# Patient Record
Sex: FEMALE | Race: BLACK OR AFRICAN AMERICAN | NOT HISPANIC OR LATINO | Employment: OTHER | URBAN - METROPOLITAN AREA
[De-identification: names, ages, dates, MRNs, and addresses within clinical notes are randomized per-mention and may not be internally consistent; named-entity substitution may affect disease eponyms.]

---

## 2017-09-11 ENCOUNTER — TELEPHONE (OUTPATIENT)
Dept: TRANSPLANT | Facility: CLINIC | Age: 52
End: 2017-09-11

## 2017-09-15 DIAGNOSIS — Z76.82 ORGAN TRANSPLANT CANDIDATE: Primary | ICD-10-CM

## 2017-09-27 ENCOUNTER — TELEPHONE (OUTPATIENT)
Dept: TRANSPLANT | Facility: CLINIC | Age: 52
End: 2017-09-27

## 2017-09-27 NOTE — TELEPHONE ENCOUNTER
Called patient to remind of appointment tomorrow in transplant clinic. Patient informed that clinic opens at 6:30. Instructed patient that if they have received forms in the mail to please have them filled out as there will be more paperwork. Patient verbalized acknowledgment.

## 2017-09-28 ENCOUNTER — TELEPHONE (OUTPATIENT)
Dept: TRANSPLANT | Facility: CLINIC | Age: 52
End: 2017-09-28

## 2017-09-28 ENCOUNTER — OFFICE VISIT (OUTPATIENT)
Dept: TRANSPLANT | Facility: CLINIC | Age: 52
End: 2017-09-28
Payer: MEDICAID

## 2017-09-28 ENCOUNTER — DOCUMENTATION ONLY (OUTPATIENT)
Dept: TRANSPLANT | Facility: CLINIC | Age: 52
End: 2017-09-28

## 2017-09-28 ENCOUNTER — HOSPITAL ENCOUNTER (OUTPATIENT)
Dept: RADIOLOGY | Facility: HOSPITAL | Age: 52
Discharge: HOME | End: 2017-09-28
Attending: NURSE PRACTITIONER
Payer: MEDICAID

## 2017-09-28 ENCOUNTER — HOSPITAL ENCOUNTER (OUTPATIENT)
Dept: RADIOLOGY | Facility: HOSPITAL | Age: 52
Discharge: HOME | End: 2017-09-28
Attending: TRANSPLANT SURGERY
Payer: MEDICAID

## 2017-09-28 ENCOUNTER — CLINICAL SUPPORT (OUTPATIENT)
Dept: INFECTIOUS DISEASES | Facility: CLINIC | Age: 52
End: 2017-09-28
Payer: MEDICAID

## 2017-09-28 VITALS
DIASTOLIC BLOOD PRESSURE: 84 MMHG | WEIGHT: 181 LBS | HEART RATE: 80 BPM | SYSTOLIC BLOOD PRESSURE: 160 MMHG | TEMPERATURE: 98 F | OXYGEN SATURATION: 98 % | RESPIRATION RATE: 17 BRPM | BODY MASS INDEX: 30.9 KG/M2 | HEIGHT: 64 IN

## 2017-09-28 DIAGNOSIS — N18.6 ESRD ON HEMODIALYSIS: ICD-10-CM

## 2017-09-28 DIAGNOSIS — E78.5 HYPERLIPIDEMIA, UNSPECIFIED HYPERLIPIDEMIA TYPE: Chronic | ICD-10-CM

## 2017-09-28 DIAGNOSIS — Z76.82 ORGAN TRANSPLANT CANDIDATE: ICD-10-CM

## 2017-09-28 DIAGNOSIS — Z99.2 ESRD ON HEMODIALYSIS: ICD-10-CM

## 2017-09-28 DIAGNOSIS — N18.6 ANEMIA IN ESRD (END-STAGE RENAL DISEASE): ICD-10-CM

## 2017-09-28 DIAGNOSIS — E11.22 TYPE 2 DIABETES MELLITUS WITH ESRD (END-STAGE RENAL DISEASE): Chronic | ICD-10-CM

## 2017-09-28 DIAGNOSIS — D63.1 ANEMIA IN ESRD (END-STAGE RENAL DISEASE): ICD-10-CM

## 2017-09-28 DIAGNOSIS — Z86.79 HX OF SINUS BRADYCARDIA: ICD-10-CM

## 2017-09-28 DIAGNOSIS — Z01.818 PRE-TRANSPLANT EVALUATION FOR CHRONIC KIDNEY DISEASE: ICD-10-CM

## 2017-09-28 DIAGNOSIS — Z01.818 PRE-TRANSPLANT EVALUATION FOR CHRONIC KIDNEY DISEASE: Primary | ICD-10-CM

## 2017-09-28 DIAGNOSIS — N18.6 TYPE 2 DIABETES MELLITUS WITH ESRD (END-STAGE RENAL DISEASE): Chronic | ICD-10-CM

## 2017-09-28 PROBLEM — E78.2 ELEVATED CHOLESTEROL WITH ELEVATED TRIGLYCERIDES: Chronic | Status: ACTIVE | Noted: 2017-09-28

## 2017-09-28 PROBLEM — E11.29 TYPE 2 DIABETES MELLITUS WITH KIDNEY COMPLICATION, WITH LONG-TERM CURRENT USE OF INSULIN: Chronic | Status: ACTIVE | Noted: 2017-09-28

## 2017-09-28 PROBLEM — Z79.4 TYPE 2 DIABETES MELLITUS WITH KIDNEY COMPLICATION, WITH LONG-TERM CURRENT USE OF INSULIN: Chronic | Status: ACTIVE | Noted: 2017-09-28

## 2017-09-28 PROCEDURE — 90632 HEPA VACCINE ADULT IM: CPT | Mod: PBBFAC,TXP

## 2017-09-28 PROCEDURE — 3008F BODY MASS INDEX DOCD: CPT | Mod: TXP,,, | Performed by: NURSE PRACTITIONER

## 2017-09-28 PROCEDURE — 99205 OFFICE O/P NEW HI 60 MIN: CPT | Mod: S$PBB,TXP,, | Performed by: NURSE PRACTITIONER

## 2017-09-28 PROCEDURE — 72170 X-RAY EXAM OF PELVIS: CPT | Mod: TC,TXP

## 2017-09-28 PROCEDURE — 76770 US EXAM ABDO BACK WALL COMP: CPT | Mod: TC,TXP

## 2017-09-28 PROCEDURE — 71020 XR CHEST PA AND LATERAL: CPT | Mod: TC,TXP

## 2017-09-28 PROCEDURE — 99204 OFFICE O/P NEW MOD 45 MIN: CPT | Mod: S$PBB,TXP,, | Performed by: TRANSPLANT SURGERY

## 2017-09-28 PROCEDURE — 71020 XR CHEST PA AND LATERAL: CPT | Mod: 26,TXP,, | Performed by: RADIOLOGY

## 2017-09-28 PROCEDURE — 99214 OFFICE O/P EST MOD 30 MIN: CPT | Mod: PBBFAC,25,TXP | Performed by: NURSE PRACTITIONER

## 2017-09-28 PROCEDURE — 90472 IMMUNIZATION ADMIN EACH ADD: CPT | Mod: PBBFAC,TXP

## 2017-09-28 PROCEDURE — 90670 PCV13 VACCINE IM: CPT | Mod: PBBFAC,TXP

## 2017-09-28 PROCEDURE — 76770 US EXAM ABDO BACK WALL COMP: CPT | Mod: 26,TXP,, | Performed by: RADIOLOGY

## 2017-09-28 PROCEDURE — 72170 X-RAY EXAM OF PELVIS: CPT | Mod: 26,TXP,, | Performed by: RADIOLOGY

## 2017-09-28 PROCEDURE — 99204 OFFICE O/P NEW MOD 45 MIN: CPT | Mod: S$PBB,TXP,, | Performed by: PHYSICIAN ASSISTANT

## 2017-09-28 PROCEDURE — 99999 PR PBB SHADOW E&M-EST. PATIENT-LVL IV: CPT | Mod: PBBFAC,TXP,, | Performed by: NURSE PRACTITIONER

## 2017-09-28 RX ORDER — LISINOPRIL 10 MG/1
10 TABLET ORAL DAILY
COMMUNITY
End: 2019-02-26

## 2017-09-28 RX ORDER — AMLODIPINE BESYLATE 10 MG/1
10 TABLET ORAL DAILY
COMMUNITY
End: 2020-08-19 | Stop reason: ALTCHOICE

## 2017-09-28 RX ORDER — FUROSEMIDE 40 MG/1
40 TABLET ORAL 2 TIMES DAILY
Status: ON HOLD | COMMUNITY
End: 2021-07-24 | Stop reason: SDUPTHER

## 2017-09-28 RX ORDER — ASPIRIN 81 MG/1
81 TABLET ORAL DAILY
COMMUNITY
End: 2018-04-12

## 2017-09-28 RX ORDER — CARVEDILOL 3.12 MG/1
25 TABLET ORAL 2 TIMES DAILY WITH MEALS
Status: ON HOLD | COMMUNITY
End: 2020-09-09 | Stop reason: CLARIF

## 2017-09-28 RX ORDER — GABAPENTIN 100 MG/1
100 CAPSULE ORAL 3 TIMES DAILY
COMMUNITY
End: 2020-08-19 | Stop reason: ALTCHOICE

## 2017-09-28 RX ORDER — AMOXICILLIN 500 MG
1 CAPSULE ORAL DAILY
Status: ON HOLD | COMMUNITY
End: 2021-07-23

## 2017-09-28 NOTE — PROGRESS NOTES
Transplant Nephrology  Kidney Transplant Recipient Evaluation    Referring Physician: Karlos Perry  Current Nephrologist: Karlos Perry    Subjective:   CC:  Initial evaluation of kidney transplant candidacy.    HPI:  Ms. Hall is a 52 y.o. year old Black or  female who has presented to be evaluated as a potential kidney transplant recipient.  She has ESRD secondary to diabetic nephropathy.  Patient is currently on hemodialysis started on  2017. Patient is dialyzing on MWF schedule.  Patient reports that she is tolerating dialysis well.. She has a chest catheter for dialysis access and LUE av fistula, which is healing.     Previous Transplant: no    Past Medical and Surgical History: Ms. Hall  has a past medical history of Anemia; Arrhythmia; Bradycardia; Disorder of kidney and ureter; Hyperlipidemia; Hypertension; Obesity; and Preeclampsia.  She has a past surgical history that includes  section and Tubal ligation.    Past Social and Family History: Ms. Hall reports that she has never smoked. She has never used smokeless tobacco. She reports that she does not drink alcohol or use drugs. Her family history includes Cancer in her mother, sister, and sister; Diabetes in her brother and sister; Lung cancer in her sister; Ovarian cancer in her mother, sister, and sister.      Diabetes since   Takes Lantus    A1   1 miscarriage at age 16. Feels it was d/t stress  preeclampsia with one pregnancy  1 c section, 1 vaginal delivery    Bradycardic episode when initially starting dialysis   She was sent to the ED at [facility]  2017       Tries to walk 2x/week on the treadmill at a friend's apartment. She will walk for at least 20 minutes.    Denies chest pain, SOB, leg pain.  Does not appear frail    No donors at this time    Past Medical History:   Diagnosis Date    Anemia     Arrhythmia     Bradycardia     Disorder of kidney and ureter     Hyperlipidemia      "Hypertension     Obesity     Preeclampsia        Review of Systems   Constitutional: Positive for fatigue. Negative for activity change, appetite change, chills, fever and unexpected weight change.   HENT: Negative for congestion, facial swelling, postnasal drip, rhinorrhea, sinus pressure, sore throat and trouble swallowing.    Eyes: Negative for pain, redness and visual disturbance.   Respiratory: Negative for cough, chest tightness, shortness of breath and wheezing.    Cardiovascular: Negative.  Negative for chest pain, palpitations and leg swelling.   Gastrointestinal: Negative for abdominal pain, diarrhea, nausea and vomiting.   Genitourinary: Negative for dysuria, flank pain and urgency.        Does not have menstrual cycles    Musculoskeletal: Negative for gait problem, neck pain and neck stiffness.   Skin: Negative for rash.   Allergic/Immunologic: Negative for environmental allergies, food allergies and immunocompromised state.   Neurological: Negative for dizziness, weakness, light-headedness and headaches.   Psychiatric/Behavioral: Negative for agitation and confusion. The patient is not nervous/anxious.        Objective:   Blood pressure (!) 160/84, pulse 80, temperature 97.8 °F (36.6 °C), temperature source Oral, resp. rate 17, height 5' 4.17" (1.63 m), weight 82.1 kg (181 lb), SpO2 98 %.body mass index is 30.9 kg/m².    Physical Exam   Constitutional: She is oriented to person, place, and time. She appears well-developed and well-nourished.   HENT:   Head: Normocephalic.   Mouth/Throat: Oropharynx is clear and moist. She has dentures. No oropharyngeal exudate.   Eyes: Conjunctivae and EOM are normal. Pupils are equal, round, and reactive to light. No scleral icterus.   Neck: Normal range of motion. Neck supple.   Cardiovascular: Normal rate, regular rhythm and normal heart sounds.    Pulmonary/Chest: Effort normal and breath sounds normal.       Abdominal: Soft. Normal appearance and bowel sounds are " normal. She exhibits no distension and no mass. There is no splenomegaly or hepatomegaly. There is no tenderness. There is no rebound, no guarding, no CVA tenderness, no tenderness at McBurney's point and negative Reeder's sign.   Musculoskeletal: Normal range of motion. She exhibits no edema.        Arms:  Lymphadenopathy:     She has no cervical adenopathy.   Neurological: She is alert and oriented to person, place, and time. She exhibits normal muscle tone. Coordination normal.   Skin: Skin is warm and dry.   Psychiatric: She has a normal mood and affect. Her behavior is normal.   Vitals reviewed.      Labs:  Lab Results   Component Value Date    WBC 8.34 09/28/2017    HGB 11.7 (L) 09/28/2017    HCT 37.1 09/28/2017     09/28/2017    K 3.8 09/28/2017    CL 97 09/28/2017    CO2 28 09/28/2017    BUN 36 (H) 09/28/2017    CREATININE 4.1 (H) 09/28/2017    EGFRNONAA 11.6 (A) 09/28/2017    CALCIUM 9.8 09/28/2017    PHOS 4.1 09/28/2017    ALBUMIN 3.5 09/28/2017    AST 11 09/28/2017    ALT 6 (L) 09/28/2017     Lab Results   Component Value Date    CHOL 273 (H) 09/28/2017     Lab Results   Component Value Date    HDL 39 (L) 09/28/2017     Lab Results   Component Value Date    LDLCALC Invalid, Trig>400.0 09/28/2017     Lab Results   Component Value Date    TRIG 490 (H) 09/28/2017     Lab Results   Component Value Date    CHOLHDL 14.3 (L) 09/28/2017         No results found for: PREALBUMIN, BILIRUBINUA, GGT, AMYLASE, LIPASE, PROTEINUA, NITRITE, RBCUA, WBCUA    No results found for: HLAABCTYPE    Labs were reviewed with the patient.    Assessment:     1. Pre-transplant evaluation for chronic kidney disease    2. ESRD on hemodialysis    3. Organ transplant candidate    4. Type 2 diabetes mellitus with ESRD (end-stage renal disease)    5. Hyperlipidemia, unspecified hyperlipidemia type    6. Hx of sinus bradycardia    7. Anemia in ESRD (end-stage renal disease)        Plan:   A1C  Lab Results   Component Value Date     HGBA1C 9.3 (H) 09/28/2017      Fax labs to dialysis/nephrologist  --> needs better cholesterol mgmt and better glycemic control        Records-->Bradycardic episode when initially starting dialysis . ED at [facility]  7/5/2017  cardiology clearance    GYN--> strong family HX ovarian cancer, mother and 2 sisters      Kidney allocation scheme was also discussed with the patient.  Patient was advised that the average wait time for a kidney in [place] is 3-5 years     Kidney donor profile index (KPDI) and estimated post-transplant survival scores (EPTS) were reviewed. The benefits and risks of accepting a kidney with KDPI > 85% were explained to the patient. Patient verbalized understanding and consented to accept a kidney with KDPI > 85%       The side effects of immunosuppressants were reviewed that include but are not limited to the risk of infection, the risks of cancer (skin and lymphoma being most common), the risk of diabetes associated with prednisone use, acceleration of heart disease and diarrhea( this being more common post transplant).     Reviewed the hospital stay.  Reviewed the post transplant follow up.  Reviewed the importance of maintaining a good weight.  Reviewed the importance of controlling BP.  Reviewed the importance of following the renal diet.      Transplant Candidacy:   Based on available information, Ms. Hall is a suitable kidney transplant candidate.  Final determination of transplant candidacy will be made once workup is complete and reviewed by the selection committee.    ORLANDO Flores Patient Status  Functional Status: 80% - Normal activity with effort: some symptoms of disease  Physical Capacity: No Limitations

## 2017-09-28 NOTE — PROGRESS NOTES
INITIAL PATIENT EDUCATION NOTE    Ms. Jael Hall was seen in pre-kidney transplant clinic for evaluation for kidney, kidney/pancreas or pancreas only transplant.  The patient attended a group education session that discussed/reviewed the following aspects of transplantation: evaluation and selection committee process, UNOS waitlist management/multiple listings, types of organs offered (KDPI < 85%, KDPI > 85%, PHS increased risk, DCD), financial aspects, surgical procedures, dietary instruction pre- and post-transplant, health maintenance pre- and post-transplant, post-transplant hospitalization and outpatient follow-up, potential to participate in a research protocol, and medication management and side effects.  A question and answer session was provided after the presentation.    The patient was seen by all members of the multi-disciplinary team to include: Nephrologist/PA, Surgeon, , Transplant Coordinator, , Pharmacist and Dietician (if applicable).    The patient reviewed and signed all consents for evaluation which were witnessed and sent to scanning into the EPIC chart.    The patient was given an education book and plan for further evaluation based on her individual assessment.      The patient was encouraged to call with any questions or concerns.

## 2017-09-28 NOTE — TELEPHONE ENCOUNTER
Reviewed pt transplant labs.  Notified dialysis unit dietitian of the following abnormal labs via fax.     Cholesterol    273mg/dl    Triglycerides   490mg/dl          Natalee Cespedes MS RD LDN

## 2017-09-28 NOTE — PROGRESS NOTES
Transplant Recipient Adult Psychosocial Assessment    Jael Hall  [address] [facility] [address] [address]    Telephone Information:   Mobile [phone]   Home  [phone] (home)  Work  There is no work phone number on file.  E-mail  No e-mail address on record    Sex: female  YOB: 1965  Age: 52 y.o.    Encounter Date: 9/28/2017  [place] Citizen: yes  Primary Language: English   Needed: no    Emergency Contact:  Name: Sudha Hall  Relationship: daughter  Address: same as pt  Phone Numbers:   [phone]    Family/Social Support:   Number of dependents/: 0  Marital history: one marriage, ended in divorce  Other family dynamics: pt has one son-Emory Hall-25    Household Composition:  Name: Sudha Hall  Age: 27  Relationship: son  Does person drive? yes      Do you and your caregivers have access to reliable transportation? yes  PRIMARY CAREGIVER: Sudha Hall will be primary caregiver, phone number [phone].      provided in-depth information to patient and caregiver regarding pre- and post-transplant caregiver role.   strongly encourages patient and caregiver to have concrete plan regarding post-transplant care giving, including back-up caregiver(s) to ensure care giving needs are met as needed.    Patient and Caregiver states understanding all aspects of caregiver role/commitment and is able/willing/committed to being caregiver to the fullest extent necessary.    Patient and Caregiver verbalizes understanding of the education provided today and caregiver responsibilities.         remains available. Patient and Caregiver agree to contact  in a timely manner if concerns arise.      Able to take time off work without financial concerns: yes. Pt's daughter reports that she will have to rotate w/ her brother so she can go to work.     Additional Significant Others who will Assist with Transplant:  Name: Emory Hall  Age:  25  City: [place] State: [place]  Relationship: son  Does person drive? yes      Living Will: no  Healthcare Power of : no  Advance Directives on file: <<no information> per medical record.  Verbally reviewed LW/HCPA information.   provided patient with copy of LW/HCPA documents and provided education on completion of forms.    Living Donors: No.    Highest Education Level: High School (9-12) or GED  Reading Ability: 12th grade  Reports difficulty with: N/A pt reports she does need reading glasses to read.  Learns Best By:  Multisensory      Status: no  VA Benefits: no     Working for Income: No  If no, reason not working: Disability    Patient is disabled.  Prior to disability, patient  was employed as  at [employer]..    Spouse/Significant Other Employment: n/a    Disabled: yes: date disability began: 17, due to: ESRD.    Monthly Income:   Disability: $685 starting in October  Able to afford all costs now and if transplanted, including medications: no Pt reports that her medications are affordable right now. SW encouraged pt to get a medication supplement now to assist with the potential cost of her transplant medication. Pt confirmed she understood and will begin process asap.   Patient and Caregiver verbalizes understanding of personal responsibilities related to transplant costs and the importance of having a financial plan to ensure that patients transplant costs are fully covered.      provided fundraising information/education.  Patient and Caregiver verbalizes understanding.   remains available.    Insurance:   Payor/Plan Subscr  Sex Relation Sub. Ins. ID Effective Group Num   1. MEDICAID - [place]* TANYA VELASQUEZ 1965 Female  [id number]* 16                                    [address]     Primary Insurance (for UNOS reporting): Public Insurance - Medicaid  Secondary Insurance (for UNOS reporting): None  Patient  and Caregiver verbalizes clear understanding that patient may experience difficulty obtaining and/or be denied insurance coverage post-surgery. This includes and is not limited to disability insurance, life insurance, health insurance, burial insurance, long term care insurance, and other insurances.    Patient and Caregiver also reports understanding that future health concerns related to or unrelated to transplantation may not be covered by patient's insurance.  Resources and information provided and reviewed.      Patient and Caregiver provides verbal permission to release any necessary information to outside resources for patient care and discharge planning.  Resources and information provided are reviewed.      Dialysis Adherence:  Patient reports high adherence.  Please see separate note for adherence check.     Infusion Service: patient utilizing? no  Home Health: patient utilizing? no  DME: yes BPC and glucometor  Pulmonary/Cardiac Rehab: pt denies   ADLS:  Pt confirms she can perform all ADLs. Pt reports she does not drive, but not due to any medical reasons.     Adherence:   Pt reports high adherence to all medical advice.  Adherence education and counseling provided.     Per History Section:History reviewed. No pertinent past medical history.  Social History   Substance Use Topics    Smoking status: Not on file    Smokeless tobacco: Not on file    Alcohol use Not on file     History   Drug use: Unknown     History   Sexual Activity    Sexual activity: Not on file       Per Today's Psychosocial:  Tobacco: none, patient denies any use.  Alcohol: none, patient denies any use.  Illicit Drugs/Non-prescribed Medications: none, patient denies any use.    Patient and Caregiver states clear understanding of the potential impact of substance use as it relates to transplant candidacy and is aware of possible random substance screening.  Substance abstinence/cessation counseling, education and resources provided  and reviewed.     Arrests/DWI/Treatment/Rehab: patient denies    Psychiatric History:    Mental Health: pt denies any previous or current dx of any mental health disorders.  Psychiatrist/Counselor: pt denies  Medications:  Pt denies  Suicide/Homicide Issues: pt denies any previous SI or HI   Safety at home: pt confirms feeling safe at home.     Knowledge: Patient and Caregiver states having clear understanding and realistic expectations regarding the potential risks and potential benefits of organ transplantation and organ donation, agrees to discuss with health care team members and support system members and to utilize available resources and express questions and/or concerns in order to further facilitate the pt informed decision-making.  Resources and information provided and reviewed.     Patient and Caregiver is aware of [facility] affiliation and/or partnership with agencies in home health care, LTAC, SNF, [facility], and other hospitals and clinics.    Understanding: Patient and Caregiver reports having a clear understanding of the many lifetime commitments involved with being a transplant recipient, including costs, compliance, medications, lab work, procedures, appointments, concrete and financial planning, preparedness, timely and appropriate communication of concerns, abstinence (ETOH, tobacco, illicit non-prescribed drugs), adherence to all health care team recommendations, support system and caregiver involvement, appropriate and timely resource utilization and follow-through, mental health counseling as needed/recommended, and patient and caregiver responsibilities.  Social Service Handbook, resources and detailed educational information provided and reviewed.  Educational information provided.    Patient and Caregiver also reports current and expected compliance with health care regime and states having a clear understanding of the importance of compliance.      Patient and Caregiver reports a clear  understanding that risks and benefits may be involved with organ transplantation and with organ donation.      Patient and Caregiver also reports clear understanding that psychosocial risk factors may affect patient, and include but are not limited to feelings of depression, generalized anxiety, anxiety regarding dependence on others, post traumatic stress disorder, feelings of guilt and other emotional and/or mental concerns, and/or exacerbation of existing mental health concerns.  Detailed resources provided and discussed.     Patient and Caregiver agrees to access appropriate resources in a timely manner as needed and/or as recommended, and to communicate concerns appropriately.  Patient and Caregiver also reports a clear understanding of treatment options available.      reviewed education, provided additional information, and answered questions.    Feelings or Concerns: pt denies any current feelings or concerns    Coping: pt reports that she likes to attend [employer] and [employer] and also like to lorenzo.    Goals: Pt's main goal is remain off dialysis and have a more independent life. Pt reports she would like to go back to work.  Patient referred to Vocational Rehabilitation.    Interview Behavior: Patient and Caregiver presents as alert and oriented x 4, pleasant, good eye contact, well groomed, recall good, concentration/judgement good, average intelligence, calm, communicative, cooperative and asking and answering questions appropriately.          Transplant Social Work - Candidacy  Assessment/Plan:     Psychosocial Suitability: Patient presents as a suitable candidate for kidney transplant at this time. Based on psychosocial risk factors, patient presents as medium risk, due to current insurance and coverage. SW explained to pt the importance of having a supplemental insurance for potential cost of medication. Pt confirm she understood and will be applying for aid. Other than coverage, pt is  a suitable candidate due to lack of psychosocial concerns, stable caregiver plan, reliable transportation and supportive family. .    Recommendations/Additional Comments: ANNA recommends that pt conduct fundraising to assist pt with pay for cost of medication, food,gas, and other transplant related expenses. SW recommends that pt remain free of all tobacco,ETOH, and substance use. SW remains available to assist with any concerns that may arise as pt navigates through the transplant process.      Jeanna Pack LMSW

## 2017-09-28 NOTE — LETTER
September 29, 2017        Karlos Perry  [address]  [address]  [facility] [address]  Phone: [phone]  Fax: [phone]             [facility]  [address]  [facility] [address]  Phone: [phone]   Patient: Jael Hall   MR Number: [id number]   YOB: 1965   Date of Visit: 9/28/2017       Dear Dr. Karlos Perry    Thank you for referring Jael Hall to me for evaluation. Attached you will find relevant portions of my assessment and plan of care.    If you have questions, please do not hesitate to call me. I look forward to following Jael Hall along with you.    Sincerely,    Natalee Resendiz, NP    Enclosure    If you would like to receive this communication electronically, please contact [email or web address] or [phone] to request [employer] Link access.    [employer] Link is a tool which provides read-only access to select patient information with whom you have a relationship. Its easy to use and provides real time access to review your patients record including encounter summaries, notes, results, and demographic information.    If you feel you have received this communication in error or would no longer like to receive these types of communications, please e-mail [email or web address]

## 2017-09-28 NOTE — PROGRESS NOTES
Transplant Surgery  Kidney Transplant Recipient Evaluation    Referring Physician: Karlos Perry  Current Nephrologist: Karlos Perry    Subjective:     Reason for Visit: evaluate transplant candidacy    History of Present Illness: Jael Hall is a 52 y.o. year old female undergoing transplant evaluation.    Dialysis History: Jael is on hemodialysis.      Transplant History: N/A    Etiology of Renal Disease: Diabetes Mellitus - Type II (based on medical records from referral).    Review of Systems   Constitutional: Negative for activity change, appetite change, chills and fever.   Respiratory: Negative for cough and shortness of breath.    Gastrointestinal: Negative for abdominal distention, constipation, diarrhea, nausea and vomiting.   Genitourinary: Negative for difficulty urinating and dyspareunia.   Musculoskeletal: Negative.    Skin: Negative for color change and rash.   Allergic/Immunologic: Negative for immunocompromised state.   Neurological: Negative for dizziness and headaches.   Psychiatric/Behavioral: Negative.        Objective:     Physical Exam:  Constitutional:   Vitals reviewed: yes   Well-nourished and well-groomed: yes  Eyes:   Sclerae icteric: no   Extraocular movements intact: yes  GI:    Bowel sounds normal: yes   Tenderness: no    If yes, quadrant/location: not applicable   Palpable masses: no    If yes, quadrant/location: not applicable   Hepatosplenomegaly: no   Ascites: no   Hernia: no    If yes, type/location: not applicable   Surgical scars: yes    If yes, type/location: lower midline - prior   not applicable  Resp:   Effort normal: yes   Breath sounds normal: yes    CV:   Regular rate and rhythm: yes   Heart sounds normal: yes   Femoral pulses normal: yes   Extremities edematous: no  Skin:   Rashes or lesions present: no    If yes, describe:not applicable   Jaundice:: no    Musculoskeletal:   Gait normal: yes   Strength normal: yes  Psych:   Oriented to person, place, and  time: yes   Affect and mood normal: yes    Additional comments: not applicable    Counseling: We provided Jael Hall with a group education session today.  We discussed kidney transplantation at length with her, including risks, potential complications, and alternatives in the management of her renal failure.  The discussion included complications related to anesthesia, bleeding, infection, primary nonfunction, and ATN.  I discussed the typical postoperative course, length of hospitalization, the need for long-term immunosuppression, and the need for long-term routine follow-up.  I discussed living-donor and -donor transplantation and the relative advantages and disadvantages of each.  I also discussed average waiting times for both living donation and  donation.  I discussed national and center-specific survival rates.  I also mentioned the potential benefit of multicenter listing to candidates listed with centers within more than one organ procurement organization.  All questions were answered.    Final determination of transplant candidacy will be made once evaluation is complete and reviewed by the Kidney & Kidney/Pancreas Selection Committee.         Transplant Surgery - Candidacy   Assessment/Plan:   Jael Hall has end stage renal disease (ESRD) on dialysis. I see no surgical contraindication to placing a kidney transplant. Based on available information, Jael Hall is a suitable kidney transplant candidate.     Gvain Saleh MD

## 2017-09-28 NOTE — PROGRESS NOTES
Pt received the Hepatitis A, Tdap, and Prevnar 13 vaccinations. Pt tolerated injections well. Pt observed for 15 minutes. No reaction. Return appointment made for the final dose of the Hepatitis A vaccination series. Pt left the unit in NAD.

## 2017-09-28 NOTE — PROGRESS NOTES
Pre Transplant Infectious Diseases Consult  Kidney Transplant Recipient Evaluation    Requesting Physician: Kidney Transplant team    Reason for Visit:    Chief Complaint   Patient presents with    Kidney Transplant Pre-evaluation     History of Present Illness  Jael Hall is a 52 y.o. year old Black or  female with advanced Kidney disease currently being evaluated for Kidney transplant.  She has been on HD since 8/2017.  Patient denies any recent fever, chills, or infective illnesses.      1) Do you have a history of:   YES NO   Diabetes      [x] []     Diabetic Foot Infection/Bone Infection  []        [x]     Surgical Removal of Spleen   []  [x]                  2) Have you had recurrent infections involving:             YES NO  Sinus infections  []         [x]   Sore Throat   []         [x]                 Prostate Infections  []         []              Bladder Infections  []         [x]                     Kidney Infections  []         [x]                               Intestinal Infections  []         [x]      Skin Infections   []         [x]       Reproductive Infections          []  [x]   Periodontal Disease  []         [x]        3)Have you ever had: YES     NO UNKNOWN      Chicken Pox   []         [x]  []   Shingles   []         [x]  []   Orolabial Herpes             []  [x]  []   Genital Herpes  []         [x]  []   Cytomegalovirus  []         [x]  []   Bairon-Barr Virus  []         [x]  []   Genital Warts   []         [x]  []   Hepatitis A   []         [x]  []   Hepatitis B   []         [x]  []   Hepatitis C   []         [x]  []   Syphilis   []         [x]  []   Gonorrhea   []         [x]  []   Pelvic Inflammatory   Disease   []         [x]  []   Chlamydia Infection  []         [x]  []   Intestinal parasites   or worms   []         [x]  []   Fungal Infections  []         [x]  []   Blood Infections  []         [x]  []       Comment:       4) Have you ever been exposed   YES NO  To  someone with tuberculosis?  []   [x]   If yes, what treatment did you receive:     5) What states have you lived in? [place]    6) What countries have you visited for more than 2 weeks?   None                      YES NO  7) Did you have any associated infections?  []  [x]       8) Are you planning to travel outside the    []  [x]   [place] after your transplant?    9) Household                   YES NO  Do you have pets living in your house?    []         [x]   If yes, describe:     Do you spend time or live on a farm or     []         [x]   have livestock or other farm animals?  If yes, which ones:    Do you have a fish tank?          []  [x]       Do you have a litter box?      []         [x]     Do you fish or hunt?       []         [x]     Do you clean or skin fish or animals?    []         [x]     Do you consume raw or undercooked    []         [x]   meat, fish, or shellfish?      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          12)Do you garden or otherwise  YES NO   work in the soil?    []         [x]   13)Do you hike, camp, or spend     time in wooded areas?   []         [x]        14) The patient's immunization history was reviewed.    Have you ever received:  YES NO UNKNOWN DATES   Routine Childhood vaccines  [x]         []  []      Influenza vaccine   [x]  []  []2017    Pneumonia    [x]  []  [] 1/23/17    Tetanus-diptheria   []         [x]  []    Hepatitis A vaccine series       []  [x]  []    Hepatitis B vaccine series         [x]  []  [] Dialysis   Meningitis vaccine   []         [x]  []    Varicella vaccine   []         [x]  []        Based on the patients immunization history and serologies, immunizations were ordered:         Ordered  Not Ordered  Influenza Vaccine     []    []   Hepatitis A series at 0,  6 months   [x]    []   Hepatitis B seriesat 0, 1, and 6 months  []    [x]   Hepatitis B High Dose 0,1, and 6 months  []    [x]   Prevnar      [x]     []   Pneumovax      []    [x]    TDap       [x]    []    Zoster       []    [x]   Menactra      []    [x]            The patient was encouraged to contact us about any problems that may develop after immunization and possible side effects were reviewed.      Previous Transplant: no    Etiology of Kidney Disease: diabetic nephropathy and HTN    Allergies: Review of patient's allergies indicates no known allergies.    There is no immunization history on file for this patient.  History reviewed. No pertinent past medical history.  History reviewed. No pertinent surgical history.   Social History     Social History    Marital status: Single     Spouse name: N/A    Number of children: N/A    Years of education: N/A     Occupational History    Not on file.     Social History Main Topics    Smoking status: Not on file    Smokeless tobacco: Not on file    Alcohol use Not on file    Drug use: Unknown    Sexual activity: Not on file     Other Topics Concern    Not on file     Social History Narrative    No narrative on file       Review of Systems   Constitution: Negative for chills, decreased appetite, fever, weakness, malaise/fatigue, night sweats, weight gain and weight loss.   HENT: Negative for congestion, ear pain, hearing loss, hoarse voice, sore throat and tinnitus.    Eyes: Negative for blurred vision, redness and visual disturbance.   Cardiovascular: Negative for chest pain, leg swelling and palpitations.   Respiratory: Negative for cough, hemoptysis, shortness of breath, sputum production and wheezing.    Endocrine: Negative for cold intolerance and heat intolerance.   Hematologic/Lymphatic: Negative for adenopathy. Does not bruise/bleed easily.   Skin: Negative for dry skin, itching, rash and suspicious lesions.   Musculoskeletal: Negative for back pain, joint pain, myalgias and neck pain.   Gastrointestinal: Negative for abdominal pain, constipation, diarrhea, heartburn, nausea and vomiting.    Genitourinary: Negative for dysuria, flank pain, frequency, hematuria, hesitancy and urgency.   Neurological: Negative for dizziness, headaches, numbness and paresthesias.   Psychiatric/Behavioral: Negative for depression and memory loss. The patient does not have insomnia and is not nervous/anxious.    Allergic/Immunologic: Negative for environmental allergies, HIV exposure, hives and persistent infections.     Physical Exam   Constitutional: She is oriented to person, place, and time. She appears well-developed and well-nourished. No distress.       HENT:   Head: Normocephalic and atraumatic.   Mouth/Throat: She has dentures (uppers). No oral lesions. Abnormal dentition (some missing lower teeth). No dental abscesses, lacerations or dental caries.       Eyes: EOM are normal. Pupils are equal, round, and reactive to light.   Neck: Normal range of motion. Neck supple.   Cardiovascular: Normal rate, regular rhythm and normal heart sounds.  Exam reveals no gallop and no friction rub.    No murmur heard.  Pulmonary/Chest: Effort normal and breath sounds normal. No respiratory distress. She has no wheezes. She has no rales.   Abdominal: Soft. Bowel sounds are normal. She exhibits no distension and no mass. There is no tenderness. There is no guarding.   Musculoskeletal: She exhibits no edema.   Lymphadenopathy:        Head (right side): No submental, no submandibular, no tonsillar, no preauricular, no posterior auricular and no occipital adenopathy present.        Head (left side): No submental, no submandibular, no tonsillar, no preauricular, no posterior auricular and no occipital adenopathy present.     She has no cervical adenopathy.        Right cervical: No superficial cervical, no deep cervical and no posterior cervical adenopathy present.       Left cervical: No superficial cervical, no deep cervical and no posterior cervical adenopathy present.     She has no axillary adenopathy.        Right axillary: No  pectoral and no lateral adenopathy present.        Left axillary: No pectoral and no lateral adenopathy present.       Right: No inguinal, no supraclavicular and no epitrochlear adenopathy present.        Left: No inguinal, no supraclavicular and no epitrochlear adenopathy present.   Neurological: She is alert and oriented to person, place, and time.   Skin: Skin is warm and dry. She is not diaphoretic.   Psychiatric: She has a normal mood and affect. Her behavior is normal.     Diagnostics: No results found for: RPR  No results found for: CMVANTIBODIE  No results found for: HIV1X2  No results found for: HTLVIIIANTIB  No results found for: HEPBSAG  No results found for: HEPBCAB  No results found for: HEPCAB  No results found for: TOXOIGG  No components found for: TOXOIGGINTER  No results found for: IFP5ETB  No results found for: UER7MHA  No results found for: VARICELLAZOS  No results found for: VARICELLAINT  No results found for: STRONGANTIGG  No results found for: EPSTEINBARRV  No results found for: HEPBSAB  No results found for: QUANTIFERON  No results found for: HEPAIGM  No results found for: PPD  No results found for this or any previous visit.         Transplant Infectious Diseases - Candidacy    Assessment/Plan:     Transplant Candidacy: Based on available information, there are no identified significant barriers to transplantation from an infectious disease standpoint pending acceptable serologies.  Refer to ID clinic for any serologies that require further evaluation. It is recommended the patient have their broken tooth treated prior to transplant.  Final determination of transplant candidacy will be made once evaluation is complete and reviewed by the Transplant Selection Committee.    IRIS Jin  Vaccine Needs:  1. Hep A today and 6 months  2. Prevnar 1/2018  4. TDap       Counseling:I discussed with Jael the risk for increased susceptibility to infections following transplantation including  increased risk for infection right after transplant and if rejection should occur.  The patients has been counseled on the importance of vaccinations including but not limited to a yearly flu vaccine.  Specific guidance has been provided to the patient regarding the patients occupation, hobbies and activities to avoid future infectious complications including but not limited to avoiding undercooked meats and seafood, proper hygiene, and contact with animals.

## 2017-09-28 NOTE — PROGRESS NOTES
PHARM.D. PRE-TRANSPLANT NOTE:    This patient's medication therapy was evaluated as part of her pre-transplant evaluation.    The following pharmacologic concerns were noted: None      Current Outpatient Prescriptions   Medication Sig Dispense Refill    amlodipine (NORVASC) 10 MG tablet Take 10 mg by mouth once daily.      aspirin (ECOTRIN) 81 MG EC tablet Take 81 mg by mouth once daily.      carvedilol (COREG) 3.125 MG tablet Take 3.125 mg by mouth 2 (two) times daily with meals.      fish oil-omega-3 fatty acids 300-1,000 mg capsule Take 1 g by mouth once daily.      furosemide (LASIX) 40 MG tablet Take 40 mg by mouth once daily.      gabapentin (NEURONTIN) 100 MG capsule Take 100 mg by mouth 3 (three) times daily.      lisinopril 10 MG tablet Take 10 mg by mouth once daily.       No current facility-administered medications for this visit.          Currently the patient is responsible for preparing / administering this patient's medications on a daily basis.  I am available for consultation and can be contacted, as needed by the other members of the Kidney Transplant team.

## 2017-09-28 NOTE — NURSING
Labs faxed to dialysis unit and Nephrologist Office.    Notes Recorded by Natalee Resendiz NP on 9/28/2017 at 12:09 PM CDT  Results reviewed, action needed.  Fax labs to dialysis/nephrologist concerning  Better glycemic control

## 2017-09-29 ENCOUNTER — TELEPHONE (OUTPATIENT)
Dept: TRANSPLANT | Facility: CLINIC | Age: 52
End: 2017-09-29

## 2017-09-29 NOTE — TELEPHONE ENCOUNTER
SW attempted to conduct adherence check for pt. SW was unable to speak with someone, voice mail left. SW remains available.

## 2017-10-11 ENCOUNTER — TELEPHONE (OUTPATIENT)
Dept: TRANSPLANT | Facility: CLINIC | Age: 52
End: 2017-10-11

## 2017-10-11 DIAGNOSIS — N18.6 ESRD ON HEMODIALYSIS: Primary | ICD-10-CM

## 2017-10-11 DIAGNOSIS — Z76.82 ORGAN TRANSPLANT CANDIDATE: Primary | ICD-10-CM

## 2017-10-11 DIAGNOSIS — Z99.2 ESRD ON HEMODIALYSIS: Primary | ICD-10-CM

## 2017-10-11 DIAGNOSIS — Z76.82 ORGAN TRANSPLANT CANDIDATE: ICD-10-CM

## 2017-10-11 NOTE — TELEPHONE ENCOUNTER
----- Message from Toney Mancilla RN sent at 10/11/2017 10:00 AM CDT -----  Natalee,     Can you put in an order for a colonoscopy for the patient here at [facility]?    Thanks,   Toney

## 2017-10-17 ENCOUNTER — HOSPITAL ENCOUNTER (OUTPATIENT)
Dept: CARDIOLOGY | Facility: CLINIC | Age: 52
Discharge: HOME | End: 2017-10-17
Payer: MEDICARE

## 2017-10-17 ENCOUNTER — CLINICAL SUPPORT (OUTPATIENT)
Dept: CARDIOLOGY | Facility: CLINIC | Age: 52
End: 2017-10-17
Payer: MEDICARE

## 2017-10-17 DIAGNOSIS — Z76.82 ORGAN TRANSPLANT CANDIDATE: ICD-10-CM

## 2017-10-17 DIAGNOSIS — I36.9 NONRHEUMATIC TRICUSPID VALVE DISORDER: ICD-10-CM

## 2017-10-17 LAB
DIASTOLIC DYSFUNCTION: NO
DIASTOLIC DYSFUNCTION: YES
ESTIMATED PA SYSTOLIC PRESSURE: 35.49
RETIRED EF AND QEF - SEE NOTES: 65 (ref 55–65)
TRICUSPID VALVE REGURGITATION: ABNORMAL

## 2017-10-17 PROCEDURE — 93016 CV STRESS TEST SUPVJ ONLY: CPT | Mod: S$PBB,TXP,, | Performed by: INTERNAL MEDICINE

## 2017-10-17 PROCEDURE — 93018 CV STRESS TEST I&R ONLY: CPT | Mod: S$PBB,TXP,, | Performed by: INTERNAL MEDICINE

## 2017-10-17 PROCEDURE — 93306 TTE W/DOPPLER COMPLETE: CPT | Mod: PBBFAC,TXP | Performed by: INTERNAL MEDICINE

## 2017-10-17 PROCEDURE — 78452 HT MUSCLE IMAGE SPECT MULT: CPT | Mod: 26,S$PBB,TXP, | Performed by: INTERNAL MEDICINE

## 2017-10-17 PROCEDURE — A9502 TC99M TETROFOSMIN: HCPCS | Mod: PBBFAC,TXP | Performed by: INTERNAL MEDICINE

## 2017-10-19 ENCOUNTER — TELEPHONE (OUTPATIENT)
Dept: TRANSPLANT | Facility: CLINIC | Age: 52
End: 2017-10-19

## 2017-10-19 ENCOUNTER — OFFICE VISIT (OUTPATIENT)
Dept: CARDIOLOGY | Facility: CLINIC | Age: 52
End: 2017-10-19
Payer: MEDICARE

## 2017-10-19 VITALS
WEIGHT: 186.31 LBS | HEART RATE: 95 BPM | OXYGEN SATURATION: 97 % | DIASTOLIC BLOOD PRESSURE: 59 MMHG | BODY MASS INDEX: 31.81 KG/M2 | HEIGHT: 64 IN | SYSTOLIC BLOOD PRESSURE: 129 MMHG

## 2017-10-19 DIAGNOSIS — Z99.2 ESRD ON HEMODIALYSIS: ICD-10-CM

## 2017-10-19 DIAGNOSIS — Z01.810 PREOP CARDIOVASCULAR EXAM: Primary | ICD-10-CM

## 2017-10-19 DIAGNOSIS — Z86.79 HX OF SINUS BRADYCARDIA: ICD-10-CM

## 2017-10-19 DIAGNOSIS — E78.2 ELEVATED CHOLESTEROL WITH ELEVATED TRIGLYCERIDES: Chronic | ICD-10-CM

## 2017-10-19 DIAGNOSIS — N18.6 ESRD ON HEMODIALYSIS: ICD-10-CM

## 2017-10-19 PROCEDURE — 99204 OFFICE O/P NEW MOD 45 MIN: CPT | Mod: S$PBB,TXP,, | Performed by: NURSE PRACTITIONER

## 2017-10-19 PROCEDURE — 99999 PR PBB SHADOW E&M-EST. PATIENT-LVL IV: CPT | Mod: PBBFAC,TXP,, | Performed by: NURSE PRACTITIONER

## 2017-10-19 PROCEDURE — 99214 OFFICE O/P EST MOD 30 MIN: CPT | Mod: PBBFAC,TXP | Performed by: NURSE PRACTITIONER

## 2017-10-19 NOTE — TELEPHONE ENCOUNTER
Nutritional assessment from dialysis unit received and reviewed--no nutritional changes to plan needed at this time.  Pt to continue to follow-up with renal dietitians recommendations.      Natalee Cespedes MS RD LDN

## 2017-10-19 NOTE — PROGRESS NOTES
Ms. Hall is new to [facility].      Subjective:   Patient ID:  Jael Hall is a 52 y.o. female who presents for evaluation of Pre-op Exam (kidney transplant)  .   HPI:    Ms. Hall is a 51yo female with HTN, HLD, DM, ESRD (ESRD secondary to diabetic nephropathy; HD MWF started on 7/5/2017, fistula in left arm, currently using dialysis cath in right chest wall) here for pre-operative cardiac risk stratification prior to kidney transplant. She says she feels well. Ms. Hall denies chest pain with exertion or at rest, palpitations, SOB, DELATORRE, dizziness, syncope, leg edema, claudication, PND, or orthopnea. She had one bradycardic episode when first starting dialysis but has been asymptomatic since then. She is active and experiences no limitations in activity tolerance. She is currently taking ASA 81mg, amlodipine 10mg, carvedilol 3.125mg BID, lasix 40mg daily, and lisinopril 10mg daily. Blood pressures are well controlled. Creatinine is 4.1 - on insulin and dialysis. HA1C 9.3. Triglycerides 490. Echo on 10/17/2017 showed EF of 60%, no WMA, grade 2 diastolic dysfunction, and mild TR. NM stress on 10/17/2017 was negative for ischemia.     Recent Cardiac Tests:    2D Echo (10/17/2017):  CONCLUSIONS     1 - Normal left ventricular systolic function (EF 60-65%).     2 - No wall motion abnormalities.     3 - Mild left atrial enlargement.     4 - Impaired LV relaxation, elevated LAP (grade 2 diastolic dysfunction).     5 - Normal right ventricular systolic function .     6 - The estimated PA systolic pressure is 35 mmHg.     7 - Mild tricuspid regurgitation.     NM Stress test (10/17/2017):  NORMAL MYOCARDIAL PERFUSION  1. The perfusion scan is free of evidence for myocardial ischemia or injury.   2. Resting wall motion is physiologic.   3. Visually estimated LV function is normal.   4. The ventricular volumes are normal at rest and stress.   5. The extracardiac distribution of radioactivity is normal.   6. There was  "no previous study available to compare.    Current Outpatient Prescriptions   Medication Sig    amlodipine (NORVASC) 10 MG tablet Take 10 mg by mouth once daily.    aspirin (ECOTRIN) 81 MG EC tablet Take 81 mg by mouth once daily.    carvedilol (COREG) 3.125 MG tablet Take 3.125 mg by mouth 2 (two) times daily with meals.    fish oil-omega-3 fatty acids 300-1,000 mg capsule Take 1 g by mouth once daily.    furosemide (LASIX) 40 MG tablet Take 40 mg by mouth once daily.    gabapentin (NEURONTIN) 100 MG capsule Take 100 mg by mouth 3 (three) times daily.    lisinopril 10 MG tablet Take 10 mg by mouth once daily.     No current facility-administered medications for this visit.      Review of Systems   Constitution: Negative for malaise/fatigue.   Eyes: Negative for blurred vision.   Cardiovascular: Negative for chest pain, claudication, dyspnea on exertion, irregular heartbeat, leg swelling, orthopnea, palpitations, paroxysmal nocturnal dyspnea and syncope.   Respiratory: Negative for shortness of breath.    Hematologic/Lymphatic: Negative for bleeding problem.   Skin: Negative for rash.   Musculoskeletal: Negative for myalgias.   Gastrointestinal: Negative for abdominal pain, constipation, diarrhea and nausea.   Genitourinary: Negative for hematuria.   Neurological: Negative for headaches, loss of balance and numbness.   Psychiatric/Behavioral: Negative for altered mental status.   Allergic/Immunologic: Negative for persistent infections.     Objective:     Right Arm BP - Sittin/59 (10/19/17 1604)  BP (!) 129/59 (BP Location: Right arm, Patient Position: Sitting, BP Method: X-Large (Automatic))   Pulse 95   Ht 5' 4" (1.626 m)   Wt 84.5 kg (186 lb 4.6 oz)   SpO2 97%   BMI 31.98 kg/m²     Physical Exam   Constitutional: She is oriented to person, place, and time. She appears well-developed and well-nourished.   HENT:   Head: Normocephalic.   Nose: Nose normal.   Eyes: Pupils are equal, round, and " reactive to light.   Neck: No JVD present. Carotid bruit is not present.   Cardiovascular: Normal rate, regular rhythm, S1 normal and S2 normal.  Exam reveals no gallop.    No murmur heard.  Pulses:       Carotid pulses are 2+ on the right side, and 2+ on the left side.       Radial pulses are 2+ on the right side, and 2+ on the left side.        Dorsalis pedis pulses are 2+ on the right side, and 2+ on the left side.   Pulmonary/Chest: Breath sounds normal. No respiratory distress.   Dialysis catheter in right upper chest wall.   Abdominal: Soft. Bowel sounds are normal. She exhibits no distension. There is no tenderness.   Musculoskeletal: Normal range of motion. She exhibits no edema.   Neurological: She is alert and oriented to person, place, and time.   Skin: Skin is warm and dry. No erythema.   JESE fistula healing   Psychiatric: She has a normal mood and affect. Her speech is normal and behavior is normal.   Nursing note and vitals reviewed.    Lab Results   Component Value Date     09/28/2017    K 3.8 09/28/2017    CL 97 09/28/2017    CO2 28 09/28/2017    BUN 36 (H) 09/28/2017    CREATININE 4.1 (H) 09/28/2017     (H) 09/28/2017    HGBA1C 9.3 (H) 09/28/2017    AST 11 09/28/2017    ALT 6 (L) 09/28/2017    ALBUMIN 3.5 09/28/2017    PROT 9.1 (H) 09/28/2017    BILITOT 0.4 09/28/2017    WBC 8.34 09/28/2017    HGB 11.7 (L) 09/28/2017    HCT 37.1 09/28/2017    MCV 77 (L) 09/28/2017     09/28/2017    CHOL 273 (H) 09/28/2017    HDL 39 (L) 09/28/2017    LDLCALC Invalid, Trig>400.0 09/28/2017    TRIG 490 (H) 09/28/2017         Recent Labs  Lab 09/28/17  0653   INR 1.0      Test(s) Reviewed  I have reviewed the following in detail:  [] Stress test   [] Angiography   [] Echocardiogram   [x] Labs   [] Other:         Assessment:         1. Preop cardiovascular exam. Patient has a Revised Cardiac Risk Index (RCRI) score of 3 (surgery, insulin, creatinine) with an 11% risk for major cardiac event. She has  no angina, heart failure, or unstable arrhythmia. She can walk up a flight of stairs without stopping for angina or dyspnea. Regadenoson stress test negative for ischemia. No further cardiovascular testing is required prior to proceeding to the operating room.     2. Elevated cholesterol with elevated triglycerides. Patient has elevated triglycerides and a total cholesterol of 273. Strongly recommend that patient be initiated on statin therapy for lipid management.      3. ESRD on hemodialysis. MWF schedule.     4. Hx of sinus bradycardia. No subsequent episodes.     Plan:     Jael was seen today for pre-op exam.    Diagnoses and all orders for this visit:    Preop cardiovascular exam    Elevated cholesterol with elevated triglycerides    ESRD on hemodialysis    Hx of sinus bradycardia      No additional cardiac testing needed prior to proceeding with surgery.     Return if symptoms worsen or fail to improve.    ------------------------------------------------------------------    JAMSHID Barber, NP-C  Consult Cardiology

## 2017-10-19 NOTE — LETTER
October 19, 2017      Natalee Resendiz, NP  [address] [facility] [address]           [facility] - Cardiology  [address]  [place] [address]  Phone: [phone]          Patient: Jael Hall   MR Number: [id number]   YOB: 1965   Date of Visit: 10/19/2017       Dear Natalee Resendiz:    Thank you for referring Jael Hall to me for evaluation. Attached you will find relevant portions of my assessment and plan of care.    If you have questions, please do not hesitate to call me. I look forward to following Jael Hall along with you.    Sincerely,    Airam Stone, [place]    Enclosure  CC:  No Recipients    If you would like to receive this communication electronically, please contact [email or web address] or [phone] to request more information on [employer] Link access.    For providers and/or their staff who would like to refer a patient to [facility], please contact us through our one-stop-shop provider referral line, [facility], at [phone].    If you feel you have received this communication in error or would no longer like to receive these types of communications, please e-mail [email or web address]

## 2018-01-09 ENCOUNTER — HOSPITAL ENCOUNTER (OUTPATIENT)
Dept: RADIOLOGY | Facility: HOSPITAL | Age: 53
Discharge: HOME | End: 2018-01-09
Attending: NURSE PRACTITIONER
Payer: MEDICARE

## 2018-01-09 DIAGNOSIS — Z76.82 ORGAN TRANSPLANT CANDIDATE: ICD-10-CM

## 2018-01-09 PROCEDURE — 93978 VASCULAR STUDY: CPT | Mod: TC,TXP

## 2018-01-09 PROCEDURE — 74176 CT ABD & PELVIS W/O CONTRAST: CPT | Mod: TC,TXP

## 2018-01-09 PROCEDURE — 93978 VASCULAR STUDY: CPT | Mod: 26,GC,TXP, | Performed by: RADIOLOGY

## 2018-01-09 PROCEDURE — 74176 CT ABD & PELVIS W/O CONTRAST: CPT | Mod: 26,GC,TXP, | Performed by: RADIOLOGY

## 2018-01-11 ENCOUNTER — TELEPHONE (OUTPATIENT)
Dept: VASCULAR SURGERY | Facility: CLINIC | Age: 53
End: 2018-01-11

## 2018-01-11 DIAGNOSIS — N18.6 END STAGE RENAL FAILURE ON DIALYSIS: Primary | ICD-10-CM

## 2018-01-11 DIAGNOSIS — Z99.2 END STAGE RENAL FAILURE ON DIALYSIS: Primary | ICD-10-CM

## 2018-01-11 NOTE — TELEPHONE ENCOUNTER
----- Message from Sowmya Diallo RN sent at 1/11/2018  2:44 PM CST -----  Contact: Pt       ----- Message -----  From: Margarita Mancilla  Sent: 1/11/2018   2:36 PM  To: [facility] Vascular Surgery Clinical Support    Pt is requesting a call back in regards to scheduling an appt for a second opinion due to issues with her arm.       Pt can be contacted at [phone]

## 2018-01-16 DIAGNOSIS — Z76.82 ORGAN TRANSPLANT CANDIDATE: Primary | ICD-10-CM

## 2018-01-25 ENCOUNTER — OFFICE VISIT (OUTPATIENT)
Dept: OBSTETRICS AND GYNECOLOGY | Facility: CLINIC | Age: 53
End: 2018-01-25
Payer: MEDICARE

## 2018-01-25 VITALS
WEIGHT: 184.94 LBS | SYSTOLIC BLOOD PRESSURE: 128 MMHG | HEIGHT: 64 IN | DIASTOLIC BLOOD PRESSURE: 82 MMHG | BODY MASS INDEX: 31.57 KG/M2

## 2018-01-25 DIAGNOSIS — Z78.0 POSTMENOPAUSE: ICD-10-CM

## 2018-01-25 DIAGNOSIS — Z01.419 VISIT FOR GYNECOLOGIC EXAMINATION: Primary | ICD-10-CM

## 2018-01-25 DIAGNOSIS — D25.9 UTERINE LEIOMYOMA, UNSPECIFIED LOCATION: ICD-10-CM

## 2018-01-25 DIAGNOSIS — N94.89 ADNEXAL MASS: ICD-10-CM

## 2018-01-25 DIAGNOSIS — Z80.41 FAMILY HISTORY OF OVARIAN CANCER: ICD-10-CM

## 2018-01-25 PROCEDURE — 99213 OFFICE O/P EST LOW 20 MIN: CPT | Mod: PBBFAC | Performed by: NURSE PRACTITIONER

## 2018-01-25 PROCEDURE — 99999 PR PBB SHADOW E&M-EST. PATIENT-LVL III: CPT | Mod: PBBFAC,,, | Performed by: NURSE PRACTITIONER

## 2018-01-25 PROCEDURE — 88175 CYTOPATH C/V AUTO FLUID REDO: CPT

## 2018-01-25 PROCEDURE — G0101 CA SCREEN;PELVIC/BREAST EXAM: HCPCS | Mod: PBBFAC | Performed by: NURSE PRACTITIONER

## 2018-01-25 PROCEDURE — G0101 CA SCREEN;PELVIC/BREAST EXAM: HCPCS | Mod: S$PBB,,, | Performed by: NURSE PRACTITIONER

## 2018-01-25 RX ORDER — INSULIN GLARGINE 100 [IU]/ML
INJECTION, SOLUTION SUBCUTANEOUS
Refills: 1 | COMMUNITY
Start: 2017-11-27 | End: 2020-08-17

## 2018-01-25 RX ORDER — LANCETS
EACH MISCELLANEOUS
Refills: 2 | Status: ON HOLD | COMMUNITY
Start: 2017-12-06 | End: 2020-09-09

## 2018-01-25 RX ORDER — INSULIN ASPART 100 [IU]/ML
5 INJECTION, SOLUTION INTRAVENOUS; SUBCUTANEOUS
Refills: 1 | Status: ON HOLD | COMMUNITY
Start: 2017-11-27 | End: 2020-09-11 | Stop reason: HOSPADM

## 2018-01-25 RX ORDER — BLOOD SUGAR DIAGNOSTIC
STRIP MISCELLANEOUS
Refills: 6 | Status: ON HOLD | COMMUNITY
Start: 2017-12-06 | End: 2020-09-09

## 2018-01-25 RX ORDER — PEN NEEDLE, DIABETIC 30 GX5/16"
NEEDLE, DISPOSABLE MISCELLANEOUS
Refills: 5 | Status: ON HOLD | COMMUNITY
Start: 2017-11-27 | End: 2020-09-09 | Stop reason: CLARIF

## 2018-01-25 RX ORDER — GLUCOSAM/CHON-MSM1/C/MANG/BOSW 500-416.6
TABLET ORAL
Refills: 5 | Status: ON HOLD | COMMUNITY
Start: 2017-11-28 | End: 2020-09-09 | Stop reason: CLARIF

## 2018-01-25 RX ORDER — HYDROCODONE BITARTRATE AND ACETAMINOPHEN 7.5; 325 MG/1; MG/1
TABLET ORAL
Refills: 0 | COMMUNITY
Start: 2017-12-20 | End: 2018-02-08 | Stop reason: SDUPTHER

## 2018-01-25 RX ORDER — HYDROCODONE BITARTRATE AND ACETAMINOPHEN 10; 325 MG/1; MG/1
TABLET ORAL
COMMUNITY
Start: 2018-01-16 | End: 2018-04-12

## 2018-01-25 NOTE — LETTER
January 25, 2018      Natalee Resendiz, [place]  [address] [facility] [address]           [facility] - OB/GYN 5th Floor  [address]  [place] [address]  Phone: [phone]          Patient: Jael Hall   MR Number: [id number]   YOB: 1965   Date of Visit: 1/25/2018       Dear Natalee Resendiz:    Thank you for referring Jael Hall to me for evaluation. Attached you will find relevant portions of my assessment and plan of care.    If you have questions, please do not hesitate to call me. I look forward to following Jael Hall along with you.    Sincerely,    PAXTON Mata NP    Enclosure  CC:  No Recipients    If you would like to receive this communication electronically, please contact [email or web address] or [phone] to request more information on [employer] Link access.    For providers and/or their staff who would like to refer a patient to [facility], please contact us through our one-stop-shop provider referral line, [facility], at [phone].    If you feel you have received this communication in error or would no longer like to receive these types of communications, please e-mail [email or web address]

## 2018-01-25 NOTE — PROGRESS NOTES
HISTORY OF PRESENT ILLNESS:    Jael Hall is a 52 y.o. female No obstetric history on file., presents for a pre-kidney transplant routine exam and has no gyn complaints.    -Main c/o is left arm pain, numbness, swelling from repeated vascular access attempts ([facility])- has appointment with Vascular Surgeon at [facility] for second opinion about Peritoneal Dialysis.  -Had fibroids and adnexal mass found on abd CT - pt asymptomatic.    DATA REVIEWED:  10-11-17  ABD - PELVIC CT:  Reproductive organs: The uterus is prominent for patient age.  There is a 3.3 cm hypodense lesion in the LEFT pelvis along the LEFT wall of the uterus possibly originating from the LEFT adnexa or uterus, consider obtaining pelvic ultrasound for further characterization.     Past Medical History:   Diagnosis Date    Anemia     Arrhythmia     Bradycardia     Disorder of kidney and ureter     Hyperlipidemia     Hypertension     Obesity     Preeclampsia        Past Surgical History:   Procedure Laterality Date     SECTION      x1    TUBAL LIGATION      Vascular access surgeries      x5        MEDICATIONS AND ALLERGIES:      Current Outpatient Prescriptions:     ACCU-CHEK KRISTI PLUS TEST STRP Strp, TEST QID, Disp: , Rfl: 6    ACCU-CHEK SOFTCLIX LANCETS Misc, U QID, Disp: , Rfl: 2    ALCOHOL ANTISEPTIC PADS (SURE COMFORT ALCOHOL PREP PADS TOP), , Disp: , Rfl:     amlodipine (NORVASC) 10 MG tablet, Take 10 mg by mouth once daily., Disp: , Rfl:     aspirin (ECOTRIN) 81 MG EC tablet, Take 81 mg by mouth once daily., Disp: , Rfl:     carvedilol (COREG) 3.125 MG tablet, Take 3.125 mg by mouth 2 (two) times daily with meals., Disp: , Rfl:     fish oil-omega-3 fatty acids 300-1,000 mg capsule, Take 1 g by mouth once daily., Disp: , Rfl:     furosemide (LASIX) 40 MG tablet, Take 40 mg by mouth once daily., Disp: , Rfl:     gabapentin (NEURONTIN) 100 MG capsule, Take 100 mg by mouth 3 (three) times daily., Disp: , Rfl:      "hydrocodone-acetaminophen 10-325mg (NORCO)  mg Tab, , Disp: , Rfl:     hydrocodone-acetaminophen 7.5-325mg (NORCO) 7.5-325 mg per tablet, TK 1 T PO  Q 6 H PRN P, Disp: , Rfl: 0    LANTUS SOLOSTAR 100 unit/mL (3 mL) InPn pen, INJECT 40 UNITS SC QHS WITH DOSE ADJUSTMENT UP OR DOWN BY 2 UNITS Q 3 DAYS. TTD 50 UNITS, Disp: , Rfl: 1    lisinopril 10 MG tablet, Take 10 mg by mouth once daily., Disp: , Rfl:     NOVOLOG FLEXPEN 100 unit/mL InPn pen, INJECT 5 UNITS SC D WITH DINNER, Disp: , Rfl: 1    SURE COMFORT PEN NEEDLE 30 gauge x 5/16" Ndle, U BID, Disp: , Rfl: 5    TRUEPLUS LANCETS 30 gauge Misc, U QID TO CHECK BLOOD SUGAR, Disp: , Rfl: 5    Review of patient's allergies indicates:  No Known Allergies    Family History   Problem Relation Age of Onset    Cancer Mother     Ovarian cancer Mother     Cancer Sister     Ovarian cancer Sister     Lung cancer Sister     Diabetes Brother     Cancer Sister     Ovarian cancer Sister     Diabetes Sister     Breast cancer Neg Hx     Colon cancer Neg Hx        Social History     Social History    Marital status: Single     Spouse name: N/A    Number of children: N/A    Years of education: N/A     Occupational History    Not on file.     Social History Main Topics    Smoking status: Never Smoker    Smokeless tobacco: Never Used    Alcohol use No    Drug use: No    Sexual activity: Yes     Partners: Male     Birth control/ protection: Post-menopausal     Other Topics Concern    Not on file     Social History Narrative    No narrative on file       OB HISTORY: Number of SAB:1 Number of vaginal deliveries:1 Number of C/S: 1, Pre-eclampsia    COMPREHENSIVE GYN HISTORY:  PAP History:  Denies abnormal Paps.  Infection History: Denies STDs. Denies PID.  Benign History: Reports uterine fibroids. Denies ovarian cysts. Denies endometriosis.  Denies other conditions.  Cancer History: Denies cervical cancer. Denies uterine cancer or hyperplasia. Denies ovarian " "cancer. Denies vulvar cancer or pre-cancer. Denies vaginal cancer or pre-cancer. Denies breast cancer. Denies colon cancer.  Sexual Activity History: Denies currently being sexually active  Menstrual History: Denies menses. Pt is  (age 50) not on HRT.     ROS:  GENERAL: No weight changes. No swelling. No fatigue. No fever.  CARDIOVASCULAR: No chest pain. No shortness of breath. No leg cramps.   NEUROLOGICAL: No headaches. No vision changes.  BREASTS: No pain. No lumps. No discharge.  ABDOMEN: No pain. No nausea. No vomiting. No diarrhea. No constipation.  REPRODUCTIVE: No abnormal bleeding.   VULVA: No pain. No lesions. No itching.  VAGINA: No relaxation. No itching. No odor. No discharge. No lesions.  URINARY: No incontinence. No nocturia. No frequency. No dysuria.    /82   Ht 5' 4" (1.626 m)   Wt 83.9 kg (184 lb 15.5 oz)   BMI 31.75 kg/m²     PE:  APPEARANCE: Well nourished, well developed, in no acute distress.  AFFECT: WNL, alert and oriented x 3.  SKIN: No hirsutism or acne.  NECK: Neck symmetric without masses or thyromegaly.  NODES: No inguinal, cervical, axillary or femoral lymph node enlargement.  CHEST: Good respiratory effort. CATHETER RIGHT CHEST WALL.  ABDOMEN: Soft. No tenderness or masses. OBESE.  BREASTS: Symmetrical, no skin changes or visible lesions. No palpable masses, nipple discharge bilaterally.  PELVIC: ATROPHIC EXTERNAL FEMALE GENITALIA without lesions. Normal hair distribution. Adequate perineal body, normal urethral meatus. VAGINA DRY / ATROPHIC without lesions or discharge. CERVIX STENOTIC without lesions, discharge or tenderness. No significant cystocele or rectocele. Bimanual exam shows uterus to be ? 10 week size, regular, mobile and nontender. Adnexa without masses or tenderness.  RECTAL: Rectovaginal exam confirms above with normal sphincter tone, no masses.  EXTREMITIES: No edema.    DIAGNOSIS:  1. Visit for gynecologic examination    2. Postmenopause    3. Uterine " "leiomyoma, unspecified location    4. Adnexal mass found on CT    5. Family history of ovarian cancer        PLAN:    Orders Placed This Encounter    US Pelvis Comp with Transvag NON-OB (xpd    Liquid-based pap smear, screening   States "cannot have a mammogram until the catheter in her right chest is removed".  Needs colonoscopy    COUNSELING:  The patient was counseled today on:  -that a CT is not the best evaluation of uterus and ovaries, so will get a pelvic ultrasound;  -osteoporosis prevention, calcium supplementation, regular weight bearing exercise;  -A.C.S. Pap and pelvic exam guidelines (pap every 3 years, no pap after age 65) and recommendations for yearly mammogram;  -to see her PCP for other health maintenance.    FOLLOW-UP with me pending test results and annually.     "

## 2018-01-30 ENCOUNTER — HOSPITAL ENCOUNTER (OUTPATIENT)
Dept: VASCULAR SURGERY | Facility: CLINIC | Age: 53
Discharge: HOME | End: 2018-01-30
Attending: SURGERY
Payer: MEDICARE

## 2018-01-30 ENCOUNTER — INITIAL CONSULT (OUTPATIENT)
Dept: VASCULAR SURGERY | Facility: CLINIC | Age: 53
End: 2018-01-30
Payer: MEDICARE

## 2018-01-30 VITALS
SYSTOLIC BLOOD PRESSURE: 139 MMHG | BODY MASS INDEX: 31.24 KG/M2 | TEMPERATURE: 98 F | HEART RATE: 91 BPM | WEIGHT: 183 LBS | DIASTOLIC BLOOD PRESSURE: 83 MMHG | HEIGHT: 64 IN

## 2018-01-30 DIAGNOSIS — N18.6 ESRD ON HEMODIALYSIS: Primary | ICD-10-CM

## 2018-01-30 DIAGNOSIS — Z99.2 ESRD (END STAGE RENAL DISEASE) ON DIALYSIS: ICD-10-CM

## 2018-01-30 DIAGNOSIS — Z99.2 ESRD ON HEMODIALYSIS: Primary | ICD-10-CM

## 2018-01-30 DIAGNOSIS — N18.6 ESRD (END STAGE RENAL DISEASE) ON DIALYSIS: ICD-10-CM

## 2018-01-30 DIAGNOSIS — Z99.2 END STAGE RENAL FAILURE ON DIALYSIS: ICD-10-CM

## 2018-01-30 DIAGNOSIS — N18.6 END STAGE RENAL FAILURE ON DIALYSIS: ICD-10-CM

## 2018-01-30 DIAGNOSIS — Z12.11 SPECIAL SCREENING FOR MALIGNANT NEOPLASMS, COLON: Primary | ICD-10-CM

## 2018-01-30 PROCEDURE — 99999 PR PBB SHADOW E&M-EST. PATIENT-LVL III: CPT | Mod: PBBFAC,TXP,, | Performed by: SURGERY

## 2018-01-30 PROCEDURE — 93990 DOPPLER FLOW TESTING: CPT | Mod: 26,S$PBB,TXP, | Performed by: SURGERY

## 2018-01-30 PROCEDURE — 99213 OFFICE O/P EST LOW 20 MIN: CPT | Mod: PBBFAC,TXP | Performed by: SURGERY

## 2018-01-30 PROCEDURE — 93990 DOPPLER FLOW TESTING: CPT | Mod: PBBFAC,TXP | Performed by: SURGERY

## 2018-01-30 PROCEDURE — 99204 OFFICE O/P NEW MOD 45 MIN: CPT | Mod: S$PBB,TXP,, | Performed by: SURGERY

## 2018-01-30 RX ORDER — POLYETHYLENE GLYCOL 3350, SODIUM SULFATE ANHYDROUS, SODIUM BICARBONATE, SODIUM CHLORIDE, POTASSIUM CHLORIDE 236; 22.74; 6.74; 5.86; 2.97 G/4L; G/4L; G/4L; G/4L; G/4L
4 POWDER, FOR SOLUTION ORAL ONCE
Qty: 4000 ML | Refills: 0 | Status: SHIPPED | OUTPATIENT
Start: 2018-01-30 | End: 2018-01-30

## 2018-01-30 NOTE — PROGRESS NOTES
HISTORY OF PRESENT ILLNESS:  A 52-year-old female with end-stage renal disease,   dialyzing via right IJ PermCath.  She is here for a second opinion regarding AV   access.    She has undergone 4 different AV access procedures in the left arm at [place]   over the last six months starting with a left Michael and then appeared to be a   brachiocephalic fistula with an upper arm bridge graft and then an upper arm   through loop in the axilla.  Evidently none of these were functional.    She is now set to have a contralateral (right) Michael AV fistula at [place]   this coming Friday.    She has no history of thrombophilia or DVT.    She is being evaluated for kidney transplantation.    PAST MEDICAL HISTORY:  1.  End-stage renal disease as above.  2.  Hypertension.  3.  Hyperlipidemia.    PAST SURGICAL HISTORY:  1.  Multiple AV access procedures at [place] as above.  2.  .    FAMILY HISTORY:  Father with diabetes and ovarian cancer.    SOCIAL HISTORY:  She is a nonsmoker.    MEDICATIONS:  Include aspirin.  See EPIC for full list.    ALLERGIES:  None.    REVIEW OF SYSTEMS:  Denies postprandial pain or DVT.    All other systems including eyes, ENT, respiratory, , musculoskeletal, skin,   breast, allergy and immune are negative.    PHYSICAL EXAMINATION:  VITAL SIGNS:  See nursing note.  GENERAL:  She is in no acute distress.  RESPIRATORY:  Normal effort.  Clear to auscultation.  CARDIOVASCULAR:  Regular rhythm.  Nondisplaced PMI.  No murmur.  EXTREMITY:  Left arm shows multiple thrombosed graft/fistula as detailed in the   HPI.  Right arm shows nonballottable veins.  NEUROLOGIC:  Cranial nerves VII through XII are intact, 5/5 in motor strength in   all extremities.    IMAGING:  Duplex of the left arm show multiple occluded access graft.    ASSESSMENT:  The patient with end-stage renal disease with four different   accesses on the left arm, which were all unsuccessful.    I agree with plans for an  autogenous right Michael fistula, assuming that the   vein is adequate in size on preoperative imaging.  She states that she does   believe that she had vein mapping at [place].    It would not be unreasonable to consider hypercoagulable evaluation as well.    PLAN:  I have asked her to see me if she wishes us to pursue surgical options or   other evaluation here at [facility].    At this time, she is plan on proceeding with this Michael AV fistula at   [place].      LIZ  dd: 01/30/2018 11:08:16 (CST)  td: 01/31/2018 07:28:00 (CST)  Doc ID   #[id number]  Job ID #[id number]    CC:

## 2018-02-01 ENCOUNTER — HOSPITAL ENCOUNTER (OUTPATIENT)
Dept: RADIOLOGY | Facility: HOSPITAL | Age: 53
Discharge: HOME | End: 2018-02-01
Attending: NURSE PRACTITIONER
Payer: MEDICARE

## 2018-02-01 DIAGNOSIS — Z76.82 ORGAN TRANSPLANT CANDIDATE: ICD-10-CM

## 2018-02-01 PROCEDURE — 76700 US EXAM ABDOM COMPLETE: CPT | Mod: 26,GC,TXP, | Performed by: RADIOLOGY

## 2018-02-01 PROCEDURE — 76700 US EXAM ABDOM COMPLETE: CPT | Mod: TC,TXP

## 2018-02-08 ENCOUNTER — PROCEDURE VISIT (OUTPATIENT)
Dept: HEPATOLOGY | Facility: CLINIC | Age: 53
End: 2018-02-08
Attending: INTERNAL MEDICINE
Payer: MEDICARE

## 2018-02-08 ENCOUNTER — OFFICE VISIT (OUTPATIENT)
Dept: HEPATOLOGY | Facility: CLINIC | Age: 53
End: 2018-02-08
Payer: MEDICARE

## 2018-02-08 VITALS
HEART RATE: 92 BPM | DIASTOLIC BLOOD PRESSURE: 79 MMHG | OXYGEN SATURATION: 96 % | RESPIRATION RATE: 18 BRPM | TEMPERATURE: 97 F | HEIGHT: 64 IN | SYSTOLIC BLOOD PRESSURE: 152 MMHG | BODY MASS INDEX: 31.84 KG/M2 | WEIGHT: 186.5 LBS

## 2018-02-08 DIAGNOSIS — R16.0 LIVER MASS: ICD-10-CM

## 2018-02-08 DIAGNOSIS — R16.0 HEPATOMEGALY: Primary | ICD-10-CM

## 2018-02-08 DIAGNOSIS — R16.0 HEPATOMEGALY: ICD-10-CM

## 2018-02-08 DIAGNOSIS — K76.0 FATTY LIVER DISEASE, NONALCOHOLIC: ICD-10-CM

## 2018-02-08 PROCEDURE — 91200 LIVER ELASTOGRAPHY: CPT | Mod: 26,S$PBB,TXP, | Performed by: INTERNAL MEDICINE

## 2018-02-08 PROCEDURE — 99214 OFFICE O/P EST MOD 30 MIN: CPT | Mod: PBBFAC,TXP,25 | Performed by: INTERNAL MEDICINE

## 2018-02-08 PROCEDURE — 91200 LIVER ELASTOGRAPHY: CPT | Mod: PBBFAC,TXP | Performed by: INTERNAL MEDICINE

## 2018-02-08 PROCEDURE — 99204 OFFICE O/P NEW MOD 45 MIN: CPT | Mod: S$PBB,TXP,, | Performed by: INTERNAL MEDICINE

## 2018-02-08 PROCEDURE — 99999 PR PBB SHADOW E&M-EST. PATIENT-LVL IV: CPT | Mod: PBBFAC,TXP,, | Performed by: INTERNAL MEDICINE

## 2018-02-08 NOTE — PROCEDURES
Procedures   Vibration-controlled Transient Elastography Procedure     Name: Jael Hall  Date of Procedure : 2018   :: Aman Anderson MD  Diagnosis: NAFLD    Probe: XL    Universal Protocol: Patient's identity, procedure and site were verified, confirmatory pause was performed.  Discussed procedure including risks and potential complications.  Questions answered.  Patient verbalizes understanding and wishes to proceed with VCTE.     Procedure: After providing explanations of the procedure, patient was placed in the supine position with right arm in maximum abduction to allow optimal exposure of right lateral abdomen.  Patient was briefly assessed, Testing was performed in the mid-axillary location, 50Hz Shear Wave pulses were applied and the resulting Shear Wave and Propagation Speed detected with a 3.5 MHz ultrasonic signal, using the FibroScan probe, Skin to liver capsule distance and liver parenchyma were accessed during the entire examination with the FibroScan probe, Patient was instructed to breathe normally and to abstain from sudden movements during the procedure, allowing for random measurements of liver stiffness. At least 10 Shear Waves were produced, Individual measurements of each Shear Wave were calculated.  Patient tolerated the procedure well with no complications.  Meets discharge criteria as was dismissed.  Rates pain 0 out of 10.  Patient will follow up with ordering provider to review results.      Findings  Median liver stiffness score: 6.2 KPa  CAP readin dB/m      Interpretation  Fibrosis interpretation is based on medial liver stiffness - Kilopascal (kPa).     Fibrosis stage: F 0-1     Steatosis interpretation is based on controlled attenuation parameter - (dB/m).    Steatosis grade: S1       Aman Anderson MD  Staff Physician  Transplant Hepatology  [facility]

## 2018-02-08 NOTE — LETTER
February 9, 2018      Natalee Resendiz, NP  [address] [facility] [address]           [facility] - Hepatology  [address]  [place] [address]  Phone: [phone]  Fax: [phone]          Patient: Jael Hall   MR Number: [id number]   YOB: 1965   Date of Visit: 2/8/2018       Dear Natalee Resendiz:    Thank you for referring Jael Hall to me for evaluation. Attached you will find relevant portions of my assessment and plan of care.    If you have questions, please do not hesitate to call me. I look forward to following Jael Hall along with you.    Sincerely,    Aman Anderson MD    Enclosure  CC:  No Recipients    If you would like to receive this communication electronically, please contact [email or web address] or [phone] to request more information on [employer] Link access.    For providers and/or their staff who would like to refer a patient to [facility], please contact us through our one-stop-shop provider referral line, [facility], at [phone].    If you feel you have received this communication in error or would no longer like to receive these types of communications, please e-mail [email or web address]

## 2018-02-08 NOTE — PROGRESS NOTES
Hepatology Consult Note    Referring provider: Natalee Resendiz    Chief complaint:   Chief Complaint   Patient presents with    Abnormal Abdominal/Liver Imaging       HPI:  Jael Hall is a pleasant 52 y.o. femalewho was referred to Hepatology Clinic for consultation of had concerns including Abnormal Abdominal/Liver Imaging..      Background:  Patient has ESRD and has been on hemodialysis in last 6 months. She is not certain the etiology of her chronic kidney disease. She is being evaluated for kidney transplantation at [facility].   She is referred to Hepatology for hypodensities seen in her liver on non-contrast CT and ultrasound.     As regards to liver disease,  - The patient reports no symptoms of hepatitis including malaise or flu-like symptoms to suggest a flare.  - The patient reports no new manifestations of portal hypertension including ascites, edema, GI bleeding, or hepatic encephalopathy to suggest liver decompensation.  - The patient reports no fevers/chills or pruritis to suggest biliary disease.    LFTs: normal    US 1/16/18:  Liver:  Large in size measuring 20.7 cm.  The liver demonstrates homogeneous echotexture.  Hypoechoic lesion within the left lobe measuring 1.7 x 0.9 x 1.5 cm, recommend MRI study for further evaluation. There is arteriovenous fistula.    CT w/o contrast (1/9/18)  Liver: Normal in size and attenuation, with no focal hepatic lesions. Lobulated liver with a 2.5 hepatic nodule along the inferior border of the liver with small calcification along its inferior border.  2 hypodensities in the LEFT and RIGHT hepatic lobe, too small to characterize, consider obtaining ultrasound for further characterization if clinically warranted.    Component      Latest Ref Rng & Units 9/28/2017   Hep. B Surf Ab, Qual       POSITIVE   Hep. B Surf Ab, Quant.      mIU/mL 239   Hep B Core Total Ab       Negative   Hepatitis B Surface Ag       Negative   Hepatitis C Ab       Negative         Patient  Active Problem List   Diagnosis    ESRD on hemodialysis    Type 2 diabetes mellitus with kidney complication, with long-term current use of insulin    Elevated cholesterol with elevated triglycerides    Hx of sinus bradycardia    Anemia in ESRD (end-stage renal disease)    End stage renal failure on dialysis       Past Medical History:   Diagnosis Date    Anemia     Arrhythmia     Bradycardia     Disorder of kidney and ureter     Hyperlipidemia     Hypertension     Obesity     Preeclampsia        Past Surgical History:   Procedure Laterality Date     SECTION      x1    TUBAL LIGATION      Vascular access surgeries      x5       Family History   Problem Relation Age of Onset    Cancer Mother     Ovarian cancer Mother     Cancer Sister     Ovarian cancer Sister     Lung cancer Sister     Diabetes Brother     Cancer Sister     Ovarian cancer Sister     Diabetes Sister     Breast cancer Neg Hx     Colon cancer Neg Hx        Social History     Social History    Marital status: Single     Spouse name: N/A    Number of children: N/A    Years of education: N/A     Social History Main Topics    Smoking status: Never Smoker    Smokeless tobacco: Never Used    Alcohol use No    Drug use: No    Sexual activity: Yes     Partners: Male     Birth control/ protection: Post-menopausal     Other Topics Concern    None     Social History Narrative    None       Current Outpatient Prescriptions   Medication Sig Dispense Refill    amlodipine (NORVASC) 10 MG tablet Take 10 mg by mouth once daily.      aspirin (ECOTRIN) 81 MG EC tablet Take 81 mg by mouth once daily.      carvedilol (COREG) 3.125 MG tablet Take 3.125 mg by mouth 2 (two) times daily with meals.      fish oil-omega-3 fatty acids 300-1,000 mg capsule Take 1 g by mouth once daily.      furosemide (LASIX) 40 MG tablet Take 40 mg by mouth once daily.      gabapentin (NEURONTIN) 100 MG capsule Take 100 mg by mouth 3 (three)  "times daily.      hydrocodone-acetaminophen 10-325mg (NORCO)  mg Tab       LANTUS SOLOSTAR 100 unit/mL (3 mL) InPn pen INJECT 40 UNITS SC QHS WITH DOSE ADJUSTMENT UP OR DOWN BY 2 UNITS Q 3 DAYS. TTD 50 UNITS  1    lisinopril 10 MG tablet Take 10 mg by mouth once daily.      NOVOLOG FLEXPEN 100 unit/mL InPn pen INJECT 5 UNITS SC D WITH DINNER  1    ACCU-CHEK KRISTI PLUS TEST STRP Strp TEST QID  6    ACCU-CHEK SOFTCLIX LANCETS Misc U QID  2    ALCOHOL ANTISEPTIC PADS (SURE COMFORT ALCOHOL PREP PADS TOP)       SURE COMFORT PEN NEEDLE 30 gauge x 5/16" Ndle U BID  5    TRUEPLUS LANCETS 30 gauge Misc U QID TO CHECK BLOOD SUGAR  5     No current facility-administered medications for this visit.        Review of patient's allergies indicates:  No Known Allergies    Review of Systems   Constitutional: Negative for chills, fever, malaise/fatigue and weight loss.   HENT: Negative for congestion, nosebleeds and sore throat.    Eyes: Negative for blurred vision, double vision and photophobia.   Respiratory: Negative for cough and shortness of breath.    Cardiovascular: Negative for chest pain, palpitations, orthopnea and leg swelling.   Gastrointestinal: Negative for abdominal pain, blood in stool, constipation, diarrhea, melena and vomiting.   Genitourinary: Negative for dysuria.   Musculoskeletal: Negative for falls and joint pain.   Skin: Negative for itching and rash.   Neurological: Negative for dizziness, tremors and weakness.   Endo/Heme/Allergies: Does not bruise/bleed easily.   Psychiatric/Behavioral: Negative for depression and substance abuse. The patient is not nervous/anxious and does not have insomnia.        Vitals:    02/08/18 1005   BP: (!) 152/79   Pulse: 92   Resp: 18   Temp: 96.9 °F (36.1 °C)   TempSrc: Oral   SpO2: 96%   Weight: 84.6 kg (186 lb 8.2 oz)   Height: 5' 4" (1.626 m)       Physical Exam   Constitutional: She is oriented to person, place, and time. She appears well-developed and " well-nourished.   HENT:   Head: Normocephalic and atraumatic.   Mouth/Throat: Oropharynx is clear and moist.   Eyes: Conjunctivae are normal. No scleral icterus.   Neck: Normal range of motion.   Cardiovascular: Normal rate, regular rhythm and normal heart sounds.    Pulmonary/Chest: Effort normal and breath sounds normal. No respiratory distress. She has no rales.   Abdominal: Soft. Bowel sounds are normal. She exhibits no distension. There is no tenderness. No hernia.   Musculoskeletal: She exhibits no edema.   Lymphadenopathy:     She has no cervical adenopathy.   Neurological: She is alert and oriented to person, place, and time.   Skin: Skin is warm and dry. No rash noted.   Psychiatric: She has a normal mood and affect. Her behavior is normal. Her mood appears not anxious.   Nursing note and vitals reviewed.      LABS: I personally reviewed pertinent laboratory findings.    Lab Results   Component Value Date    ALT 6 (L) 09/28/2017    AST 11 09/28/2017    ALKPHOS 71 09/28/2017    BILITOT 0.4 09/28/2017       Lab Results   Component Value Date    WBC 8.34 09/28/2017    HGB 11.7 (L) 09/28/2017    HCT 37.1 09/28/2017    MCV 77 (L) 09/28/2017     09/28/2017       Lab Results   Component Value Date     09/28/2017    K 3.8 09/28/2017    CL 97 09/28/2017    CO2 28 09/28/2017    BUN 36 (H) 09/28/2017    CREATININE 4.1 (H) 09/28/2017    CALCIUM 9.8 09/28/2017    ANIONGAP 13 09/28/2017    ESTGFRAFRICA 13.4 (A) 09/28/2017    EGFRNONAA 11.6 (A) 09/28/2017       Lab Results   Component Value Date    HEPBCAB Negative 09/28/2017    HEPCAB Negative 09/28/2017       No results found for: SMOOTHMUSCAB, MITOAB    I personally reviewed all recent lab results.  I personally reviewed imaging studies.    Assessment:  52 y.o. female presenting with     1. Hepatomegaly    2. Liver mass    3. Fatty liver disease, nonalcoholic      VCTE 92/8/18): F0-1, grade 1 steatosis.  Normal liver enzymes and no significant  fibrosis.  Mild hepatic steatosis.    Unclear etiology for hypodensities seen in the liver on non-contrast CT . Will review at Liver Imaging Conference    Recommendation(s):  - obtain VCTE (fibroscan) - done  - review liver imaging at IR Conference   - follow up as needed for hepatic steatosis or 1 year    Counseled for NAFLD:  - life-style modifications: weight loss, daily exercise (30 mins of HIIT or cardio), low carb/low fat diet  - control of diabetes or insulin resistance   - control of high cholesterol, if needed with statin drugs or other cholesterol-lowering agents  - vitamin E supplements (no more than 800 mg per day) may help reduce liver fat  - coffee consumption (low caloric): 2-3 cups per day may reduce liver fat    A total of 40 minutes were spent face-to-face with the patient during this encounter and over half of that time was spent on counseling and coordination of care.  We discussed in depth the nature of the patient's liver disease, the management plan in details. I also educated the patient about lifestyle modifications which may improve hepatic steatosis, overweight/obesity, insulin resistance and high blood pressure issues. I have provided the patient with an opportunity to ask questions and have all questions answered to his satisfaction.     I have sent communication to the referring physician and/or primary care provider.    Aman Anderson MD  Staff Physician  Hepatology and Liver Transplant  [facility]  [facility]

## 2018-02-08 NOTE — PATIENT INSTRUCTIONS
- will discuss and review your CT scan and Ultrasound Liver Imaging Conference and call you with further recommendation  - you do have non-alcoholic fatty liver disease  - return in 1 year for follow up    - life-style modifications: weight loss, daily exercise (30 mins of HIIT or cardio), low carb/low fat diet  - control of diabetes or insulin resistance   - control of high cholesterol, if needed with statin drugs or other cholesterol-lowering agents  - vitamin E supplements (no more than 800 mg per day) may help reduce liver fat  - coffee consumption (low caloric): 2-3 cups per day may reduce liver fat      Nonalcoholic Fatty Liver Disease (NAFLD)  Nonalcoholic fatty liver disease (NAFLD) is a common disease of the liver. It occurs when you have too much fat in the liver. If NAFLD is severe, it can cause liver damage that seems like the damage caused by drinking too much alcohol. But NAFLD is not caused by drinking alcohol. This sheet tells you more about NAFLD and how it can be managed.    How the liver works   The liver is an organ in the upper right side of the belly (abdomen). It has many important jobs. These include:  · Breaking down (metabolizing) proteins, carbohydrates, and fats  · Making a substance called bile that helps break down fats  · Storing and releasing sugar (glucose) into the blood to give the body energy  · Removing toxins from the blood  · Helping with blood clotting  Understanding NAFLD  A healthy liver may contain some fat. But if too much fat builds up in the liver, this causes NAFLD. NAFLD can be mild, causing fatty liver. Or it can be more severe and show inflammation, as well as the fat. This can cause non-alcoholic steatohepatitis (BAER).  · Fatty liver. With fatty liver, the liver simply has more fat than normal. This extra fat usually does not harm the liver.  · BAER. With BAER, the fatty liver becomes inflamed over time. BAER is serious because it can lead to scarring of the liver  (fibrosis). Over time, the scarring may lead to cirrhosis of the liver. This can eventually cause liver failure or liver cancer.  Causes and risk factors of NAFLD  Doctors don't know what causes NAFLD. But certain things make the problem more likely to happen. These include:  · Obesity  · Prediabetes or diabetes  · High levels of fat found in the blood (cholesterol and triglycerides)  · Being exposed to certain medicines   Symptoms of NAFLD  Most people with NAFLD have no symptoms. If symptoms do occur, they can include:  · Tiredness  · Weakness  · Weight loss  · Loss of appetite  · Nausea and vomiting  · Belly pain and cramping  · Yellowing of the skin and eyes (jaundice), as well as dark urine, or light-colored stools  · Swelling in the belly or legs  Diagnosing NAFLD  Your healthcare provider may think you have NAFLD if routine blood tests show high levels of liver enzymes. This may mean you have a liver problem. You may need one or more imaging tests, such as an ultrasound, CT, or MRI. You may need more blood tests to look for other causes of liver disease. You may also need a liver biopsy. During this test, a hollow needle is used to remove a tiny tissue sample from your liver. This tissue is then checked in a lab. This test can find signs of damage to liver tissue. It can also help figure out the cause of the damage and tell the difference between fatty liver and BAER.  Treating NAFLD  Treatment for NAFLD varies for each person. The best early treatment is to treat any underlying conditions causing metabolic syndrome. This is the name for a group of conditions that includes:  · High blood pressure  · High levels of cholesterol and triglycerides  · Being overweight or obese  · Diabetes  Your healthcare provider will monitor your health and treat any symptoms or underlying health problems you have. Your provider will also work with you to control your risk factors. This will make liver damage less likely. In  fact, treating those underlying conditions can often improve liver disease. You may need to take certain medicines, but no medicine will cure NAFLD. This is why treating the underlying conditions is most important. Your plan may include:  · Losing extra weight  · Getting regular exercise  · Controlling diabetes and high cholesterol or triglyceride levels  · Taking medicines and vitamins as prescribed by your provider  · Quitting smoking  · Not drinking alcohol  · Eating a healthy and balanced diet  Living with NAFLD  If NAFLD is caught early, it can be managed with treatment. Your healthcare provider will discuss further treatment choices with you as needed.  Be sure to ask your provider about recommended vaccines. These include vaccines for viruses that can cause liver disease.  Date Last Reviewed: 12/1/2016  © 1868-0603 [employer]. [address] [place] [address], [place] [address]. All rights reserved. This information is not intended as a substitute for professional medical care. Always follow your healthcare professional's instructions.

## 2018-02-09 ENCOUNTER — TELEPHONE (OUTPATIENT)
Dept: TRANSPLANT | Facility: CLINIC | Age: 53
End: 2018-02-09

## 2018-02-09 NOTE — TELEPHONE ENCOUNTER
Patient: Jael Hall       MRN: [id number]      : 1965     Age: 52 y.o.  [address] [facility] [address]    Provider: Hepatologist Alfonso Anderson    Urgency of review: non-urgent    Patient Transplant Status: Other Kidney transplant candidate    Reason for presentation: Indeterminate lesion    Clinical Summary:   52 F, ESRD on HD , being eval for KT . Fibroscan: mild steatosis S1, F0-1.  1.7 cm hypoechoic lesion on US .  CT : hypodensities in the Rt and Lt lobe.    Imaging to be reviewed: US and CT     HCC Treatment History: n/a    ABO: O POS    Platelets:   Lab Results   Component Value Date/Time     2017 06:53 AM     Creatinine:   Lab Results   Component Value Date/Time    CREATININE 4.1 (H) 2017 06:53 AM     Bilirubin:   Lab Results   Component Value Date/Time    BILITOT 0.4 2017 06:53 AM     AFP Last 3 each if available: No results found for: AFP, EXTAFP    MELD: MELD-Na score: 20 at 2017  6:53 AM  MELD score: 20 at 2017  6:53 AM  Calculated from:  Serum Creatinine: On dialysis. Using 4 mg/dL.  Serum Sodium: 138 mmol/L (Rounded to 137) at 2017  6:53 AM  Total Bilirubin: 0.4 mg/dL (Rounded to 1) at 2017  6:53 AM  INR(ratio): 1.0 at 2017  6:53 AM  Age: 52 years    Plan:     Follow-up Provider:

## 2018-02-09 NOTE — TELEPHONE ENCOUNTER
Patient: Jael Hall       MRN: [id number]      : 1965     Age: 52 y.o.  [address] [place] [address]    Provider: Hepatologist Alfonso Anderson    Urgency of review: non-urgent    Patient Transplant Status: Other Kidney transplant candidate    Reason for presentation: Indeterminate lesion    Clinical Summary:   52 F, ESRD on HD , being eval for KT . Fibroscan: mild steatosis S1, F0-1.  1.7 cm hypoechoic lesion on US .  CT : hypodensities in the Rt and Lt lobe.    Imaging to be reviewed: US and CT     HCC Treatment History: n/a    ABO: O POS    Platelets:   Lab Results   Component Value Date/Time     2017 06:53 AM     Creatinine:   Lab Results   Component Value Date/Time    CREATININE 4.1 (H) 2017 06:53 AM     Bilirubin:   Lab Results   Component Value Date/Time    BILITOT 0.4 2017 06:53 AM     AFP Last 3 each if available: No results found for: AFP, EXTAFP    MELD: MELD-Na score: 20 at 2017  6:53 AM  MELD score: 20 at 2017  6:53 AM  Calculated from:  Serum Creatinine: On dialysis. Using 4 mg/dL.  Serum Sodium: 138 mmol/L (Rounded to 137) at 2017  6:53 AM  Total Bilirubin: 0.4 mg/dL (Rounded to 1) at 2017  6:53 AM  INR(ratio): 1.0 at 2017  6:53 AM  Age: 52 years    Plan:  Conflicting reports but agree with both.  CT suggests a lesion originating from the liver.  US suggest a fluid component arising from the GB.  Non-contrast MR now to further evaluate.      Note forwarded to pre-kidney transplant clinical staff to coordinate    Follow-up Provider: Aman Anderson MD

## 2018-02-12 ENCOUNTER — CONFERENCE (OUTPATIENT)
Dept: TRANSPLANT | Facility: CLINIC | Age: 53
End: 2018-02-12

## 2018-02-14 ENCOUNTER — TELEPHONE (OUTPATIENT)
Dept: TRANSPLANT | Facility: HOSPITAL | Age: 53
End: 2018-02-14

## 2018-02-14 DIAGNOSIS — R16.0 LIVER MASS: Primary | ICD-10-CM

## 2018-02-14 NOTE — TELEPHONE ENCOUNTER
Called and informed the patient of Liver Imaging Conference recommendation:  - non-contrast MRI     Patient is out of town this week and would like to schedule MRI next week.

## 2018-02-15 ENCOUNTER — TELEPHONE (OUTPATIENT)
Dept: HEPATOLOGY | Facility: CLINIC | Age: 53
End: 2018-02-15

## 2018-03-08 ENCOUNTER — OFFICE VISIT (OUTPATIENT)
Dept: OBSTETRICS AND GYNECOLOGY | Facility: CLINIC | Age: 53
End: 2018-03-08
Payer: MEDICARE

## 2018-03-08 DIAGNOSIS — Z11.3 SCREENING FOR STDS (SEXUALLY TRANSMITTED DISEASES): ICD-10-CM

## 2018-03-08 DIAGNOSIS — N89.8 VAGINAL DISCHARGE: Primary | ICD-10-CM

## 2018-03-08 LAB
CANDIDA RRNA VAG QL PROBE: NEGATIVE
G VAGINALIS RRNA GENITAL QL PROBE: NEGATIVE
T VAGINALIS RRNA GENITAL QL PROBE: NEGATIVE

## 2018-03-08 PROCEDURE — 87480 CANDIDA DNA DIR PROBE: CPT

## 2018-03-08 PROCEDURE — 87491 CHLMYD TRACH DNA AMP PROBE: CPT

## 2018-03-08 PROCEDURE — 99213 OFFICE O/P EST LOW 20 MIN: CPT | Mod: S$PBB,,, | Performed by: NURSE PRACTITIONER

## 2018-03-08 RX ORDER — METRONIDAZOLE 500 MG/1
500 TABLET ORAL 2 TIMES DAILY
Qty: 14 TABLET | Refills: 1 | Status: SHIPPED | OUTPATIENT
Start: 2018-03-08 | End: 2018-03-15

## 2018-03-09 LAB
C TRACH DNA SPEC QL NAA+PROBE: NOT DETECTED
N GONORRHOEA DNA SPEC QL NAA+PROBE: NOT DETECTED

## 2018-03-12 ENCOUNTER — TELEPHONE (OUTPATIENT)
Dept: OBSTETRICS AND GYNECOLOGY | Facility: CLINIC | Age: 53
End: 2018-03-12

## 2018-03-12 NOTE — TELEPHONE ENCOUNTER
----- Message from Karena Ceron sent at 3/12/2018 10:44 AM CDT -----  Contact: pt  _x  1st Request  _  2nd Request  _  3rd Request      Who:pt    Why: pt returning call back in regards to her results     What Number to Call Back: [phone]    When to Expect a call back: (Before the end of the day)   -- if call after 3:00 call back will be tomorrow.

## 2018-03-12 NOTE — TELEPHONE ENCOUNTER
Explained to patient that her results were normal. Informed her that Venus is out of the office until tomorrow. Pt verbalized her understanding

## 2018-03-12 NOTE — TELEPHONE ENCOUNTER
----- Message from Jeffrey Rodriguez MA sent at 3/12/2018  9:51 AM CDT -----  Contact: Pt  Pt called and would like the results of her pap test.    Pt acn be reached at [phone].    Thanks

## 2018-03-15 ENCOUNTER — HOSPITAL ENCOUNTER (OUTPATIENT)
Dept: RADIOLOGY | Facility: HOSPITAL | Age: 53
Discharge: HOME | End: 2018-03-15
Attending: INTERNAL MEDICINE
Payer: MEDICARE

## 2018-03-15 DIAGNOSIS — R16.0 LIVER MASS: ICD-10-CM

## 2018-03-15 PROCEDURE — 74181 MRI ABDOMEN W/O CONTRAST: CPT | Mod: TC,TXP

## 2018-03-15 PROCEDURE — 74181 MRI ABDOMEN W/O CONTRAST: CPT | Mod: 26,TXP,, | Performed by: RADIOLOGY

## 2018-03-19 ENCOUNTER — TELEPHONE (OUTPATIENT)
Dept: HEPATOLOGY | Facility: CLINIC | Age: 53
End: 2018-03-19

## 2018-03-19 NOTE — TELEPHONE ENCOUNTER
Patient: Jael Hall       MRN: [id number]      : 1965     Age: 52 y.o.  [address] [facility] [address]    Provider: Hepatologist Alfonso Anderson    Urgency of review: non-urgent    Patient Transplant Status: Other ?cyst    Reason for presentation: Indeterminate lesion    Clinical Summary:   52 F, ESRD on HD , being eval for KT . Fibroscan: mild steatosis S1, F0-1.  1.7 cm hypoechoic lesion on US .  CT : hypodensities in the Rt and Lt lobe. Reviewed at IR conference, recommended MRI.    Imaging to be reviewed: CT and MRI    HCC Treatment History: n/a    ABO: O POS    Platelets:   Lab Results   Component Value Date/Time     2017 06:53 AM     Creatinine:   Lab Results   Component Value Date/Time    CREATININE 4.1 (H) 2017 06:53 AM     Bilirubin:   Lab Results   Component Value Date/Time    BILITOT 0.4 2017 06:53 AM     AFP Last 3 each if available: No results found for: AFP, EXTAFP    MELD: MELD-Na score: 20 at 2017  6:53 AM  MELD score: 20 at 2017  6:53 AM  Calculated from:  Serum Creatinine: On dialysis. Using 4 mg/dL.  Serum Sodium: 138 mmol/L (Rounded to 137) at 2017  6:53 AM  Total Bilirubin: 0.4 mg/dL (Rounded to 1) at 2017  6:53 AM  INR(ratio): 1.0 at 2017  6:53 AM  Age: 52 years    Plan:  No gado so enhancement characteristics can't be determined. Cystic area adjacent to gallbladder is attached and likely represents an outpouching of the wall.  Additional small T2 bright foci, likely cysts but impossible to characterize.     No additional follow-up needed.  Pre-kidney transplant clinical coordinators notified via this communication    Follow-up Provider: Aman Anderson MD

## 2018-03-19 NOTE — TELEPHONE ENCOUNTER
Patient: Jael Hall       MRN: [id number]      : 1965     Age: 52 y.o.  [address] [facility] [address]    Provider: Hepatologist Alfonso Anderson    Urgency of review: non-urgent    Patient Transplant Status: Other ?cyst    Reason for presentation: Indeterminate lesion    Clinical Summary:   52 F, ESRD on HD , being eval for KT . Fibroscan: mild steatosis S1, F0-1.  1.7 cm hypoechoic lesion on US .  CT : hypodensities in the Rt and Lt lobe. Reviewed at IR conference, recommended MRI.    Imaging to be reviewed: CT and MRI    HCC Treatment History: n/a    ABO: O POS    Platelets:   Lab Results   Component Value Date/Time     2017 06:53 AM     Creatinine:   Lab Results   Component Value Date/Time    CREATININE 4.1 (H) 2017 06:53 AM     Bilirubin:   Lab Results   Component Value Date/Time    BILITOT 0.4 2017 06:53 AM     AFP Last 3 each if available: No results found for: AFP, EXTAFP    MELD: MELD-Na score: 20 at 2017  6:53 AM  MELD score: 20 at 2017  6:53 AM  Calculated from:  Serum Creatinine: On dialysis. Using 4 mg/dL.  Serum Sodium: 138 mmol/L (Rounded to 137) at 2017  6:53 AM  Total Bilirubin: 0.4 mg/dL (Rounded to 1) at 2017  6:53 AM  INR(ratio): 1.0 at 2017  6:53 AM  Age: 52 years    Plan:     Follow-up Provider:

## 2018-03-20 ENCOUNTER — HOSPITAL ENCOUNTER (OUTPATIENT)
Dept: RADIOLOGY | Facility: HOSPITAL | Age: 53
Discharge: HOME | End: 2018-03-20
Attending: NURSE PRACTITIONER
Payer: MEDICARE

## 2018-03-20 DIAGNOSIS — D25.9 UTERINE LEIOMYOMA, UNSPECIFIED LOCATION: ICD-10-CM

## 2018-03-20 DIAGNOSIS — N94.89 ADNEXAL MASS: ICD-10-CM

## 2018-03-20 PROCEDURE — 76830 TRANSVAGINAL US NON-OB: CPT | Mod: 26,NTX,, | Performed by: RADIOLOGY

## 2018-03-20 PROCEDURE — 76830 TRANSVAGINAL US NON-OB: CPT | Mod: TC,TXP

## 2018-03-20 PROCEDURE — 76856 US EXAM PELVIC COMPLETE: CPT | Mod: 26,NTX,, | Performed by: RADIOLOGY

## 2018-03-22 ENCOUNTER — TELEPHONE (OUTPATIENT)
Dept: OBSTETRICS AND GYNECOLOGY | Facility: CLINIC | Age: 53
End: 2018-03-22

## 2018-03-22 NOTE — TELEPHONE ENCOUNTER
PHONE CALL: Discussed US results. Pt states vaginal  discharge has cleared up completely. Not bleeding. Advised embx due to thickened EMS. Pt states cannot do this until after Easter. Will have Ms Cochran call her to schedule. Venus Mata NP

## 2018-03-24 ENCOUNTER — OFFICE VISIT (OUTPATIENT)
Dept: URGENT CARE | Facility: CLINIC | Age: 53
End: 2018-03-24
Payer: MEDICARE

## 2018-03-24 VITALS
RESPIRATION RATE: 18 BRPM | OXYGEN SATURATION: 96 % | WEIGHT: 188 LBS | SYSTOLIC BLOOD PRESSURE: 180 MMHG | DIASTOLIC BLOOD PRESSURE: 87 MMHG | HEART RATE: 105 BPM | HEIGHT: 64 IN | TEMPERATURE: 99 F | BODY MASS INDEX: 32.1 KG/M2

## 2018-03-24 DIAGNOSIS — H66.92 ACUTE OTITIS MEDIA, LEFT: Primary | ICD-10-CM

## 2018-03-24 PROCEDURE — 99213 OFFICE O/P EST LOW 20 MIN: CPT | Mod: S$GLB,TXP,, | Performed by: NURSE PRACTITIONER

## 2018-03-24 RX ORDER — AMOXICILLIN 875 MG/1
875 TABLET, FILM COATED ORAL 2 TIMES DAILY
Qty: 20 TABLET | Refills: 0 | Status: SHIPPED | OUTPATIENT
Start: 2018-03-24 | End: 2018-04-03

## 2018-03-24 NOTE — PATIENT INSTRUCTIONS
Middle Ear Infection (Adult)  You have an infection of the middle ear, the space behind the eardrum. This is also called acute otitis media (AOM). Sometimes it is caused by the common cold. This is because congestion can block the internal passage (eustachian tube) that drains fluid from the middle ear. When the middle ear fills with fluid, bacteria can grow there and cause an infection. Oral antibiotics are used to treat this illness, not ear drops. Symptoms usually start to improve within 1 to 2 days of treatment.    Home care  The following are general care guidelines:  · Finish all of the antibiotic medicine given, even though you may feel better after the first few days.  · You may use over-the-counter medicine, such as acetaminophen or ibuprofen, to control pain and fever, unless something else was prescribed. If you have chronic liver or kidney disease or have ever had a stomach ulcer or gastrointestinal bleeding, talk with your healthcare provider before using these medicines. Do not give aspirin to anyone under 18 years of age who has a fever. It may cause severe illness or death.  Follow-up care  Follow up with your healthcare provider, or as advised, in 2 weeks if all symptoms have not gotten better, or if hearing doesn't go back to normal within 1 month.  When to seek medical advice  Call your healthcare provider right away if any of these occur:  · Ear pain gets worse or does not improve after 3 days of treatment  · Unusual drowsiness or confusion  · Neck pain, stiff neck, or headache  · Fluid or blood draining from the ear canal  · Fever of 100.4°F (38°C) or as advised   · Seizure  Date Last Reviewed: 6/1/2016  © 9107-4920 [employer]. [address] [place] [address], [place] [address]. All rights reserved. This information is not intended as a substitute for professional medical care. Always follow your healthcare professional's instructions.

## 2018-03-24 NOTE — PROGRESS NOTES
"Subjective:       Patient ID: Jael Hall is a 52 y.o. female.    Vitals:  height is 5' 4" (1.626 m) and weight is 85.3 kg (188 lb). Her temperature is 98.7 °F (37.1 °C). Her blood pressure is 180/87 (abnormal) and her pulse is 105. Her respiration is 18 and oxygen saturation is 96%.     Chief Complaint: Otalgia (left)    Pt states that her left ear and jaw are hurting      Otalgia    There is pain in the left ear. This is a new problem. The current episode started yesterday. The problem occurs constantly. The problem has been gradually worsening. There has been no fever. The pain is moderate. Associated symptoms include coughing and a sore throat. Pertinent negatives include no abdominal pain or headaches. She has tried acetaminophen for the symptoms. The treatment provided mild relief.     Review of Systems   Constitution: Positive for malaise/fatigue. Negative for chills and fever.   HENT: Positive for ear pain and sore throat. Negative for congestion and hoarse voice.    Eyes: Negative for discharge and redness.   Cardiovascular: Negative for chest pain, dyspnea on exertion and leg swelling.   Respiratory: Positive for cough. Negative for shortness of breath, sputum production and wheezing.    Musculoskeletal: Negative for myalgias.   Gastrointestinal: Negative for abdominal pain and nausea.   Neurological: Negative for headaches.       Objective:      Physical Exam   Constitutional: She is oriented to person, place, and time. She appears well-developed and well-nourished. She is cooperative.  Non-toxic appearance. She does not appear ill. No distress.   HENT:   Head: Normocephalic and atraumatic.   Right Ear: Hearing, tympanic membrane and ear canal normal.   Left Ear: Hearing and ear canal normal. There is swelling and tenderness.   Nose: Nose normal. No mucosal edema, rhinorrhea or nasal deformity. No epistaxis. Right sinus exhibits no maxillary sinus tenderness and no frontal sinus tenderness. Left sinus " exhibits no maxillary sinus tenderness and no frontal sinus tenderness.   Mouth/Throat: Uvula is midline and mucous membranes are normal. No trismus in the jaw. Normal dentition. No uvula swelling. No posterior oropharyngeal erythema.   Eyes: Conjunctivae and lids are normal. Right eye exhibits no discharge. Left eye exhibits no discharge. No scleral icterus.   Sclera clear bilat   Neck: Trachea normal, normal range of motion, full passive range of motion without pain and phonation normal. Neck supple.   Cardiovascular: Normal rate, regular rhythm and normal pulses.    Pulmonary/Chest: Effort normal. No respiratory distress.   Abdominal: Soft. Normal appearance. She exhibits no distension, no pulsatile midline mass and no mass. There is no tenderness.   Musculoskeletal: Normal range of motion. She exhibits no edema or deformity.   Neurological: She is alert and oriented to person, place, and time. She exhibits normal muscle tone. Coordination normal.   Skin: Skin is warm, dry and intact. She is not diaphoretic. No pallor.   Psychiatric: She has a normal mood and affect. Her speech is normal and behavior is normal. Judgment and thought content normal. Cognition and memory are normal.   Nursing note and vitals reviewed.      Assessment:       1. Acute otitis media, left        Plan:       Patient Instructions     Middle Ear Infection (Adult)  You have an infection of the middle ear, the space behind the eardrum. This is also called acute otitis media (AOM). Sometimes it is caused by the common cold. This is because congestion can block the internal passage (eustachian tube) that drains fluid from the middle ear. When the middle ear fills with fluid, bacteria can grow there and cause an infection. Oral antibiotics are used to treat this illness, not ear drops. Symptoms usually start to improve within 1 to 2 days of treatment.    Home care  The following are general care guidelines:  · Finish all of the antibiotic  medicine given, even though you may feel better after the first few days.  · You may use over-the-counter medicine, such as acetaminophen or ibuprofen, to control pain and fever, unless something else was prescribed. If you have chronic liver or kidney disease or have ever had a stomach ulcer or gastrointestinal bleeding, talk with your healthcare provider before using these medicines. Do not give aspirin to anyone under 18 years of age who has a fever. It may cause severe illness or death.  Follow-up care  Follow up with your healthcare provider, or as advised, in 2 weeks if all symptoms have not gotten better, or if hearing doesn't go back to normal within 1 month.  When to seek medical advice  Call your healthcare provider right away if any of these occur:  · Ear pain gets worse or does not improve after 3 days of treatment  · Unusual drowsiness or confusion  · Neck pain, stiff neck, or headache  · Fluid or blood draining from the ear canal  · Fever of 100.4°F (38°C) or as advised   · Seizure  Date Last Reviewed: 6/1/2016 © 2000-2017 [employer]. [address] [place] [address], [place] [address]. All rights reserved. This information is not intended as a substitute for professional medical care. Always follow your healthcare professional's instructions.            Acute otitis media, left    Other orders  -     amoxicillin (AMOXIL) 875 MG tablet; Take 1 tablet (875 mg total) by mouth 2 (two) times daily.  Dispense: 20 tablet; Refill: 0

## 2018-03-27 ENCOUNTER — CLINICAL SUPPORT (OUTPATIENT)
Dept: INFECTIOUS DISEASES | Facility: CLINIC | Age: 53
End: 2018-03-27
Payer: MEDICARE

## 2018-03-27 ENCOUNTER — CONFERENCE (OUTPATIENT)
Dept: TRANSPLANT | Facility: CLINIC | Age: 53
End: 2018-03-27

## 2018-03-27 DIAGNOSIS — Z76.82 ORGAN TRANSPLANT CANDIDATE: ICD-10-CM

## 2018-03-27 DIAGNOSIS — N18.6 ESRD ON HEMODIALYSIS: ICD-10-CM

## 2018-03-27 DIAGNOSIS — Z01.818 PRE-TRANSPLANT EVALUATION FOR CHRONIC KIDNEY DISEASE: ICD-10-CM

## 2018-03-27 DIAGNOSIS — Z99.2 ESRD ON HEMODIALYSIS: ICD-10-CM

## 2018-03-27 PROCEDURE — 90471 IMMUNIZATION ADMIN: CPT | Mod: PBBFAC,TXP

## 2018-03-27 NOTE — PROGRESS NOTES
Pt received the second dose of her Hepatitis A vaccination. Pt tolerated her injection well. Pt left the unit in NAD.

## 2018-04-02 DIAGNOSIS — Z99.2 DIALYSIS PATIENT: Primary | ICD-10-CM

## 2018-04-09 ENCOUNTER — HOSPITAL ENCOUNTER (OUTPATIENT)
Dept: RADIOLOGY | Facility: HOSPITAL | Age: 53
Discharge: HOME | End: 2018-04-09
Attending: NURSE PRACTITIONER
Payer: MEDICARE

## 2018-04-09 DIAGNOSIS — Z76.82 ORGAN TRANSPLANT CANDIDATE: ICD-10-CM

## 2018-04-09 DIAGNOSIS — Z12.31 VISIT FOR SCREENING MAMMOGRAM: ICD-10-CM

## 2018-04-09 PROCEDURE — 77067 SCR MAMMO BI INCL CAD: CPT | Mod: TC,TXP

## 2018-04-09 PROCEDURE — 77067 SCR MAMMO BI INCL CAD: CPT | Mod: 26,TXP,, | Performed by: RADIOLOGY

## 2018-04-10 ENCOUNTER — ANESTHESIA (OUTPATIENT)
Dept: ENDOSCOPY | Facility: HOSPITAL | Age: 53
End: 2018-04-10
Payer: MEDICARE

## 2018-04-10 ENCOUNTER — ANESTHESIA EVENT (OUTPATIENT)
Dept: ENDOSCOPY | Facility: HOSPITAL | Age: 53
End: 2018-04-10
Payer: MEDICARE

## 2018-04-10 ENCOUNTER — TELEPHONE (OUTPATIENT)
Dept: RADIOLOGY | Facility: HOSPITAL | Age: 53
End: 2018-04-10

## 2018-04-10 NOTE — TELEPHONE ENCOUNTER
Spoke with patient and explained mammogram findings.Patient expressed understanding of results. Patient is scheduled for a abnormal mammogram follow up appointment at [facility] on 4/19/18

## 2018-04-12 ENCOUNTER — PROCEDURE VISIT (OUTPATIENT)
Dept: OBSTETRICS AND GYNECOLOGY | Facility: CLINIC | Age: 53
End: 2018-04-12
Payer: MEDICARE

## 2018-04-12 VITALS
BODY MASS INDEX: 31.92 KG/M2 | SYSTOLIC BLOOD PRESSURE: 136 MMHG | DIASTOLIC BLOOD PRESSURE: 72 MMHG | HEIGHT: 64 IN | WEIGHT: 187 LBS

## 2018-04-12 DIAGNOSIS — N88.2 CERVICAL STENOSIS (UTERINE CERVIX): ICD-10-CM

## 2018-04-12 DIAGNOSIS — N88.2 CERVICAL STENOSIS (UTERINE CERVIX): Primary | ICD-10-CM

## 2018-04-12 RX ORDER — CALCIUM ACETATE 667 MG/1
667 CAPSULE ORAL
COMMUNITY
Start: 2018-03-30 | End: 2020-08-19 | Stop reason: ALTCHOICE

## 2018-04-12 RX ORDER — ASPIRIN 325 MG
325 TABLET ORAL DAILY
COMMUNITY
End: 2019-05-14

## 2018-04-12 RX ORDER — MISOPROSTOL 200 UG/1
TABLET ORAL
Qty: 2 TABLET | Refills: 0 | Status: SHIPPED | OUTPATIENT
Start: 2018-04-12 | End: 2018-04-12 | Stop reason: SDUPTHER

## 2018-04-12 RX ORDER — HYDROCODONE BITARTRATE AND ACETAMINOPHEN 5; 325 MG/1; MG/1
TABLET ORAL
COMMUNITY
Start: 2018-02-02 | End: 2018-06-05

## 2018-04-12 RX ORDER — MISOPROSTOL 200 UG/1
TABLET ORAL
Qty: 2 TABLET | Refills: 0 | Status: ON HOLD | OUTPATIENT
Start: 2018-04-12 | End: 2018-05-22 | Stop reason: HOSPADM

## 2018-04-12 RX ORDER — INSULIN GLARGINE 100 [IU]/ML
40 INJECTION, SOLUTION SUBCUTANEOUS NIGHTLY
COMMUNITY
Start: 2014-07-08 | End: 2019-05-14 | Stop reason: SDUPTHER

## 2018-04-12 NOTE — PROCEDURES
Procedures     ENDOMETRIAL BIOPSY:     Jael Hall is a 52 y.o. female  No LMP recorded. Patient is postmenopausal. presents for an endometrial biopsy due to post menopausal bleeding, thickened endometrial stripe on ultrasound.    DATA REVIEWED:  PELVIC US:  Uterus:Size: 7.3 x 5.0 x 5.3 cm  Masses: 2.3 cm subserosal fibroid along the posterior uterine body.  Endometrium: Mildly thickened in this post menopausal patient, measuring 6 mm.  Right ovary:Not able to be delineated.  Left ovary:Size: 4.2 x 3.2 x 2.8 cmAppearance: Normal  Vascular Flow: 3.3 cm simple cyst.  Free Fluid:None.  IMPRESSION:  Mild thickening of the endometrium.  This is nonspecific, the differential considerations could include cervical stenosis, endometrial hyperplasia, endometrial neoplasm.  Further evaluation is recommended.  3.3 cm simple cyst of the left ovary is almost certainly benign; however, in a postmenopausal patient, annual follow-up is recommended.    PRE ENDOMETRIAL BIOPSY COUNSELING:  The patient was informed of the risk of bleeding, infection, uterine perforation and pain and that the test will rule-out endometrial cancer with accuracy greater than 95%. She was counseled on the alternatives to endometrial biopsy and agrees to proceed.    PROCEDURE:  UPT negative  A time out was performed  The cervix was visualized with a speculum.  A single tooth tenaculum was placed on the anterior lip prior to the biopsy.  A sterile sound was used to UNSUCCESSFULLY to dilate the cervical os and sound the uterus prior to obtaining the biopsy.  A sterile endometrial pipelle could not be passed due to cervical stenosis.  The tenaculum and speculum were removed.  The patient tolerated the procedure well .    ASSESSMENT:    bleeding.  Thickened endometrial stripe on ultrasound.  Cervical Stenosis.  Ovarian cyst    PLAN:    MEDICATIONS PRESCRIBED:  Cytotec 200 mcg to take night before and morning of procedure.     POST FAILED ENDOMETRIAL  BIOPSY COUNSELING:  Manage post biopsy cramping with NSAIDs or Tylenol.  Expect spotting or light bleeding for a few days.  Report bleeding heavier than a period, fever > 101.0 F, worsening pain or a foul smelling vaginal discharge.  Potential side effects of Cytotec.     FOLLOW-UP: with Dr BENNY Marti pretreated with Cytotec for a retry at an endometrial biopsy and surgery consult for possible D&C Hysteroscope vs Hysterectomy.

## 2018-04-13 ENCOUNTER — TELEPHONE (OUTPATIENT)
Dept: OBSTETRICS AND GYNECOLOGY | Facility: CLINIC | Age: 53
End: 2018-04-13

## 2018-04-13 NOTE — TELEPHONE ENCOUNTER
----- Message from Slim Costa sent at 4/13/2018 12:28 PM CDT -----      Can the clinic reply in [facility]: no      Please refill the medication(s) listed below. Please call the patient when the prescription(s) is ready for  at this phone number   [phone]        Medication #1-miSOPROStol (CYTOTEC) 200 MCG Tab 2 tablet 0 4/12/2018  No  Sig: Take one tablet by mouth night before procedure and one tablet by mouth morning of procedure.  Class: Normal  Order: [id number]        Medication #2- n/a      Preferred Pharmacy: [facility] [id number] AT [address]

## 2018-04-13 NOTE — TELEPHONE ENCOUNTER
Yaw, this is Monaa I've received your request I'm returning your call from the OBGYN clinic at [facility]. I just wanted to let you know that I'm working on getting an answer for you by today. ( Left msg on pt voice mail.)

## 2018-04-17 ENCOUNTER — LAB VISIT (OUTPATIENT)
Dept: LAB | Facility: HOSPITAL | Age: 53
End: 2018-04-17
Attending: COLON & RECTAL SURGERY
Payer: MEDICARE

## 2018-04-17 DIAGNOSIS — Z99.2 DIALYSIS PATIENT: ICD-10-CM

## 2018-04-17 LAB — POTASSIUM SERPL-SCNC: 4.9 MMOL/L

## 2018-04-17 PROCEDURE — 36415 COLL VENOUS BLD VENIPUNCTURE: CPT | Mod: TXP

## 2018-04-17 PROCEDURE — 84132 ASSAY OF SERUM POTASSIUM: CPT | Mod: TXP

## 2018-04-19 ENCOUNTER — HOSPITAL ENCOUNTER (OUTPATIENT)
Dept: RADIOLOGY | Facility: HOSPITAL | Age: 53
Discharge: HOME | End: 2018-04-19
Attending: NURSE PRACTITIONER
Payer: MEDICARE

## 2018-04-19 DIAGNOSIS — R92.8 ABNORMAL MAMMOGRAM: ICD-10-CM

## 2018-04-19 PROCEDURE — 77061 BREAST TOMOSYNTHESIS UNI: CPT | Mod: TC,PO,LT,TXP

## 2018-04-19 PROCEDURE — 77065 DX MAMMO INCL CAD UNI: CPT | Mod: 26,LT,TXP, | Performed by: RADIOLOGY

## 2018-04-19 PROCEDURE — 77065 DX MAMMO INCL CAD UNI: CPT | Mod: TC,PO,LT,TXP

## 2018-04-19 PROCEDURE — 77061 BREAST TOMOSYNTHESIS UNI: CPT | Mod: 26,LT,TXP, | Performed by: RADIOLOGY

## 2018-04-23 ENCOUNTER — OFFICE VISIT (OUTPATIENT)
Dept: OBSTETRICS AND GYNECOLOGY | Facility: CLINIC | Age: 53
End: 2018-04-23
Attending: OBSTETRICS & GYNECOLOGY
Payer: MEDICARE

## 2018-04-23 VITALS
HEIGHT: 64 IN | WEIGHT: 193.56 LBS | SYSTOLIC BLOOD PRESSURE: 142 MMHG | DIASTOLIC BLOOD PRESSURE: 74 MMHG | BODY MASS INDEX: 33.05 KG/M2

## 2018-04-23 DIAGNOSIS — N83.202 LEFT OVARIAN CYST: ICD-10-CM

## 2018-04-23 DIAGNOSIS — Z76.89 ENCOUNTER FOR BIOPSY: ICD-10-CM

## 2018-04-23 DIAGNOSIS — D25.9 UTERINE LEIOMYOMA, UNSPECIFIED LOCATION: ICD-10-CM

## 2018-04-23 DIAGNOSIS — R93.89 THICKENED ENDOMETRIUM: Primary | ICD-10-CM

## 2018-04-23 LAB
B-HCG UR QL: NEGATIVE
CTP QC/QA: YES

## 2018-04-23 PROCEDURE — 58100 BIOPSY OF UTERUS LINING: CPT | Mod: S$PBB,,, | Performed by: OBSTETRICS & GYNECOLOGY

## 2018-04-23 PROCEDURE — 58100 BIOPSY OF UTERUS LINING: CPT | Mod: PBBFAC | Performed by: OBSTETRICS & GYNECOLOGY

## 2018-04-23 PROCEDURE — 99213 OFFICE O/P EST LOW 20 MIN: CPT | Mod: 25,S$PBB,, | Performed by: OBSTETRICS & GYNECOLOGY

## 2018-04-23 PROCEDURE — 88305 TISSUE EXAM BY PATHOLOGIST: CPT | Performed by: PATHOLOGY

## 2018-04-23 PROCEDURE — 88305 TISSUE EXAM BY PATHOLOGIST: CPT | Mod: 26,,, | Performed by: PATHOLOGY

## 2018-04-23 PROCEDURE — 99213 OFFICE O/P EST LOW 20 MIN: CPT | Mod: PBBFAC,25 | Performed by: OBSTETRICS & GYNECOLOGY

## 2018-04-23 PROCEDURE — 99999 PR PBB SHADOW E&M-EST. PATIENT-LVL III: CPT | Mod: PBBFAC,,, | Performed by: OBSTETRICS & GYNECOLOGY

## 2018-04-23 PROCEDURE — 81025 URINE PREGNANCY TEST: CPT | Mod: PBBFAC | Performed by: OBSTETRICS & GYNECOLOGY

## 2018-04-23 RX ORDER — LOSARTAN POTASSIUM 25 MG/1
50 TABLET ORAL DAILY
Status: ON HOLD | COMMUNITY
Start: 2018-04-18 | End: 2020-09-09 | Stop reason: CLARIF

## 2018-04-25 NOTE — PROGRESS NOTES
HISTORY OF PRESENT ILLNESS:    Jael Hall is a 52 y.o. female, , No LMP recorded. Patient is postmenopausal.,  presents for consult and EMB for thickened endometrium on ultrasound.  Attempt at EMB unsuccessful due to stenotic cervix.  Patient took cytotec prior to visit today.    U/S:    Uterus:  Size: 7.3 x 5.0 x 5.3 cm.  Masses: 2.3 cm subserosal fibroid along the posterior uterine body.  Endometrium: Mildly thickened in this post menopausal patient, measuring 6 mm.  Right ovary:  Not able to be delineated.    Left ovary:  Size: 4.2 x 3.2 x 2.8 cm.  Appearance: Normal.  Vascular Flow: 3.3 cm simple cyst.  Free Fluid:  None.    Past Medical History:   Diagnosis Date    Anemia     Arrhythmia     Bradycardia     Disorder of kidney and ureter     Hyperlipidemia     Hypertension     Obesity     Preeclampsia        Past Surgical History:   Procedure Laterality Date     SECTION      x1    HYSTERECTOMY      TUBAL LIGATION      Vascular access surgeries      x5       MEDICATIONS AND ALLERGIES:      Current Outpatient Prescriptions:     ACCU-CHEK KRISTI PLUS TEST STRP Strp, TEST QID, Disp: , Rfl: 6    ACCU-CHEK SOFTCLIX LANCETS Misc, U QID, Disp: , Rfl: 2    ALCOHOL ANTISEPTIC PADS (SURE COMFORT ALCOHOL PREP PADS TOP), , Disp: , Rfl:     amlodipine (NORVASC) 10 MG tablet, Take 10 mg by mouth once daily., Disp: , Rfl:     aspirin 325 MG tablet, Take 325 mg by mouth once daily., Disp: , Rfl:     blood sugar diagnostic Strp, 1 strip., Disp: , Rfl:     calcium acetate (PHOSLO) 667 mg capsule, , Disp: , Rfl:     carvedilol (COREG) 3.125 MG tablet, Take 3.125 mg by mouth 2 (two) times daily with meals., Disp: , Rfl:     fish oil-omega-3 fatty acids 300-1,000 mg capsule, Take 1 g by mouth once daily., Disp: , Rfl:     furosemide (LASIX) 40 MG tablet, Take 40 mg by mouth once daily., Disp: , Rfl:     gabapentin (NEURONTIN) 100 MG capsule, Take 100 mg by mouth 3 (three) times daily., Disp: ,  "Rfl:     hydrocodone-acetaminophen 5-325mg (NORCO) 5-325 mg per tablet, , Disp: , Rfl:     insulin glargine (LANTUS) 100 unit/mL injection, Inject 40 Units into the skin., Disp: , Rfl:     LANTUS SOLOSTAR 100 unit/mL (3 mL) InPn pen, INJECT 40 UNITS SC QHS WITH DOSE ADJUSTMENT UP OR DOWN BY 2 UNITS Q 3 DAYS. TTD 50 UNITS, Disp: , Rfl: 1    lisinopril 10 MG tablet, Take 10 mg by mouth once daily., Disp: , Rfl:     losartan (COZAAR) 25 MG tablet, , Disp: , Rfl:     miSOPROStol (CYTOTEC) 200 MCG Tab, Take one tablet by mouth night before procedure and one tablet by mouth morning of procedure., Disp: 2 tablet, Rfl: 0    NOVOLOG FLEXPEN 100 unit/mL InPn pen, INJECT 5 UNITS SC D WITH DINNER, Disp: , Rfl: 1    SURE COMFORT PEN NEEDLE 30 gauge x 5/16" Ndle, U BID, Disp: , Rfl: 5    TRUEPLUS LANCETS 30 gauge Misc, U QID TO CHECK BLOOD SUGAR, Disp: , Rfl: 5    Review of patient's allergies indicates:  No Known Allergies    Family History   Problem Relation Age of Onset    Cancer Mother     Ovarian cancer Mother     Cancer Sister     Ovarian cancer Sister     Lung cancer Sister     Diabetes Brother     Cancer Sister     Ovarian cancer Sister     Diabetes Sister     Breast cancer Neg Hx     Colon cancer Neg Hx        Social History     Social History    Marital status: Single     Spouse name: N/A    Number of children: N/A    Years of education: N/A     Occupational History    Not on file.     Social History Main Topics    Smoking status: Never Smoker    Smokeless tobacco: Never Used    Alcohol use No    Drug use: No    Sexual activity: Yes     Partners: Male     Birth control/ protection: Post-menopausal     Other Topics Concern    Not on file     Social History Narrative    No narrative on file       ROS:  GENERAL: No weight changes. No swelling. No fatigue. No fever.  CARDIOVASCULAR: No chest pain. No shortness of breath. No leg cramps.   NEUROLOGICAL: No headaches. No vision changes.  BREASTS: " "No pain. No lumps. No discharge.  ABDOMEN: No pain. No nausea. No vomiting. No diarrhea. No constipation.  REPRODUCTIVE: No abnormal bleeding.   VULVA: No pain. No lesions. No itching.  VAGINA: No relaxation. No itching. No odor. No discharge. No lesions.  URINARY: No incontinence. No nocturia. No frequency. No dysuria.    BP (!) 142/74   Ht 5' 4" (1.626 m)   Wt 87.8 kg (193 lb 9 oz)   BMI 33.23 kg/m²     PE:  APPEARANCE: Well nourished, well developed, in no acute distress.  ABDOMEN: Soft. No tenderness or masses. No hepatosplenomegaly. No hernias.  BREASTS, FUNDOSCOPIC, RECTAL DEFERRED  PELVIC: External female genitalia without lesions.  Female hair distribution. Adequate perineal body, Normal urethral meatus. Vagina moist and well rugated without lesions or discharge.  No significant cystocele or rectocele present. Cervix pink without lesions, discharge or tenderness. Uterus is normal size, regular, mobile and nontender. Adnexa without masses or tenderness.  EXTREMITIES: No edema      DIAGNOSIS & PLAN  1. Thickened endometrium  Tissue Specimen To Pathology, Obstetrics/Gynecology   2. Encounter for biopsy  POCT Urine Pregnancy   3. Uterine leiomyoma, unspecified location     4. Left ovarian cyst         COUNSELING:  ENDOMETRIAL BIOPSY:    The patient was informed of the risk of bleeding, infection, uterine perforation, and pain.  She was counseled that the test will rule-out endometrial cancer with an accuracy greater than 96%.  She was counseled on the alternatives to endometrial biopsy and agrees to proceed.  A time out was performed.      The cervix was visualized with a speculum.  The cervix was swabbed with Betadinex3 times 3.  The anterior lip of the cervix was grasped with a single toothed tenaculum.  A uterine pipelle was inserted into the uterus, and the uterus sounded to  7cm. The tenaculum and speculum were removed, and the specimen was sent to Pathology for histologic evaluation.   The patient " tolerated with above procedure well.    Post Endometrial Biopsy counseling:  The patient was instructed to manage post-biopsy cramping with NSAIDs or Tylenol.  She was counseled to expect spotting or light bleeding for a few days, and she was counseled to report heavy bleeding, fever greater then 101.0F, worsening pain, or foul-smelling vaginal discharge.  Counseling lasted about 15 minutes, and after counseling she had no further questions.     Follow-up biopsy.  Discussed observation. Vs. Surgery

## 2018-04-26 ENCOUNTER — HOSPITAL ENCOUNTER (OUTPATIENT)
Facility: HOSPITAL | Age: 53
Discharge: HOME | End: 2018-04-26
Attending: COLON & RECTAL SURGERY | Admitting: COLON & RECTAL SURGERY
Payer: MEDICARE

## 2018-04-26 VITALS
TEMPERATURE: 99 F | RESPIRATION RATE: 18 BRPM | HEIGHT: 64 IN | OXYGEN SATURATION: 95 % | SYSTOLIC BLOOD PRESSURE: 126 MMHG | WEIGHT: 186 LBS | BODY MASS INDEX: 31.76 KG/M2 | HEART RATE: 96 BPM | DIASTOLIC BLOOD PRESSURE: 64 MMHG

## 2018-04-26 DIAGNOSIS — N18.6 END STAGE RENAL FAILURE ON DIALYSIS: ICD-10-CM

## 2018-04-26 DIAGNOSIS — Z12.11 COLON CANCER SCREENING: Primary | ICD-10-CM

## 2018-04-26 DIAGNOSIS — Z99.2 END STAGE RENAL FAILURE ON DIALYSIS: ICD-10-CM

## 2018-04-26 LAB
GLUCOSE SERPL-MCNC: 132 MG/DL (ref 70–110)
POCT GLUCOSE: 132 MG/DL (ref 70–110)

## 2018-04-26 PROCEDURE — 45385 COLONOSCOPY W/LESION REMOVAL: CPT | Mod: TXP,,, | Performed by: COLON & RECTAL SURGERY

## 2018-04-26 PROCEDURE — 27201089 HC SNARE, DISP (ANY): Mod: TXP | Performed by: COLON & RECTAL SURGERY

## 2018-04-26 PROCEDURE — 37000008 HC ANESTHESIA 1ST 15 MINUTES: Mod: TXP | Performed by: COLON & RECTAL SURGERY

## 2018-04-26 PROCEDURE — 63600175 PHARM REV CODE 636 W HCPCS: Mod: TXP | Performed by: NURSE ANESTHETIST, CERTIFIED REGISTERED

## 2018-04-26 PROCEDURE — 25000003 PHARM REV CODE 250: Mod: TXP | Performed by: NURSE PRACTITIONER

## 2018-04-26 PROCEDURE — 27201012 HC FORCEPS, HOT/COLD, DISP: Mod: TXP | Performed by: COLON & RECTAL SURGERY

## 2018-04-26 PROCEDURE — 88305 TISSUE EXAM BY PATHOLOGIST: CPT | Mod: 26,TXP,,

## 2018-04-26 PROCEDURE — 45380 COLONOSCOPY AND BIOPSY: CPT | Mod: TXP | Performed by: COLON & RECTAL SURGERY

## 2018-04-26 PROCEDURE — 88305 TISSUE EXAM BY PATHOLOGIST: CPT | Mod: TXP

## 2018-04-26 PROCEDURE — 45385 COLONOSCOPY W/LESION REMOVAL: CPT | Mod: TXP | Performed by: COLON & RECTAL SURGERY

## 2018-04-26 PROCEDURE — E9220 PRA ENDO ANESTHESIA: HCPCS | Mod: TXP,,, | Performed by: NURSE ANESTHETIST, CERTIFIED REGISTERED

## 2018-04-26 PROCEDURE — 45380 COLONOSCOPY AND BIOPSY: CPT | Mod: 59,TXP,, | Performed by: COLON & RECTAL SURGERY

## 2018-04-26 PROCEDURE — 37000009 HC ANESTHESIA EA ADD 15 MINS: Mod: TXP | Performed by: COLON & RECTAL SURGERY

## 2018-04-26 RX ORDER — SODIUM CHLORIDE 9 MG/ML
INJECTION, SOLUTION INTRAVENOUS CONTINUOUS
Status: DISCONTINUED | OUTPATIENT
Start: 2018-04-26 | End: 2018-04-26 | Stop reason: HOSPADM

## 2018-04-26 RX ORDER — PROPOFOL 10 MG/ML
VIAL (ML) INTRAVENOUS
Status: DISCONTINUED | OUTPATIENT
Start: 2018-04-26 | End: 2018-04-26

## 2018-04-26 RX ORDER — LIDOCAINE HCL/PF 100 MG/5ML
SYRINGE (ML) INTRAVENOUS
Status: DISCONTINUED | OUTPATIENT
Start: 2018-04-26 | End: 2018-04-26

## 2018-04-26 RX ORDER — PROPOFOL 10 MG/ML
VIAL (ML) INTRAVENOUS CONTINUOUS PRN
Status: DISCONTINUED | OUTPATIENT
Start: 2018-04-26 | End: 2018-04-26

## 2018-04-26 RX ADMIN — LIDOCAINE HYDROCHLORIDE 50 MG: 20 INJECTION, SOLUTION INTRAVENOUS at 11:04

## 2018-04-26 RX ADMIN — PROPOFOL 200 MCG/KG/MIN: 10 INJECTION, EMULSION INTRAVENOUS at 11:04

## 2018-04-26 RX ADMIN — SODIUM CHLORIDE: 9 INJECTION, SOLUTION INTRAVENOUS at 11:04

## 2018-04-26 RX ADMIN — PROPOFOL 80 MG: 10 INJECTION, EMULSION INTRAVENOUS at 11:04

## 2018-04-26 NOTE — ANESTHESIA PREPROCEDURE EVALUATION
04/26/2018  Jael Hall is a 52 y.o., female.    Anesthesia Evaluation         Review of Systems  Anesthesia Hx:  No problems with previous Anesthesia   Social:  Non-Smoker    Cardiovascular:   Exercise tolerance: good Hypertension Denies CAD.     Denies Angina.  Functional Capacity Can you climb two flights of stairs? ==> Yes    Pulmonary:   Denies Asthma.  Denies Recent URI.  Denies Sleep Apnea.    Renal/:   Chronic Renal Disease, ESRD, Dialysis    Hepatic/GI:   Denies PUD. Denies Hiatal Hernia. GERD Denies Liver Disease.  Denies Hepatitis.    Neurological:   Denies CVA. Denies Seizures.    Endocrine:   Diabetes Denies Hypothyroidism.        Physical Exam  General:  Well nourished    Airway/Jaw/Neck:  Airway Findings: Mouth Opening: Normal Tongue: Normal  General Airway Assessment: Adult  Mallampati: I  TM Distance: Normal, at least 6 cm  Jaw/Neck Findings:  Neck ROM: Normal ROM  Neck Findings:     Eyes/Ears/Nose:  EYES/EARS/NOSE FINDINGS: Normal   Dental:  Dental Findings: In tact   Chest/Lungs:  Chest/Lungs Findings: Clear to auscultation     Heart/Vascular:  Heart Findings: Rate: Normal  Rhythm: Regular Rhythm  Sounds: Normal        Mental Status:  Mental Status Findings:  Alert and Oriented         Anesthesia Plan  Type of Anesthesia, risks & benefits discussed:  Anesthesia Type:  general  Patient's Preference: Proceed with anesthesia understanding that the risks are very small but could be serious or life threatening.  Intra-op Monitoring Plan: standard ASA monitors  Intra-op Monitoring Plan Comments:   Post Op Pain Control Plan:   Post Op Pain Control Plan Comments:   Induction:   IV  Beta Blocker:  Patient is on a Beta-Blocker and has received one dose within the past 24 hours (No further documentation required).       Informed Consent: Patient understands risks and agrees with Anesthesia plan.   Questions answered. Anesthesia consent signed with patient.  ASA Score: 4     Day of Surgery Review of History & Physical: I have interviewed and examined the patient. I have reviewed the patient's H&P dated:            Ready For Surgery From Anesthesia Perspective.

## 2018-04-26 NOTE — DISCHARGE INSTRUCTIONS
Colonoscopy     A camera attached to a flexible tube with a viewing lens is used to take video pictures.     Colonoscopy is a test to view the inside of your lower digestive tract (colon and rectum). Sometimes it can show the last part of the small intestine (ileum). During the test, small pieces of tissue may be removed for testing. This is called a biopsy. Small growths, such as polyps, may also be removed.   Why is colonoscopy done?  The test is done to help look for colon cancer. And it can help find the source of abdominal pain, bleeding, and changes in bowel habits. It may be needed once a year, depending on factors such as your:  · Age  · Health history  · Family health history  · Symptoms  · Results from any prior colonoscopy  Risks and possible complications  These include:  · Bleeding               · A puncture or tear in the colon   · Risks of anesthesia  · A cancer lesion not being seen  Getting ready   To prepare for the test:  · Talk with your healthcare provider about the risks of the test (see below). Also ask your healthcare provider about alternatives to the test.  · Tell your healthcare provider about any medicines you take. Also tell him or her about any health conditions you may have.  · Make sure your rectum and colon are empty for the test. Follow the diet and bowel prep instructions exactly. If you dont, the test may need to be rescheduled.  · Plan for a friend or family member to drive you home after the test.     Colonoscopy provides an inside view of the entire colon.     You may discuss the results with your doctor right away or at a future visit.  During the test   The test is usually done in the hospital on an outpatient basis. This means you go home the same day. The procedure takes about 30 minutes. During that time:  · You are given relaxing (sedating) medicine through an IV line. You may be drowsy, or fully asleep.  · The healthcare provider will first give you a physical exam to  check for anal and rectal problems.  · Then the anus is lubricated and the scope inserted.  · If you are awake, you may have a feeling similar to needing to have a bowel movement. You may also feel pressure as air is pumped into the colon. Its OK to pass gas during the procedure.  · Biopsy, polyp removal, or other treatments may be done during the test.  After the test   You may have gas right after the test. It can help to try to pass it to help prevent later bloating. Your healthcare provider may discuss the results with you right away. Or you may need to schedule a follow-up visit to talk about the results. After the test, you can go back to your normal eating and other activities. You may be tired from the sedation and need to rest for a few hours.  Date Last Reviewed: 11/1/2016 © 2000-2017 [employer]. [address] [place] [address], [place] [address]. All rights reserved. This information is not intended as a substitute for professional medical care. Always follow your healthcare professional's instructions.

## 2018-04-26 NOTE — TRANSFER OF CARE
"Anesthesia Transfer of Care Note    Patient: Jael Hall    Procedure(s) Performed: Procedure(s) (LRB):  COLONOSCOPY (N/A)    Patient location: GI    Anesthesia Type: general    Transport from OR: Transported from OR on room air with adequate spontaneous ventilation    Post pain: adequate analgesia    Post assessment: no apparent anesthetic complications and tolerated procedure well    Post vital signs: stable    Level of consciousness: awake, alert and oriented    Nausea/Vomiting: no nausea/vomiting    Complications: none    Transfer of care protocol was followed      Last vitals:   Visit Vitals  /65 (BP Location: Left arm, Patient Position: Lying)   Pulse 97   Temp 37.3 °C (99.1 °F) (Temporal)   Resp 19   Ht 5' 4" (1.626 m)   Wt 84.4 kg (186 lb)   SpO2 99%   Breastfeeding? No   BMI 31.93 kg/m²     "

## 2018-04-26 NOTE — PLAN OF CARE
Pt AAOx3, VS WNL, resp e/u.  Pt tolerated PO fluids.  Denies any nausea or pain. Pt verbalized understanding of discharge instructions. Ambulated to exit with steady gait, accompanied by daughter. DIANA.

## 2018-04-26 NOTE — H&P
Endoscopy H&P    Procedure : Colonoscopy      asymptomatic screening exam      Past Medical History:   Diagnosis Date    Anemia     Arrhythmia     Bradycardia     Disorder of kidney and ureter     Hyperlipidemia     Hypertension     Obesity     Preeclampsia              Review of Systems -ROS:  GENERAL: No fever, chills, fatigability or weight loss.  CHEST: Denies DELATORRE, cyanosis, wheezing, cough and sputum production.  CARDIOVASCULAR: Denies chest pain, PND, orthopnea or reduced exercise tolerance.   Musculoskeletal ROS: negative for - gait disturbance or joint pain  Neurological ROS: negative for - confusion or memory loss        Physical Exam:  General: well developed, well nourished, no distress  Head: normocephalic  Neck: supple, symmetrical, trachea midline  Lungs:  clear to auscultation bilaterally and normal respiratory effort  Heart: regular rate and rhythm, S1, S2 normal, no murmur, rub or gallop and regular rate and rhythm  Abdomen: soft, non-tender non-distented; bowel sounds normal; no masses,  no organomegaly  Extremities: no cyanosis or edema, or clubbing       Moderate Sedation (choice): Mallampati Score 1    ASA : II    IMP: asymptomatic screening exam    Plan: Colonoscopy with Moderate sedation.  I have explained the procedure including indications, alternatives, expected outcomes and potential complications. The patient appears to understand and gives informed consent. The patient is medically ready for surgery.

## 2018-04-26 NOTE — PROVATION PATIENT INSTRUCTIONS
Discharge Summary/Instructions after an Endoscopic Procedure  Patient Name: Jael Hall  Patient MRN: [id number]  Patient YOB: 1965 Thursday, April 26, 2018  Pankaj Hinojosa MD  RESTRICTIONS:  During your procedure today, you received medications for sedation.  These   medications may affect your judgment, balance and coordination.  Therefore,   for 24 hours, you have the following restrictions:   - DO NOT drive a car, operate machinery, make legal/financial decisions,   sign important papers or drink alcohol.    ACTIVITY:  The following day: return to full activity including work, except no heavy   lifting, straining or running for 3 days if polyps were removed.  DIET:  Eat and drink normally unless instructed otherwise.     TREATMENT FOR COMMON SIDE EFFECTS:  - Mild abdominal pain, nausea, belching, bloating or excessive gas:  rest,   eat lightly and use a heating pad.  - Sore Throat: treat with throat lozenges and/or gargle with warm salt   water.  - Because air was used during the procedure, expelling large amounts of air   from your rectum or belching is normal.  - If a bowel prep was taken, you may not have a bowel movement for 1-3 days.    This is normal.  SYMPTOMS TO WATCH FOR AND REPORT TO YOUR PHYSICIAN:  1. Abdominal pain or bloating, other than gas cramps.  2. Chest pain.  3. Back pain.  4. Signs of infection such as: chills or fever occurring within 24 hours   after the procedure.  5. Rectal bleeding, which would show as bright red, maroon, or black stools.   (A tablespoon of blood from the rectum is not serious, especially if   hemorrhoids are present.)  6. Vomiting.  7. Weakness or dizziness.  GO DIRECTLY TO THE NEAREST EMERGENCY ROOM IF YOU HAVE ANY OF THE FOLLOWING:      Difficulty breathing              Chills and/or fever over 101 F   Persistent vomiting and/or vomiting blood   Severe abdominal pain   Severe chest pain   Black, tarry stools   Bleeding- more than one tablespoon   Any other  symptom or condition that you feel may need urgent attention  Your doctor recommends these additional instructions:  If any biopsies were taken, your doctors clinic will contact you in 1 to 2   weeks with any results.  - Discharge patient to home (ambulatory).   - Await pathology results.   - Repeat colonoscopy is recommended.  The colonoscopy date will be   determined after pathology results from today's exam become available for   review.  For questions, problems or results please call your physician - Pankaj Hinojosa MD at Work:  [phone].  [facility], EMERGENCY ROOM PHONE NUMBER: [phone]  IF A COMPLICATION OR EMERGENCY SITUATION ARISES AND YOU ARE UNABLE TO REACH   YOUR PHYSICIAN - GO DIRECTLY TO THE EMERGENCY ROOM.  Pankaj Hinojosa MD  4/26/2018 12:08:04 PM  This report has been verified and signed electronically.

## 2018-05-01 NOTE — ANESTHESIA POSTPROCEDURE EVALUATION
"Anesthesia Post Evaluation    Patient: Jael Hall    Procedure(s) Performed: Procedure(s) (LRB):  COLONOSCOPY (N/A)    Final Anesthesia Type: general  Patient location during evaluation: PACU  Patient participation: Yes- Able to Participate  Level of consciousness: awake and alert  Post-procedure vital signs: reviewed and stable  Pain management: adequate  Airway patency: patent  PONV status at discharge: No PONV  Anesthetic complications: no      Cardiovascular status: blood pressure returned to baseline  Respiratory status: unassisted  Hydration status: euvolemic  Follow-up not needed.        Visit Vitals  /64   Pulse 96   Temp 37.2 °C (99 °F)   Resp 18   Ht 5' 4" (1.626 m)   Wt 84.4 kg (186 lb)   SpO2 95%   Breastfeeding? No   BMI 31.93 kg/m²       Pain/Ami Score: No Data Recorded      "

## 2018-05-03 ENCOUNTER — TELEPHONE (OUTPATIENT)
Dept: ENDOSCOPY | Facility: HOSPITAL | Age: 53
End: 2018-05-03

## 2018-05-08 ENCOUNTER — TELEPHONE (OUTPATIENT)
Dept: OBSTETRICS AND GYNECOLOGY | Facility: CLINIC | Age: 53
End: 2018-05-08

## 2018-05-08 NOTE — TELEPHONE ENCOUNTER
Discussed insufficient emb with patient and recommended hsc/d&C.    pls notify patient-  Next available surgery dates may 22 and 29

## 2018-05-09 ENCOUNTER — TELEPHONE (OUTPATIENT)
Dept: OBSTETRICS AND GYNECOLOGY | Facility: CLINIC | Age: 53
End: 2018-05-09

## 2018-05-09 DIAGNOSIS — N88.2 CERVICAL STENOSIS (UTERINE CERVIX): ICD-10-CM

## 2018-05-09 DIAGNOSIS — R93.89 THICKENED ENDOMETRIUM: Primary | ICD-10-CM

## 2018-05-09 DIAGNOSIS — D25.9 UTERINE LEIOMYOMA, UNSPECIFIED LOCATION: ICD-10-CM

## 2018-05-15 ENCOUNTER — HOSPITAL ENCOUNTER (OUTPATIENT)
Dept: PREADMISSION TESTING | Facility: OTHER | Age: 53
Discharge: HOME | End: 2018-05-15
Attending: OBSTETRICS & GYNECOLOGY
Payer: MEDICARE

## 2018-05-15 ENCOUNTER — ANESTHESIA EVENT (OUTPATIENT)
Dept: SURGERY | Facility: OTHER | Age: 53
End: 2018-05-15
Payer: MEDICARE

## 2018-05-15 ENCOUNTER — OFFICE VISIT (OUTPATIENT)
Dept: OBSTETRICS AND GYNECOLOGY | Facility: CLINIC | Age: 53
End: 2018-05-15
Attending: OBSTETRICS & GYNECOLOGY
Payer: MEDICARE

## 2018-05-15 VITALS
HEART RATE: 79 BPM | DIASTOLIC BLOOD PRESSURE: 72 MMHG | BODY MASS INDEX: 31.69 KG/M2 | WEIGHT: 185.63 LBS | OXYGEN SATURATION: 97 % | HEIGHT: 64 IN | SYSTOLIC BLOOD PRESSURE: 140 MMHG | TEMPERATURE: 98 F

## 2018-05-15 VITALS
BODY MASS INDEX: 31.69 KG/M2 | WEIGHT: 185.63 LBS | SYSTOLIC BLOOD PRESSURE: 118 MMHG | HEIGHT: 64 IN | DIASTOLIC BLOOD PRESSURE: 70 MMHG

## 2018-05-15 DIAGNOSIS — D25.9 UTERINE LEIOMYOMA, UNSPECIFIED LOCATION: ICD-10-CM

## 2018-05-15 DIAGNOSIS — R93.89 THICKENED ENDOMETRIUM: Primary | ICD-10-CM

## 2018-05-15 DIAGNOSIS — N88.2 CERVICAL STENOSIS (UTERINE CERVIX): ICD-10-CM

## 2018-05-15 PROCEDURE — 99213 OFFICE O/P EST LOW 20 MIN: CPT | Mod: PBBFAC | Performed by: OBSTETRICS & GYNECOLOGY

## 2018-05-15 PROCEDURE — 99499 UNLISTED E&M SERVICE: CPT | Mod: S$PBB,,, | Performed by: OBSTETRICS & GYNECOLOGY

## 2018-05-15 PROCEDURE — 99999 PR PBB SHADOW E&M-EST. PATIENT-LVL III: CPT | Mod: PBBFAC,,, | Performed by: OBSTETRICS & GYNECOLOGY

## 2018-05-15 RX ORDER — SODIUM CHLORIDE, SODIUM LACTATE, POTASSIUM CHLORIDE, CALCIUM CHLORIDE 600; 310; 30; 20 MG/100ML; MG/100ML; MG/100ML; MG/100ML
INJECTION, SOLUTION INTRAVENOUS CONTINUOUS
Status: CANCELLED | OUTPATIENT
Start: 2018-05-15

## 2018-05-15 RX ORDER — LIDOCAINE HYDROCHLORIDE 10 MG/ML
0.5 INJECTION, SOLUTION EPIDURAL; INFILTRATION; INTRACAUDAL; PERINEURAL ONCE
Status: CANCELLED | OUTPATIENT
Start: 2018-05-15 | End: 2018-05-15

## 2018-05-15 RX ORDER — MIDAZOLAM HYDROCHLORIDE 1 MG/ML
5 INJECTION INTRAMUSCULAR; INTRAVENOUS
Status: ACTIVE | OUTPATIENT
Start: 2018-05-15 | End: 2018-05-15

## 2018-05-15 NOTE — PROGRESS NOTES
HISTORY OF PRESENT ILLNESS:    Jael Hall is a 52 y.o. female, , No LMP recorded. Patient is postmenopausal.,  presents for pre-op for HSC/D&C    Patient noted to have thickened endometrium on ultrasound.  Attempt at EMB x 2 unsuccessful due to stenotic cervix.      10/17 CT:  Reproductive organs: The uterus is prominent for patient age.  There is a 3.3 cm hypodense lesion in the LEFT pelvis along the LEFT wall of the uterus possibly originating from the LEFT adnexa or uterus, consider obtaining pelvic ultrasound for further characterization.      U/S:  Uterus:  Size: 7.3 x 5.0 x 5.3 cm.  Masses: 2.3 cm subserosal fibroid along the posterior uterine body.  Endometrium: Mildly thickened in this post menopausal patient, measuring 6 mm.  Right ovary:  Not able to be delineated.   Left ovary:  Size: 4.2 x 3.2 x 2.8 cm.  Appearance: Normal.  Vascular Flow: 3.3 cm simple cyst.  Free Fluid:  None.    Past Medical History:   Diagnosis Date    Anemia     Arrhythmia     Bradycardia     Disorder of kidney and ureter     Hyperlipidemia     Hypertension     Obesity     Preeclampsia        Past Surgical History:   Procedure Laterality Date     SECTION      x1    COLONOSCOPY N/A 2018    Procedure: COLONOSCOPY;  Surgeon: Pankaj Hinojosa MD;  Location: [facility] ([address] [place] [address]);  Service: Endoscopy;  Laterality: N/A;  dialysis pt/labs prior to colon/MS    TUBAL LIGATION      Vascular access surgeries      x5       MEDICATIONS AND ALLERGIES:      Current Outpatient Prescriptions:     ACCU-CHEK KRISTI PLUS TEST STRP Strp, TEST QID, Disp: , Rfl: 6    ACCU-CHEK SOFTCLIX LANCETS Misc, U QID, Disp: , Rfl: 2    ALCOHOL ANTISEPTIC PADS (SURE COMFORT ALCOHOL PREP PADS TOP), , Disp: , Rfl:     amlodipine (NORVASC) 10 MG tablet, Take 10 mg by mouth once daily., Disp: , Rfl:     aspirin 325 MG tablet, Take 325 mg by mouth once daily., Disp: , Rfl:     blood sugar diagnostic Strp, 1 strip., Disp: , Rfl:      "calcium acetate (PHOSLO) 667 mg capsule, , Disp: , Rfl:     carvedilol (COREG) 3.125 MG tablet, Take 3.125 mg by mouth 2 (two) times daily with meals., Disp: , Rfl:     fish oil-omega-3 fatty acids 300-1,000 mg capsule, Take 1 g by mouth once daily., Disp: , Rfl:     furosemide (LASIX) 40 MG tablet, Take 40 mg by mouth once daily., Disp: , Rfl:     gabapentin (NEURONTIN) 100 MG capsule, Take 100 mg by mouth 3 (three) times daily., Disp: , Rfl:     hydrocodone-acetaminophen 5-325mg (NORCO) 5-325 mg per tablet, , Disp: , Rfl:     insulin glargine (LANTUS) 100 unit/mL injection, Inject 40 Units into the skin., Disp: , Rfl:     LANTUS SOLOSTAR 100 unit/mL (3 mL) InPn pen, INJECT 40 UNITS SC QHS WITH DOSE ADJUSTMENT UP OR DOWN BY 2 UNITS Q 3 DAYS. TTD 50 UNITS, Disp: , Rfl: 1    lisinopril 10 MG tablet, Take 10 mg by mouth once daily., Disp: , Rfl:     losartan (COZAAR) 25 MG tablet, , Disp: , Rfl:     NOVOLOG FLEXPEN 100 unit/mL InPn pen, INJECT 5 UNITS SC D WITH DINNER, Disp: , Rfl: 1    SURE COMFORT PEN NEEDLE 30 gauge x 5/16" Ndle, U BID, Disp: , Rfl: 5    TRUEPLUS LANCETS 30 gauge Misc, U QID TO CHECK BLOOD SUGAR, Disp: , Rfl: 5    miSOPROStol (CYTOTEC) 200 MCG Tab, Take one tablet by mouth night before procedure and one tablet by mouth morning of procedure., Disp: 2 tablet, Rfl: 0    Review of patient's allergies indicates:  No Known Allergies    Family History   Problem Relation Age of Onset    Cancer Mother     Ovarian cancer Mother     Cancer Sister     Ovarian cancer Sister     Lung cancer Sister     Diabetes Brother     Cancer Sister     Ovarian cancer Sister     Diabetes Sister     Breast cancer Neg Hx     Colon cancer Neg Hx        Social History     Social History    Marital status: Single     Spouse name: N/A    Number of children: N/A    Years of education: N/A     Occupational History    Not on file.     Social History Main Topics    Smoking status: Never Smoker    Smokeless " "tobacco: Never Used    Alcohol use No    Drug use: No    Sexual activity: Yes     Partners: Male     Birth control/ protection: Post-menopausal     Other Topics Concern    Not on file     Social History Narrative    No narrative on file       ROS:  GENERAL: No weight changes. No swelling. No fatigue. No fever.  CARDIOVASCULAR: No chest pain. No shortness of breath. No leg cramps.   NEUROLOGICAL: No headaches. No vision changes.  BREASTS: No pain. No lumps. No discharge.  ABDOMEN: No pain. No nausea. No vomiting. No diarrhea. No constipation.  REPRODUCTIVE: No abnormal bleeding.   VULVA: No pain. No lesions. No itching.  VAGINA: No relaxation. No itching. No odor. No discharge. No lesions.  URINARY: No incontinence. No nocturia. No frequency. No dysuria.    /70   Ht 5' 4" (1.626 m)   Wt 84.2 kg (185 lb 10 oz)   BMI 31.86 kg/m²     PE:  APPEARANCE: Well nourished, well developed, in no acute distress.  ABDOMEN: Soft. No tenderness or masses. No hepatosplenomegaly. No hernias.  BREASTS, FUNDOSCOPIC, RECTAL DEFERRED  PELVIC: External female genitalia without lesions.  Female hair distribution. Adequate perineal body, Normal urethral meatus. Vagina atrophic without lesions or discharge.  No significant cystocele or rectocele present. Cervix pink without lesions, discharge or tenderness. Uterus is normal size, regular, mobile and nontender. Adnexa without masses or tenderness.  EXTREMITIES: No edema      DIAGNOSIS & PLAN  1. Thickened endometrium  Place in Outpatient    Vital signs    Up ad xiang    POCT glucose    Notify physician if BS > 180 for hysterectomy patients    Notify Physician/Vital Signs Parameters Urine output less than 0.5mL/kg/hr (with indwelling catheter) or 30 mL/hr (without indwelling catheter) or blood glucose greater than 200 mg/dL    Notify physician    Notify Physician - Potential Need of Opioid Reversal    Chlorohexidine Gluconate Bath    Diet NPO    Early ambulation    Place SCOTT hose "    Place sequential compression device   2. Uterine leiomyoma, unspecified location     3. Cervical stenosis (uterine cervix)           COUNSELING:  Patient counseled for HSC/D&C.  Counseled on operative risks of infection, bleeding, organ injury, and anesthesia complications.  She desires to proceed.

## 2018-05-15 NOTE — ANESTHESIA PREPROCEDURE EVALUATION
05/15/2018  Jael Hall is a 52 y.o., female.    Anesthesia Evaluation    I have reviewed the Patient Summary Reports.    I have reviewed the Nursing Notes.   I have reviewed the Medications.     Review of Systems  Anesthesia Hx:  Denies Family Hx of Anesthesia complications.   Denies Personal Hx of Anesthesia complications.   Hematology/Oncology:     Oncology Normal    -- Anemia:   Cardiovascular:   Hypertension Stress/Echo 10/17  CONCLUSIONS     1 - Normal left ventricular systolic function (EF 60-65%).     2 - No wall motion abnormalities.     3 - Mild left atrial enlargement.     4 - Impaired LV relaxation, elevated LAP (grade 2 diastolic dysfunction).     5 - Normal right ventricular systolic function .     6 - The estimated PA systolic pressure is 35 mmHg.     7 - Mild tricuspid regurgitation.    Pulmonary:  Pulmonary Normal    Renal/:   Chronic Renal Disease, Dialysis, ESRD    Hepatic/GI:  Hepatic/GI Normal    Neurological:   Peripheral Neuropathy    Endocrine:   Diabetes        Physical Exam  General:  Obesity    Airway/Jaw/Neck:  Airway Findings: Mouth Opening: Normal Tongue: Normal  Mallampati: II  TM Distance: Normal, at least 6 cm      Dental:  Dental Findings: Upper Dentures        Mental Status:  Mental Status Findings:  Alert and Oriented, Cooperative         Anesthesia Plan  Type of Anesthesia, risks & benefits discussed:  Anesthesia Type:  general  Patient's Preference:   Intra-op Monitoring Plan: standard ASA monitors  Intra-op Monitoring Plan Comments:   Post Op Pain Control Plan: multimodal analgesia  Post Op Pain Control Plan Comments:   Induction:   IV  Beta Blocker:         Informed Consent:  Anesthesia consent signed with patient.  ASA Score: 3     Day of Surgery Review of History & Physical:    H&P update referred to the surgeon.     Anesthesia Plan Notes: Labs ordered    Day of  surgery update: Hb 10, K 5.2        Ready For Surgery From Anesthesia Perspective.

## 2018-05-15 NOTE — DISCHARGE INSTRUCTIONS
PRE-ADMIT TESTING -  [phone]    [address]  [place] [facility]          Your surgery has been scheduled at [facility]. We are pleased to have the opportunity to serve you. For Further Information please call [phone].    On the day of surgery please report to the Information Desk on the 1st floor.    · CONTACT YOUR PHYSICIAN'S OFFICE THE DAY PRIOR TO YOUR SURGERY TO OBTAIN YOUR ARRIVAL TIME.     · The evening before surgery do not eat anything after 9 p.m. ( this includes hard candy, chewing gum and mints).  You may only have GATORADE, POWERADE AND WATER  from 9 p.m. until you leave your home.   DO NOT DRINK ANY LIQUIDS ON THE WAY TO THE HOSPITAL.      SPECIAL MEDICATION INSTRUCTIONS: TAKE medications checked off by the Anesthesiologist on your Medication List.    Angiogram Patients: Take medications as instructed by your physician, including aspirin.     Surgery Patients:    If you take ASPIRIN - Your PHYSICIAN/SURGEON will need to inform you IF/OR when you need to stop taking aspirin prior to your surgery.     Do Not take any medications containing IBUPROFEN.  Do Not Wear any make-up or dark nail polish   (especially eye make-up) to surgery. If you come to surgery with makeup on you will be required to remove the makeup or nail polish.    Do not shave your surgical area at least 5 days prior to your surgery. The surgical prep will be performed at the hospital according to Infection Control regulations.    Leave all valuables at home.   Do Not wear any jewelry or watches, including any metal in body piercings.  Contact Lens must be removed before surgery. Either do not wear the contact lens or bring a case and solution for storage.  Please bring a container for eyeglasses or dentures as required.  Bring any paperwork your physician has provided, such as consent forms,  history and physicals, doctor's orders, etc.   Bring comfortable clothes that are loose fitting to wear upon  discharge. Take into consideration the type of surgery being performed.  Maintain your diet as advised per your physician the day prior to surgery.      Adequate rest the night before surgery is advised.   Park in the Parking lot behind the hospital or in the [facility] across the street from the parking lot. Parking is complimentary.  If you will be discharged the same day as your procedure, please arrange for a responsible adult to drive you home or to accompany you if traveling by taxi.   YOU WILL NOT BE PERMITTED TO DRIVE OR TO LEAVE THE HOSPITAL ALONE AFTER SURGERY.   It is strongly recommended that you arrange for someone to remain with you for the first 24 hrs following your surgery.       Thank you for your cooperation.  The Staff of [facility].        Bathing Instructions                                                                 Please shower the evening before and morning of your procedure with    ANTIBACTERIAL SOAP. ( DIAL, etc )  Concentrate on the surgical area   for at least 3 minutes and rinse completely. Dry off as usual.   Do not use any deodorant, powder, body lotions, perfume, after shave or    cologne.

## 2018-05-22 ENCOUNTER — ANESTHESIA (OUTPATIENT)
Dept: SURGERY | Facility: OTHER | Age: 53
End: 2018-05-22
Payer: MEDICARE

## 2018-05-22 ENCOUNTER — HOSPITAL ENCOUNTER (OUTPATIENT)
Facility: OTHER | Age: 53
Discharge: HOME | End: 2018-05-22
Attending: OBSTETRICS & GYNECOLOGY | Admitting: OBSTETRICS & GYNECOLOGY
Payer: MEDICARE

## 2018-05-22 ENCOUNTER — SURGERY (OUTPATIENT)
Age: 53
End: 2018-05-22

## 2018-05-22 VITALS
BODY MASS INDEX: 31.58 KG/M2 | HEIGHT: 64 IN | WEIGHT: 185 LBS | TEMPERATURE: 97 F | DIASTOLIC BLOOD PRESSURE: 80 MMHG | SYSTOLIC BLOOD PRESSURE: 159 MMHG | OXYGEN SATURATION: 100 % | HEART RATE: 84 BPM | RESPIRATION RATE: 16 BRPM

## 2018-05-22 DIAGNOSIS — R93.89 THICKENED ENDOMETRIUM: ICD-10-CM

## 2018-05-22 DIAGNOSIS — Z98.890 S/P DILATION AND CURETTAGE: Primary | ICD-10-CM

## 2018-05-22 LAB
ANION GAP SERPL CALC-SCNC: 14 MMOL/L
BASOPHILS # BLD AUTO: 0.03 K/UL
BASOPHILS NFR BLD: 0.4 %
BUN SERPL-MCNC: 99 MG/DL
CALCIUM SERPL-MCNC: 9.7 MG/DL
CHLORIDE SERPL-SCNC: 100 MMOL/L
CO2 SERPL-SCNC: 19 MMOL/L
CREAT SERPL-MCNC: 6.6 MG/DL
DIFFERENTIAL METHOD: ABNORMAL
EOSINOPHIL # BLD AUTO: 0.2 K/UL
EOSINOPHIL NFR BLD: 3.2 %
ERYTHROCYTE [DISTWIDTH] IN BLOOD BY AUTOMATED COUNT: 16.4 %
EST. GFR  (AFRICAN AMERICAN): 8 ML/MIN/1.73 M^2
EST. GFR  (NON AFRICAN AMERICAN): 7 ML/MIN/1.73 M^2
GLUCOSE SERPL-MCNC: 248 MG/DL
HCT VFR BLD AUTO: 31.5 %
HGB BLD-MCNC: 10 G/DL
LYMPHOCYTES # BLD AUTO: 2.6 K/UL
LYMPHOCYTES NFR BLD: 36.9 %
MCH RBC QN AUTO: 24.7 PG
MCHC RBC AUTO-ENTMCNC: 31.7 G/DL
MCV RBC AUTO: 78 FL
MONOCYTES # BLD AUTO: 0.4 K/UL
MONOCYTES NFR BLD: 5.6 %
NEUTROPHILS # BLD AUTO: 3.8 K/UL
NEUTROPHILS NFR BLD: 53.8 %
PLATELET # BLD AUTO: 288 K/UL
PMV BLD AUTO: 9.5 FL
POCT GLUCOSE: 242 MG/DL (ref 70–110)
POTASSIUM SERPL-SCNC: 5.2 MMOL/L
RBC # BLD AUTO: 4.05 M/UL
SODIUM SERPL-SCNC: 133 MMOL/L
WBC # BLD AUTO: 7.08 K/UL

## 2018-05-22 PROCEDURE — 71000016 HC POSTOP RECOV ADDL HR: Mod: TXP | Performed by: OBSTETRICS & GYNECOLOGY

## 2018-05-22 PROCEDURE — 36000706: Mod: TXP | Performed by: OBSTETRICS & GYNECOLOGY

## 2018-05-22 PROCEDURE — 71000033 HC RECOVERY, INTIAL HOUR: Mod: NTX | Performed by: OBSTETRICS & GYNECOLOGY

## 2018-05-22 PROCEDURE — 82962 GLUCOSE BLOOD TEST: CPT | Mod: TXP | Performed by: OBSTETRICS & GYNECOLOGY

## 2018-05-22 PROCEDURE — 85025 COMPLETE CBC W/AUTO DIFF WBC: CPT | Mod: NTX

## 2018-05-22 PROCEDURE — 25000003 PHARM REV CODE 250: Mod: TXP | Performed by: NURSE ANESTHETIST, CERTIFIED REGISTERED

## 2018-05-22 PROCEDURE — 36415 COLL VENOUS BLD VENIPUNCTURE: CPT | Mod: NTX

## 2018-05-22 PROCEDURE — 37000009 HC ANESTHESIA EA ADD 15 MINS: Mod: TXP | Performed by: OBSTETRICS & GYNECOLOGY

## 2018-05-22 PROCEDURE — 37000008 HC ANESTHESIA 1ST 15 MINUTES: Mod: NTX | Performed by: OBSTETRICS & GYNECOLOGY

## 2018-05-22 PROCEDURE — 27201423 OPTIME MED/SURG SUP & DEVICES STERILE SUPPLY: Mod: NTX | Performed by: OBSTETRICS & GYNECOLOGY

## 2018-05-22 PROCEDURE — 88305 TISSUE EXAM BY PATHOLOGIST: CPT | Mod: 26,NTX,, | Performed by: PATHOLOGY

## 2018-05-22 PROCEDURE — 63600175 PHARM REV CODE 636 W HCPCS: Mod: TXP | Performed by: NURSE ANESTHETIST, CERTIFIED REGISTERED

## 2018-05-22 PROCEDURE — 58558 HYSTEROSCOPY BIOPSY: CPT | Mod: NTX,,, | Performed by: OBSTETRICS & GYNECOLOGY

## 2018-05-22 PROCEDURE — 25000003 PHARM REV CODE 250: Mod: TXP | Performed by: ANESTHESIOLOGY

## 2018-05-22 PROCEDURE — 71000015 HC POSTOP RECOV 1ST HR: Mod: NTX | Performed by: OBSTETRICS & GYNECOLOGY

## 2018-05-22 PROCEDURE — 88305 TISSUE EXAM BY PATHOLOGIST: CPT | Mod: TXP | Performed by: PATHOLOGY

## 2018-05-22 PROCEDURE — 80048 BASIC METABOLIC PNL TOTAL CA: CPT | Mod: TXP

## 2018-05-22 PROCEDURE — 36000707: Mod: NTX | Performed by: OBSTETRICS & GYNECOLOGY

## 2018-05-22 RX ORDER — DIPHENHYDRAMINE HYDROCHLORIDE 50 MG/ML
25 INJECTION INTRAMUSCULAR; INTRAVENOUS EVERY 4 HOURS PRN
Status: DISCONTINUED | OUTPATIENT
Start: 2018-05-22 | End: 2018-05-22 | Stop reason: HOSPADM

## 2018-05-22 RX ORDER — GLYCOPYRROLATE 0.2 MG/ML
INJECTION INTRAMUSCULAR; INTRAVENOUS
Status: DISCONTINUED | OUTPATIENT
Start: 2018-05-22 | End: 2018-05-22

## 2018-05-22 RX ORDER — ONDANSETRON 2 MG/ML
4 INJECTION INTRAMUSCULAR; INTRAVENOUS DAILY PRN
Status: DISCONTINUED | OUTPATIENT
Start: 2018-05-22 | End: 2018-05-22 | Stop reason: HOSPADM

## 2018-05-22 RX ORDER — HYDROCODONE BITARTRATE AND ACETAMINOPHEN 5; 325 MG/1; MG/1
1 TABLET ORAL EVERY 4 HOURS PRN
Qty: 14 TABLET | Refills: 0 | Status: SHIPPED | OUTPATIENT
Start: 2018-05-22 | End: 2018-06-05

## 2018-05-22 RX ORDER — HYDROMORPHONE HYDROCHLORIDE 2 MG/ML
0.4 INJECTION, SOLUTION INTRAMUSCULAR; INTRAVENOUS; SUBCUTANEOUS EVERY 5 MIN PRN
Status: DISCONTINUED | OUTPATIENT
Start: 2018-05-22 | End: 2018-05-22 | Stop reason: HOSPADM

## 2018-05-22 RX ORDER — MIDAZOLAM HYDROCHLORIDE 1 MG/ML
INJECTION, SOLUTION INTRAMUSCULAR; INTRAVENOUS
Status: DISCONTINUED | OUTPATIENT
Start: 2018-05-22 | End: 2018-05-22

## 2018-05-22 RX ORDER — MEPERIDINE HYDROCHLORIDE 50 MG/ML
12.5 INJECTION INTRAMUSCULAR; INTRAVENOUS; SUBCUTANEOUS ONCE AS NEEDED
Status: DISCONTINUED | OUTPATIENT
Start: 2018-05-22 | End: 2018-05-22 | Stop reason: HOSPADM

## 2018-05-22 RX ORDER — ACETAMINOPHEN 10 MG/ML
INJECTION, SOLUTION INTRAVENOUS
Status: DISCONTINUED | OUTPATIENT
Start: 2018-05-22 | End: 2018-05-22

## 2018-05-22 RX ORDER — OXYCODONE HYDROCHLORIDE 5 MG/1
5 TABLET ORAL
Status: DISCONTINUED | OUTPATIENT
Start: 2018-05-22 | End: 2018-05-22 | Stop reason: HOSPADM

## 2018-05-22 RX ORDER — ACETAMINOPHEN 325 MG/1
650 TABLET ORAL EVERY 4 HOURS PRN
Status: DISCONTINUED | OUTPATIENT
Start: 2018-05-22 | End: 2018-05-22 | Stop reason: HOSPADM

## 2018-05-22 RX ORDER — ONDANSETRON 8 MG/1
8 TABLET, ORALLY DISINTEGRATING ORAL EVERY 8 HOURS PRN
Status: DISCONTINUED | OUTPATIENT
Start: 2018-05-22 | End: 2018-05-22 | Stop reason: HOSPADM

## 2018-05-22 RX ORDER — PHENYLEPHRINE HYDROCHLORIDE 10 MG/ML
INJECTION INTRAVENOUS
Status: DISCONTINUED | OUTPATIENT
Start: 2018-05-22 | End: 2018-05-22

## 2018-05-22 RX ORDER — ONDANSETRON HYDROCHLORIDE 2 MG/ML
INJECTION, SOLUTION INTRAMUSCULAR; INTRAVENOUS
Status: DISCONTINUED | OUTPATIENT
Start: 2018-05-22 | End: 2018-05-22

## 2018-05-22 RX ORDER — SODIUM CHLORIDE 0.9 % (FLUSH) 0.9 %
3 SYRINGE (ML) INJECTION
Status: DISCONTINUED | OUTPATIENT
Start: 2018-05-22 | End: 2018-05-22 | Stop reason: HOSPADM

## 2018-05-22 RX ORDER — SODIUM CHLORIDE 9 MG/ML
INJECTION, SOLUTION INTRAVENOUS CONTINUOUS PRN
Status: DISCONTINUED | OUTPATIENT
Start: 2018-05-22 | End: 2018-05-22

## 2018-05-22 RX ORDER — FENTANYL CITRATE 50 UG/ML
INJECTION, SOLUTION INTRAMUSCULAR; INTRAVENOUS
Status: DISCONTINUED | OUTPATIENT
Start: 2018-05-22 | End: 2018-05-22

## 2018-05-22 RX ORDER — ACETAMINOPHEN 325 MG/1
650 TABLET ORAL EVERY 6 HOURS PRN
Qty: 30 TABLET | Refills: 0 | Status: SHIPPED | OUTPATIENT
Start: 2018-05-22 | End: 2020-08-03 | Stop reason: SDUPTHER

## 2018-05-22 RX ORDER — DIPHENHYDRAMINE HYDROCHLORIDE 50 MG/ML
12.5 INJECTION INTRAMUSCULAR; INTRAVENOUS EVERY 30 MIN PRN
Status: DISCONTINUED | OUTPATIENT
Start: 2018-05-22 | End: 2018-05-22 | Stop reason: HOSPADM

## 2018-05-22 RX ORDER — LIDOCAINE HCL/PF 100 MG/5ML
SYRINGE (ML) INTRAVENOUS
Status: DISCONTINUED | OUTPATIENT
Start: 2018-05-22 | End: 2018-05-22

## 2018-05-22 RX ORDER — PROPOFOL 10 MG/ML
VIAL (ML) INTRAVENOUS
Status: DISCONTINUED | OUTPATIENT
Start: 2018-05-22 | End: 2018-05-22

## 2018-05-22 RX ORDER — LIDOCAINE HYDROCHLORIDE 10 MG/ML
0.5 INJECTION, SOLUTION EPIDURAL; INFILTRATION; INTRACAUDAL; PERINEURAL ONCE
Status: DISCONTINUED | OUTPATIENT
Start: 2018-05-22 | End: 2018-05-22 | Stop reason: HOSPADM

## 2018-05-22 RX ORDER — HYDROCODONE BITARTRATE AND ACETAMINOPHEN 5; 325 MG/1; MG/1
1 TABLET ORAL EVERY 4 HOURS PRN
Status: DISCONTINUED | OUTPATIENT
Start: 2018-05-22 | End: 2018-05-22 | Stop reason: HOSPADM

## 2018-05-22 RX ORDER — DIPHENHYDRAMINE HCL 25 MG
25 CAPSULE ORAL EVERY 4 HOURS PRN
Status: DISCONTINUED | OUTPATIENT
Start: 2018-05-22 | End: 2018-05-22 | Stop reason: HOSPADM

## 2018-05-22 RX ORDER — FENTANYL CITRATE 50 UG/ML
25 INJECTION, SOLUTION INTRAMUSCULAR; INTRAVENOUS EVERY 5 MIN PRN
Status: DISCONTINUED | OUTPATIENT
Start: 2018-05-22 | End: 2018-05-22 | Stop reason: HOSPADM

## 2018-05-22 RX ORDER — SODIUM CHLORIDE, SODIUM LACTATE, POTASSIUM CHLORIDE, CALCIUM CHLORIDE 600; 310; 30; 20 MG/100ML; MG/100ML; MG/100ML; MG/100ML
INJECTION, SOLUTION INTRAVENOUS CONTINUOUS
Status: DISCONTINUED | OUTPATIENT
Start: 2018-05-22 | End: 2018-05-22 | Stop reason: HOSPADM

## 2018-05-22 RX ADMIN — SODIUM CHLORIDE: 0.9 INJECTION, SOLUTION INTRAVENOUS at 08:05

## 2018-05-22 RX ADMIN — PROPOFOL 160 MG: 10 INJECTION, EMULSION INTRAVENOUS at 09:05

## 2018-05-22 RX ADMIN — CARBOXYMETHYLCELLULOSE SODIUM 2 DROP: 2.5 SOLUTION/ DROPS OPHTHALMIC at 09:05

## 2018-05-22 RX ADMIN — PHENYLEPHRINE HYDROCHLORIDE 200 MCG: 10 INJECTION INTRAVENOUS at 09:05

## 2018-05-22 RX ADMIN — ONDANSETRON 4 MG: 2 INJECTION, SOLUTION INTRAMUSCULAR; INTRAVENOUS at 09:05

## 2018-05-22 RX ADMIN — LIDOCAINE HYDROCHLORIDE 100 MG: 20 INJECTION, SOLUTION INTRAVENOUS at 09:05

## 2018-05-22 RX ADMIN — ACETAMINOPHEN 1000 MG: 10 INJECTION, SOLUTION INTRAVENOUS at 09:05

## 2018-05-22 RX ADMIN — GLYCOPYRROLATE 0.2 MG: 0.2 INJECTION, SOLUTION INTRAMUSCULAR; INTRAVENOUS at 09:05

## 2018-05-22 RX ADMIN — FENTANYL CITRATE 50 MCG: 50 INJECTION, SOLUTION INTRAMUSCULAR; INTRAVENOUS at 09:05

## 2018-05-22 RX ADMIN — MIDAZOLAM 2 MG: 1 INJECTION INTRAMUSCULAR; INTRAVENOUS at 08:05

## 2018-05-22 NOTE — DISCHARGE SUMMARY
[facility]  Brief Op Note/Discharge Note  Short Stay    Admit Date: 5/22/2018    Discharge Date: 05/22/2018    Attending Physician: Josie Marti MD     Surgery Date: 5/22/2018     Surgeon(s) and Role:     * Josie Marti MD - Primary    Assisting Surgeon: Remy Barros MD PGY1    Pre-op Diagnosis:  Thickened endometrium [R93.8]  Cervical stenosis (uterine cervix) [N88.2]  Uterine leiomyoma, unspecified location [D25.9]    Post-op Diagnosis:  Post-Op Diagnosis Codes:     * Thickened endometrium [R93.8]     * Cervical stenosis (uterine cervix) [N88.2]     * Uterine leiomyoma, unspecified location [D25.9]    Procedure(s) (LRB):  [id number] (N/A)    Anesthesia: Choice    Findings/Key Components:   *please see op note for full report*  1. Normal appearing external genitalia  2. Uterus sounded to 3 inches  3. Cervix serially dilated to size of #15 Daniel dilator  4. 5mm hysteroscope inserted into uterine cavity: slightly proliferative endometrial tissue identified  5. Bilateral tubal ostia visualized and appeared normal  6. Sharp curettage performed until gritty texture appreciated in each of the four uterine quadrants    Estimated Blood Loss: * No values recorded between 5/22/2018  9:11 AM and 5/22/2018  9:37 AM *         Specimens:   Specimen (12h ago through future)    Start     Ordered    05/22/18 0930  Specimen to Pathology - Surgery  Once     Comments:  1. Endometrial Curettage      05/22/18 0929          Discharge Provider: Remy Barros    Diagnoses:  Active Hospital Problems    Diagnosis  POA    Thickened endometrium [R93.8]  Yes    S/P dilation and curettage [Z98.890]  Not Applicable      Resolved Hospital Problems    Diagnosis Date Resolved POA   No resolved problems to display.       Discharged Condition: good    Hospital Course:   Patient was admitted for outpatient procedure as above, and tolerated the procedure well with no complications. Please see operative report  for further details. Following the procedure, the patient was awakened from anesthesia and transferred to the recovery area in stable condition. She was discharged to home once ambulating, voiding, tolerating PO intake, and pain was well-controlled. Patient was given routine post-op instructions and prescriptions for pain medication to take as needed. Patient instructed to follow up with Dr. Marti in 4 weeks.    Final Diagnoses: Same as principal problem.    Disposition: Home or Self Care    Follow up/Patient Instructions:    Medications:  Reconciled Home Medications:      Medication List      START taking these medications    acetaminophen 325 MG tablet  Commonly known as:  TYLENOL  Take 2 tablets (650 mg total) by mouth every 6 (six) hours as needed for Pain.        CHANGE how you take these medications    * HYDROcodone-acetaminophen 5-325 mg per tablet  Commonly known as:  NORCO  What changed:  Another medication with the same name was added. Make sure you understand how and when to take each.     * HYDROcodone-acetaminophen 5-325 mg per tablet  Commonly known as:  NORCO  Take 1 tablet by mouth every 4 (four) hours as needed for Pain.  What changed:  You were already taking a medication with the same name, and this prescription was added. Make sure you understand how and when to take each.        * This list has 2 medication(s) that are the same as other medications prescribed for you. Read the directions carefully, and ask your doctor or other care provider to review them with you.            CONTINUE taking these medications    * blood sugar diagnostic Strp  1 strip.     * ACCU-CHEK KRISTI PLUS TEST STRP Strp  Generic drug:  blood sugar diagnostic  TEST QID     * TRUEPLUS LANCETS 30 gauge Misc  Generic drug:  lancets  U QID TO CHECK BLOOD SUGAR     * ACCU-CHEK SOFTCLIX LANCETS Misc  Generic drug:  lancets  U QID     amLODIPine 10 MG tablet  Commonly known as:  NORVASC  Take 10 mg by mouth once daily.     aspirin  "325 MG tablet  Take 325 mg by mouth once daily.     calcium acetate 667 mg capsule  Commonly known as:  PHOSLO     carvedilol 3.125 MG tablet  Commonly known as:  COREG  Take 3.125 mg by mouth 2 (two) times daily with meals.     fish oil-omega-3 fatty acids 300-1,000 mg capsule  Take 1 g by mouth once daily.     furosemide 40 MG tablet  Commonly known as:  LASIX  Take 40 mg by mouth once daily.     gabapentin 100 MG capsule  Commonly known as:  NEURONTIN  Take 100 mg by mouth 3 (three) times daily.     * insulin glargine 100 unit/mL injection  Commonly known as:  LANTUS  Inject 40 Units into the skin every evening.     * LANTUS SOLOSTAR U-100 INSULIN 100 unit/mL (3 mL) Inpn pen  Generic drug:  insulin glargine  INJECT 40 UNITS SC QHS WITH DOSE ADJUSTMENT UP OR DOWN BY 2 UNITS Q 3 DAYS. TTD 50 UNITS     lisinopril 10 MG tablet  Take 10 mg by mouth once daily.     losartan 25 MG tablet  Commonly known as:  COZAAR     NovoLOG Flexpen U-100 Insulin 100 unit/mL Inpn pen  Generic drug:  insulin aspart U-100  INJECT 5 UNITS SC D WITH DINNER     SURE COMFORT ALCOHOL PREP PADS TOP     SURE COMFORT PEN NEEDLE 30 gauge x 5/16" Ndle  Generic drug:  pen needle, diabetic  U BID        * This list has 6 medication(s) that are the same as other medications prescribed for you. Read the directions carefully, and ask your doctor or other care provider to review them with you.            STOP taking these medications    miSOPROStol 200 MCG Tab  Commonly known as:  CYTOTEC            Discharge Procedure Orders  Diet general     Call MD for:  temperature >100.4     Call MD for:  persistent nausea and vomiting     Call MD for:  severe uncontrolled pain     Call MD for:  difficulty breathing, headache or visual disturbances     Call MD for:  hives     Call MD for:  persistent dizziness or light-headedness     Call MD for:  extreme fatigue     Call MD for:   Order Comments: Heavy vaginal bleeding (> 1 pad/hr)       Follow-up Information     " Josie Marti MD. Schedule an appointment as soon as possible for a visit in 4 weeks.    Specialty:  Obstetrics and Gynecology  Why:  Post Op Check  Contact information:  [address] [facility] [id number] [phone]                 Remy Barros MD  PGY1, OBGYN  [facility]

## 2018-05-22 NOTE — PLAN OF CARE
Jael Hall has met all discharge criteria from Phase II. Vital Signs are stable, ambulating  without difficulty. Discharge instructions given, patient verbalized understanding. Discharged from facility via wheelchair in stable condition.

## 2018-05-22 NOTE — TRANSFER OF CARE
"Anesthesia Transfer of Care Note    Patient: Jael Hall    Procedure(s) Performed: Procedure(s) (LRB):  [id number] (N/A)    Patient location: PACU    Anesthesia Type: general    Transport from OR: Transported from OR on 2-3 L/min O2 by NC with adequate spontaneous ventilation. Continuous SpO2 monitoring in transport    Post pain: adequate analgesia    Post assessment: no apparent anesthetic complications    Post vital signs: stable    Level of consciousness: responds to stimulation    Nausea/Vomiting: no nausea/vomiting    Complications: none    Transfer of care protocol was followed      Last vitals:   Visit Vitals  BP (!) 152/73 (BP Location: Left arm, Patient Position: Lying)   Pulse 95   Temp 36.5 °C (97.7 °F) (Oral)   Resp 18   Ht 5' 4" (1.626 m)   Wt 83.9 kg (185 lb)   LMP 03/15/2016 (LMP Unknown)   SpO2 100%   Breastfeeding? No   BMI 31.76 kg/m²     "

## 2018-05-22 NOTE — OR NURSING
Blood glucose 242. Patient reports that she did take her Novolog and Lantus last night as ordered. Dr. Kimball called and notified of above. No new orders at this time.

## 2018-05-22 NOTE — INTERVAL H&P NOTE
The patient has been examined and the H&P has been reviewed:    I concur with the findings and no changes have occurred since H&P was written.    Surgery risks, benefits and alternative options discussed and understood by patient/family.          Active Hospital Problems    Diagnosis  POA    Thickened endometrium [R93.8]  Yes      Resolved Hospital Problems    Diagnosis Date Resolved POA   No resolved problems to display.

## 2018-05-22 NOTE — OP NOTE
DATE OF PROCEDURE:  05/22/2018.    PREOPERATIVE DIAGNOSES:  1.  Thickened endometrium.  2.  Stenotic cervix.  3.  Uterine fibroids.    POSTOPERATIVE DIAGNOSES:  1.  Thickened endometrium.  2.  Stenotic cervix.  3.  Uterine fibroids.    PROCEDURE:  Diagnostic hysteroscopy, dilation and curettage.    SURGEON:  Josie Marti M.D.    ASSISTANT:  Remy Barros M.D.    ANESTHESIA:  LMA.    ESTIMATED BLOOD LOSS:  Less than 10 mL    COMPLICATIONS:  None.    FINDINGS:  Uterus sounded to 7 cm.  Mildly proliferative endometrium noted.    Bilateral tubal ostia visualized and normal appearing.  No intracavitary masses   or fibroids noted.    PROCEDURE IN DETAIL:  After consent was obtained, the patient was taken to the   Operating Room.  She was given Anesthesia.  She was prepped and draped in dorsal   lithotomy position.  Her bladder was drained.  Speculum was placed in the   vagina and cervix was grasped with a tenaculum.  The uterus was sounded.  The   cervix was dilated with Hanks dilators up to size 15.  A 5-mm diagnostic   hysteroscope was placed into uterine cavity and the cavity distended with   saline.  The findings as above were noted.  The hysteroscope was removed.  Sharp   curettage was performed until a gritty texture was noted throughout.  Specimen   sent to Pathology.  The tenaculum was removed.  There is no active cervix or   uterine bleeding noted.  The speculum was removed.  The patient was awoken from   anesthesia and taken to Recovery in stable condition.      ABHIJIT  dd: 05/22/2018 09:36:28 (CDT)  td: 05/22/2018 10:41:34 (CDT)  Doc ID   #[id number]  Job ID #[id number]    CC:

## 2018-05-22 NOTE — ANESTHESIA POSTPROCEDURE EVALUATION
"Anesthesia Post Evaluation    Patient: Jael Hall    Procedure(s) Performed: Procedure(s) (LRB):  [id number] (N/A)    Final Anesthesia Type: general  Patient location during evaluation: PACU  Patient participation: Yes- Able to Participate  Level of consciousness: awake and alert  Post-procedure vital signs: reviewed and stable  Pain management: adequate  Airway patency: patent  PONV status at discharge: No PONV  Anesthetic complications: no      Cardiovascular status: blood pressure returned to baseline  Respiratory status: unassisted, spontaneous ventilation and room air  Hydration status: euvolemic  Follow-up not needed.        Visit Vitals  BP (!) 155/80   Pulse 83   Temp 36.3 °C (97.4 °F) (Oral)   Resp 16   Ht 5' 4" (1.626 m)   Wt 83.9 kg (185 lb)   LMP 03/15/2016 (LMP Unknown)   SpO2 100%   Breastfeeding? No   BMI 31.76 kg/m²       Pain/Ami Score: Pain Assessment Performed: Yes (5/22/2018 10:55 AM)  Presence of Pain: denies (5/22/2018 10:55 AM)  Ami Score: 10 (5/22/2018 10:55 AM)      "

## 2018-05-22 NOTE — DISCHARGE INSTRUCTIONS
Home Care Instruction D&C Hysteroscopy           Anesthesia: After Your Surgery  Youve just had surgery. During surgery, you received medication called anesthesia to keep you comfortable and pain-free. After surgery, you may experience some pain or nausea. This is common. Here are some tips for feeling better and recovering after surgery.    Going home  Your doctor or nurse will show you how to take care of yourself when you go home. He or she will also answer your questions. Have an adult family member or friend drive you home. For the first 24 hours after your surgery:  · Do not drive or use heavy equipment.  · Do not make important decisions or sign legal documents.  · Avoid alcohol.  · Have someone stay with you, if needed. He or she can watch for problems and help keep you safe.  Be sure to keep all follow-up appointments with your doctor. And rest after your procedure for as long as your doctor tells you to.    Coping with pain  If you have pain after surgery, pain medication will help you feel better. Take it as directed, before pain becomes severe. Also, ask your doctor or pharmacist about other ways to control pain, such as with heat, ice, and relaxation. And follow any other instructions your surgeon or nurse gives you.    Tips for taking pain medication  To get the best relief possible, remember these points:  · Pain medications can upset your stomach. Taking them with a little food may help.  · Most pain relievers taken by mouth need at least 20 to 30 minutes to take effect.  · Taking medication on a schedule can help you remember to take it. Try to time your medication so that you can take it before beginning an activity, such as dressing, walking, or sitting down for dinner.  · Constipation is a common side effect of pain medications. Contact your doctor before taking any medications like laxatives or stool softeners to help relieve constipation. Also ask about any dietary restrictions, because  drinking lots of fluids and eating foods like fruits and vegetables that are high in fiber can also help. Remember, dont take laxatives unless your surgeon has prescribed them.  · Mixing alcohol and pain medication can cause dizziness and slow your breathing. It can even be fatal. Dont drink alcohol while taking pain medication.  · Pain medication can slow your reflexes. Dont drive or operate machinery while taking pain medication.  If your health care provider tells you to take acetaminophen to help relieve your pain, ask him or her how much you are supposed to take each day. (Acetaminophen is the generic name for Tylenol and other brand-name pain relievers.) Acetaminophen or other pain relievers may interact with your prescription medicines or other over-the-counter (OTC) drugs. Some prescription medications contain acetaminophen along with other active ingredients. Using both prescription and OTC acetaminophen for pain can cause you to overdose. The FDA recommends that you read the labels on your OTC medications carefully. This will help you to clearly understand the list of active ingredients, dosing instructions, and any warnings. It may also help you avoid taking too much acetaminophen. If you have questions or don't understand the information, ask your pharmacist or health care provider to explain it to you before you take the OTC medication.    Managing nausea  Some people have an upset stomach after surgery. This is often due to anesthesia, pain, pain medications, or the stress of surgery. The following tips will help you manage nausea and get good nutrition as you recover. If you were on a special diet before surgery, ask your doctor if you should follow it during recovery. These tips may help:  · Dont push yourself to eat. Your body will tell you when to eat and how much.  · Start off with clear liquids and soup. They are easier to digest.  · Progress to semi-solid foods (mashed potatoes, applesauce,  and gelatin) as you feel ready.  · Slowly move to solid foods. Dont eat fatty, rich, or spicy foods at first.  · Dont force yourself to have three large meals a day. Instead, eat smaller amounts more often.  · Take pain medications with a small amount of solid food, such as crackers or toast to avoid nausea.      Call your surgeon if  · You still have pain an hour after taking medication (it may not be strong enough).  · You feel too sleepy, dizzy, or groggy (medication may be too strong).  · You have side effects like nausea, vomiting, or skin changes (rash, itching, or hives).   © 7684-8239 [employer]. [address] [place] [address], [place] [address]. All rights reserved. This information is not intended as a substitute for professional medical care. Always follow your healthcare professional's instructions.                    ACTIVITY LEVEL:  If you received sedation and/or an anesthetic, you may feel sleepy for several hours. Rest until you feel more  awake. Gradually resume your normal activities.    DIET:  At home, continue with liquids. If there is no nausea, you may eat a soft diet and gradually resume a regular diet.    BATHING:  You may shower  as desired in one day.  You should avoid tub baths, hot tubs and swimming pools until seen by your physician for a post-op follow up.    CARE:  You can expect watery or bloody vaginal discharge for several days. Do not use tampons, please only use pads. Sexual activity is restricted as advised by your doctor.    MEDICATIONS:  You will receive instructions for any pain and/or antibiotic prescriptions. Pain medication should be taken only if needed and as directed. Antibiotics, if ordered, should be taken as directed until the entire prescription is completed.    ADDITIONAL INFORMATION:  __________________________________________________________________________________________  WHEN TO CALL THE DOCTOR:   For any heavy vaginal bleeding   Fever over 101°F  (38.4°C)   Any lower abdominal pain not relieved by the pain mediation

## 2018-05-28 ENCOUNTER — TELEPHONE (OUTPATIENT)
Dept: OBSTETRICS AND GYNECOLOGY | Facility: CLINIC | Age: 53
End: 2018-05-28

## 2018-06-05 ENCOUNTER — OFFICE VISIT (OUTPATIENT)
Dept: OBSTETRICS AND GYNECOLOGY | Facility: CLINIC | Age: 53
End: 2018-06-05
Attending: OBSTETRICS & GYNECOLOGY
Payer: MEDICARE

## 2018-06-05 VITALS
BODY MASS INDEX: 32.18 KG/M2 | WEIGHT: 188.5 LBS | DIASTOLIC BLOOD PRESSURE: 70 MMHG | SYSTOLIC BLOOD PRESSURE: 134 MMHG | HEIGHT: 64 IN

## 2018-06-05 DIAGNOSIS — Z98.890 POST-OPERATIVE STATE: Primary | ICD-10-CM

## 2018-06-05 PROCEDURE — 99213 OFFICE O/P EST LOW 20 MIN: CPT | Mod: PBBFAC | Performed by: OBSTETRICS & GYNECOLOGY

## 2018-06-05 PROCEDURE — 99024 POSTOP FOLLOW-UP VISIT: CPT | Mod: POP,,, | Performed by: OBSTETRICS & GYNECOLOGY

## 2018-06-05 PROCEDURE — 99999 PR PBB SHADOW E&M-EST. PATIENT-LVL III: CPT | Mod: PBBFAC,,, | Performed by: OBSTETRICS & GYNECOLOGY

## 2018-06-05 RX ORDER — CYCLOBENZAPRINE HCL 10 MG
TABLET ORAL
COMMUNITY
Start: 2018-05-18 | End: 2019-02-26

## 2018-07-05 ENCOUNTER — TELEPHONE (OUTPATIENT)
Dept: TRANSPLANT | Facility: CLINIC | Age: 53
End: 2018-07-05

## 2018-07-05 NOTE — TELEPHONE ENCOUNTER
Adherence check:  SW received compliance form from pt's dialysis unit. For the last 3 months pt has had 0 AMAs, missed 1 treatment 6/26/2018 due to traveling. Pt does not sign off early. Pt's last PTH was 294 drawn on 6/28/2018. No concerns with labs, caregivers, transportation or psychosocial issues. SW remains available.

## 2018-07-06 ENCOUNTER — TELEPHONE (OUTPATIENT)
Dept: TRANSPLANT | Facility: CLINIC | Age: 53
End: 2018-07-06

## 2018-07-06 ENCOUNTER — COMMITTEE REVIEW (OUTPATIENT)
Dept: TRANSPLANT | Facility: CLINIC | Age: 53
End: 2018-07-06

## 2018-07-06 DIAGNOSIS — Z76.82 ORGAN TRANSPLANT CANDIDATE: Primary | ICD-10-CM

## 2018-07-06 DIAGNOSIS — Z76.82 AWAITING TRANSPLANTATION OF KIDNEY: Primary | ICD-10-CM

## 2018-07-06 NOTE — COMMITTEE REVIEW
Native Organ Dx: Diabetes Mellitus - Type II      SELECTION COMMITTEE NOTE    Jael Hall was presented at selection committee on 7/6/2018.  Patient met selection criteria for kidney transplant related to ESRD due to   Diabetes Mellitus - Type II.  No absolute contraindications to transplant at this time.  Patient will be placed on the cadaveric wait list pending final financial approval from insurance company.  Patient will return to clinic for routine appointment in 1 year(s) - she is due for updated clinic visit in Sept 2018. Patient does not meet criteria for High KDPI kidney offer due to weight.    She should follow-up with hepatology for fatty liver.     Spoken to patient in regards to committee's decision. All questions were answered and patient verbalized understanding.       Note written by Mary Toro RN    ===============================================    I was present at the meeting and attest to the decision of the committee. Please see my additional comments that are bolded above.    Mayra Art MD

## 2018-07-06 NOTE — LETTER
July 6, 2018    Karlos Perry MD  [address] [address]    Phone: [phone]  Fax: [phone]             Dear Dr. Karlos Perry:    Patient: Jael Hall   MR Number: [id number]   YOB: 1965     Your patient, Jael Hall, was recently discussed at the [facility] Selection Committee meeting on 7/6/2018. I am happy to inform you that Jael has been approved for transplantation.  She has met selection criteria for a kidney transplant related to ESRD secondary to primary diagnosis of Diabetes Mellitus - Type II. Your patient will be placed on the cadaveric wait list pending final financial approval from insurance company.     We appreciate your confidence in allowing us to participate in your patients care.  If you have any questions or concerns, please do not hesitate to contact me.    Sincerely,      Jeannie Kenny MD  Medical Director, Kidney & Kidney/Pancreas Transplantation  [facility]/

## 2018-07-06 NOTE — TELEPHONE ENCOUNTER
"  KIDNEY WAIT LISTING NOTE    Date of Financial clearance to list: 18    SSN/[facility]:     Organ: Kidney  Name:       Jael Hall   : 1965          Gender:     female    MRN#: [id number]                                 State of Permanent Residence:  [address] [place] [address]  Ethnicity: /Black   Race:      Black or     CLINICAL INFORMATION   Candidate Medical Urgency Status: Active  Number of Previous Kidney Transplants: 0  Number of Previous Solid Organ Transplants: 0  Did you enter number of previous kidney or other solid organ transplants? yes  Is this Candidate a Prior Living Donor: no  (If yes, please generate letter to [employer] with patient's date of donation, recipient SSN, signed by Surgical Director after patient is listed in order to receive priority points).      ABO  ABO Blood Group:   O POS     ABO Confirmation: (THESE DATES MUST BE PRIOR TO THE LIST DATE AND SUPPORTED BY SEPARATE LAB REPORTS)    Internal Results    Lab Results   Component Value Date    GROUPTRH O POS 10/17/2017    GROUPTRH O POS 2017     No results found for: ABO    External Results    ABO Date 1:    ABO Date 2  Are either of these ABO results based on External Labs? no  (If Yes, STOP and go to source document in Media Tab for verification).    VITALS  Height:  5' 4"  Weight:  181 lbs  (Use height from Transplant clinic visits only).  Did you enter height/weight? Yes    HLA    Class I:  Lab Results   Component Value Date    LYIV6XJ 2 2017    NUFE0JQ 24 2017    RPMR3IG 7 2017    MKOQ7EL 35 2017    FRLUQ8PY 6 2017    BWPZT5OT XX 2017    DXJUP6PQ 4 2017    QFDJY5PH 7 2017       Class II:  Lab Results   Component Value Date    HECFCY39RQ 4 2017    RTKQTW26LM 15 2017    CIXUTN864VR 53 2017    XCJZDQ9331 51 2017    XAPRO3IZ 7 2017    DGKLD4WE 6 2017       Tested for HLA Antibodies: Yes, no " "antibodies detected     If result is "Positive" antibodies are detected     If result is "Negative or questionable" no antibodies detected    Lab Results   Component Value Date    CIPRAS Negative 09/28/2017    CIIPRAS Negative 09/28/2017       DIALYSIS INFORMATION  Is patient Pre-Dialysis: No     Report GFR being used as the criteria for placement on the kidney list. If not, leave blank  GFR < or = 20 ml/min? n/a  If Yes, Specify value  ___   ml/min     Initial date GFR became 20 or less:   Is GFR obtained from an Outside lab Result? n/a  (If YES verify with source document scanned into media)    If patient on Dialysis:    Is candidate currently on dialysis for ESRD? Yes  If Yes,  Date Chronic Dialysis Started:   7/5/2017  (verify with source document in Media Tab)   Dialysis Unit Name: [facility]  [address] [place] [address]                        Physician Name:  Dr. Jeannie Klein  NPI#: [id number]    DIABETES INFORMATION  Primary Native Kidney Diagnosis: Diabetes Mellitus - Type II  C-Peptide Value - No results found for: CPEPTIDE  Current Diabetes Status: Type II    FOR NON-KIDNEY DEPARTMENT USE ONLY:  Additional Organs Registered? none    Maximum Acceptable Number of HLA Mismatches  ABDR:     6      (0-6)               AB:               (0-4)  ADR:   _____  (0-4)              BDR: _____ (0-4)  A:        _____  (0-2)              B:      _____ (0-2)          DR: ______ (0-2)    Will Recipient Accept?   Accept HBcAB Positive Organ:            Yes  Accept HBV LUIS Positive Organ:        no  Accept HCV Antibody Positive Organ: no   Accept HCV LUIS Positive Organ: no  Accept KDPI > 85 Donor ?: No                        Local: No                           Import: No    ### NURSE TO VERIFY CONSENT AND MAKE ANY NECESSARY CHANGES NEEDED IN UNET AT THE TIME OF VERIFICATION ###    Unacceptible Antigens  If yes, list     No results found for: WP9AZEJ, CIABCLM, CIIAB, ABCMT    ### DO NOT LIST IF ANTIGEN " VALUE WEAK ###

## 2018-07-06 NOTE — LETTER
2018     Jael Fredisvinh  [address] [facility] [address]    Dear Jael Hall:  MRN: [id number]    Congratulations! On 2018, you were placed on  the waiting list for a  donor transplant.    Your candidacy for kidney transplant is based on the following criteria: ESRD due to Diabetes Mellitus - Type II.    Your transplant coordinator while on the waiting list is Telly Wiley RN. They can be reached at [phone] or [phone] with any questions.      What to do now?    ? Ask your living donors to call and begin testing   Give your donors our phone number, [phone]   Make sure your donors have your name and date of birth when they call   You will get transplanted much faster if you have a living donor    ? Have your blood sent to our Transplant Lab every month   If you are on dialysis - our Transplant Lab will work with your dialysis unit to send your blood every month   If you are not on dialysis   - If you live near an [facility] lab, we will schedule you to have blood drawn every month  - If you do not live near an [facility] lab, you will be sent blood kits in the mail. You will need to take a kit to your local lab or doctor to have your blood drawn every month and mail to the Transplant Lab.     ? Call us with ANY CHANGES   Phone numbers - we must be able to reach you anytime of the day or night when a kidney is available   Address   Insurance coverage   Dialysis unit or kidney doctor   Han: if you have surgery, stay in the hospital, have to get blood, or have an infection    ? Review your Kidney/Kidney-Pancreas Transplant Guide    This will give you detailed information about what happens when  - you are on the waiting list   - you are called when a kidney is available     The [facility] has a transplant surgeon and physician available 365  days a year, 24 hours a day to coordinate organ acceptance, procurement, surgical placement and to   address urgent patient care issues.  You will be notified in writing of any changes to our Transplant  Centers staffing plan that would impact your ability to receive a transplant.     Attached is a letter from the [employer]aring ([employer]). It describes the services and  information offered to patients by [employer] and the Organ Procurement and Transplant Network. We look  forward to working with you while on the waiting list.      Congratulations,     Your Transplant Partner   [facility]    [address] [place] [address]   [phone]                /lc                CC: MD Donnie Erazo                                                                                 OPTN/UNOS: Your Resource for Organ Transplant Information        If you have a question regarding your own medical care, you always should call your transplant center first. However, for general organ transplant-related information, you can call the [employer] ([employer]) toll-free patient services line at [phone].    Anyone, including potential transplant candidates, recipients, family members/friends, living donors, and/or donor family members can call this number to:    · talk about organ donation, living donation, how transplant and donation work, the donation process, transplant policies, and transplant/donor information;  · get a free patient information kit with helpful booklets, waiting list and transplant information, and a list of all transplant centers;  · ask questions about the Organ Procurement and Transplantation Network (OPTN) web site (www.optn.transplant.hrsa.gov); the [employer] Web site (www.unos.org); or the [employer]OS web site for living donors and transplant recipients (www.transplantliving.org);  · learn how [employer] and the OPTN can help you;  · talk about any concerns that you may have with a transplant center and how  they perform    UN[employer] is a not-for-profit organization that provides all of the administrative services for the national OPTN under federal contract to the Health Resources and Services Administration (HRSA), an agency under the U.S. Department of Health and Human Services (HHS).     [employer] and OPTN responsibilities include:    · writing educational material for patients, the public and professionals;  · helping to make people aware of the need for donated organs and tissue;  · writing organ transplant policy with help from doctors, nurses, transplant patients/candidates, donor families, living donors, and the public;  · coordinating the organ matching and placement process;  · collecting information about every organ transplant and donation that occurs in the [place].    Remember, you should contact your transplant center directly if you have questions or concerns about your own medical care including medical records, work-up progress and test reports. [facility] is not your transplant center, and staff at [facility] will not be able to transfer you to your transplant center, so keep your transplant centers phone number handy. But, while you research your transplant needs and learn as much as you can about transplantation and donation, we welcome your call to our toll-free patient services line at [phone].

## 2018-07-27 DIAGNOSIS — Z76.82 ORGAN TRANSPLANT CANDIDATE: Primary | ICD-10-CM

## 2018-12-27 ENCOUNTER — TELEPHONE (OUTPATIENT)
Dept: SURGERY | Facility: CLINIC | Age: 53
End: 2018-12-27

## 2018-12-27 NOTE — TELEPHONE ENCOUNTER
----- Message from Toro Rojo sent at 12/27/2018  9:16 AM CST -----  Contact: Pt:[phone]  .Needs Advice    Reason for call:Pt called and states she would like to know if she needs to follow up with . Pt has a colonoscopy in April of this year.        Communication Preference:Pt:[phone]    Additional Information:

## 2018-12-27 NOTE — TELEPHONE ENCOUNTER
Spoke with patient and informed her that an appointment is not needed if she is not having any complications.

## 2019-01-21 DIAGNOSIS — Z76.82 ORGAN TRANSPLANT CANDIDATE: Primary | ICD-10-CM

## 2019-02-12 ENCOUNTER — CLINICAL SUPPORT (OUTPATIENT)
Dept: CARDIOLOGY | Facility: CLINIC | Age: 54
End: 2019-02-12
Attending: NURSE PRACTITIONER
Payer: MEDICARE

## 2019-02-12 DIAGNOSIS — Z76.82 ORGAN TRANSPLANT CANDIDATE: ICD-10-CM

## 2019-02-12 LAB
CV STRESS BASE HR: 83 BPM
DIASTOLIC BLOOD PRESSURE: 65 MMHG
END DIASTOLIC INDEX-HIGH: 171 ML/M2
END SYSTOLIC INDEX-HIGH: 70 ML/M2
NUC REST DIASTOLIC VOLUME INDEX: 108
NUC REST EJECTION FRACTION: 84
NUC REST SYSTOLIC VOLUME INDEX: 17
OHS CV CPX 1 MINUTE RECOVERY HEART RATE: 105 BPM
OHS CV CPX 85 PERCENT MAX PREDICTED HEART RATE MALE: 134
OHS CV CPX MAX PREDICTED HEART RATE: 158
OHS CV CPX PATIENT IS FEMALE: 1
OHS CV CPX PATIENT IS MALE: 0
OHS CV CPX PEAK DIASTOLIC BLOOD PRESSURE: 65 MMHG
OHS CV CPX PEAK HEAR RATE: 88 BPM
OHS CV CPX PEAK RATE PRESSURE PRODUCT: NORMAL
OHS CV CPX PEAK SYSTOLIC BLOOD PRESSURE: 146 MMHG
OHS CV CPX PERCENT MAX PREDICTED HEART RATE ACHIEVED: 56
OHS CV CPX RATE PRESSURE PRODUCT PRESENTING: NORMAL
RETIRED EF AND QEF - SEE NOTES: 51 %
SYSTOLIC BLOOD PRESSURE: 146 MMHG

## 2019-02-12 PROCEDURE — 93018 STRESS TEST WITH MYOCARDIAL PERFUSION (CUPID ONLY): ICD-10-PCS | Mod: TXP,,, | Performed by: INTERNAL MEDICINE

## 2019-02-12 PROCEDURE — 78452 STRESS TEST WITH MYOCARDIAL PERFUSION (CUPID ONLY): ICD-10-PCS | Mod: 26,TXP,, | Performed by: INTERNAL MEDICINE

## 2019-02-12 PROCEDURE — 93018 CV STRESS TEST I&R ONLY: CPT | Mod: TXP,,, | Performed by: INTERNAL MEDICINE

## 2019-02-12 PROCEDURE — 78452 HT MUSCLE IMAGE SPECT MULT: CPT | Mod: 26,TXP,, | Performed by: INTERNAL MEDICINE

## 2019-02-12 PROCEDURE — 93016 STRESS TEST WITH MYOCARDIAL PERFUSION (CUPID ONLY): ICD-10-PCS | Mod: TXP,,, | Performed by: INTERNAL MEDICINE

## 2019-02-12 PROCEDURE — 93016 CV STRESS TEST SUPVJ ONLY: CPT | Mod: TXP,,, | Performed by: INTERNAL MEDICINE

## 2019-02-12 PROCEDURE — 93017 CV STRESS TEST TRACING ONLY: CPT | Mod: PBBFAC,TXP | Performed by: INTERNAL MEDICINE

## 2019-02-12 PROCEDURE — A9502 TC99M TETROFOSMIN: HCPCS | Mod: PBBFAC,TXP | Performed by: INTERNAL MEDICINE

## 2019-02-12 RX ORDER — REGADENOSON 0.08 MG/ML
0.4 INJECTION, SOLUTION INTRAVENOUS
Status: COMPLETED | OUTPATIENT
Start: 2019-02-12 | End: 2019-02-12

## 2019-02-12 RX ADMIN — REGADENOSON 0.4 MG: 0.08 INJECTION, SOLUTION INTRAVENOUS at 09:02

## 2019-02-19 DIAGNOSIS — Z76.82 ORGAN TRANSPLANT CANDIDATE: Primary | ICD-10-CM

## 2019-02-26 ENCOUNTER — HOSPITAL ENCOUNTER (OUTPATIENT)
Dept: RADIOLOGY | Facility: HOSPITAL | Age: 54
Discharge: HOME | End: 2019-02-26
Attending: NURSE PRACTITIONER
Payer: MEDICARE

## 2019-02-26 ENCOUNTER — OFFICE VISIT (OUTPATIENT)
Dept: TRANSPLANT | Facility: CLINIC | Age: 54
End: 2019-02-26
Payer: MEDICARE

## 2019-02-26 VITALS
OXYGEN SATURATION: 96 % | HEART RATE: 89 BPM | RESPIRATION RATE: 17 BRPM | DIASTOLIC BLOOD PRESSURE: 74 MMHG | BODY MASS INDEX: 32.6 KG/M2 | SYSTOLIC BLOOD PRESSURE: 145 MMHG | TEMPERATURE: 98 F | HEIGHT: 64 IN | WEIGHT: 190.94 LBS

## 2019-02-26 DIAGNOSIS — D63.1 ANEMIA IN ESRD (END-STAGE RENAL DISEASE): ICD-10-CM

## 2019-02-26 DIAGNOSIS — E11.22 TYPE 2 DIABETES MELLITUS WITH CHRONIC KIDNEY DISEASE ON CHRONIC DIALYSIS, WITH LONG-TERM CURRENT USE OF INSULIN: Chronic | ICD-10-CM

## 2019-02-26 DIAGNOSIS — Z99.2 ESRD ON HEMODIALYSIS: ICD-10-CM

## 2019-02-26 DIAGNOSIS — Z99.2 END STAGE RENAL FAILURE ON DIALYSIS: Primary | ICD-10-CM

## 2019-02-26 DIAGNOSIS — Z76.82 ORGAN TRANSPLANT CANDIDATE: ICD-10-CM

## 2019-02-26 DIAGNOSIS — N18.6 END STAGE RENAL FAILURE ON DIALYSIS: Primary | ICD-10-CM

## 2019-02-26 DIAGNOSIS — Z76.82 PATIENT ON WAITING LIST FOR KIDNEY TRANSPLANT: ICD-10-CM

## 2019-02-26 DIAGNOSIS — Z79.4 TYPE 2 DIABETES MELLITUS WITH CHRONIC KIDNEY DISEASE ON CHRONIC DIALYSIS, WITH LONG-TERM CURRENT USE OF INSULIN: Chronic | ICD-10-CM

## 2019-02-26 DIAGNOSIS — Z99.2 TYPE 2 DIABETES MELLITUS WITH CHRONIC KIDNEY DISEASE ON CHRONIC DIALYSIS, WITH LONG-TERM CURRENT USE OF INSULIN: Chronic | ICD-10-CM

## 2019-02-26 DIAGNOSIS — N18.6 ESRD ON HEMODIALYSIS: ICD-10-CM

## 2019-02-26 DIAGNOSIS — N18.6 ANEMIA IN ESRD (END-STAGE RENAL DISEASE): ICD-10-CM

## 2019-02-26 DIAGNOSIS — N18.6 TYPE 2 DIABETES MELLITUS WITH CHRONIC KIDNEY DISEASE ON CHRONIC DIALYSIS, WITH LONG-TERM CURRENT USE OF INSULIN: Chronic | ICD-10-CM

## 2019-02-26 PROCEDURE — 76770 US RETROPERITONEAL COMPLETE: ICD-10-PCS | Mod: 26,TXP,, | Performed by: RADIOLOGY

## 2019-02-26 PROCEDURE — 99999 PR PBB SHADOW E&M-EST. PATIENT-LVL V: ICD-10-PCS | Mod: PBBFAC,TXP,, | Performed by: NURSE PRACTITIONER

## 2019-02-26 PROCEDURE — 76770 US EXAM ABDO BACK WALL COMP: CPT | Mod: 26,TXP,, | Performed by: RADIOLOGY

## 2019-02-26 PROCEDURE — 71046 XR CHEST PA AND LATERAL: ICD-10-PCS | Mod: 26,TXP,, | Performed by: RADIOLOGY

## 2019-02-26 PROCEDURE — 71046 X-RAY EXAM CHEST 2 VIEWS: CPT | Mod: 26,TXP,, | Performed by: RADIOLOGY

## 2019-02-26 PROCEDURE — 76770 US EXAM ABDO BACK WALL COMP: CPT | Mod: TC,TXP

## 2019-02-26 PROCEDURE — 71046 X-RAY EXAM CHEST 2 VIEWS: CPT | Mod: TC,TXP

## 2019-02-26 PROCEDURE — 99215 PR OFFICE/OUTPT VISIT, EST, LEVL V, 40-54 MIN: ICD-10-PCS | Mod: S$PBB,TXP,, | Performed by: NURSE PRACTITIONER

## 2019-02-26 PROCEDURE — 99215 OFFICE O/P EST HI 40 MIN: CPT | Mod: PBBFAC,25,TXP | Performed by: NURSE PRACTITIONER

## 2019-02-26 PROCEDURE — 99999 PR PBB SHADOW E&M-EST. PATIENT-LVL V: CPT | Mod: PBBFAC,TXP,, | Performed by: NURSE PRACTITIONER

## 2019-02-26 PROCEDURE — 99215 OFFICE O/P EST HI 40 MIN: CPT | Mod: S$PBB,TXP,, | Performed by: NURSE PRACTITIONER

## 2019-02-26 NOTE — PROGRESS NOTES
Kidney Transplant Recipient Reevalulation    Referring Physician: Karlos Perry  Current Nephrologist: Karlos Perry  Waitlist Status: active  Dialysis Start Date: 7/5/2017    Subjective:     CC:  Annual reassessment of kidney transplant candidacy.    HPI:  Ms. Hall is a 53 y.o. year old Black or  female with ESRD secondary to diabetic nephropathy.  She has been on the wait list for a kidney transplant at [facility] since 7/5/2017. Patient is currently on hemodialysis started on 7/5/2017. Patient is dialyzing on MWF schedule.  Patient reports that she is tolerating dialysis well.. She has a RUE AV fistula.   Dialyzes for 3 1/2 hours, and reports BP remains stable. Over all feels well. Denies SOB, leg pain or chest pain with exertion. Looks good.   Walks and will do chores around the house to stay busy.     Recent hospitalizations or ED visits.  5/22/2018 D&C   SPECIMEN  1) Endometrial curettage  FINAL PATHOLOGIC DIAGNOSIS  ENDOMETRIUM: SMALL FRAGMENTS OF INACTIVE ENDOMETRIUM WITH TUBAL METAPLASIA; BENIGN  FRAGMENTS OF ENDOCERVICAL TISSUE; NO EVIDENCE OF MALIGNANCY.      2/26/2019 CXR  Impression     No evidence of acute cardiopulmonary process.     2/26/2019  STRESS TEST, REGADENOSON W MYOCARDIAL PERFUSION SPECT (MULTI STUDY)   Conclusion     · The perfusion scan is free of evidence from myocardial ischemia or injury.  · An ejection fraction of 84 % at rest  · Resting wall motion is physiologic.  · Post stress wall motion is physiologic.  · LV cavity size is normal at rest.  · There was no ST segment deviation noted during stress.  · The EKG portion of this study is negative for myocardial ischemia.  · There were no arrhythmias during stress.  · The patient reported SOB (non-anginal) during the stress test.  · There is a prior study available for comparison that was performed on 10/17/2017.  · As compared to the previous study, there are no significant changes          Past Medical History:  "  Diagnosis Date    Anemia     Arrhythmia     Bradycardia     Diabetes mellitus     Dialysis patient     monday wednesday friday    Disorder of kidney and ureter     Hyperlipidemia     Hypertension     Obesity     Preeclampsia        Review of Systems   Constitutional: Negative for activity change, appetite change, chills, fatigue, fever and unexpected weight change.   HENT: Negative for congestion, facial swelling, postnasal drip, rhinorrhea, sinus pressure, sore throat and trouble swallowing.    Eyes: Negative for pain, redness and visual disturbance.   Respiratory: Negative for cough, chest tightness, shortness of breath and wheezing.    Cardiovascular: Negative.  Negative for chest pain, palpitations and leg swelling.   Gastrointestinal: Negative for abdominal pain, diarrhea, nausea and vomiting.   Genitourinary: Negative for dysuria, flank pain and urgency.   Musculoskeletal: Positive for arthralgias. Negative for gait problem, neck pain and neck stiffness.        Left shoulder arthritis --Goes to PT every Thursday   [facility]    Skin: Negative for rash.   Allergic/Immunologic: Negative for environmental allergies, food allergies and immunocompromised state.   Neurological: Negative for dizziness, weakness, light-headedness and headaches.   Psychiatric/Behavioral: Negative for agitation and confusion. The patient is not nervous/anxious.        Objective:   body mass index is 33.29 kg/m².  BP (!) 145/74 (BP Location: Left arm, Patient Position: Sitting, BP Method: Medium (Automatic))   Pulse 89   Temp 98.2 °F (36.8 °C) (Oral)   Resp 17   Ht 5' 3.5" (1.613 m)   Wt 86.6 kg (190 lb 14.7 oz)   LMP 03/15/2016 (LMP Unknown)   SpO2 96%   BMI 33.29 kg/m²     Physical Exam   Constitutional: She is oriented to person, place, and time. She appears well-developed and well-nourished.   HENT:   Head: Normocephalic.   Mouth/Throat: Oropharynx is clear and moist. No " oropharyngeal exudate.   Eyes: Conjunctivae and EOM are normal. Pupils are equal, round, and reactive to light. No scleral icterus.   Neck: Normal range of motion. Neck supple.   Cardiovascular: Normal rate, regular rhythm and normal heart sounds.   Pulmonary/Chest: Effort normal and breath sounds normal.   Abdominal: Soft. Normal appearance and bowel sounds are normal. She exhibits no distension and no mass. There is no splenomegaly or hepatomegaly. There is no tenderness. There is no rebound, no guarding, no CVA tenderness, no tenderness at McBurney's point and negative Reeder's sign.   Musculoskeletal: Normal range of motion. She exhibits no edema.        Arms:  Lymphadenopathy:     She has no cervical adenopathy.   Neurological: She is alert and oriented to person, place, and time. She exhibits normal muscle tone. Coordination normal.   Skin: Skin is warm and dry.   Psychiatric: She has a normal mood and affect. Her behavior is normal.   Vitals reviewed.      Labs:  Lab Results   Component Value Date    WBC 7.08 05/22/2018    HGB 10.0 (L) 05/22/2018    HCT 31.5 (L) 05/22/2018     (L) 05/22/2018    K 5.2 (H) 05/22/2018     05/22/2018    CO2 19 (L) 05/22/2018    BUN 99 (H) 05/22/2018    CREATININE 6.6 (H) 05/22/2018    EGFRNONAA 7 (A) 05/22/2018    CALCIUM 9.7 05/22/2018    PHOS 4.1 09/28/2017    ALBUMIN 3.5 09/28/2017    AST 11 09/28/2017    ALT 6 (L) 09/28/2017    .0 (H) 10/17/2017       No results found for: PREALBUMIN, BILIRUBINUA, GGT, AMYLASE, LIPASE, PROTEINUA, NITRITE, RBCUA, WBCUA    No results found for: HLAABCTYPE    Lab Results   Component Value Date    CPRA 0 11/19/2018    CPRA 0 11/19/2018    CPRA 0 11/19/2018    VI0KTSJ Negative 07/10/2018    CIABCLM A34. WEAK-- A29 11/19/2018    CIIAB Negative 11/19/2018       Labs were reviewed with the patient.    Pre-transplant Workup:   Reviewed with the patient.    Assessment:     1. End stage renal failure on dialysis    2. Patient on  waiting list for kidney transplant    3. Anemia in ESRD (end-stage renal disease)    4. Type 2 diabetes mellitus with chronic kidney disease on chronic dialysis, with long-term current use of insulin    5. ESRD on hemodialysis        Plan:   Renal US 2/26/2019 pending  MMG 4/11/2019  Hx steatosis, f/u with  Hep/ with Dr Anderson, CTA/P, GYN, 3/14/2019    Transplant Candidacy:   Ms. Hall is a suitable kidney transplant candidate.  Meets center eligibility for accepting HCV+ donor offer - yes.  Patient educated on HCV+ donors. Jael is willing  to accept HCV+ donor offer -  yes   Patient is a candidate for KDPI > 85 kidney donor offer - no d/t weight.  She remains in overall stable health, and will remain active on the transplant list.    Natalee Resendiz NP       Follow-up:   In addition to the tests noted in the plan, Ms. Hall will continue to have reevaluation as per the standing pre-kidney transplant protocol:  1. Monthly blood for PRA  2. Annual return to clinic, except HIV positive, > 65 years of age, or pancreas transplant candidates who will be scheduled to see transplant every 6 months while in pre-transplant phase  3. Annual re-testing: CXR, EKG, yearly mammograms for women over 40 and PSA for males over 40, cardiology follow-up as recommended by initial cardiology pre-transplant evaluation  4. Renal ultrasound every 2 years  5. Baseline colonoscopy after age 50 and repeated as recommended    UNOS Patient Status  Functional Status: 60% - Requires occasional assistance but is able to care for needs  Physical Capacity: No Limitations

## 2019-02-26 NOTE — LETTER
Date: 2/27/2019          Listed Patient      To: Dialysis Unit  and Charge RN From: Pat Toro LMSW   RE: Jael Hall, 1965, [id number]     At [facility], we conduct adherence checks as an important part of transplant care. Initial and listed patient assessments are not complete without adherence information.        Please complete the following information:     Current Dry Weight: ___________         Most Recent Pre-Treatment Weight: ___________ /date: _________                    Data from the last 3 months:  (data from last 3 months preferred):    Number of AMAs with dates, time, and reasons: ____________________________________________________    ______________________________________________________________________________________________    ______________________________________________________________________________________________    Number of No-Shows with dates and reasons: ______________________________________________________      ______________________________________________________________________________________________    Last intact PTH:  ___________/date: __________      Any concerns with Labs:  YES / NO      If yes, please explain:  ___________________________________________________________________________    ______________________________________________________________________________________________    Any concerns with Caregivers:  YES / NO    If yes, please explain:  ___________________________________________________________________________    ______________________________________________________________________________________________     Any concerns with Transportation:  YES / NO    If yes, please explain:  ___________________________________________________________________________    ______________________________________________________________________________________________    Any Psychiatric and/or Psychosocial concerns:  YES /  NO     If yes, please explain: ___________________________________________________________________________    ______________________________________________________________________________________________      PLEASE RETURN TO: FAX: [phone]     Thank you for collaborating with us in the care of this patient.           [address]  ?  [place] [address]  ?  phone [phone]  ?  fax [phone]  ?  [email or web address]  Confidentiality notice: The accompanying facsimile is intended solely for the use of the recipient designated above. Document(s) transmitted herewith may contain information that is confidential and privileged. Delivery, distribution or dissemination of this communication other than to the intended recipient is strictly prohibited. If you have received this facsimile in error, please notify us immediately by telephone.

## 2019-02-26 NOTE — PROGRESS NOTES
"PHARM.D. PRE-TRANSPLANT NOTE:    This patient's medication therapy was evaluated as part of her pre-transplant evaluation.    The following pharmacologic concerns were noted:   -Aspirin 325 mg and fishoil     This patient's medication profile was reviewed for contraindications for DAA Hepatitis C therapy:    [x]  No current inducers of CYP 3A4 or PGP  [x]  No amiodarone on this patient's EMR profile in the last 24 months  [x]  No past or current atrial fibrillation on this patient's EMR profile       Current Outpatient Medications   Medication Sig Dispense Refill    ACCU-CHEK KRISTI PLUS TEST STRP Strp TEST QID  6    ACCU-CHEK SOFTCLIX LANCETS Misc U QID  2    ALCOHOL ANTISEPTIC PADS (SURE COMFORT ALCOHOL PREP PADS TOP)       amlodipine (NORVASC) 10 MG tablet Take 10 mg by mouth once daily.      aspirin 325 MG tablet Take 325 mg by mouth once daily.      blood sugar diagnostic Strp 1 strip.      calcium acetate (PHOSLO) 667 mg capsule       carvedilol (COREG) 3.125 MG tablet Take 3.125 mg by mouth 2 (two) times daily with meals.      fish oil-omega-3 fatty acids 300-1,000 mg capsule Take 1 g by mouth once daily.      furosemide (LASIX) 40 MG tablet Take 40 mg by mouth once daily.      gabapentin (NEURONTIN) 100 MG capsule Take 100 mg by mouth 3 (three) times daily.      insulin glargine (LANTUS) 100 unit/mL injection Inject 40 Units into the skin every evening.       LANTUS SOLOSTAR 100 unit/mL (3 mL) InPn pen INJECT 40 UNITS SC QHS WITH DOSE ADJUSTMENT UP OR DOWN BY 2 UNITS Q 3 DAYS. TTD 50 UNITS  1    losartan (COZAAR) 25 MG tablet       NOVOLOG FLEXPEN 100 unit/mL InPn pen INJECT 5 UNITS SC D WITH DINNER  1    SURE COMFORT PEN NEEDLE 30 gauge x 5/16" Ndle U BID  5    TRUEPLUS LANCETS 30 gauge Misc U QID TO CHECK BLOOD SUGAR  5    acetaminophen (TYLENOL) 325 MG tablet Take 2 tablets (650 mg total) by mouth every 6 (six) hours as needed for Pain. 30 tablet 0     No current facility-administered " medications for this visit.          Currently she is responsible for preparing / administering this patient's medications on a daily basis.  I am available for consultation and can be contacted, as needed by the other members of the Kidney Transplant team.

## 2019-02-26 NOTE — LETTER
February 28, 2019        Karlos Perry  [address]  [address]  [facility] [address]  Phone: [phone]  Fax: [phone]             [facility]  [address]  [facility] [address]  Phone: [phone]   Patient: Jael Hall   MR Number: [id number]   YOB: 1965   Date of Visit: 2/26/2019       Dear Dr. Karlos Perry    Thank you for referring Jael Hall to me for evaluation. Attached you will find relevant portions of my assessment and plan of care.    If you have questions, please do not hesitate to call me. I look forward to following Jael Hall along with you.    Sincerely,    Natalee Resendiz, NP    Enclosure    If you would like to receive this communication electronically, please contact [email or web address] or [phone] to request Share0 Link access.    Share[employer] Link is a tool which provides read-only access to select patient information with whom you have a relationship. Its easy to use and provides real time access to review your patients record including encounter summaries, notes, results, and demographic information.    If you feel you have received this communication in error or would no longer like to receive these types of communications, please e-mail [email or web address]

## 2019-02-27 NOTE — PROGRESS NOTES
Transplant Recipient Adult Psychosocial Assessment-UPDATE     Jael Hall  [address] [facility] [address]  Telephone Information:   Mobile [phone]   Home  [phone] (home)  Work  There is no work phone number on file.  E-mail  No e-mail address on record    Sex: female  YOB: 1965  Age: 53 y.o.    Encounter Date: 2/26/2019  [place] Citizen: yes  Primary Language: English   Needed: no    Emergency Contact:  Name: Sudha Hall  Relationship: daughter  Address: same as pt  Phone Numbers:   [phone]    Family/Social Support:   Number of dependents/: 0  Marital history: one marriage, ended in divorce  Other family dynamics: pt has one son-Emory Hall-25    Household Composition:  Name: Sudha Hall  Age: 26  Relationship: daughter  Does person drive? yes      Do you and your caregivers have access to reliable transportation? yes  PRIMARY CAREGIVER: Sudha Hall will be primary caregiver, phone number [phone].      provided in-depth information to patient and caregiver regarding pre- and post-transplant caregiver role.   strongly encourages patient and caregiver to have concrete plan regarding post-transplant care giving, including back-up caregiver(s) to ensure care giving needs are met as needed.    Patient and Caregiver states understanding all aspects of caregiver role/commitment and is able/willing/committed to being caregiver to the fullest extent necessary.    Patient and Caregiver verbalizes understanding of the education provided today and caregiver responsibilities.         remains available. Patient and Caregiver agree to contact  in a timely manner if concerns arise.      Able to take time off work without financial concerns: yes. Pt's daughter reports that she will have to rotate w/ her brother so she can go to work.     Additional Significant Others who will Assist with Transplant:  Name: Emory  Rafael  Age: 26  City: [place] State: [place]  Relationship: son  Does person drive? yes     Name: Curtis Rodriguez   Age: 75  Number: [phone]  City: [place] State: [place]  Relationship: friend   Does person drive? yes        Living Will: no  Healthcare Power of : no  Advance Directives on file: <<no information> per medical record.  Verbally reviewed LW/HCPA information.   provided patient with copy of LW/HCPA documents and provided education on completion of forms.    Living Donors: No.    Highest Education Level: High School (9-12) or GED  Reading Ability: 12th grade  Reports difficulty with: seeing pt reports she does utilize reading glasses to read.  Learns Best By:  Multisensory      Status: no  VA Benefits: no     Working for Income: No  If no, reason not working: Disability    Patient is disabled.  Prior to disability, patient  was employed as  at [employer]..    Spouse/Significant Other Employment: n/a    Disabled: yes: date disability began: 17, due to: ESRD.    Monthly Income:   Disability: $790  Able to afford all costs now and if transplanted, including medications: no Pt reports that her medications are affordable right now. SW encouraged pt to get a medication supplement now to assist with the potential cost of her transplant medication. Pt confirmed she understood and will begin process asap.   Patient and Caregiver verbalizes understanding of personal responsibilities related to transplant costs and the importance of having a financial plan to ensure that patients transplant costs are fully covered.      provided fundraising information/education.  Patient and Caregiver verbalizes understanding.   remains available.    Insurance:   Payor/Plan Subscr  Sex Relation Sub. Ins. ID Effective Group Num   1. MEDICAID - [place]* TANYA VELASQUEZ 1965 Female  [id number]* 16                                    [address]     Primary Insurance (for UNOS reporting): Public Insurance - Medicaid  Secondary Insurance (for UNOS reporting): None  Patient and Caregiver verbalizes clear understanding that patient may experience difficulty obtaining and/or be denied insurance coverage post-surgery. This includes and is not limited to disability insurance, life insurance, health insurance, burial insurance, long term care insurance, and other insurances.    Patient and Caregiver also reports understanding that future health concerns related to or unrelated to transplantation may not be covered by patient's insurance.  Resources and information provided and reviewed.      Patient and Caregiver provides verbal permission to release any necessary information to outside resources for patient care and discharge planning.  Resources and information provided are reviewed.      Dialysis Adherence:  Patient reports high adherence.   faxed adherence form. Please see separate note for adherence check.     Infusion Service: patient utilizing? no  Home Health: patient utilizing? no  DME: yes BPC and glucometor  Pulmonary/Cardiac Rehab: pt denies   ADLS:  Pt confirms she can perform all ADLs. Pt reports she does not drive, but not due to any medical reasons.     Adherence:   Pt reports high adherence to all medical advice.  Adherence education and counseling provided.     Per History Section:  Past Medical History:   Diagnosis Date    Anemia     Arrhythmia     Bradycardia     Diabetes mellitus     Dialysis patient     monday wednesday friday    Disorder of kidney and ureter     Hyperlipidemia     Hypertension     Obesity     Preeclampsia      Social History     Tobacco Use    Smoking status: Never Smoker    Smokeless tobacco: Never Used   Substance Use Topics    Alcohol use: No     Social History     Substance and Sexual Activity   Drug Use No     Social History     Substance and Sexual Activity   Sexual Activity Yes    Partners: Male     Birth control/protection: Post-menopausal       Per Today's Psychosocial:  Tobacco: none, patient denies any use.  Alcohol: none, patient denies any use.  Illicit Drugs/Non-prescribed Medications: none, patient denies any use.    Patient and Caregiver states clear understanding of the potential impact of substance use as it relates to transplant candidacy and is aware of possible random substance screening.  Substance abstinence/cessation counseling, education and resources provided and reviewed.     Arrests/DWI/Treatment/Rehab: patient denies    Psychiatric History:    Mental Health: pt denies any previous or current dx of any mental health disorders.  Psychiatrist/Counselor: pt denies  Medications:  Pt denies  Suicide/Homicide Issues: pt denies any previous SI or HI   Safety at home: pt confirms feeling safe at home.     Knowledge: Patient and Caregiver states having clear understanding and realistic expectations regarding the potential risks and potential benefits of organ transplantation and organ donation, agrees to discuss with health care team members and support system members and to utilize available resources and express questions and/or concerns in order to further facilitate the pt informed decision-making.  Resources and information provided and reviewed.     Patient and Caregiver is aware of [facility]'s affiliation and/or partnership with agencies in home health care, LTAC, SNF, [facility], and other hospitals and clinics.    Understanding: Patient and Caregiver reports having a clear understanding of the many lifetime commitments involved with being a transplant recipient, including costs, compliance, medications, lab work, procedures, appointments, concrete and financial planning, preparedness, timely and appropriate communication of concerns, abstinence (ETOH, tobacco, illicit non-prescribed drugs), adherence to all health care team recommendations, support system and caregiver involvement, appropriate  and timely resource utilization and follow-through, mental health counseling as needed/recommended, and patient and caregiver responsibilities.  Social Service Handbook, resources and detailed educational information provided and reviewed.  Educational information provided.    Patient and Caregiver also reports current and expected compliance with health care regime and states having a clear understanding of the importance of compliance.      Patient and Caregiver reports a clear understanding that risks and benefits may be involved with organ transplantation and with organ donation.      Patient and Caregiver also reports clear understanding that psychosocial risk factors may affect patient, and include but are not limited to feelings of depression, generalized anxiety, anxiety regarding dependence on others, post traumatic stress disorder, feelings of guilt and other emotional and/or mental concerns, and/or exacerbation of existing mental health concerns.  Detailed resources provided and discussed.     Patient and Caregiver agrees to access appropriate resources in a timely manner as needed and/or as recommended, and to communicate concerns appropriately.  Patient and Caregiver also reports a clear understanding of treatment options available.      reviewed education, provided additional information, and answered questions.    Feelings or Concerns: pt denies any current feelings or concerns    Coping: pt reports that she likes to attend [employer] and also like to lorenzo.    Goals: Pt's main goal is remain off dialysis and have a more independent life. Pt reports she would like to go back to work.  Patient referred to Vocational Rehabilitation.    Interview Behavior: Patient and Caregiver presents as alert and oriented x 4, pleasant, good eye contact, well groomed, recall good, concentration/judgement good, average intelligence, calm, communicative, cooperative and asking and answering  questions appropriately.          Transplant Social Work - Candidacy  Assessment/Plan:     Psychosocial Suitability: Patient presents as a suitable candidate for kidney transplant at this time. Based on psychosocial risk factors, patient presents as medium risk, due to current insurance and coverage. SW explained to pt the importance of having a supplemental insurance for potential cost of medication. Pt confirm she understood and will be applying for aid. Other than coverage, pt is a suitable candidate due to lack of psychosocial concerns, stable caregiver plan, reliable transportation and supportive family. .    Recommendations/Additional Comments: ANNA recommends that pt conduct fundraising to assist pt with pay for cost of medication, food,gas, and other transplant related expenses. SW recommends that pt remain free of all tobacco,ETOH, and substance use. SW remains available to assist with any concerns that may arise as pt navigates through the transplant process.      Eleuterio Toro, MSW, LMSW

## 2019-03-14 ENCOUNTER — PROCEDURE VISIT (OUTPATIENT)
Dept: HEPATOLOGY | Facility: CLINIC | Age: 54
End: 2019-03-14
Attending: INTERNAL MEDICINE
Payer: MEDICARE

## 2019-03-14 ENCOUNTER — HOSPITAL ENCOUNTER (OUTPATIENT)
Dept: RADIOLOGY | Facility: HOSPITAL | Age: 54
Discharge: HOME | End: 2019-03-14
Attending: TRANSPLANT SURGERY
Payer: MEDICARE

## 2019-03-14 ENCOUNTER — OFFICE VISIT (OUTPATIENT)
Dept: HEPATOLOGY | Facility: CLINIC | Age: 54
End: 2019-03-14
Payer: MEDICARE

## 2019-03-14 VITALS
TEMPERATURE: 98 F | RESPIRATION RATE: 20 BRPM | SYSTOLIC BLOOD PRESSURE: 113 MMHG | HEIGHT: 64 IN | DIASTOLIC BLOOD PRESSURE: 62 MMHG | WEIGHT: 192 LBS | HEART RATE: 99 BPM | OXYGEN SATURATION: 97 % | BODY MASS INDEX: 32.78 KG/M2

## 2019-03-14 DIAGNOSIS — Z76.82 ORGAN TRANSPLANT CANDIDATE: ICD-10-CM

## 2019-03-14 DIAGNOSIS — K76.0 NAFLD (NONALCOHOLIC FATTY LIVER DISEASE): ICD-10-CM

## 2019-03-14 DIAGNOSIS — K76.0 NAFLD (NONALCOHOLIC FATTY LIVER DISEASE): Primary | ICD-10-CM

## 2019-03-14 PROCEDURE — 91200 LIVER ELASTOGRAPHY: CPT | Mod: 26,S$PBB,TXP, | Performed by: INTERNAL MEDICINE

## 2019-03-14 PROCEDURE — 99215 OFFICE O/P EST HI 40 MIN: CPT | Mod: S$PBB,TXP,, | Performed by: INTERNAL MEDICINE

## 2019-03-14 PROCEDURE — 99213 OFFICE O/P EST LOW 20 MIN: CPT | Mod: PBBFAC,TXP,25 | Performed by: INTERNAL MEDICINE

## 2019-03-14 PROCEDURE — 74176 CT ABDOMEN PELVIS WITHOUT CONTRAST: ICD-10-PCS | Mod: 26,TXP,, | Performed by: RADIOLOGY

## 2019-03-14 PROCEDURE — 91200 LIVER ELASTOGRAPHY: CPT | Mod: PBBFAC,TXP | Performed by: INTERNAL MEDICINE

## 2019-03-14 PROCEDURE — 99999 PR PBB SHADOW E&M-EST. PATIENT-LVL III: ICD-10-PCS | Mod: PBBFAC,TXP,, | Performed by: INTERNAL MEDICINE

## 2019-03-14 PROCEDURE — 91200 PR LIVER ELASTOGRAPHY W/OUT IMAG W/INTERP & REPORT: ICD-10-PCS | Mod: 26,S$PBB,TXP, | Performed by: INTERNAL MEDICINE

## 2019-03-14 PROCEDURE — 74176 CT ABD & PELVIS W/O CONTRAST: CPT | Mod: TC,TXP

## 2019-03-14 PROCEDURE — 99215 PR OFFICE/OUTPT VISIT, EST, LEVL V, 40-54 MIN: ICD-10-PCS | Mod: S$PBB,TXP,, | Performed by: INTERNAL MEDICINE

## 2019-03-14 PROCEDURE — 99999 PR PBB SHADOW E&M-EST. PATIENT-LVL III: CPT | Mod: PBBFAC,TXP,, | Performed by: INTERNAL MEDICINE

## 2019-03-14 PROCEDURE — 74176 CT ABD & PELVIS W/O CONTRAST: CPT | Mod: 26,TXP,, | Performed by: RADIOLOGY

## 2019-03-14 NOTE — PROCEDURES
Vibration-controlled Transient Elastography Procedure     Name: Jael Hall  Date of Procedure : 2019   :: Aman Anderson MD  Diagnosis: NAFLD    Probe: XL    Universal Protocol: Patient's identity, procedure and site were verified, confirmatory pause was performed.  Discussed procedure including risks and potential complications.  Questions answered.  Patient verbalizes understanding and wishes to proceed with VCTE.     Procedure: After providing explanations of the procedure, patient was placed in the supine position with right arm in maximum abduction to allow optimal exposure of right lateral abdomen.  Patient was briefly assessed, Testing was performed in the mid-axillary location, 50Hz Shear Wave pulses were applied and the resulting Shear Wave and Propagation Speed detected with a 3.5 MHz ultrasonic signal, using the FibroScan probe, Skin to liver capsule distance and liver parenchyma were accessed during the entire examination with the FibroScan probe, Patient was instructed to breathe normally and to abstain from sudden movements during the procedure, allowing for random measurements of liver stiffness. At least 10 Shear Waves were produced, Individual measurements of each Shear Wave were calculated.  Patient tolerated the procedure well with no complications.  Meets discharge criteria as was dismissed.  Rates pain 0 out of 10.  Patient will follow up with ordering provider to review results.      Findings  Median liver stiffness score: 6.1 KPa  CAP readin dB/m    IQR/med: 15 %    Interpretation  Fibrosis interpretation is based on medial liver stiffness - Kilopascal (kPa).     Fibrosis stage: F 0-1     Steatosis interpretation is based on controlled attenuation parameter - (dB/m).    Steatosis grade: S3       Aman Anderson MD  Staff Physician  Transplant Hepatology  [facility]

## 2019-03-14 NOTE — PROGRESS NOTES
Hepatology Clinic Note    Referring provider: Kidney Transplant    Chief complaint:   Chief Complaint   Patient presents with    Hepatomegaly       HPI:  Jael Hall is a pleasant 53 y.o. femalewho was referred to Hepatology Clinic for consultation of had concerns including Hepatomegaly..      3/14/19: Known NAFLD, no active issue. No jaundice. Will repeat VCTE and obtain hepatic function panel.     2/8/18: VCTE: 6.2 - no significant liver stiffness    Background:  Patient has ESRD and has been on hemodialysis in last 6 months. She is not certain the etiology of her chronic kidney disease. She is being evaluated for kidney transplantation at [facility].   She is referred to Hepatology for hypodensities seen in her liver on non-contrast CT and ultrasound.     US 1/16/18:  Liver:  Large in size measuring 20.7 cm.  The liver demonstrates homogeneous echotexture.  Hypoechoic lesion within the left lobe measuring 1.7 x 0.9 x 1.5 cm, recommend MRI study for further evaluation. There is arteriovenous fistula.    CT w/o contrast (1/9/18)  Liver: Normal in size and attenuation, with no focal hepatic lesions. Lobulated liver with a 2.5 hepatic nodule along the inferior border of the liver with small calcification along its inferior border.  2 hypodensities in the LEFT and RIGHT hepatic lobe, too small to characterize, consider obtaining ultrasound for further characterization if clinically warranted.    Component      Latest Ref Rng & Units 9/28/2017   Hep. B Surf Ab, Qual       POSITIVE   Hep. B Surf Ab, Quant.      mIU/mL 239   Hep B Core Total Ab       Negative   Hepatitis B Surface Ag       Negative   Hepatitis C Ab       Negative         Patient Active Problem List   Diagnosis    ESRD on hemodialysis    Type 2 diabetes mellitus with kidney complication, with long-term current use of insulin    Elevated cholesterol with elevated triglycerides    Hx of sinus bradycardia    Anemia in ESRD (end-stage renal disease)    End  stage renal failure on dialysis    Thickened endometrium    S/P dilation and curettage    Patient on waiting list for kidney transplant       Past Medical History:   Diagnosis Date    Anemia     Arrhythmia     Bradycardia     Diabetes mellitus     Dialysis patient         Disorder of kidney and ureter     Hyperlipidemia     Hypertension     Obesity     Preeclampsia        Past Surgical History:   Procedure Laterality Date    AV FISTULA PLACEMENT      right arm     SECTION      x1    COLONOSCOPY N/A 2018    Performed by [facility] (4TH FLR)    [id number] N/A 2018    [facility]    TUBAL LIGATION      Vascular access surgeries      x5       Family History   Problem Relation Age of Onset    Cancer Mother     Ovarian cancer Mother     Cancer Sister     Ovarian cancer Sister     Lung cancer Sister     Diabetes Brother     Cancer Sister     Ovarian cancer Sister     Diabetes Sister     Breast cancer Neg Hx     Colon cancer Neg Hx        Social History     Socioeconomic History    Marital status: Single     Spouse name: Not on file    Number of children: Not on file    Years of education: Not on file    Highest education level: Not on file   Social Needs    Financial resource strain: Not on file    Food insecurity - worry: Not on file    Food insecurity - inability: Not on file    Transportation needs - medical: Not on file    Transportation needs - non-medical: Not on file   Occupational History    Not on file   Tobacco Use    Smoking status: Never Smoker    Smokeless tobacco: Never Used   Substance and Sexual Activity    Alcohol use: No    Drug use: No    Sexual activity: Yes     Partners: Male     Birth control/protection: Post-menopausal   Other Topics Concern    Not on file   Social History Narrative    Not on file       Current Outpatient Medications  "  Medication Sig Dispense Refill    ACCU-CHEK KRISTI PLUS TEST STRP Strp TEST QID  6    ACCU-CHEK SOFTCLIX LANCETS Misc U QID  2    acetaminophen (TYLENOL) 325 MG tablet Take 2 tablets (650 mg total) by mouth every 6 (six) hours as needed for Pain. 30 tablet 0    ALCOHOL ANTISEPTIC PADS (SURE COMFORT ALCOHOL PREP PADS TOP)       amlodipine (NORVASC) 10 MG tablet Take 10 mg by mouth once daily.      aspirin 325 MG tablet Take 325 mg by mouth once daily.      blood sugar diagnostic Strp 1 strip.      calcium acetate (PHOSLO) 667 mg capsule       carvedilol (COREG) 3.125 MG tablet Take 3.125 mg by mouth 2 (two) times daily with meals.      fish oil-omega-3 fatty acids 300-1,000 mg capsule Take 1 g by mouth once daily.      furosemide (LASIX) 40 MG tablet Take 40 mg by mouth once daily.      gabapentin (NEURONTIN) 100 MG capsule Take 100 mg by mouth 3 (three) times daily.      insulin glargine (LANTUS) 100 unit/mL injection Inject 40 Units into the skin every evening.       LANTUS SOLOSTAR 100 unit/mL (3 mL) InPn pen INJECT 40 UNITS SC QHS WITH DOSE ADJUSTMENT UP OR DOWN BY 2 UNITS Q 3 DAYS. TTD 50 UNITS  1    losartan (COZAAR) 25 MG tablet       NOVOLOG FLEXPEN 100 unit/mL InPn pen INJECT 5 UNITS SC D WITH DINNER  1    SURE COMFORT PEN NEEDLE 30 gauge x 5/16" Ndle U BID  5    TRUEPLUS LANCETS 30 gauge Misc U QID TO CHECK BLOOD SUGAR  5     No current facility-administered medications for this visit.        Review of patient's allergies indicates:  No Known Allergies    Review of Systems   Constitutional: Negative for chills, fever, malaise/fatigue and weight loss.   HENT: Negative for congestion, nosebleeds and sore throat.    Eyes: Negative for blurred vision, double vision and photophobia.   Respiratory: Negative for cough and shortness of breath.    Cardiovascular: Negative for chest pain, palpitations, orthopnea and leg swelling.   Gastrointestinal: Negative for abdominal pain, blood in stool, " constipation, diarrhea, melena and vomiting.   Genitourinary: Negative for dysuria.   Musculoskeletal: Negative for falls and joint pain.   Skin: Negative for itching and rash.   Neurological: Negative for dizziness, tremors and weakness.   Endo/Heme/Allergies: Does not bruise/bleed easily.   Psychiatric/Behavioral: Negative for depression and substance abuse. The patient is not nervous/anxious and does not have insomnia.        There were no vitals filed for this visit.    Physical Exam   Constitutional: She is oriented to person, place, and time. She appears well-developed and well-nourished.   HENT:   Head: Normocephalic and atraumatic.   Mouth/Throat: Oropharynx is clear and moist.   Eyes: Conjunctivae are normal. No scleral icterus.   Neck: Normal range of motion.   Cardiovascular: Normal rate, regular rhythm and normal heart sounds.   Pulmonary/Chest: Effort normal and breath sounds normal. No respiratory distress. She has no rales.   Abdominal: Soft. Bowel sounds are normal. She exhibits no distension. There is no tenderness. No hernia.   Musculoskeletal: She exhibits no edema.   Lymphadenopathy:     She has no cervical adenopathy.   Neurological: She is alert and oriented to person, place, and time.   Skin: Skin is warm and dry. No rash noted.   Psychiatric: She has a normal mood and affect. Her behavior is normal. Her mood appears not anxious.   Nursing note and vitals reviewed.      LABS: I personally reviewed pertinent laboratory findings.    Lab Results   Component Value Date    ALT 6 (L) 09/28/2017    AST 11 09/28/2017    ALKPHOS 71 09/28/2017    BILITOT 0.4 09/28/2017       Lab Results   Component Value Date    WBC 7.08 05/22/2018    HGB 10.0 (L) 05/22/2018    HCT 31.5 (L) 05/22/2018    MCV 78 (L) 05/22/2018     05/22/2018       Lab Results   Component Value Date     (L) 05/22/2018    K 5.2 (H) 05/22/2018     05/22/2018    CO2 19 (L) 05/22/2018    BUN 99 (H) 05/22/2018    CREATININE  6.6 (H) 05/22/2018    CALCIUM 9.7 05/22/2018    ANIONGAP 14 05/22/2018    ESTGFRAFRICA 8 (A) 05/22/2018    EGFRNONAA 7 (A) 05/22/2018       Lab Results   Component Value Date    HEPBCAB Negative 09/28/2017    HEPCAB Negative 09/28/2017       No results found for: SMOOTHMUSCAB, MITOAB    I personally reviewed all recent lab results.  I personally reviewed imaging studies.    Assessment:  53 y.o. female presenting with     1. NAFLD (nonalcoholic fatty liver disease)       VCTE: grade 3 steatosis and no fibrosis    Recommendation(s):  - VCTE today (no liver fibrosis)  - hepatic functional panel    Counseled for NAFLD:  - life-style modifications: weight loss, daily exercise (30 mins of HIIT or cardio), low carb/low fat diet  - control of diabetes or insulin resistance   - control of high cholesterol, if needed with statin drugs or other cholesterol-lowering agents  - vitamin E supplements (no more than 800 mg per day) may help reduce liver fat  - coffee consumption (low caloric): 2-3 cups per day may reduce liver fat    A total of 25 minutes were spent face-to-face with the patient during this encounter and over half of that time was spent on counseling and coordination of care.  We discussed in depth the nature of the patient's liver disease, the management plan in details. I also educated the patient about lifestyle modifications which may improve hepatic steatosis, overweight/obesity, insulin resistance and high blood pressure issues. I have provided the patient with an opportunity to ask questions and have all questions answered to his satisfaction.     I have sent communication to the referring physician and/or primary care provider.    Aman Anderson MD  Staff Physician  Hepatology and Liver Transplant  [facility]  [facility]

## 2019-03-14 NOTE — PATIENT INSTRUCTIONS
Please schedule liver blood tests today.  Your Fibroscan test was normal.   Please return in 1 year for check up.    - life-style modifications: weight loss, daily exercise (30 mins of HIIT or cardio), low carb/low fat diet  - control of diabetes or insulin resistance   - control of high cholesterol, if needed with statin drugs or other cholesterol-lowering agents  - vitamin E supplements (no more than 800 mg per day) may help reduce liver fat  - coffee consumption (low caloric): 2-3 cups per day may reduce liver fat

## 2019-03-15 ENCOUNTER — TELEPHONE (OUTPATIENT)
Dept: HEPATOLOGY | Facility: CLINIC | Age: 54
End: 2019-03-15

## 2019-03-15 NOTE — TELEPHONE ENCOUNTER
----- Message from Aman Anderson MD sent at 3/15/2019 10:04 AM CDT -----  Please inform patient that her liver blood tests are all normal. Thank you.

## 2019-03-19 ENCOUNTER — OFFICE VISIT (OUTPATIENT)
Dept: UROGYNECOLOGY | Facility: CLINIC | Age: 54
End: 2019-03-19
Payer: MEDICARE

## 2019-03-19 VITALS
SYSTOLIC BLOOD PRESSURE: 140 MMHG | DIASTOLIC BLOOD PRESSURE: 60 MMHG | BODY MASS INDEX: 33.27 KG/M2 | WEIGHT: 194.88 LBS | HEIGHT: 64 IN

## 2019-03-19 DIAGNOSIS — Z01.419 WELL WOMAN EXAM: Primary | ICD-10-CM

## 2019-03-19 DIAGNOSIS — Z87.42 HISTORY OF OVARIAN CYST: ICD-10-CM

## 2019-03-19 DIAGNOSIS — Z12.4 ENCOUNTER FOR SCREENING FOR CERVICAL CANCER: ICD-10-CM

## 2019-03-19 PROCEDURE — 99999 PR PBB SHADOW E&M-EST. PATIENT-LVL IV: ICD-10-PCS | Mod: PBBFAC,TXP,, | Performed by: NURSE PRACTITIONER

## 2019-03-19 PROCEDURE — 87624 HPV HI-RISK TYP POOLED RSLT: CPT | Mod: TXP

## 2019-03-19 PROCEDURE — G0101 CA SCREEN;PELVIC/BREAST EXAM: HCPCS | Mod: S$PBB,NTX,, | Performed by: NURSE PRACTITIONER

## 2019-03-19 PROCEDURE — G0101 PR CA SCREEN;PELVIC/BREAST EXAM: ICD-10-PCS | Mod: S$PBB,NTX,, | Performed by: NURSE PRACTITIONER

## 2019-03-19 PROCEDURE — 99999 PR PBB SHADOW E&M-EST. PATIENT-LVL IV: CPT | Mod: PBBFAC,TXP,, | Performed by: NURSE PRACTITIONER

## 2019-03-19 PROCEDURE — 99214 OFFICE O/P EST MOD 30 MIN: CPT | Mod: PBBFAC,TXP | Performed by: NURSE PRACTITIONER

## 2019-03-19 PROCEDURE — 88175 CYTOPATH C/V AUTO FLUID REDO: CPT | Mod: TXP

## 2019-03-19 RX ORDER — ATORVASTATIN CALCIUM 40 MG/1
TABLET, FILM COATED ORAL
Refills: 1 | Status: ON HOLD | COMMUNITY
Start: 2019-01-07 | End: 2020-09-09 | Stop reason: CLARIF

## 2019-03-19 NOTE — PROGRESS NOTES
2019    SUBJECTIVE:   53 y.o. female for annual exam.    Past Medical History:   Diagnosis Date    Anemia     Arrhythmia     Bradycardia     Diabetes mellitus     Dialysis patient         Disorder of kidney and ureter     Hyperlipidemia     Hypertension     Obesity     Preeclampsia        Past Surgical History:   Procedure Laterality Date    AV FISTULA PLACEMENT      right arm     SECTION      x1    COLONOSCOPY N/A 2018    Performed by P[facility]aj H[facility]josa [facility] at [facility] ENDO (4TH FLR)    [id number] N/A 2018    Performed by [facility] at [facility]    TUBAL LIGATION      Vascular access surgeries      x5       Current Outpatient Medications   Medication Sig Dispense Refill    ACCU-CHEK KRISTI PLUS TEST STRP Strp TEST QID  6    ACCU-CHEK SOFTCLIX LANCETS Misc U QID  2    acetaminophen (TYLENOL) 325 MG tablet Take 2 tablets (650 mg total) by mouth every 6 (six) hours as needed for Pain. 30 tablet 0    ALCOHOL ANTISEPTIC PADS (SURE COMFORT ALCOHOL PREP PADS TOP)       amlodipine (NORVASC) 10 MG tablet Take 10 mg by mouth once daily.      aspirin 325 MG tablet Take 325 mg by mouth once daily.      atorvastatin (LIPITOR) 40 MG tablet TK 1 T PO QD  1    blood sugar diagnostic Strp 1 strip.      calcium acetate (PHOSLO) 667 mg capsule Take 667 mg by mouth 3 (three) times daily with meals.       carvedilol (COREG) 3.125 MG tablet Take 3.125 mg by mouth 2 (two) times daily with meals.      fish oil-omega-3 fatty acids 300-1,000 mg capsule Take 1 g by mouth once daily.      furosemide (LASIX) 40 MG tablet Take 40 mg by mouth once daily.      gabapentin (NEURONTIN) 100 MG capsule Take 100 mg by mouth 3 (three) times daily.      insulin glargine (LANTUS) 100 unit/mL injection Inject 40 Units into the skin every evening.       LANTUS SOLOSTAR 100 unit/mL (3 mL) InPn pen INJECT 40 UNITS SC QHS WITH DOSE ADJUSTMENT UP OR DOWN BY 2 UNITS  "Q 3 DAYS. TTD 50 UNITS  1    losartan (COZAAR) 25 MG tablet Take 50 mg by mouth once daily.       medical supply, miscellaneous (MISCELLANEOUS MEDICAL SUPPLY MISC) Measure blood sugar three times a day.      NOVOLOG FLEXPEN 100 unit/mL InPn pen INJECT 5 UNITS SC D WITH DINNER  1    SURE COMFORT PEN NEEDLE 30 gauge x 5/16" Ndle U BID  5    TRUEPLUS LANCETS 30 gauge Misc U QID TO CHECK BLOOD SUGAR  5     No current facility-administered medications for this visit.      Allergies: Patient has no known allergies.   Patient's last menstrual period was 03/15/2016 (lmp unknown).        Well Woman:  Pap:2018 normal  Mammo:2018 normal elevated TC score  Colonoscopy:2018 polyp-- repeat   Dexa:    2018   Pelvic ultrasound  Uterus:    Size: 7.3 x 5.0 x 5.3 cm  Masses: 2.3 cm subserosal fibroid along the posterior uterine body.  Endometrium: Mildly thickened in this post menopausal patient, measuring 6 mm.  Right ovary: Not able to be delineated  Left ovary: Size: 4.2 x 3.2 x 2.8 cm Appearance: Normal Vascular Flow: 3.3 cm simple cyst.  Free Fluid: None  Impression:  Mild thickening of the endometrium.  This is nonspecific, the differential considerations could include cervical stenosis, endometrial hyperplasia, endometrial neoplasm.  Further evaluation is recommended.  3.3 cm simple cyst of the left ovary is almost certainly benign; however, in a postmenopausal patient, annual follow-up is recommended.    2018  embx  Fragments of benign endocervical mucosa admixed with blood.  Insufficient for the evaluation of endometrium.    2018  Hysteroscopy D&C  ENDOMETRIUM: SMALL FRAGMENTS OF INACTIVE ENDOMETRIUM WITH TUBAL METAPLASIA; BENIGN  FRAGMENTS OF ENDOCERVICAL TISSUE; NO EVIDENCE OF MALIGNANCY.    Family History  Family History   Problem Relation Age of Onset    Cancer Mother     Ovarian cancer Mother     Cancer Sister     Ovarian cancer Sister     Lung cancer Sister     Diabetes Brother  " "   Cancer Sister     Ovarian cancer Sister     Diabetes Sister     Breast cancer Neg Hx     Colon cancer Neg Hx     Cervical cancer Neg Hx     Endometrial cancer Neg Hx     Vaginal cancer Neg Hx           ROS:  Feeling well.   No dyspnea or chest pain on exertion.    No abdominal pain, change in bowel habits, black or bloody stools.    No urinary tract symptoms.   GYN ROS: no breast pain or new or enlarging lumps on self exam, no vaginal bleeding.   No neurological complaints.    OBJECTIVE:   The patient appears well, alert, oriented x 3, in no distress.  BP (!) 140/60 (BP Location: Left arm, Patient Position: Sitting, BP Method: Large (Manual))   Ht 5' 4" (1.626 m)   Wt 88.4 kg (194 lb 14.2 oz)   LMP 03/15/2016 (LMP Unknown)   BMI 33.45 kg/m²   ENT normal.  Neck supple. No adenopathy or thyromegaly. GISELL.   Lungs are clear, good air entry, no wheezes, rhonchi or rales.   S1 and S2 normal, no murmurs, regular rate and rhythm.   Abdomen soft without tenderness, guarding, mass or organomegaly.   Extremities show no edema, normal peripheral pulses.   Neurological is normal, no focal findings.    BREAST EXAM: breasts appear normal, no suspicious masses, no skin or nipple changes or axillary nodes    PELVIC EXAM:   VULVA: normal appearing vulva with no masses, tenderness or lesions,   VAGINA: normal appearing vagina with normal color and discharge, no lesions, atrophic,  CERVIX: normal appearing cervix without discharge or lesions, UTERUS: enlarged to 8 week's size,   ADNEXA: no masses,   RECTAL: normal rectal, no masses    ASSESSMENT:   1. Well woman exam     2. Encounter for screening for cervical cancer   HPV High Risk Genotypes, PCR    Liquid-based pap smear, screening   3. History of ovarian cyst  US Pelvis Comp with Transvag NON-OB (xpd       PLAN:   1. Well woman  --pap today  --takes 2-3 weeks for results  --mammogram due 04/2019--call to schedule, the order has been placed    2. History of ovarian " cyst  --will repeat ultrasound    3. RTC 1 year for annual        30 minutes were spent in face to face time with this patient  90 % of this time was spent in counseling and/or coordination of care  Estelle RAMOS [facility]  Division of Female Pelvic Medicine and Reconstructive Surgery  Department of Obstetrics & Gynecology

## 2019-03-19 NOTE — LETTER
March 19, 2019      Ja Crawford MD  [address]  [place] [address]           [facility] - Gynecology  [address]  [place] [address]  Phone: [phone]          Patient: Jael Hall   MR Number: [id number]   YOB: 1965   Date of Visit: 3/19/2019       Dear Dr. Ja Crawford:    Thank you for referring Jael Hall to me for evaluation. Attached you will find relevant portions of my assessment and plan of care.    If you have questions, please do not hesitate to call me. I look forward to following Jael Hall along with you.    Sincerely,    Estelle So, NP    Enclosure  CC:  No Recipients    If you would like to receive this communication electronically, please contact [email or web address] or [phone] to request more information on [employer] access.    For providers and/or their staff who would like to refer a patient to [facility], please contact us through our one-stop-shop provider referral line, [facility], at [phone].    If you feel you have received this communication in error or would no longer like to receive these types of communications, please e-mail [email or web address]

## 2019-03-19 NOTE — PATIENT INSTRUCTIONS
1. Well woman  --pap today  --takes 2-3 weeks for results  --mammogram due 04/2019--call to schedule, the order has been placed    2. History of ovarian cyst  --will repeat ultrasound    3. RTC 1 year for annual

## 2019-03-21 LAB
HPV HR 12 DNA CVX QL NAA+PROBE: POSITIVE
HPV16 AG SPEC QL: NEGATIVE
HPV18 DNA SPEC QL NAA+PROBE: NEGATIVE

## 2019-03-25 ENCOUNTER — SOCIAL WORK (OUTPATIENT)
Dept: TRANSPLANT | Facility: CLINIC | Age: 54
End: 2019-03-25

## 2019-03-25 NOTE — PROGRESS NOTES
According to dialysis unit medical record, in the last three months pt has, 0 AMAs, 2  No Shows (12/30/2018-personal reasons and 2/18/2019 PCP appointments) and denies issues with transportation and labs. Pt's last PTH was 2/11/2019.       Confirmed by dialysis unit-RN or LMSW

## 2019-03-25 NOTE — PROGRESS NOTES
Patient's chart reviewed today. Patient due for follow-up in February 2020. Appointments will be scheduled per protocol. HLA sample current, in lab, and being received on a regular basis.

## 2019-04-02 ENCOUNTER — HOSPITAL ENCOUNTER (OUTPATIENT)
Dept: RADIOLOGY | Facility: HOSPITAL | Age: 54
Discharge: HOME | End: 2019-04-02
Attending: NURSE PRACTITIONER
Payer: MEDICARE

## 2019-04-02 DIAGNOSIS — Z87.42 HISTORY OF OVARIAN CYST: ICD-10-CM

## 2019-04-02 PROCEDURE — 76856 US PELVIS COMP WITH TRANSVAG NON-OB (XPD): ICD-10-PCS | Mod: 26,TXP,, | Performed by: RADIOLOGY

## 2019-04-02 PROCEDURE — 76830 TRANSVAGINAL US NON-OB: CPT | Mod: 26,TXP,, | Performed by: RADIOLOGY

## 2019-04-02 PROCEDURE — 76830 TRANSVAGINAL US NON-OB: CPT | Mod: TC,TXP

## 2019-04-02 PROCEDURE — 76830 US PELVIS COMP WITH TRANSVAG NON-OB (XPD): ICD-10-PCS | Mod: 26,TXP,, | Performed by: RADIOLOGY

## 2019-04-02 PROCEDURE — 76856 US EXAM PELVIC COMPLETE: CPT | Mod: 26,TXP,, | Performed by: RADIOLOGY

## 2019-04-05 ENCOUNTER — TELEPHONE (OUTPATIENT)
Dept: UROGYNECOLOGY | Facility: CLINIC | Age: 54
End: 2019-04-05

## 2019-04-05 NOTE — TELEPHONE ENCOUNTER
Attempted to reach patient regarding pelvic ultrasound. VM left to call office.  Estelle So, SERAFINP-BC

## 2019-04-16 ENCOUNTER — HOSPITAL ENCOUNTER (OUTPATIENT)
Dept: RADIOLOGY | Facility: HOSPITAL | Age: 54
Discharge: HOME | End: 2019-04-16
Attending: NURSE PRACTITIONER
Payer: MEDICARE

## 2019-04-16 DIAGNOSIS — Z76.82 ORGAN TRANSPLANT CANDIDATE: ICD-10-CM

## 2019-04-16 PROCEDURE — 77067 SCR MAMMO BI INCL CAD: CPT | Mod: TC,TXP

## 2019-04-16 PROCEDURE — 77067 MAMMO DIGITAL SCREENING BILAT WITH CAD: ICD-10-PCS | Mod: 26,TXP,, | Performed by: RADIOLOGY

## 2019-04-16 PROCEDURE — 77067 SCR MAMMO BI INCL CAD: CPT | Mod: 26,TXP,, | Performed by: RADIOLOGY

## 2019-04-24 ENCOUNTER — TELEPHONE (OUTPATIENT)
Dept: UROGYNECOLOGY | Facility: CLINIC | Age: 54
End: 2019-04-24

## 2019-04-24 DIAGNOSIS — N83.8 OVARIAN MASS: Primary | ICD-10-CM

## 2019-04-24 DIAGNOSIS — R19.09 OTHER INTRA-ABDOMINAL AND PELVIC SWELLING, MASS AND LUMP: ICD-10-CM

## 2019-04-24 NOTE — TELEPHONE ENCOUNTER
Reviewed ultrasound with patient .   scheduled.  Will refer to gyn oncology.  Estelle So, SERAFINP-BC

## 2019-04-25 ENCOUNTER — LAB VISIT (OUTPATIENT)
Dept: LAB | Facility: HOSPITAL | Age: 54
End: 2019-04-25
Payer: MEDICARE

## 2019-04-25 DIAGNOSIS — R19.09 OTHER INTRA-ABDOMINAL AND PELVIC SWELLING, MASS AND LUMP: ICD-10-CM

## 2019-04-25 DIAGNOSIS — N83.8 OVARIAN MASS: ICD-10-CM

## 2019-04-25 LAB — CANCER AG125 SERPL-ACNC: 17 U/ML (ref 0–30)

## 2019-04-25 PROCEDURE — 36415 COLL VENOUS BLD VENIPUNCTURE: CPT | Mod: NTX

## 2019-04-25 PROCEDURE — 86304 IMMUNOASSAY TUMOR CA 125: CPT | Mod: NTX

## 2019-04-26 ENCOUNTER — TELEPHONE (OUTPATIENT)
Dept: GYNECOLOGIC ONCOLOGY | Facility: CLINIC | Age: 54
End: 2019-04-26

## 2019-05-06 ENCOUNTER — TELEPHONE (OUTPATIENT)
Dept: GYNECOLOGIC ONCOLOGY | Facility: CLINIC | Age: 54
End: 2019-05-06

## 2019-05-06 ENCOUNTER — DOCUMENTATION ONLY (OUTPATIENT)
Dept: GYNECOLOGIC ONCOLOGY | Facility: CLINIC | Age: 54
End: 2019-05-06

## 2019-05-07 NOTE — PROGRESS NOTES
Referred by Estelle So for pelvic mass.     Pelvic US 4/2/19  COMPARISON:  CT abdomen pelvis without contrast 03/14/2018, pelvic ultrasound 03/20/2018.  FINDINGS:  Uterus: Size: 10.6 x 4.9 x 6.3 cm, Exophytic fibroid measuring 3 x 1.6 x 2.6.  Endometrium: Normal in this post menopausal patient, measuring 4 mm.  The right ovary is not visualized.  No abnormality detected at the right adnexal lesion.  Left ovary: Size: 3.8 x 3.2 x 2.6 cm, Redemonstration of a cystic lesion with mural nodularity, measuring 4 x 2.8 x 2.3 cm (previously measured 3.3 x 2.7 x 2.2 cm).     normal 17  Pap 3/2019 negative.   D&C 5/2018 benign pathology.     Prior abdominal surgeries include c/s, BTL  Colonoscopy 4/2018    Medical comorbidities include IDDM, HTN, HLD, CKD on dialysis (undergoing work up for possible transplant), NAFLD    Family history significant for

## 2019-05-13 ENCOUNTER — TELEPHONE (OUTPATIENT)
Dept: GYNECOLOGIC ONCOLOGY | Facility: CLINIC | Age: 54
End: 2019-05-13

## 2019-05-14 ENCOUNTER — TELEPHONE (OUTPATIENT)
Dept: GYNECOLOGIC ONCOLOGY | Facility: CLINIC | Age: 54
End: 2019-05-14

## 2019-05-14 ENCOUNTER — INITIAL CONSULT (OUTPATIENT)
Dept: GYNECOLOGIC ONCOLOGY | Facility: CLINIC | Age: 54
End: 2019-05-14
Payer: MEDICARE

## 2019-05-14 VITALS
HEIGHT: 64 IN | BODY MASS INDEX: 33.2 KG/M2 | HEART RATE: 89 BPM | DIASTOLIC BLOOD PRESSURE: 68 MMHG | WEIGHT: 194.44 LBS | SYSTOLIC BLOOD PRESSURE: 145 MMHG

## 2019-05-14 DIAGNOSIS — N83.202 CYST OF LEFT OVARY: ICD-10-CM

## 2019-05-14 DIAGNOSIS — R19.00 PELVIC MASS: Primary | ICD-10-CM

## 2019-05-14 PROCEDURE — 99999 PR PBB SHADOW E&M-EST. PATIENT-LVL III: ICD-10-PCS | Mod: PBBFAC,,, | Performed by: OBSTETRICS & GYNECOLOGY

## 2019-05-14 PROCEDURE — 99204 OFFICE O/P NEW MOD 45 MIN: CPT | Mod: S$PBB,,, | Performed by: OBSTETRICS & GYNECOLOGY

## 2019-05-14 PROCEDURE — 99204 PR OFFICE/OUTPT VISIT, NEW, LEVL IV, 45-59 MIN: ICD-10-PCS | Mod: S$PBB,,, | Performed by: OBSTETRICS & GYNECOLOGY

## 2019-05-14 PROCEDURE — 99213 OFFICE O/P EST LOW 20 MIN: CPT | Mod: PBBFAC | Performed by: OBSTETRICS & GYNECOLOGY

## 2019-05-14 PROCEDURE — 99999 PR PBB SHADOW E&M-EST. PATIENT-LVL III: CPT | Mod: PBBFAC,,, | Performed by: OBSTETRICS & GYNECOLOGY

## 2019-05-14 RX ORDER — OMEGA-3 FATTY ACIDS 1000 MG
2 CAPSULE ORAL
COMMUNITY
End: 2019-05-14

## 2019-05-14 RX ORDER — INSULIN ASPART 100 [IU]/ML
5 INJECTION, SOLUTION INTRAVENOUS; SUBCUTANEOUS
COMMUNITY
Start: 2019-02-18 | End: 2019-07-23 | Stop reason: SDUPTHER

## 2019-05-14 RX ORDER — INSULIN GLARGINE 100 [IU]/ML
45 INJECTION, SOLUTION SUBCUTANEOUS
COMMUNITY
Start: 2019-02-18 | End: 2019-06-28

## 2019-05-14 RX ORDER — CALCIUM ACETATE 667 MG/1
1 CAPSULE ORAL
COMMUNITY
Start: 2018-06-26 | End: 2019-05-14 | Stop reason: SDUPTHER

## 2019-05-14 RX ORDER — LOSARTAN POTASSIUM 50 MG/1
TABLET ORAL
Refills: 5 | COMMUNITY
Start: 2019-03-15 | End: 2019-05-14 | Stop reason: SDUPTHER

## 2019-05-14 RX ORDER — PEN NEEDLE, DIABETIC 29 G X1/2"
NEEDLE, DISPOSABLE MISCELLANEOUS
COMMUNITY
Start: 2019-01-02 | End: 2019-05-14 | Stop reason: SDUPTHER

## 2019-05-14 RX ORDER — ASPIRIN 81 MG/1
81 TABLET ORAL DAILY
Status: ON HOLD | COMMUNITY
Start: 2017-07-12 | End: 2021-01-27 | Stop reason: HOSPADM

## 2019-05-14 RX ORDER — ACETAMINOPHEN 325 MG/1
650 TABLET ORAL
COMMUNITY
Start: 2018-05-22 | End: 2019-05-14 | Stop reason: SDUPTHER

## 2019-05-14 RX ORDER — AMLODIPINE BESYLATE 10 MG/1
10 TABLET ORAL
COMMUNITY
Start: 2019-04-24 | End: 2019-05-14 | Stop reason: SDUPTHER

## 2019-05-14 RX ORDER — CARVEDILOL 3.12 MG/1
3.12 TABLET ORAL
COMMUNITY
Start: 2019-01-23 | End: 2019-07-23 | Stop reason: SDUPTHER

## 2019-05-14 RX ORDER — GABAPENTIN 100 MG/1
100 CAPSULE ORAL
COMMUNITY
Start: 2019-01-02 | End: 2019-05-14 | Stop reason: SDUPTHER

## 2019-05-14 RX ORDER — LANCETS
EACH MISCELLANEOUS
Status: ON HOLD | COMMUNITY
Start: 2017-12-06 | End: 2020-09-09 | Stop reason: CLARIF

## 2019-05-14 RX ORDER — INSULIN GLARGINE 100 [IU]/ML
40 INJECTION, SOLUTION SUBCUTANEOUS NIGHTLY
COMMUNITY
Start: 2014-07-08 | End: 2019-06-28

## 2019-05-14 RX ORDER — FUROSEMIDE 40 MG/1
40 TABLET ORAL
COMMUNITY
Start: 2019-01-02 | End: 2019-05-14 | Stop reason: SDUPTHER

## 2019-05-14 RX ORDER — LIDOCAINE HYDROCHLORIDE 10 MG/ML
1 INJECTION, SOLUTION EPIDURAL; INFILTRATION; INTRACAUDAL; PERINEURAL ONCE
Status: CANCELLED | OUTPATIENT
Start: 2019-05-14 | End: 2019-05-14

## 2019-05-14 RX ORDER — PEN NEEDLE, DIABETIC 30 GX3/16"
NEEDLE, DISPOSABLE MISCELLANEOUS
Status: ON HOLD | COMMUNITY
Start: 2019-01-08 | End: 2020-09-09 | Stop reason: CLARIF

## 2019-05-14 RX ORDER — SODIUM BICARBONATE 650 MG/1
650 TABLET ORAL
Status: ON HOLD | COMMUNITY
Start: 2017-03-15 | End: 2020-09-09

## 2019-05-14 RX ORDER — SODIUM CHLORIDE 9 MG/ML
INJECTION, SOLUTION INTRAVENOUS CONTINUOUS
Status: CANCELLED | OUTPATIENT
Start: 2019-05-14

## 2019-05-14 RX ORDER — ONDANSETRON 4 MG/1
4 TABLET, ORALLY DISINTEGRATING ORAL
COMMUNITY
Start: 2018-04-24 | End: 2019-05-14

## 2019-05-14 RX ORDER — LOSARTAN POTASSIUM 25 MG/1
25 TABLET ORAL
COMMUNITY
Start: 2019-04-24 | End: 2019-05-14 | Stop reason: SDUPTHER

## 2019-05-14 RX ORDER — PEN NEEDLE, DIABETIC 30 GX3/16"
1 NEEDLE, DISPOSABLE MISCELLANEOUS
COMMUNITY
Start: 2019-02-18 | End: 2019-05-14 | Stop reason: SDUPTHER

## 2019-05-14 NOTE — PROGRESS NOTES
Subjective:      Patient ID: Jael Hall is a 53 y.o. female.    Chief Complaint: Pelvic Mass      HPI     Referred by Estelle So for pelvic mass.     Reports cyst noted on imaging as workup for transplant. She is relatively asymptomatic.     Pelvic US 4/2/19  COMPARISON:  CT abdomen pelvis without contrast 03/14/2019, pelvic ultrasound 03/20/2018.  FINDINGS:  Uterus: Size: 10.6 x 4.9 x 6.3 cm, Exophytic fibroid measuring 3 x 1.6 x 2.6.  Endometrium: Normal in this post menopausal patient, measuring 4 mm.  The right ovary is not visualized.  No abnormality detected at the right adnexal lesion.  Left ovary: Size: 3.8 x 3.2 x 2.6 cm, Redemonstration of a cystic lesion with mural nodularity, measuring 4 x 2.8 x 2.3 cm (previously measured 3.3 x 2.7 x 2.2 cm).      normal 17  Pap 3/2019 negative.   D&C 5/2018 benign pathology.      Prior abdominal surgeries include c/s (midline incision), BTL  Colonoscopy 4/2018    LMP mid 40s. No PMB.     Medical comorbidities include IDDM, HTN, HLD, CKD on dialysis (undergoing work up for possible transplant), NAFLD     Family history significant for sisters (1, alive) and mother (dec, 50s) - ovarian cancer. S - lung cancer (dec). No breast, uterine or colon cancer.    Review of Systems   Constitutional: Negative for appetite change, chills, diaphoresis, fatigue, fever and unexpected weight change.   Respiratory: Negative for cough, chest tightness, shortness of breath and wheezing.    Cardiovascular: Negative for chest pain, palpitations and leg swelling.   Gastrointestinal: Negative for abdominal distention, abdominal pain, blood in stool, constipation, diarrhea, nausea and vomiting.   Genitourinary: Negative for difficulty urinating, dysuria, flank pain, frequency, hematuria, pelvic pain, vaginal bleeding, vaginal discharge and vaginal pain.   Musculoskeletal: Negative for arthralgias and back pain.   Skin: Negative for color change and rash.   Neurological: Negative  for dizziness, weakness, numbness and headaches.   Hematological: Negative for adenopathy.   Psychiatric/Behavioral: Negative for confusion and sleep disturbance. The patient is not nervous/anxious.        Past Medical History:   Diagnosis Date    Anemia     Arrhythmia     Bradycardia     Cyst of left ovary 2019    Diabetes mellitus     Dialysis patient         Disorder of kidney and ureter     Hyperlipidemia     Hypertension     Obesity     Preeclampsia      Past Surgical History:   Procedure Laterality Date    AV FISTULA PLACEMENT      right arm     SECTION      x1    COLONOSCOPY N/A 2018    Performed by Pankaj Hinojosa MD at [facility] ENDO (4TH FLR)    [id number] N/A 2018    [facility]    TUBAL LIGATION      Vascular access surgeries      x5     Family History   Problem Relation Age of Onset    Cancer Mother     Ovarian cancer Mother     Cancer Sister     Ovarian cancer Sister     Lung cancer Sister     Diabetes Brother     Cancer Sister     Ovarian cancer Sister     Diabetes Sister     Breast cancer Neg Hx     Colon cancer Neg Hx     Cervical cancer Neg Hx     Endometrial cancer Neg Hx     Vaginal cancer Neg Hx      Social History     Socioeconomic History    Marital status: Single     Spouse name: Not on file    Number of children: Not on file    Years of education: Not on file    Highest education level: Not on file   Occupational History    Not on file   Social Needs    Financial resource strain: Not on file    Food insecurity:     Worry: Not on file     Inability: Not on file    Transportation needs:     Medical: Not on file     Non-medical: Not on file   Tobacco Use    Smoking status: Never Smoker    Smokeless tobacco: Never Used   Substance and Sexual Activity    Alcohol use: No    Drug use: No    Sexual activity: Yes     Partners: Male     Birth control/protection:  Post-menopausal   Lifestyle    Physical activity:     Days per week: Not on file     Minutes per session: Not on file    Stress: Not on file   Relationships    Social connections:     Talks on phone: Not on file     Gets together: Not on file     Attends Religion service: Not on file     Active member of club or organization: Not on file     Attends meetings of clubs or organizations: Not on file     Relationship status: Not on file   Other Topics Concern    Not on file   Social History Narrative    Not on file     Current Outpatient Medications   Medication Sig    ACCU-CHEK KRISTI PLUS TEST STRP Strp TEST QID    ACCU-CHEK SOFTCLIX LANCETS Misc U QID    acetaminophen (TYLENOL) 325 MG tablet Take 2 tablets (650 mg total) by mouth every 6 (six) hours as needed for Pain.    ALCOHOL ANTISEPTIC PADS (SURE COMFORT ALCOHOL PREP PADS TOP)     amlodipine (NORVASC) 10 MG tablet Take 10 mg by mouth once daily.    aspirin (ECOTRIN) 81 MG EC tablet Take 81 mg by mouth.    blood sugar diagnostic Strp 1 strip.    calcium acetate (PHOSLO) 667 mg capsule Take 667 mg by mouth 3 (three) times daily with meals.     carvedilol (COREG) 3.125 MG tablet Take 3.125 mg by mouth.    fish oil-omega-3 fatty acids 300-1,000 mg capsule Take 1 g by mouth once daily.    furosemide (LASIX) 40 MG tablet Take 40 mg by mouth once daily.    gabapentin (NEURONTIN) 100 MG capsule Take 100 mg by mouth 3 (three) times daily.    insulin aspart U-100 (NOVOLOG) 100 unit/mL (3 mL) InPn pen Inject 5 Units into the skin.    insulin glargine (LANTUS U-100 INSULIN) 100 unit/mL injection Inject 40 Units into the skin.    insulin glargine 100 units/mL (3mL) SubQ pen Inject 45 Units into the skin.    lancets Misc U QID    LANTUS SOLOSTAR 100 unit/mL (3 mL) InPn pen INJECT 40 UNITS SC QHS WITH DOSE ADJUSTMENT UP OR DOWN BY 2 UNITS Q 3 DAYS. TTD 50 UNITS    losartan (COZAAR) 25 MG tablet Take 50 mg by mouth once daily.     medical supply,  "miscellaneous (MISCELLANEOUS MEDICAL SUPPLY MISC) Measure blood sugar three times a day.    NOVOLOG FLEXPEN 100 unit/mL InPn pen INJECT 5 UNITS SC D WITH DINNER    pen needle, diabetic (SURE COMFORT PEN NEEDLE) 31 gauge x 3/16" Ndle U BID UTD    sodium bicarbonate 650 MG tablet Take 650 mg by mouth.    SURE COMFORT PEN NEEDLE 30 gauge x 5/16" Ndle U BID    TRUEPLUS LANCETS 30 gauge Misc U QID TO CHECK BLOOD SUGAR    atorvastatin (LIPITOR) 40 MG tablet TK 1 T PO QD    carvedilol (COREG) 3.125 MG tablet Take 3.125 mg by mouth 2 (two) times daily with meals.     No current facility-administered medications for this visit.      Review of patient's allergies indicates:  No Known Allergies     BP (!) 145/68   Pulse 89   Ht 5' 4" (1.626 m)   Wt 88.2 kg (194 lb 7.1 oz)   LMP 03/15/2016 (LMP Unknown)   BMI 33.38 kg/m²       Objective:   Physical Exam:   Constitutional: She is oriented to person, place, and time. She appears well-developed and well-nourished. No distress.    HENT:   Head: Normocephalic and atraumatic.    Eyes: No scleral icterus.    Neck: Normal range of motion.    Cardiovascular: Exam reveals no cyanosis and no edema.     Pulmonary/Chest: Effort normal. No respiratory distress. She exhibits no tenderness.        Abdominal: Soft. Normal appearance. She exhibits no distension, no fluid wave, no ascites and no mass. There is no tenderness. There is no rigidity, no rebound and no guarding. No hernia.     Genitourinary: Vagina normal and uterus normal. Pelvic exam was performed with patient supine. There is no rash, tenderness or lesion on the right labia. There is no rash, tenderness or lesion on the left labia. Uterus is not tender. Cervix is normal. Right adnexum displays no mass, no tenderness and no fullness. Left adnexum displays no mass, no tenderness and no fullness. No bleeding (scant w exam) in the vagina. No vaginal discharge found.           Musculoskeletal: Normal range of motion and " moves all extremeties. She exhibits no edema.      Lymphadenopathy:        Right: No inguinal adenopathy present.        Left: No inguinal adenopathy present.    Neurological: She is alert and oriented to person, place, and time.    Skin: Skin is warm and dry. No rash noted. No cyanosis or erythema. No pallor.    Psychiatric: She has a normal mood and affect. Thought content normal.       Assessment:     1. Cyst of left ovary        Plan:       Discussed with the patient the differential diagnosis for a pelvic mass in a postmenopausal woman including benign or malignant process.  Given the complexity of the lesion as well as need for definitive management for transplant workup will proceed with surgical resection.  She desires to proceed.  She is a candidate for minimally invasive approach.  Recommend RTLH/BSO/possible staging based on intraoperative assessments.     The risks, benefits, and indications of the procedure were discussed with the patient and her family members if present.  These included bleeding, transfusion, infection, damage to surrounding tissues (bowel, bladder, ureter), wound separation, perioperative cardiac events, VTE, pneumonia, and possible death.  She voiced understanding, all questions were answered and consents were signed.    Surgery scheduled for 7/5/19 [facility].  Pre op anesthesia consult.  Her nephrologist is Dr. Karlos Perry [facility]- will need to communicate with them regarding dialysis, current schedule is MWF.   Diabetes- would like to optimize glucose control perioperatively, also seen at [facility] diabetes center, Rosemary Rojas NP

## 2019-05-14 NOTE — LETTER
May 14, 2019      Estelle So, [place]  [address]  [facility] [address]           [facility] [address]  [address], [address]  [facility] [address]  Phone: [phone]  Fax: [phone]          Patient: Jael Hall   MR Number: [id number]   YOB: 1965   Date of Visit: 5/14/2019       Dear Estelle So:    Thank you for referring Jael Hall to me for evaluation. Attached you will find relevant portions of my assessment and plan of care.    If you have questions, please do not hesitate to call me. I look forward to following Jael Hall along with you.    Sincerely,    Trice Lei MD    Enclosure  CC:  MD Rosemary Erazo, ZEUS    If you would like to receive this communication electronically, please contact [email or web address] or [phone] to request more information on [employer] Link access.    For providers and/or their staff who would like to refer a patient to [facility], please contact us through our one-stop-shop provider referral line, [facility], at [phone].    If you feel you have received this communication in error or would no longer like to receive these types of communications, please e-mail [email or web address]

## 2019-05-15 ENCOUNTER — TELEPHONE (OUTPATIENT)
Dept: GYNECOLOGIC ONCOLOGY | Facility: CLINIC | Age: 54
End: 2019-05-15

## 2019-05-15 NOTE — TELEPHONE ENCOUNTER
Received call from Dr. Gina Barron who works with pt's nephrologist, Dr. Perry, regarding alternate dialysis plan for pt on 7/5/19 surgery date. Notified the pt will receive dialysis Wednesday(7/3/19) and Thursday (7/4/19) prior to surgery and will resume dialysis on Monday (7/8/19). In the event the pt requires dialysis acutely after surgery he can be contacted for information regarding patient routine dialysis prescription.

## 2019-05-17 ENCOUNTER — TELEPHONE (OUTPATIENT)
Dept: GYNECOLOGIC ONCOLOGY | Facility: CLINIC | Age: 54
End: 2019-05-17

## 2019-05-17 NOTE — TELEPHONE ENCOUNTER
"Spoke to Munira at [facility] and notified her of provider request to have HbA1C drawn and attempt to gain better glycemic control for the pt prior to surgery. Notified pt is scheduled to see the NP  In the endocrinology clinic on 6/5/19 and a note has been placed in the chart. I also attempted to contact "Ms. Julien," the NP the pt is scheduled to see on 6/5/19,  to speak with her further regarding this.     Left message for pt to contact clinic regarding this information.   "

## 2019-06-28 ENCOUNTER — ANESTHESIA EVENT (OUTPATIENT)
Dept: SURGERY | Facility: OTHER | Age: 54
DRG: 742 | End: 2019-06-28
Payer: MEDICARE

## 2019-06-28 ENCOUNTER — HOSPITAL ENCOUNTER (OUTPATIENT)
Dept: PREADMISSION TESTING | Facility: OTHER | Age: 54
Discharge: HOME | End: 2019-06-28
Attending: OBSTETRICS & GYNECOLOGY
Payer: MEDICARE

## 2019-06-28 VITALS
DIASTOLIC BLOOD PRESSURE: 75 MMHG | TEMPERATURE: 99 F | SYSTOLIC BLOOD PRESSURE: 130 MMHG | HEART RATE: 91 BPM | OXYGEN SATURATION: 97 %

## 2019-06-28 DIAGNOSIS — N83.202 CYST OF LEFT OVARY: ICD-10-CM

## 2019-06-28 LAB
ABO + RH BLD: NORMAL
BASOPHILS # BLD AUTO: 0.04 K/UL (ref 0–0.2)
BASOPHILS NFR BLD: 0.5 % (ref 0–1.9)
BLD GP AB SCN CELLS X3 SERPL QL: NORMAL
DIFFERENTIAL METHOD: ABNORMAL
EOSINOPHIL # BLD AUTO: 0.2 K/UL (ref 0–0.5)
EOSINOPHIL NFR BLD: 3.2 % (ref 0–8)
ERYTHROCYTE [DISTWIDTH] IN BLOOD BY AUTOMATED COUNT: 14.6 % (ref 11.5–14.5)
HCT VFR BLD AUTO: 34.1 % (ref 37–48.5)
HGB BLD-MCNC: 10.9 G/DL (ref 12–16)
LYMPHOCYTES # BLD AUTO: 2.2 K/UL (ref 1–4.8)
LYMPHOCYTES NFR BLD: 29.8 % (ref 18–48)
MCH RBC QN AUTO: 25.7 PG (ref 27–31)
MCHC RBC AUTO-ENTMCNC: 32 G/DL (ref 32–36)
MCV RBC AUTO: 80 FL (ref 82–98)
MONOCYTES # BLD AUTO: 0.9 K/UL (ref 0.3–1)
MONOCYTES NFR BLD: 12.4 % (ref 4–15)
NEUTROPHILS # BLD AUTO: 4 K/UL (ref 1.8–7.7)
NEUTROPHILS NFR BLD: 54.1 % (ref 38–73)
PLATELET # BLD AUTO: 241 K/UL (ref 150–350)
PMV BLD AUTO: 10 FL (ref 9.2–12.9)
RBC # BLD AUTO: 4.24 M/UL (ref 4–5.4)
WBC # BLD AUTO: 7.44 K/UL (ref 3.9–12.7)

## 2019-06-28 PROCEDURE — 36415 COLL VENOUS BLD VENIPUNCTURE: CPT | Mod: NTX

## 2019-06-28 PROCEDURE — 86850 RBC ANTIBODY SCREEN: CPT | Mod: TXP

## 2019-06-28 PROCEDURE — 85025 COMPLETE CBC W/AUTO DIFF WBC: CPT | Mod: NTX

## 2019-06-28 RX ORDER — PREGABALIN 75 MG/1
75 CAPSULE ORAL
Status: CANCELLED | OUTPATIENT
Start: 2019-06-28 | End: 2019-06-28

## 2019-06-28 RX ORDER — SODIUM CHLORIDE, SODIUM LACTATE, POTASSIUM CHLORIDE, CALCIUM CHLORIDE 600; 310; 30; 20 MG/100ML; MG/100ML; MG/100ML; MG/100ML
INJECTION, SOLUTION INTRAVENOUS CONTINUOUS
Status: CANCELLED | OUTPATIENT
Start: 2019-06-28

## 2019-06-28 RX ORDER — FAMOTIDINE 20 MG/1
20 TABLET, FILM COATED ORAL
Status: CANCELLED | OUTPATIENT
Start: 2019-06-28 | End: 2019-06-28

## 2019-06-28 RX ORDER — LIDOCAINE HYDROCHLORIDE 10 MG/ML
0.5 INJECTION, SOLUTION EPIDURAL; INFILTRATION; INTRACAUDAL; PERINEURAL ONCE
Status: CANCELLED | OUTPATIENT
Start: 2019-06-28 | End: 2019-06-28

## 2019-06-28 RX ORDER — ACETAMINOPHEN 500 MG
1000 TABLET ORAL
Status: CANCELLED | OUTPATIENT
Start: 2019-06-28 | End: 2019-06-28

## 2019-06-28 NOTE — DISCHARGE INSTRUCTIONS
PRE-ADMIT TESTING -  [phone]    [address]  [place] [facility]          Your surgery has been scheduled at [facility]. We are pleased to have the opportunity to serve you. For Further Information please call [phone].    On the day of surgery please report to the Information Desk on the 1st floor.    · CONTACT YOUR PHYSICIAN'S OFFICE THE DAY PRIOR TO YOUR SURGERY TO OBTAIN YOUR ARRIVAL TIME.     · The evening before surgery do not eat anything after 9 p.m. ( this includes hard candy, chewing gum and mints).  You may only have GATORADE, POWERADE AND WATER  from 9 p.m. until you leave your home.   DO NOT DRINK ANY LIQUIDS ON THE WAY TO THE HOSPITAL.      SPECIAL MEDICATION INSTRUCTIONS: TAKE medications checked off by the Anesthesiologist on your Medication List.    Angiogram Patients: Take medications as instructed by your physician, including aspirin.     Surgery Patients:    If you take ASPIRIN - Your PHYSICIAN/SURGEON will need to inform you IF/OR when you need to stop taking aspirin prior to your surgery.     Do Not take any medications containing IBUPROFEN.  Do Not Wear any make-up or dark nail polish   (especially eye make-up) to surgery. If you come to surgery with makeup on you will be required to remove the makeup or nail polish.    Do not shave your surgical area at least 5 days prior to your surgery. The surgical prep will be performed at the hospital according to Infection Control regulations.    Leave all valuables at home.   Do Not wear any jewelry or watches, including any metal in body piercings. Jewelry must be removed prior to coming to the hospital.  There is a possibility that rings that are unable to be removed may be cut off if they are on the surgical extremity.    Contact Lens must be removed before surgery. Either do not wear the contact lens or bring a case and solution for storage.  Please bring a container for eyeglasses or dentures as required.  Bring  any paperwork your physician has provided, such as consent forms,  history and physicals, doctor's orders, etc.   Bring comfortable clothes that are loose fitting to wear upon discharge. Take into consideration the type of surgery being performed.  Maintain your diet as advised per your physician the day prior to surgery.      Adequate rest the night before surgery is advised.   Park in the Parking lot behind the hospital or in the [facility] across the street from the parking lot. Parking is complimentary.  If you will be discharged the same day as your procedure, please arrange for a responsible adult to drive you home or to accompany you if traveling by taxi.   YOU WILL NOT BE PERMITTED TO DRIVE OR TO LEAVE THE HOSPITAL ALONE AFTER SURGERY.   It is strongly recommended that you arrange for someone to remain with you for the first 24 hrs following your surgery.    The Surgeon will speak to your family/visitor after your surgery regarding the outcome of your surgery and post op care.  The Surgeon may speak to you after your surgery, but there is a possibility you may not remember the details.  Please check with your family members regarding the conversation with the Surgeon.      Thank you for your cooperation.  The Staff of [facility].                Bathing Instructions with Hibiclens     Shower the evening before and morning of your procedure with Hibiclens:   Wash your face with water and your regular face wash/soap   Apply Hibiclens directly on your skin or on a wet washcloth and wash gently. When showering: Move away from the shower stream when applying Hibiclens to avoid rinsing off too soon.   Rinse thoroughly with warm water   Do not dilute Hibiclens         Dry off as usual, do not use any deodorant, powder, body lotions, perfume, after shave or cologne.

## 2019-06-28 NOTE — ANESTHESIA PREPROCEDURE EVALUATION
06/28/2019  Jael Hall is a 53 y.o., female.    Anesthesia Evaluation    I have reviewed the Patient Summary Reports.    I have reviewed the Nursing Notes.   I have reviewed the Medications.     Review of Systems  Anesthesia Hx:  No problems with previous Anesthesia  Denies Family Hx of Anesthesia complications.   Denies Personal Hx of Anesthesia complications.   Social:  Non-Smoker, No Alcohol Use    Hematology/Oncology:     Oncology Normal    -- Anemia:   EENT/Dental:EENT/Dental Normal   Cardiovascular:   Hypertension Stress/Echo 10/17  CONCLUSIONS     1 - Normal left ventricular systolic function (EF 60-65%).     2 - No wall motion abnormalities.     3 - Mild left atrial enlargement.     4 - Impaired LV relaxation, elevated LAP (grade 2 diastolic dysfunction).     5 - Normal right ventricular systolic function .     6 - The estimated PA systolic pressure is 35 mmHg.     7 - Mild tricuspid regurgitation.    Pulmonary:  Pulmonary Normal    Renal/:   Chronic Renal Disease, Dialysis, ESRD    Hepatic/GI:   Liver Disease,    Neurological:   Peripheral Neuropathy    Endocrine:   Diabetes    Psych:  Psychiatric Normal           Physical Exam  General:  Obesity    Airway/Jaw/Neck:  Airway Findings: Mouth Opening: Normal Tongue: Normal  General Airway Assessment: Adult, Good  Mallampati: II  TM Distance: Normal, at least 6 cm  Jaw/Neck Findings:  Neck ROM: Normal ROM      Dental:  Dental Findings: Upper Dentures        Mental Status:  Mental Status Findings:  Alert and Oriented, Cooperative         Anesthesia Plan  Type of Anesthesia, risks & benefits discussed:  Anesthesia Type:  general  Patient's Preference:   Intra-op Monitoring Plan: standard ASA monitors  Intra-op Monitoring Plan Comments:   Post Op Pain Control Plan: multimodal analgesia and per primary service following discharge from PACU  Post Op  Pain Control Plan Comments:   Induction:   IV  Beta Blocker:         Informed Consent: Patient understands risks and agrees with Anesthesia plan.  Questions answered. Anesthesia consent signed with patient.  ASA Score: 3     Day of Surgery Review of History & Physical:    H&P update referred to the surgeon.     Anesthesia Plan Notes: Labs ordered    Day of surgery update: hct 34, k 4.0            Ready For Surgery From Anesthesia Perspective.

## 2019-07-03 ENCOUNTER — TELEPHONE (OUTPATIENT)
Dept: GYNECOLOGIC ONCOLOGY | Facility: CLINIC | Age: 54
End: 2019-07-03

## 2019-07-05 ENCOUNTER — HOSPITAL ENCOUNTER (INPATIENT)
Facility: OTHER | Age: 54
LOS: 1 days | Discharge: HOME | DRG: 742 | End: 2019-07-06
Attending: OBSTETRICS & GYNECOLOGY | Admitting: OBSTETRICS & GYNECOLOGY
Payer: MEDICARE

## 2019-07-05 ENCOUNTER — ANESTHESIA (OUTPATIENT)
Dept: SURGERY | Facility: OTHER | Age: 54
DRG: 742 | End: 2019-07-05
Payer: MEDICARE

## 2019-07-05 DIAGNOSIS — Z98.890 S/P ROBOT-ASSISTED SURGICAL PROCEDURE: Primary | ICD-10-CM

## 2019-07-05 DIAGNOSIS — N18.5 ANEMIA OF CHRONIC RENAL FAILURE, STAGE 5: ICD-10-CM

## 2019-07-05 DIAGNOSIS — M89.8X9 METABOLIC BONE DISEASE: ICD-10-CM

## 2019-07-05 DIAGNOSIS — I10 ESSENTIAL HYPERTENSION: ICD-10-CM

## 2019-07-05 DIAGNOSIS — D63.1 ANEMIA OF CHRONIC RENAL FAILURE, STAGE 5: ICD-10-CM

## 2019-07-05 DIAGNOSIS — N83.202 CYST OF LEFT OVARY: ICD-10-CM

## 2019-07-05 DIAGNOSIS — N18.6 ESRD (END STAGE RENAL DISEASE): ICD-10-CM

## 2019-07-05 LAB
ESTIMATED AVG GLUCOSE: 249 MG/DL (ref 68–131)
HBA1C MFR BLD HPLC: 10.3 % (ref 4–5.6)
POCT GLUCOSE: 184 MG/DL (ref 70–110)
POCT GLUCOSE: 213 MG/DL (ref 70–110)
POCT GLUCOSE: 294 MG/DL (ref 70–110)
POCT GLUCOSE: 305 MG/DL (ref 70–110)
POCT GLUCOSE: 353 MG/DL (ref 70–110)
POCT GLUCOSE: 371 MG/DL (ref 70–110)
POTASSIUM SERPL-SCNC: 4 MMOL/L (ref 3.5–5.1)

## 2019-07-05 PROCEDURE — 88307 TISSUE EXAM BY PATHOLOGIST: CPT | Mod: 26,NTX,, | Performed by: PATHOLOGY

## 2019-07-05 PROCEDURE — 25000003 PHARM REV CODE 250: Mod: NTX | Performed by: NURSE ANESTHETIST, CERTIFIED REGISTERED

## 2019-07-05 PROCEDURE — 11000001 HC ACUTE MED/SURG PRIVATE ROOM: Mod: NTX

## 2019-07-05 PROCEDURE — 63600175 PHARM REV CODE 636 W HCPCS: Mod: NTX

## 2019-07-05 PROCEDURE — 25000003 PHARM REV CODE 250: Mod: NTX | Performed by: SPECIALIST

## 2019-07-05 PROCEDURE — 63600175 PHARM REV CODE 636 W HCPCS: Mod: NTX | Performed by: OBSTETRICS & GYNECOLOGY

## 2019-07-05 PROCEDURE — 36000713 HC OR TIME LEV V EA ADD 15 MIN: Mod: NTX | Performed by: OBSTETRICS & GYNECOLOGY

## 2019-07-05 PROCEDURE — 63600175 PHARM REV CODE 636 W HCPCS: Mod: NTX | Performed by: NURSE ANESTHETIST, CERTIFIED REGISTERED

## 2019-07-05 PROCEDURE — 83036 HEMOGLOBIN GLYCOSYLATED A1C: CPT | Mod: NTX

## 2019-07-05 PROCEDURE — 63600175 PHARM REV CODE 636 W HCPCS: Mod: NTX | Performed by: ANESTHESIOLOGY

## 2019-07-05 PROCEDURE — 63600175 PHARM REV CODE 636 W HCPCS: Mod: NTX | Performed by: STUDENT IN AN ORGANIZED HEALTH CARE EDUCATION/TRAINING PROGRAM

## 2019-07-05 PROCEDURE — 58571 PR LAPAROSCOPY W TOT HYSTERECTUTERUS <=250 GRAM  W TUBE/OVARY: ICD-10-PCS | Mod: AS,NTX,, | Performed by: NURSE PRACTITIONER

## 2019-07-05 PROCEDURE — 58571 TLH W/T/O 250 G OR LESS: CPT | Mod: NTX,,, | Performed by: OBSTETRICS & GYNECOLOGY

## 2019-07-05 PROCEDURE — 25000003 PHARM REV CODE 250: Mod: NTX | Performed by: OBSTETRICS & GYNECOLOGY

## 2019-07-05 PROCEDURE — 37000008 HC ANESTHESIA 1ST 15 MINUTES: Mod: NTX | Performed by: OBSTETRICS & GYNECOLOGY

## 2019-07-05 PROCEDURE — 88307 TISSUE SPECIMEN TO PATHOLOGY - SURGERY: ICD-10-PCS | Mod: 26,NTX,, | Performed by: PATHOLOGY

## 2019-07-05 PROCEDURE — 58571 TLH W/T/O 250 G OR LESS: CPT | Mod: AS,NTX,, | Performed by: NURSE PRACTITIONER

## 2019-07-05 PROCEDURE — 27201423 OPTIME MED/SURG SUP & DEVICES STERILE SUPPLY: Mod: NTX | Performed by: OBSTETRICS & GYNECOLOGY

## 2019-07-05 PROCEDURE — 84132 ASSAY OF SERUM POTASSIUM: CPT | Mod: NTX

## 2019-07-05 PROCEDURE — 58571 PR LAPAROSCOPY W TOT HYSTERECTUTERUS <=250 GRAM  W TUBE/OVARY: ICD-10-PCS | Mod: NTX,,, | Performed by: OBSTETRICS & GYNECOLOGY

## 2019-07-05 PROCEDURE — 36000712 HC OR TIME LEV V 1ST 15 MIN: Mod: NTX | Performed by: OBSTETRICS & GYNECOLOGY

## 2019-07-05 PROCEDURE — P9045 ALBUMIN (HUMAN), 5%, 250 ML: HCPCS | Mod: JG,NTX | Performed by: NURSE ANESTHETIST, CERTIFIED REGISTERED

## 2019-07-05 PROCEDURE — 94761 N-INVAS EAR/PLS OXIMETRY MLT: CPT | Mod: NTX

## 2019-07-05 PROCEDURE — 25000003 PHARM REV CODE 250: Mod: NTX | Performed by: STUDENT IN AN ORGANIZED HEALTH CARE EDUCATION/TRAINING PROGRAM

## 2019-07-05 PROCEDURE — 71000039 HC RECOVERY, EACH ADD'L HOUR: Mod: NTX | Performed by: OBSTETRICS & GYNECOLOGY

## 2019-07-05 PROCEDURE — S5571 INSULIN DISPOS PEN 3 ML: HCPCS | Mod: NTX | Performed by: STUDENT IN AN ORGANIZED HEALTH CARE EDUCATION/TRAINING PROGRAM

## 2019-07-05 PROCEDURE — 37000009 HC ANESTHESIA EA ADD 15 MINS: Mod: NTX | Performed by: OBSTETRICS & GYNECOLOGY

## 2019-07-05 PROCEDURE — 71000033 HC RECOVERY, INTIAL HOUR: Mod: NTX | Performed by: OBSTETRICS & GYNECOLOGY

## 2019-07-05 PROCEDURE — 88307 TISSUE EXAM BY PATHOLOGIST: CPT | Mod: NTX | Performed by: PATHOLOGY

## 2019-07-05 RX ORDER — ACETAMINOPHEN 500 MG
1000 TABLET ORAL
Status: COMPLETED | OUTPATIENT
Start: 2019-07-05 | End: 2019-07-05

## 2019-07-05 RX ORDER — GLUCAGON 1 MG
1 KIT INJECTION
Status: DISCONTINUED | OUTPATIENT
Start: 2019-07-05 | End: 2019-07-06 | Stop reason: HOSPADM

## 2019-07-05 RX ORDER — INSULIN ASPART 100 [IU]/ML
INJECTION, SOLUTION INTRAVENOUS; SUBCUTANEOUS
Status: COMPLETED
Start: 2019-07-05 | End: 2019-07-05

## 2019-07-05 RX ORDER — OXYCODONE AND ACETAMINOPHEN 10; 325 MG/1; MG/1
1 TABLET ORAL EVERY 4 HOURS PRN
Status: DISCONTINUED | OUTPATIENT
Start: 2019-07-05 | End: 2019-07-06 | Stop reason: HOSPADM

## 2019-07-05 RX ORDER — ONDANSETRON 2 MG/ML
4 INJECTION INTRAMUSCULAR; INTRAVENOUS DAILY PRN
Status: DISCONTINUED | OUTPATIENT
Start: 2019-07-05 | End: 2019-07-05 | Stop reason: HOSPADM

## 2019-07-05 RX ORDER — PHENYLEPHRINE HYDROCHLORIDE 10 MG/ML
INJECTION INTRAVENOUS
Status: DISCONTINUED | OUTPATIENT
Start: 2019-07-05 | End: 2019-07-05

## 2019-07-05 RX ORDER — DIPHENHYDRAMINE HYDROCHLORIDE 50 MG/ML
25 INJECTION INTRAMUSCULAR; INTRAVENOUS EVERY 4 HOURS PRN
Status: DISCONTINUED | OUTPATIENT
Start: 2019-07-05 | End: 2019-07-06 | Stop reason: HOSPADM

## 2019-07-05 RX ORDER — ATORVASTATIN CALCIUM 20 MG/1
40 TABLET, FILM COATED ORAL DAILY
Status: DISCONTINUED | OUTPATIENT
Start: 2019-07-05 | End: 2019-07-06 | Stop reason: HOSPADM

## 2019-07-05 RX ORDER — HYDROMORPHONE HYDROCHLORIDE 2 MG/ML
0.4 INJECTION, SOLUTION INTRAMUSCULAR; INTRAVENOUS; SUBCUTANEOUS EVERY 5 MIN PRN
Status: DISCONTINUED | OUTPATIENT
Start: 2019-07-05 | End: 2019-07-05 | Stop reason: HOSPADM

## 2019-07-05 RX ORDER — PROPOFOL 10 MG/ML
VIAL (ML) INTRAVENOUS
Status: DISCONTINUED | OUTPATIENT
Start: 2019-07-05 | End: 2019-07-05

## 2019-07-05 RX ORDER — ONDANSETRON 8 MG/1
8 TABLET, ORALLY DISINTEGRATING ORAL EVERY 8 HOURS PRN
Status: DISCONTINUED | OUTPATIENT
Start: 2019-07-05 | End: 2019-07-06 | Stop reason: HOSPADM

## 2019-07-05 RX ORDER — DOCUSATE SODIUM 100 MG/1
100 CAPSULE, LIQUID FILLED ORAL 2 TIMES DAILY
Status: DISCONTINUED | OUTPATIENT
Start: 2019-07-05 | End: 2019-07-06 | Stop reason: HOSPADM

## 2019-07-05 RX ORDER — ROCURONIUM BROMIDE 10 MG/ML
INJECTION, SOLUTION INTRAVENOUS
Status: DISCONTINUED | OUTPATIENT
Start: 2019-07-05 | End: 2019-07-05

## 2019-07-05 RX ORDER — HYDROMORPHONE HYDROCHLORIDE 2 MG/ML
INJECTION, SOLUTION INTRAMUSCULAR; INTRAVENOUS; SUBCUTANEOUS
Status: DISPENSED
Start: 2019-07-05 | End: 2019-07-05

## 2019-07-05 RX ORDER — CARVEDILOL 3.12 MG/1
3.12 TABLET ORAL 2 TIMES DAILY WITH MEALS
Status: DISCONTINUED | OUTPATIENT
Start: 2019-07-05 | End: 2019-07-06 | Stop reason: HOSPADM

## 2019-07-05 RX ORDER — IBUPROFEN 200 MG
24 TABLET ORAL
Status: DISCONTINUED | OUTPATIENT
Start: 2019-07-05 | End: 2019-07-06 | Stop reason: HOSPADM

## 2019-07-05 RX ORDER — INSULIN ASPART 100 [IU]/ML
0-5 INJECTION, SOLUTION INTRAVENOUS; SUBCUTANEOUS EVERY 6 HOURS PRN
Status: DISCONTINUED | OUTPATIENT
Start: 2019-07-05 | End: 2019-07-05

## 2019-07-05 RX ORDER — OXYCODONE AND ACETAMINOPHEN 5; 325 MG/1; MG/1
1 TABLET ORAL EVERY 4 HOURS PRN
Qty: 20 TABLET | Refills: 0 | Status: ON HOLD | OUTPATIENT
Start: 2019-07-05 | End: 2020-09-09 | Stop reason: CLARIF

## 2019-07-05 RX ORDER — SODIUM CHLORIDE, SODIUM LACTATE, POTASSIUM CHLORIDE, CALCIUM CHLORIDE 600; 310; 30; 20 MG/100ML; MG/100ML; MG/100ML; MG/100ML
INJECTION, SOLUTION INTRAVENOUS CONTINUOUS
Status: DISCONTINUED | OUTPATIENT
Start: 2019-07-05 | End: 2019-07-05

## 2019-07-05 RX ORDER — NEOSTIGMINE METHYLSULFATE 1 MG/ML
INJECTION, SOLUTION INTRAVENOUS
Status: DISCONTINUED | OUTPATIENT
Start: 2019-07-05 | End: 2019-07-05

## 2019-07-05 RX ORDER — MEPERIDINE HYDROCHLORIDE 25 MG/ML
12.5 INJECTION INTRAMUSCULAR; INTRAVENOUS; SUBCUTANEOUS ONCE AS NEEDED
Status: DISCONTINUED | OUTPATIENT
Start: 2019-07-05 | End: 2019-07-05 | Stop reason: HOSPADM

## 2019-07-05 RX ORDER — BISACODYL 10 MG
10 SUPPOSITORY, RECTAL RECTAL DAILY PRN
Status: DISCONTINUED | OUTPATIENT
Start: 2019-07-05 | End: 2019-07-06 | Stop reason: HOSPADM

## 2019-07-05 RX ORDER — INSULIN ASPART 100 [IU]/ML
INJECTION, SOLUTION INTRAVENOUS; SUBCUTANEOUS
Status: DISPENSED
Start: 2019-07-05 | End: 2019-07-05

## 2019-07-05 RX ORDER — LIDOCAINE HCL/PF 100 MG/5ML
SYRINGE (ML) INTRAVENOUS
Status: DISCONTINUED | OUTPATIENT
Start: 2019-07-05 | End: 2019-07-05

## 2019-07-05 RX ORDER — SODIUM CHLORIDE 0.9 % (FLUSH) 0.9 %
3 SYRINGE (ML) INJECTION
Status: DISCONTINUED | OUTPATIENT
Start: 2019-07-05 | End: 2019-07-06 | Stop reason: HOSPADM

## 2019-07-05 RX ORDER — HYDROMORPHONE HYDROCHLORIDE 1 MG/ML
1 INJECTION, SOLUTION INTRAMUSCULAR; INTRAVENOUS; SUBCUTANEOUS EVERY 4 HOURS PRN
Status: DISCONTINUED | OUTPATIENT
Start: 2019-07-05 | End: 2019-07-06 | Stop reason: HOSPADM

## 2019-07-05 RX ORDER — SODIUM CHLORIDE 9 MG/ML
INJECTION, SOLUTION INTRAVENOUS CONTINUOUS
Status: DISCONTINUED | OUTPATIENT
Start: 2019-07-05 | End: 2019-07-05

## 2019-07-05 RX ORDER — GLUCAGON 1 MG
1 KIT INJECTION
Status: DISCONTINUED | OUTPATIENT
Start: 2019-07-05 | End: 2019-07-05

## 2019-07-05 RX ORDER — DIPHENHYDRAMINE HYDROCHLORIDE 50 MG/ML
12.5 INJECTION INTRAMUSCULAR; INTRAVENOUS EVERY 30 MIN PRN
Status: DISCONTINUED | OUTPATIENT
Start: 2019-07-05 | End: 2019-07-05 | Stop reason: HOSPADM

## 2019-07-05 RX ORDER — FAMOTIDINE 20 MG/1
20 TABLET, FILM COATED ORAL
Status: COMPLETED | OUTPATIENT
Start: 2019-07-05 | End: 2019-07-05

## 2019-07-05 RX ORDER — INSULIN ASPART 100 [IU]/ML
1-10 INJECTION, SOLUTION INTRAVENOUS; SUBCUTANEOUS
Status: DISCONTINUED | OUTPATIENT
Start: 2019-07-05 | End: 2019-07-06 | Stop reason: HOSPADM

## 2019-07-05 RX ORDER — GABAPENTIN 100 MG/1
100 CAPSULE ORAL 3 TIMES DAILY
Status: DISCONTINUED | OUTPATIENT
Start: 2019-07-05 | End: 2019-07-06 | Stop reason: HOSPADM

## 2019-07-05 RX ORDER — PREGABALIN 75 MG/1
75 CAPSULE ORAL
Status: COMPLETED | OUTPATIENT
Start: 2019-07-05 | End: 2019-07-05

## 2019-07-05 RX ORDER — LIDOCAINE HYDROCHLORIDE 10 MG/ML
1 INJECTION, SOLUTION EPIDURAL; INFILTRATION; INTRACAUDAL; PERINEURAL ONCE
Status: DISCONTINUED | OUTPATIENT
Start: 2019-07-05 | End: 2019-07-05 | Stop reason: HOSPADM

## 2019-07-05 RX ORDER — ALBUMIN HUMAN 50 G/1000ML
SOLUTION INTRAVENOUS CONTINUOUS PRN
Status: DISCONTINUED | OUTPATIENT
Start: 2019-07-05 | End: 2019-07-05

## 2019-07-05 RX ORDER — GLYCOPYRROLATE 0.2 MG/ML
INJECTION INTRAMUSCULAR; INTRAVENOUS
Status: DISCONTINUED | OUTPATIENT
Start: 2019-07-05 | End: 2019-07-05

## 2019-07-05 RX ORDER — INSULIN ASPART 100 [IU]/ML
0-5 INJECTION, SOLUTION INTRAVENOUS; SUBCUTANEOUS
Status: DISCONTINUED | OUTPATIENT
Start: 2019-07-05 | End: 2019-07-06 | Stop reason: HOSPADM

## 2019-07-05 RX ORDER — CEFAZOLIN SODIUM 1 G/3ML
2 INJECTION, POWDER, FOR SOLUTION INTRAMUSCULAR; INTRAVENOUS
Status: COMPLETED | OUTPATIENT
Start: 2019-07-05 | End: 2019-07-05

## 2019-07-05 RX ORDER — LIDOCAINE HYDROCHLORIDE 10 MG/ML
0.5 INJECTION, SOLUTION EPIDURAL; INFILTRATION; INTRACAUDAL; PERINEURAL ONCE
Status: DISCONTINUED | OUTPATIENT
Start: 2019-07-05 | End: 2019-07-05 | Stop reason: HOSPADM

## 2019-07-05 RX ORDER — DIPHENHYDRAMINE HCL 25 MG
25 CAPSULE ORAL EVERY 4 HOURS PRN
Status: DISCONTINUED | OUTPATIENT
Start: 2019-07-05 | End: 2019-07-06 | Stop reason: HOSPADM

## 2019-07-05 RX ORDER — MUPIROCIN 20 MG/G
1 OINTMENT TOPICAL 2 TIMES DAILY
Status: DISCONTINUED | OUTPATIENT
Start: 2019-07-05 | End: 2019-07-06 | Stop reason: HOSPADM

## 2019-07-05 RX ORDER — FENTANYL CITRATE 50 UG/ML
INJECTION, SOLUTION INTRAMUSCULAR; INTRAVENOUS
Status: DISCONTINUED | OUTPATIENT
Start: 2019-07-05 | End: 2019-07-05

## 2019-07-05 RX ORDER — OXYCODONE HYDROCHLORIDE 5 MG/1
5 TABLET ORAL
Status: DISCONTINUED | OUTPATIENT
Start: 2019-07-05 | End: 2019-07-05 | Stop reason: HOSPADM

## 2019-07-05 RX ORDER — ONDANSETRON 2 MG/ML
INJECTION INTRAMUSCULAR; INTRAVENOUS
Status: DISCONTINUED | OUTPATIENT
Start: 2019-07-05 | End: 2019-07-05

## 2019-07-05 RX ORDER — IBUPROFEN 200 MG
16 TABLET ORAL
Status: DISCONTINUED | OUTPATIENT
Start: 2019-07-05 | End: 2019-07-06 | Stop reason: HOSPADM

## 2019-07-05 RX ORDER — OXYCODONE AND ACETAMINOPHEN 5; 325 MG/1; MG/1
1 TABLET ORAL EVERY 4 HOURS PRN
Status: DISCONTINUED | OUTPATIENT
Start: 2019-07-05 | End: 2019-07-06 | Stop reason: HOSPADM

## 2019-07-05 RX ADMIN — FENTANYL CITRATE 100 MCG: 50 INJECTION, SOLUTION INTRAMUSCULAR; INTRAVENOUS at 07:07

## 2019-07-05 RX ADMIN — INSULIN DETEMIR 20 UNITS: 100 INJECTION, SOLUTION SUBCUTANEOUS at 09:07

## 2019-07-05 RX ADMIN — LIDOCAINE HYDROCHLORIDE 50 MG: 20 INJECTION, SOLUTION INTRAVENOUS at 07:07

## 2019-07-05 RX ADMIN — FENTANYL CITRATE 25 MCG: 50 INJECTION, SOLUTION INTRAMUSCULAR; INTRAVENOUS at 09:07

## 2019-07-05 RX ADMIN — INSULIN ASPART 10 UNITS: 100 INJECTION, SOLUTION INTRAVENOUS; SUBCUTANEOUS at 10:07

## 2019-07-05 RX ADMIN — FENTANYL CITRATE 25 MCG: 50 INJECTION, SOLUTION INTRAMUSCULAR; INTRAVENOUS at 08:07

## 2019-07-05 RX ADMIN — CEFAZOLIN 2 G: 330 INJECTION, POWDER, FOR SOLUTION INTRAMUSCULAR; INTRAVENOUS at 07:07

## 2019-07-05 RX ADMIN — PHENYLEPHRINE HYDROCHLORIDE 200 MCG: 10 INJECTION INTRAVENOUS at 07:07

## 2019-07-05 RX ADMIN — ONDANSETRON 4 MG: 2 INJECTION INTRAMUSCULAR; INTRAVENOUS at 09:07

## 2019-07-05 RX ADMIN — ATORVASTATIN CALCIUM 40 MG: 20 TABLET, FILM COATED ORAL at 02:07

## 2019-07-05 RX ADMIN — SUGAMMADEX 176 MG: 100 INJECTION, SOLUTION INTRAVENOUS at 10:07

## 2019-07-05 RX ADMIN — GLYCOPYRROLATE 0.8 MG: 0.2 INJECTION, SOLUTION INTRAMUSCULAR; INTRAVENOUS at 09:07

## 2019-07-05 RX ADMIN — INSULIN ASPART 5 UNITS: 100 INJECTION, SOLUTION INTRAVENOUS; SUBCUTANEOUS at 11:07

## 2019-07-05 RX ADMIN — HYDROMORPHONE HYDROCHLORIDE 0.4 MG: 2 INJECTION, SOLUTION INTRAMUSCULAR; INTRAVENOUS; SUBCUTANEOUS at 11:07

## 2019-07-05 RX ADMIN — DOCUSATE SODIUM 100 MG: 100 CAPSULE, LIQUID FILLED ORAL at 09:07

## 2019-07-05 RX ADMIN — OXYCODONE HYDROCHLORIDE AND ACETAMINOPHEN 1 TABLET: 10; 325 TABLET ORAL at 02:07

## 2019-07-05 RX ADMIN — PHENYLEPHRINE HYDROCHLORIDE 100 MCG: 10 INJECTION INTRAVENOUS at 09:07

## 2019-07-05 RX ADMIN — PHENYLEPHRINE HYDROCHLORIDE 200 MCG: 10 INJECTION INTRAVENOUS at 09:07

## 2019-07-05 RX ADMIN — DOCUSATE SODIUM 100 MG: 100 CAPSULE, LIQUID FILLED ORAL at 02:07

## 2019-07-05 RX ADMIN — PREGABALIN 75 MG: 75 CAPSULE ORAL at 06:07

## 2019-07-05 RX ADMIN — GABAPENTIN 100 MG: 100 CAPSULE ORAL at 02:07

## 2019-07-05 RX ADMIN — ACETAMINOPHEN 1000 MG: 500 TABLET, FILM COATED ORAL at 06:07

## 2019-07-05 RX ADMIN — SODIUM CHLORIDE: 0.9 INJECTION, SOLUTION INTRAVENOUS at 07:07

## 2019-07-05 RX ADMIN — ROCURONIUM BROMIDE 40 MG: 10 INJECTION, SOLUTION INTRAVENOUS at 07:07

## 2019-07-05 RX ADMIN — GABAPENTIN 100 MG: 100 CAPSULE ORAL at 09:07

## 2019-07-05 RX ADMIN — ALBUMIN (HUMAN): 2.5 SOLUTION INTRAVENOUS at 09:07

## 2019-07-05 RX ADMIN — MUPIROCIN 1 G: 20 OINTMENT TOPICAL at 12:07

## 2019-07-05 RX ADMIN — LIDOCAINE HYDROCHLORIDE 20 MG: 20 INJECTION, SOLUTION INTRAVENOUS at 08:07

## 2019-07-05 RX ADMIN — MUPIROCIN 1 G: 20 OINTMENT TOPICAL at 09:07

## 2019-07-05 RX ADMIN — GLYCOPYRROLATE 0.4 MG: 0.2 INJECTION, SOLUTION INTRAMUSCULAR; INTRAVENOUS at 07:07

## 2019-07-05 RX ADMIN — INSULIN ASPART 4 UNITS: 100 INJECTION, SOLUTION INTRAVENOUS; SUBCUTANEOUS at 06:07

## 2019-07-05 RX ADMIN — FAMOTIDINE 20 MG: 20 TABLET, FILM COATED ORAL at 06:07

## 2019-07-05 RX ADMIN — NEOSTIGMINE METHYLSULFATE 5 MG: 1 INJECTION INTRAVENOUS at 09:07

## 2019-07-05 RX ADMIN — CARVEDILOL 3.12 MG: 3.12 TABLET, FILM COATED ORAL at 06:07

## 2019-07-05 RX ADMIN — SODIUM CHLORIDE: 0.9 INJECTION, SOLUTION INTRAVENOUS at 09:07

## 2019-07-05 RX ADMIN — CARBOXYMETHYLCELLULOSE SODIUM 2 DROP: 2.5 SOLUTION/ DROPS OPHTHALMIC at 07:07

## 2019-07-05 RX ADMIN — ROCURONIUM BROMIDE 10 MG: 10 INJECTION, SOLUTION INTRAVENOUS at 07:07

## 2019-07-05 RX ADMIN — INSULIN ASPART 2 UNITS: 100 INJECTION, SOLUTION INTRAVENOUS; SUBCUTANEOUS at 06:07

## 2019-07-05 RX ADMIN — PROPOFOL 150 MG: 10 INJECTION, EMULSION INTRAVENOUS at 07:07

## 2019-07-05 NOTE — H&P
Clinic note from Dr. Lei on 5/14/19. No new changes since last visit. R/B/A of surgery discussed. All questions answered.    BG on admit 294, Dr. Lei notified, SSI ordered to cover patient.     Rola Jaeger M.D.  OBGYN  PGY3          Subjective:      Patient ID: aJel Hall is a 53 y.o. female.     Chief Complaint: Pelvic Mass        HPI      Referred by Estelle So for pelvic mass.      Reports cyst noted on imaging as workup for transplant. She is relatively asymptomatic.     Pelvic US 4/2/19  COMPARISON:  CT abdomen pelvis without contrast 03/14/2019, pelvic ultrasound 03/20/2018.  FINDINGS:  Uterus: Size: 10.6 x 4.9 x 6.3 cm, Exophytic fibroid measuring 3 x 1.6 x 2.6.  Endometrium: Normal in this post menopausal patient, measuring 4 mm.  The right ovary is not visualized.  No abnormality detected at the right adnexal lesion.  Left ovary: Size: 3.8 x 3.2 x 2.6 cm, Redemonstration of a cystic lesion with mural nodularity, measuring 4 x 2.8 x 2.3 cm (previously measured 3.3 x 2.7 x 2.2 cm).      normal 17  Pap 3/2019 negative.   D&C 5/2018 benign pathology.      Prior abdominal surgeries include c/s (midline incision), BTL  Colonoscopy 4/2018     LMP mid 40s. No PMB.     Medical comorbidities include IDDM, HTN, HLD, CKD on dialysis (undergoing work up for possible transplant), NAFLD     Family history significant for sisters (1, alive) and mother (dec, 50s) - ovarian cancer. S - lung cancer (dec). No breast, uterine or colon cancer.     Review of Systems   Constitutional: Negative for appetite change, chills, diaphoresis, fatigue, fever and unexpected weight change.   Respiratory: Negative for cough, chest tightness, shortness of breath and wheezing.    Cardiovascular: Negative for chest pain, palpitations and leg swelling.   Gastrointestinal: Negative for abdominal distention, abdominal pain, blood in stool, constipation, diarrhea, nausea and vomiting.   Genitourinary: Negative for difficulty  urinating, dysuria, flank pain, frequency, hematuria, pelvic pain, vaginal bleeding, vaginal discharge and vaginal pain.   Musculoskeletal: Negative for arthralgias and back pain.   Skin: Negative for color change and rash.   Neurological: Negative for dizziness, weakness, numbness and headaches.   Hematological: Negative for adenopathy.   Psychiatric/Behavioral: Negative for confusion and sleep disturbance. The patient is not nervous/anxious.               Past Medical History:   Diagnosis Date    Anemia      Arrhythmia      Bradycardia      Cyst of left ovary 2019    Diabetes mellitus      Dialysis patient           Disorder of kidney and ureter      Hyperlipidemia      Hypertension      Obesity      Preeclampsia              Past Surgical History:   Procedure Laterality Date    AV FISTULA PLACEMENT         right arm     SECTION         x1    COLONOSCOPY N/A 2018     Performed by [facility]aj [facility]osa [facility] at [facility] (4TH FLR)    FDEHCAQRYJLJ-CLVVIEYN-ATVCYQBPE N/A 2018     Performed by Josie Marti [facility] at [facility] [place] [place] [facility] [place]    TUBAL LIGATION        Vascular access surgeries         x5            Family History   Problem Relation Age of Onset    Cancer Mother      Ovarian cancer Mother      Cancer Sister      Ovarian cancer Sister      Lung cancer Sister      Diabetes Brother      Cancer Sister      Ovarian cancer Sister      Diabetes Sister      Breast cancer Neg Hx      Colon cancer Neg Hx      Cervical cancer Neg Hx      Endometrial cancer Neg Hx      Vaginal cancer Neg Hx        Social History               Socioeconomic History    Marital status: Single       Spouse name: Not on file    Number of children: Not on file    Years of education: Not on file    Highest education level: Not on file   Occupational History    Not on file   Social Needs    Financial resource strain: Not on file    Food insecurity:       Worry: Not on file        Inability: Not on file    Transportation needs:       Medical: Not on file       Non-medical: Not on file   Tobacco Use    Smoking status: Never Smoker    Smokeless tobacco: Never Used   Substance and Sexual Activity    Alcohol use: No    Drug use: No    Sexual activity: Yes       Partners: Male       Birth control/protection: Post-menopausal   Lifestyle    Physical activity:       Days per week: Not on file       Minutes per session: Not on file    Stress: Not on file   Relationships    Social connections:       Talks on phone: Not on file       Gets together: Not on file       Attends [employer] service: Not on file       Active member of club or organization: Not on file       Attends meetings of clubs or organizations: Not on file       Relationship status: Not on file   Other Topics Concern    Not on file   Social History Narrative    Not on file              Current Outpatient Medications   Medication Sig    ACCU-CHEK KRISTI PLUS TEST STRP Strp TEST QID    ACCU-CHEK SOFTCLIX LANCETS Misc U QID    acetaminophen (TYLENOL) 325 MG tablet Take 2 tablets (650 mg total) by mouth every 6 (six) hours as needed for Pain.    ALCOHOL ANTISEPTIC PADS (SURE COMFORT ALCOHOL PREP PADS TOP)      amlodipine (NORVASC) 10 MG tablet Take 10 mg by mouth once daily.    aspirin (ECOTRIN) 81 MG EC tablet Take 81 mg by mouth.    blood sugar diagnostic Strp 1 strip.    calcium acetate (PHOSLO) 667 mg capsule Take 667 mg by mouth 3 (three) times daily with meals.     carvedilol (COREG) 3.125 MG tablet Take 3.125 mg by mouth.    fish oil-omega-3 fatty acids 300-1,000 mg capsule Take 1 g by mouth once daily.    furosemide (LASIX) 40 MG tablet Take 40 mg by mouth once daily.    gabapentin (NEURONTIN) 100 MG capsule Take 100 mg by mouth 3 (three) times daily.    insulin aspart U-100 (NOVOLOG) 100 unit/mL (3 mL) InPn pen Inject 5 Units into the skin.    insulin glargine (LANTUS U-100 INSULIN) 100 unit/mL injection  "Inject 40 Units into the skin.    insulin glargine 100 units/mL (3mL) SubQ pen Inject 45 Units into the skin.    lancets Misc U QID    LANTUS SOLOSTAR 100 unit/mL (3 mL) InPn pen INJECT 40 UNITS SC QHS WITH DOSE ADJUSTMENT UP OR DOWN BY 2 UNITS Q 3 DAYS. TTD 50 UNITS    losartan (COZAAR) 25 MG tablet Take 50 mg by mouth once daily.     medical supply, miscellaneous (MISCELLANEOUS MEDICAL SUPPLY MISC) Measure blood sugar three times a day.    NOVOLOG FLEXPEN 100 unit/mL InPn pen INJECT 5 UNITS SC D WITH DINNER    pen needle, diabetic (SURE COMFORT PEN NEEDLE) 31 gauge x 3/16" Ndle U BID UTD    sodium bicarbonate 650 MG tablet Take 650 mg by mouth.    SURE COMFORT PEN NEEDLE 30 gauge x 5/16" Ndle U BID    TRUEPLUS LANCETS 30 gauge Misc U QID TO CHECK BLOOD SUGAR    atorvastatin (LIPITOR) 40 MG tablet TK 1 T PO QD    carvedilol (COREG) 3.125 MG tablet Take 3.125 mg by mouth 2 (two) times daily with meals.      No current facility-administered medications for this visit.       Review of patient's allergies indicates:  No Known Allergies      BP (!) 145/68   Pulse 89   Ht 5' 4" (1.626 m)   Wt 88.2 kg (194 lb 7.1 oz)   LMP 03/15/2016 (LMP Unknown)   BMI 33.38 kg/m²         Objective:   Physical Exam:   Constitutional: She is oriented to person, place, and time. She appears well-developed and well-nourished. No distress.    HENT:   Head: Normocephalic and atraumatic.    Eyes: No scleral icterus.    Neck: Normal range of motion.    Cardiovascular: Exam reveals no cyanosis and no edema.     Pulmonary/Chest: Effort normal. No respiratory distress. She exhibits no tenderness.         Abdominal: Soft. Normal appearance. She exhibits no distension, no fluid wave, no ascites and no mass. There is no tenderness. There is no rigidity, no rebound and no guarding. No hernia.     Genitourinary: Vagina normal and uterus normal. Pelvic exam was performed with patient supine. There is no rash, tenderness or lesion on the " right labia. There is no rash, tenderness or lesion on the left labia. Uterus is not tender. Cervix is normal. Right adnexum displays no mass, no tenderness and no fullness. Left adnexum displays no mass, no tenderness and no fullness. No bleeding (scant w exam) in the vagina. No vaginal discharge found.           Musculoskeletal: Normal range of motion and moves all extremeties. She exhibits no edema.      Lymphadenopathy:        Right: No inguinal adenopathy present.        Left: No inguinal adenopathy present.    Neurological: She is alert and oriented to person, place, and time.    Skin: Skin is warm and dry. No rash noted. No cyanosis or erythema. No pallor.    Psychiatric: She has a normal mood and affect. Thought content normal.         Assessment:      1. Cyst of left ovary          Plan:       Discussed with the patient the differential diagnosis for a pelvic mass in a postmenopausal woman including benign or malignant process.  Given the complexity of the lesion as well as need for definitive management for transplant workup will proceed with surgical resection.  She desires to proceed.  She is a candidate for minimally invasive approach.  Recommend RTLH/BSO/possible staging based on intraoperative assessments.      The risks, benefits, and indications of the procedure were discussed with the patient and her family members if present.  These included bleeding, transfusion, infection, damage to surrounding tissues (bowel, bladder, ureter), wound separation, perioperative cardiac events, VTE, pneumonia, and possible death.  She voiced understanding, all questions were answered and consents were signed.     Surgery scheduled for 7/5/19 Buddhism.  Pre op anesthesia consult.  Her nephrologist is Dr. Karlos Perry [facility]- will need to communicate with them regarding dialysis, current schedule is MWF.   Diabetes- would like to optimize glucose control perioperatively, also seen at [facility] diabetes center,  Rosemary Rojas, NP

## 2019-07-05 NOTE — OPERATIVE NOTE ADDENDUM
Certification of Assistant at Surgery       Surgery Date: 7/5/2019     Participating Surgeons:  Surgeon(s) and Role:     * Trice Lei MD - Primary     * Rola Jaeger MD - Resident - Assisting       Procedures:  Procedure(s) (LRB):  XI ROBOTIC HYSTERECTOMY (N/A)  XI ROBOTIC SALPINGO-OOPHORECTOMY (Bilateral)    Assistant Surgeon's Certification of Necessity:  I understand that section 1842 (b) (6) (d) of the Social Security Act generally prohibits Medicare Part B reasonable charge payment for the services of assistants at surgery in teaching hospitals when qualified residents are available to furnish such services. I certify that the services for which payment is claimed were medically necessary, and that no qualified resident was available to perform the services. I further understand that these services are subject to post-payment review by the Medicare carrier.      Ashlyn Dan NP    07/05/2019  10:03 AM

## 2019-07-05 NOTE — OR NURSING
Update to daughter by phone and sent to pt room. Pt states pain tolerable. No change from previous assessment. Report to SAMIR Montiel RN Pt prepared for transfer.

## 2019-07-05 NOTE — PLAN OF CARE
CM met with pt and family for initial discharge planning assessment.    Pt confirmed her PCP is correct in Epic and her pharmacy of choice is [facility] at [facility].    Pt lives with family and will return there at time of discharge.     Pt and family anticipate no CM needs for discharge.     07/05/19 7109   Discharge Assessment   Assessment Type Discharge Planning Assessment   Confirmed/corrected address and phone number on facesheet? Yes   Assessment information obtained from? Patient;Caregiver;Medical Record   Expected Length of Stay (days) 2   Communicated expected length of stay with patient/caregiver yes   Prior to hospitilization cognitive status: Alert/Oriented   Prior to hospitalization functional status: Independent   Current cognitive status: Alert/Oriented   Current Functional Status: Independent   Lives With child(lucas), adult   Able to Return to Prior Arrangements yes   Is patient able to care for self after discharge? Yes   Readmission Within the Last 30 Days no previous admission in last 30 days   Patient currently being followed by outpatient case management? No   Patient currently receives any other outside agency services? No   Equipment Currently Used at Home none   Do you have any problems affording any of your prescribed medications? No   Is the patient taking medications as prescribed? yes   Does the patient have transportation home? Yes   Discharge Plan A Home   Discharge Plan B Home   DME Needed Upon Discharge  none   Patient/Family in Agreement with Plan yes

## 2019-07-05 NOTE — ANESTHESIA POSTPROCEDURE EVALUATION
Anesthesia Post Evaluation    Patient: Jael Hall    Procedure(s) Performed: Procedure(s) (LRB):  XI ROBOTIC HYSTERECTOMY (N/A)  XI ROBOTIC SALPINGO-OOPHORECTOMY (Bilateral)    Final Anesthesia Type: general  Patient location during evaluation: PACU  Patient participation: Yes- Able to Participate  Level of consciousness: awake and alert  Post-procedure vital signs: reviewed and stable  Pain management: adequate  Airway patency: patent  PONV status at discharge: No PONV  Anesthetic complications: no      Cardiovascular status: blood pressure returned to baseline and stable  Respiratory status: unassisted, spontaneous ventilation and nasal cannula  Hydration status: euvolemic  Follow-up not needed.  Comments: Glucose 371 in PACU, tx per sliding scale protocol          Vitals Value Taken Time   /68 7/5/2019 12:13 PM   Temp 37.2 °C (98.9 °F) 7/5/2019 12:13 PM   Pulse 85 7/5/2019 12:13 PM   Resp 16 7/5/2019 12:13 PM   SpO2 95 % 7/5/2019 12:13 PM         Event Time     Out of Recovery 11:50:10          Pain/Ami Score: Pain Rating Prior to Med Admin: 5 (7/5/2019 11:10 AM)  Pain Rating Post Med Admin: 4 (states tolerable asleep) (7/5/2019 11:18 AM)  Ami Score: 8 (7/5/2019 11:18 AM)

## 2019-07-05 NOTE — BRIEF OP NOTE
[facility]  Brief Op Note    Admit Date: 7/5/2019    Attending Physician: Trice Lei MD     Surgery Date: 7/5/2019     Surgeon(s) and Role:     * Trice Lei MD - Primary     * Rola Jaeger MD - Resident - Assisting     * Melissa Lugo MD - Resident - Assisting        Pre-op Diagnosis:  Pelvic mass [R19.00]    Post-op Diagnosis:  Post-Op Diagnosis Codes:     * Pelvic mass [R19.00]    Procedure(s) (LRB):  XI ROBOTIC HYSTERECTOMY (N/A)  XI ROBOTIC SALPINGO-OOPHORECTOMY (Bilateral)  STAGING (N/A)    Anesthesia: General    Findings/Key Components:   1. Uterus sounded to 9cm, large rectocele noted  2. Uterus with anterior fundal fibroid noted.   3. Evidence of adenomyosis and endometriosis noted on the uterine serosa   4. Bilateral ovaries normal in appearance   5. Left cystic lesion noted in broad ligament, separate from ovaries, successfully taken down.   6. Both ureters noted to vermiculate   7. Complete TLH/BSO successfully completed. Vaginal cuff closed, both ureters again noted to vermiculate.   8. Hemostasis noted at the end of the procedure.     Estimated Blood Loss: 25cc         Specimens:   Specimen (12h ago, onward)    Start     Ordered    07/05/19 0926  Specimen to Pathology - Surgery  Once     Comments:  1.  Uterus, cervix, bilateral tubes and ovaries     Start Status     07/05/19 0926 Collected (07/05/19 0926) Order ID: [id number]       07/05/19 0926          Discharge Provider: Rola Jaeger    Diagnoses:  Active Hospital Problems    Diagnosis  POA    *s/p RA-TLH/BSO [Z98.890]  Not Applicable    Cyst of left ovary [N83.202]  Yes      Resolved Hospital Problems    Diagnosis Date Resolved POA    Cyst of left ovary [N83.202] 07/05/2019 Yes         Rola Jaeger M.D.  [facility]

## 2019-07-05 NOTE — NURSING
Received patient to room 386 from Recovery. Pt AAO x 4 with slight drowsiness noted. Resp.even and unlabored. Abdominal lap sites x 5 with dermabond CDI. F/C patent and draining clear yellow urine to g/u bag. Spouse @ bedside. Pt c/o abdominal pain @ 6.  SCD's in place bilateral. VSS. Safety maintained. Bed in lowest position and locked. Non-skid socks on. Call light in reach.

## 2019-07-06 VITALS
OXYGEN SATURATION: 95 % | DIASTOLIC BLOOD PRESSURE: 89 MMHG | TEMPERATURE: 98 F | WEIGHT: 202.81 LBS | HEIGHT: 64 IN | BODY MASS INDEX: 34.62 KG/M2 | SYSTOLIC BLOOD PRESSURE: 132 MMHG | HEART RATE: 94 BPM | RESPIRATION RATE: 20 BRPM

## 2019-07-06 LAB
ANION GAP SERPL CALC-SCNC: 17 MMOL/L (ref 8–16)
BASOPHILS # BLD AUTO: 0.01 K/UL (ref 0–0.2)
BASOPHILS NFR BLD: 0.1 % (ref 0–1.9)
BUN SERPL-MCNC: 64 MG/DL (ref 6–20)
CALCIUM SERPL-MCNC: 9.4 MG/DL (ref 8.7–10.5)
CHLORIDE SERPL-SCNC: 94 MMOL/L (ref 95–110)
CO2 SERPL-SCNC: 25 MMOL/L (ref 23–29)
CREAT SERPL-MCNC: 10.7 MG/DL (ref 0.5–1.4)
DIFFERENTIAL METHOD: ABNORMAL
EOSINOPHIL # BLD AUTO: 0.2 K/UL (ref 0–0.5)
EOSINOPHIL NFR BLD: 2.3 % (ref 0–8)
ERYTHROCYTE [DISTWIDTH] IN BLOOD BY AUTOMATED COUNT: 14.8 % (ref 11.5–14.5)
EST. GFR  (AFRICAN AMERICAN): 4 ML/MIN/1.73 M^2
EST. GFR  (NON AFRICAN AMERICAN): 4 ML/MIN/1.73 M^2
GLUCOSE SERPL-MCNC: 136 MG/DL (ref 70–110)
HCT VFR BLD AUTO: 28.9 % (ref 37–48.5)
HGB BLD-MCNC: 9.3 G/DL (ref 12–16)
LYMPHOCYTES # BLD AUTO: 2.4 K/UL (ref 1–4.8)
LYMPHOCYTES NFR BLD: 27.5 % (ref 18–48)
MCH RBC QN AUTO: 25.7 PG (ref 27–31)
MCHC RBC AUTO-ENTMCNC: 32.2 G/DL (ref 32–36)
MCV RBC AUTO: 80 FL (ref 82–98)
MONOCYTES # BLD AUTO: 0.8 K/UL (ref 0.3–1)
MONOCYTES NFR BLD: 8.7 % (ref 4–15)
NEUTROPHILS # BLD AUTO: 5.3 K/UL (ref 1.8–7.7)
NEUTROPHILS NFR BLD: 61.4 % (ref 38–73)
PLATELET # BLD AUTO: 228 K/UL (ref 150–350)
PMV BLD AUTO: 10 FL (ref 9.2–12.9)
POCT GLUCOSE: 246 MG/DL (ref 70–110)
POTASSIUM SERPL-SCNC: 4.6 MMOL/L (ref 3.5–5.1)
RBC # BLD AUTO: 3.62 M/UL (ref 4–5.4)
SODIUM SERPL-SCNC: 136 MMOL/L (ref 136–145)
WBC # BLD AUTO: 8.72 K/UL (ref 3.9–12.7)

## 2019-07-06 PROCEDURE — 85025 COMPLETE CBC W/AUTO DIFF WBC: CPT | Mod: NTX

## 2019-07-06 PROCEDURE — 25000003 PHARM REV CODE 250: Mod: NTX | Performed by: STUDENT IN AN ORGANIZED HEALTH CARE EDUCATION/TRAINING PROGRAM

## 2019-07-06 PROCEDURE — 25000003 PHARM REV CODE 250: Mod: NTX | Performed by: INTERNAL MEDICINE

## 2019-07-06 PROCEDURE — 90935 HEMODIALYSIS ONE EVALUATION: CPT | Mod: NTX

## 2019-07-06 PROCEDURE — 36415 COLL VENOUS BLD VENIPUNCTURE: CPT | Mod: NTX

## 2019-07-06 PROCEDURE — 80048 BASIC METABOLIC PNL TOTAL CA: CPT | Mod: NTX

## 2019-07-06 RX ORDER — DOCUSATE SODIUM 100 MG/1
100 CAPSULE, LIQUID FILLED ORAL 2 TIMES DAILY
Refills: 0 | Status: ON HOLD | COMMUNITY
Start: 2019-07-06 | End: 2020-09-09 | Stop reason: CLARIF

## 2019-07-06 RX ORDER — OXYCODONE AND ACETAMINOPHEN 5; 325 MG/1; MG/1
1 TABLET ORAL EVERY 4 HOURS PRN
Qty: 30 TABLET | Refills: 0 | Status: SHIPPED | OUTPATIENT
Start: 2019-07-06 | End: 2019-07-23 | Stop reason: SDUPTHER

## 2019-07-06 RX ORDER — SODIUM CHLORIDE 9 MG/ML
INJECTION, SOLUTION INTRAVENOUS ONCE
Status: COMPLETED | OUTPATIENT
Start: 2019-07-06 | End: 2019-07-06

## 2019-07-06 RX ORDER — SODIUM CHLORIDE 9 MG/ML
INJECTION, SOLUTION INTRAVENOUS
Status: DISCONTINUED | OUTPATIENT
Start: 2019-07-06 | End: 2019-07-06 | Stop reason: HOSPADM

## 2019-07-06 RX ADMIN — GABAPENTIN 100 MG: 100 CAPSULE ORAL at 02:07

## 2019-07-06 RX ADMIN — DOCUSATE SODIUM 100 MG: 100 CAPSULE, LIQUID FILLED ORAL at 09:07

## 2019-07-06 RX ADMIN — MUPIROCIN 1 G: 20 OINTMENT TOPICAL at 09:07

## 2019-07-06 RX ADMIN — SODIUM CHLORIDE: 0.9 INJECTION, SOLUTION INTRAVENOUS at 09:07

## 2019-07-06 RX ADMIN — OXYCODONE HYDROCHLORIDE AND ACETAMINOPHEN 1 TABLET: 5; 325 TABLET ORAL at 04:07

## 2019-07-06 RX ADMIN — OXYCODONE HYDROCHLORIDE AND ACETAMINOPHEN 1 TABLET: 10; 325 TABLET ORAL at 02:07

## 2019-07-06 RX ADMIN — GABAPENTIN 100 MG: 100 CAPSULE ORAL at 09:07

## 2019-07-06 RX ADMIN — OXYCODONE HYDROCHLORIDE AND ACETAMINOPHEN 1 TABLET: 5; 325 TABLET ORAL at 09:07

## 2019-07-06 RX ADMIN — INSULIN ASPART 3 UNITS: 100 INJECTION, SOLUTION INTRAVENOUS; SUBCUTANEOUS at 02:07

## 2019-07-06 RX ADMIN — ATORVASTATIN CALCIUM 40 MG: 20 TABLET, FILM COATED ORAL at 09:07

## 2019-07-06 NOTE — PROGRESS NOTES
[facility]  Obstetrics & Gynecology  Progress Note    Patient Name: Jael Hall  MRN: [id number]  Admission Date: 7/5/2019  Primary Care Provider: Mary Solano MD  Principal Problem: S/P robot-assisted surgical procedure    Subjective:     Interval History:   NAEON. She reports mild to moderate pain adequately controlled by PO pain medications - oxycodone 10 mg x1, oxycodone 5 mg x1. She has been able to PO without any difficulty and denies nausea and vomiting. She denies flatus. Urinary catheter in place. Patient has not yet ambulated. No vaginal bleeding.     Scheduled Meds:   atorvastatin  40 mg Oral Daily    carvedilol  3.125 mg Oral BID WM    docusate sodium  100 mg Oral BID    gabapentin  100 mg Oral TID    insulin detemir U-100  20 Units Subcutaneous QHS    mupirocin  1 g Nasal BID     Continuous Infusions:  PRN Meds:bisacodyl, dextrose 50%, dextrose 50%, diphenhydrAMINE, diphenhydrAMINE, glucagon (human recombinant), glucose, glucose, HYDROmorphone, insulin aspart U-100, insulin aspart U-100, ondansetron, oxyCODONE-acetaminophen, oxyCODONE-acetaminophen, promethazine (PHENERGAN) IVPB, sodium chloride 0.9%    Review of patient's allergies indicates:  No Known Allergies    Objective:     Vital Signs (Most Recent):  Temp: 96 °F (35.6 °C) (07/06/19 0407)  Pulse: 88 (07/06/19 0407)  Resp: 18 (07/06/19 0407)  BP: (!) 146/69 (07/06/19 0407)  SpO2: 96 % (07/06/19 0407) Vital Signs (24h Range):  Temp:  [96 °F (35.6 °C)-98.9 °F (37.2 °C)] 96 °F (35.6 °C)  Pulse:  [81-88] 88  Resp:  [16-18] 18  SpO2:  [95 %-100 %] 96 %  BP: (143-184)/(68-84) 146/69     Weight: 92 kg (202 lb 13.2 oz)  Body mass index is 34.81 kg/m².  Patient's last menstrual period was 03/15/2016 (lmp unknown).    I&O (Last 24H):    Intake/Output Summary (Last 24 hours) at 7/6/2019 0646  Last data filed at 7/5/2019 1924  Gross per 24 hour   Intake 1290 ml   Output 150 ml   Net 1140 ml       Physical Exam:   Constitutional:  She is oriented to person, place, and time. She appears well-developed and well-nourished. No distress.    HENT:   Head: Normocephalic and atraumatic.   Nose: Nose normal.      Cardiovascular: Normal rate, regular rhythm and intact distal pulses.     Pulmonary/Chest: Effort normal and breath sounds normal. No respiratory distress.        Abdominal: Soft. She exhibits no distension. Abdominal incision: Port sites appear clean/dry/intact. There is no tenderness.                 Neurological: She is alert and oriented to person, place, and time. No cranial nerve deficit.    Skin: Skin is warm and dry. She is not diaphoretic.    Psychiatric: She has a normal mood and affect. Her behavior is normal. Judgment and thought content normal.       Laboratory:  CBC:   Recent Labs   Lab 07/06/19  0353   WBC 8.72   RBC 3.62*   HGB 9.3*   HCT 28.9*      MCV 80*   MCH 25.7*   MCHC 32.2     UOP: N/A    Assessment/Plan:     Active Diagnoses:    Diagnosis Date Noted POA    PRINCIPAL PROBLEM:  s/p RA-TLH/BSO [Z98.890] 07/05/2019 Not Applicable    Cyst of left ovary [N83.202] 07/05/2019 Yes      Problems Resolved During this Admission:    Diagnosis Date Noted Date Resolved POA    Cyst of left ovary [N83.202] 05/14/2019 07/05/2019 Yes     1. Postop day #1 s/p RA-TLH/BSO 2/2 pelvic mass  - continue routine postop care.   - continue pain control with percocet/Ibuprofen Dilaudid prn for breakthrough  - continue regular diet. IVFs (LR d'oswaldo given patient tolerating PO.  - ambulate TID. IS bedside.  - F/u H/H in the AM    2.  ESRD  - No UOP  - Cr 10.7  - Dialysis three times per week, next one is Monday    3. Daibetes  - AM glucose 136  - SSI ordered    4. HTN  - Continue carvedilol         Avel Betancourt MD  Obstetrics & Gynecology  [facility]

## 2019-07-06 NOTE — DISCHARGE SUMMARY
Discharge Summary  Gynecology Oncology    Admit Date: 2019    Discharge Date and Time: 2019     Attending Physician: Trice Lei MD    Principal Diagnoses: S/P robot-assisted surgical procedure    Active Hospital Problems    Diagnosis  POA    *s/p RA-TLH/BSO [Z98.890]  Not Applicable    Cyst of left ovary [N83.202]  Yes      Resolved Hospital Problems    Diagnosis Date Resolved POA    Cyst of left ovary [N83.202] 2019 Yes     Procedures: Procedure(s) (LRB):  XI ROBOTIC HYSTERECTOMY (N/A)  XI ROBOTIC SALPINGO-OOPHORECTOMY (Bilateral)    Discharged Condition: good    Hospital Course:   Jael Hall is a 53 y.o. y.o.  female who presented on 2019  for above procedures for the treatment of pelvic mass incidentally found on workup for renal transplant. Patient tolerated procedure well. PMH significant for anemia, DM with renal failure requiring dialysis, h/o tachy and bradyarrhythmias, hyperlipidemia, hypertension, obesity. Post-operative course was uncomplicated.  On day of discharge, patient was urinating, ambulating, and tolerating a regular diet without difficulty. Pain was well controlled on PO medication. Nephrology consult was ordered and she went to dialysis on the morning of POD#1. She was discharged home on POD#1 in stable condition with instructions to follow up with Dr. Lei in 6 weeks.     Significant Diagnostic Studies:  Recent Labs   Lab 19  0353   WBC 8.72   HGB 9.3*   HCT 28.9*   MCV 80*         Treatments:   1. Surgery as above  2. Dialysis    Disposition: Home or Self Care    Patient Instructions:   Current Discharge Medication List      START taking these medications    Details   docusate sodium (COLACE) 100 MG capsule Take 1 capsule (100 mg total) by mouth 2 (two) times daily.  Refills: 0      !! oxyCODONE-acetaminophen (PERCOCET) 5-325 mg per tablet Take 1 tablet by mouth every 4 (four) hours as needed for Pain.  Qty: 20 tablet, Refills: 0      !!  oxyCODONE-acetaminophen (PERCOCET) 5-325 mg per tablet Take 1 tablet by mouth every 4 (four) hours as needed.  Qty: 30 tablet, Refills: 0       !! - Potential duplicate medications found. Please discuss with provider.      CONTINUE these medications which have NOT CHANGED    Details   acetaminophen (TYLENOL) 325 MG tablet Take 2 tablets (650 mg total) by mouth every 6 (six) hours as needed for Pain.  Qty: 30 tablet, Refills: 0      amlodipine (NORVASC) 10 MG tablet Take 10 mg by mouth once daily.      aspirin (ECOTRIN) 81 MG EC tablet Take 81 mg by mouth.      atorvastatin (LIPITOR) 40 MG tablet TK 1 T PO QD  Refills: 1      calcium acetate (PHOSLO) 667 mg capsule Take 667 mg by mouth 3 (three) times daily with meals.       !! carvedilol (COREG) 3.125 MG tablet Take 3.125 mg by mouth 2 (two) times daily with meals.      !! carvedilol (COREG) 3.125 MG tablet Take 3.125 mg by mouth.      fish oil-omega-3 fatty acids 300-1,000 mg capsule Take 1 g by mouth once daily.      furosemide (LASIX) 40 MG tablet Take 40 mg by mouth once daily.      gabapentin (NEURONTIN) 100 MG capsule Take 100 mg by mouth 3 (three) times daily.      !! insulin aspart U-100 (NOVOLOG) 100 unit/mL (3 mL) InPn pen Inject 5 Units into the skin 3 (three) times daily with meals.       LANTUS SOLOSTAR 100 unit/mL (3 mL) InPn pen INJECT 40 UNITS SC QHS WITH DOSE ADJUSTMENT UP OR DOWN BY 2 UNITS Q 3 DAYS. TTD 50 UNITS  Refills: 1      losartan (COZAAR) 25 MG tablet Take 50 mg by mouth once daily.       !! ACCU-CHEK KRISTI PLUS TEST STRP Strp TEST QID  Refills: 6      !! ACCU-CHEK SOFTCLIX LANCETS Misc U QID  Refills: 2      ALCOHOL ANTISEPTIC PADS (SURE COMFORT ALCOHOL PREP PADS TOP)       !! blood sugar diagnostic Strp 1 strip.      !! lancets Misc U QID      medical supply, miscellaneous (MISCELLANEOUS MEDICAL SUPPLY MISC) Measure blood sugar three times a day.      !! NOVOLOG FLEXPEN 100 unit/mL InPn pen INJECT 5 UNITS SC D WITH DINNER  Refills: 1     "  pen needle, diabetic (SURE COMFORT PEN NEEDLE) 31 gauge x 3/16" Ndle U BID UTD      sodium bicarbonate 650 MG tablet Take 650 mg by mouth.      SURE COMFORT PEN NEEDLE 30 gauge x 5/16" Ndle U BID  Refills: 5      TRUEPLUS LANCETS 30 gauge Misc U QID TO CHECK BLOOD SUGAR  Refills: 5       !! - Potential duplicate medications found. Please discuss with provider.          Discharge Procedure Orders   Lifting restrictions   Scheduling Instructions: Do not lift anything heavier than a gallon of milk for 2 weeks.  No heavy lifting until your post-op visit in 4-6 weeks.     No driving until:   Scheduling Instructions: Pain is well controlled without narcotics     Notify your health care provider if you experience any of the following:  temperature >100.4     Notify your health care provider if you experience any of the following:  persistent nausea and vomiting or diarrhea     Notify your health care provider if you experience any of the following:  severe uncontrolled pain     Notify your health care provider if you experience any of the following:  redness, tenderness, or signs of infection (pain, swelling, redness, odor or green/yellow discharge around incision site)     Notify your health care provider if you experience any of the following:  increased confusion or weakness     Notify your health care provider if you experience any of the following:   Scheduling Instructions: Heavy vaginal bleeding, soaking though more than 1 heavy pad per hour.     No dressing needed   Scheduling Instructions: Wash with warm soapy water in the shower and pat dry.   Do not soak in a tub for 2 weeks after surgery.     Activity as tolerated   Scheduling Instructions: Nothing in the vagina for 6 weeks - No douching, tampons, or sex.     Follow-up Information     Trice Lei MD. Schedule an appointment as soon as possible for a visit in 6 weeks.    Specialty:  Gynecologic Oncology  Why:  Post-Op  Contact information:  [address]  [facility] [address]  [phone]               Jeremie Hughes M.D.  Obstetrics & Gynecology  PGY-3

## 2019-07-06 NOTE — NURSING
07/06/2019        Pt verbalized understanding D/C instructions and education provided pertinent to D/C needs. IV cath removed fully intact. No distress noted. Pt transferred per wheelchair per transport to awaiting vehicle.          Monica Montiel RN

## 2019-07-06 NOTE — CONSULTS
NEPHROLOGY CONSULT NOTE    HPI & INTERVAL HISTORY: The patient is a 54 y/o female who was admitted for a hysterectomy due to a pelvic mass.  She successfully underwent the procedure yesterday and currently is without c/o CP, SOB, N/V, C/F.  She has a hx of ESRD and receives HD on a MWF schedule at [facility] under the care of Dr. Perry. Her last dialysis was on Tuesday in anticipation of surgery. In addition she has diabetes and hypertension.    Past Medical History:   Diagnosis Date    Anemia     Arrhythmia     Bradycardia     Cyst of left ovary 2019    Diabetes mellitus     Dialysis patient         Disorder of kidney and ureter     Hyperlipidemia     Hypertension     Kidney failure     Obesity     Preeclampsia       Past Surgical History:   Procedure Laterality Date    AV FISTULA PLACEMENT      right arm     SECTION      x1    COLONOSCOPY N/A 2018    Performed by P[facility]aj [facility]josa [facility] at [facility] (4TH FLR)    [id number] N/A 2018    Per[facility] at [facility] OR    TUBAL LIGATION      Vascular access surgeries      x5      Review of patient's allergies indicates:  No Known Allergies   Medications Prior to Admission   Medication Sig Dispense Refill Last Dose    acetaminophen (TYLENOL) 325 MG tablet Take 2 tablets (650 mg total) by mouth every 6 (six) hours as needed for Pain. 30 tablet 0 Past Month at Unknown time    amlodipine (NORVASC) 10 MG tablet Take 10 mg by mouth once daily.   2019 at 2100    aspirin (ECOTRIN) 81 MG EC tablet Take 81 mg by mouth.   2019 at Unknown time    atorvastatin (LIPITOR) 40 MG tablet TK 1 T PO QD  1 2019 at Unknown time    calcium acetate (PHOSLO) 667 mg capsule Take 667 mg by mouth 3 (three) times daily with meals.    2019 at Unknown time    carvedilol (COREG) 3.125 MG tablet Take 3.125 mg by mouth 2 (two) times daily with meals.   2019 at 0430     "carvedilol (COREG) 3.125 MG tablet Take 3.125 mg by mouth.   7/4/2019 at Unknown time    fish oil-omega-3 fatty acids 300-1,000 mg capsule Take 1 g by mouth once daily.   7/4/2019 at Unknown time    furosemide (LASIX) 40 MG tablet Take 40 mg by mouth once daily.   7/4/2019 at Unknown time    gabapentin (NEURONTIN) 100 MG capsule Take 100 mg by mouth 3 (three) times daily.   7/4/2019 at Unknown time    insulin aspart U-100 (NOVOLOG) 100 unit/mL (3 mL) InPn pen Inject 5 Units into the skin 3 (three) times daily with meals.    7/4/2019 at 2000    LANTUS SOLOSTAR 100 unit/mL (3 mL) InPn pen INJECT 40 UNITS SC QHS WITH DOSE ADJUSTMENT UP OR DOWN BY 2 UNITS Q 3 DAYS. TTD 50 UNITS  1 7/4/2019 at 2000    losartan (COZAAR) 25 MG tablet Take 50 mg by mouth once daily.    7/4/2019 at 2100    ACCU-CHEK KRISTI PLUS TEST STRP Strp TEST QID  6 Taking    ACCU-CHEK SOFTCLIX LANCETS Misc U QID  2 Taking    ALCOHOL ANTISEPTIC PADS (SURE COMFORT ALCOHOL PREP PADS TOP)    Taking    blood sugar diagnostic Strp 1 strip.   Taking    lancets Misc U QID   Taking    medical supply, miscellaneous (MISCELLANEOUS MEDICAL SUPPLY MISC) Measure blood sugar three times a day.   Taking    NOVOLOG FLEXPEN 100 unit/mL InPn pen INJECT 5 UNITS SC D WITH DINNER  1 Taking    pen needle, diabetic (SURE COMFORT PEN NEEDLE) 31 gauge x 3/16" Ndle U BID UTD   Taking    sodium bicarbonate 650 MG tablet Take 650 mg by mouth.   7/3/2019    SURE COMFORT PEN NEEDLE 30 gauge x 5/16" Ndle U BID  5 Taking    TRUEPLUS LANCETS 30 gauge Misc U QID TO CHECK BLOOD SUGAR  5 Taking       Social History     Socioeconomic History    Marital status: Single     Spouse name: Not on file    Number of children: Not on file    Years of education: Not on file    Highest education level: Not on file   Occupational History    Not on file   Social Needs    Financial resource strain: Not on file    Food insecurity:     Worry: Not on file     Inability: Not on file "    Transportation needs:     Medical: Not on file     Non-medical: Not on file   Tobacco Use    Smoking status: Never Smoker    Smokeless tobacco: Never Used   Substance and Sexual Activity    Alcohol use: No    Drug use: No    Sexual activity: Yes     Partners: Male     Birth control/protection: Post-menopausal   Lifestyle    Physical activity:     Days per week: Not on file     Minutes per session: Not on file    Stress: Not on file   Relationships    Social connections:     Talks on phone: Not on file     Gets together: Not on file     Attends Congregational service: Not on file     Active member of club or organization: Not on file     Attends meetings of clubs or organizations: Not on file     Relationship status: Not on file   Other Topics Concern    Not on file   Social History Narrative    Not on file        MEDS   atorvastatin  40 mg Oral Daily    carvedilol  3.125 mg Oral BID WM    docusate sodium  100 mg Oral BID    gabapentin  100 mg Oral TID    insulin detemir U-100  20 Units Subcutaneous QHS    mupirocin  1 g Nasal BID        ROS:     GENERAL:  No change in appetite, energy or weight.  No fever, chills or sweats.   HEENT: No auditory or visual complaints. No tinnitus.  No pharyngeal complaints  RESPIRATORY:  No cough, SOB.      CARDIOVASCULAR:  No chest pain or pressure.  No palpitations   GASTROINTESTINAL:  No dysphagia, odynophagia, heartburn, diarrhea or constipation.  No nausea or vomiting.  MUSCULOSKELETAL:  No myalgias, arthralgias, joint swelling.    NEUROLOGICAL:  No vertigo or disturbance of balance or coordination.  No motor or sensory complaints. No headache.  GENITOURINARY:  No dysuria, urgency or urinary frequency.  No incontinence.  Mass/cyst found on uterus  DERMATOLOGIC:  No complaint of skin lesions or rashes.  No change        CONTINOUS INFUSIONS:      Intake/Output Summary (Last 24 hours) at 7/6/2019 1333  Last data filed at 7/6/2019 0600  Gross per 24 hour   Intake 240  ml   Output 400 ml   Net -160 ml        HEMODYNAMICS:    Temp:  [96 °F (35.6 °C)-98.2 °F (36.8 °C)] 98 °F (36.7 °C)  Pulse:  [81-98] 95  Resp:  [16-20] 20  SpO2:  [95 %-100 %] 95 %  BP: (112-159)/(60-80) 123/78   Gen:  No acute distress. Pt is alert, awake, and oriented to time, people, places, situation.  HEENT:  Atraumatic, normocephalic.    Cards: RRR, no murmurs, rubs, or gallops  Pul: Normal effort; clear to auscultation bilaterally  Abd: Soft, nontender, nondistened. No rebound tenderness or guarding  Ext: No clubbing, cyanosis, or edema  Skin: No rash or petechiae, skin is warm and dry without diaphoresis  Dialysis Access:  (R) FA AVF    LABS   Lab Results   Component Value Date    WBC 8.72 07/06/2019    HGB 9.3 (L) 07/06/2019    HCT 28.9 (L) 07/06/2019    MCV 80 (L) 07/06/2019     07/06/2019        Recent Labs   Lab 07/06/19  0353   *   CALCIUM 9.4      K 4.6   CO2 25   CL 94*   BUN 64*   CREATININE 10.7*      Lab Results   Component Value Date    .0 (H) 02/26/2019    CALCIUM 9.4 07/06/2019    PHOS 4.1 09/28/2017      No results found for: IRON, TIBC, FERRITIN     ABG  No results for input(s): PH, PO2, PCO2, HCO3, BE in the last 168 hours.      IMAGING:  CXR    ASSESSMENT  Patient Active Problem List   Diagnosis    ESRD on hemodialysis    Type 2 diabetes mellitus with kidney complication, with long-term current use of insulin    Elevated cholesterol with elevated triglycerides    Hx of sinus bradycardia    Anemia in ESRD (end-stage renal disease)    End stage renal failure on dialysis    Thickened endometrium    S/P dilation and curettage    Patient on waiting list for kidney transplant    NAFLD (nonalcoholic fatty liver disease)    s/p RA-TLH/BSO    Cyst of left ovary        PLAN    1.  ESRD - HD as ordered for 3 hours, F160, Na 138, K 2, Ca 2.5, HCO3 35, UF 2-3 liters as tolerated. Continue renal diet. Avoid nephrotoxic medications. Monitor CMP, CBC, Mg, Phos.    2.  Hypertension - Meds/UF.  Limit sodium intake.    3. Anemia - Likely anemia of ESRD,  May benefit from Epo.    4. Hyperphosphotemia - Pt on Phoslo binders. Continue low phos diet.    5. DM2 - Continue accucheck/SSI    6. S/P Hysterectomy - Patient doing well.

## 2019-07-06 NOTE — PLAN OF CARE
07/06/19 1044   Final Note   Assessment Type Final Discharge Note   Anticipated Discharge Disposition Home   What phone number can be called within the next 1-3 days to see how you are doing after discharge? [phone]   Discharge plans and expectations educations in teach back method with documentation complete? Yes   Right Care Referral Info   Post Acute Recommendation No Care

## 2019-07-06 NOTE — PLAN OF CARE
Problem: Adult Inpatient Plan of Care  Goal: Plan of Care Review  Fall prevention precautions in place, pt remained free of falls throughout shift, call bell and personal items within reach, pain adequately controlled with PRN medications, hourly rounding.

## 2019-07-07 NOTE — OP NOTE
DATE OF PROCEDURE:  07/05/2019     SURGEON:  Trice Lei M.D.     ASSISTANTS: TERRIE Wilder, First Assist-- No qualified resident was available for the procedure. Rola Jaeger MD (RES)      PREOPERATIVE DIAGNOSIS:   1. Complex pelvic mass  2. ESRD, transplant candidate     POSTOPERATIVE DIAGNOSES:    1. Complex pelvic mass  2. ESRD, transplant candidate     PROCEDURE PERFORMED:  Robotic-assisted total laparoscopic hysterectomy,   bilateral salpingo-oophorectomy     ANESTHESIA:  General endotracheal anesthesia.     SPECIMENS REMOVED:  1.  Uterus and cervix.  2.  Bilateral fallopian tubes and ovaries.     ESTIMATED BLOOD LOSS:  25 mL.     COMPLICATIONS:  None.     FINDINGS: 10 week size uterus. Normal fallopian tubes and ovaries bilaterally. Left broad ligament simple cystic appearing lesion approximately 4cm in size, resected in its entirety.     PROCEDURE IN DETAIL:  The patient was taken to the Operating Room.  Informed consent had been obtained.  She underwent general endotracheal anesthesia without difficulty, was prepped and draped in the normal sterile fashion in a dorsal lithotomy position.  Timeout was performed.  All parties agreed to the planned procedure.  Perioperative antibiotics were administered.  Cazares catheter was placed under sterile conditions.  The VCare uterine manipulator was secured in place in a standard fashion for uterine manipulation.  Gloves were changed and attention was turned to the patient's abdomen.       The umbilicus was inverted. Veress needle was gently inserted.  Intra-abdominal placement was confirmed with low CO2 pressure and water drop test.  Abdomen was insufflated and pneumoperitoneum was obtained.  Skin incision was made superior to the umbilicus.  Robotic trocar was introduced.  Intra-abdominal placement was confirmed.  Additional trocars were placed, 2 robotic trocars to the left of the camera, 1 robotic trocar to the right of the camera and an additional 8  mm assist port to the right of the camera.  The patient was placed in steep Trendelenburg. Robot was docked and operating surgeon reported to the console.       Survey of the abdomen and pelvis revealed the above findings. Bilateral round ligaments were identified.  These were cauterized and transected.  The posterior leaf of the broad ligament was then opened bilaterally facilitating access to the retroperitoneum.  The infundibulopelvic ligaments were then skeletonized with good visualization of the ureter beneath.  These were cauterized and transected.  The anterior leaf of the broad ligament was then opened circumferentially.  Proper plane for the bladder flap was identified and the bladder was gently reflected off of the anterior aspect of the uterus and cervix to the level of the cervicovaginal junction. We completed resected the left broad ligament cystic lesion along with our dissection of the posterior leaf. Bilateral uterine arteries were then skeletonized.  These were cauterized and transected.  The remainder of the uterosacral and cardinal ligaments were then also serially cauterized and transected.  Posterior colpotomy was initiated in the 6 o'clock position and carried around circumferentially.  The uterus, cervix, bilateral fallopian tubes and ovaries as well as the broad ligament cyst were then removed through the vagina.      We then closed the vaginal cuff with a V-Loc running suture in a running fashion.  Bilateral ureters were reinspected and both noting to be vermiculating equally and briskly. Procedure was deemed complete. The robotic trocars were then removed under direct visualization and the robot was undocked. Skin incisions were then rendered hemostatic.  These were closed with 4-0 Monocryl in a subcuticular fashion and topped with sterile Dermabond. The patient tolerated the procedure well. Sponge, lap, needle and instrument counts were correct x2 as reported by the circulating nurse.  She  was awakened from anesthesia and taken to recovery in stable condition.

## 2019-07-16 ENCOUNTER — TELEPHONE (OUTPATIENT)
Dept: GYNECOLOGIC ONCOLOGY | Facility: CLINIC | Age: 54
End: 2019-07-16

## 2019-07-16 NOTE — TELEPHONE ENCOUNTER
Called to check on patient after surgery and review benign final pathology. She reports doing well. No concerns. Post op scheduled for 8/23. She voiced understanding.

## 2019-07-22 ENCOUNTER — TELEPHONE (OUTPATIENT)
Dept: GYNECOLOGIC ONCOLOGY | Facility: CLINIC | Age: 54
End: 2019-07-22

## 2019-07-23 ENCOUNTER — OFFICE VISIT (OUTPATIENT)
Dept: GYNECOLOGIC ONCOLOGY | Facility: CLINIC | Age: 54
End: 2019-07-23
Payer: MEDICARE

## 2019-07-23 ENCOUNTER — TELEPHONE (OUTPATIENT)
Dept: GYNECOLOGIC ONCOLOGY | Facility: CLINIC | Age: 54
End: 2019-07-23

## 2019-07-23 VITALS
WEIGHT: 193.31 LBS | SYSTOLIC BLOOD PRESSURE: 156 MMHG | DIASTOLIC BLOOD PRESSURE: 75 MMHG | HEART RATE: 91 BPM | HEIGHT: 64 IN | BODY MASS INDEX: 33 KG/M2

## 2019-07-23 DIAGNOSIS — Z98.890 S/P ROBOT-ASSISTED SURGICAL PROCEDURE: Primary | ICD-10-CM

## 2019-07-23 DIAGNOSIS — R93.89 THICKENED ENDOMETRIUM: ICD-10-CM

## 2019-07-23 DIAGNOSIS — N83.202 CYST OF LEFT OVARY: ICD-10-CM

## 2019-07-23 PROCEDURE — 99999 PR PBB SHADOW E&M-EST. PATIENT-LVL III: CPT | Mod: PBBFAC,,, | Performed by: OBSTETRICS & GYNECOLOGY

## 2019-07-23 PROCEDURE — 99024 PR POST-OP FOLLOW-UP VISIT: ICD-10-PCS | Mod: POP,,, | Performed by: OBSTETRICS & GYNECOLOGY

## 2019-07-23 PROCEDURE — 99213 OFFICE O/P EST LOW 20 MIN: CPT | Mod: PBBFAC | Performed by: OBSTETRICS & GYNECOLOGY

## 2019-07-23 PROCEDURE — 99999 PR PBB SHADOW E&M-EST. PATIENT-LVL III: ICD-10-PCS | Mod: PBBFAC,,, | Performed by: OBSTETRICS & GYNECOLOGY

## 2019-07-23 PROCEDURE — 99024 POSTOP FOLLOW-UP VISIT: CPT | Mod: POP,,, | Performed by: OBSTETRICS & GYNECOLOGY

## 2019-07-23 RX ORDER — ATORVASTATIN CALCIUM 40 MG/1
TABLET, FILM COATED ORAL
COMMUNITY
Start: 2019-06-26 | End: 2019-07-23 | Stop reason: SDUPTHER

## 2019-07-23 RX ORDER — INSULIN PUMP SYRINGE, 3 ML
EACH MISCELLANEOUS
Status: ON HOLD | COMMUNITY
Start: 2019-06-05 | End: 2020-09-09 | Stop reason: CLARIF

## 2019-07-23 RX ORDER — MEDICAL SUPPLY, MISCELLANEOUS
MISCELLANEOUS MISCELLANEOUS
Status: ON HOLD | COMMUNITY
Start: 2019-01-23 | End: 2020-09-09 | Stop reason: CLARIF

## 2019-07-23 RX ORDER — INSULIN ASPART 100 [IU]/ML
5 INJECTION, SOLUTION INTRAVENOUS; SUBCUTANEOUS
Status: ON HOLD | COMMUNITY
Start: 2019-06-05 | End: 2020-09-09 | Stop reason: SDUPTHER

## 2019-07-23 RX ORDER — MEDICAL SUPPLY, MISCELLANEOUS
MISCELLANEOUS MISCELLANEOUS
COMMUNITY
Start: 2018-02-02 | End: 2019-07-23 | Stop reason: SDUPTHER

## 2019-07-23 RX ORDER — PEN NEEDLE, DIABETIC 31 GX5/16"
NEEDLE, DISPOSABLE MISCELLANEOUS
Refills: 5 | Status: ON HOLD | COMMUNITY
Start: 2019-05-28 | End: 2020-09-09 | Stop reason: CLARIF

## 2019-07-23 RX ORDER — MEDICAL SUPPLY, MISCELLANEOUS
MISCELLANEOUS MISCELLANEOUS
Status: ON HOLD | COMMUNITY
Start: 2017-09-19 | End: 2020-09-09 | Stop reason: CLARIF

## 2019-07-23 RX ORDER — INSULIN GLARGINE 100 [IU]/ML
45 INJECTION, SOLUTION SUBCUTANEOUS
COMMUNITY
Start: 2019-06-05 | End: 2019-08-20

## 2019-07-23 NOTE — PROGRESS NOTES
Subjective:      Patient ID: Jael Hall is a 53 y.o. female.    Chief Complaint: cyst of left ovary      HPI  S/p RTLH/BSO 7/5/19  Uncomplicated post op course.   Final pathology reviewed and benign.     Presents today for post operative visit. Recovering appropriately from surgery. Up and about, eating, +BM.    Referral history:  Referred by Estelle So for pelvic mass.      Reports cyst noted on imaging as workup for transplant. She is relatively asymptomatic.     Pelvic US 4/2/19  COMPARISON:  CT abdomen pelvis without contrast 03/14/2019, pelvic ultrasound 03/20/2018.  FINDINGS:  Uterus: Size: 10.6 x 4.9 x 6.3 cm, Exophytic fibroid measuring 3 x 1.6 x 2.6.  Endometrium: Normal in this post menopausal patient, measuring 4 mm.  The right ovary is not visualized.  No abnormality detected at the right adnexal lesion.  Left ovary: Size: 3.8 x 3.2 x 2.6 cm, Redemonstration of a cystic lesion with mural nodularity, measuring 4 x 2.8 x 2.3 cm (previously measured 3.3 x 2.7 x 2.2 cm).      normal 17  Pap 3/2019 negative.   D&C 5/2018 benign pathology.      Prior abdominal surgeries include c/s (midline incision), BTL  Colonoscopy 4/2018     LMP mid 40s. No PMB.     Medical comorbidities include IDDM, HTN, HLD, CKD on dialysis (undergoing work up for possible transplant), NAFLD     Family history significant for sisters (1, alive) and mother (dec, 50s) - ovarian cancer. S - lung cancer (dec). No breast, uterine or colon cancer.  Review of Systems   Constitutional: Negative for appetite change, chills, fatigue and fever.   HENT: Negative for mouth sores.    Respiratory: Negative for cough and shortness of breath.    Cardiovascular: Negative for leg swelling.   Gastrointestinal: Negative for abdominal pain, blood in stool, constipation and diarrhea.   Endocrine: Negative for cold intolerance.   Genitourinary: Negative for dysuria and vaginal bleeding.   Musculoskeletal: Negative for myalgias.   Skin: Negative  for rash.   Allergic/Immunologic: Negative.    Neurological: Negative for weakness and numbness.   Hematological: Negative for adenopathy. Does not bruise/bleed easily.   Psychiatric/Behavioral: Negative for confusion.       Objective:   Physical Exam:   Constitutional: She is oriented to person, place, and time. She appears well-developed and well-nourished.    HENT:   Head: Normocephalic and atraumatic.    Eyes: Pupils are equal, round, and reactive to light. EOM are normal.    Neck: Normal range of motion. Neck supple. No thyromegaly present.    Cardiovascular: Normal rate, regular rhythm and intact distal pulses.     Pulmonary/Chest: Effort normal and breath sounds normal. No respiratory distress. She has no wheezes.        Abdominal: Soft. Bowel sounds are normal. She exhibits abdominal incision. She exhibits no distension, no ascites and no mass. There is no tenderness.             Musculoskeletal: Normal range of motion and moves all extremeties.      Lymphadenopathy:     She has no cervical adenopathy.        Right: No supraclavicular adenopathy present.        Left: No supraclavicular adenopathy present.    Neurological: She is alert and oriented to person, place, and time.    Skin: Skin is warm and dry. No rash noted.    Psychiatric: She has a normal mood and affect.       Assessment:     1. s/p RA-TLH/BSO    2. Cyst of left ovary    3. Thickened endometrium        Plan:   No orders of the defined types were placed in this encounter.    Recovering appropriately from surgery.   Fortunately benign pathology.   Continue post operative care.  RTC 4-6 weeks for follow up post op exam or sooner if needed.

## 2019-07-23 NOTE — LETTER
July 23, 2019        Estelle So, [place]  [address]  [facility] [address]             [facility] [address] [facility] [address]  [address], [address]  [facility] [address]  Phone: [phone]  Fax: [phone]   Patient: Jael Hall   MR Number: [id number]   YOB: 1965   Date of Visit: 7/23/2019       Dear Dr. So:    Thank you for referring Jael Hall to me for evaluation. Below are the relevant portions of my assessment and plan of care.            If you have questions, please do not hesitate to call me. I look forward to following Jael along with you.    Sincerely,      Trice Lei MD           CC  No Recipients

## 2019-08-19 ENCOUNTER — TELEPHONE (OUTPATIENT)
Dept: GYNECOLOGIC ONCOLOGY | Facility: CLINIC | Age: 54
End: 2019-08-19

## 2019-08-20 ENCOUNTER — OFFICE VISIT (OUTPATIENT)
Dept: GYNECOLOGIC ONCOLOGY | Facility: CLINIC | Age: 54
End: 2019-08-20
Payer: MEDICARE

## 2019-08-20 VITALS
BODY MASS INDEX: 33.61 KG/M2 | HEIGHT: 64 IN | DIASTOLIC BLOOD PRESSURE: 63 MMHG | HEART RATE: 91 BPM | SYSTOLIC BLOOD PRESSURE: 131 MMHG | WEIGHT: 196.88 LBS

## 2019-08-20 DIAGNOSIS — N83.202 CYST OF LEFT OVARY: ICD-10-CM

## 2019-08-20 DIAGNOSIS — Z98.890 S/P ROBOT-ASSISTED SURGICAL PROCEDURE: Primary | ICD-10-CM

## 2019-08-20 PROCEDURE — 99213 OFFICE O/P EST LOW 20 MIN: CPT | Mod: PBBFAC | Performed by: OBSTETRICS & GYNECOLOGY

## 2019-08-20 PROCEDURE — 99999 PR PBB SHADOW E&M-EST. PATIENT-LVL III: ICD-10-PCS | Mod: PBBFAC,,, | Performed by: OBSTETRICS & GYNECOLOGY

## 2019-08-20 PROCEDURE — 99024 POSTOP FOLLOW-UP VISIT: CPT | Mod: POP,,, | Performed by: OBSTETRICS & GYNECOLOGY

## 2019-08-20 PROCEDURE — 99999 PR PBB SHADOW E&M-EST. PATIENT-LVL III: CPT | Mod: PBBFAC,,, | Performed by: OBSTETRICS & GYNECOLOGY

## 2019-08-20 PROCEDURE — 99024 PR POST-OP FOLLOW-UP VISIT: ICD-10-PCS | Mod: POP,,, | Performed by: OBSTETRICS & GYNECOLOGY

## 2019-08-20 RX ORDER — INSULIN GLARGINE 100 [IU]/ML
55 INJECTION, SOLUTION SUBCUTANEOUS DAILY
Status: ON HOLD | COMMUNITY
Start: 2019-07-24 | End: 2020-09-11 | Stop reason: SDUPTHER

## 2019-08-20 RX ORDER — MEDICAL SUPPLY, MISCELLANEOUS
MISCELLANEOUS MISCELLANEOUS
Status: ON HOLD | COMMUNITY
Start: 2019-07-24 | End: 2020-09-09 | Stop reason: CLARIF

## 2019-08-20 RX ORDER — MEDICAL SUPPLY, MISCELLANEOUS
MISCELLANEOUS MISCELLANEOUS
Status: ON HOLD | COMMUNITY
Start: 2019-08-14 | End: 2020-09-09 | Stop reason: CLARIF

## 2019-08-24 NOTE — PROGRESS NOTES
Subjective:      Patient ID: Jael Hall is a 53 y.o. female.    Chief Complaint: cyct of left ovary      HPI  S/p RTLH/BSO 7/5/19  Uncomplicated post op course.   Final pathology reviewed and benign.      Presents today for follow up post operative visit. Continues to recover appropriately from surgery.     Referral history:  Referred by Estelle So for pelvic mass.      Reports cyst noted on imaging as workup for transplant. She is relatively asymptomatic.     Pelvic US 4/2/19  COMPARISON:  CT abdomen pelvis without contrast 03/14/2019, pelvic ultrasound 03/20/2018.  FINDINGS:  Uterus: Size: 10.6 x 4.9 x 6.3 cm, Exophytic fibroid measuring 3 x 1.6 x 2.6.  Endometrium: Normal in this post menopausal patient, measuring 4 mm.  The right ovary is not visualized.  No abnormality detected at the right adnexal lesion.  Left ovary: Size: 3.8 x 3.2 x 2.6 cm, Redemonstration of a cystic lesion with mural nodularity, measuring 4 x 2.8 x 2.3 cm (previously measured 3.3 x 2.7 x 2.2 cm).      normal 17  Pap 3/2019 negative.   D&C 5/2018 benign pathology.      Prior abdominal surgeries include c/s (midline incision), BTL  Colonoscopy 4/2018     LMP mid 40s. No PMB.     Medical comorbidities include IDDM, HTN, HLD, CKD on dialysis (undergoing work up for possible transplant), NAFLD     Family history significant for sisters (1, alive) and mother (dec, 50s) - ovarian cancer. S - lung cancer (dec). No breast, uterine or colon cancer.  Review of Systems   Constitutional: Negative for appetite change, chills, fatigue and fever.   HENT: Negative for mouth sores.    Respiratory: Negative for cough and shortness of breath.    Cardiovascular: Negative for leg swelling.   Gastrointestinal: Negative for abdominal pain, blood in stool, constipation and diarrhea.   Endocrine: Negative for cold intolerance.   Genitourinary: Negative for dysuria and vaginal bleeding.   Musculoskeletal: Negative for myalgias.   Skin: Negative for  rash.   Allergic/Immunologic: Negative.    Neurological: Negative for weakness and numbness.   Hematological: Negative for adenopathy. Does not bruise/bleed easily.   Psychiatric/Behavioral: Negative for confusion.       Objective:   Physical Exam:   Constitutional: She is oriented to person, place, and time. She appears well-developed and well-nourished.    HENT:   Head: Normocephalic and atraumatic.    Eyes: Pupils are equal, round, and reactive to light. EOM are normal.    Neck: Normal range of motion. Neck supple. No thyromegaly present.    Cardiovascular: Normal rate, regular rhythm and intact distal pulses.     Pulmonary/Chest: Effort normal and breath sounds normal. No respiratory distress. She has no wheezes.        Abdominal: Soft. Bowel sounds are normal. She exhibits no distension, no ascites and no mass. There is no tenderness.     Genitourinary: Rectum normal and vagina normal. Pelvic exam was performed with patient supine. There is no lesion on the right labia. There is no lesion on the left labia. Uterus is absent. There is an absent adnexa. Right adnexum displays no mass. Left adnexum displays no mass. Vaginal cuff normal.Cervix exhibits absence.   Genitourinary Comments: Vaginal cuff healing well.            Musculoskeletal: Normal range of motion and moves all extremeties.      Lymphadenopathy:     She has no cervical adenopathy.        Right: No inguinal and no supraclavicular adenopathy present.        Left: No inguinal and no supraclavicular adenopathy present.    Neurological: She is alert and oriented to person, place, and time.    Skin: Skin is warm and dry. No rash noted.    Psychiatric: She has a normal mood and affect.       Assessment:     1. s/p RA-TLH/BSO    2. Cyst of left ovary        Plan:   No orders of the defined types were placed in this encounter.    Recovering appropriately from surgery.   Fortunately benign pathology.   Will plan to d/c from gyn oncology standpoint.

## 2019-10-09 ENCOUNTER — TELEPHONE (OUTPATIENT)
Dept: GYNECOLOGIC ONCOLOGY | Facility: CLINIC | Age: 54
End: 2019-10-09

## 2019-10-09 NOTE — TELEPHONE ENCOUNTER
Spoke with pt. States that she has some light pink after she urinates. It started last week and it's not present every time she urinates. Advised pt to follow up with her primary care  or [facility]. Pt verbalized understanding and denies any further questions.  ----- Message from Sathya Escobar sent at 10/9/2019 12:16 PM CDT -----  Contact: TANYA VELASQUEZ [[id number]]  Type:  Patient Returning Call    Who Called: TANYA VELASQUEZ [[id number]]    Who Left Message for Patient: Elaine Cox    Does the patient know what this is regarding?: yes    Would the patient rather a call back or a response via [facility]? call    Best Call Back Number: [phone]    Additional Information:

## 2019-10-09 NOTE — TELEPHONE ENCOUNTER
Left message informing pt that she would need to follow up with her primary care  or [facility]ign[facility] regarding these symptoms. Office number left to call back if she has any additional questions.   ----- Message from Sathya Escobar sent at 10/9/2019  9:10 AM CDT -----  Contact: TANYA VELASQUEZ [[id number]]  Name of Who is Calling: TANYA VELASQUEZ [[id number]]      What is the request in detail: Would like to speak with staff in regards to light pink spotting she has been experiencing when urinating. (not all the time though) Please advise      Can the clinic reply by [facility]: no      What Number to Call Back if not in [facility]: [phone]

## 2019-10-18 ENCOUNTER — HOSPITAL ENCOUNTER (EMERGENCY)
Facility: OTHER | Age: 54
Discharge: HOME | End: 2019-10-18
Attending: EMERGENCY MEDICINE
Payer: MEDICARE

## 2019-10-18 VITALS
OXYGEN SATURATION: 97 % | HEART RATE: 91 BPM | HEIGHT: 64 IN | SYSTOLIC BLOOD PRESSURE: 142 MMHG | RESPIRATION RATE: 18 BRPM | WEIGHT: 194 LBS | BODY MASS INDEX: 33.12 KG/M2 | TEMPERATURE: 98 F | DIASTOLIC BLOOD PRESSURE: 65 MMHG

## 2019-10-18 DIAGNOSIS — R31.9 HEMATURIA, UNSPECIFIED TYPE: Primary | ICD-10-CM

## 2019-10-18 DIAGNOSIS — R80.9 PROTEINURIA, UNSPECIFIED TYPE: ICD-10-CM

## 2019-10-18 LAB
BACTERIA #/AREA URNS HPF: ABNORMAL /HPF
BILIRUB UR QL STRIP: NEGATIVE
CLARITY UR: CLEAR
COLOR UR: YELLOW
GLUCOSE UR QL STRIP: NEGATIVE
HGB UR QL STRIP: ABNORMAL
HYALINE CASTS #/AREA URNS LPF: 1 /LPF
KETONES UR QL STRIP: NEGATIVE
LEUKOCYTE ESTERASE UR QL STRIP: NEGATIVE
MICROSCOPIC COMMENT: ABNORMAL
NITRITE UR QL STRIP: NEGATIVE
PH UR STRIP: 8 [PH] (ref 5–8)
PROT UR QL STRIP: ABNORMAL
RBC #/AREA URNS HPF: 4 /HPF (ref 0–4)
SP GR UR STRIP: 1.01 (ref 1–1.03)
SQUAMOUS #/AREA URNS HPF: 30 /HPF
URN SPEC COLLECT METH UR: ABNORMAL
UROBILINOGEN UR STRIP-ACNC: NEGATIVE EU/DL
WBC #/AREA URNS HPF: 1 /HPF (ref 0–5)

## 2019-10-18 PROCEDURE — 81000 URINALYSIS NONAUTO W/SCOPE: CPT | Mod: NTX

## 2019-10-18 PROCEDURE — 99282 EMERGENCY DEPT VISIT SF MDM: CPT | Mod: NTX

## 2019-10-18 NOTE — ED TRIAGE NOTES
Pt to ER with c/o bilateral lower back pain and pink tinged urine x 1 week. PT denies painful urination. No itching Brunings or discomfort. Pt denies weakness, N/V, dizziness, blurred vision, CP, SOB. Pt with Hx of dialysis M/W/F. Pt completed dialysis today PTA.

## 2019-10-18 NOTE — ED PROVIDER NOTES
Encounter Date: 10/18/2019    SCRIBE #1 NOTE: ILi, am scribing for, and in the presence of, Dr. Og.       History     Chief Complaint   Patient presents with    Vaginal Bleeding     Pt reports light vag spotting with low back pain for the past week.      Time seen by provider: 12:01 PM    This is a 54 y.o. female with hx of HTN, DM, and ESRD on dialysis MWF who presents with complaint of hematuria. She notes seeing light pink blood on tissue after urination. She reports bilateral lower back pain. She denies abdominal pain, dysuria, urgency, or frequency. She denies heavy lifting. She denies experiencing similar episode in the past. She is not on blood thinners. She is awaiting follow up appointment at [facility]. She reports PSHx of total hysterectomy. She denies use of tobacco or alcohol.    The history is provided by the patient.     Review of patient's allergies indicates:  No Known Allergies  Past Medical History:   Diagnosis Date    Anemia     Arrhythmia     Bradycardia     Cyst of left ovary 2019    Diabetes mellitus     Dialysis patient         Disorder of kidney and ureter     Hyperlipidemia     Hypertension     Kidney failure     Obesity     Preeclampsia      Past Surgical History:   Procedure Laterality Date    AV FISTULA PLACEMENT      right arm     SECTION      x1    COLONOSCOPY N/A 2018    Procedure: COLONOSCOPY;  Surgeon: Pankaj Hinojosa MD;  Location: [address];  Service: Endoscopy;  Laterality: N/A;  dialysis pt/labs prior to colon/MS    ROBOT-ASSISTED LAPAROSCOPIC ABDOMINAL HYSTERECTOMY USING DA NATALYA XI N/A 2019    Procedure: XI ROBOTIC HYSTERECTOMY;  Surgeon: Trice Lei MD;  Location: [facility] OR;  Service: OB/GYN;  Laterality: N/A;    ROBOT-ASSISTED LAPAROSCOPIC SALPINGO-OOPHORECTOMY USING DA NATALYA XI Bilateral 2019    Procedure: XI ROBOTIC SALPINGO-OOPHORECTOMY;  Surgeon: Trice Lei MD;  Location: [facility]  OR;  Service: OB/GYN;  Laterality: Bilateral;    TUBAL LIGATION      Vascular access surgeries      x5     Family History   Problem Relation Age of Onset    Cancer Mother     Ovarian cancer Mother     Cancer Sister     Ovarian cancer Sister     Lung cancer Sister     Diabetes Brother     Cancer Sister     Ovarian cancer Sister     Diabetes Sister     Breast cancer Neg Hx     Colon cancer Neg Hx     Cervical cancer Neg Hx     Endometrial cancer Neg Hx     Vaginal cancer Neg Hx      Social History     Tobacco Use    Smoking status: Never Smoker    Smokeless tobacco: Never Used   Substance Use Topics    Alcohol use: No    Drug use: No     Review of Systems   Constitutional: Negative for chills and fever.   HENT: Negative for congestion and sore throat.    Eyes: Negative for photophobia and redness.   Respiratory: Negative for cough and shortness of breath.    Cardiovascular: Negative for chest pain.   Gastrointestinal: Negative for abdominal pain, nausea and vomiting.   Genitourinary: Positive for hematuria. Negative for dysuria, frequency and urgency.   Musculoskeletal: Negative for back pain.   Skin: Negative for rash.   Neurological: Negative for weakness, light-headedness and headaches.   Psychiatric/Behavioral: Negative for confusion.       Physical Exam     Initial Vitals [10/18/19 1052]   BP Pulse Resp Temp SpO2   (!) 171/78 99 18 98.1 °F (36.7 °C) --      MAP       --         Physical Exam    Nursing note and vitals reviewed.  Constitutional: She appears well-developed and well-nourished. She is not diaphoretic. No distress.   HENT:   Head: Normocephalic and atraumatic.   Mouth/Throat: Oropharynx is clear and moist.   Eyes: Conjunctivae and EOM are normal. Pupils are equal, round, and reactive to light. No scleral icterus.   Neck: Normal range of motion. Neck supple.   Cardiovascular: Normal rate, regular rhythm, S1 normal, S2 normal and normal heart sounds. Exam reveals no gallop and no  friction rub.    No murmur heard.  Pulmonary/Chest: Breath sounds normal. No respiratory distress. She has no wheezes. She has no rhonchi. She has no rales.   Abdominal: Soft. Bowel sounds are normal. There is no tenderness. There is no rebound and no guarding.   Musculoskeletal: Normal range of motion. She exhibits tenderness. She exhibits no edema.   No flank tenderness. Bilateral paraspinal tenderness to right and left of L4 and L5.   Lymphadenopathy:     She has no cervical adenopathy.   Neurological: She is alert and oriented to person, place, and time.   Skin: Skin is warm and dry. Capillary refill takes less than 2 seconds. No rash noted. No pallor.   Psychiatric: She has a normal mood and affect. Her behavior is normal. Judgment and thought content normal.         ED Course   Procedures  Labs Reviewed   URINALYSIS, REFLEX TO URINE CULTURE          Imaging Results    None          Medical Decision Making:   History:   Old Medical Records: I decided to obtain old medical records.  Clinical Tests:   Lab Tests: Ordered and Reviewed            Scribe Attestation:   Scribe #1: I performed the above scribed service and the documentation accurately describes the services I performed. I attest to the accuracy of the note.    Attending Attestation:           Physician Attestation for Scribe:  Physician Attestation Statement for Scribe #1: I, Dr. Og, reviewed documentation, as scribed by Li Hannon in my presence, and it is both accurate and complete.         Attending ED Notes:   Emergent evaluation a 54-year-old female with complaint of seeing blood on the toilet paper when she wipes after urination.  Patient is afebrile, nontoxic-appearing stable vital signs. Physical exam is benign.  Urinary analysis reveals protein and blood.  Patient has no flank tenderness to palpation.  Abdominal exam is benign.  The patient is extensively counseled her diagnosis and treatment, discharged good condition and directed  follow-up with gynecology and her primary care physician in the next 24-48 hours.             Clinical Impression:   No diagnosis found.                             Keegan Salazar MD  10/18/19 5712

## 2019-10-30 ENCOUNTER — TELEPHONE (OUTPATIENT)
Dept: GYNECOLOGIC ONCOLOGY | Facility: CLINIC | Age: 54
End: 2019-10-30

## 2019-10-30 NOTE — TELEPHONE ENCOUNTER
Left message for patient at 3:58p.m. To contact the office about what's going with her .        Cheyanne Jaramillo MA

## 2019-11-11 ENCOUNTER — TELEPHONE (OUTPATIENT)
Dept: GYNECOLOGIC ONCOLOGY | Facility: CLINIC | Age: 54
End: 2019-11-11

## 2019-11-12 ENCOUNTER — OFFICE VISIT (OUTPATIENT)
Dept: GYNECOLOGIC ONCOLOGY | Facility: CLINIC | Age: 54
End: 2019-11-12
Payer: MEDICARE

## 2019-11-12 VITALS
WEIGHT: 195.13 LBS | DIASTOLIC BLOOD PRESSURE: 75 MMHG | BODY MASS INDEX: 33.31 KG/M2 | SYSTOLIC BLOOD PRESSURE: 170 MMHG | HEIGHT: 64 IN | HEART RATE: 94 BPM

## 2019-11-12 DIAGNOSIS — N93.9 VAGINA BLEEDING: Primary | ICD-10-CM

## 2019-11-12 PROCEDURE — 99214 OFFICE O/P EST MOD 30 MIN: CPT | Mod: PBBFAC,25 | Performed by: OBSTETRICS & GYNECOLOGY

## 2019-11-12 PROCEDURE — 88305 TISSUE EXAM BY PATHOLOGIST: ICD-10-PCS | Mod: 26,,, | Performed by: PATHOLOGY

## 2019-11-12 PROCEDURE — 99999 PR PBB SHADOW E&M-EST. PATIENT-LVL IV: ICD-10-PCS | Mod: PBBFAC,,, | Performed by: OBSTETRICS & GYNECOLOGY

## 2019-11-12 PROCEDURE — 88305 TISSUE EXAM BY PATHOLOGIST: CPT | Mod: 26,,, | Performed by: PATHOLOGY

## 2019-11-12 PROCEDURE — 99214 OFFICE O/P EST MOD 30 MIN: CPT | Mod: S$PBB,,, | Performed by: OBSTETRICS & GYNECOLOGY

## 2019-11-12 PROCEDURE — 99214 PR OFFICE/OUTPT VISIT, EST, LEVL IV, 30-39 MIN: ICD-10-PCS | Mod: S$PBB,,, | Performed by: OBSTETRICS & GYNECOLOGY

## 2019-11-12 PROCEDURE — 99999 PR PBB SHADOW E&M-EST. PATIENT-LVL IV: CPT | Mod: PBBFAC,,, | Performed by: OBSTETRICS & GYNECOLOGY

## 2019-11-12 PROCEDURE — 88305 TISSUE EXAM BY PATHOLOGIST: CPT | Performed by: PATHOLOGY

## 2019-11-12 NOTE — PROGRESS NOTES
Subjective:      Patient ID: Jael Hall is a 54 y.o. female.    Chief Complaint: Cyst of left ovary      HPI  S/p RTLH/BSO 7/5/19  Uncomplicated post op course.   Final pathology reviewed and benign.      Presents today for problem visit. Endorses vaginal bleeding.      Referral history:  Referred by Estelle So for pelvic mass.      Reports cyst noted on imaging as workup for transplant. She is relatively asymptomatic.     Pelvic US 4/2/19  COMPARISON:  CT abdomen pelvis without contrast 03/14/2019, pelvic ultrasound 03/20/2018.  FINDINGS:  Uterus: Size: 10.6 x 4.9 x 6.3 cm, Exophytic fibroid measuring 3 x 1.6 x 2.6.  Endometrium: Normal in this post menopausal patient, measuring 4 mm.  The right ovary is not visualized.  No abnormality detected at the right adnexal lesion.  Left ovary: Size: 3.8 x 3.2 x 2.6 cm, Redemonstration of a cystic lesion with mural nodularity, measuring 4 x 2.8 x 2.3 cm (previously measured 3.3 x 2.7 x 2.2 cm).      normal 17  Pap 3/2019 negative.   D&C 5/2018 benign pathology.      Prior abdominal surgeries include c/s (midline incision), BTL  Colonoscopy 4/2018     LMP mid 40s. No PMB.     Medical comorbidities include IDDM, HTN, HLD, CKD on dialysis (undergoing work up for possible transplant), NAFLD     Family history significant for sisters (1, alive) and mother (dec, 50s) - ovarian cancer. S - lung cancer (dec). No breast, uterine or colon cancer.  Review of Systems   Constitutional: Negative for appetite change, chills, fatigue and fever.   HENT: Negative for mouth sores.    Respiratory: Negative for cough and shortness of breath.    Cardiovascular: Negative for leg swelling.   Gastrointestinal: Negative for abdominal pain, blood in stool, constipation and diarrhea.   Endocrine: Negative for cold intolerance.   Genitourinary: Negative for dysuria and vaginal bleeding.   Musculoskeletal: Negative for myalgias.   Skin: Negative for rash.   Allergic/Immunologic:  Negative.    Neurological: Negative for weakness and numbness.   Hematological: Negative for adenopathy. Does not bruise/bleed easily.   Psychiatric/Behavioral: Negative for confusion.       Objective:   Physical Exam:   Constitutional: She is oriented to person, place, and time. She appears well-developed and well-nourished.    HENT:   Head: Normocephalic and atraumatic.    Eyes: Pupils are equal, round, and reactive to light. EOM are normal.    Neck: Normal range of motion. Neck supple. No thyromegaly present.    Cardiovascular: Normal rate, regular rhythm and intact distal pulses.     Pulmonary/Chest: Effort normal and breath sounds normal. No respiratory distress. She has no wheezes.        Abdominal: Soft. Bowel sounds are normal. She exhibits no distension, no ascites and no mass. There is no tenderness.     Genitourinary: Rectum normal. Pelvic exam was performed with patient supine. There is no lesion on the right labia. There is no lesion on the left labia. Uterus is absent. There is an absent adnexa. Right adnexum displays no mass. Left adnexum displays no mass. There is bleeding in the vagina. Cervix exhibits absence.   Genitourinary Comments: 0.5cm area of granulation appearing tissue at the left vaginal apex. Removed and sent for pathologic evaluation. Silver nitrate applied for cautery.            Musculoskeletal: Normal range of motion and moves all extremeties.      Lymphadenopathy:     She has no cervical adenopathy.        Right: No inguinal and no supraclavicular adenopathy present.        Left: No inguinal and no supraclavicular adenopathy present.    Neurological: She is alert and oriented to person, place, and time.    Skin: Skin is warm and dry. No rash noted.    Psychiatric: She has a normal mood and affect.       Assessment:     1. Vagina bleeding        Plan:   No orders of the defined types were placed in this encounter.    Problem visit today for vaginal bleeding.   Small area of  granulation appearing tissue at the left vaginal apex. Removed and sent to pathology. Cauterized with silver nitrate.   May RTC as needed.

## 2019-11-14 NOTE — PROCEDURES
Biopsy (Gynecological)  Date/Time: 11/12/2019 10:30 AM  Performed by: Trice Lei MD  Authorized by: Trice Lei MD     Consent Done?:  Yes (Verbal)  Local anesthesia used?: No      Biopsy Location:  Vagina  Vagina:     Site:  Left Vaginal Side Wall  Estimated blood loss (cc):  0   Patient tolerated the procedure well with no immediate complications.

## 2019-11-15 LAB
FINAL PATHOLOGIC DIAGNOSIS: NORMAL
GROSS: NORMAL

## 2019-12-03 ENCOUNTER — OFFICE VISIT (OUTPATIENT)
Dept: UROLOGY | Facility: CLINIC | Age: 54
End: 2019-12-03
Payer: MEDICARE

## 2019-12-03 VITALS
SYSTOLIC BLOOD PRESSURE: 155 MMHG | HEART RATE: 90 BPM | WEIGHT: 198.44 LBS | HEIGHT: 64 IN | DIASTOLIC BLOOD PRESSURE: 81 MMHG | BODY MASS INDEX: 33.88 KG/M2

## 2019-12-03 DIAGNOSIS — R31.0 GROSS HEMATURIA: ICD-10-CM

## 2019-12-03 DIAGNOSIS — N18.6 ESRD (END STAGE RENAL DISEASE): Primary | ICD-10-CM

## 2019-12-03 PROCEDURE — 88112 PR  CYTOPATH, CELL ENHANCE TECH: ICD-10-PCS | Mod: 26,NTX,, | Performed by: PATHOLOGY

## 2019-12-03 PROCEDURE — 88112 CYTOPATH CELL ENHANCE TECH: CPT | Mod: TXP | Performed by: PATHOLOGY

## 2019-12-03 PROCEDURE — 99999 PR PBB SHADOW E&M-EST. PATIENT-LVL V: ICD-10-PCS | Mod: PBBFAC,TXP,, | Performed by: UROLOGY

## 2019-12-03 PROCEDURE — 88112 CYTOPATH CELL ENHANCE TECH: CPT | Mod: 26,NTX,, | Performed by: PATHOLOGY

## 2019-12-03 PROCEDURE — 99999 PR PBB SHADOW E&M-EST. PATIENT-LVL V: CPT | Mod: PBBFAC,TXP,, | Performed by: UROLOGY

## 2019-12-03 PROCEDURE — 99204 OFFICE O/P NEW MOD 45 MIN: CPT | Mod: S$PBB,NTX,, | Performed by: UROLOGY

## 2019-12-03 PROCEDURE — 99215 OFFICE O/P EST HI 40 MIN: CPT | Mod: PBBFAC,TXP,25 | Performed by: UROLOGY

## 2019-12-03 PROCEDURE — 99204 PR OFFICE/OUTPT VISIT, NEW, LEVL IV, 45-59 MIN: ICD-10-PCS | Mod: S$PBB,NTX,, | Performed by: UROLOGY

## 2019-12-03 NOTE — PROGRESS NOTES
Subjective:       Patient ID: Jael Hall is a 54 y.o. female.    Chief Complaint: Hematuria    HPI   Jael Hall is a 54 y.o. female who is new to the clinic with a PMHx of DM and ESRD on hemodialysis MWF and presents to the clinic for evaluation of hematuria. Patient explains she has a PSHx a full hysterectomy. She is having gross hematuria and explains she is experiencing discomfort. She denies burning with urination but reports an odor of her urine.      Past Medical History:   Diagnosis Date    Anemia     Arrhythmia     Bradycardia     Cyst of left ovary 2019    Diabetes mellitus     Dialysis patient         Disorder of kidney and ureter     Hyperlipidemia     Hypertension     Kidney failure     Obesity     Preeclampsia        Past Surgical History:   Procedure Laterality Date    AV FISTULA PLACEMENT      right arm     SECTION      x1    COLONOSCOPY N/A 2018    Procedure: COLONOSCOPY;  Surgeon: Pankaj Hinojosa MD;  Location: [address];  Service: Endoscopy;  Laterality: N/A;  dialysis pt/labs prior to colon/MS    ROBOT-ASSISTED LAPAROSCOPIC ABDOMINAL HYSTERECTOMY USING DA NATALYA XI N/A 2019    Procedure: XI ROBOTIC HYSTERECTOMY;  Surgeon: Trice Lei MD;  Location: [facility];  Service: OB/GYN;  Laterality: N/A;    ROBOT-ASSISTED LAPAROSCOPIC SALPINGO-OOPHORECTOMY USING DA NATALYA XI Bilateral 2019    Procedure: XI ROBOTIC SALPINGO-OOPHORECTOMY;  Surgeon: Trice Lei MD;  Location: [facility];  Service: OB/GYN;  Laterality: Bilateral;    TUBAL LIGATION      Vascular access surgeries      x5       Family History   Problem Relation Age of Onset    Cancer Mother     Ovarian cancer Mother     Cancer Sister     Ovarian cancer Sister     Lung cancer Sister     Diabetes Brother     Cancer Sister     Ovarian cancer Sister     Diabetes Sister     Breast cancer Neg Hx     Colon cancer Neg Hx     Cervical cancer Neg Hx      Endometrial cancer Neg Hx     Vaginal cancer Neg Hx        Social History     Socioeconomic History    Marital status: Single     Spouse name: Not on file    Number of children: Not on file    Years of education: Not on file    Highest education level: Not on file   Occupational History    Not on file   Social Needs    Financial resource strain: Not on file    Food insecurity:     Worry: Not on file     Inability: Not on file    Transportation needs:     Medical: Not on file     Non-medical: Not on file   Tobacco Use    Smoking status: Never Smoker    Smokeless tobacco: Never Used   Substance and Sexual Activity    Alcohol use: No    Drug use: No    Sexual activity: Yes     Partners: Male     Birth control/protection: Post-menopausal   Lifestyle    Physical activity:     Days per week: Not on file     Minutes per session: Not on file    Stress: Not on file   Relationships    Social connections:     Talks on phone: Not on file     Gets together: Not on file     Attends Tenriism service: Not on file     Active member of club or organization: Not on file     Attends meetings of clubs or organizations: Not on file     Relationship status: Not on file   Other Topics Concern    Not on file   Social History Narrative    Not on file       Allergies:  Patient has no known allergies.    Medications:    Current Outpatient Medications:     ACCU-CHEK KRISTI PLUS TEST STRP Strp, TEST QID, Disp: , Rfl: 6    ACCU-CHEK SOFTCLIX LANCETS Misc, U QID, Disp: , Rfl: 2    acetaminophen (TYLENOL) 325 MG tablet, Take 2 tablets (650 mg total) by mouth every 6 (six) hours as needed for Pain., Disp: 30 tablet, Rfl: 0    ALCOHOL ANTISEPTIC PADS (SURE COMFORT ALCOHOL PREP PADS TOP), , Disp: , Rfl:     amlodipine (NORVASC) 10 MG tablet, Take 10 mg by mouth once daily., Disp: , Rfl:     aspirin (ECOTRIN) 81 MG EC tablet, Take 81 mg by mouth., Disp: , Rfl:     atorvastatin (LIPITOR) 40 MG tablet, TK 1 T PO QD, Disp: , Rfl:  "1    BD ULTRA-FINE ETTA PEN NEEDLE 32 gauge x 5/32" Ndle, U BID UTD, Disp: , Rfl: 5    blood sugar diagnostic Strp, 1 strip., Disp: , Rfl:     blood-glucose meter (FREESTYLE SYSTEM KIT) kit, Free style flash  jarvis meter. Use as instructed, Disp: , Rfl:     carvedilol (COREG) 3.125 MG tablet, Take 3.125 mg by mouth 2 (two) times daily with meals., Disp: , Rfl:     docusate sodium (COLACE) 100 MG capsule, Take 1 capsule (100 mg total) by mouth 2 (two) times daily., Disp: , Rfl: 0    fish oil-omega-3 fatty acids 300-1,000 mg capsule, Take 1 g by mouth once daily., Disp: , Rfl:     furosemide (LASIX) 40 MG tablet, Take 40 mg by mouth once daily., Disp: , Rfl:     gabapentin (NEURONTIN) 100 MG capsule, Take 100 mg by mouth 3 (three) times daily., Disp: , Rfl:     insulin (LANTUS SOLOSTAR U-100 INSULIN) glargine 100 units/mL (3mL) SubQ pen, Inject 55 Units into the skin., Disp: , Rfl:     insulin aspart U-100 (NOVOLOG FLEXPEN U-100 INSULIN) 100 unit/mL (3 mL) InPn pen, Inject 5 Units into the skin., Disp: , Rfl:     lancets Misc, U QID, Disp: , Rfl:     LANTUS SOLOSTAR 100 unit/mL (3 mL) InPn pen, INJECT 40 UNITS SC QHS WITH DOSE ADJUSTMENT UP OR DOWN BY 2 UNITS Q 3 DAYS. TTD 50 UNITS, Disp: , Rfl: 1    losartan (COZAAR) 25 MG tablet, Take 50 mg by mouth once daily. , Disp: , Rfl:     medical supply, miscellaneous (MISCELLANEOUS MEDICAL SUPPLY MISC), Measure blood sugar three times a day., Disp: , Rfl:     miscellaneous medical supply (C-TUB) Misc, Measure blood sugar three times a day., Disp: , Rfl:     miscellaneous medical supply (C-TUB) Misc, Patient ok to return to work. Avoid strenuous activity., Disp: , Rfl:     miscellaneous medical supply (C-TUB) Misc, Patient to check blood sugar 4 x daily  Dispense sufficient arm patches for 30 day supply, Disp: , Rfl:     miscellaneous medical supply (C-TUB) Misc, Monitor blood sugar continuously, Disp: , Rfl:     pen needle, diabetic (SURE COMFORT PEN " "NEEDLE) 31 gauge x 3/16" Ndle, U BID UTD, Disp: , Rfl:     SURE COMFORT PEN NEEDLE 30 gauge x 5/16" Ndle, U BID, Disp: , Rfl: 5    TRUEPLUS LANCETS 30 gauge Misc, U QID TO CHECK BLOOD SUGAR, Disp: , Rfl: 5    calcium acetate (PHOSLO) 667 mg capsule, Take 667 mg by mouth 3 (three) times daily with meals. , Disp: , Rfl:     NOVOLOG FLEXPEN 100 unit/mL InPn pen, INJECT 5 UNITS SC D WITH DINNER, Disp: , Rfl: 1    oxyCODONE-acetaminophen (PERCOCET) 5-325 mg per tablet, Take 1 tablet by mouth every 4 (four) hours as needed for Pain. (Patient not taking: Reported on 12/3/2019), Disp: 20 tablet, Rfl: 0    sodium bicarbonate 650 MG tablet, Take 650 mg by mouth., Disp: , Rfl:     Review of Systems   Constitutional: Negative for activity change, appetite change, chills, diaphoresis, fatigue, fever and unexpected weight change.   HENT: Negative for congestion, dental problem, drooling, ear discharge, ear pain, facial swelling, hearing loss, mouth sores, nosebleeds, postnasal drip, rhinorrhea, sinus pressure, sinus pain, sneezing, sore throat, tinnitus, trouble swallowing and voice change.    Eyes: Negative for pain, discharge and itching.   Respiratory: Negative for apnea, cough, choking, chest tightness, shortness of breath and wheezing.    Cardiovascular: Negative for chest pain, palpitations and leg swelling.   Gastrointestinal: Negative for abdominal distention, abdominal pain, anal bleeding, blood in stool, constipation, diarrhea, nausea, rectal pain and vomiting.   Endocrine: Negative for polydipsia and polyuria.   Genitourinary: Positive for hematuria. Negative for decreased urine volume, difficulty urinating, dyspareunia, dysuria, enuresis, flank pain, frequency, genital sores, pelvic pain and urgency.   Musculoskeletal: Negative for arthralgias and back pain.   Skin: Negative for color change, rash and wound.   Neurological: Negative for dizziness, syncope, speech difficulty, light-headedness and headaches. "   Hematological: Negative for adenopathy. Does not bruise/bleed easily.   Psychiatric/Behavioral: Negative for behavioral problems, confusion and sleep disturbance.       Objective:      Physical Exam   Constitutional: She appears well-developed.   HENT:   Head: Normocephalic.   Cardiovascular: Normal rate.    Pulmonary/Chest: Effort normal.   Abdominal: Soft.   Genitourinary:   Genitourinary Comments: Urine dip shows +1 leukocytes, +30 protein, +50 glucose, and trace blood   Musculoskeletal: Normal range of motion.   Neurological: She is alert.   Skin: Skin is warm.         Assessment:       No diagnosis found.    Plan:       There are no diagnoses linked to this encounter.      Send urine for culture and urine cytology.  Schedule CT Urogram and Cystoscopy.    I, Key Christopher, am acting as a scribe on this patient encounter in the presence and under the supervision of Dr. Amanda.    12/03/2019 10:17 AM    I, Dr. Amanda, personally performed the services described in this documentation.   All medical record entries made by the scribe were at my direction and in my presence.   I have reviewed the chart and agree that the record is accurate and complete.   Serg Amanda MD.  10:17 AM 12/03/2019

## 2019-12-05 LAB — FINAL PATHOLOGIC DIAGNOSIS: NORMAL

## 2019-12-06 ENCOUNTER — HOSPITAL ENCOUNTER (OUTPATIENT)
Dept: RADIOLOGY | Facility: HOSPITAL | Age: 54
Discharge: HOME | End: 2019-12-06
Attending: UROLOGY
Payer: MEDICARE

## 2019-12-06 DIAGNOSIS — R31.0 GROSS HEMATURIA: ICD-10-CM

## 2019-12-06 PROCEDURE — 25500020 PHARM REV CODE 255: Mod: TXP | Performed by: UROLOGY

## 2019-12-06 PROCEDURE — 74178 CT ABD&PLV WO CNTR FLWD CNTR: CPT | Mod: TC,TXP

## 2019-12-06 PROCEDURE — 74178 CT ABD&PLV WO CNTR FLWD CNTR: CPT | Mod: 26,NTX,, | Performed by: RADIOLOGY

## 2019-12-06 PROCEDURE — 74178 CT UROGRAM ABD PELVIS W WO: ICD-10-PCS | Mod: 26,NTX,, | Performed by: RADIOLOGY

## 2019-12-06 RX ADMIN — IOHEXOL 125 ML: 350 INJECTION, SOLUTION INTRAVENOUS at 02:12

## 2019-12-10 ENCOUNTER — HOSPITAL ENCOUNTER (OUTPATIENT)
Dept: UROLOGY | Facility: HOSPITAL | Age: 54
Discharge: HOME | End: 2019-12-10
Attending: UROLOGY
Payer: MEDICARE

## 2019-12-10 VITALS
BODY MASS INDEX: 34.44 KG/M2 | DIASTOLIC BLOOD PRESSURE: 87 MMHG | WEIGHT: 201.75 LBS | TEMPERATURE: 99 F | HEART RATE: 83 BPM | HEIGHT: 64 IN | SYSTOLIC BLOOD PRESSURE: 166 MMHG | RESPIRATION RATE: 18 BRPM

## 2019-12-10 DIAGNOSIS — R31.0 GROSS HEMATURIA: ICD-10-CM

## 2019-12-10 PROCEDURE — 52000 CYSTOURETHROSCOPY: CPT | Mod: TXP

## 2019-12-10 RX ORDER — SULFAMETHOXAZOLE AND TRIMETHOPRIM 800; 160 MG/1; MG/1
1 TABLET ORAL 2 TIMES DAILY
Qty: 60 TABLET | Refills: 2 | Status: SHIPPED | OUTPATIENT
Start: 2019-12-10 | End: 2020-01-09

## 2019-12-10 RX ORDER — LIDOCAINE HYDROCHLORIDE 20 MG/ML
JELLY TOPICAL ONCE
Status: COMPLETED | OUTPATIENT
Start: 2019-12-10 | End: 2019-12-10

## 2019-12-10 RX ORDER — AMOXICILLIN AND CLAVULANATE POTASSIUM 875; 125 MG/1; MG/1
1 TABLET, FILM COATED ORAL EVERY 12 HOURS
Qty: 60 TABLET | Refills: 1 | Status: SHIPPED | OUTPATIENT
Start: 2019-12-10 | End: 2020-08-11

## 2019-12-10 RX ORDER — CIPROFLOXACIN 500 MG/1
500 TABLET ORAL ONCE
Status: COMPLETED | OUTPATIENT
Start: 2019-12-10 | End: 2019-12-10

## 2019-12-10 RX ADMIN — LIDOCAINE HYDROCHLORIDE: 20 JELLY TOPICAL at 02:12

## 2019-12-10 RX ADMIN — CIPROFLOXACIN 500 MG: 500 TABLET ORAL at 02:12

## 2019-12-10 NOTE — PATIENT INSTRUCTIONS
What to Expect After a Cystoscopy  For the next 24-48 hours, you may feel a mild burning when you urinate. This burning is normal and expected. Drink plenty of water to dilute the urine to help relieve the burning sensation. You may also see a small amount of blood in your urine after the procedure.    Unless you are already taking antibiotics, you may be given an antibiotic after the test to prevent infection.    Signs and Symptoms to Report  Call the [facility] at [phone] if you develop any of the following:  · Fever of 101 degrees or higher  · Chills or persistent bleeding  · Inability to urinate

## 2019-12-10 NOTE — PROCEDURES
Preop diagnosis gross hematuria  Postop diagnosis same plus cystitis cystica  Surgeon Treasure  Procedure flexible cystoscopy  Patient was prepped and draped in the dorsal lithotomy position.  Xylocaine jelly was instilled per urethra.  The urethra was normal.  The bladder was inspected and there was numerous cysts indicative of cystitis cystica.  There were no stones tumors foreign bodies or active infections noted.  Impression cystitis cystica  Recommendations Augmentin 875 mg p.o. b.i.d. times 1-2 months and return to clinic in 2 months for urinalysis  L Treasure

## 2020-01-01 NOTE — PROGRESS NOTES
HISTORY OF PRESENT ILLNESS:    Jael Hall is a 52 y.o. female  No LMP recorded. Patient is postmenopausal. presents today complaining of vaginal discharge.   -c/o fishy yellow vaginal discharge not associated with fever, pelvic pain, itching, UTI sx or AUB.   -Admits to use of deodorant perfumed soaps but symptoms started after intercourse w/o condom use and requests STD screening.   -Has US scheduled for 3-20-18.     Past Medical History:   Diagnosis Date    Anemia     Arrhythmia     Bradycardia     Disorder of kidney and ureter     Hyperlipidemia     Hypertension     Obesity     Preeclampsia        Past Surgical History:   Procedure Laterality Date     SECTION      x1    TUBAL LIGATION      Vascular access surgeries      x5       MEDICATIONS AND ALLERGIES:      Current Outpatient Prescriptions:     ACCU-CHEK KRISTI PLUS TEST STRP Strp, TEST QID, Disp: , Rfl: 6    ACCU-CHEK SOFTCLIX LANCETS Misc, U QID, Disp: , Rfl: 2    ALCOHOL ANTISEPTIC PADS (SURE COMFORT ALCOHOL PREP PADS TOP), , Disp: , Rfl:     amlodipine (NORVASC) 10 MG tablet, Take 10 mg by mouth once daily., Disp: , Rfl:     aspirin (ECOTRIN) 81 MG EC tablet, Take 81 mg by mouth once daily., Disp: , Rfl:     carvedilol (COREG) 3.125 MG tablet, Take 3.125 mg by mouth 2 (two) times daily with meals., Disp: , Rfl:     fish oil-omega-3 fatty acids 300-1,000 mg capsule, Take 1 g by mouth once daily., Disp: , Rfl:     furosemide (LASIX) 40 MG tablet, Take 40 mg by mouth once daily., Disp: , Rfl:     gabapentin (NEURONTIN) 100 MG capsule, Take 100 mg by mouth 3 (three) times daily., Disp: , Rfl:     hydrocodone-acetaminophen 10-325mg (NORCO)  mg Tab, , Disp: , Rfl:     LANTUS SOLOSTAR 100 unit/mL (3 mL) InPn pen, INJECT 40 UNITS SC QHS WITH DOSE ADJUSTMENT UP OR DOWN BY 2 UNITS Q 3 DAYS. TTD 50 UNITS, Disp: , Rfl: 1    lisinopril 10 MG tablet, Take 10 mg by mouth once daily., Disp: , Rfl:     metroNIDAZOLE (FLAGYL)  "500 MG tablet, Take 1 tablet (500 mg total) by mouth 2 (two) times daily., Disp: 14 tablet, Rfl: 1    NOVOLOG FLEXPEN 100 unit/mL InPn pen, INJECT 5 UNITS SC D WITH DINNER, Disp: , Rfl: 1    SURE COMFORT PEN NEEDLE 30 gauge x 5/16" Ndle, U BID, Disp: , Rfl: 5    TRUEPLUS LANCETS 30 gauge Misc, U QID TO CHECK BLOOD SUGAR, Disp: , Rfl: 5    Review of patient's allergies indicates:  No Known Allergies    OB HISTORY: Number of SAB:1 Number of vaginal deliveries:1 Number of C/S: 1, Pre-eclampsia     COMPREHENSIVE GYN HISTORY:  PAP History:  Denies abnormal Paps. LAST PAP 1-25-18 NORMAL.  Infection History: Denies STDs. Denies PID.  Benign History: Reports uterine fibroids. Denies ovarian cysts. Denies endometriosis.  Denies other conditions.  Cancer History: Denies cervical cancer. Denies uterine cancer or hyperplasia. Denies ovarian cancer. Denies vulvar cancer or pre-cancer. Denies vaginal cancer or pre-cancer. Denies breast cancer. Denies colon cancer.  Sexual Activity History: Denies currently being sexually active  Menstrual History: Denies menses. Pt is  (age 50) not on HRT.     ROS:  GENERAL: No fever or chills.  ABDOMEN: No pain. No nausea. No vomiting. No diarrhea. No constipation.  REPRODUCTIVE: No abnormal bleeding.   VULVA: No pain. No lesions. No itching.  VAGINA: No relaxation. No itching. + ODOR and D/C. No lesions.  URINARY: No incontinence. No nocturia. No frequency. No dysuria.    PE:  APPEARANCE: Well nourished, well developed, in no acute distress.  AFFECT: WNL, alert and oriented x 3.  PELVIC: External female genitalia COATED WITH DISCHARGE. Normal hair distribution. Adequate perineal body, normal urethral meatus. VAGINA with COPIOUS BLOODY YELLOW BROWN D/C WITH ODOR present.  CERVIX STENOTIC without lesions, discharge or tenderness. No significant cystocele or rectocele. Bimanual exam shows uterus to be ? 10 week size, regular, mobile and nontender. Adnexa without masses or " tenderness.    DIAGNOSIS:  1. Vaginal discharge    2. Screening for STDs (sexually transmitted diseases)        PLAN:    Orders Placed This Encounter    Vaginosis Screen by DNA Probe    C. trachomatis/N. gonorrhoeae by AMP DNA Cervix    metroNIDAZOLE (FLAGYL) 500 MG tablet   Declined other STD tests    COUNSELING:  The patient was counseled today on:  -Vaginitis prevention including :  a. avoiding feminine products such as deoderant soaps, body wash, bubble bath, douches, scented toilet paper, deoderant tampons or pads, baby or feminine wipes, chronic pad use, etc. and       b. avoiding other vulvovaginal irritants such as long hot baths, humidity, tight, synthetic clothing, chlorine and sitting around in wet bathing suits and   c. wearing cotton underwear, avoiding thong underwear and no underwear to bed and      d. taking showers instead of baths and use a hair dryer on cool setting afterwards to dry and  e.wearing cotton to exercise and shower immediately after exercise and change clothes and  f. using polyurethane condoms without spermicide if sexually active and symptoms are triggered by intercourse.  -STDs and preventino;  -Flagyl use and potential side effects;  -pelvic rest until culture results known.     FOLLOW-UP with me pending test results.     initial  screen

## 2020-01-09 DIAGNOSIS — Z76.82 ORGAN TRANSPLANT CANDIDATE: Primary | ICD-10-CM

## 2020-01-10 ENCOUNTER — TELEPHONE (OUTPATIENT)
Dept: TRANSPLANT | Facility: CLINIC | Age: 55
End: 2020-01-10

## 2020-03-10 ENCOUNTER — OFFICE VISIT (OUTPATIENT)
Dept: OBSTETRICS AND GYNECOLOGY | Facility: CLINIC | Age: 55
End: 2020-03-10
Payer: MEDICARE

## 2020-03-10 VITALS
WEIGHT: 196 LBS | DIASTOLIC BLOOD PRESSURE: 90 MMHG | BODY MASS INDEX: 33.46 KG/M2 | SYSTOLIC BLOOD PRESSURE: 140 MMHG | HEIGHT: 64 IN

## 2020-03-10 DIAGNOSIS — Z12.31 ENCOUNTER FOR SCREENING MAMMOGRAM FOR MALIGNANT NEOPLASM OF BREAST: ICD-10-CM

## 2020-03-10 DIAGNOSIS — Z01.419 VISIT FOR GYNECOLOGIC EXAMINATION: Primary | ICD-10-CM

## 2020-03-10 DIAGNOSIS — N95.9 MENOPAUSAL AND PERIMENOPAUSAL DISORDER: ICD-10-CM

## 2020-03-10 DIAGNOSIS — Z12.39 BREAST CANCER SCREENING: ICD-10-CM

## 2020-03-10 PROCEDURE — 99214 OFFICE O/P EST MOD 30 MIN: CPT | Mod: PBBFAC,25 | Performed by: OBSTETRICS & GYNECOLOGY

## 2020-03-10 PROCEDURE — 99999 PR PBB SHADOW E&M-EST. PATIENT-LVL IV: CPT | Mod: PBBFAC,,, | Performed by: OBSTETRICS & GYNECOLOGY

## 2020-03-10 PROCEDURE — G0101 PR CA SCREEN;PELVIC/BREAST EXAM: ICD-10-PCS | Mod: S$PBB,,, | Performed by: OBSTETRICS & GYNECOLOGY

## 2020-03-10 PROCEDURE — G0101 CA SCREEN;PELVIC/BREAST EXAM: HCPCS | Mod: S$PBB,,, | Performed by: OBSTETRICS & GYNECOLOGY

## 2020-03-10 PROCEDURE — G0101 CA SCREEN;PELVIC/BREAST EXAM: HCPCS | Mod: PBBFAC | Performed by: OBSTETRICS & GYNECOLOGY

## 2020-03-10 PROCEDURE — 99999 PR PBB SHADOW E&M-EST. PATIENT-LVL IV: ICD-10-PCS | Mod: PBBFAC,,, | Performed by: OBSTETRICS & GYNECOLOGY

## 2020-03-10 NOTE — PROGRESS NOTES
HISTORY OF PRESENT ILLNESS:    Jael Hall is a 54 y.o. female,   presents today for her routine visit and has no complaints.  MAMMO DUE April, ORDERS PLACED.  NO GYN C/O.  NO FURTHER PVB AND CUFF WELL-HEALED.  OK F/U EVERY 3-5 YEARS UNLESS ISSUES.  STILL WAITING FOR A KIDNEY    LAST HOOD POSTOP  ROBOTIC TLH BSO:  FINAL PATHOLOGIC DIAGNOSIS  Uterus with cervix and bilateral fallopian tubes and ovaries, hysterectomy and bilateral salpingo oophorectomy:  - Benign leiomyoma, 2.5 cm in greatest dimension macroscopically.  - Adenomyosis with focal cystic dilatation.  - Ectocervix with no significant histologic abnormality.  - Endocervix with benign endocervical polyp.  - Benign fallopian tube, bilateral, with left paratubal cyst.  - Benign ovary with no significant histologic abnormality, bilateral.  - Negative for hyperplasia, atypia, and malignancy.    Past Medical History:   Diagnosis Date    Anemia     Arrhythmia     Bradycardia     Cyst of left ovary 2019    Diabetes mellitus     Dialysis patient         Disorder of kidney and ureter     Hyperlipidemia     Hypertension     Kidney failure     Obesity     Preeclampsia        Past Surgical History:   Procedure Laterality Date    AV FISTULA PLACEMENT      right arm     SECTION      x1    COLONOSCOPY N/A 2018    Procedure: COLONOSCOPY;  Surgeon: Pankaj Hinojosa MD;  Location: [address]);  Service: Endoscopy;  Laterality: N/A;  dialysis pt/labs prior to colon/MS    ROBOT-ASSISTED LAPAROSCOPIC ABDOMINAL HYSTERECTOMY USING DA NATALYA XI N/A 2019    Procedure: XI ROBOTIC HYSTERECTOMY;  Surgeon: Trice Lei MD;  Location: [facility] OR;  Service: OB/GYN;  Laterality: N/A;    ROBOT-ASSISTED LAPAROSCOPIC SALPINGO-OOPHORECTOMY USING DA NATALYA XI Bilateral 2019    Procedure: XI ROBOTIC SALPINGO-OOPHORECTOMY;  Surgeon: Trice Lei MD;  Location: [facility] OR;  Service: OB/GYN;  Laterality:  "Bilateral;    TUBAL LIGATION      Vascular access surgeries      x5       MEDICATIONS AND ALLERGIES:      Current Outpatient Medications:     ACCU-CHEK KRISTI PLUS TEST STRP Strp, TEST QID, Disp: , Rfl: 6    ACCU-CHEK SOFTCLIX LANCETS Misc, U QID, Disp: , Rfl: 2    acetaminophen (TYLENOL) 325 MG tablet, Take 2 tablets (650 mg total) by mouth every 6 (six) hours as needed for Pain., Disp: 30 tablet, Rfl: 0    ALCOHOL ANTISEPTIC PADS (SURE COMFORT ALCOHOL PREP PADS TOP), , Disp: , Rfl:     amlodipine (NORVASC) 10 MG tablet, Take 10 mg by mouth once daily., Disp: , Rfl:     amoxicillin-clavulanate 875-125mg (AUGMENTIN) 875-125 mg per tablet, Take 1 tablet by mouth every 12 (twelve) hours., Disp: 60 tablet, Rfl: 1    aspirin (ECOTRIN) 81 MG EC tablet, Take 81 mg by mouth., Disp: , Rfl:     atorvastatin (LIPITOR) 40 MG tablet, TK 1 T PO QD, Disp: , Rfl: 1    BD ULTRA-FINE ETTA PEN NEEDLE 32 gauge x 5/32" Ndle, U BID UTD, Disp: , Rfl: 5    blood sugar diagnostic Strp, 1 strip., Disp: , Rfl:     blood-glucose meter (FREESTYLE SYSTEM KIT) kit, Free style flash  jarvis meter. Use as instructed, Disp: , Rfl:     calcium acetate (PHOSLO) 667 mg capsule, Take 667 mg by mouth 3 (three) times daily with meals. , Disp: , Rfl:     carvedilol (COREG) 3.125 MG tablet, Take 3.125 mg by mouth 2 (two) times daily with meals., Disp: , Rfl:     docusate sodium (COLACE) 100 MG capsule, Take 1 capsule (100 mg total) by mouth 2 (two) times daily., Disp: , Rfl: 0    fish oil-omega-3 fatty acids 300-1,000 mg capsule, Take 1 g by mouth once daily., Disp: , Rfl:     furosemide (LASIX) 40 MG tablet, Take 40 mg by mouth once daily., Disp: , Rfl:     gabapentin (NEURONTIN) 100 MG capsule, Take 100 mg by mouth 3 (three) times daily., Disp: , Rfl:     insulin (LANTUS SOLOSTAR U-100 INSULIN) glargine 100 units/mL (3mL) SubQ pen, Inject 55 Units into the skin., Disp: , Rfl:     insulin aspart U-100 (NOVOLOG FLEXPEN U-100 INSULIN) 100 " "unit/mL (3 mL) InPn pen, Inject 5 Units into the skin., Disp: , Rfl:     lancets Misc, U QID, Disp: , Rfl:     LANTUS SOLOSTAR 100 unit/mL (3 mL) InPn pen, INJECT 40 UNITS SC QHS WITH DOSE ADJUSTMENT UP OR DOWN BY 2 UNITS Q 3 DAYS. TTD 50 UNITS, Disp: , Rfl: 1    losartan (COZAAR) 25 MG tablet, Take 50 mg by mouth once daily. , Disp: , Rfl:     medical supply, miscellaneous (MISCELLANEOUS MEDICAL SUPPLY MISC), Measure blood sugar three times a day., Disp: , Rfl:     miscellaneous medical supply (C-TUB) Misc, Measure blood sugar three times a day., Disp: , Rfl:     miscellaneous medical supply (C-TUB) Misc, Patient ok to return to work. Avoid strenuous activity., Disp: , Rfl:     miscellaneous medical supply (C-TUB) Misc, Patient to check blood sugar 4 x daily  Dispense sufficient arm patches for 30 day supply, Disp: , Rfl:     miscellaneous medical supply (C-TUB) Misc, Monitor blood sugar continuously, Disp: , Rfl:     NOVOLOG FLEXPEN 100 unit/mL InPn pen, INJECT 5 UNITS SC D WITH DINNER, Disp: , Rfl: 1    oxyCODONE-acetaminophen (PERCOCET) 5-325 mg per tablet, Take 1 tablet by mouth every 4 (four) hours as needed for Pain., Disp: 20 tablet, Rfl: 0    pen needle, diabetic (SURE COMFORT PEN NEEDLE) 31 gauge x 3/16" Ndle, U BID UTD, Disp: , Rfl:     sodium bicarbonate 650 MG tablet, Take 650 mg by mouth., Disp: , Rfl:     SURE COMFORT PEN NEEDLE 30 gauge x 5/16" Ndle, U BID, Disp: , Rfl: 5    TRUEPLUS LANCETS 30 gauge Misc, U QID TO CHECK BLOOD SUGAR, Disp: , Rfl: 5    Review of patient's allergies indicates:  No Known Allergies    Family History   Problem Relation Age of Onset    Cancer Mother     Ovarian cancer Mother     Cancer Sister     Ovarian cancer Sister     Lung cancer Sister     Diabetes Brother     Cancer Sister     Ovarian cancer Sister     Diabetes Sister     Breast cancer Neg Hx     Colon cancer Neg Hx     Cervical cancer Neg Hx     Endometrial cancer Neg Hx     Vaginal " cancer Neg Hx        Social History     Socioeconomic History    Marital status: Single     Spouse name: Not on file    Number of children: Not on file    Years of education: Not on file    Highest education level: Not on file   Occupational History    Not on file   Social Needs    Financial resource strain: Not on file    Food insecurity:     Worry: Not on file     Inability: Not on file    Transportation needs:     Medical: Not on file     Non-medical: Not on file   Tobacco Use    Smoking status: Never Smoker    Smokeless tobacco: Never Used   Substance and Sexual Activity    Alcohol use: No    Drug use: No    Sexual activity: Yes     Partners: Male     Birth control/protection: Post-menopausal   Lifestyle    Physical activity:     Days per week: Not on file     Minutes per session: Not on file    Stress: Not on file   Relationships    Social connections:     Talks on phone: Not on file     Gets together: Not on file     Attends Taoist service: Not on file     Active member of club or organization: Not on file     Attends meetings of clubs or organizations: Not on file     Relationship status: Not on file   Other Topics Concern    Not on file   Social History Narrative    Not on file       COMPREHENSIVE GYN HISTORY:  PAP History: Denies abnormal Paps except as noted above.  Infection History: Denies STDs. Denies PID.  Benign History: Denies uterine fibroids. Denies ovarian cysts. Denies endometriosis. Denies other conditions.  Cancer History: Denies cervical cancer. Denies uterine cancer or hyperplasia. Denies ovarian cancer. Denies vulvar cancer or pre-cancer. Denies vaginal cancer or pre-cancer. Denies breast cancer. Denies colon cancer.  Menstrual History: Denies menses.     ROS:  GENERAL: No weight changes. No swelling. No fatigue. No fever.  CARDIOVASCULAR: No chest pain. No shortness of breath. No leg cramps.   NEUROLOGICAL: No headaches. No vision changes.  BREASTS: No pain. No lumps.  "No discharge.  ABDOMEN: No pain. No nausea. No vomiting. No diarrhea. No constipation.  REPRODUCTIVE: No abnormal bleeding.  VULVA: No pain. No lesions. No itching.  VAGINA: No relaxation. No itching. No odor. No discharge. No lesions.  URINARY: No incontinence. No nocturia. No frequency. No dysuria.    BP (!) 140/90   Ht 5' 4" (1.626 m)   Wt 88.9 kg (195 lb 15.8 oz)   LMP 03/15/2016 (LMP Unknown)   BMI 33.64 kg/m²     PE:  APPEARANCE: Well nourished, well developed, in no acute distress.  AFFECT: WNL, alert and oriented x 3.  SKIN: No acne or hirsutism.  NECK: Neck symmetric without masses or thyromegaly.  NODES: No inguinal, cervical, axillary or femoral lymph node enlargement.  CHEST: Good respiratory effort.   ABDOMEN: Soft. No tenderness or masses. No hepatosplenomegaly. No hernias.  BREASTS: Symmetrical, no skin changes or visible lesions. No palpable masses, nipple discharge bilaterally.  PELVIC: ATROPHIC EXTERNAL FEMALE GENITALIA without lesions. Normal hair distribution. Adequate perineal body, normal urethral meatus. VAGINA DRY without lesions or discharge. No significant cystocele or rectocele. Bimanual exam shows CERVIX and UTERUS to be SURGICALLY ABSENT. Adnexa without masses or tenderness.  EXTREMITIES: No edema.    DIAGNOSIS:  1. Visit for gynecologic examination     2. Menopausal and perimenopausal disorder     3. Breast cancer screening  Mammo Digital Screening Bilat w/ Jon   4. Encounter for screening mammogram for malignant neoplasm of breast   Mammo Digital Screening Bilat w/ Jon       LABS AND TESTS ORDERED:  Mammogram    MEDICATIONS PRESCRIBED:    COUNSELING:  The patient was counseled today on osteoporosis prevention, calcium supplementation, and regular weight bearing exercise. The patient was also counseled today on ACS PAP guidelines, with recommendations for yearly pelvic exams unless their uterus, cervix, and ovaries were removed for benign reasons; in that case, examinations every " 3-5 years are recommended.  The patient was also counseled regarding monthly breast self-examination, routine STD screening for at-risk populations, prophylactic immunizations for transmitted infections such as  HPV, Pertussis, or Influenza as appropriate, and yearly mammograms when indicated by ACS guidelines.  She was advised to see her primary care physician for all other health maintenance.  FOLLOW-UP with me for next routine visit.

## 2020-04-01 ENCOUNTER — OFFICE VISIT (OUTPATIENT)
Dept: URGENT CARE | Facility: CLINIC | Age: 55
End: 2020-04-01
Payer: MEDICARE

## 2020-04-01 VITALS
HEIGHT: 64 IN | SYSTOLIC BLOOD PRESSURE: 138 MMHG | TEMPERATURE: 101 F | RESPIRATION RATE: 20 BRPM | DIASTOLIC BLOOD PRESSURE: 72 MMHG | WEIGHT: 196 LBS | HEART RATE: 60 BPM | OXYGEN SATURATION: 96 % | BODY MASS INDEX: 33.46 KG/M2

## 2020-04-01 DIAGNOSIS — J18.9 PNEUMONIA OF LEFT LUNG DUE TO INFECTIOUS ORGANISM, UNSPECIFIED PART OF LUNG: ICD-10-CM

## 2020-04-01 DIAGNOSIS — R50.9 FEVER, UNSPECIFIED FEVER CAUSE: ICD-10-CM

## 2020-04-01 DIAGNOSIS — Z20.822 SUSPECTED COVID-19 VIRUS INFECTION: Primary | ICD-10-CM

## 2020-04-01 LAB
BILIRUB UR QL STRIP: NEGATIVE
GLUCOSE UR QL STRIP: NEGATIVE
KETONES UR QL STRIP: NEGATIVE
LEUKOCYTE ESTERASE UR QL STRIP: NEGATIVE
PH, POC UA: 8 (ref 5–8)
POC BLOOD, URINE: NEGATIVE
POC NITRATES, URINE: NEGATIVE
PROT UR QL STRIP: POSITIVE
SP GR UR STRIP: 1.01 (ref 1–1.03)
UROBILINOGEN UR STRIP-ACNC: ABNORMAL (ref 0.1–1.1)

## 2020-04-01 PROCEDURE — 71046 X-RAY EXAM CHEST 2 VIEWS: CPT | Mod: NTX,S$GLB,, | Performed by: RADIOLOGY

## 2020-04-01 PROCEDURE — 81003 POCT URINALYSIS, DIPSTICK, AUTOMATED, W/O SCOPE: ICD-10-PCS | Mod: QW,NTX,S$GLB, | Performed by: PHYSICIAN ASSISTANT

## 2020-04-01 PROCEDURE — U0002 COVID-19 LAB TEST NON-CDC: HCPCS | Mod: NTX

## 2020-04-01 PROCEDURE — 99214 PR OFFICE/OUTPT VISIT, EST, LEVL IV, 30-39 MIN: ICD-10-PCS | Mod: 25,NTX,S$GLB, | Performed by: PHYSICIAN ASSISTANT

## 2020-04-01 PROCEDURE — 71046 XR CHEST PA AND LATERAL: ICD-10-PCS | Mod: NTX,S$GLB,, | Performed by: RADIOLOGY

## 2020-04-01 PROCEDURE — 81003 URINALYSIS AUTO W/O SCOPE: CPT | Mod: QW,NTX,S$GLB, | Performed by: PHYSICIAN ASSISTANT

## 2020-04-01 PROCEDURE — 99214 OFFICE O/P EST MOD 30 MIN: CPT | Mod: 25,NTX,S$GLB, | Performed by: PHYSICIAN ASSISTANT

## 2020-04-01 RX ORDER — DOXYCYCLINE HYCLATE 100 MG
100 TABLET ORAL 2 TIMES DAILY
Qty: 14 TABLET | Refills: 0 | Status: SHIPPED | OUTPATIENT
Start: 2020-04-01 | End: 2020-04-08

## 2020-04-01 RX ORDER — ACETAMINOPHEN 325 MG/1
650 TABLET ORAL
Status: COMPLETED | OUTPATIENT
Start: 2020-04-01 | End: 2020-04-01

## 2020-04-01 RX ADMIN — ACETAMINOPHEN 650 MG: 325 TABLET ORAL at 08:04

## 2020-04-01 NOTE — PROGRESS NOTES
"Subjective:       Patient ID: Jael Hall is a 54 y.o. female.    Vitals:  height is 5' 4" (1.626 m) and weight is 88.9 kg (195 lb 15.8 oz). Her oral temperature is 101 °F (38.3 °C) (abnormal). Her blood pressure is 138/72 and her pulse is 60. Her respiration is 20 and oxygen saturation is 96%.     Chief Complaint: Fever ([id number])    This is a 54 yr old female with hx of HTN, ESRD on dialysis, DM II, NAFLD who presents c/o fever onset yesterday. Did not take temp at home. 101 at this time with no antipyretics taken today. Reports cough since yesterday but denies any SOB, chest pain or chest tightness. Denies sinus congestion or pressure. Reports low mid back pain since yesterday as well. Denies any dysuria, hematuria, frequency, urgency.    Fever    This is a new problem. The current episode started yesterday. The problem has been unchanged. The maximum temperature noted was 101 to 101.9 F. The temperature was taken using an oral thermometer. Associated symptoms include coughing, headaches and muscle aches. Pertinent negatives include no abdominal pain, chest pain, congestion, diarrhea, ear pain, nausea, rash, sleepiness, sore throat, urinary pain, vomiting or wheezing. She has tried acetaminophen for the symptoms. The treatment provided mild relief.   Risk factors: no contaminated food, no contaminated water, no hx of cancer, no immunosuppression, no occupational exposure, no recent sickness, no recent travel and no sick contacts        Constitution: Positive for fever. Negative for chills and fatigue.   HENT: Negative for ear pain, congestion and sore throat.    Neck: Negative for painful lymph nodes.   Cardiovascular: Negative for chest pain and leg swelling.   Eyes: Negative for double vision and blurred vision.   Respiratory: Positive for cough. Negative for shortness of breath and wheezing.    Gastrointestinal: Negative for abdominal pain, nausea, vomiting and diarrhea.   Genitourinary: Negative for " dysuria, frequency, urgency and history of kidney stones.   Musculoskeletal: Positive for back pain. Negative for joint pain, joint swelling, muscle cramps and muscle ache.   Skin: Negative for color change, pale, rash and bruising.   Allergic/Immunologic: Negative for seasonal allergies.   Neurological: Positive for headaches. Negative for dizziness, history of vertigo, light-headedness and passing out.   Hematologic/Lymphatic: Negative for swollen lymph nodes.   Psychiatric/Behavioral: Negative for nervous/anxious, sleep disturbance and depression. The patient is not nervous/anxious.        Objective:      Physical Exam   Constitutional: She is oriented to person, place, and time. She appears well-developed and well-nourished. She is cooperative.  Non-toxic appearance. She does not have a sickly appearance. She does not appear ill. No distress.   Appears very well, not tachypneic, not distressed, speaking in full sentences,   HENT:   Head: Normocephalic and atraumatic.   Right Ear: Hearing, tympanic membrane, external ear and ear canal normal.   Left Ear: Hearing, tympanic membrane, external ear and ear canal normal.   Nose: Nose normal. No mucosal edema, rhinorrhea or nasal deformity. No epistaxis. Right sinus exhibits no maxillary sinus tenderness and no frontal sinus tenderness. Left sinus exhibits no maxillary sinus tenderness and no frontal sinus tenderness.   Mouth/Throat: Uvula is midline, oropharynx is clear and moist and mucous membranes are normal. No trismus in the jaw. Normal dentition. No uvula swelling. No oropharyngeal exudate, posterior oropharyngeal edema or posterior oropharyngeal erythema.   Eyes: Conjunctivae and lids are normal. No scleral icterus.   Neck: Trachea normal, full passive range of motion without pain and phonation normal. Neck supple. No neck rigidity. No edema and no erythema present.   Cardiovascular: Normal rate, regular rhythm, normal heart sounds, intact distal pulses and  normal pulses.   Pulmonary/Chest: Effort normal and breath sounds normal. No respiratory distress. She has no decreased breath sounds. She has no rhonchi.   Abdominal: Normal appearance.   No cva ttp   Musculoskeletal: Normal range of motion. She exhibits no edema or deformity.   Neurological: She is alert and oriented to person, place, and time. She exhibits normal muscle tone. Coordination normal.   Skin: Skin is warm, dry, intact, not diaphoretic and not pale.   Psychiatric: She has a normal mood and affect. Her speech is normal and behavior is normal. Judgment and thought content normal. Cognition and memory are normal.   Nursing note and vitals reviewed.          X-ray Chest Pa And Lateral    Result Date: 4/1/2020  EXAMINATION: XR CHEST PA AND LATERAL CLINICAL HISTORY: Fever, unspecified TECHNIQUE: PA and lateral views of the chest were performed. COMPARISON: 02/26/2018.  09/28/2017. FINDINGS: Cardiac silhouette is mildly to moderately enlarged but stable compared with earlier studies.  Pulmonary vascular distribution appears mildly plethoric; correlate with volume status. In the left mid lung zone on PA chest radiograph there is a very small region of increased attenuation, 1.5 x 0.7 cm, overlying the 3rd and 7th ribs.  This is new compared to earlier examinations.  Focal inflammation, infectious or noninfectious, can not be excluded in this patient with a history of fever.  Although I am unable to identify this opacity on lateral radiograph, intrapulmonary nodule is also not excluded. I detect no pleural fluid, pneumothorax, pneumomediastinum, pneumoperitoneum, lymph node enlargement, cardiac decompensation or significant osseous abnormality.     New focal opacity, 1.5 x 0.7 cm, projects over the left mid lung zone on PA view.  Pneumonia is not excluded in this patient with a history of fever.  Differential diagnosis includes intrapulmonary nodule in this 54-year-old woman.  I recommend repeat chest  "radiograph after an interval of 6-12 weeks; if the lesion persists on subsequent chest radiograph I recommend that this patient, identified as "never smoker" in the electronic medical record, undergo chest CT at that time. This report was flagged in Epic as abnormal. Electronically signed by: Flaquita Douglass MD Date:    04/01/2020 Time:    09:32      Assessment:       1. Suspected Covid-19 Virus Infection    2. Pneumonia of left lung due to infectious organism, unspecified part of lung    3. Fever, unspecified fever cause        Plan:       New fever and cough. cxr with opacity in LMF concerning for pneumonia - bacterial vs viral - or nodule. Will swab for covid, as I do suspect this at this time  Will cover for CAP with doxy due to ESRD. Discussed with Dr Barahona who agrees with plan. Discussed very strict ED precautions for any new, worsening, changing or concerning symptoms or difficulty breathing.   Pt appears very well at this time with stable vital signs. Enrolled in home monitoring due to significant hx of ESRD and DMII.    Suspected Covid-19 Virus Infection  -     COVID-19 Home Symptom Monitoring  - Duration (days): 14    Pneumonia of left lung due to infectious organism, unspecified part of lung  -     doxycycline (VIBRA-TABS) 100 MG tablet; Take 1 tablet (100 mg total) by mouth 2 (two) times daily. for 7 days  Dispense: 14 tablet; Refill: 0    Fever, unspecified fever cause  -     X-Ray Chest PA And Lateral; Future; Expected date: 04/01/2020  -     SARS- CoV-2 (COVID-19) QUALITATIVE PCR  -     POCT Urinalysis, Dipstick, Automated, W/O Scope  -     acetaminophen tablet 650 mg    Labs reviewed, pertinent imaging reviewed, previous medical records, medical history, surgical history, social history, family history reviewed.       Patient Instructions   Fluids  Rest  Monitor temperatures, tylenol for any fevers  Please stay quarantined until you receive results of test  We will call you with results  Very " important - if anything worsens or  You have new/concerning/changing symptoms, or you have difficulty breathing please call 911, or return here, or go to the emergency department      Please arrange follow up with your primary medical clinic as soon as possible. You must understand that you've received an Urgent Care treatment only and that you may be released before all of your medical problems are known or treated. You, the patient, will arrange for follow up as instructed. If your symptoms worsen or fail to improve you should go to the Emergency Room.  WE CANNOT RULE OUT ALL POSSIBLE CAUSES OF YOUR SYMPTOMS IN THE URGENT CARE SETTING PLEASE GO TO THE ER IF YOU FEELS YOUR CONDITION IS WORSENING OR YOU WOULD LIKE EMERGENT EVALUATION.    Please return here or go to the Emergency Department for any concerns or worsening of condition.  If you were prescribed antibiotics, please take them to completion.  If you were prescribed a narcotic medication, do not drive or operate heavy equipment or machinery while taking these medications.  Please follow up with your primary care doctor or specialist as needed.    If you  smoke, please stop smoking.      FOLLOW UP WITH PRIMARY CARE IN 2-3 WEEKS

## 2020-04-01 NOTE — PATIENT INSTRUCTIONS
Fluids  Rest  Monitor temperatures, tylenol for any fevers  Please stay quarantined until you receive results of test  We will call you with results  Very important - if anything worsens or  You have new/concerning/changing symptoms, or you have difficulty breathing please call 911, or return here, or go to the emergency department      Please arrange follow up with your primary medical clinic as soon as possible. You must understand that you've received an Urgent Care treatment only and that you may be released before all of your medical problems are known or treated. You, the patient, will arrange for follow up as instructed. If your symptoms worsen or fail to improve you should go to the Emergency Room.  WE CANNOT RULE OUT ALL POSSIBLE CAUSES OF YOUR SYMPTOMS IN THE URGENT CARE SETTING PLEASE GO TO THE ER IF YOU FEELS YOUR CONDITION IS WORSENING OR YOU WOULD LIKE EMERGENT EVALUATION.    Please return here or go to the Emergency Department for any concerns or worsening of condition.  If you were prescribed antibiotics, please take them to completion.  If you were prescribed a narcotic medication, do not drive or operate heavy equipment or machinery while taking these medications.  Please follow up with your primary care doctor or specialist as needed.    If you  smoke, please stop smoking.      FOLLOW UP WITH PRIMARY CARE IN 2-3 WEEKS

## 2020-04-02 ENCOUNTER — TELEPHONE (OUTPATIENT)
Dept: URGENT CARE | Facility: CLINIC | Age: 55
End: 2020-04-02

## 2020-04-02 ENCOUNTER — TELEPHONE (OUTPATIENT)
Dept: RADIOLOGY | Facility: HOSPITAL | Age: 55
End: 2020-04-02

## 2020-04-02 LAB — SARS-COV-2 RNA RESP QL NAA+PROBE: DETECTED

## 2020-04-02 NOTE — TELEPHONE ENCOUNTER
Covid 19 Lab results discussed with patient. Pt is currently  admitted to [facility] for covid 19 virus infection.

## 2020-04-07 ENCOUNTER — TELEPHONE (OUTPATIENT)
Dept: TRANSPLANT | Facility: CLINIC | Age: 55
End: 2020-04-07

## 2020-05-08 ENCOUNTER — OFFICE VISIT (OUTPATIENT)
Dept: URGENT CARE | Facility: CLINIC | Age: 55
End: 2020-05-08
Payer: MEDICARE

## 2020-05-08 VITALS
SYSTOLIC BLOOD PRESSURE: 154 MMHG | DIASTOLIC BLOOD PRESSURE: 78 MMHG | HEART RATE: 98 BPM | RESPIRATION RATE: 18 BRPM | BODY MASS INDEX: 33.29 KG/M2 | OXYGEN SATURATION: 95 % | TEMPERATURE: 98 F | HEIGHT: 64 IN | WEIGHT: 195 LBS

## 2020-05-08 DIAGNOSIS — Z11.59 SCREENING FOR VIRAL DISEASE: Primary | ICD-10-CM

## 2020-05-08 LAB — SARS-COV-2 RNA RESP QL NAA+PROBE: NOT DETECTED

## 2020-05-08 PROCEDURE — U0002 COVID-19 LAB TEST NON-CDC: HCPCS | Mod: TXP

## 2020-05-08 PROCEDURE — 99213 PR OFFICE/OUTPT VISIT, EST, LEVL III, 20-29 MIN: ICD-10-PCS | Mod: S$GLB,TXP,, | Performed by: NURSE PRACTITIONER

## 2020-05-08 PROCEDURE — 99213 OFFICE O/P EST LOW 20 MIN: CPT | Mod: S$GLB,TXP,, | Performed by: NURSE PRACTITIONER

## 2020-05-08 NOTE — PATIENT INSTRUCTIONS
Return to Urgent Care or go to ER if symptoms worsen or fail to improve.  Follow up with PCP as recommended for further management.   We will contact you in 24-48 hours regarding your COVID-19 test result.

## 2020-05-08 NOTE — PROGRESS NOTES
"Subjective:       Patient ID: Jael Hall is a 54 y.o. female.    Vitals:  height is 5' 4" (1.626 m) and weight is 88.5 kg (195 lb). Her temperature is 97.7 °F (36.5 °C). Her blood pressure is 154/78 (abnormal) and her pulse is 98. Her respiration is 18 and oxygen saturation is 95%.     Chief Complaint: COVID-19 Concerns    Symptoms are no longer present x > 3 weeks.  No fever.  No SOB.  No cough.  Would like to be retested for COVID-19.     Other   Chronicity: PT TESTED POS FOR COVID 1 MONTH AGO  Pertinent negatives include no chills, congestion, coughing, diaphoresis, fatigue, fever, myalgias, nausea, rash, sore throat or vomiting.       Constitution: Negative for chills, sweating, fatigue and fever.   HENT: Negative for ear pain, congestion, sinus pain, sinus pressure, sore throat and voice change.    Neck: Negative for painful lymph nodes.   Eyes: Negative for eye redness.   Respiratory: Negative for chest tightness, cough, sputum production, bloody sputum, COPD, shortness of breath, stridor, wheezing and asthma.    Gastrointestinal: Negative for nausea and vomiting.   Musculoskeletal: Negative for muscle ache.   Skin: Negative for rash.   Allergic/Immunologic: Negative for seasonal allergies and asthma.   Hematologic/Lymphatic: Negative for swollen lymph nodes.       Objective:      Physical Exam   Constitutional: She is oriented to person, place, and time. She appears well-developed and well-nourished. She is cooperative.  Non-toxic appearance. She does not have a sickly appearance. She does not appear ill. No distress.   ambulatory   HENT:   Head: Normocephalic and atraumatic.   Right Ear: Hearing normal.   Left Ear: Hearing normal.   Nose: No mucosal edema, rhinorrhea or nasal deformity.   Mouth/Throat: Uvula is midline and mucous membranes are normal. No trismus in the jaw. Normal dentition. No uvula swelling. No posterior oropharyngeal edema or posterior oropharyngeal erythema.   Eyes: Lids are normal. "   Neck: Trachea normal, full passive range of motion without pain and phonation normal. No neck rigidity. No edema and no erythema present.   Cardiovascular: Normal pulses.   Pulmonary/Chest: Effort normal. No stridor. No respiratory distress (able to speak in complete sentences during phnone conversation). She has no decreased breath sounds. She has no wheezes. She has no rhonchi.   Abdominal: Normal appearance.   Musculoskeletal: Normal range of motion. She exhibits no edema or deformity.   Neurological: She is alert and oriented to person, place, and time.   Skin: Skin is warm, dry, intact, not diaphoretic and not pale.   Psychiatric: She has a normal mood and affect. Her speech is normal and behavior is normal. Cognition and memory are normal.   Nursing note and vitals reviewed.        Assessment:       1. Screening for viral disease        Plan:         Screening for viral disease  -     COVID-19 Routine Screening

## 2020-05-11 ENCOUNTER — TELEPHONE (OUTPATIENT)
Dept: URGENT CARE | Facility: CLINIC | Age: 55
End: 2020-05-11

## 2020-05-11 NOTE — TELEPHONE ENCOUNTER
INFORMED OF REPEAT TESTING COVID 19 VIRUS NEGATIVE. SHE WILL  RESULTS FROM .    BLPMD  11:46  5/11/20

## 2020-05-20 ENCOUNTER — OFFICE VISIT (OUTPATIENT)
Dept: URGENT CARE | Facility: CLINIC | Age: 55
End: 2020-05-20
Payer: MEDICARE

## 2020-05-20 VITALS
OXYGEN SATURATION: 97 % | HEIGHT: 64 IN | DIASTOLIC BLOOD PRESSURE: 81 MMHG | WEIGHT: 195 LBS | BODY MASS INDEX: 33.29 KG/M2 | TEMPERATURE: 98 F | SYSTOLIC BLOOD PRESSURE: 146 MMHG | HEART RATE: 80 BPM

## 2020-05-20 DIAGNOSIS — M62.838 MUSCLE SPASM OF RIGHT LEG: ICD-10-CM

## 2020-05-20 DIAGNOSIS — M79.604 ACUTE LEG PAIN, RIGHT: Primary | ICD-10-CM

## 2020-05-20 PROCEDURE — 99214 PR OFFICE/OUTPT VISIT, EST, LEVL IV, 30-39 MIN: ICD-10-PCS | Mod: S$GLB,TXP,, | Performed by: NURSE PRACTITIONER

## 2020-05-20 PROCEDURE — 99214 OFFICE O/P EST MOD 30 MIN: CPT | Mod: S$GLB,TXP,, | Performed by: NURSE PRACTITIONER

## 2020-05-20 RX ORDER — CYCLOBENZAPRINE HCL 10 MG
10 TABLET ORAL NIGHTLY
Qty: 5 TABLET | Refills: 0 | Status: SHIPPED | OUTPATIENT
Start: 2020-05-20 | End: 2020-05-25

## 2020-05-20 NOTE — PATIENT INSTRUCTIONS
Alternate heat and ice as needed for comfort.     Take tylenol as directed for minor pain.     Use muscle relaxer at night will make drowsy take with caution.     Follow up with PCP.     You must understand that you've received an Urgent Care treatment only and that you may be released before all your medical problems are known or treated. You, the patient, will arrange for follow up care as instructed.  Follow up with your PCP or specialty clinic as directed in the next 1-2 weeks if not improved or as needed.  You can call [phone] to schedule an appointment with the appropriate provider.  If your condition worsens we recommend that you receive another evaluation at the emergency room immediately or contact your primary medical clinics after hours call service to discuss your concerns.  Please return here or go to the Emergency Department for any concerns or worsening of condition.          Leg Cramps  A muscle cramp or spasm is a strong contraction of the muscle fibers. It is also called a charley horse. This may occur in the foot, calf, or thigh at night when the legs are elevated. If the spasm is prolonged, it can become very painful.  This may be caused by sleeping in an uncomfortable position, muscle fatigue, poor muscle tone from lack of exercise and stretching, dehydration, electrolyte imbalance, diabetes, alcohol use, and certain medicine.  Home care  · Drink plenty of fluids during the day to prevent dehydration.  · Stretch your legs before bedtime.  · Eat a diet high in potassium. These foods include fresh fruit, such as bananas, oranges, cantaloupe, and honeydew melon. It also includes apple, prune, orange, grape and pineapple juices. Other foods high in potassium are white, red, and allen beans, baked potatoes, raw spinach, cod, flounder, halibut, salmon, and scallops.  · Talk with your healthcare provider about taking mineral and vitamin supplements that contain magnesium and vitamin B-12 if you  are not already taking these. Other prescription medicines may also be used.  · Avoid stimulants such as caffeine, nicotine, decongestants.  How to relieve an acute leg cramp  · For mild pain, getting out of the bed and walking may help. Some people find relief with heat and massage. You can apply heat with a warm shower, bath, or compress. Some people feel better with a cold packs. You can make an ice pack by filling a plastic bag that seals at the top with ice cubes and then wrapping it with a thin towel. Try both and use the method that feels best for 15 to 20 minutes at a time.  · For severe pain, stretching the muscle that is in spasm may quickly relieve the pain.  · When the spasm is in your foot, your toes may curl up or down. To stretch the muscle in spasm, bend your toes in the opposite direction. If the spasm pulls your toes up, bend them down. If the spasm pulls them down, bend them up.  · When the spasm is in your calf, bend the ankle so the foot points upward toward your knee.  · When the spasm is in your thigh, bend or straighten the knee and hip until you feel relief.  Follow-up care  Follow up with your healthcare provider, or as advised.  When to seek medical advice  Call your healthcare provider right away if any of these occur:  · Walking makes your pain worse and rest makes it better  · You develop weakness in the affected leg  · Pain or frequency of spasms increases and is not controlled by the above measures  Date Last Reviewed: 11/23/2015  © 3472-2309 [employer]. [address] [place] [address], [place] [address]. All rights reserved. This information is not intended as a substitute for professional medical care. Always follow your healthcare professional's instructions.

## 2020-05-20 NOTE — PROGRESS NOTES
"Subjective:       Patient ID: Jael Hall is a 54 y.o. female.    Vitals:  height is 5' 4" (1.626 m) and weight is 88.5 kg (195 lb). Her tympanic temperature is 98.2 °F (36.8 °C). Her blood pressure is 146/81 (abnormal) and her pulse is 80. Her oxygen saturation is 97%.     Chief Complaint: thigh pain    Patient presents with c. o having right thigh soreness that started Sunday. Patient has been taking tylenol but had no relief.     Leg Pain    The incident occurred 2 days ago. The incident occurred at home. There was no injury mechanism. The pain is present in the right thigh. The pain is at a severity of 9/10. The pain is mild. The pain has been constant since onset. She reports no foreign bodies present. The symptoms are aggravated by movement. She has tried acetaminophen for the symptoms. The treatment provided mild relief.       Constitution: Negative for chills, fatigue and fever.   HENT: Negative for congestion and sore throat.    Neck: Negative for painful lymph nodes.   Cardiovascular: Negative for chest pain and leg swelling.   Eyes: Negative for double vision and blurred vision.   Respiratory: Negative for cough and shortness of breath.    Gastrointestinal: Negative for nausea, vomiting and diarrhea.   Genitourinary: Negative for dysuria, frequency, urgency and history of kidney stones.   Musculoskeletal: Negative for joint pain, joint swelling, muscle cramps and muscle ache.   Skin: Negative for color change, pale, rash and bruising.   Allergic/Immunologic: Negative for seasonal allergies.   Neurological: Negative for dizziness, history of vertigo, light-headedness, passing out and headaches.   Hematologic/Lymphatic: Negative for swollen lymph nodes.   Psychiatric/Behavioral: Negative for nervous/anxious, sleep disturbance and depression. The patient is not nervous/anxious.        Objective:      Physical Exam   Constitutional: She is oriented to person, place, and time. She appears well-developed and " well-nourished. She is cooperative.  Non-toxic appearance. She does not appear ill. No distress.   HENT:   Head: Normocephalic and atraumatic.   Right Ear: Hearing, tympanic membrane, external ear and ear canal normal.   Left Ear: Hearing, tympanic membrane, external ear and ear canal normal.   Nose: Nose normal. No mucosal edema, rhinorrhea or nasal deformity. No epistaxis. Right sinus exhibits no maxillary sinus tenderness and no frontal sinus tenderness. Left sinus exhibits no maxillary sinus tenderness and no frontal sinus tenderness.   Mouth/Throat: Uvula is midline, oropharynx is clear and moist and mucous membranes are normal. No trismus in the jaw. Normal dentition. No uvula swelling. No posterior oropharyngeal erythema.   Eyes: Conjunctivae and lids are normal. Right eye exhibits no discharge. Left eye exhibits no discharge. No scleral icterus.   Neck: Trachea normal, normal range of motion, full passive range of motion without pain and phonation normal. Neck supple.   Cardiovascular: Normal rate, regular rhythm, normal heart sounds, intact distal pulses and normal pulses.   Pulmonary/Chest: Effort normal and breath sounds normal. No respiratory distress.   Abdominal: Soft. Normal appearance and bowel sounds are normal. She exhibits no distension, no pulsatile midline mass and no mass. There is no tenderness.   Musculoskeletal: Normal range of motion. She exhibits no edema or deformity.        Right upper leg: She exhibits tenderness.        Legs:  No warmth  No swelling  Tender (worse at night when trying to sleep)  Walking without difficulty   Neurological: She is alert and oriented to person, place, and time. She exhibits normal muscle tone. Coordination normal.   Skin: Skin is warm, dry, intact, not diaphoretic and not pale.   Psychiatric: She has a normal mood and affect. Her speech is normal and behavior is normal. Judgment and thought content normal. Cognition and memory are normal.   Nursing note  and vitals reviewed.        Assessment:       1. Acute leg pain, right    2. Muscle spasm of right leg        Plan:         Acute leg pain, right    Muscle spasm of right leg  -     cyclobenzaprine (FLEXERIL) 10 MG tablet; Take 1 tablet (10 mg total) by mouth every evening. for 5 days  Dispense: 5 tablet; Refill: 0      Patient Instructions   Alternate heat and ice as needed for comfort.     Take tylenol as directed for minor pain.     Use muscle relaxer at night will make drowsy take with caution.     Follow up with PCP.     You must understand that you've received an Urgent Care treatment only and that you may be released before all your medical problems are known or treated. You, the patient, will arrange for follow up care as instructed.  Follow up with your PCP or specialty clinic as directed in the next 1-2 weeks if not improved or as needed.  You can call [phone] to schedule an appointment with the appropriate provider.  If your condition worsens we recommend that you receive another evaluation at the emergency room immediately or contact your primary medical clinics after hours call service to discuss your concerns.  Please return here or go to the Emergency Department for any concerns or worsening of condition.          Leg Cramps  A muscle cramp or spasm is a strong contraction of the muscle fibers. It is also called a charley horse. This may occur in the foot, calf, or thigh at night when the legs are elevated. If the spasm is prolonged, it can become very painful.  This may be caused by sleeping in an uncomfortable position, muscle fatigue, poor muscle tone from lack of exercise and stretching, dehydration, electrolyte imbalance, diabetes, alcohol use, and certain medicine.  Home care  · Drink plenty of fluids during the day to prevent dehydration.  · Stretch your legs before bedtime.  · Eat a diet high in potassium. These foods include fresh fruit, such as bananas, oranges, cantaloupe, and  honeydew melon. It also includes apple, prune, orange, grape and pineapple juices. Other foods high in potassium are white, red, and allen beans, baked potatoes, raw spinach, cod, flounder, halibut, salmon, and scallops.  · Talk with your healthcare provider about taking mineral and vitamin supplements that contain magnesium and vitamin B-12 if you are not already taking these. Other prescription medicines may also be used.  · Avoid stimulants such as caffeine, nicotine, decongestants.  How to relieve an acute leg cramp  · For mild pain, getting out of the bed and walking may help. Some people find relief with heat and massage. You can apply heat with a warm shower, bath, or compress. Some people feel better with a cold packs. You can make an ice pack by filling a plastic bag that seals at the top with ice cubes and then wrapping it with a thin towel. Try both and use the method that feels best for 15 to 20 minutes at a time.  · For severe pain, stretching the muscle that is in spasm may quickly relieve the pain.  · When the spasm is in your foot, your toes may curl up or down. To stretch the muscle in spasm, bend your toes in the opposite direction. If the spasm pulls your toes up, bend them down. If the spasm pulls them down, bend them up.  · When the spasm is in your calf, bend the ankle so the foot points upward toward your knee.  · When the spasm is in your thigh, bend or straighten the knee and hip until you feel relief.  Follow-up care  Follow up with your healthcare provider, or as advised.  When to seek medical advice  Call your healthcare provider right away if any of these occur:  · Walking makes your pain worse and rest makes it better  · You develop weakness in the affected leg  · Pain or frequency of spasms increases and is not controlled by the above measures  Date Last Reviewed: 11/23/2015  © 5159-9645 [employer]. [address] [place] [address], [place] [address]. All rights reserved. This  information is not intended as a substitute for professional medical care. Always follow your healthcare professional's instructions.

## 2020-07-10 ENCOUNTER — TELEPHONE (OUTPATIENT)
Dept: TRANSPLANT | Facility: CLINIC | Age: 55
End: 2020-07-10

## 2020-07-14 ENCOUNTER — HOSPITAL ENCOUNTER (OUTPATIENT)
Dept: RADIOLOGY | Facility: HOSPITAL | Age: 55
Discharge: HOME | End: 2020-07-14
Attending: NURSE PRACTITIONER
Payer: MEDICARE

## 2020-07-14 ENCOUNTER — HOSPITAL ENCOUNTER (OUTPATIENT)
Dept: CARDIOLOGY | Facility: HOSPITAL | Age: 55
Discharge: HOME | End: 2020-07-14
Attending: NURSE PRACTITIONER
Payer: MEDICARE

## 2020-07-14 ENCOUNTER — OFFICE VISIT (OUTPATIENT)
Dept: TRANSPLANT | Facility: CLINIC | Age: 55
End: 2020-07-14
Payer: MEDICARE

## 2020-07-14 VITALS
HEIGHT: 64 IN | BODY MASS INDEX: 34.59 KG/M2 | SYSTOLIC BLOOD PRESSURE: 187 MMHG | DIASTOLIC BLOOD PRESSURE: 93 MMHG | RESPIRATION RATE: 16 BRPM | OXYGEN SATURATION: 97 % | WEIGHT: 202.63 LBS | HEART RATE: 75 BPM | TEMPERATURE: 98 F

## 2020-07-14 VITALS
DIASTOLIC BLOOD PRESSURE: 80 MMHG | HEART RATE: 80 BPM | BODY MASS INDEX: 33.29 KG/M2 | WEIGHT: 195 LBS | SYSTOLIC BLOOD PRESSURE: 140 MMHG | HEIGHT: 64 IN

## 2020-07-14 DIAGNOSIS — E78.2 ELEVATED CHOLESTEROL WITH ELEVATED TRIGLYCERIDES: Chronic | ICD-10-CM

## 2020-07-14 DIAGNOSIS — Z99.2 ESRD ON HEMODIALYSIS: ICD-10-CM

## 2020-07-14 DIAGNOSIS — Z99.2 TYPE 2 DIABETES MELLITUS WITH CHRONIC KIDNEY DISEASE ON CHRONIC DIALYSIS, WITH LONG-TERM CURRENT USE OF INSULIN: Chronic | ICD-10-CM

## 2020-07-14 DIAGNOSIS — Z76.82 PATIENT ON WAITING LIST FOR KIDNEY TRANSPLANT: Primary | ICD-10-CM

## 2020-07-14 DIAGNOSIS — Z79.4 TYPE 2 DIABETES MELLITUS WITH CHRONIC KIDNEY DISEASE ON CHRONIC DIALYSIS, WITH LONG-TERM CURRENT USE OF INSULIN: Chronic | ICD-10-CM

## 2020-07-14 DIAGNOSIS — I10 ESSENTIAL HYPERTENSION: ICD-10-CM

## 2020-07-14 DIAGNOSIS — Z76.82 ORGAN TRANSPLANT CANDIDATE: ICD-10-CM

## 2020-07-14 DIAGNOSIS — Z76.82 ORGAN TRANSPLANT CANDIDATE: Primary | ICD-10-CM

## 2020-07-14 DIAGNOSIS — M89.8X9 METABOLIC BONE DISEASE: ICD-10-CM

## 2020-07-14 DIAGNOSIS — N18.6 TYPE 2 DIABETES MELLITUS WITH CHRONIC KIDNEY DISEASE ON CHRONIC DIALYSIS, WITH LONG-TERM CURRENT USE OF INSULIN: Chronic | ICD-10-CM

## 2020-07-14 DIAGNOSIS — E11.22 TYPE 2 DIABETES MELLITUS WITH CHRONIC KIDNEY DISEASE ON CHRONIC DIALYSIS, WITH LONG-TERM CURRENT USE OF INSULIN: Chronic | ICD-10-CM

## 2020-07-14 DIAGNOSIS — N18.6 ESRD ON HEMODIALYSIS: ICD-10-CM

## 2020-07-14 LAB
ASCENDING AORTA: 3.1 CM
AV INDEX (PROSTH): 0.86
AV MEAN GRADIENT: 2 MMHG
AV PEAK GRADIENT: 4 MMHG
AV VALVE AREA: 2.99 CM2
AV VELOCITY RATIO: 0.81
BSA FOR ECHO PROCEDURE: 2 M2
CV ECHO LV RWT: 0.45 CM
DOP CALC AO PEAK VEL: 0.96 M/S
DOP CALC AO VTI: 20.8 CM
DOP CALC LVOT AREA: 3.5 CM2
DOP CALC LVOT DIAMETER: 2.1 CM
DOP CALC LVOT PEAK VEL: 0.78 M/S
DOP CALC LVOT STROKE VOLUME: 62.14 CM3
DOP CALCLVOT PEAK VEL VTI: 17.95 CM
E WAVE DECELERATION TIME: 159.82 MSEC
E/A RATIO: 1.07
E/E' RATIO: 9.53 M/S
ECHO LV POSTERIOR WALL: 0.98 CM (ref 0.6–1.1)
FRACTIONAL SHORTENING: 29 % (ref 28–44)
INTERVENTRICULAR SEPTUM: 0.84 CM (ref 0.6–1.1)
IVRT: 79.92 MSEC
LA MAJOR: 5.55 CM
LA MINOR: 5.57 CM
LA WIDTH: 4.8 CM
LEFT ATRIUM SIZE: 4.3 CM
LEFT ATRIUM VOLUME INDEX: 50.4 ML/M2
LEFT ATRIUM VOLUME: 97.54 CM3
LEFT INTERNAL DIMENSION IN SYSTOLE: 3.1 CM (ref 2.1–4)
LEFT VENTRICLE DIASTOLIC VOLUME INDEX: 45.06 ML/M2
LEFT VENTRICLE DIASTOLIC VOLUME: 87.19 ML
LEFT VENTRICLE MASS INDEX: 67 G/M2
LEFT VENTRICLE SYSTOLIC VOLUME INDEX: 19.6 ML/M2
LEFT VENTRICLE SYSTOLIC VOLUME: 37.92 ML
LEFT VENTRICULAR INTERNAL DIMENSION IN DIASTOLE: 4.39 CM (ref 3.5–6)
LEFT VENTRICULAR MASS: 129.46 G
LV LATERAL E/E' RATIO: 9 M/S
LV SEPTAL E/E' RATIO: 10.13 M/S
MV PEAK A VEL: 0.76 M/S
MV PEAK E VEL: 0.81 M/S
MV STENOSIS PRESSURE HALF TIME: 46.35 MS
MV VALVE AREA P 1/2 METHOD: 4.75 CM2
PISA TR MAX VEL: 2.66 M/S
PULM VEIN S/D RATIO: 0.69
PV PEAK D VEL: 0.61 M/S
PV PEAK S VEL: 0.42 M/S
RA MAJOR: 4.66 CM
RA PRESSURE: 8 MMHG
RA WIDTH: 3.75 CM
RIGHT VENTRICULAR END-DIASTOLIC DIMENSION: 3.5 CM
RV TISSUE DOPPLER FREE WALL SYSTOLIC VELOCITY 1 (APICAL 4 CHAMBER VIEW): 12.81 CM/S
SINUS: 3.5 CM
STJ: 2.89 CM
TDI LATERAL: 0.09 M/S
TDI SEPTAL: 0.08 M/S
TDI: 0.09 M/S
TR MAX PG: 28 MMHG
TRICUSPID ANNULAR PLANE SYSTOLIC EXCURSION: 2.16 CM
TV REST PULMONARY ARTERY PRESSURE: 36 MMHG

## 2020-07-14 PROCEDURE — 72170 XR PELVIS ROUTINE AP: ICD-10-PCS | Mod: 26,TXP,, | Performed by: RADIOLOGY

## 2020-07-14 PROCEDURE — 71046 X-RAY EXAM CHEST 2 VIEWS: CPT | Mod: 26,TXP,, | Performed by: RADIOLOGY

## 2020-07-14 PROCEDURE — 93306 TTE W/DOPPLER COMPLETE: CPT | Mod: 26,TXP,, | Performed by: INTERNAL MEDICINE

## 2020-07-14 PROCEDURE — 99215 OFFICE O/P EST HI 40 MIN: CPT | Mod: PBBFAC,25,TXP | Performed by: NURSE PRACTITIONER

## 2020-07-14 PROCEDURE — 77067 MAMMO DIGITAL SCREENING BILAT WITH CAD: ICD-10-PCS | Mod: 26,TXP,, | Performed by: RADIOLOGY

## 2020-07-14 PROCEDURE — 77067 SCR MAMMO BI INCL CAD: CPT | Mod: 26,TXP,, | Performed by: RADIOLOGY

## 2020-07-14 PROCEDURE — 71046 X-RAY EXAM CHEST 2 VIEWS: CPT | Mod: TC,TXP

## 2020-07-14 PROCEDURE — 72170 X-RAY EXAM OF PELVIS: CPT | Mod: 26,TXP,, | Performed by: RADIOLOGY

## 2020-07-14 PROCEDURE — 93306 TTE W/DOPPLER COMPLETE: CPT | Mod: TXP

## 2020-07-14 PROCEDURE — 99215 OFFICE O/P EST HI 40 MIN: CPT | Mod: S$PBB,TXP,, | Performed by: NURSE PRACTITIONER

## 2020-07-14 PROCEDURE — 93306 ECHO (CUPID ONLY): ICD-10-PCS | Mod: 26,TXP,, | Performed by: INTERNAL MEDICINE

## 2020-07-14 PROCEDURE — 76770 US RETROPERITONEAL COMPLETE: ICD-10-PCS | Mod: 26,TXP,, | Performed by: RADIOLOGY

## 2020-07-14 PROCEDURE — 76770 US EXAM ABDO BACK WALL COMP: CPT | Mod: 26,TXP,, | Performed by: RADIOLOGY

## 2020-07-14 PROCEDURE — 99999 PR PBB SHADOW E&M-EST. PATIENT-LVL V: CPT | Mod: PBBFAC,TXP,, | Performed by: NURSE PRACTITIONER

## 2020-07-14 PROCEDURE — 72170 X-RAY EXAM OF PELVIS: CPT | Mod: TC,TXP

## 2020-07-14 PROCEDURE — 76770 US EXAM ABDO BACK WALL COMP: CPT | Mod: TC,TXP

## 2020-07-14 PROCEDURE — 77067 SCR MAMMO BI INCL CAD: CPT | Mod: TC,TXP

## 2020-07-14 PROCEDURE — 71046 XR CHEST PA AND LATERAL: ICD-10-PCS | Mod: 26,TXP,, | Performed by: RADIOLOGY

## 2020-07-14 PROCEDURE — 99215 PR OFFICE/OUTPT VISIT, EST, LEVL V, 40-54 MIN: ICD-10-PCS | Mod: S$PBB,TXP,, | Performed by: NURSE PRACTITIONER

## 2020-07-14 PROCEDURE — 99999 PR PBB SHADOW E&M-EST. PATIENT-LVL V: ICD-10-PCS | Mod: PBBFAC,TXP,, | Performed by: NURSE PRACTITIONER

## 2020-07-14 NOTE — LETTER
July 14, 2020    Jael Hall  [address] [facility] [address]    Dear Jael Hall:  MRN: [id number]    The [facility] team reviewed your transplant candidacy.  It is our programs opinion that you are temporarily not a transplant candidate at our facility as of 7/14/20 because of renal mass requiring further evaluation.  You will remain listed on the wait list, but will not be able to receive a transplant until this issue is resolved.      Attached is a letter from the United Network for Organ Sharing (UNOS).  It describes the services and information offered to patients by [facility] and the Organ Procurement and Transplant Network.    The [facility] team remains available to answer any questions.  Should you have any questions regarding this decision, please do not hesitate to contact our pre-transplant office.      Sincerely,    Jeannie Kenny MD  Medical Director, Kidney & Kidney/Pancreas Transplantation    tj/Enclosed    Cc: MD [facility]             The Organ Procurement and Transplantation Network   Toll-free patient services line: Your resource for organ transplant information     If you have a question regarding your own medical care, you always should call your transplant hospital first. However, for general organ transplant-related information, you can call the Organ Procurement and Transplantation Network (OPTN) toll-free patient services line at [phone].     Anyone, including potential transplant candidates, candidates, recipients, family members, friends, living donors, and donor family members, can call this number to:     · Talk about organ donation, living donation, the transplant process, the donation process, and transplant policies.   · Get a free patient information kit with helpful booklets, waiting list and transplant information, and a list of all transplant hospitals.   · Ask questions about the OPTN website  (https://optn.transplant.hrsa.gov/), the United Network for Organ Sharings (UNOS) website (https://unos.org/), or the UNOS website for living donors and transplant recipients. (https://www.transplantliving.org/).   · Learn how the OPTN can help you.   · Talk about any concerns that you may have with a transplant hospital.     The nations transplant system, the OPTN, is managed under federal contract by the United Network for Organ Sharing (UNOS), which is a non-profit charitable organization. The OPTN helps create and define organ sharing policies that make the best use of donated organs. This process continuously evaluating new advances and discoveries so policies can be adapted to best serve patients waiting for transplants. To do so, the OPTN works closely with transplant professionals, transplant patients, transplant candidates, donor families, living donors, and the public. All transplant programs and organ procurement organizations throughout the country are OPTN members and are obligated to follow the policies the OPTN creates for allocating organs.     The OPTN also is responsible for:   · Providing educational material for patients, the public, and professionals.   · Raising awareness of the need for donated organs and tissue.   · Coordinating organ procurement, matching, and placement.   · Collecting information about every organ transplant and donation that occurs in the [place].     Remember, you should contact your transplant hospital directly if you have questions or concerns about your own medical care including medical records, work-up progress, and test results.     We are not your transplant hospital, and our staff will not be able to answer questions about your case, so please keep your transplant hospitals phone number handy.   However, while you research your transplant needs and learn as much as you can about transplantation and donation, we welcome your call to our toll-free patient  services line at [phone].

## 2020-07-14 NOTE — PROGRESS NOTES
Jael Hall medical records reviewed with KASSANDRA Walker. Provider determined that due to having potential safety issues that the patient will be made inactive on waitlist at this time.   Renal mass requiring further evaluation - CT abd/pelvis, Urology clr    Jael Hall notified of safety concerns related to transplant at this time. Patients dialysis unit and Nephrologist notified of above concerns by letter.

## 2020-07-14 NOTE — PROGRESS NOTES
Kidney Transplant Recipient Reevalulation    Referring Physician: Karlos Perry  Current Nephrologist: Karlos Perry  Waitlist Status: active  Dialysis Start Date: 7/5/2017    Subjective:     CC:  Annual reassessment of kidney transplant candidacy.    HPI:  Ms. Hall is a 54 y.o. year old Black or  female with ESRD secondary to diabetic nephropathy.  She has been on the wait list for a kidney transplant at [facility] since 7/5/2017. Patient is currently on hemodialysis started on 7/5/2017. Patient is dialyzing on MWF schedule.  Patient reports that she is tolerating dialysis well.. She has a RUE AV fistula.   Dialyzes for 3 1/2 hours, and reports BP remains stable. Over all feels well. Denies SOB, leg pain or chest pain with exertion. Looks good.     Currently doesn't work anymore. Walks and does chores around the house to stay busy.     Recent hospitalizations or ED visits.  May 2020-COVID. Hospitalized at [facility] for over week.     Will be following with Urology on Monday for follow-up from cystoscopy from 12/2019. At that time patient will give UA specimen.     Completed PT at [facility] for shoulder and patient reports marked improvement.     Due for midday antihypertensive.  BP Readings from Last 3 Encounters:   07/14/20 (!) 187/93   07/14/20 (!) 140/80   05/20/20 (!) 146/81         CXR: 7/14/20 unremarkable  Renal US: 7/14/2020 Hypoechoic lesion in the right kidney   Pelvis xray: 7/14/2020 per surgery: Iliac calcifications are present.  Acceptable for transplant on CT from 2019  Echo: 7/14/2020 EF 65% PA 36  No WMA  Stress: 2/12/2019 without ischemia  Colonoscopy: polys--no dysplasia and tubular adenomas--repeat 4/2023  Pap: without malignancy 11/2019   MMG no evidence of malignancy 7/14/2020  PTH: 410 2/2019         Past Medical History:   Diagnosis Date    Anemia     Arrhythmia     Bradycardia     Cyst of left ovary 5/14/2019    Diabetes mellitus     Dialysis patient     monday wednesday friday  "   Disorder of kidney and ureter     Hyperlipidemia     Hypertension     Kidney failure     Obesity     Preeclampsia        Review of Systems   Constitutional: Negative for activity change, appetite change, chills, fatigue, fever and unexpected weight change.   HENT: Negative for congestion, facial swelling, postnasal drip, rhinorrhea, sinus pressure, sore throat and trouble swallowing.    Eyes: Negative for pain, redness and visual disturbance.   Respiratory: Negative for cough, chest tightness, shortness of breath and wheezing.    Cardiovascular: Negative.  Negative for chest pain, palpitations and leg swelling.   Gastrointestinal: Negative for abdominal pain, diarrhea, nausea and vomiting.   Genitourinary: Negative for dysuria, flank pain and urgency.   Musculoskeletal: Positive for arthralgias and myalgias (right thigh pain currenlty taking muscle relaxant). Negative for gait problem, neck pain and neck stiffness.   Skin: Negative for rash.   Allergic/Immunologic: Negative for environmental allergies, food allergies and immunocompromised state.   Neurological: Negative for dizziness, weakness, light-headedness and headaches.   Psychiatric/Behavioral: Negative for agitation and confusion. The patient is not nervous/anxious.        Objective:   body mass index is 34.55 kg/m².  BP (!) 187/93 (BP Location: Left arm, Patient Position: Sitting, BP Method: Medium (Automatic))   Pulse 75   Temp 97.8 °F (36.6 °C) (Oral)   Resp 16   Ht 5' 4.21" (1.631 m)   Wt 91.9 kg (202 lb 9.6 oz)   LMP 03/15/2016 (LMP Unknown)   SpO2 97%   BMI 34.55 kg/m²     Physical Exam  Vitals signs reviewed.   Constitutional:       Appearance: Normal appearance. She is well-developed.   HENT:      Head: Normocephalic.      Mouth/Throat:      Pharynx: No oropharyngeal exudate.   Eyes:      General: No scleral icterus.     Conjunctiva/sclera: Conjunctivae normal.      Pupils: Pupils are equal, round, and reactive to light.   Neck:      " Musculoskeletal: Normal range of motion and neck supple.   Cardiovascular:      Rate and Rhythm: Normal rate and regular rhythm.      Heart sounds: Normal heart sounds.   Pulmonary:      Effort: Pulmonary effort is normal.      Breath sounds: Normal breath sounds.   Abdominal:      General: Bowel sounds are normal. There is no distension.      Palpations: Abdomen is soft. There is no hepatomegaly, splenomegaly or mass.      Tenderness: There is no abdominal tenderness. There is no guarding or rebound. Negative signs include Reeder's sign and McBurney's sign.   Musculoskeletal: Normal range of motion.        Arms:    Lymphadenopathy:      Cervical: No cervical adenopathy.   Skin:     General: Skin is warm and dry.   Neurological:      Mental Status: She is alert and oriented to person, place, and time.      Motor: No abnormal muscle tone.      Coordination: Coordination normal.   Psychiatric:         Behavior: Behavior normal.         Labs:  Lab Results   Component Value Date    WBC 8.72 07/06/2019    HGB 9.3 (L) 07/06/2019    HCT 28.9 (L) 07/06/2019     07/06/2019    K 4.6 07/06/2019    CL 94 (L) 07/06/2019    CO2 25 07/06/2019    BUN 64 (H) 07/06/2019    CREATININE 10.7 (H) 07/06/2019    EGFRNONAA 4 (A) 07/06/2019    CALCIUM 9.4 07/06/2019    PHOS 4.1 09/28/2017    ALBUMIN 3.7 03/14/2019    AST 12 03/14/2019    ALT 12 03/14/2019    .0 (H) 02/26/2019       Lab Results   Component Value Date    BILIRUBINUA Negative 10/18/2019    PROTEINUA 2+ (A) 10/18/2019    NITRITE Negative 10/18/2019    RBCUA 4 10/18/2019    WBCUA 1 10/18/2019       No results found for: HLAABCTYPE    Lab Results   Component Value Date    CPRA 0 05/20/2020    CR3SSPR Negative 05/20/2020    CIABCLM A29, B8, WEAK---A66 03/06/2020    CIIAB DQA1*05:01 05/20/2020       Labs were reviewed with the patient.    Pre-transplant Workup:   Reviewed with the patient.    Assessment:     1. Patient on waiting list for kidney transplant    2. ESRD on  hemodialysis    3. Type 2 diabetes mellitus with chronic kidney disease on chronic dialysis, with long-term current use of insulin    4. Metabolic bone disease    5. Essential hypertension    6. Elevated cholesterol with elevated triglycerides        Plan:   Hypoechoic lesion in the right kidney suspicious for malignancy. Needs CT per renal protocol (on dialysis)  Notify Urologist of US renal results--already has an appointment on 7/20/20 for urology f/u cystoscopy  Will be placed inactive for hypoechoic right kidney lesion.       Transplant Candidacy:   Ms. Hall is a suitable kidney transplant candidate.  Meets center eligibility for accepting HCV+ donor offer - yes.  Patient educated on HCV+ donors. Jael is willing  to accept HCV+ donor offer -  yes   Patient is a candidate for KDPI > 85 kidney donor offer - no d/t weight.  She will be placed in an inactive status at present due to hypoechoic lesion in the right kidney suspicious for malignancy. Patient needs CT renal protocol. .    Dejah Roach NP       Follow-up:   In addition to the tests noted in the plan, Ms. Hall will continue to have reevaluation as per the standing pre-kidney transplant protocol:  1. Monthly blood for PRA  2. Annual return to clinic, except HIV positive, > 65 years of age, or pancreas transplant candidates who will be scheduled to see transplant every 6 months while in pre-transplant phase  3. Annual re-testing: CXR, EKG, yearly mammograms for women over 40 and PSA for males over 40, cardiology follow-up as recommended by initial cardiology pre-transplant evaluation  4. Renal ultrasound every 2 years  5. Baseline colonoscopy after age 50 and repeated as recommended    UNOS Patient Status  Functional Status: 60% - Requires occasional assistance but is able to care for needs  Physical Capacity: No Limitations

## 2020-07-14 NOTE — LETTER
July 16, 2020        Karlos Perry  [address]  [address]  [facility] [address]  Phone: [phone]  Fax: [phone]             Ken Lima- [facility]  [address]  [facility] [address]  Phone: [phone]   Patient: Jael Hall   MR Number: [id number]   YOB: 1965   Date of Visit: 7/14/2020       Dear Dr. Karlos Perry    Thank you for referring Jael Hall to me for evaluation. Attached you will find relevant portions of my assessment and plan of care.    If you have questions, please do not hesitate to call me. I look forward to following Jael Hall along with you.    Sincerely,    Dejah Roach, NP    Enclosure    If you would like to receive this communication electronically, please contact [email or web address] or [phone] to request [facility] access.    [facility] is a tool which provides read-only access to select patient information with whom you have a relationship. Its easy to use and provides real time access to review your patients record including encounter summaries, notes, results, and demographic information.    If you feel you have received this communication in error or would no longer like to receive these types of communications, please e-mail [email or web address]

## 2020-07-17 ENCOUNTER — HOSPITAL ENCOUNTER (OUTPATIENT)
Dept: RADIOLOGY | Facility: HOSPITAL | Age: 55
Discharge: HOME | End: 2020-07-17
Attending: NURSE PRACTITIONER
Payer: MEDICARE

## 2020-07-17 DIAGNOSIS — Z76.82 ORGAN TRANSPLANT CANDIDATE: ICD-10-CM

## 2020-07-17 NOTE — PROGRESS NOTES
LISTED PATIENT EDUCATION NOTE    Ms. Jael Hall was seen in LISTED kidney transplant clinic for evaluation for kidney, kidney/pancreas or pancreas only transplant.  The patient attended a an individual video education session that discussed/reviewed the following aspects of transplantation: evaluation and selection committee process, UNOS waitlist management/multiple listings, types of organs offered (KDPI < 85%, KDPI > 85%, PHS increased risk, DCD, HCV+, HIV+ for HIV+ recipients and enbloc/dual), financial aspects, surgical procedures, dietary instruction pre- and post-transplant, health maintenance pre- and post-transplant, post-transplant hospitalization and outpatient follow-up, potential to participate in a research protocol, and medication management and side effects.  A question and answer session was provided after the presentation.    The patient was seen by all members of the multi-disciplinary team to include: Nephrologist/PA, Surgeon, , Transplant Coordinator, , Pharmacist and Dietician (if applicable).    The patient reviewed and signed all consents for evaluation which were witnessed and sent to scanning into the [facility] chart.    The patient was given an education book and plan for further evaluation based on her individual assessment.      The patient was encouraged to call with any questions or concerns.

## 2020-07-21 ENCOUNTER — TELEPHONE (OUTPATIENT)
Dept: TRANSPLANT | Facility: CLINIC | Age: 55
End: 2020-07-21

## 2020-07-22 ENCOUNTER — TELEPHONE (OUTPATIENT)
Dept: TRANSPLANT | Facility: CLINIC | Age: 55
End: 2020-07-22

## 2020-07-22 DIAGNOSIS — Z76.82 ORGAN TRANSPLANT CANDIDATE: Primary | ICD-10-CM

## 2020-07-24 ENCOUNTER — TELEPHONE (OUTPATIENT)
Dept: TRANSPLANT | Facility: CLINIC | Age: 55
End: 2020-07-24

## 2020-07-28 ENCOUNTER — HOSPITAL ENCOUNTER (OUTPATIENT)
Dept: RADIOLOGY | Facility: OTHER | Age: 55
Discharge: HOME | End: 2020-07-28
Attending: NURSE PRACTITIONER
Payer: MEDICARE

## 2020-07-28 PROCEDURE — 25500020 PHARM REV CODE 255: Mod: TXP | Performed by: NURSE PRACTITIONER

## 2020-07-28 PROCEDURE — 74178 CT ABD&PLV WO CNTR FLWD CNTR: CPT | Mod: TC,TXP

## 2020-07-28 PROCEDURE — 74178 CT ABD&PLV WO CNTR FLWD CNTR: CPT | Mod: 26,TXP,, | Performed by: RADIOLOGY

## 2020-07-28 PROCEDURE — 74178 CT ABDOMEN PELVIS W WO CONTRAST: ICD-10-PCS | Mod: 26,TXP,, | Performed by: RADIOLOGY

## 2020-07-28 RX ADMIN — IOHEXOL 75 ML: 350 INJECTION, SOLUTION INTRAVENOUS at 01:07

## 2020-08-03 ENCOUNTER — HOSPITAL ENCOUNTER (EMERGENCY)
Facility: HOSPITAL | Age: 55
Discharge: HOME | End: 2020-08-03
Attending: EMERGENCY MEDICINE
Payer: MEDICARE

## 2020-08-03 VITALS
HEIGHT: 64 IN | HEART RATE: 73 BPM | SYSTOLIC BLOOD PRESSURE: 196 MMHG | WEIGHT: 202 LBS | RESPIRATION RATE: 20 BRPM | OXYGEN SATURATION: 96 % | DIASTOLIC BLOOD PRESSURE: 88 MMHG | TEMPERATURE: 99 F | BODY MASS INDEX: 34.49 KG/M2

## 2020-08-03 DIAGNOSIS — M79.651 PAIN OF RIGHT THIGH: Primary | ICD-10-CM

## 2020-08-03 LAB
ALBUMIN SERPL BCP-MCNC: 3.4 G/DL (ref 3.5–5.2)
ALP SERPL-CCNC: 59 U/L (ref 55–135)
ALT SERPL W/O P-5'-P-CCNC: 12 U/L (ref 10–44)
ANION GAP SERPL CALC-SCNC: 13 MMOL/L (ref 8–16)
AST SERPL-CCNC: 13 U/L (ref 10–40)
BASOPHILS # BLD AUTO: 0.05 K/UL (ref 0–0.2)
BASOPHILS NFR BLD: 0.7 % (ref 0–1.9)
BILIRUB SERPL-MCNC: 0.7 MG/DL (ref 0.1–1)
BUN SERPL-MCNC: 52 MG/DL (ref 6–20)
CALCIUM SERPL-MCNC: 10.1 MG/DL (ref 8.7–10.5)
CHLORIDE SERPL-SCNC: 94 MMOL/L (ref 95–110)
CO2 SERPL-SCNC: 28 MMOL/L (ref 23–29)
CREAT SERPL-MCNC: 9.8 MG/DL (ref 0.5–1.4)
DIFFERENTIAL METHOD: ABNORMAL
EOSINOPHIL # BLD AUTO: 0.2 K/UL (ref 0–0.5)
EOSINOPHIL NFR BLD: 3.2 % (ref 0–8)
ERYTHROCYTE [DISTWIDTH] IN BLOOD BY AUTOMATED COUNT: 17.1 % (ref 11.5–14.5)
EST. GFR  (AFRICAN AMERICAN): 4.6 ML/MIN/1.73 M^2
EST. GFR  (NON AFRICAN AMERICAN): 4 ML/MIN/1.73 M^2
GLUCOSE SERPL-MCNC: 152 MG/DL (ref 70–110)
HCT VFR BLD AUTO: 37.9 % (ref 37–48.5)
HGB BLD-MCNC: 11.6 G/DL (ref 12–16)
IMM GRANULOCYTES # BLD AUTO: 0.02 K/UL (ref 0–0.04)
IMM GRANULOCYTES NFR BLD AUTO: 0.3 % (ref 0–0.5)
LYMPHOCYTES # BLD AUTO: 1.7 K/UL (ref 1–4.8)
LYMPHOCYTES NFR BLD: 23.9 % (ref 18–48)
MCH RBC QN AUTO: 25.3 PG (ref 27–31)
MCHC RBC AUTO-ENTMCNC: 30.6 G/DL (ref 32–36)
MCV RBC AUTO: 83 FL (ref 82–98)
MONOCYTES # BLD AUTO: 0.7 K/UL (ref 0.3–1)
MONOCYTES NFR BLD: 9.6 % (ref 4–15)
NEUTROPHILS # BLD AUTO: 4.5 K/UL (ref 1.8–7.7)
NEUTROPHILS NFR BLD: 62.3 % (ref 38–73)
NRBC BLD-RTO: 0 /100 WBC
PLATELET # BLD AUTO: 191 K/UL (ref 150–350)
PMV BLD AUTO: 10.6 FL (ref 9.2–12.9)
POTASSIUM SERPL-SCNC: 4.6 MMOL/L (ref 3.5–5.1)
PROT SERPL-MCNC: 8.3 G/DL (ref 6–8.4)
RBC # BLD AUTO: 4.59 M/UL (ref 4–5.4)
SODIUM SERPL-SCNC: 135 MMOL/L (ref 136–145)
WBC # BLD AUTO: 7.2 K/UL (ref 3.9–12.7)

## 2020-08-03 PROCEDURE — 99284 EMERGENCY DEPT VISIT MOD MDM: CPT | Mod: NTX

## 2020-08-03 PROCEDURE — 99284 PR EMERGENCY DEPT VISIT,LEVEL IV: ICD-10-PCS | Mod: NTX,,, | Performed by: EMERGENCY MEDICINE

## 2020-08-03 PROCEDURE — 25000003 PHARM REV CODE 250: Mod: NTX | Performed by: PHYSICIAN ASSISTANT

## 2020-08-03 PROCEDURE — 99284 EMERGENCY DEPT VISIT MOD MDM: CPT | Mod: NTX,,, | Performed by: EMERGENCY MEDICINE

## 2020-08-03 PROCEDURE — 80053 COMPREHEN METABOLIC PANEL: CPT | Mod: NTX

## 2020-08-03 PROCEDURE — 85025 COMPLETE CBC W/AUTO DIFF WBC: CPT | Mod: NTX

## 2020-08-03 RX ORDER — CYCLOBENZAPRINE HCL 10 MG
10 TABLET ORAL 3 TIMES DAILY PRN
Qty: 12 TABLET | Refills: 0 | Status: SHIPPED | OUTPATIENT
Start: 2020-08-03 | End: 2020-08-08

## 2020-08-03 RX ORDER — ACETAMINOPHEN 325 MG/1
650 TABLET ORAL EVERY 6 HOURS PRN
Qty: 30 TABLET | Refills: 0 | Status: ON HOLD | OUTPATIENT
Start: 2020-08-03 | End: 2020-09-09

## 2020-08-03 RX ORDER — ACETAMINOPHEN 325 MG/1
650 TABLET ORAL
Status: COMPLETED | OUTPATIENT
Start: 2020-08-03 | End: 2020-08-03

## 2020-08-03 RX ADMIN — ACETAMINOPHEN 650 MG: 325 TABLET ORAL at 12:08

## 2020-08-03 NOTE — ED NOTES
Patient discharged home with medications and states no other questions.   Discharge instructions given  Patient verbalizes understanding   Patient denies chest pain and shortness of breath   All belongings sent home with patient

## 2020-08-03 NOTE — ED NOTES
LOC: The patient is awake, alert, and oriented to place, time, situation. Affect is appropriate.  Speech is appropriate and clear.     APPEARANCE: Patient resting comfortably in no acute distress.  Patient is clean and well groomed.    SKIN: The skin is warm and dry; color consistent with ethnicity.  Patient has normal skin turgor and moist mucus membranes.  Skin intact; no breakdown or bruising noted.     MUSCULOSKELETAL: Patient moving upper and lower extremities without difficulty.  Endorses right leg pain that starts in the vastus lateralis and radiated upward.     RESPIRATORY: Airway is open and patent. Respirations spontaneous, even, easy, and non-labored.  Patient has a normal effort and rate.  No accessory muscle use noted. Denies cough.     CARDIAC:  Normal rhythm and rate noted.  No peripheral edema noted. No complaints of chest pain.      ABDOMEN: Soft and non tender to palpation.  No distention noted.     NEUROLOGIC: Eyes open spontaneously.  Behavior appropriate to situation.  Follows commands; facial expression symmetrical.  Purposeful motor response noted; normal sensation in all extremities.

## 2020-08-03 NOTE — ED TRIAGE NOTES
Pt reports to ED with c/o right leg pain with no injury. Pt was seen at [facility] where a blood clot was ruled out and xray was completed with no broken bones. Pt wants a second opinions.

## 2020-08-03 NOTE — ED PROVIDER NOTES
Encounter Date: 8/3/2020       History     Chief Complaint   Patient presents with    Leg Pain     seen at [facility] about 1 month ago, no better, r leg pain       Patient is a 54-year-old female with past medical history of hypertension, diabetes, ESRD on dialysis (M/W/F), presenting to the emergency department for evaluation of right leg pain.  The patient states her symptoms have been occurring intermittently for over 1 month.  She states that the pain is located in her right thigh and her knee.  She denies any fall or injury.  She states she feels like it might be swollen.  She admits she was seen and evaluated for the same pain at [facility] approximately 1 month ago.  She states she was told it was due to electrolyte imbalances and she was given medication that did not help.  She has not seen her PCP for this.  She denies any numbness, tingling, weakness.  She denies any shortness of breath.  Of note, patient did go to dialysis today however cut her session short because she felt like her pain was severe enough to come back to the emergency room.This is the extent of the patient's complaints at this time.       The history is provided by the patient.     Review of patient's allergies indicates:  No Known Allergies  Past Medical History:   Diagnosis Date    Anemia     Arrhythmia     Bradycardia     Cyst of left ovary 2019    Diabetes mellitus     Dialysis patient         Disorder of kidney and ureter     Hyperlipidemia     Hypertension     Kidney failure     Obesity     Preeclampsia      Past Surgical History:   Procedure Laterality Date    AV FISTULA PLACEMENT      right arm     SECTION      x1    COLONOSCOPY N/A 2018    Procedure: COLONOSCOPY;  Surgeon: Pankaj Hinojosa MD;  Location: [facility] ([address] [place] [address]);  Service: Endoscopy;  Laterality: N/A;  dialysis pt/labs prior to colon/MS    ROBOT-ASSISTED LAPAROSCOPIC ABDOMINAL HYSTERECTOMY USING DA NATALYA XI N/A 2019     Procedure: XI ROBOTIC HYSTERECTOMY;  Surgeon: Trice Lei MD;  Location: [facility] OR;  Service: OB/GYN;  Laterality: N/A;    ROBOT-ASSISTED LAPAROSCOPIC SALPINGO-OOPHORECTOMY USING DA NATALYA XI Bilateral 7/5/2019    Procedure: XI ROBOTIC SALPINGO-OOPHORECTOMY;  Surgeon: Trice Lei MD;  Location: [facility] OR;  Service: OB/GYN;  Laterality: Bilateral;    TUBAL LIGATION      Vascular access surgeries      x5     Family History   Problem Relation Age of Onset    Cancer Mother     Ovarian cancer Mother     Cancer Sister     Ovarian cancer Sister     Lung cancer Sister     Diabetes Brother     Cancer Sister     Ovarian cancer Sister     Diabetes Sister     Breast cancer Neg Hx     Colon cancer Neg Hx     Cervical cancer Neg Hx     Endometrial cancer Neg Hx     Vaginal cancer Neg Hx      Social History     Tobacco Use    Smoking status: Never Smoker    Smokeless tobacco: Never Used   Substance Use Topics    Alcohol use: No    Drug use: No     Review of Systems   Constitutional: Negative for activity change, appetite change, chills, fatigue and fever.   HENT: Negative for congestion, rhinorrhea and sore throat.    Eyes: Negative for photophobia and visual disturbance.   Respiratory: Negative for cough, shortness of breath and wheezing.    Cardiovascular: Negative for chest pain.   Gastrointestinal: Negative for abdominal pain, diarrhea, nausea and vomiting.   Genitourinary: Negative for dysuria, hematuria and urgency.   Musculoskeletal: Negative for back pain, myalgias and neck pain.        Right leg pain   Skin: Negative for color change and wound.   Neurological: Negative for weakness and headaches.   Psychiatric/Behavioral: Negative for agitation and confusion.       Physical Exam     Initial Vitals [08/03/20 1136]   BP Pulse Resp Temp SpO2   139/82 88 18 98.7 °F (37.1 °C) 96 %      MAP       --         Physical Exam    Nursing note and vitals reviewed.  Constitutional: Vital signs are normal. She  appears well-developed and well-nourished. She is not diaphoretic. She is Obese . She is cooperative.  Non-toxic appearance. She does not have a sickly appearance. She does not appear ill. No distress.   Obese,  female unaccompanied the emergency room.  Speaking clearly full sentences.  No acute distress.   HENT:   Head: Normocephalic and atraumatic.   Right Ear: External ear normal.   Left Ear: External ear normal.   Nose: Nose normal.   Mouth/Throat: Oropharynx is clear and moist.   Eyes: Conjunctivae and EOM are normal.   Neck: Normal range of motion. Neck supple.   Musculoskeletal: Normal range of motion.      Comments: Diffuse tenderness to palpation of the right upper extremity along the anterior thigh with no point tenderness.  No erythema.  No areas of ecchymosis or significant skin change.  No palpable bony deformity, crepitus, step-off.  Normal range of motion, strength, sensation.  Bilateral legs are symmetrical.   Neurological: She is alert and oriented to person, place, and time.   Skin: Skin is warm.   Psychiatric: She has a normal mood and affect. Her behavior is normal. Judgment and thought content normal.         ED Course   Procedures  Labs Reviewed   CBC W/ AUTO DIFFERENTIAL - Abnormal; Notable for the following components:       Result Value    Hemoglobin 11.6 (*)     Mean Corpuscular Hemoglobin 25.3 (*)     Mean Corpuscular Hemoglobin Conc 30.6 (*)     RDW 17.1 (*)     All other components within normal limits   COMPREHENSIVE METABOLIC PANEL - Abnormal; Notable for the following components:    Sodium 135 (*)     Chloride 94 (*)     Glucose 152 (*)     BUN, Bld 52 (*)     Creatinine 9.8 (*)     Albumin 3.4 (*)     eGFR if  4.6 (*)     eGFR if non  4.0 (*)     All other components within normal limits             Medical Decision Making:   History:   Old Medical Records: I decided to obtain old medical records.  Old Records Summarized: records from  another hospital.       <> Summary of Records:     Reviewed patient's medical record in [facility].  She was seen at [facility] on 07/13/2020 for the same complaints, 1 month of right leg pain.  Ultrasound was negative for DVT.  Pain attributed to electrolyte imbalance and muscle spasms.  Discharged home Tylenol and Robaxin.    Initial Assessment:     Urgent evaluation 54-year-old female with past history of hypertension, diabetes, ESRD on dialysis, presenting to the emergency department for evaluation of right thigh pain x2 months.  Patient is afebrile, nontoxic appearing, hemodynamically stable.  Physical exam reveals diffuse tenderness to palpation without other significant findings.  Reviewed patient's medical records noted above.  Will plan for basic labs and reassess.      Clinical Tests:   Lab Tests: Ordered and Reviewed  ED Management:    CBC shows no significant changes.  Chemistry does not show any significant electrolyte abnormalities given the patient's known ESRD.  Nothing noted explain the patient's current symptoms.  Likely secondary musculoskeletal etiology.  Patient tried vacs in the past with no significant improvement.  Will try Flexeril.  She is also given a prescription for Tylenol.  She is counseled home care treatment as well symptom control.  Urged to obtain close follow with PCP on outpatient basis.The patient was instructed to follow up with a primary care provider in 2 days or to return to the emergency department for worsening symptoms. The treatment plan was discussed with the patient who demonstrated understanding and comfort with plan. The patient's history, physical exam, and plan of care was discussed with and agreed upon with my supervising physician.       This note was created using M Modal Fluency Direct. There may be typographical errors secondary to dictation.                                    Clinical Impression:     1. Pain of right thigh             ED Disposition Condition    Discharge  Stable        ED Prescriptions     Medication Sig Dispense Start Date End Date Auth. Provider    cyclobenzaprine (FLEXERIL) 10 MG tablet Take 1 tablet (10 mg total) by mouth 3 (three) times daily as needed for Muscle spasms. 12 tablet 8/3/2020 8/8/2020 Jennifer Beach PA-C    acetaminophen (TYLENOL) 325 MG tablet Take 2 tablets (650 mg total) by mouth every 6 (six) hours as needed for Pain. 30 tablet 8/3/2020  Jennifer Beach PA-C        Follow-up Information     Follow up With Specialties Details Why Contact Info    Mary Solano MD Family Medicine   [address] [facility] [address] [phone]      [facility]   [address] [place] [address] [phone]                                     Jennifer Beach PA-C  08/03/20 [id number]

## 2020-08-11 ENCOUNTER — HOSPITAL ENCOUNTER (OUTPATIENT)
Dept: RADIOLOGY | Facility: HOSPITAL | Age: 55
Discharge: HOME | End: 2020-08-11
Attending: UROLOGY
Payer: MEDICARE

## 2020-08-11 ENCOUNTER — TELEPHONE (OUTPATIENT)
Dept: UROLOGY | Facility: CLINIC | Age: 55
End: 2020-08-11

## 2020-08-11 ENCOUNTER — OFFICE VISIT (OUTPATIENT)
Dept: UROLOGY | Facility: CLINIC | Age: 55
End: 2020-08-11
Payer: MEDICARE

## 2020-08-11 VITALS
HEART RATE: 77 BPM | SYSTOLIC BLOOD PRESSURE: 184 MMHG | DIASTOLIC BLOOD PRESSURE: 96 MMHG | HEIGHT: 64 IN | WEIGHT: 201.94 LBS | BODY MASS INDEX: 34.48 KG/M2

## 2020-08-11 DIAGNOSIS — R31.0 HEMATURIA, GROSS: ICD-10-CM

## 2020-08-11 DIAGNOSIS — N28.89 RENAL MASS: Primary | ICD-10-CM

## 2020-08-11 DIAGNOSIS — N28.89 RENAL MASS: ICD-10-CM

## 2020-08-11 DIAGNOSIS — R31.0 HEMATURIA, GROSS: Primary | ICD-10-CM

## 2020-08-11 DIAGNOSIS — R31.0 GROSS HEMATURIA: ICD-10-CM

## 2020-08-11 PROCEDURE — 99204 OFFICE O/P NEW MOD 45 MIN: CPT | Mod: S$PBB,TXP,, | Performed by: UROLOGY

## 2020-08-11 PROCEDURE — 71046 X-RAY EXAM CHEST 2 VIEWS: CPT | Mod: TC,TXP

## 2020-08-11 PROCEDURE — 87086 URINE CULTURE/COLONY COUNT: CPT | Mod: TXP

## 2020-08-11 PROCEDURE — 99204 PR OFFICE/OUTPT VISIT, NEW, LEVL IV, 45-59 MIN: ICD-10-PCS | Mod: S$PBB,TXP,, | Performed by: UROLOGY

## 2020-08-11 PROCEDURE — 99999 PR PBB SHADOW E&M-EST. PATIENT-LVL V: ICD-10-PCS | Mod: PBBFAC,TXP,, | Performed by: UROLOGY

## 2020-08-11 PROCEDURE — 99215 OFFICE O/P EST HI 40 MIN: CPT | Mod: PBBFAC,TXP,25 | Performed by: UROLOGY

## 2020-08-11 PROCEDURE — 71046 XR CHEST PA AND LATERAL: ICD-10-PCS | Mod: 26,TXP,, | Performed by: RADIOLOGY

## 2020-08-11 PROCEDURE — 99999 PR PBB SHADOW E&M-EST. PATIENT-LVL V: CPT | Mod: PBBFAC,TXP,, | Performed by: UROLOGY

## 2020-08-11 PROCEDURE — 71046 X-RAY EXAM CHEST 2 VIEWS: CPT | Mod: 26,TXP,, | Performed by: RADIOLOGY

## 2020-08-11 RX ORDER — METHOCARBAMOL 500 MG/1
TABLET, FILM COATED ORAL
Status: ON HOLD | COMMUNITY
Start: 2020-07-13 | End: 2020-09-09 | Stop reason: CLARIF

## 2020-08-11 RX ORDER — VERAPAMIL HYDROCHLORIDE 120 MG/1
TABLET, FILM COATED, EXTENDED RELEASE ORAL
Status: ON HOLD | COMMUNITY
Start: 2020-06-30 | End: 2020-09-09 | Stop reason: CLARIF

## 2020-08-11 RX ORDER — SEVELAMER CARBONATE 800 MG/1
TABLET, FILM COATED ORAL
Status: ON HOLD | COMMUNITY
Start: 2020-06-05 | End: 2020-09-09 | Stop reason: CLARIF

## 2020-08-11 RX ORDER — INSULIN LISPRO 100 [IU]/ML
INJECTION, SOLUTION INTRAVENOUS; SUBCUTANEOUS
Status: ON HOLD | COMMUNITY
Start: 2020-06-30 | End: 2020-09-09 | Stop reason: CLARIF

## 2020-08-11 RX ORDER — PANTOPRAZOLE SODIUM 20 MG/1
TABLET, DELAYED RELEASE ORAL
COMMUNITY
Start: 2020-06-12 | End: 2020-08-17 | Stop reason: ALTCHOICE

## 2020-08-11 RX ORDER — PREGABALIN 50 MG/1
50 CAPSULE ORAL 2 TIMES DAILY
Status: ON HOLD | COMMUNITY
Start: 2020-08-10 | End: 2020-09-11 | Stop reason: HOSPADM

## 2020-08-11 NOTE — PROGRESS NOTES
Subjective:       Patient ID: Jael Hall is a 54 y.o. female.    Chief Complaint:  Renal mass.      History of Present Illness  HPI  Patient is a 54 y.o. female who is new to our clinic and referred by Natalee Resendiz NP for evaluation of a right renal mass.  Patient reports some gross hematuria. No abdominal pain.  She does urinate, 2x/day.    No unexpected weight loss.     Patient is a never smoker.     Has a h/o hysterectomy---no other abdominal surgeries.     Patient has ESRD and is on HD---Monday, Wednesday, Friday through a right wrist AVF.  She has DM and HTN.     +fh of ovarian cancer---sister.          Review of Systems  Review of Systems  All other systems reviewed and negative except pertinent positives noted in HPI.       Objective:     Physical Exam   Constitutional: She is oriented to person, place, and time. She appears well-developed. She is cooperative.  Non-toxic appearance.   HENT:   Head: Normocephalic.   Cardiovascular: Normal rate and normal pulses.    Pulmonary/Chest: Effort normal. No accessory muscle usage. No tachypnea. No respiratory distress.   Abdominal: Soft. Normal appearance. She exhibits no distension, no fluid wave and no mass. There is no abdominal tenderness. There is no rebound. No hernia.       Liver/spleen non-palpable   Musculoskeletal: Normal range of motion.   Neurological: She is alert and oriented to person, place, and time.   Skin: No bruising and no rash noted.     Psychiatric: Her speech is normal and behavior is normal. Thought content normal.       Lab Review  Lab Results   Component Value Date    COLORU Yellow 10/18/2019    SPECGRAV 1.010 10/18/2019    PHUR 8.0 04/01/2020    NITRITE Negative 10/18/2019    KETONESU Negative 10/18/2019    UROBILINOGEN Negative 10/18/2019         Assessment:        1. Renal mass    2. Hematuria, gross            Plan:     Renal mass    Hematuria, gross  -     Cytology, Urine  -     Urine culture      - Long talk about renal mass and  it's management.  Reviewed images.Discussed options including active surveillance, biopsy, minimally invasive techniques including HFRA, cryo. Discussed open and laparascopic surgical approaches. Discussed partial and radical nephrectomy. Discussed surgery preparation, surgery, recuperation, recovery, exercise restrictions. Discussed risks, benefits, and complications. Answered questions and addressed their concerns.  -ultimately plan for right robotic nephrectomy.      -urine cytology and urine culture  -plan for cystoscopy with bilateral retrograde pyelograms to complete hematuria w/u prior to robotic right nephrectomy.

## 2020-08-12 ENCOUNTER — TELEPHONE (OUTPATIENT)
Dept: PREADMISSION TESTING | Facility: HOSPITAL | Age: 55
End: 2020-08-12

## 2020-08-12 LAB
BACTERIA UR CULT: NORMAL
BACTERIA UR CULT: NORMAL

## 2020-08-12 NOTE — TELEPHONE ENCOUNTER
----- Message from Isabel Huerta RN sent at 8/12/2020  2:56 PM CDT -----  Surgery Date: 08/20/20  Pre-op Appt: none scheduled at this time          Please call pt and schedule:    POC    Zackary     Or NP    Labs        Thank you,  Isabel Huerta, RN

## 2020-08-12 NOTE — ANESTHESIA PAT ROS NOTE
08/12/2020  Jael Hall is a 54 y.o., female.      Pre-op Assessment          Review of Systems         Anesthesia Assessment: Preoperative EQUATION    Planned Procedure: Procedure(s) (LRB):  CYSTOSCOPY, WITH RETROGRADE PYELOGRAM (Bilateral)  Requested Anesthesia Type:Monitor Anesthesia Care  Surgeon: Douglas Abraham MD  Service: Urology  Known or anticipated Date of Surgery:8/20/2020    Surgeon notes: reviewed      Previous anesthesia records:GETA and No problems   07/05/19: Robotic hysterectomy with BSO:  Airway/Jaw/Neck:  Airway Findings: Mouth Opening: Normal Tongue: Normal  General Airway Assessment: Adult, Good  Mallampati: II  TM Distance: Normal, at least 6 cm  Jaw/Neck Findings:  Neck ROM: Normal ROM     Placement Date: 07/05/19 Placement Time: 0733 Method of Intubation: Kaba Inserted by: CRNA Airway Device: Endotracheal Tube Mask Ventilation: Easy - oral Intubated: Postinduction Airway Device Size: 7.5 Style: Cuffed Cuff Inflation: Minimal occlusive pressure Inflation Amount (mL): 3.5 Placement Verified By: Auscultation;Capnometry Grade: Grade I , easy per Kaba  Complicating Factors: None;Short neck;Large/Floppy epiglottis;Obesity Findings Post-Intubation: Positive EtCO2;Bilateral breath sounds;Atraumatic/Condition of teeth unchanged Depth of Insertion (cm): 22 Secured at: Lips Complications: None Breath Sounds: Equal Bilateral Insertion attempts (enter comment if more than 2 attempts): 1       Last PCP note: > 1 year ago , within [facility]   Subspecialty notes: Gyn/ONC, Hepatology, Kidney Transplant, Liver Transplant, Urology    Other important co-morbidities:per epic: DM2, HLD, HTN, Obesity and anemia, esrd, ckd3, rt renal mass, dialysis m/w/f, nonalcoholic fatty liver disease, liver mass      Tests already available:  Available tests,  within 1 month , within [facility] .    8/3:  cmp  Cbc    7/14:     ECHO EF 65%    6/26:  a1c 6.4    4/23:  EKG- NSR/LT AXIS DEVIATION, PROLONGED QT, NO CHANGE FROM PREVIOUS.              Instructions given. (See in Nurse's note)    Optimization:  Anesthesia Preop Clinic Assessment  Indicated.    Medical Opinion Indicated.            Plan:    Testing:  Phos, PT/INR and PTT   Pre-anesthesia  visit       Visit focus: concerns in complex and/or prolonged anesthesia     Consultation:TBCB NP/nephrology (Karlos Perry MD)     Patient  has previously scheduled Medical Appointment: 8/17/20    Navigation: Tests Scheduled.              Consults scheduled.             Results will be tracked by Preop Clinic.

## 2020-08-13 PROBLEM — R31.9 HEMATURIA: Status: ACTIVE | Noted: 2019-10-30

## 2020-08-13 PROBLEM — N30.80 CYSTITIS CYSTICA: Status: ACTIVE | Noted: 2020-02-03

## 2020-08-13 PROBLEM — U07.1 COVID-19 VIRUS INFECTION: Status: ACTIVE | Noted: 2020-04-07

## 2020-08-13 PROBLEM — R53.81 PHYSICAL DECONDITIONING: Status: ACTIVE | Noted: 2020-04-13

## 2020-08-13 PROBLEM — K08.9 POOR DENTITION: Status: ACTIVE | Noted: 2020-02-05

## 2020-08-13 PROBLEM — K21.9 GERD (GASTROESOPHAGEAL REFLUX DISEASE): Status: ACTIVE | Noted: 2020-08-13

## 2020-08-13 PROBLEM — E87.5 HYPERKALEMIA: Status: ACTIVE | Noted: 2017-03-29

## 2020-08-13 NOTE — ASSESSMENT & PLAN NOTE
Dialysis Days MWF at [facility]    Medications:  sodium bicarbonate 650 MG tablet, Renvela 800mg x 3 tablets,  three times per day with meals    Close monitoring of serum electrolytes is recommended prior to surgery and during the perioperative period to ensure that the electrolytes are appropriate to proceed with surgery.  Please closely monitor the use of IV fluids to avoid fluid/volume overload.   Please note that the patient may be at increased risk of bleeding from platelet dysfunction from renal failure.   I suggest involving the nephrology team for on going dialysis treatment

## 2020-08-13 NOTE — ASSESSMENT & PLAN NOTE
sevelamer carbonate (RENVELA) 800 mg Tab, 3 tablets with meals and snacks  Patient reports only taking 1 tablet with meals  Will  patient that she is prescribed 3 tablets with every meals and that her current Phosphorus level is 8.7 at this time

## 2020-08-13 NOTE — ASSESSMENT & PLAN NOTE
Currently takes:  insulin (LANTUS SOLOSTAR U-100 INSULIN) glargine 100 units/mL (3mL) SubQ pen 55 Units     insulin aspart U-100 (NOVOLOG FLEXPEN U-100 INSULIN) 100 unit/mL (3 mL) InPn pen 5 Units      DM x 21 years.  Most recent A1c on 6/26/2020 6.4.  Average daily accuchecks are 94 .   Reports peripheral neuropathy. Denies visual changes.  Maintain healthy weight. Exercise at least 150 minutes weekly. Encouraged diet rich in nutrients such as fruits, vegetables, and whole grains; reduce sugar intake from cakes, candy, and sugared drinks.  \  Diabetes Mellitus-I suggest monitoring the glucose in the perioperative period ( Before meals and bed time,if the patient is on oral feeds or every 6 hourly ,if the patient is NPO )  Blood glucose target in hospitalized patients is 140-180. Oral Hypoglycemic agents are generally avoided during the hospital stay . If glucose is consistently elevated ,I suggest using basal ,prandial Insulin regimen to control the glucose , as elevated glucose can be associated with adverse surgical out comes. Please consider involving Hospital Medicine or Endocrinology ,if any help is needed with Glucose control. Patient will be instructed based on the pre op clinic guidelines  about adjustment of diabetic treatment (If applicable )  considering the NPO status for Surgery

## 2020-08-13 NOTE — ASSESSMENT & PLAN NOTE
Dialysis Days MWF at [facility] dialysis [place]    Medications:  sodium bicarbonate 650 MG tablet

## 2020-08-13 NOTE — ASSESSMENT & PLAN NOTE
Assessment:  Current BP  Today 206/96--> repeat BP after 30 minutes 197/93  Patient reports home BP readings of: at HD it runs 130-180/??  Patient reports anxiety today with visit and diagnosis; states she is afraid of what the doctors may find    Plan:   Retrieve Dialysis BP readings Angelita pre and post dialysis:    8/19/20 pre 187/105  Post 150/93  8/18/20 pre 199/114  Post 189/109  8/14/20 pre 178/102  Post 107/65  8/12/10 pre 207/107  Post 133/85  8/10/20 pre 183/100  Post 138/84    Discussed patient's BP with Dr. Fuentes, patient's Nephrologist- she states the patient is non-compliant with BP medications and has expressed interest in stopping all medications.  Dr. Fuentes counseled the patient to continue all BP meds to prevent stroke, heart attack and further target organ damage.      No medication changes to be made at this time- will  patient to take medications as prescribed in preparation for her surgery    Current Medications:    losartan (COZAAR) 50 MG tablet once per day    verapamiL (CALAN-SR) 120 MG CR tablet once per day    carvedilol (COREG) 25 MG tablet twice per day    Counseling:  Take medications as prescribed in preparation for her surgery  Encouraged keeping a healthy weight and BMI  Education was provided regarding lifestyle changes to reduce systolic BP;  Smoking cessation; Exercise 30 minutes per day,  5 days per week or 150 minutes weekly;  Sodium reduction and avoidance of high salt foods such as processed meats, frozen meals, fast foods.

## 2020-08-13 NOTE — ASSESSMENT & PLAN NOTE
Currently being treated with pantoprazole (PROTONIX) 20 MG tablet  Encouraged to elevate HOB, avoid laying down for 2 hours after meals.   Weight loss encouraged and dietary changes such as reduction or avoidance of fatty foods, caffeine, spicy foods, and chocolate.  Smoking cessation was recommended as well as reduction of alcohol consumption.

## 2020-08-17 ENCOUNTER — LAB VISIT (OUTPATIENT)
Dept: SURGERY | Facility: CLINIC | Age: 55
End: 2020-08-17
Payer: MEDICARE

## 2020-08-17 ENCOUNTER — INITIAL CONSULT (OUTPATIENT)
Dept: INTERNAL MEDICINE | Facility: CLINIC | Age: 55
End: 2020-08-17
Payer: MEDICARE

## 2020-08-17 ENCOUNTER — LAB VISIT (OUTPATIENT)
Dept: LAB | Facility: HOSPITAL | Age: 55
End: 2020-08-17
Attending: ANESTHESIOLOGY
Payer: MEDICARE

## 2020-08-17 VITALS
TEMPERATURE: 99 F | RESPIRATION RATE: 18 BRPM | WEIGHT: 202.13 LBS | SYSTOLIC BLOOD PRESSURE: 206 MMHG | BODY MASS INDEX: 34.69 KG/M2 | OXYGEN SATURATION: 96 % | HEART RATE: 87 BPM | DIASTOLIC BLOOD PRESSURE: 96 MMHG

## 2020-08-17 DIAGNOSIS — E11.22 TYPE 2 DIABETES MELLITUS WITH CHRONIC KIDNEY DISEASE ON CHRONIC DIALYSIS, WITH LONG-TERM CURRENT USE OF INSULIN: Chronic | ICD-10-CM

## 2020-08-17 DIAGNOSIS — Z79.4 TYPE 2 DIABETES MELLITUS WITH CHRONIC KIDNEY DISEASE ON CHRONIC DIALYSIS, WITH LONG-TERM CURRENT USE OF INSULIN: Chronic | ICD-10-CM

## 2020-08-17 DIAGNOSIS — N18.6 ESRD ON HEMODIALYSIS: ICD-10-CM

## 2020-08-17 DIAGNOSIS — U07.1 COVID-19 VIRUS INFECTION: ICD-10-CM

## 2020-08-17 DIAGNOSIS — E78.2 ELEVATED CHOLESTEROL WITH ELEVATED TRIGLYCERIDES: Chronic | ICD-10-CM

## 2020-08-17 DIAGNOSIS — Z99.2 TYPE 2 DIABETES MELLITUS WITH CHRONIC KIDNEY DISEASE ON CHRONIC DIALYSIS, WITH LONG-TERM CURRENT USE OF INSULIN: Chronic | ICD-10-CM

## 2020-08-17 DIAGNOSIS — F41.9 ANXIETY: ICD-10-CM

## 2020-08-17 DIAGNOSIS — K76.0 NAFLD (NONALCOHOLIC FATTY LIVER DISEASE): ICD-10-CM

## 2020-08-17 DIAGNOSIS — N18.6 TYPE 2 DIABETES MELLITUS WITH CHRONIC KIDNEY DISEASE ON CHRONIC DIALYSIS, WITH LONG-TERM CURRENT USE OF INSULIN: Chronic | ICD-10-CM

## 2020-08-17 DIAGNOSIS — Z99.2 ESRD ON HEMODIALYSIS: ICD-10-CM

## 2020-08-17 DIAGNOSIS — I10 ESSENTIAL HYPERTENSION: ICD-10-CM

## 2020-08-17 DIAGNOSIS — D63.1 ANEMIA IN ESRD (END-STAGE RENAL DISEASE): ICD-10-CM

## 2020-08-17 DIAGNOSIS — N18.6 ANEMIA IN ESRD (END-STAGE RENAL DISEASE): ICD-10-CM

## 2020-08-17 DIAGNOSIS — E87.1 HYPONATREMIA: ICD-10-CM

## 2020-08-17 DIAGNOSIS — E83.39 HYPERPHOSPHATEMIA: ICD-10-CM

## 2020-08-17 DIAGNOSIS — Z91.148 NON COMPLIANCE W MEDICATION REGIMEN: ICD-10-CM

## 2020-08-17 DIAGNOSIS — K21.9 GASTROESOPHAGEAL REFLUX DISEASE, ESOPHAGITIS PRESENCE NOT SPECIFIED: ICD-10-CM

## 2020-08-17 DIAGNOSIS — M89.8X9 METABOLIC BONE DISEASE: ICD-10-CM

## 2020-08-17 DIAGNOSIS — R31.0 HEMATURIA, GROSS: ICD-10-CM

## 2020-08-17 DIAGNOSIS — N18.6 ESRD (END STAGE RENAL DISEASE): ICD-10-CM

## 2020-08-17 PROBLEM — L66.9 SCARRING ALOPECIA: Status: ACTIVE | Noted: 2019-09-16

## 2020-08-17 PROBLEM — Z98.890 S/P DILATION AND CURETTAGE: Status: RESOLVED | Noted: 2018-05-22 | Resolved: 2020-08-17

## 2020-08-17 PROBLEM — H31.002 CHORIORETINAL SCAR, LEFT EYE: Status: ACTIVE | Noted: 2018-02-07

## 2020-08-17 PROBLEM — E55.9 VITAMIN D DEFICIENCY: Status: ACTIVE | Noted: 2019-10-01

## 2020-08-17 PROBLEM — R80.9 PROTEINURIA: Status: ACTIVE | Noted: 2019-10-30

## 2020-08-17 PROBLEM — N83.202 CYST OF LEFT OVARY: Status: RESOLVED | Noted: 2019-07-05 | Resolved: 2020-08-17

## 2020-08-17 PROBLEM — D63.8 ANEMIA OF CHRONIC DISORDER: Status: RESOLVED | Noted: 2017-11-08 | Resolved: 2020-08-17

## 2020-08-17 PROBLEM — E87.5 HYPERKALEMIA: Status: RESOLVED | Noted: 2017-03-29 | Resolved: 2020-08-17

## 2020-08-17 PROBLEM — R93.89 THICKENED ENDOMETRIUM: Status: RESOLVED | Noted: 2018-05-22 | Resolved: 2020-08-17

## 2020-08-17 PROBLEM — D63.8 ANEMIA OF CHRONIC DISORDER: Status: ACTIVE | Noted: 2017-11-08

## 2020-08-17 PROBLEM — I15.0 RENOVASCULAR HYPERTENSION: Status: ACTIVE | Noted: 2020-08-17

## 2020-08-17 PROBLEM — I15.0 RENOVASCULAR HYPERTENSION: Status: RESOLVED | Noted: 2020-08-17 | Resolved: 2020-08-17

## 2020-08-17 LAB
APTT BLDCRRT: 25.7 SEC (ref 21–32)
INR PPP: 1 (ref 0.8–1.2)
PHOSPHATE SERPL-MCNC: 8.7 MG/DL (ref 2.7–4.5)
PROTHROMBIN TIME: 11 SEC (ref 9–12.5)

## 2020-08-17 PROCEDURE — 36415 COLL VENOUS BLD VENIPUNCTURE: CPT | Mod: NTX

## 2020-08-17 PROCEDURE — 85610 PROTHROMBIN TIME: CPT | Mod: TXP

## 2020-08-17 PROCEDURE — 99214 PR OFFICE/OUTPT VISIT, EST, LEVL IV, 30-39 MIN: ICD-10-PCS | Mod: S$PBB,NTX,, | Performed by: HOSPITALIST

## 2020-08-17 PROCEDURE — 84100 ASSAY OF PHOSPHORUS: CPT | Mod: TXP

## 2020-08-17 PROCEDURE — 99215 OFFICE O/P EST HI 40 MIN: CPT | Mod: PBBFAC,TXP

## 2020-08-17 PROCEDURE — 99999 PR PBB SHADOW E&M-EST. PATIENT-LVL V: ICD-10-PCS | Mod: PBBFAC,TXP,,

## 2020-08-17 PROCEDURE — 85730 THROMBOPLASTIN TIME PARTIAL: CPT | Mod: TXP

## 2020-08-17 PROCEDURE — U0003 INFECTIOUS AGENT DETECTION BY NUCLEIC ACID (DNA OR RNA); SEVERE ACUTE RESPIRATORY SYNDROME CORONAVIRUS 2 (SARS-COV-2) (CORONAVIRUS DISEASE [COVID-19]), AMPLIFIED PROBE TECHNIQUE, MAKING USE OF HIGH THROUGHPUT TECHNOLOGIES AS DESCRIBED BY CMS-2020-01-R: HCPCS | Mod: NTX

## 2020-08-17 PROCEDURE — 99999 PR PBB SHADOW E&M-EST. PATIENT-LVL V: CPT | Mod: PBBFAC,TXP,,

## 2020-08-17 PROCEDURE — 99214 OFFICE O/P EST MOD 30 MIN: CPT | Mod: S$PBB,NTX,, | Performed by: HOSPITALIST

## 2020-08-17 NOTE — OUTPATIENT SUBJECTIVE & OBJECTIVE
Outpatient Subjective & Objective     Chief complaint-Preoperative evaluation, Perioperative Medical management, complication reduction plan     Active cardiac conditions- none    Revised cardiac risk index predictors- insulin requiring diabetes mellitus and creatinine more than 2    Functional capacity -Examples of physical activity walking 20  Minutes 2-3 days per week can take 1 flight of stairs----- She can undertake all the above activities without  chest pain,chest tightness, Shortness of breath ,dizziness,lightheadedness making her exercise tolerance more,than 4 Mets.     Review of Systems   Constitutional: Negative for chills, fatigue and fever.   HENT: Negative for trouble swallowing and voice change.    Eyes: Negative for photophobia and visual disturbance.        No acute visual changes   Respiratory: Negative for apnea, cough, chest tightness, shortness of breath and wheezing.         STOP bang  Score 2    Low risk HEIDY   Cardiovascular: Negative for chest pain, palpitations and leg swelling.   Gastrointestinal: Negative for abdominal distention, abdominal pain, blood in stool, constipation, diarrhea, nausea and vomiting.        NO FLD, hepatitis, cirrhosis  No BRB or black tarry stool     Endocrine: Negative for cold intolerance, heat intolerance, polydipsia, polyphagia and polyuria.   Genitourinary: Positive for flank pain. Negative for difficulty urinating, dysuria, frequency, hematuria and urgency.        Urinate twice per day On HD MWF   Musculoskeletal: Negative for arthralgias, back pain, gait problem, joint swelling, myalgias, neck pain and neck stiffness.        Right upper thigh pain at times   Neurological: Positive for headaches. Negative for dizziness, tremors, seizures, syncope, weakness, light-headedness and numbness.   Psychiatric/Behavioral: Negative for suicidal ideas. The patient is nervous/anxious.      Family History   Problem Relation Age of Onset    Cancer Mother     Ovarian  cancer Mother     Cancer Sister     Ovarian cancer Sister     Lung cancer Sister     Diabetes Brother     Cancer Sister     Ovarian cancer Sister     Diabetes Sister     Breast cancer Neg Hx     Colon cancer Neg Hx     Cervical cancer Neg Hx     Endometrial cancer Neg Hx     Vaginal cancer Neg Hx      Past Surgical History:   Procedure Laterality Date    AV FISTULA PLACEMENT      right arm     SECTION      x1    COLONOSCOPY N/A 2018    Procedure: COLONOSCOPY;  Surgeon: Pankaj Hinojosa MD;  Location: [address];  Service: Endoscopy;  Laterality: N/A;  dialysis pt/labs prior to colon/MS    HYSTERECTOMY      ROBOT-ASSISTED LAPAROSCOPIC ABDOMINAL HYSTERECTOMY USING DA NATALYA XI N/A 2019    Procedure: XI ROBOTIC HYSTERECTOMY;  Surgeon: Trice Lei MD;  Location: [facility];  Service: OB/GYN;  Laterality: N/A;    ROBOT-ASSISTED LAPAROSCOPIC SALPINGO-OOPHORECTOMY USING DA NATALYA XI Bilateral 2019    Procedure: XI ROBOTIC SALPINGO-OOPHORECTOMY;  Surgeon: Trice Lei MD;  Location: [facility];  Service: OB/GYN;  Laterality: Bilateral;    TUBAL LIGATION      Vascular access surgeries      x5       No anesthesia, bleeding, cardiac problems with previous surgeries/procedures. No DVT PE    No anesthesia, bleeding, cardiac problems with previous surgeries/procedures. NO DVT or PE  Medications and Allergies reviewed in epic.     Physical Exam   Constitutional: She is oriented to person, place, and time. She appears well-developed, well-nourished and obese. She is cooperative.   HENT:   Head: Normocephalic.   Mouth/Throat: Uvula is midline, oropharynx is clear and moist and mucous membranes are normal. Mucous membranes are moist.       Eyes: Lids are normal.   Neck: Trachea normal, normal range of motion and phonation normal. Carotid bruit is not present.   Cardiovascular: Normal rate, regular rhythm and normal heart sounds. Exam reveals no gallop and no friction rub.   No murmur  heard.  Pulses:       Carotid pulses are 3+ on the right side and 3+ on the left side.       Radial pulses are 3+ on the right side and 3+ on the left side.        Dorsalis pedis pulses are 3+ on the right side and 3+ on the left side.        Posterior tibial pulses are 3+ on the right side and 3+ on the left side.   Pulmonary/Chest: Effort normal and breath sounds normal.   Abdominal: Normal appearance and bowel sounds are normal. There is no abdominal tenderness.   Musculoskeletal: Normal range of motion.      Right lower leg: No edema.      Left lower leg: No edema.   Lymphadenopathy:        Head (right side): No submental, no submandibular, no tonsillar, no preauricular, no posterior auricular and no occipital adenopathy present.        Head (left side): No submental, no submandibular, no tonsillar, no preauricular, no posterior auricular and no occipital adenopathy present.        Right cervical: No superficial cervical adenopathy present.       Left cervical: No superficial cervical adenopathy present.   Neurological: She is alert and oriented to person, place, and time. GCS eye subscore is 4. GCS verbal subscore is 5. GCS motor subscore is 6.   Skin: Skin is warm and dry. Capillary refill takes 2 to 3 seconds.   Psychiatric: Her behavior is normal. Mood, memory, affect, judgment and thought content normal. Her speech is tangential.   Patient required frequent redirection when answering questions during the exam     Blood pressure (!) 206/96, pulse 87, temperature 98.7 °F (37.1 °C), resp. rate 18, weight 91.7 kg (202 lb 1.6 oz), last menstrual period 03/15/2016, SpO2 96 %.    Investigations  Lab and Imaging have been reviewed in [facility].    Labs 8/3/2020    Na 135  Cl 94  BUN 52  Cr 9.8  Alb 3.4  GFR 4.6    Hgb 11.6  HCT 37.9      Phosphorus 8.7    Pt 11  INR 1  APTT 25.7    TTE 7/14/2020  Conclusion    · Normal left ventricular systolic function. The estimated ejection fraction is 65%.  · Concentric left  ventricular remodeling.  · No wall motion abnormalities.  · Indeterminate left ventricular diastolic function.  · Normal right ventricular systolic function.  · Severe left atrial enlargement.  · Mild tricuspid regurgitation.  · The estimated PA systolic pressure is 36 mmHg.  · Intermediate central venous pressure (8 mmHg).          Review of old records- Was done and information gathered regards to events leading to surgery and health conditions of significance in the perioperative period.    Outpatient Subjective & Objective

## 2020-08-17 NOTE — PATIENT INSTRUCTIONS
Your surgery has been scheduled for:8/20    You should report to:    ____[facility], located on the [place] side of the first floor of the           [facility] ([phone])    __x__The Second Floor Surgery Center, located on the [facility] side of the            Second floor of the [facility] ([phone])    Please Note   - Tell your doctor if you take Aspirin, products containing Aspirin, herbal medications or blood thinners, such as Coumadin, Ticlid, or Plavix.  (Consult your provider regarding holding or stopping before surgery).  - Arrange for someone to drive you home following surgery.  You will not be allowed to leave the surgical facility alone or drive yourself home following sedation and anesthesia.    Before Surgery  - Stop taking all vitamins/ herbal medications 7 days prior to surgery  - No Motrin/Advil (Ibuprofen) 7 days before surgery  - No Aleve (Naproxen) 7 days before surgery  - Stop taking Aspirin, products containing Aspirin 7 days before surgery  - Stop taking blood thinners_7_days before surgery  - No Goody's/BC  Powder 7 days before surgery  - Refrain from drinking alcoholic beverages for 24 hours before and after surgery  - Stop or limit smoking 14 days before surgery  - You may take Tylenol for pain    Night before Surgery   Stop ALL solid food, gum, candy (including vitamins) 8 hours before arrival time.  (Please note: If your surgeon gives you different eating and drinking instructions, please follow surgeon's directions.)   Stop all CLOUDY liquids: coffee with creamer, formula, tube feeds, cloudy juices, non-human milk with additives, 6 hours prior to arrival time.   The patient should be ENCOURAGED to drink carbohydrate-rich clear liquids (sports drinks, clear juices) until 2 hours prior to arrival time.   CLEAR liquids include only water, black coffee NO creamer, clear oral rehydration drinks, clear sports drinks or clear fruit  juices (no orange juice, no pulpy juices, no apple cider). Advise patients if they can read newsprint through the liquid, it qualifies as clear liquid.    IF IN DOUBT, drink water instead.   - Take a shower or bath (shower is recommended).  Bathe with Hibiclens soap or an antibacterial soap from the neck down.  If not supplied by your surgeon, hibiclens soap will need to be purchased over the counter in pharmacy.  Rinse soap off thoroughly.  - Shampoo your hair with your regular shampoo    The Day of Surgery    NOTHING TO  DRINK 2 hours before arrival time. If you are told to take medication on the morning of surgery, it may be taken with a sip of water.   - Take another bath or shower with hibiclens or any antibacterial soap, to reduce the chance of infection.  - Take heart and blood pressure medications with a small sip of water, as advised by the perioperative team.  - Do not take fluid pills  - You may brush your teeth and rinse your mouth, but do not swallow any additional water.   - Do not apply perfumes, powder, body lotions or deodorant on the day of surgery.  - Nail polish should be removed.  - Do not wear makeup or moisturizer.  - Wear comfortable clothes, such as a button front shirt and loose fitting pants.  - Leave all jewelry, including body piercings, and valuables at home.    - Bring any devices you will neeed after surgery such as crutches or canes.  - If you have sleep apnea, please bring your CPAP machine.    In the event that your physical condition changes including the onset of a cold or respiratory illness, or if you have to delay or cancel your surgery, please notify your surgeon.      Preventive perioperative care    Thromboembolic prophylaxis:  Her risk factors for thrombosis include obesity and surgical procedure.I suggest  thromboembolic prophylaxis ( mechanical/pharmacological, weighing the risk benefits of pharmacological agent use considering nadege procedural bleeding )  during the  perioperative period.I suggested being active in the post operative period.      Postoperative pulmonary complication prophylaxis-Risk factors for post operative pulmonary complications include ASA class >2- I suggest early ambulation and pain control so as to avoid diaphragmatic splinting       Renal complication prophylaxis-Risk factors for renal complications include pre-existing renal disease, diabetes mellitus and hypertension . I suggest keeping her well hydrated and avoidance/ minimizing the use of  NSAID's,CHRISTIANSEN 2 Inhibitors ,IV contrast if possible in the perioperative period.I suggested drinking 2 litre's of water a day      Surgical site Infection Prophylaxis-I  suggest appropriate antibiotic for Prophylaxis against Surgical site infections     In view of urological procedure the patient  is at risk of postoperative urinary retention.  I suggest avoidance / minimizing the of  Benzodiazepines,Anticholinergic medication,antihistamines ( Benadryl) , if possible in the perioperative period. I suggest using the minimum possible use of opioids for the minimum period of time in the perioperative period. Benadryl avoidance suggested    This visit was focused on Preoperative evaluation, Perioperative Medical management, complication reduction plans. I suggest that the patient follows up with primary care or relevant sub specialists for ongoing health care.    I appreciate the opportunity to be involved in this patients care. Please feel free to contact me if there were any questions about this consultation.    Discharge Instructions for Hyperphosphatemia  You have been diagnosed with hyperphosphatemia, which means you have too much phosphorus in your blood. Phosphorus helps develop bones and teeth and helps control energy metabolism. Most cases of hyperphosphatemia are caused by other health problems, such as kidney disease.  Diet changes  · Keep track of how much fluid you drink.  ¨ Fill a gallon milk jug with  "water and keep it in your refrigerator.  ¨ Try to drink half of the water in the jug during the course of the day.  · Limit your intake of milk, cheese, cottage cheese, yogurt, and ice cream.  · Limit your intake of meat, fish, poultry, eggs, beans, and nuts.  · Avoid or eliminate soft drinks (soda pop).  · Avoid foods and drinks that contain added phosphorus or phosphates. Read ingredient labels for words containing "PHOS" like pyrophosphate. Consult a dietitian as instructed by your doctor for a list of foods that are safe for you.  Other home care  · Take all medicines exactly as directed.  · Take phosphorus-binding antacids with meals as prescribed by your healthcare provider. These antacids bind to the phosphorus in food and prevent it from being absorbed.  · Avoid over-the-counter medicines that contain phosphorus, such as laxatives, enemas, and supplements. Read the labels before you purchase these products.  · Keep all appointments for lab work and follow-up. Your healthcare provider needs to monitor your condition closely.  · Resume your normal activities as directed by your healthcare provider.  Follow-up  Make a follow-up appointment as directed by our staff.     When to call your healthcare provider  Call your provider right away if you have any of the following:  · Nausea or vomiting  · Diarrhea that is not relieved by antidiarrheal medication or by changing your diet  · Constipation that lasts longer than 2 days  · Chest pain  · Shortness of breath   Date Last Reviewed: 6/1/2015  © 2550-7807 [employer]. [address] [place] [address], [place] [address]. All rights reserved. This information is not intended as a substitute for professional medical care. Always follow your healthcare professional's instructions.        "

## 2020-08-17 NOTE — ASSESSMENT & PLAN NOTE
Patient would benefit from weight loss and has  tried to set realistic goals to achieve success. Lifestyle changes were discussed on eating healthy, exercising at least 150 minutes weekly, and reducing sedentary behavior.   Discussed the risk factors associated with obesity: Arthritis/HEIDY/Diabetes/Fatty Liver/Cardiovascular disease/GERD/HTN/HLP.

## 2020-08-17 NOTE — Clinical Note
Ms. Hall was seen today in the perioperative clinic.  He Bp was very elevated 206/96, 197/93 and her Phosphorus was 8.7.  I will check with her BP tomorrow at HD to see if her BP is better; it may be elevated from a WE full of fluid (she goes HD MWF).  For the high Phosphorus, I called her after her appointment today to see if she was taking her Renvela but she did not answer.  She seems to admit not liking to take so many medications so this may be why it is elevated as well.  Thank you for the consultation- Michael

## 2020-08-17 NOTE — PROGRESS NOTES
"[facility] - Pre Op Consult  Progress Note    Patient Name: Jael Hall  MRN: [id number]  Date of Evaluation- 08/17/2020  PCP- Mary Solano MD    Future cases for Jael Hall [[id number]]     Case ID Status Date Time [facility] Procedure Provider Location    [id number] [facility] 8/20/2020  8:30 AM 50 CYSTOSCOPY, WITH RETROGRADE PYELOGRAM Douglas Abraham MD [[id number]] [facility] OR 1ST FLR          HPI:  This is a 54 y.o. female  who presents today for a preoperative evaulation in preparation for Urology  surgery. Scheduled for 8/20/2020.  States  surgery is indicated for "the kidney, he found some thing on my kidney and he is going to shoot the dye in me".   Patient is new to me.  Details of current problem: The duration of problem started August 11, 2020 when she was referred by her urologist to Dr. Abraham.  Reports symptoms of hematuria, back pain bilaterally.  No aggravating or relieving factors identified  Reports pain: no pain at this time  The history has been obtained by speaking with the patient and reviewing the electronic medical record and/or outside health information. Significant health conditions for the perioperative period are discussed below in assessment and plan.     Patient reports current health status to be Fair.  Denies any new symptoms before surgery.     Problems of concern for the perioperative period include:    ESRD-  Hemodyalysis MWF- [facility]  HTN  Anemia  Anxiety  DM2  NAFLD  GERD   Covid-19 May 2020      Subjective/ Objective:       Chief complaint-Preoperative evaluation, Perioperative Medical management, complication reduction plan     Active cardiac conditions- none    Revised cardiac risk index predictors- insulin requiring diabetes mellitus and creatinine more than 2    Functional capacity -Examples of physical activity walking 20  Minutes 2-3 days per week can take 1 flight of stairs----- She can undertake all the above activities without  chest pain,chest tightness, Shortness of breath " ,dizziness,lightheadedness making her exercise tolerance more,than 4 Mets.     Review of Systems   Constitutional: Negative for chills, fatigue and fever.   HENT: Negative for trouble swallowing and voice change.    Eyes: Negative for photophobia and visual disturbance.        No acute visual changes   Respiratory: Negative for apnea, cough, chest tightness, shortness of breath and wheezing.         STOP bang  Score 2    Low risk HEIDY   Cardiovascular: Negative for chest pain, palpitations and leg swelling.   Gastrointestinal: Negative for abdominal distention, abdominal pain, blood in stool, constipation, diarrhea, nausea and vomiting.        NO FLD, hepatitis, cirrhosis  No BRB or black tarry stool     Endocrine: Negative for cold intolerance, heat intolerance, polydipsia, polyphagia and polyuria.   Genitourinary: Positive for flank pain. Negative for difficulty urinating, dysuria, frequency, hematuria and urgency.        Urinate twice per day On HD MWF   Musculoskeletal: Negative for arthralgias, back pain, gait problem, joint swelling, myalgias, neck pain and neck stiffness.        Right upper thigh pain at times   Neurological: Positive for headaches. Negative for dizziness, tremors, seizures, syncope, weakness, light-headedness and numbness.   Psychiatric/Behavioral: Negative for suicidal ideas. The patient is nervous/anxious.      Family History   Problem Relation Age of Onset    Cancer Mother     Ovarian cancer Mother     Cancer Sister     Ovarian cancer Sister     Lung cancer Sister     Diabetes Brother     Cancer Sister     Ovarian cancer Sister     Diabetes Sister     Breast cancer Neg Hx     Colon cancer Neg Hx     Cervical cancer Neg Hx     Endometrial cancer Neg Hx     Vaginal cancer Neg Hx      Past Surgical History:   Procedure Laterality Date    AV FISTULA PLACEMENT      right arm     SECTION      x1    COLONOSCOPY N/A 2018    Procedure: COLONOSCOPY;  Surgeon: Pankaj NAVAS  MD Ashish;  Location: [facility] (4TH FLR);  Service: Endoscopy;  Laterality: N/A;  dialysis pt/labs prior to colon/MS    HYSTERECTOMY      ROBOT-ASSISTED LAPAROSCOPIC ABDOMINAL HYSTERECTOMY USING DA NATALYA XI N/A 7/5/2019    Procedure: XI ROBOTIC HYSTERECTOMY;  Surgeon: Trice Lie MD;  Location: [facility];  Service: OB/GYN;  Laterality: N/A;    ROBOT-ASSISTED LAPAROSCOPIC SALPINGO-OOPHORECTOMY USING DA NATALYA XI Bilateral 7/5/2019    Procedure: XI ROBOTIC SALPINGO-OOPHORECTOMY;  Surgeon: Trice Lei MD;  Location: [facility] OR;  Service: OB/GYN;  Laterality: Bilateral;    TUBAL LIGATION      Vascular access surgeries      x5       No anesthesia, bleeding, cardiac problems with previous surgeries/procedures. No DVT PE    No anesthesia, bleeding, cardiac problems with previous surgeries/procedures. NO DVT or PE  Medications and Allergies reviewed in epic.     Physical Exam   Constitutional: She is oriented to person, place, and time. She appears well-developed, well-nourished and obese. She is cooperative.   HENT:   Head: Normocephalic.   Mouth/Throat: Uvula is midline, oropharynx is clear and moist and mucous membranes are normal. Mucous membranes are moist.       Eyes: Lids are normal.   Neck: Trachea normal, normal range of motion and phonation normal. Carotid bruit is not present.   Cardiovascular: Normal rate, regular rhythm and normal heart sounds. Exam reveals no gallop and no friction rub.   No murmur heard.  Pulses:       Carotid pulses are 3+ on the right side and 3+ on the left side.       Radial pulses are 3+ on the right side and 3+ on the left side.        Dorsalis pedis pulses are 3+ on the right side and 3+ on the left side.        Posterior tibial pulses are 3+ on the right side and 3+ on the left side.   Pulmonary/Chest: Effort normal and breath sounds normal.   Abdominal: Normal appearance and bowel sounds are normal. There is no abdominal tenderness.   Musculoskeletal: Normal range of motion.       Right lower leg: No edema.      Left lower leg: No edema.   Lymphadenopathy:        Head (right side): No submental, no submandibular, no tonsillar, no preauricular, no posterior auricular and no occipital adenopathy present.        Head (left side): No submental, no submandibular, no tonsillar, no preauricular, no posterior auricular and no occipital adenopathy present.        Right cervical: No superficial cervical adenopathy present.       Left cervical: No superficial cervical adenopathy present.   Neurological: She is alert and oriented to person, place, and time. GCS eye subscore is 4. GCS verbal subscore is 5. GCS motor subscore is 6.   Skin: Skin is warm and dry. Capillary refill takes 2 to 3 seconds.   Psychiatric: Her behavior is normal. Mood, memory, affect, judgment and thought content normal. Her speech is tangential.   Patient required frequent redirection when answering questions during the exam     Blood pressure (!) 206/96, pulse 87, temperature 98.7 °F (37.1 °C), resp. rate 18, weight 91.7 kg (202 lb 1.6 oz), last menstrual period 03/15/2016, SpO2 96 %.    Investigations  Lab and Imaging have been reviewed in epic.    Labs 8/3/2020    Na 135  Cl 94  BUN 52  Cr 9.8  Alb 3.4  GFR 4.6    Hgb 11.6  HCT 37.9      Phosphorus 8.7    Pt 11  INR 1  APTT 25.7    TTE 7/14/2020  Conclusion    · Normal left ventricular systolic function. The estimated ejection fraction is 65%.  · Concentric left ventricular remodeling.  · No wall motion abnormalities.  · Indeterminate left ventricular diastolic function.  · Normal right ventricular systolic function.  · Severe left atrial enlargement.  · Mild tricuspid regurgitation.  · The estimated PA systolic pressure is 36 mmHg.  · Intermediate central venous pressure (8 mmHg).          Review of old records- Was done and information gathered regards to events leading to surgery and health conditions of significance in the perioperative  period.        Preoperative cardiac risk assessment-  The patient does not have any active cardiac conditions . Revised cardiac risk index predictors- ---.Functional capacity is more than 4 Mets. She will be undergoing a Urological procedure that carries a Moderate Risk risk     The estimated risk of the rate of adverse cardiac outcomes 10%    No further cardiac work up is indicated prior to proceeding with the surgery        American Society of Anesthesiologists Physical status classification ( ASA ) class: 3     Postoperative pulmonary complication risk assessment: 1.6%     ARISCAT ( Canet) risk index- risk class -  Low, if duration of surgery is under 3 hours     Assessment/Plan:     Essential hypertension  Current BP  Today 206/96--> repeat BP after 30 minutes 197/93  Patient reports home BP readings of: at HD it runs 130-180/??  Patient reports anxiety today with visit and diagnosis  Plan:  Will Check BP with HD on 8/18/2020    Encouraged keeping a healthy weight and BMI  Education was provided regarding lifestyle changes to reduce systolic BP;  Smoking cessation; Exercise 30 minutes per day,  5 days per week or 150 minutes weekly;  Sodium reduction and avoidance of high salt foods such as processed meats, frozen meals, fast foods.     losartan (COZAAR) 25 MG tablet  amlodipine (NORVASC) 10 MG tablet  verapamiL (CALAN-SR) 120 MG CR tablet  carvedilol (COREG) 3.125 MG tablet    Elevated cholesterol with elevated triglycerides  atorvastatin (LIPITOR) 40 MG tablet    ESRD on hemodialysis  Dialysis Days MWF at [facility] dialysis [place]    Medications:  sodium bicarbonate 650 MG tablet, Renvela 800mg three times per day with meals    Close monitoring of serum electrolytes is recommended prior to surgery and during the perioperative period to ensure that the electrolytes are appropriate to proceed with surgery.  Please closely monitor the use of IV fluids to avoid fluid/volume overload.   Please note that the patient  may be at increased risk of bleeding from platelet dysfunction from renal failure.   I suggest involving the nephrology team for on going dialysis treatment     Type 2 diabetes mellitus with kidney complication, with long-term current use of insulin  Currently takes:  insulin (LANTUS SOLOSTAR U-100 INSULIN) glargine 100 units/mL (3mL) SubQ pen 55 Units     insulin aspart U-100 (NOVOLOG FLEXPEN U-100 INSULIN) 100 unit/mL (3 mL) InPn pen 5 Units      DM x 21 years.  Most recent A1c on 6/26/2020 6.4.  Average daily accuchecks are 94 .   Reports peripheral neuropathy. Denies visual changes.  Maintain healthy weight. Exercise at least 150 minutes weekly. Encouraged diet rich in nutrients such as fruits, vegetables, and whole grains; reduce sugar intake from cakes, candy, and sugared drinks.  \  Diabetes Mellitus-I suggest monitoring the glucose in the perioperative period ( Before meals and bed time,if the patient is on oral feeds or every 6 hourly ,if the patient is NPO )  Blood glucose target in hospitalized patients is 140-180. Oral Hypoglycemic agents are generally avoided during the hospital stay . If glucose is consistently elevated ,I suggest using basal ,prandial Insulin regimen to control the glucose , as elevated glucose can be associated with adverse surgical out comes. Please consider involving Hospital Medicine or Endocrinology ,if any help is needed with Glucose control. Patient will be instructed based on the pre op clinic guidelines  about adjustment of diabetic treatment (If applicable )  considering the NPO status for Surgery          Metabolic bone disease  sevelamer carbonate (RENVELA) 800 mg Tab    Diabetic neuropathy  pregabalin (LYRICA) 50 MG capsule    GERD (gastroesophageal reflux disease)  Currently being treated with pantoprazole (PROTONIX) 20 MG tablet  Encouraged to elevate HOB, avoid laying down for 2 hours after meals.   Weight loss encouraged and dietary changes such as reduction or  avoidance of fatty foods, caffeine, spicy foods, and chocolate.  Smoking cessation was recommended as well as reduction of alcohol consumption.    COVID-19 virus infection  States she had Covid 19 in May 2020 and was treated at [facility]    BMI 33.0-33.9,adult  Patient would benefit from weight loss and has  tried to set realistic goals to achieve success. Lifestyle changes were discussed on eating healthy, exercising at least 150 minutes weekly, and reducing sedentary behavior.   Discussed the risk factors associated with obesity: Arthritis/HEIDY/Diabetes/Fatty Liver/Cardiovascular disease/GERD/HTN/HLP.    Anxiety  Patient reports she is having anxiety related tot he new diagnosis of possible kidney cancer    Anemia in ESRD (end-stage renal disease)  Hgb 11.6    HCT 37.9    Hyperphosphatemia  Phosphorus level 8.7  Low phosphorus diet education  Ensure patient is taking her Renvela 800mg TID with meals      Hyponatremia  Na 134 Cl 94---> patient goes to HD MWF    Preventive perioperative care    Thromboembolic prophylaxis:  Her risk factors for thrombosis include obesity and surgical procedure.I suggest  thromboembolic prophylaxis ( mechanical/pharmacological, weighing the risk benefits of pharmacological agent use considering nadege procedural bleeding )  during the perioperative period.I suggested being active in the post operative period.      Postoperative pulmonary complication prophylaxis-Risk factors for post operative pulmonary complications include ASA class >2- I suggest early ambulation and pain control so as to avoid diaphragmatic splinting       Renal complication prophylaxis-Risk factors for renal complications include pre-existing renal disease, diabetes mellitus and hypertension . I suggest keeping her well hydrated and avoidance/ minimizing the use of  NSAID's,CHRISTIANSEN 2 Inhibitors ,IV contrast if possible in the perioperative period.I suggested drinking 2 litre's of water a day      Surgical site Infection  Prophylaxis-I  suggest appropriate antibiotic for Prophylaxis against Surgical site infections     In view of urological procedure the patient  is at risk of postoperative urinary retention.  I suggest avoidance / minimizing the of  Benzodiazepines,Anticholinergic medication,antihistamines ( Benadryl) , if possible in the perioperative period. I suggest using the minimum possible use of opioids for the minimum period of time in the perioperative period. Benadryl avoidance suggested    This visit was focused on Preoperative evaluation, Perioperative Medical management, complication reduction plans. I suggest that the patient follows up with primary care or relevant sub specialists for ongoing health care.    I appreciate the opportunity to be involved in this patients care. Please feel free to contact me if there were any questions about this consultation.    Patient is optimized         Michael Adrian NP  Perioperative Medicine  [facility]   Pager [phone]

## 2020-08-17 NOTE — HPI
"This is a 54 y.o. female  who presents today for a preoperative evaulation in preparation for Urology  surgery. Scheduled for 8/20/2020.  States  surgery is indicated for "the kidney, he found some thing on my kidney and he is going to shoot the dye in me".   Patient is new to me.  Details of current problem: The duration of problem started August 11, 2020 when she was referred by her urologist to Dr. Abraham.  Reports symptoms of hematuria, back pain bilaterally.  No aggravating or relieving factors identified  Reports pain: no pain at this time  The history has been obtained by speaking with the patient and reviewing the electronic medical record and/or outside health information. Significant health conditions for the perioperative period are discussed below in assessment and plan.     Patient reports current health status to be Fair.  Denies any new symptoms before surgery.     Problems of concern for the perioperative period include:    ESRD-  Hemodyalysis MWF- [facility]  HTN  Anemia  Anxiety  DM2  NAFLD  GERD   Covid-19 May 2020  "

## 2020-08-17 NOTE — ASSESSMENT & PLAN NOTE
States she had Covid 19 in May 2020 and was treated at [facility]

## 2020-08-17 NOTE — ASSESSMENT & PLAN NOTE
Phosphorus level 8.7  Low phosphorus diet education  Ensure patient is taking her Renvela 800mg TID with meals

## 2020-08-18 LAB — SARS-COV-2 RNA RESP QL NAA+PROBE: NOT DETECTED

## 2020-08-19 ENCOUNTER — TELEPHONE (OUTPATIENT)
Dept: UROLOGY | Facility: CLINIC | Age: 55
End: 2020-08-19

## 2020-08-19 ENCOUNTER — ANESTHESIA EVENT (OUTPATIENT)
Dept: SURGERY | Facility: HOSPITAL | Age: 55
End: 2020-08-19
Payer: MEDICARE

## 2020-08-19 PROBLEM — Z91.148 NON COMPLIANCE W MEDICATION REGIMEN: Status: ACTIVE | Noted: 2020-08-19

## 2020-08-19 NOTE — ASSESSMENT & PLAN NOTE
After speaking with Patient's Nephrologist Dr. Fuentes it is apparent the patient is not compliant with her blood pressure medication regime.  Dr Fuentes reports that the patient has expressed the desire to stop all blood pressure medications and Dr. Fuentes has counseled the patient that she is at risk for stroke, heart attack and other health risks if she does not  blood pressure medications as prescribed    The patient was asked what medications she takes for blood pressure and phopsphorus levels which was later compared to the medication list from dialysis which revealed several discrepancies in dosage and frequency---> Medication changes were made on the med card

## 2020-08-19 NOTE — TELEPHONE ENCOUNTER
Called pt to confirm arrival time of 630am for procedure on 8/20/2020. Gave pt NPO instructions and gave pt opportunity to ask questions. Pt verbalized understanding.

## 2020-08-20 ENCOUNTER — ANESTHESIA (OUTPATIENT)
Dept: SURGERY | Facility: HOSPITAL | Age: 55
End: 2020-08-20
Payer: MEDICARE

## 2020-08-20 ENCOUNTER — HOSPITAL ENCOUNTER (OUTPATIENT)
Facility: HOSPITAL | Age: 55
Discharge: HOME | End: 2020-08-20
Attending: UROLOGY | Admitting: UROLOGY
Payer: MEDICARE

## 2020-08-20 VITALS
WEIGHT: 200 LBS | TEMPERATURE: 98 F | RESPIRATION RATE: 16 BRPM | HEART RATE: 46 BPM | BODY MASS INDEX: 34.15 KG/M2 | OXYGEN SATURATION: 95 % | DIASTOLIC BLOOD PRESSURE: 55 MMHG | SYSTOLIC BLOOD PRESSURE: 112 MMHG | HEIGHT: 64 IN

## 2020-08-20 DIAGNOSIS — R31.0 GROSS HEMATURIA: ICD-10-CM

## 2020-08-20 DIAGNOSIS — K76.0 NAFLD (NONALCOHOLIC FATTY LIVER DISEASE): ICD-10-CM

## 2020-08-20 DIAGNOSIS — N18.6 ESRD (END STAGE RENAL DISEASE): Primary | ICD-10-CM

## 2020-08-20 LAB
POCT GLUCOSE: 104 MG/DL (ref 70–110)
POCT GLUCOSE: 106 MG/DL (ref 70–110)

## 2020-08-20 PROCEDURE — D9220A PRA ANESTHESIA: Mod: ANES,NTX,, | Performed by: ANESTHESIOLOGY

## 2020-08-20 PROCEDURE — 25000003 PHARM REV CODE 250: Mod: TXP | Performed by: STUDENT IN AN ORGANIZED HEALTH CARE EDUCATION/TRAINING PROGRAM

## 2020-08-20 PROCEDURE — 36000706: Mod: TXP | Performed by: UROLOGY

## 2020-08-20 PROCEDURE — 25000003 PHARM REV CODE 250: Mod: TXP | Performed by: NURSE ANESTHETIST, CERTIFIED REGISTERED

## 2020-08-20 PROCEDURE — 37000008 HC ANESTHESIA 1ST 15 MINUTES: Mod: NTX | Performed by: UROLOGY

## 2020-08-20 PROCEDURE — 82962 GLUCOSE BLOOD TEST: CPT | Mod: TXP | Performed by: UROLOGY

## 2020-08-20 PROCEDURE — 71000015 HC POSTOP RECOV 1ST HR: Mod: TXP | Performed by: UROLOGY

## 2020-08-20 PROCEDURE — 37000009 HC ANESTHESIA EA ADD 15 MINS: Mod: NTX | Performed by: UROLOGY

## 2020-08-20 PROCEDURE — 74420 PR  X-RAY RETROGRADE PYELOGRAM: ICD-10-PCS | Mod: 26,NTX,, | Performed by: UROLOGY

## 2020-08-20 PROCEDURE — 36000707: Mod: TXP | Performed by: UROLOGY

## 2020-08-20 PROCEDURE — 52005 CYSTO W/URTRL CATHJ: CPT | Mod: NTX,,, | Performed by: UROLOGY

## 2020-08-20 PROCEDURE — D9220A PRA ANESTHESIA: ICD-10-PCS | Mod: CRNA,NTX,, | Performed by: NURSE ANESTHETIST, CERTIFIED REGISTERED

## 2020-08-20 PROCEDURE — 63600175 PHARM REV CODE 636 W HCPCS: Mod: NTX | Performed by: NURSE ANESTHETIST, CERTIFIED REGISTERED

## 2020-08-20 PROCEDURE — C1769 GUIDE WIRE: HCPCS | Mod: TXP | Performed by: UROLOGY

## 2020-08-20 PROCEDURE — 52005 PR CYSTOURETHROSCOPY,URETER CATHETER: ICD-10-PCS | Mod: NTX,,, | Performed by: UROLOGY

## 2020-08-20 PROCEDURE — 71000044 HC DOSC ROUTINE RECOVERY FIRST HOUR: Mod: NTX | Performed by: UROLOGY

## 2020-08-20 PROCEDURE — C1758 CATHETER, URETERAL: HCPCS | Mod: TXP | Performed by: UROLOGY

## 2020-08-20 PROCEDURE — 25500020 PHARM REV CODE 255: Mod: NTX | Performed by: UROLOGY

## 2020-08-20 PROCEDURE — 82962 GLUCOSE BLOOD TEST: CPT | Mod: NTX | Performed by: UROLOGY

## 2020-08-20 PROCEDURE — 71000016 HC POSTOP RECOV ADDL HR: Mod: NTX | Performed by: UROLOGY

## 2020-08-20 PROCEDURE — D9220A PRA ANESTHESIA: ICD-10-PCS | Mod: ANES,NTX,, | Performed by: ANESTHESIOLOGY

## 2020-08-20 PROCEDURE — 74420 UROGRAPHY RTRGR +-KUB: CPT | Mod: 26,NTX,, | Performed by: UROLOGY

## 2020-08-20 PROCEDURE — D9220A PRA ANESTHESIA: Mod: CRNA,NTX,, | Performed by: NURSE ANESTHETIST, CERTIFIED REGISTERED

## 2020-08-20 PROCEDURE — 63600175 PHARM REV CODE 636 W HCPCS: Mod: NTX | Performed by: STUDENT IN AN ORGANIZED HEALTH CARE EDUCATION/TRAINING PROGRAM

## 2020-08-20 RX ORDER — PROPOFOL 10 MG/ML
VIAL (ML) INTRAVENOUS CONTINUOUS PRN
Status: DISCONTINUED | OUTPATIENT
Start: 2020-08-20 | End: 2020-08-20

## 2020-08-20 RX ORDER — FENTANYL CITRATE 50 UG/ML
INJECTION, SOLUTION INTRAMUSCULAR; INTRAVENOUS
Status: DISCONTINUED | OUTPATIENT
Start: 2020-08-20 | End: 2020-08-20

## 2020-08-20 RX ORDER — SODIUM CHLORIDE 9 MG/ML
INJECTION, SOLUTION INTRAVENOUS CONTINUOUS
Status: DISCONTINUED | OUTPATIENT
Start: 2020-08-20 | End: 2020-08-20 | Stop reason: HOSPADM

## 2020-08-20 RX ORDER — EPHEDRINE SULFATE 50 MG/ML
INJECTION, SOLUTION INTRAVENOUS
Status: DISCONTINUED | OUTPATIENT
Start: 2020-08-20 | End: 2020-08-20

## 2020-08-20 RX ORDER — LIDOCAINE HYDROCHLORIDE 10 MG/ML
1 INJECTION, SOLUTION EPIDURAL; INFILTRATION; INTRACAUDAL; PERINEURAL ONCE
Status: DISCONTINUED | OUTPATIENT
Start: 2020-08-20 | End: 2020-08-20 | Stop reason: HOSPADM

## 2020-08-20 RX ORDER — SODIUM CHLORIDE 0.9 % (FLUSH) 0.9 %
3 SYRINGE (ML) INJECTION
Status: DISCONTINUED | OUTPATIENT
Start: 2020-08-20 | End: 2020-08-20 | Stop reason: HOSPADM

## 2020-08-20 RX ORDER — LIDOCAINE HYDROCHLORIDE 20 MG/ML
INJECTION INTRAVENOUS
Status: DISCONTINUED | OUTPATIENT
Start: 2020-08-20 | End: 2020-08-20

## 2020-08-20 RX ORDER — CEFAZOLIN SODIUM 1 G/3ML
2 INJECTION, POWDER, FOR SOLUTION INTRAMUSCULAR; INTRAVENOUS
Status: COMPLETED | OUTPATIENT
Start: 2020-08-20 | End: 2020-08-20

## 2020-08-20 RX ORDER — PROPOFOL 10 MG/ML
VIAL (ML) INTRAVENOUS
Status: DISCONTINUED | OUTPATIENT
Start: 2020-08-20 | End: 2020-08-20

## 2020-08-20 RX ORDER — HYDROMORPHONE HYDROCHLORIDE 1 MG/ML
0.2 INJECTION, SOLUTION INTRAMUSCULAR; INTRAVENOUS; SUBCUTANEOUS EVERY 5 MIN PRN
Status: DISCONTINUED | OUTPATIENT
Start: 2020-08-20 | End: 2020-08-20 | Stop reason: HOSPADM

## 2020-08-20 RX ORDER — ONDANSETRON 2 MG/ML
4 INJECTION INTRAMUSCULAR; INTRAVENOUS DAILY PRN
Status: DISCONTINUED | OUTPATIENT
Start: 2020-08-20 | End: 2020-08-20 | Stop reason: HOSPADM

## 2020-08-20 RX ORDER — MIDAZOLAM HYDROCHLORIDE 1 MG/ML
INJECTION, SOLUTION INTRAMUSCULAR; INTRAVENOUS
Status: DISCONTINUED | OUTPATIENT
Start: 2020-08-20 | End: 2020-08-20

## 2020-08-20 RX ADMIN — EPHEDRINE SULFATE 10 MG: 50 INJECTION INTRAVENOUS at 08:08

## 2020-08-20 RX ADMIN — PROPOFOL 75 MCG/KG/MIN: 10 INJECTION, EMULSION INTRAVENOUS at 08:08

## 2020-08-20 RX ADMIN — EPHEDRINE SULFATE 10 MG: 50 INJECTION INTRAVENOUS at 09:08

## 2020-08-20 RX ADMIN — FENTANYL CITRATE 25 MCG: 50 INJECTION, SOLUTION INTRAMUSCULAR; INTRAVENOUS at 08:08

## 2020-08-20 RX ADMIN — EPHEDRINE SULFATE 5 MG: 50 INJECTION INTRAVENOUS at 08:08

## 2020-08-20 RX ADMIN — PROPOFOL 20 MG: 10 INJECTION, EMULSION INTRAVENOUS at 08:08

## 2020-08-20 RX ADMIN — EPHEDRINE SULFATE 5 MG: 50 INJECTION INTRAVENOUS at 09:08

## 2020-08-20 RX ADMIN — SODIUM CHLORIDE: 0.9 INJECTION, SOLUTION INTRAVENOUS at 07:08

## 2020-08-20 RX ADMIN — MIDAZOLAM HYDROCHLORIDE 1 MG: 1 INJECTION, SOLUTION INTRAMUSCULAR; INTRAVENOUS at 08:08

## 2020-08-20 RX ADMIN — CEFAZOLIN 2 G: 330 INJECTION, POWDER, FOR SOLUTION INTRAMUSCULAR; INTRAVENOUS at 08:08

## 2020-08-20 RX ADMIN — LIDOCAINE HYDROCHLORIDE 40 MG: 20 INJECTION, SOLUTION INTRAVENOUS at 08:08

## 2020-08-20 NOTE — DISCHARGE INSTRUCTIONS
Cystography (Retrograde Cystography)  Cystography (also called retrograde cystography) is a detailed X-ray exam of your bladder. For this procedure, your bladder is filled with an X-ray dye (contrast medium). The dye lets your bladder be seen more clearly on the X-ray images. This procedure is done by a radiologist.    Why cystography is done  A cystography can help diagnose such bladder problems as:  · Kidney stones  · Wounds or bursting (rupture) of your bladder wall  · Urinary tract infection  · Blood clots  · Tumors  Getting ready for your procedure  · If instructed, empty your bowel before the exam using a medicine (laxative) or a liquid injected into your rectum (enema).  · Empty your bladder before the exam.  Tell your provider if you:  · Have any allergies to X-ray dye  · Have a bladder infection  · Are pregnant or think you may be pregnant  Follow any other instructions you are given.  During your procedure  · You will change into a hospital gown and lie on an exam table. Your urethra will be numbed with an anesthetic jelly. You may also be given medication to help you relax.  · A thin tube (catheter) will be put into your urethra up to your bladder. You will feel pressure. The dye will be slowly put through the catheter into your bladder. As your bladder fills with this liquid, you will feel the need to urinate. Tell the radiologist when this becomes uncomfortable.  · X-rays are taken of your full bladder. The catheter is removed, your bladder is then drained, and more X-rays are taken.  Possible risks and complications  · Infection or bruising at the place where the tube is put into your urethra  · Problems due to the dye. This includes an allergic reaction or kidney damage.  · Radiation exposure to your reproductive organs   After your procedure  · You may feel some burning when you urinate the first few times after the test. Drink plenty of water after the exam to help flush the dye out of your  body.   · Your provider will discuss your test results with you. He or she will recommend more testing or treatment as needed.  When to call your healthcare provider   Call your provider right away if you have:  · Blood in your urine, after you have gone to the bathroom three times   · Signs of infection, such as chills, fever, rapid heartbeat, or fast breathing         Date Last Reviewed: 7/23/2015  © 3187-4287 [employer]. [address] [place] [address], [place] [address]. All rights reserved. This information is not intended as a substitute for professional medical care. Always follow your healthcare professional's instructions.

## 2020-08-20 NOTE — ANESTHESIA PREPROCEDURE EVALUATION
08/20/2020  Jael Hall is a 54 y.o., female.    Patient Active Problem List   Diagnosis    ESRD on hemodialysis    Type 2 diabetes mellitus with kidney complication, with long-term current use of insulin    Elevated cholesterol with elevated triglycerides    Hx of sinus bradycardia    Anemia in ESRD (end-stage renal disease)    Patient on waiting list for kidney transplant    NAFLD (nonalcoholic fatty liver disease)    s/p RA-TLH/BSO    Essential hypertension    Metabolic bone disease    COVID-19 virus infection    Allergic rhinitis    Cystitis cystica    Diabetic neuropathy    Hematuria    Physical deconditioning    Poor dentition    GERD (gastroesophageal reflux disease)    BMI 33.0-33.9,adult    Chorioretinal scar, left eye    Proteinuria    Scarring alopecia    Sinusitis    Vitamin D deficiency    Anxiety    Hyperphosphatemia    Hyponatremia    Non compliance w medication regimen    ESRD (end stage renal disease)       Anesthesia Evaluation    I have reviewed the Patient Summary Reports.    I have reviewed the Nursing Notes. I have reviewed the NPO Status.      Review of Systems  Social:  Non-Smoker, No Alcohol Use    Hematology/Oncology:  Hematology Normal   Oncology Normal     EENT/Dental:EENT/Dental Normal   Cardiovascular:   Hypertension    Pulmonary:  Pulmonary Normal    Renal/:   Chronic Renal Disease, ESRD    Hepatic/GI:   GERD Liver Disease,    Neurological:   Neuromuscular Disease,    Endocrine:   Diabetes, well controlled, type 2        Physical Exam  General:  Well nourished, Obesity    Airway/Jaw/Neck:  Airway Findings: Mouth Opening: Normal Tongue: Normal  General Airway Assessment: Adult  Mallampati: I  Improves to I with phonation.  TM Distance: Normal, at least 6 cm        Eyes/Ears/Nose:  EYES/EARS/NOSE FINDINGS: Normal   Dental:  DENTAL FINDINGS: Normal    Chest/Lungs:  Chest/Lungs Findings: Clear to auscultation, Normal Respiratory Rate     Heart/Vascular:  Heart Findings: Rate: Normal  Rhythm: Regular Rhythm  Sounds: Normal     Abdomen:  Abdomen Findings: Normal    Musculoskeletal:  Musculoskeletal Findings: Normal   Skin:  Skin Findings: Normal    Mental Status:  Mental Status Findings:  Cooperative, Alert and Oriented         Anesthesia Assessment: Preoperative EQUATION    Planned Procedure: Procedure(s) (LRB):  CYSTOSCOPY, WITH RETROGRADE PYELOGRAM (Bilateral)  Requested Anesthesia Type:Monitor Anesthesia Care  Surgeon: Douglas Abraham MD  Service: Urology  Known or anticipated Date of Surgery:8/20/2020    Surgeon notes: reviewed      Previous anesthesia records:GETA and No problems   07/05/19: Robotic hysterectomy with BSO:  Airway/Jaw/Neck:  Airway Findings: Mouth Opening: Normal Tongue: Normal  General Airway Assessment: Adult, Good  Mallampati: II  TM Distance: Normal, at least 6 cm  Jaw/Neck Findings:  Neck ROM: Normal ROM     Placement Date: 07/05/19 Placement Time: 0733 Method of Intubation: Kaba Inserted by: CRNA Airway Device: Endotracheal Tube Mask Ventilation: Easy - oral Intubated: Postinduction Airway Device Size: 7.5 Style: Cuffed Cuff Inflation: Minimal occlusive pressure Inflation Amount (mL): 3.5 Placement Verified By: Auscultation;Capnometry Grade: Grade I , easy per Kaba  Complicating Factors: None;Short neck;Large/Floppy epiglottis;Obesity Findings Post-Intubation: Positive EtCO2;Bilateral breath sounds;Atraumatic/Condition of teeth unchanged Depth of Insertion (cm): 22 Secured at: Lips Complications: None Breath Sounds: Equal Bilateral Insertion attempts (enter comment if more than 2 attempts): 1       Last PCP note: > 1 year ago , within [facility]   Subspecialty notes: Gyn/ONC, Hepatology, Kidney Transplant, Liver Transplant, Urology    Other important co-morbidities:per epic: DM2, HLD, HTN, Obesity and anemia, esrd, ckd3, rt renal  mass, dialysis m/w/f, nonalcoholic fatty liver disease, liver mass      Tests already available:  Available tests,  within 1 month , within [facility] .   8/3:  cmp  Cbc    7/14:     ECHO EF 65%    6/26:  a1c 6.4    4/23:  EKG- NSR/LT AXIS DEVIATION, PROLONGED QT, NO CHANGE FROM PREVIOUS.              Instructions given. (See in Nurse's note)    Optimization:  Anesthesia Preop Clinic Assessment  Indicated.    Medical Opinion Indicated.            Plan:    Testing:  Phos, PT/INR and PTT   Pre-anesthesia  visit       Visit focus: concerns in complex and/or prolonged anesthesia     Consultation:TBC[facility] NP/nephrology (Karlos Perry MD)     Patient  has previously scheduled Medical Appointment: 8/17/20    Navigation: Tests Scheduled.              Consults scheduled.             Results will be tracked by Preop Clinic.         Anesthesia Plan  Type of Anesthesia, risks & benefits discussed:  Anesthesia Type:  general  Patient's Preference:   Intra-op Monitoring Plan: standard ASA monitors  Intra-op Monitoring Plan Comments:   Post Op Pain Control Plan: per primary service following discharge from PACU  Post Op Pain Control Plan Comments:   Induction:   IV  Beta Blocker:  Patient is not currently on a Beta-Blocker (No further documentation required).       Informed Consent: Patient understands risks and agrees with Anesthesia plan.  Questions answered. Anesthesia consent signed with patient.  ASA Score: 3     Day of Surgery Review of History & Physical:    H&P update referred to the surgeon.         Ready For Surgery From Anesthesia Perspective.       Lab Results   Component Value Date    WBC 7.20 08/03/2020    HGB 11.6 (L) 08/03/2020    HCT 37.9 08/03/2020    MCV 83 08/03/2020     08/03/2020       BMP  Lab Results   Component Value Date     (L) 08/03/2020    K 4.6 08/03/2020    CL 94 (L) 08/03/2020    CO2 28 08/03/2020    BUN 52 (H) 08/03/2020    CREATININE 9.8 (H) 08/03/2020    CALCIUM 10.1 08/03/2020     ANIONGAP 13 08/03/2020    ESTGFRAFRICA 4.6 (A) 08/03/2020    EGFRNONAA 4.0 (A) 08/03/2020

## 2020-08-20 NOTE — PLAN OF CARE
Patient tolerated procedure and anesthesia. No complaints of pain present. Discharge materials given and explained to patient. She verbalized understanding. Monitor stickers removed and patient taken to ride via wheelchair in stable condition with no complaints.

## 2020-08-20 NOTE — DISCHARGE SUMMARY
[facility]  Discharge Note  Short Stay    Admit Date: 8/20/2020    Discharge Date and Time: 08/20/2020 9:12 AM      Attending Physician: Douglas Abraham MD     Discharge Provider: Fredo Huang MD    Diagnoses:  Active Hospital Problems    Diagnosis  POA    *ESRD (end stage renal disease) [N18.6]  Yes      Resolved Hospital Problems   No resolved problems to display.       Discharged Condition: stable    Hospital Course: Patient was admitted for cystoscopy and bilateral RPGs and tolerated the procedure well with no complications. She was discharged home in good condition on the same day.       Final Diagnoses: Same as principal problem.    Disposition: Home or Self Care    Follow up/Patient Instructions:    Medications:  Reconciled Home Medications:   Current Discharge Medication List      CONTINUE these medications which have NOT CHANGED    Details   acetaminophen (TYLENOL) 325 MG tablet Take 2 tablets (650 mg total) by mouth every 6 (six) hours as needed for Pain.  Qty: 30 tablet, Refills: 0      carvedilol (COREG) 3.125 MG tablet Take 25 mg by mouth 2 (two) times daily with meals.       furosemide (LASIX) 40 MG tablet Take 80 mg by mouth 2 (two) times a day.       HUMALOG KWIKPEN INSULIN 100 unit/mL pen ADM 5 UNI SC TID WC      insulin (LANTUS SOLOSTAR U-100 INSULIN) glargine 100 units/mL (3mL) SubQ pen Inject 55 Units into the skin.      losartan (COZAAR) 25 MG tablet Take 50 mg by mouth once daily.       pregabalin (LYRICA) 50 MG capsule       sevelamer carbonate (RENVELA) 800 mg Tab TK 3 TS PO TID WITH MEALS AND 3 TS WITH SNACKS      verapamiL (CALAN-SR) 120 MG CR tablet TK 1 T PO  D      !! ACCU-CHEK KRISTI PLUS TEST STRP Strp TEST QID  Refills: 6      !! ACCU-CHEK SOFTCLIX LANCETS Misc U QID  Refills: 2      ALCOHOL ANTISEPTIC PADS (SURE COMFORT ALCOHOL PREP PADS TOP)       aspirin (ECOTRIN) 81 MG EC tablet Take 81 mg by mouth.      atorvastatin (LIPITOR) 40 MG tablet TK 1 T PO QD  Refills: 1  "     BD ULTRA-FINE ETTA PEN NEEDLE 32 gauge x 5/32" Ndle U BID UTD  Refills: 5      !! blood sugar diagnostic Strp 1 strip.      blood-glucose meter (FREESTYLE SYSTEM KIT) kit Free style flash  jarvis meter. Use as instructed      docusate sodium (COLACE) 100 MG capsule Take 1 capsule (100 mg total) by mouth 2 (two) times daily.  Refills: 0      fish oil-omega-3 fatty acids 300-1,000 mg capsule Take 1 g by mouth once daily.      !! insulin aspart U-100 (NOVOLOG FLEXPEN U-100 INSULIN) 100 unit/mL (3 mL) InPn pen Inject 5 Units into the skin.      !! lancets Misc U QID      medical supply, miscellaneous (MISCELLANEOUS MEDICAL SUPPLY MISC) Measure blood sugar three times a day.      methocarbamoL (ROBAXIN) 500 MG Tab TK 1 T PO  TID FOR 10 DAYS      !! miscellaneous medical supply (C-TUB) Misc Measure blood sugar three times a day.      !! miscellaneous medical supply (C-TUB) Misc Patient ok to return to work. Avoid strenuous activity.      !! miscellaneous medical supply (C-TUB) Misc Patient to check blood sugar 4 x daily  Dispense sufficient arm patches for 30 day supply      !! miscellaneous medical supply (C-TUB) Misc Monitor blood sugar continuously      !! NOVOLOG FLEXPEN 100 unit/mL InPn pen INJECT 5 UNITS SC D WITH DINNER  Refills: 1      oxyCODONE-acetaminophen (PERCOCET) 5-325 mg per tablet Take 1 tablet by mouth every 4 (four) hours as needed for Pain.  Qty: 20 tablet, Refills: 0      pen needle, diabetic (SURE COMFORT PEN NEEDLE) 31 gauge x 3/16" Ndle U BID UTD      sodium bicarbonate 650 MG tablet Take 650 mg by mouth.      SURE COMFORT PEN NEEDLE 30 gauge x 5/16" Ndle U BID  Refills: 5      TRUEPLUS LANCETS 30 gauge Misc U QID TO CHECK BLOOD SUGAR  Refills: 5      vitamin renal formula, B-complex-vitamin c-folic acid, (NEPHROCAP) 1 mg Cap Take 1 capsule by mouth.       !! - Potential duplicate medications found. Please discuss with provider.        Discharge Procedure Orders   Protime-INR   Standing Status: " Future Number of Occurrences: 1 Standing Exp. Date: 10/11/21     APTT   Standing Status: Future Number of Occurrences: 1 Standing Exp. Date: 10/11/21     Phosphorus   Standing Status: Future Number of Occurrences: 1 Standing Exp. Date: 10/11/21     Notify your health care provider if you experience any of the following:  persistent nausea and vomiting or diarrhea     Notify your health care provider if you experience any of the following:  severe uncontrolled pain     Notify your health care provider if you experience any of the following:  temperature >100.4     Activity as tolerated

## 2020-08-20 NOTE — INTERVAL H&P NOTE
The patient has been examined and the H&P has been reviewed:    I concur with the findings and no changes have occurred since H&P was written.    Surgery risks, benefits and alternative options discussed and understood by patient/family.          Active Hospital Problems    Diagnosis  POA    ESRD (end stage renal disease) [N18.6]  Yes      Resolved Hospital Problems   No resolved problems to display.

## 2020-08-20 NOTE — OP NOTE
[facility]  Operative Note    Date: 08/20/2020    Pre-Op Diagnosis: Renal Mass, ESRD, hematuria     Patient Active Problem List    Diagnosis Date Noted    ESRD (end stage renal disease) 08/20/2020    Non compliance w medication regimen 08/19/2020    Anxiety     Hyperphosphatemia     Hyponatremia     GERD (gastroesophageal reflux disease) 08/13/2020    Physical deconditioning 04/13/2020    COVID-19 virus infection 04/07/2020    Poor dentition 02/05/2020    Cystitis cystica 02/03/2020    Hematuria 10/30/2019    Proteinuria 10/30/2019    Vitamin D deficiency 10/01/2019    Scarring alopecia 09/16/2019    Essential hypertension     Metabolic bone disease     s/p RA-TLH/BSO 07/05/2019    BMI 33.0-33.9,adult 04/23/2019    NAFLD (nonalcoholic fatty liver disease) 03/14/2019    Patient on waiting list for kidney transplant 02/26/2019    Chorioretinal scar, left eye 02/07/2018    ESRD on hemodialysis 09/28/2017    Type 2 diabetes mellitus with kidney complication, with long-term current use of insulin 09/28/2017    Elevated cholesterol with elevated triglycerides 09/28/2017    Hx of sinus bradycardia 09/28/2017    Anemia in ESRD (end-stage renal disease) 09/28/2017    Sinusitis 10/01/2014    Allergic rhinitis 10/21/2013    Diabetic neuropathy 06/07/2013       Post-Op Diagnosis: Same    Procedure(s) Performed:   1.  Cystoscopy with bilateral retrograde pyelogram  2.  Fluoroscopy < 1 hour  3.  Intra-operative interpretation of radiographic images    Specimen(s): none    Staff Surgeon: Douglas Abraham MD    Assistant Surgeon: Fredo Huang MD    Anesthesia: Monitored Local Anesthesia with Sedation    Indications: Jael Hall is a 54 y.o. female with hematuria and a renal mass    Findings:     1. Normal cystourethroscopy  2. Right retrograde pyelogram showing no filling defects or hydronephrosis  3. Left retrograde pyelogram showing no filling defects or  hydronephrosis    Estimated Blood Loss: min    Drains: none    Procedure in Detail:  After risks, benefits and possible complications of cystoscopy were explained, the patient elected to undergo the procedure and informed consent was obtained. All questions were answered in the nadege-operative area. The patient was transferred to the cystoscopy suite and placed in the supine position.  SCDs were applied and working.  MAC anesthesia was administered.  Once adequately sedated, the patient was placed in the dorsal lithotomy position and prepped and draped in the usual sterile fashion.  Time out was performed, nadege-procedural antibiotics were confirmed.     A rigid cystoscope in a 22 Fr sheath was introduced into the bladder per urethra. This passed easily. The entire urethra was visualized which showed no masses or strictures.  The right and left ureteral orifices were identified in the normal anatomic position.  Formal cystoscopy was performed which revealed no masses or lesions suspicious for malignancy, no trabeculations, no bladder stones and no bladder diverticula.     The left UO was identified and cannulated with a 5 Fr open-ended ureteral catheter. Using fluoroscopy, a RPG was performed which showed the above findings. This was then repeated on the right side, revealing the above findings.     The bladder was drained, and the patient was removed from lithotomy.     The patient tolerated the procedure well and was transferred to recovery in stable condition.    Disposition:  The patient will follow up with Dr. Abraham in order to discuss surgery and management of renal mass.      Fredo Huang MD

## 2020-08-20 NOTE — TRANSFER OF CARE
"Anesthesia Transfer of Care Note    Patient: Jael Hall    Procedure(s) Performed: Procedure(s) (LRB):  CYSTOSCOPY, WITH RETROGRADE PYELOGRAM (Bilateral)    Patient location: PACU    Anesthesia Type: general    Transport from OR: Transported from OR on 6-10 L/min O2 by face mask with adequate spontaneous ventilation    Post pain: adequate analgesia    Post assessment: no apparent anesthetic complications    Post vital signs: stable    Level of consciousness: awake    Nausea/Vomiting: no nausea/vomiting    Complications: none    Transfer of care protocol was followed      Last vitals:   Visit Vitals  BP (!) 85/52 (BP Location: Left arm, Patient Position: Lying)   Pulse (!) 52   Temp 36.6 °C (97.9 °F) (Temporal)   Resp 16   Ht 5' 4" (1.626 m)   Wt 90.7 kg (200 lb)   LMP 03/15/2016 (LMP Unknown)   SpO2 97%   Breastfeeding No   BMI 34.33 kg/m²     "

## 2020-08-20 NOTE — ANESTHESIA POSTPROCEDURE EVALUATION
Anesthesia Post Evaluation    Patient: Jael Hall    Procedure(s) Performed: Procedure(s) (LRB):  CYSTOSCOPY, WITH RETROGRADE PYELOGRAM (Bilateral)    Final Anesthesia Type: general    Patient location during evaluation: PACU  Patient participation: Yes- Able to Participate  Level of consciousness: awake and alert and awake  Post-procedure vital signs: reviewed and stable  Pain management: adequate  Airway patency: patent    PONV status at discharge: No PONV  Anesthetic complications: no      Cardiovascular status: blood pressure returned to baseline  Respiratory status: unassisted and spontaneous ventilation  Hydration status: euvolemic  Follow-up not needed.          Vitals Value Taken Time   BP 92/55 08/20/20 0947   Temp 36.6 °C (97.9 °F) 08/20/20 0913   Pulse 46 08/20/20 0949   Resp 26 08/20/20 0949   SpO2 93 % 08/20/20 0949   Vitals shown include unvalidated device data.      No case tracking events are documented in the log.      Pain/Ami Score: Ami Score: 9 (8/20/2020  9:25 AM)

## 2020-08-24 ENCOUNTER — TELEPHONE (OUTPATIENT)
Dept: UROLOGY | Facility: CLINIC | Age: 55
End: 2020-08-24

## 2020-08-24 NOTE — TELEPHONE ENCOUNTER
----- Message from Douglas Abraham MD sent at 8/24/2020  7:29 AM CDT -----  Regarding: RE: returning call  Contact: pt [phone]  No attempt to call patient was made by me.  She needs an appointment to discuss nephrectomy.    Douglas   ----- Message -----  From: Magalie Pham RN  Sent: 8/21/2020   4:16 PM CDT  To: Douglas Abraham MD  Subject: FW: returning call                                 ----- Message -----  From: Elaine Mack  Sent: 8/21/2020   8:13 AM CDT  To: Jarad Cole Staff  Subject: returning call                                   Pt is returning a missed call from the dr on yesterday . Please contact pt

## 2020-08-31 ENCOUNTER — OFFICE VISIT (OUTPATIENT)
Dept: UROLOGY | Facility: CLINIC | Age: 55
End: 2020-08-31
Payer: MEDICARE

## 2020-08-31 VITALS
DIASTOLIC BLOOD PRESSURE: 92 MMHG | SYSTOLIC BLOOD PRESSURE: 177 MMHG | WEIGHT: 199.94 LBS | HEIGHT: 64 IN | HEART RATE: 80 BPM | BODY MASS INDEX: 34.13 KG/M2

## 2020-08-31 DIAGNOSIS — N28.89 RENAL MASS: Primary | ICD-10-CM

## 2020-08-31 DIAGNOSIS — R31.0 HEMATURIA, GROSS: ICD-10-CM

## 2020-08-31 PROCEDURE — 99999 PR PBB SHADOW E&M-EST. PATIENT-LVL IV: CPT | Mod: PBBFAC,TXP,, | Performed by: UROLOGY

## 2020-08-31 PROCEDURE — 99214 PR OFFICE/OUTPT VISIT, EST, LEVL IV, 30-39 MIN: ICD-10-PCS | Mod: S$PBB,TXP,, | Performed by: UROLOGY

## 2020-08-31 PROCEDURE — 99214 OFFICE O/P EST MOD 30 MIN: CPT | Mod: S$PBB,TXP,, | Performed by: UROLOGY

## 2020-08-31 PROCEDURE — 99214 OFFICE O/P EST MOD 30 MIN: CPT | Mod: PBBFAC,TXP | Performed by: UROLOGY

## 2020-08-31 PROCEDURE — 99999 PR PBB SHADOW E&M-EST. PATIENT-LVL IV: ICD-10-PCS | Mod: PBBFAC,TXP,, | Performed by: UROLOGY

## 2020-08-31 NOTE — PROGRESS NOTES
Subjective:       Patient ID: Jael Hall is a 55 y.o. female.    Chief Complaint:  Renal mass.      History of Present Illness  HPI  Patient is a 55 y.o. female who is established to our clinic and referred by Natalee Resendiz NP for evaluation of a right renal mass.  Patient reports some gross hematuria. No abdominal pain.  She does urinate, 2x/day.    Patient underwent cystoscopy, bilateral RPGs---were normal.  Here to again discuss possible right nephrectomy.    No unexpected weight loss.     Patient is a never smoker.     Has a h/o hysterectomy---no other abdominal surgeries.     Patient has ESRD and is on HD---Monday, Wednesday, Friday through a right wrist AVF.  She has DM and HTN.     +fh of ovarian cancer---sister.          Review of Systems  Review of Systems  All other systems reviewed and negative except pertinent positives noted in HPI.       Objective:     Physical Exam   Constitutional: She is oriented to person, place, and time. She appears well-developed. She is cooperative.  Non-toxic appearance.   HENT:   Head: Normocephalic.   Cardiovascular: Normal rate and normal pulses.    Pulmonary/Chest: Effort normal. No accessory muscle usage. No tachypnea. No respiratory distress.   Abdominal: Soft. Normal appearance. She exhibits no distension, no fluid wave and no mass. There is no abdominal tenderness. There is no rebound. No hernia.       Liver/spleen non-palpable   Musculoskeletal: Normal range of motion.   Neurological: She is alert and oriented to person, place, and time.   Skin: No bruising and no rash noted.     Psychiatric: Her speech is normal and behavior is normal. Thought content normal.       Lab Review  Lab Results   Component Value Date    COLORU Yellow 10/18/2019    SPECGRAV 1.010 10/18/2019    PHUR 8.0 04/01/2020    NITRITE Negative 10/18/2019    KETONESU Negative 10/18/2019    UROBILINOGEN Negative 10/18/2019         Assessment:        1. Renal mass    2. Hematuria, gross             Plan:     Renal mass    Hematuria, gross      - Long talk about renal mass and it's management.  Reviewed images.Discussed options including active surveillance, biopsy, minimally invasive techniques including HFRA, cryo. Discussed open and laparascopic surgical approaches. Discussed partial and radical nephrectomy. Discussed surgery preparation, surgery, recuperation, recovery, exercise restrictions. Discussed risks, benefits, and complications. Answered questions and addressed their concerns.  -ultimately plan for right robotic nephrectomy.      -hematuria w/u complete with cysto/bilateral rpg's

## 2020-09-01 ENCOUNTER — TELEPHONE (OUTPATIENT)
Dept: PREADMISSION TESTING | Facility: HOSPITAL | Age: 55
End: 2020-09-01

## 2020-09-04 ENCOUNTER — TELEPHONE (OUTPATIENT)
Dept: UROLOGY | Facility: CLINIC | Age: 55
End: 2020-09-04

## 2020-09-04 DIAGNOSIS — N28.89 RIGHT RENAL MASS: ICD-10-CM

## 2020-09-04 DIAGNOSIS — N28.89 RENAL MASS: Primary | ICD-10-CM

## 2020-09-09 ENCOUNTER — HOSPITAL ENCOUNTER (INPATIENT)
Facility: OTHER | Age: 55
LOS: 2 days | Discharge: HOME | DRG: 640 | End: 2020-09-11
Attending: INTERNAL MEDICINE | Admitting: HOSPITALIST
Payer: MEDICARE

## 2020-09-09 DIAGNOSIS — R00.1 SYMPTOMATIC BRADYCARDIA: Primary | ICD-10-CM

## 2020-09-09 DIAGNOSIS — T82.590A MECHANICAL COMPLICATION OF ARTERIOVENOUS FISTULA SURGICALLY CREATED, INITIAL ENCOUNTER: ICD-10-CM

## 2020-09-09 DIAGNOSIS — T82.898A PROBLEM WITH DIALYSIS ACCESS: ICD-10-CM

## 2020-09-09 DIAGNOSIS — R00.1 SINUS BRADYCARDIA: ICD-10-CM

## 2020-09-09 DIAGNOSIS — N18.6 ESRD (END STAGE RENAL DISEASE): ICD-10-CM

## 2020-09-09 PROBLEM — Z99.2 END-STAGE RENAL DISEASE ON HEMODIALYSIS: Status: ACTIVE | Noted: 2020-08-20

## 2020-09-09 PROBLEM — E87.5 HYPERKALEMIA: Status: ACTIVE | Noted: 2020-09-09

## 2020-09-09 LAB
ALBUMIN SERPL BCP-MCNC: 3 G/DL (ref 3.5–5.2)
ALBUMIN SERPL BCP-MCNC: 3.3 G/DL (ref 3.5–5.2)
ALP SERPL-CCNC: 52 U/L (ref 55–135)
ALT SERPL W/O P-5'-P-CCNC: 21 U/L (ref 10–44)
ANION GAP SERPL CALC-SCNC: 16 MMOL/L (ref 8–16)
ANION GAP SERPL CALC-SCNC: 19 MMOL/L (ref 8–16)
AST SERPL-CCNC: 31 U/L (ref 10–40)
BASOPHILS # BLD AUTO: 0.07 K/UL (ref 0–0.2)
BASOPHILS NFR BLD: 1.1 % (ref 0–1.9)
BILIRUB SERPL-MCNC: 0.8 MG/DL (ref 0.1–1)
BUN SERPL-MCNC: 112 MG/DL (ref 6–20)
BUN SERPL-MCNC: 59 MG/DL (ref 6–20)
CALCIUM SERPL-MCNC: 9.1 MG/DL (ref 8.7–10.5)
CALCIUM SERPL-MCNC: 9.3 MG/DL (ref 8.7–10.5)
CHLORIDE SERPL-SCNC: 98 MMOL/L (ref 95–110)
CHLORIDE SERPL-SCNC: 98 MMOL/L (ref 95–110)
CO2 SERPL-SCNC: 18 MMOL/L (ref 23–29)
CO2 SERPL-SCNC: 22 MMOL/L (ref 23–29)
CREAT SERPL-MCNC: 11.6 MG/DL (ref 0.5–1.4)
CREAT SERPL-MCNC: 17.7 MG/DL (ref 0.5–1.4)
DIFFERENTIAL METHOD: ABNORMAL
EOSINOPHIL # BLD AUTO: 0.4 K/UL (ref 0–0.5)
EOSINOPHIL NFR BLD: 5.7 % (ref 0–8)
ERYTHROCYTE [DISTWIDTH] IN BLOOD BY AUTOMATED COUNT: 16.3 % (ref 11.5–14.5)
EST. GFR  (AFRICAN AMERICAN): 2 ML/MIN/1.73 M^2
EST. GFR  (AFRICAN AMERICAN): 4 ML/MIN/1.73 M^2
EST. GFR  (NON AFRICAN AMERICAN): 2 ML/MIN/1.73 M^2
EST. GFR  (NON AFRICAN AMERICAN): 3 ML/MIN/1.73 M^2
GLUCOSE SERPL-MCNC: 128 MG/DL (ref 70–110)
GLUCOSE SERPL-MCNC: 48 MG/DL (ref 70–110)
HCT VFR BLD AUTO: 35.1 % (ref 37–48.5)
HGB BLD-MCNC: 11 G/DL (ref 12–16)
IMM GRANULOCYTES # BLD AUTO: 0.01 K/UL (ref 0–0.04)
IMM GRANULOCYTES NFR BLD AUTO: 0.2 % (ref 0–0.5)
LYMPHOCYTES # BLD AUTO: 2.2 K/UL (ref 1–4.8)
LYMPHOCYTES NFR BLD: 34.1 % (ref 18–48)
MCH RBC QN AUTO: 25.5 PG (ref 27–31)
MCHC RBC AUTO-ENTMCNC: 31.3 G/DL (ref 32–36)
MCV RBC AUTO: 81 FL (ref 82–98)
MONOCYTES # BLD AUTO: 0.7 K/UL (ref 0.3–1)
MONOCYTES NFR BLD: 11.6 % (ref 4–15)
NEUTROPHILS # BLD AUTO: 3 K/UL (ref 1.8–7.7)
NEUTROPHILS NFR BLD: 47.3 % (ref 38–73)
NRBC BLD-RTO: 0 /100 WBC
PHOSPHATE SERPL-MCNC: 9.7 MG/DL (ref 2.7–4.5)
PLATELET # BLD AUTO: 185 K/UL (ref 150–350)
PMV BLD AUTO: 11 FL (ref 9.2–12.9)
POCT GLUCOSE: 106 MG/DL (ref 70–110)
POCT GLUCOSE: 125 MG/DL (ref 70–110)
POCT GLUCOSE: 133 MG/DL (ref 70–110)
POCT GLUCOSE: 38 MG/DL (ref 70–110)
POTASSIUM SERPL-SCNC: 5 MMOL/L (ref 3.5–5.1)
POTASSIUM SERPL-SCNC: 7.8 MMOL/L (ref 3.5–5.1)
PROT SERPL-MCNC: 7.9 G/DL (ref 6–8.4)
RBC # BLD AUTO: 4.31 M/UL (ref 4–5.4)
SODIUM SERPL-SCNC: 135 MMOL/L (ref 136–145)
SODIUM SERPL-SCNC: 136 MMOL/L (ref 136–145)
TROPONIN I SERPL DL<=0.01 NG/ML-MCNC: <0.006 NG/ML (ref 0–0.03)
WBC # BLD AUTO: 6.31 K/UL (ref 3.9–12.7)

## 2020-09-09 PROCEDURE — 93005 ELECTROCARDIOGRAM TRACING: CPT | Mod: NTX

## 2020-09-09 PROCEDURE — 85025 COMPLETE CBC W/AUTO DIFF WBC: CPT | Mod: NTX

## 2020-09-09 PROCEDURE — 93010 EKG 12-LEAD: ICD-10-PCS | Mod: NTX,,, | Performed by: INTERNAL MEDICINE

## 2020-09-09 PROCEDURE — C1894 INTRO/SHEATH, NON-LASER: HCPCS | Mod: NTX | Performed by: INTERNAL MEDICINE

## 2020-09-09 PROCEDURE — 25500020 PHARM REV CODE 255: Mod: TXP | Performed by: INTERNAL MEDICINE

## 2020-09-09 PROCEDURE — 36901 INTRO CATH DIALYSIS CIRCUIT: CPT | Mod: NTX | Performed by: INTERNAL MEDICINE

## 2020-09-09 PROCEDURE — 63600175 PHARM REV CODE 636 W HCPCS: Mod: NTX | Performed by: HOSPITALIST

## 2020-09-09 PROCEDURE — 80100014 HC HEMODIALYSIS 1:1: Mod: NTX

## 2020-09-09 PROCEDURE — 25000003 PHARM REV CODE 250: Mod: NTX | Performed by: NURSE PRACTITIONER

## 2020-09-09 PROCEDURE — 80053 COMPREHEN METABOLIC PANEL: CPT | Mod: NTX

## 2020-09-09 PROCEDURE — 27000221 HC OXYGEN, UP TO 24 HOURS: Mod: NTX

## 2020-09-09 PROCEDURE — 84484 ASSAY OF TROPONIN QUANT: CPT | Mod: NTX

## 2020-09-09 PROCEDURE — 93010 ELECTROCARDIOGRAM REPORT: CPT | Mod: NTX,,, | Performed by: INTERNAL MEDICINE

## 2020-09-09 PROCEDURE — 36415 COLL VENOUS BLD VENIPUNCTURE: CPT | Mod: NTX

## 2020-09-09 PROCEDURE — 80069 RENAL FUNCTION PANEL: CPT | Mod: NTX

## 2020-09-09 PROCEDURE — 99223 PR INITIAL HOSPITAL CARE,LEVL III: ICD-10-PCS | Mod: AI,NTX,, | Performed by: HOSPITALIST

## 2020-09-09 PROCEDURE — 25000003 PHARM REV CODE 250: Mod: NTX | Performed by: HOSPITALIST

## 2020-09-09 PROCEDURE — 82962 GLUCOSE BLOOD TEST: CPT | Mod: NTX | Performed by: INTERNAL MEDICINE

## 2020-09-09 PROCEDURE — 99223 PR INITIAL HOSPITAL CARE,LEVL III: ICD-10-PCS | Mod: NTX,,, | Performed by: INTERNAL MEDICINE

## 2020-09-09 PROCEDURE — 99223 1ST HOSP IP/OBS HIGH 75: CPT | Mod: AI,NTX,, | Performed by: HOSPITALIST

## 2020-09-09 PROCEDURE — 25000003 PHARM REV CODE 250: Mod: TXP | Performed by: INTERNAL MEDICINE

## 2020-09-09 PROCEDURE — 99223 1ST HOSP IP/OBS HIGH 75: CPT | Mod: NTX,,, | Performed by: INTERNAL MEDICINE

## 2020-09-09 PROCEDURE — 63600175 PHARM REV CODE 636 W HCPCS: Mod: TXP | Performed by: INTERNAL MEDICINE

## 2020-09-09 PROCEDURE — 94761 N-INVAS EAR/PLS OXIMETRY MLT: CPT | Mod: NTX

## 2020-09-09 PROCEDURE — 20000000 HC ICU ROOM: Mod: NTX

## 2020-09-09 RX ORDER — INSULIN ASPART 100 [IU]/ML
0-5 INJECTION, SOLUTION INTRAVENOUS; SUBCUTANEOUS
Status: DISCONTINUED | OUTPATIENT
Start: 2020-09-09 | End: 2020-09-11 | Stop reason: HOSPADM

## 2020-09-09 RX ORDER — HEPARIN SOD,PORCINE/0.9 % NACL 1000/500ML
INTRAVENOUS SOLUTION INTRAVENOUS
Status: DISCONTINUED | OUTPATIENT
Start: 2020-09-09 | End: 2020-09-09 | Stop reason: HOSPADM

## 2020-09-09 RX ORDER — AMOXICILLIN 500 MG
1 CAPSULE ORAL DAILY
Status: ON HOLD | COMMUNITY
End: 2021-01-26

## 2020-09-09 RX ORDER — ATROPINE SULFATE 0.1 MG/ML
0.5 INJECTION INTRAVENOUS EVERY 5 MIN PRN
Status: DISCONTINUED | OUTPATIENT
Start: 2020-09-09 | End: 2020-09-11 | Stop reason: HOSPADM

## 2020-09-09 RX ORDER — LOSARTAN POTASSIUM 50 MG/1
100 TABLET ORAL DAILY
COMMUNITY
End: 2020-11-02

## 2020-09-09 RX ORDER — CYCLOBENZAPRINE HCL 5 MG
10 TABLET ORAL 3 TIMES DAILY PRN
Status: DISCONTINUED | OUTPATIENT
Start: 2020-09-09 | End: 2020-09-11

## 2020-09-09 RX ORDER — HEPARIN SODIUM 5000 [USP'U]/ML
5000 INJECTION, SOLUTION INTRAVENOUS; SUBCUTANEOUS EVERY 12 HOURS
Status: DISCONTINUED | OUTPATIENT
Start: 2020-09-09 | End: 2020-09-11 | Stop reason: HOSPADM

## 2020-09-09 RX ORDER — CARVEDILOL 25 MG/1
25 TABLET ORAL 2 TIMES DAILY
Status: ON HOLD | COMMUNITY
End: 2020-09-11 | Stop reason: HOSPADM

## 2020-09-09 RX ORDER — SEVELAMER CARBONATE 800 MG/1
800 TABLET, FILM COATED ORAL
Status: DISCONTINUED | OUTPATIENT
Start: 2020-09-09 | End: 2020-09-09

## 2020-09-09 RX ORDER — VERAPAMIL HYDROCHLORIDE 120 MG/1
120 CAPSULE, EXTENDED RELEASE ORAL DAILY
Status: ON HOLD | COMMUNITY
End: 2020-09-11 | Stop reason: HOSPADM

## 2020-09-09 RX ORDER — ATORVASTATIN CALCIUM 40 MG/1
40 TABLET, FILM COATED ORAL DAILY
COMMUNITY
End: 2021-07-27

## 2020-09-09 RX ORDER — CYCLOBENZAPRINE HCL 10 MG
10 TABLET ORAL 3 TIMES DAILY PRN
Status: ON HOLD | COMMUNITY
End: 2020-09-11 | Stop reason: HOSPADM

## 2020-09-09 RX ORDER — SEVELAMER CARBONATE 800 MG/1
2400 TABLET, FILM COATED ORAL
Status: DISCONTINUED | OUTPATIENT
Start: 2020-09-09 | End: 2020-09-11 | Stop reason: HOSPADM

## 2020-09-09 RX ORDER — ONDANSETRON 2 MG/ML
4 INJECTION INTRAMUSCULAR; INTRAVENOUS EVERY 8 HOURS PRN
Status: DISCONTINUED | OUTPATIENT
Start: 2020-09-09 | End: 2020-09-11 | Stop reason: HOSPADM

## 2020-09-09 RX ORDER — IBUPROFEN 200 MG
16 TABLET ORAL
Status: DISCONTINUED | OUTPATIENT
Start: 2020-09-09 | End: 2020-09-11 | Stop reason: HOSPADM

## 2020-09-09 RX ORDER — LIDOCAINE HYDROCHLORIDE 10 MG/ML
INJECTION, SOLUTION EPIDURAL; INFILTRATION; INTRACAUDAL; PERINEURAL
Status: DISCONTINUED | OUTPATIENT
Start: 2020-09-09 | End: 2020-09-09 | Stop reason: HOSPADM

## 2020-09-09 RX ORDER — ASPIRIN 81 MG/1
81 TABLET ORAL DAILY
Status: DISCONTINUED | OUTPATIENT
Start: 2020-09-10 | End: 2020-09-11 | Stop reason: HOSPADM

## 2020-09-09 RX ORDER — SEVELAMER CARBONATE 800 MG/1
2400 TABLET, FILM COATED ORAL
COMMUNITY
End: 2023-03-10 | Stop reason: SDUPTHER

## 2020-09-09 RX ORDER — IBUPROFEN 200 MG
24 TABLET ORAL
Status: DISCONTINUED | OUTPATIENT
Start: 2020-09-09 | End: 2020-09-11 | Stop reason: HOSPADM

## 2020-09-09 RX ORDER — IPRATROPIUM BROMIDE AND ALBUTEROL SULFATE 2.5; .5 MG/3ML; MG/3ML
3 SOLUTION RESPIRATORY (INHALATION) EVERY 4 HOURS
Status: DISCONTINUED | OUTPATIENT
Start: 2020-09-09 | End: 2020-09-09

## 2020-09-09 RX ORDER — ACETAMINOPHEN 500 MG
1000 TABLET ORAL EVERY 6 HOURS PRN
Status: ON HOLD | COMMUNITY
End: 2020-09-11 | Stop reason: HOSPADM

## 2020-09-09 RX ORDER — HYDRALAZINE HYDROCHLORIDE 25 MG/1
25 TABLET, FILM COATED ORAL EVERY 8 HOURS PRN
Status: DISCONTINUED | OUTPATIENT
Start: 2020-09-09 | End: 2020-09-11 | Stop reason: HOSPADM

## 2020-09-09 RX ORDER — ATORVASTATIN CALCIUM 20 MG/1
40 TABLET, FILM COATED ORAL DAILY
Status: DISCONTINUED | OUTPATIENT
Start: 2020-09-10 | End: 2020-09-11 | Stop reason: HOSPADM

## 2020-09-09 RX ORDER — IPRATROPIUM BROMIDE AND ALBUTEROL SULFATE 2.5; .5 MG/3ML; MG/3ML
3 SOLUTION RESPIRATORY (INHALATION) EVERY 4 HOURS PRN
Status: DISCONTINUED | OUTPATIENT
Start: 2020-09-09 | End: 2020-09-11 | Stop reason: HOSPADM

## 2020-09-09 RX ORDER — ATORVASTATIN CALCIUM 20 MG/1
40 TABLET, FILM COATED ORAL NIGHTLY
Status: DISCONTINUED | OUTPATIENT
Start: 2020-09-09 | End: 2020-09-09

## 2020-09-09 RX ORDER — FUROSEMIDE 10 MG/ML
80 INJECTION INTRAMUSCULAR; INTRAVENOUS 2 TIMES DAILY
Status: DISCONTINUED | OUTPATIENT
Start: 2020-09-09 | End: 2020-09-10

## 2020-09-09 RX ORDER — GLUCAGON 1 MG
1 KIT INJECTION
Status: DISCONTINUED | OUTPATIENT
Start: 2020-09-09 | End: 2020-09-11 | Stop reason: HOSPADM

## 2020-09-09 RX ADMIN — Medication 24 G: at 09:09

## 2020-09-09 RX ADMIN — SODIUM CHLORIDE 500 ML: 0.9 INJECTION, SOLUTION INTRAVENOUS at 02:09

## 2020-09-09 RX ADMIN — FUROSEMIDE 80 MG: 10 INJECTION, SOLUTION INTRAMUSCULAR; INTRAVENOUS at 08:09

## 2020-09-09 RX ADMIN — FUROSEMIDE 80 MG: 10 INJECTION, SOLUTION INTRAMUSCULAR; INTRAVENOUS at 02:09

## 2020-09-09 RX ADMIN — HEPARIN SODIUM 5000 UNITS: 5000 INJECTION INTRAVENOUS; SUBCUTANEOUS at 08:09

## 2020-09-09 RX ADMIN — HYDRALAZINE HYDROCHLORIDE 25 MG: 25 TABLET ORAL at 07:09

## 2020-09-09 NOTE — SUBJECTIVE & OBJECTIVE
Past Medical History:   Diagnosis Date    Anemia of chronic disorder 2017    Overview:  Added automatically from request for surgery [id number]    Anemia of chronic renal failure, stage 5     Anxiety     Cyst of left ovary 2019    Cyst of left ovary 2019    Dyslipidemia 2013    Hyperkalemia 3/29/2017    Peripheral neuropathy 2013    Renovascular hypertension 2020    S/P dilation and curettage 2018    Thickened endometrium 2018    Uncontrolled type 2 diabetes mellitus 2013    Overview:  poc a1c today 11.2-270/ up from 10.8-263, Pt. has very very stressed/ was incarcerated/marital stress/ will current meds/ less stress now/ will monitor.       Past Surgical History:   Procedure Laterality Date    AV FISTULA PLACEMENT      right arm     SECTION      x1    COLONOSCOPY N/A 2018    Procedure: COLONOSCOPY;  Surgeon: Pankaj Hinojosa MD;  Location: [facility] F[facility]);  Service: Endoscopy;  Laterality: N/A;  dialysis pt/labs prior to colon/MS    CYSTOSCOPY W/ RETROGRADES Bilateral 2020    Procedure: CYSTOSCOPY, WITH RETROGRADE PYELOGRAM;  Surgeon: Douglas Abraham MD;  Location: [address];  Service: Urology;  Laterality: Bilateral;  45 min    HYSTERECTOMY      ROBOT-ASSISTED LAPAROSCOPIC ABDOMINAL HYSTERECTOMY USING DA NATALYA XI N/A 2019    Procedure: XI ROBOTIC HYSTERECTOMY;  Surgeon: Trice Lei MD;  Location: [facility];  Service: OB/GYN;  Laterality: N/A;    ROBOT-ASSISTED LAPAROSCOPIC SALPINGO-OOPHORECTOMY USING DA NATALYA XI Bilateral 2019    Procedure: XI ROBOTIC SALPINGO-OOPHORECTOMY;  Surgeon: Trice Lei MD;  Location: [facility];  Service: OB/GYN;  Laterality: Bilateral;    TUBAL LIGATION      Vascular access surgeries      x5       Review of patient's allergies indicates:  No Known Allergies    No current facility-administered medications on file prior to encounter.      Current Outpatient Medications on File Prior to  "Encounter   Medication Sig    acetaminophen (TYLENOL) 500 MG tablet Take 1,000 mg by mouth every 6 (six) hours as needed for Pain.    aspirin (ECOTRIN) 81 MG EC tablet Take 81 mg by mouth.    atorvastatin (LIPITOR) 40 MG tablet Take 40 mg by mouth once daily.    carvediloL (COREG) 25 MG tablet Take 25 mg by mouth 2 (two) times daily.    cyclobenzaprine (FLEXERIL) 10 MG tablet Take 10 mg by mouth 3 (three) times daily as needed for Muscle spasms.    fish oil-omega-3 fatty acids 300-1,000 mg capsule Take 1 g by mouth once daily.    furosemide (LASIX) 40 MG tablet Take 80 mg by mouth 2 (two) times a day.     insulin (LANTUS SOLOSTAR U-100 INSULIN) glargine 100 units/mL (3mL) SubQ pen Inject 55 Units into the skin once daily.     losartan (COZAAR) 50 MG tablet Take 50 mg by mouth once daily.    NOVOLOG FLEXPEN 100 unit/mL InPn pen Inject 5 Units into the skin 3 (three) times daily with meals.     omega-3 fatty acids/fish oil (FISH OIL-OMEGA-3 FATTY ACIDS) 300-1,000 mg capsule Take 1 capsule by mouth once daily.    pregabalin (LYRICA) 50 MG capsule Take 50 mg by mouth 2 (two) times daily.     sevelamer carbonate (RENVELA) 800 mg Tab Take 2,400 mg by mouth 3 (three) times daily with meals.    verapamiL (VERELAN) 120 MG C24P Take 120 mg by mouth once daily.    [DISCONTINUED] ACCU-CHEK KRISTI PLUS TEST STRP Strp TEST QID    [DISCONTINUED] ACCU-CHEK SOFTCLIX LANCETS Misc U QID    [DISCONTINUED] acetaminophen (TYLENOL) 325 MG tablet Take 2 tablets (650 mg total) by mouth every 6 (six) hours as needed for Pain.    [DISCONTINUED] ALCOHOL ANTISEPTIC PADS (SURE COMFORT ALCOHOL PREP PADS TOP)     [DISCONTINUED] atorvastatin (LIPITOR) 40 MG tablet TK 1 T PO QD    [DISCONTINUED] BD ULTRA-FINE ETTA PEN NEEDLE 32 gauge x 5/32" Ndle U BID UTD    [DISCONTINUED] blood sugar diagnostic Strp 1 strip.    [DISCONTINUED] blood-glucose meter (FREESTYLE SYSTEM KIT) kit Free style flash  jarvis meter. Use as instructed    " "[DISCONTINUED] carvedilol (COREG) 3.125 MG tablet Take 25 mg by mouth 2 (two) times daily with meals.     [DISCONTINUED] docusate sodium (COLACE) 100 MG capsule Take 1 capsule (100 mg total) by mouth 2 (two) times daily.    [DISCONTINUED] HUMALOG KWIKPEN INSULIN 100 unit/mL pen ADM 5 UNI SC TID WC    [DISCONTINUED] insulin aspart U-100 (NOVOLOG FLEXPEN U-100 INSULIN) 100 unit/mL (3 mL) InPn pen Inject 5 Units into the skin.    [DISCONTINUED] lancets Misc U QID    [DISCONTINUED] losartan (COZAAR) 25 MG tablet Take 50 mg by mouth once daily.     [DISCONTINUED] medical supply, miscellaneous (MISCELLANEOUS MEDICAL SUPPLY MISC) Measure blood sugar three times a day.    [DISCONTINUED] methocarbamoL (ROBAXIN) 500 MG Tab TK 1 T PO  TID FOR 10 DAYS    [DISCONTINUED] miscellaneous medical supply (C-TUB) Misc Measure blood sugar three times a day.    [DISCONTINUED] pen needle, diabetic (SURE COMFORT PEN NEEDLE) 31 gauge x 3/16" Ndle U BID UTD    [DISCONTINUED] sevelamer carbonate (RENVELA) 800 mg Tab TK 3 TS PO TID WITH MEALS AND 3 TS WITH SNACKS    [DISCONTINUED] sodium bicarbonate 650 MG tablet Take 650 mg by mouth.    [DISCONTINUED] SURE COMFORT PEN NEEDLE 30 gauge x 5/16" Ndle U BID    [DISCONTINUED] TRUEPLUS LANCETS 30 gauge Misc U QID TO CHECK BLOOD SUGAR    [DISCONTINUED] verapamiL (CALAN-SR) 120 MG CR tablet TK 1 T PO  D    [DISCONTINUED] [place] [facility] [place]. Avoid strenuous activity.    [DISCONTINUED] miscellaneous [place] [facility] [place] blood sugar 4 x daily  Dispense sufficient arm patches for 30 day supply    [DISCONTINUED] miscellaneous medical supply (C-TUB) Misc Monitor blood sugar continuously    [DISCONTINUED] oxyCODONE-acetaminophen (PERCOCET) 5-325 mg per tablet Take 1 tablet by mouth every 4 (four) hours as needed for Pain.    [DISCONTINUED] vitamin renal formula, B-complex-vitamin c-folic acid, (NEPHROCAP) 1 mg Cap " Take 1 capsule by mouth.     Family History     Problem Relation (Age of Onset)    Cancer Mother, Sister, Sister    Diabetes Brother, Sister    Lung cancer Sister    Ovarian cancer Mother, Sister, Sister        Tobacco Use    Smoking status: Never Smoker    Smokeless tobacco: Never Used   Substance and Sexual Activity    Alcohol use: No    Drug use: No    Sexual activity: Yes     Partners: Male     Birth control/protection: Post-menopausal     Review of Systems   Constitutional: Negative for chills and fever.   HENT: Negative for congestion, hearing loss, sinus pressure and trouble swallowing.    Eyes: Negative for photophobia, pain, redness and visual disturbance.   Respiratory: Negative for cough, chest tightness, shortness of breath and wheezing.    Cardiovascular: Negative for chest pain and palpitations.   Gastrointestinal: Negative for abdominal distention, abdominal pain, blood in stool, constipation, diarrhea, nausea and vomiting.   Endocrine: Negative for cold intolerance, heat intolerance, polydipsia, polyphagia and polyuria.   Genitourinary: Negative for dysuria and frequency.   Musculoskeletal: Negative for arthralgias, back pain, gait problem, joint swelling, myalgias and neck pain.   Allergic/Immunologic: Negative for environmental allergies, food allergies and immunocompromised state.   Neurological: Positive for weakness and light-headedness. Negative for dizziness, seizures, syncope and headaches.   Hematological: Negative for adenopathy.   Psychiatric/Behavioral: Negative for agitation, behavioral problems and confusion.     Objective:     Vital Signs (Most Recent):  Temp: 97.2 °F (36.2 °C) (09/09/20 1505)  Pulse: (!) 38 (09/09/20 1615)  Resp: 15 (09/09/20 1615)  BP: (!) 123/58 (09/09/20 1615)  SpO2: 100 % (09/09/20 1615) Vital Signs (24h Range):  Temp:  [97.2 °F (36.2 °C)-97.7 °F (36.5 °C)] 97.2 °F (36.2 °C)  Pulse:  [] 38  Resp:  [15-30] 15  SpO2:  [97 %-100 %] 100 %  BP:  ()/(52-65) 123/58     Weight: 91.1 kg (200 lb 13.4 oz)  Body mass index is 34.47 kg/m².    Physical Exam  Constitutional:       General: She is not in acute distress.     Appearance: She is well-developed. She is not diaphoretic.   HENT:      Head: Normocephalic and atraumatic.      Nose: Nose normal.      Mouth/Throat:      Pharynx: No oropharyngeal exudate.   Eyes:      General: No scleral icterus.        Right eye: No discharge.         Left eye: No discharge.      Conjunctiva/sclera: Conjunctivae normal.      Pupils: Pupils are equal, round, and reactive to light.   Neck:      Musculoskeletal: Normal range of motion and neck supple.      Thyroid: No thyromegaly.      Vascular: No JVD.      Trachea: No tracheal deviation.   Cardiovascular:      Rate and Rhythm: Normal rate and regular rhythm.      Heart sounds: Normal heart sounds. No murmur. No friction rub. No gallop.    Pulmonary:      Effort: Pulmonary effort is normal. No respiratory distress.      Breath sounds: Normal breath sounds. No stridor. No wheezing or rales.   Chest:      Chest wall: No tenderness.   Abdominal:      General: Bowel sounds are normal. There is no distension.      Palpations: Abdomen is soft. There is no mass.      Tenderness: There is no abdominal tenderness. There is no guarding or rebound.   Musculoskeletal: Normal range of motion.         General: No tenderness.      Comments: Right upper extremity fistula without a strong bruit or thrill.   Lymphadenopathy:      Cervical: No cervical adenopathy.   Skin:     General: Skin is warm and dry.      Coloration: Skin is not pale.      Findings: No erythema or rash.   Neurological:      Mental Status: She is alert and oriented to person, place, and time.      Cranial Nerves: No cranial nerve deficit.      Motor: No abnormal muscle tone.      Coordination: Coordination normal.      Deep Tendon Reflexes: Reflexes are normal and symmetric. Reflexes normal.   Psychiatric:          Behavior: Behavior normal.         Thought Content: Thought content normal.         Judgment: Judgment normal.           CRANIAL NERVES     CN III, IV, VI   Pupils are equal, round, and reactive to light.       Significant Labs: All pertinent labs within the past 24 hours have been reviewed.    Significant Imaging: I have reviewed all pertinent imaging results/findings within the past 24 hours.

## 2020-09-09 NOTE — H&P
[facility] Hospital Medicine  History & Physical    Patient Name: Jael Hall  MRN: [id number]  Admission Date: 9/9/2020  Attending Physician: Jonas Emerson MD   Primary Care Provider: Mary Solano MD         Patient information was obtained from patient, past medical records and ER records.     Subjective:     Principal Problem:Symptomatic bradycardia    Chief Complaint:   Chief Complaint   Patient presents with    Dizziness        HPI: Patient is a 55-year-old woman with history of hypertension, diabetes mellitus type 2, end-stage renal disease who underwent a fistulogram today of her right upper extremity AV fistula when it was noted that she was significantly bradycardic with pulse of about 30 beats per minute.  Patient reports feeling lightheadedness with ambulation.  Patient last successfully underwent outpatient dialysis last Friday on 9/4/2020.  Outpatient dialysis was attempted this last Monday on 9/7/2020 but was not successful due to suspected clot.  Fistulogram performed today did not show evidence of a clot.    She denies any fevers or chills.  Patient denies any chest pain or shortness of breath.  She denies any nausea, vomiting, diarrhea, or constipation.  She denies any recent illness or travel.    Past Medical History:   Diagnosis Date    Anemia of chronic disorder 11/8/2017    Overview:  Added automatically from request for surgery [id number]    Anemia of chronic renal failure, stage 5     Anxiety     Cyst of left ovary 5/14/2019    Cyst of left ovary 7/5/2019    Dyslipidemia 1/4/2013    Hyperkalemia 3/29/2017    Peripheral neuropathy 1/4/2013    Renovascular hypertension 8/17/2020    S/P dilation and curettage 5/22/2018    Thickened endometrium 5/22/2018    Uncontrolled type 2 diabetes mellitus 4/5/2013    Overview:  poc a1c today 11.2-270/ up from 10.8-263, Pt. has very very stressed/ was incarcerated/marital stress/ will current meds/ less stress now/ will  monitor.       Past Surgical History:   Procedure Laterality Date    AV FISTULA PLACEMENT      right arm     SECTION      x1    COLONOSCOPY N/A 2018    Procedure: COLONOSCOPY;  Surgeon: Pankaj Hinojosa MD;  [facility]  Service: Endoscopy;  Laterality: N/A;  dialysis pt/labs prior to colon/MS    CYSTOSCOPY W/ RETROGRADES Bilateral 2020    Procedure: CYSTOSCOPY, WITH RETROGRADE PYELOGRAM;  Surgeon: Douglas Abraham MD;  [address]  Service: Urology;  Laterality: Bilateral;  45 min    HYSTERECTOMY      ROBOT-ASSISTED LAPAROSCOPIC ABDOMINAL HYSTERECTOMY USING DA NATALYA XI N/A 2019    Procedure: XI ROBOTIC HYSTERECTOMY;  Surgeon: Trice Lei MD;  Location: [facility];  Service: OB/GYN;  Laterality: N/A;    ROBOT-ASSISTED LAPAROSCOPIC SALPINGO-OOPHORECTOMY USING DA NATALYA XI Bilateral 2019    Procedure: XI ROBOTIC SALPINGO-OOPHORECTOMY;  Surgeon: Trice Lei MD;  Location: [facility];  Service: OB/GYN;  Laterality: Bilateral;    TUBAL LIGATION      Vascular access surgeries      x5       Review of patient's allergies indicates:  No Known Allergies    No current facility-administered medications on file prior to encounter.      Current Outpatient Medications on File Prior to Encounter   Medication Sig    acetaminophen (TYLENOL) 500 MG tablet Take 1,000 mg by mouth every 6 (six) hours as needed for Pain.    aspirin (ECOTRIN) 81 MG EC tablet Take 81 mg by mouth.    atorvastatin (LIPITOR) 40 MG tablet Take 40 mg by mouth once daily.    carvediloL (COREG) 25 MG tablet Take 25 mg by mouth 2 (two) times daily.    cyclobenzaprine (FLEXERIL) 10 MG tablet Take 10 mg by mouth 3 (three) times daily as needed for Muscle spasms.    fish oil-omega-3 fatty acids 300-1,000 mg capsule Take 1 g by mouth once daily.    furosemide (LASIX) 40 MG tablet Take 80 mg by mouth 2 (two) times a day.     insulin (LANTUS SOLOSTAR U-100 INSULIN) glargine 100 units/mL (3mL)  "SubQ pen Inject 55 Units into the skin once daily.     losartan (COZAAR) 50 MG tablet Take 50 mg by mouth once daily.    NOVOLOG FLEXPEN 100 unit/mL InPn pen Inject 5 Units into the skin 3 (three) times daily with meals.     omega-3 fatty acids/fish oil (FISH OIL-OMEGA-3 FATTY ACIDS) 300-1,000 mg capsule Take 1 capsule by mouth once daily.    pregabalin (LYRICA) 50 MG capsule Take 50 mg by mouth 2 (two) times daily.     sevelamer carbonate (RENVELA) 800 mg Tab Take 2,400 mg by mouth 3 (three) times daily with meals.    verapamiL (VERELAN) 120 MG C24P Take 120 mg by mouth once daily.    [DISCONTINUED] ACCU-CHEK KRISTI PLUS TEST STRP Strp TEST QID    [DISCONTINUED] ACCU-CHEK SOFTCLIX LANCETS Misc U QID    [DISCONTINUED] acetaminophen (TYLENOL) 325 MG tablet Take 2 tablets (650 mg total) by mouth every 6 (six) hours as needed for Pain.    [DISCONTINUED] ALCOHOL ANTISEPTIC PADS (SURE COMFORT ALCOHOL PREP PADS TOP)     [DISCONTINUED] atorvastatin (LIPITOR) 40 MG tablet TK 1 T PO QD    [DISCONTINUED] BD ULTRA-FINE ETTA PEN NEEDLE 32 gauge x 5/32" Ndle U BID UTD    [DISCONTINUED] blood sugar diagnostic Strp 1 strip.    [DISCONTINUED] blood-glucose meter (FREESTYLE SYSTEM KIT) kit Free style flash  jarvis meter. Use as instructed    [DISCONTINUED] carvedilol (COREG) 3.125 MG tablet Take 25 mg by mouth 2 (two) times daily with meals.     [DISCONTINUED] docusate sodium (COLACE) 100 MG capsule Take 1 capsule (100 mg total) by mouth 2 (two) times daily.    [DISCONTINUED] HUMALOG KWIKPEN INSULIN 100 unit/mL pen ADM 5 UNI SC TID WC    [DISCONTINUED] insulin aspart U-100 (NOVOLOG FLEXPEN U-100 INSULIN) 100 unit/mL (3 mL) InPn pen Inject 5 Units into the skin.    [DISCONTINUED] lancets Misc U QID    [DISCONTINUED] losartan (COZAAR) 25 MG tablet Take 50 mg by mouth once daily.     [DISCONTINUED] medical supply, miscellaneous (MISCELLANEOUS MEDICAL SUPPLY MISC) Measure blood sugar three times a day.    " "[DISCONTINUED] methocarbamoL (ROBAXIN) 500 MG Tab TK 1 T PO  TID FOR 10 DAYS    [DISCONTINUED] miscellaneous medical supply (C-TUB) Misc Measure blood sugar three times a day.    [DISCONTINUED] pen needle, diabetic (SURE COMFORT PEN NEEDLE) 31 gauge x 3/16" Ndle U BID UTD    [DISCONTINUED] sevelamer carbonate (RENVELA) 800 mg Tab TK 3 TS PO TID WITH MEALS AND 3 TS WITH SNACKS    [DISCONTINUED] sodium bicarbonate 650 MG tablet Take 650 mg by mouth.    [DISCONTINUED] SURE COMFORT PEN NEEDLE 30 gauge x 5/16" Ndle U BID    [DISCONTINUED] TRUEPLUS LANCETS 30 gauge Misc U QID TO CHECK BLOOD SUGAR    [DISCONTINUED] verapamiL (CALAN-SR) 120 MG CR tablet TK 1 T PO  D    [DISCONTINUED] miscellaneous medical [place]C-TUB[place] [facility] [place] to [place]. Avoid strenuous activity.    [DISCONTINUED] miscellaneous medical supply (C-TUB) [facility] [place] to check blood sugar 4 x daily  Dispense sufficient arm patches for 30 day supply    [DISCONTINUED] miscellaneous medical supply (C-TUB) Misc Monitor blood sugar continuously    [DISCONTINUED] oxyCODONE-acetaminophen (PERCOCET) 5-325 mg per tablet Take 1 tablet by mouth every 4 (four) hours as needed for Pain.    [DISCONTINUED] vitamin renal formula, B-complex-vitamin c-folic acid, (NEPHROCAP) 1 mg Cap Take 1 capsule by mouth.     Family History     Problem Relation (Age of Onset)    Cancer Mother, Sister, Sister    Diabetes Brother, Sister    Lung cancer Sister    Ovarian cancer Mother, Sister, Sister        Tobacco Use    Smoking status: Never Smoker    Smokeless tobacco: Never Used   Substance and Sexual Activity    Alcohol use: No    Drug use: No    Sexual activity: Yes     Partners: Male     Birth control/protection: Post-menopausal     Review of Systems   Constitutional: Negative for chills and fever.   HENT: Negative for congestion, hearing loss, sinus pressure and trouble swallowing.    Eyes: Negative for photophobia, pain, redness and visual disturbance. "   Respiratory: Negative for cough, chest tightness, shortness of breath and wheezing.    Cardiovascular: Negative for chest pain and palpitations.   Gastrointestinal: Negative for abdominal distention, abdominal pain, blood in stool, constipation, diarrhea, nausea and vomiting.   Endocrine: Negative for cold intolerance, heat intolerance, polydipsia, polyphagia and polyuria.   Genitourinary: Negative for dysuria and frequency.   Musculoskeletal: Negative for arthralgias, back pain, gait problem, joint swelling, myalgias and neck pain.   Allergic/Immunologic: Negative for environmental allergies, food allergies and immunocompromised state.   Neurological: Positive for weakness and light-headedness. Negative for dizziness, seizures, syncope and headaches.   Hematological: Negative for adenopathy.   Psychiatric/Behavioral: Negative for agitation, behavioral problems and confusion.     Objective:     Vital Signs (Most Recent):  Temp: 97.2 °F (36.2 °C) (09/09/20 1505)  Pulse: (!) 38 (09/09/20 1615)  Resp: 15 (09/09/20 1615)  BP: (!) 123/58 (09/09/20 1615)  SpO2: 100 % (09/09/20 1615) Vital Signs (24h Range):  Temp:  [97.2 °F (36.2 °C)-97.7 °F (36.5 °C)] 97.2 °F (36.2 °C)  Pulse:  [] 38  Resp:  [15-30] 15  SpO2:  [97 %-100 %] 100 %  BP: ()/(52-65) 123/58     Weight: 91.1 kg (200 lb 13.4 oz)  Body mass index is 34.47 kg/m².    Physical Exam  Constitutional:       General: She is not in acute distress.     Appearance: She is well-developed. She is not diaphoretic.   HENT:      Head: Normocephalic and atraumatic.      Nose: Nose normal.      Mouth/Throat:      Pharynx: No oropharyngeal exudate.   Eyes:      General: No scleral icterus.        Right eye: No discharge.         Left eye: No discharge.      Conjunctiva/sclera: Conjunctivae normal.      Pupils: Pupils are equal, round, and reactive to light.   Neck:      Musculoskeletal: Normal range of motion and neck supple.      Thyroid: No thyromegaly.       Vascular: No JVD.      Trachea: No tracheal deviation.   Cardiovascular:      Rate and Rhythm: Normal rate and regular rhythm.      Heart sounds: Normal heart sounds. No murmur. No friction rub. No gallop.    Pulmonary:      Effort: Pulmonary effort is normal. No respiratory distress.      Breath sounds: Normal breath sounds. No stridor. No wheezing or rales.   Chest:      Chest wall: No tenderness.   Abdominal:      General: Bowel sounds are normal. There is no distension.      Palpations: Abdomen is soft. There is no mass.      Tenderness: There is no abdominal tenderness. There is no guarding or rebound.   Musculoskeletal: Normal range of motion.         General: No tenderness.      Comments: Right upper extremity fistula without a strong bruit or thrill.   Lymphadenopathy:      Cervical: No cervical adenopathy.   Skin:     General: Skin is warm and dry.      Coloration: Skin is not pale.      Findings: No erythema or rash.   Neurological:      Mental Status: She is alert and oriented to person, place, and time.      Cranial Nerves: No cranial nerve deficit.      Motor: No abnormal muscle tone.      Coordination: Coordination normal.      Deep Tendon Reflexes: Reflexes are normal and symmetric. Reflexes normal.   Psychiatric:         Behavior: Behavior normal.         Thought Content: Thought content normal.         Judgment: Judgment normal.           CRANIAL NERVES     CN III, IV, VI   Pupils are equal, round, and reactive to light.       Significant Labs: All pertinent labs within the past 24 hours have been reviewed.    Significant Imaging: I have reviewed all pertinent imaging results/findings within the past 24 hours.    Assessment/Plan:     * Symptomatic bradycardia  No P waves appreciated on 12 lead electrocardiogram.  Patient with a ventricular escape rhythm of about 30 beats per minute.  Will hold carvedilol and diltiazem.  Patient with significant metabolic derangements related to inadequate dialysis.   Plan on dialysis now.  Will monitor intensive care unit.  Checking an echocardiogram.  Consult Cardiology for evaluation and treatment recommendations.    End-stage renal disease on hemodialysis  Patient with marked hyperkalemia and metabolic acidosis related to missed outpatient dialysis session due to issues with her access.  Fistulogram today looked okay.  Giving medical therapy to treat acute hyperkalemia and consulted nephrology for hemodialysis now.    Hyperkalemia  Will shift potassium intracellularly and plan on hemodialysis now.    Type 2 diabetes mellitus with kidney complication, with long-term current use of insulin  Will give reduced dose of long-acting insulin tomorrow morning.  Otherwise sliding scale insulin for now.    Essential hypertension  Mildly hypotensive.  Bolus 500 cc of normal saline now.  Hold blood pressure medications.    VTE Risk Mitigation (From admission, onward)         Ordered     heparin (porcine) injection 5,000 Units  Every 12 hours      09/09/20 1401     IP VTE HIGH RISK PATIENT  Once      09/09/20 1401     Place sequential compression device  Until discontinued      09/09/20 1401                Jonas Emerson MD  Department of Hospital Medicine   [facility]

## 2020-09-09 NOTE — PLAN OF CARE
Dr. Tirado at the bedside to evaluate patient post procedure, who presented to ACU from procedure area with continued hypotension/bradycardia. Pt will be admitted as an inpatient. Patient's spouse at the bedside and updated on the POC.

## 2020-09-09 NOTE — HPI
Patient is a 55-year-old woman with history of hypertension, diabetes mellitus type 2, end-stage renal disease who underwent a fistulogram today of her right upper extremity AV fistula when it was noted that she was significantly bradycardic with pulse of about 30 beats per minute.  Patient reports feeling lightheadedness with ambulation.  Patient last successfully underwent outpatient dialysis last Friday on 9/4/2020.  Outpatient dialysis was attempted this last Monday on 9/7/2020 but was not successful due to suspected clot.  Fistulogram performed today did not show evidence of a clot.    She denies any fevers or chills.  Patient denies any chest pain or shortness of breath.  She denies any nausea, vomiting, diarrhea, or constipation.  She denies any recent illness or travel.

## 2020-09-09 NOTE — PLAN OF CARE
Upon initial assessment, pt in junctional rhythm, HR in the 30s. Pt initially c/o nausea and some chest tightness. Currently in NSR, HR in the 70s. No longer symptomatic. Afebrile. BP stable, slightly hypertensive, MD made aware, monitor BP once HD is completed. On 2L NC, no c/o SOB. Currently on HD. No complications with R arm fistula so far. 500cc NS bolus administered per order. No urine noted since arrival to unit, MD aware, pt has ESRD. Tolerating clear liquid diet. Safety maintained. Up to date with POC. Will continue to monitor.

## 2020-09-09 NOTE — H&P
[address] CheckIn  History & Physical    Subjective:      Chief Complaint/Reason for Admission: here for declot    Jael Hall is a 55 y.o. female with ESRD (MWF at [facility] with Dr. Brumfield) who presents today for declot after she was noted to have no flow when she showed up to HD on Monday.  She had no warning signs prior to this thrombosis episode, and the access has been in use for a couple of years now, with no prior episodes of thrombosis.    Past Medical History:   Diagnosis Date    Anemia of chronic disorder 2017    Overview:  Added automatically from request for surgery [id number]    Anemia of chronic renal failure, stage 5     Anxiety     Cyst of left ovary 2019    Cyst of left ovary 2019    Dyslipidemia 2013    Hyperkalemia 3/29/2017    Peripheral neuropathy 2013    Renovascular hypertension 2020    S/P dilation and curettage 2018    Thickened endometrium 2018    Uncontrolled type 2 diabetes mellitus 2013    Overview:  poc a1c today 11.2-270/ up from 10.8-263, Pt. has very very stressed/ was incarcerated/marital stress/ will current meds/ less stress now/ will monitor.     Past Surgical History:   Procedure Laterality Date    AV FISTULA PLACEMENT      right arm     SECTION      x1    COLONOSCOPY N/A 2018    Procedure: COLONOSCOPY;  Surgeon: Pankaj Hinojosa MD;  Location: [facility] ([address] [place] [address]);  Service: Endoscopy;  Laterality: N/A;  dialysis pt/labs prior to colon/MS    CYSTOSCOPY W/ RETROGRADES Bilateral 2020    Procedure: CYSTOSCOPY, WITH RETROGRADE PYELOGRAM;  Surgeon: Douglas Abraham MD;  Location: [address];  Service: Urology;  Laterality: Bilateral;  45 min    HYSTERECTOMY      ROBOT-ASSISTED LAPAROSCOPIC ABDOMINAL HYSTERECTOMY USING DA NATALYA XI N/A 2019    Procedure: XI ROBOTIC HYSTERECTOMY;  Surgeon: Trice Lei MD;  Location: [facility];  Service: OB/GYN;  Laterality: N/A;     "ROBOT-ASSISTED LAPAROSCOPIC SALPINGO-OOPHORECTOMY USING DA NATALYA XI Bilateral 7/5/2019    Procedure: XI ROBOTIC SALPINGO-OOPHORECTOMY;  Surgeon: Trice Lei MD;  Location[facility];  Service: OB/GYN;  Laterality: Bilateral;    TUBAL LIGATION      Vascular access surgeries      x5     Family History   Problem Relation Age of Onset    Cancer Mother     Ovarian cancer Mother     Cancer Sister     Ovarian cancer Sister     Lung cancer Sister     Diabetes Brother     Cancer Sister     Ovarian cancer Sister     Diabetes Sister     Breast cancer Neg Hx     Colon cancer Neg Hx     Cervical cancer Neg Hx     Endometrial cancer Neg Hx     Vaginal cancer Neg Hx      Social History     Tobacco Use    Smoking status: Never Smoker    Smokeless tobacco: Never Used   Substance Use Topics    Alcohol use: No    Drug use: No       PTA Medications   Medication Sig    ACCU-CHEK KRISTI PLUS TEST STRP Strp TEST QID    ACCU-CHEK SOFTCLIX LANCETS Misc U QID    acetaminophen (TYLENOL) 325 MG tablet Take 2 tablets (650 mg total) by mouth every 6 (six) hours as needed for Pain.    ALCOHOL ANTISEPTIC PADS (SURE COMFORT ALCOHOL PREP PADS TOP)     aspirin (ECOTRIN) 81 MG EC tablet Take 81 mg by mouth.    atorvastatin (LIPITOR) 40 MG tablet TK 1 T PO QD    BD ULTRA-FINE ETTA PEN NEEDLE 32 gauge x 5/32" Ndle U BID UTD    blood sugar diagnostic Strp 1 strip.    blood-glucose meter (FREESTYLE SYSTEM KIT) kit Free style flash  jarvis meter. Use as instructed    carvedilol (COREG) 3.125 MG tablet Take 25 mg by mouth 2 (two) times daily with meals.     docusate sodium (COLACE) 100 MG capsule Take 1 capsule (100 mg total) by mouth 2 (two) times daily.    fish oil-omega-3 fatty acids 300-1,000 mg capsule Take 1 g by mouth once daily.    furosemide (LASIX) 40 MG tablet Take 80 mg by mouth 2 (two) times a day.     HUMALOG KWIKPEN INSULIN 100 unit/mL pen ADM 5 UNI SC TID WC    insulin (LANTUS SOLOSTAR U-100 INSULIN) " "glargine 100 units/mL (3mL) SubQ pen Inject 55 Units into the skin.    insulin aspart U-100 (NOVOLOG FLEXPEN U-100 INSULIN) 100 unit/mL (3 mL) InPn pen Inject 5 Units into the skin.    lancets Misc U QID    losartan (COZAAR) 25 MG tablet Take 50 mg by mouth once daily.     medical supply, miscellaneous (MISCELLANEOUS MEDICAL SUPPLY MISC) Measure blood sugar three times a day.    methocarbamoL (ROBAXIN) 500 MG Tab TK 1 T PO  TID FOR 10 DAYS    miscellaneous medical supply (C-TUB) Misc Measure blood sugar three times a day.    NOVOLOG FLEXPEN 100 unit/mL InPn pen INJECT 5 UNITS SC D WITH DINNER    pen needle, diabetic (SURE COMFORT PEN NEEDLE) 31 gauge x 3/16" Ndle U BID UTD    pregabalin (LYRICA) 50 MG capsule     sevelamer carbonate (RENVELA) 800 mg Tab TK 3 TS PO TID WITH MEALS AND 3 TS WITH SNACKS    sodium bicarbonate 650 MG tablet Take 650 mg by mouth.    SURE COMFORT PEN NEEDLE 30 gauge x 5/16" Ndle U BID    TRUEPLUS LANCETS 30 gauge Misc U QID TO CHECK BLOOD SUGAR    verapamiL (CALAN-SR) 120 MG CR tablet TK 1 T PO  D    miscellaneous medical supply (C-TUB) [facility] [place] ok to return to work. Avoid strenuous activity.    miscellaneous medical supply (C-TUB) [facility] [place] to check blood sugar 4 x daily  Dispense sufficient arm patches for 30 day supply    miscellaneous medical supply (C-TUB) Misc Monitor blood sugar continuously    oxyCODONE-acetaminophen (PERCOCET) 5-325 mg per tablet Take 1 tablet by mouth every 4 (four) hours as needed for Pain.    vitamin renal formula, B-complex-vitamin c-folic acid, (NEPHROCAP) 1 mg Cap Take 1 capsule by mouth.     Review of patient's allergies indicates:  No Known Allergies     Review of Systems   Constitutional: Negative.    Respiratory: Negative.    Cardiovascular: Negative.        Objective:      Vital Signs (Most Recent)  Temp: 97.3 °F (36.3 °C) (09/09/20 1043)  Pulse: (!) 116 (09/09/20 1043)  Resp: 16 (09/09/20 1043)  BP: 104/65 (09/09/20 " 1044)  SpO2: 99 % (09/09/20 1043)    Vital Signs Range (Last 24H):  Temp:  [97.3 °F (36.3 °C)]   Pulse:  [116]   Resp:  [16]   BP: (104)/(65)   SpO2:  [99 %]     Physical Exam  Constitutional:       General: She is not in acute distress.     Appearance: She is well-developed. She is not diaphoretic.   HENT:      Head: Normocephalic and atraumatic.   Neck:      Musculoskeletal: Neck supple.   Pulmonary:      Effort: Pulmonary effort is normal. No respiratory distress.   Musculoskeletal:      Comments: RUE radiocephalic AVF with very soft thrill and minimal pulsatility, partial collapse with arm elevation and inappropriate pulse augmentation   Skin:     General: Skin is warm and dry.   Neurological:      Mental Status: She is alert and oriented to person, place, and time.   Psychiatric:         Behavior: Behavior normal.         Assessment:      Active Hospital Problems    Diagnosis  POA    ESRD (end stage renal disease) [N18.6]  Yes     Priority: Low      Resolved Hospital Problems   No resolved problems to display.       Plan:      Declot vs fistulogram today.  Risks explained, consent signed.

## 2020-09-09 NOTE — PROGRESS NOTES
[facility] Hemodialysis Procedure Note    Subjective:  The patient is currently doing well on hemodialysis.  She states that she feels fine (only complains of hunger), denies pain or shortness of breath.  Otherwise denies palpitations, abd pain, n/v/d, LE swelling.  The nurse was able to access her fistula in the correct areas and has her blood flow rate currently at 330 ml/min.    Vitals reviewed, she remains bradycardic with mild hypotension (100's systolic).    GEN:  appears well, comfortably sitting up in bed, in no acute distress  HEENT/NECK:  anicteric sclera, MMM, no apparent JVD  CVS:  bradycardic with no apparent murmurs or rubs  PULM:  CTAB  ABD:  soft, nontender, nondistended, normal bowel sounds  EXTR:  no edema  ACCESS:  RUE radiocephalic AVF, currently being accessed    BFR: 330ml/min  DFR: 600ml/min  Bath: 1K for first hour, then 2K thereafter/2.5Ca  UF Goal: 2L as tolerated  Meds: none    All recent labs have been reviewed and the dialysis bath has been adjusted accordingly.    Hemodynamic monitoring ongoing while on hemodialysis.  She will have her bradycardia further reevaluated once K has come to more normal range.    Lester Gómez MD

## 2020-09-09 NOTE — ASSESSMENT & PLAN NOTE
Will give reduced dose of long-acting insulin tomorrow morning.  Otherwise sliding scale insulin for now.

## 2020-09-09 NOTE — ASSESSMENT & PLAN NOTE
No P waves appreciated on 12 lead electrocardiogram.  Patient with a ventricular escape rhythm of about 30 beats per minute.  Will hold carvedilol and diltiazem.  Patient with significant metabolic derangements related to inadequate dialysis.  Plan on dialysis now.  Will monitor intensive care unit.  Checking an echocardiogram.  Consult Cardiology for evaluation and treatment recommendations.

## 2020-09-09 NOTE — ASSESSMENT & PLAN NOTE
Patient with marked hyperkalemia and metabolic acidosis related to missed outpatient dialysis session due to issues with her access.  Fistulogram today looked okay.  Giving medical therapy to treat acute hyperkalemia and consulted nephrology for hemodialysis now.

## 2020-09-09 NOTE — HOSPITAL COURSE
Patient is a 55-year-old woman with history of hypertension, diabetes mellitus type 2, and end-stage renal disease on outpatient dialysis normally on Mondays, Wednesday, and Friday admitted to the intensive care unit due to symptomatic bradycardia and need for urgent hemodialysis with marked hyperkalemia.  Patient underwent a fistulogram which did not reveal any significant issue.  Patient underwent urgent dialysis utilizing her fistula with correction of multiple metabolic derangement including marked hyperkalemia and metabolic acidosis resulting in resolution her marked symptomatic bradycardia.  Patient's blood pressure medications also adjusted to limit the risk of severe bradycardia in the future including substituting her non-dihydropyridine calcium channel blocker (verapamil) to a long-acting dihydropyridine calcium channel blocker (nifedeipine).  Patient stable to be discharged home to resume her outpatient hemodialysis.  Patient advised on close outpatient follow-up with her primary care provider for close monitoring of her blood pressure and pulse and further adjustment in her regimen as needed to achieve blood pressure goals.

## 2020-09-09 NOTE — BRIEF OP NOTE
[address]  Brief Operative Note    Surgery Date: 9/9/2020     Surgeon(s) and Role:     * Lester Gómez MD - Primary    Assisting Surgeon: Negrita Bell MD    Pre-op Diagnosis:  ESRD (end stage renal disease) [N18.6]  Problem with dialysis access [T82.898A]    Post-op Diagnosis:  Post-Op Diagnosis Codes:     * ESRD (end stage renal disease) [N18.6]     * Problem with dialysis access [T82.898A]    Procedure(s) (LRB):  Fistulogram (Right)    Anesthesia: 1% lidocaine    Description of the findings of the procedure(s): Patient was prepped and draped in a sterile fashion.  This was a successful fistulogram of her RUE AV radiocephalic AVF with no interventions performed in the fistula.  Patient tolerated the procedure well and no immediate complications noted.      Estimated Blood Loss: < 10 mL         Specimens:   Specimen (12h ago, onward)    None            Discharge Note    OUTCOME: Patient was prepped and draped in a sterile fashion.  This was a successful fistulogram of her RUE AV radiocephalic AVF with no interventions performed in the fistula.  Patient tolerated the procedure well and no immediate complications noted.    DISPOSITION: Home or Self Care    FINAL DIAGNOSIS:  Mechanical complication of arteriovenous fistula surgically created    FOLLOWUP: In clinic as needed; please contact your dialysis unit to schedule your next dialysis session.    DISCHARGE INSTRUCTIONS:    Discharge Procedure Orders   Lifting restrictions   Order Comments: No heavy lifting for 24 hours.     Call MD for:  temperature >100.4     Call MD for:  severe uncontrolled pain     Call MD for:  redness, tenderness, or signs of infection (pain, swelling, redness, odor or green/yellow discharge around incision site)     Call MD for:   Order Comments: Bleeding from the access site.     Activity as tolerated

## 2020-09-09 NOTE — PLAN OF CARE
initial discharge planning assessment.     Pt confirmed her PCP is correct in Epic   her pharmacy of choice is [facility].  Pt lives with family and can return at time of discharge.   Consults: cardiology, nephrology  Out patient HD ([facility]) on a MWF schedule   anticipate no CM needs for discharge       09/09/20 1728   Discharge Assessment   Assessment Type Discharge Planning Assessment   Confirmed/corrected address and phone number on facesheet? Yes   Assessment information obtained from? Patient;Caregiver;Medical Record   Communicated expected length of stay with patient/caregiver yes   Prior to hospitilization cognitive status: Alert/Oriented   Prior to hospitalization functional status: Independent   Current cognitive status: Alert/Oriented   Current Functional Status: Independent   Lives With child(lucas), adult   Able to Return to Prior Arrangements yes   Is patient able to care for self after discharge? Yes   Patient currently receives any other outside agency services? No   Equipment Currently Used at Home none   Do you have any problems affording any of your prescribed medications? No   Is the patient taking medications as prescribed? yes   Does the patient have transportation home? Yes   Transportation Anticipated family or friend will provide;health plan transportation   Discharge Plan A Home   Discharge Plan B Home Health   DME Needed Upon Discharge  none   Patient/Family in Agreement with Plan yes

## 2020-09-09 NOTE — NURSING
Pt complaining of chest tightness, and requesting O2. Does not report any SOB. Placed on 2L NC. Dr Emerson notified and at bedside.

## 2020-09-09 NOTE — PROCEDURES
"[facility]    Fistulogram Procedure Report    Date of Procedure:  09/09/2020 12:21 PM    Procedures Performed: Fistulogram with no intervention, reflux arteriogram    Indication: "clotted access"    Referring Provider: Dr. Brumfield and [facility]    Primary Surgeon: Lester Gómez MD  Assist: Negrita Bell MD    Medications Administered:  1% lidocaine    Procedure Report in Detail:  After informed consent was obtained the patient was prepped and draped in the usual sterile fashion.  Her right upper Radiocephalic AVF was cannulated in the venous facing direction with a micropuncture system under direct visualization of doppler ultrasound, confirmed in correct placement under fluroscopy, and a fistulogram was performed using iodinated contrast.  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  Central vasculature was then evaluated with contrasted film revealing widely patent central vasculature.  Reflux arteriogram revealed patent anastomosis with no JA stenosis (the anastomosis was measured at about 5 mm in diameter).  More than 6cm of the radial artery was visualized and it was patent.  The flow was measured pre-aneurysm at about 450 ml/min and about 250 ml/min post-aneurysms, which could have been related to her bradycardia and hypotension during the procedure.  At this point, it was felt no lesion was intervene-able, and so the microsheath was pulled and direct pressure was used to achieve hemostasis.  No complications occurred during the procedure.     Estimated blood loss: < 10 mL   Estimated contrast used: 32 mL    Impression/Plan:  This was a successful fistulogram with no interventions " performed in the fistula.  We have taken a picture and sent to the dialysis unit to visualize areas to avoid.  We also will plan to admit her since she has symptomatic bradycardia, with no sinus visualized on EKG.  If she continues to have issues while on HD, please do refer her back and we will take another look.      eLster Gómez MD  [phone]

## 2020-09-09 NOTE — CONSULTS
[facility]    Date of admission:  9/9/2020     Chief Complaint  Chief Complaint   Patient presents with    Dizziness       HPI:  55-year-old female presented to the hospital for a nonfunctioning dialysis shunt.  She was found to be bradycardiac.  She was admitted in intensive care unit.  Her electrolytes returned showing an elevated potassium.  She has been started on dialysis.  She has returned to sinus rhythm with a normal rate.    Medications  Current Facility-Administered Medications   Medication Dose Route Frequency Provider Last Rate Last Dose    albuterol-ipratropium 2.5 mg-0.5 mg/3 mL nebulizer solution 3 mL  3 mL Nebulization Q4H PRN Jonas Emerson MD        [START ON 9/10/2020] aspirin EC tablet 81 mg  81 mg Oral Daily Jonas Emerson MD        [START ON 9/10/2020] atorvastatin tablet 40 mg  40 mg Oral Daily Jonas Emerson MD        atropine injection 0.5 mg  0.5 mg Intravenous Q5 Min PRN Jonas Emerson MD        cyclobenzaprine tablet 10 mg  10 mg Oral TID PRN Jonas Emerson MD        dextrose 50% injection 12.5 g  12.5 g Intravenous PRN Jonas Emerson MD        dextrose 50% injection 25 g  25 g Intravenous PRN Jonas Emerson MD        furosemide injection 80 mg  80 mg Intravenous BID Jonas Emerson MD   80 mg at 09/09/20 1437    glucagon (human recombinant) injection 1 mg  1 mg Intramuscular PRN Jonas Emerson MD        glucose chewable tablet 16 g  16 g Oral PRN Jonas Emerson MD        glucose chewable tablet 24 g  24 g Oral PRN Jonas Emerson MD        heparin (porcine) injection 5,000 Units  5,000 Units Subcutaneous Q12H Jonas Emerson MD        insulin aspart U-100 pen 0-5 Units  0-5 Units Subcutaneous QID (AC + HS) PRDOMI Emerson MD        [START ON 9/10/2020] insulin detemir U-100 pen 20 Units  20 Units Subcutaneous Daily Jonas Emerson MD        ondansetron injection 4 mg  4 mg Intravenous Q8H PRN Jonas Emerson MD         sevelamer carbonate tablet 2,400 mg  2,400 mg Oral TID  Jonas Emerson MD   Stopped at 09/09/20 1715      Prior to Admission medications    Medication Sig Start Date End Date Taking? Authorizing Provider   acetaminophen (TYLENOL) 500 MG tablet Take 1,000 mg by mouth every 6 (six) hours as needed for Pain.   Yes Historical Provider, MD   aspirin (ECOTRIN) 81 MG EC tablet Take 81 mg by mouth. 7/12/17  Yes Historical Provider, MD   atorvastatin (LIPITOR) 40 MG tablet Take 40 mg by mouth once daily.   Yes Historical Provider, MD   carvediloL (COREG) 25 MG tablet Take 25 mg by mouth 2 (two) times daily.   Yes Historical Provider, MD   cyclobenzaprine (FLEXERIL) 10 MG tablet Take 10 mg by mouth 3 (three) times daily as needed for Muscle spasms.   Yes Historical Provider, MD   fish oil-omega-3 fatty acids 300-1,000 mg capsule Take 1 g by mouth once daily.   Yes Historical Provider, MD   furosemide (LASIX) 40 MG tablet Take 80 mg by mouth 2 (two) times a day.    Yes Historical Provider, MD   insulin (LANTUS SOLOSTAR U-100 INSULIN) glargine 100 units/mL (3mL) SubQ pen Inject 55 Units into the skin once daily.  7/24/19  Yes Historical Provider, MD   losartan (COZAAR) 50 MG tablet Take 50 mg by mouth once daily.   Yes Historical Provider, MD   NOVOLOG FLEXPEN 100 unit/mL InPn pen Inject 5 Units into the skin 3 (three) times daily with meals.  11/27/17  Yes Historical Provider, MD   omega-3 fatty acids/fish oil (FISH OIL-OMEGA-3 FATTY ACIDS) 300-1,000 mg capsule Take 1 capsule by mouth once daily.   Yes Historical Provider, MD   pregabalin (LYRICA) 50 MG capsule Take 50 mg by mouth 2 (two) times daily.  8/10/20  Yes Historical Provider, MD   sevelamer carbonate (RENVELA) 800 mg Tab Take 2,400 mg by mouth 3 (three) times daily with meals.   Yes Historical Provider, MD   verapamiL (VERELAN) 120 MG C24P Take 120 mg by mouth once daily.   Yes Historical Provider, MD   ACCU-CHEK KRISTI PLUS TEST STRP Strp TEST QID 12/6/17  "9/9/20 Yes Historical Provider, MD   ACCU-CHEK SOFTCLIX LANCETS Misc U QID 12/6/17 9/9/20 Yes Historical Provider, MD   acetaminophen (TYLENOL) 325 MG tablet Take 2 tablets (650 mg total) by mouth every 6 (six) hours as needed for Pain. 8/3/20 9/9/20 Yes Jennifer Beach PA-C   ALCOHOL ANTISEPTIC PADS (SURE COMFORT ALCOHOL PREP PADS TOP)  11/27/17 9/9/20 Yes Historical Provider, MD   atorvastatin (LIPITOR) 40 MG tablet TK 1 T PO QD 1/7/19 9/9/20 Yes Historical Provider, MD   BD ULTRA-FINE ETTA PEN NEEDLE 32 gauge x 5/32" Ndle U BID UTD 5/28/19 9/9/20 Yes Historical Provider, MD   blood sugar diagnostic Strp 1 strip. 7/31/12 9/9/20 Yes Historical Provider, MD   blood-glucose meter (FREESTYLE SYSTEM KIT) kit Free style flash  jarvis meter. Use as instructed 6/5/19 9/9/20 Yes Historical Provider, MD   carvedilol (COREG) 3.125 MG tablet Take 25 mg by mouth 2 (two) times daily with meals.   9/9/20 Yes Historical Provider, MD   docusate sodium (COLACE) 100 MG capsule Take 1 capsule (100 mg total) by mouth 2 (two) times daily. 7/6/19 9/9/20 Yes Jeremie Hughes MD   HUMALOG KWIKPEN INSULIN 100 unit/mL pen ADM 5 UNI SC TID WC 6/30/20 9/9/20 Yes Historical Provider, MD   insulin aspart U-100 (NOVOLOG FLEXPEN U-100 INSULIN) 100 unit/mL (3 mL) InPn pen Inject 5 Units into the skin. 6/5/19 9/9/20 Yes Historical Provider, MD   lancets Misc U QID 12/6/17 9/9/20 Yes Historical Provider, MD   losartan (COZAAR) 25 MG tablet Take 50 mg by mouth once daily.  4/18/18 9/9/20 Yes Historical Provider, MD   medical supply, miscellaneous (MISCELLANEOUS MEDICAL SUPPLY MISC) Measure blood sugar three times a day. 1/23/19 9/9/20 Yes Historical Provider, MD   methocarbamoL (ROBAXIN) 500 MG Tab TK 1 T PO  TID FOR 10 DAYS 7/13/20 9/9/20 Yes Historical Provider, MD   miscellaneous medical supply (C-TUB) Misc Measure blood sugar three times a day. 1/23/19 9/9/20 Yes Historical Provider, MD   pen needle, diabetic (SURE COMFORT PEN NEEDLE) 31 gauge x " "3/16" Ndle U BID UTD 1/8/19 9/9/20 Yes Historical Provider, MD   sevelamer carbonate (RENVELA) 800 mg Tab TK 3 TS PO TID WITH MEALS AND 3 TS WITH SNACKS 6/5/20 9/9/20 Yes Historical Provider, MD   sodium bicarbonate 650 MG tablet Take 650 mg by mouth. 3/15/17 9/9/20 Yes Historical Provider, MD   SURE COMFORT PEN NEEDLE 30 gauge x 5/16" Ndle U BID 11/27/17 9/9/20 Yes Historical Provider, MD   TRUEPLUS LANCETS 30 gauge Misc U QID TO CHECK BLOOD SUGAR 11/28/17 9/9/20 Yes Historical Provider, MD   verapamiL (CALAN-SR) 120 MG CR tablet TK 1 T PO  D 6/30/20 9/9/20 Yes Historical Provider, MD   miscellaneous medical supply (C-TUB) Misc Patient ok to return to work. Avoid strenuous activity. 9/19/17 9/9/20  Historical Provider, MD   miscellaneous medical supply (C-TUB) Misc Patient to check blood sugar 4 x daily  Dispense sufficient arm patches for 30 day supply 7/24/19 9/9/20  Historical Provider, MD   miscellaneous medical supply (C-TUB) Misc Monitor blood sugar continuously 8/14/19 9/9/20  Historical Provider, MD   oxyCODONE-acetaminophen (PERCOCET) 5-325 mg per tablet Take 1 tablet by mouth every 4 (four) hours as needed for Pain. 7/5/19 9/9/20  Rola Jaeger MD   vitamin renal formula, B-complex-vitamin c-folic acid, (NEPHROCAP) 1 mg Cap Take 1 capsule by mouth. 4/14/20 9/9/20  Historical Provider, MD       History  Past Medical History:   Diagnosis Date    Anemia of chronic disorder 11/8/2017    Overview:  Added automatically from request for surgery [id number]    Anemia of chronic renal failure, stage 5     Anxiety     Cyst of left ovary 5/14/2019    Cyst of left ovary 7/5/2019    Dyslipidemia 1/4/2013    Hyperkalemia 3/29/2017    Peripheral neuropathy 1/4/2013    Renovascular hypertension 8/17/2020    S/P dilation and curettage 5/22/2018    Thickened endometrium 5/22/2018    Uncontrolled type 2 diabetes mellitus 4/5/2013    Overview:  poc a1c today 11.2-270/ up from 10.8-263, Pt. has very very stressed/ " was incarcerated/marital stress/ will current meds/ less stress now/ will monitor.     Past Surgical History:   Procedure Laterality Date    AV FISTULA PLACEMENT      right arm     SECTION      x1    COLONOSCOPY N/A 2018    Procedure: COLONOSCOPY;  Surgeon: Pankaj Hinojosa MD;  Location: [facility] F[facility]);  Service: Endoscopy;  Laterality: N/A;  dialysis pt/labs prior to colon/MS    CYSTOSCOPY W/ RETROGRADES Bilateral 2020    Procedure: CYSTOSCOPY, WITH RETROGRADE PYELOGRAM;  Surgeon: Douglas Abraham MD;  [address];  Service: Urology;  Laterality: Bilateral;  45 min    HYSTERECTOMY      ROBOT-ASSISTED LAPAROSCOPIC ABDOMINAL HYSTERECTOMY USING DA NATALYA XI N/A 2019    Procedure: XI ROBOTIC HYSTERECTOMY;  Surgeon: Trice Lei MD;  Location: [facility];  Service: OB/GYN;  Laterality: N/A;    ROBOT-ASSISTED LAPAROSCOPIC SALPINGO-OOPHORECTOMY USING DA NATALYA XI Bilateral 2019    Procedure: XI ROBOTIC SALPINGO-OOPHORECTOMY;  Surgeon: Trice Lei MD;  Location: [facility];  Service: OB/GYN;  Laterality: Bilateral;    TUBAL LIGATION      Vascular access surgeries      x5     Social History     Socioeconomic History    Marital status:      Spouse name: Not on file    Number of children: 2    Years of education: Not on file    Highest education level: Not on file   Occupational History    Occupation: disability   Social Needs    Financial resource strain: Not on file    Food insecurity     Worry: Not on file     Inability: Not on file    Transportation needs     Medical: Not on file     Non-medical: Not on file   Tobacco Use    Smoking status: Never Smoker    Smokeless tobacco: Never Used   Substance and Sexual Activity    Alcohol use: No    Drug use: No    Sexual activity: Yes     Partners: Male     Birth control/protection: Post-menopausal   Lifestyle    Physical activity     Days per week: Not on file     Minutes per session: Not on file     Stress: Rather much   Relationships    Social connections     Talks on phone: Not on file     Gets together: Not on file     Attends [employer] service: Not on file     Active member of club or organization: Not on file     Attends meetings of clubs or organizations: Not on file     Relationship status: Not on file   Other Topics Concern    Not on file   Social History Narrative    Not on file       Allergies  Review of patient's allergies indicates:  No Known Allergies    Review of Systems   Review of Systems   Constitution: Negative for malaise/fatigue, weight gain and weight loss.   Eyes: Negative for visual disturbance.   Cardiovascular: Negative for chest pain, claudication, cyanosis, dyspnea on exertion, irregular heartbeat, leg swelling, near-syncope, orthopnea, palpitations, paroxysmal nocturnal dyspnea and syncope.   Respiratory: Negative for cough, hemoptysis, shortness of breath, sleep disturbances due to breathing and wheezing.    Hematologic/Lymphatic: Negative for bleeding problem. Does not bruise/bleed easily.   Skin: Negative for poor wound healing.   Musculoskeletal: Negative for muscle cramps and myalgias.   Gastrointestinal: Negative for abdominal pain, anorexia, diarrhea, heartburn, hematemesis, hematochezia, melena, nausea and vomiting.   Genitourinary: Negative for hematuria and nocturia.   Neurological: Positive for light-headedness and weakness. Negative for excessive daytime sleepiness, dizziness and focal weakness.       Physical Exam  Temp:  [97.2 °F (36.2 °C)-97.7 °F (36.5 °C)]   Pulse:  []   Resp:  [13-30]   BP: ()/(52-86)   SpO2:  [97 %-100 %]    Wt Readings from Last 1 Encounters:   09/09/20 91.1 kg (200 lb 13.4 oz)     Physical Exam   Constitutional: She is oriented to person, place, and time. She is cooperative.  Non-toxic appearance. No distress.   HENT:   Head: Normocephalic and atraumatic.   Eyes: Conjunctivae are normal. No scleral icterus.   Neck: Neck supple. No  hepatojugular reflux and no JVD present. Carotid bruit is not present. No tracheal deviation present. No thyromegaly present.   Cardiovascular: Normal rate, regular rhythm, S1 normal and S2 normal.  No extrasystoles are present. PMI is not displaced. Exam reveals gallop and S4. Exam reveals no S3.   No murmur heard.  Pulses:       Carotid pulses are 2+ on the right side and 2+ on the left side.       Radial pulses are 2+ on the right side and 2+ on the left side.        Dorsalis pedis pulses are 2+ on the right side and 2+ on the left side.        Posterior tibial pulses are 2+ on the right side and 2+ on the left side.   Right arm AV shunt is accessed.   Pulmonary/Chest: No accessory muscle usage. No respiratory distress. She has no decreased breath sounds. She has no wheezes. She has no rhonchi. She has no rales.   Abdominal: Soft. Bowel sounds are normal. She exhibits no pulsatile liver, no abdominal bruit and no pulsatile midline mass. There is no splenomegaly or hepatomegaly. There is no abdominal tenderness.   Musculoskeletal:         General: No tenderness, deformity or edema.   Neurological: She is alert and oriented to person, place, and time. She has normal strength. No cranial nerve deficit or sensory deficit.   Skin: Skin is warm, dry and intact. She is not diaphoretic. No cyanosis. No pallor. Nails show no clubbing.   Psychiatric: She has a normal mood and affect. Her speech is normal and behavior is normal.       EKG  Junctional rhythm.  Likely sino-ventricular      Labs  Recent Results (from the past 72 hour(s))   POCT glucose    Collection Time: 09/09/20 10:23 AM   Result Value Ref Range    POCT Glucose 106 70 - 110 mg/dL   CBC auto differential    Collection Time: 09/09/20 11:32 AM   Result Value Ref Range    WBC 6.31 3.90 - 12.70 K/uL    RBC 4.31 4.00 - 5.40 M/uL    Hemoglobin 11.0 (L) 12.0 - 16.0 g/dL    Hematocrit 35.1 (L) 37.0 - 48.5 %    Mean Corpuscular Volume 81 (L) 82 - 98 fL    Mean  Corpuscular Hemoglobin 25.5 (L) 27.0 - 31.0 pg    Mean Corpuscular Hemoglobin Conc 31.3 (L) 32.0 - 36.0 g/dL    RDW 16.3 (H) 11.5 - 14.5 %    Platelets 185 150 - 350 K/uL    MPV 11.0 9.2 - 12.9 fL    Immature Granulocytes 0.2 0.0 - 0.5 %    Gran # (ANC) 3.0 1.8 - 7.7 K/uL    Immature Grans (Abs) 0.01 0.00 - 0.04 K/uL    Lymph # 2.2 1.0 - 4.8 K/uL    Mono # 0.7 0.3 - 1.0 K/uL    Eos # 0.4 0.0 - 0.5 K/uL    Baso # 0.07 0.00 - 0.20 K/uL    nRBC 0 0 /100 WBC    Gran% 47.3 38.0 - 73.0 %    Lymph% 34.1 18.0 - 48.0 %    Mono% 11.6 4.0 - 15.0 %    Eosinophil% 5.7 0.0 - 8.0 %    Basophil% 1.1 0.0 - 1.9 %    Differential Method Automated    Renal function panel    Collection Time: 09/09/20 11:32 AM   Result Value Ref Range    Glucose 128 (H) 70 - 110 mg/dL    Sodium 135 (L) 136 - 145 mmol/L    Potassium 7.8 (HH) 3.5 - 5.1 mmol/L    Chloride 98 95 - 110 mmol/L    CO2 18 (L) 23 - 29 mmol/L    BUN, Bld 112 (H) 6 - 20 mg/dL    Calcium 9.1 8.7 - 10.5 mg/dL    Creatinine 17.7 (H) 0.5 - 1.4 mg/dL    Albumin 3.0 (L) 3.5 - 5.2 g/dL    Phosphorus 9.7 (HH) 2.7 - 4.5 mg/dL    eGFR if African American 2 (A) >60 mL/min/1.73 m^2    eGFR if non African American 2 (A) >60 mL/min/1.73 m^2    Anion Gap 19 (H) 8 - 16 mmol/L   Troponin I    Collection Time: 09/09/20 11:32 AM   Result Value Ref Range    Troponin I <0.006 0.000 - 0.026 ng/mL   POCT glucose    Collection Time: 09/09/20  1:26 PM   Result Value Ref Range    POCT Glucose 133 (H) 70 - 110 mg/dL        Imaging  X-ray Chest Pa And Lateral    Result Date: 8/11/2020  EXAMINATION: XR CHEST PA AND LATERAL CLINICAL HISTORY: Other specified disorders of kidney and ureter FINDINGS: Two views: There is borderline cardiomegaly.  Lungs are clear.  The bones showed DJD.     No acute process seen. Electronically signed by: Bernardo Winkler MD Date:    08/11/2020 Time:    11:12    Surg Fl Surgery Fluoro Usage    Result Date: 8/20/2020  See OP Notes for results. IMPRESSION: See OP Notes for results. This  procedure was auto-finalized by: Virtual Radiologist      Assessment  1.  Bradycardia  Related to metabolic abnormalities especially hyperkalemia.  Now resolved.    Plan and Discussion  No intervention needed at this point.  But, would continue to hold beta-blockers and tell her metabolic situation dialysis status is stabilized.      Saw Breen MD

## 2020-09-10 PROBLEM — E87.5 HYPERKALEMIA: Status: RESOLVED | Noted: 2020-09-09 | Resolved: 2020-09-10

## 2020-09-10 LAB
ANION GAP SERPL CALC-SCNC: 17 MMOL/L (ref 8–16)
ASCENDING AORTA: 2.58 CM
AV INDEX (PROSTH): 0.9
AV MEAN GRADIENT: 6 MMHG
AV PEAK GRADIENT: 12 MMHG
AV VALVE AREA: 3.19 CM2
AV VELOCITY RATIO: 0.77
BASOPHILS # BLD AUTO: 0.07 K/UL (ref 0–0.2)
BASOPHILS NFR BLD: 1 % (ref 0–1.9)
BSA FOR ECHO PROCEDURE: 2.03 M2
BUN SERPL-MCNC: 63 MG/DL (ref 6–20)
CALCIUM SERPL-MCNC: 9.4 MG/DL (ref 8.7–10.5)
CHLORIDE SERPL-SCNC: 96 MMOL/L (ref 95–110)
CO2 SERPL-SCNC: 24 MMOL/L (ref 23–29)
CREAT SERPL-MCNC: 12.5 MG/DL (ref 0.5–1.4)
CV ECHO LV RWT: 0.51 CM
DIFFERENTIAL METHOD: ABNORMAL
DOP CALC AO PEAK VEL: 1.73 M/S
DOP CALC AO VTI: 37.47 CM
DOP CALC LVOT AREA: 3.5 CM2
DOP CALC LVOT DIAMETER: 2.12 CM
DOP CALC LVOT PEAK VEL: 1.33 M/S
DOP CALC LVOT STROKE VOLUME: 119.36 CM3
DOP CALCLVOT PEAK VEL VTI: 33.83 CM
E WAVE DECELERATION TIME: 247.06 MSEC
E/A RATIO: 1.08
E/E' RATIO: 13.73 M/S
ECHO LV POSTERIOR WALL: 1.11 CM (ref 0.6–1.1)
EOSINOPHIL # BLD AUTO: 0.3 K/UL (ref 0–0.5)
EOSINOPHIL NFR BLD: 3.6 % (ref 0–8)
ERYTHROCYTE [DISTWIDTH] IN BLOOD BY AUTOMATED COUNT: 16.3 % (ref 11.5–14.5)
EST. GFR  (AFRICAN AMERICAN): 3 ML/MIN/1.73 M^2
EST. GFR  (NON AFRICAN AMERICAN): 3 ML/MIN/1.73 M^2
FRACTIONAL SHORTENING: 47 % (ref 28–44)
GLUCOSE SERPL-MCNC: 46 MG/DL (ref 70–110)
HCT VFR BLD AUTO: 38 % (ref 37–48.5)
HGB BLD-MCNC: 12.1 G/DL (ref 12–16)
IMM GRANULOCYTES # BLD AUTO: 0.02 K/UL (ref 0–0.04)
IMM GRANULOCYTES NFR BLD AUTO: 0.3 % (ref 0–0.5)
INTERVENTRICULAR SEPTUM: 1.36 CM (ref 0.6–1.1)
IVRT: 72.31 MSEC
LA MAJOR: 5.59 CM
LA MINOR: 5.61 CM
LA WIDTH: 4.66 CM
LEFT ATRIUM SIZE: 3.55 CM
LEFT ATRIUM VOLUME INDEX MOD: 47.9 ML/M2
LEFT ATRIUM VOLUME INDEX: 40.6 ML/M2
LEFT ATRIUM VOLUME MOD: 93 CM3
LEFT ATRIUM VOLUME: 78.74 CM3
LEFT INTERNAL DIMENSION IN SYSTOLE: 2.31 CM (ref 2.1–4)
LEFT VENTRICLE DIASTOLIC VOLUME INDEX: 44.29 ML/M2
LEFT VENTRICLE DIASTOLIC VOLUME: 85.97 ML
LEFT VENTRICLE MASS INDEX: 101 G/M2
LEFT VENTRICLE SYSTOLIC VOLUME INDEX: 9.5 ML/M2
LEFT VENTRICLE SYSTOLIC VOLUME: 18.41 ML
LEFT VENTRICULAR INTERNAL DIMENSION IN DIASTOLE: 4.36 CM (ref 3.5–6)
LEFT VENTRICULAR MASS: 196.73 G
LV LATERAL E/E' RATIO: 10.3 M/S
LV SEPTAL E/E' RATIO: 20.6 M/S
LYMPHOCYTES # BLD AUTO: 2.5 K/UL (ref 1–4.8)
LYMPHOCYTES NFR BLD: 35.2 % (ref 18–48)
MAGNESIUM SERPL-MCNC: 2.7 MG/DL (ref 1.6–2.6)
MCH RBC QN AUTO: 25.6 PG (ref 27–31)
MCHC RBC AUTO-ENTMCNC: 31.8 G/DL (ref 32–36)
MCV RBC AUTO: 81 FL (ref 82–98)
MONOCYTES # BLD AUTO: 0.8 K/UL (ref 0.3–1)
MONOCYTES NFR BLD: 10.7 % (ref 4–15)
MV PEAK A VEL: 0.95 M/S
MV PEAK E VEL: 1.03 M/S
MV STENOSIS PRESSURE HALF TIME: 71.65 MS
MV VALVE AREA P 1/2 METHOD: 3.07 CM2
NEUTROPHILS # BLD AUTO: 3.4 K/UL (ref 1.8–7.7)
NEUTROPHILS NFR BLD: 49.2 % (ref 38–73)
NRBC BLD-RTO: 0 /100 WBC
PHOSPHATE SERPL-MCNC: 8.1 MG/DL (ref 2.7–4.5)
PLATELET # BLD AUTO: 190 K/UL (ref 150–350)
PMV BLD AUTO: 11.6 FL (ref 9.2–12.9)
POCT GLUCOSE: 101 MG/DL (ref 70–110)
POCT GLUCOSE: 107 MG/DL (ref 70–110)
POCT GLUCOSE: 113 MG/DL (ref 70–110)
POCT GLUCOSE: 142 MG/DL (ref 70–110)
POCT GLUCOSE: 74 MG/DL (ref 70–110)
POCT GLUCOSE: 89 MG/DL (ref 70–110)
POTASSIUM SERPL-SCNC: 5.1 MMOL/L (ref 3.5–5.1)
PULM VEIN S/D RATIO: 1.79
PV PEAK D VEL: 0.42 M/S
PV PEAK S VEL: 0.75 M/S
PV PEAK VELOCITY: 1.09 CM/S
RA MAJOR: 4.93 CM
RA PRESSURE: 3 MMHG
RA WIDTH: 3.4 CM
RBC # BLD AUTO: 4.72 M/UL (ref 4–5.4)
RIGHT VENTRICULAR END-DIASTOLIC DIMENSION: 3.42 CM
SINUS: 3.05 CM
SODIUM SERPL-SCNC: 137 MMOL/L (ref 136–145)
STJ: 2.86 CM
TDI LATERAL: 0.1 M/S
TDI SEPTAL: 0.05 M/S
TDI: 0.08 M/S
TRICUSPID ANNULAR PLANE SYSTOLIC EXCURSION: 2.67 CM
WBC # BLD AUTO: 6.98 K/UL (ref 3.9–12.7)

## 2020-09-10 PROCEDURE — 94761 N-INVAS EAR/PLS OXIMETRY MLT: CPT | Mod: NTX

## 2020-09-10 PROCEDURE — 93041 RHYTHM ECG TRACING: CPT | Mod: NTX

## 2020-09-10 PROCEDURE — 84100 ASSAY OF PHOSPHORUS: CPT | Mod: NTX

## 2020-09-10 PROCEDURE — 99233 PR SUBSEQUENT HOSPITAL CARE,LEVL III: ICD-10-PCS | Mod: NTX,,, | Performed by: HOSPITALIST

## 2020-09-10 PROCEDURE — 83735 ASSAY OF MAGNESIUM: CPT | Mod: NTX

## 2020-09-10 PROCEDURE — 99233 SBSQ HOSP IP/OBS HIGH 50: CPT | Mod: NTX,,, | Performed by: HOSPITALIST

## 2020-09-10 PROCEDURE — 25000003 PHARM REV CODE 250: Mod: NTX | Performed by: INTERNAL MEDICINE

## 2020-09-10 PROCEDURE — 93005 ELECTROCARDIOGRAM TRACING: CPT | Mod: NTX

## 2020-09-10 PROCEDURE — 93010 EKG 12-LEAD: ICD-10-PCS | Mod: NTX,,, | Performed by: INTERNAL MEDICINE

## 2020-09-10 PROCEDURE — 63600175 PHARM REV CODE 636 W HCPCS: Mod: NTX | Performed by: NURSE PRACTITIONER

## 2020-09-10 PROCEDURE — 25000003 PHARM REV CODE 250: Mod: NTX | Performed by: NURSE PRACTITIONER

## 2020-09-10 PROCEDURE — 25000003 PHARM REV CODE 250: Mod: NTX | Performed by: HOSPITALIST

## 2020-09-10 PROCEDURE — 80100014 HC HEMODIALYSIS 1:1: Mod: NTX

## 2020-09-10 PROCEDURE — 36415 COLL VENOUS BLD VENIPUNCTURE: CPT | Mod: NTX

## 2020-09-10 PROCEDURE — 20000000 HC ICU ROOM: Mod: NTX

## 2020-09-10 PROCEDURE — 93010 ELECTROCARDIOGRAM REPORT: CPT | Mod: NTX,,, | Performed by: INTERNAL MEDICINE

## 2020-09-10 PROCEDURE — 99900035 HC TECH TIME PER 15 MIN (STAT): Mod: NTX

## 2020-09-10 PROCEDURE — 85025 COMPLETE CBC W/AUTO DIFF WBC: CPT | Mod: NTX

## 2020-09-10 PROCEDURE — 80048 BASIC METABOLIC PNL TOTAL CA: CPT | Mod: NTX

## 2020-09-10 PROCEDURE — 99232 PR SUBSEQUENT HOSPITAL CARE,LEVL II: ICD-10-PCS | Mod: 25,NTX,, | Performed by: INTERNAL MEDICINE

## 2020-09-10 PROCEDURE — 63600175 PHARM REV CODE 636 W HCPCS: Mod: NTX | Performed by: HOSPITALIST

## 2020-09-10 PROCEDURE — 99232 SBSQ HOSP IP/OBS MODERATE 35: CPT | Mod: 25,NTX,, | Performed by: INTERNAL MEDICINE

## 2020-09-10 RX ORDER — ACETAMINOPHEN 500 MG
1000 TABLET ORAL EVERY 8 HOURS PRN
Status: DISCONTINUED | OUTPATIENT
Start: 2020-09-10 | End: 2020-09-11 | Stop reason: HOSPADM

## 2020-09-10 RX ORDER — MORPHINE SULFATE 2 MG/ML
2 INJECTION, SOLUTION INTRAMUSCULAR; INTRAVENOUS ONCE
Status: COMPLETED | OUTPATIENT
Start: 2020-09-10 | End: 2020-09-10

## 2020-09-10 RX ORDER — MUPIROCIN 20 MG/G
OINTMENT TOPICAL 2 TIMES DAILY
Status: DISCONTINUED | OUTPATIENT
Start: 2020-09-10 | End: 2020-09-11 | Stop reason: HOSPADM

## 2020-09-10 RX ORDER — NIFEDIPINE 30 MG/1
30 TABLET, EXTENDED RELEASE ORAL DAILY
Status: DISCONTINUED | OUTPATIENT
Start: 2020-09-10 | End: 2020-09-11

## 2020-09-10 RX ORDER — SODIUM CHLORIDE 9 MG/ML
INJECTION, SOLUTION INTRAVENOUS ONCE
Status: DISCONTINUED | OUTPATIENT
Start: 2020-09-10 | End: 2020-09-11

## 2020-09-10 RX ORDER — CARVEDILOL 12.5 MG/1
25 TABLET ORAL 2 TIMES DAILY
Status: DISCONTINUED | OUTPATIENT
Start: 2020-09-10 | End: 2020-09-10

## 2020-09-10 RX ORDER — HYDRALAZINE HYDROCHLORIDE 25 MG/1
50 TABLET, FILM COATED ORAL EVERY 8 HOURS
Status: DISCONTINUED | OUTPATIENT
Start: 2020-09-10 | End: 2020-09-11

## 2020-09-10 RX ORDER — SODIUM CHLORIDE 9 MG/ML
INJECTION, SOLUTION INTRAVENOUS
Status: DISCONTINUED | OUTPATIENT
Start: 2020-09-10 | End: 2020-09-11

## 2020-09-10 RX ORDER — ACETAMINOPHEN 500 MG
1000 TABLET ORAL ONCE
Status: COMPLETED | OUTPATIENT
Start: 2020-09-10 | End: 2020-09-10

## 2020-09-10 RX ORDER — FUROSEMIDE 40 MG/1
80 TABLET ORAL 2 TIMES DAILY
Status: DISCONTINUED | OUTPATIENT
Start: 2020-09-10 | End: 2020-09-11 | Stop reason: HOSPADM

## 2020-09-10 RX ADMIN — CARVEDILOL 25 MG: 12.5 TABLET, FILM COATED ORAL at 05:09

## 2020-09-10 RX ADMIN — MORPHINE SULFATE 2 MG: 2 INJECTION, SOLUTION INTRAMUSCULAR; INTRAVENOUS at 09:09

## 2020-09-10 RX ADMIN — FUROSEMIDE 80 MG: 40 TABLET ORAL at 05:09

## 2020-09-10 RX ADMIN — HEPARIN SODIUM 5000 UNITS: 5000 INJECTION INTRAVENOUS; SUBCUTANEOUS at 09:09

## 2020-09-10 RX ADMIN — HEPARIN SODIUM 5000 UNITS: 5000 INJECTION INTRAVENOUS; SUBCUTANEOUS at 01:09

## 2020-09-10 RX ADMIN — HYDRALAZINE HYDROCHLORIDE 50 MG: 25 TABLET, FILM COATED ORAL at 01:09

## 2020-09-10 RX ADMIN — ATORVASTATIN CALCIUM 40 MG: 20 TABLET, FILM COATED ORAL at 01:09

## 2020-09-10 RX ADMIN — NIFEDIPINE 30 MG: 30 TABLET, FILM COATED, EXTENDED RELEASE ORAL at 01:09

## 2020-09-10 RX ADMIN — MUPIROCIN: 20 OINTMENT TOPICAL at 11:09

## 2020-09-10 RX ADMIN — HYDRALAZINE HYDROCHLORIDE 50 MG: 25 TABLET, FILM COATED ORAL at 09:09

## 2020-09-10 RX ADMIN — HYDRALAZINE HYDROCHLORIDE 25 MG: 25 TABLET ORAL at 03:09

## 2020-09-10 RX ADMIN — Medication 24 G: at 04:09

## 2020-09-10 RX ADMIN — SEVELAMER CARBONATE 2400 MG: 800 TABLET, FILM COATED ORAL at 04:09

## 2020-09-10 RX ADMIN — HYDRALAZINE HYDROCHLORIDE 50 MG: 25 TABLET, FILM COATED ORAL at 06:09

## 2020-09-10 RX ADMIN — ACETAMINOPHEN 1000 MG: 500 TABLET, FILM COATED ORAL at 05:09

## 2020-09-10 RX ADMIN — SEVELAMER CARBONATE 2400 MG: 800 TABLET, FILM COATED ORAL at 07:09

## 2020-09-10 RX ADMIN — ASPIRIN 81 MG: 81 TABLET, COATED ORAL at 01:09

## 2020-09-10 RX ADMIN — FUROSEMIDE 80 MG: 40 TABLET ORAL at 01:09

## 2020-09-10 RX ADMIN — SEVELAMER CARBONATE 2400 MG: 800 TABLET, FILM COATED ORAL at 11:09

## 2020-09-10 NOTE — PROGRESS NOTES
[facility] Hospital Medicine  Progress Note    Patient Name: Jael Hall  MRN: [id number]  Patient Class: IP- Inpatient   Admission Date: 9/9/2020  Length of Stay: 1 days  Attending Physician: Jonas Emerson MD  Primary Care Provider: Mary Solano MD        Subjective:     Principal Problem:Symptomatic bradycardia        HPI:  Patient is a 55-year-old woman with history of hypertension, diabetes mellitus type 2, end-stage renal disease who underwent a fistulogram today of her right upper extremity AV fistula when it was noted that she was significantly bradycardic with pulse of about 30 beats per minute.  Patient reports feeling lightheadedness with ambulation.  Patient last successfully underwent outpatient dialysis last Friday on 9/4/2020.  Outpatient dialysis was attempted this last Monday on 9/7/2020 but was not successful due to suspected clot.  Fistulogram performed today did not show evidence of a clot.    She denies any fevers or chills.  Patient denies any chest pain or shortness of breath.  She denies any nausea, vomiting, diarrhea, or constipation.  She denies any recent illness or travel.    Overview/Hospital Course:  Patient is a 55-year-old woman with history of hypertension, diabetes mellitus type 2, and end-stage renal disease on outpatient dialysis normally on Mondays, Wednesday, and Friday admitted to the intensive care unit due to symptomatic bradycardia and need for urgent hemodialysis with marked hyperkalemia.  Patient underwent urgent dialysis with resolution of her symptomatic bradycardia.    Interval History:  Patient underwent emergent hemodialysis yesterday to correct multiple metabolic derangements including marked hyperkalemia.  Patient converted from junctional escape rhythm of about 30 beats per minute to normal sinus rhythm at 80 beats per minute.  Patient reports feeling better this morning.  She denies any further lightheadedness or dizziness.    Review of  Systems   Constitutional: Negative for chills and fever.   Respiratory: Negative for shortness of breath and wheezing.    Cardiovascular: Negative for chest pain.   Gastrointestinal: Negative for abdominal distention, abdominal pain, constipation, diarrhea, nausea and vomiting.   Genitourinary: Negative for dysuria and frequency.   Musculoskeletal: Negative for arthralgias and myalgias.   Neurological: Negative for light-headedness.   Psychiatric/Behavioral: Negative for agitation and confusion.     Objective:     Vital Signs (Most Recent):  Temp: 98 °F (36.7 °C) (09/10/20 0705)  Pulse: 86 (09/10/20 0730)  Resp: 16 (09/10/20 0730)  BP: (!) 165/75 (09/10/20 0730)  SpO2: 97 % (09/10/20 0730) Vital Signs (24h Range):  Temp:  [97.2 °F (36.2 °C)-98 °F (36.7 °C)] 98 °F (36.7 °C)  Pulse:  [] 86  Resp:  [10-30] 16  SpO2:  [95 %-100 %] 97 %  BP: ()/() 165/75     Weight: 89.1 kg (196 lb 6.9 oz)  Body mass index is 33.72 kg/m².    Intake/Output Summary (Last 24 hours) at 9/10/2020 0803  Last data filed at 9/10/2020 0705  Gross per 24 hour   Intake 1890 ml   Output 2900 ml   Net -1010 ml      Physical Exam  Constitutional:       General: She is not in acute distress.     Appearance: She is well-developed.   HENT:      Head: Atraumatic.   Eyes:      Conjunctiva/sclera: Conjunctivae normal.   Neck:      Musculoskeletal: Neck supple.   Cardiovascular:      Rate and Rhythm: Normal rate and regular rhythm.      Heart sounds: Normal heart sounds. No murmur.      Comments: Right upper extremity fistula with a strong bruit or thrill this morning.  Pulmonary:      Effort: Pulmonary effort is normal.      Breath sounds: Normal breath sounds. No wheezing.   Abdominal:      General: Bowel sounds are normal. There is no distension.      Palpations: Abdomen is soft.      Tenderness: There is no abdominal tenderness.   Musculoskeletal: Normal range of motion.         General: No deformity.   Neurological:      Mental Status:  She is alert and oriented to person, place, and time.         Significant Labs: All pertinent labs within the past 24 hours have been reviewed.    Significant Imaging: I have reviewed all pertinent imaging results/findings within the past 24 hours.      Assessment/Plan:      * Symptomatic bradycardia  Patient converted from junctional escape rhythm back into sinus rhythm following improvement in her metabolic derangements with dialysis yesterday evening.  Potassium normal limits this morning.  Patient still with increased anion gap metabolic acidosis.  Plan on further dialysis today.    End-stage renal disease on hemodialysis  Patient with marked hyperkalemia and metabolic acidosis related to missed outpatient dialysis session due to issues with her access.  Fistulogram yesterday looked okay and patient's of he underwent hemodialysis yesterday.  Patient still with increased anion gap metabolic acidosis.  Plan additional dialysis today.    Type 2 diabetes mellitus with kidney complication, with long-term current use of insulin  Patient with hypoglycemia overnight.  Hold scheduled insulin.  Continue with sliding scale insulin for now.    Essential hypertension  Hypertension resolved.  Patient normotensive this morning.  Continue to monitor blood pressure and titrate blood pressure regimen as needed to maintain reasonable blood pressure control.      VTE Risk Mitigation (From admission, onward)         Ordered     heparin (porcine) injection 5,000 Units  Every 12 hours      09/09/20 1401     IP VTE HIGH RISK PATIENT  Once      09/09/20 1401     Place sequential compression device  Until discontinued      09/09/20 1401                Jonas Emerson MD  Department of Hospital Medicine   [facility]

## 2020-09-10 NOTE — ASSESSMENT & PLAN NOTE
Patient converted from junctional escape rhythm back into sinus rhythm following improvement in her metabolic derangements with dialysis yesterday evening.  Potassium normal limits this morning.  Patient still with increased anion gap metabolic acidosis.  Plan on further dialysis today.

## 2020-09-10 NOTE — PROGRESS NOTES
[facility]    Admission date:  9/9/2020     Assessment  1.  Bradycardia  Related to metabolic abnormalities especially hyperkalemia.  Now resolved.    Plan and Discussion  She is maintaining sinus rhythm.  Would be reasonable to resume beta-blockers.  Will review echocardiogram when completed.    Subjective  Feels well.  About to start dialysis again this morning.  No problems overnight.    Medications  Current Facility-Administered Medications   Medication Dose Route Frequency Provider Last Rate Last Dose    albuterol-ipratropium 2.5 mg-0.5 mg/3 mL nebulizer solution 3 mL  3 mL Nebulization Q4H PRN Jonas Emerson MD        aspirin EC tablet 81 mg  81 mg Oral Daily Jonas Emerson MD        atorvastatin tablet 40 mg  40 mg Oral Daily Jonas Emerson MD        atropine injection 0.5 mg  0.5 mg Intravenous Q5 Min PRN Jonas Emerson MD        cyclobenzaprine tablet 10 mg  10 mg Oral TID PRN Jonas Emerson MD        dextrose 50% injection 12.5 g  12.5 g Intravenous PRN Jonas Emerson MD        dextrose 50% injection 25 g  25 g Intravenous PRN Jonas Emerson MD        furosemide tablet 80 mg  80 mg Oral BID Jonas Emerson MD        glucagon (human recombinant) injection 1 mg  1 mg Intramuscular PRN Jonas Emerson MD        glucose chewable tablet 16 g  16 g Oral PRN Jonas Emerson MD        glucose chewable tablet 24 g  24 g Oral PRN Jonas Emerson MD   24 g at 09/10/20 0413    heparin (porcine) injection 5,000 Units  5,000 Units Subcutaneous Q12H Jonas Emerson MD   5,000 Units at 09/09/20 2043    hydrALAZINE tablet 25 mg  25 mg Oral Q8H PRN Abdirahman Cash NP   25 mg at 09/10/20 0304    hydrALAZINE tablet 50 mg  50 mg Oral Q8H Jonas Emerson MD   50 mg at 09/10/20 0620    insulin aspart U-100 pen 0-5 Units  0-5 Units Subcutaneous QID (AC + HS) PRN Jonas Emerson MD        NIFEdipine 24 hr tablet 30 mg  30 mg Oral Daily Jonas Emerson MD         ondansetron injection 4 mg  4 mg Intravenous Q8H PRN Jonas Emerson MD        sevelamer carbonate tablet 2,400 mg  2,400 mg Oral TID WM Jonas Emerson MD   2,400 mg at 09/10/20 0717        Physical Exam  Temp:  [97.2 °F (36.2 °C)-98 °F (36.7 °C)]   Pulse:  []   Resp:  [10-30]   BP: ()/()   SpO2:  [95 %-100 %]    Wt Readings from Last 3 Encounters:   09/10/20 89.1 kg (196 lb 6.9 oz)   08/31/20 90.7 kg (199 lb 15.3 oz)   08/20/20 90.7 kg (200 lb)     Physical Exam   Constitutional: She is oriented to person, place, and time. She is cooperative.  Non-toxic appearance. No distress.   Neck: No hepatojugular reflux and no JVD present. Carotid bruit is not present.   Cardiovascular: Normal rate, regular rhythm, S1 normal and S2 normal.  No extrasystoles are present. Exam reveals gallop and S4. Exam reveals no S3.   No murmur heard.  Pulses:       Carotid pulses are 2+ on the right side and 2+ on the left side.       Radial pulses are 2+ on the right side and 2+ on the left side.   Right arm AV shunt   Pulmonary/Chest: No accessory muscle usage. No respiratory distress. She has no decreased breath sounds. She has no wheezes. She has no rhonchi. She has no rales.   Abdominal: Soft. Bowel sounds are normal. She exhibits no pulsatile liver, no abdominal bruit and no pulsatile midline mass. There is no splenomegaly or hepatomegaly. There is no abdominal tenderness.   Musculoskeletal:         General: No tenderness, deformity or edema.   Neurological: She is alert and oriented to person, place, and time.   Skin: Skin is warm, dry and intact. She is not diaphoretic. No cyanosis. No pallor. Nails show no clubbing.   Psychiatric: She has a normal mood and affect. Her speech is normal and behavior is normal.       Telemetry  Sinus rhythm    Labs  Recent Results (from the past 24 hour(s))   POCT glucose    Collection Time: 09/09/20 10:23 AM   Result Value Ref Range    POCT Glucose 106 70 - 110 mg/dL   CBC  auto differential    Collection Time: 09/09/20 11:32 AM   Result Value Ref Range    WBC 6.31 3.90 - 12.70 K/uL    RBC 4.31 4.00 - 5.40 M/uL    Hemoglobin 11.0 (L) 12.0 - 16.0 g/dL    Hematocrit 35.1 (L) 37.0 - 48.5 %    Mean Corpuscular Volume 81 (L) 82 - 98 fL    Mean Corpuscular Hemoglobin 25.5 (L) 27.0 - 31.0 pg    Mean Corpuscular Hemoglobin Conc 31.3 (L) 32.0 - 36.0 g/dL    RDW 16.3 (H) 11.5 - 14.5 %    Platelets 185 150 - 350 K/uL    MPV 11.0 9.2 - 12.9 fL    Immature Granulocytes 0.2 0.0 - 0.5 %    Gran # (ANC) 3.0 1.8 - 7.7 K/uL    Immature Grans (Abs) 0.01 0.00 - 0.04 K/uL    Lymph # 2.2 1.0 - 4.8 K/uL    Mono # 0.7 0.3 - 1.0 K/uL    Eos # 0.4 0.0 - 0.5 K/uL    Baso # 0.07 0.00 - 0.20 K/uL    nRBC 0 0 /100 WBC    Gran% 47.3 38.0 - 73.0 %    Lymph% 34.1 18.0 - 48.0 %    Mono% 11.6 4.0 - 15.0 %    Eosinophil% 5.7 0.0 - 8.0 %    Basophil% 1.1 0.0 - 1.9 %    Differential Method Automated    Renal function panel    Collection Time: 09/09/20 11:32 AM   Result Value Ref Range    Glucose 128 (H) 70 - 110 mg/dL    Sodium 135 (L) 136 - 145 mmol/L    Potassium 7.8 (HH) 3.5 - 5.1 mmol/L    Chloride 98 95 - 110 mmol/L    CO2 18 (L) 23 - 29 mmol/L    BUN, Bld 112 (H) 6 - 20 mg/dL    Calcium 9.1 8.7 - 10.5 mg/dL    Creatinine 17.7 (H) 0.5 - 1.4 mg/dL    Albumin 3.0 (L) 3.5 - 5.2 g/dL    Phosphorus 9.7 (HH) 2.7 - 4.5 mg/dL    eGFR if African American 2 (A) >60 mL/min/1.73 m^2    eGFR if non African American 2 (A) >60 mL/min/1.73 m^2    Anion Gap 19 (H) 8 - 16 mmol/L   Troponin I    Collection Time: 09/09/20 11:32 AM   Result Value Ref Range    Troponin I <0.006 0.000 - 0.026 ng/mL   POCT glucose    Collection Time: 09/09/20  1:26 PM   Result Value Ref Range    POCT Glucose 133 (H) 70 - 110 mg/dL   POCT glucose    Collection Time: 09/09/20  4:28 PM   Result Value Ref Range    POCT Glucose 101 70 - 110 mg/dL   POCT glucose    Collection Time: 09/09/20  9:43 PM   Result Value Ref Range    POCT Glucose 38 (LL) 70 - 110 mg/dL    Comprehensive metabolic panel    Collection Time: 09/09/20  9:56 PM   Result Value Ref Range    Sodium 136 136 - 145 mmol/L    Potassium 5.0 3.5 - 5.1 mmol/L    Chloride 98 95 - 110 mmol/L    CO2 22 (L) 23 - 29 mmol/L    Glucose 48 (LL) 70 - 110 mg/dL    BUN, Bld 59 (H) 6 - 20 mg/dL    Creatinine 11.6 (H) 0.5 - 1.4 mg/dL    Calcium 9.3 8.7 - 10.5 mg/dL    Total Protein 7.9 6.0 - 8.4 g/dL    Albumin 3.3 (L) 3.5 - 5.2 g/dL    Total Bilirubin 0.8 0.1 - 1.0 mg/dL    Alkaline Phosphatase 52 (L) 55 - 135 U/L    AST 31 10 - 40 U/L    ALT 21 10 - 44 U/L    Anion Gap 16 8 - 16 mmol/L    eGFR if African American 4 (A) >60 mL/min/1.73 m^2    eGFR if non African American 3 (A) >60 mL/min/1.73 m^2   POCT glucose    Collection Time: 09/09/20 10:32 PM   Result Value Ref Range    POCT Glucose 125 (H) 70 - 110 mg/dL   CBC auto differential    Collection Time: 09/10/20  3:19 AM   Result Value Ref Range    WBC 6.98 3.90 - 12.70 K/uL    RBC 4.72 4.00 - 5.40 M/uL    Hemoglobin 12.1 12.0 - 16.0 g/dL    Hematocrit 38.0 37.0 - 48.5 %    Mean Corpuscular Volume 81 (L) 82 - 98 fL    Mean Corpuscular Hemoglobin 25.6 (L) 27.0 - 31.0 pg    Mean Corpuscular Hemoglobin Conc 31.8 (L) 32.0 - 36.0 g/dL    RDW 16.3 (H) 11.5 - 14.5 %    Platelets 190 150 - 350 K/uL    MPV 11.6 9.2 - 12.9 fL    Immature Granulocytes 0.3 0.0 - 0.5 %    Gran # (ANC) 3.4 1.8 - 7.7 K/uL    Immature Grans (Abs) 0.02 0.00 - 0.04 K/uL    Lymph # 2.5 1.0 - 4.8 K/uL    Mono # 0.8 0.3 - 1.0 K/uL    Eos # 0.3 0.0 - 0.5 K/uL    Baso # 0.07 0.00 - 0.20 K/uL    nRBC 0 0 /100 WBC    Gran% 49.2 38.0 - 73.0 %    Lymph% 35.2 18.0 - 48.0 %    Mono% 10.7 4.0 - 15.0 %    Eosinophil% 3.6 0.0 - 8.0 %    Basophil% 1.0 0.0 - 1.9 %    Differential Method Automated    Basic metabolic panel    Collection Time: 09/10/20  3:19 AM   Result Value Ref Range    Sodium 137 136 - 145 mmol/L    Potassium 5.1 3.5 - 5.1 mmol/L    Chloride 96 95 - 110 mmol/L    CO2 24 23 - 29 mmol/L    Glucose 46 (LL) 70 -  110 mg/dL    BUN, Bld 63 (H) 6 - 20 mg/dL    Creatinine 12.5 (H) 0.5 - 1.4 mg/dL    Calcium 9.4 8.7 - 10.5 mg/dL    Anion Gap 17 (H) 8 - 16 mmol/L    eGFR if African American 3 (A) >60 mL/min/1.73 m^2    eGFR if non African American 3 (A) >60 mL/min/1.73 m^2   Magnesium    Collection Time: 09/10/20  3:19 AM   Result Value Ref Range    Magnesium 2.7 (H) 1.6 - 2.6 mg/dL   Phosphorus    Collection Time: 09/10/20  3:19 AM   Result Value Ref Range    Phosphorus 8.1 (H) 2.7 - 4.5 mg/dL   POCT glucose    Collection Time: 09/10/20  4:41 AM   Result Value Ref Range    POCT Glucose 89 70 - 110 mg/dL   POCT glucose    Collection Time: 09/10/20  7:21 AM   Result Value Ref Range    POCT Glucose 74 70 - 110 mg/dL   Echo Color Flow Doppler? Yes    Collection Time: 09/10/20  8:34 AM   Result Value Ref Range    BSA 2.03 m2    TDI SEPTAL 0.05 m/s    LV LATERAL E/E' RATIO 10.30 m/s    LV SEPTAL E/E' RATIO 20.60 m/s    LA WIDTH 4.66 cm    TDI LATERAL 0.10 m/s    PV PEAK VELOCITY 1.09 cm/s    LVIDD 4.36 3.5 - 6.0 cm    IVS 1.36 (A) 0.6 - 1.1 cm    PW 1.11 (A) 0.6 - 1.1 cm    LVIDS 2.31 2.1 - 4.0 cm    FS 47 28 - 44 %    LA volume 78.74 cm3    Sinus 3.05 cm    STJ 2.86 cm    Ascending aorta 2.58 cm    LV mass 196.73 g    LA size 3.55 cm    RVDD 3.42 cm    TAPSE 2.67 cm    Left Ventricle Relative Wall Thickness 0.51 cm    AV mean gradient 6 mmHg    AV valve area 3.19 cm2    AV Velocity Ratio 0.77     AV index (prosthetic) 0.90     MV valve area p 1/2 method 3.07 cm2    E/A ratio 1.08     Mean e' 0.08 m/s    E wave decelartion time 247.06 msec    IVRT 72.31 msec    Pulm vein S/D ratio 1.79     LVOT diameter 2.12 cm    LVOT area 3.5 cm2    LVOT peak marcello 1.33 m/s    LVOT peak VTI 33.83 cm    Ao peak marcello 1.73 m/s    Ao VTI 37.47 cm    LVOT stroke volume 119.36 cm3    AV peak gradient 12 mmHg    E/E' ratio 13.73 m/s    MV Peak E Marcello 1.03 m/s    MV stenosis pressure 1/2 time 71.65 ms    MV Peak A Marcello 0.95 m/s    PV Peak S Marcello 0.75 m/s    PV  Peak D Marcello 0.42 m/s    LV Systolic Volume 18.41 mL    LV Systolic Volume Index 9.5 mL/m2    LV Diastolic Volume 85.97 mL    LV Diastolic Volume Index 44.29 mL/m2    LA Volume Index 40.6 mL/m2    LV Mass Index 101 g/m2    RA Major Axis 4.93 cm    Left Atrium Minor Axis 5.61 cm    Left Atrium Major Axis 5.59 cm    LA Volume Index (Mod) 47.9 mL/m2    LA volume (mod) 93.00 cm3    RA Width 3.40 cm             Saw Breen MD

## 2020-09-10 NOTE — NURSING
2143 Blood glucose = 38 mg/dl, pt asymptomatic. prn glucose tablet given, Repeat BG = 125 mg/dl.   0410 Received a critical glucose 46 mg/dl, prn glucose tablet given Repeat BG= 89 mg/dl.

## 2020-09-10 NOTE — SUBJECTIVE & OBJECTIVE
Interval History:  Patient underwent emergent hemodialysis yesterday to correct multiple metabolic derangements including marked hyperkalemia.  Patient converted from junctional escape rhythm of about 30 beats per minute to normal sinus rhythm at 80 beats per minute.  Patient reports feeling better this morning.  She denies any further lightheadedness or dizziness.    Review of Systems   Constitutional: Negative for chills and fever.   Respiratory: Negative for shortness of breath and wheezing.    Cardiovascular: Negative for chest pain.   Gastrointestinal: Negative for abdominal distention, abdominal pain, constipation, diarrhea, nausea and vomiting.   Genitourinary: Negative for dysuria and frequency.   Musculoskeletal: Negative for arthralgias and myalgias.   Neurological: Negative for light-headedness.   Psychiatric/Behavioral: Negative for agitation and confusion.     Objective:     Vital Signs (Most Recent):  Temp: 98 °F (36.7 °C) (09/10/20 0705)  Pulse: 86 (09/10/20 0730)  Resp: 16 (09/10/20 0730)  BP: (!) 165/75 (09/10/20 0730)  SpO2: 97 % (09/10/20 0730) Vital Signs (24h Range):  Temp:  [97.2 °F (36.2 °C)-98 °F (36.7 °C)] 98 °F (36.7 °C)  Pulse:  [] 86  Resp:  [10-30] 16  SpO2:  [95 %-100 %] 97 %  BP: ()/() 165/75     Weight: 89.1 kg (196 lb 6.9 oz)  Body mass index is 33.72 kg/m².    Intake/Output Summary (Last 24 hours) at 9/10/2020 0803  Last data filed at 9/10/2020 0705  Gross per 24 hour   Intake 1890 ml   Output 2900 ml   Net -1010 ml      Physical Exam  Constitutional:       General: She is not in acute distress.     Appearance: She is well-developed.   HENT:      Head: Atraumatic.   Eyes:      Conjunctiva/sclera: Conjunctivae normal.   Neck:      Musculoskeletal: Neck supple.   Cardiovascular:      Rate and Rhythm: Normal rate and regular rhythm.      Heart sounds: Normal heart sounds. No murmur.      Comments: Right upper extremity fistula with a strong bruit or thrill this  morning.  Pulmonary:      Effort: Pulmonary effort is normal.      Breath sounds: Normal breath sounds. No wheezing.   Abdominal:      General: Bowel sounds are normal. There is no distension.      Palpations: Abdomen is soft.      Tenderness: There is no abdominal tenderness.   Musculoskeletal: Normal range of motion.         General: No deformity.   Neurological:      Mental Status: She is alert and oriented to person, place, and time.         Significant Labs: All pertinent labs within the past 24 hours have been reviewed.    Significant Imaging: I have reviewed all pertinent imaging results/findings within the past 24 hours.

## 2020-09-10 NOTE — ASSESSMENT & PLAN NOTE
Patient with hypoglycemia overnight.  Hold scheduled insulin.  Continue with sliding scale insulin for now.

## 2020-09-10 NOTE — ASSESSMENT & PLAN NOTE
Patient with marked hyperkalemia and metabolic acidosis related to missed outpatient dialysis session due to issues with her access.  Fistulogram yesterday looked okay and patient's of he underwent hemodialysis yesterday.  Patient still with increased anion gap metabolic acidosis.  Plan additional dialysis today.

## 2020-09-10 NOTE — ASSESSMENT & PLAN NOTE
Hypertension resolved.  Patient normotensive this morning.  Continue to monitor blood pressure and titrate blood pressure regimen as needed to maintain reasonable blood pressure control.

## 2020-09-11 VITALS
DIASTOLIC BLOOD PRESSURE: 67 MMHG | RESPIRATION RATE: 18 BRPM | TEMPERATURE: 98 F | OXYGEN SATURATION: 95 % | HEART RATE: 94 BPM | SYSTOLIC BLOOD PRESSURE: 124 MMHG | WEIGHT: 193.81 LBS | BODY MASS INDEX: 33.09 KG/M2 | HEIGHT: 64 IN

## 2020-09-11 LAB
ANION GAP SERPL CALC-SCNC: 17 MMOL/L (ref 8–16)
BASOPHILS # BLD AUTO: 0.05 K/UL (ref 0–0.2)
BASOPHILS NFR BLD: 0.7 % (ref 0–1.9)
BUN SERPL-MCNC: 44 MG/DL (ref 6–20)
CALCIUM SERPL-MCNC: 10.2 MG/DL (ref 8.7–10.5)
CHLORIDE SERPL-SCNC: 95 MMOL/L (ref 95–110)
CO2 SERPL-SCNC: 21 MMOL/L (ref 23–29)
CREAT SERPL-MCNC: 9.9 MG/DL (ref 0.5–1.4)
DIFFERENTIAL METHOD: ABNORMAL
EOSINOPHIL # BLD AUTO: 0.4 K/UL (ref 0–0.5)
EOSINOPHIL NFR BLD: 5.4 % (ref 0–8)
ERYTHROCYTE [DISTWIDTH] IN BLOOD BY AUTOMATED COUNT: 16.5 % (ref 11.5–14.5)
EST. GFR  (AFRICAN AMERICAN): 5 ML/MIN/1.73 M^2
EST. GFR  (NON AFRICAN AMERICAN): 4 ML/MIN/1.73 M^2
GLUCOSE SERPL-MCNC: 148 MG/DL (ref 70–110)
HCT VFR BLD AUTO: 43.4 % (ref 37–48.5)
HGB BLD-MCNC: 13.8 G/DL (ref 12–16)
IMM GRANULOCYTES # BLD AUTO: 0.01 K/UL (ref 0–0.04)
IMM GRANULOCYTES NFR BLD AUTO: 0.1 % (ref 0–0.5)
LYMPHOCYTES # BLD AUTO: 1.9 K/UL (ref 1–4.8)
LYMPHOCYTES NFR BLD: 27.8 % (ref 18–48)
MAGNESIUM SERPL-MCNC: 2.3 MG/DL (ref 1.6–2.6)
MCH RBC QN AUTO: 25.6 PG (ref 27–31)
MCHC RBC AUTO-ENTMCNC: 31.8 G/DL (ref 32–36)
MCV RBC AUTO: 80 FL (ref 82–98)
MONOCYTES # BLD AUTO: 1 K/UL (ref 0.3–1)
MONOCYTES NFR BLD: 14.6 % (ref 4–15)
NEUTROPHILS # BLD AUTO: 3.4 K/UL (ref 1.8–7.7)
NEUTROPHILS NFR BLD: 51.4 % (ref 38–73)
NRBC BLD-RTO: 0 /100 WBC
PHOSPHATE SERPL-MCNC: 6.8 MG/DL (ref 2.7–4.5)
PLATELET # BLD AUTO: 150 K/UL (ref 150–350)
PMV BLD AUTO: 10.6 FL (ref 9.2–12.9)
POCT GLUCOSE: 178 MG/DL (ref 70–110)
POTASSIUM SERPL-SCNC: 4.9 MMOL/L (ref 3.5–5.1)
RBC # BLD AUTO: 5.4 M/UL (ref 4–5.4)
SODIUM SERPL-SCNC: 133 MMOL/L (ref 136–145)
WBC # BLD AUTO: 6.7 K/UL (ref 3.9–12.7)

## 2020-09-11 PROCEDURE — 80048 BASIC METABOLIC PNL TOTAL CA: CPT | Mod: NTX

## 2020-09-11 PROCEDURE — 99239 PR HOSPITAL DISCHARGE DAY,>30 MIN: ICD-10-PCS | Mod: NTX,,, | Performed by: HOSPITALIST

## 2020-09-11 PROCEDURE — 83735 ASSAY OF MAGNESIUM: CPT | Mod: NTX

## 2020-09-11 PROCEDURE — 99239 HOSP IP/OBS DSCHRG MGMT >30: CPT | Mod: NTX,,, | Performed by: HOSPITALIST

## 2020-09-11 PROCEDURE — 25000003 PHARM REV CODE 250: Mod: NTX | Performed by: HOSPITALIST

## 2020-09-11 PROCEDURE — 85025 COMPLETE CBC W/AUTO DIFF WBC: CPT | Mod: NTX

## 2020-09-11 PROCEDURE — 63600175 PHARM REV CODE 636 W HCPCS: Mod: NTX | Performed by: HOSPITALIST

## 2020-09-11 PROCEDURE — 80100016 HC MAINTENANCE HEMODIALYSIS: Mod: NTX

## 2020-09-11 PROCEDURE — 99900035 HC TECH TIME PER 15 MIN (STAT): Mod: NTX

## 2020-09-11 PROCEDURE — 36415 COLL VENOUS BLD VENIPUNCTURE: CPT | Mod: NTX

## 2020-09-11 PROCEDURE — 84100 ASSAY OF PHOSPHORUS: CPT | Mod: NTX

## 2020-09-11 PROCEDURE — 94761 N-INVAS EAR/PLS OXIMETRY MLT: CPT | Mod: NTX

## 2020-09-11 RX ORDER — NIFEDIPINE 60 MG/1
60 TABLET, EXTENDED RELEASE ORAL DAILY
Qty: 30 TABLET | Refills: 0 | Status: SHIPPED | OUTPATIENT
Start: 2020-09-12 | End: 2020-09-30

## 2020-09-11 RX ORDER — GABAPENTIN 100 MG/1
100 CAPSULE ORAL 3 TIMES DAILY
Qty: 90 CAPSULE | Refills: 0 | Status: SHIPPED | OUTPATIENT
Start: 2020-09-11 | End: 2020-09-30

## 2020-09-11 RX ORDER — CARVEDILOL 12.5 MG/1
12.5 TABLET ORAL 2 TIMES DAILY
Status: DISCONTINUED | OUTPATIENT
Start: 2020-09-11 | End: 2020-09-11 | Stop reason: HOSPADM

## 2020-09-11 RX ORDER — INSULIN GLARGINE 100 [IU]/ML
15 INJECTION, SOLUTION SUBCUTANEOUS NIGHTLY
Qty: 3 ML | Refills: 0 | Status: ON HOLD
Start: 2020-09-11 | End: 2021-01-27 | Stop reason: SDUPTHER

## 2020-09-11 RX ORDER — CARVEDILOL 12.5 MG/1
12.5 TABLET ORAL 2 TIMES DAILY
Qty: 60 TABLET | Refills: 0 | Status: SHIPPED | OUTPATIENT
Start: 2020-09-11 | End: 2020-09-30

## 2020-09-11 RX ORDER — NIFEDIPINE 30 MG/1
60 TABLET, EXTENDED RELEASE ORAL DAILY
Status: DISCONTINUED | OUTPATIENT
Start: 2020-09-12 | End: 2020-09-11 | Stop reason: HOSPADM

## 2020-09-11 RX ORDER — NIFEDIPINE 30 MG/1
30 TABLET, EXTENDED RELEASE ORAL NIGHTLY
Status: DISCONTINUED | OUTPATIENT
Start: 2020-09-11 | End: 2020-09-11 | Stop reason: HOSPADM

## 2020-09-11 RX ORDER — GABAPENTIN 100 MG/1
100 CAPSULE ORAL 3 TIMES DAILY
Status: DISCONTINUED | OUTPATIENT
Start: 2020-09-11 | End: 2020-09-11 | Stop reason: HOSPADM

## 2020-09-11 RX ORDER — ACETAMINOPHEN 500 MG
1000 TABLET ORAL EVERY 8 HOURS PRN
Refills: 0 | COMMUNITY
Start: 2020-09-11 | End: 2021-01-25

## 2020-09-11 RX ADMIN — ACETAMINOPHEN 1000 MG: 500 TABLET, FILM COATED ORAL at 04:09

## 2020-09-11 RX ADMIN — HYDRALAZINE HYDROCHLORIDE 50 MG: 25 TABLET, FILM COATED ORAL at 05:09

## 2020-09-11 RX ADMIN — ASPIRIN 81 MG: 81 TABLET, COATED ORAL at 08:09

## 2020-09-11 RX ADMIN — HEPARIN SODIUM 5000 UNITS: 5000 INJECTION INTRAVENOUS; SUBCUTANEOUS at 08:09

## 2020-09-11 RX ADMIN — MUPIROCIN: 20 OINTMENT TOPICAL at 08:09

## 2020-09-11 RX ADMIN — ATORVASTATIN CALCIUM 40 MG: 20 TABLET, FILM COATED ORAL at 08:09

## 2020-09-11 RX ADMIN — NIFEDIPINE 30 MG: 30 TABLET, FILM COATED, EXTENDED RELEASE ORAL at 08:09

## 2020-09-11 RX ADMIN — GABAPENTIN 100 MG: 100 CAPSULE ORAL at 09:09

## 2020-09-11 RX ADMIN — SEVELAMER CARBONATE 2400 MG: 800 TABLET, FILM COATED ORAL at 08:09

## 2020-09-11 RX ADMIN — CARVEDILOL 12.5 MG: 12.5 TABLET, FILM COATED ORAL at 09:09

## 2020-09-11 RX ADMIN — FUROSEMIDE 80 MG: 40 TABLET ORAL at 08:09

## 2020-09-11 NOTE — NURSING
Per Dr. Betts, ok for pt to go to dialysis off unit without telemetry as pt is going home after dialysis.  Pt off unit to dialysis with transport staff in bed.

## 2020-09-11 NOTE — ANESTHESIA PAT ROS NOTE
09/11/2020  Jael Hall is a 55 y.o., female.      Pre-op Assessment          Review of Systems         Anesthesia Assessment: Preoperative EQUATION    Planned Procedure: Procedure(s) (LRB):  ROBOTIC NEPHRECTOMY (Right)  Requested Anesthesia Type:General  Surgeon: Douglas Abraham MD  Service: Urology  Known or anticipated Date of Surgery:10/8/2020    Surgeon notes: reviewed    Previous anesthesia records:MAC and No problems     Aug 20, 2020   CYSTOSCOPY, WITH RETROGRADE PYELOGRAM (Bilateral Bladder)  Airway/Jaw/Neck:  Airway Findings: Mouth Opening: Normal Tongue: Normal  General Airway Assessment: Adult  Mallampati: I  Improves to I with phonation.  TM Distance: Normal, at least 6 cm          July 5, 2019   ROBOTIC HYSTERECTOMY (N/A Abdomen) XI ROBOTIC SALPINGO-OOPHORECTOMY (Bilateral Abdomen)  Airway/Jaw/Neck:  Airway Findings: Mouth Opening: Normal Tongue: Normal  General Airway Assessment: Adult, Good  Mallampati: II  TM Distance: Normal, at least 6 cm  Jaw/Neck Findings:  Neck ROM: Normal ROM     Method of Intubation: Kaba Inserted by: CRNA Airway Device: Endotracheal Tube Mask Ventilation: Easy - oral Intubated: Postinduction Airway Device Size: 7.5 Style: Cuffed Cuff Inflation: Minimal occlusive pressure Inflation Amount (mL): 3.5 Placement Verified By: Auscultation;Capnometry Grade: Grade I , easy per Kaba  Complicating Factors: None;Short neck;Large/Floppy epiglottis;Obesity Findings Post-Intubation: Positive EtCO2;Bilateral breath sounds;Atraumatic/Condition of teeth unchanged Depth of Insertion (cm): 22 Secured at: Lips Complications: None Breath Sounds: Equal Bilateral Insertion attempts (enter comment if more than 2 attempts): 1    Last PCP note: > 1 year ago , within [facility]   Subspecialty notes: Gyn/ONC, Kidney Transplant, Urology, OBGYN    Other important co-morbidities: PER EPIC  DM2, GERD, HLD, HTN, Obesity and dyslipidemia, symptomatic bradycardia, high cholesterol, high triglycerides, hyperpotassemia, hyperparathyroidism, hyponatremia, hyperphosphatemia, CKD5, ESRD, HD M/W/F, JOCELYN AV fistula, NAFLD, renovascular hypertension, chronic anemia, metabolic bone disease, anxiety, h/o COVID 4/2/20. Diabeetic neuropathy.      Tests already available:  Available tests,  within 1 month , within [facility] .      9/20:  TTE: EF 70%  EKG-NSR, Lt axis deviation. Cannot r/o anterior infarct    9/11:  Phos/Mag/BMP/CBC    8/17:  PTT/PT/INR    7/14:  PTH    6/26:  Lipid profile/ A1C 6.4             Instructions given. (See in Nurse's note)    Optimization:  Anesthesia Preop Clinic Assessment  Indicated    Medical Opinion Indicated           Plan:    Testing:  A1C and T&S   Pre-anesthesia  visit       Visit focus: concerns in complex and/or prolonged anesthesia, position other than supine     Consultation:TBCB NPMichael.     Patient  has previously scheduled Medical Appointment:9/30/2020    Navigation: Tests Scheduled.              Consults scheduled.             Results will be tracked by Preop Clinic.

## 2020-09-11 NOTE — PLAN OF CARE
discharge planning assessment  Final note:     Pt confirmed her PCP is correct in Epic   her pharmacy of choice is [facility].  Pt lives with family and can return at time of discharge.   Consults: cardiology, nephrology  Dispo- will discharge after HD today  anticipate no CM needs for discharge  Self reports no needs        09/11/20 1414   Final Note   Assessment Type Final Discharge Note   Anticipated Discharge Disposition Home   Hospital Follow Up  Appt(s) scheduled? Yes   Discharge plans and expectations educations in teach back method with documentation complete? Yes   Right Care Referral Info   Post Acute Recommendation No Care

## 2020-09-11 NOTE — NURSING
Per Dr. Betts, discharge pt directly from dialysis room post HD.  Pt AAOx4, in NAD, denies pain or SOB.  Patient educated on discharge instructions and verbalized understanding.  All questions answered to patient's satisfaction.  IV removed without complication.  Pt transported off unit via wheelchair.

## 2020-09-14 ENCOUNTER — TELEPHONE (OUTPATIENT)
Dept: PREADMISSION TESTING | Facility: HOSPITAL | Age: 55
End: 2020-09-14

## 2020-09-18 NOTE — DISCHARGE SUMMARY
[facility] Hospital Medicine  Discharge Summary      Patient Name: Jael Hall  MRN: [id number]  Admission Date: 9/9/2020  Hospital Length of Stay: 2 days  Discharge Date and Time: 9/11/2020  3:26 PM  Attending Physician: Jonas Emerson MD  Discharging Provider: Jonas Emerson MD  Primary Care Provider: Mary Solano MD      HPI:   Patient is a 55-year-old woman with history of hypertension, diabetes mellitus type 2, end-stage renal disease who underwent a fistulogram today of her right upper extremity AV fistula when it was noted that she was significantly bradycardic with pulse of about 30 beats per minute.  Patient reports feeling lightheadedness with ambulation.  Patient last successfully underwent outpatient dialysis last Friday on 9/4/2020.  Outpatient dialysis was attempted this last Monday on 9/7/2020 but was not successful due to suspected clot.  Fistulogram performed today did not show evidence of a clot.    She denies any fevers or chills.  Patient denies any chest pain or shortness of breath.  She denies any nausea, vomiting, diarrhea, or constipation.  She denies any recent illness or travel.    Hospital Course:   Patient is a 55-year-old woman with history of hypertension, diabetes mellitus type 2, and end-stage renal disease on outpatient dialysis normally on Mondays, Wednesday, and Friday admitted to the intensive care unit due to symptomatic bradycardia and need for urgent hemodialysis with marked hyperkalemia.  Patient underwent a fistulogram which did not reveal any significant issue.  Patient underwent urgent dialysis utilizing her fistula with correction of multiple metabolic derangement including marked hyperkalemia and metabolic acidosis resulting in resolution her marked symptomatic bradycardia.  Patient's blood pressure medications also adjusted to limit the risk of severe bradycardia in the future including substituting her non-dihydropyridine calcium channel  blocker (verapamil) to a long-acting dihydropyridine calcium channel blocker (nifedeipine).  Patient stable to be discharged home to resume her outpatient hemodialysis.  Patient advised on close outpatient follow-up with her primary care provider for close monitoring of her blood pressure and pulse and further adjustment in her regimen as needed to achieve blood pressure goals.     Consults:   Consults (From admission, onward)        Status Ordering Provider     Inpatient consult to Cardiology  Once     Provider:  Saw Breen MD    Completed VICKY AVENDANO          Final Active Diagnoses:    Diagnosis Date Noted POA    PRINCIPAL PROBLEM:  Symptomatic bradycardia [R00.1] 09/09/2020 Yes    End-stage renal disease on hemodialysis [N18.6, Z99.2] 08/20/2020 Not Applicable    Type 2 diabetes mellitus with kidney complication, with long-term current use of insulin [E11.29, Z79.4] 09/28/2017 Not Applicable     Chronic    Essential hypertension [I10]  Yes      Problems Resolved During this Admission:    Diagnosis Date Noted Date Resolved POA    Hyperkalemia [E87.5] 09/09/2020 09/10/2020 Yes    Mechanical complication of arteriovenous fistula surgically created [T82.590A] 09/09/2020 09/09/2020 Yes    Mechanical complication of arteriovenous fistula surgically created [T82.590A] 09/09/2020 09/09/2020 Yes       Discharged Condition: Stable    Disposition: Home or Self Care    Follow Up:  Follow-up Information     Donnie Khanna On 9/14/2020.    Why: Continue with outpatient renal replacement therapy  Contact information:  [address] [place] [address] [phone]           Schedule an appointment as soon as possible for a visit with Mary Solano MD.    Specialty: Family Medicine  Why: Review blood pressure and capillary blood glucose log for additional adjustments in blood pressure and insulin regimen.  Contact information:  [address] [facility] [address] [phone]                  Patient Instructions:      Diet diabetic     Diet renal     Lifting restrictions   Order Comments: No heavy lifting for 24 hours.     Call MD for:  temperature >100.4     Call MD for:  severe uncontrolled pain     Call MD for:  redness, tenderness, or signs of infection (pain, swelling, redness, odor or green/yellow discharge around incision site)     Call MD for:   Order Comments: Bleeding from the access site.     Notify your health care provider if you experience any of the following:  temperature >100.4     Notify your health care provider if you experience any of the following:  persistent nausea and vomiting or diarrhea     Notify your health care provider if you experience any of the following:  severe uncontrolled pain     Notify your health care provider if you experience any of the following:  difficulty breathing or increased cough     Notify your health care provider if you experience any of the following:  severe persistent headache     Notify your health care provider if you experience any of the following:  worsening rash     Notify your health care provider if you experience any of the following:  persistent dizziness, light-headedness, or visual disturbances     Notify your health care provider if you experience any of the following:  increased confusion or weakness     Activity as tolerated     Activity as tolerated        Medications:  Reconciled Home Medications:      Medication List      START taking these medications    gabapentin 100 MG capsule  Commonly known as: NEURONTIN  Take 1 capsule (100 mg total) by mouth 3 (three) times daily.     NIFEdipine 60 MG (OSM) 24 hr tablet  Commonly known as: PROCARDIA-XL  Take 1 tablet (60 mg total) by mouth once daily.        CHANGE how you take these medications    acetaminophen 500 MG tablet  Commonly known as: TYLENOL  Take 2 tablets (1,000 mg total) by mouth every 8 (eight) hours as needed for Pain.  What changed: when to take this      carvediloL 12.5 MG tablet  Commonly known as: COREG  Take 1 tablet (12.5 mg total) by mouth 2 (two) times daily.  What changed:   · medication strength  · how much to take     LANTUS SOLOSTAR U-100 INSULIN glargine 100 units/mL (3mL) SubQ pen  Generic drug: insulin  Inject 15 Units into the skin every evening.  What changed:   · how much to take  · when to take this        CONTINUE taking these medications    aspirin 81 MG EC tablet  Commonly known as: ECOTRIN  Take 81 mg by mouth.     atorvastatin 40 MG tablet  Commonly known as: LIPITOR  Take 40 mg by mouth once daily.     * fish oil-omega-3 fatty acids 300-1,000 mg capsule  Take 1 g by mouth once daily.     * fish oil-omega-3 fatty acids 300-1,000 mg capsule  Take 1 capsule by mouth once daily.     furosemide 40 MG tablet  Commonly known as: LASIX  Take 80 mg by mouth 2 (two) times a day.     losartan 50 MG tablet  Commonly known as: COZAAR  Take 50 mg by mouth once daily.     sevelamer carbonate 800 mg Tab  Commonly known as: RENVELA  Take 2,400 mg by mouth 3 (three) times daily with meals.         * This list has 2 medication(s) that are the same as other medications prescribed for you. Read the directions carefully, and ask your doctor or other care provider to review them with you.            STOP taking these medications    cyclobenzaprine 10 MG tablet  Commonly known as: FLEXERIL     NovoLOG Flexpen U-100 Insulin 100 unit/mL (3 mL) Inpn pen  Generic drug: insulin aspart U-100     pregabalin 50 MG capsule  Commonly known as: LYRICA     verapamiL 120 MG C24p  Commonly known as: VERELAN            Indwelling Lines/Drains at time of discharge:   Lines/Drains/Airways     Drain                 Hemodialysis AV Fistula Left forearm -- days         Hemodialysis AV Fistula Right upper arm -- days                Time spent on the discharge of patient: 35 minutes  Patient was seen and examined on the date of discharge and determined to be suitable for discharge.          Jonas Emerson MD  Department of Hospital Medicine  [facility]

## 2020-09-30 ENCOUNTER — OFFICE VISIT (OUTPATIENT)
Dept: UROLOGY | Facility: CLINIC | Age: 55
End: 2020-09-30
Payer: MEDICARE

## 2020-09-30 ENCOUNTER — INITIAL CONSULT (OUTPATIENT)
Dept: INTERNAL MEDICINE | Facility: CLINIC | Age: 55
End: 2020-09-30
Payer: MEDICARE

## 2020-09-30 ENCOUNTER — LAB VISIT (OUTPATIENT)
Dept: LAB | Facility: HOSPITAL | Age: 55
End: 2020-09-30
Attending: ANESTHESIOLOGY
Payer: MEDICARE

## 2020-09-30 VITALS
WEIGHT: 202 LBS | BODY MASS INDEX: 34.49 KG/M2 | HEIGHT: 64 IN | OXYGEN SATURATION: 98 % | SYSTOLIC BLOOD PRESSURE: 184 MMHG | TEMPERATURE: 98 F | HEART RATE: 90 BPM | DIASTOLIC BLOOD PRESSURE: 86 MMHG

## 2020-09-30 DIAGNOSIS — N28.89 RENAL MASS: Primary | ICD-10-CM

## 2020-09-30 DIAGNOSIS — F50.89 PICA IN ADULTS: ICD-10-CM

## 2020-09-30 DIAGNOSIS — E87.1 HYPONATREMIA: ICD-10-CM

## 2020-09-30 DIAGNOSIS — E87.5 HYPERKALEMIA: Primary | ICD-10-CM

## 2020-09-30 DIAGNOSIS — Z01.818 PREOPERATIVE TESTING: ICD-10-CM

## 2020-09-30 DIAGNOSIS — R00.1 SYMPTOMATIC BRADYCARDIA: ICD-10-CM

## 2020-09-30 DIAGNOSIS — N18.6 TYPE 2 DIABETES MELLITUS WITH CHRONIC KIDNEY DISEASE ON CHRONIC DIALYSIS, WITH LONG-TERM CURRENT USE OF INSULIN: Chronic | ICD-10-CM

## 2020-09-30 DIAGNOSIS — Z99.2 ESRD ON HEMODIALYSIS: ICD-10-CM

## 2020-09-30 DIAGNOSIS — Z99.2 TYPE 2 DIABETES MELLITUS WITH CHRONIC KIDNEY DISEASE ON CHRONIC DIALYSIS, WITH LONG-TERM CURRENT USE OF INSULIN: Chronic | ICD-10-CM

## 2020-09-30 DIAGNOSIS — E87.5 HYPERKALEMIA: ICD-10-CM

## 2020-09-30 DIAGNOSIS — Z79.4 TYPE 2 DIABETES MELLITUS WITH CHRONIC KIDNEY DISEASE ON CHRONIC DIALYSIS, WITH LONG-TERM CURRENT USE OF INSULIN: Chronic | ICD-10-CM

## 2020-09-30 DIAGNOSIS — N18.6 ANEMIA IN ESRD (END-STAGE RENAL DISEASE): ICD-10-CM

## 2020-09-30 DIAGNOSIS — E11.22 TYPE 2 DIABETES MELLITUS WITH CHRONIC KIDNEY DISEASE ON CHRONIC DIALYSIS, WITH LONG-TERM CURRENT USE OF INSULIN: Chronic | ICD-10-CM

## 2020-09-30 DIAGNOSIS — N18.6 ESRD ON HEMODIALYSIS: ICD-10-CM

## 2020-09-30 DIAGNOSIS — R31.9 HEMATURIA, UNSPECIFIED TYPE: ICD-10-CM

## 2020-09-30 DIAGNOSIS — K08.9 POOR DENTITION: ICD-10-CM

## 2020-09-30 DIAGNOSIS — K21.9 GASTROESOPHAGEAL REFLUX DISEASE, ESOPHAGITIS PRESENCE NOT SPECIFIED: ICD-10-CM

## 2020-09-30 DIAGNOSIS — I10 ESSENTIAL HYPERTENSION: ICD-10-CM

## 2020-09-30 DIAGNOSIS — D63.1 ANEMIA IN ESRD (END-STAGE RENAL DISEASE): ICD-10-CM

## 2020-09-30 DIAGNOSIS — E83.39 HYPERPHOSPHATEMIA: ICD-10-CM

## 2020-09-30 DIAGNOSIS — E08.65 DIABETES MELLITUS DUE TO UNDERLYING CONDITION WITH HYPERGLYCEMIA: ICD-10-CM

## 2020-09-30 DIAGNOSIS — K76.0 NAFLD (NONALCOHOLIC FATTY LIVER DISEASE): ICD-10-CM

## 2020-09-30 PROBLEM — F50.83 PICA IN ADULTS: Status: ACTIVE | Noted: 2020-09-30

## 2020-09-30 LAB
ABO + RH BLD: NORMAL
ALBUMIN SERPL BCP-MCNC: 3.4 G/DL (ref 3.5–5.2)
ALP SERPL-CCNC: 55 U/L (ref 55–135)
ALT SERPL W/O P-5'-P-CCNC: 9 U/L (ref 10–44)
ANION GAP SERPL CALC-SCNC: 17 MMOL/L (ref 8–16)
AST SERPL-CCNC: 12 U/L (ref 10–40)
BILIRUB SERPL-MCNC: 0.7 MG/DL (ref 0.1–1)
BLD GP AB SCN CELLS X3 SERPL QL: NORMAL
BLOOD GROUP ANTIBODIES SERPL: NORMAL
BUN SERPL-MCNC: 60 MG/DL (ref 6–20)
CALCIUM SERPL-MCNC: 9.8 MG/DL (ref 8.7–10.5)
CHLORIDE SERPL-SCNC: 97 MMOL/L (ref 95–110)
CO2 SERPL-SCNC: 24 MMOL/L (ref 23–29)
CREAT SERPL-MCNC: 12 MG/DL (ref 0.5–1.4)
EST. GFR  (AFRICAN AMERICAN): 3.6 ML/MIN/1.73 M^2
EST. GFR  (NON AFRICAN AMERICAN): 3.1 ML/MIN/1.73 M^2
ESTIMATED AVG GLUCOSE: 137 MG/DL (ref 68–131)
GLUCOSE SERPL-MCNC: 157 MG/DL (ref 70–110)
HBA1C MFR BLD HPLC: 6.4 % (ref 4–5.6)
POTASSIUM SERPL-SCNC: 5.5 MMOL/L (ref 3.5–5.1)
PROT SERPL-MCNC: 8.2 G/DL (ref 6–8.4)
SODIUM SERPL-SCNC: 138 MMOL/L (ref 136–145)

## 2020-09-30 PROCEDURE — 86905 BLOOD TYPING RBC ANTIGENS: CPT | Mod: TXP

## 2020-09-30 PROCEDURE — 99214 OFFICE O/P EST MOD 30 MIN: CPT | Mod: S$PBB,NTX,, | Performed by: HOSPITALIST

## 2020-09-30 PROCEDURE — 80053 COMPREHEN METABOLIC PANEL: CPT | Mod: TXP

## 2020-09-30 PROCEDURE — 99999 PR PBB SHADOW E&M-EST. PATIENT-LVL IV: CPT | Mod: PBBFAC,TXP,,

## 2020-09-30 PROCEDURE — 83036 HEMOGLOBIN GLYCOSYLATED A1C: CPT | Mod: TXP

## 2020-09-30 PROCEDURE — 99214 PR OFFICE/OUTPT VISIT, EST, LEVL IV, 30-39 MIN: ICD-10-PCS | Mod: S$PBB,NTX,, | Performed by: HOSPITALIST

## 2020-09-30 PROCEDURE — 99499 NO LOS: ICD-10-PCS | Mod: S$PBB,NTX,, | Performed by: UROLOGY

## 2020-09-30 PROCEDURE — 99999 PR PBB SHADOW E&M-EST. PATIENT-LVL IV: ICD-10-PCS | Mod: PBBFAC,TXP,,

## 2020-09-30 PROCEDURE — 99214 OFFICE O/P EST MOD 30 MIN: CPT | Mod: PBBFAC,25,NTX

## 2020-09-30 PROCEDURE — 86850 RBC ANTIBODY SCREEN: CPT | Mod: NTX

## 2020-09-30 PROCEDURE — 99499 UNLISTED E&M SERVICE: CPT | Mod: S$PBB,NTX,, | Performed by: UROLOGY

## 2020-09-30 PROCEDURE — 36415 COLL VENOUS BLD VENIPUNCTURE: CPT | Mod: TXP

## 2020-09-30 PROCEDURE — 86870 RBC ANTIBODY IDENTIFICATION: CPT | Mod: NTX

## 2020-09-30 RX ORDER — ACETAMINOPHEN 500 MG
1000 TABLET ORAL
Status: CANCELLED | OUTPATIENT
Start: 2020-09-30

## 2020-09-30 RX ORDER — INSULIN ASPART 100 [IU]/ML
5 INJECTION, SOLUTION INTRAVENOUS; SUBCUTANEOUS
COMMUNITY
End: 2021-01-25

## 2020-09-30 RX ORDER — HEPARIN SODIUM 5000 [USP'U]/ML
5000 INJECTION, SOLUTION INTRAVENOUS; SUBCUTANEOUS
Status: CANCELLED | OUTPATIENT
Start: 2020-09-30 | End: 2020-10-01

## 2020-09-30 RX ORDER — PREGABALIN 75 MG/1
75 CAPSULE ORAL 2 TIMES DAILY
COMMUNITY
End: 2020-11-02

## 2020-09-30 NOTE — ASSESSMENT & PLAN NOTE
Currently takes:  insulin (LANTUS SOLOSTAR U-100 INSULIN) glargine 100 units/mL (3mL) SubQ pen 50 Units      insulin aspart U-100 (NOVOLOG FLEXPEN U-100 INSULIN) 100 unit/mL (3 mL) InPn pen 5 Units       DM x 21 years.  Most recent A1c on 6.4.  Average daily accuchecks are 118; has been down 79-80 .   Reports peripheral neuropathy especially in hands. Denies visual changes.  Maintain healthy weight. Exercise at least 150 minutes weekly. Encouraged diet rich in nutrients such as fruits, vegetables, and whole grains; reduce sugar intake from cakes, candy, and sugared drinks.  \  Diabetes Mellitus-I suggest monitoring the glucose in the perioperative period ( Before meals and bed time,if the patient is on oral feeds or every 6 hourly ,if the patient is NPO )  Blood glucose target in hospitalized patients is 140-180. Oral Hypoglycemic agents are generally avoided during the hospital stay . If glucose is consistently elevated ,I suggest using basal ,prandial Insulin regimen to control the glucose , as elevated glucose can be associated with adverse surgical out comes. Please consider involving Hospital Medicine or Endocrinology ,if any help is needed with Glucose control. Patient will be instructed based on the pre op clinic guidelines  about adjustment of diabetic treatment (If applicable )  considering the NPO status for Surgery

## 2020-09-30 NOTE — ASSESSMENT & PLAN NOTE
Assessment:  Current /86 Today      Plan:   home blood pressure monitoring   Conservative BP management in light of patient's recent history of hypotension and medications changes    Current Medications:     losartan (COZAAR) 100 MG tablet once per day    Lasix 40 mg po BID     verapamiL (CALAN-SR) 120 MG CR tablet once per day---> stopped due to low BP and HR     carvedilol (COREG) 25 MG tablet twice per day---> Stopped due to low BP and HR     Counseling:  Take medications as prescribed in preparation for her surgery  Encouraged keeping a healthy weight and BMI  Education was provided regarding lifestyle changes to reduce systolic BP;  Smoking cessation; Exercise 30 minutes per day,  5 days per week or 150 minutes weekly;  Sodium reduction and avoidance of high salt foods such as processed meats, frozen meals, fast foods.

## 2020-09-30 NOTE — H&P (VIEW-ONLY)
[facility]  Staff: MD christina    Is this patient in a research study? No    CC: renal mass    HPI:  Jael Hall is a 55 y.o. female who is referred by Natalee Resendiz NP for evaluation of a right renal mass. She is a renal transplant candidate.   Patient reports some gross hematuria. No abdominal pain.  She does urinate, 2x/day.     Patient underwent cystoscopy, bilateral RPGs---were normal.  Here to again discuss possible right nephrectomy.     No unexpected weight loss.      Patient is a never smoker.      Has a h/o robotic total abdominal hysterectomy in 2020 ---no other abdominal surgeries.      Patient has ESRD and is on HD---Monday, Wednesday, Friday through a right wrist AVF.  She has DM and HTN.        ROS:  Neg except per HPI    Past Medical History:   Diagnosis Date    Anemia of chronic disorder 2017    Overview:  Added automatically from request for surgery [id number]    Anemia of chronic renal failure, stage 5     Anxiety     Cyst of left ovary 2019    Cyst of left ovary 2019    Dyslipidemia 2013    End-stage renal disease on hemodialysis 2020    Hematuria 10/30/2019    Hx of sinus bradycardia 2017    Hyperkalemia 3/29/2017    Peripheral neuropathy 2013    Renovascular hypertension 2020    S/P dilation and curettage 2018    Thickened endometrium 2018    Uncontrolled type 2 diabetes mellitus 2013    Overview:  poc a1c today 11.2-270/ up from 10.8-263, Pt. has very very stressed/ was incarcerated/marital stress/ will current meds/ less stress now/ will monitor.       Past Surgical History:   Procedure Laterality Date    AV FISTULA PLACEMENT      right arm     SECTION      x1    COLONOSCOPY N/A 2018    Procedure: COLONOSCOPY;  Surgeon: Pankaj Hinojosa MD;  Location: [facility] ([address] [place] [address]);  Service: Endoscopy;  Laterality: N/A;  dialysis pt/labs prior to colon/MS    CYSTOSCOPY W/ RETROGRADES Bilateral 2020     Procedure: CYSTOSCOPY, WITH RETROGRADE PYELOGRAM;  Surgeon: Douglas Abraham MD;  Location: [address] FLR;  Service: Urology;  Laterality: Bilateral;  45 min    FISTULOGRAM Right 9/9/2020    Procedure: Fistulogram;  Surgeon: Lester Gómez MD;  Location: [facility] CATH LAB;  Service: Nephrology;  Laterality: Right;    HYSTERECTOMY      ROBOT-ASSISTED LAPAROSCOPIC ABDOMINAL HYSTERECTOMY USING DA NATALYA XI N/A 7/5/2019    Procedure: XI ROBOTIC HYSTERECTOMY;  Surgeon: Trice Lei MD;  Location: [facility] OR;  Service: OB/GYN;  Laterality: N/A;    ROBOT-ASSISTED LAPAROSCOPIC SALPINGO-OOPHORECTOMY USING DA NATALYA XI Bilateral 7/5/2019    Procedure: XI ROBOTIC SALPINGO-OOPHORECTOMY;  Surgeon: Triec Lei MD;  Location: [facility];  Service: OB/GYN;  Laterality: Bilateral;    TUBAL LIGATION      Vascular access surgeries      x5       Social History     Socioeconomic History    Marital status:      Spouse name: Not on file    Number of children: 2    Years of education: Not on file    Highest education level: Not on file   Occupational History    Occupation: disability   Social Needs    Financial resource strain: Not on file    Food insecurity     Worry: Not on file     Inability: Not on file    Transportation needs     Medical: Not on file     Non-medical: Not on file   Tobacco Use    Smoking status: Never Smoker    Smokeless tobacco: Never Used   Substance and Sexual Activity    Alcohol use: No    Drug use: No    Sexual activity: Yes     Partners: Male     Birth control/protection: Post-menopausal   Lifestyle    Physical activity     Days per week: Not on file     Minutes per session: Not on file    Stress: Rather much   Relationships    Social connections     Talks on phone: Not on file     Gets together: Not on file     Attends Holiness service: Not on file     Active member of club or organization: Not on file     Attends meetings of clubs or organizations: Not on file     Relationship  "status: Not on file   Other Topics Concern    Not on file   Social History Narrative    Not on file       Family History   Problem Relation Age of Onset    Cancer Mother     Ovarian cancer Mother     Cancer Sister     Ovarian cancer Sister     Lung cancer Sister     Diabetes Brother     Cancer Sister     Ovarian cancer Sister     Diabetes Sister     Breast cancer Neg Hx     Colon cancer Neg Hx     Cervical cancer Neg Hx     Endometrial cancer Neg Hx     Vaginal cancer Neg Hx        Review of patient's allergies indicates:  No Known Allergies    Current Outpatient Medications on File Prior to Visit   Medication Sig Dispense Refill    acetaminophen (TYLENOL) 500 MG tablet Take 2 tablets (1,000 mg total) by mouth every 8 (eight) hours as needed for Pain.  0    aspirin (ECOTRIN) 81 MG EC tablet Take 81 mg by mouth.      atorvastatin (LIPITOR) 40 MG tablet Take 40 mg by mouth once daily.      fish oil-omega-3 fatty acids 300-1,000 mg capsule Take 1 g by mouth once daily.      furosemide (LASIX) 40 MG tablet Take 80 mg by mouth 2 (two) times a day.       insulin (LANTUS SOLOSTAR U-100 INSULIN) glargine 100 units/mL (3mL) SubQ pen Inject 15 Units into the skin every evening. (Patient taking differently: Inject 50 Units into the skin every evening. ) 3 mL 0    losartan (COZAAR) 50 MG tablet Take 100 mg by mouth once daily.       omega-3 fatty acids/fish oil (FISH OIL-OMEGA-3 FATTY ACIDS) 300-1,000 mg capsule Take 1 capsule by mouth once daily.      sevelamer carbonate (RENVELA) 800 mg Tab Take 2,400 mg by mouth 3 (three) times daily with meals.       No current facility-administered medications on file prior to visit.        Anticoagulation:  Yes ASA 81    Physical Exam:  Estimated body mass index is 34.67 kg/m² as calculated from the following:    Height as of an earlier encounter on 9/30/20: 5' 4" (1.626 m).    Weight as of an earlier encounter on 9/30/20: 91.6 kg (202 lb).    General: No acute " distress, well developed. AAOx3  Head: Normocephalic, Atraumatic  Eyes: Extra-occular movements intact, No discharge  Neck: supple, symmetrical, trachea midline  Lungs: normal respiratory effort, no respiratory distress, no wheezes  CV: regular rate, 2+ pulses  Abdomen: soft, non-tender, non-distended, no organomegaly. Robotic port sites incisions from previous gyn surgery are well healed.  MSK: no edema, no deformities, normal ROM  Skin: skin color, texture, turgor normal.  Neurologic: no focal deficits, sensation intact    Labs:    Urine dipstick today - negative for all components.     Lab Results   Component Value Date    WBC 6.70 09/11/2020    HGB 13.8 09/11/2020    HCT 43.4 09/11/2020    MCV 80 (L) 09/11/2020     09/11/2020           BMP  Lab Results   Component Value Date     09/30/2020    K 5.5 (H) 09/30/2020    CL 97 09/30/2020    CO2 24 09/30/2020    BUN 60 (H) 09/30/2020    CREATININE 12.0 (H) 09/30/2020    CALCIUM 9.8 09/30/2020    ANIONGAP 17 (H) 09/30/2020    ESTGFRAFRICA 3.6 (A) 09/30/2020    EGFRNONAA 3.1 (A) 09/30/2020       Imaging:    1.9x1.6x2.3cm solid mass the anterior aspect of the interpolar right kidney.  The remaining bilateral kidneys show numerous cystic lesions  No hydronephrosis or stones bilaterally.   Singular artery and vein on right.     Assessment: Jael Hall is a 55 y.o. female with a right renal mass concerning for RCC    Plan:     1. To OR on 10/8/2020 for right radical robotic nephrectomy  2. Consents signed   3. I have explained the risk, benefits, and alternatives of the procedure in detail. The patient voices understanding and all questions have been answered. The patient agrees to proceed as planned.   4. Patient saw NP Bia and has been appropriately optimized.   5. COVID ordered.   6.Pre-op labs showing mild hyperkalemia, EKG showing NSR with left axis deviation. No electrical sequelae of hyperkalemia.  A1c improved to 6.4.     Fredo Huang MD

## 2020-09-30 NOTE — ASSESSMENT & PLAN NOTE
BMI 34.67    Patient would benefit from weight loss and has  tried to set realistic goals to achieve success. Lifestyle changes were discussed on eating healthy, exercising at least 150 minutes weekly, and reducing sedentary behavior.   Discussed the risk factors associated with obesity: Arthritis/HEIDY/Diabetes/Fatty Liver/Cardiovascular disease/GERD/HTN/HLP.

## 2020-09-30 NOTE — Clinical Note
Ms. Hall was seen in the perioperative clinic for optimization 9/30/2020.  She is still having issues with BP control with wild swings from 90/60 to 180/90's.  We will keep an eye out on her BP.  She has HD MWF so she should be ready for a surgery scheduled 10/8, which is a Thursday.    Thank you for the consultation,  Michael

## 2020-09-30 NOTE — ASSESSMENT & PLAN NOTE
Currently being treated with Famotidine  Encouraged to elevate HOB, avoid laying down for 2 hours after meals.   Weight loss encouraged and dietary changes such as reduction or avoidance of fatty foods, caffeine, spicy foods, and chocolate.  Smoking cessation was recommended as well as reduction of alcohol consumption.

## 2020-09-30 NOTE — ASSESSMENT & PLAN NOTE
Dialysis Days MWF at [facility] dialysis center     Medications:  sodium bicarbonate 650 MG tablet--> changed to being administered at [facility]   Renvela 800mg x 3 tablets,  three times per day with meals     Close monitoring of serum electrolytes is recommended prior to surgery and during the perioperative period to ensure that the electrolytes are appropriate to proceed with surgery.  Please closely monitor the use of IV fluids to avoid fluid/volume overload.   Please note that the patient may be at increased risk of bleeding from platelet dysfunction from renal failure.   I suggest involving the nephrology team for on going dialysis treatment

## 2020-09-30 NOTE — PATIENT INSTRUCTIONS
Preventive perioperative care    Thromboembolic prophylaxis:  Her risk factors for thrombosis include obesity, surgical procedure and reduced mobility.I suggest  thromboembolic prophylaxis ( mechanical/pharmacological, weighing the risk benefits of pharmacological agent use considering nadege procedural bleeding )  during the perioperative period.I suggested being active in the post operative period.       Postoperative pulmonary complication prophylaxis-Risk factors for post operative pulmonary complications include surgery lasting over 3 hours and ASA class >2- I suggest incentive spirometry use, early ambulation and pain control so as to avoid diaphragmatic splinting  Brush dentures/clean oral rinses and sleep with the head of the bed up 30 degrees     Renal complication prophylaxis-Risk factors for renal complications include pre-existing renal disease, diabetes mellitus and hypertension . I suggest keeping her well hydrated and avoidance/ minimizing the use of  NSAID's,CHRISTIANSEN 2 Inhibitors ,IV contrast if possible in the perioperative period.I suggested drinking 2 litre's of water a day      Surgical site Infection Prophylaxis-I  suggest appropriate antibiotic for Prophylaxis against Surgical site infections     In view of urological procedure the patient  is at risk of postoperative urinary retention.  I suggest avoidance / minimizing the of  Benzodiazepines,Anticholinergic medication,antihistamines ( Benadryl) , if possible in the perioperative period. I suggest using the minimum possible use of opioids for the minimum period of time in the perioperative period. Benadryl avoidance suggested      This visit was focused on Preoperative evaluation, Perioperative Medical management, complication reduction plans. I suggest that the patient follows up with primary care or relevant sub specialists for ongoing health care.    I appreciate the opportunity to be involved in this patients care. Please feel free to contact  me if there were any questions about this consultation.  Diet for Chronic Kidney Disease  Following a special diet when you have kidney disease can help you stay as healthy as possible. Your healthcare provider or dietitian should make a special diet plan just for you.    Eating right  Here are some good eating rules to follow:  · Protein. Eating protein is important for your body. But too much protein can put a strain on your kidneys. Eating less protein may slow the progression of chronic kidney disease. Foods high in protein include meat, fish, eggs, cheese, and other dairy products. A registered dietitian can help you plan a diet that has the right amount of protein for you.  · Sodium. Having too much salt in your diet can make your body hold onto (retain) water. Ask your provider or dietitian how much sodium per day you are allowed. This will help you avoid fluid buildup in your body (fluid retention). It can also help control high blood pressure. Learn to read food labels to know how much sodium is in one serving. Foods high in salt include processed meats, canned and boxed foods, sauces, salted chips and snacks, pickled foods, frozen dinners, and restaurant and fast food.  · Fluids. If you have advanced kidney disease, you will need to limit the water and fluids you drink. If you dont, then too much water will build up in your body. The exact amount of fluid you can drink depends on how well your kidneys are working. Ask your provider how much water you can safely drink each day.  · Potassium. In advanced kidney disease, your potassium level can go dangerously high. This affects your heart. It can cause an irregular heartbeat (arrhythmia). Ask your provider or dietitian if you should limit potassium in your diet. Foods high in potassium include dairy products (milk, yogurt, cheese), dried beans, bananas, oranges, potatoes, tomatoes, spinach, cantaloupe, honeydew melon, dried fruits, and  nuts.   · Calcium. Calcium is important to build strong bones. But foods high in calcium are also high in phosphorus, which can take calcium from your bones. Limiting foods high in phosphorus will help keep calcium in your bones. Ask your provider how much calcium you should get each day.  · Phosphorus. In advanced kidney disease, your phosphorus level can go dangerously high. This affects many systems in the body and can damage your heart. Limit your intake of phosphorus-rich foods. These include dried beans and peas, nuts, peanut butter, cocoa, beer, cola drinks, and dairy products.  Date Last Reviewed: 8/1/2016  © 1558-7103 [facility]. [address] [place] [address], [place] [address]. All rights reserved. This information is not intended as a substitute for professional medical care. Always follow your healthcare professional's instructions.        Discharge Instructions: Eating a Low-Potassium Diet  Your health care provider has prescribed a low-potassium diet for you. This kind of diet is advised for people who have certain kidney problems. Potassium is needed for muscle function. But too much potassium is a health risk. Potassium is found in many foods. Read below to find out how to change your diet.  Foods to limit  Some foods are high in potassium. Limit your daily intake of the foods in the list below.  · Fruits: apricots (canned and fresh), bananas, cantaloupe, honeydew melon, kiwi, nectarines, pomegranates, oranges, orange juice, pears, dried fruits (apricots, dates, figs, prunes), and prune juice  · Vegetables: asparagus, avocado, artichoke, bamboo shoots, beets, brussels sprouts, cabbage, celery, chard, okra, potatoes (white and sweet), pumpkin, rutabaga, spinach (cooked), squash, tomato, tomato sauce, tomato juice, and vegetable juice cocktail  · Legumes: black-eyed peas, chickpeas, lentils, lima beans, navy beans, red kidney beans, soybeans, and split peas  · Nuts and seeds: almonds, [place]  nuts, cashews, peanuts, peanut butter, pecans, pumpkin seeds, sunflower seeds, and walnuts  · Breads and cereals: bran and whole-grain products  · Dairy foods: milk, cheese, ice cream, yogurt  · Animal protein: all forms of animal protein  · Other: chocolate, cocoa, coconut milk, and molasses  Tips  · Ask your health care provider how much potassium you are allowed each day. This will help you figure out serving sizes for your needs.  · Check labels for potassium. It may be listed as potassium chloride.  · Do not use salt substitutes. These often have potassium in them.  · Cook frozen fruits and vegetables in water. Rinse and drain them well before eating.  · Drain liquid from all canned fruits and vegetables. Rinse them before eating.  · Reduce the potassium in potatoes. Peel them, slice thinly, and soak in water for at least 4 hours.  · Reduce the potassium in green leafy vegetables. Soak them in water for at least 4 hours.  · Eat white rice and refined white flour products. These include white bread, pasta, and grits.  Follow-up  Make a follow-up appointment as advised by our staff.  When to call your health care provider  Call your health care provider right away if you have any of the following:  · Fatigue  · Shortness of breath  · Chest pain  · Slow, irregular heartbeat  · Fainting  · Dizziness  · Lightheadedness  · Confusion   Date Last Reviewed: 6/21/2015  © 7335-1859 [employer]. [address] [place] [address]. All rights reserved. This information is not intended as a substitute for professional medical care. Always follow your healthcare professional's instructions.      Your surgery has been scheduled for:10/8/20    You should report to:    ____[facility], located on the [place] side of the first floor of the [facility] ([phone])    __X__The Second Floor Surgery Center, located on the [facility] side of the            Second  floor of the [facility] ([phone])    Please Note   - Tell your doctor if you take Aspirin, products containing Aspirin, herbal medications or blood thinners, such as Coumadin, Ticlid, or Plavix.  (Consult your provider regarding holding or stopping before surgery).  - Arrange for someone to drive you home following surgery.  You will not be allowed to leave the surgical facility alone or drive yourself home following sedation and anesthesia.    Before Surgery  - Stop taking all vitamins/ herbal medications 7 days prior to surgery  - No Motrin/Advil (Ibuprofen) 7 days before surgery  - No Aleve (Naproxen) 7 days before surgery  - Stop taking Aspirin, products containing Aspirin 7 days before surgery  - Stop taking blood thinners_7_days before surgery  - No Goody's/BC  Powder 7 days before surgery  - Refrain from drinking alcoholic beverages for 24 hours before and after surgery  - Stop or limit smoking 14 days before surgery  - You may take Tylenol for pain    Night before Surgery   Stop ALL solid food, gum, candy (including vitamins) 8 hours before arrival time.  (Please note: If your surgeon gives you different eating and drinking instructions, please follow surgeon's directions.)   Stop all CLOUDY liquids: coffee with creamer, formula, tube feeds, cloudy juices, non-human milk with additives, 6 hours prior to arrival time.   The patient should be ENCOURAGED to drink carbohydrate-rich clear liquids (sports drinks, clear juices) until 2 hours prior to arrival time.   CLEAR liquids include only water, black coffee NO creamer, clear oral rehydration drinks, clear sports drinks or clear fruit juices (no orange juice, no pulpy juices, no apple cider). Advise patients if they can read newsprint through the liquid, it qualifies as clear liquid.    IF IN DOUBT, drink water instead.   - Take a shower or bath (shower is recommended).  Bathe with Hibiclens soap or an antibacterial soap from the neck  down.  If not supplied by your surgeon, hibiclens soap will need to be purchased over the counter in pharmacy.  Rinse soap off thoroughly.  - Shampoo your hair with your regular shampoo    The Day of Surgery    NOTHING TO  DRINK 2 hours before arrival time. If you are told to take medication on the morning of surgery, it may be taken with a sip of water.   - Take another bath or shower with hibiclens or any antibacterial soap, to reduce the chance of infection.  - Take heart and blood pressure medications with a small sip of water, as advised by the perioperative team.  - Do not take fluid pills  - You may brush your teeth and rinse your mouth, but do not swallow any additional water.   - Do not apply perfumes, powder, body lotions or deodorant on the day of surgery.  - Nail polish should be removed.  - Do not wear makeup or moisturizer.  - Wear comfortable clothes, such as a button front shirt and loose fitting pants.  - Leave all jewelry, including body piercings, and valuables at home.    - Bring any devices you will neeed after surgery such as crutches or canes.  - If you have sleep apnea, please bring your CPAP machine.    In the event that your physical condition changes including the onset of a cold or respiratory illness, or if you have to delay or cancel your surgery, please notify your surgeon.  Anesthesia: General Anesthesia     You are watched continuously during your procedure by your anesthesia provider.     Youre due to have surgery. During surgery, youll be given medicine called anesthesia or anesthetic. This will keep you comfortable and pain-free. Your anesthesia provider will use general anesthesia.  What is general anesthesia?  General anesthesia puts you into a state like deep sleep. It goes into the bloodstream (IV anesthetics), into the lungs (gas anesthetics), or both. You feel nothing during the procedure. You will not remember it. During the procedure, the anesthesia provider monitors you  continuously. He or she checks your heart rate and rhythm, blood pressure, breathing, and blood oxygen.  · IV anesthetics. IV anesthetics are given through an IV line in your arm. Theyre often given first. This is so you are asleep before a gas anesthetic is started. Some kinds of IV anesthetics relieve pain. Others relax you. Your doctor will decide which kind is best in your case.  · Gas anesthetics. Gas anesthetics are breathed into the lungs. They are often used to keep you asleep. They can be given through a facemask or a tube placed in your larynx or trachea (breathing tube).  ? If you have a facemask, your anesthesia provider will most likely place it over your nose and mouth while youre still awake. Youll breathe oxygen through the mask as your IV anesthetic is started. Gas anesthetic may be added through the mask.  ? If you have a tube in the larynx or trachea, it will be inserted into your throat after youre asleep.  Anesthesia tools and medicines  You will likely have:  · IV anesthetics. These are put into an IV line into your bloodstream.  · Gas anesthetics. You breathe these anesthetics into your lungs, where they pass into your bloodstream.  · Pulse oximeter. This is a small clip that is attached to the end of your finger. This measures your blood oxygen level.  · Electrocardiography leads (electrodes). These are small sticky pads that are placed on your chest. They record your heart rate and rhythm.  · Blood pressure cuff. This reads your blood pressure.  Risks and possible complications  General anesthesia has some risks. These include:  · Breathing problems  · Nausea and vomiting  · Sore throat or hoarseness (usually temporary)  · Allergic reaction to the anesthetic  · Irregular heartbeat (rare)  · Cardiac arrest (rare)   Anesthesia safety  · Follow all instructions you are given for how long not to eat or drink before your procedure.  · Be sure your doctor knows what medicines and drugs you  take. This includes over-the-counter medicines, herbs, supplements, alcohol or other drugs. You will be asked when those were last taken.  · Have an adult family member or friend drive you home after the procedure.  · For the first 24 hours after your surgery:  ? Do not drive or use heavy equipment.  ? Do not make important decisions or sign legal documents. If important decisions or signing legal documents is necessary during the first 24 hours after surgery, have a trusted family member or spouse act on your behalf.  ? Avoid alcohol.  ? Have a responsible adult stay with you. He or she can watch for problems and help keep you safe.  Date Last Reviewed: 12/1/2016  © 3187-1423 [employer]. [address] [place] [address], [place] [address]. All rights reserved. This information is not intended as a substitute for professional medical care. Always follow your healthcare professional's instructions

## 2020-09-30 NOTE — HPI
"This is a 55 y.o. female  who presents today for a preoperative evaluation in preparation for Urology  surgery. Scheduled for 10/8/2020.  States  surgery is indicated "for removal of kidney".   Patient is known to me.  Details of current problem: The duration of problem is about 6 months to 1 years.   Reports symptoms of hematuria, & pain in back.  No aggravating or relieving factors identified.  Reports pain: 0/10  The history has been obtained by speaking with the patient and reviewing the electronic medical record and/or outside health information. Significant health conditions for the perioperative period are discussed below in assessment and plan.     Patient reports current health status to be Fair. Reports was in ER for feeling dizzy and BP was low- from that visit they changed her BP medication regime.  Problems of concern for the perioperative period:    Anemia  Bradycardia  Diabetes type 2  ESRD  HD MWF  HTN  Peripheral Neuropathy  Hyperkalemia  Hyperphosphatemia  Hyponatremia  Obesity BMI 34.67  NAFLD      "

## 2020-09-30 NOTE — OUTPATIENT SUBJECTIVE & OBJECTIVE
Outpatient Subjective & Objective     Chief complaint-Preoperative evaluation, Perioperative Medical management, complication reduction plan     Active cardiac conditions- none    Revised cardiac risk index predictors- insulin requiring diabetes mellitus and creatinine more than 2    Functional capacity -Examples of physical activity laundry, mopping, make bed, walking in [place] 2x/week  house work and can take 1 flight of stairs----- She can undertake all the above activities without  chest pain,chest tightness, Shortness of breath ,dizziness,lightheadedness making her exercise tolerance more,  than 4 Mets.       Review of Systems   Constitutional: Negative for chills, fatigue and fever.   HENT: Negative for trouble swallowing and voice change.    Eyes: Negative for photophobia and visual disturbance.        No acute visual changes   Respiratory: Negative for apnea, cough, chest tightness, shortness of breath and wheezing.         STOP bang  Score  Low risk HEIDY  High risk HEIDY   Cardiovascular: Negative for chest pain, palpitations and leg swelling.   Gastrointestinal: Negative for abdominal distention, abdominal pain, blood in stool, constipation, diarrhea, nausea and vomiting.        NO FLD, hepatitis, cirrhosis  No BRB or black tarry stool     Endocrine: Negative for cold intolerance, heat intolerance, polydipsia, polyphagia and polyuria.   Genitourinary: Negative for difficulty urinating, dysuria, flank pain, frequency, hematuria and urgency.        Urinates twice per day- no blood in urine at this time   Musculoskeletal: Positive for myalgias. Negative for arthralgias, back pain, gait problem, joint swelling, neck pain and neck stiffness.        Reports pain in right upper thigh     Neurological: Positive for dizziness and light-headedness. Negative for tremors, seizures, syncope, weakness, numbness and headaches.        Neuropathy in hands from DM  Dizziiness treated at [facility]    Psychiatric/Behavioral:  Negative for suicidal ideas. The patient is not nervous/anxious.          Family History   Problem Relation Age of Onset    Cancer Mother     Ovarian cancer Mother     Cancer Sister     Ovarian cancer Sister     Lung cancer Sister     Diabetes Brother     Cancer Sister     Ovarian cancer Sister     Diabetes Sister     Breast cancer Neg Hx     Colon cancer Neg Hx     Cervical cancer Neg Hx     Endometrial cancer Neg Hx     Vaginal cancer Neg Hx      Past Surgical History:   Procedure Laterality Date    AV FISTULA PLACEMENT      right arm     SECTION      x1    COLONOSCOPY N/A 2018    Procedure: COLONOSCOPY;  Surgeon: Pankaj Hinojosa MD;  Location: [facility] (4TH FLR);  Service: Endoscopy;  Laterality: N/A;  dialysis pt/labs prior to colon/MS    CYSTOSCOPY W/ RETROGRADES Bilateral 2020    Procedure: CYSTOSCOPY, WITH RETROGRADE PYELOGRAM;  Surgeon: Douglas Abraham MD;  [address]  Service: Urology;  Laterality: Bilateral;  45 min    FISTULOGRAM Right 2020    Procedure: Fistulogram;  Surgeon: Lester Gómez MD;  Location[facility] C[facility] LAB;  Service: Nephrology;  Laterality: Right;    HYSTERECTOMY      ROBOT-ASSISTED LAPAROSCOPIC ABDOMINAL HYSTERECTOMY USING DA NATALYA XI N/A 2019    Procedure: XI ROBOTIC HYSTERECTOMY;  Surgeon: Trice Lei MD;  Location: [facility];  Service: OB/GYN;  Laterality: N/A;    ROBOT-ASSISTED LAPAROSCOPIC SALPINGO-OOPHORECTOMY USING DA NATALYA XI Bilateral 2019    Procedure: XI ROBOTIC SALPINGO-OOPHORECTOMY;  Surgeon: Trice Lei MD;  Location: [facility];  Service: OB/GYN;  Laterality: Bilateral;    TUBAL LIGATION      Vascular access surgeries      x5       No anesthesia, bleeding, cardiac problems with previous surgeries/procedures NO PE DVT    No known family problems with anesthesia, bleeding, cardiac problems with previous surgeries/procedures  Reports her brother had DVT in Rt leg--> patient has paralysis on one side  so likely r/t this.    Medications and Allergies reviewed in epic.     Physical Exam   Constitutional: She is oriented to person, place, and time. She appears well-developed, well-nourished and obese. She is cooperative.   HENT:   Head: Normocephalic.   Nose: Nose normal.   Mouth/Throat: Uvula is midline, oropharynx is clear and moist and mucous membranes are normal. Mucous membranes are moist. Oropharynx is clear.       Eyes: Pupils are equal, round, and reactive to light. Conjunctivae and lids are normal.   Neck: Trachea normal, normal range of motion and phonation normal. Neck supple.   Cardiovascular: Normal rate, regular rhythm and normal heart sounds. PMI is not displaced. Exam reveals no gallop and no friction rub.   No murmur heard.  Pulses:       Carotid pulses are 3+ on the right side and 3+ on the left side.       Radial pulses are 3+ on the right side and 3+ on the left side.        Posterior tibial pulses are 3+ on the right side and 3+ on the left side.   Pulmonary/Chest: Effort normal and breath sounds normal.   Abdominal: Normal appearance and bowel sounds are normal. There is no abdominal tenderness.   Musculoskeletal: Normal range of motion.      Right lower leg: No edema.      Left lower leg: No edema.   Lymphadenopathy:        Head (right side): No submental, no submandibular, no tonsillar, no preauricular, no posterior auricular and no occipital adenopathy present.        Head (left side): No submental, no submandibular, no tonsillar, no preauricular, no posterior auricular and no occipital adenopathy present.        Right cervical: No superficial cervical adenopathy present.       Left cervical: No superficial cervical adenopathy present.   Neurological: She is alert and oriented to person, place, and time. GCS eye subscore is 4. GCS verbal subscore is 5. GCS motor subscore is 6.   Skin: Skin is warm and dry. Capillary refill takes 2 to 3 seconds.   Psychiatric: Her speech is normal and  "behavior is normal. Mood, memory, judgment and thought content normal.     Blood pressure (!) 184/86, pulse 90, temperature 98.1 °F (36.7 °C), height 5' 4" (1.626 m), weight 91.6 kg (202 lb), last menstrual period 03/15/2016, SpO2 98 %.      Investigations  Lab and Imaging have been reviewed in epic.    9/30/20202 Labs  Na 138  K 5.5  GFR 3.6  BUN 60  Cr 12  Albumin 3.4    9/29/2020   Hgb 11.4  HCT 34.8      9/10/2020 EKG    Vent. Rate : 081 BPM     Atrial Rate : 081 BPM      P-R Int : 140 ms          QRS Dur : 094 ms       QT Int : 422 ms       P-R-T Axes : 071 -34 056 degrees      QTc Int : 490 ms     Normal sinus rhythm   Left axis deviation   Cannot rule out Anterior infarct ,age undetermined   Abnormal ECG   Confirmed by Rina TRIMBLE, Coleman ([id number]) on 9/10/2020 6:21:56 PM          7/14/2020 Echo  Conclusion    · Normal left ventricular systolic function. The estimated ejection fraction is 65%.  · Concentric left ventricular remodeling.  · No wall motion abnormalities.  · Indeterminate left ventricular diastolic function.  · Normal right ventricular systolic function.  · Severe left atrial enlargement.  · Mild tricuspid regurgitation.  · The estimated PA systolic pressure is 36 mmHg.  · Intermediate central venous pressure (8 mmHg).     9/10/2020 TTE  Conclusion    · Concentric left ventricular hypertrophy.  · Mild left atrial enlargement.  · Normal left ventricular systolic function.  · Normal right ventricular systolic function.       Review of old records- Was done and information gathered regards to events leading to surgery and health conditions of significance in the perioperative period.    Outpatient Subjective & Objective   "

## 2020-09-30 NOTE — PROGRESS NOTES
"[facility]  Progress Note    Patient Name: Jael Hall  MRN: [id number]  Date of Evaluation- 10/01/2020  PCP- Mary Solano MD    Future cases for Jael Hall [[id number]]     Case ID Status Date Time [facility] Procedure Provider Location    [id number] [facility] 10/8/2020  7:00  ROBOTIC NEPHRECTOMY Douglas Abraham MD [[id number]] [facility] OR 2ND FLR          HPI:  This is a 55 y.o. female  who presents today for a preoperative evaluation in preparation for Urology  surgery. Scheduled for 10/8/2020.  States  surgery is indicated "for removal of kidney".   Patient is known to me.  Details of current problem: The duration of problem is about 6 months to 1 years.   Reports symptoms of hematuria, & pain in back.  No aggravating or relieving factors identified.  Reports pain: 0/10  The history has been obtained by speaking with the patient and reviewing the electronic medical record and/or outside health information. Significant health conditions for the perioperative period are discussed below in assessment and plan.     Patient reports current health status to be Fair. Reports was in ER for feeling dizzy and BP was low- from that visit they changed her BP medication regime.  Problems of concern for the perioperative period:    Anemia  Bradycardia  Diabetes type 2  ESRD  HD MWF  HTN  Peripheral Neuropathy  Hyperkalemia  Hyperphosphatemia  Hyponatremia  Obesity BMI 34.67  NAFLD          Subjective/ Objective:     Chief complaint-Preoperative evaluation, Perioperative Medical management, complication reduction plan     Active cardiac conditions- none    Revised cardiac risk index predictors- insulin requiring diabetes mellitus and creatinine more than 2    Functional capacity -Examples of physical activity laundry, mopping, make bed, walking in [place] 2x/week  house work and can take 1 flight of stairs----- She can undertake all the above activities without  chest pain,chest tightness, Shortness of breath " ,dizziness,lightheadedness making her exercise tolerance more,  than 4 Mets.       Review of Systems   Constitutional: Negative for chills, fatigue and fever.   HENT: Negative for trouble swallowing and voice change.    Eyes: Negative for photophobia and visual disturbance.        No acute visual changes   Respiratory: Negative for apnea, cough, chest tightness, shortness of breath and wheezing.         STOP bang  Score  Low risk HEIDY  High risk HEIDY   Cardiovascular: Negative for chest pain, palpitations and leg swelling.   Gastrointestinal: Negative for abdominal distention, abdominal pain, blood in stool, constipation, diarrhea, nausea and vomiting.        NO FLD, hepatitis, cirrhosis  No BRB or black tarry stool     Endocrine: Negative for cold intolerance, heat intolerance, polydipsia, polyphagia and polyuria.   Genitourinary: Negative for difficulty urinating, dysuria, flank pain, frequency, hematuria and urgency.        Urinates twice per day- no blood in urine at this time   Musculoskeletal: Positive for myalgias. Negative for arthralgias, back pain, gait problem, joint swelling, neck pain and neck stiffness.        Reports pain in right upper thigh     Neurological: Positive for dizziness and light-headedness. Negative for tremors, seizures, syncope, weakness, numbness and headaches.        Neuropathy in hands from DM  Dizziiness treated at [facility]    Psychiatric/Behavioral: Negative for suicidal ideas. The patient is not nervous/anxious.          Family History   Problem Relation Age of Onset    Cancer Mother     Ovarian cancer Mother     Cancer Sister     Ovarian cancer Sister     Lung cancer Sister     Diabetes Brother     Cancer Sister     Ovarian cancer Sister     Diabetes Sister     Breast cancer Neg Hx     Colon cancer Neg Hx     Cervical cancer Neg Hx     Endometrial cancer Neg Hx     Vaginal cancer Neg Hx      Past Surgical History:   Procedure Laterality Date    AV FISTULA PLACEMENT       right arm     SECTION      x1    COLONOSCOPY N/A 2018    Procedure: COLONOSCOPY;  Surgeon: Pankaj Hinojosa MD;  Location: [facility] ENDO (4TH FLR);  Service: Endoscopy;  Laterality: N/A;  dialysis pt/labs prior to colon/MS    CYSTOSCOPY W/ RETROGRADES Bilateral 2020    Procedure: CYSTOSCOPY, WITH RETROGRADE PYELOGRAM;  Surgeon: Douglas Abraham MD;  Location: [facility] OR 1ST FLR;  Service: Urology;  Laterality: Bilateral;  45 min    FISTULOGRAM Right 2020    Procedure: Fistulogram;  Surgeon: Lester Gómez MD;  Location: [facility] CATH LAB;  Service: Nephrology;  Laterality: Right;    HYSTERECTOMY      ROBOT-ASSISTED LAPAROSCOPIC ABDOMINAL HYSTERECTOMY USING DA NATALYA XI N/A 2019    Procedure: XI ROBOTIC HYSTERECTOMY;  Surgeon: Trice Lei MD;  Location: [facility] OR;  Service: OB/GYN;  Laterality: N/A;    ROBOT-ASSISTED LAPAROSCOPIC SALPINGO-OOPHORECTOMY USING DA NATALYA XI Bilateral 2019    Procedure: XI ROBOTIC SALPINGO-OOPHORECTOMY;  Surgeon: Trice eLi MD;  Location: [facility];  Service: OB/GYN;  Laterality: Bilateral;    TUBAL LIGATION      Vascular access surgeries      x5       No anesthesia, bleeding, cardiac problems with previous surgeries/procedures NO PE DVT    No known family problems with anesthesia, bleeding, cardiac problems with previous surgeries/procedures  Reports her brother had DVT in Rt leg--> patient has paralysis on one side so likely r/t this.    Medications and Allergies reviewed in epic.     Physical Exam   Constitutional: She is oriented to person, place, and time. She appears well-developed, well-nourished and obese. She is cooperative.   HENT:   Head: Normocephalic.   Nose: Nose normal.   Mouth/Throat: Uvula is midline, oropharynx is clear and moist and mucous membranes are normal. Mucous membranes are moist. Oropharynx is clear.       Eyes: Pupils are equal, round, and reactive to light. Conjunctivae and lids are normal.   Neck: Trachea normal, normal range  "of motion and phonation normal. Neck supple.   Cardiovascular: Normal rate, regular rhythm and normal heart sounds. PMI is not displaced. Exam reveals no gallop and no friction rub.   No murmur heard.  Pulses:       Carotid pulses are 3+ on the right side and 3+ on the left side.       Radial pulses are 3+ on the right side and 3+ on the left side.        Posterior tibial pulses are 3+ on the right side and 3+ on the left side.   Pulmonary/Chest: Effort normal and breath sounds normal.   Abdominal: Normal appearance and bowel sounds are normal. There is no abdominal tenderness.   Musculoskeletal: Normal range of motion.      Right lower leg: No edema.      Left lower leg: No edema.   Lymphadenopathy:        Head (right side): No submental, no submandibular, no tonsillar, no preauricular, no posterior auricular and no occipital adenopathy present.        Head (left side): No submental, no submandibular, no tonsillar, no preauricular, no posterior auricular and no occipital adenopathy present.        Right cervical: No superficial cervical adenopathy present.       Left cervical: No superficial cervical adenopathy present.   Neurological: She is alert and oriented to person, place, and time. GCS eye subscore is 4. GCS verbal subscore is 5. GCS motor subscore is 6.   Skin: Skin is warm and dry. Capillary refill takes 2 to 3 seconds.   Psychiatric: Her speech is normal and behavior is normal. Mood, memory, judgment and thought content normal.     Blood pressure (!) 184/86, pulse 90, temperature 98.1 °F (36.7 °C), height 5' 4" (1.626 m), weight 91.6 kg (202 lb), last menstrual period 03/15/2016, SpO2 98 %.      Investigations  Lab and Imaging have been reviewed in epic.    9/30/20202 Labs  Na 138  K 5.5  GFR 3.6  BUN 60  Cr 12  Albumin 3.4    9/29/2020   Hgb 11.4  HCT 34.8      9/10/2020 EKG    Vent. Rate : 081 BPM     Atrial Rate : 081 BPM      P-R Int : 140 ms          QRS Dur : 094 ms       QT Int : 422 ms      "  P-R-T Axes : 071 -34 056 degrees      QTc Int : 490 ms     Normal sinus rhythm   Left axis deviation   Cannot rule out Anterior infarct ,age undetermined   Abnormal ECG   Confirmed by Coleman Flynn MD ([id number]) on 9/10/2020 6:21:56 PM          7/14/2020 Echo  Conclusion    · Normal left ventricular systolic function. The estimated ejection fraction is 65%.  · Concentric left ventricular remodeling.  · No wall motion abnormalities.  · Indeterminate left ventricular diastolic function.  · Normal right ventricular systolic function.  · Severe left atrial enlargement.  · Mild tricuspid regurgitation.  · The estimated PA systolic pressure is 36 mmHg.  · Intermediate central venous pressure (8 mmHg).     9/10/2020 TTE  Conclusion    · Concentric left ventricular hypertrophy.  · Mild left atrial enlargement.  · Normal left ventricular systolic function.  · Normal right ventricular systolic function.       Review of old records- Was done and information gathered regards to events leading to surgery and health conditions of significance in the perioperative period.        Preoperative cardiac risk assessment-  The patient does not have any active cardiac conditions . Revised cardiac risk index predictors- ---.Functional capacity is more than 4 Mets. She will be undergoing a Urological procedure that carries a Moderate Risk risk     The estimated risk of the rate of adverse cardiac outcomes  10%    No further cardiac work up is indicated prior to proceeding with the surgery     Orders Placed This Encounter    Comprehensive metabolic panel       American Society of Anesthesiologists Physical status classification ( ASA ) class: 3     Postoperative pulmonary complication risk assessment: 13.3     ARISCAT ( Canet) risk index- risk class -   intermediate, if duration of surgery is over 3 hours       Assessment/Plan:     Essential hypertension  Assessment:  Current /86 Today      Plan:   home blood pressure  monitoring   Conservative BP management in light of patient's recent history of hypotension and medications changes    Current Medications:     losartan (COZAAR) 100 MG tablet once per day    Lasix 40 mg po BID     verapamiL (CALAN-SR) 120 MG CR tablet once per day---> stopped due to low BP and HR     carvedilol (COREG) 25 MG tablet twice per day---> Stopped due to low BP and HR     Counseling:  Take medications as prescribed in preparation for her surgery  Encouraged keeping a healthy weight and BMI  Education was provided regarding lifestyle changes to reduce systolic BP;  Smoking cessation; Exercise 30 minutes per day,  5 days per week or 150 minutes weekly;  Sodium reduction and avoidance of high salt foods such as processed meats, frozen meals, fast foods.        ESRD on hemodialysis  Dialysis Days MWF at [facility] dialysis center     Medications:  sodium bicarbonate 650 MG tablet--> changed to being administered at [facility]   Renvela 800mg x 3 tablets,  three times per day with meals     Close monitoring of serum electrolytes is recommended prior to surgery and during the perioperative period to ensure that the electrolytes are appropriate to proceed with surgery.  Please closely monitor the use of IV fluids to avoid fluid/volume overload.   Please note that the patient may be at increased risk of bleeding from platelet dysfunction from renal failure.   I suggest involving the nephrology team for on going dialysis treatment     Hyperphosphatemia  Phosphate more controlled  Most recent 7.8  As low as 6.8 and high of 9.7  Patient reports medication compliance with Renvela 2400 mg with meals    Anemia in ESRD (end-stage renal disease)  Hgb 11,HCT 34    GERD (gastroesophageal reflux disease)  Currently being treated with Famotidine  Encouraged to elevate HOB, avoid laying down for 2 hours after meals.   Weight loss encouraged and dietary changes such as reduction or avoidance of fatty foods, caffeine, spicy foods, and  chocolate.  Smoking cessation was recommended as well as reduction of alcohol consumption.    BMI 33.0-33.9,adult  BMI 34.67    Patient would benefit from weight loss and has  tried to set realistic goals to achieve success. Lifestyle changes were discussed on eating healthy, exercising at least 150 minutes weekly, and reducing sedentary behavior.   Discussed the risk factors associated with obesity: Arthritis/HEIDY/Diabetes/Fatty Liver/Cardiovascular disease/GERD/HTN/HLP.    Type 2 diabetes mellitus with kidney complication, with long-term current use of insulin  Currently takes:  insulin (LANTUS SOLOSTAR U-100 INSULIN) glargine 100 units/mL (3mL) SubQ pen 50 Units      insulin aspart U-100 (NOVOLOG FLEXPEN U-100 INSULIN) 100 unit/mL (3 mL) InPn pen 5 Units       DM x 21 years.  Most recent A1c on 6.4.  Average daily accuchecks are 118; has been down 79-80 .   Reports peripheral neuropathy especially in hands. Denies visual changes.  Maintain healthy weight. Exercise at least 150 minutes weekly. Encouraged diet rich in nutrients such as fruits, vegetables, and whole grains; reduce sugar intake from cakes, candy, and sugared drinks.  \  Diabetes Mellitus-I suggest monitoring the glucose in the perioperative period ( Before meals and bed time,if the patient is on oral feeds or every 6 hourly ,if the patient is NPO )  Blood glucose target in hospitalized patients is 140-180. Oral Hypoglycemic agents are generally avoided during the hospital stay . If glucose is consistently elevated ,I suggest using basal ,prandial Insulin regimen to control the glucose , as elevated glucose can be associated with adverse surgical out comes. Please consider involving Hospital Medicine or Endocrinology ,if any help is needed with Glucose control. Patient will be instructed based on the pre op clinic guidelines  about adjustment of diabetic treatment (If applicable )  considering the NPO status for Surgery      Hyponatremia  NA  134    Poor dentition  Dentures- upper plate    Pica in adults  Patient reports she eats ice routinely  Patient gets iron infusions at HD    Symptomatic bradycardia  Beta blocker and calcium channel blocker stopped      EKG 9/10/2020  Test Reason : R00.1,     Vent. Rate : 081 BPM     Atrial Rate : 081 BPM      P-R Int : 140 ms          QRS Dur : 094 ms       QT Int : 422 ms       P-R-T Axes : 071 -34 056 degrees      QTc Int : 490 ms     Normal sinus rhythm   Left axis deviation   Cannot rule out Anterior infarct ,age undetermined   Abnormal ECG   Confirmed by Coleman Flynn MD ([id number]) on 9/10/2020 6:21:56 PM          Hematuria  Reports she has had hematuria in the past but currently no hematuria at this time    NAFLD (nonalcoholic fatty liver disease)  Followed by Hepatology Dr. Aman Anderson      Preventive perioperative care    Thromboembolic prophylaxis:  Her risk factors for thrombosis include obesity, surgical procedure and reduced mobility.I suggest  thromboembolic prophylaxis ( mechanical/pharmacological, weighing the risk benefits of pharmacological agent use considering nadege procedural bleeding )  during the perioperative period.I suggested being active in the post operative period.       Postoperative pulmonary complication prophylaxis-Risk factors for post operative pulmonary complications include surgery lasting over 3 hours and ASA class >2- I suggest incentive spirometry use, early ambulation and pain control so as to avoid diaphragmatic splinting  Brush dentures/clean oral rinses and sleep with the head of the bed up 30 degrees     Renal complication prophylaxis-Risk factors for renal complications include pre-existing renal disease, diabetes mellitus and hypertension . I suggest keeping her well hydrated and avoidance/ minimizing the use of  NSAID's,CHRISTIANSEN 2 Inhibitors ,IV contrast if possible in the perioperative period.I suggested drinking 2 litre's of water a day      Surgical site Infection  Prophylaxis-I  suggest appropriate antibiotic for Prophylaxis against Surgical site infections     In view of urological procedure the patient  is at risk of postoperative urinary retention.  I suggest avoidance / minimizing the of  Benzodiazepines,Anticholinergic medication,antihistamines ( Benadryl) , if possible in the perioperative period. I suggest using the minimum possible use of opioids for the minimum period of time in the perioperative period. Benadryl avoidance suggested      This visit was focused on Preoperative evaluation, Perioperative Medical management, complication reduction plans. I suggest that the patient follows up with primary care or relevant sub specialists for ongoing health care.    I appreciate the opportunity to be involved in this patients care. Please feel free to contact me if there were any questions about this consultation.    Patient was optimized    Blood pressure monitoring      Michael Adrian NP  Perioperative Medicine  [facility]   Pager [phone]

## 2020-09-30 NOTE — PROGRESS NOTES
[facility] [place]  Staff: MD christina    Is this patient in a research study? No    CC: renal mass    HPI:  Jael Hall is a 55 y.o. female who is referred by Natalee Resendiz NP for evaluation of a right renal mass. She is a renal transplant candidate.   Patient reports some gross hematuria. No abdominal pain.  She does urinate, 2x/day.     Patient underwent cystoscopy, bilateral RPGs---were normal.  Here to again discuss possible right nephrectomy.     No unexpected weight loss.      Patient is a never smoker.      Has a h/o robotic total abdominal hysterectomy in 2020 ---no other abdominal surgeries.      Patient has ESRD and is on HD---Monday, Wednesday, Friday through a right wrist AVF.  She has DM and HTN.        ROS:  Neg except per HPI    Past Medical History:   Diagnosis Date    Anemia of chronic disorder 2017    Overview:  Added automatically from request for surgery [id number]    Anemia of chronic renal failure, stage 5     Anxiety     Cyst of left ovary 2019    Cyst of left ovary 2019    Dyslipidemia 2013    End-stage renal disease on hemodialysis 2020    Hematuria 10/30/2019    Hx of sinus bradycardia 2017    Hyperkalemia 3/29/2017    Peripheral neuropathy 2013    Renovascular hypertension 2020    S/P dilation and curettage 2018    Thickened endometrium 2018    Uncontrolled type 2 diabetes mellitus 2013    Overview:  poc a1c today 11.2-270/ up from 10.8-263, Pt. has very very stressed/ was incarcerated/marital stress/ will current meds/ less stress now/ will monitor.       Past Surgical History:   Procedure Laterality Date    AV FISTULA PLACEMENT      right arm     SECTION      x1    COLONOSCOPY N/A 2018    Procedure: COLONOSCOPY;  Surgeon: Pankaj Hinojosa MD;  Location: [facility] ([address] [place] [address]);  Service: Endoscopy;  Laterality: N/A;  dialysis pt/labs prior to colon/MS    CYSTOSCOPY W/ RETROGRADES Bilateral 2020     Procedure: CYSTOSCOPY, WITH RETROGRADE PYELOGRAM;  Surgeon: Douglas Abraham MD;  Location: [address] FLR;  Service: Urology;  Laterality: Bilateral;  45 min    FISTULOGRAM Right 9/9/2020    Procedure: Fistulogram;  Surgeon: Lester Gómez MD;  Location: [facility] CATH LAB;  Service: Nephrology;  Laterality: Right;    HYSTERECTOMY      ROBOT-ASSISTED LAPAROSCOPIC ABDOMINAL HYSTERECTOMY USING DA NATALYA XI N/A 7/5/2019    Procedure: XI ROBOTIC HYSTERECTOMY;  Surgeon: Trice Lei MD;  Location: [facility] OR;  Service: OB/GYN;  Laterality: N/A;    ROBOT-ASSISTED LAPAROSCOPIC SALPINGO-OOPHORECTOMY USING DA NATALYA XI Bilateral 7/5/2019    Procedure: XI ROBOTIC SALPINGO-OOPHORECTOMY;  Surgeon: Trice Lei MD;  Location: [facility];  Service: OB/GYN;  Laterality: Bilateral;    TUBAL LIGATION      Vascular access surgeries      x5       Social History     Socioeconomic History    Marital status:      Spouse name: Not on file    Number of children: 2    Years of education: Not on file    Highest education level: Not on file   Occupational History    Occupation: disability   Social Needs    Financial resource strain: Not on file    Food insecurity     Worry: Not on file     Inability: Not on file    Transportation needs     Medical: Not on file     Non-medical: Not on file   Tobacco Use    Smoking status: Never Smoker    Smokeless tobacco: Never Used   Substance and Sexual Activity    Alcohol use: No    Drug use: No    Sexual activity: Yes     Partners: Male     Birth control/protection: Post-menopausal   Lifestyle    Physical activity     Days per week: Not on file     Minutes per session: Not on file    Stress: Rather much   Relationships    Social connections     Talks on phone: Not on file     Gets together: Not on file     Attends Restoration service: Not on file     Active member of club or organization: Not on file     Attends meetings of clubs or organizations: Not on file     Relationship  "status: Not on file   Other Topics Concern    Not on file   Social History Narrative    Not on file       Family History   Problem Relation Age of Onset    Cancer Mother     Ovarian cancer Mother     Cancer Sister     Ovarian cancer Sister     Lung cancer Sister     Diabetes Brother     Cancer Sister     Ovarian cancer Sister     Diabetes Sister     Breast cancer Neg Hx     Colon cancer Neg Hx     Cervical cancer Neg Hx     Endometrial cancer Neg Hx     Vaginal cancer Neg Hx        Review of patient's allergies indicates:  No Known Allergies    Current Outpatient Medications on File Prior to Visit   Medication Sig Dispense Refill    acetaminophen (TYLENOL) 500 MG tablet Take 2 tablets (1,000 mg total) by mouth every 8 (eight) hours as needed for Pain.  0    aspirin (ECOTRIN) 81 MG EC tablet Take 81 mg by mouth.      atorvastatin (LIPITOR) 40 MG tablet Take 40 mg by mouth once daily.      fish oil-omega-3 fatty acids 300-1,000 mg capsule Take 1 g by mouth once daily.      furosemide (LASIX) 40 MG tablet Take 80 mg by mouth 2 (two) times a day.       insulin (LANTUS SOLOSTAR U-100 INSULIN) glargine 100 units/mL (3mL) SubQ pen Inject 15 Units into the skin every evening. (Patient taking differently: Inject 50 Units into the skin every evening. ) 3 mL 0    losartan (COZAAR) 50 MG tablet Take 100 mg by mouth once daily.       omega-3 fatty acids/fish oil (FISH OIL-OMEGA-3 FATTY ACIDS) 300-1,000 mg capsule Take 1 capsule by mouth once daily.      sevelamer carbonate (RENVELA) 800 mg Tab Take 2,400 mg by mouth 3 (three) times daily with meals.       No current facility-administered medications on file prior to visit.        Anticoagulation:  Yes ASA 81    Physical Exam:  Estimated body mass index is 34.67 kg/m² as calculated from the following:    Height as of an earlier encounter on 9/30/20: 5' 4" (1.626 m).    Weight as of an earlier encounter on 9/30/20: 91.6 kg (202 lb).    General: No acute " distress, well developed. AAOx3  Head: Normocephalic, Atraumatic  Eyes: Extra-occular movements intact, No discharge  Neck: supple, symmetrical, trachea midline  Lungs: normal respiratory effort, no respiratory distress, no wheezes  CV: regular rate, 2+ pulses  Abdomen: soft, non-tender, non-distended, no organomegaly. Robotic port sites incisions from previous gyn surgery are well healed.  MSK: no edema, no deformities, normal ROM  Skin: skin color, texture, turgor normal.  Neurologic: no focal deficits, sensation intact    Labs:    Urine dipstick today - negative for all components.     Lab Results   Component Value Date    WBC 6.70 09/11/2020    HGB 13.8 09/11/2020    HCT 43.4 09/11/2020    MCV 80 (L) 09/11/2020     09/11/2020           BMP  Lab Results   Component Value Date     09/30/2020    K 5.5 (H) 09/30/2020    CL 97 09/30/2020    CO2 24 09/30/2020    BUN 60 (H) 09/30/2020    CREATININE 12.0 (H) 09/30/2020    CALCIUM 9.8 09/30/2020    ANIONGAP 17 (H) 09/30/2020    ESTGFRAFRICA 3.6 (A) 09/30/2020    EGFRNONAA 3.1 (A) 09/30/2020       Imaging:    1.9x1.6x2.3cm solid mass the anterior aspect of the interpolar right kidney.  The remaining bilateral kidneys show numerous cystic lesions  No hydronephrosis or stones bilaterally.   Singular artery and vein on right.     Assessment: Jael Hall is a 55 y.o. female with a right renal mass concerning for RCC    Plan:     1. To OR on 10/8/2020 for right radical robotic nephrectomy  2. Consents signed   3. I have explained the risk, benefits, and alternatives of the procedure in detail. The patient voices understanding and all questions have been answered. The patient agrees to proceed as planned.   4. Patient saw NP Bia and has been appropriately optimized.   5. COVID ordered.   6.Pre-op labs showing mild hyperkalemia, EKG showing NSR with left axis deviation. No electrical sequelae of hyperkalemia.  A1c improved to 6.4.     Fredo Huang MD

## 2020-09-30 NOTE — ASSESSMENT & PLAN NOTE
Phosphate more controlled  Most recent 7.8  As low as 6.8 and high of 9.7  Patient reports medication compliance with Renvela 2400 mg with meals

## 2020-10-01 ENCOUNTER — ANESTHESIA EVENT (OUTPATIENT)
Dept: SURGERY | Facility: HOSPITAL | Age: 55
DRG: 656 | End: 2020-10-01
Payer: MEDICARE

## 2020-10-01 PROBLEM — Z86.79 HX OF SINUS BRADYCARDIA: Status: RESOLVED | Noted: 2017-09-28 | Resolved: 2020-10-01

## 2020-10-01 PROBLEM — R31.9 HEMATURIA: Status: RESOLVED | Noted: 2019-10-30 | Resolved: 2020-10-01

## 2020-10-01 PROBLEM — Z99.2 END-STAGE RENAL DISEASE ON HEMODIALYSIS: Status: RESOLVED | Noted: 2020-08-20 | Resolved: 2020-10-01

## 2020-10-01 PROBLEM — N18.6 END-STAGE RENAL DISEASE ON HEMODIALYSIS: Status: RESOLVED | Noted: 2020-08-20 | Resolved: 2020-10-01

## 2020-10-01 RX ORDER — FAMOTIDINE 20 MG/1
20 TABLET, FILM COATED ORAL
COMMUNITY
Start: 2020-09-29 | End: 2021-01-25

## 2020-10-01 NOTE — ANESTHESIA PREPROCEDURE EVALUATION
[facility]  Anesthesia Pre-Operative Evaluation       Patient Name: Jael Hall  YOB: 1965  MRN: [id number]  [facility]: [id number]      Code Status: Prior   Date of Procedure: 10/8/2020  Anesthesia: General Procedure: Procedure(s) (LRB):  ROBOTIC NEPHRECTOMY (Right)  Pre-Operative Diagnosis: Renal mass [N28.89]  Proceduralist: Surgeon(s) and Role:     * Douglas Abraham MD - Primary        SUBJECTIVE:   Jael Hall is a 55 y.o. female who  has a past medical history of Anemia of chronic disorder (11/8/2017), Anemia of chronic renal failure, stage 5, Anxiety, Cyst of left ovary (5/14/2019), Cyst of left ovary (7/5/2019), Dyslipidemia (1/4/2013), End-stage renal disease on hemodialysis (8/20/2020), Hematuria (10/30/2019), sinus bradycardia (9/28/2017), Hyperkalemia (3/29/2017), Peripheral neuropathy (1/4/2013), Renovascular hypertension (8/17/2020), S/P dilation and curettage (5/22/2018), Thickened endometrium (5/22/2018), and Uncontrolled type 2 diabetes mellitus (4/5/2013).. referred by Natalee Resendiz NP for evaluation of a right renal mass. She is a renal transplant candidate.   Patient reports some gross hematuria. No abdominal pain.  She does urinate, 2x/day. ESRD and is on HD---Monday, Wednesday, Friday through a right wrist AVF.  She has DM and HTN.      Revised cardiac risk index (RCRI) score is 2.    she has a current medication list which includes the following long-term medication(s): fish oil-omega-3 fatty acids, furosemide, lantus solostar u-100 insulin, and fish oil-omega-3 fatty acids.     ALLERGIES:   Review of patient's allergies indicates:  No Known Allergies  LDA:      Lines/Drains/Airways     Drain                 Hemodialysis AV Fistula Left forearm -- days         Hemodialysis AV Fistula Right upper arm -- days               Anesthesia Evaluation      Airway   Mallampati: I  TM distance: Normal, at least 6 cm  Dental      Pulmonary    Cardiovascular   Exercise  tolerance: good  (+) hypertension,     Neuro/Psych    (+) neuromuscular disease, psychiatric history    GI/Hepatic/Renal    (+) GERD, liver disease, chronic renal disease,     Endo/Other    (+) diabetes mellitus type 2 poorly controlled,   Abdominal                     MEDICATIONS:     Antibiotics (From admission, onward)    Start     Stop Route Frequency Ordered    10/08/20 0525  ceFAZolin injection 2 g      -- IV On Call Procedure 10/08/20 0525        VTE Risk Mitigation (From admission, onward)         Ordered     IP VTE HIGH RISK PATIENT  Once      10/08/20 0525     Place sequential compression device  Until discontinued      10/08/20 0525                  Current Facility-Administered Medications   Medication Dose Route Frequency Provider Last Rate Last Dose    ceFAZolin injection 2 g  2 g Intravenous On Call Procedure Fredo Huang MD        fentaNYL injection 25 mcg  25 mcg Intravenous Q5 Min PRN Greg Sanders MD        midazolam (VERSED) 1 mg/mL injection 0.5 mg  0.5 mg Intravenous PRN Greg Sanders MD              History:     Active Hospital Problems    Diagnosis  POA    Preoperative testing [Z01.818]  Not Applicable      Resolved Hospital Problems   No resolved problems to display.     Surgical History:    has a past surgical history that includes Tubal ligation; Vascular access surgeries;  section; Colonoscopy (N/A, 2018); AV fistula placement; Robot-assisted laparoscopic abdominal hysterectomy using da Kev Xi (N/A, 2019); Robot-assisted laparoscopic salpingo-oophorectomy using da Kev Xi (Bilateral, 2019); Hysterectomy; Cystoscopy w/ retrogrades (Bilateral, 2020); and Fistulogram (Right, 2020).   Social History:    reports being sexually active and has had partner(s) who are Male. She reports using the following method of birth control/protection: Post-menopausal.  reports that she has never smoked. She has never used smokeless tobacco. She reports that  she does not drink alcohol or use drugs.     OBJECTIVE:     Vital Signs (Most Recent):  Temp: 36.7 °C (98.1 °F) (10/08/20 0550)  Pulse: (P) 97 (10/08/20 0640)  Resp: (!) (P) 21 (10/08/20 0640)  BP: (!) (P) 184/74 (10/08/20 0640)  SpO2: (P) 99 % (10/08/20 0640) Vital Signs Range (Last 24H):  Temp:  [36.7 °C (98.1 °F)]   Pulse:  []   Resp:  [14-21]   BP: (183-247)/()   SpO2:  [99 %-100 %]        Body mass index is 32.61 kg/m².   Wt Readings from Last 4 Encounters:   10/08/20 86.2 kg (190 lb)   09/30/20 91.6 kg (202 lb)   09/11/20 87.9 kg (193 lb 12.6 oz)   08/31/20 90.7 kg (199 lb 15.3 oz)       Significant Labs:  Lab Results   Component Value Date    WBC 6.70 09/11/2020    HGB 13.8 09/11/2020    HCT 43.4 09/11/2020     09/11/2020     09/30/2020    K 5.5 (H) 09/30/2020    CL 97 09/30/2020    CREATININE 12.0 (H) 09/30/2020    BUN 60 (H) 09/30/2020    CO2 24 09/30/2020     (H) 09/30/2020    CALCIUM 9.8 09/30/2020    MG 2.3 09/11/2020    PHOS 6.8 (H) 09/11/2020    ALKPHOS 55 09/30/2020    ALT 9 (L) 09/30/2020    AST 12 09/30/2020    ALBUMIN 3.4 (L) 09/30/2020    INR 1.0 08/17/2020    APTT 25.7 08/17/2020    HGBA1C 6.4 (H) 09/30/2020    TROPONINI <0.006 09/09/2020     Patient's last menstrual period was 03/15/2016 (lmp unknown).  Recent Results (from the past 72 hour(s))   COVID-19 Routine Screening    Collection Time: 10/05/20  2:21 PM   Result Value Ref Range    SARS-CoV2 (COVID-19) Qualitative PCR Not Detected Not Detected   POCT glucose    Collection Time: 10/08/20  5:46 AM   Result Value Ref Range    POCT Glucose 115 (H) 70 - 110 mg/dL       EKG:   Results for orders placed or performed during the hospital encounter of 09/09/20   EKG 12-lead    Collection Time: 09/10/20  6:16 AM    Narrative    Test Reason : R00.1,    Vent. Rate : 081 BPM     Atrial Rate : 081 BPM     P-R Int : 140 ms          QRS Dur : 094 ms      QT Int : 422 ms       P-R-T Axes : 071 -34 056 degrees     QTc Int :  490 ms    Normal sinus rhythm  Left axis deviation  Cannot rule out Anterior infarct ,age undetermined  Abnormal ECG  Confirmed by Coleman Flynn MD ([id number]) on 9/10/2020 6:21:56 PM    Referred By: JAYSHREE COONEY           Confirmed By:Coleman Flynn MD       TTE:  Results for orders placed or performed during the hospital encounter of 09/09/20   Echo Color Flow Doppler? Yes   Result Value Ref Range    BSA 2.03 m2    TDI SEPTAL 0.05 m/s    LV LATERAL E/E' RATIO 10.30 m/s    LV SEPTAL E/E' RATIO 20.60 m/s    LA WIDTH 4.66 cm    TDI LATERAL 0.10 m/s    PV PEAK VELOCITY 1.09 cm/s    LVIDd 4.36 3.5 - 6.0 cm    IVS 1.36 (A) 0.6 - 1.1 cm    Posterior Wall 1.11 (A) 0.6 - 1.1 cm    LVIDs 2.31 2.1 - 4.0 cm    FS 47 28 - 44 %    LA volume 78.74 cm3    Sinus 3.05 cm    STJ 2.86 cm    Ascending aorta 2.58 cm    LV mass 196.73 g    LA size 3.55 cm    RVDD 3.42 cm    TAPSE 2.67 cm    Left Ventricle Relative Wall Thickness 0.51 cm    AV mean gradient 6 mmHg    AV valve area 3.19 cm2    AV Velocity Ratio 0.77     AV index (prosthetic) 0.90     MV valve area p 1/2 method 3.07 cm2    E/A ratio 1.08     Mean e' 0.08 m/s    E wave decelartion time 247.06 msec    IVRT 72.31 msec    Pulm vein S/D ratio 1.79     LVOT diameter 2.12 cm    LVOT area 3.5 cm2    LVOT peak marcello 1.33 m/s    LVOT peak VTI 33.83 cm    Ao peak marcello 1.73 m/s    Ao VTI 37.47 cm    LVOT stroke volume 119.36 cm3    AV peak gradient 12 mmHg    E/E' ratio 13.73 m/s    MV Peak E Marcello 1.03 m/s    MV stenosis pressure 1/2 time 71.65 ms    MV Peak A Marcello 0.95 m/s    PV Peak S Marcello 0.75 m/s    PV Peak D Marcello 0.42 m/s    LV Systolic Volume 18.41 mL    LV Systolic Volume Index 9.5 mL/m2    LV Diastolic Volume 85.97 mL    LV Diastolic Volume Index 44.29 mL/m2    LA Volume Index 40.6 mL/m2    LV Mass Index 101 g/m2    RA Major Axis 4.93 cm    Left Atrium Minor Axis 5.61 cm    Left Atrium Major Axis 5.59 cm    LA Volume Index (Mod) 47.9 mL/m2    LA volume (mod) 93.00 cm3    RA Width 3.40  cm    Right Atrial Pressure (from IVC) 3 mmHg    Narrative    · Concentric left ventricular hypertrophy.  · Mild left atrial enlargement.  · Normal left ventricular systolic function.  · Normal right ventricular systolic function.        Nuc Rest EF   Date Value Ref Range Status   02/12/2019 84  Final      Results for orders placed or performed during the hospital encounter of 10/17/17   2D echo with color flow doppler   Result Value Ref Range    QEF 65 55 - 65    Diastolic Dysfunction Yes (A)     Est. PA Systolic Pressure 35.49     Tricuspid Valve Regurgitation MILD      JONAH:  No results found for this or any previous visit.  Stress Test:  Results for orders placed in visit on 02/12/19   NM (Lexiscan) Card Interp Stress test (Cupid Only)    Narrative · The perfusion scan is free of evidence from myocardial ischemia or   injury.  · An ejection fraction of 84 % at rest  · Resting wall motion is physiologic.  · Post stress wall motion is physiologic.  · LV cavity size is normal at rest.  · There was no ST segment deviation noted during stress.  · The EKG portion of this study is negative for myocardial ischemia.  · There were no arrhythmias during stress.  · The patient reported SOB (non-anginal) during the stress test.  · There is a prior study available for comparison that was performed on   10/17/2017.  · As compared to the previous study, there are no significant changes         LHC:  Results for orders placed during the hospital encounter of 09/09/20   Cardiac catheterization    Narrative Procedure performed in the Invasive Lab    - See Procedure Log link below for nursing documentation    - See OpNote on Surgeries Tab for physician findings       PFT:  No results found for: FEV1, FVC, AUC1HBC, TLC, DLCO     ASSESSMENT/PLAN:     Anesthesia Evaluation    I have reviewed the Patient Summary Reports.     I have reviewed the Nursing Notes.    I have reviewed the Medications.     Review of Systems  Anesthesia Hx:  No  problems with previous Anesthesia    Hematology/Oncology:  Hematology Normal   Oncology Normal     EENT/Dental:EENT/Dental Normal   Cardiovascular:   Exercise tolerance: good Hypertension    Pulmonary:  Pulmonary Normal    Renal/:   Chronic Renal Disease    Hepatic/GI:   GERD Liver Disease,    Musculoskeletal:  Musculoskeletal Normal    Neurological:   Neuromuscular Disease,    Endocrine:   Diabetes, poorly controlled, type 2    Dermatological:  Skin Normal    Psych:   Psychiatric History          Physical Exam  General:  Well nourished, Obesity    Airway/Jaw/Neck:  Airway Findings: Mouth Opening: Normal Tongue: Normal  General Airway Assessment: Adult  Mallampati: I  Improves to I with phonation.  TM Distance: Normal, at least 6 cm        Eyes/Ears/Nose:  EYES/EARS/NOSE FINDINGS: Normal   Dental:  DENTAL FINDINGS: Normal   Chest/Lungs:  Chest/Lungs Findings: Clear to auscultation, Normal Respiratory Rate         Mental Status:  Mental Status Findings:  Cooperative, Alert and Oriented                           Anesthesia Plan  Type of Anesthesia, risks & benefits discussed:  Anesthesia Type:  general  Patient's Preference:   Intra-op Monitoring Plan: standard ASA monitors  Intra-op Monitoring Plan Comments:   Post Op Pain Control Plan: multimodal analgesia  Post Op Pain Control Plan Comments:   Induction:   IV  Beta Blocker:  Patient is not currently on a Beta-Blocker (No further documentation required).       Informed Consent: Patient understands risks and agrees with Anesthesia plan.  Questions answered. Anesthesia consent signed with patient.  ASA Score: 3     Day of Surgery Review of History & Physical:    H&P update referred to the surgeon.     Anesthesia Plan Notes: Antibody Identification POS: Anti-S            Ready For Surgery From Anesthesia Perspective.     Anesthesia Assessment: Preoperative EQUATION    Planned Procedure: Procedure(s) (LRB):  ROBOTIC NEPHRECTOMY (Right)  Requested Anesthesia  Type:General  Surgeon: Douglas Abraham MD  Service: Urology  Known or anticipated Date of Surgery:10/8/2020    Surgeon notes: reviewed    Previous anesthesia records:MAC and No problems     Aug 20, 2020   CYSTOSCOPY, WITH RETROGRADE PYELOGRAM (Bilateral Bladder)  Airway/Jaw/Neck:  Airway Findings: Mouth Opening: Normal Tongue: Normal  General Airway Assessment: Adult  Mallampati: I  Improves to I with phonation.  TM Distance: Normal, at least 6 cm          July 5, 2019   ROBOTIC HYSTERECTOMY (N/A Abdomen) XI ROBOTIC SALPINGO-OOPHORECTOMY (Bilateral Abdomen)  Airway/Jaw/Neck:  Airway Findings: Mouth Opening: Normal Tongue: Normal  General Airway Assessment: Adult, Good  Mallampati: II  TM Distance: Normal, at least 6 cm  Jaw/Neck Findings:  Neck ROM: Normal ROM     Method of Intubation: Kaba Inserted by: CRNA Airway Device: Endotracheal Tube Mask Ventilation: Easy - oral Intubated: Postinduction Airway Device Size: 7.5 Style: Cuffed Cuff Inflation: Minimal occlusive pressure Inflation Amount (mL): 3.5 Placement Verified By: Auscultation;Capnometry Grade: Grade I , easy per Kaba  Complicating Factors: None;Short neck;Large/Floppy epiglottis;Obesity Findings Post-Intubation: Positive EtCO2;Bilateral breath sounds;Atraumatic/Condition of teeth unchanged Depth of Insertion (cm): 22 Secured at: Lips Complications: None Breath Sounds: Equal Bilateral Insertion attempts (enter comment if more than 2 attempts): 1    Last PCP note: > 1 year ago , within [facility]   Subspecialty notes: Gyn/ONC, Kidney Transplant, Urology, OBGYN    Other important co-morbidities: PER EPIC DM2, GERD, HLD, HTN, Obesity and dyslipidemia, symptomatic bradycardia, high cholesterol, high triglycerides, hyperpotassemia, hyperparathyroidism, hyponatremia, hyperphosphatemia, CKD5, ESRD, HD M/W/F, JOCELYN AV fistula, NAFLD, renovascular hypertension, chronic anemia, metabolic bone disease, anxiety, h/o COVID 4/2/20. Diabeetic neuropathy.      Tests  already available:  Available tests,  within 1 month , within [facility] .      9/20:  TTE: EF 70%  EKG-NSR, Lt axis deviation. Cannot r/o anterior infarct    9/11:  Phos/Mag/BMP/CBC    8/17:  PTT/PT/INR    7/14:  PTH    6/26:  Lipid profile/ A1C 6.4             Instructions given. (See in Nurse's note)    Optimization:  Anesthesia Preop Clinic Assessment  Indicated    Medical Opinion Indicated           Plan:    Testing:  A1C and T&S   Pre-anesthesia  visit       Visit focus: concerns in complex and/or prolonged anesthesia, position other than supine     Consultation:TBCB NPMichael.     Patient  has previously scheduled Medical Appointment:9/30/2020    Navigation: Tests Scheduled.              Consults scheduled.             Results will be tracked by Preop Clinic.                                                                                                                   10/01/2020     Jael GEORGE Hall is a 55 y.o., female.

## 2020-10-01 NOTE — ASSESSMENT & PLAN NOTE
Beta blocker and calcium channel blocker stopped      EKG 9/10/2020  Test Reason : R00.1,     Vent. Rate : 081 BPM     Atrial Rate : 081 BPM      P-R Int : 140 ms          QRS Dur : 094 ms       QT Int : 422 ms       P-R-T Axes : 071 -34 056 degrees      QTc Int : 490 ms     Normal sinus rhythm   Left axis deviation   Cannot rule out Anterior infarct ,age undetermined   Abnormal ECG   Confirmed by Coleman Flynn MD ([id number]) on 9/10/2020 6:21:56 PM

## 2020-10-05 ENCOUNTER — LAB VISIT (OUTPATIENT)
Dept: SURGERY | Facility: CLINIC | Age: 55
DRG: 656 | End: 2020-10-05
Payer: MEDICARE

## 2020-10-05 DIAGNOSIS — N28.89 RENAL MASS: ICD-10-CM

## 2020-10-05 DIAGNOSIS — N28.89 RIGHT RENAL MASS: ICD-10-CM

## 2020-10-05 LAB — SARS-COV-2 RNA RESP QL NAA+PROBE: NOT DETECTED

## 2020-10-05 PROCEDURE — U0003 INFECTIOUS AGENT DETECTION BY NUCLEIC ACID (DNA OR RNA); SEVERE ACUTE RESPIRATORY SYNDROME CORONAVIRUS 2 (SARS-COV-2) (CORONAVIRUS DISEASE [COVID-19]), AMPLIFIED PROBE TECHNIQUE, MAKING USE OF HIGH THROUGHPUT TECHNOLOGIES AS DESCRIBED BY CMS-2020-01-R: HCPCS | Mod: NTX

## 2020-10-07 ENCOUNTER — TELEPHONE (OUTPATIENT)
Dept: UROLOGY | Facility: CLINIC | Age: 55
End: 2020-10-07

## 2020-10-08 ENCOUNTER — ANESTHESIA (OUTPATIENT)
Dept: SURGERY | Facility: HOSPITAL | Age: 55
DRG: 656 | End: 2020-10-08
Payer: MEDICARE

## 2020-10-08 ENCOUNTER — HOSPITAL ENCOUNTER (INPATIENT)
Facility: HOSPITAL | Age: 55
LOS: 1 days | Discharge: HOME | DRG: 656 | End: 2020-10-09
Attending: UROLOGY | Admitting: UROLOGY
Payer: MEDICARE

## 2020-10-08 DIAGNOSIS — N28.89 RENAL MASS: Primary | ICD-10-CM

## 2020-10-08 DIAGNOSIS — E08.65 DIABETES MELLITUS DUE TO UNDERLYING CONDITION WITH HYPERGLYCEMIA: ICD-10-CM

## 2020-10-08 DIAGNOSIS — Z01.818 PREOPERATIVE TESTING: ICD-10-CM

## 2020-10-08 PROBLEM — M89.9 CHRONIC KIDNEY DISEASE-MINERAL AND BONE DISORDER: Status: ACTIVE | Noted: 2020-10-08

## 2020-10-08 PROBLEM — E83.9 CHRONIC KIDNEY DISEASE-MINERAL AND BONE DISORDER: Status: ACTIVE | Noted: 2020-10-08

## 2020-10-08 PROBLEM — N18.9 CHRONIC KIDNEY DISEASE-MINERAL AND BONE DISORDER: Status: ACTIVE | Noted: 2020-10-08

## 2020-10-08 LAB
ANION GAP SERPL CALC-SCNC: 16 MMOL/L (ref 8–16)
BASOPHILS # BLD AUTO: 0.03 K/UL (ref 0–0.2)
BASOPHILS NFR BLD: 0.4 % (ref 0–1.9)
BUN SERPL-MCNC: 51 MG/DL (ref 6–20)
CALCIUM SERPL-MCNC: 9.6 MG/DL (ref 8.7–10.5)
CHLORIDE SERPL-SCNC: 93 MMOL/L (ref 95–110)
CO2 SERPL-SCNC: 23 MMOL/L (ref 23–29)
CREAT SERPL-MCNC: 10 MG/DL (ref 0.5–1.4)
DIFFERENTIAL METHOD: ABNORMAL
EOSINOPHIL # BLD AUTO: 0.1 K/UL (ref 0–0.5)
EOSINOPHIL NFR BLD: 0.7 % (ref 0–8)
ERYTHROCYTE [DISTWIDTH] IN BLOOD BY AUTOMATED COUNT: 16.1 % (ref 11.5–14.5)
EST. GFR  (AFRICAN AMERICAN): 4.5 ML/MIN/1.73 M^2
EST. GFR  (NON AFRICAN AMERICAN): 3.9 ML/MIN/1.73 M^2
GLUCOSE SERPL-MCNC: 136 MG/DL (ref 70–110)
GLUCOSE SERPL-MCNC: 177 MG/DL (ref 70–110)
HCO3 UR-SCNC: 24.2 MMOL/L (ref 24–28)
HCT VFR BLD AUTO: 36.6 % (ref 37–48.5)
HCT VFR BLD CALC: 34 %PCV (ref 36–54)
HGB BLD-MCNC: 11.2 G/DL (ref 12–16)
IMM GRANULOCYTES # BLD AUTO: 0.02 K/UL (ref 0–0.04)
IMM GRANULOCYTES NFR BLD AUTO: 0.3 % (ref 0–0.5)
LYMPHOCYTES # BLD AUTO: 1.1 K/UL (ref 1–4.8)
LYMPHOCYTES NFR BLD: 15.3 % (ref 18–48)
MAGNESIUM SERPL-MCNC: 2.3 MG/DL (ref 1.6–2.6)
MCH RBC QN AUTO: 25.3 PG (ref 27–31)
MCHC RBC AUTO-ENTMCNC: 30.6 G/DL (ref 32–36)
MCV RBC AUTO: 83 FL (ref 82–98)
MONOCYTES # BLD AUTO: 0.2 K/UL (ref 0.3–1)
MONOCYTES NFR BLD: 2.7 % (ref 4–15)
NEUTROPHILS # BLD AUTO: 5.9 K/UL (ref 1.8–7.7)
NEUTROPHILS NFR BLD: 80.6 % (ref 38–73)
NRBC BLD-RTO: 0 /100 WBC
PCO2 BLDA: 38.3 MMHG (ref 35–45)
PH SMN: 7.41 [PH] (ref 7.35–7.45)
PHOSPHATE SERPL-MCNC: 7.5 MG/DL (ref 2.7–4.5)
PLATELET # BLD AUTO: 168 K/UL (ref 150–350)
PMV BLD AUTO: 11.2 FL (ref 9.2–12.9)
PO2 BLDA: 124 MMHG (ref 80–100)
POC BE: 0 MMOL/L
POC IONIZED CALCIUM: 1.15 MMOL/L (ref 1.06–1.42)
POC SATURATED O2: 99 % (ref 95–100)
POC TCO2: 25 MMOL/L (ref 23–27)
POCT GLUCOSE: 115 MG/DL (ref 70–110)
POCT GLUCOSE: 178 MG/DL (ref 70–110)
POCT GLUCOSE: 198 MG/DL (ref 70–110)
POCT GLUCOSE: 198 MG/DL (ref 70–110)
POTASSIUM BLD-SCNC: 3.9 MMOL/L (ref 3.5–5.1)
POTASSIUM SERPL-SCNC: 4.8 MMOL/L (ref 3.5–5.1)
RBC # BLD AUTO: 4.43 M/UL (ref 4–5.4)
SAMPLE: ABNORMAL
SODIUM BLD-SCNC: 133 MMOL/L (ref 136–145)
SODIUM SERPL-SCNC: 132 MMOL/L (ref 136–145)
WBC # BLD AUTO: 7.34 K/UL (ref 3.9–12.7)

## 2020-10-08 PROCEDURE — 25000003 PHARM REV CODE 250: Mod: NTX | Performed by: STUDENT IN AN ORGANIZED HEALTH CARE EDUCATION/TRAINING PROGRAM

## 2020-10-08 PROCEDURE — 63600175 PHARM REV CODE 636 W HCPCS: Mod: NTX | Performed by: STUDENT IN AN ORGANIZED HEALTH CARE EDUCATION/TRAINING PROGRAM

## 2020-10-08 PROCEDURE — 25000003 PHARM REV CODE 250: Mod: NTX

## 2020-10-08 PROCEDURE — 25000003 PHARM REV CODE 250: Mod: NTX | Performed by: ANESTHESIOLOGY

## 2020-10-08 PROCEDURE — 50545 PR LAP, RADICAL NEPHRECTOMY: ICD-10-PCS | Mod: RT,NTX,, | Performed by: UROLOGY

## 2020-10-08 PROCEDURE — 99223 PR INITIAL HOSPITAL CARE,LEVL III: ICD-10-PCS | Mod: NTX,,, | Performed by: NURSE PRACTITIONER

## 2020-10-08 PROCEDURE — 64461 PVB THORACIC SINGLE INJ SITE: CPT | Mod: 59,RT,NTX, | Performed by: ANESTHESIOLOGY

## 2020-10-08 PROCEDURE — 27201037 HC PRESSURE MONITORING SET UP: Mod: NTX

## 2020-10-08 PROCEDURE — 27201423 OPTIME MED/SURG SUP & DEVICES STERILE SUPPLY: Mod: NTX | Performed by: UROLOGY

## 2020-10-08 PROCEDURE — 94799 UNLISTED PULMONARY SVC/PX: CPT | Mod: NTX

## 2020-10-08 PROCEDURE — 99223 1ST HOSP IP/OBS HIGH 75: CPT | Mod: NTX,,, | Performed by: NURSE PRACTITIONER

## 2020-10-08 PROCEDURE — 83735 ASSAY OF MAGNESIUM: CPT | Mod: NTX

## 2020-10-08 PROCEDURE — 64461 ERECTOR SPINAE PLANE SINGLE INJECTION BLOCK: ICD-10-PCS | Mod: 59,RT,NTX, | Performed by: ANESTHESIOLOGY

## 2020-10-08 PROCEDURE — 37000009 HC ANESTHESIA EA ADD 15 MINS: Mod: NTX | Performed by: UROLOGY

## 2020-10-08 PROCEDURE — 63600175 PHARM REV CODE 636 W HCPCS: Mod: NTX | Performed by: ANESTHESIOLOGY

## 2020-10-08 PROCEDURE — 85025 COMPLETE CBC W/AUTO DIFF WBC: CPT | Mod: NTX

## 2020-10-08 PROCEDURE — 36000713 HC OR TIME LEV V EA ADD 15 MIN: Mod: NTX | Performed by: UROLOGY

## 2020-10-08 PROCEDURE — 82962 GLUCOSE BLOOD TEST: CPT | Mod: NTX | Performed by: UROLOGY

## 2020-10-08 PROCEDURE — 50545 LAPARO RADICAL NEPHRECTOMY: CPT | Mod: RT,NTX,, | Performed by: UROLOGY

## 2020-10-08 PROCEDURE — 64461 PVB THORACIC SINGLE INJ SITE: CPT | Mod: NTX | Performed by: STUDENT IN AN ORGANIZED HEALTH CARE EDUCATION/TRAINING PROGRAM

## 2020-10-08 PROCEDURE — D9220A PRA ANESTHESIA: ICD-10-PCS | Mod: NTX,,, | Performed by: ANESTHESIOLOGY

## 2020-10-08 PROCEDURE — 36000712 HC OR TIME LEV V 1ST 15 MIN: Mod: NTX | Performed by: UROLOGY

## 2020-10-08 PROCEDURE — 88307 TISSUE EXAM BY PATHOLOGIST: CPT | Mod: 26,NTX,, | Performed by: PATHOLOGY

## 2020-10-08 PROCEDURE — 76942 ECHO GUIDE FOR BIOPSY: CPT | Mod: NTX | Performed by: STUDENT IN AN ORGANIZED HEALTH CARE EDUCATION/TRAINING PROGRAM

## 2020-10-08 PROCEDURE — 80048 BASIC METABOLIC PNL TOTAL CA: CPT | Mod: NTX

## 2020-10-08 PROCEDURE — 84100 ASSAY OF PHOSPHORUS: CPT | Mod: NTX

## 2020-10-08 PROCEDURE — 88307 TISSUE EXAM BY PATHOLOGIST: CPT | Mod: NTX | Performed by: PATHOLOGY

## 2020-10-08 PROCEDURE — 36620 ARTERIAL: ICD-10-PCS | Mod: 59,NTX,, | Performed by: ANESTHESIOLOGY

## 2020-10-08 PROCEDURE — 71000016 HC POSTOP RECOV ADDL HR: Mod: NTX | Performed by: UROLOGY

## 2020-10-08 PROCEDURE — 71000039 HC RECOVERY, EACH ADD'L HOUR: Mod: NTX | Performed by: UROLOGY

## 2020-10-08 PROCEDURE — 94761 N-INVAS EAR/PLS OXIMETRY MLT: CPT | Mod: NTX

## 2020-10-08 PROCEDURE — D9220A PRA ANESTHESIA: Mod: NTX,,, | Performed by: ANESTHESIOLOGY

## 2020-10-08 PROCEDURE — 88307 PR  SURG PATH,LEVEL V: ICD-10-PCS | Mod: 26,NTX,, | Performed by: PATHOLOGY

## 2020-10-08 PROCEDURE — 71000015 HC POSTOP RECOV 1ST HR: Mod: NTX | Performed by: UROLOGY

## 2020-10-08 PROCEDURE — 37000008 HC ANESTHESIA 1ST 15 MINUTES: Mod: NTX | Performed by: UROLOGY

## 2020-10-08 PROCEDURE — 71000033 HC RECOVERY, INTIAL HOUR: Mod: NTX | Performed by: UROLOGY

## 2020-10-08 PROCEDURE — 11000001 HC ACUTE MED/SURG PRIVATE ROOM: Mod: NTX

## 2020-10-08 PROCEDURE — 36620 INSERTION CATHETER ARTERY: CPT | Mod: 59,NTX,, | Performed by: ANESTHESIOLOGY

## 2020-10-08 RX ORDER — PHENYLEPHRINE HCL IN 0.9% NACL 1 MG/10 ML
SYRINGE (ML) INTRAVENOUS
Status: DISCONTINUED | OUTPATIENT
Start: 2020-10-08 | End: 2020-10-08

## 2020-10-08 RX ORDER — ACETAMINOPHEN 500 MG
1000 TABLET ORAL EVERY 6 HOURS
Status: DISCONTINUED | OUTPATIENT
Start: 2020-10-08 | End: 2020-10-09 | Stop reason: HOSPADM

## 2020-10-08 RX ORDER — NEOSTIGMINE METHYLSULFATE 1 MG/ML
INJECTION, SOLUTION INTRAVENOUS
Status: DISCONTINUED | OUTPATIENT
Start: 2020-10-08 | End: 2020-10-08

## 2020-10-08 RX ORDER — SODIUM CHLORIDE 9 MG/ML
INJECTION, SOLUTION INTRAVENOUS ONCE
Status: COMPLETED | OUTPATIENT
Start: 2020-10-09 | End: 2020-10-09

## 2020-10-08 RX ORDER — CEFAZOLIN SODIUM 1 G/3ML
2 INJECTION, POWDER, FOR SOLUTION INTRAMUSCULAR; INTRAVENOUS
Status: COMPLETED | OUTPATIENT
Start: 2020-10-08 | End: 2020-10-08

## 2020-10-08 RX ORDER — PREGABALIN 50 MG/1
50 CAPSULE ORAL 2 TIMES DAILY
Status: DISCONTINUED | OUTPATIENT
Start: 2020-10-08 | End: 2020-10-09 | Stop reason: HOSPADM

## 2020-10-08 RX ORDER — SODIUM CHLORIDE 9 MG/ML
INJECTION, SOLUTION INTRAVENOUS CONTINUOUS PRN
Status: DISCONTINUED | OUTPATIENT
Start: 2020-10-08 | End: 2020-10-08

## 2020-10-08 RX ORDER — OXYCODONE HYDROCHLORIDE 10 MG/1
10 TABLET ORAL EVERY 4 HOURS PRN
Status: DISCONTINUED | OUTPATIENT
Start: 2020-10-08 | End: 2020-10-09 | Stop reason: HOSPADM

## 2020-10-08 RX ORDER — INSULIN ASPART 100 [IU]/ML
0-5 INJECTION, SOLUTION INTRAVENOUS; SUBCUTANEOUS
Status: DISCONTINUED | OUTPATIENT
Start: 2020-10-08 | End: 2020-10-09 | Stop reason: HOSPADM

## 2020-10-08 RX ORDER — ACETAMINOPHEN 500 MG
1000 TABLET ORAL
Status: COMPLETED | OUTPATIENT
Start: 2020-10-08 | End: 2020-10-08

## 2020-10-08 RX ORDER — HEPARIN SODIUM 5000 [USP'U]/ML
5000 INJECTION, SOLUTION INTRAVENOUS; SUBCUTANEOUS EVERY 8 HOURS
Status: DISCONTINUED | OUTPATIENT
Start: 2020-10-08 | End: 2020-10-09 | Stop reason: HOSPADM

## 2020-10-08 RX ORDER — FENTANYL CITRATE 50 UG/ML
25 INJECTION, SOLUTION INTRAMUSCULAR; INTRAVENOUS EVERY 5 MIN PRN
Status: COMPLETED | OUTPATIENT
Start: 2020-10-08 | End: 2020-10-08

## 2020-10-08 RX ORDER — SODIUM CHLORIDE 0.9 % (FLUSH) 0.9 %
10 SYRINGE (ML) INJECTION
Status: DISCONTINUED | OUTPATIENT
Start: 2020-10-08 | End: 2020-10-09 | Stop reason: HOSPADM

## 2020-10-08 RX ORDER — OXYCODONE HYDROCHLORIDE 5 MG/1
5 TABLET ORAL EVERY 4 HOURS PRN
Status: DISCONTINUED | OUTPATIENT
Start: 2020-10-08 | End: 2020-10-09 | Stop reason: HOSPADM

## 2020-10-08 RX ORDER — LABETALOL HYDROCHLORIDE 5 MG/ML
10 INJECTION, SOLUTION INTRAVENOUS ONCE
Status: COMPLETED | OUTPATIENT
Start: 2020-10-08 | End: 2020-10-08

## 2020-10-08 RX ORDER — KETAMINE HCL IN 0.9 % NACL 50 MG/5 ML
SYRINGE (ML) INTRAVENOUS
Status: DISCONTINUED | OUTPATIENT
Start: 2020-10-08 | End: 2020-10-08

## 2020-10-08 RX ORDER — ONDANSETRON 2 MG/ML
4 INJECTION INTRAMUSCULAR; INTRAVENOUS EVERY 6 HOURS PRN
Status: DISCONTINUED | OUTPATIENT
Start: 2020-10-08 | End: 2020-10-09 | Stop reason: HOSPADM

## 2020-10-08 RX ORDER — MIDAZOLAM HYDROCHLORIDE 1 MG/ML
0.5 INJECTION INTRAMUSCULAR; INTRAVENOUS
Status: DISCONTINUED | OUTPATIENT
Start: 2020-10-08 | End: 2020-10-08

## 2020-10-08 RX ORDER — IBUPROFEN 200 MG
16 TABLET ORAL
Status: DISCONTINUED | OUTPATIENT
Start: 2020-10-08 | End: 2020-10-09 | Stop reason: HOSPADM

## 2020-10-08 RX ORDER — PROPOFOL 10 MG/ML
VIAL (ML) INTRAVENOUS
Status: DISCONTINUED | OUTPATIENT
Start: 2020-10-08 | End: 2020-10-08

## 2020-10-08 RX ORDER — ROCURONIUM BROMIDE 10 MG/ML
INJECTION, SOLUTION INTRAVENOUS
Status: DISCONTINUED | OUTPATIENT
Start: 2020-10-08 | End: 2020-10-08

## 2020-10-08 RX ORDER — LABETALOL HCL 20 MG/4 ML
10 SYRINGE (ML) INTRAVENOUS EVERY 10 MIN PRN
Status: DISCONTINUED | OUTPATIENT
Start: 2020-10-08 | End: 2020-10-08 | Stop reason: HOSPADM

## 2020-10-08 RX ORDER — ONDANSETRON 2 MG/ML
INJECTION INTRAMUSCULAR; INTRAVENOUS
Status: DISCONTINUED | OUTPATIENT
Start: 2020-10-08 | End: 2020-10-08

## 2020-10-08 RX ORDER — FUROSEMIDE 40 MG/1
80 TABLET ORAL 2 TIMES DAILY
Status: DISCONTINUED | OUTPATIENT
Start: 2020-10-08 | End: 2020-10-09 | Stop reason: HOSPADM

## 2020-10-08 RX ORDER — FAMOTIDINE 20 MG/1
40 TABLET, FILM COATED ORAL 2 TIMES DAILY
Status: DISCONTINUED | OUTPATIENT
Start: 2020-10-08 | End: 2020-10-09 | Stop reason: HOSPADM

## 2020-10-08 RX ORDER — HYDROMORPHONE HYDROCHLORIDE 1 MG/ML
0.2 INJECTION, SOLUTION INTRAMUSCULAR; INTRAVENOUS; SUBCUTANEOUS EVERY 5 MIN PRN
Status: DISCONTINUED | OUTPATIENT
Start: 2020-10-08 | End: 2020-10-08 | Stop reason: HOSPADM

## 2020-10-08 RX ORDER — LIDOCAINE HYDROCHLORIDE 20 MG/ML
INJECTION, SOLUTION EPIDURAL; INFILTRATION; INTRACAUDAL; PERINEURAL
Status: DISCONTINUED | OUTPATIENT
Start: 2020-10-08 | End: 2020-10-08

## 2020-10-08 RX ORDER — OXYCODONE AND ACETAMINOPHEN 5; 325 MG/1; MG/1
1 TABLET ORAL
Status: DISCONTINUED | OUTPATIENT
Start: 2020-10-08 | End: 2020-10-08 | Stop reason: HOSPADM

## 2020-10-08 RX ORDER — IBUPROFEN 200 MG
24 TABLET ORAL
Status: DISCONTINUED | OUTPATIENT
Start: 2020-10-08 | End: 2020-10-09 | Stop reason: HOSPADM

## 2020-10-08 RX ORDER — TALC
6 POWDER (GRAM) TOPICAL NIGHTLY PRN
Status: DISCONTINUED | OUTPATIENT
Start: 2020-10-08 | End: 2020-10-09 | Stop reason: HOSPADM

## 2020-10-08 RX ORDER — HEPARIN SODIUM 5000 [USP'U]/ML
5000 INJECTION, SOLUTION INTRAVENOUS; SUBCUTANEOUS
Status: COMPLETED | OUTPATIENT
Start: 2020-10-08 | End: 2020-10-08

## 2020-10-08 RX ORDER — LABETALOL HCL 20 MG/4 ML
SYRINGE (ML) INTRAVENOUS
Status: COMPLETED
Start: 2020-10-08 | End: 2020-10-08

## 2020-10-08 RX ORDER — ATORVASTATIN CALCIUM 20 MG/1
40 TABLET, FILM COATED ORAL DAILY
Status: DISCONTINUED | OUTPATIENT
Start: 2020-10-09 | End: 2020-10-09 | Stop reason: HOSPADM

## 2020-10-08 RX ORDER — LOSARTAN POTASSIUM 25 MG/1
100 TABLET ORAL DAILY
Status: DISCONTINUED | OUTPATIENT
Start: 2020-10-08 | End: 2020-10-09 | Stop reason: HOSPADM

## 2020-10-08 RX ORDER — DEXAMETHASONE SODIUM PHOSPHATE 4 MG/ML
INJECTION, SOLUTION INTRA-ARTICULAR; INTRALESIONAL; INTRAMUSCULAR; INTRAVENOUS; SOFT TISSUE
Status: DISCONTINUED | OUTPATIENT
Start: 2020-10-08 | End: 2020-10-08

## 2020-10-08 RX ORDER — GLUCAGON 1 MG
1 KIT INJECTION
Status: DISCONTINUED | OUTPATIENT
Start: 2020-10-08 | End: 2020-10-09 | Stop reason: HOSPADM

## 2020-10-08 RX ORDER — SEVELAMER CARBONATE 800 MG/1
2400 TABLET, FILM COATED ORAL
Status: DISCONTINUED | OUTPATIENT
Start: 2020-10-08 | End: 2020-10-09 | Stop reason: HOSPADM

## 2020-10-08 RX ORDER — ASPIRIN 81 MG/1
81 TABLET ORAL DAILY
Status: DISCONTINUED | OUTPATIENT
Start: 2020-10-09 | End: 2020-10-09 | Stop reason: HOSPADM

## 2020-10-08 RX ADMIN — HEPARIN SODIUM 5000 UNITS: 5000 INJECTION INTRAVENOUS; SUBCUTANEOUS at 09:10

## 2020-10-08 RX ADMIN — ROCURONIUM BROMIDE 30 MG: 10 INJECTION, SOLUTION INTRAVENOUS at 08:10

## 2020-10-08 RX ADMIN — ROCURONIUM BROMIDE 20 MG: 10 INJECTION, SOLUTION INTRAVENOUS at 09:10

## 2020-10-08 RX ADMIN — HYDROMORPHONE HYDROCHLORIDE 0.2 MG: 1 INJECTION, SOLUTION INTRAMUSCULAR; INTRAVENOUS; SUBCUTANEOUS at 02:10

## 2020-10-08 RX ADMIN — PROPOFOL 50 MG: 10 INJECTION, EMULSION INTRAVENOUS at 07:10

## 2020-10-08 RX ADMIN — HEPARIN SODIUM 5000 UNITS: 5000 INJECTION INTRAVENOUS; SUBCUTANEOUS at 06:10

## 2020-10-08 RX ADMIN — ACETAMINOPHEN 1000 MG: 500 TABLET ORAL at 11:10

## 2020-10-08 RX ADMIN — HEPARIN SODIUM 5000 UNITS: 5000 INJECTION INTRAVENOUS; SUBCUTANEOUS at 02:10

## 2020-10-08 RX ADMIN — MIDAZOLAM 2 MG: 1 INJECTION INTRAMUSCULAR; INTRAVENOUS at 06:10

## 2020-10-08 RX ADMIN — NEOSTIGMINE METHYLSULFATE 2 MG: 1 INJECTION INTRAVENOUS at 10:10

## 2020-10-08 RX ADMIN — GLYCOPYRROLATE 0.4 MG: 0.2 INJECTION INTRAMUSCULAR; INTRAVENOUS at 10:10

## 2020-10-08 RX ADMIN — ACETAMINOPHEN 1000 MG: 500 TABLET ORAL at 12:10

## 2020-10-08 RX ADMIN — Medication 100 MCG: at 07:10

## 2020-10-08 RX ADMIN — ROCURONIUM BROMIDE 20 MG: 10 INJECTION, SOLUTION INTRAVENOUS at 10:10

## 2020-10-08 RX ADMIN — LABETALOL HYDROCHLORIDE 10 MG: 5 INJECTION, SOLUTION INTRAVENOUS at 11:10

## 2020-10-08 RX ADMIN — OXYCODONE HYDROCHLORIDE 10 MG: 10 TABLET ORAL at 10:10

## 2020-10-08 RX ADMIN — FENTANYL CITRATE 25 MCG: 50 INJECTION INTRAMUSCULAR; INTRAVENOUS at 10:10

## 2020-10-08 RX ADMIN — Medication 15 MG: at 08:10

## 2020-10-08 RX ADMIN — ROCURONIUM BROMIDE 30 MG: 10 INJECTION, SOLUTION INTRAVENOUS at 07:10

## 2020-10-08 RX ADMIN — ONDANSETRON 4 MG: 2 INJECTION INTRAMUSCULAR; INTRAVENOUS at 10:10

## 2020-10-08 RX ADMIN — ROCURONIUM BROMIDE 60 MG: 10 INJECTION, SOLUTION INTRAVENOUS at 07:10

## 2020-10-08 RX ADMIN — Medication 10 MG: at 11:10

## 2020-10-08 RX ADMIN — FENTANYL CITRATE 50 MCG: 50 INJECTION INTRAMUSCULAR; INTRAVENOUS at 06:10

## 2020-10-08 RX ADMIN — PROPOFOL 100 MG: 10 INJECTION, EMULSION INTRAVENOUS at 07:10

## 2020-10-08 RX ADMIN — LOSARTAN POTASSIUM 100 MG: 25 TABLET, FILM COATED ORAL at 06:10

## 2020-10-08 RX ADMIN — FAMOTIDINE 40 MG: 20 TABLET, FILM COATED ORAL at 09:10

## 2020-10-08 RX ADMIN — SEVELAMER CARBONATE 2400 MG: 800 TABLET, FILM COATED ORAL at 05:10

## 2020-10-08 RX ADMIN — Medication 20 MG: at 07:10

## 2020-10-08 RX ADMIN — CEFAZOLIN 2 G: 330 INJECTION, POWDER, FOR SOLUTION INTRAMUSCULAR; INTRAVENOUS at 07:10

## 2020-10-08 RX ADMIN — Medication 10 MG: at 05:10

## 2020-10-08 RX ADMIN — PREGABALIN 50 MG: 50 CAPSULE ORAL at 09:10

## 2020-10-08 RX ADMIN — ACETAMINOPHEN 1000 MG: 500 TABLET ORAL at 05:10

## 2020-10-08 RX ADMIN — ACETAMINOPHEN 1000 MG: 500 TABLET ORAL at 06:10

## 2020-10-08 RX ADMIN — FENTANYL CITRATE 25 MCG: 50 INJECTION INTRAMUSCULAR; INTRAVENOUS at 08:10

## 2020-10-08 RX ADMIN — LIDOCAINE HYDROCHLORIDE 100 MG: 20 INJECTION, SOLUTION EPIDURAL; INFILTRATION; INTRACAUDAL at 07:10

## 2020-10-08 RX ADMIN — FENTANYL CITRATE 25 MCG: 50 INJECTION INTRAMUSCULAR; INTRAVENOUS at 07:10

## 2020-10-08 RX ADMIN — FAMOTIDINE 40 MG: 20 TABLET, FILM COATED ORAL at 12:10

## 2020-10-08 RX ADMIN — DEXAMETHASONE SODIUM PHOSPHATE 8 MG: 4 INJECTION, SOLUTION INTRAMUSCULAR; INTRAVENOUS at 08:10

## 2020-10-08 RX ADMIN — Medication 15 MG: at 09:10

## 2020-10-08 RX ADMIN — OXYCODONE HYDROCHLORIDE 10 MG: 10 TABLET ORAL at 04:10

## 2020-10-08 RX ADMIN — SODIUM CHLORIDE: 0.9 INJECTION, SOLUTION INTRAVENOUS at 07:10

## 2020-10-08 RX ADMIN — FUROSEMIDE 80 MG: 40 TABLET ORAL at 05:10

## 2020-10-08 RX ADMIN — OXYCODONE HYDROCHLORIDE 10 MG: 10 TABLET ORAL at 12:10

## 2020-10-08 NOTE — ANESTHESIA PROCEDURE NOTES
Intubation  Performed by: Yudi Ariza MD  Authorized by: Yudi Ariza MD     Intubation:     Induction:  Intravenous    Intubated:  Postinduction    Mask Ventilation:  Easy mask    Attempts:  1    Attempted By:  Staff anesthesiologist    Method of Intubation:  Direct    Blade:  Crespo 2    Laryngeal View Grade: Grade I - full view of chords      Difficult Airway Encountered?: No      Complications:  None    Airway Device:  Oral endotracheal tube    Airway Device Size:  7.0    Style/Cuff Inflation:  Cuffed    Inflation Amount (mL):  7    Tube secured:  21    Secured at:  The teeth    Placement Verified By:  Capnometry    Complicating Factors:  None    Findings Post-Intubation:  BS equal bilateral and atraumatic/condition of teeth unchanged

## 2020-10-08 NOTE — INTERVAL H&P NOTE
The patient has been examined and the H&P has been reviewed:    I concur with the findings and no changes have occurred since H&P was written.    Surgery risks, benefits and alternative options discussed and understood by patient/family.    -I have explained the risk, benefits, and alternatives of the procedure in detail.  The risks including but not limited to bleeding, injury to adjacent structures including the spleen, liver, lung, pancreas, colon and intestines, blood vessels in the abdomen including the renal artery or renal vein, or need for conversion to open nephrectomy were explained to the patient in depth. The patient voices understanding and all questions have been answered. The patient agrees to proceed as planned with a right robotic nephrectomy        There are no hospital problems to display for this patient.

## 2020-10-08 NOTE — CONSULTS
[facility]  Nephrology  Consult Note    Patient Name: aJel Hall  MRN: [id number]  Admission Date: 10/8/2020  Hospital Length of Stay: 0 days  Attending Provider: Douglas Abraham MD   Primary Care Physician: Mary Solano MD  Principal Problem:Renal mass    Inpatient consult to Nephrology  Consult performed by: Herlinda Alba DNP, FNP-C  Consult ordered by: Dominik Park MD  Reason for consult: ESRD Management        Subjective:     HPI: Jael Hall is a 55 y.o. AA female with a history of ESRD (on HD), diabetic nephropathy, T2DM, and HTN who presents today for a R radical robotic nephrectomy with Urology. The patient has a history of a right renal mass suspicious for renal cell carcinoma found on CT during transplant (renal) work up. Per records, the patient endorsed complaints of gross hematuria. She is now s/p an elective nephrectomy. The patient is MWF dialysis patient under the direction of Dr. Brumfield at [facility]. She typically dialyzes for 3.5 hrs via a RFA radiocephalic AVF (recent fistulogram 9/9/20). She does urinates 2x/day. She last dialyze on Wednesday, 10/7. No distress on exam. Patient still pretty lethargic from sedation, therefore, unable to complete full ROS. Nephrology consulted for management of ESRD and HD treatment.  HPI mainly from records vs patient due to sedation.     Past Medical History:   Diagnosis Date    Anemia of chronic disorder 11/8/2017    Overview:  Added automatically from request for surgery [id number]    Anemia of chronic renal failure, stage 5     Anxiety     Cyst of left ovary 5/14/2019    Cyst of left ovary 7/5/2019    Dyslipidemia 1/4/2013    End-stage renal disease on hemodialysis 8/20/2020    Hematuria 10/30/2019    Hx of sinus bradycardia 9/28/2017    Hyperkalemia 3/29/2017    Peripheral neuropathy 1/4/2013    Renovascular hypertension 8/17/2020    S/P dilation and curettage 5/22/2018    Thickened endometrium  2018    Uncontrolled type 2 diabetes mellitus 2013    Overview:  poc a1c today 11.2-270/ up from 10.8-263, Pt. has very very stressed/ was incarcerated/marital stress/ will current meds/ less stress now/ will monitor.       Past Surgical History:   Procedure Laterality Date    AV FISTULA PLACEMENT      right arm     SECTION      x1    COLONOSCOPY N/A 2018    Procedure: COLONOSCOPY;  Surgeon: Pankaj Hinojosa MD;  Location: [facility] (4TH FLR);  Service: Endoscopy;  Laterality: N/A;  dialysis pt/labs prior to colon/MS    CYSTOSCOPY W/ RETROGRADES Bilateral 2020    Procedure: CYSTOSCOPY, WITH RETROGRADE PYELOGRAM;  Surgeon: Douglas Abraham MD;  [address]  Service: Urology;  Laterality: Bilateral;  45 min    FISTULOGRAM Right 2020    Procedure: Fistulogram;  Surgeon: Lester Gómez MD;  Location: [facility] CATH LAB;  Service: Nephrology;  Laterality: Right;    HYSTERECTOMY      ROBOT-ASSISTED LAPAROSCOPIC ABDOMINAL HYSTERECTOMY USING DA NATALYA XI N/A 2019    Procedure: XI ROBOTIC HYSTERECTOMY;  Surgeon: Trice Lei MD;  Location: [facility];  Service: OB/GYN;  Laterality: N/A;    ROBOT-ASSISTED LAPAROSCOPIC SALPINGO-OOPHORECTOMY USING DA NATALYA XI Bilateral 2019    Procedure: XI ROBOTIC SALPINGO-OOPHORECTOMY;  Surgeon: Trice Lei MD;  Location: [facility];  Service: OB/GYN;  Laterality: Bilateral;    TUBAL LIGATION      Vascular access surgeries      x5       Review of patient's allergies indicates:  No Known Allergies  Current Facility-Administered Medications   Medication Frequency    acetaminophen tablet 1,000 mg Q6H    [START ON 10/9/2020] aspirin EC tablet 81 mg Daily    [START ON 10/9/2020] atorvastatin tablet 40 mg Daily    famotidine tablet 40 mg BID    furosemide tablet 80 mg BID loop    heparin (porcine) injection 5,000 Units Q8H    HYDROmorphone injection 0.2 mg Q5 Min PRN    labetalol 20 mg/4 mL (5 mg/mL) IV syring Q10 Min PRN     [START ON 10/9/2020] losartan tablet 100 mg Daily    melatonin tablet 6 mg Nightly PRN    ondansetron injection 4 mg Q6H PRN    oxyCODONE immediate release tablet 10 mg Q4H PRN    oxyCODONE immediate release tablet 5 mg Q4H PRN    oxyCODONE-acetaminophen 5-325 mg per tablet 1 tablet Q3H PRN    pregabalin capsule 50 mg BID    promethazine (PHENERGAN) 12.5 mg in dextrose 5 % 50 mL IVPB Q6H PRN    sevelamer carbonate tablet 2,400 mg TID WM    sodium chloride 0.9% flush 10 mL PRN     Family History     Problem Relation (Age of Onset)    Cancer Mother, Sister, Sister    Diabetes Brother, Sister    Lung cancer Sister    Ovarian cancer Mother, Sister, Sister        Tobacco Use    Smoking status: Never Smoker    Smokeless tobacco: Never Used   Substance and Sexual Activity    Alcohol use: No    Drug use: No    Sexual activity: Yes     Partners: Male     Birth control/protection: Post-menopausal     Review of Systems   Unable to perform ROS: Acuity of condition     Objective:     Vital Signs (Most Recent):  Temp: 97.7 °F (36.5 °C) (10/08/20 1200)  Pulse: 78 (10/08/20 1200)  Resp: 16 (10/08/20 1241)  BP: (!) 182/77 (10/08/20 1200)  SpO2: 99 % (10/08/20 1200)  O2 Device (Oxygen Therapy): room air (10/08/20 1215) Vital Signs (24h Range):  Temp:  [97.5 °F (36.4 °C)-98.1 °F (36.7 °C)] 97.7 °F (36.5 °C)  Pulse:  [] 78  Resp:  [12-21] 16  SpO2:  [98 %-100 %] 99 %  BP: (178-247)/() 182/77  Arterial Line BP: (148-157)/(58-63) 155/62     Weight: 86.2 kg (190 lb) (10/08/20 0550)  Body mass index is 32.61 kg/m².  Body surface area is 1.97 meters squared.    No intake/output data recorded.    Physical Exam  Vitals signs and nursing note reviewed.   Constitutional:       General: She is sleeping. She is not in acute distress.     Appearance: She is well-developed. She is not ill-appearing or toxic-appearing.      Interventions: Nasal cannula in place.   HENT:      Head: Atraumatic.   Eyes:       Conjunctiva/sclera: Conjunctivae normal.   Neck:      Musculoskeletal: Neck supple.   Cardiovascular:      Rate and Rhythm: Normal rate and regular rhythm.      Heart sounds: Normal heart sounds. No murmur.   Pulmonary:      Effort: Pulmonary effort is normal.      Breath sounds: Normal breath sounds. No wheezing.   Abdominal:      General: Bowel sounds are normal. There is no distension.      Palpations: Abdomen is soft.      Tenderness: There is no abdominal tenderness.   Musculoskeletal: Normal range of motion.         General: No deformity.      Comments: + RFA fistula with a strong bruit or thrill.   Neurological:      Mental Status: She is lethargic.      Comments: +sedation         Significant Labs:  CBC:   Recent Labs   Lab 10/08/20  1115   WBC 7.34   RBC 4.43   HGB 11.2*   HCT 36.6*      MCV 83   MCH 25.3*   MCHC 30.6*     CMP:   Recent Labs   Lab 10/08/20  1115   *   CALCIUM 9.6   *   K 4.8   CO2 23   CL 93*   BUN 51*   CREATININE 10.0*     All labs within the past 24 hours have been reviewed.      Assessment/Plan:     * Renal mass  - s/p nephrectomy on 10/8    Chronic kidney disease-mineral and bone disorder  - Renal Function Panel for electrolytes monitoring  - Recommend renal diet with 1 L fluid restriction    - Novasource with meals  - Phos 7.5. Anticipate clearance with HD tomorrow.   - Already on binders as outpatient. Please continue Sevelamer 2400 mg TID.   - Daily renal panel so that phos and albumin is monitored daily.       Essential hypertension  - Goal for BP is <140 mmHg SBP and BDP <90 mmHg, maintain MAP > 65.    Anemia in ESRD (end-stage renal disease)  Recent Labs   Lab 10/08/20  0859 10/08/20  1115   WBC  --  7.34   HGB  --  11.2*   HCT 34* 36.6*   PLT  --  168     - Hgb today 11.2. Target Hgb 10-11 gm/dL.  - Will review chronic care plan from outpatient dialysis center for NADEGE dosing.     ESRD on hemodialysis  The patient is a 56 yo AAF admitted today for a R radical  robotic nephrectomy with Urology.     Nephrology History  iHD Schedule: MWF  Unit/MD: [facility]/Dr. Brumfield  Duration: 3.5 hrs  EDW: (?pending records)  Access: RFA AVF  Resodial Renal Function: 2x/day    - Will provide dialysis for metabolic clearance and volume management tomorrow per outpatient schedule. Electrolytes and acid base stable.   - Will obtain OP dialysis records today (pending)  - Continue to monitor intake and output, daily weights   - Avoid nephrotoxic medication and renal dose medications to GFR  - Will continue to monitor    Thank you for your consult. I will follow-up with patient. Please contact us if you have any additional questions.    Herlinda Alba DNP, FNP-C  Nephrology  [facility]

## 2020-10-08 NOTE — ASSESSMENT & PLAN NOTE
The patient is a 56 yo AAF admitted today for a R radical robotic nephrectomy with Urology.     Nephrology History  iHD Schedule: MWF  Unit/MD: [facility]/Dr. Brumfield  Duration: 3.5 hrs  EDW:   Access: RFA AVF  Resodial Renal Function: 2x/day    - Will provide dialysis for metabolic clearance and volume management tomorrow outpatient schedule. Electrolytes and acid base stable.   - Will obtain OP dialysis records today (pending)  - Continue to monitor intake and output, daily weights   - Avoid nephrotoxic medication and renal dose medications to GFR  - Will continue to monitor

## 2020-10-08 NOTE — ANESTHESIA PROCEDURE NOTES
Erector Spinae Plane Single Injection Block    Patient location during procedure: pre-op   Block not for primary anesthetic.  Reason for block: at surgeon's request and post-op pain management   Post-op Pain Location: right abdominal pain  Start time: 10/8/2020 6:10 AM  Timeout: 10/8/2020 6:10 AM   End time: 10/8/2020 6:15 AM    Staffing  Authorizing Provider: Jet Pastor MD  Performing Provider: Tab Flores MD    Preanesthetic Checklist  Completed: patient identified, site marked, surgical consent, pre-op evaluation, timeout performed, IV checked, risks and benefits discussed and monitors and equipment checked  Peripheral Block  Patient position: sitting  Prep: ChloraPrep  Patient monitoring: heart rate, cardiac monitor, continuous pulse ox, continuous capnometry and frequent blood pressure checks  Block type: erector spinae plane (Erector Spinae Plane Block)  Laterality: right  Injection technique: single shot  Location: T7-8  Needle  Needle type: Tuohy   Needle gauge: 17 G  Needle length: 3.5 in  Needle localization: anatomical landmarks and ultrasound guidance   -ultrasound image captured on disc.  Assessment  Injection assessment: negative aspiration, negative parasthesia and local visualized surrounding nerve  Paresthesia pain: none  Heart rate change: no  Slow fractionated injection: yes  Additional Notes  Patient tolerated well.  See [facility] RN record for vitals.    30 cc's of 0.375% bupiv administered under ultrasound guidance.

## 2020-10-08 NOTE — PLAN OF CARE
Robot time out completed with pre-incision time out. All DaVinci instruments inspected before case by [facility] [place].  All instruments appear intact.

## 2020-10-08 NOTE — SUBJECTIVE & OBJECTIVE
Past Medical History:   Diagnosis Date    Anemia of chronic disorder 2017    Overview:  Added automatically from request for surgery [id number]    Anemia of chronic renal failure, stage 5     Anxiety     Cyst of left ovary 2019    Cyst of left ovary 2019    Dyslipidemia 2013    End-stage renal disease on hemodialysis 2020    Hematuria 10/30/2019    Hx of sinus bradycardia 2017    Hyperkalemia 3/29/2017    Peripheral neuropathy 2013    Renovascular hypertension 2020    S/P dilation and curettage 2018    Thickened endometrium 2018    Uncontrolled type 2 diabetes mellitus 2013    Overview:  poc a1c today 11.2-270/ up from 10.8-263, Pt. has very very stressed/ was incarcerated/marital stress/ will current meds/ less stress now/ will monitor.       Past Surgical History:   Procedure Laterality Date    AV FISTULA PLACEMENT      right arm     SECTION      x1    COLONOSCOPY N/A 2018    Procedure: COLONOSCOPY;  Surgeon: Pankaj Hinojosa MD;  Location: [address]  Service: Endoscopy;  Laterality: N/A;  dialysis pt/labs prior to colon/MS    CYSTOSCOPY W/ RETROGRADES Bilateral 2020    Procedure: CYSTOSCOPY, WITH RETROGRADE PYELOGRAM;  Surgeon: Douglas Abraham MD;  Location: [address];  Service: Urology;  Laterality: Bilateral;  45 min    FISTULOGRAM Right 2020    Procedure: Fistulogram;  Surgeon: Lester Gómez MD;  Location: [facility] CATH LAB;  Service: Nephrology;  Laterality: Right;    HYSTERECTOMY      ROBOT-ASSISTED LAPAROSCOPIC ABDOMINAL HYSTERECTOMY USING DA NATALYA XI N/A 2019    Procedure: XI ROBOTIC HYSTERECTOMY;  Surgeon: Trice Lei MD;  Location: [facility];  Service: OB/GYN;  Laterality: N/A;    ROBOT-ASSISTED LAPAROSCOPIC SALPINGO-OOPHORECTOMY USING DA NATALYA XI Bilateral 2019    Procedure: XI ROBOTIC SALPINGO-OOPHORECTOMY;  Surgeon: Trice Lei MD;  Location: [facility];  Service: OB/GYN;   Laterality: Bilateral;    TUBAL LIGATION      Vascular access surgeries      x5       Review of patient's allergies indicates:  No Known Allergies  Current Facility-Administered Medications   Medication Frequency    acetaminophen tablet 1,000 mg Q6H    [START ON 10/9/2020] aspirin EC tablet 81 mg Daily    [START ON 10/9/2020] atorvastatin tablet 40 mg Daily    famotidine tablet 40 mg BID    furosemide tablet 80 mg BID loop    heparin (porcine) injection 5,000 Units Q8H    HYDROmorphone injection 0.2 mg Q5 Min PRN    labetalol 20 mg/4 mL (5 mg/mL) IV syring Q10 Min PRN    [START ON 10/9/2020] losartan tablet 100 mg Daily    melatonin tablet 6 mg Nightly PRN    ondansetron injection 4 mg Q6H PRN    oxyCODONE immediate release tablet 10 mg Q4H PRN    oxyCODONE immediate release tablet 5 mg Q4H PRN    oxyCODONE-acetaminophen 5-325 mg per tablet 1 tablet Q3H PRN    pregabalin capsule 50 mg BID    promethazine (PHENERGAN) 12.5 mg in dextrose 5 % 50 mL IVPB Q6H PRN    sevelamer carbonate tablet 2,400 mg TID WM    sodium chloride 0.9% flush 10 mL PRN     Family History     Problem Relation (Age of Onset)    Cancer Mother, Sister, Sister    Diabetes Brother, Sister    Lung cancer Sister    Ovarian cancer Mother, Sister, Sister        Tobacco Use    Smoking status: Never Smoker    Smokeless tobacco: Never Used   Substance and Sexual Activity    Alcohol use: No    Drug use: No    Sexual activity: Yes     Partners: Male     Birth control/protection: Post-menopausal     Review of Systems   Unable to perform ROS: Acuity of condition     Objective:     Vital Signs (Most Recent):  Temp: 97.7 °F (36.5 °C) (10/08/20 1200)  Pulse: 78 (10/08/20 1200)  Resp: 16 (10/08/20 1241)  BP: (!) 182/77 (10/08/20 1200)  SpO2: 99 % (10/08/20 1200)  O2 Device (Oxygen Therapy): room air (10/08/20 1215) Vital Signs (24h Range):  Temp:  [97.5 °F (36.4 °C)-98.1 °F (36.7 °C)] 97.7 °F (36.5 °C)  Pulse:  [] 78  Resp:  [12-21]  16  SpO2:  [98 %-100 %] 99 %  BP: (178-247)/() 182/77  Arterial Line BP: (148-157)/(58-63) 155/62     Weight: 86.2 kg (190 lb) (10/08/20 0550)  Body mass index is 32.61 kg/m².  Body surface area is 1.97 meters squared.    No intake/output data recorded.    Physical Exam  Vitals signs and nursing note reviewed.   Constitutional:       General: She is sleeping. She is not in acute distress.     Appearance: She is well-developed. She is not ill-appearing or toxic-appearing.      Interventions: Nasal cannula in place.   HENT:      Head: Atraumatic.   Eyes:      Conjunctiva/sclera: Conjunctivae normal.   Neck:      Musculoskeletal: Neck supple.   Cardiovascular:      Rate and Rhythm: Normal rate and regular rhythm.      Heart sounds: Normal heart sounds. No murmur.   Pulmonary:      Effort: Pulmonary effort is normal.      Breath sounds: Normal breath sounds. No wheezing.   Abdominal:      General: Bowel sounds are normal. There is no distension.      Palpations: Abdomen is soft.      Tenderness: There is no abdominal tenderness.   Musculoskeletal: Normal range of motion.         General: No deformity.      Comments: + RFA fistula with a strong bruit or thrill.   Neurological:      Mental Status: She is lethargic.      Comments: +sedation         Significant Labs:  CBC:   Recent Labs   Lab 10/08/20  1115   WBC 7.34   RBC 4.43   HGB 11.2*   HCT 36.6*      MCV 83   MCH 25.3*   MCHC 30.6*     CMP:   Recent Labs   Lab 10/08/20  1115   *   CALCIUM 9.6   *   K 4.8   CO2 23   CL 93*   BUN 51*   CREATININE 10.0*     All labs within the past 24 hours have been reviewed.

## 2020-10-08 NOTE — ASSESSMENT & PLAN NOTE
Recent Labs   Lab 10/08/20  0859 10/08/20  1115   WBC  --  7.34   HGB  --  11.2*   HCT 34* 36.6*   PLT  --  168     - Hgb today 11.2. Target Hgb 10-11 gm/dL.  - Will review chronic care plan from outpatient dialysis center for NADEGE dosing.

## 2020-10-08 NOTE — ASSESSMENT & PLAN NOTE
- s/p nephrectomy on 10/8   Patient Name:  Jaycob Carmona  MRN:  [id number]    Assessment     1  Rotator cuff syndrome of left shoulder  meloxicam (MOBIC) 7 5 mg tablet       Plan     Left shoulder rotator cuff tendinitis/bursitis  1  Prescription for meloxicam   2  Activities as tolerated modification to avoid pain  3  Follow-up in three weeks for repeat evaluation  Consider referral to physical therapy, cortisone injection, or MRI at that time as indicated  Subjective     52-year-old male reports to the office today for evaluation of his left shoulder  He notes left shoulder pain for approximately one month  He denies any specific injury or trauma  He notes pain on the lateral aspect of the shoulder with radiation distally to the elbow  Pain is worse with movement and lifting  He takes Advil which does provide relief  He denies any significant stiffness  No weakness or instability  No numbness or tingling  No fevers or chills  General ROS:  Negative for fever, lethargy/malaise, or night sweats  Neurological ROS:  Negative for confusion or seizures  Objective     /73   Pulse 96   Ht 5' 7" (1 702 m)   Wt 70 3 kg (155 lb)   BMI 24 28 kg/m²     Left shoulder:  No gross deformity  No tenderness to palpation biceps insertion site, AC joint, and lateral shoulder  Passive range of motion includes 170° forward flexion, 90° of external rotation-abduction, 50° of internal rotation-abduction  Positive Roberto impingement test   Minimal discomfort with empty can test   Positive speed's test   Sensation intact axillary, median, ulnar and radial nerves  2+ radial pulse  Appropriate mood and affect        Scribe Attestation    I,:   Sameer Bacon PA-C am acting as a scribe while in the presence of the attending physician :        I,:   Bruno Jones MD personally performed the services described in this documentation    as scribed in my presence :

## 2020-10-08 NOTE — ANESTHESIA POSTPROCEDURE EVALUATION
Anesthesia Post Evaluation    Patient: Jael Hall    Procedure(s) Performed: Procedure(s) (LRB):  ROBOTIC NEPHRECTOMY (Right)    Final Anesthesia Type: general    Patient location during evaluation: PACU  Patient participation: Yes- Able to Participate  Level of consciousness: awake and alert  Post-procedure vital signs: reviewed and stable  Pain management: adequate  Airway patency: patent    PONV status at discharge: No PONV  Anesthetic complications: no      Cardiovascular status: blood pressure returned to baseline  Respiratory status: unassisted  Hydration status: euvolemic  Follow-up not needed.          Vitals Value Taken Time   /77 10/08/20 1433   Temp 36.5 °C (97.7 °F) 10/08/20 1200   Pulse 79 10/08/20 1451   Resp 16 10/08/20 1451   SpO2 99 % 10/08/20 1451   Vitals shown include unvalidated device data.      Event Time   Out of Recovery 12:15:00         Pain/Ami Score: Pain Rating Prior to Med Admin: 6 (10/8/2020  2:28 PM)  Pain Rating Post Med Admin: 0 (10/8/2020  6:45 AM)  Ami Score: 9 (10/8/2020 12:15 PM)

## 2020-10-08 NOTE — HPI
Jael Hall is a 55 y.o. AA female with a history of ESRD (on HD), diabetic nephropathy, T2DM, and HTN who presents today for a R radical robotic nephrectomy with Urology. The patient has a history of a right renal mass suspicious for renal cell carcinoma found on CT during transplant (renal) work up. Per records, the patient endorsed complaints of gross hematuria. She is now s/p an elective nephrectomy. The patient is MWF dialysis patient under the direction of Dr. Brumfield at [facility]. She typically dialyzes for 3.5 hrs via a RFA radiocephalic AVF (recent fistulogram 9/9/20). She does urinates 2x/day. She last dialyze on Wednesday, 10/7. No distress on exam. Patient still pretty lethargic from sedation, therefore, unable to complete full ROS. Nephrology consulted for management of ESRD and HD treatment.  HPI mainly from records vs patient due to sedation.

## 2020-10-08 NOTE — ANESTHESIA PROCEDURE NOTES
Arterial    Diagnosis: kidney tumor    Patient location during procedure: done in OR  Procedure start time: 10/8/2020 7:15 AM  Timeout: 10/8/2020 7:15 AM  Procedure end time: 10/8/2020 7:18 AM    Staffing  Authorizing Provider: Yudi Ariza MD  Performing Provider: Yudi Ariza MD    Anesthesiologist was present at the time of the procedure.    Preanesthetic Checklist  Completed: patient identified, site marked, surgical consent, pre-op evaluation, timeout performed, IV checked, risks and benefits discussed, monitors and equipment checked and anesthesia consent givenArterial  Skin Prep: chlorhexidine gluconate  Local Infiltration: none  Orientation: left  Location: radial  Catheter Size: 18 G  Catheter placement by Anatomical landmarks. Heme positive aspiration all ports.Insertion Attempts: 1  Assessment  Dressing: secured with tape and tegaderm  Patient: Tolerated well

## 2020-10-08 NOTE — OP NOTE
[facility]  Operative Note    Date: 10/08/2020    Pre-Op Diagnosis: right renal mass suspicious for renal cell carcinoma    Post-Op Diagnosis: same    Procedure(s) Performed:   1.  Robotic right radical nephrectomy    Specimen(s):   1.  Right Kidney    Surgeon: Douglas Abraham MD    Assistant: Dominik Park MD    Anesthesia: General endotracheal anesthesia    Indications: Jael Hall is a 55 y.o. female with ESRD on HD and a right renal mass suspicious for renal cell carcinoma.  After discussion of management options and risks and benefits associated with each the patient has elected to pursue surgical management.      Findings:   - Right radical nephrectomy successfully performed    EBL: 10 mL    Drains:   None    Anesthesia: General endotracheal anesthesia    Complications:  none    Procedure in Detail: After discussion of risks and benefits of the procedure, informed consent was obtained.  The patient was brought to the operating room and placed supine on the operating table.  SCDs were applied and working prior to induction of anesthesia.  General anesthesia was administered.  A 16 Fr Cazares catheter was placed in the standard fashion with 10 ml sterile water used to inflate the balloon.  An OG tube was placed.  The patient was then moved into the modified flank position with the right side up. The patient was appropriately padded and secured to the table.  The patient was then prepped and draped in the usual sterile fashion.  Timeout was performed and preoperative antibiotics were confirmed.      A Veress needle was introduced into the abdomen.  Entry into the peritoneal cavity was confirmed with the drop test and an initial insufflation pressure of <10mmHg.  Aspiration during our drop test revealed no blood or succus.  The peritoneal cavity was insufflated up to 15 mmHg and the Veress was removed.  The location of the 12 mm camera port was marked with a marking pen and incised sharply using  a 15 blade.  The 12 mm port was then introduced.  The camera was passed through the port and the abdomen was inspected and found to be free of bowel or vascular injury.  Using direct vision the other 3 robotic ports were placed, just below the right costal margin and two ports lower on the right abdomen, ensuring at least 8 cm between ports to allow robotic mobility.  A 12 mm assistant port was placed in the upper abdomen just lateral to midline and a 5 mm assistant port was placed in the lower midline.  Each trocar was introduced under direct vision ensuring no injury to the underlying abdominal contents.      Dissection began along the white line of Toldt, reflecting the colon medially away from the kidney. The hepatic reflection was released.  The psoas muscle was identified.  Care was taken to avoid injury to the duodenum.  Once the colon was reflected, dissection was carried out just below the kidney, and the ureter was identified.  The ureter was elevated and we continued our dissection toward the lower pole of the kidney proximally until we identified the renal hilum.      Careful dissection of the hilum was performed.  The renal vein was easily identified anteriorly. The upper pole was freed from the liver and the adrenal was spared. The renal artery was identified posterior and superior to the renal vein.  The entire hilum was isolated.  Hemostasis was excellent.      The entire hilum was then stapled using the vascular stapler passed through the assistant port.    The remainder of the superior and lateral attachments were freed and the kidney was mobilized. The right ureter was clipped with Weck clips and transected    The kidney was placed into an EndoCatch bag via the assistant port.      The robot was undocked and all trocars were removed. A new upper midline incision above the umbilicus was opened  from skin down to fascia to allow for removal of the specimen.  This was inspected and found to be in  tact.  This was passed off the field for pathologic analysis.      The extraction site fascia was closed using 1-0 PDS in a running fashion.  All skin incisions were closed using 4-0 monocryl.  Dermabond was applied to the incisions.     The patient tolerated the procedure well and was transferred to the recovery room in stable condition.    Disposition: The patient will remain on the urology service overnight for observation.     Dominik Park MD

## 2020-10-08 NOTE — ASSESSMENT & PLAN NOTE
- Renal Function Panel for electrolytes monitoring  - Recommend renal diet with 1 L fluid restriction    - Novasource with meals  - Phos 7.5. Anticipate clearance with HD tomorrow.   - Already on binders as outpatient. Please continue Sevelamer 2400 mg TID.   - Daily renal panel so that phos and albumin is monitored daily.

## 2020-10-08 NOTE — PLAN OF CARE
All DaVinci instruments inspected after case by [facility]. All Instruments appear intact and have lives remaining.

## 2020-10-08 NOTE — TRANSFER OF CARE
"Anesthesia Transfer of Care Note    Patient: Jael Hall    Procedure(s) Performed: Procedure(s) (LRB):  ROBOTIC NEPHRECTOMY (Right)    Patient location: PACU    Anesthesia Type: general    Transport from OR: Transported from OR on 6-10 L/min O2 by face mask with adequate spontaneous ventilation    Post pain: adequate analgesia    Post assessment: no apparent anesthetic complications    Post vital signs: stable    Level of consciousness: awake and oriented    Nausea/Vomiting: no nausea/vomiting    Complications: none    Transfer of care protocol was followed      Last vitals:   Visit Vitals  BP (!) 220/92 (BP Location: Left leg)   Pulse 91   Temp 36.4 °C (97.5 °F) (Temporal)   Resp 20   Ht 5' 4" (1.626 m)   Wt 86.2 kg (190 lb)   LMP 03/15/2016 (LMP Unknown)   SpO2 100%   Breastfeeding No   BMI 32.61 kg/m²     "

## 2020-10-09 VITALS
RESPIRATION RATE: 18 BRPM | SYSTOLIC BLOOD PRESSURE: 147 MMHG | HEART RATE: 104 BPM | TEMPERATURE: 98 F | OXYGEN SATURATION: 95 % | HEIGHT: 64 IN | DIASTOLIC BLOOD PRESSURE: 86 MMHG | BODY MASS INDEX: 32.44 KG/M2 | WEIGHT: 190 LBS

## 2020-10-09 LAB
ANION GAP SERPL CALC-SCNC: 20 MMOL/L (ref 8–16)
BASOPHILS # BLD AUTO: 0.04 K/UL (ref 0–0.2)
BASOPHILS NFR BLD: 0.4 % (ref 0–1.9)
BUN SERPL-MCNC: 68 MG/DL (ref 6–20)
CALCIUM SERPL-MCNC: 10 MG/DL (ref 8.7–10.5)
CHLORIDE SERPL-SCNC: 91 MMOL/L (ref 95–110)
CO2 SERPL-SCNC: 20 MMOL/L (ref 23–29)
CREAT SERPL-MCNC: 11.9 MG/DL (ref 0.5–1.4)
DIFFERENTIAL METHOD: ABNORMAL
EOSINOPHIL # BLD AUTO: 0 K/UL (ref 0–0.5)
EOSINOPHIL NFR BLD: 0 % (ref 0–8)
ERYTHROCYTE [DISTWIDTH] IN BLOOD BY AUTOMATED COUNT: 16.1 % (ref 11.5–14.5)
EST. GFR  (AFRICAN AMERICAN): 3.6 ML/MIN/1.73 M^2
EST. GFR  (NON AFRICAN AMERICAN): 3.1 ML/MIN/1.73 M^2
GLUCOSE SERPL-MCNC: 165 MG/DL (ref 70–110)
HCT VFR BLD AUTO: 36.6 % (ref 37–48.5)
HGB BLD-MCNC: 11.7 G/DL (ref 12–16)
IMM GRANULOCYTES # BLD AUTO: 0.04 K/UL (ref 0–0.04)
IMM GRANULOCYTES NFR BLD AUTO: 0.4 % (ref 0–0.5)
LYMPHOCYTES # BLD AUTO: 1.4 K/UL (ref 1–4.8)
LYMPHOCYTES NFR BLD: 14.9 % (ref 18–48)
MAGNESIUM SERPL-MCNC: 2.6 MG/DL (ref 1.6–2.6)
MCH RBC QN AUTO: 25.7 PG (ref 27–31)
MCHC RBC AUTO-ENTMCNC: 32 G/DL (ref 32–36)
MCV RBC AUTO: 80 FL (ref 82–98)
MONOCYTES # BLD AUTO: 1.2 K/UL (ref 0.3–1)
MONOCYTES NFR BLD: 11.9 % (ref 4–15)
NEUTROPHILS # BLD AUTO: 7 K/UL (ref 1.8–7.7)
NEUTROPHILS NFR BLD: 72.4 % (ref 38–73)
NRBC BLD-RTO: 0 /100 WBC
PHOSPHATE SERPL-MCNC: 9.3 MG/DL (ref 2.7–4.5)
PLATELET # BLD AUTO: 205 K/UL (ref 150–350)
PMV BLD AUTO: 11.4 FL (ref 9.2–12.9)
POCT GLUCOSE: 113 MG/DL (ref 70–110)
POTASSIUM SERPL-SCNC: 5.6 MMOL/L (ref 3.5–5.1)
RBC # BLD AUTO: 4.55 M/UL (ref 4–5.4)
SODIUM SERPL-SCNC: 131 MMOL/L (ref 136–145)
WBC # BLD AUTO: 9.65 K/UL (ref 3.9–12.7)

## 2020-10-09 PROCEDURE — 85025 COMPLETE CBC W/AUTO DIFF WBC: CPT | Mod: NTX

## 2020-10-09 PROCEDURE — 25000003 PHARM REV CODE 250: Mod: NTX | Performed by: NURSE PRACTITIONER

## 2020-10-09 PROCEDURE — 90935 PR HEMODIALYSIS, ONE EVALUATION: ICD-10-PCS | Mod: NTX,,, | Performed by: INTERNAL MEDICINE

## 2020-10-09 PROCEDURE — 80100016 HC MAINTENANCE HEMODIALYSIS: Mod: NTX

## 2020-10-09 PROCEDURE — 84100 ASSAY OF PHOSPHORUS: CPT | Mod: NTX

## 2020-10-09 PROCEDURE — 83735 ASSAY OF MAGNESIUM: CPT | Mod: NTX

## 2020-10-09 PROCEDURE — 90935 HEMODIALYSIS ONE EVALUATION: CPT | Mod: NTX,,, | Performed by: INTERNAL MEDICINE

## 2020-10-09 PROCEDURE — 25000003 PHARM REV CODE 250: Mod: NTX | Performed by: STUDENT IN AN ORGANIZED HEALTH CARE EDUCATION/TRAINING PROGRAM

## 2020-10-09 PROCEDURE — 80048 BASIC METABOLIC PNL TOTAL CA: CPT | Mod: NTX

## 2020-10-09 PROCEDURE — 63600175 PHARM REV CODE 636 W HCPCS: Mod: NTX | Performed by: STUDENT IN AN ORGANIZED HEALTH CARE EDUCATION/TRAINING PROGRAM

## 2020-10-09 RX ORDER — ACETAMINOPHEN 500 MG
500 TABLET ORAL EVERY 6 HOURS
Qty: 28 TABLET | Refills: 0 | Status: SHIPPED | OUTPATIENT
Start: 2020-10-09 | End: 2020-10-16

## 2020-10-09 RX ORDER — OXYCODONE HYDROCHLORIDE 5 MG/1
5 TABLET ORAL EVERY 4 HOURS PRN
Qty: 5 TABLET | Refills: 0 | Status: SHIPPED | OUTPATIENT
Start: 2020-10-09 | End: 2021-01-25

## 2020-10-09 RX ORDER — POLYETHYLENE GLYCOL 3350 17 G/17G
17 POWDER, FOR SOLUTION ORAL DAILY
Qty: 14 EACH | Refills: 0 | Status: SHIPPED | OUTPATIENT
Start: 2020-10-09 | End: 2020-10-23

## 2020-10-09 RX ADMIN — FAMOTIDINE 40 MG: 20 TABLET, FILM COATED ORAL at 12:10

## 2020-10-09 RX ADMIN — SODIUM CHLORIDE 350 ML: 0.9 INJECTION, SOLUTION INTRAVENOUS at 09:10

## 2020-10-09 RX ADMIN — FUROSEMIDE 80 MG: 40 TABLET ORAL at 12:10

## 2020-10-09 RX ADMIN — ATORVASTATIN CALCIUM 40 MG: 20 TABLET, FILM COATED ORAL at 12:10

## 2020-10-09 RX ADMIN — PREGABALIN 50 MG: 50 CAPSULE ORAL at 12:10

## 2020-10-09 RX ADMIN — HEPARIN SODIUM 5000 UNITS: 5000 INJECTION INTRAVENOUS; SUBCUTANEOUS at 05:10

## 2020-10-09 RX ADMIN — ASPIRIN 81 MG: 81 TABLET, COATED ORAL at 12:10

## 2020-10-09 RX ADMIN — SEVELAMER CARBONATE 2400 MG: 800 TABLET, FILM COATED ORAL at 12:10

## 2020-10-09 RX ADMIN — ACETAMINOPHEN 1000 MG: 500 TABLET ORAL at 05:10

## 2020-10-09 RX ADMIN — ACETAMINOPHEN 1000 MG: 500 TABLET ORAL at 12:10

## 2020-10-09 NOTE — PLAN OF CARE
Spoke with patient to complete d/c planning assessment. Patient lives in a single story home with her daughter. Discharge orders have been placed and patient's uncle is at bedside to provide transportation home. Will continue to follow for needs.   10/09/20 1413   Discharge Assessment   Assessment Type Discharge Planning Assessment   Confirmed/corrected address and phone number on facesheet? Yes   Assessment information obtained from? Patient   Expected Length of Stay (days) 1   Communicated expected length of stay with patient/caregiver yes   Prior to hospitilization cognitive status: Alert/Oriented   Prior to hospitalization functional status: Independent   Current cognitive status: Alert/Oriented   Current Functional Status: Independent   Facility Arrived From: Home   Lives With child(lucas), adult   Able to Return to Prior Arrangements yes   Is patient able to care for self after discharge? Yes   Who are your caregiver(s) and their phone number(s)? Sudha Hall (Daughter) [phone]   Readmission Within the Last 30 Days no previous admission in last 30 days   Patient currently being followed by outpatient case management? No   Patient currently receives any other outside agency services? No   Equipment Currently Used at Home none   Do you have any problems affording any of your prescribed medications? No   Is the patient taking medications as prescribed? yes   Does the patient have transportation home? Yes   Transportation Anticipated family or friend will provide   Discharge Plan A Home with family   Discharge Plan B Home with family   DME Needed Upon Discharge  none   Patient/Family in Agreement with Plan yes

## 2020-10-09 NOTE — PLAN OF CARE
Alert and oriented. Without complaints. Ana Maria rescheduled to evening as patient states prescribing doctor instructed her to take it at night. MAD to room 522.

## 2020-10-09 NOTE — PROGRESS NOTES
Patient received in dialysis with standing weight obtained. Maintenance dialysis began via 15 gauge fistula needles to right forearm fistula.

## 2020-10-09 NOTE — PROGRESS NOTES
[facility]  Urology  Progress Note    Patient Name: Jael Hall  MRN: [id number]  Admission Date: 10/8/2020  Hospital Length of Stay: 1 days  Code Status: Full Code   Attending Provider: Douglas Abraham MD   Primary Care Physician: Mary Solano MD    Subjective:     HPI:  No notes on file    Interval History: NAEON. AFVSS.  Has not ambulated.  Tolerating liquids and solids without N/V.  Labs pending.  Pain controlled.  On an MWF HD schedule. Will need dialysis today prior to discharge.    Objective:     Temp:  [97 °F (36.1 °C)-98.2 °F (36.8 °C)] 97 °F (36.1 °C)  Pulse:  [78-99] 91  Resp:  [9-26] 18  SpO2:  [92 %-100 %] 96 %  BP: (140-220)/(72-92) 152/72  Arterial Line BP: (148-177)/(58-70) 177/70     Body mass index is 32.61 kg/m².           Drains     Drain                 Hemodialysis AV Fistula Left forearm -- days         Hemodialysis AV Fistula Right upper arm -- days                Physical Exam   Constitutional: She is oriented to person, place, and time. She appears well-developed. No distress.   Eyes: No scleral icterus.   Cardiovascular: Normal rate.    Pulmonary/Chest: Effort normal. No respiratory distress.   Abdominal: Soft. She exhibits no distension. There is abdominal tenderness (appropriate).   Incisions c/d/i.   Musculoskeletal: Normal range of motion.   Neurological: She is alert and oriented to person, place, and time.   Skin: Skin is warm and dry. No rash noted.     Psychiatric: Her behavior is normal.       Significant Labs:    BMP:  Recent Labs   Lab 10/08/20  1115   *   K 4.8   CL 93*   CO2 23   BUN 51*   CREATININE 10.0*   CALCIUM 9.6       CBC:   Recent Labs   Lab 10/08/20  0859 10/08/20  1115   WBC  --  7.34   HGB  --  11.2*   HCT 34* 36.6*   PLT  --  168       Significant Imaging:  All pertinent imaging results/findings from the past 24 hours have been reviewed.                  Assessment/Plan:     * Renal mass  54 yo female with ESRD on HD with right  renal mass s/p right robotic nephrectomy on 10/8. Doing well.    - Diet: regular  - Pain controlled  - Nephrology consulted: will need dialysis prior to discharge  - Encourage ambulation  - SCDs    Dispo: Plan for home today after dialysis        VTE Risk Mitigation (From admission, onward)         Ordered     heparin (porcine) injection 5,000 Units  Every 8 hours      10/08/20 1114     IP VTE HIGH RISK PATIENT  Once      10/08/20 1114     Place sequential compression device  Until discontinued      10/08/20 1114                Guillermo Nicholas MD  Urology  [facility]

## 2020-10-09 NOTE — PROGRESS NOTES
Dialysis completed . Needles removed from right forearm fistula with pressure held to sites for 7 minutes each with hemostasis achieved. Gauze and tape applied. Patient dialyzed for 3.5 hours with fluid removal of 2 liters. Tolerated with stable vital signs. Patient returned to room via wheelchair per hospital transport.

## 2020-10-09 NOTE — PLAN OF CARE
Fair hours. Slept some. Pain controlled with current meds. Ambulating with walker and standby assist, tolerating fairly well. Vss. IS encouraged. Receiving iv fluids. Safety maintained.

## 2020-10-09 NOTE — PLAN OF CARE
Quiet hours. Slept well. Pain controlled with current meds. ambulating well. Urine output 15 mls, md aware, stated she normally puts out more. Taking in liquids. For dialysis today. Vss. Safety maintained.

## 2020-10-09 NOTE — PROGRESS NOTES
ESRD on IHD MWF    Out patient HD [facility]: [facility]/Dr. Brumfield  On HD for: Diabetic nephropathy  Duration of outpatient dialysis session - 3.5  EDW - ?   Residual Renal Function - Yes, 2x day  - Will provide dialysis for metabolic clearance and volume   - Seen and examined today during hemodialysis; tolerating treatment well without issues. Denied headaches, chest pain, abdominal pain, or muscle cramps   - underwent radical robotic nephrectomy on 10/08/2020  -   - Target ultrafiltration 2L  - Dialysate adjusted to current labs   Access: RFA AVF    Active problem in hospital    Anemia of Chronic Kidney Disease   Recent Labs   Lab 10/09/20  0511   WBC 9.65   RBC 4.55   HGB 11.7*   HCT 36.6*      MCV 80*   MCH 25.7*   MCHC 32.0   not on EPO  Hgb>11    Mineral Bone Disease in CKD   - Renal Function Panel Daily for electrolytes monitoring  - Phos 9.3  - Already on binders as outpatient Please continue    - CoCa 10.5     Nutrition   - Renal Diet    HTN   -  156/62 BP, will continue to monitor. Goal for BP is <130 mmHg SBP and BDP <80 mmHg.

## 2020-10-09 NOTE — PROGRESS NOTES
Pt ready for discharge. Discharge instructions and prescriptions given and explained to pt. Pt verbalizes understanding. PIV's removed. No further questions from pt at this time. Pt to be discharged via wheelchair. Will cont to monitor until discharge.

## 2020-10-09 NOTE — DISCHARGE INSTRUCTIONS
What to expect with your Nephrectomy.  [facility]  Updated 06/10/2011   After surgery  o You may or may not have a drain that is shaped like a grenade and put to suction  - This drain usually may or may not come out on Post op day 1. If you go home with a drain, the nurses will teach you how to record the output and you will come back 3-5 days after you leave to have the drain removed in clinic.  o You will have a catheter after your surgery. It should come out the next day and you should be able to void on your own before you leave. If you are a male and on a BPH medicine, we will need to make sure you restart that the night of your surgery.  o The night of surgery we expect and hope that you will:  - Walk - walking helps get the bowels moving. Also after your surgery, you are at a risk for a deep venous thrombosis (which is a clot in the legs that can form by remaining inactive or still for extended periods of time) and this can travel to your lungs and make you feel short of breath. This is a very serious condition. Walking helps prevent a DVT from occurring.  - Eat - you do not have to eat a whole meal, but we want to make sure you can tolerate liquid and/or solid food without nausea and vomiting  - Use your incentive spirometer - this is the breathing apparatus that helps you expand your lungs. If and when you have pain you will not want to take deep breaths. But if you dont take deep breaths, you are at risk for pneumonia. The incentive spirometer will help prevent this from occurring by expanding your lungs.  o Symptoms you may experience immediately post-op:  - Bloating and/or shoulder pain if you had a laparoscopic procedure - when we do this operation, we fill up your abdominal cavity with gas to better help us visualize the organs and allow our instruments to fit. After the surgery, not all the air can be removed and your body will eventually absorb this small amount of air. However this can make  you feel bloated. In addition, when you sit up, the air can sit right under a muscle (the diaphragm) which has connecting nerves to the shoulders, which could explain why you have shoulder pain.  - Do not expect necessarily to have gas or to have a bowel movement - this goes along with the bloating, you may feel like you want to pass gas or have a bowel movement but you cant. This is normal and you will feel like this for a couple days. There are no pills to help with this. Small walks throughout the day should help with this.  - Pain - your pain should be able to be controlled with medicines by mouth that we prescribe. It is important for you to tell us if you are on any pain medications at home before the surgery as you may need stronger pain meds while in the hospital.   You can go home when:  o Pain is controlled with medicines by mouth  o You are able to walk without difficulty or pain  o You are tolerating a regulating diet   When you go home:  o Activity  - Continue to walk - small walks throughout the day are better than one long walk.   - Do not lift anything greater than 8 pounds for 6 weeks - we want your abdominal wall muscles to heal.  o Bowel Movements - Do not strain to have a bowel movement - the pain medicines will make you constipated. That is why we also ask you to take colace 2-3 x per day to help keep your bowels regular. If you are still having trouble, then you can also add Miralax once a day. Do not take any stool softeners if you are having diarrhea.  o Drain - If you have a drain (not your catheter, but a separate drain) then record the output and bring it with you to your next appointment  o Smoking - If you smoke, we encourage you STOP. Smoking interferes with the healing process and your prolong your healing with continued smoking.  o Driving - Do not drive while you are on pain meds or with your catheter in place.  o Bathing - If you do not have a drain, you can shower 48 hours after  your surgery. If you do have a drain, sponge bathe only until the drain is out.  o Dressing - you can remove the dressings if there is no drainage or change them as needed if there is. The little sterile band-aid strips will fall off on their own in 10-14 days. If they have not fallen off then you can remove them yourself.  o Restarting medicines -especially blood thinners (Aspirin, Plavix, Coumadin), Fish Oil. Discuss this with your physician prior to discharge.   When to return to the ER  o Fever - If you have a fever >101.5, this could be due to a number of reasons such as infection of the urine or incision. If your catheter has been removed, you could possibly have a leak. It would be best to come to the ER so they can better evaluate you.  o Severe pain - pain is expected, but severe or new onset of pain is not normal.   o Inability to tolerate food or liquid with nausea and vomiting - it would be best to go to the ER for them to better evaluate you.

## 2020-10-09 NOTE — SUBJECTIVE & OBJECTIVE
Interval History: NAEON. AFVSS.  Has not ambulated.  Tolerating liquids and solids without N/V.  Labs pending.  Pain controlled.  On an MWF HD schedule. Will need dialysis today prior to discharge.    Objective:     Temp:  [97 °F (36.1 °C)-98.2 °F (36.8 °C)] 97 °F (36.1 °C)  Pulse:  [78-99] 91  Resp:  [9-26] 18  SpO2:  [92 %-100 %] 96 %  BP: (140-220)/(72-92) 152/72  Arterial Line BP: (148-177)/(58-70) 177/70     Body mass index is 32.61 kg/m².           Drains     Drain                 Hemodialysis AV Fistula Left forearm -- days         Hemodialysis AV Fistula Right upper arm -- days                Physical Exam   Constitutional: She is oriented to person, place, and time. She appears well-developed. No distress.   Eyes: No scleral icterus.   Cardiovascular: Normal rate.    Pulmonary/Chest: Effort normal. No respiratory distress.   Abdominal: Soft. She exhibits no distension. There is abdominal tenderness (appropriate).   Incisions c/d/i.   Musculoskeletal: Normal range of motion.   Neurological: She is alert and oriented to person, place, and time.   Skin: Skin is warm and dry. No rash noted.     Psychiatric: Her behavior is normal.       Significant Labs:    BMP:  Recent Labs   Lab 10/08/20  1115   *   K 4.8   CL 93*   CO2 23   BUN 51*   CREATININE 10.0*   CALCIUM 9.6       CBC:   Recent Labs   Lab 10/08/20  0859 10/08/20  1115   WBC  --  7.34   HGB  --  11.2*   HCT 34* 36.6*   PLT  --  168       Significant Imaging:  All pertinent imaging results/findings from the past 24 hours have been reviewed.

## 2020-10-09 NOTE — NURSING TRANSFER
Nursing Transfer Note      10/8/2020     Transfer from PACU to 522     Transfer via stretcher    Transfer with personal belongings     Transported by PACU PCT    Medicines sent:     Chart send with patient: Yes    Notified: daughter

## 2020-10-09 NOTE — NURSING
Received admit from pacu via bed 56 y/o bf s/p robotic right nephrectomy.; aaox4, no distress noted. No complaints at present. Will continue to monitor.

## 2020-10-09 NOTE — PLAN OF CARE
Future Appointments   Date Time Provider Department Center   11/2/2020 10:30 AM Douglas Abraham MD [facility] [place]     Patient discharged home to care of family on 10/9/20.   10/09/20 1515   Final Note   Assessment Type Final Discharge Note   Anticipated Discharge Disposition Home   What phone number can be called within the next 1-3 days to see how you are doing after discharge?   ([phone])   Hospital Follow Up  Appt(s) scheduled? Yes   Discharge plans and expectations educations in teach back method with documentation complete? Yes

## 2020-10-12 LAB
FINAL PATHOLOGIC DIAGNOSIS: NORMAL
GROSS: NORMAL
Lab: NORMAL
MICROSCOPIC EXAM: NORMAL

## 2020-10-12 NOTE — PLAN OF CARE
Future Appointments   Date Time Provider Department Center   11/2/2020 10:30 AM Douglas Abraham MD [facility] [place]     Patient discharged home to care of self on 10/9/20.   10/12/20 1548   Final Note   Assessment Type Final Discharge Note   Anticipated Discharge Disposition Home   What phone number can be called within the next 1-3 days to see how you are doing after discharge?   ([phone])   Hospital Follow Up  Appt(s) scheduled? Yes   Discharge plans and expectations educations in teach back method with documentation complete? Yes

## 2020-10-12 NOTE — DISCHARGE SUMMARY
[facility]  Urology  Discharge Summary      Patient Name: Jael Velasquez  MRN: [id number]  Admission Date: 10/8/2020  Hospital Length of Stay: 1 days  Discharge Date and Time: 10/9/2020  2:36 PM  Attending Physician: Douglas Abraham MD  Discharging Provider: Guillermo Nicholas MD  Primary Care Physician: Mary Solano MD    HPI:   No notes on file    Procedure(s) (LRB):  ROBOTIC NEPHRECTOMY (Right)     Indwelling Lines/Drains at time of discharge:   Lines/Drains/Airways     Drain                 Hemodialysis AV Fistula Left forearm -- days         Hemodialysis AV Fistula Right upper arm -- days                Hospital Course: JAEL VELASQUEZ 55 y.o.female underwent: Procedure(s) (LRB):  ROBOTIC NEPHRECTOMY (Right). The patient tolerated the procedure well, was transferred to recovery post-op, and then transferred to the floor for continuation of medical care. The patient's clinical condition progressively improved. By the time of discharge, she was tolerating a diet without nausea or vomiting, pain was well controlled with oral medications, and she was ambulating without difficulty. On POD 1 the patient was given hemodialysis prior to her discharged to home. On discharge, the patient's incisions were c/d/i and the surgical site was soft and appropriately tender to palpation. The patient will follow up in [facility] in 2 weeks.        Consults:   Consults (From admission, onward)        Status Ordering Provider     Inpatient consult to Nephrology  Once     Provider:  (Not yet assigned)    NII Mora          Significant Diagnostic Studies:    Pending Diagnostic Studies:     Procedure Component Value Units Date/Time    Specimen to Pathology, Surgery Urology [[id number]] Collected: 10/08/20 1020    Order Status: Sent Lab Status: In process Updated: 10/08/20 1219          Final Active Diagnoses:    Diagnosis Date Noted POA    PRINCIPAL PROBLEM:  Renal mass [N28.89] 10/08/2020 Yes     Preoperative testing [Z01.818] 10/08/2020 Not Applicable    Chronic kidney disease-mineral and bone disorder [N18.9, E83.9, M89.9] 10/08/2020 Yes    Essential hypertension [I10]  Yes    Anemia in ESRD (end-stage renal disease) [N18.6, D63.1] 09/28/2017 Yes    ESRD on hemodialysis [N18.6, Z99.2] 09/28/2017 Not Applicable      Problems Resolved During this Admission:         Discharged Condition: good    Disposition: Home or Self Care    Follow Up:  Follow-up Information     Douglas Abraham MD On 11/2/2020.    Specialty: Urology  Why: post op  Contact information:  [address]  [facility] [address]  [phone]                 Patient Instructions:      Hemoglobin A1C   Standing Status: Future Number of Occurrences: 1 Standing Exp. Date: 11/10/21     Diet Adult Regular     Notify your health care provider if you experience any of the following:  temperature >100.4     Notify your health care provider if you experience any of the following:  persistent nausea and vomiting or diarrhea     Notify your health care provider if you experience any of the following:  severe uncontrolled pain     Notify your health care provider if you experience any of the following:  redness, tenderness, or signs of infection (pain, swelling, redness, odor or green/yellow discharge around incision site)     Notify your health care provider if you experience any of the following:  difficulty breathing or increased cough     Notify your health care provider if you experience any of the following:  severe persistent headache     Notify your health care provider if you experience any of the following:  persistent dizziness, light-headedness, or visual disturbances     Notify your health care provider if you experience any of the following:  increased confusion or weakness     Type & Screen   Standing Status: Future Number of Occurrences: 1 Standing Exp. Date: 11/10/21     Activity as tolerated     Medications:  Reconciled Home  Medications:      Medication List      START taking these medications    oxyCODONE 5 MG immediate release tablet  Commonly known as: ROXICODONE  Take 1 tablet (5 mg total) by mouth every 4 (four) hours as needed for Pain.     polyethylene glycol 17 gram Pwpk  Commonly known as: GLYCOLAX  Mix 1 packet (17 g) with liquid and take by mouth once daily. for 14 days        CHANGE how you take these medications    * acetaminophen 500 MG tablet  Commonly known as: TYLENOL  Take 2 tablets (1,000 mg total) by mouth every 8 (eight) hours as needed for Pain.  What changed: Another medication with the same name was added. Make sure you understand how and when to take each.     * PAIN RELIEF EXTRA STRENGTH 500 MG tablet  Generic drug: acetaminophen  Take 1 tablet (500 mg total) by mouth every 6 (six) hours. for 7 days  What changed: You were already taking a medication with the same name, and this prescription was added. Make sure you understand how and when to take each.     LANTUS SOLOSTAR U-100 INSULIN glargine 100 units/mL (3mL) SubQ pen  Generic drug: insulin  Inject 15 Units into the skin every evening.  What changed: how much to take         * This list has 2 medication(s) that are the same as other medications prescribed for you. Read the directions carefully, and ask your doctor or other care provider to review them with you.            CONTINUE taking these medications    aspirin 81 MG EC tablet  Commonly known as: ECOTRIN  Take 81 mg by mouth.     atorvastatin 40 MG tablet  Commonly known as: LIPITOR  Take 40 mg by mouth once daily.     famotidine 20 MG tablet  Commonly known as: PEPCID  Take 20 mg by mouth.     * fish oil-omega-3 fatty acids 300-1,000 mg capsule  Take 1 g by mouth once daily.     * fish oil-omega-3 fatty acids 300-1,000 mg capsule  Take 1 capsule by mouth once daily.     furosemide 40 MG tablet  Commonly known as: LASIX  Take 80 mg by mouth 2 (two) times a day.     insulin aspart U-100 100 unit/mL  injection  Commonly known as: NOVOLOG  Inject 5 Units into the skin 3 (three) times daily before meals.     losartan 50 MG tablet  Commonly known as: COZAAR  Take 100 mg by mouth once daily.     pregabalin 75 MG capsule  Commonly known as: LYRICA  Take 75 mg by mouth 2 (two) times daily.     sevelamer carbonate 800 mg Tab  Commonly known as: RENVELA  Take 2,400 mg by mouth 3 (three) times daily with meals.         * This list has 2 medication(s) that are the same as other medications prescribed for you. Read the directions carefully, and ask your doctor or other care provider to review them with you.                Time spent on the discharge of patient: 15 minutes    Guillermo Nicholas MD  Urology  [facility]

## 2020-10-13 ENCOUNTER — TELEPHONE (OUTPATIENT)
Dept: UROLOGY | Facility: CLINIC | Age: 55
End: 2020-10-13

## 2020-10-13 NOTE — TELEPHONE ENCOUNTER
Left VM.  Calling to check on her.  Discussed the path report showed that everything was confined to the kidney.   Margins clear.  This is great!  Discussed activity restrictions until seen by Dr. Abraham on 11/02/2020

## 2020-11-02 ENCOUNTER — OFFICE VISIT (OUTPATIENT)
Dept: UROLOGY | Facility: CLINIC | Age: 55
End: 2020-11-02
Payer: MEDICARE

## 2020-11-02 VITALS
BODY MASS INDEX: 32.45 KG/M2 | HEART RATE: 79 BPM | HEIGHT: 64 IN | DIASTOLIC BLOOD PRESSURE: 68 MMHG | WEIGHT: 190.06 LBS | SYSTOLIC BLOOD PRESSURE: 137 MMHG

## 2020-11-02 DIAGNOSIS — C64.1 RENAL CELL CARCINOMA OF RIGHT KIDNEY: Primary | ICD-10-CM

## 2020-11-02 PROCEDURE — 99999 PR PBB SHADOW E&M-EST. PATIENT-LVL III: ICD-10-PCS | Mod: PBBFAC,TXP,, | Performed by: UROLOGY

## 2020-11-02 PROCEDURE — 99024 POSTOP FOLLOW-UP VISIT: CPT | Mod: NTX,POP,, | Performed by: UROLOGY

## 2020-11-02 PROCEDURE — 99213 OFFICE O/P EST LOW 20 MIN: CPT | Mod: PBBFAC,NTX | Performed by: UROLOGY

## 2020-11-02 PROCEDURE — 99999 PR PBB SHADOW E&M-EST. PATIENT-LVL III: CPT | Mod: PBBFAC,TXP,, | Performed by: UROLOGY

## 2020-11-02 PROCEDURE — 99024 PR POST-OP FOLLOW-UP VISIT: ICD-10-PCS | Mod: NTX,POP,, | Performed by: UROLOGY

## 2020-11-02 RX ORDER — PREGABALIN 25 MG/1
25 CAPSULE ORAL 2 TIMES DAILY
COMMUNITY
Start: 2020-10-16 | End: 2021-06-13

## 2020-11-02 RX ORDER — LOSARTAN POTASSIUM 100 MG/1
100 TABLET ORAL DAILY
COMMUNITY
Start: 2020-10-07 | End: 2021-02-25

## 2020-11-02 NOTE — PROGRESS NOTES
"  Subjective:       Patient ID: Jael Hall is a 55 y.o. female.    Chief Complaint:  Renal mass.      History of Present Illness  HPI  Patient is a 55 y.o. female who is established to our clinic and referred by Natalee Resendiz NP for evaluation of a right renal mass.  She underwent a right robotic nephrectomy on 10/8/20.  She is doing well.  Having some back pain but otherwise recovering well from surgery.    No unexpected weight loss.     Patient is a never smoker.     Has a h/o hysterectomy---no other abdominal surgeries.     Patient has ESRD and is on HD---Monday, Wednesday, Friday through a right wrist AVF.  She has DM and HTN.     +fh of ovarian cancer---sister.        PATHOLOGY:    Final Pathologic Diagnosis RIGHT KIDNEY:   RENAL CELL CARCINOMA WITH NO CARCINOMA IDENTIFIED IN PERINEPHRIC ADIPOSE   TISSUE OR IN MARGINS   END-STAGE DIABETIC RENAL DISEASE    Comment: Interp By Harsh Adame M.D., Signed on 10/12/2020 at 13:41   Gross Container Label and Clinic/AP Number:  [id number]   Received in formalin labeled "1.  Right kidney" is a 663 g, 16.5 x 13.5 x 4.0   cm right total nephrectomy specimen consisting of a 5.8 cm in length by 0.6   cm in diameter ureter, 4.0 cm in length by 1.2 cm in diameter renal artery, a   2.5 cm in length by 0.9 cm in diameter renal vein, and an 11.0 x 7.0 x 5.8 cm   kidney encapsulated in 3.5 cm-thick perinephric fat.  The ureter is opened to   reveal a patent, pinpoint, grossly unremarkable lumen.  The renal vessels are   patent.  The kidney is bivalved to reveal a 2.7 x 2.4 x 2.3 cm variegated,   tan-yellow, focally hemorrhagic, partially glistening and gelatinous   well-circumscribed mass within the cortical midpole of the kidney.  The mass   grossly extends less than 0.1 cm from the renal capsule, 3.0 cm from Gerota's   fascia, 0.5 cm from the renal sinus fat and renal pelvis.  No involvement of   renal vein is grossly identified.  Additionally, there is a 0.8 x 0.7 x 0.6 "   cm red-brown, hemorrhagic cystic nodule grossly abutting the upper pole of   the renal capsule, and grossly extending 3.1 cm from the primary mass.  The   remaining renal parenchyma consist of multiple simple cysts ranging from   0.1-1.0 cm in diameter.  The renal parenchyma is tan-pink with distinct   corticomedullary junctions.  Sections of [id number] are submitted as follows:   Cassette key:   A:  Ureter and renal vessel resection margins   B-C:  Midpole renal mass with adjacent renal sinus and renal capsule   D:  Mid pole renal mass with adjacent grossly uninvolved renal parenchyma   E:  Additional upper pole hemorrhagic cystic nodule with adjacent renal   parenchyma   F:  Upper pole renal parenchyma with cysts   G:  Lower pole renal parenchyma with cysts   Gross by: Chastity Moss    Microscopic Exam Kidney:    Right nephrectomy   Tumor site:    Renal parenchyma   Tumor size:    2.7 cm.   Tumor focality:    There is the principal mass but in addition is a 1 mm   separate papillary lesion.   Anatomic extent of tumor:    Limited to the kidney   Histologic type:    Renal carcinoma, clear cell variant   thGthrthathdtheth:th th4th of 4.   Sarcomatoid features:    Not identified.   Rhabdoid Features:  Not identified   Tumor Necrosis:  Not identified   Adrenal gland:    Absent   Margins:    No carcinoma identified in margins   Kidney apart from tumor:    This is an end-stage kidney with glomerular   changes typical of diabetic disease   Hilar lymph nodes:    Absent   Pathologic stage (AJCC 2010): pT1a pNX pMX   Block for possible further studies: 1B          Review of Systems  Review of Systems  All other systems reviewed and negative except pertinent positives noted in HPI.       Objective:     Physical Exam  Constitutional:       Appearance: Normal appearance. She is well-developed. She is not toxic-appearing.   HENT:      Head: Normocephalic.   Cardiovascular:      Rate and Rhythm: Normal rate.      Pulses: Normal pulses.   Pulmonary:       Effort: Pulmonary effort is normal. No tachypnea, accessory muscle usage or respiratory distress.   Abdominal:      General: There is no distension.      Palpations: Abdomen is soft. There is no fluid wave or mass.      Tenderness: There is no abdominal tenderness. There is no rebound.      Hernia: No hernia is present.          Comments: Liver/spleen non-palpable   Musculoskeletal: Normal range of motion.   Skin:     Findings: No bruising or rash.   Neurological:      Mental Status: She is alert and oriented to person, place, and time.   Psychiatric:         Speech: Speech normal.         Behavior: Behavior normal. Behavior is cooperative.         Thought Content: Thought content normal.         Lab Review  Lab Results   Component Value Date    COLORU Yellow 10/18/2019    SPECGRAV 1.010 10/18/2019    PHUR 8.0 04/01/2020    NITRITE Negative 10/18/2019    KETONESU Negative 10/18/2019    UROBILINOGEN Negative 10/18/2019         Assessment:        1. Renal cell carcinoma of right kidney            Plan:     Renal cell carcinoma of right kidney      - pathology reviewed.  -plan CT abdomen in 6 months.   -ok for transplant from  perspective.

## 2020-12-10 NOTE — PROGRESS NOTES
Jael Hall medical records reviewed with . Provider determined that the patient will be re-activated on the kidney transplant waitlist.       Jael Hall notified of re-activation on the waitlist. Patients dialysis unit and Nephrologist notified of status change by letter.

## 2020-12-10 NOTE — LETTER
December 10, 2020    Jael Hall  [address] [facility] [address]    Dear Jael Hall:  MRN: [id number]    The [facility] transplant team reviewed your transplant candidacy.  It is our programs opinion that you are a transplant candidate and are re-activated at our facility as of 12/10/20.  You are now eligible to receive a transplant.  Your status is now Status 1.    Attached is a letter from the United Network for Organ Sharing (UNOS).  It describes the services and information offered to patients by UNOS and the Organ Procurement and Transplant Network.    The [facility] transplant team remains available to answer any questions.  Should you have any questions regarding this decision, please do not hesitate to contact our pre-transplant office.      Sincerely,  CHELSEY HopkinsN, RN, MPH  Kidney Transplant Coordinator    [facility] [address] [address]  [phone]     tj/Enclosed     Cc: MD Donnie Erazo [facility]           The Organ Procurement and Transplantation Network   Toll-free patient services line: Your resource for organ transplant information     If you have a question regarding your own medical care, you always should call your transplant hospital first. However, for general organ transplant-related information, you can call the Organ Procurement and Transplantation Network (OPTN) toll-free patient services line at [phone].     Anyone, including potential transplant candidates, candidates, recipients, family members, friends, living donors, and donor family members, can call this number to:     · Talk about organ donation, living donation, the transplant process, the donation process, and transplant policies.   · Get a free patient information kit with helpful booklets, waiting list and transplant information, and a list of all transplant hospitals.   · Ask questions about the OPTN website  (https://optn.transplant.hrsa.gov/), the United Network for Organ Sharings (UNOS) website (https://unos.org/), or the UNOS website for living donors and transplant recipients. (https://www.transplantliving.org/).   · Learn how the OPTN can help you.   · Talk about any concerns that you may have with a transplant hospital.     The nations transplant system, the OPTN, is managed under federal contract by the United Network for Organ Sharing (UNOS), which is a non-profit charitable organization. The OPTN helps create and define organ sharing policies that make the best use of donated organs. This process continuously evaluating new advances and discoveries so policies can be adapted to best serve patients waiting for transplants. To do so, the OPTN works closely with transplant professionals, transplant patients, transplant candidates, donor families, living donors, and the public. All transplant programs and organ procurement organizations throughout the country are OPTN members and are obligated to follow the policies the OPTN creates for allocating organs.     The OPTN also is responsible for:   · Providing educational material for patients, the public, and professionals.   · Raising awareness of the need for donated organs and tissue.   · Coordinating organ procurement, matching, and placement.   · Collecting information about every organ transplant and donation that occurs in the [place].     Remember, you should contact your transplant hospital directly if you have questions or concerns about your own medical care including medical records, work-up progress, and test results.     We are not your transplant hospital, and our staff will not be able to answer questions about your case, so please keep your transplant hospitals phone number handy.   However, while you research your transplant needs and learn as much as you can about transplantation and donation, we welcome your call to our toll-free patient  services line at [phone].

## 2021-01-25 ENCOUNTER — HOSPITAL ENCOUNTER (INPATIENT)
Facility: HOSPITAL | Age: 56
LOS: 2 days | Discharge: HOME | DRG: 193 | End: 2021-01-27
Attending: EMERGENCY MEDICINE | Admitting: HOSPITALIST
Payer: MEDICARE

## 2021-01-25 DIAGNOSIS — N18.6 ESRD (END STAGE RENAL DISEASE): ICD-10-CM

## 2021-01-25 DIAGNOSIS — R05.9 COUGH: ICD-10-CM

## 2021-01-25 DIAGNOSIS — N18.6 ANEMIA IN ESRD (END-STAGE RENAL DISEASE): ICD-10-CM

## 2021-01-25 DIAGNOSIS — D63.1 ANEMIA IN ESRD (END-STAGE RENAL DISEASE): ICD-10-CM

## 2021-01-25 DIAGNOSIS — J18.9 PNEUMONIA OF BOTH LOWER LOBES DUE TO INFECTIOUS ORGANISM: Primary | ICD-10-CM

## 2021-01-25 DIAGNOSIS — R07.9 CHEST PAIN: ICD-10-CM

## 2021-01-25 DIAGNOSIS — N18.6 ESRD ON HEMODIALYSIS: ICD-10-CM

## 2021-01-25 DIAGNOSIS — D64.9 ANEMIA, UNSPECIFIED TYPE: ICD-10-CM

## 2021-01-25 DIAGNOSIS — Z99.2 ESRD ON HEMODIALYSIS: ICD-10-CM

## 2021-01-25 LAB
ALBUMIN SERPL BCP-MCNC: 3.7 G/DL (ref 3.5–5.2)
ALP SERPL-CCNC: 59 U/L (ref 55–135)
ALT SERPL W/O P-5'-P-CCNC: 13 U/L (ref 10–44)
ANION GAP SERPL CALC-SCNC: 13 MMOL/L (ref 8–16)
AST SERPL-CCNC: 23 U/L (ref 10–40)
BASOPHILS # BLD AUTO: 0.04 K/UL (ref 0–0.2)
BASOPHILS NFR BLD: 0.6 % (ref 0–1.9)
BILIRUB SERPL-MCNC: 0.7 MG/DL (ref 0.1–1)
BUN SERPL-MCNC: 20 MG/DL (ref 6–20)
CALCIUM SERPL-MCNC: 9.3 MG/DL (ref 8.7–10.5)
CHLORIDE SERPL-SCNC: 89 MMOL/L (ref 95–110)
CO2 SERPL-SCNC: 29 MMOL/L (ref 23–29)
CREAT SERPL-MCNC: 6.8 MG/DL (ref 0.5–1.4)
CTP QC/QA: YES
DIFFERENTIAL METHOD: ABNORMAL
EOSINOPHIL # BLD AUTO: 0.3 K/UL (ref 0–0.5)
EOSINOPHIL NFR BLD: 4.4 % (ref 0–8)
ERYTHROCYTE [DISTWIDTH] IN BLOOD BY AUTOMATED COUNT: 17.6 % (ref 11.5–14.5)
EST. GFR  (AFRICAN AMERICAN): 7.1 ML/MIN/1.73 M^2
EST. GFR  (NON AFRICAN AMERICAN): 6.2 ML/MIN/1.73 M^2
FERRITIN SERPL-MCNC: 1142 NG/ML (ref 20–300)
GLUCOSE SERPL-MCNC: 127 MG/DL (ref 70–110)
HCT VFR BLD AUTO: 23.2 % (ref 37–48.5)
HGB BLD-MCNC: 7.2 G/DL (ref 12–16)
IMM GRANULOCYTES # BLD AUTO: 0.03 K/UL (ref 0–0.04)
IMM GRANULOCYTES NFR BLD AUTO: 0.4 % (ref 0–0.5)
IRON SERPL-MCNC: 51 UG/DL (ref 30–160)
LACTATE SERPL-SCNC: 1.1 MMOL/L (ref 0.5–2.2)
LYMPHOCYTES # BLD AUTO: 1.6 K/UL (ref 1–4.8)
LYMPHOCYTES NFR BLD: 21.9 % (ref 18–48)
MCH RBC QN AUTO: 24.7 PG (ref 27–31)
MCHC RBC AUTO-ENTMCNC: 31 G/DL (ref 32–36)
MCV RBC AUTO: 80 FL (ref 82–98)
MONOCYTES # BLD AUTO: 0.8 K/UL (ref 0.3–1)
MONOCYTES NFR BLD: 10.9 % (ref 4–15)
NEUTROPHILS # BLD AUTO: 4.4 K/UL (ref 1.8–7.7)
NEUTROPHILS NFR BLD: 61.8 % (ref 38–73)
NRBC BLD-RTO: 0 /100 WBC
PLATELET # BLD AUTO: 212 K/UL (ref 150–350)
PMV BLD AUTO: 10.4 FL (ref 9.2–12.9)
POCT GLUCOSE: 109 MG/DL (ref 70–110)
POCT GLUCOSE: 85 MG/DL (ref 70–110)
POTASSIUM SERPL-SCNC: 4.3 MMOL/L (ref 3.5–5.1)
PROCALCITONIN SERPL IA-MCNC: 0.24 NG/ML
PROT SERPL-MCNC: 8.2 G/DL (ref 6–8.4)
RBC # BLD AUTO: 2.91 M/UL (ref 4–5.4)
RETICS/RBC NFR AUTO: 1.7 % (ref 0.5–2.5)
SARS-COV-2 RDRP RESP QL NAA+PROBE: NEGATIVE
SATURATED IRON: 16 % (ref 20–50)
SODIUM SERPL-SCNC: 131 MMOL/L (ref 136–145)
TOTAL IRON BINDING CAPACITY: 312 UG/DL (ref 250–450)
TRANSFERRIN SERPL-MCNC: 211 MG/DL (ref 200–375)
WBC # BLD AUTO: 7.07 K/UL (ref 3.9–12.7)

## 2021-01-25 PROCEDURE — 82728 ASSAY OF FERRITIN: CPT | Mod: NTX

## 2021-01-25 PROCEDURE — 63600175 PHARM REV CODE 636 W HCPCS: Mod: NTX | Performed by: PHYSICIAN ASSISTANT

## 2021-01-25 PROCEDURE — 99285 PR EMERGENCY DEPT VISIT,LEVEL V: ICD-10-PCS | Mod: NTX,CS,, | Performed by: PHYSICIAN ASSISTANT

## 2021-01-25 PROCEDURE — 87040 BLOOD CULTURE FOR BACTERIA: CPT | Mod: NTX

## 2021-01-25 PROCEDURE — 96374 THER/PROPH/DIAG INJ IV PUSH: CPT | Mod: NTX

## 2021-01-25 PROCEDURE — 25000003 PHARM REV CODE 250: Mod: NTX | Performed by: HOSPITALIST

## 2021-01-25 PROCEDURE — U0002 COVID-19 LAB TEST NON-CDC: HCPCS | Mod: NTX | Performed by: EMERGENCY MEDICINE

## 2021-01-25 PROCEDURE — 63700000 PHARM REV CODE 250 ALT 637 W/O HCPCS: Mod: NTX | Performed by: PHYSICIAN ASSISTANT

## 2021-01-25 PROCEDURE — 85025 COMPLETE CBC W/AUTO DIFF WBC: CPT | Mod: NTX

## 2021-01-25 PROCEDURE — 96372 THER/PROPH/DIAG INJ SC/IM: CPT | Mod: 59

## 2021-01-25 PROCEDURE — G0378 HOSPITAL OBSERVATION PER HR: HCPCS | Mod: NTX

## 2021-01-25 PROCEDURE — 96375 TX/PRO/DX INJ NEW DRUG ADDON: CPT

## 2021-01-25 PROCEDURE — 99285 EMERGENCY DEPT VISIT HI MDM: CPT | Mod: 25,NTX

## 2021-01-25 PROCEDURE — 80053 COMPREHEN METABOLIC PANEL: CPT | Mod: NTX

## 2021-01-25 PROCEDURE — 99285 EMERGENCY DEPT VISIT HI MDM: CPT | Mod: NTX,CS,, | Performed by: PHYSICIAN ASSISTANT

## 2021-01-25 PROCEDURE — 99219 PR INITIAL OBSERVATION CARE,LEVL II: ICD-10-PCS | Mod: NTX,,, | Performed by: HOSPITALIST

## 2021-01-25 PROCEDURE — 25000003 PHARM REV CODE 250: Mod: NTX | Performed by: PHYSICIAN ASSISTANT

## 2021-01-25 PROCEDURE — 63600175 PHARM REV CODE 636 W HCPCS: Mod: NTX | Performed by: HOSPITALIST

## 2021-01-25 PROCEDURE — 96376 TX/PRO/DX INJ SAME DRUG ADON: CPT

## 2021-01-25 PROCEDURE — C9399 UNCLASSIFIED DRUGS OR BIOLOG: HCPCS | Mod: NTX | Performed by: HOSPITALIST

## 2021-01-25 PROCEDURE — 83605 ASSAY OF LACTIC ACID: CPT | Mod: NTX

## 2021-01-25 PROCEDURE — 85045 AUTOMATED RETICULOCYTE COUNT: CPT | Mod: NTX

## 2021-01-25 PROCEDURE — 83540 ASSAY OF IRON: CPT | Mod: NTX

## 2021-01-25 PROCEDURE — 84145 PROCALCITONIN (PCT): CPT | Mod: NTX

## 2021-01-25 PROCEDURE — 99219 PR INITIAL OBSERVATION CARE,LEVL II: CPT | Mod: NTX,,, | Performed by: HOSPITALIST

## 2021-01-25 RX ORDER — PROMETHAZINE HYDROCHLORIDE AND CODEINE PHOSPHATE 6.25; 1 MG/5ML; MG/5ML
2.5 SOLUTION ORAL NIGHTLY PRN
Status: DISCONTINUED | OUTPATIENT
Start: 2021-01-25 | End: 2021-01-27 | Stop reason: HOSPADM

## 2021-01-25 RX ORDER — GLUCAGON 1 MG
1 KIT INJECTION
Status: DISCONTINUED | OUTPATIENT
Start: 2021-01-25 | End: 2021-01-27 | Stop reason: HOSPADM

## 2021-01-25 RX ORDER — AZITHROMYCIN 250 MG/1
500 TABLET, FILM COATED ORAL
Status: COMPLETED | OUTPATIENT
Start: 2021-01-25 | End: 2021-01-25

## 2021-01-25 RX ORDER — BENZONATATE 100 MG/1
100 CAPSULE ORAL 3 TIMES DAILY PRN
Status: DISCONTINUED | OUTPATIENT
Start: 2021-01-25 | End: 2021-01-27 | Stop reason: HOSPADM

## 2021-01-25 RX ORDER — ATORVASTATIN CALCIUM 20 MG/1
40 TABLET, FILM COATED ORAL DAILY
Status: DISCONTINUED | OUTPATIENT
Start: 2021-01-26 | End: 2021-01-27 | Stop reason: HOSPADM

## 2021-01-25 RX ORDER — CARVEDILOL 12.5 MG/1
12.5 TABLET ORAL 2 TIMES DAILY
Status: ON HOLD | COMMUNITY
Start: 2021-01-15 | End: 2021-01-27 | Stop reason: SDUPTHER

## 2021-01-25 RX ORDER — ONDANSETRON 4 MG/1
4 TABLET, ORALLY DISINTEGRATING ORAL EVERY 8 HOURS PRN
Status: DISCONTINUED | OUTPATIENT
Start: 2021-01-25 | End: 2021-01-27 | Stop reason: HOSPADM

## 2021-01-25 RX ORDER — IBUPROFEN 200 MG
16 TABLET ORAL
Status: DISCONTINUED | OUTPATIENT
Start: 2021-01-25 | End: 2021-01-27 | Stop reason: HOSPADM

## 2021-01-25 RX ORDER — CEFTRIAXONE 1 G/1
1 INJECTION, POWDER, FOR SOLUTION INTRAMUSCULAR; INTRAVENOUS
Status: DISCONTINUED | OUTPATIENT
Start: 2021-01-26 | End: 2021-01-27 | Stop reason: HOSPADM

## 2021-01-25 RX ORDER — FUROSEMIDE 10 MG/ML
80 INJECTION INTRAMUSCULAR; INTRAVENOUS 3 TIMES DAILY
Status: DISCONTINUED | OUTPATIENT
Start: 2021-01-25 | End: 2021-01-26

## 2021-01-25 RX ORDER — CEFTRIAXONE 1 G/1
1 INJECTION, POWDER, FOR SOLUTION INTRAMUSCULAR; INTRAVENOUS
Status: COMPLETED | OUTPATIENT
Start: 2021-01-25 | End: 2021-01-25

## 2021-01-25 RX ORDER — SEVELAMER CARBONATE 800 MG/1
2400 TABLET, FILM COATED ORAL
Status: DISCONTINUED | OUTPATIENT
Start: 2021-01-26 | End: 2021-01-27 | Stop reason: HOSPADM

## 2021-01-25 RX ORDER — LOSARTAN POTASSIUM 50 MG/1
100 TABLET ORAL DAILY
Status: DISCONTINUED | OUTPATIENT
Start: 2021-01-26 | End: 2021-01-27 | Stop reason: HOSPADM

## 2021-01-25 RX ORDER — GLUCAGON 1 MG
1 KIT INJECTION
Status: DISCONTINUED | OUTPATIENT
Start: 2021-01-25 | End: 2021-01-25

## 2021-01-25 RX ORDER — IBUPROFEN 200 MG
24 TABLET ORAL
Status: DISCONTINUED | OUTPATIENT
Start: 2021-01-25 | End: 2021-01-25

## 2021-01-25 RX ORDER — SODIUM CHLORIDE 0.9 % (FLUSH) 0.9 %
10 SYRINGE (ML) INJECTION
Status: CANCELLED | OUTPATIENT
Start: 2021-01-25

## 2021-01-25 RX ORDER — PREGABALIN 50 MG/1
50 CAPSULE ORAL 2 TIMES DAILY
Status: DISCONTINUED | OUTPATIENT
Start: 2021-01-25 | End: 2021-01-26

## 2021-01-25 RX ORDER — CARVEDILOL 12.5 MG/1
12.5 TABLET ORAL 2 TIMES DAILY
Status: DISCONTINUED | OUTPATIENT
Start: 2021-01-25 | End: 2021-01-26

## 2021-01-25 RX ORDER — BENZONATATE 100 MG/1
100 CAPSULE ORAL
Status: COMPLETED | OUTPATIENT
Start: 2021-01-25 | End: 2021-01-25

## 2021-01-25 RX ORDER — INSULIN ASPART 100 [IU]/ML
0-5 INJECTION, SOLUTION INTRAVENOUS; SUBCUTANEOUS
Status: DISCONTINUED | OUTPATIENT
Start: 2021-01-25 | End: 2021-01-27 | Stop reason: HOSPADM

## 2021-01-25 RX ORDER — AMLODIPINE BESYLATE 10 MG/1
10 TABLET ORAL NIGHTLY
Status: DISCONTINUED | OUTPATIENT
Start: 2021-01-25 | End: 2021-01-27 | Stop reason: HOSPADM

## 2021-01-25 RX ORDER — AMLODIPINE BESYLATE 10 MG/1
10 TABLET ORAL NIGHTLY
COMMUNITY
Start: 2020-11-23 | End: 2021-03-30 | Stop reason: SDUPTHER

## 2021-01-25 RX ORDER — HEPARIN SODIUM 5000 [USP'U]/ML
5000 INJECTION, SOLUTION INTRAVENOUS; SUBCUTANEOUS EVERY 8 HOURS
Status: DISCONTINUED | OUTPATIENT
Start: 2021-01-25 | End: 2021-01-26

## 2021-01-25 RX ORDER — HYDRALAZINE HYDROCHLORIDE 25 MG/1
25 TABLET, FILM COATED ORAL 2 TIMES DAILY
Status: ON HOLD | COMMUNITY
Start: 2021-01-08 | End: 2021-01-27 | Stop reason: SDUPTHER

## 2021-01-25 RX ORDER — FUROSEMIDE 10 MG/ML
80 INJECTION INTRAMUSCULAR; INTRAVENOUS
Status: DISCONTINUED | OUTPATIENT
Start: 2021-01-25 | End: 2021-01-25

## 2021-01-25 RX ORDER — IBUPROFEN 200 MG
24 TABLET ORAL
Status: DISCONTINUED | OUTPATIENT
Start: 2021-01-25 | End: 2021-01-27 | Stop reason: HOSPADM

## 2021-01-25 RX ORDER — HYDRALAZINE HYDROCHLORIDE 25 MG/1
25 TABLET, FILM COATED ORAL 3 TIMES DAILY
Status: DISCONTINUED | OUTPATIENT
Start: 2021-01-25 | End: 2021-01-27

## 2021-01-25 RX ORDER — IBUPROFEN 200 MG
16 TABLET ORAL
Status: DISCONTINUED | OUTPATIENT
Start: 2021-01-25 | End: 2021-01-25

## 2021-01-25 RX ORDER — AZITHROMYCIN 250 MG/1
250 TABLET, FILM COATED ORAL DAILY
Status: DISCONTINUED | OUTPATIENT
Start: 2021-01-26 | End: 2021-01-27 | Stop reason: HOSPADM

## 2021-01-25 RX ORDER — TALC
6 POWDER (GRAM) TOPICAL NIGHTLY PRN
Status: CANCELLED | OUTPATIENT
Start: 2021-01-25

## 2021-01-25 RX ORDER — SODIUM CHLORIDE 0.9 % (FLUSH) 0.9 %
10 SYRINGE (ML) INJECTION
Status: DISCONTINUED | OUTPATIENT
Start: 2021-01-25 | End: 2021-01-27 | Stop reason: HOSPADM

## 2021-01-25 RX ORDER — ASPIRIN 81 MG/1
81 TABLET ORAL DAILY
Status: DISCONTINUED | OUTPATIENT
Start: 2021-01-26 | End: 2021-01-26

## 2021-01-25 RX ADMIN — HEPARIN SODIUM 5000 UNITS: 5000 INJECTION INTRAVENOUS; SUBCUTANEOUS at 11:01

## 2021-01-25 RX ADMIN — AMLODIPINE BESYLATE 10 MG: 10 TABLET ORAL at 08:01

## 2021-01-25 RX ADMIN — FUROSEMIDE 80 MG: 10 INJECTION, SOLUTION INTRAVENOUS at 08:01

## 2021-01-25 RX ADMIN — INSULIN DETEMIR 10 UNITS: 100 INJECTION, SOLUTION SUBCUTANEOUS at 11:01

## 2021-01-25 RX ADMIN — BENZONATATE 100 MG: 100 CAPSULE ORAL at 12:01

## 2021-01-25 RX ADMIN — GUAIFENESIN AND DEXTROMETHORPHAN HYDROBROMIDE 1 TABLET: 600; 30 TABLET, EXTENDED RELEASE ORAL at 08:01

## 2021-01-25 RX ADMIN — PREGABALIN 50 MG: 50 CAPSULE ORAL at 08:01

## 2021-01-25 RX ADMIN — HYDRALAZINE HYDROCHLORIDE 25 MG: 25 TABLET, FILM COATED ORAL at 08:01

## 2021-01-25 RX ADMIN — CEFTRIAXONE SODIUM 1 G: 1 INJECTION, POWDER, FOR SOLUTION INTRAMUSCULAR; INTRAVENOUS at 03:01

## 2021-01-25 RX ADMIN — FUROSEMIDE 80 MG: 10 INJECTION, SOLUTION INTRAMUSCULAR; INTRAVENOUS at 04:01

## 2021-01-25 RX ADMIN — AZITHROMYCIN MONOHYDRATE 500 MG: 250 TABLET ORAL at 03:01

## 2021-01-25 RX ADMIN — CARVEDILOL 12.5 MG: 12.5 TABLET, FILM COATED ORAL at 08:01

## 2021-01-26 LAB
ABO + RH BLD: NORMAL
ALBUMIN SERPL BCP-MCNC: 3.3 G/DL (ref 3.5–5.2)
ANION GAP SERPL CALC-SCNC: 12 MMOL/L (ref 8–16)
BASOPHILS # BLD AUTO: 0.04 K/UL (ref 0–0.2)
BASOPHILS NFR BLD: 0.7 % (ref 0–1.9)
BLD GP AB SCN CELLS X3 SERPL QL: NORMAL
BLD PROD TYP BPU: NORMAL
BLOOD GROUP ANTIBODIES SERPL: NORMAL
BLOOD UNIT EXPIRATION DATE: NORMAL
BLOOD UNIT TYPE CODE: 5100
BLOOD UNIT TYPE: NORMAL
BUN SERPL-MCNC: 26 MG/DL (ref 6–20)
CALCIUM SERPL-MCNC: 10.4 MG/DL (ref 8.7–10.5)
CHLORIDE SERPL-SCNC: 91 MMOL/L (ref 95–110)
CO2 SERPL-SCNC: 31 MMOL/L (ref 23–29)
CODING SYSTEM: NORMAL
CREAT SERPL-MCNC: 8.2 MG/DL (ref 0.5–1.4)
DIFFERENTIAL METHOD: ABNORMAL
DISPENSE STATUS: NORMAL
EOSINOPHIL # BLD AUTO: 0.3 K/UL (ref 0–0.5)
EOSINOPHIL NFR BLD: 5.6 % (ref 0–8)
ERYTHROCYTE [DISTWIDTH] IN BLOOD BY AUTOMATED COUNT: 16.8 % (ref 11.5–14.5)
EST. GFR  (AFRICAN AMERICAN): 5.7 ML/MIN/1.73 M^2
EST. GFR  (NON AFRICAN AMERICAN): 4.9 ML/MIN/1.73 M^2
GLUCOSE SERPL-MCNC: 70 MG/DL (ref 70–110)
HCT VFR BLD AUTO: 21.4 % (ref 37–48.5)
HGB BLD-MCNC: 6.9 G/DL (ref 12–16)
IMM GRANULOCYTES # BLD AUTO: 0.02 K/UL (ref 0–0.04)
IMM GRANULOCYTES NFR BLD AUTO: 0.3 % (ref 0–0.5)
LYMPHOCYTES # BLD AUTO: 1.6 K/UL (ref 1–4.8)
LYMPHOCYTES NFR BLD: 26.8 % (ref 18–48)
MCH RBC QN AUTO: 25.5 PG (ref 27–31)
MCHC RBC AUTO-ENTMCNC: 32.2 G/DL (ref 32–36)
MCV RBC AUTO: 79 FL (ref 82–98)
MONOCYTES # BLD AUTO: 0.7 K/UL (ref 0.3–1)
MONOCYTES NFR BLD: 11.7 % (ref 4–15)
NEUTROPHILS # BLD AUTO: 3.3 K/UL (ref 1.8–7.7)
NEUTROPHILS NFR BLD: 54.9 % (ref 38–73)
NRBC BLD-RTO: 0 /100 WBC
NUM UNITS TRANS PACKED RBC: NORMAL
PHOSPHATE SERPL-MCNC: 4.9 MG/DL (ref 2.7–4.5)
PLATELET # BLD AUTO: 198 K/UL (ref 150–350)
PMV BLD AUTO: 10.4 FL (ref 9.2–12.9)
POCT GLUCOSE: 120 MG/DL (ref 70–110)
POCT GLUCOSE: 121 MG/DL (ref 70–110)
POCT GLUCOSE: 146 MG/DL (ref 70–110)
POCT GLUCOSE: 65 MG/DL (ref 70–110)
POCT GLUCOSE: 92 MG/DL (ref 70–110)
POTASSIUM SERPL-SCNC: 4.2 MMOL/L (ref 3.5–5.1)
RBC # BLD AUTO: 2.71 M/UL (ref 4–5.4)
SODIUM SERPL-SCNC: 134 MMOL/L (ref 136–145)
WBC # BLD AUTO: 6.08 K/UL (ref 3.9–12.7)

## 2021-01-26 PROCEDURE — 99232 PR SUBSEQUENT HOSPITAL CARE,LEVL II: ICD-10-PCS | Mod: NTX,,, | Performed by: HOSPITALIST

## 2021-01-26 PROCEDURE — 85025 COMPLETE CBC W/AUTO DIFF WBC: CPT | Mod: NTX,AY

## 2021-01-26 PROCEDURE — 11000001 HC ACUTE MED/SURG PRIVATE ROOM: Mod: NTX

## 2021-01-26 PROCEDURE — 99232 SBSQ HOSP IP/OBS MODERATE 35: CPT | Mod: NTX,,, | Performed by: HOSPITALIST

## 2021-01-26 PROCEDURE — 80069 RENAL FUNCTION PANEL: CPT | Mod: NTX

## 2021-01-26 PROCEDURE — 36415 COLL VENOUS BLD VENIPUNCTURE: CPT | Mod: NTX

## 2021-01-26 PROCEDURE — 86870 RBC ANTIBODY IDENTIFICATION: CPT | Mod: NTX

## 2021-01-26 PROCEDURE — 96376 TX/PRO/DX INJ SAME DRUG ADON: CPT

## 2021-01-26 PROCEDURE — P9016 RBC LEUKOCYTES REDUCED: HCPCS | Mod: NTX

## 2021-01-26 PROCEDURE — 99223 1ST HOSP IP/OBS HIGH 75: CPT | Mod: NTX,,, | Performed by: NURSE PRACTITIONER

## 2021-01-26 PROCEDURE — 36430 TRANSFUSION BLD/BLD COMPNT: CPT

## 2021-01-26 PROCEDURE — 96372 THER/PROPH/DIAG INJ SC/IM: CPT

## 2021-01-26 PROCEDURE — 99223 PR INITIAL HOSPITAL CARE,LEVL III: ICD-10-PCS | Mod: NTX,,, | Performed by: NURSE PRACTITIONER

## 2021-01-26 PROCEDURE — 86900 BLOOD TYPING SEROLOGIC ABO: CPT | Mod: NTX

## 2021-01-26 PROCEDURE — 86902 BLOOD TYPE ANTIGEN DONOR EA: CPT | Mod: 59,NTX

## 2021-01-26 PROCEDURE — 87040 BLOOD CULTURE FOR BACTERIA: CPT | Mod: 59,NTX,AY

## 2021-01-26 PROCEDURE — 63700000 PHARM REV CODE 250 ALT 637 W/O HCPCS: Mod: NTX | Performed by: HOSPITALIST

## 2021-01-26 PROCEDURE — 25000003 PHARM REV CODE 250: Mod: NTX | Performed by: HOSPITALIST

## 2021-01-26 PROCEDURE — 86922 COMPATIBILITY TEST ANTIGLOB: CPT | Mod: NTX

## 2021-01-26 PROCEDURE — 63600175 PHARM REV CODE 636 W HCPCS: Mod: NTX | Performed by: HOSPITALIST

## 2021-01-26 RX ORDER — CARVEDILOL 25 MG/1
25 TABLET ORAL 2 TIMES DAILY
Status: DISCONTINUED | OUTPATIENT
Start: 2021-01-26 | End: 2021-01-27 | Stop reason: HOSPADM

## 2021-01-26 RX ORDER — HYDROCODONE BITARTRATE AND ACETAMINOPHEN 500; 5 MG/1; MG/1
TABLET ORAL
Status: DISCONTINUED | OUTPATIENT
Start: 2021-01-26 | End: 2021-01-27 | Stop reason: HOSPADM

## 2021-01-26 RX ORDER — FUROSEMIDE 10 MG/ML
120 INJECTION INTRAMUSCULAR; INTRAVENOUS 3 TIMES DAILY
Status: DISCONTINUED | OUTPATIENT
Start: 2021-01-26 | End: 2021-01-27

## 2021-01-26 RX ORDER — MUPIROCIN 20 MG/G
OINTMENT TOPICAL 2 TIMES DAILY
Status: DISCONTINUED | OUTPATIENT
Start: 2021-01-26 | End: 2021-01-27 | Stop reason: HOSPADM

## 2021-01-26 RX ORDER — SODIUM CHLORIDE 9 MG/ML
INJECTION, SOLUTION INTRAVENOUS ONCE
Status: DISCONTINUED | OUTPATIENT
Start: 2021-01-27 | End: 2021-01-27

## 2021-01-26 RX ORDER — PREGABALIN 25 MG/1
25 CAPSULE ORAL 2 TIMES DAILY
Status: DISCONTINUED | OUTPATIENT
Start: 2021-01-26 | End: 2021-01-27 | Stop reason: HOSPADM

## 2021-01-26 RX ORDER — ACETAMINOPHEN 500 MG
1000 TABLET ORAL EVERY 6 HOURS PRN
COMMUNITY
End: 2021-07-27

## 2021-01-26 RX ORDER — ACETAMINOPHEN 500 MG
500 TABLET ORAL EVERY 8 HOURS PRN
Status: DISCONTINUED | OUTPATIENT
Start: 2021-01-26 | End: 2021-01-27 | Stop reason: HOSPADM

## 2021-01-26 RX ADMIN — CARVEDILOL 25 MG: 25 TABLET, FILM COATED ORAL at 08:01

## 2021-01-26 RX ADMIN — Medication 1 SPRAY: at 09:01

## 2021-01-26 RX ADMIN — HEPARIN SODIUM 5000 UNITS: 5000 INJECTION INTRAVENOUS; SUBCUTANEOUS at 05:01

## 2021-01-26 RX ADMIN — FUROSEMIDE 120 MG: 10 INJECTION, SOLUTION INTRAMUSCULAR; INTRAVENOUS at 09:01

## 2021-01-26 RX ADMIN — HYDRALAZINE HYDROCHLORIDE 25 MG: 25 TABLET, FILM COATED ORAL at 02:01

## 2021-01-26 RX ADMIN — GUAIFENESIN AND DEXTROMETHORPHAN HYDROBROMIDE 1 TABLET: 600; 30 TABLET, EXTENDED RELEASE ORAL at 08:01

## 2021-01-26 RX ADMIN — HYDRALAZINE HYDROCHLORIDE 25 MG: 25 TABLET, FILM COATED ORAL at 08:01

## 2021-01-26 RX ADMIN — SEVELAMER CARBONATE 2400 MG: 800 TABLET, FILM COATED ORAL at 12:01

## 2021-01-26 RX ADMIN — FUROSEMIDE 80 MG: 10 INJECTION, SOLUTION INTRAVENOUS at 08:01

## 2021-01-26 RX ADMIN — SEVELAMER CARBONATE 2400 MG: 800 TABLET, FILM COATED ORAL at 08:01

## 2021-01-26 RX ADMIN — ASPIRIN 81 MG: 81 TABLET, COATED ORAL at 08:01

## 2021-01-26 RX ADMIN — AMLODIPINE BESYLATE 10 MG: 10 TABLET ORAL at 09:01

## 2021-01-26 RX ADMIN — LOSARTAN POTASSIUM 100 MG: 50 TABLET, FILM COATED ORAL at 08:01

## 2021-01-26 RX ADMIN — FUROSEMIDE 120 MG: 10 INJECTION, SOLUTION INTRAMUSCULAR; INTRAVENOUS at 02:01

## 2021-01-26 RX ADMIN — ATORVASTATIN CALCIUM 40 MG: 20 TABLET, FILM COATED ORAL at 08:01

## 2021-01-26 RX ADMIN — CARVEDILOL 25 MG: 25 TABLET, FILM COATED ORAL at 09:01

## 2021-01-26 RX ADMIN — PREGABALIN 50 MG: 50 CAPSULE ORAL at 08:01

## 2021-01-26 RX ADMIN — CEFTRIAXONE SODIUM 1 G: 1 INJECTION, POWDER, FOR SOLUTION INTRAMUSCULAR; INTRAVENOUS at 02:01

## 2021-01-26 RX ADMIN — HYDRALAZINE HYDROCHLORIDE 25 MG: 25 TABLET, FILM COATED ORAL at 09:01

## 2021-01-26 RX ADMIN — SEVELAMER CARBONATE 2400 MG: 800 TABLET, FILM COATED ORAL at 05:01

## 2021-01-26 RX ADMIN — AZITHROMYCIN MONOHYDRATE 250 MG: 250 TABLET ORAL at 02:01

## 2021-01-26 RX ADMIN — PREGABALIN 25 MG: 25 CAPSULE ORAL at 09:01

## 2021-01-26 RX ADMIN — MUPIROCIN: 20 OINTMENT TOPICAL at 10:01

## 2021-01-26 RX ADMIN — GUAIFENESIN AND DEXTROMETHORPHAN HYDROBROMIDE 1 TABLET: 600; 30 TABLET, EXTENDED RELEASE ORAL at 09:01

## 2021-01-26 RX ADMIN — Medication 1 SPRAY: at 02:01

## 2021-01-27 VITALS
HEIGHT: 64 IN | RESPIRATION RATE: 17 BRPM | BODY MASS INDEX: 33.12 KG/M2 | OXYGEN SATURATION: 97 % | WEIGHT: 194 LBS | SYSTOLIC BLOOD PRESSURE: 164 MMHG | HEART RATE: 79 BPM | DIASTOLIC BLOOD PRESSURE: 75 MMHG | TEMPERATURE: 98 F

## 2021-01-27 LAB
ALBUMIN SERPL BCP-MCNC: 3.4 G/DL (ref 3.5–5.2)
ANION GAP SERPL CALC-SCNC: 14 MMOL/L (ref 8–16)
BASOPHILS # BLD AUTO: 0.05 K/UL (ref 0–0.2)
BASOPHILS NFR BLD: 0.7 % (ref 0–1.9)
BUN SERPL-MCNC: 38 MG/DL (ref 6–20)
CALCIUM SERPL-MCNC: 10.2 MG/DL (ref 8.7–10.5)
CHLORIDE SERPL-SCNC: 91 MMOL/L (ref 95–110)
CO2 SERPL-SCNC: 28 MMOL/L (ref 23–29)
CREAT SERPL-MCNC: 10.7 MG/DL (ref 0.5–1.4)
DIFFERENTIAL METHOD: ABNORMAL
EOSINOPHIL # BLD AUTO: 0.4 K/UL (ref 0–0.5)
EOSINOPHIL NFR BLD: 5.8 % (ref 0–8)
ERYTHROCYTE [DISTWIDTH] IN BLOOD BY AUTOMATED COUNT: 16.4 % (ref 11.5–14.5)
EST. GFR  (AFRICAN AMERICAN): 4.1 ML/MIN/1.73 M^2
EST. GFR  (NON AFRICAN AMERICAN): 3.6 ML/MIN/1.73 M^2
GLUCOSE SERPL-MCNC: 110 MG/DL (ref 70–110)
HCT VFR BLD AUTO: 23.8 % (ref 37–48.5)
HGB BLD-MCNC: 7.6 G/DL (ref 12–16)
IMM GRANULOCYTES # BLD AUTO: 0.02 K/UL (ref 0–0.04)
IMM GRANULOCYTES NFR BLD AUTO: 0.3 % (ref 0–0.5)
LYMPHOCYTES # BLD AUTO: 1.8 K/UL (ref 1–4.8)
LYMPHOCYTES NFR BLD: 23.6 % (ref 18–48)
MCH RBC QN AUTO: 25.5 PG (ref 27–31)
MCHC RBC AUTO-ENTMCNC: 31.9 G/DL (ref 32–36)
MCV RBC AUTO: 80 FL (ref 82–98)
MONOCYTES # BLD AUTO: 0.9 K/UL (ref 0.3–1)
MONOCYTES NFR BLD: 11.4 % (ref 4–15)
NEUTROPHILS # BLD AUTO: 4.3 K/UL (ref 1.8–7.7)
NEUTROPHILS NFR BLD: 58.2 % (ref 38–73)
NRBC BLD-RTO: 0 /100 WBC
PHOSPHATE SERPL-MCNC: 5.4 MG/DL (ref 2.7–4.5)
PLATELET # BLD AUTO: 187 K/UL (ref 150–350)
PMV BLD AUTO: 10.7 FL (ref 9.2–12.9)
POTASSIUM SERPL-SCNC: 4.5 MMOL/L (ref 3.5–5.1)
RBC # BLD AUTO: 2.98 M/UL (ref 4–5.4)
SODIUM SERPL-SCNC: 133 MMOL/L (ref 136–145)
WBC # BLD AUTO: 7.43 K/UL (ref 3.9–12.7)

## 2021-01-27 PROCEDURE — 90935 HEMODIALYSIS ONE EVALUATION: CPT | Mod: NTX

## 2021-01-27 PROCEDURE — 63600175 PHARM REV CODE 636 W HCPCS: Mod: NTX | Performed by: HOSPITALIST

## 2021-01-27 PROCEDURE — 80069 RENAL FUNCTION PANEL: CPT | Mod: NTX,AY

## 2021-01-27 PROCEDURE — 80100016 HC MAINTENANCE HEMODIALYSIS: Mod: NTX

## 2021-01-27 PROCEDURE — 85025 COMPLETE CBC W/AUTO DIFF WBC: CPT | Mod: NTX,AY

## 2021-01-27 PROCEDURE — 99239 HOSP IP/OBS DSCHRG MGMT >30: CPT | Mod: NTX,,, | Performed by: HOSPITALIST

## 2021-01-27 PROCEDURE — 99239 PR HOSPITAL DISCHARGE DAY,>30 MIN: ICD-10-PCS | Mod: NTX,,, | Performed by: HOSPITALIST

## 2021-01-27 PROCEDURE — 36415 COLL VENOUS BLD VENIPUNCTURE: CPT | Mod: NTX

## 2021-01-27 PROCEDURE — 63700000 PHARM REV CODE 250 ALT 637 W/O HCPCS: Mod: NTX | Performed by: HOSPITALIST

## 2021-01-27 PROCEDURE — 90935 PR HEMODIALYSIS, ONE EVALUATION: ICD-10-PCS | Mod: GC,NTX,, | Performed by: INTERNAL MEDICINE

## 2021-01-27 PROCEDURE — 25000003 PHARM REV CODE 250: Mod: NTX | Performed by: NURSE PRACTITIONER

## 2021-01-27 PROCEDURE — 90935 HEMODIALYSIS ONE EVALUATION: CPT | Mod: GC,NTX,, | Performed by: INTERNAL MEDICINE

## 2021-01-27 PROCEDURE — 25000003 PHARM REV CODE 250: Mod: NTX | Performed by: HOSPITALIST

## 2021-01-27 RX ORDER — LEVOFLOXACIN 500 MG/1
500 TABLET, FILM COATED ORAL EVERY OTHER DAY
Qty: 2 TABLET | Refills: 0 | Status: ON HOLD | OUTPATIENT
Start: 2021-01-28 | End: 2021-02-01 | Stop reason: HOSPADM

## 2021-01-27 RX ORDER — CARVEDILOL 25 MG/1
25 TABLET ORAL 2 TIMES DAILY
Qty: 120 TABLET | Refills: 2 | Status: SHIPPED | OUTPATIENT
Start: 2021-01-27 | End: 2021-02-25

## 2021-01-27 RX ORDER — GUAIFENESIN/DEXTROMETHORPHAN 100-10MG/5
5 SYRUP ORAL EVERY 6 HOURS PRN
Qty: 118 ML | Refills: 0 | Status: SHIPPED | OUTPATIENT
Start: 2021-01-27 | End: 2021-02-06

## 2021-01-27 RX ORDER — HYDRALAZINE HYDROCHLORIDE 25 MG/1
75 TABLET, FILM COATED ORAL EVERY 8 HOURS
Qty: 270 TABLET | Refills: 2 | Status: ON HOLD | OUTPATIENT
Start: 2021-01-27 | End: 2021-05-09

## 2021-01-27 RX ORDER — INSULIN GLARGINE 100 [IU]/ML
25 INJECTION, SOLUTION SUBCUTANEOUS NIGHTLY
Status: ON HOLD
Start: 2021-01-27 | End: 2021-07-01 | Stop reason: HOSPADM

## 2021-01-27 RX ORDER — SODIUM CHLORIDE 9 MG/ML
INJECTION, SOLUTION INTRAVENOUS ONCE
Status: COMPLETED | OUTPATIENT
Start: 2021-01-27 | End: 2021-01-27

## 2021-01-27 RX ADMIN — MUPIROCIN: 20 OINTMENT TOPICAL at 09:01

## 2021-01-27 RX ADMIN — Medication 1 SPRAY: at 03:01

## 2021-01-27 RX ADMIN — GUAIFENESIN AND DEXTROMETHORPHAN HYDROBROMIDE 1 TABLET: 600; 30 TABLET, EXTENDED RELEASE ORAL at 12:01

## 2021-01-27 RX ADMIN — AZITHROMYCIN MONOHYDRATE 250 MG: 250 TABLET ORAL at 12:01

## 2021-01-27 RX ADMIN — CEFTRIAXONE SODIUM 1 G: 1 INJECTION, POWDER, FOR SOLUTION INTRAMUSCULAR; INTRAVENOUS at 03:01

## 2021-01-27 RX ADMIN — Medication 1 SPRAY: at 09:01

## 2021-01-27 RX ADMIN — PREGABALIN 25 MG: 25 CAPSULE ORAL at 12:01

## 2021-01-27 RX ADMIN — SODIUM CHLORIDE: 0.9 INJECTION, SOLUTION INTRAVENOUS at 09:01

## 2021-01-27 RX ADMIN — FUROSEMIDE 120 MG: 10 INJECTION, SOLUTION INTRAMUSCULAR; INTRAVENOUS at 12:01

## 2021-01-27 RX ADMIN — HYDRALAZINE HYDROCHLORIDE 75 MG: 50 TABLET ORAL at 03:01

## 2021-01-27 RX ADMIN — CARVEDILOL 25 MG: 25 TABLET, FILM COATED ORAL at 12:01

## 2021-01-27 RX ADMIN — ATORVASTATIN CALCIUM 40 MG: 20 TABLET, FILM COATED ORAL at 12:01

## 2021-01-27 RX ADMIN — ACETAMINOPHEN 500 MG: 500 TABLET ORAL at 03:01

## 2021-01-27 RX ADMIN — HYDRALAZINE HYDROCHLORIDE 75 MG: 50 TABLET ORAL at 09:01

## 2021-01-27 RX ADMIN — SEVELAMER CARBONATE 2400 MG: 800 TABLET, FILM COATED ORAL at 12:01

## 2021-01-27 RX ADMIN — LOSARTAN POTASSIUM 100 MG: 50 TABLET, FILM COATED ORAL at 12:01

## 2021-01-28 LAB
BACTERIA BLD CULT: ABNORMAL

## 2021-01-29 ENCOUNTER — PATIENT OUTREACH (OUTPATIENT)
Dept: ADMINISTRATIVE | Facility: CLINIC | Age: 56
End: 2021-01-29

## 2021-01-30 LAB — BACTERIA BLD CULT: NORMAL

## 2021-01-31 ENCOUNTER — HOSPITAL ENCOUNTER (OUTPATIENT)
Facility: HOSPITAL | Age: 56
Discharge: HOME | End: 2021-02-01
Attending: EMERGENCY MEDICINE | Admitting: STUDENT IN AN ORGANIZED HEALTH CARE EDUCATION/TRAINING PROGRAM
Payer: MEDICARE

## 2021-01-31 DIAGNOSIS — R07.9 CHEST PAIN: ICD-10-CM

## 2021-01-31 DIAGNOSIS — R06.02 SOB (SHORTNESS OF BREATH): ICD-10-CM

## 2021-01-31 PROBLEM — Z86.16 HISTORY OF COVID-19: Status: ACTIVE | Noted: 2020-04-07

## 2021-01-31 LAB
ALBUMIN SERPL BCP-MCNC: 3.6 G/DL (ref 3.5–5.2)
ALP SERPL-CCNC: 55 U/L (ref 55–135)
ALT SERPL W/O P-5'-P-CCNC: 12 U/L (ref 10–44)
ANION GAP SERPL CALC-SCNC: 15 MMOL/L (ref 8–16)
AST SERPL-CCNC: 17 U/L (ref 10–40)
BACTERIA BLD CULT: NORMAL
BACTERIA BLD CULT: NORMAL
BASOPHILS # BLD AUTO: 0.04 K/UL (ref 0–0.2)
BASOPHILS NFR BLD: 0.5 % (ref 0–1.9)
BILIRUB SERPL-MCNC: 0.6 MG/DL (ref 0.1–1)
BNP SERPL-MCNC: 333 PG/ML (ref 0–99)
BUN SERPL-MCNC: 29 MG/DL (ref 6–20)
CALCIUM SERPL-MCNC: 10.4 MG/DL (ref 8.7–10.5)
CHLORIDE SERPL-SCNC: 96 MMOL/L (ref 95–110)
CO2 SERPL-SCNC: 27 MMOL/L (ref 23–29)
CREAT SERPL-MCNC: 8.7 MG/DL (ref 0.5–1.4)
CTP QC/QA: YES
DIFFERENTIAL METHOD: ABNORMAL
EOSINOPHIL # BLD AUTO: 0.2 K/UL (ref 0–0.5)
EOSINOPHIL NFR BLD: 2.8 % (ref 0–8)
ERYTHROCYTE [DISTWIDTH] IN BLOOD BY AUTOMATED COUNT: 16.8 % (ref 11.5–14.5)
EST. GFR  (AFRICAN AMERICAN): 5.3 ML/MIN/1.73 M^2
EST. GFR  (NON AFRICAN AMERICAN): 4.6 ML/MIN/1.73 M^2
ESTIMATED AVG GLUCOSE: 111 MG/DL (ref 68–131)
GLUCOSE SERPL-MCNC: 134 MG/DL (ref 70–110)
HBA1C MFR BLD: 5.5 % (ref 4–5.6)
HCT VFR BLD AUTO: 24.2 % (ref 37–48.5)
HGB BLD-MCNC: 7.6 G/DL (ref 12–16)
IMM GRANULOCYTES # BLD AUTO: 0.03 K/UL (ref 0–0.04)
IMM GRANULOCYTES NFR BLD AUTO: 0.4 % (ref 0–0.5)
LYMPHOCYTES # BLD AUTO: 1.6 K/UL (ref 1–4.8)
LYMPHOCYTES NFR BLD: 21.5 % (ref 18–48)
MCH RBC QN AUTO: 24.9 PG (ref 27–31)
MCHC RBC AUTO-ENTMCNC: 31.4 G/DL (ref 32–36)
MCV RBC AUTO: 79 FL (ref 82–98)
MONOCYTES # BLD AUTO: 0.9 K/UL (ref 0.3–1)
MONOCYTES NFR BLD: 12 % (ref 4–15)
NEUTROPHILS # BLD AUTO: 4.7 K/UL (ref 1.8–7.7)
NEUTROPHILS NFR BLD: 62.8 % (ref 38–73)
NRBC BLD-RTO: 0 /100 WBC
PLATELET # BLD AUTO: 209 K/UL (ref 150–350)
PMV BLD AUTO: 9.6 FL (ref 9.2–12.9)
POCT GLUCOSE: 109 MG/DL (ref 70–110)
POCT GLUCOSE: 111 MG/DL (ref 70–110)
POCT GLUCOSE: 112 MG/DL (ref 70–110)
POCT GLUCOSE: 130 MG/DL (ref 70–110)
POTASSIUM SERPL-SCNC: 3.8 MMOL/L (ref 3.5–5.1)
PROT SERPL-MCNC: 7.5 G/DL (ref 6–8.4)
RBC # BLD AUTO: 3.05 M/UL (ref 4–5.4)
SARS-COV-2 RDRP RESP QL NAA+PROBE: NEGATIVE
SODIUM SERPL-SCNC: 138 MMOL/L (ref 136–145)
TROPONIN I SERPL DL<=0.01 NG/ML-MCNC: 0.01 NG/ML (ref 0–0.03)
TROPONIN I SERPL DL<=0.01 NG/ML-MCNC: 0.01 NG/ML (ref 0–0.03)
WBC # BLD AUTO: 7.5 K/UL (ref 3.9–12.7)

## 2021-01-31 PROCEDURE — 25000003 PHARM REV CODE 250: Mod: NTX | Performed by: STUDENT IN AN ORGANIZED HEALTH CARE EDUCATION/TRAINING PROGRAM

## 2021-01-31 PROCEDURE — 63600175 PHARM REV CODE 636 W HCPCS: Mod: NTX | Performed by: STUDENT IN AN ORGANIZED HEALTH CARE EDUCATION/TRAINING PROGRAM

## 2021-01-31 PROCEDURE — C9399 UNCLASSIFIED DRUGS OR BIOLOG: HCPCS | Mod: NTX | Performed by: STUDENT IN AN ORGANIZED HEALTH CARE EDUCATION/TRAINING PROGRAM

## 2021-01-31 PROCEDURE — 99214 OFFICE O/P EST MOD 30 MIN: CPT | Mod: NTX,,, | Performed by: INTERNAL MEDICINE

## 2021-01-31 PROCEDURE — 83880 ASSAY OF NATRIURETIC PEPTIDE: CPT | Mod: NTX

## 2021-01-31 PROCEDURE — G0378 HOSPITAL OBSERVATION PER HR: HCPCS | Mod: NTX

## 2021-01-31 PROCEDURE — 93010 ELECTROCARDIOGRAM REPORT: CPT | Mod: NTX,,, | Performed by: INTERNAL MEDICINE

## 2021-01-31 PROCEDURE — 25000003 PHARM REV CODE 250: Mod: NTX | Performed by: INTERNAL MEDICINE

## 2021-01-31 PROCEDURE — 99285 PR EMERGENCY DEPT VISIT,LEVEL V: ICD-10-PCS | Mod: GC,NTX,, | Performed by: EMERGENCY MEDICINE

## 2021-01-31 PROCEDURE — 99285 EMERGENCY DEPT VISIT HI MDM: CPT | Mod: GC,NTX,, | Performed by: EMERGENCY MEDICINE

## 2021-01-31 PROCEDURE — 93010 ELECTROCARDIOGRAM REPORT: CPT | Mod: 76,NTX,, | Performed by: INTERNAL MEDICINE

## 2021-01-31 PROCEDURE — 85025 COMPLETE CBC W/AUTO DIFF WBC: CPT | Mod: NTX

## 2021-01-31 PROCEDURE — U0002 COVID-19 LAB TEST NON-CDC: HCPCS | Mod: NTX | Performed by: EMERGENCY MEDICINE

## 2021-01-31 PROCEDURE — 84484 ASSAY OF TROPONIN QUANT: CPT | Mod: 91,NTX

## 2021-01-31 PROCEDURE — 96372 THER/PROPH/DIAG INJ SC/IM: CPT | Mod: NTX

## 2021-01-31 PROCEDURE — 80053 COMPREHEN METABOLIC PANEL: CPT | Mod: NTX

## 2021-01-31 PROCEDURE — 93005 ELECTROCARDIOGRAM TRACING: CPT | Mod: NTX

## 2021-01-31 PROCEDURE — 99220 PR INITIAL OBSERVATION CARE,LEVL III: ICD-10-PCS | Mod: GC,NTX,, | Performed by: STUDENT IN AN ORGANIZED HEALTH CARE EDUCATION/TRAINING PROGRAM

## 2021-01-31 PROCEDURE — 93010 EKG 12-LEAD: ICD-10-PCS | Mod: 76,NTX,, | Performed by: INTERNAL MEDICINE

## 2021-01-31 PROCEDURE — 83036 HEMOGLOBIN GLYCOSYLATED A1C: CPT | Mod: NTX

## 2021-01-31 PROCEDURE — 84484 ASSAY OF TROPONIN QUANT: CPT | Mod: NTX

## 2021-01-31 PROCEDURE — 99285 EMERGENCY DEPT VISIT HI MDM: CPT | Mod: 25,NTX

## 2021-01-31 PROCEDURE — 99214 PR OFFICE/OUTPT VISIT, EST, LEVL IV, 30-39 MIN: ICD-10-PCS | Mod: NTX,,, | Performed by: INTERNAL MEDICINE

## 2021-01-31 PROCEDURE — 99220 PR INITIAL OBSERVATION CARE,LEVL III: CPT | Mod: GC,NTX,, | Performed by: STUDENT IN AN ORGANIZED HEALTH CARE EDUCATION/TRAINING PROGRAM

## 2021-01-31 RX ORDER — IBUPROFEN 200 MG
24 TABLET ORAL
Status: DISCONTINUED | OUTPATIENT
Start: 2021-01-31 | End: 2021-02-01 | Stop reason: HOSPADM

## 2021-01-31 RX ORDER — KETOROLAC TROMETHAMINE 30 MG/ML
15 INJECTION, SOLUTION INTRAMUSCULAR; INTRAVENOUS ONCE
Status: COMPLETED | OUTPATIENT
Start: 2021-01-31 | End: 2021-02-01

## 2021-01-31 RX ORDER — CARVEDILOL 12.5 MG/1
25 TABLET ORAL ONCE
Status: COMPLETED | OUTPATIENT
Start: 2021-01-31 | End: 2021-01-31

## 2021-01-31 RX ORDER — SODIUM CHLORIDE 9 MG/ML
INJECTION, SOLUTION INTRAVENOUS ONCE
Status: DISCONTINUED | OUTPATIENT
Start: 2021-01-31 | End: 2021-02-01 | Stop reason: HOSPADM

## 2021-01-31 RX ORDER — CARVEDILOL 25 MG/1
25 TABLET ORAL 2 TIMES DAILY
Status: DISCONTINUED | OUTPATIENT
Start: 2021-01-31 | End: 2021-02-01

## 2021-01-31 RX ORDER — AMLODIPINE BESYLATE 10 MG/1
10 TABLET ORAL NIGHTLY
Status: DISCONTINUED | OUTPATIENT
Start: 2021-01-31 | End: 2021-02-01 | Stop reason: HOSPADM

## 2021-01-31 RX ORDER — SODIUM CHLORIDE 0.9 % (FLUSH) 0.9 %
10 SYRINGE (ML) INJECTION
Status: DISCONTINUED | OUTPATIENT
Start: 2021-01-31 | End: 2021-02-01 | Stop reason: HOSPADM

## 2021-01-31 RX ORDER — ASPIRIN 325 MG
325 TABLET ORAL
Status: COMPLETED | OUTPATIENT
Start: 2021-01-31 | End: 2021-01-31

## 2021-01-31 RX ORDER — SODIUM CHLORIDE 9 MG/ML
INJECTION, SOLUTION INTRAVENOUS ONCE
Status: CANCELLED | OUTPATIENT
Start: 2021-01-31 | End: 2021-01-31

## 2021-01-31 RX ORDER — ATORVASTATIN CALCIUM 40 MG/1
40 TABLET, FILM COATED ORAL DAILY
Status: DISCONTINUED | OUTPATIENT
Start: 2021-01-31 | End: 2021-02-01 | Stop reason: HOSPADM

## 2021-01-31 RX ORDER — IBUPROFEN 200 MG
16 TABLET ORAL
Status: DISCONTINUED | OUTPATIENT
Start: 2021-01-31 | End: 2021-02-01 | Stop reason: HOSPADM

## 2021-01-31 RX ORDER — FUROSEMIDE 40 MG/1
40 TABLET ORAL 2 TIMES DAILY
Status: DISCONTINUED | OUTPATIENT
Start: 2021-01-31 | End: 2021-02-01 | Stop reason: HOSPADM

## 2021-01-31 RX ORDER — HYDRALAZINE HYDROCHLORIDE 25 MG/1
25 TABLET, FILM COATED ORAL ONCE
Status: COMPLETED | OUTPATIENT
Start: 2021-01-31 | End: 2021-01-31

## 2021-01-31 RX ORDER — INSULIN ASPART 100 [IU]/ML
0-5 INJECTION, SOLUTION INTRAVENOUS; SUBCUTANEOUS
Status: DISCONTINUED | OUTPATIENT
Start: 2021-01-31 | End: 2021-02-01 | Stop reason: HOSPADM

## 2021-01-31 RX ORDER — HEPARIN SODIUM 5000 [USP'U]/ML
5000 INJECTION, SOLUTION INTRAVENOUS; SUBCUTANEOUS EVERY 8 HOURS
Status: DISCONTINUED | OUTPATIENT
Start: 2021-01-31 | End: 2021-02-01 | Stop reason: HOSPADM

## 2021-01-31 RX ORDER — FUROSEMIDE 40 MG/1
40 TABLET ORAL
Status: COMPLETED | OUTPATIENT
Start: 2021-01-31 | End: 2021-01-31

## 2021-01-31 RX ORDER — GLUCAGON 1 MG
1 KIT INJECTION
Status: DISCONTINUED | OUTPATIENT
Start: 2021-01-31 | End: 2021-02-01 | Stop reason: HOSPADM

## 2021-01-31 RX ORDER — TALC
6 POWDER (GRAM) TOPICAL NIGHTLY PRN
Status: DISCONTINUED | OUTPATIENT
Start: 2021-01-31 | End: 2021-02-01 | Stop reason: HOSPADM

## 2021-01-31 RX ORDER — BENZONATATE 100 MG/1
100 CAPSULE ORAL 3 TIMES DAILY PRN
Status: DISCONTINUED | OUTPATIENT
Start: 2021-01-31 | End: 2021-02-01 | Stop reason: HOSPADM

## 2021-01-31 RX ORDER — AMLODIPINE BESYLATE 10 MG/1
10 TABLET ORAL
Status: COMPLETED | OUTPATIENT
Start: 2021-01-31 | End: 2021-01-31

## 2021-01-31 RX ORDER — LABETALOL HCL 20 MG/4 ML
20 SYRINGE (ML) INTRAVENOUS EVERY 4 HOURS PRN
Status: DISCONTINUED | OUTPATIENT
Start: 2021-01-31 | End: 2021-02-01 | Stop reason: HOSPADM

## 2021-01-31 RX ORDER — ACETAMINOPHEN 325 MG/1
650 TABLET ORAL EVERY 4 HOURS PRN
Status: DISCONTINUED | OUTPATIENT
Start: 2021-01-31 | End: 2021-02-01 | Stop reason: HOSPADM

## 2021-01-31 RX ORDER — PREGABALIN 25 MG/1
25 CAPSULE ORAL 2 TIMES DAILY
Status: DISCONTINUED | OUTPATIENT
Start: 2021-01-31 | End: 2021-02-01 | Stop reason: HOSPADM

## 2021-01-31 RX ORDER — LOSARTAN POTASSIUM 50 MG/1
100 TABLET ORAL DAILY
Status: DISCONTINUED | OUTPATIENT
Start: 2021-01-31 | End: 2021-02-01 | Stop reason: HOSPADM

## 2021-01-31 RX ORDER — MUPIROCIN 20 MG/G
OINTMENT TOPICAL 2 TIMES DAILY
Status: DISCONTINUED | OUTPATIENT
Start: 2021-01-31 | End: 2021-02-01 | Stop reason: HOSPADM

## 2021-01-31 RX ORDER — SEVELAMER CARBONATE 800 MG/1
2400 TABLET, FILM COATED ORAL
Status: DISCONTINUED | OUTPATIENT
Start: 2021-01-31 | End: 2021-02-01 | Stop reason: HOSPADM

## 2021-01-31 RX ORDER — SODIUM CHLORIDE 9 MG/ML
INJECTION, SOLUTION INTRAVENOUS
Status: CANCELLED | OUTPATIENT
Start: 2021-01-31

## 2021-01-31 RX ADMIN — AMLODIPINE BESYLATE 10 MG: 10 TABLET ORAL at 09:01

## 2021-01-31 RX ADMIN — FUROSEMIDE 40 MG: 40 TABLET ORAL at 04:01

## 2021-01-31 RX ADMIN — SEVELAMER CARBONATE 2400 MG: 800 TABLET, FILM COATED ORAL at 12:01

## 2021-01-31 RX ADMIN — INSULIN DETEMIR 12 UNITS: 100 INJECTION, SOLUTION SUBCUTANEOUS at 09:01

## 2021-01-31 RX ADMIN — HEPARIN SODIUM 5000 UNITS: 5000 INJECTION INTRAVENOUS; SUBCUTANEOUS at 02:01

## 2021-01-31 RX ADMIN — HYDRALAZINE HYDROCHLORIDE 25 MG: 25 TABLET, FILM COATED ORAL at 04:01

## 2021-01-31 RX ADMIN — HYDRALAZINE HYDROCHLORIDE 75 MG: 50 TABLET, FILM COATED ORAL at 09:01

## 2021-01-31 RX ADMIN — CARVEDILOL 25 MG: 25 TABLET, FILM COATED ORAL at 09:01

## 2021-01-31 RX ADMIN — SEVELAMER CARBONATE 2400 MG: 800 TABLET, FILM COATED ORAL at 08:01

## 2021-01-31 RX ADMIN — CARVEDILOL 25 MG: 12.5 TABLET, FILM COATED ORAL at 04:01

## 2021-01-31 RX ADMIN — AMLODIPINE BESYLATE 10 MG: 10 TABLET ORAL at 04:01

## 2021-01-31 RX ADMIN — HEPARIN SODIUM 5000 UNITS: 5000 INJECTION INTRAVENOUS; SUBCUTANEOUS at 09:01

## 2021-01-31 RX ADMIN — CARVEDILOL 25 MG: 25 TABLET, FILM COATED ORAL at 08:01

## 2021-01-31 RX ADMIN — PREGABALIN 25 MG: 25 CAPSULE ORAL at 08:01

## 2021-01-31 RX ADMIN — HYDRALAZINE HYDROCHLORIDE 75 MG: 50 TABLET, FILM COATED ORAL at 08:01

## 2021-01-31 RX ADMIN — FUROSEMIDE 40 MG: 40 TABLET ORAL at 08:01

## 2021-01-31 RX ADMIN — HYDRALAZINE HYDROCHLORIDE 75 MG: 50 TABLET, FILM COATED ORAL at 02:01

## 2021-01-31 RX ADMIN — PREGABALIN 25 MG: 25 CAPSULE ORAL at 09:01

## 2021-01-31 RX ADMIN — FUROSEMIDE 40 MG: 40 TABLET ORAL at 09:01

## 2021-01-31 RX ADMIN — LOSARTAN POTASSIUM 100 MG: 50 TABLET, FILM COATED ORAL at 08:01

## 2021-01-31 RX ADMIN — ATORVASTATIN CALCIUM 40 MG: 40 TABLET, FILM COATED ORAL at 08:01

## 2021-01-31 RX ADMIN — MUPIROCIN: 20 OINTMENT TOPICAL at 09:01

## 2021-01-31 RX ADMIN — ASPIRIN 325 MG ORAL TABLET 325 MG: 325 PILL ORAL at 03:01

## 2021-01-31 RX ADMIN — SEVELAMER CARBONATE 2400 MG: 800 TABLET, FILM COATED ORAL at 05:01

## 2021-02-01 VITALS
DIASTOLIC BLOOD PRESSURE: 73 MMHG | WEIGHT: 184 LBS | OXYGEN SATURATION: 96 % | TEMPERATURE: 98 F | HEIGHT: 64 IN | HEART RATE: 82 BPM | BODY MASS INDEX: 31.41 KG/M2 | SYSTOLIC BLOOD PRESSURE: 166 MMHG | RESPIRATION RATE: 20 BRPM

## 2021-02-01 PROBLEM — J18.9 BILATERAL PNEUMONIA: Status: RESOLVED | Noted: 2021-01-25 | Resolved: 2021-02-01

## 2021-02-01 LAB
ALBUMIN SERPL BCP-MCNC: 3.5 G/DL (ref 3.5–5.2)
ALP SERPL-CCNC: 50 U/L (ref 55–135)
ALT SERPL W/O P-5'-P-CCNC: 11 U/L (ref 10–44)
ANION GAP SERPL CALC-SCNC: 15 MMOL/L (ref 8–16)
ASCENDING AORTA: 2.78 CM
AST SERPL-CCNC: 14 U/L (ref 10–40)
AV INDEX (PROSTH): 0.73
AV MEAN GRADIENT: 6 MMHG
AV PEAK GRADIENT: 11 MMHG
AV VALVE AREA: 2.76 CM2
AV VELOCITY RATIO: 0.71
BASOPHILS # BLD AUTO: 0.04 K/UL (ref 0–0.2)
BASOPHILS NFR BLD: 0.6 % (ref 0–1.9)
BILIRUB SERPL-MCNC: 0.5 MG/DL (ref 0.1–1)
BSA FOR ECHO PROCEDURE: 1.94 M2
BUN SERPL-MCNC: 39 MG/DL (ref 6–20)
CALCIUM SERPL-MCNC: 10 MG/DL (ref 8.7–10.5)
CHLORIDE SERPL-SCNC: 95 MMOL/L (ref 95–110)
CO2 SERPL-SCNC: 26 MMOL/L (ref 23–29)
CREAT SERPL-MCNC: 11.2 MG/DL (ref 0.5–1.4)
CV ECHO LV RWT: 0.47 CM
DIFFERENTIAL METHOD: ABNORMAL
DOP CALC AO PEAK VEL: 1.66 M/S
DOP CALC AO VTI: 36.66 CM
DOP CALC LVOT AREA: 3.8 CM2
DOP CALC LVOT DIAMETER: 2.2 CM
DOP CALC LVOT PEAK VEL: 1.18 M/S
DOP CALC LVOT STROKE VOLUME: 101.18 CM3
DOP CALCLVOT PEAK VEL VTI: 26.63 CM
E WAVE DECELERATION TIME: 229.06 MSEC
E/A RATIO: 1.44
E/E' RATIO: 20.17 M/S
ECHO LV POSTERIOR WALL: 1.17 CM (ref 0.6–1.1)
EOSINOPHIL # BLD AUTO: 0.3 K/UL (ref 0–0.5)
EOSINOPHIL NFR BLD: 4.3 % (ref 0–8)
ERYTHROCYTE [DISTWIDTH] IN BLOOD BY AUTOMATED COUNT: 16.7 % (ref 11.5–14.5)
EST. GFR  (AFRICAN AMERICAN): 3.9 ML/MIN/1.73 M^2
EST. GFR  (NON AFRICAN AMERICAN): 3.4 ML/MIN/1.73 M^2
FRACTIONAL SHORTENING: 31 % (ref 28–44)
GLUCOSE SERPL-MCNC: 99 MG/DL (ref 70–110)
HCT VFR BLD AUTO: 22.6 % (ref 37–48.5)
HGB BLD-MCNC: 7.1 G/DL (ref 12–16)
IMM GRANULOCYTES # BLD AUTO: 0.02 K/UL (ref 0–0.04)
IMM GRANULOCYTES NFR BLD AUTO: 0.3 % (ref 0–0.5)
INTERVENTRICULAR SEPTUM: 1.25 CM (ref 0.6–1.1)
LA MAJOR: 5.76 CM
LA MINOR: 5.87 CM
LA WIDTH: 4.52 CM
LEFT ATRIUM SIZE: 4.27 CM
LEFT ATRIUM VOLUME INDEX MOD: 40.2 ML/M2
LEFT ATRIUM VOLUME INDEX: 50.5 ML/M2
LEFT ATRIUM VOLUME MOD: 75.99 CM3
LEFT ATRIUM VOLUME: 95.39 CM3
LEFT INTERNAL DIMENSION IN SYSTOLE: 3.44 CM (ref 2.1–4)
LEFT VENTRICLE DIASTOLIC VOLUME INDEX: 61.31 ML/M2
LEFT VENTRICLE DIASTOLIC VOLUME: 115.87 ML
LEFT VENTRICLE MASS INDEX: 124 G/M2
LEFT VENTRICLE SYSTOLIC VOLUME INDEX: 25.8 ML/M2
LEFT VENTRICLE SYSTOLIC VOLUME: 48.76 ML
LEFT VENTRICULAR INTERNAL DIMENSION IN DIASTOLE: 4.96 CM (ref 3.5–6)
LEFT VENTRICULAR MASS: 233.5 G
LV LATERAL E/E' RATIO: 20.17 M/S
LV SEPTAL E/E' RATIO: 20.17 M/S
LYMPHOCYTES # BLD AUTO: 1.9 K/UL (ref 1–4.8)
LYMPHOCYTES NFR BLD: 30 % (ref 18–48)
MAGNESIUM SERPL-MCNC: 2.5 MG/DL (ref 1.6–2.6)
MCH RBC QN AUTO: 25.4 PG (ref 27–31)
MCHC RBC AUTO-ENTMCNC: 31.4 G/DL (ref 32–36)
MCV RBC AUTO: 81 FL (ref 82–98)
MONOCYTES # BLD AUTO: 0.8 K/UL (ref 0.3–1)
MONOCYTES NFR BLD: 12.6 % (ref 4–15)
MV A" WAVE DURATION": 11.8 MSEC
MV PEAK A VEL: 0.84 M/S
MV PEAK E VEL: 1.21 M/S
NEUTROPHILS # BLD AUTO: 3.3 K/UL (ref 1.8–7.7)
NEUTROPHILS NFR BLD: 52.2 % (ref 38–73)
NRBC BLD-RTO: 0 /100 WBC
PHOSPHATE SERPL-MCNC: 5.9 MG/DL (ref 2.7–4.5)
PISA TR MAX VEL: 2.97 M/S
PLATELET # BLD AUTO: 192 K/UL (ref 150–350)
PMV BLD AUTO: 10.2 FL (ref 9.2–12.9)
POCT GLUCOSE: 105 MG/DL (ref 70–110)
POCT GLUCOSE: 120 MG/DL (ref 70–110)
POCT GLUCOSE: 97 MG/DL (ref 70–110)
POTASSIUM SERPL-SCNC: 4.2 MMOL/L (ref 3.5–5.1)
PROT SERPL-MCNC: 7 G/DL (ref 6–8.4)
PULM VEIN S/D RATIO: 0.81
PV PEAK D VEL: 0.83 M/S
PV PEAK S VEL: 0.67 M/S
RA MAJOR: 5.06 CM
RA PRESSURE: 3 MMHG
RA WIDTH: 4.19 CM
RBC # BLD AUTO: 2.8 M/UL (ref 4–5.4)
RIGHT VENTRICULAR END-DIASTOLIC DIMENSION: 3.93 CM
SINUS: 3.42 CM
SODIUM SERPL-SCNC: 136 MMOL/L (ref 136–145)
STJ: 2.77 CM
TDI LATERAL: 0.06 M/S
TDI SEPTAL: 0.06 M/S
TDI: 0.06 M/S
TR MAX PG: 35 MMHG
TRICUSPID ANNULAR PLANE SYSTOLIC EXCURSION: 2.38 CM
TV REST PULMONARY ARTERY PRESSURE: 38 MMHG
WBC # BLD AUTO: 6.33 K/UL (ref 3.9–12.7)

## 2021-02-01 PROCEDURE — 36415 COLL VENOUS BLD VENIPUNCTURE: CPT | Mod: NTX

## 2021-02-01 PROCEDURE — 85025 COMPLETE CBC W/AUTO DIFF WBC: CPT | Mod: NTX

## 2021-02-01 PROCEDURE — 63600175 PHARM REV CODE 636 W HCPCS: Mod: NTX | Performed by: STUDENT IN AN ORGANIZED HEALTH CARE EDUCATION/TRAINING PROGRAM

## 2021-02-01 PROCEDURE — 96374 THER/PROPH/DIAG INJ IV PUSH: CPT

## 2021-02-01 PROCEDURE — 80100016 HC MAINTENANCE HEMODIALYSIS: Mod: NTX

## 2021-02-01 PROCEDURE — 80053 COMPREHEN METABOLIC PANEL: CPT | Mod: NTX

## 2021-02-01 PROCEDURE — G0378 HOSPITAL OBSERVATION PER HR: HCPCS | Mod: NTX

## 2021-02-01 PROCEDURE — 90935 HEMODIALYSIS ONE EVALUATION: CPT | Mod: NTX,,, | Performed by: INTERNAL MEDICINE

## 2021-02-01 PROCEDURE — 99217 PR OBSERVATION CARE DISCHARGE: CPT | Mod: GC,NTX,, | Performed by: STUDENT IN AN ORGANIZED HEALTH CARE EDUCATION/TRAINING PROGRAM

## 2021-02-01 PROCEDURE — 94761 N-INVAS EAR/PLS OXIMETRY MLT: CPT | Mod: NTX

## 2021-02-01 PROCEDURE — 83735 ASSAY OF MAGNESIUM: CPT | Mod: NTX

## 2021-02-01 PROCEDURE — 99217 PR OBSERVATION CARE DISCHARGE: ICD-10-PCS | Mod: GC,NTX,, | Performed by: STUDENT IN AN ORGANIZED HEALTH CARE EDUCATION/TRAINING PROGRAM

## 2021-02-01 PROCEDURE — G0257 UNSCHED DIALYSIS ESRD PT HOS: HCPCS | Mod: NTX

## 2021-02-01 PROCEDURE — 25000003 PHARM REV CODE 250: Mod: NTX | Performed by: STUDENT IN AN ORGANIZED HEALTH CARE EDUCATION/TRAINING PROGRAM

## 2021-02-01 PROCEDURE — 84100 ASSAY OF PHOSPHORUS: CPT | Mod: NTX

## 2021-02-01 PROCEDURE — 96372 THER/PROPH/DIAG INJ SC/IM: CPT | Mod: 59

## 2021-02-01 PROCEDURE — 90935 PR HEMODIALYSIS, ONE EVALUATION: ICD-10-PCS | Mod: NTX,,, | Performed by: INTERNAL MEDICINE

## 2021-02-01 RX ORDER — CARVEDILOL 25 MG/1
25 TABLET ORAL 2 TIMES DAILY WITH MEALS
Status: DISCONTINUED | OUTPATIENT
Start: 2021-02-02 | End: 2021-02-01 | Stop reason: HOSPADM

## 2021-02-01 RX ORDER — INSULIN ASPART 100 [IU]/ML
0-5 INJECTION, SOLUTION INTRAVENOUS; SUBCUTANEOUS
Status: CANCELLED | OUTPATIENT
Start: 2021-02-01

## 2021-02-01 RX ADMIN — PREGABALIN 25 MG: 25 CAPSULE ORAL at 01:02

## 2021-02-01 RX ADMIN — KETOROLAC TROMETHAMINE 15 MG: 30 INJECTION, SOLUTION INTRAMUSCULAR at 12:02

## 2021-02-01 RX ADMIN — HEPARIN SODIUM 5000 UNITS: 5000 INJECTION INTRAVENOUS; SUBCUTANEOUS at 01:02

## 2021-02-01 RX ADMIN — FUROSEMIDE 40 MG: 40 TABLET ORAL at 01:02

## 2021-02-01 RX ADMIN — HYDRALAZINE HYDROCHLORIDE 75 MG: 50 TABLET, FILM COATED ORAL at 05:02

## 2021-02-01 RX ADMIN — LOSARTAN POTASSIUM 100 MG: 50 TABLET, FILM COATED ORAL at 01:02

## 2021-02-01 RX ADMIN — CARVEDILOL 25 MG: 25 TABLET, FILM COATED ORAL at 01:02

## 2021-02-01 RX ADMIN — SEVELAMER CARBONATE 2400 MG: 800 TABLET, FILM COATED ORAL at 01:02

## 2021-02-01 RX ADMIN — ATORVASTATIN CALCIUM 40 MG: 40 TABLET, FILM COATED ORAL at 01:02

## 2021-02-01 RX ADMIN — HEPARIN SODIUM 5000 UNITS: 5000 INJECTION INTRAVENOUS; SUBCUTANEOUS at 05:02

## 2021-02-01 RX ADMIN — MUPIROCIN: 20 OINTMENT TOPICAL at 01:02

## 2021-02-23 ENCOUNTER — TELEPHONE (OUTPATIENT)
Dept: OTOLARYNGOLOGY | Facility: CLINIC | Age: 56
End: 2021-02-23

## 2021-02-23 ENCOUNTER — OFFICE VISIT (OUTPATIENT)
Dept: URGENT CARE | Facility: CLINIC | Age: 56
End: 2021-02-23
Payer: MEDICARE

## 2021-02-23 VITALS
WEIGHT: 185 LBS | SYSTOLIC BLOOD PRESSURE: 164 MMHG | OXYGEN SATURATION: 95 % | TEMPERATURE: 98 F | BODY MASS INDEX: 31.58 KG/M2 | DIASTOLIC BLOOD PRESSURE: 78 MMHG | HEIGHT: 64 IN | HEART RATE: 82 BPM | RESPIRATION RATE: 20 BRPM

## 2021-02-23 DIAGNOSIS — H60.501 ACUTE OTITIS EXTERNA OF RIGHT EAR, UNSPECIFIED TYPE: Primary | ICD-10-CM

## 2021-02-23 DIAGNOSIS — H61.23 BILATERAL IMPACTED CERUMEN: ICD-10-CM

## 2021-02-23 PROCEDURE — 69210 REMOVE IMPACTED EAR WAX UNI: CPT | Mod: 50,S$GLB,TXP, | Performed by: NURSE PRACTITIONER

## 2021-02-23 PROCEDURE — 99213 PR OFFICE/OUTPT VISIT, EST, LEVL III, 20-29 MIN: ICD-10-PCS | Mod: S$GLB,TXP,, | Performed by: NURSE PRACTITIONER

## 2021-02-23 PROCEDURE — 99213 OFFICE O/P EST LOW 20 MIN: CPT | Mod: S$GLB,TXP,, | Performed by: NURSE PRACTITIONER

## 2021-02-23 PROCEDURE — 69210 EAR CERUMEN REMOVAL: ICD-10-PCS | Mod: 50,S$GLB,TXP, | Performed by: NURSE PRACTITIONER

## 2021-02-23 RX ORDER — OFLOXACIN 3 MG/ML
SOLUTION/ DROPS OPHTHALMIC
Qty: 5 ML | Refills: 0 | Status: ON HOLD | OUTPATIENT
Start: 2021-02-23 | End: 2021-07-23

## 2021-02-24 ENCOUNTER — TELEPHONE (OUTPATIENT)
Dept: URGENT CARE | Facility: CLINIC | Age: 56
End: 2021-02-24

## 2021-02-25 ENCOUNTER — OFFICE VISIT (OUTPATIENT)
Dept: CARDIOLOGY | Facility: CLINIC | Age: 56
End: 2021-02-25
Payer: MEDICARE

## 2021-02-25 VITALS
OXYGEN SATURATION: 97 % | HEIGHT: 64 IN | DIASTOLIC BLOOD PRESSURE: 82 MMHG | BODY MASS INDEX: 32.93 KG/M2 | SYSTOLIC BLOOD PRESSURE: 189 MMHG | WEIGHT: 192.88 LBS | HEART RATE: 78 BPM

## 2021-02-25 DIAGNOSIS — E78.5 HYPERLIPIDEMIA, UNSPECIFIED HYPERLIPIDEMIA TYPE: ICD-10-CM

## 2021-02-25 DIAGNOSIS — E78.2 ELEVATED CHOLESTEROL WITH ELEVATED TRIGLYCERIDES: Chronic | ICD-10-CM

## 2021-02-25 DIAGNOSIS — I10 ESSENTIAL HYPERTENSION: ICD-10-CM

## 2021-02-25 DIAGNOSIS — R07.9 CHEST PAIN, UNSPECIFIED TYPE: Primary | ICD-10-CM

## 2021-02-25 DIAGNOSIS — I50.32 CHRONIC DIASTOLIC HEART FAILURE: ICD-10-CM

## 2021-02-25 PROBLEM — I50.30 DIASTOLIC HF (HEART FAILURE): Status: ACTIVE | Noted: 2021-02-25

## 2021-02-25 PROCEDURE — 99999 PR PBB SHADOW E&M-EST. PATIENT-LVL III: CPT | Mod: PBBFAC,GC,TXP, | Performed by: STUDENT IN AN ORGANIZED HEALTH CARE EDUCATION/TRAINING PROGRAM

## 2021-02-25 PROCEDURE — 99214 PR OFFICE/OUTPT VISIT, EST, LEVL IV, 30-39 MIN: ICD-10-PCS | Mod: S$PBB,GC,NTX, | Performed by: STUDENT IN AN ORGANIZED HEALTH CARE EDUCATION/TRAINING PROGRAM

## 2021-02-25 PROCEDURE — 99213 OFFICE O/P EST LOW 20 MIN: CPT | Mod: PBBFAC,TXP | Performed by: STUDENT IN AN ORGANIZED HEALTH CARE EDUCATION/TRAINING PROGRAM

## 2021-02-25 PROCEDURE — 99999 PR PBB SHADOW E&M-EST. PATIENT-LVL III: ICD-10-PCS | Mod: PBBFAC,GC,TXP, | Performed by: STUDENT IN AN ORGANIZED HEALTH CARE EDUCATION/TRAINING PROGRAM

## 2021-02-25 PROCEDURE — 99214 OFFICE O/P EST MOD 30 MIN: CPT | Mod: S$PBB,GC,NTX, | Performed by: STUDENT IN AN ORGANIZED HEALTH CARE EDUCATION/TRAINING PROGRAM

## 2021-02-25 RX ORDER — LABETALOL 300 MG/1
300 TABLET, FILM COATED ORAL 2 TIMES DAILY
Qty: 60 TABLET | Refills: 11 | Status: SHIPPED | OUTPATIENT
Start: 2021-02-25 | End: 2021-02-25

## 2021-02-25 RX ORDER — ALBUTEROL SULFATE 90 UG/1
AEROSOL, METERED RESPIRATORY (INHALATION)
COMMUNITY
Start: 2021-02-15 | End: 2021-07-27

## 2021-02-25 RX ORDER — VALSARTAN 160 MG/1
160 TABLET ORAL 2 TIMES DAILY
Qty: 180 TABLET | Refills: 3 | Status: ON HOLD | OUTPATIENT
Start: 2021-02-25 | End: 2021-07-01 | Stop reason: HOSPADM

## 2021-02-25 RX ORDER — IRBESARTAN 150 MG/1
150 TABLET ORAL 2 TIMES DAILY
Qty: 180 TABLET | Refills: 3 | Status: SHIPPED | OUTPATIENT
Start: 2021-02-25 | End: 2021-02-25

## 2021-02-25 RX ORDER — LABETALOL 300 MG/1
300 TABLET, FILM COATED ORAL 2 TIMES DAILY
Qty: 60 TABLET | Refills: 11 | Status: ON HOLD | OUTPATIENT
Start: 2021-02-25 | End: 2021-07-01 | Stop reason: SDUPTHER

## 2021-03-04 ENCOUNTER — TELEPHONE (OUTPATIENT)
Dept: CARDIOLOGY | Facility: CLINIC | Age: 56
End: 2021-03-04

## 2021-03-04 ENCOUNTER — OFFICE VISIT (OUTPATIENT)
Dept: OTOLARYNGOLOGY | Facility: CLINIC | Age: 56
End: 2021-03-04
Payer: MEDICARE

## 2021-03-04 ENCOUNTER — CLINICAL SUPPORT (OUTPATIENT)
Dept: AUDIOLOGY | Facility: CLINIC | Age: 56
End: 2021-03-04
Payer: MEDICARE

## 2021-03-04 DIAGNOSIS — H91.21 SUDDEN RIGHT HEARING LOSS: ICD-10-CM

## 2021-03-04 DIAGNOSIS — Z99.2 ESRD ON HEMODIALYSIS: ICD-10-CM

## 2021-03-04 DIAGNOSIS — I10 ESSENTIAL HYPERTENSION: ICD-10-CM

## 2021-03-04 DIAGNOSIS — N18.6 ESRD ON HEMODIALYSIS: ICD-10-CM

## 2021-03-04 DIAGNOSIS — E11.22 TYPE 2 DIABETES MELLITUS WITH CHRONIC KIDNEY DISEASE ON CHRONIC DIALYSIS, WITH LONG-TERM CURRENT USE OF INSULIN: ICD-10-CM

## 2021-03-04 DIAGNOSIS — Z99.2 TYPE 2 DIABETES MELLITUS WITH CHRONIC KIDNEY DISEASE ON CHRONIC DIALYSIS, WITH LONG-TERM CURRENT USE OF INSULIN: ICD-10-CM

## 2021-03-04 DIAGNOSIS — Z79.4 TYPE 2 DIABETES MELLITUS WITH CHRONIC KIDNEY DISEASE ON CHRONIC DIALYSIS, WITH LONG-TERM CURRENT USE OF INSULIN: ICD-10-CM

## 2021-03-04 DIAGNOSIS — N18.6 TYPE 2 DIABETES MELLITUS WITH CHRONIC KIDNEY DISEASE ON CHRONIC DIALYSIS, WITH LONG-TERM CURRENT USE OF INSULIN: ICD-10-CM

## 2021-03-04 PROCEDURE — 92557 COMPREHENSIVE HEARING TEST: CPT | Mod: PBBFAC,TXP | Performed by: AUDIOLOGIST

## 2021-03-04 PROCEDURE — 99214 OFFICE O/P EST MOD 30 MIN: CPT | Mod: S$PBB,ICN,, | Performed by: NURSE PRACTITIONER

## 2021-03-04 PROCEDURE — 99999 PR PBB SHADOW E&M-EST. PATIENT-LVL IV: ICD-10-PCS | Mod: PBBFAC,,, | Performed by: NURSE PRACTITIONER

## 2021-03-04 PROCEDURE — 92565 STENGER TEST PURE TONE: CPT | Mod: PBBFAC,NTX | Performed by: AUDIOLOGIST

## 2021-03-04 PROCEDURE — 92567 TYMPANOMETRY: CPT | Mod: PBBFAC,NTX | Performed by: AUDIOLOGIST

## 2021-03-04 PROCEDURE — 99214 PR OFFICE/OUTPT VISIT, EST, LEVL IV, 30-39 MIN: ICD-10-PCS | Mod: S$PBB,ICN,, | Performed by: NURSE PRACTITIONER

## 2021-03-04 PROCEDURE — 99999 PR PBB SHADOW E&M-EST. PATIENT-LVL IV: CPT | Mod: PBBFAC,,, | Performed by: NURSE PRACTITIONER

## 2021-03-04 PROCEDURE — 99214 OFFICE O/P EST MOD 30 MIN: CPT | Mod: PBBFAC,25 | Performed by: NURSE PRACTITIONER

## 2021-03-05 ENCOUNTER — TELEPHONE (OUTPATIENT)
Dept: OTOLARYNGOLOGY | Facility: CLINIC | Age: 56
End: 2021-03-05

## 2021-03-05 DIAGNOSIS — H91.21 SUDDEN RIGHT HEARING LOSS: Primary | ICD-10-CM

## 2021-03-05 DIAGNOSIS — H53.9 VISUAL DISTURBANCE: ICD-10-CM

## 2021-03-08 ENCOUNTER — TELEPHONE (OUTPATIENT)
Dept: OTOLARYNGOLOGY | Facility: CLINIC | Age: 56
End: 2021-03-08

## 2021-03-08 ENCOUNTER — HOSPITAL ENCOUNTER (OUTPATIENT)
Dept: CARDIOLOGY | Facility: HOSPITAL | Age: 56
Discharge: HOME | End: 2021-03-08
Attending: STUDENT IN AN ORGANIZED HEALTH CARE EDUCATION/TRAINING PROGRAM
Payer: MEDICARE

## 2021-03-08 VITALS — HEART RATE: 74 BPM | SYSTOLIC BLOOD PRESSURE: 123 MMHG | DIASTOLIC BLOOD PRESSURE: 65 MMHG

## 2021-03-08 DIAGNOSIS — R07.9 CHEST PAIN, UNSPECIFIED TYPE: ICD-10-CM

## 2021-03-08 LAB
CFR FLOW - ANTERIOR: 1.16
CFR FLOW - INFERIOR: 1.32
CFR FLOW - LATERAL: 1.27
CFR FLOW - MAX: 1.54
CFR FLOW - MIN: 0.93
CFR FLOW - SEPTAL: 1.12
CFR FLOW - WHOLE HEART: 1.22
CV PHARM DOSE: 48.9 MG
CV STRESS BASE HR: 74 BPM
DIASTOLIC BLOOD PRESSURE: 79 MMHG
END DIASTOLIC INDEX-HIGH: 170 ML/M2
END SYSTOLIC INDEX-HIGH: 70 ML/M2
NUC REST DIASTOLIC VOLUME INDEX: 108
NUC REST EJECTION FRACTION: 69
NUC REST SYSTOLIC VOLUME INDEX: 34
NUC STRESS DIASTOLIC VOLUME INDEX: 110
NUC STRESS EJECTION FRACTION: 69 %
NUC STRESS SYSTOLIC VOLUME INDEX: 34
OHS CV CPX 85 PERCENT MAX PREDICTED HEART RATE MALE: 132
OHS CV CPX MAX PREDICTED HEART RATE: 156
OHS CV CPX PATIENT IS FEMALE: 1
OHS CV CPX PATIENT IS MALE: 0
OHS CV CPX PEAK DIASTOLIC BLOOD PRESSURE: 74 MMHG
OHS CV CPX PEAK HEAR RATE: 74 BPM
OHS CV CPX PEAK RATE PRESSURE PRODUCT: 9398
OHS CV CPX PEAK SYSTOLIC BLOOD PRESSURE: 127 MMHG
OHS CV CPX PERCENT MAX PREDICTED HEART RATE ACHIEVED: 47
OHS CV CPX RATE PRESSURE PRODUCT PRESENTING: NORMAL
REST FLOW - ANTERIOR: 1.5 CC/MIN/G
REST FLOW - INFERIOR: 1.38 CC/MIN/G
REST FLOW - LATERAL: 1.36 CC/MIN/G
REST FLOW - MAX: 1.79 CC/MIN/G
REST FLOW - MIN: 0.98 CC/MIN/G
REST FLOW - SEPTAL: 1.36 CC/MIN/G
REST FLOW - WHOLE HEART: 1.4 CC/MIN/G
RETIRED EF AND QEF - SEE NOTES: 51 %
STRESS FLOW - ANTERIOR: 1.72 CC/MIN/G
STRESS FLOW - INFERIOR: 1.82 CC/MIN/G
STRESS FLOW - LATERAL: 1.72 CC/MIN/G
STRESS FLOW - MAX: 2.28 CC/MIN/G
STRESS FLOW - MIN: 1.2 CC/MIN/G
STRESS FLOW - SEPTAL: 1.51 CC/MIN/G
STRESS FLOW - WHOLE HEART: 1.69 CC/MIN/G
SYSTOLIC BLOOD PRESSURE: 142 MMHG

## 2021-03-08 PROCEDURE — 93018 CV STRESS TEST I&R ONLY: CPT | Mod: NTX,,, | Performed by: INTERNAL MEDICINE

## 2021-03-08 PROCEDURE — 78492 CARDIAC PET SCAN STRESS (CUPID ONLY): ICD-10-PCS | Mod: 26,NTX,, | Performed by: INTERNAL MEDICINE

## 2021-03-08 PROCEDURE — 93016 CARDIAC PET SCAN STRESS (CUPID ONLY): ICD-10-PCS | Mod: NTX,,, | Performed by: INTERNAL MEDICINE

## 2021-03-08 PROCEDURE — 93018 CARDIAC PET SCAN STRESS (CUPID ONLY): ICD-10-PCS | Mod: NTX,,, | Performed by: INTERNAL MEDICINE

## 2021-03-08 PROCEDURE — 78434 AQMBF PET REST & RX STRESS: CPT | Mod: NTX

## 2021-03-08 PROCEDURE — 63600175 PHARM REV CODE 636 W HCPCS: Mod: TXP | Performed by: STUDENT IN AN ORGANIZED HEALTH CARE EDUCATION/TRAINING PROGRAM

## 2021-03-08 PROCEDURE — 93016 CV STRESS TEST SUPVJ ONLY: CPT | Mod: NTX,,, | Performed by: INTERNAL MEDICINE

## 2021-03-08 PROCEDURE — 78434 CARDIAC PET SCAN STRESS (CUPID ONLY): ICD-10-PCS | Mod: 26,NTX,, | Performed by: INTERNAL MEDICINE

## 2021-03-08 PROCEDURE — 78492 MYOCRD IMG PET MLT RST&STRS: CPT | Mod: 26,NTX,, | Performed by: INTERNAL MEDICINE

## 2021-03-08 PROCEDURE — 78434 AQMBF PET REST & RX STRESS: CPT | Mod: 26,NTX,, | Performed by: INTERNAL MEDICINE

## 2021-03-08 RX ORDER — DIPYRIDAMOLE 5 MG/ML
48.87 INJECTION INTRAVENOUS ONCE
Status: COMPLETED | OUTPATIENT
Start: 2021-03-08 | End: 2021-03-08

## 2021-03-08 RX ADMIN — DIPYRIDAMOLE 48.85 MG: 5 INJECTION INTRAVENOUS at 10:03

## 2021-03-11 ENCOUNTER — HOSPITAL ENCOUNTER (OUTPATIENT)
Dept: RADIOLOGY | Facility: HOSPITAL | Age: 56
Discharge: HOME | End: 2021-03-11
Attending: NURSE PRACTITIONER
Payer: MEDICARE

## 2021-03-11 ENCOUNTER — TELEPHONE (OUTPATIENT)
Dept: OTOLARYNGOLOGY | Facility: CLINIC | Age: 56
End: 2021-03-11

## 2021-03-11 DIAGNOSIS — H91.21 SUDDEN RIGHT HEARING LOSS: ICD-10-CM

## 2021-03-11 PROCEDURE — 70480 CT ORBIT/EAR/FOSSA W/O DYE: CPT | Mod: 26,NTX,, | Performed by: RADIOLOGY

## 2021-03-11 PROCEDURE — 70480 CT TEMPORAL BONE WITHOUT CONTRAST: ICD-10-PCS | Mod: 26,NTX,, | Performed by: RADIOLOGY

## 2021-03-11 PROCEDURE — 70480 CT ORBIT/EAR/FOSSA W/O DYE: CPT | Mod: TC,TXP

## 2021-03-12 ENCOUNTER — TELEPHONE (OUTPATIENT)
Dept: CARDIAC CATH/INVASIVE PROCEDURES | Facility: HOSPITAL | Age: 56
End: 2021-03-12

## 2021-03-12 ENCOUNTER — HOSPITAL ENCOUNTER (EMERGENCY)
Facility: HOSPITAL | Age: 56
Discharge: HOME | End: 2021-03-12
Attending: EMERGENCY MEDICINE
Payer: MEDICARE

## 2021-03-12 VITALS
BODY MASS INDEX: 32.78 KG/M2 | HEART RATE: 80 BPM | DIASTOLIC BLOOD PRESSURE: 82 MMHG | SYSTOLIC BLOOD PRESSURE: 180 MMHG | RESPIRATION RATE: 18 BRPM | WEIGHT: 192 LBS | HEIGHT: 64 IN | OXYGEN SATURATION: 95 % | TEMPERATURE: 99 F

## 2021-03-12 DIAGNOSIS — R06.02 SOB (SHORTNESS OF BREATH): ICD-10-CM

## 2021-03-12 DIAGNOSIS — H92.01 OTALGIA OF RIGHT EAR: Primary | ICD-10-CM

## 2021-03-12 LAB
CTP QC/QA: YES
SARS-COV-2 RDRP RESP QL NAA+PROBE: NEGATIVE

## 2021-03-12 PROCEDURE — 93010 EKG 12-LEAD: ICD-10-PCS | Mod: NTX,,, | Performed by: INTERNAL MEDICINE

## 2021-03-12 PROCEDURE — 93010 ELECTROCARDIOGRAM REPORT: CPT | Mod: NTX,,, | Performed by: INTERNAL MEDICINE

## 2021-03-12 PROCEDURE — 99283 EMERGENCY DEPT VISIT LOW MDM: CPT | Mod: 25,NTX

## 2021-03-12 PROCEDURE — 99284 PR EMERGENCY DEPT VISIT,LEVEL IV: ICD-10-PCS | Mod: ,,, | Performed by: EMERGENCY MEDICINE

## 2021-03-12 PROCEDURE — 99284 EMERGENCY DEPT VISIT MOD MDM: CPT | Mod: ,,, | Performed by: EMERGENCY MEDICINE

## 2021-03-12 PROCEDURE — U0002 COVID-19 LAB TEST NON-CDC: HCPCS | Mod: NTX | Performed by: EMERGENCY MEDICINE

## 2021-03-12 PROCEDURE — 93005 ELECTROCARDIOGRAM TRACING: CPT | Mod: NTX

## 2021-03-12 PROCEDURE — 25000003 PHARM REV CODE 250: Mod: NTX | Performed by: EMERGENCY MEDICINE

## 2021-03-12 RX ORDER — HYDROCODONE BITARTRATE AND ACETAMINOPHEN 5; 325 MG/1; MG/1
1 TABLET ORAL
Status: COMPLETED | OUTPATIENT
Start: 2021-03-12 | End: 2021-03-12

## 2021-03-12 RX ORDER — HYDROCODONE BITARTRATE AND ACETAMINOPHEN 5; 325 MG/1; MG/1
1 TABLET ORAL EVERY 8 HOURS PRN
Qty: 11 TABLET | Refills: 0 | Status: SHIPPED | OUTPATIENT
Start: 2021-03-12 | End: 2021-05-24 | Stop reason: SDUPTHER

## 2021-03-12 RX ADMIN — HYDROCODONE BITARTRATE AND ACETAMINOPHEN 1 TABLET: 5; 325 TABLET ORAL at 06:03

## 2021-03-15 ENCOUNTER — TELEPHONE (OUTPATIENT)
Dept: OTOLARYNGOLOGY | Facility: CLINIC | Age: 56
End: 2021-03-15

## 2021-03-16 ENCOUNTER — HOSPITAL ENCOUNTER (OUTPATIENT)
Dept: RADIOLOGY | Facility: HOSPITAL | Age: 56
Discharge: HOME | End: 2021-03-16
Attending: NURSE PRACTITIONER
Payer: MEDICARE

## 2021-03-16 PROCEDURE — 70551 MRI BRAIN STEM W/O DYE: CPT | Mod: TC,TXP

## 2021-03-16 PROCEDURE — 70551 MRI IAC/TEMPORAL BONES WITHOUT CONTRAST: ICD-10-PCS | Mod: 26,NTX,, | Performed by: RADIOLOGY

## 2021-03-16 PROCEDURE — 70551 MRI BRAIN STEM W/O DYE: CPT | Mod: 26,NTX,, | Performed by: RADIOLOGY

## 2021-03-21 ENCOUNTER — TELEPHONE (OUTPATIENT)
Dept: CARDIOLOGY | Facility: CLINIC | Age: 56
End: 2021-03-21

## 2021-03-22 ENCOUNTER — TELEPHONE (OUTPATIENT)
Dept: CARDIOLOGY | Facility: CLINIC | Age: 56
End: 2021-03-22

## 2021-03-26 ENCOUNTER — OFFICE VISIT (OUTPATIENT)
Dept: OTOLARYNGOLOGY | Facility: CLINIC | Age: 56
End: 2021-03-26
Payer: MEDICARE

## 2021-03-26 VITALS — DIASTOLIC BLOOD PRESSURE: 79 MMHG | BODY MASS INDEX: 32.79 KG/M2 | WEIGHT: 191 LBS | SYSTOLIC BLOOD PRESSURE: 151 MMHG

## 2021-03-26 DIAGNOSIS — H91.21 SUDDEN RIGHT HEARING LOSS: ICD-10-CM

## 2021-03-26 PROCEDURE — 99214 PR OFFICE/OUTPT VISIT, EST, LEVL IV, 30-39 MIN: ICD-10-PCS | Mod: 25,S$PBB,, | Performed by: OTOLARYNGOLOGY

## 2021-03-26 PROCEDURE — 69801 INCISE INNER EAR: CPT | Mod: PBBFAC | Performed by: OTOLARYNGOLOGY

## 2021-03-26 PROCEDURE — 99214 OFFICE O/P EST MOD 30 MIN: CPT | Mod: 25,S$PBB,, | Performed by: OTOLARYNGOLOGY

## 2021-03-26 PROCEDURE — 99999 PR PBB SHADOW E&M-EST. PATIENT-LVL III: ICD-10-PCS | Mod: PBBFAC,,, | Performed by: OTOLARYNGOLOGY

## 2021-03-26 PROCEDURE — 69801 PR LABYRINTHOTOMY W PERFUSION VESTIBULOACTIVE DRUGS,TRANSCRANIAL: ICD-10-PCS | Mod: S$PBB,RT,, | Performed by: OTOLARYNGOLOGY

## 2021-03-26 PROCEDURE — 99999 PR PBB SHADOW E&M-EST. PATIENT-LVL III: CPT | Mod: PBBFAC,,, | Performed by: OTOLARYNGOLOGY

## 2021-03-26 PROCEDURE — 69801 INCISE INNER EAR: CPT | Mod: S$PBB,RT,, | Performed by: OTOLARYNGOLOGY

## 2021-03-26 PROCEDURE — 99213 OFFICE O/P EST LOW 20 MIN: CPT | Mod: PBBFAC,25 | Performed by: OTOLARYNGOLOGY

## 2021-03-26 RX ORDER — DEXAMETHASONE SODIUM PHOSPHATE 100 MG/10ML
10 INJECTION INTRAMUSCULAR; INTRAVENOUS ONCE
Status: COMPLETED | OUTPATIENT
Start: 2021-03-26 | End: 2021-03-26

## 2021-03-26 RX ADMIN — DEXAMETHASONE SODIUM PHOSPHATE 10 MG: 10 INJECTION INTRAMUSCULAR; INTRAVENOUS at 09:03

## 2021-03-30 ENCOUNTER — OFFICE VISIT (OUTPATIENT)
Dept: CARDIOLOGY | Facility: CLINIC | Age: 56
End: 2021-03-30
Payer: MEDICARE

## 2021-03-30 VITALS
HEART RATE: 72 BPM | DIASTOLIC BLOOD PRESSURE: 61 MMHG | BODY MASS INDEX: 32.74 KG/M2 | WEIGHT: 191.81 LBS | SYSTOLIC BLOOD PRESSURE: 143 MMHG | HEIGHT: 64 IN

## 2021-03-30 DIAGNOSIS — Z79.4 TYPE 2 DIABETES MELLITUS WITH CHRONIC KIDNEY DISEASE ON CHRONIC DIALYSIS, WITH LONG-TERM CURRENT USE OF INSULIN: Chronic | ICD-10-CM

## 2021-03-30 DIAGNOSIS — Z76.82 PATIENT ON WAITING LIST FOR KIDNEY TRANSPLANT: ICD-10-CM

## 2021-03-30 DIAGNOSIS — N18.6 TYPE 2 DIABETES MELLITUS WITH CHRONIC KIDNEY DISEASE ON CHRONIC DIALYSIS, WITH LONG-TERM CURRENT USE OF INSULIN: Chronic | ICD-10-CM

## 2021-03-30 DIAGNOSIS — Z99.2 TYPE 2 DIABETES MELLITUS WITH CHRONIC KIDNEY DISEASE ON CHRONIC DIALYSIS, WITH LONG-TERM CURRENT USE OF INSULIN: Chronic | ICD-10-CM

## 2021-03-30 DIAGNOSIS — I15.1 HYPERTENSION SECONDARY TO OTHER RENAL DISORDERS: ICD-10-CM

## 2021-03-30 DIAGNOSIS — E11.22 TYPE 2 DIABETES MELLITUS WITH CHRONIC KIDNEY DISEASE ON CHRONIC DIALYSIS, WITH LONG-TERM CURRENT USE OF INSULIN: Chronic | ICD-10-CM

## 2021-03-30 DIAGNOSIS — N18.6 ANEMIA IN ESRD (END-STAGE RENAL DISEASE): ICD-10-CM

## 2021-03-30 DIAGNOSIS — E78.2 ELEVATED CHOLESTEROL WITH ELEVATED TRIGLYCERIDES: Primary | Chronic | ICD-10-CM

## 2021-03-30 DIAGNOSIS — D63.1 ANEMIA IN ESRD (END-STAGE RENAL DISEASE): ICD-10-CM

## 2021-03-30 PROCEDURE — 99214 PR OFFICE/OUTPT VISIT, EST, LEVL IV, 30-39 MIN: ICD-10-PCS | Mod: S$PBB,TXP,, | Performed by: INTERNAL MEDICINE

## 2021-03-30 PROCEDURE — 99999 PR PBB SHADOW E&M-EST. PATIENT-LVL IV: ICD-10-PCS | Mod: PBBFAC,TXP,, | Performed by: INTERNAL MEDICINE

## 2021-03-30 PROCEDURE — 99999 PR PBB SHADOW E&M-EST. PATIENT-LVL IV: CPT | Mod: PBBFAC,TXP,, | Performed by: INTERNAL MEDICINE

## 2021-03-30 PROCEDURE — 99214 OFFICE O/P EST MOD 30 MIN: CPT | Mod: PBBFAC,TXP | Performed by: INTERNAL MEDICINE

## 2021-03-30 PROCEDURE — 99214 OFFICE O/P EST MOD 30 MIN: CPT | Mod: S$PBB,TXP,, | Performed by: INTERNAL MEDICINE

## 2021-03-30 RX ORDER — AMLODIPINE BESYLATE 10 MG/1
10 TABLET ORAL NIGHTLY
Qty: 90 TABLET | Refills: 3 | Status: SHIPPED | OUTPATIENT
Start: 2021-03-30 | End: 2023-06-02 | Stop reason: SDUPTHER

## 2021-03-30 RX ORDER — FAMOTIDINE 20 MG/1
20 TABLET, FILM COATED ORAL 2 TIMES DAILY
Status: ON HOLD | COMMUNITY
Start: 2021-02-23 | End: 2021-07-23

## 2021-04-01 DIAGNOSIS — Z76.82 ORGAN TRANSPLANT CANDIDATE: Primary | ICD-10-CM

## 2021-04-06 ENCOUNTER — CLINICAL SUPPORT (OUTPATIENT)
Dept: AUDIOLOGY | Facility: CLINIC | Age: 56
End: 2021-04-06
Payer: MEDICARE

## 2021-04-06 ENCOUNTER — OFFICE VISIT (OUTPATIENT)
Dept: OTOLARYNGOLOGY | Facility: CLINIC | Age: 56
End: 2021-04-06
Payer: MEDICARE

## 2021-04-06 VITALS — WEIGHT: 191.38 LBS | BODY MASS INDEX: 32.85 KG/M2

## 2021-04-06 DIAGNOSIS — H91.21 SUDDEN RIGHT HEARING LOSS: ICD-10-CM

## 2021-04-06 PROCEDURE — 69801 PR LABYRINTHOTOMY W PERFUSION VESTIBULOACTIVE DRUGS,TRANSCRANIAL: ICD-10-PCS | Mod: S$PBB,RT,, | Performed by: OTOLARYNGOLOGY

## 2021-04-06 PROCEDURE — 92550 TYMPANOMETRY & REFLEX THRESH: CPT | Mod: PBBFAC,TXP | Performed by: AUDIOLOGIST

## 2021-04-06 PROCEDURE — 99213 PR OFFICE/OUTPT VISIT, EST, LEVL III, 20-29 MIN: ICD-10-PCS | Mod: 25,S$PBB,, | Performed by: OTOLARYNGOLOGY

## 2021-04-06 PROCEDURE — 69801 INCISE INNER EAR: CPT | Mod: PBBFAC,NTX | Performed by: OTOLARYNGOLOGY

## 2021-04-06 PROCEDURE — 92557 COMPREHENSIVE HEARING TEST: CPT | Mod: PBBFAC,NTX | Performed by: AUDIOLOGIST

## 2021-04-06 PROCEDURE — 99999 PR PBB SHADOW E&M-EST. PATIENT-LVL III: CPT | Mod: PBBFAC,,, | Performed by: OTOLARYNGOLOGY

## 2021-04-06 PROCEDURE — 99999 PR PBB SHADOW E&M-EST. PATIENT-LVL III: ICD-10-PCS | Mod: PBBFAC,,, | Performed by: OTOLARYNGOLOGY

## 2021-04-06 PROCEDURE — 69801 INCISE INNER EAR: CPT | Mod: S$PBB,RT,, | Performed by: OTOLARYNGOLOGY

## 2021-04-06 PROCEDURE — 99213 OFFICE O/P EST LOW 20 MIN: CPT | Mod: 25,S$PBB,, | Performed by: OTOLARYNGOLOGY

## 2021-04-06 PROCEDURE — 99213 OFFICE O/P EST LOW 20 MIN: CPT | Mod: PBBFAC,TXP | Performed by: OTOLARYNGOLOGY

## 2021-04-06 RX ORDER — DEXAMETHASONE SODIUM PHOSPHATE 100 MG/10ML
10 INJECTION INTRAMUSCULAR; INTRAVENOUS ONCE
Status: DISCONTINUED | OUTPATIENT
Start: 2021-04-06 | End: 2021-04-06

## 2021-04-06 RX ORDER — DEXAMETHASONE SODIUM PHOSPHATE 100 MG/10ML
10 INJECTION INTRAMUSCULAR; INTRAVENOUS ONCE
Status: COMPLETED | OUTPATIENT
Start: 2021-04-06 | End: 2021-04-06

## 2021-04-06 RX ADMIN — DEXAMETHASONE SODIUM PHOSPHATE 10 MG: 10 INJECTION INTRAMUSCULAR; INTRAVENOUS at 10:04

## 2021-04-16 ENCOUNTER — OFFICE VISIT (OUTPATIENT)
Dept: OTOLARYNGOLOGY | Facility: CLINIC | Age: 56
End: 2021-04-16
Payer: MEDICARE

## 2021-04-16 ENCOUNTER — CLINICAL SUPPORT (OUTPATIENT)
Dept: AUDIOLOGY | Facility: CLINIC | Age: 56
End: 2021-04-16
Payer: MEDICARE

## 2021-04-16 VITALS — BODY MASS INDEX: 32.24 KG/M2 | WEIGHT: 187.81 LBS

## 2021-04-16 DIAGNOSIS — H91.21 SUDDEN RIGHT HEARING LOSS: ICD-10-CM

## 2021-04-16 PROCEDURE — 92557 COMPREHENSIVE HEARING TEST: CPT | Mod: PBBFAC | Performed by: AUDIOLOGIST

## 2021-04-16 PROCEDURE — 99213 OFFICE O/P EST LOW 20 MIN: CPT | Mod: PBBFAC,25 | Performed by: OTOLARYNGOLOGY

## 2021-04-16 PROCEDURE — 99999 PR PBB SHADOW E&M-EST. PATIENT-LVL III: CPT | Mod: PBBFAC,,, | Performed by: OTOLARYNGOLOGY

## 2021-04-16 PROCEDURE — 99213 PR OFFICE/OUTPT VISIT, EST, LEVL III, 20-29 MIN: ICD-10-PCS | Mod: S$PBB,,, | Performed by: OTOLARYNGOLOGY

## 2021-04-16 PROCEDURE — 92567 TYMPANOMETRY: CPT | Mod: PBBFAC | Performed by: AUDIOLOGIST

## 2021-04-16 PROCEDURE — 99213 OFFICE O/P EST LOW 20 MIN: CPT | Mod: S$PBB,,, | Performed by: OTOLARYNGOLOGY

## 2021-04-16 PROCEDURE — 99999 PR PBB SHADOW E&M-EST. PATIENT-LVL III: ICD-10-PCS | Mod: PBBFAC,,, | Performed by: OTOLARYNGOLOGY

## 2021-05-06 ENCOUNTER — HOSPITAL ENCOUNTER (OUTPATIENT)
Dept: RADIOLOGY | Facility: HOSPITAL | Age: 56
Discharge: HOME | End: 2021-05-06
Attending: UROLOGY
Payer: MEDICARE

## 2021-05-06 DIAGNOSIS — C64.1 RENAL CELL CARCINOMA OF RIGHT KIDNEY: ICD-10-CM

## 2021-05-06 LAB
CREAT SERPL-MCNC: 7 MG/DL (ref 0.5–1.4)
SAMPLE: ABNORMAL

## 2021-05-06 PROCEDURE — 74170 CT ABDOMEN W WO CONTRAST: ICD-10-PCS | Mod: 26,NTX,, | Performed by: RADIOLOGY

## 2021-05-06 PROCEDURE — 25500020 PHARM REV CODE 255: Mod: TXP | Performed by: UROLOGY

## 2021-05-06 PROCEDURE — 74170 CT ABD WO CNTRST FLWD CNTRST: CPT | Mod: 26,NTX,, | Performed by: RADIOLOGY

## 2021-05-06 PROCEDURE — 74170 CT ABD WO CNTRST FLWD CNTRST: CPT | Mod: TC,TXP

## 2021-05-06 RX ADMIN — IOHEXOL 100 ML: 350 INJECTION, SOLUTION INTRAVENOUS at 10:05

## 2021-05-07 PROCEDURE — 99291 PR CRITICAL CARE, E/M 30-74 MINUTES: ICD-10-PCS | Mod: CR,NTX,CS, | Performed by: EMERGENCY MEDICINE

## 2021-05-07 PROCEDURE — 80047 BASIC METABLC PNL IONIZED CA: CPT | Mod: NTX

## 2021-05-07 PROCEDURE — 96374 THER/PROPH/DIAG INJ IV PUSH: CPT | Mod: NTX

## 2021-05-07 PROCEDURE — 82962 GLUCOSE BLOOD TEST: CPT | Mod: NTX

## 2021-05-07 PROCEDURE — 99291 CRITICAL CARE FIRST HOUR: CPT | Mod: 25,NTX

## 2021-05-07 PROCEDURE — 99291 CRITICAL CARE FIRST HOUR: CPT | Mod: CR,NTX,CS, | Performed by: EMERGENCY MEDICINE

## 2021-05-07 PROCEDURE — 96375 TX/PRO/DX INJ NEW DRUG ADDON: CPT | Mod: NTX

## 2021-05-07 PROCEDURE — 96376 TX/PRO/DX INJ SAME DRUG ADON: CPT | Mod: NTX

## 2021-05-08 ENCOUNTER — HOSPITAL ENCOUNTER (OUTPATIENT)
Facility: HOSPITAL | Age: 56
Discharge: HOME | End: 2021-05-09
Attending: EMERGENCY MEDICINE | Admitting: INTERNAL MEDICINE
Payer: MEDICARE

## 2021-05-08 DIAGNOSIS — H91.22 SUDDEN HEARING LOSS, LEFT: ICD-10-CM

## 2021-05-08 DIAGNOSIS — I10 HYPERTENSION, UNSPECIFIED TYPE: ICD-10-CM

## 2021-05-08 DIAGNOSIS — H91.90 HEARING LOSS: ICD-10-CM

## 2021-05-08 DIAGNOSIS — Z99.2 ESRD ON HEMODIALYSIS: ICD-10-CM

## 2021-05-08 DIAGNOSIS — H91.90 HEARING LOSS, UNSPECIFIED HEARING LOSS TYPE, UNSPECIFIED LATERALITY: Primary | ICD-10-CM

## 2021-05-08 DIAGNOSIS — N18.6 ESRD ON HEMODIALYSIS: ICD-10-CM

## 2021-05-08 DIAGNOSIS — R07.9 CHEST PAIN: ICD-10-CM

## 2021-05-08 LAB
ALBUMIN SERPL BCP-MCNC: 3.7 G/DL (ref 3.5–5.2)
ALP SERPL-CCNC: 69 U/L (ref 55–135)
ALT SERPL W/O P-5'-P-CCNC: 15 U/L (ref 10–44)
ANION GAP SERPL CALC-SCNC: 11 MMOL/L (ref 8–16)
AST SERPL-CCNC: 20 U/L (ref 10–40)
BASOPHILS # BLD AUTO: 0.05 K/UL (ref 0–0.2)
BASOPHILS NFR BLD: 0.8 % (ref 0–1.9)
BILIRUB SERPL-MCNC: 1 MG/DL (ref 0.1–1)
BUN SERPL-MCNC: 16 MG/DL (ref 6–20)
BUN SERPL-MCNC: 17 MG/DL (ref 6–30)
CALCIUM SERPL-MCNC: 10.7 MG/DL (ref 8.7–10.5)
CHLORIDE SERPL-SCNC: 96 MMOL/L (ref 95–110)
CHLORIDE SERPL-SCNC: 97 MMOL/L (ref 95–110)
CO2 SERPL-SCNC: 31 MMOL/L (ref 23–29)
CREAT SERPL-MCNC: 5.9 MG/DL (ref 0.5–1.4)
CREAT SERPL-MCNC: 6.1 MG/DL (ref 0.5–1.4)
CTP QC/QA: YES
DIFFERENTIAL METHOD: ABNORMAL
EOSINOPHIL # BLD AUTO: 0.3 K/UL (ref 0–0.5)
EOSINOPHIL NFR BLD: 5.7 % (ref 0–8)
ERYTHROCYTE [DISTWIDTH] IN BLOOD BY AUTOMATED COUNT: 20.8 % (ref 11.5–14.5)
EST. GFR  (AFRICAN AMERICAN): 8.5 ML/MIN/1.73 M^2
EST. GFR  (NON AFRICAN AMERICAN): 7.3 ML/MIN/1.73 M^2
GLUCOSE SERPL-MCNC: 82 MG/DL (ref 70–110)
GLUCOSE SERPL-MCNC: 84 MG/DL (ref 70–110)
HCT VFR BLD AUTO: 27.4 % (ref 37–48.5)
HCT VFR BLD CALC: 35 %PCV (ref 36–54)
HGB BLD-MCNC: 8.5 G/DL (ref 12–16)
IMM GRANULOCYTES # BLD AUTO: 0.02 K/UL (ref 0–0.04)
IMM GRANULOCYTES NFR BLD AUTO: 0.3 % (ref 0–0.5)
LYMPHOCYTES # BLD AUTO: 1 K/UL (ref 1–4.8)
LYMPHOCYTES NFR BLD: 16.7 % (ref 18–48)
MAGNESIUM SERPL-MCNC: 2.3 MG/DL (ref 1.6–2.6)
MCH RBC QN AUTO: 25 PG (ref 27–31)
MCHC RBC AUTO-ENTMCNC: 31 G/DL (ref 32–36)
MCV RBC AUTO: 81 FL (ref 82–98)
MONOCYTES # BLD AUTO: 0.6 K/UL (ref 0.3–1)
MONOCYTES NFR BLD: 10.3 % (ref 4–15)
NEUTROPHILS # BLD AUTO: 3.9 K/UL (ref 1.8–7.7)
NEUTROPHILS NFR BLD: 66.2 % (ref 38–73)
NRBC BLD-RTO: 0 /100 WBC
PLATELET # BLD AUTO: 192 K/UL (ref 150–450)
PMV BLD AUTO: 9.9 FL (ref 9.2–12.9)
POC IONIZED CALCIUM: 1.27 MMOL/L (ref 1.06–1.42)
POC TCO2 (MEASURED): 33 MMOL/L (ref 23–29)
POCT GLUCOSE: 203 MG/DL (ref 70–110)
POCT GLUCOSE: 85 MG/DL (ref 70–110)
POTASSIUM BLD-SCNC: 4 MMOL/L (ref 3.5–5.1)
POTASSIUM SERPL-SCNC: 4 MMOL/L (ref 3.5–5.1)
PROT SERPL-MCNC: 8 G/DL (ref 6–8.4)
RBC # BLD AUTO: 3.4 M/UL (ref 4–5.4)
SAMPLE: ABNORMAL
SARS-COV-2 RDRP RESP QL NAA+PROBE: NEGATIVE
SODIUM BLD-SCNC: 139 MMOL/L (ref 136–145)
SODIUM SERPL-SCNC: 139 MMOL/L (ref 136–145)
WBC # BLD AUTO: 5.92 K/UL (ref 3.9–12.7)

## 2021-05-08 PROCEDURE — 90935 HEMODIALYSIS ONE EVALUATION: CPT | Mod: NTX,,, | Performed by: NURSE PRACTITIONER

## 2021-05-08 PROCEDURE — 63600175 PHARM REV CODE 636 W HCPCS: Mod: NTX | Performed by: EMERGENCY MEDICINE

## 2021-05-08 PROCEDURE — 99215 OFFICE O/P EST HI 40 MIN: CPT | Mod: 25,NTX,, | Performed by: NURSE PRACTITIONER

## 2021-05-08 PROCEDURE — G0378 HOSPITAL OBSERVATION PER HR: HCPCS | Mod: NTX

## 2021-05-08 PROCEDURE — 25000003 PHARM REV CODE 250: Mod: NTX | Performed by: PHYSICIAN ASSISTANT

## 2021-05-08 PROCEDURE — 99220 PR INITIAL OBSERVATION CARE,LEVL III: CPT | Mod: NTX,,, | Performed by: PHYSICIAN ASSISTANT

## 2021-05-08 PROCEDURE — 99215 PR OFFICE/OUTPT VISIT, EST, LEVL V, 40-54 MIN: ICD-10-PCS | Mod: 25,NTX,, | Performed by: NURSE PRACTITIONER

## 2021-05-08 PROCEDURE — 25500020 PHARM REV CODE 255: Mod: NTX | Performed by: EMERGENCY MEDICINE

## 2021-05-08 PROCEDURE — 83735 ASSAY OF MAGNESIUM: CPT | Mod: NTX | Performed by: EMERGENCY MEDICINE

## 2021-05-08 PROCEDURE — 25000003 PHARM REV CODE 250: Mod: NTX | Performed by: INTERNAL MEDICINE

## 2021-05-08 PROCEDURE — G0257 UNSCHED DIALYSIS ESRD PT HOS: HCPCS | Mod: NTX

## 2021-05-08 PROCEDURE — 85025 COMPLETE CBC W/AUTO DIFF WBC: CPT | Mod: NTX | Performed by: EMERGENCY MEDICINE

## 2021-05-08 PROCEDURE — 90935 PR HEMODIALYSIS, ONE EVALUATION: ICD-10-PCS | Mod: NTX,,, | Performed by: NURSE PRACTITIONER

## 2021-05-08 PROCEDURE — U0002 COVID-19 LAB TEST NON-CDC: HCPCS | Mod: NTX | Performed by: EMERGENCY MEDICINE

## 2021-05-08 PROCEDURE — 25000003 PHARM REV CODE 250: Mod: NTX | Performed by: EMERGENCY MEDICINE

## 2021-05-08 PROCEDURE — 63600175 PHARM REV CODE 636 W HCPCS: Mod: NTX | Performed by: PHYSICIAN ASSISTANT

## 2021-05-08 PROCEDURE — 99220 PR INITIAL OBSERVATION CARE,LEVL III: ICD-10-PCS | Mod: NTX,,, | Performed by: PHYSICIAN ASSISTANT

## 2021-05-08 PROCEDURE — 80053 COMPREHEN METABOLIC PANEL: CPT | Mod: NTX | Performed by: EMERGENCY MEDICINE

## 2021-05-08 PROCEDURE — 25000003 PHARM REV CODE 250: Mod: NTX | Performed by: NURSE PRACTITIONER

## 2021-05-08 RX ORDER — VALSARTAN 160 MG/1
160 TABLET ORAL DAILY
Status: DISCONTINUED | OUTPATIENT
Start: 2021-05-08 | End: 2021-05-08

## 2021-05-08 RX ORDER — BISACODYL 10 MG
10 SUPPOSITORY, RECTAL RECTAL DAILY PRN
Status: DISCONTINUED | OUTPATIENT
Start: 2021-05-08 | End: 2021-05-09 | Stop reason: HOSPADM

## 2021-05-08 RX ORDER — IBUPROFEN 200 MG
24 TABLET ORAL
Status: DISCONTINUED | OUTPATIENT
Start: 2021-05-08 | End: 2021-05-09 | Stop reason: HOSPADM

## 2021-05-08 RX ORDER — POLYETHYLENE GLYCOL 3350 17 G/17G
17 POWDER, FOR SOLUTION ORAL DAILY
Status: DISCONTINUED | OUTPATIENT
Start: 2021-05-08 | End: 2021-05-09 | Stop reason: HOSPADM

## 2021-05-08 RX ORDER — LABETALOL 300 MG/1
300 TABLET, FILM COATED ORAL 2 TIMES DAILY
Status: DISCONTINUED | OUTPATIENT
Start: 2021-05-08 | End: 2021-05-09 | Stop reason: HOSPADM

## 2021-05-08 RX ORDER — PREDNISONE 20 MG/1
60 TABLET ORAL
Status: COMPLETED | OUTPATIENT
Start: 2021-05-08 | End: 2021-05-08

## 2021-05-08 RX ORDER — LABETALOL HCL 20 MG/4 ML
10 SYRINGE (ML) INTRAVENOUS
Status: COMPLETED | OUTPATIENT
Start: 2021-05-08 | End: 2021-05-08

## 2021-05-08 RX ORDER — ACETAMINOPHEN 325 MG/1
650 TABLET ORAL EVERY 4 HOURS PRN
Status: DISCONTINUED | OUTPATIENT
Start: 2021-05-08 | End: 2021-05-09 | Stop reason: HOSPADM

## 2021-05-08 RX ORDER — HYDRALAZINE HYDROCHLORIDE 25 MG/1
25 TABLET, FILM COATED ORAL EVERY 8 HOURS
Status: DISCONTINUED | OUTPATIENT
Start: 2021-05-08 | End: 2021-05-08

## 2021-05-08 RX ORDER — SODIUM CHLORIDE 9 MG/ML
INJECTION, SOLUTION INTRAVENOUS ONCE
Status: COMPLETED | OUTPATIENT
Start: 2021-05-08 | End: 2021-05-08

## 2021-05-08 RX ORDER — HYDROCODONE BITARTRATE AND ACETAMINOPHEN 5; 325 MG/1; MG/1
1 TABLET ORAL EVERY 6 HOURS PRN
Status: DISCONTINUED | OUTPATIENT
Start: 2021-05-08 | End: 2021-05-09 | Stop reason: HOSPADM

## 2021-05-08 RX ORDER — LABETALOL HCL 20 MG/4 ML
20 SYRINGE (ML) INTRAVENOUS
Status: COMPLETED | OUTPATIENT
Start: 2021-05-08 | End: 2021-05-08

## 2021-05-08 RX ORDER — FAMOTIDINE 20 MG/1
20 TABLET, FILM COATED ORAL DAILY
Status: DISCONTINUED | OUTPATIENT
Start: 2021-05-08 | End: 2021-05-09 | Stop reason: HOSPADM

## 2021-05-08 RX ORDER — FUROSEMIDE 40 MG/1
40 TABLET ORAL 2 TIMES DAILY
Status: DISCONTINUED | OUTPATIENT
Start: 2021-05-08 | End: 2021-05-09 | Stop reason: HOSPADM

## 2021-05-08 RX ORDER — IBUPROFEN 200 MG
16 TABLET ORAL
Status: DISCONTINUED | OUTPATIENT
Start: 2021-05-08 | End: 2021-05-09 | Stop reason: HOSPADM

## 2021-05-08 RX ORDER — TALC
6 POWDER (GRAM) TOPICAL NIGHTLY PRN
Status: DISCONTINUED | OUTPATIENT
Start: 2021-05-08 | End: 2021-05-09 | Stop reason: HOSPADM

## 2021-05-08 RX ORDER — SEVELAMER CARBONATE 800 MG/1
2400 TABLET, FILM COATED ORAL
Status: DISCONTINUED | OUTPATIENT
Start: 2021-05-08 | End: 2021-05-09 | Stop reason: HOSPADM

## 2021-05-08 RX ORDER — VALSARTAN 160 MG/1
160 TABLET ORAL 2 TIMES DAILY
Status: DISCONTINUED | OUTPATIENT
Start: 2021-05-08 | End: 2021-05-09 | Stop reason: HOSPADM

## 2021-05-08 RX ORDER — PREGABALIN 50 MG/1
50 CAPSULE ORAL DAILY
Status: DISCONTINUED | OUTPATIENT
Start: 2021-05-08 | End: 2021-05-09 | Stop reason: HOSPADM

## 2021-05-08 RX ORDER — PREDNISONE 20 MG/1
60 TABLET ORAL DAILY
Status: DISCONTINUED | OUTPATIENT
Start: 2021-05-08 | End: 2021-05-09 | Stop reason: HOSPADM

## 2021-05-08 RX ORDER — HYDRALAZINE HYDROCHLORIDE 20 MG/ML
10 INJECTION INTRAMUSCULAR; INTRAVENOUS
Status: COMPLETED | OUTPATIENT
Start: 2021-05-08 | End: 2021-05-08

## 2021-05-08 RX ORDER — ONDANSETRON 8 MG/1
8 TABLET, ORALLY DISINTEGRATING ORAL EVERY 8 HOURS PRN
Status: DISCONTINUED | OUTPATIENT
Start: 2021-05-08 | End: 2021-05-09 | Stop reason: HOSPADM

## 2021-05-08 RX ORDER — AMLODIPINE BESYLATE 10 MG/1
10 TABLET ORAL DAILY
Status: DISCONTINUED | OUTPATIENT
Start: 2021-05-08 | End: 2021-05-08

## 2021-05-08 RX ORDER — SODIUM CHLORIDE 0.9 % (FLUSH) 0.9 %
5 SYRINGE (ML) INJECTION
Status: DISCONTINUED | OUTPATIENT
Start: 2021-05-08 | End: 2021-05-09 | Stop reason: HOSPADM

## 2021-05-08 RX ORDER — LABETALOL 300 MG/1
300 TABLET, FILM COATED ORAL EVERY 12 HOURS
Status: DISCONTINUED | OUTPATIENT
Start: 2021-05-08 | End: 2021-05-08

## 2021-05-08 RX ORDER — ATORVASTATIN CALCIUM 40 MG/1
40 TABLET, FILM COATED ORAL DAILY
Status: DISCONTINUED | OUTPATIENT
Start: 2021-05-08 | End: 2021-05-09 | Stop reason: HOSPADM

## 2021-05-08 RX ORDER — IPRATROPIUM BROMIDE AND ALBUTEROL SULFATE 2.5; .5 MG/3ML; MG/3ML
3 SOLUTION RESPIRATORY (INHALATION) EVERY 4 HOURS PRN
Status: DISCONTINUED | OUTPATIENT
Start: 2021-05-08 | End: 2021-05-09 | Stop reason: HOSPADM

## 2021-05-08 RX ORDER — AMLODIPINE BESYLATE 10 MG/1
10 TABLET ORAL NIGHTLY
Status: DISCONTINUED | OUTPATIENT
Start: 2021-05-08 | End: 2021-05-09 | Stop reason: HOSPADM

## 2021-05-08 RX ORDER — GLUCAGON 1 MG
1 KIT INJECTION
Status: DISCONTINUED | OUTPATIENT
Start: 2021-05-08 | End: 2021-05-09 | Stop reason: HOSPADM

## 2021-05-08 RX ADMIN — HYDRALAZINE HYDROCHLORIDE 75 MG: 50 TABLET, FILM COATED ORAL at 10:05

## 2021-05-08 RX ADMIN — PREDNISONE 60 MG: 20 TABLET ORAL at 11:05

## 2021-05-08 RX ADMIN — LABETALOL HYDROCHLORIDE 20 MG: 5 INJECTION, SOLUTION INTRAVENOUS at 03:05

## 2021-05-08 RX ADMIN — AMLODIPINE BESYLATE 10 MG: 10 TABLET ORAL at 07:05

## 2021-05-08 RX ADMIN — VALSARTAN 160 MG: 160 TABLET, FILM COATED ORAL at 09:05

## 2021-05-08 RX ADMIN — SEVELAMER CARBONATE 2400 MG: 800 TABLET, FILM COATED ORAL at 11:05

## 2021-05-08 RX ADMIN — PREDNISONE 60 MG: 20 TABLET ORAL at 05:05

## 2021-05-08 RX ADMIN — FAMOTIDINE 20 MG: 20 TABLET ORAL at 11:05

## 2021-05-08 RX ADMIN — LABETALOL HYDROCHLORIDE 10 MG: 5 INJECTION, SOLUTION INTRAVENOUS at 02:05

## 2021-05-08 RX ADMIN — AMLODIPINE BESYLATE 10 MG: 10 TABLET ORAL at 09:05

## 2021-05-08 RX ADMIN — PREGABALIN 50 MG: 50 CAPSULE ORAL at 11:05

## 2021-05-08 RX ADMIN — IOHEXOL 75 ML: 350 INJECTION, SOLUTION INTRAVENOUS at 03:05

## 2021-05-08 RX ADMIN — HYDRALAZINE HYDROCHLORIDE 10 MG: 20 INJECTION, SOLUTION INTRAMUSCULAR; INTRAVENOUS at 05:05

## 2021-05-08 RX ADMIN — HYDRALAZINE HYDROCHLORIDE 75 MG: 50 TABLET, FILM COATED ORAL at 04:05

## 2021-05-08 RX ADMIN — VALSARTAN 160 MG: 160 TABLET, FILM COATED ORAL at 11:05

## 2021-05-08 RX ADMIN — ATORVASTATIN CALCIUM 40 MG: 40 TABLET, FILM COATED ORAL at 11:05

## 2021-05-08 RX ADMIN — SODIUM CHLORIDE: 0.9 INJECTION, SOLUTION INTRAVENOUS at 02:05

## 2021-05-08 RX ADMIN — LABETALOL HYDROCHLORIDE 300 MG: 300 TABLET, FILM COATED ORAL at 09:05

## 2021-05-08 RX ADMIN — FUROSEMIDE 40 MG: 40 TABLET ORAL at 09:05

## 2021-05-08 RX ADMIN — FUROSEMIDE 40 MG: 40 TABLET ORAL at 11:05

## 2021-05-08 RX ADMIN — LABETALOL HYDROCHLORIDE 300 MG: 100 TABLET, FILM COATED ORAL at 07:05

## 2021-05-09 VITALS
BODY MASS INDEX: 31.65 KG/M2 | WEIGHT: 190 LBS | DIASTOLIC BLOOD PRESSURE: 64 MMHG | RESPIRATION RATE: 20 BRPM | HEIGHT: 65 IN | HEART RATE: 82 BPM | TEMPERATURE: 97 F | SYSTOLIC BLOOD PRESSURE: 136 MMHG | OXYGEN SATURATION: 95 %

## 2021-05-09 LAB
ANION GAP SERPL CALC-SCNC: 12 MMOL/L (ref 8–16)
BASOPHILS # BLD AUTO: 0.02 K/UL (ref 0–0.2)
BASOPHILS NFR BLD: 0.3 % (ref 0–1.9)
BUN SERPL-MCNC: 30 MG/DL (ref 6–20)
CALCIUM SERPL-MCNC: 11.2 MG/DL (ref 8.7–10.5)
CHLORIDE SERPL-SCNC: 97 MMOL/L (ref 95–110)
CO2 SERPL-SCNC: 27 MMOL/L (ref 23–29)
CREAT SERPL-MCNC: 8 MG/DL (ref 0.5–1.4)
DIFFERENTIAL METHOD: ABNORMAL
EOSINOPHIL # BLD AUTO: 0 K/UL (ref 0–0.5)
EOSINOPHIL NFR BLD: 0 % (ref 0–8)
ERYTHROCYTE [DISTWIDTH] IN BLOOD BY AUTOMATED COUNT: 21 % (ref 11.5–14.5)
EST. GFR  (AFRICAN AMERICAN): 5.8 ML/MIN/1.73 M^2
EST. GFR  (NON AFRICAN AMERICAN): 5.1 ML/MIN/1.73 M^2
ESTIMATED AVG GLUCOSE: 80 MG/DL (ref 68–131)
GLUCOSE SERPL-MCNC: 142 MG/DL (ref 70–110)
HBA1C MFR BLD: 4.4 % (ref 4–5.6)
HCT VFR BLD AUTO: 27 % (ref 37–48.5)
HGB BLD-MCNC: 8.3 G/DL (ref 12–16)
IMM GRANULOCYTES # BLD AUTO: 0.04 K/UL (ref 0–0.04)
IMM GRANULOCYTES NFR BLD AUTO: 0.6 % (ref 0–0.5)
LYMPHOCYTES # BLD AUTO: 0.9 K/UL (ref 1–4.8)
LYMPHOCYTES NFR BLD: 13.7 % (ref 18–48)
MAGNESIUM SERPL-MCNC: 2.4 MG/DL (ref 1.6–2.6)
MCH RBC QN AUTO: 24.8 PG (ref 27–31)
MCHC RBC AUTO-ENTMCNC: 30.7 G/DL (ref 32–36)
MCV RBC AUTO: 81 FL (ref 82–98)
MONOCYTES # BLD AUTO: 0.8 K/UL (ref 0.3–1)
MONOCYTES NFR BLD: 12.3 % (ref 4–15)
NEUTROPHILS # BLD AUTO: 4.6 K/UL (ref 1.8–7.7)
NEUTROPHILS NFR BLD: 73.1 % (ref 38–73)
NRBC BLD-RTO: 0 /100 WBC
PHOSPHATE SERPL-MCNC: 3.6 MG/DL (ref 2.7–4.5)
PLATELET # BLD AUTO: 219 K/UL (ref 150–450)
PMV BLD AUTO: 10.7 FL (ref 9.2–12.9)
POCT GLUCOSE: 147 MG/DL (ref 70–110)
POTASSIUM SERPL-SCNC: 4.9 MMOL/L (ref 3.5–5.1)
RBC # BLD AUTO: 3.35 M/UL (ref 4–5.4)
SODIUM SERPL-SCNC: 136 MMOL/L (ref 136–145)
WBC # BLD AUTO: 6.33 K/UL (ref 3.9–12.7)

## 2021-05-09 PROCEDURE — 99217 PR OBSERVATION CARE DISCHARGE: CPT | Mod: NTX,,, | Performed by: PHYSICIAN ASSISTANT

## 2021-05-09 PROCEDURE — 83036 HEMOGLOBIN GLYCOSYLATED A1C: CPT | Mod: NTX | Performed by: PHYSICIAN ASSISTANT

## 2021-05-09 PROCEDURE — 85025 COMPLETE CBC W/AUTO DIFF WBC: CPT | Mod: NTX | Performed by: PHYSICIAN ASSISTANT

## 2021-05-09 PROCEDURE — 99217 PR OBSERVATION CARE DISCHARGE: ICD-10-PCS | Mod: NTX,,, | Performed by: PHYSICIAN ASSISTANT

## 2021-05-09 PROCEDURE — 36415 COLL VENOUS BLD VENIPUNCTURE: CPT | Mod: NTX | Performed by: PHYSICIAN ASSISTANT

## 2021-05-09 PROCEDURE — 63600175 PHARM REV CODE 636 W HCPCS: Mod: NTX | Performed by: PHYSICIAN ASSISTANT

## 2021-05-09 PROCEDURE — 83735 ASSAY OF MAGNESIUM: CPT | Mod: NTX | Performed by: PHYSICIAN ASSISTANT

## 2021-05-09 PROCEDURE — 80048 BASIC METABOLIC PNL TOTAL CA: CPT | Mod: NTX | Performed by: PHYSICIAN ASSISTANT

## 2021-05-09 PROCEDURE — 25000003 PHARM REV CODE 250: Mod: NTX | Performed by: PHYSICIAN ASSISTANT

## 2021-05-09 PROCEDURE — G0378 HOSPITAL OBSERVATION PER HR: HCPCS | Mod: NTX

## 2021-05-09 PROCEDURE — 84100 ASSAY OF PHOSPHORUS: CPT | Mod: NTX | Performed by: PHYSICIAN ASSISTANT

## 2021-05-09 RX ORDER — GUAIFENESIN 100 MG/5ML
200 SOLUTION ORAL EVERY 4 HOURS PRN
Status: DISCONTINUED | OUTPATIENT
Start: 2021-05-09 | End: 2021-05-09 | Stop reason: HOSPADM

## 2021-05-09 RX ORDER — BENZONATATE 100 MG/1
100 CAPSULE ORAL 3 TIMES DAILY PRN
Status: DISCONTINUED | OUTPATIENT
Start: 2021-05-09 | End: 2021-05-09 | Stop reason: HOSPADM

## 2021-05-09 RX ORDER — PREDNISONE 20 MG/1
TABLET ORAL
Qty: 21 TABLET | Refills: 0 | Status: SHIPPED | OUTPATIENT
Start: 2021-05-10 | End: 2021-05-22

## 2021-05-09 RX ORDER — HYDRALAZINE HYDROCHLORIDE 50 MG/1
75 TABLET, FILM COATED ORAL EVERY 8 HOURS
Qty: 135 TABLET | Refills: 0 | Status: SHIPPED | OUTPATIENT
Start: 2021-05-09 | End: 2021-06-08

## 2021-05-09 RX ADMIN — FAMOTIDINE 20 MG: 20 TABLET ORAL at 10:05

## 2021-05-09 RX ADMIN — PREDNISONE 60 MG: 20 TABLET ORAL at 10:05

## 2021-05-09 RX ADMIN — FUROSEMIDE 40 MG: 40 TABLET ORAL at 10:05

## 2021-05-09 RX ADMIN — HYDRALAZINE HYDROCHLORIDE 75 MG: 50 TABLET, FILM COATED ORAL at 05:05

## 2021-05-09 RX ADMIN — LABETALOL HYDROCHLORIDE 300 MG: 300 TABLET, FILM COATED ORAL at 10:05

## 2021-05-09 RX ADMIN — VALSARTAN 160 MG: 160 TABLET, FILM COATED ORAL at 10:05

## 2021-05-09 RX ADMIN — ATORVASTATIN CALCIUM 40 MG: 40 TABLET, FILM COATED ORAL at 10:05

## 2021-05-09 RX ADMIN — SEVELAMER CARBONATE 2400 MG: 800 TABLET, FILM COATED ORAL at 10:05

## 2021-05-09 RX ADMIN — PREGABALIN 50 MG: 50 CAPSULE ORAL at 10:05

## 2021-05-10 ENCOUNTER — TELEPHONE (OUTPATIENT)
Dept: OTOLARYNGOLOGY | Facility: CLINIC | Age: 56
End: 2021-05-10

## 2021-05-11 ENCOUNTER — CLINICAL SUPPORT (OUTPATIENT)
Dept: AUDIOLOGY | Facility: CLINIC | Age: 56
End: 2021-05-11
Payer: MEDICARE

## 2021-05-11 ENCOUNTER — OFFICE VISIT (OUTPATIENT)
Dept: OTOLARYNGOLOGY | Facility: CLINIC | Age: 56
End: 2021-05-11
Payer: MEDICARE

## 2021-05-11 VITALS — BODY MASS INDEX: 31.37 KG/M2 | WEIGHT: 188.5 LBS

## 2021-05-11 DIAGNOSIS — H90.3 SENSORINEURAL HEARING LOSS, BILATERAL: Primary | ICD-10-CM

## 2021-05-11 DIAGNOSIS — H91.22 SUDDEN HEARING LOSS, LEFT: ICD-10-CM

## 2021-05-11 DIAGNOSIS — H90.A21 SENSORINEURAL HEARING LOSS (SNHL) OF RIGHT EAR WITH RESTRICTED HEARING OF LEFT EAR: Primary | ICD-10-CM

## 2021-05-11 DIAGNOSIS — H93.13 TINNITUS OF BOTH EARS: ICD-10-CM

## 2021-05-11 DIAGNOSIS — H91.21 SUDDEN HEARING LOSS, RIGHT: ICD-10-CM

## 2021-05-11 PROCEDURE — 99214 OFFICE O/P EST MOD 30 MIN: CPT | Mod: S$PBB,,, | Performed by: OTOLARYNGOLOGY

## 2021-05-11 PROCEDURE — 99999 PR PBB SHADOW E&M-EST. PATIENT-LVL III: ICD-10-PCS | Mod: PBBFAC,,, | Performed by: OTOLARYNGOLOGY

## 2021-05-11 PROCEDURE — 92555 SPEECH THRESHOLD AUDIOMETRY: CPT | Mod: PBBFAC,TXP | Performed by: AUDIOLOGIST

## 2021-05-11 PROCEDURE — 92567 TYMPANOMETRY: CPT | Mod: PBBFAC,TXP | Performed by: AUDIOLOGIST

## 2021-05-11 PROCEDURE — 99999 PR PBB SHADOW E&M-EST. PATIENT-LVL III: CPT | Mod: PBBFAC,,, | Performed by: OTOLARYNGOLOGY

## 2021-05-11 PROCEDURE — 92553 AUDIOMETRY AIR & BONE: CPT | Mod: PBBFAC,NTX | Performed by: AUDIOLOGIST

## 2021-05-11 PROCEDURE — 99213 OFFICE O/P EST LOW 20 MIN: CPT | Mod: PBBFAC,25 | Performed by: OTOLARYNGOLOGY

## 2021-05-11 PROCEDURE — 99214 PR OFFICE/OUTPT VISIT, EST, LEVL IV, 30-39 MIN: ICD-10-PCS | Mod: S$PBB,,, | Performed by: OTOLARYNGOLOGY

## 2021-05-18 ENCOUNTER — OFFICE VISIT (OUTPATIENT)
Dept: UROLOGY | Facility: CLINIC | Age: 56
End: 2021-05-18
Payer: MEDICARE

## 2021-05-18 ENCOUNTER — HOSPITAL ENCOUNTER (OUTPATIENT)
Dept: RADIOLOGY | Facility: HOSPITAL | Age: 56
Discharge: HOME | End: 2021-05-18
Attending: UROLOGY
Payer: MEDICARE

## 2021-05-18 VITALS
SYSTOLIC BLOOD PRESSURE: 117 MMHG | DIASTOLIC BLOOD PRESSURE: 58 MMHG | BODY MASS INDEX: 31.4 KG/M2 | HEART RATE: 67 BPM | HEIGHT: 65 IN | WEIGHT: 188.5 LBS

## 2021-05-18 DIAGNOSIS — K82.9 GALL BLADDER DISEASE: Primary | ICD-10-CM

## 2021-05-18 DIAGNOSIS — Z99.2 ESRD ON HEMODIALYSIS: ICD-10-CM

## 2021-05-18 DIAGNOSIS — C64.1 RENAL CELL CARCINOMA OF RIGHT KIDNEY: ICD-10-CM

## 2021-05-18 DIAGNOSIS — K82.9 GALL BLADDER DISEASE: ICD-10-CM

## 2021-05-18 DIAGNOSIS — N18.6 ESRD ON HEMODIALYSIS: ICD-10-CM

## 2021-05-18 PROCEDURE — 99214 OFFICE O/P EST MOD 30 MIN: CPT | Mod: PBBFAC,25,TXP | Performed by: UROLOGY

## 2021-05-18 PROCEDURE — 99214 OFFICE O/P EST MOD 30 MIN: CPT | Mod: S$PBB,TXP,, | Performed by: UROLOGY

## 2021-05-18 PROCEDURE — 99214 PR OFFICE/OUTPT VISIT, EST, LEVL IV, 30-39 MIN: ICD-10-PCS | Mod: S$PBB,TXP,, | Performed by: UROLOGY

## 2021-05-18 PROCEDURE — 99999 PR PBB SHADOW E&M-EST. PATIENT-LVL IV: ICD-10-PCS | Mod: PBBFAC,TXP,, | Performed by: UROLOGY

## 2021-05-18 PROCEDURE — 99999 PR PBB SHADOW E&M-EST. PATIENT-LVL IV: CPT | Mod: PBBFAC,TXP,, | Performed by: UROLOGY

## 2021-05-18 PROCEDURE — 76705 ECHO EXAM OF ABDOMEN: CPT | Mod: 26,TXP,, | Performed by: RADIOLOGY

## 2021-05-18 PROCEDURE — 76705 ECHO EXAM OF ABDOMEN: CPT | Mod: TC,TXP

## 2021-05-18 PROCEDURE — 76705 US ABDOMEN LIMITED: ICD-10-PCS | Mod: 26,TXP,, | Performed by: RADIOLOGY

## 2021-05-18 RX ORDER — LOSARTAN POTASSIUM 50 MG/1
50 TABLET ORAL DAILY
Status: ON HOLD | COMMUNITY
Start: 2021-04-14 | End: 2021-07-26 | Stop reason: SDUPTHER

## 2021-05-24 ENCOUNTER — HOSPITAL ENCOUNTER (EMERGENCY)
Facility: HOSPITAL | Age: 56
Discharge: HOME | End: 2021-05-24
Attending: EMERGENCY MEDICINE
Payer: MEDICARE

## 2021-05-24 VITALS
DIASTOLIC BLOOD PRESSURE: 67 MMHG | OXYGEN SATURATION: 97 % | SYSTOLIC BLOOD PRESSURE: 145 MMHG | RESPIRATION RATE: 18 BRPM | WEIGHT: 194 LBS | HEIGHT: 66 IN | HEART RATE: 76 BPM | BODY MASS INDEX: 31.18 KG/M2 | TEMPERATURE: 98 F

## 2021-05-24 DIAGNOSIS — S82.832A OTHER CLOSED FRACTURE OF DISTAL END OF LEFT FIBULA, INITIAL ENCOUNTER: Primary | ICD-10-CM

## 2021-05-24 DIAGNOSIS — M25.572 LEFT ANKLE PAIN: ICD-10-CM

## 2021-05-24 PROCEDURE — 27786 TREATMENT OF ANKLE FRACTURE: CPT | Mod: 54,LT,, | Performed by: PHYSICIAN ASSISTANT

## 2021-05-24 PROCEDURE — 29515 APPLICATION SHORT LEG SPLINT: CPT | Mod: LT,NTX

## 2021-05-24 PROCEDURE — 99284 EMERGENCY DEPT VISIT MOD MDM: CPT | Mod: 25,NTX,, | Performed by: PHYSICIAN ASSISTANT

## 2021-05-24 PROCEDURE — 27786 PR CLOSED RX DIST FIBULA FX: ICD-10-PCS | Mod: 54,LT,, | Performed by: PHYSICIAN ASSISTANT

## 2021-05-24 PROCEDURE — 99283 EMERGENCY DEPT VISIT LOW MDM: CPT | Mod: 25,NTX

## 2021-05-24 PROCEDURE — 99284 PR EMERGENCY DEPT VISIT,LEVEL IV: ICD-10-PCS | Mod: 25,NTX,, | Performed by: PHYSICIAN ASSISTANT

## 2021-05-24 RX ORDER — HYDROCODONE BITARTRATE AND ACETAMINOPHEN 5; 325 MG/1; MG/1
1 TABLET ORAL EVERY 8 HOURS PRN
Qty: 9 TABLET | Refills: 0 | Status: ON HOLD | OUTPATIENT
Start: 2021-05-24 | End: 2021-06-30 | Stop reason: SDUPTHER

## 2021-05-25 ENCOUNTER — OFFICE VISIT (OUTPATIENT)
Dept: OTOLARYNGOLOGY | Facility: CLINIC | Age: 56
End: 2021-05-25
Payer: MEDICARE

## 2021-05-25 DIAGNOSIS — N18.6 TYPE 2 DIABETES MELLITUS WITH CHRONIC KIDNEY DISEASE ON CHRONIC DIALYSIS, WITH LONG-TERM CURRENT USE OF INSULIN: ICD-10-CM

## 2021-05-25 DIAGNOSIS — Z79.4 TYPE 2 DIABETES MELLITUS WITH CHRONIC KIDNEY DISEASE ON CHRONIC DIALYSIS, WITH LONG-TERM CURRENT USE OF INSULIN: ICD-10-CM

## 2021-05-25 DIAGNOSIS — H91.22 SUDDEN HEARING LOSS, LEFT: Primary | ICD-10-CM

## 2021-05-25 DIAGNOSIS — N18.6 ESRD ON HEMODIALYSIS: ICD-10-CM

## 2021-05-25 DIAGNOSIS — E11.22 TYPE 2 DIABETES MELLITUS WITH CHRONIC KIDNEY DISEASE ON CHRONIC DIALYSIS, WITH LONG-TERM CURRENT USE OF INSULIN: ICD-10-CM

## 2021-05-25 DIAGNOSIS — H91.21 SUDDEN HEARING LOSS, RIGHT: ICD-10-CM

## 2021-05-25 DIAGNOSIS — Z99.2 ESRD ON HEMODIALYSIS: ICD-10-CM

## 2021-05-25 DIAGNOSIS — I50.30 DIASTOLIC HEART FAILURE SECONDARY TO HYPERTENSION: ICD-10-CM

## 2021-05-25 DIAGNOSIS — H90.3 SENSORINEURAL HEARING LOSS, BILATERAL: ICD-10-CM

## 2021-05-25 DIAGNOSIS — I11.0 DIASTOLIC HEART FAILURE SECONDARY TO HYPERTENSION: ICD-10-CM

## 2021-05-25 DIAGNOSIS — Z99.2 TYPE 2 DIABETES MELLITUS WITH CHRONIC KIDNEY DISEASE ON CHRONIC DIALYSIS, WITH LONG-TERM CURRENT USE OF INSULIN: ICD-10-CM

## 2021-05-25 PROCEDURE — 99999 PR PBB SHADOW E&M-EST. PATIENT-LVL III: ICD-10-PCS | Mod: PBBFAC,,, | Performed by: OTOLARYNGOLOGY

## 2021-05-25 PROCEDURE — 99999 PR PBB SHADOW E&M-EST. PATIENT-LVL III: CPT | Mod: PBBFAC,,, | Performed by: OTOLARYNGOLOGY

## 2021-05-25 PROCEDURE — 99213 OFFICE O/P EST LOW 20 MIN: CPT | Mod: S$PBB,,, | Performed by: OTOLARYNGOLOGY

## 2021-05-25 PROCEDURE — 99213 PR OFFICE/OUTPT VISIT, EST, LEVL III, 20-29 MIN: ICD-10-PCS | Mod: S$PBB,,, | Performed by: OTOLARYNGOLOGY

## 2021-05-25 PROCEDURE — 99213 OFFICE O/P EST LOW 20 MIN: CPT | Mod: PBBFAC | Performed by: OTOLARYNGOLOGY

## 2021-05-25 RX ORDER — HYDRALAZINE HYDROCHLORIDE 25 MG/1
25 TABLET, FILM COATED ORAL 3 TIMES DAILY
Status: ON HOLD | COMMUNITY
Start: 2021-05-12 | End: 2021-07-01 | Stop reason: SDUPTHER

## 2021-05-25 RX ORDER — PREGABALIN 75 MG/1
75 CAPSULE ORAL DAILY
COMMUNITY
Start: 2021-05-12 | End: 2023-11-21 | Stop reason: SDUPTHER

## 2021-05-26 ENCOUNTER — TELEPHONE (OUTPATIENT)
Dept: OTOLARYNGOLOGY | Facility: CLINIC | Age: 56
End: 2021-05-26

## 2021-05-26 ENCOUNTER — CLINICAL SUPPORT (OUTPATIENT)
Dept: AUDIOLOGY | Facility: CLINIC | Age: 56
End: 2021-05-26
Payer: MEDICARE

## 2021-05-26 DIAGNOSIS — H91.23 SUDDEN HEARING LOSS OF BOTH EARS: ICD-10-CM

## 2021-05-26 DIAGNOSIS — H90.3 SENSORINEURAL HEARING LOSS (SNHL), BILATERAL: Primary | ICD-10-CM

## 2021-05-26 PROCEDURE — 92626 EVAL AUD FUNCJ 1ST HOUR: CPT | Mod: PBBFAC | Performed by: AUDIOLOGIST

## 2021-05-26 PROCEDURE — 92626 PR EVAL, AUDITORY FUNCTION, SURG IMPLANT DEVICE, 1ST HR: ICD-10-PCS | Mod: S$PBB,,, | Performed by: AUDIOLOGIST

## 2021-05-26 PROCEDURE — 92626 EVAL AUD FUNCJ 1ST HOUR: CPT | Mod: S$PBB,,, | Performed by: AUDIOLOGIST

## 2021-05-26 PROCEDURE — 92557 COMPREHENSIVE HEARING TEST: CPT | Mod: PBBFAC | Performed by: AUDIOLOGIST

## 2021-05-31 ENCOUNTER — TELEPHONE (OUTPATIENT)
Dept: OTOLARYNGOLOGY | Facility: CLINIC | Age: 56
End: 2021-05-31

## 2021-06-01 ENCOUNTER — OFFICE VISIT (OUTPATIENT)
Dept: ORTHOPEDICS | Facility: CLINIC | Age: 56
End: 2021-06-01
Payer: MEDICARE

## 2021-06-01 ENCOUNTER — HOSPITAL ENCOUNTER (EMERGENCY)
Facility: HOSPITAL | Age: 56
Discharge: HOME | End: 2021-06-01
Attending: EMERGENCY MEDICINE
Payer: MEDICARE

## 2021-06-01 VITALS
OXYGEN SATURATION: 97 % | TEMPERATURE: 98 F | DIASTOLIC BLOOD PRESSURE: 77 MMHG | RESPIRATION RATE: 16 BRPM | SYSTOLIC BLOOD PRESSURE: 171 MMHG | HEART RATE: 89 BPM

## 2021-06-01 VITALS
SYSTOLIC BLOOD PRESSURE: 166 MMHG | BODY MASS INDEX: 32.32 KG/M2 | DIASTOLIC BLOOD PRESSURE: 68 MMHG | HEART RATE: 84 BPM | WEIGHT: 194 LBS | HEIGHT: 65 IN
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
## 2021-06-01 DIAGNOSIS — M25.511 ACUTE PAIN OF RIGHT SHOULDER: ICD-10-CM

## 2021-06-01 DIAGNOSIS — S82.832D: Primary | ICD-10-CM

## 2021-06-01 PROCEDURE — 99999 PR PBB SHADOW E&M-EST. PATIENT-LVL IV: ICD-10-PCS | Mod: PBBFAC,,, | Performed by: ORTHOPAEDIC SURGERY

## 2021-06-01 PROCEDURE — 99204 OFFICE O/P NEW MOD 45 MIN: CPT | Mod: S$PBB,,, | Performed by: ORTHOPAEDIC SURGERY

## 2021-06-01 PROCEDURE — 99284 EMERGENCY DEPT VISIT MOD MDM: CPT | Mod: NTX,,, | Performed by: NURSE PRACTITIONER

## 2021-06-01 PROCEDURE — 99284 PR EMERGENCY DEPT VISIT,LEVEL IV: ICD-10-PCS | Mod: NTX,,, | Performed by: NURSE PRACTITIONER

## 2021-06-01 PROCEDURE — 99214 OFFICE O/P EST MOD 30 MIN: CPT | Mod: PBBFAC,PN | Performed by: ORTHOPAEDIC SURGERY

## 2021-06-01 PROCEDURE — 99204 PR OFFICE/OUTPT VISIT, NEW, LEVL IV, 45-59 MIN: ICD-10-PCS | Mod: S$PBB,,, | Performed by: ORTHOPAEDIC SURGERY

## 2021-06-01 PROCEDURE — 99284 EMERGENCY DEPT VISIT MOD MDM: CPT | Mod: 27,NTX

## 2021-06-01 PROCEDURE — 99999 PR PBB SHADOW E&M-EST. PATIENT-LVL IV: CPT | Mod: PBBFAC,,, | Performed by: ORTHOPAEDIC SURGERY

## 2021-06-01 PROCEDURE — 25000003 PHARM REV CODE 250: Mod: NTX | Performed by: NURSE PRACTITIONER

## 2021-06-01 RX ORDER — TIZANIDINE 4 MG/1
4 TABLET ORAL EVERY 6 HOURS PRN
Qty: 20 TABLET | Refills: 0 | Status: SHIPPED | OUTPATIENT
Start: 2021-06-01 | End: 2021-06-01 | Stop reason: SDUPTHER

## 2021-06-01 RX ORDER — TIZANIDINE 4 MG/1
4 TABLET ORAL EVERY 6 HOURS PRN
Qty: 20 TABLET | Refills: 0 | Status: SHIPPED | OUTPATIENT
Start: 2021-06-01 | End: 2021-06-06

## 2021-06-01 RX ORDER — DOCUSATE SODIUM 100 MG/1
100 CAPSULE, LIQUID FILLED ORAL 2 TIMES DAILY
Qty: 20 CAPSULE | Refills: 0 | Status: SHIPPED | OUTPATIENT
Start: 2021-06-01 | End: 2021-06-11

## 2021-06-01 RX ORDER — DICLOFENAC SODIUM 30 MG/G
GEL TOPICAL
Qty: 100 G | Refills: 0 | Status: ON HOLD | OUTPATIENT
Start: 2021-06-01 | End: 2021-07-23

## 2021-06-01 RX ORDER — DICLOFENAC SODIUM 30 MG/G
GEL TOPICAL
Qty: 100 G | Refills: 0 | Status: SHIPPED | OUTPATIENT
Start: 2021-06-01 | End: 2021-06-01 | Stop reason: SDUPTHER

## 2021-06-01 RX ORDER — CYCLOBENZAPRINE HCL 10 MG
10 TABLET ORAL
Status: COMPLETED | OUTPATIENT
Start: 2021-06-01 | End: 2021-06-01

## 2021-06-01 RX ORDER — SYRING-NEEDL,DISP,INSUL,0.3 ML 29 G X1/2"
296 SYRINGE, EMPTY DISPOSABLE MISCELLANEOUS ONCE
Qty: 296 ML | Refills: 0 | Status: SHIPPED | OUTPATIENT
Start: 2021-06-01 | End: 2021-06-01

## 2021-06-01 RX ADMIN — CYCLOBENZAPRINE 10 MG: 10 TABLET, FILM COATED ORAL at 08:06

## 2021-06-13 ENCOUNTER — HOSPITAL ENCOUNTER (INPATIENT)
Facility: HOSPITAL | Age: 56
LOS: 1 days | Discharge: HOME | DRG: 291 | End: 2021-06-14
Attending: EMERGENCY MEDICINE | Admitting: INTERNAL MEDICINE
Payer: MEDICARE

## 2021-06-13 DIAGNOSIS — R60.0 BILATERAL LOWER EXTREMITY EDEMA: ICD-10-CM

## 2021-06-13 DIAGNOSIS — D63.1 ANEMIA IN ESRD (END-STAGE RENAL DISEASE): ICD-10-CM

## 2021-06-13 DIAGNOSIS — M79.89 LEG SWELLING: ICD-10-CM

## 2021-06-13 DIAGNOSIS — N18.6 ESRD ON HEMODIALYSIS: ICD-10-CM

## 2021-06-13 DIAGNOSIS — I10 ESSENTIAL HYPERTENSION: ICD-10-CM

## 2021-06-13 DIAGNOSIS — J81.0 ACUTE PULMONARY EDEMA: Primary | ICD-10-CM

## 2021-06-13 DIAGNOSIS — I50.32 CHRONIC DIASTOLIC HEART FAILURE: ICD-10-CM

## 2021-06-13 DIAGNOSIS — H91.93 BILATERAL HEARING LOSS, UNSPECIFIED HEARING LOSS TYPE: ICD-10-CM

## 2021-06-13 DIAGNOSIS — Z99.2 ESRD ON HEMODIALYSIS: ICD-10-CM

## 2021-06-13 DIAGNOSIS — I50.9 CHF (CONGESTIVE HEART FAILURE): ICD-10-CM

## 2021-06-13 DIAGNOSIS — N18.6 ANEMIA IN ESRD (END-STAGE RENAL DISEASE): ICD-10-CM

## 2021-06-13 DIAGNOSIS — I50.33 ACUTE ON CHRONIC DIASTOLIC HEART FAILURE: ICD-10-CM

## 2021-06-13 PROBLEM — E66.811 CLASS 1 OBESITY IN ADULT: Status: ACTIVE | Noted: 2019-04-23

## 2021-06-13 PROBLEM — I15.2 OBESITY, DIABETES, AND HYPERTENSION SYNDROME: Status: ACTIVE | Noted: 2021-06-13

## 2021-06-13 PROBLEM — E11.69 OBESITY, DIABETES, AND HYPERTENSION SYNDROME: Status: ACTIVE | Noted: 2021-06-13

## 2021-06-13 PROBLEM — E66.9 OBESITY, DIABETES, AND HYPERTENSION SYNDROME: Status: ACTIVE | Noted: 2021-06-13

## 2021-06-13 PROBLEM — I13.2 BENIGN HYPERTENSIVE HEART AND KIDNEY DISEASE WITH HF AND ESRD: Status: ACTIVE | Noted: 2021-06-13

## 2021-06-13 PROBLEM — E11.59 OBESITY, DIABETES, AND HYPERTENSION SYNDROME: Status: ACTIVE | Noted: 2021-06-13

## 2021-06-13 PROBLEM — E66.9 CLASS 1 OBESITY IN ADULT: Status: ACTIVE | Noted: 2019-04-23

## 2021-06-13 LAB
ALBUMIN SERPL BCP-MCNC: 3.3 G/DL (ref 3.5–5.2)
ALP SERPL-CCNC: 106 U/L (ref 55–135)
ALT SERPL W/O P-5'-P-CCNC: 11 U/L (ref 10–44)
ANION GAP SERPL CALC-SCNC: 13 MMOL/L (ref 8–16)
AST SERPL-CCNC: 21 U/L (ref 10–40)
BASOPHILS # BLD AUTO: 0.05 K/UL (ref 0–0.2)
BASOPHILS NFR BLD: 0.9 % (ref 0–1.9)
BILIRUB SERPL-MCNC: 0.7 MG/DL (ref 0.1–1)
BNP SERPL-MCNC: 1229 PG/ML (ref 0–99)
BUN SERPL-MCNC: 44 MG/DL (ref 6–20)
CALCIUM SERPL-MCNC: 11.1 MG/DL (ref 8.7–10.5)
CHLORIDE SERPL-SCNC: 96 MMOL/L (ref 95–110)
CO2 SERPL-SCNC: 28 MMOL/L (ref 23–29)
CREAT SERPL-MCNC: 10.6 MG/DL (ref 0.5–1.4)
DIFFERENTIAL METHOD: ABNORMAL
EOSINOPHIL # BLD AUTO: 0.3 K/UL (ref 0–0.5)
EOSINOPHIL NFR BLD: 4.8 % (ref 0–8)
ERYTHROCYTE [DISTWIDTH] IN BLOOD BY AUTOMATED COUNT: 20.4 % (ref 11.5–14.5)
EST. GFR  (AFRICAN AMERICAN): 4.2 ML/MIN/1.73 M^2
EST. GFR  (NON AFRICAN AMERICAN): 3.6 ML/MIN/1.73 M^2
ESTIMATED AVG GLUCOSE: 91 MG/DL (ref 68–131)
GLUCOSE SERPL-MCNC: 65 MG/DL (ref 70–110)
HBA1C MFR BLD: 4.8 % (ref 4–5.6)
HCT VFR BLD AUTO: 26.5 % (ref 37–48.5)
HGB BLD-MCNC: 8.1 G/DL (ref 12–16)
IMM GRANULOCYTES # BLD AUTO: 0.02 K/UL (ref 0–0.04)
IMM GRANULOCYTES NFR BLD AUTO: 0.4 % (ref 0–0.5)
LYMPHOCYTES # BLD AUTO: 1.2 K/UL (ref 1–4.8)
LYMPHOCYTES NFR BLD: 22 % (ref 18–48)
MCH RBC QN AUTO: 24.8 PG (ref 27–31)
MCHC RBC AUTO-ENTMCNC: 30.6 G/DL (ref 32–36)
MCV RBC AUTO: 81 FL (ref 82–98)
MONOCYTES # BLD AUTO: 0.8 K/UL (ref 0.3–1)
MONOCYTES NFR BLD: 14.4 % (ref 4–15)
NEUTROPHILS # BLD AUTO: 3.3 K/UL (ref 1.8–7.7)
NEUTROPHILS NFR BLD: 57.5 % (ref 38–73)
NRBC BLD-RTO: 0 /100 WBC
PLATELET # BLD AUTO: 241 K/UL (ref 150–450)
PMV BLD AUTO: 10.8 FL (ref 9.2–12.9)
POTASSIUM SERPL-SCNC: 5 MMOL/L (ref 3.5–5.1)
PROT SERPL-MCNC: 7.5 G/DL (ref 6–8.4)
RBC # BLD AUTO: 3.27 M/UL (ref 4–5.4)
SODIUM SERPL-SCNC: 137 MMOL/L (ref 136–145)
WBC # BLD AUTO: 5.64 K/UL (ref 3.9–12.7)

## 2021-06-13 PROCEDURE — 83880 ASSAY OF NATRIURETIC PEPTIDE: CPT | Mod: NTX | Performed by: PHYSICIAN ASSISTANT

## 2021-06-13 PROCEDURE — 99285 EMERGENCY DEPT VISIT HI MDM: CPT | Mod: NTX,,, | Performed by: EMERGENCY MEDICINE

## 2021-06-13 PROCEDURE — 99285 PR EMERGENCY DEPT VISIT,LEVEL V: ICD-10-PCS | Mod: NTX,,, | Performed by: EMERGENCY MEDICINE

## 2021-06-13 PROCEDURE — 93010 ELECTROCARDIOGRAM REPORT: CPT | Mod: NTX,,, | Performed by: INTERNAL MEDICINE

## 2021-06-13 PROCEDURE — G0378 HOSPITAL OBSERVATION PER HR: HCPCS | Mod: NTX

## 2021-06-13 PROCEDURE — 63600175 PHARM REV CODE 636 W HCPCS: Mod: NTX | Performed by: HOSPITALIST

## 2021-06-13 PROCEDURE — 80053 COMPREHEN METABOLIC PANEL: CPT | Mod: NTX | Performed by: PHYSICIAN ASSISTANT

## 2021-06-13 PROCEDURE — 83036 HEMOGLOBIN GLYCOSYLATED A1C: CPT | Mod: NTX | Performed by: PHYSICIAN ASSISTANT

## 2021-06-13 PROCEDURE — 99285 EMERGENCY DEPT VISIT HI MDM: CPT | Mod: 25,NTX

## 2021-06-13 PROCEDURE — 93010 EKG 12-LEAD: ICD-10-PCS | Mod: NTX,,, | Performed by: INTERNAL MEDICINE

## 2021-06-13 PROCEDURE — 93005 ELECTROCARDIOGRAM TRACING: CPT | Mod: NTX

## 2021-06-13 PROCEDURE — 86803 HEPATITIS C AB TEST: CPT | Mod: NTX | Performed by: EMERGENCY MEDICINE

## 2021-06-13 PROCEDURE — 25000003 PHARM REV CODE 250: Mod: NTX | Performed by: EMERGENCY MEDICINE

## 2021-06-13 PROCEDURE — 25000003 PHARM REV CODE 250: Mod: NTX | Performed by: HOSPITALIST

## 2021-06-13 PROCEDURE — 96374 THER/PROPH/DIAG INJ IV PUSH: CPT | Mod: NTX

## 2021-06-13 PROCEDURE — 82962 GLUCOSE BLOOD TEST: CPT | Mod: NTX

## 2021-06-13 PROCEDURE — 86703 HIV-1/HIV-2 1 RESULT ANTBDY: CPT | Mod: NTX | Performed by: EMERGENCY MEDICINE

## 2021-06-13 PROCEDURE — 99220 PR INITIAL OBSERVATION CARE,LEVL III: CPT | Mod: NTX,,, | Performed by: HOSPITALIST

## 2021-06-13 PROCEDURE — 85025 COMPLETE CBC W/AUTO DIFF WBC: CPT | Mod: NTX | Performed by: PHYSICIAN ASSISTANT

## 2021-06-13 PROCEDURE — 99220 PR INITIAL OBSERVATION CARE,LEVL III: ICD-10-PCS | Mod: NTX,,, | Performed by: HOSPITALIST

## 2021-06-13 RX ORDER — FUROSEMIDE 10 MG/ML
80 INJECTION INTRAMUSCULAR; INTRAVENOUS
Status: COMPLETED | OUTPATIENT
Start: 2021-06-13 | End: 2021-06-13

## 2021-06-13 RX ORDER — HYDRALAZINE HYDROCHLORIDE 25 MG/1
25 TABLET, FILM COATED ORAL
Status: COMPLETED | OUTPATIENT
Start: 2021-06-13 | End: 2021-06-13

## 2021-06-13 RX ORDER — HEPARIN SODIUM 1000 [USP'U]/ML
1000 INJECTION, SOLUTION INTRAVENOUS; SUBCUTANEOUS
Status: DISCONTINUED | OUTPATIENT
Start: 2021-06-14 | End: 2021-06-14 | Stop reason: HOSPADM

## 2021-06-13 RX ORDER — ATORVASTATIN CALCIUM 40 MG/1
40 TABLET, FILM COATED ORAL DAILY
Status: DISCONTINUED | OUTPATIENT
Start: 2021-06-14 | End: 2021-06-14 | Stop reason: HOSPADM

## 2021-06-13 RX ORDER — AMLODIPINE BESYLATE 10 MG/1
10 TABLET ORAL NIGHTLY
Status: DISCONTINUED | OUTPATIENT
Start: 2021-06-13 | End: 2021-06-14 | Stop reason: HOSPADM

## 2021-06-13 RX ORDER — GLUCAGON 1 MG
1 KIT INJECTION
Status: DISCONTINUED | OUTPATIENT
Start: 2021-06-13 | End: 2021-06-14 | Stop reason: HOSPADM

## 2021-06-13 RX ORDER — HYDRALAZINE HYDROCHLORIDE 25 MG/1
25 TABLET, FILM COATED ORAL 3 TIMES DAILY
Status: DISCONTINUED | OUTPATIENT
Start: 2021-06-14 | End: 2021-06-14 | Stop reason: HOSPADM

## 2021-06-13 RX ORDER — SODIUM CHLORIDE 0.9 % (FLUSH) 0.9 %
5 SYRINGE (ML) INJECTION
Status: DISCONTINUED | OUTPATIENT
Start: 2021-06-13 | End: 2021-06-14 | Stop reason: HOSPADM

## 2021-06-13 RX ORDER — MUPIROCIN 20 MG/G
OINTMENT TOPICAL 2 TIMES DAILY
Status: DISCONTINUED | OUTPATIENT
Start: 2021-06-14 | End: 2021-06-14 | Stop reason: HOSPADM

## 2021-06-13 RX ORDER — TALC
6 POWDER (GRAM) TOPICAL NIGHTLY PRN
Status: DISCONTINUED | OUTPATIENT
Start: 2021-06-13 | End: 2021-06-14 | Stop reason: HOSPADM

## 2021-06-13 RX ORDER — PREGABALIN 75 MG/1
75 CAPSULE ORAL DAILY
Status: DISCONTINUED | OUTPATIENT
Start: 2021-06-14 | End: 2021-06-14 | Stop reason: HOSPADM

## 2021-06-13 RX ORDER — LOSARTAN POTASSIUM 50 MG/1
50 TABLET ORAL DAILY
Status: DISCONTINUED | OUTPATIENT
Start: 2021-06-14 | End: 2021-06-14 | Stop reason: HOSPADM

## 2021-06-13 RX ORDER — FAMOTIDINE 20 MG/1
20 TABLET, FILM COATED ORAL DAILY
Status: DISCONTINUED | OUTPATIENT
Start: 2021-06-14 | End: 2021-06-14 | Stop reason: HOSPADM

## 2021-06-13 RX ORDER — LABETALOL 100 MG/1
300 TABLET, FILM COATED ORAL 2 TIMES DAILY
Status: DISCONTINUED | OUTPATIENT
Start: 2021-06-13 | End: 2021-06-14 | Stop reason: HOSPADM

## 2021-06-13 RX ORDER — IBUPROFEN 200 MG
24 TABLET ORAL
Status: DISCONTINUED | OUTPATIENT
Start: 2021-06-13 | End: 2021-06-14 | Stop reason: HOSPADM

## 2021-06-13 RX ORDER — IBUPROFEN 200 MG
16 TABLET ORAL
Status: DISCONTINUED | OUTPATIENT
Start: 2021-06-13 | End: 2021-06-14 | Stop reason: HOSPADM

## 2021-06-13 RX ORDER — FUROSEMIDE 10 MG/ML
40 INJECTION INTRAMUSCULAR; INTRAVENOUS
Status: DISCONTINUED | OUTPATIENT
Start: 2021-06-13 | End: 2021-06-13

## 2021-06-13 RX ORDER — ACETAMINOPHEN 325 MG/1
650 TABLET ORAL EVERY 4 HOURS PRN
Status: DISCONTINUED | OUTPATIENT
Start: 2021-06-13 | End: 2021-06-14 | Stop reason: HOSPADM

## 2021-06-13 RX ORDER — SODIUM CHLORIDE 0.9 % (FLUSH) 0.9 %
10 SYRINGE (ML) INJECTION
Status: DISCONTINUED | OUTPATIENT
Start: 2021-06-13 | End: 2021-06-14 | Stop reason: HOSPADM

## 2021-06-13 RX ORDER — HEPARIN SODIUM 5000 [USP'U]/ML
5000 INJECTION, SOLUTION INTRAVENOUS; SUBCUTANEOUS EVERY 8 HOURS
Status: DISCONTINUED | OUTPATIENT
Start: 2021-06-14 | End: 2021-06-14 | Stop reason: HOSPADM

## 2021-06-13 RX ORDER — POLYETHYLENE GLYCOL 3350 17 G/17G
17 POWDER, FOR SOLUTION ORAL DAILY PRN
Status: DISCONTINUED | OUTPATIENT
Start: 2021-06-13 | End: 2021-06-14 | Stop reason: HOSPADM

## 2021-06-13 RX ORDER — SEVELAMER CARBONATE 800 MG/1
2400 TABLET, FILM COATED ORAL
Status: DISCONTINUED | OUTPATIENT
Start: 2021-06-14 | End: 2021-06-14 | Stop reason: HOSPADM

## 2021-06-13 RX ORDER — ONDANSETRON 2 MG/ML
8 INJECTION INTRAMUSCULAR; INTRAVENOUS EVERY 8 HOURS PRN
Status: DISCONTINUED | OUTPATIENT
Start: 2021-06-13 | End: 2021-06-14 | Stop reason: HOSPADM

## 2021-06-13 RX ORDER — SODIUM CHLORIDE 9 MG/ML
INJECTION, SOLUTION INTRAVENOUS ONCE
Status: COMPLETED | OUTPATIENT
Start: 2021-06-14 | End: 2021-06-14

## 2021-06-13 RX ADMIN — FUROSEMIDE 80 MG: 10 INJECTION, SOLUTION INTRAMUSCULAR; INTRAVENOUS at 10:06

## 2021-06-13 RX ADMIN — LABETALOL HYDROCHLORIDE 300 MG: 100 TABLET, FILM COATED ORAL at 11:06

## 2021-06-13 RX ADMIN — HYDRALAZINE HYDROCHLORIDE 25 MG: 25 TABLET, FILM COATED ORAL at 10:06

## 2021-06-13 RX ADMIN — AMLODIPINE BESYLATE 10 MG: 10 TABLET ORAL at 11:06

## 2021-06-14 ENCOUNTER — TELEPHONE (OUTPATIENT)
Dept: ORTHOPEDICS | Facility: CLINIC | Age: 56
End: 2021-06-14

## 2021-06-14 VITALS
HEIGHT: 64 IN | OXYGEN SATURATION: 96 % | HEART RATE: 64 BPM | TEMPERATURE: 96 F | BODY MASS INDEX: 34.83 KG/M2 | DIASTOLIC BLOOD PRESSURE: 76 MMHG | SYSTOLIC BLOOD PRESSURE: 171 MMHG | RESPIRATION RATE: 17 BRPM | WEIGHT: 204 LBS

## 2021-06-14 DIAGNOSIS — M25.572 ACUTE LEFT ANKLE PAIN: Primary | ICD-10-CM

## 2021-06-14 PROBLEM — I50.32 CHRONIC DIASTOLIC HEART FAILURE: Status: ACTIVE | Noted: 2021-02-25

## 2021-06-14 PROBLEM — J81.0 ACUTE PULMONARY EDEMA: Status: ACTIVE | Noted: 2021-06-14

## 2021-06-14 LAB
ALBUMIN SERPL BCP-MCNC: 3.2 G/DL (ref 3.5–5.2)
ALP SERPL-CCNC: 99 U/L (ref 55–135)
ALT SERPL W/O P-5'-P-CCNC: 11 U/L (ref 10–44)
ANION GAP SERPL CALC-SCNC: 11 MMOL/L (ref 8–16)
ASCENDING AORTA: 2.74 CM
AST SERPL-CCNC: 13 U/L (ref 10–40)
AV INDEX (PROSTH): 0.83
AV MEAN GRADIENT: 2 MMHG
AV PEAK GRADIENT: 5 MMHG
AV VALVE AREA: 2.86 CM2
AV VELOCITY RATIO: 0.78
BASOPHILS # BLD AUTO: 0.04 K/UL (ref 0–0.2)
BASOPHILS NFR BLD: 0.9 % (ref 0–1.9)
BILIRUB SERPL-MCNC: 0.7 MG/DL (ref 0.1–1)
BSA FOR ECHO PROCEDURE: 2.04 M2
BUN SERPL-MCNC: 48 MG/DL (ref 6–20)
CALCIUM SERPL-MCNC: 10.5 MG/DL (ref 8.7–10.5)
CHLORIDE SERPL-SCNC: 95 MMOL/L (ref 95–110)
CO2 SERPL-SCNC: 28 MMOL/L (ref 23–29)
CREAT SERPL-MCNC: 11 MG/DL (ref 0.5–1.4)
CV ECHO LV RWT: 0.51 CM
DIFFERENTIAL METHOD: ABNORMAL
DOP CALC AO PEAK VEL: 1.08 M/S
DOP CALC AO VTI: 24.76 CM
DOP CALC LVOT AREA: 3.4 CM2
DOP CALC LVOT DIAMETER: 2.09 CM
DOP CALC LVOT PEAK VEL: 0.84 M/S
DOP CALC LVOT STROKE VOLUME: 70.71 CM3
DOP CALCLVOT PEAK VEL VTI: 20.62 CM
E WAVE DECELERATION TIME: 169.01 MSEC
E/A RATIO: 1.19
E/E' RATIO: 18.8 M/S
ECHO LV POSTERIOR WALL: 1.23 CM (ref 0.6–1.1)
EJECTION FRACTION: 53 %
EOSINOPHIL # BLD AUTO: 0.2 K/UL (ref 0–0.5)
EOSINOPHIL NFR BLD: 4.3 % (ref 0–8)
ERYTHROCYTE [DISTWIDTH] IN BLOOD BY AUTOMATED COUNT: 20.5 % (ref 11.5–14.5)
EST. GFR  (AFRICAN AMERICAN): 4 ML/MIN/1.73 M^2
EST. GFR  (NON AFRICAN AMERICAN): 3.5 ML/MIN/1.73 M^2
FRACTIONAL SHORTENING: 32 % (ref 28–44)
GLUCOSE SERPL-MCNC: 105 MG/DL (ref 70–110)
HCT VFR BLD AUTO: 26 % (ref 37–48.5)
HCV AB SERPL QL IA: NEGATIVE
HGB BLD-MCNC: 8.1 G/DL (ref 12–16)
HIV 1+2 AB+HIV1 P24 AG SERPL QL IA: NEGATIVE
IMM GRANULOCYTES # BLD AUTO: 0.01 K/UL (ref 0–0.04)
IMM GRANULOCYTES NFR BLD AUTO: 0.2 % (ref 0–0.5)
INTERVENTRICULAR SEPTUM: 1.3 CM (ref 0.6–1.1)
IVRT: 95.15 MSEC
LA MAJOR: 5.89 CM
LA MINOR: 5.9 CM
LA WIDTH: 4.25 CM
LEFT ATRIUM SIZE: 3.87 CM
LEFT ATRIUM VOLUME INDEX MOD: 31 ML/M2
LEFT ATRIUM VOLUME INDEX: 41.8 ML/M2
LEFT ATRIUM VOLUME MOD: 61.06 CM3
LEFT ATRIUM VOLUME: 82.41 CM3
LEFT INTERNAL DIMENSION IN SYSTOLE: 3.31 CM (ref 2.1–4)
LEFT VENTRICLE DIASTOLIC VOLUME INDEX: 56.41 ML/M2
LEFT VENTRICLE DIASTOLIC VOLUME: 111.12 ML
LEFT VENTRICLE MASS INDEX: 123 G/M2
LEFT VENTRICLE SYSTOLIC VOLUME INDEX: 22.5 ML/M2
LEFT VENTRICLE SYSTOLIC VOLUME: 44.33 ML
LEFT VENTRICULAR INTERNAL DIMENSION IN DIASTOLE: 4.87 CM (ref 3.5–6)
LEFT VENTRICULAR MASS: 241.65 G
LV LATERAL E/E' RATIO: 18.8 M/S
LV SEPTAL E/E' RATIO: 18.8 M/S
LYMPHOCYTES # BLD AUTO: 1.1 K/UL (ref 1–4.8)
LYMPHOCYTES NFR BLD: 23.6 % (ref 18–48)
MAGNESIUM SERPL-MCNC: 2.8 MG/DL (ref 1.6–2.6)
MCH RBC QN AUTO: 25.1 PG (ref 27–31)
MCHC RBC AUTO-ENTMCNC: 31.2 G/DL (ref 32–36)
MCV RBC AUTO: 81 FL (ref 82–98)
MONOCYTES # BLD AUTO: 0.6 K/UL (ref 0.3–1)
MONOCYTES NFR BLD: 12.6 % (ref 4–15)
MV A" WAVE DURATION": 11.42 MSEC
MV MEAN GRADIENT: 0 MMHG
MV PEAK A VEL: 0.79 M/S
MV PEAK E VEL: 0.94 M/S
MV PEAK GRADIENT: 1 MMHG
MV STENOSIS PRESSURE HALF TIME: 52.99 MS
MV VALVE AREA P 1/2 METHOD: 4.15 CM2
NEUTROPHILS # BLD AUTO: 2.7 K/UL (ref 1.8–7.7)
NEUTROPHILS NFR BLD: 58.4 % (ref 38–73)
NRBC BLD-RTO: 0 /100 WBC
PHOSPHATE SERPL-MCNC: 4 MG/DL (ref 2.7–4.5)
PISA MRMAX VEL: 0.06 M/S
PISA TR MAX VEL: 3.46 M/S
PLATELET # BLD AUTO: 209 K/UL (ref 150–450)
PMV BLD AUTO: 10.2 FL (ref 9.2–12.9)
POCT GLUCOSE: 67 MG/DL (ref 70–110)
POCT GLUCOSE: 95 MG/DL (ref 70–110)
POTASSIUM SERPL-SCNC: 5.1 MMOL/L (ref 3.5–5.1)
PROT SERPL-MCNC: 7 G/DL (ref 6–8.4)
PULM VEIN S/D RATIO: 0.78
PV PEAK D VEL: 0.6 M/S
PV PEAK S VEL: 0.47 M/S
RA MAJOR: 5.48 CM
RA PRESSURE: 15 MMHG
RA WIDTH: 4.15 CM
RBC # BLD AUTO: 3.23 M/UL (ref 4–5.4)
RIGHT VENTRICULAR END-DIASTOLIC DIMENSION: 3.26 CM
RV TISSUE DOPPLER FREE WALL SYSTOLIC VELOCITY 1 (APICAL 4 CHAMBER VIEW): 9.69 CM/S
SINUS: 3.12 CM
SODIUM SERPL-SCNC: 134 MMOL/L (ref 136–145)
STJ: 2.54 CM
TDI LATERAL: 0.05 M/S
TDI SEPTAL: 0.05 M/S
TDI: 0.05 M/S
TR MAX PG: 48 MMHG
TRICUSPID ANNULAR PLANE SYSTOLIC EXCURSION: 1.65 CM
TROPONIN I SERPL DL<=0.01 NG/ML-MCNC: 0.01 NG/ML (ref 0–0.03)
TV REST PULMONARY ARTERY PRESSURE: 63 MMHG
WBC # BLD AUTO: 4.67 K/UL (ref 3.9–12.7)

## 2021-06-14 PROCEDURE — 99223 1ST HOSP IP/OBS HIGH 75: CPT | Mod: 25,NTX,, | Performed by: INTERNAL MEDICINE

## 2021-06-14 PROCEDURE — 84100 ASSAY OF PHOSPHORUS: CPT | Mod: NTX | Performed by: INTERNAL MEDICINE

## 2021-06-14 PROCEDURE — 25000003 PHARM REV CODE 250: Mod: NTX | Performed by: HOSPITALIST

## 2021-06-14 PROCEDURE — 27201247 HC HEMODIALYSIS, SET-UP & CANCEL: Mod: NTX

## 2021-06-14 PROCEDURE — 11000001 HC ACUTE MED/SURG PRIVATE ROOM: Mod: NTX

## 2021-06-14 PROCEDURE — 99239 HOSP IP/OBS DSCHRG MGMT >30: CPT | Mod: NTX,,, | Performed by: NURSE PRACTITIONER

## 2021-06-14 PROCEDURE — 80053 COMPREHEN METABOLIC PANEL: CPT | Mod: NTX | Performed by: INTERNAL MEDICINE

## 2021-06-14 PROCEDURE — 85025 COMPLETE CBC W/AUTO DIFF WBC: CPT | Mod: NTX | Performed by: INTERNAL MEDICINE

## 2021-06-14 PROCEDURE — 83735 ASSAY OF MAGNESIUM: CPT | Mod: NTX | Performed by: INTERNAL MEDICINE

## 2021-06-14 PROCEDURE — 25000003 PHARM REV CODE 250: Mod: NTX | Performed by: NURSE PRACTITIONER

## 2021-06-14 PROCEDURE — 99239 PR HOSPITAL DISCHARGE DAY,>30 MIN: ICD-10-PCS | Mod: NTX,,, | Performed by: NURSE PRACTITIONER

## 2021-06-14 PROCEDURE — 90935 PR HEMODIALYSIS, ONE EVALUATION: ICD-10-PCS | Mod: NTX,,, | Performed by: NURSE PRACTITIONER

## 2021-06-14 PROCEDURE — 99223 PR INITIAL HOSPITAL CARE,LEVL III: ICD-10-PCS | Mod: 25,NTX,, | Performed by: INTERNAL MEDICINE

## 2021-06-14 PROCEDURE — 96372 THER/PROPH/DIAG INJ SC/IM: CPT | Mod: 59

## 2021-06-14 PROCEDURE — 25000003 PHARM REV CODE 250: Mod: NTX | Performed by: STUDENT IN AN ORGANIZED HEALTH CARE EDUCATION/TRAINING PROGRAM

## 2021-06-14 PROCEDURE — 63600175 PHARM REV CODE 636 W HCPCS: Mod: NTX | Performed by: HOSPITALIST

## 2021-06-14 PROCEDURE — 84484 ASSAY OF TROPONIN QUANT: CPT | Mod: NTX | Performed by: INTERNAL MEDICINE

## 2021-06-14 PROCEDURE — 90935 HEMODIALYSIS ONE EVALUATION: CPT | Mod: NTX,,, | Performed by: NURSE PRACTITIONER

## 2021-06-14 PROCEDURE — 80100016 HC MAINTENANCE HEMODIALYSIS: Mod: NTX

## 2021-06-14 RX ORDER — TRAMADOL HYDROCHLORIDE 50 MG/1
50 TABLET ORAL EVERY 8 HOURS PRN
Status: DISCONTINUED | OUTPATIENT
Start: 2021-06-14 | End: 2021-06-14 | Stop reason: HOSPADM

## 2021-06-14 RX ORDER — TRAMADOL HYDROCHLORIDE 50 MG/1
50 TABLET ORAL EVERY 8 HOURS PRN
Qty: 12 EACH | Refills: 0 | Status: ON HOLD | OUTPATIENT
Start: 2021-06-14 | End: 2021-07-23

## 2021-06-14 RX ADMIN — FAMOTIDINE 20 MG: 20 TABLET ORAL at 01:06

## 2021-06-14 RX ADMIN — SODIUM CHLORIDE: 0.9 INJECTION, SOLUTION INTRAVENOUS at 09:06

## 2021-06-14 RX ADMIN — HYDRALAZINE HYDROCHLORIDE 25 MG: 25 TABLET, FILM COATED ORAL at 01:06

## 2021-06-14 RX ADMIN — HEPARIN SODIUM 5000 UNITS: 5000 INJECTION INTRAVENOUS; SUBCUTANEOUS at 06:06

## 2021-06-14 RX ADMIN — PREGABALIN 75 MG: 75 CAPSULE ORAL at 01:06

## 2021-06-14 RX ADMIN — ATORVASTATIN CALCIUM 40 MG: 40 TABLET, FILM COATED ORAL at 01:06

## 2021-06-14 RX ADMIN — ACETAMINOPHEN 650 MG: 325 TABLET ORAL at 08:06

## 2021-06-14 RX ADMIN — ACETAMINOPHEN 650 MG: 325 TABLET ORAL at 01:06

## 2021-06-14 RX ADMIN — LABETALOL HYDROCHLORIDE 300 MG: 100 TABLET, FILM COATED ORAL at 01:06

## 2021-06-14 RX ADMIN — HEPARIN SODIUM 5000 UNITS: 5000 INJECTION INTRAVENOUS; SUBCUTANEOUS at 01:06

## 2021-06-14 RX ADMIN — MUPIROCIN: 20 OINTMENT TOPICAL at 01:06

## 2021-06-14 RX ADMIN — SEVELAMER CARBONATE 2400 MG: 800 TABLET, FILM COATED ORAL at 01:06

## 2021-06-14 RX ADMIN — LOSARTAN POTASSIUM 50 MG: 50 TABLET, FILM COATED ORAL at 01:06

## 2021-06-14 RX ADMIN — TRAMADOL HYDROCHLORIDE 50 MG: 50 TABLET, COATED ORAL at 01:06

## 2021-06-15 ENCOUNTER — OFFICE VISIT (OUTPATIENT)
Dept: ORTHOPEDICS | Facility: CLINIC | Age: 56
End: 2021-06-15
Payer: MEDICARE

## 2021-06-15 ENCOUNTER — PATIENT OUTREACH (OUTPATIENT)
Dept: ADMINISTRATIVE | Facility: CLINIC | Age: 56
End: 2021-06-15

## 2021-06-15 ENCOUNTER — TELEPHONE (OUTPATIENT)
Dept: OTOLARYNGOLOGY | Facility: CLINIC | Age: 56
End: 2021-06-15

## 2021-06-15 ENCOUNTER — HOSPITAL ENCOUNTER (OUTPATIENT)
Dept: RADIOLOGY | Facility: HOSPITAL | Age: 56
Discharge: HOME | End: 2021-06-15
Attending: ORTHOPAEDIC SURGERY
Payer: MEDICARE

## 2021-06-15 VITALS
WEIGHT: 204 LBS | BODY MASS INDEX: 34.83 KG/M2 | HEIGHT: 64 IN | DIASTOLIC BLOOD PRESSURE: 78 MMHG | HEART RATE: 71 BPM | SYSTOLIC BLOOD PRESSURE: 159 MMHG

## 2021-06-15 DIAGNOSIS — M25.572 ACUTE LEFT ANKLE PAIN: ICD-10-CM

## 2021-06-15 DIAGNOSIS — E87.79 OTHER HYPERVOLEMIA: Primary | ICD-10-CM

## 2021-06-15 DIAGNOSIS — S82.832D TRAUMATIC CLOSED NONDISPLACED FRACTURE OF DISTAL FIBULA, LEFT, WITH ROUTINE HEALING, SUBSEQUENT ENCOUNTER: Primary | ICD-10-CM

## 2021-06-15 PROBLEM — I50.33 ACUTE ON CHRONIC DIASTOLIC HEART FAILURE: Status: ACTIVE | Noted: 2021-06-14

## 2021-06-15 PROBLEM — E87.70 VOLUME OVERLOAD: Chronic | Status: ACTIVE | Noted: 2021-06-15

## 2021-06-15 PROCEDURE — 99999 PR PBB SHADOW E&M-EST. PATIENT-LVL IV: ICD-10-PCS | Mod: PBBFAC,,, | Performed by: ORTHOPAEDIC SURGERY

## 2021-06-15 PROCEDURE — 99214 PR OFFICE/OUTPT VISIT, EST, LEVL IV, 30-39 MIN: ICD-10-PCS | Mod: S$PBB,,, | Performed by: ORTHOPAEDIC SURGERY

## 2021-06-15 PROCEDURE — 73610 X-RAY EXAM OF ANKLE: CPT | Mod: TC,FY,LT,TXP

## 2021-06-15 PROCEDURE — 99214 OFFICE O/P EST MOD 30 MIN: CPT | Mod: PBBFAC,25,PN | Performed by: ORTHOPAEDIC SURGERY

## 2021-06-15 PROCEDURE — 99214 OFFICE O/P EST MOD 30 MIN: CPT | Mod: S$PBB,,, | Performed by: ORTHOPAEDIC SURGERY

## 2021-06-15 PROCEDURE — 73610 XR ANKLE COMPLETE 3 VIEW LEFT: ICD-10-PCS | Mod: 26,LT,NTX, | Performed by: RADIOLOGY

## 2021-06-15 PROCEDURE — 99999 PR PBB SHADOW E&M-EST. PATIENT-LVL IV: CPT | Mod: PBBFAC,,, | Performed by: ORTHOPAEDIC SURGERY

## 2021-06-15 PROCEDURE — 73610 X-RAY EXAM OF ANKLE: CPT | Mod: 26,LT,NTX, | Performed by: RADIOLOGY

## 2021-06-17 DIAGNOSIS — Z76.82 ORGAN TRANSPLANT CANDIDATE: Primary | ICD-10-CM

## 2021-06-18 ENCOUNTER — TELEPHONE (OUTPATIENT)
Dept: TRANSPLANT | Facility: CLINIC | Age: 56
End: 2021-06-18

## 2021-06-21 ENCOUNTER — TELEPHONE (OUTPATIENT)
Dept: OTOLARYNGOLOGY | Facility: CLINIC | Age: 56
End: 2021-06-21

## 2021-06-23 ENCOUNTER — TELEPHONE (OUTPATIENT)
Dept: OTOLARYNGOLOGY | Facility: CLINIC | Age: 56
End: 2021-06-23

## 2021-06-25 ENCOUNTER — TELEPHONE (OUTPATIENT)
Dept: OTOLARYNGOLOGY | Facility: CLINIC | Age: 56
End: 2021-06-25

## 2021-06-28 ENCOUNTER — HOSPITAL ENCOUNTER (INPATIENT)
Facility: HOSPITAL | Age: 56
LOS: 3 days | Discharge: HOME | DRG: 140 | End: 2021-07-01
Attending: OTOLARYNGOLOGY | Admitting: OTOLARYNGOLOGY
Payer: MEDICARE

## 2021-06-28 DIAGNOSIS — N18.6 BENIGN HYPERTENSION WITH ESRD (END-STAGE RENAL DISEASE): ICD-10-CM

## 2021-06-28 DIAGNOSIS — Z99.2 ESRD ON HEMODIALYSIS: ICD-10-CM

## 2021-06-28 DIAGNOSIS — H90.5 SENSORINEURAL HEARING LOSS: ICD-10-CM

## 2021-06-28 DIAGNOSIS — I12.0 BENIGN HYPERTENSION WITH ESRD (END-STAGE RENAL DISEASE): ICD-10-CM

## 2021-06-28 DIAGNOSIS — N18.6 ESRD ON HEMODIALYSIS: ICD-10-CM

## 2021-06-28 DIAGNOSIS — H90.3 SENSORINEURAL HEARING LOSS (SNHL) OF BOTH EARS: Primary | ICD-10-CM

## 2021-06-28 LAB
ALBUMIN SERPL BCP-MCNC: 3.3 G/DL (ref 3.5–5.2)
ANION GAP SERPL CALC-SCNC: 14 MMOL/L (ref 8–16)
BUN SERPL-MCNC: 47 MG/DL (ref 6–20)
CALCIUM SERPL-MCNC: 10.4 MG/DL (ref 8.7–10.5)
CHLORIDE SERPL-SCNC: 97 MMOL/L (ref 95–110)
CO2 SERPL-SCNC: 27 MMOL/L (ref 23–29)
CREAT SERPL-MCNC: 11.4 MG/DL (ref 0.5–1.4)
EST. GFR  (AFRICAN AMERICAN): 3.8 ML/MIN/1.73 M^2
EST. GFR  (NON AFRICAN AMERICAN): 3.3 ML/MIN/1.73 M^2
GLUCOSE SERPL-MCNC: 88 MG/DL (ref 70–110)
PHOSPHATE SERPL-MCNC: 3.8 MG/DL (ref 2.7–4.5)
POCT GLUCOSE: 150 MG/DL (ref 70–110)
POCT GLUCOSE: 72 MG/DL (ref 70–110)
POTASSIUM SERPL-SCNC: 4.3 MMOL/L (ref 3.5–5.1)
SODIUM SERPL-SCNC: 138 MMOL/L (ref 136–145)

## 2021-06-28 PROCEDURE — 99223 PR INITIAL HOSPITAL CARE,LEVL III: ICD-10-PCS | Mod: NTX,,, | Performed by: NURSE PRACTITIONER

## 2021-06-28 PROCEDURE — 25000003 PHARM REV CODE 250: Mod: NTX | Performed by: STUDENT IN AN ORGANIZED HEALTH CARE EDUCATION/TRAINING PROGRAM

## 2021-06-28 PROCEDURE — 99499 NO LOS: ICD-10-PCS | Mod: NTX,,, | Performed by: OTOLARYNGOLOGY

## 2021-06-28 PROCEDURE — 36415 COLL VENOUS BLD VENIPUNCTURE: CPT | Mod: NTX | Performed by: OTOLARYNGOLOGY

## 2021-06-28 PROCEDURE — 80100016 HC MAINTENANCE HEMODIALYSIS: Mod: NTX

## 2021-06-28 PROCEDURE — 80069 RENAL FUNCTION PANEL: CPT | Mod: NTX | Performed by: OTOLARYNGOLOGY

## 2021-06-28 PROCEDURE — 99223 PR INITIAL HOSPITAL CARE,LEVL III: ICD-10-PCS | Mod: GC,NTX,, | Performed by: HOSPITALIST

## 2021-06-28 PROCEDURE — 11000001 HC ACUTE MED/SURG PRIVATE ROOM: Mod: NTX

## 2021-06-28 PROCEDURE — 99223 1ST HOSP IP/OBS HIGH 75: CPT | Mod: GC,NTX,, | Performed by: HOSPITALIST

## 2021-06-28 PROCEDURE — 99499 UNLISTED E&M SERVICE: CPT | Mod: NTX,,, | Performed by: OTOLARYNGOLOGY

## 2021-06-28 PROCEDURE — 82962 GLUCOSE BLOOD TEST: CPT | Mod: NTX | Performed by: OTOLARYNGOLOGY

## 2021-06-28 PROCEDURE — 99223 1ST HOSP IP/OBS HIGH 75: CPT | Mod: NTX,,, | Performed by: NURSE PRACTITIONER

## 2021-06-28 RX ORDER — INSULIN ASPART 100 [IU]/ML
1-10 INJECTION, SOLUTION INTRAVENOUS; SUBCUTANEOUS
Status: DISCONTINUED | OUTPATIENT
Start: 2021-06-28 | End: 2021-06-29

## 2021-06-28 RX ORDER — SEVELAMER CARBONATE 800 MG/1
2400 TABLET, FILM COATED ORAL
Status: DISCONTINUED | OUTPATIENT
Start: 2021-06-28 | End: 2021-07-01 | Stop reason: HOSPADM

## 2021-06-28 RX ORDER — VALSARTAN 160 MG/1
160 TABLET ORAL 2 TIMES DAILY
Status: DISCONTINUED | OUTPATIENT
Start: 2021-06-28 | End: 2021-07-01 | Stop reason: HOSPADM

## 2021-06-28 RX ORDER — SODIUM CHLORIDE 0.9 % (FLUSH) 0.9 %
10 SYRINGE (ML) INJECTION
Status: DISCONTINUED | OUTPATIENT
Start: 2021-06-28 | End: 2021-07-01 | Stop reason: HOSPADM

## 2021-06-28 RX ORDER — AMLODIPINE BESYLATE 10 MG/1
10 TABLET ORAL NIGHTLY
Status: DISCONTINUED | OUTPATIENT
Start: 2021-06-28 | End: 2021-07-01 | Stop reason: HOSPADM

## 2021-06-28 RX ORDER — CEFAZOLIN SODIUM 1 G/3ML
2 INJECTION, POWDER, FOR SOLUTION INTRAMUSCULAR; INTRAVENOUS
Status: DISCONTINUED | OUTPATIENT
Start: 2021-06-28 | End: 2021-06-28

## 2021-06-28 RX ORDER — GLUCAGON 1 MG
1 KIT INJECTION
Status: DISCONTINUED | OUTPATIENT
Start: 2021-06-28 | End: 2021-06-29

## 2021-06-28 RX ORDER — TRAMADOL HYDROCHLORIDE 50 MG/1
50 TABLET ORAL EVERY 8 HOURS PRN
Status: DISCONTINUED | OUTPATIENT
Start: 2021-06-28 | End: 2021-07-01 | Stop reason: HOSPADM

## 2021-06-28 RX ORDER — LIDOCAINE HYDROCHLORIDE 10 MG/ML
1 INJECTION, SOLUTION EPIDURAL; INFILTRATION; INTRACAUDAL; PERINEURAL ONCE
Status: DISCONTINUED | OUTPATIENT
Start: 2021-06-28 | End: 2021-07-01 | Stop reason: HOSPADM

## 2021-06-28 RX ORDER — IBUPROFEN 200 MG
16 TABLET ORAL
Status: DISCONTINUED | OUTPATIENT
Start: 2021-06-28 | End: 2021-06-29

## 2021-06-28 RX ORDER — LIDOCAINE HYDROCHLORIDE 10 MG/ML
1 INJECTION, SOLUTION EPIDURAL; INFILTRATION; INTRACAUDAL; PERINEURAL ONCE
Status: DISCONTINUED | OUTPATIENT
Start: 2021-06-28 | End: 2021-06-28

## 2021-06-28 RX ORDER — FAMOTIDINE 20 MG/1
20 TABLET, FILM COATED ORAL 2 TIMES DAILY
Status: DISCONTINUED | OUTPATIENT
Start: 2021-06-28 | End: 2021-06-28

## 2021-06-28 RX ORDER — PREGABALIN 75 MG/1
75 CAPSULE ORAL DAILY
Status: DISCONTINUED | OUTPATIENT
Start: 2021-06-28 | End: 2021-07-01 | Stop reason: HOSPADM

## 2021-06-28 RX ORDER — IPRATROPIUM BROMIDE AND ALBUTEROL SULFATE 2.5; .5 MG/3ML; MG/3ML
3 SOLUTION RESPIRATORY (INHALATION) EVERY 6 HOURS PRN
Status: DISCONTINUED | OUTPATIENT
Start: 2021-06-28 | End: 2021-07-01 | Stop reason: HOSPADM

## 2021-06-28 RX ORDER — SODIUM CHLORIDE 9 MG/ML
INJECTION, SOLUTION INTRAVENOUS ONCE
Status: DISCONTINUED | OUTPATIENT
Start: 2021-06-28 | End: 2021-06-29

## 2021-06-28 RX ORDER — IBUPROFEN 200 MG
24 TABLET ORAL
Status: DISCONTINUED | OUTPATIENT
Start: 2021-06-28 | End: 2021-06-29

## 2021-06-28 RX ORDER — TALC
6 POWDER (GRAM) TOPICAL NIGHTLY PRN
Status: DISCONTINUED | OUTPATIENT
Start: 2021-06-28 | End: 2021-07-01 | Stop reason: HOSPADM

## 2021-06-28 RX ORDER — HYDRALAZINE HYDROCHLORIDE 25 MG/1
25 TABLET, FILM COATED ORAL 3 TIMES DAILY
Status: DISCONTINUED | OUTPATIENT
Start: 2021-06-28 | End: 2021-06-29

## 2021-06-28 RX ORDER — OXYCODONE HYDROCHLORIDE 5 MG/1
5 TABLET ORAL EVERY 6 HOURS PRN
Status: DISCONTINUED | OUTPATIENT
Start: 2021-06-28 | End: 2021-07-01 | Stop reason: HOSPADM

## 2021-06-28 RX ORDER — ONDANSETRON 8 MG/1
8 TABLET, ORALLY DISINTEGRATING ORAL EVERY 8 HOURS PRN
Status: DISCONTINUED | OUTPATIENT
Start: 2021-06-28 | End: 2021-07-01 | Stop reason: HOSPADM

## 2021-06-28 RX ORDER — FUROSEMIDE 40 MG/1
40 TABLET ORAL 2 TIMES DAILY
Status: DISCONTINUED | OUTPATIENT
Start: 2021-06-28 | End: 2021-07-01 | Stop reason: HOSPADM

## 2021-06-28 RX ORDER — ATORVASTATIN CALCIUM 10 MG/1
40 TABLET, FILM COATED ORAL DAILY
Status: DISCONTINUED | OUTPATIENT
Start: 2021-06-28 | End: 2021-07-01 | Stop reason: HOSPADM

## 2021-06-28 RX ORDER — LOSARTAN POTASSIUM 25 MG/1
50 TABLET ORAL DAILY
Status: DISCONTINUED | OUTPATIENT
Start: 2021-06-28 | End: 2021-06-28

## 2021-06-28 RX ORDER — FAMOTIDINE 20 MG/1
20 TABLET, FILM COATED ORAL EVERY OTHER DAY
Status: DISCONTINUED | OUTPATIENT
Start: 2021-06-29 | End: 2021-07-01 | Stop reason: HOSPADM

## 2021-06-28 RX ADMIN — VALSARTAN 160 MG: 160 TABLET, FILM COATED ORAL at 08:06

## 2021-06-28 RX ADMIN — LABETALOL HYDROCHLORIDE 300 MG: 100 TABLET, FILM COATED ORAL at 12:06

## 2021-06-28 RX ADMIN — PREGABALIN 75 MG: 75 CAPSULE ORAL at 12:06

## 2021-06-28 RX ADMIN — HYDRALAZINE HYDROCHLORIDE 25 MG: 25 TABLET, FILM COATED ORAL at 02:06

## 2021-06-28 RX ADMIN — HYDRALAZINE HYDROCHLORIDE 25 MG: 25 TABLET, FILM COATED ORAL at 08:06

## 2021-06-28 RX ADMIN — LABETALOL HYDROCHLORIDE 300 MG: 200 TABLET, FILM COATED ORAL at 08:06

## 2021-06-28 RX ADMIN — AMLODIPINE BESYLATE 10 MG: 10 TABLET ORAL at 08:06

## 2021-06-28 RX ADMIN — ATORVASTATIN CALCIUM 40 MG: 10 TABLET, FILM COATED ORAL at 12:06

## 2021-06-28 RX ADMIN — SEVELAMER CARBONATE 2400 MG: 800 TABLET, FILM COATED ORAL at 12:06

## 2021-06-28 RX ADMIN — FUROSEMIDE 40 MG: 40 TABLET ORAL at 08:06

## 2021-06-29 ENCOUNTER — ANESTHESIA EVENT (OUTPATIENT)
Dept: SURGERY | Facility: HOSPITAL | Age: 56
DRG: 140 | End: 2021-06-29
Payer: MEDICARE

## 2021-06-29 LAB
ALBUMIN SERPL BCP-MCNC: 3.2 G/DL (ref 3.5–5.2)
ALP SERPL-CCNC: 79 U/L (ref 55–135)
ALT SERPL W/O P-5'-P-CCNC: 8 U/L (ref 10–44)
ANION GAP SERPL CALC-SCNC: 11 MMOL/L (ref 8–16)
AST SERPL-CCNC: 12 U/L (ref 10–40)
BASOPHILS # BLD AUTO: 0.05 K/UL (ref 0–0.2)
BASOPHILS NFR BLD: 1 % (ref 0–1.9)
BILIRUB SERPL-MCNC: 0.9 MG/DL (ref 0.1–1)
BUN SERPL-MCNC: 27 MG/DL (ref 6–20)
CALCIUM SERPL-MCNC: 10 MG/DL (ref 8.7–10.5)
CHLORIDE SERPL-SCNC: 103 MMOL/L (ref 95–110)
CO2 SERPL-SCNC: 23 MMOL/L (ref 23–29)
CREAT SERPL-MCNC: 7.4 MG/DL (ref 0.5–1.4)
DIFFERENTIAL METHOD: ABNORMAL
EOSINOPHIL # BLD AUTO: 0.4 K/UL (ref 0–0.5)
EOSINOPHIL NFR BLD: 7.8 % (ref 0–8)
ERYTHROCYTE [DISTWIDTH] IN BLOOD BY AUTOMATED COUNT: 20 % (ref 11.5–14.5)
EST. GFR  (AFRICAN AMERICAN): 6.4 ML/MIN/1.73 M^2
EST. GFR  (NON AFRICAN AMERICAN): 5.6 ML/MIN/1.73 M^2
GLUCOSE SERPL-MCNC: 82 MG/DL (ref 70–110)
HCT VFR BLD AUTO: 24.1 % (ref 37–48.5)
HGB BLD-MCNC: 7.6 G/DL (ref 12–16)
IMM GRANULOCYTES # BLD AUTO: 0.02 K/UL (ref 0–0.04)
IMM GRANULOCYTES NFR BLD AUTO: 0.4 % (ref 0–0.5)
LYMPHOCYTES # BLD AUTO: 1 K/UL (ref 1–4.8)
LYMPHOCYTES NFR BLD: 19.4 % (ref 18–48)
MCH RBC QN AUTO: 24.8 PG (ref 27–31)
MCHC RBC AUTO-ENTMCNC: 31.5 G/DL (ref 32–36)
MCV RBC AUTO: 79 FL (ref 82–98)
MONOCYTES # BLD AUTO: 0.7 K/UL (ref 0.3–1)
MONOCYTES NFR BLD: 12.6 % (ref 4–15)
NEUTROPHILS # BLD AUTO: 3 K/UL (ref 1.8–7.7)
NEUTROPHILS NFR BLD: 58.8 % (ref 38–73)
NRBC BLD-RTO: 0 /100 WBC
PLATELET # BLD AUTO: 171 K/UL (ref 150–450)
PMV BLD AUTO: 9.6 FL (ref 9.2–12.9)
POCT GLUCOSE: 78 MG/DL (ref 70–110)
POCT GLUCOSE: 98 MG/DL (ref 70–110)
POTASSIUM SERPL-SCNC: 4.3 MMOL/L (ref 3.5–5.1)
PROT SERPL-MCNC: 7.3 G/DL (ref 6–8.4)
RBC # BLD AUTO: 3.07 M/UL (ref 4–5.4)
SARS-COV-2 RDRP RESP QL NAA+PROBE: NEGATIVE
SODIUM SERPL-SCNC: 137 MMOL/L (ref 136–145)
WBC # BLD AUTO: 5.15 K/UL (ref 3.9–12.7)

## 2021-06-29 PROCEDURE — 25000003 PHARM REV CODE 250: Mod: NTX | Performed by: STUDENT IN AN ORGANIZED HEALTH CARE EDUCATION/TRAINING PROGRAM

## 2021-06-29 PROCEDURE — 99233 PR SUBSEQUENT HOSPITAL CARE,LEVL III: ICD-10-PCS | Mod: NTX,,, | Performed by: OTOLARYNGOLOGY

## 2021-06-29 PROCEDURE — 36415 COLL VENOUS BLD VENIPUNCTURE: CPT | Mod: NTX | Performed by: STUDENT IN AN ORGANIZED HEALTH CARE EDUCATION/TRAINING PROGRAM

## 2021-06-29 PROCEDURE — 99233 SBSQ HOSP IP/OBS HIGH 50: CPT | Mod: NTX,,, | Performed by: OTOLARYNGOLOGY

## 2021-06-29 PROCEDURE — 11000001 HC ACUTE MED/SURG PRIVATE ROOM: Mod: NTX

## 2021-06-29 PROCEDURE — 99232 PR SUBSEQUENT HOSPITAL CARE,LEVL II: ICD-10-PCS | Mod: GC,NTX,, | Performed by: HOSPITALIST

## 2021-06-29 PROCEDURE — 80053 COMPREHEN METABOLIC PANEL: CPT | Mod: NTX | Performed by: STUDENT IN AN ORGANIZED HEALTH CARE EDUCATION/TRAINING PROGRAM

## 2021-06-29 PROCEDURE — 85025 COMPLETE CBC W/AUTO DIFF WBC: CPT | Mod: NTX | Performed by: STUDENT IN AN ORGANIZED HEALTH CARE EDUCATION/TRAINING PROGRAM

## 2021-06-29 PROCEDURE — 25000003 PHARM REV CODE 250: Mod: NTX | Performed by: OTOLARYNGOLOGY

## 2021-06-29 PROCEDURE — U0002 COVID-19 LAB TEST NON-CDC: HCPCS | Mod: NTX | Performed by: STUDENT IN AN ORGANIZED HEALTH CARE EDUCATION/TRAINING PROGRAM

## 2021-06-29 PROCEDURE — 99232 SBSQ HOSP IP/OBS MODERATE 35: CPT | Mod: GC,NTX,, | Performed by: HOSPITALIST

## 2021-06-29 RX ORDER — IBUPROFEN 200 MG
16 TABLET ORAL
Status: DISCONTINUED | OUTPATIENT
Start: 2021-06-29 | End: 2021-07-01 | Stop reason: HOSPADM

## 2021-06-29 RX ORDER — SODIUM CHLORIDE 9 MG/ML
INJECTION, SOLUTION INTRAVENOUS ONCE
Status: COMPLETED | OUTPATIENT
Start: 2021-06-30 | End: 2021-06-30

## 2021-06-29 RX ORDER — MUPIROCIN 20 MG/G
OINTMENT TOPICAL 2 TIMES DAILY
Status: DISCONTINUED | OUTPATIENT
Start: 2021-06-29 | End: 2021-07-01 | Stop reason: HOSPADM

## 2021-06-29 RX ORDER — GLUCAGON 1 MG
1 KIT INJECTION
Status: DISCONTINUED | OUTPATIENT
Start: 2021-06-29 | End: 2021-07-01 | Stop reason: HOSPADM

## 2021-06-29 RX ORDER — HYDRALAZINE HYDROCHLORIDE 50 MG/1
50 TABLET, FILM COATED ORAL 3 TIMES DAILY
Status: DISCONTINUED | OUTPATIENT
Start: 2021-06-29 | End: 2021-06-30

## 2021-06-29 RX ORDER — IBUPROFEN 200 MG
24 TABLET ORAL
Status: DISCONTINUED | OUTPATIENT
Start: 2021-06-29 | End: 2021-07-01 | Stop reason: HOSPADM

## 2021-06-29 RX ORDER — INSULIN ASPART 100 [IU]/ML
0-5 INJECTION, SOLUTION INTRAVENOUS; SUBCUTANEOUS
Status: DISCONTINUED | OUTPATIENT
Start: 2021-06-29 | End: 2021-07-01 | Stop reason: HOSPADM

## 2021-06-29 RX ADMIN — FAMOTIDINE 20 MG: 20 TABLET ORAL at 09:06

## 2021-06-29 RX ADMIN — VALSARTAN 160 MG: 160 TABLET, FILM COATED ORAL at 09:06

## 2021-06-29 RX ADMIN — HYDRALAZINE HYDROCHLORIDE 25 MG: 25 TABLET, FILM COATED ORAL at 09:06

## 2021-06-29 RX ADMIN — AMLODIPINE BESYLATE 10 MG: 10 TABLET ORAL at 11:06

## 2021-06-29 RX ADMIN — FUROSEMIDE 40 MG: 40 TABLET ORAL at 11:06

## 2021-06-29 RX ADMIN — LABETALOL HYDROCHLORIDE 300 MG: 200 TABLET, FILM COATED ORAL at 11:06

## 2021-06-29 RX ADMIN — ATORVASTATIN CALCIUM 40 MG: 10 TABLET, FILM COATED ORAL at 09:06

## 2021-06-29 RX ADMIN — MUPIROCIN: 20 OINTMENT TOPICAL at 09:06

## 2021-06-29 RX ADMIN — SEVELAMER CARBONATE 2400 MG: 800 TABLET, FILM COATED ORAL at 06:06

## 2021-06-29 RX ADMIN — MUPIROCIN: 20 OINTMENT TOPICAL at 11:06

## 2021-06-29 RX ADMIN — LABETALOL HYDROCHLORIDE 300 MG: 200 TABLET, FILM COATED ORAL at 03:06

## 2021-06-29 RX ADMIN — LABETALOL HYDROCHLORIDE 300 MG: 200 TABLET, FILM COATED ORAL at 09:06

## 2021-06-29 RX ADMIN — FUROSEMIDE 40 MG: 40 TABLET ORAL at 09:06

## 2021-06-29 RX ADMIN — VALSARTAN 160 MG: 160 TABLET, FILM COATED ORAL at 11:06

## 2021-06-29 RX ADMIN — SEVELAMER CARBONATE 2400 MG: 800 TABLET, FILM COATED ORAL at 08:06

## 2021-06-29 RX ADMIN — PREGABALIN 75 MG: 75 CAPSULE ORAL at 09:06

## 2021-06-29 RX ADMIN — HYDRALAZINE HYDROCHLORIDE 50 MG: 50 TABLET, FILM COATED ORAL at 11:06

## 2021-06-30 ENCOUNTER — ANESTHESIA (OUTPATIENT)
Dept: SURGERY | Facility: HOSPITAL | Age: 56
DRG: 140 | End: 2021-06-30
Payer: MEDICARE

## 2021-06-30 LAB
ALBUMIN SERPL BCP-MCNC: 3.1 G/DL (ref 3.5–5.2)
ANION GAP SERPL CALC-SCNC: 12 MMOL/L (ref 8–16)
BASOPHILS # BLD AUTO: 0.02 K/UL (ref 0–0.2)
BASOPHILS NFR BLD: 0.4 % (ref 0–1.9)
BUN SERPL-MCNC: 43 MG/DL (ref 6–20)
CALCIUM SERPL-MCNC: 10.1 MG/DL (ref 8.7–10.5)
CHLORIDE SERPL-SCNC: 102 MMOL/L (ref 95–110)
CO2 SERPL-SCNC: 21 MMOL/L (ref 23–29)
CREAT SERPL-MCNC: 9.6 MG/DL (ref 0.5–1.4)
DIFFERENTIAL METHOD: ABNORMAL
EOSINOPHIL # BLD AUTO: 0.3 K/UL (ref 0–0.5)
EOSINOPHIL NFR BLD: 7 % (ref 0–8)
ERYTHROCYTE [DISTWIDTH] IN BLOOD BY AUTOMATED COUNT: 19.9 % (ref 11.5–14.5)
EST. GFR  (AFRICAN AMERICAN): 4.7 ML/MIN/1.73 M^2
EST. GFR  (NON AFRICAN AMERICAN): 4.1 ML/MIN/1.73 M^2
GLUCOSE SERPL-MCNC: 89 MG/DL (ref 70–110)
HCT VFR BLD AUTO: 22.7 % (ref 37–48.5)
HGB BLD-MCNC: 7.2 G/DL (ref 12–16)
IMM GRANULOCYTES # BLD AUTO: 0.01 K/UL (ref 0–0.04)
IMM GRANULOCYTES NFR BLD AUTO: 0.2 % (ref 0–0.5)
LYMPHOCYTES # BLD AUTO: 1.2 K/UL (ref 1–4.8)
LYMPHOCYTES NFR BLD: 24.3 % (ref 18–48)
MCH RBC QN AUTO: 24.8 PG (ref 27–31)
MCHC RBC AUTO-ENTMCNC: 31.7 G/DL (ref 32–36)
MCV RBC AUTO: 78 FL (ref 82–98)
MONOCYTES # BLD AUTO: 0.6 K/UL (ref 0.3–1)
MONOCYTES NFR BLD: 13.3 % (ref 4–15)
NEUTROPHILS # BLD AUTO: 2.6 K/UL (ref 1.8–7.7)
NEUTROPHILS NFR BLD: 54.8 % (ref 38–73)
NRBC BLD-RTO: 0 /100 WBC
PHOSPHATE SERPL-MCNC: 4.3 MG/DL (ref 2.7–4.5)
PLATELET # BLD AUTO: 161 K/UL (ref 150–450)
PMV BLD AUTO: 9.9 FL (ref 9.2–12.9)
POCT GLUCOSE: 122 MG/DL (ref 70–110)
POCT GLUCOSE: 132 MG/DL (ref 70–110)
POCT GLUCOSE: 79 MG/DL (ref 70–110)
POCT GLUCOSE: 95 MG/DL (ref 70–110)
POTASSIUM SERPL-SCNC: 4.2 MMOL/L (ref 3.5–5.1)
RBC # BLD AUTO: 2.9 M/UL (ref 4–5.4)
SODIUM SERPL-SCNC: 135 MMOL/L (ref 136–145)
WBC # BLD AUTO: 4.73 K/UL (ref 3.9–12.7)

## 2021-06-30 PROCEDURE — 85025 COMPLETE CBC W/AUTO DIFF WBC: CPT | Mod: NTX | Performed by: OTOLARYNGOLOGY

## 2021-06-30 PROCEDURE — 36000710: Mod: NTX | Performed by: OTOLARYNGOLOGY

## 2021-06-30 PROCEDURE — 36000711: Mod: NTX | Performed by: OTOLARYNGOLOGY

## 2021-06-30 PROCEDURE — 90935 HEMODIALYSIS ONE EVALUATION: CPT | Mod: NTX,,, | Performed by: NURSE PRACTITIONER

## 2021-06-30 PROCEDURE — 25000003 PHARM REV CODE 250: Mod: NTX | Performed by: STUDENT IN AN ORGANIZED HEALTH CARE EDUCATION/TRAINING PROGRAM

## 2021-06-30 PROCEDURE — 90935 PR HEMODIALYSIS, ONE EVALUATION: ICD-10-PCS | Mod: NTX,,, | Performed by: NURSE PRACTITIONER

## 2021-06-30 PROCEDURE — 63600175 PHARM REV CODE 636 W HCPCS: Mod: NTX | Performed by: NURSE ANESTHETIST, CERTIFIED REGISTERED

## 2021-06-30 PROCEDURE — D9220A PRA ANESTHESIA: ICD-10-PCS | Mod: NTX,,, | Performed by: ANESTHESIOLOGY

## 2021-06-30 PROCEDURE — 63600175 PHARM REV CODE 636 W HCPCS: Mod: NTX | Performed by: OTOLARYNGOLOGY

## 2021-06-30 PROCEDURE — D9220A PRA ANESTHESIA: Mod: NTX,,, | Performed by: ANESTHESIOLOGY

## 2021-06-30 PROCEDURE — 69930 IMPLANT COCHLEAR DEVICE: CPT | Mod: RT,GC,NTX, | Performed by: OTOLARYNGOLOGY

## 2021-06-30 PROCEDURE — 99233 PR SUBSEQUENT HOSPITAL CARE,LEVL III: ICD-10-PCS | Mod: 25,NTX,, | Performed by: OTOLARYNGOLOGY

## 2021-06-30 PROCEDURE — 82962 GLUCOSE BLOOD TEST: CPT | Mod: NTX | Performed by: OTOLARYNGOLOGY

## 2021-06-30 PROCEDURE — 71000039 HC RECOVERY, EACH ADD'L HOUR: Mod: NTX | Performed by: OTOLARYNGOLOGY

## 2021-06-30 PROCEDURE — 71000033 HC RECOVERY, INTIAL HOUR: Mod: NTX | Performed by: OTOLARYNGOLOGY

## 2021-06-30 PROCEDURE — 27201423 OPTIME MED/SURG SUP & DEVICES STERILE SUPPLY: Mod: NTX | Performed by: OTOLARYNGOLOGY

## 2021-06-30 PROCEDURE — 25000003 PHARM REV CODE 250: Mod: NTX | Performed by: NURSE PRACTITIONER

## 2021-06-30 PROCEDURE — 37000008 HC ANESTHESIA 1ST 15 MINUTES: Mod: NTX | Performed by: OTOLARYNGOLOGY

## 2021-06-30 PROCEDURE — 80100016 HC MAINTENANCE HEMODIALYSIS: Mod: NTX

## 2021-06-30 PROCEDURE — 94761 N-INVAS EAR/PLS OXIMETRY MLT: CPT | Mod: NTX

## 2021-06-30 PROCEDURE — 71000015 HC POSTOP RECOV 1ST HR: Mod: NTX | Performed by: OTOLARYNGOLOGY

## 2021-06-30 PROCEDURE — 11000001 HC ACUTE MED/SURG PRIVATE ROOM: Mod: NTX

## 2021-06-30 PROCEDURE — 27000221 HC OXYGEN, UP TO 24 HOURS: Mod: NTX

## 2021-06-30 PROCEDURE — L8614 COCHLEAR DEVICE: HCPCS | Mod: NTX | Performed by: OTOLARYNGOLOGY

## 2021-06-30 PROCEDURE — 63600175 PHARM REV CODE 636 W HCPCS: Mod: NTX | Performed by: STUDENT IN AN ORGANIZED HEALTH CARE EDUCATION/TRAINING PROGRAM

## 2021-06-30 PROCEDURE — 25000003 PHARM REV CODE 250: Mod: NTX | Performed by: OTOLARYNGOLOGY

## 2021-06-30 PROCEDURE — 99233 SBSQ HOSP IP/OBS HIGH 50: CPT | Mod: 25,NTX,, | Performed by: OTOLARYNGOLOGY

## 2021-06-30 PROCEDURE — 69930 PR IMPLANT COCHLEAR DEVICE: ICD-10-PCS | Mod: RT,GC,NTX, | Performed by: OTOLARYNGOLOGY

## 2021-06-30 PROCEDURE — 71000016 HC POSTOP RECOV ADDL HR: Mod: NTX | Performed by: OTOLARYNGOLOGY

## 2021-06-30 PROCEDURE — 37000009 HC ANESTHESIA EA ADD 15 MINS: Mod: NTX | Performed by: OTOLARYNGOLOGY

## 2021-06-30 PROCEDURE — 80069 RENAL FUNCTION PANEL: CPT | Mod: NTX | Performed by: OTOLARYNGOLOGY

## 2021-06-30 DEVICE — IMPLANTABLE DEVICE: Type: IMPLANTABLE DEVICE | Site: EAR | Status: FUNCTIONAL

## 2021-06-30 RX ORDER — CEFAZOLIN SODIUM 1 G/3ML
INJECTION, POWDER, FOR SOLUTION INTRAMUSCULAR; INTRAVENOUS
Status: DISCONTINUED | OUTPATIENT
Start: 2021-06-30 | End: 2021-06-30

## 2021-06-30 RX ORDER — LIDOCAINE HYDROCHLORIDE 20 MG/ML
INJECTION INTRAVENOUS
Status: DISCONTINUED | OUTPATIENT
Start: 2021-06-30 | End: 2021-06-30

## 2021-06-30 RX ORDER — FENTANYL CITRATE 50 UG/ML
25 INJECTION, SOLUTION INTRAMUSCULAR; INTRAVENOUS EVERY 5 MIN PRN
Status: COMPLETED | OUTPATIENT
Start: 2021-06-30 | End: 2021-06-30

## 2021-06-30 RX ORDER — DEXMEDETOMIDINE HYDROCHLORIDE 100 UG/ML
INJECTION, SOLUTION INTRAVENOUS
Status: DISCONTINUED | OUTPATIENT
Start: 2021-06-30 | End: 2021-06-30

## 2021-06-30 RX ORDER — MIDAZOLAM HYDROCHLORIDE 1 MG/ML
INJECTION, SOLUTION INTRAMUSCULAR; INTRAVENOUS
Status: DISCONTINUED | OUTPATIENT
Start: 2021-06-30 | End: 2021-06-30

## 2021-06-30 RX ORDER — ONDANSETRON 4 MG/1
8 TABLET, ORALLY DISINTEGRATING ORAL EVERY 8 HOURS PRN
Qty: 15 TABLET | Refills: 0 | Status: ON HOLD | OUTPATIENT
Start: 2021-06-30 | End: 2021-07-23

## 2021-06-30 RX ORDER — ACETAMINOPHEN 500 MG
1000 TABLET ORAL ONCE
Status: DISCONTINUED | OUTPATIENT
Start: 2021-06-30 | End: 2021-07-01 | Stop reason: HOSPADM

## 2021-06-30 RX ORDER — HYDRALAZINE HYDROCHLORIDE 20 MG/ML
5 INJECTION INTRAMUSCULAR; INTRAVENOUS ONCE
Status: COMPLETED | OUTPATIENT
Start: 2021-06-30 | End: 2021-06-30

## 2021-06-30 RX ORDER — OFLOXACIN 3 MG/ML
SOLUTION/ DROPS OPHTHALMIC
Status: DISCONTINUED | OUTPATIENT
Start: 2021-06-30 | End: 2021-06-30 | Stop reason: HOSPADM

## 2021-06-30 RX ORDER — CEPHALEXIN 500 MG/1
500 CAPSULE ORAL EVERY 12 HOURS
Qty: 20 CAPSULE | Refills: 0 | Status: SHIPPED | OUTPATIENT
Start: 2021-06-30 | End: 2021-07-11

## 2021-06-30 RX ORDER — DEXAMETHASONE SODIUM PHOSPHATE 4 MG/ML
INJECTION, SOLUTION INTRA-ARTICULAR; INTRALESIONAL; INTRAMUSCULAR; INTRAVENOUS; SOFT TISSUE
Status: DISCONTINUED | OUTPATIENT
Start: 2021-06-30 | End: 2021-06-30

## 2021-06-30 RX ORDER — LIDOCAINE HYDROCHLORIDE AND EPINEPHRINE 10; 10 MG/ML; UG/ML
INJECTION, SOLUTION INFILTRATION; PERINEURAL
Status: DISCONTINUED | OUTPATIENT
Start: 2021-06-30 | End: 2021-06-30 | Stop reason: HOSPADM

## 2021-06-30 RX ORDER — HYDRALAZINE HYDROCHLORIDE 20 MG/ML
INJECTION INTRAMUSCULAR; INTRAVENOUS
Status: COMPLETED
Start: 2021-06-30 | End: 2021-06-30

## 2021-06-30 RX ORDER — PHENYLEPHRINE HYDROCHLORIDE 10 MG/ML
INJECTION INTRAVENOUS
Status: DISCONTINUED | OUTPATIENT
Start: 2021-06-30 | End: 2021-06-30

## 2021-06-30 RX ORDER — FENTANYL CITRATE 50 UG/ML
INJECTION, SOLUTION INTRAMUSCULAR; INTRAVENOUS
Status: DISCONTINUED | OUTPATIENT
Start: 2021-06-30 | End: 2021-06-30

## 2021-06-30 RX ORDER — ONDANSETRON 2 MG/ML
4 INJECTION INTRAMUSCULAR; INTRAVENOUS ONCE AS NEEDED
Status: DISCONTINUED | OUTPATIENT
Start: 2021-06-30 | End: 2021-06-30 | Stop reason: HOSPADM

## 2021-06-30 RX ORDER — KETAMINE HCL IN 0.9 % NACL 50 MG/5 ML
SYRINGE (ML) INTRAVENOUS
Status: DISCONTINUED | OUTPATIENT
Start: 2021-06-30 | End: 2021-06-30

## 2021-06-30 RX ORDER — SODIUM CHLORIDE 0.9 % (FLUSH) 0.9 %
10 SYRINGE (ML) INJECTION
Status: DISCONTINUED | OUTPATIENT
Start: 2021-06-30 | End: 2021-06-30 | Stop reason: HOSPADM

## 2021-06-30 RX ORDER — PROPOFOL 10 MG/ML
VIAL (ML) INTRAVENOUS
Status: DISCONTINUED | OUTPATIENT
Start: 2021-06-30 | End: 2021-06-30

## 2021-06-30 RX ORDER — SUCCINYLCHOLINE CHLORIDE 20 MG/ML
INJECTION INTRAMUSCULAR; INTRAVENOUS
Status: DISCONTINUED | OUTPATIENT
Start: 2021-06-30 | End: 2021-06-30

## 2021-06-30 RX ORDER — HYDROCODONE BITARTRATE AND ACETAMINOPHEN 5; 325 MG/1; MG/1
1 TABLET ORAL EVERY 8 HOURS PRN
Qty: 20 TABLET | Refills: 0 | Status: ON HOLD | OUTPATIENT
Start: 2021-06-30 | End: 2021-07-23

## 2021-06-30 RX ORDER — ONDANSETRON 2 MG/ML
4 INJECTION INTRAMUSCULAR; INTRAVENOUS EVERY 6 HOURS PRN
Status: DISCONTINUED | OUTPATIENT
Start: 2021-06-30 | End: 2021-06-30

## 2021-06-30 RX ORDER — SODIUM CHLORIDE 0.9 % (FLUSH) 0.9 %
10 SYRINGE (ML) INJECTION
Status: DISCONTINUED | OUTPATIENT
Start: 2021-06-30 | End: 2021-07-01 | Stop reason: HOSPADM

## 2021-06-30 RX ORDER — MECLIZINE HCL 12.5 MG 12.5 MG/1
12.5 TABLET ORAL 3 TIMES DAILY PRN
Qty: 15 TABLET | Refills: 0 | Status: ON HOLD | OUTPATIENT
Start: 2021-06-30 | End: 2021-07-23

## 2021-06-30 RX ADMIN — OXYCODONE 5 MG: 5 TABLET ORAL at 07:06

## 2021-06-30 RX ADMIN — VALSARTAN 160 MG: 160 TABLET, FILM COATED ORAL at 10:06

## 2021-06-30 RX ADMIN — MIDAZOLAM HYDROCHLORIDE 2 MG: 1 INJECTION, SOLUTION INTRAMUSCULAR; INTRAVENOUS at 01:06

## 2021-06-30 RX ADMIN — LIDOCAINE HYDROCHLORIDE 100 MG: 20 INJECTION, SOLUTION INTRAVENOUS at 01:06

## 2021-06-30 RX ADMIN — PROPOFOL 20 MG: 10 INJECTION, EMULSION INTRAVENOUS at 03:06

## 2021-06-30 RX ADMIN — FENTANYL CITRATE 25 MCG: 50 INJECTION, SOLUTION INTRAMUSCULAR; INTRAVENOUS at 07:06

## 2021-06-30 RX ADMIN — FENTANYL CITRATE 50 MCG: 50 INJECTION, SOLUTION INTRAMUSCULAR; INTRAVENOUS at 01:06

## 2021-06-30 RX ADMIN — DEXMEDETOMIDINE HYDROCHLORIDE 27 MCG: 100 INJECTION, SOLUTION, CONCENTRATE INTRAVENOUS at 01:06

## 2021-06-30 RX ADMIN — AMLODIPINE BESYLATE 10 MG: 10 TABLET ORAL at 10:06

## 2021-06-30 RX ADMIN — LABETALOL HYDROCHLORIDE 300 MG: 200 TABLET, FILM COATED ORAL at 10:06

## 2021-06-30 RX ADMIN — SUCCINYLCHOLINE CHLORIDE 140 MG: 20 INJECTION, SOLUTION INTRAMUSCULAR; INTRAVENOUS at 01:06

## 2021-06-30 RX ADMIN — FENTANYL CITRATE 25 MCG: 50 INJECTION, SOLUTION INTRAMUSCULAR; INTRAVENOUS at 06:06

## 2021-06-30 RX ADMIN — SODIUM CHLORIDE: 0.9 INJECTION, SOLUTION INTRAVENOUS at 08:06

## 2021-06-30 RX ADMIN — HYDRALAZINE HYDROCHLORIDE 75 MG: 50 TABLET ORAL at 10:06

## 2021-06-30 RX ADMIN — DEXMEDETOMIDINE HYDROCHLORIDE 0.3 MCG/KG/HR: 100 INJECTION, SOLUTION, CONCENTRATE INTRAVENOUS at 01:06

## 2021-06-30 RX ADMIN — HYDRALAZINE HYDROCHLORIDE 5 MG: 20 INJECTION INTRAMUSCULAR; INTRAVENOUS at 06:06

## 2021-06-30 RX ADMIN — PROPOFOL 100 MG: 10 INJECTION, EMULSION INTRAVENOUS at 01:06

## 2021-06-30 RX ADMIN — Medication 30 MG: at 01:06

## 2021-06-30 RX ADMIN — DEXAMETHASONE SODIUM PHOSPHATE 8 MG: 4 INJECTION INTRA-ARTICULAR; INTRALESIONAL; INTRAMUSCULAR; INTRAVENOUS; SOFT TISSUE at 04:06

## 2021-06-30 RX ADMIN — ONDANSETRON 4 MG: 2 INJECTION INTRAMUSCULAR; INTRAVENOUS at 01:06

## 2021-06-30 RX ADMIN — FUROSEMIDE 40 MG: 40 TABLET ORAL at 10:06

## 2021-06-30 RX ADMIN — TRAMADOL HYDROCHLORIDE 50 MG: 50 TABLET, COATED ORAL at 10:06

## 2021-06-30 RX ADMIN — CEFAZOLIN 2 G: 330 INJECTION, POWDER, FOR SOLUTION INTRAMUSCULAR; INTRAVENOUS at 01:06

## 2021-06-30 RX ADMIN — MUPIROCIN: 20 OINTMENT TOPICAL at 10:06

## 2021-06-30 RX ADMIN — SODIUM CHLORIDE: 0.9 INJECTION, SOLUTION INTRAVENOUS at 01:06

## 2021-06-30 RX ADMIN — PHENYLEPHRINE HYDROCHLORIDE 50 MCG: 10 INJECTION INTRAVENOUS at 02:06

## 2021-07-01 VITALS
WEIGHT: 194 LBS | RESPIRATION RATE: 15 BRPM | TEMPERATURE: 98 F | HEART RATE: 80 BPM | HEIGHT: 64 IN | OXYGEN SATURATION: 94 % | BODY MASS INDEX: 33.12 KG/M2 | DIASTOLIC BLOOD PRESSURE: 81 MMHG | SYSTOLIC BLOOD PRESSURE: 189 MMHG

## 2021-07-01 LAB
POCT GLUCOSE: 133 MG/DL (ref 70–110)
POCT GLUCOSE: 72 MG/DL (ref 70–110)
POCT GLUCOSE: 88 MG/DL (ref 70–110)

## 2021-07-01 PROCEDURE — 63600175 PHARM REV CODE 636 W HCPCS: Mod: NTX | Performed by: STUDENT IN AN ORGANIZED HEALTH CARE EDUCATION/TRAINING PROGRAM

## 2021-07-01 PROCEDURE — 25000003 PHARM REV CODE 250: Mod: NTX | Performed by: STUDENT IN AN ORGANIZED HEALTH CARE EDUCATION/TRAINING PROGRAM

## 2021-07-01 PROCEDURE — 99024 PR POST-OP FOLLOW-UP VISIT: ICD-10-PCS | Mod: NTX,,, | Performed by: OTOLARYNGOLOGY

## 2021-07-01 PROCEDURE — 94761 N-INVAS EAR/PLS OXIMETRY MLT: CPT | Mod: NTX

## 2021-07-01 PROCEDURE — 99024 POSTOP FOLLOW-UP VISIT: CPT | Mod: NTX,,, | Performed by: OTOLARYNGOLOGY

## 2021-07-01 RX ORDER — HYDRALAZINE HYDROCHLORIDE 25 MG/1
75 TABLET, FILM COATED ORAL 3 TIMES DAILY
Qty: 90 TABLET | Refills: 0 | Status: SHIPPED | OUTPATIENT
Start: 2021-07-01 | End: 2021-07-27

## 2021-07-01 RX ORDER — PROMETHAZINE HYDROCHLORIDE 25 MG/1
25 TABLET ORAL EVERY 6 HOURS PRN
Status: DISCONTINUED | OUTPATIENT
Start: 2021-07-01 | End: 2021-07-01 | Stop reason: HOSPADM

## 2021-07-01 RX ORDER — LABETALOL 300 MG/1
300 TABLET, FILM COATED ORAL 3 TIMES DAILY
Qty: 90 TABLET | Refills: 11 | Status: ON HOLD | OUTPATIENT
Start: 2021-07-01 | End: 2021-07-24 | Stop reason: HOSPADM

## 2021-07-01 RX ADMIN — PREGABALIN 75 MG: 75 CAPSULE ORAL at 09:07

## 2021-07-01 RX ADMIN — PROMETHAZINE HYDROCHLORIDE 25 MG: 25 INJECTION INTRAMUSCULAR; INTRAVENOUS at 06:07

## 2021-07-01 RX ADMIN — VALSARTAN 160 MG: 160 TABLET, FILM COATED ORAL at 09:07

## 2021-07-01 RX ADMIN — ATORVASTATIN CALCIUM 40 MG: 10 TABLET, FILM COATED ORAL at 09:07

## 2021-07-01 RX ADMIN — OXYCODONE 5 MG: 5 TABLET ORAL at 12:07

## 2021-07-01 RX ADMIN — ONDANSETRON 8 MG: 8 TABLET, ORALLY DISINTEGRATING ORAL at 05:07

## 2021-07-01 RX ADMIN — FAMOTIDINE 20 MG: 20 TABLET ORAL at 09:07

## 2021-07-01 RX ADMIN — LABETALOL HYDROCHLORIDE 300 MG: 200 TABLET, FILM COATED ORAL at 09:07

## 2021-07-01 RX ADMIN — MUPIROCIN: 20 OINTMENT TOPICAL at 09:07

## 2021-07-01 RX ADMIN — FUROSEMIDE 40 MG: 40 TABLET ORAL at 09:07

## 2021-07-01 RX ADMIN — HYDRALAZINE HYDROCHLORIDE 75 MG: 50 TABLET ORAL at 09:07

## 2021-07-05 ENCOUNTER — PATIENT OUTREACH (OUTPATIENT)
Dept: ADMINISTRATIVE | Facility: CLINIC | Age: 56
End: 2021-07-05

## 2021-07-06 ENCOUNTER — OFFICE VISIT (OUTPATIENT)
Dept: OTOLARYNGOLOGY | Facility: CLINIC | Age: 56
End: 2021-07-06
Payer: MEDICARE

## 2021-07-06 VITALS — BODY MASS INDEX: 33.3 KG/M2 | WEIGHT: 194 LBS

## 2021-07-06 DIAGNOSIS — Z09 POSTOPERATIVE EXAMINATION: Primary | ICD-10-CM

## 2021-07-06 PROCEDURE — 99999 PR PBB SHADOW E&M-EST. PATIENT-LVL III: CPT | Mod: PBBFAC,,, | Performed by: OTOLARYNGOLOGY

## 2021-07-06 PROCEDURE — 99024 PR POST-OP FOLLOW-UP VISIT: ICD-10-PCS | Mod: POP,,, | Performed by: OTOLARYNGOLOGY

## 2021-07-06 PROCEDURE — 99213 OFFICE O/P EST LOW 20 MIN: CPT | Mod: PBBFAC | Performed by: OTOLARYNGOLOGY

## 2021-07-06 PROCEDURE — 99024 POSTOP FOLLOW-UP VISIT: CPT | Mod: POP,,, | Performed by: OTOLARYNGOLOGY

## 2021-07-06 PROCEDURE — 99999 PR PBB SHADOW E&M-EST. PATIENT-LVL III: ICD-10-PCS | Mod: PBBFAC,,, | Performed by: OTOLARYNGOLOGY

## 2021-07-09 ENCOUNTER — TELEPHONE (OUTPATIENT)
Dept: ORTHOPEDICS | Facility: CLINIC | Age: 56
End: 2021-07-09

## 2021-07-09 DIAGNOSIS — S82.832D TRAUMATIC CLOSED NONDISPLACED FRACTURE OF DISTAL FIBULA, LEFT, WITH ROUTINE HEALING, SUBSEQUENT ENCOUNTER: Primary | ICD-10-CM

## 2021-07-12 ENCOUNTER — TELEPHONE (OUTPATIENT)
Dept: PULMONOLOGY | Facility: CLINIC | Age: 56
End: 2021-07-12

## 2021-07-13 ENCOUNTER — HOSPITAL ENCOUNTER (OUTPATIENT)
Dept: RADIOLOGY | Facility: HOSPITAL | Age: 56
Discharge: HOME | End: 2021-07-13
Attending: ORTHOPAEDIC SURGERY
Payer: MEDICARE

## 2021-07-13 ENCOUNTER — TELEPHONE (OUTPATIENT)
Dept: ORTHOPEDICS | Facility: CLINIC | Age: 56
End: 2021-07-13

## 2021-07-13 ENCOUNTER — OFFICE VISIT (OUTPATIENT)
Dept: ORTHOPEDICS | Facility: CLINIC | Age: 56
End: 2021-07-13
Payer: MEDICARE

## 2021-07-13 VITALS
BODY MASS INDEX: 33.12 KG/M2 | SYSTOLIC BLOOD PRESSURE: 166 MMHG | HEIGHT: 64 IN | DIASTOLIC BLOOD PRESSURE: 72 MMHG | WEIGHT: 194 LBS | HEART RATE: 79 BPM

## 2021-07-13 DIAGNOSIS — S82.832D TRAUMATIC CLOSED NONDISPLACED FRACTURE OF DISTAL FIBULA, LEFT, WITH ROUTINE HEALING, SUBSEQUENT ENCOUNTER: ICD-10-CM

## 2021-07-13 DIAGNOSIS — S82.832D TRAUMATIC CLOSED NONDISPLACED FRACTURE OF DISTAL FIBULA, LEFT, WITH ROUTINE HEALING, SUBSEQUENT ENCOUNTER: Primary | ICD-10-CM

## 2021-07-13 PROCEDURE — 73610 X-RAY EXAM OF ANKLE: CPT | Mod: TC,FY,LT,NTX

## 2021-07-13 PROCEDURE — 99214 OFFICE O/P EST MOD 30 MIN: CPT | Mod: S$PBB,,, | Performed by: ORTHOPAEDIC SURGERY

## 2021-07-13 PROCEDURE — 73610 X-RAY EXAM OF ANKLE: CPT | Mod: 26,LT,NTX, | Performed by: RADIOLOGY

## 2021-07-13 PROCEDURE — 73610 XR ANKLE COMPLETE 3 VIEW LEFT: ICD-10-PCS | Mod: 26,LT,NTX, | Performed by: RADIOLOGY

## 2021-07-13 PROCEDURE — 99999 PR PBB SHADOW E&M-EST. PATIENT-LVL IV: ICD-10-PCS | Mod: PBBFAC,,, | Performed by: ORTHOPAEDIC SURGERY

## 2021-07-13 PROCEDURE — 99214 OFFICE O/P EST MOD 30 MIN: CPT | Mod: PBBFAC,PN | Performed by: ORTHOPAEDIC SURGERY

## 2021-07-13 PROCEDURE — 99999 PR PBB SHADOW E&M-EST. PATIENT-LVL IV: CPT | Mod: PBBFAC,,, | Performed by: ORTHOPAEDIC SURGERY

## 2021-07-13 PROCEDURE — 99214 PR OFFICE/OUTPT VISIT, EST, LEVL IV, 30-39 MIN: ICD-10-PCS | Mod: S$PBB,,, | Performed by: ORTHOPAEDIC SURGERY

## 2021-07-20 ENCOUNTER — CLINICAL SUPPORT (OUTPATIENT)
Dept: AUDIOLOGY | Facility: CLINIC | Age: 56
End: 2021-07-20
Payer: MEDICARE

## 2021-07-20 DIAGNOSIS — H90.3 SENSORINEURAL HEARING LOSS, BILATERAL: Primary | ICD-10-CM

## 2021-07-20 PROCEDURE — 92603 COCHLEAR IMPLT F/UP EXAM 7/>: CPT | Mod: PBBFAC | Performed by: AUDIOLOGIST

## 2021-07-22 ENCOUNTER — HOSPITAL ENCOUNTER (OUTPATIENT)
Facility: HOSPITAL | Age: 56
Discharge: HOME | End: 2021-07-26
Attending: EMERGENCY MEDICINE | Admitting: INTERNAL MEDICINE
Payer: MEDICARE

## 2021-07-22 DIAGNOSIS — R06.00 DYSPNEA: ICD-10-CM

## 2021-07-22 DIAGNOSIS — Z86.16 HISTORY OF COVID-19: ICD-10-CM

## 2021-07-22 DIAGNOSIS — N28.9 HYPERVOLEMIA ASSOCIATED WITH RENAL INSUFFICIENCY: Primary | ICD-10-CM

## 2021-07-22 DIAGNOSIS — R06.02 SHORTNESS OF BREATH: ICD-10-CM

## 2021-07-22 DIAGNOSIS — I15.1 HYPERTENSION SECONDARY TO OTHER RENAL DISORDERS: ICD-10-CM

## 2021-07-22 DIAGNOSIS — N18.6 ESRD ON DIALYSIS: ICD-10-CM

## 2021-07-22 DIAGNOSIS — I50.33 ACUTE ON CHRONIC DIASTOLIC HEART FAILURE: ICD-10-CM

## 2021-07-22 DIAGNOSIS — Z99.2 ESRD ON DIALYSIS: ICD-10-CM

## 2021-07-22 DIAGNOSIS — E87.70 HYPERVOLEMIA ASSOCIATED WITH RENAL INSUFFICIENCY: Primary | ICD-10-CM

## 2021-07-22 DIAGNOSIS — R94.31 QT PROLONGATION: ICD-10-CM

## 2021-07-22 DIAGNOSIS — R07.9 CHEST PAIN: ICD-10-CM

## 2021-07-22 PROBLEM — I16.0 HYPERTENSIVE URGENCY: Status: ACTIVE | Noted: 2021-07-22

## 2021-07-22 LAB
ABO + RH BLD: NORMAL
ALBUMIN SERPL BCP-MCNC: 3.3 G/DL (ref 3.5–5.2)
ALP SERPL-CCNC: 76 U/L (ref 55–135)
ALT SERPL W/O P-5'-P-CCNC: <5 U/L (ref 10–44)
ANION GAP SERPL CALC-SCNC: 9 MMOL/L (ref 8–16)
AST SERPL-CCNC: 15 U/L (ref 10–40)
BASOPHILS # BLD AUTO: 0.04 K/UL (ref 0–0.2)
BASOPHILS NFR BLD: 0.8 % (ref 0–1.9)
BILIRUB SERPL-MCNC: 1.1 MG/DL (ref 0.1–1)
BLD GP AB SCN CELLS X3 SERPL QL: NORMAL
BNP SERPL-MCNC: 1785 PG/ML (ref 0–99)
BUN SERPL-MCNC: 16 MG/DL (ref 6–20)
CALCIUM SERPL-MCNC: 11 MG/DL (ref 8.7–10.5)
CHLORIDE SERPL-SCNC: 97 MMOL/L (ref 95–110)
CO2 SERPL-SCNC: 31 MMOL/L (ref 23–29)
CREAT SERPL-MCNC: 6.1 MG/DL (ref 0.5–1.4)
CTP QC/QA: YES
D DIMER PPP IA.FEU-MCNC: 1.71 MG/L FEU
DIFFERENTIAL METHOD: ABNORMAL
EOSINOPHIL # BLD AUTO: 0.4 K/UL (ref 0–0.5)
EOSINOPHIL NFR BLD: 7.8 % (ref 0–8)
ERYTHROCYTE [DISTWIDTH] IN BLOOD BY AUTOMATED COUNT: 20.3 % (ref 11.5–14.5)
EST. GFR  (AFRICAN AMERICAN): 8.1 ML/MIN/1.73 M^2
EST. GFR  (NON AFRICAN AMERICAN): 7 ML/MIN/1.73 M^2
GLUCOSE SERPL-MCNC: 102 MG/DL (ref 70–110)
HCT VFR BLD AUTO: 25 % (ref 37–48.5)
HGB BLD-MCNC: 7.8 G/DL (ref 12–16)
IMM GRANULOCYTES # BLD AUTO: 0.01 K/UL (ref 0–0.04)
IMM GRANULOCYTES NFR BLD AUTO: 0.2 % (ref 0–0.5)
LYMPHOCYTES # BLD AUTO: 1 K/UL (ref 1–4.8)
LYMPHOCYTES NFR BLD: 20 % (ref 18–48)
MAGNESIUM SERPL-MCNC: 2.2 MG/DL (ref 1.6–2.6)
MCH RBC QN AUTO: 24.9 PG (ref 27–31)
MCHC RBC AUTO-ENTMCNC: 31.2 G/DL (ref 32–36)
MCV RBC AUTO: 80 FL (ref 82–98)
MONOCYTES # BLD AUTO: 0.6 K/UL (ref 0.3–1)
MONOCYTES NFR BLD: 11.8 % (ref 4–15)
NEUTROPHILS # BLD AUTO: 3.1 K/UL (ref 1.8–7.7)
NEUTROPHILS NFR BLD: 59.4 % (ref 38–73)
NRBC BLD-RTO: 0 /100 WBC
PHOSPHATE SERPL-MCNC: 2.5 MG/DL (ref 2.7–4.5)
PLATELET # BLD AUTO: 185 K/UL (ref 150–450)
PMV BLD AUTO: 9.1 FL (ref 9.2–12.9)
POTASSIUM SERPL-SCNC: 3.6 MMOL/L (ref 3.5–5.1)
PROT SERPL-MCNC: 7.4 G/DL (ref 6–8.4)
RBC # BLD AUTO: 3.13 M/UL (ref 4–5.4)
SARS-COV-2 RDRP RESP QL NAA+PROBE: NEGATIVE
SODIUM SERPL-SCNC: 137 MMOL/L (ref 136–145)
T4 FREE SERPL-MCNC: 1.7 NG/DL (ref 0.71–1.51)
TROPONIN I SERPL DL<=0.01 NG/ML-MCNC: 0.02 NG/ML (ref 0–0.03)
TROPONIN I SERPL DL<=0.01 NG/ML-MCNC: 0.02 NG/ML (ref 0–0.03)
TSH SERPL DL<=0.005 MIU/L-ACNC: 0.33 UIU/ML (ref 0.4–4)
WBC # BLD AUTO: 5.15 K/UL (ref 3.9–12.7)

## 2021-07-22 PROCEDURE — 80053 COMPREHEN METABOLIC PANEL: CPT | Mod: NTX | Performed by: STUDENT IN AN ORGANIZED HEALTH CARE EDUCATION/TRAINING PROGRAM

## 2021-07-22 PROCEDURE — 85379 FIBRIN DEGRADATION QUANT: CPT | Mod: NTX | Performed by: STUDENT IN AN ORGANIZED HEALTH CARE EDUCATION/TRAINING PROGRAM

## 2021-07-22 PROCEDURE — 96372 THER/PROPH/DIAG INJ SC/IM: CPT | Mod: 59

## 2021-07-22 PROCEDURE — 99291 PR CRITICAL CARE, E/M 30-74 MINUTES: ICD-10-PCS | Mod: NTX,CS,, | Performed by: EMERGENCY MEDICINE

## 2021-07-22 PROCEDURE — 84484 ASSAY OF TROPONIN QUANT: CPT | Mod: NTX | Performed by: STUDENT IN AN ORGANIZED HEALTH CARE EDUCATION/TRAINING PROGRAM

## 2021-07-22 PROCEDURE — 93010 EKG 12-LEAD: ICD-10-PCS | Mod: NTX,,, | Performed by: INTERNAL MEDICINE

## 2021-07-22 PROCEDURE — 96374 THER/PROPH/DIAG INJ IV PUSH: CPT | Mod: NTX

## 2021-07-22 PROCEDURE — 63600175 PHARM REV CODE 636 W HCPCS: Mod: NTX | Performed by: PHYSICIAN ASSISTANT

## 2021-07-22 PROCEDURE — 84443 ASSAY THYROID STIM HORMONE: CPT | Mod: NTX | Performed by: PHYSICIAN ASSISTANT

## 2021-07-22 PROCEDURE — G0378 HOSPITAL OBSERVATION PER HR: HCPCS | Mod: NTX

## 2021-07-22 PROCEDURE — 25000003 PHARM REV CODE 250: Mod: NTX | Performed by: PHYSICIAN ASSISTANT

## 2021-07-22 PROCEDURE — 84100 ASSAY OF PHOSPHORUS: CPT | Mod: NTX | Performed by: STUDENT IN AN ORGANIZED HEALTH CARE EDUCATION/TRAINING PROGRAM

## 2021-07-22 PROCEDURE — 93005 ELECTROCARDIOGRAM TRACING: CPT | Mod: NTX

## 2021-07-22 PROCEDURE — 85025 COMPLETE CBC W/AUTO DIFF WBC: CPT | Mod: NTX | Performed by: STUDENT IN AN ORGANIZED HEALTH CARE EDUCATION/TRAINING PROGRAM

## 2021-07-22 PROCEDURE — 99220 PR INITIAL OBSERVATION CARE,LEVL III: ICD-10-PCS | Mod: NTX,,, | Performed by: PHYSICIAN ASSISTANT

## 2021-07-22 PROCEDURE — 99291 CRITICAL CARE FIRST HOUR: CPT | Mod: NTX,CS,, | Performed by: EMERGENCY MEDICINE

## 2021-07-22 PROCEDURE — 86900 BLOOD TYPING SEROLOGIC ABO: CPT | Mod: NTX | Performed by: PHYSICIAN ASSISTANT

## 2021-07-22 PROCEDURE — 84439 ASSAY OF FREE THYROXINE: CPT | Mod: NTX | Performed by: PHYSICIAN ASSISTANT

## 2021-07-22 PROCEDURE — 93010 ELECTROCARDIOGRAM REPORT: CPT | Mod: NTX,,, | Performed by: INTERNAL MEDICINE

## 2021-07-22 PROCEDURE — U0002 COVID-19 LAB TEST NON-CDC: HCPCS | Mod: NTX | Performed by: EMERGENCY MEDICINE

## 2021-07-22 PROCEDURE — 25500020 PHARM REV CODE 255: Mod: NTX | Performed by: EMERGENCY MEDICINE

## 2021-07-22 PROCEDURE — 83735 ASSAY OF MAGNESIUM: CPT | Mod: NTX | Performed by: STUDENT IN AN ORGANIZED HEALTH CARE EDUCATION/TRAINING PROGRAM

## 2021-07-22 PROCEDURE — 84484 ASSAY OF TROPONIN QUANT: CPT | Mod: 91,NTX | Performed by: PHYSICIAN ASSISTANT

## 2021-07-22 PROCEDURE — 96375 TX/PRO/DX INJ NEW DRUG ADDON: CPT | Mod: NTX

## 2021-07-22 PROCEDURE — 99291 CRITICAL CARE FIRST HOUR: CPT | Mod: 25,NTX

## 2021-07-22 PROCEDURE — 99220 PR INITIAL OBSERVATION CARE,LEVL III: CPT | Mod: NTX,,, | Performed by: PHYSICIAN ASSISTANT

## 2021-07-22 PROCEDURE — 36415 COLL VENOUS BLD VENIPUNCTURE: CPT | Mod: NTX | Performed by: PHYSICIAN ASSISTANT

## 2021-07-22 PROCEDURE — 83880 ASSAY OF NATRIURETIC PEPTIDE: CPT | Mod: NTX | Performed by: STUDENT IN AN ORGANIZED HEALTH CARE EDUCATION/TRAINING PROGRAM

## 2021-07-22 PROCEDURE — 63600175 PHARM REV CODE 636 W HCPCS: Mod: NTX | Performed by: STUDENT IN AN ORGANIZED HEALTH CARE EDUCATION/TRAINING PROGRAM

## 2021-07-22 RX ORDER — POLYETHYLENE GLYCOL 3350 17 G/17G
17 POWDER, FOR SOLUTION ORAL 2 TIMES DAILY
Status: DISCONTINUED | OUTPATIENT
Start: 2021-07-22 | End: 2021-07-26 | Stop reason: HOSPADM

## 2021-07-22 RX ORDER — IBUPROFEN 200 MG
16 TABLET ORAL
Status: DISCONTINUED | OUTPATIENT
Start: 2021-07-22 | End: 2021-07-26 | Stop reason: HOSPADM

## 2021-07-22 RX ORDER — IBUPROFEN 200 MG
24 TABLET ORAL
Status: DISCONTINUED | OUTPATIENT
Start: 2021-07-22 | End: 2021-07-26 | Stop reason: HOSPADM

## 2021-07-22 RX ORDER — PREGABALIN 75 MG/1
75 CAPSULE ORAL DAILY
Status: DISCONTINUED | OUTPATIENT
Start: 2021-07-23 | End: 2021-07-26 | Stop reason: HOSPADM

## 2021-07-22 RX ORDER — HYDRALAZINE HYDROCHLORIDE 50 MG/1
50 TABLET, FILM COATED ORAL
Status: DISPENSED | OUTPATIENT
Start: 2021-07-22 | End: 2021-07-23

## 2021-07-22 RX ORDER — ACETAMINOPHEN 325 MG/1
650 TABLET ORAL EVERY 8 HOURS PRN
Status: DISCONTINUED | OUTPATIENT
Start: 2021-07-22 | End: 2021-07-26 | Stop reason: HOSPADM

## 2021-07-22 RX ORDER — LOSARTAN POTASSIUM 50 MG/1
50 TABLET ORAL DAILY
Status: DISCONTINUED | OUTPATIENT
Start: 2021-07-23 | End: 2021-07-26

## 2021-07-22 RX ORDER — TRAMADOL HYDROCHLORIDE 50 MG/1
50 TABLET ORAL EVERY 8 HOURS PRN
Status: DISCONTINUED | OUTPATIENT
Start: 2021-07-22 | End: 2021-07-26 | Stop reason: HOSPADM

## 2021-07-22 RX ORDER — ONDANSETRON 8 MG/1
8 TABLET, ORALLY DISINTEGRATING ORAL EVERY 8 HOURS PRN
Status: DISCONTINUED | OUTPATIENT
Start: 2021-07-22 | End: 2021-07-22 | Stop reason: ALTCHOICE

## 2021-07-22 RX ORDER — HYDRALAZINE HYDROCHLORIDE 20 MG/ML
10 INJECTION INTRAMUSCULAR; INTRAVENOUS EVERY 6 HOURS PRN
Status: DISCONTINUED | OUTPATIENT
Start: 2021-07-22 | End: 2021-07-26 | Stop reason: HOSPADM

## 2021-07-22 RX ORDER — GLUCAGON 1 MG
1 KIT INJECTION
Status: DISCONTINUED | OUTPATIENT
Start: 2021-07-22 | End: 2021-07-26 | Stop reason: HOSPADM

## 2021-07-22 RX ORDER — SEVELAMER CARBONATE 800 MG/1
2400 TABLET, FILM COATED ORAL
Status: DISCONTINUED | OUTPATIENT
Start: 2021-07-23 | End: 2021-07-23

## 2021-07-22 RX ORDER — HYDRALAZINE HYDROCHLORIDE 20 MG/ML
20 INJECTION INTRAMUSCULAR; INTRAVENOUS
Status: COMPLETED | OUTPATIENT
Start: 2021-07-22 | End: 2021-07-22

## 2021-07-22 RX ORDER — HEPARIN SODIUM 5000 [USP'U]/ML
5000 INJECTION, SOLUTION INTRAVENOUS; SUBCUTANEOUS EVERY 8 HOURS
Status: DISCONTINUED | OUTPATIENT
Start: 2021-07-22 | End: 2021-07-26 | Stop reason: HOSPADM

## 2021-07-22 RX ORDER — ONDANSETRON 2 MG/ML
4 INJECTION INTRAMUSCULAR; INTRAVENOUS EVERY 8 HOURS PRN
Status: DISCONTINUED | OUTPATIENT
Start: 2021-07-22 | End: 2021-07-22 | Stop reason: ALTCHOICE

## 2021-07-22 RX ORDER — FUROSEMIDE 10 MG/ML
120 INJECTION INTRAMUSCULAR; INTRAVENOUS
Status: COMPLETED | OUTPATIENT
Start: 2021-07-22 | End: 2021-07-22

## 2021-07-22 RX ORDER — SODIUM CHLORIDE 0.9 % (FLUSH) 0.9 %
10 SYRINGE (ML) INJECTION
Status: DISCONTINUED | OUTPATIENT
Start: 2021-07-22 | End: 2021-07-26 | Stop reason: HOSPADM

## 2021-07-22 RX ORDER — IPRATROPIUM BROMIDE AND ALBUTEROL SULFATE 2.5; .5 MG/3ML; MG/3ML
3 SOLUTION RESPIRATORY (INHALATION)
Status: DISCONTINUED | OUTPATIENT
Start: 2021-07-22 | End: 2021-07-26 | Stop reason: HOSPADM

## 2021-07-22 RX ORDER — TALC
6 POWDER (GRAM) TOPICAL NIGHTLY PRN
Status: DISCONTINUED | OUTPATIENT
Start: 2021-07-22 | End: 2021-07-26 | Stop reason: HOSPADM

## 2021-07-22 RX ORDER — AMLODIPINE BESYLATE 10 MG/1
10 TABLET ORAL NIGHTLY
Status: DISCONTINUED | OUTPATIENT
Start: 2021-07-22 | End: 2021-07-26 | Stop reason: HOSPADM

## 2021-07-22 RX ADMIN — HYDRALAZINE HYDROCHLORIDE 20 MG: 20 INJECTION, SOLUTION INTRAMUSCULAR; INTRAVENOUS at 01:07

## 2021-07-22 RX ADMIN — POLYETHYLENE GLYCOL 3350 17 G: 17 POWDER, FOR SOLUTION ORAL at 08:07

## 2021-07-22 RX ADMIN — HYDRALAZINE HYDROCHLORIDE 75 MG: 50 TABLET ORAL at 08:07

## 2021-07-22 RX ADMIN — AMLODIPINE BESYLATE 10 MG: 10 TABLET ORAL at 08:07

## 2021-07-22 RX ADMIN — FUROSEMIDE 120 MG: 10 INJECTION, SOLUTION INTRAMUSCULAR; INTRAVENOUS at 12:07

## 2021-07-22 RX ADMIN — ONDANSETRON 4 MG: 2 INJECTION INTRAMUSCULAR; INTRAVENOUS at 03:07

## 2021-07-22 RX ADMIN — HEPARIN SODIUM 5000 UNITS: 5000 INJECTION INTRAVENOUS; SUBCUTANEOUS at 08:07

## 2021-07-22 RX ADMIN — IOHEXOL 100 ML: 350 INJECTION, SOLUTION INTRAVENOUS at 02:07

## 2021-07-23 LAB
ALBUMIN SERPL BCP-MCNC: 3.2 G/DL (ref 3.5–5.2)
ALP SERPL-CCNC: 75 U/L (ref 55–135)
ALT SERPL W/O P-5'-P-CCNC: <5 U/L (ref 10–44)
ANION GAP SERPL CALC-SCNC: 10 MMOL/L (ref 8–16)
AST SERPL-CCNC: 14 U/L (ref 10–40)
BASOPHILS # BLD AUTO: 0.06 K/UL (ref 0–0.2)
BASOPHILS NFR BLD: 1.3 % (ref 0–1.9)
BILIRUB SERPL-MCNC: 0.8 MG/DL (ref 0.1–1)
BUN SERPL-MCNC: 21 MG/DL (ref 6–20)
CALCIUM SERPL-MCNC: 10.3 MG/DL (ref 8.7–10.5)
CHLORIDE SERPL-SCNC: 95 MMOL/L (ref 95–110)
CO2 SERPL-SCNC: 31 MMOL/L (ref 23–29)
CREAT SERPL-MCNC: 7.6 MG/DL (ref 0.5–1.4)
DIFFERENTIAL METHOD: ABNORMAL
EOSINOPHIL # BLD AUTO: 0.5 K/UL (ref 0–0.5)
EOSINOPHIL NFR BLD: 10.4 % (ref 0–8)
ERYTHROCYTE [DISTWIDTH] IN BLOOD BY AUTOMATED COUNT: 20.5 % (ref 11.5–14.5)
EST. GFR  (AFRICAN AMERICAN): 6.2 ML/MIN/1.73 M^2
EST. GFR  (NON AFRICAN AMERICAN): 5.4 ML/MIN/1.73 M^2
FERRITIN SERPL-MCNC: 1266 NG/ML (ref 20–300)
GLUCOSE SERPL-MCNC: 88 MG/DL (ref 70–110)
HCT VFR BLD AUTO: 23.2 % (ref 37–48.5)
HGB BLD-MCNC: 7.2 G/DL (ref 12–16)
IMM GRANULOCYTES # BLD AUTO: 0.01 K/UL (ref 0–0.04)
IMM GRANULOCYTES NFR BLD AUTO: 0.2 % (ref 0–0.5)
INR PPP: 1.1 (ref 0.8–1.2)
IRON SERPL-MCNC: 41 UG/DL (ref 30–160)
LYMPHOCYTES # BLD AUTO: 0.9 K/UL (ref 1–4.8)
LYMPHOCYTES NFR BLD: 20.9 % (ref 18–48)
MAGNESIUM SERPL-MCNC: 2.4 MG/DL (ref 1.6–2.6)
MCH RBC QN AUTO: 24.7 PG (ref 27–31)
MCHC RBC AUTO-ENTMCNC: 31 G/DL (ref 32–36)
MCV RBC AUTO: 80 FL (ref 82–98)
MONOCYTES # BLD AUTO: 0.6 K/UL (ref 0.3–1)
MONOCYTES NFR BLD: 12.4 % (ref 4–15)
NEUTROPHILS # BLD AUTO: 2.5 K/UL (ref 1.8–7.7)
NEUTROPHILS NFR BLD: 54.8 % (ref 38–73)
NRBC BLD-RTO: 0 /100 WBC
PHOSPHATE SERPL-MCNC: 3.7 MG/DL (ref 2.7–4.5)
PLATELET # BLD AUTO: 217 K/UL (ref 150–450)
PMV BLD AUTO: 10.8 FL (ref 9.2–12.9)
POTASSIUM SERPL-SCNC: 4.1 MMOL/L (ref 3.5–5.1)
PROT SERPL-MCNC: 7.2 G/DL (ref 6–8.4)
PROTHROMBIN TIME: 11.9 SEC (ref 9–12.5)
RBC # BLD AUTO: 2.92 M/UL (ref 4–5.4)
SATURATED IRON: 16 % (ref 20–50)
SODIUM SERPL-SCNC: 136 MMOL/L (ref 136–145)
TOTAL IRON BINDING CAPACITY: 258 UG/DL (ref 250–450)
TRANSFERRIN SERPL-MCNC: 174 MG/DL (ref 200–375)
WBC # BLD AUTO: 4.5 K/UL (ref 3.9–12.7)

## 2021-07-23 PROCEDURE — 83540 ASSAY OF IRON: CPT | Mod: NTX | Performed by: PHYSICIAN ASSISTANT

## 2021-07-23 PROCEDURE — 27000221 HC OXYGEN, UP TO 24 HOURS: Mod: NTX

## 2021-07-23 PROCEDURE — 99226 PR SUBSEQUENT OBSERVATION CARE,LEVEL III: ICD-10-PCS | Mod: NTX,,, | Performed by: PHYSICIAN ASSISTANT

## 2021-07-23 PROCEDURE — 96372 THER/PROPH/DIAG INJ SC/IM: CPT | Mod: 59

## 2021-07-23 PROCEDURE — 85025 COMPLETE CBC W/AUTO DIFF WBC: CPT | Mod: NTX | Performed by: PHYSICIAN ASSISTANT

## 2021-07-23 PROCEDURE — 25000003 PHARM REV CODE 250: Mod: NTX | Performed by: PHYSICIAN ASSISTANT

## 2021-07-23 PROCEDURE — 84100 ASSAY OF PHOSPHORUS: CPT | Mod: NTX | Performed by: PHYSICIAN ASSISTANT

## 2021-07-23 PROCEDURE — 25000242 PHARM REV CODE 250 ALT 637 W/ HCPCS: Mod: NTX | Performed by: PHYSICIAN ASSISTANT

## 2021-07-23 PROCEDURE — 99900035 HC TECH TIME PER 15 MIN (STAT): Mod: NTX

## 2021-07-23 PROCEDURE — 93005 ELECTROCARDIOGRAM TRACING: CPT | Mod: NTX

## 2021-07-23 PROCEDURE — 80053 COMPREHEN METABOLIC PANEL: CPT | Mod: NTX | Performed by: PHYSICIAN ASSISTANT

## 2021-07-23 PROCEDURE — 85610 PROTHROMBIN TIME: CPT | Mod: NTX | Performed by: PHYSICIAN ASSISTANT

## 2021-07-23 PROCEDURE — 99215 PR OFFICE/OUTPT VISIT, EST, LEVL V, 40-54 MIN: ICD-10-PCS | Mod: NTX,,, | Performed by: HOSPITALIST

## 2021-07-23 PROCEDURE — 94761 N-INVAS EAR/PLS OXIMETRY MLT: CPT | Mod: NTX

## 2021-07-23 PROCEDURE — 99215 OFFICE O/P EST HI 40 MIN: CPT | Mod: NTX,,, | Performed by: HOSPITALIST

## 2021-07-23 PROCEDURE — 99226 PR SUBSEQUENT OBSERVATION CARE,LEVEL III: CPT | Mod: NTX,,, | Performed by: PHYSICIAN ASSISTANT

## 2021-07-23 PROCEDURE — 93010 ELECTROCARDIOGRAM REPORT: CPT | Mod: NTX,,, | Performed by: INTERNAL MEDICINE

## 2021-07-23 PROCEDURE — G0378 HOSPITAL OBSERVATION PER HR: HCPCS | Mod: NTX

## 2021-07-23 PROCEDURE — 36415 COLL VENOUS BLD VENIPUNCTURE: CPT | Mod: NTX | Performed by: PHYSICIAN ASSISTANT

## 2021-07-23 PROCEDURE — 82728 ASSAY OF FERRITIN: CPT | Mod: NTX | Performed by: PHYSICIAN ASSISTANT

## 2021-07-23 PROCEDURE — 83735 ASSAY OF MAGNESIUM: CPT | Mod: NTX | Performed by: PHYSICIAN ASSISTANT

## 2021-07-23 PROCEDURE — 63600175 PHARM REV CODE 636 W HCPCS: Mod: NTX | Performed by: PHYSICIAN ASSISTANT

## 2021-07-23 PROCEDURE — 94640 AIRWAY INHALATION TREATMENT: CPT | Mod: NTX

## 2021-07-23 PROCEDURE — 93010 EKG 12-LEAD: ICD-10-PCS | Mod: NTX,,, | Performed by: INTERNAL MEDICINE

## 2021-07-23 PROCEDURE — G0257 UNSCHED DIALYSIS ESRD PT HOS: HCPCS | Mod: NTX

## 2021-07-23 RX ORDER — SODIUM CHLORIDE 9 MG/ML
INJECTION, SOLUTION INTRAVENOUS ONCE
Status: DISCONTINUED | OUTPATIENT
Start: 2021-07-23 | End: 2021-07-26 | Stop reason: HOSPADM

## 2021-07-23 RX ADMIN — LOSARTAN POTASSIUM 50 MG: 50 TABLET, FILM COATED ORAL at 09:07

## 2021-07-23 RX ADMIN — POLYETHYLENE GLYCOL 3350 17 G: 17 POWDER, FOR SOLUTION ORAL at 09:07

## 2021-07-23 RX ADMIN — PREGABALIN 75 MG: 75 CAPSULE ORAL at 09:07

## 2021-07-23 RX ADMIN — AMLODIPINE BESYLATE 10 MG: 10 TABLET ORAL at 08:07

## 2021-07-23 RX ADMIN — ACETAMINOPHEN 650 MG: 325 TABLET ORAL at 05:07

## 2021-07-23 RX ADMIN — HYDRALAZINE HYDROCHLORIDE 75 MG: 50 TABLET ORAL at 09:07

## 2021-07-23 RX ADMIN — IPRATROPIUM BROMIDE AND ALBUTEROL SULFATE 3 ML: .5; 2.5 SOLUTION RESPIRATORY (INHALATION) at 08:07

## 2021-07-23 RX ADMIN — HEPARIN SODIUM 5000 UNITS: 5000 INJECTION INTRAVENOUS; SUBCUTANEOUS at 05:07

## 2021-07-23 RX ADMIN — POLYETHYLENE GLYCOL 3350 17 G: 17 POWDER, FOR SOLUTION ORAL at 08:07

## 2021-07-23 RX ADMIN — HYDRALAZINE HYDROCHLORIDE 75 MG: 50 TABLET ORAL at 08:07

## 2021-07-23 RX ADMIN — HEPARIN SODIUM 5000 UNITS: 5000 INJECTION INTRAVENOUS; SUBCUTANEOUS at 09:07

## 2021-07-24 LAB
ALBUMIN SERPL BCP-MCNC: 3.2 G/DL (ref 3.5–5.2)
ALP SERPL-CCNC: 76 U/L (ref 55–135)
ALT SERPL W/O P-5'-P-CCNC: 5 U/L (ref 10–44)
ANION GAP SERPL CALC-SCNC: 10 MMOL/L (ref 8–16)
AST SERPL-CCNC: 13 U/L (ref 10–40)
BASOPHILS # BLD AUTO: 0.04 K/UL (ref 0–0.2)
BASOPHILS NFR BLD: 0.8 % (ref 0–1.9)
BILIRUB SERPL-MCNC: 0.6 MG/DL (ref 0.1–1)
BUN SERPL-MCNC: 15 MG/DL (ref 6–20)
CALCIUM SERPL-MCNC: 9.8 MG/DL (ref 8.7–10.5)
CHLORIDE SERPL-SCNC: 105 MMOL/L (ref 95–110)
CO2 SERPL-SCNC: 22 MMOL/L (ref 23–29)
CREAT SERPL-MCNC: 5.5 MG/DL (ref 0.5–1.4)
DIFFERENTIAL METHOD: ABNORMAL
EOSINOPHIL # BLD AUTO: 0.5 K/UL (ref 0–0.5)
EOSINOPHIL NFR BLD: 9.6 % (ref 0–8)
ERYTHROCYTE [DISTWIDTH] IN BLOOD BY AUTOMATED COUNT: 20.6 % (ref 11.5–14.5)
EST. GFR  (AFRICAN AMERICAN): 9.2 ML/MIN/1.73 M^2
EST. GFR  (NON AFRICAN AMERICAN): 8 ML/MIN/1.73 M^2
GLUCOSE SERPL-MCNC: 95 MG/DL (ref 70–110)
HCT VFR BLD AUTO: 23.4 % (ref 37–48.5)
HGB BLD-MCNC: 7.1 G/DL (ref 12–16)
IMM GRANULOCYTES # BLD AUTO: 0.01 K/UL (ref 0–0.04)
IMM GRANULOCYTES NFR BLD AUTO: 0.2 % (ref 0–0.5)
LYMPHOCYTES # BLD AUTO: 1.1 K/UL (ref 1–4.8)
LYMPHOCYTES NFR BLD: 21 % (ref 18–48)
MAGNESIUM SERPL-MCNC: 2.3 MG/DL (ref 1.6–2.6)
MCH RBC QN AUTO: 24.9 PG (ref 27–31)
MCHC RBC AUTO-ENTMCNC: 30.3 G/DL (ref 32–36)
MCV RBC AUTO: 82 FL (ref 82–98)
MONOCYTES # BLD AUTO: 0.5 K/UL (ref 0.3–1)
MONOCYTES NFR BLD: 9 % (ref 4–15)
NEUTROPHILS # BLD AUTO: 3 K/UL (ref 1.8–7.7)
NEUTROPHILS NFR BLD: 59.4 % (ref 38–73)
NRBC BLD-RTO: 0 /100 WBC
PHOSPHATE SERPL-MCNC: 2.8 MG/DL (ref 2.7–4.5)
PLATELET # BLD AUTO: 200 K/UL (ref 150–450)
PMV BLD AUTO: 10 FL (ref 9.2–12.9)
POCT GLUCOSE: 121 MG/DL (ref 70–110)
POTASSIUM SERPL-SCNC: 4.2 MMOL/L (ref 3.5–5.1)
PROT SERPL-MCNC: 7.2 G/DL (ref 6–8.4)
RBC # BLD AUTO: 2.85 M/UL (ref 4–5.4)
SODIUM SERPL-SCNC: 137 MMOL/L (ref 136–145)
T4 FREE SERPL-MCNC: 0.95 NG/DL (ref 0.71–1.51)
TSH SERPL DL<=0.005 MIU/L-ACNC: 0.56 UIU/ML (ref 0.4–4)
WBC # BLD AUTO: 5 K/UL (ref 3.9–12.7)

## 2021-07-24 PROCEDURE — G0378 HOSPITAL OBSERVATION PER HR: HCPCS | Mod: NTX

## 2021-07-24 PROCEDURE — 36415 COLL VENOUS BLD VENIPUNCTURE: CPT | Mod: NTX | Performed by: PHYSICIAN ASSISTANT

## 2021-07-24 PROCEDURE — 83735 ASSAY OF MAGNESIUM: CPT | Mod: NTX | Performed by: PHYSICIAN ASSISTANT

## 2021-07-24 PROCEDURE — 94640 AIRWAY INHALATION TREATMENT: CPT | Mod: NTX

## 2021-07-24 PROCEDURE — 96376 TX/PRO/DX INJ SAME DRUG ADON: CPT

## 2021-07-24 PROCEDURE — 99226 PR SUBSEQUENT OBSERVATION CARE,LEVEL III: CPT | Mod: NTX,,, | Performed by: PHYSICIAN ASSISTANT

## 2021-07-24 PROCEDURE — 96372 THER/PROPH/DIAG INJ SC/IM: CPT | Mod: 59

## 2021-07-24 PROCEDURE — 27000221 HC OXYGEN, UP TO 24 HOURS: Mod: NTX

## 2021-07-24 PROCEDURE — 25000003 PHARM REV CODE 250: Mod: NTX | Performed by: PHYSICIAN ASSISTANT

## 2021-07-24 PROCEDURE — 84439 ASSAY OF FREE THYROXINE: CPT | Mod: NTX | Performed by: PHYSICIAN ASSISTANT

## 2021-07-24 PROCEDURE — 94761 N-INVAS EAR/PLS OXIMETRY MLT: CPT | Mod: NTX

## 2021-07-24 PROCEDURE — 25000003 PHARM REV CODE 250: Mod: NTX | Performed by: NURSE PRACTITIONER

## 2021-07-24 PROCEDURE — 85025 COMPLETE CBC W/AUTO DIFF WBC: CPT | Mod: NTX | Performed by: PHYSICIAN ASSISTANT

## 2021-07-24 PROCEDURE — 80053 COMPREHEN METABOLIC PANEL: CPT | Mod: NTX | Performed by: PHYSICIAN ASSISTANT

## 2021-07-24 PROCEDURE — 84443 ASSAY THYROID STIM HORMONE: CPT | Mod: NTX | Performed by: PHYSICIAN ASSISTANT

## 2021-07-24 PROCEDURE — 99226 PR SUBSEQUENT OBSERVATION CARE,LEVEL III: ICD-10-PCS | Mod: NTX,,, | Performed by: PHYSICIAN ASSISTANT

## 2021-07-24 PROCEDURE — 63600175 PHARM REV CODE 636 W HCPCS: Mod: NTX | Performed by: PHYSICIAN ASSISTANT

## 2021-07-24 PROCEDURE — 84100 ASSAY OF PHOSPHORUS: CPT | Mod: NTX | Performed by: PHYSICIAN ASSISTANT

## 2021-07-24 PROCEDURE — 25000242 PHARM REV CODE 250 ALT 637 W/ HCPCS: Mod: NTX | Performed by: PHYSICIAN ASSISTANT

## 2021-07-24 PROCEDURE — 99900035 HC TECH TIME PER 15 MIN (STAT): Mod: NTX

## 2021-07-24 RX ORDER — FUROSEMIDE 10 MG/ML
100 INJECTION INTRAMUSCULAR; INTRAVENOUS ONCE
Status: COMPLETED | OUTPATIENT
Start: 2021-07-24 | End: 2021-07-24

## 2021-07-24 RX ORDER — ONDANSETRON HYDROCHLORIDE 4 MG/5ML
8 SOLUTION ORAL EVERY 6 HOURS PRN
Status: DISCONTINUED | OUTPATIENT
Start: 2021-07-24 | End: 2021-07-26 | Stop reason: HOSPADM

## 2021-07-24 RX ORDER — LABETALOL 100 MG/1
300 TABLET, FILM COATED ORAL 3 TIMES DAILY
Status: DISCONTINUED | OUTPATIENT
Start: 2021-07-24 | End: 2021-07-26 | Stop reason: HOSPADM

## 2021-07-24 RX ORDER — CARVEDILOL 12.5 MG/1
12.5 TABLET ORAL 2 TIMES DAILY
Qty: 60 TABLET | Refills: 11 | Status: SHIPPED | OUTPATIENT
Start: 2021-07-24 | End: 2021-07-24 | Stop reason: HOSPADM

## 2021-07-24 RX ORDER — SODIUM CHLORIDE 9 MG/ML
INJECTION, SOLUTION INTRAVENOUS ONCE
Status: COMPLETED | OUTPATIENT
Start: 2021-07-26 | End: 2021-07-26

## 2021-07-24 RX ORDER — ONDANSETRON 8 MG/1
8 TABLET, ORALLY DISINTEGRATING ORAL EVERY 6 HOURS PRN
Status: DISCONTINUED | OUTPATIENT
Start: 2021-07-24 | End: 2021-07-26 | Stop reason: HOSPADM

## 2021-07-24 RX ORDER — LABETALOL 300 MG/1
300 TABLET, FILM COATED ORAL 3 TIMES DAILY
Qty: 90 TABLET | Refills: 11 | Status: ON HOLD | OUTPATIENT
Start: 2021-07-24 | End: 2023-09-30

## 2021-07-24 RX ORDER — LABETALOL 100 MG/1
300 TABLET, FILM COATED ORAL 3 TIMES DAILY
Status: DISCONTINUED | OUTPATIENT
Start: 2021-07-24 | End: 2021-07-24

## 2021-07-24 RX ORDER — FUROSEMIDE 40 MG/1
80 TABLET ORAL DAILY
Status: DISCONTINUED | OUTPATIENT
Start: 2021-07-24 | End: 2021-07-26 | Stop reason: HOSPADM

## 2021-07-24 RX ORDER — CARVEDILOL 12.5 MG/1
12.5 TABLET ORAL 2 TIMES DAILY
Status: DISCONTINUED | OUTPATIENT
Start: 2021-07-24 | End: 2021-07-24

## 2021-07-24 RX ORDER — FUROSEMIDE 80 MG/1
80 TABLET ORAL DAILY
Qty: 30 TABLET | Refills: 11 | Status: SHIPPED | OUTPATIENT
Start: 2021-07-24 | End: 2023-11-30

## 2021-07-24 RX ADMIN — HYDRALAZINE HYDROCHLORIDE 75 MG: 50 TABLET ORAL at 09:07

## 2021-07-24 RX ADMIN — HEPARIN SODIUM 5000 UNITS: 5000 INJECTION INTRAVENOUS; SUBCUTANEOUS at 02:07

## 2021-07-24 RX ADMIN — HYDRALAZINE HYDROCHLORIDE 75 MG: 50 TABLET ORAL at 02:07

## 2021-07-24 RX ADMIN — IPRATROPIUM BROMIDE AND ALBUTEROL SULFATE 3 ML: .5; 2.5 SOLUTION RESPIRATORY (INHALATION) at 01:07

## 2021-07-24 RX ADMIN — ACETAMINOPHEN 650 MG: 325 TABLET ORAL at 05:07

## 2021-07-24 RX ADMIN — IPRATROPIUM BROMIDE AND ALBUTEROL SULFATE 3 ML: .5; 2.5 SOLUTION RESPIRATORY (INHALATION) at 07:07

## 2021-07-24 RX ADMIN — ONDANSETRON 8 MG: 8 TABLET, ORALLY DISINTEGRATING ORAL at 08:07

## 2021-07-24 RX ADMIN — HEPARIN SODIUM 5000 UNITS: 5000 INJECTION INTRAVENOUS; SUBCUTANEOUS at 05:07

## 2021-07-24 RX ADMIN — CARVEDILOL 12.5 MG: 12.5 TABLET, FILM COATED ORAL at 02:07

## 2021-07-24 RX ADMIN — IPRATROPIUM BROMIDE AND ALBUTEROL SULFATE 3 ML: .5; 2.5 SOLUTION RESPIRATORY (INHALATION) at 09:07

## 2021-07-24 RX ADMIN — HYDRALAZINE HYDROCHLORIDE 10 MG: 20 INJECTION, SOLUTION INTRAMUSCULAR; INTRAVENOUS at 03:07

## 2021-07-24 RX ADMIN — FUROSEMIDE 80 MG: 40 TABLET ORAL at 02:07

## 2021-07-24 RX ADMIN — FUROSEMIDE 100 MG: 10 INJECTION, SOLUTION INTRAMUSCULAR; INTRAVENOUS at 02:07

## 2021-07-24 RX ADMIN — PREGABALIN 75 MG: 75 CAPSULE ORAL at 09:07

## 2021-07-24 RX ADMIN — HEPARIN SODIUM 5000 UNITS: 5000 INJECTION INTRAVENOUS; SUBCUTANEOUS at 10:07

## 2021-07-24 RX ADMIN — AMLODIPINE BESYLATE 10 MG: 10 TABLET ORAL at 09:07

## 2021-07-24 RX ADMIN — LOSARTAN POTASSIUM 50 MG: 50 TABLET, FILM COATED ORAL at 09:07

## 2021-07-24 RX ADMIN — SODIUM CHLORIDE 125 MG: 9 INJECTION, SOLUTION INTRAVENOUS at 09:07

## 2021-07-24 RX ADMIN — LABETALOL HYDROCHLORIDE 300 MG: 100 TABLET, FILM COATED ORAL at 09:07

## 2021-07-25 LAB
ALBUMIN SERPL BCP-MCNC: 3.3 G/DL (ref 3.5–5.2)
ALP SERPL-CCNC: 75 U/L (ref 55–135)
ALT SERPL W/O P-5'-P-CCNC: <5 U/L (ref 10–44)
ANION GAP SERPL CALC-SCNC: 12 MMOL/L (ref 8–16)
AST SERPL-CCNC: 13 U/L (ref 10–40)
BASOPHILS # BLD AUTO: 0.03 K/UL (ref 0–0.2)
BASOPHILS NFR BLD: 0.7 % (ref 0–1.9)
BILIRUB SERPL-MCNC: 0.8 MG/DL (ref 0.1–1)
BUN SERPL-MCNC: 27 MG/DL (ref 6–20)
CALCIUM SERPL-MCNC: 10.3 MG/DL (ref 8.7–10.5)
CHLORIDE SERPL-SCNC: 105 MMOL/L (ref 95–110)
CO2 SERPL-SCNC: 19 MMOL/L (ref 23–29)
CREAT SERPL-MCNC: 7.7 MG/DL (ref 0.5–1.4)
DIFFERENTIAL METHOD: ABNORMAL
EOSINOPHIL # BLD AUTO: 0.4 K/UL (ref 0–0.5)
EOSINOPHIL NFR BLD: 10.2 % (ref 0–8)
ERYTHROCYTE [DISTWIDTH] IN BLOOD BY AUTOMATED COUNT: 21 % (ref 11.5–14.5)
EST. GFR  (AFRICAN AMERICAN): 6.1 ML/MIN/1.73 M^2
EST. GFR  (NON AFRICAN AMERICAN): 5.3 ML/MIN/1.73 M^2
GLUCOSE SERPL-MCNC: 110 MG/DL (ref 70–110)
HCT VFR BLD AUTO: 22.9 % (ref 37–48.5)
HGB BLD-MCNC: 7.1 G/DL (ref 12–16)
IMM GRANULOCYTES # BLD AUTO: 0.02 K/UL (ref 0–0.04)
IMM GRANULOCYTES NFR BLD AUTO: 0.5 % (ref 0–0.5)
LYMPHOCYTES # BLD AUTO: 0.8 K/UL (ref 1–4.8)
LYMPHOCYTES NFR BLD: 20 % (ref 18–48)
MAGNESIUM SERPL-MCNC: 2.5 MG/DL (ref 1.6–2.6)
MCH RBC QN AUTO: 24.1 PG (ref 27–31)
MCHC RBC AUTO-ENTMCNC: 31 G/DL (ref 32–36)
MCV RBC AUTO: 78 FL (ref 82–98)
MONOCYTES # BLD AUTO: 0.5 K/UL (ref 0.3–1)
MONOCYTES NFR BLD: 11.4 % (ref 4–15)
NEUTROPHILS # BLD AUTO: 2.4 K/UL (ref 1.8–7.7)
NEUTROPHILS NFR BLD: 57.2 % (ref 38–73)
NRBC BLD-RTO: 0 /100 WBC
PHOSPHATE SERPL-MCNC: 3.8 MG/DL (ref 2.7–4.5)
PLATELET # BLD AUTO: 200 K/UL (ref 150–450)
PMV BLD AUTO: 11.2 FL (ref 9.2–12.9)
POTASSIUM SERPL-SCNC: 4.4 MMOL/L (ref 3.5–5.1)
PROT SERPL-MCNC: 7.3 G/DL (ref 6–8.4)
RBC # BLD AUTO: 2.95 M/UL (ref 4–5.4)
SODIUM SERPL-SCNC: 136 MMOL/L (ref 136–145)
WBC # BLD AUTO: 4.11 K/UL (ref 3.9–12.7)

## 2021-07-25 PROCEDURE — 94640 AIRWAY INHALATION TREATMENT: CPT | Mod: NTX

## 2021-07-25 PROCEDURE — 25000242 PHARM REV CODE 250 ALT 637 W/ HCPCS: Mod: NTX | Performed by: PHYSICIAN ASSISTANT

## 2021-07-25 PROCEDURE — 36415 COLL VENOUS BLD VENIPUNCTURE: CPT | Mod: NTX | Performed by: PHYSICIAN ASSISTANT

## 2021-07-25 PROCEDURE — 96375 TX/PRO/DX INJ NEW DRUG ADDON: CPT

## 2021-07-25 PROCEDURE — 99226 PR SUBSEQUENT OBSERVATION CARE,LEVEL III: ICD-10-PCS | Mod: NTX,,, | Performed by: PHYSICIAN ASSISTANT

## 2021-07-25 PROCEDURE — 25000003 PHARM REV CODE 250: Mod: NTX | Performed by: PHYSICIAN ASSISTANT

## 2021-07-25 PROCEDURE — G0378 HOSPITAL OBSERVATION PER HR: HCPCS | Mod: NTX

## 2021-07-25 PROCEDURE — 80053 COMPREHEN METABOLIC PANEL: CPT | Mod: NTX | Performed by: PHYSICIAN ASSISTANT

## 2021-07-25 PROCEDURE — 63600175 PHARM REV CODE 636 W HCPCS: Mod: NTX | Performed by: PHYSICIAN ASSISTANT

## 2021-07-25 PROCEDURE — 96372 THER/PROPH/DIAG INJ SC/IM: CPT | Mod: 59

## 2021-07-25 PROCEDURE — 99226 PR SUBSEQUENT OBSERVATION CARE,LEVEL III: CPT | Mod: NTX,,, | Performed by: PHYSICIAN ASSISTANT

## 2021-07-25 PROCEDURE — 83735 ASSAY OF MAGNESIUM: CPT | Mod: NTX | Performed by: PHYSICIAN ASSISTANT

## 2021-07-25 PROCEDURE — 99900035 HC TECH TIME PER 15 MIN (STAT): Mod: NTX

## 2021-07-25 PROCEDURE — 85025 COMPLETE CBC W/AUTO DIFF WBC: CPT | Mod: NTX | Performed by: PHYSICIAN ASSISTANT

## 2021-07-25 PROCEDURE — 84100 ASSAY OF PHOSPHORUS: CPT | Mod: NTX | Performed by: PHYSICIAN ASSISTANT

## 2021-07-25 PROCEDURE — 94761 N-INVAS EAR/PLS OXIMETRY MLT: CPT | Mod: NTX

## 2021-07-25 RX ORDER — SENNOSIDES 8.6 MG/1
8.6 TABLET ORAL 2 TIMES DAILY
Status: DISCONTINUED | OUTPATIENT
Start: 2021-07-25 | End: 2021-07-26 | Stop reason: HOSPADM

## 2021-07-25 RX ADMIN — HEPARIN SODIUM 5000 UNITS: 5000 INJECTION INTRAVENOUS; SUBCUTANEOUS at 01:07

## 2021-07-25 RX ADMIN — LABETALOL HYDROCHLORIDE 300 MG: 100 TABLET, FILM COATED ORAL at 05:07

## 2021-07-25 RX ADMIN — LOSARTAN POTASSIUM 50 MG: 50 TABLET, FILM COATED ORAL at 08:07

## 2021-07-25 RX ADMIN — HYDRALAZINE HYDROCHLORIDE 75 MG: 50 TABLET ORAL at 05:07

## 2021-07-25 RX ADMIN — IPRATROPIUM BROMIDE AND ALBUTEROL SULFATE 3 ML: .5; 2.5 SOLUTION RESPIRATORY (INHALATION) at 04:07

## 2021-07-25 RX ADMIN — HEPARIN SODIUM 5000 UNITS: 5000 INJECTION INTRAVENOUS; SUBCUTANEOUS at 09:07

## 2021-07-25 RX ADMIN — HYDRALAZINE HYDROCHLORIDE 75 MG: 50 TABLET ORAL at 08:07

## 2021-07-25 RX ADMIN — SENNOSIDES 8.6 MG: 8.6 TABLET, FILM COATED ORAL at 10:07

## 2021-07-25 RX ADMIN — HYDRALAZINE HYDROCHLORIDE 75 MG: 50 TABLET ORAL at 09:07

## 2021-07-25 RX ADMIN — LABETALOL HYDROCHLORIDE 300 MG: 100 TABLET, FILM COATED ORAL at 09:07

## 2021-07-25 RX ADMIN — FUROSEMIDE 80 MG: 40 TABLET ORAL at 08:07

## 2021-07-25 RX ADMIN — LABETALOL HYDROCHLORIDE 300 MG: 100 TABLET, FILM COATED ORAL at 08:07

## 2021-07-25 RX ADMIN — PREGABALIN 75 MG: 75 CAPSULE ORAL at 08:07

## 2021-07-25 RX ADMIN — HEPARIN SODIUM 5000 UNITS: 5000 INJECTION INTRAVENOUS; SUBCUTANEOUS at 06:07

## 2021-07-25 RX ADMIN — AMLODIPINE BESYLATE 10 MG: 10 TABLET ORAL at 09:07

## 2021-07-25 RX ADMIN — SENNOSIDES 8.6 MG: 8.6 TABLET, FILM COATED ORAL at 09:07

## 2021-07-26 VITALS
HEIGHT: 64 IN | DIASTOLIC BLOOD PRESSURE: 81 MMHG | OXYGEN SATURATION: 95 % | TEMPERATURE: 98 F | BODY MASS INDEX: 30.48 KG/M2 | SYSTOLIC BLOOD PRESSURE: 150 MMHG | RESPIRATION RATE: 18 BRPM | HEART RATE: 78 BPM | WEIGHT: 178.56 LBS

## 2021-07-26 PROBLEM — E66.01 MORBID OBESITY: Chronic | Status: ACTIVE | Noted: 2021-06-28

## 2021-07-26 PROBLEM — I16.0 HYPERTENSIVE URGENCY: Status: RESOLVED | Noted: 2021-07-22 | Resolved: 2021-07-26

## 2021-07-26 LAB
ABO + RH BLD: NORMAL
ALBUMIN SERPL BCP-MCNC: 3.3 G/DL (ref 3.5–5.2)
ALP SERPL-CCNC: 79 U/L (ref 55–135)
ALT SERPL W/O P-5'-P-CCNC: <5 U/L (ref 10–44)
ANION GAP SERPL CALC-SCNC: 10 MMOL/L (ref 8–16)
AST SERPL-CCNC: 11 U/L (ref 10–40)
BASOPHILS # BLD AUTO: 0.03 K/UL (ref 0–0.2)
BASOPHILS NFR BLD: 0.7 % (ref 0–1.9)
BILIRUB SERPL-MCNC: 0.6 MG/DL (ref 0.1–1)
BLD GP AB SCN CELLS X3 SERPL QL: NORMAL
BLD PROD TYP BPU: NORMAL
BLOOD UNIT EXPIRATION DATE: NORMAL
BLOOD UNIT TYPE CODE: 5100
BLOOD UNIT TYPE: NORMAL
BUN SERPL-MCNC: 35 MG/DL (ref 6–20)
CALCIUM SERPL-MCNC: 10.2 MG/DL (ref 8.7–10.5)
CHLORIDE SERPL-SCNC: 102 MMOL/L (ref 95–110)
CO2 SERPL-SCNC: 22 MMOL/L (ref 23–29)
CODING SYSTEM: NORMAL
CREAT SERPL-MCNC: 9.2 MG/DL (ref 0.5–1.4)
DIFFERENTIAL METHOD: ABNORMAL
DISPENSE STATUS: NORMAL
EOSINOPHIL # BLD AUTO: 0.4 K/UL (ref 0–0.5)
EOSINOPHIL NFR BLD: 10.7 % (ref 0–8)
ERYTHROCYTE [DISTWIDTH] IN BLOOD BY AUTOMATED COUNT: 21 % (ref 11.5–14.5)
EST. GFR  (AFRICAN AMERICAN): 4.9 ML/MIN/1.73 M^2
EST. GFR  (NON AFRICAN AMERICAN): 4.3 ML/MIN/1.73 M^2
GLUCOSE SERPL-MCNC: 92 MG/DL (ref 70–110)
HCT VFR BLD AUTO: 23 % (ref 37–48.5)
HGB BLD-MCNC: 7 G/DL (ref 12–16)
IMM GRANULOCYTES # BLD AUTO: 0 K/UL (ref 0–0.04)
IMM GRANULOCYTES NFR BLD AUTO: 0 % (ref 0–0.5)
LYMPHOCYTES # BLD AUTO: 0.9 K/UL (ref 1–4.8)
LYMPHOCYTES NFR BLD: 23.1 % (ref 18–48)
MAGNESIUM SERPL-MCNC: 2.6 MG/DL (ref 1.6–2.6)
MCH RBC QN AUTO: 24.1 PG (ref 27–31)
MCHC RBC AUTO-ENTMCNC: 30.4 G/DL (ref 32–36)
MCV RBC AUTO: 79 FL (ref 82–98)
MONOCYTES # BLD AUTO: 0.5 K/UL (ref 0.3–1)
MONOCYTES NFR BLD: 11.9 % (ref 4–15)
NEUTROPHILS # BLD AUTO: 2.2 K/UL (ref 1.8–7.7)
NEUTROPHILS NFR BLD: 53.6 % (ref 38–73)
NRBC BLD-RTO: 0 /100 WBC
NUM UNITS TRANS PACKED RBC: NORMAL
PHOSPHATE SERPL-MCNC: 4.3 MG/DL (ref 2.7–4.5)
PLATELET # BLD AUTO: 212 K/UL (ref 150–450)
PMV BLD AUTO: 10.4 FL (ref 9.2–12.9)
POCT GLUCOSE: 114 MG/DL (ref 70–110)
POTASSIUM SERPL-SCNC: 4.7 MMOL/L (ref 3.5–5.1)
PROT SERPL-MCNC: 7.3 G/DL (ref 6–8.4)
RBC # BLD AUTO: 2.9 M/UL (ref 4–5.4)
SODIUM SERPL-SCNC: 134 MMOL/L (ref 136–145)
WBC # BLD AUTO: 4.02 K/UL (ref 3.9–12.7)

## 2021-07-26 PROCEDURE — 94761 N-INVAS EAR/PLS OXIMETRY MLT: CPT | Mod: NTX

## 2021-07-26 PROCEDURE — 86902 BLOOD TYPE ANTIGEN DONOR EA: CPT | Mod: NTX | Performed by: PHYSICIAN ASSISTANT

## 2021-07-26 PROCEDURE — 25000242 PHARM REV CODE 250 ALT 637 W/ HCPCS: Mod: NTX | Performed by: PHYSICIAN ASSISTANT

## 2021-07-26 PROCEDURE — 94640 AIRWAY INHALATION TREATMENT: CPT | Mod: NTX

## 2021-07-26 PROCEDURE — 96372 THER/PROPH/DIAG INJ SC/IM: CPT | Mod: 59

## 2021-07-26 PROCEDURE — 90935 HEMODIALYSIS ONE EVALUATION: CPT | Mod: NTX,,, | Performed by: NURSE PRACTITIONER

## 2021-07-26 PROCEDURE — 90935 PR HEMODIALYSIS, ONE EVALUATION: ICD-10-PCS | Mod: NTX,,, | Performed by: NURSE PRACTITIONER

## 2021-07-26 PROCEDURE — 86900 BLOOD TYPING SEROLOGIC ABO: CPT | Mod: NTX | Performed by: PHYSICIAN ASSISTANT

## 2021-07-26 PROCEDURE — 25000003 PHARM REV CODE 250: Mod: NTX | Performed by: NURSE PRACTITIONER

## 2021-07-26 PROCEDURE — P9016 RBC LEUKOCYTES REDUCED: HCPCS | Mod: NTX | Performed by: PHYSICIAN ASSISTANT

## 2021-07-26 PROCEDURE — 80053 COMPREHEN METABOLIC PANEL: CPT | Mod: NTX | Performed by: PHYSICIAN ASSISTANT

## 2021-07-26 PROCEDURE — 84100 ASSAY OF PHOSPHORUS: CPT | Mod: NTX | Performed by: PHYSICIAN ASSISTANT

## 2021-07-26 PROCEDURE — 25000003 PHARM REV CODE 250: Mod: NTX | Performed by: PHYSICIAN ASSISTANT

## 2021-07-26 PROCEDURE — 99217 PR OBSERVATION CARE DISCHARGE: CPT | Mod: NTX,,, | Performed by: PHYSICIAN ASSISTANT

## 2021-07-26 PROCEDURE — 86922 COMPATIBILITY TEST ANTIGLOB: CPT | Mod: NTX | Performed by: PHYSICIAN ASSISTANT

## 2021-07-26 PROCEDURE — 63600175 PHARM REV CODE 636 W HCPCS: Mod: NTX | Performed by: PHYSICIAN ASSISTANT

## 2021-07-26 PROCEDURE — 99217 PR OBSERVATION CARE DISCHARGE: ICD-10-PCS | Mod: NTX,,, | Performed by: PHYSICIAN ASSISTANT

## 2021-07-26 PROCEDURE — 85025 COMPLETE CBC W/AUTO DIFF WBC: CPT | Mod: NTX | Performed by: PHYSICIAN ASSISTANT

## 2021-07-26 PROCEDURE — G0257 UNSCHED DIALYSIS ESRD PT HOS: HCPCS | Mod: NTX

## 2021-07-26 PROCEDURE — G0378 HOSPITAL OBSERVATION PER HR: HCPCS | Mod: NTX

## 2021-07-26 PROCEDURE — 83735 ASSAY OF MAGNESIUM: CPT | Mod: NTX | Performed by: PHYSICIAN ASSISTANT

## 2021-07-26 RX ORDER — NEBULIZER AND COMPRESSOR
EACH MISCELLANEOUS
Refills: 0 | COMMUNITY
Start: 2021-07-26 | End: 2022-09-22

## 2021-07-26 RX ORDER — HYDROCODONE BITARTRATE AND ACETAMINOPHEN 500; 5 MG/1; MG/1
TABLET ORAL
Status: DISCONTINUED | OUTPATIENT
Start: 2021-07-26 | End: 2021-07-26 | Stop reason: HOSPADM

## 2021-07-26 RX ORDER — LOSARTAN POTASSIUM 50 MG/1
100 TABLET ORAL DAILY
Status: DISCONTINUED | OUTPATIENT
Start: 2021-07-27 | End: 2021-07-26 | Stop reason: HOSPADM

## 2021-07-26 RX ORDER — LOSARTAN POTASSIUM 100 MG/1
100 TABLET ORAL DAILY
Qty: 30 TABLET | Refills: 11 | Status: ON HOLD | OUTPATIENT
Start: 2021-07-26 | End: 2023-09-30 | Stop reason: HOSPADM

## 2021-07-26 RX ADMIN — FUROSEMIDE 80 MG: 40 TABLET ORAL at 12:07

## 2021-07-26 RX ADMIN — SODIUM CHLORIDE 100 ML/HR: 0.9 INJECTION, SOLUTION INTRAVENOUS at 08:07

## 2021-07-26 RX ADMIN — PREGABALIN 75 MG: 75 CAPSULE ORAL at 12:07

## 2021-07-26 RX ADMIN — HEPARIN SODIUM 5000 UNITS: 5000 INJECTION INTRAVENOUS; SUBCUTANEOUS at 06:07

## 2021-07-26 RX ADMIN — LOSARTAN POTASSIUM 50 MG: 50 TABLET, FILM COATED ORAL at 12:07

## 2021-07-26 RX ADMIN — IPRATROPIUM BROMIDE AND ALBUTEROL SULFATE 3 ML: .5; 2.5 SOLUTION RESPIRATORY (INHALATION) at 07:07

## 2021-07-27 ENCOUNTER — HOSPITAL ENCOUNTER (OUTPATIENT)
Dept: RADIOLOGY | Facility: HOSPITAL | Age: 56
Discharge: HOME | End: 2021-07-27
Attending: NURSE PRACTITIONER
Payer: MEDICARE

## 2021-07-27 ENCOUNTER — OFFICE VISIT (OUTPATIENT)
Dept: CARDIOLOGY | Facility: CLINIC | Age: 56
End: 2021-07-27
Payer: MEDICARE

## 2021-07-27 ENCOUNTER — OFFICE VISIT (OUTPATIENT)
Dept: TRANSPLANT | Facility: CLINIC | Age: 56
End: 2021-07-27
Payer: MEDICARE

## 2021-07-27 VITALS
SYSTOLIC BLOOD PRESSURE: 163 MMHG | TEMPERATURE: 98 F | DIASTOLIC BLOOD PRESSURE: 77 MMHG | RESPIRATION RATE: 20 BRPM | WEIGHT: 181 LBS | HEIGHT: 65 IN | HEART RATE: 80 BPM | BODY MASS INDEX: 30.16 KG/M2 | OXYGEN SATURATION: 91 %

## 2021-07-27 VITALS
DIASTOLIC BLOOD PRESSURE: 84 MMHG | HEART RATE: 98 BPM | WEIGHT: 181.19 LBS | HEIGHT: 64 IN | SYSTOLIC BLOOD PRESSURE: 192 MMHG | BODY MASS INDEX: 30.93 KG/M2

## 2021-07-27 VITALS — HEIGHT: 64 IN | BODY MASS INDEX: 30.73 KG/M2 | WEIGHT: 180 LBS

## 2021-07-27 DIAGNOSIS — N18.6 ANEMIA IN ESRD (END-STAGE RENAL DISEASE): ICD-10-CM

## 2021-07-27 DIAGNOSIS — Z76.82 PATIENT ON WAITING LIST FOR KIDNEY TRANSPLANT: Primary | ICD-10-CM

## 2021-07-27 DIAGNOSIS — E87.70 HYPERVOLEMIA ASSOCIATED WITH RENAL INSUFFICIENCY: ICD-10-CM

## 2021-07-27 DIAGNOSIS — E11.22 TYPE 2 DIABETES MELLITUS WITH CHRONIC KIDNEY DISEASE ON CHRONIC DIALYSIS, WITH LONG-TERM CURRENT USE OF INSULIN: Chronic | ICD-10-CM

## 2021-07-27 DIAGNOSIS — E66.01 MORBID OBESITY: Chronic | ICD-10-CM

## 2021-07-27 DIAGNOSIS — Z79.4 TYPE 2 DIABETES MELLITUS WITH CHRONIC KIDNEY DISEASE ON CHRONIC DIALYSIS, WITH LONG-TERM CURRENT USE OF INSULIN: Chronic | ICD-10-CM

## 2021-07-27 DIAGNOSIS — I10 ESSENTIAL HYPERTENSION: ICD-10-CM

## 2021-07-27 DIAGNOSIS — D63.1 ANEMIA IN ESRD (END-STAGE RENAL DISEASE): ICD-10-CM

## 2021-07-27 DIAGNOSIS — Z12.31 BREAST CANCER SCREENING BY MAMMOGRAM: ICD-10-CM

## 2021-07-27 DIAGNOSIS — Z76.82 ORGAN TRANSPLANT CANDIDATE: ICD-10-CM

## 2021-07-27 DIAGNOSIS — Z99.2 ESRD ON HEMODIALYSIS: ICD-10-CM

## 2021-07-27 DIAGNOSIS — E78.2 ELEVATED CHOLESTEROL WITH ELEVATED TRIGLYCERIDES: Chronic | ICD-10-CM

## 2021-07-27 DIAGNOSIS — K76.0 NAFLD (NONALCOHOLIC FATTY LIVER DISEASE): ICD-10-CM

## 2021-07-27 DIAGNOSIS — I10 ESSENTIAL HYPERTENSION: Primary | ICD-10-CM

## 2021-07-27 DIAGNOSIS — I15.1 HYPERTENSION SECONDARY TO OTHER RENAL DISORDERS: ICD-10-CM

## 2021-07-27 DIAGNOSIS — N18.6 TYPE 2 DIABETES MELLITUS WITH CHRONIC KIDNEY DISEASE ON CHRONIC DIALYSIS, WITH LONG-TERM CURRENT USE OF INSULIN: Chronic | ICD-10-CM

## 2021-07-27 DIAGNOSIS — N28.9 HYPERVOLEMIA ASSOCIATED WITH RENAL INSUFFICIENCY: ICD-10-CM

## 2021-07-27 DIAGNOSIS — Z99.2 TYPE 2 DIABETES MELLITUS WITH CHRONIC KIDNEY DISEASE ON CHRONIC DIALYSIS, WITH LONG-TERM CURRENT USE OF INSULIN: Chronic | ICD-10-CM

## 2021-07-27 DIAGNOSIS — I50.32 CHRONIC DIASTOLIC HEART FAILURE: ICD-10-CM

## 2021-07-27 DIAGNOSIS — N18.6 ESRD ON HEMODIALYSIS: ICD-10-CM

## 2021-07-27 PROBLEM — E11.29 TYPE 2 DIABETES MELLITUS WITH KIDNEY COMPLICATION, WITH LONG-TERM CURRENT USE OF INSULIN: Chronic | Status: RESOLVED | Noted: 2017-09-28 | Resolved: 2021-07-27

## 2021-07-27 PROCEDURE — 99215 OFFICE O/P EST HI 40 MIN: CPT | Mod: S$PBB,TXP,, | Performed by: NURSE PRACTITIONER

## 2021-07-27 PROCEDURE — 77067 MAMMO DIGITAL SCREENING BILAT WITH TOMO: ICD-10-PCS | Mod: 26,TXP,, | Performed by: RADIOLOGY

## 2021-07-27 PROCEDURE — 99999 PR PBB SHADOW E&M-EST. PATIENT-LVL V: CPT | Mod: PBBFAC,TXP,, | Performed by: NURSE PRACTITIONER

## 2021-07-27 PROCEDURE — 76770 US EXAM ABDO BACK WALL COMP: CPT | Mod: 26,TXP,, | Performed by: RADIOLOGY

## 2021-07-27 PROCEDURE — 99999 PR PBB SHADOW E&M-EST. PATIENT-LVL V: ICD-10-PCS | Mod: PBBFAC,TXP,, | Performed by: NURSE PRACTITIONER

## 2021-07-27 PROCEDURE — 99214 OFFICE O/P EST MOD 30 MIN: CPT | Mod: PBBFAC,TXP | Performed by: INTERNAL MEDICINE

## 2021-07-27 PROCEDURE — 76770 US RETROPERITONEAL COMPLETE: ICD-10-PCS | Mod: 26,TXP,, | Performed by: RADIOLOGY

## 2021-07-27 PROCEDURE — 99215 OFFICE O/P EST HI 40 MIN: CPT | Mod: PBBFAC,27,TXP | Performed by: NURSE PRACTITIONER

## 2021-07-27 PROCEDURE — 99214 OFFICE O/P EST MOD 30 MIN: CPT | Mod: S$PBB,GC,TXP, | Performed by: INTERNAL MEDICINE

## 2021-07-27 PROCEDURE — 99999 PR PBB SHADOW E&M-EST. PATIENT-LVL IV: ICD-10-PCS | Mod: PBBFAC,GC,TXP, | Performed by: INTERNAL MEDICINE

## 2021-07-27 PROCEDURE — 99214 PR OFFICE/OUTPT VISIT, EST, LEVL IV, 30-39 MIN: ICD-10-PCS | Mod: S$PBB,GC,TXP, | Performed by: INTERNAL MEDICINE

## 2021-07-27 PROCEDURE — 77063 BREAST TOMOSYNTHESIS BI: CPT | Mod: 26,TXP,, | Performed by: RADIOLOGY

## 2021-07-27 PROCEDURE — 77067 SCR MAMMO BI INCL CAD: CPT | Mod: 26,TXP,, | Performed by: RADIOLOGY

## 2021-07-27 PROCEDURE — 76770 US EXAM ABDO BACK WALL COMP: CPT | Mod: TC,TXP

## 2021-07-27 PROCEDURE — 99999 PR PBB SHADOW E&M-EST. PATIENT-LVL IV: CPT | Mod: PBBFAC,GC,TXP, | Performed by: INTERNAL MEDICINE

## 2021-07-27 PROCEDURE — 99215 PR OFFICE/OUTPT VISIT, EST, LEVL V, 40-54 MIN: ICD-10-PCS | Mod: S$PBB,TXP,, | Performed by: NURSE PRACTITIONER

## 2021-07-27 PROCEDURE — 77067 SCR MAMMO BI INCL CAD: CPT | Mod: TC,TXP

## 2021-07-27 PROCEDURE — 77063 MAMMO DIGITAL SCREENING BILAT WITH TOMO: ICD-10-PCS | Mod: 26,TXP,, | Performed by: RADIOLOGY

## 2021-07-27 RX ORDER — OXYCODONE AND ACETAMINOPHEN 5; 325 MG/1; MG/1
1 TABLET ORAL EVERY 6 HOURS PRN
COMMUNITY
Start: 2021-05-28 | End: 2022-05-01

## 2021-07-27 RX ORDER — HYDRALAZINE HYDROCHLORIDE 50 MG/1
50 TABLET, FILM COATED ORAL EVERY 8 HOURS
Qty: 90 TABLET | Refills: 11 | Status: SHIPPED | OUTPATIENT
Start: 2021-07-27 | End: 2022-05-17

## 2021-07-27 RX ORDER — ASPIRIN 81 MG/1
81 TABLET ORAL EVERY MORNING
COMMUNITY

## 2021-07-27 RX ORDER — ONDANSETRON 4 MG/1
8 TABLET, FILM COATED ORAL
COMMUNITY
End: 2022-05-01

## 2021-07-29 DIAGNOSIS — Z76.82 PATIENT ON WAITING LIST FOR KIDNEY TRANSPLANT: Primary | ICD-10-CM

## 2021-07-29 DIAGNOSIS — Z76.82 ORGAN TRANSPLANT CANDIDATE: ICD-10-CM

## 2021-08-16 ENCOUNTER — TELEPHONE (OUTPATIENT)
Dept: ORTHOPEDICS | Facility: CLINIC | Age: 56
End: 2021-08-16

## 2021-08-16 DIAGNOSIS — S82.832D TRAUMATIC CLOSED NONDISPLACED FRACTURE OF DISTAL FIBULA, LEFT, WITH ROUTINE HEALING, SUBSEQUENT ENCOUNTER: Primary | ICD-10-CM

## 2021-08-17 ENCOUNTER — CLINICAL SUPPORT (OUTPATIENT)
Dept: AUDIOLOGY | Facility: CLINIC | Age: 56
End: 2021-08-17
Payer: MEDICARE

## 2021-08-17 ENCOUNTER — HOSPITAL ENCOUNTER (OUTPATIENT)
Dept: RADIOLOGY | Facility: HOSPITAL | Age: 56
Discharge: HOME | End: 2021-08-17
Attending: ORTHOPAEDIC SURGERY
Payer: MEDICARE

## 2021-08-17 ENCOUNTER — OFFICE VISIT (OUTPATIENT)
Dept: ORTHOPEDICS | Facility: CLINIC | Age: 56
End: 2021-08-17
Payer: MEDICARE

## 2021-08-17 VITALS
SYSTOLIC BLOOD PRESSURE: 152 MMHG | BODY MASS INDEX: 30.16 KG/M2 | WEIGHT: 181 LBS | DIASTOLIC BLOOD PRESSURE: 78 MMHG | HEART RATE: 78 BPM | HEIGHT: 65 IN

## 2021-08-17 DIAGNOSIS — H91.23 SUDDEN HEARING LOSS OF BOTH EARS: ICD-10-CM

## 2021-08-17 DIAGNOSIS — S82.832D TRAUMATIC CLOSED NONDISPLACED FRACTURE OF DISTAL FIBULA, LEFT, WITH ROUTINE HEALING, SUBSEQUENT ENCOUNTER: Primary | ICD-10-CM

## 2021-08-17 DIAGNOSIS — H93.13 TINNITUS OF BOTH EARS: ICD-10-CM

## 2021-08-17 DIAGNOSIS — S82.832D TRAUMATIC CLOSED NONDISPLACED FRACTURE OF DISTAL FIBULA, LEFT, WITH ROUTINE HEALING, SUBSEQUENT ENCOUNTER: ICD-10-CM

## 2021-08-17 DIAGNOSIS — H90.3 SENSORINEURAL HEARING LOSS, BILATERAL: Primary | ICD-10-CM

## 2021-08-17 PROCEDURE — 92604 REPROGRAM COCHLEAR IMPLT 7/>: CPT | Mod: PBBFAC | Performed by: AUDIOLOGIST

## 2021-08-17 PROCEDURE — 73610 X-RAY EXAM OF ANKLE: CPT | Mod: TC,FY,LT,NTX

## 2021-08-17 PROCEDURE — 73610 XR ANKLE COMPLETE 3 VIEW LEFT: ICD-10-PCS | Mod: 26,LT,NTX, | Performed by: RADIOLOGY

## 2021-08-17 PROCEDURE — 99213 OFFICE O/P EST LOW 20 MIN: CPT | Mod: PBBFAC,PN,25 | Performed by: ORTHOPAEDIC SURGERY

## 2021-08-17 PROCEDURE — 99214 OFFICE O/P EST MOD 30 MIN: CPT | Mod: S$PBB,,, | Performed by: ORTHOPAEDIC SURGERY

## 2021-08-17 PROCEDURE — 99214 PR OFFICE/OUTPT VISIT, EST, LEVL IV, 30-39 MIN: ICD-10-PCS | Mod: S$PBB,,, | Performed by: ORTHOPAEDIC SURGERY

## 2021-08-17 PROCEDURE — 99999 PR PBB SHADOW E&M-EST. PATIENT-LVL III: ICD-10-PCS | Mod: PBBFAC,,, | Performed by: ORTHOPAEDIC SURGERY

## 2021-08-17 PROCEDURE — 73610 X-RAY EXAM OF ANKLE: CPT | Mod: 26,LT,NTX, | Performed by: RADIOLOGY

## 2021-08-17 PROCEDURE — 99999 PR PBB SHADOW E&M-EST. PATIENT-LVL III: CPT | Mod: PBBFAC,,, | Performed by: ORTHOPAEDIC SURGERY

## 2021-08-24 DIAGNOSIS — Z76.82 ORGAN TRANSPLANT CANDIDATE: Primary | ICD-10-CM

## 2021-08-31 ENCOUNTER — HOSPITAL ENCOUNTER (EMERGENCY)
Facility: HOSPITAL | Age: 56
Discharge: HOME | End: 2021-08-31
Attending: EMERGENCY MEDICINE
Payer: MEDICARE

## 2021-08-31 VITALS
WEIGHT: 170 LBS | DIASTOLIC BLOOD PRESSURE: 80 MMHG | SYSTOLIC BLOOD PRESSURE: 189 MMHG | OXYGEN SATURATION: 96 % | RESPIRATION RATE: 16 BRPM | BODY MASS INDEX: 29.02 KG/M2 | HEART RATE: 70 BPM | HEIGHT: 64 IN | TEMPERATURE: 99 F

## 2021-08-31 DIAGNOSIS — I10 HYPERTENSION: ICD-10-CM

## 2021-08-31 DIAGNOSIS — Z99.2 ESRD ON HEMODIALYSIS: Primary | ICD-10-CM

## 2021-08-31 DIAGNOSIS — N18.6 ESRD ON HEMODIALYSIS: Primary | ICD-10-CM

## 2021-08-31 LAB
BUN SERPL-MCNC: 43 MG/DL (ref 6–30)
CHLORIDE SERPL-SCNC: 100 MMOL/L (ref 95–110)
CREAT SERPL-MCNC: 11.1 MG/DL (ref 0.5–1.4)
GLUCOSE SERPL-MCNC: 96 MG/DL (ref 70–110)
HCT VFR BLD CALC: 30 %PCV (ref 36–54)
POC IONIZED CALCIUM: 1.21 MMOL/L (ref 1.06–1.42)
POC TCO2 (MEASURED): 29 MMOL/L (ref 23–29)
POTASSIUM BLD-SCNC: 4 MMOL/L (ref 3.5–5.1)
SAMPLE: ABNORMAL
SODIUM BLD-SCNC: 141 MMOL/L (ref 136–145)

## 2021-08-31 PROCEDURE — 93010 EKG 12-LEAD: ICD-10-PCS | Mod: NTX,,, | Performed by: INTERNAL MEDICINE

## 2021-08-31 PROCEDURE — 99284 EMERGENCY DEPT VISIT MOD MDM: CPT | Mod: NTX,,, | Performed by: EMERGENCY MEDICINE

## 2021-08-31 PROCEDURE — 93010 ELECTROCARDIOGRAM REPORT: CPT | Mod: NTX,,, | Performed by: INTERNAL MEDICINE

## 2021-08-31 PROCEDURE — 99283 EMERGENCY DEPT VISIT LOW MDM: CPT | Mod: 25,NTX

## 2021-08-31 PROCEDURE — 80047 BASIC METABLC PNL IONIZED CA: CPT | Mod: NTX

## 2021-08-31 PROCEDURE — 99284 PR EMERGENCY DEPT VISIT,LEVEL IV: ICD-10-PCS | Mod: NTX,,, | Performed by: EMERGENCY MEDICINE

## 2021-08-31 PROCEDURE — 93005 ELECTROCARDIOGRAM TRACING: CPT | Mod: NTX

## 2021-09-01 ENCOUNTER — HOSPITAL ENCOUNTER (EMERGENCY)
Facility: HOSPITAL | Age: 56
Discharge: HOME | End: 2021-09-01
Attending: EMERGENCY MEDICINE
Payer: MEDICARE

## 2021-09-01 VITALS
RESPIRATION RATE: 18 BRPM | OXYGEN SATURATION: 96 % | DIASTOLIC BLOOD PRESSURE: 98 MMHG | SYSTOLIC BLOOD PRESSURE: 149 MMHG | TEMPERATURE: 98 F | HEART RATE: 72 BPM

## 2021-09-01 DIAGNOSIS — Z99.2 DIALYSIS PATIENT: ICD-10-CM

## 2021-09-01 LAB
BUN SERPL-MCNC: 55 MG/DL (ref 6–30)
CHLORIDE SERPL-SCNC: 100 MMOL/L (ref 95–110)
CREAT SERPL-MCNC: 12.9 MG/DL (ref 0.5–1.4)
GLUCOSE SERPL-MCNC: 82 MG/DL (ref 70–110)
HCT VFR BLD CALC: 27 %PCV (ref 36–54)
POC IONIZED CALCIUM: 1.2 MMOL/L (ref 1.06–1.42)
POC TCO2 (MEASURED): 27 MMOL/L (ref 23–29)
POTASSIUM BLD-SCNC: 4.4 MMOL/L (ref 3.5–5.1)
SAMPLE: ABNORMAL
SODIUM BLD-SCNC: 141 MMOL/L (ref 136–145)

## 2021-09-01 PROCEDURE — 93005 ELECTROCARDIOGRAM TRACING: CPT | Mod: NTX

## 2021-09-01 PROCEDURE — 93010 ELECTROCARDIOGRAM REPORT: CPT | Mod: NTX,,, | Performed by: INTERNAL MEDICINE

## 2021-09-01 PROCEDURE — 99284 PR EMERGENCY DEPT VISIT,LEVEL IV: ICD-10-PCS | Mod: NTX,,, | Performed by: EMERGENCY MEDICINE

## 2021-09-01 PROCEDURE — 99283 EMERGENCY DEPT VISIT LOW MDM: CPT | Mod: NTX

## 2021-09-01 PROCEDURE — 99284 EMERGENCY DEPT VISIT MOD MDM: CPT | Mod: NTX,,, | Performed by: EMERGENCY MEDICINE

## 2021-09-01 PROCEDURE — 93010 EKG 12-LEAD: ICD-10-PCS | Mod: NTX,,, | Performed by: INTERNAL MEDICINE

## 2021-09-04 ENCOUNTER — PATIENT OUTREACH (OUTPATIENT)
Dept: EMERGENCY MEDICINE | Facility: HOSPITAL | Age: 56
End: 2021-09-04
Payer: MEDICARE

## 2021-09-13 ENCOUNTER — TELEPHONE (OUTPATIENT)
Dept: TRANSPLANT | Facility: CLINIC | Age: 56
End: 2021-09-13

## 2021-09-16 ENCOUNTER — PATIENT OUTREACH (OUTPATIENT)
Dept: EMERGENCY MEDICINE | Facility: HOSPITAL | Age: 56
End: 2021-09-16

## 2021-09-21 ENCOUNTER — CLINICAL SUPPORT (OUTPATIENT)
Dept: AUDIOLOGY | Facility: CLINIC | Age: 56
End: 2021-09-21
Payer: MEDICARE

## 2021-09-21 ENCOUNTER — HOSPITAL ENCOUNTER (OUTPATIENT)
Dept: CARDIOLOGY | Facility: HOSPITAL | Age: 56
Discharge: HOME | End: 2021-09-21
Attending: NURSE PRACTITIONER
Payer: MEDICARE

## 2021-09-21 VITALS
WEIGHT: 170 LBS | BODY MASS INDEX: 29.02 KG/M2 | HEART RATE: 76 BPM | SYSTOLIC BLOOD PRESSURE: 188 MMHG | HEIGHT: 64 IN | DIASTOLIC BLOOD PRESSURE: 78 MMHG

## 2021-09-21 DIAGNOSIS — Z76.82 ORGAN TRANSPLANT CANDIDATE: ICD-10-CM

## 2021-09-21 DIAGNOSIS — H91.23 SUDDEN HEARING LOSS OF BOTH EARS: ICD-10-CM

## 2021-09-21 DIAGNOSIS — H90.3 SENSORINEURAL HEARING LOSS, BILATERAL: Primary | ICD-10-CM

## 2021-09-21 LAB
ASCENDING AORTA: 2.91 CM
AV INDEX (PROSTH): 0.78
AV MEAN GRADIENT: 4 MMHG
AV PEAK GRADIENT: 8 MMHG
AV VALVE AREA: 3 CM2
AV VELOCITY RATIO: 0.73
BSA FOR ECHO PROCEDURE: 1.87 M2
CV ECHO LV RWT: 0.45 CM
DOP CALC AO PEAK VEL: 1.39 M/S
DOP CALC AO VTI: 28.18 CM
DOP CALC LVOT AREA: 3.8 CM2
DOP CALC LVOT DIAMETER: 2.21 CM
DOP CALC LVOT PEAK VEL: 1.01 M/S
DOP CALC LVOT STROKE VOLUME: 84.43 CM3
DOP CALCLVOT PEAK VEL VTI: 22.02 CM
E WAVE DECELERATION TIME: 156.18 MSEC
E/A RATIO: 1.99
E/E' RATIO: 18.8 M/S
ECHO LV POSTERIOR WALL: 1.1 CM (ref 0.6–1.1)
EJECTION FRACTION: 60 %
FRACTIONAL SHORTENING: 43 % (ref 28–44)
INTERVENTRICULAR SEPTUM: 1.2 CM (ref 0.6–1.1)
IVRT: 57.09 MSEC
LA MAJOR: 5.86 CM
LA MINOR: 5.44 CM
LA WIDTH: 4.73 CM
LEFT ATRIUM SIZE: 4.41 CM
LEFT ATRIUM VOLUME INDEX: 54.7 ML/M2
LEFT ATRIUM VOLUME: 100.04 CM3
LEFT INTERNAL DIMENSION IN SYSTOLE: 2.81 CM (ref 2.1–4)
LEFT VENTRICLE DIASTOLIC VOLUME INDEX: 62.78 ML/M2
LEFT VENTRICLE DIASTOLIC VOLUME: 114.88 ML
LEFT VENTRICLE MASS INDEX: 118 G/M2
LEFT VENTRICLE SYSTOLIC VOLUME INDEX: 16.3 ML/M2
LEFT VENTRICLE SYSTOLIC VOLUME: 29.88 ML
LEFT VENTRICULAR INTERNAL DIMENSION IN DIASTOLE: 4.94 CM (ref 3.5–6)
LEFT VENTRICULAR MASS: 216.05 G
LV LATERAL E/E' RATIO: 15.67 M/S
LV SEPTAL E/E' RATIO: 23.5 M/S
MV A" WAVE DURATION": 7.14 MSEC
MV PEAK A VEL: 0.71 M/S
MV PEAK E VEL: 1.41 M/S
MV STENOSIS PRESSURE HALF TIME: 45.29 MS
MV VALVE AREA P 1/2 METHOD: 4.86 CM2
PISA MRMAX VEL: 0.06 M/S
PISA TR MAX VEL: 3.51 M/S
PULM VEIN S/D RATIO: 0.46
PV PEAK D VEL: 0.97 M/S
PV PEAK S VEL: 0.45 M/S
RA MAJOR: 5.05 CM
RA PRESSURE: 8 MMHG
RA WIDTH: 3.64 CM
RIGHT ATRIAL AREA: 15 CM2
RIGHT VENTRICULAR END-DIASTOLIC DIMENSION: 3.2 CM
RV TISSUE DOPPLER FREE WALL SYSTOLIC VELOCITY 1 (APICAL 4 CHAMBER VIEW): 14.97 CM/S
SINUS: 3.5 CM
STJ: 2.66 CM
TDI LATERAL: 0.09 M/S
TDI SEPTAL: 0.06 M/S
TDI: 0.08 M/S
TR MAX PG: 49 MMHG
TRICUSPID ANNULAR PLANE SYSTOLIC EXCURSION: 2.53 CM
TV REST PULMONARY ARTERY PRESSURE: 57 MMHG

## 2021-09-21 PROCEDURE — 92604 REPROGRAM COCHLEAR IMPLT 7/>: CPT | Mod: PBBFAC | Performed by: AUDIOLOGIST

## 2021-09-21 PROCEDURE — 93306 ECHO (CUPID ONLY): ICD-10-PCS | Mod: 26,TXP,, | Performed by: INTERNAL MEDICINE

## 2021-09-21 PROCEDURE — 93306 TTE W/DOPPLER COMPLETE: CPT | Mod: 26,TXP,, | Performed by: INTERNAL MEDICINE

## 2021-09-21 PROCEDURE — 93306 TTE W/DOPPLER COMPLETE: CPT | Mod: TXP

## 2021-10-08 ENCOUNTER — HOSPITAL ENCOUNTER (OUTPATIENT)
Facility: HOSPITAL | Age: 56
Discharge: HOME | End: 2021-10-08
Attending: EMERGENCY MEDICINE | Admitting: INTERNAL MEDICINE
Payer: MEDICARE

## 2021-10-08 ENCOUNTER — TELEPHONE (OUTPATIENT)
Dept: TRANSPLANT | Facility: CLINIC | Age: 56
End: 2021-10-08

## 2021-10-08 VITALS
RESPIRATION RATE: 20 BRPM | DIASTOLIC BLOOD PRESSURE: 75 MMHG | OXYGEN SATURATION: 96 % | SYSTOLIC BLOOD PRESSURE: 173 MMHG | HEART RATE: 69 BPM | TEMPERATURE: 98 F

## 2021-10-08 DIAGNOSIS — N18.9 CHRONIC KIDNEY DISEASE-MINERAL AND BONE DISORDER: ICD-10-CM

## 2021-10-08 DIAGNOSIS — M89.9 CHRONIC KIDNEY DISEASE-MINERAL AND BONE DISORDER: ICD-10-CM

## 2021-10-08 DIAGNOSIS — R06.02 SHORTNESS OF BREATH: ICD-10-CM

## 2021-10-08 DIAGNOSIS — R06.02 SOB (SHORTNESS OF BREATH): ICD-10-CM

## 2021-10-08 DIAGNOSIS — R79.89 ELEVATED BRAIN NATRIURETIC PEPTIDE (BNP) LEVEL: ICD-10-CM

## 2021-10-08 DIAGNOSIS — N18.6 ESRD (END STAGE RENAL DISEASE) ON DIALYSIS: ICD-10-CM

## 2021-10-08 DIAGNOSIS — R07.9 CHEST PAIN: ICD-10-CM

## 2021-10-08 DIAGNOSIS — I50.33 ACUTE ON CHRONIC DIASTOLIC HEART FAILURE: Primary | ICD-10-CM

## 2021-10-08 DIAGNOSIS — D63.1 ANEMIA IN ESRD (END-STAGE RENAL DISEASE): ICD-10-CM

## 2021-10-08 DIAGNOSIS — E83.9 CHRONIC KIDNEY DISEASE-MINERAL AND BONE DISORDER: ICD-10-CM

## 2021-10-08 DIAGNOSIS — I15.0 RENOVASCULAR HYPERTENSION: ICD-10-CM

## 2021-10-08 DIAGNOSIS — Z99.2 ESRD (END STAGE RENAL DISEASE) ON DIALYSIS: ICD-10-CM

## 2021-10-08 DIAGNOSIS — N18.6 ANEMIA IN ESRD (END-STAGE RENAL DISEASE): ICD-10-CM

## 2021-10-08 LAB
ALBUMIN SERPL BCP-MCNC: 3.4 G/DL (ref 3.5–5.2)
ALP SERPL-CCNC: 77 U/L (ref 55–135)
ALT SERPL W/O P-5'-P-CCNC: 10 U/L (ref 10–44)
ANION GAP SERPL CALC-SCNC: 14 MMOL/L (ref 8–16)
AST SERPL-CCNC: 23 U/L (ref 10–40)
BASOPHILS # BLD AUTO: 0.05 K/UL (ref 0–0.2)
BASOPHILS NFR BLD: 1.2 % (ref 0–1.9)
BILIRUB SERPL-MCNC: 1.3 MG/DL (ref 0.1–1)
BNP SERPL-MCNC: 3310 PG/ML (ref 0–99)
BUN SERPL-MCNC: 23 MG/DL (ref 6–20)
BUN SERPL-MCNC: 26 MG/DL (ref 6–30)
CALCIUM SERPL-MCNC: 10.6 MG/DL (ref 8.7–10.5)
CHLORIDE SERPL-SCNC: 98 MMOL/L (ref 95–110)
CHLORIDE SERPL-SCNC: 99 MMOL/L (ref 95–110)
CO2 SERPL-SCNC: 27 MMOL/L (ref 23–29)
CREAT SERPL-MCNC: 7.4 MG/DL (ref 0.5–1.4)
CREAT SERPL-MCNC: 8.1 MG/DL (ref 0.5–1.4)
CTP QC/QA: YES
DIFFERENTIAL METHOD: ABNORMAL
EOSINOPHIL # BLD AUTO: 0.4 K/UL (ref 0–0.5)
EOSINOPHIL NFR BLD: 10 % (ref 0–8)
ERYTHROCYTE [DISTWIDTH] IN BLOOD BY AUTOMATED COUNT: 21.3 % (ref 11.5–14.5)
EST. GFR  (AFRICAN AMERICAN): 6.4 ML/MIN/1.73 M^2
EST. GFR  (NON AFRICAN AMERICAN): 5.5 ML/MIN/1.73 M^2
ESTIMATED AVG GLUCOSE: 77 MG/DL (ref 68–131)
GLUCOSE SERPL-MCNC: 103 MG/DL (ref 70–110)
GLUCOSE SERPL-MCNC: 109 MG/DL (ref 70–110)
HBA1C MFR BLD: 4.3 % (ref 4–5.6)
HCT VFR BLD AUTO: 26.4 % (ref 37–48.5)
HCT VFR BLD CALC: 26 %PCV (ref 36–54)
HGB BLD-MCNC: 8.2 G/DL (ref 12–16)
IMM GRANULOCYTES # BLD AUTO: 0.01 K/UL (ref 0–0.04)
IMM GRANULOCYTES NFR BLD AUTO: 0.2 % (ref 0–0.5)
LYMPHOCYTES # BLD AUTO: 0.9 K/UL (ref 1–4.8)
LYMPHOCYTES NFR BLD: 20.4 % (ref 18–48)
MCH RBC QN AUTO: 25.3 PG (ref 27–31)
MCHC RBC AUTO-ENTMCNC: 31.1 G/DL (ref 32–36)
MCV RBC AUTO: 82 FL (ref 82–98)
MONOCYTES # BLD AUTO: 0.5 K/UL (ref 0.3–1)
MONOCYTES NFR BLD: 11.6 % (ref 4–15)
NEUTROPHILS # BLD AUTO: 2.4 K/UL (ref 1.8–7.7)
NEUTROPHILS NFR BLD: 56.6 % (ref 38–73)
NRBC BLD-RTO: 0 /100 WBC
PLATELET # BLD AUTO: 140 K/UL (ref 150–450)
PMV BLD AUTO: 10.1 FL (ref 9.2–12.9)
POC IONIZED CALCIUM: 1.14 MMOL/L (ref 1.06–1.42)
POC TCO2 (MEASURED): 34 MMOL/L (ref 23–29)
POCT GLUCOSE: 95 MG/DL (ref 70–110)
POCT GLUCOSE: 99 MG/DL (ref 70–110)
POTASSIUM BLD-SCNC: 3.8 MMOL/L (ref 3.5–5.1)
POTASSIUM SERPL-SCNC: 4.2 MMOL/L (ref 3.5–5.1)
PROT SERPL-MCNC: 7.5 G/DL (ref 6–8.4)
RBC # BLD AUTO: 3.24 M/UL (ref 4–5.4)
SAMPLE: ABNORMAL
SARS-COV-2 RDRP RESP QL NAA+PROBE: NEGATIVE
SODIUM BLD-SCNC: 139 MMOL/L (ref 136–145)
SODIUM SERPL-SCNC: 139 MMOL/L (ref 136–145)
TROPONIN I SERPL DL<=0.01 NG/ML-MCNC: 0.01 NG/ML (ref 0–0.03)
WBC # BLD AUTO: 4.22 K/UL (ref 3.9–12.7)

## 2021-10-08 PROCEDURE — 99234 PR OBSERV/HOSP SAME DATE,LEVL III: ICD-10-PCS | Mod: NTX,,, | Performed by: PHYSICIAN ASSISTANT

## 2021-10-08 PROCEDURE — 63600175 PHARM REV CODE 636 W HCPCS: Mod: NTX | Performed by: STUDENT IN AN ORGANIZED HEALTH CARE EDUCATION/TRAINING PROGRAM

## 2021-10-08 PROCEDURE — 25000003 PHARM REV CODE 250: Mod: NTX | Performed by: PHYSICIAN ASSISTANT

## 2021-10-08 PROCEDURE — 25000003 PHARM REV CODE 250: Mod: NTX | Performed by: NURSE PRACTITIONER

## 2021-10-08 PROCEDURE — 93010 ELECTROCARDIOGRAM REPORT: CPT | Mod: NTX,,, | Performed by: INTERNAL MEDICINE

## 2021-10-08 PROCEDURE — 99215 OFFICE O/P EST HI 40 MIN: CPT | Mod: 25,NTX,, | Performed by: INTERNAL MEDICINE

## 2021-10-08 PROCEDURE — 99234 HOSP IP/OBS SM DT SF/LOW 45: CPT | Mod: NTX,,, | Performed by: PHYSICIAN ASSISTANT

## 2021-10-08 PROCEDURE — G0378 HOSPITAL OBSERVATION PER HR: HCPCS | Mod: NTX

## 2021-10-08 PROCEDURE — 93005 ELECTROCARDIOGRAM TRACING: CPT | Mod: NTX

## 2021-10-08 PROCEDURE — 90935 PR HEMODIALYSIS, ONE EVALUATION: ICD-10-PCS | Mod: NTX,,, | Performed by: NURSE PRACTITIONER

## 2021-10-08 PROCEDURE — 83880 ASSAY OF NATRIURETIC PEPTIDE: CPT | Mod: NTX | Performed by: STUDENT IN AN ORGANIZED HEALTH CARE EDUCATION/TRAINING PROGRAM

## 2021-10-08 PROCEDURE — 84484 ASSAY OF TROPONIN QUANT: CPT | Mod: NTX | Performed by: STUDENT IN AN ORGANIZED HEALTH CARE EDUCATION/TRAINING PROGRAM

## 2021-10-08 PROCEDURE — 99285 EMERGENCY DEPT VISIT HI MDM: CPT | Mod: 25,NTX

## 2021-10-08 PROCEDURE — 99285 PR EMERGENCY DEPT VISIT,LEVEL V: ICD-10-PCS | Mod: GC,NTX,CS, | Performed by: EMERGENCY MEDICINE

## 2021-10-08 PROCEDURE — 99215 PR OFFICE/OUTPT VISIT, EST, LEVL V, 40-54 MIN: ICD-10-PCS | Mod: 25,NTX,, | Performed by: INTERNAL MEDICINE

## 2021-10-08 PROCEDURE — 82962 GLUCOSE BLOOD TEST: CPT | Mod: NTX

## 2021-10-08 PROCEDURE — 82330 ASSAY OF CALCIUM: CPT

## 2021-10-08 PROCEDURE — U0002 COVID-19 LAB TEST NON-CDC: HCPCS | Mod: NTX | Performed by: STUDENT IN AN ORGANIZED HEALTH CARE EDUCATION/TRAINING PROGRAM

## 2021-10-08 PROCEDURE — 90935 HEMODIALYSIS ONE EVALUATION: CPT | Mod: NTX,,, | Performed by: NURSE PRACTITIONER

## 2021-10-08 PROCEDURE — 96374 THER/PROPH/DIAG INJ IV PUSH: CPT | Mod: NTX

## 2021-10-08 PROCEDURE — 80053 COMPREHEN METABOLIC PANEL: CPT | Mod: NTX | Performed by: STUDENT IN AN ORGANIZED HEALTH CARE EDUCATION/TRAINING PROGRAM

## 2021-10-08 PROCEDURE — 99285 EMERGENCY DEPT VISIT HI MDM: CPT | Mod: GC,NTX,CS, | Performed by: EMERGENCY MEDICINE

## 2021-10-08 PROCEDURE — 80047 BASIC METABLC PNL IONIZED CA: CPT | Mod: NTX

## 2021-10-08 PROCEDURE — 93010 EKG 12-LEAD: ICD-10-PCS | Mod: NTX,,, | Performed by: INTERNAL MEDICINE

## 2021-10-08 PROCEDURE — 83036 HEMOGLOBIN GLYCOSYLATED A1C: CPT | Mod: NTX | Performed by: STUDENT IN AN ORGANIZED HEALTH CARE EDUCATION/TRAINING PROGRAM

## 2021-10-08 PROCEDURE — G0257 UNSCHED DIALYSIS ESRD PT HOS: HCPCS | Mod: NTX

## 2021-10-08 PROCEDURE — 85025 COMPLETE CBC W/AUTO DIFF WBC: CPT | Mod: NTX | Performed by: STUDENT IN AN ORGANIZED HEALTH CARE EDUCATION/TRAINING PROGRAM

## 2021-10-08 RX ORDER — SODIUM CHLORIDE 9 MG/ML
INJECTION, SOLUTION INTRAVENOUS ONCE
Status: COMPLETED | OUTPATIENT
Start: 2021-10-08 | End: 2021-10-08

## 2021-10-08 RX ORDER — AMLODIPINE BESYLATE 10 MG/1
10 TABLET ORAL NIGHTLY
Status: DISCONTINUED | OUTPATIENT
Start: 2021-10-08 | End: 2021-10-08 | Stop reason: HOSPADM

## 2021-10-08 RX ORDER — IBUPROFEN 200 MG
16 TABLET ORAL
Status: DISCONTINUED | OUTPATIENT
Start: 2021-10-08 | End: 2021-10-08 | Stop reason: HOSPADM

## 2021-10-08 RX ORDER — SODIUM CHLORIDE 0.9 % (FLUSH) 0.9 %
10 SYRINGE (ML) INJECTION EVERY 8 HOURS PRN
Status: DISCONTINUED | OUTPATIENT
Start: 2021-10-08 | End: 2021-10-08 | Stop reason: HOSPADM

## 2021-10-08 RX ORDER — HYDRALAZINE HYDROCHLORIDE 25 MG/1
25 TABLET, FILM COATED ORAL
Status: DISCONTINUED | OUTPATIENT
Start: 2021-10-08 | End: 2021-10-08

## 2021-10-08 RX ORDER — HYDRALAZINE HYDROCHLORIDE 20 MG/ML
10 INJECTION INTRAMUSCULAR; INTRAVENOUS
Status: COMPLETED | OUTPATIENT
Start: 2021-10-08 | End: 2021-10-08

## 2021-10-08 RX ORDER — MAG HYDROX/ALUMINUM HYD/SIMETH 200-200-20
30 SUSPENSION, ORAL (FINAL DOSE FORM) ORAL 4 TIMES DAILY PRN
Status: DISCONTINUED | OUTPATIENT
Start: 2021-10-08 | End: 2021-10-08 | Stop reason: HOSPADM

## 2021-10-08 RX ORDER — ASPIRIN 81 MG/1
81 TABLET ORAL DAILY
Status: DISCONTINUED | OUTPATIENT
Start: 2021-10-09 | End: 2021-10-08 | Stop reason: HOSPADM

## 2021-10-08 RX ORDER — PREGABALIN 75 MG/1
75 CAPSULE ORAL DAILY
Status: DISCONTINUED | OUTPATIENT
Start: 2021-10-09 | End: 2021-10-08 | Stop reason: HOSPADM

## 2021-10-08 RX ORDER — ONDANSETRON 8 MG/1
8 TABLET, ORALLY DISINTEGRATING ORAL EVERY 8 HOURS PRN
Status: DISCONTINUED | OUTPATIENT
Start: 2021-10-08 | End: 2021-10-08 | Stop reason: HOSPADM

## 2021-10-08 RX ORDER — SEVELAMER CARBONATE 800 MG/1
2400 TABLET, FILM COATED ORAL
Status: DISCONTINUED | OUTPATIENT
Start: 2021-10-08 | End: 2021-10-08 | Stop reason: HOSPADM

## 2021-10-08 RX ORDER — GLUCAGON 1 MG
1 KIT INJECTION
Status: DISCONTINUED | OUTPATIENT
Start: 2021-10-08 | End: 2021-10-08 | Stop reason: HOSPADM

## 2021-10-08 RX ORDER — POLYETHYLENE GLYCOL 3350 17 G/17G
17 POWDER, FOR SOLUTION ORAL DAILY PRN
Status: DISCONTINUED | OUTPATIENT
Start: 2021-10-08 | End: 2021-10-08 | Stop reason: HOSPADM

## 2021-10-08 RX ORDER — FUROSEMIDE 80 MG/1
80 TABLET ORAL DAILY
Status: DISCONTINUED | OUTPATIENT
Start: 2021-10-09 | End: 2021-10-08 | Stop reason: HOSPADM

## 2021-10-08 RX ORDER — IPRATROPIUM BROMIDE AND ALBUTEROL SULFATE 2.5; .5 MG/3ML; MG/3ML
3 SOLUTION RESPIRATORY (INHALATION) EVERY 4 HOURS PRN
Status: DISCONTINUED | OUTPATIENT
Start: 2021-10-08 | End: 2021-10-08 | Stop reason: HOSPADM

## 2021-10-08 RX ORDER — TALC
6 POWDER (GRAM) TOPICAL NIGHTLY PRN
Status: DISCONTINUED | OUTPATIENT
Start: 2021-10-08 | End: 2021-10-08 | Stop reason: HOSPADM

## 2021-10-08 RX ORDER — SIMETHICONE 80 MG
1 TABLET,CHEWABLE ORAL 4 TIMES DAILY PRN
Status: DISCONTINUED | OUTPATIENT
Start: 2021-10-08 | End: 2021-10-08 | Stop reason: HOSPADM

## 2021-10-08 RX ORDER — HYDRALAZINE HYDROCHLORIDE 50 MG/1
50 TABLET, FILM COATED ORAL EVERY 8 HOURS
Status: DISCONTINUED | OUTPATIENT
Start: 2021-10-08 | End: 2021-10-08 | Stop reason: HOSPADM

## 2021-10-08 RX ORDER — PROCHLORPERAZINE EDISYLATE 5 MG/ML
5 INJECTION INTRAMUSCULAR; INTRAVENOUS EVERY 6 HOURS PRN
Status: DISCONTINUED | OUTPATIENT
Start: 2021-10-08 | End: 2021-10-08 | Stop reason: HOSPADM

## 2021-10-08 RX ORDER — IBUPROFEN 200 MG
24 TABLET ORAL
Status: DISCONTINUED | OUTPATIENT
Start: 2021-10-08 | End: 2021-10-08 | Stop reason: HOSPADM

## 2021-10-08 RX ORDER — INSULIN ASPART 100 [IU]/ML
0-5 INJECTION, SOLUTION INTRAVENOUS; SUBCUTANEOUS
Status: DISCONTINUED | OUTPATIENT
Start: 2021-10-08 | End: 2021-10-08 | Stop reason: HOSPADM

## 2021-10-08 RX ORDER — LOSARTAN POTASSIUM 50 MG/1
100 TABLET ORAL DAILY
Status: DISCONTINUED | OUTPATIENT
Start: 2021-10-09 | End: 2021-10-08 | Stop reason: HOSPADM

## 2021-10-08 RX ORDER — ACETAMINOPHEN 325 MG/1
650 TABLET ORAL EVERY 4 HOURS PRN
Status: DISCONTINUED | OUTPATIENT
Start: 2021-10-08 | End: 2021-10-08 | Stop reason: HOSPADM

## 2021-10-08 RX ADMIN — SODIUM CHLORIDE: 0.9 INJECTION, SOLUTION INTRAVENOUS at 03:10

## 2021-10-08 RX ADMIN — HYDRALAZINE HYDROCHLORIDE 10 MG: 20 INJECTION, SOLUTION INTRAMUSCULAR; INTRAVENOUS at 09:10

## 2021-10-08 RX ADMIN — HYDRALAZINE HYDROCHLORIDE 50 MG: 25 TABLET, FILM COATED ORAL at 03:10

## 2021-10-19 ENCOUNTER — CLINICAL SUPPORT (OUTPATIENT)
Dept: AUDIOLOGY | Facility: CLINIC | Age: 56
End: 2021-10-19
Payer: MEDICARE

## 2021-10-19 DIAGNOSIS — H91.23 SUDDEN HEARING LOSS OF BOTH EARS: ICD-10-CM

## 2021-10-19 DIAGNOSIS — H90.3 SENSORINEURAL HEARING LOSS, BILATERAL: Primary | ICD-10-CM

## 2021-10-19 DIAGNOSIS — H93.13 TINNITUS OF BOTH EARS: ICD-10-CM

## 2021-10-19 PROCEDURE — 92604 REPROGRAM COCHLEAR IMPLT 7/>: CPT | Mod: PBBFAC | Performed by: AUDIOLOGIST

## 2021-11-11 ENCOUNTER — TELEPHONE (OUTPATIENT)
Dept: AUDIOLOGY | Facility: CLINIC | Age: 56
End: 2021-11-11
Payer: MEDICARE

## 2021-11-15 ENCOUNTER — TELEPHONE (OUTPATIENT)
Dept: AUDIOLOGY | Facility: CLINIC | Age: 56
End: 2021-11-15
Payer: MEDICARE

## 2021-11-16 ENCOUNTER — CLINICAL SUPPORT (OUTPATIENT)
Dept: AUDIOLOGY | Facility: CLINIC | Age: 56
End: 2021-11-16
Payer: MEDICARE

## 2021-11-16 DIAGNOSIS — H93.13 TINNITUS OF BOTH EARS: ICD-10-CM

## 2021-11-16 DIAGNOSIS — H90.3 SENSORINEURAL HEARING LOSS, BILATERAL: Primary | ICD-10-CM

## 2021-11-16 PROCEDURE — 92604 REPROGRAM COCHLEAR IMPLT 7/>: CPT | Mod: PBBFAC | Performed by: AUDIOLOGIST

## 2021-11-23 NOTE — ASSESSMENT & PLAN NOTE
54 yo female with ESRD on HD with right renal mass s/p right robotic nephrectomy on 10/8. Doing well.    - Diet: regular  - Pain controlled  - Nephrology consulted: will need dialysis prior to discharge  - Encourage ambulation  - SCDs    Dispo: Plan for home today after dialysis   I put the patient on a week of antibiotic.

## 2021-12-07 ENCOUNTER — CLINICAL SUPPORT (OUTPATIENT)
Dept: AUDIOLOGY | Facility: CLINIC | Age: 56
End: 2021-12-07
Payer: MEDICARE

## 2021-12-07 DIAGNOSIS — H90.3 SENSORINEURAL HEARING LOSS (SNHL) OF BOTH EARS: ICD-10-CM

## 2021-12-07 PROCEDURE — 92604 REPROGRAM COCHLEAR IMPLT 7/>: CPT | Mod: PBBFAC | Performed by: AUDIOLOGIST

## 2021-12-13 ENCOUNTER — OFFICE VISIT (OUTPATIENT)
Dept: ENDOCRINOLOGY | Facility: CLINIC | Age: 56
End: 2021-12-13
Payer: MEDICARE

## 2021-12-13 ENCOUNTER — HOSPITAL ENCOUNTER (OUTPATIENT)
Dept: RADIOLOGY | Facility: HOSPITAL | Age: 56
Discharge: HOME | End: 2021-12-13
Attending: NURSE PRACTITIONER
Payer: MEDICARE

## 2021-12-13 ENCOUNTER — HOSPITAL ENCOUNTER (OUTPATIENT)
Dept: ENDOCRINOLOGY | Facility: CLINIC | Age: 56
Discharge: HOME | End: 2021-12-13
Attending: INTERNAL MEDICINE
Payer: MEDICARE

## 2021-12-13 VITALS
DIASTOLIC BLOOD PRESSURE: 80 MMHG | SYSTOLIC BLOOD PRESSURE: 144 MMHG | WEIGHT: 170.19 LBS | HEIGHT: 64 IN | HEART RATE: 58 BPM | BODY MASS INDEX: 29.06 KG/M2

## 2021-12-13 DIAGNOSIS — Z76.82 PATIENT ON WAITING LIST FOR KIDNEY TRANSPLANT: ICD-10-CM

## 2021-12-13 DIAGNOSIS — E04.1 THYROID NODULE: ICD-10-CM

## 2021-12-13 DIAGNOSIS — E04.1 THYROID NODULE: Primary | ICD-10-CM

## 2021-12-13 PROCEDURE — 88173 PR  INTERPRETATION OF FNA SMEAR: ICD-10-PCS | Mod: 26,TXP,, | Performed by: PATHOLOGY

## 2021-12-13 PROCEDURE — 88173 CYTOPATH EVAL FNA REPORT: CPT | Mod: 26,TXP,, | Performed by: PATHOLOGY

## 2021-12-13 PROCEDURE — 76536 US EXAM OF HEAD AND NECK: CPT | Mod: 26,TXP,, | Performed by: RADIOLOGY

## 2021-12-13 PROCEDURE — 99204 PR OFFICE/OUTPT VISIT, NEW, LEVL IV, 45-59 MIN: ICD-10-PCS | Mod: S$PBB,TXP,, | Performed by: INTERNAL MEDICINE

## 2021-12-13 PROCEDURE — 10005 FNA BX W/US GDN 1ST LES: CPT | Mod: TXP,,, | Performed by: INTERNAL MEDICINE

## 2021-12-13 PROCEDURE — 99999 PR PBB SHADOW E&M-EST. PATIENT-LVL IV: ICD-10-PCS | Mod: PBBFAC,TXP,, | Performed by: INTERNAL MEDICINE

## 2021-12-13 PROCEDURE — 88173 CYTOPATH EVAL FNA REPORT: CPT | Mod: TXP | Performed by: PATHOLOGY

## 2021-12-13 PROCEDURE — 10005 US FINE NEEDLE ASPIRATION THYROID, FIRST LESION: ICD-10-PCS | Mod: TXP,,, | Performed by: INTERNAL MEDICINE

## 2021-12-13 PROCEDURE — 76536 US EXAM OF HEAD AND NECK: CPT | Mod: TC,TXP

## 2021-12-13 PROCEDURE — 99999 PR PBB SHADOW E&M-EST. PATIENT-LVL IV: CPT | Mod: PBBFAC,TXP,, | Performed by: INTERNAL MEDICINE

## 2021-12-13 PROCEDURE — 99204 OFFICE O/P NEW MOD 45 MIN: CPT | Mod: S$PBB,TXP,, | Performed by: INTERNAL MEDICINE

## 2021-12-13 PROCEDURE — 99214 OFFICE O/P EST MOD 30 MIN: CPT | Mod: PBBFAC,TXP | Performed by: INTERNAL MEDICINE

## 2021-12-13 PROCEDURE — 76536 US SOFT TISSUE HEAD NECK THYROID: ICD-10-PCS | Mod: 26,TXP,, | Performed by: RADIOLOGY

## 2021-12-14 LAB
FINAL PATHOLOGIC DIAGNOSIS: NORMAL
Lab: NORMAL

## 2021-12-15 ENCOUNTER — TELEPHONE (OUTPATIENT)
Dept: ENDOCRINOLOGY | Facility: CLINIC | Age: 56
End: 2021-12-15
Payer: MEDICARE

## 2021-12-15 DIAGNOSIS — E04.1 THYROID NODULE: Primary | ICD-10-CM

## 2022-01-13 ENCOUNTER — TELEPHONE (OUTPATIENT)
Dept: TRANSPLANT | Facility: CLINIC | Age: 57
End: 2022-01-13
Payer: MEDICARE

## 2022-01-13 NOTE — TELEPHONE ENCOUNTER
Returned pt's call, no answer, LM.    ----- Message from Nati Bond sent at 1/12/2022  4:56 PM CST -----    ----- Message -----  From: Tyler Toro  Sent: 1/12/2022  11:09 AM CST  To: Kidney Waitlisted Coordinator    Pt calling to speak w/ Telly for an update.    [phone]

## 2022-01-20 ENCOUNTER — TELEPHONE (OUTPATIENT)
Dept: AUDIOLOGY | Facility: CLINIC | Age: 57
End: 2022-01-20
Payer: MEDICARE

## 2022-01-20 NOTE — TELEPHONE ENCOUNTER
----- Message from Jeffrey San sent at 1/20/2022 12:01 PM CST -----  Contact: @ [phone]  Patient calling to schedule a follow-up appointment with Rosey Faye tomorrow, pls call

## 2022-02-01 ENCOUNTER — CLINICAL SUPPORT (OUTPATIENT)
Dept: AUDIOLOGY | Facility: CLINIC | Age: 57
End: 2022-02-01
Payer: MEDICARE

## 2022-02-01 DIAGNOSIS — H93.13 TINNITUS OF BOTH EARS: ICD-10-CM

## 2022-02-01 DIAGNOSIS — H91.23 SUDDEN HEARING LOSS OF BOTH EARS: ICD-10-CM

## 2022-02-01 DIAGNOSIS — H90.3 SENSORINEURAL HEARING LOSS (SNHL) OF BOTH EARS: Primary | ICD-10-CM

## 2022-02-01 PROCEDURE — 92604 REPROGRAM COCHLEAR IMPLT 7/>: CPT | Mod: PBBFAC | Performed by: AUDIOLOGIST

## 2022-02-01 NOTE — PROGRESS NOTES
2/1/2022    Cochlear Implant Programming Session:    Right Ear:  Dr. Zuleta  :  Cochlear [employer]  Internal Device/Serial Number:  [id number]   Processor:  [id number]   SN of Processors: [id number] and [id number]  DOS:  6/30/21  DIS:  7/20/21  Processor Color: Black  Magnet Strength: 4-I + external magnet  Coil Length:  6cm  Battery Type:  Standard rechargeable  Warranty:  5 year - July 19, 2024     MINI REGINO issued  TV streamer issued    Jael Hall was seen for a follow up programming session with her cochlear implant sound processor.  Mrs. Hall reported hearing well in quiet situations or close range.  She still reported the processor was cutting off at times and wanted me to check the batteries.  The cable wire was evaluated and no fault was found.  There was a slightly loose bayonette connection on the battery but most likely not the cause of intermittency.  The magnet is most likely slipping off of the sit with the stocking under her wig.  The magnet was replaced to the #5I and the 4 was placed on the outside.  Replacement equipment was requested from Cochlear including exchanging the rechargeable batteries, a #5I magnet and the cable cover to fit the 5I.    Impedance testing was completed.  C and T levels were measured.  There was no significant change in stimulation.  Mrs. Hall was already using a 50uv sensitivity and high levels of stimulation.    Recommend:  1.  Follow up programming in June 2022.  2.  Cochlear implant supplies as needed.  3.  Annual evaluation.

## 2022-02-18 ENCOUNTER — TELEPHONE (OUTPATIENT)
Dept: AUDIOLOGY | Facility: CLINIC | Age: 57
End: 2022-02-18
Payer: MEDICARE

## 2022-02-18 NOTE — TELEPHONE ENCOUNTER
----- Message from Vicky Benton sent at 2/18/2022  3:24 PM CST -----  Regarding: pt called  Name of Who is Calling: TANYA VELASQUEZ [[id number]]      What is the request in detail:pt ear piece is not working properly. Please advise        Can the clinic reply by [facility]: NO      What Number to Call Back if not in [facility]: [phone]

## 2022-02-23 ENCOUNTER — TELEPHONE (OUTPATIENT)
Dept: AUDIOLOGY | Facility: CLINIC | Age: 57
End: 2022-02-23
Payer: MEDICARE

## 2022-02-23 NOTE — TELEPHONE ENCOUNTER
----- Message from Esa Mcclellan sent at 2/21/2022  4:56 PM CST -----  Patient called to speak w/ someone in regards to scheduling an appt due to her hearing aids not working proper. Requesting callback [phone] if no answer LVM w/ appt details

## 2022-03-09 ENCOUNTER — CLINICAL SUPPORT (OUTPATIENT)
Dept: AUDIOLOGY | Facility: CLINIC | Age: 57
End: 2022-03-09
Payer: MEDICARE

## 2022-03-09 DIAGNOSIS — H90.3 SENSORINEURAL HEARING LOSS (SNHL) OF BOTH EARS: Primary | ICD-10-CM

## 2022-03-09 DIAGNOSIS — H93.13 TINNITUS OF BOTH EARS: ICD-10-CM

## 2022-03-09 PROCEDURE — 92604 REPROGRAM COCHLEAR IMPLT 7/>: CPT | Mod: PBBFAC | Performed by: AUDIOLOGIST

## 2022-03-10 NOTE — PROGRESS NOTES
3/9/2022    Cochlear Implant Programming Session:  Right Ear:  Dr. Zuleta  :  Cochlear [employer]  Internal Device/Serial Number:  [id number]   Processor:  [id number]   SN of Processors: [id number] and [id number]  DOS:  6/30/21  DIS:  7/20/21  Processor Color: Black  Magnet Strength: 4-I + external magnet  Coil Length:  6cm  Battery Type:  Standard rechargeable  Warranty:  5 year - July 19, 2024     MINI REGINO issued  TV streamer issued     Jael Hall was seen for a follow up programming session with her cochlear implant sound processor.  Mrs. Hall reported static noise in the sound processor.  A listening check was completed.  The microphones had good sound quality.  Mrs. Hall was still wearing the processor under her hair cap.  Impedance testing was completed.  The processor was updated and activated.  The processor had good sound quality when it was not under her hair cap and wig.  The wig was most likely causing the static noise rubbing over the microphones.    Recommend:  1.  Follow up programming in 6 months or sooner if needed.  2.  Consider cochlear implantation for the left ear.  3.  Annual evaluation.

## 2022-03-22 ENCOUNTER — TELEPHONE (OUTPATIENT)
Dept: AUDIOLOGY | Facility: CLINIC | Age: 57
End: 2022-03-22
Payer: MEDICARE

## 2022-03-22 NOTE — TELEPHONE ENCOUNTER
----- Message from Wen Kuo sent at 3/22/2022  1:15 PM CDT -----  Regarding: appt  Contact: pt @ [phone]  Pt calling to schedule an appt with Donita Faye, please call.

## 2022-03-28 ENCOUNTER — CLINICAL SUPPORT (OUTPATIENT)
Dept: AUDIOLOGY | Facility: CLINIC | Age: 57
End: 2022-03-28
Payer: MEDICARE

## 2022-03-28 DIAGNOSIS — H91.22 SUDDEN HEARING LOSS, LEFT: ICD-10-CM

## 2022-03-28 DIAGNOSIS — H90.3 SENSORINEURAL HEARING LOSS (SNHL) OF BOTH EARS: Primary | ICD-10-CM

## 2022-03-28 DIAGNOSIS — H93.13 TINNITUS OF BOTH EARS: ICD-10-CM

## 2022-03-28 DIAGNOSIS — H91.23 SUDDEN HEARING LOSS OF BOTH EARS: ICD-10-CM

## 2022-03-28 PROCEDURE — 92604 REPROGRAM COCHLEAR IMPLT 7/>: CPT | Mod: PBBFAC | Performed by: AUDIOLOGIST

## 2022-03-29 NOTE — PROGRESS NOTES
3/28/2022    Cochlear Implant Programming Session:    Right Ear:  Dr. Zuleta  :  [employer]  Internal Device/Serial Number:  [id number]   Processor:  [id number]   SN of Processors: [id number] and [id number]  DOS:  6/30/21  DIS:  7/20/21  Processor Color: Black  Magnet Strength: 4-I + external magnet  Coil Length:  6cm  Battery Type:  Standard rechargeable  Warranty:  5 year - July 19, 2024     MINI REGINO issued  TV streamer issued     Jael Hall was seen for a follow up programming session with her cochlear implant sound processor.  Mrs. Hall reported static noise in the sound processor again.  A listening check was completed.  The microphones had good sound quality.  The processor was once again under her wig which may be affecting the sound quality of the processor.  Mrs. Hall also described times when she did not feel she was hearing well with the sound processor.  She reported problems with group conversations and not being able to understand as clearly as she desired.      An off the ear option was discussed today to determine if the static noise was a result of covering the microphones.  Mrs. Hall only has one of the # 5 cables and covers.  We will request additional cochlear implant supplies including a cable coil and cover as well as on off the ear option.    Mrs. Hall reported she had difficulty understanding TV at times.  Mrs. Hall was unsure if the TV streamer was connected properly.  Mrs. Hall will bring it for the next programming next visit to verify proper pairing.    Pure tone threshold testing revealed a severe to profound sensorineural hearing loss in the left ear.  A speech reception threshold was obtained at 70dBHL with 48% speech discrimination score.  Aided testing was completed to determine the aided benefit of the cochlear implant sound processor.  An aided speech reception threshold was obtained at 30dBHL for the implant only in her right ear.  An aided speech discrimination score was  obtained at 84% correct at 60dBSPL using W-22 words.    Recommend:  1.  Cochlear implant supplies as needed including a trial with an off the ear option to verify sound quality issues are related to covering the microphone with her wig.  2.  Follow up programming as needed.  3.  Consideration of hearing aid for the left ear.  4.  Annual evaluation.

## 2022-03-31 NOTE — TELEPHONE ENCOUNTER
----- Message from Karlos Perry MD sent at 9/1/2020  9:23 AM CDT -----  From a renal standpoint, she is fine. Her P[facility] is at [facility]. She can resume outpatient dialysis per her schedule.     ThanksMN  ----- Message -----  From: Isabel Huerta RN  Sent: 8/17/2020   8:07 AM CDT  To: Karlos Perry MD, Karlos Perry Mead    Pt is scheduled for a cystoscopy with retrograde pyelogram with Dr. Abraham on 08/20/20. (General MAC anesthesia, 502 mins). Anesthesia review in progress.     Is the pt cleared/optimized? Please advise.      Thank you,   Isabel Huerta RN   Anesthesia [facility]       
Her/She

## 2022-04-06 ENCOUNTER — DOCUMENTATION ONLY (OUTPATIENT)
Dept: AUDIOLOGY | Facility: CLINIC | Age: 57
End: 2022-04-06
Payer: MEDICARE

## 2022-04-06 NOTE — PROGRESS NOTES
Cochlear Implant Registration Form    Right Ear    [employer]och[employer]ar [employer]   Implant Model    Internal Serial Number [id number]   Date of Implantation 06/30/2021   Date of Initial Stimulation 07/20/2021

## 2022-05-01 ENCOUNTER — HOSPITAL ENCOUNTER (OUTPATIENT)
Facility: HOSPITAL | Age: 57
Discharge: HOME | End: 2022-05-01
Attending: EMERGENCY MEDICINE | Admitting: INTERNAL MEDICINE
Payer: MEDICARE

## 2022-05-01 VITALS
OXYGEN SATURATION: 99 % | BODY MASS INDEX: 29.02 KG/M2 | HEART RATE: 78 BPM | TEMPERATURE: 98 F | HEIGHT: 64 IN | DIASTOLIC BLOOD PRESSURE: 78 MMHG | RESPIRATION RATE: 16 BRPM | WEIGHT: 170 LBS | SYSTOLIC BLOOD PRESSURE: 146 MMHG

## 2022-05-01 DIAGNOSIS — R07.9 CHEST PAIN: ICD-10-CM

## 2022-05-01 DIAGNOSIS — I50.9 CONGESTIVE HEART FAILURE, UNSPECIFIED HF CHRONICITY, UNSPECIFIED HEART FAILURE TYPE: ICD-10-CM

## 2022-05-01 DIAGNOSIS — F41.9 ANXIETY: ICD-10-CM

## 2022-05-01 DIAGNOSIS — N18.6 ESRD ON HEMODIALYSIS: ICD-10-CM

## 2022-05-01 DIAGNOSIS — Z99.2 ESRD ON HEMODIALYSIS: ICD-10-CM

## 2022-05-01 DIAGNOSIS — E87.70 HYPERVOLEMIA ASSOCIATED WITH RENAL INSUFFICIENCY: Primary | ICD-10-CM

## 2022-05-01 DIAGNOSIS — N28.9 HYPERVOLEMIA ASSOCIATED WITH RENAL INSUFFICIENCY: Primary | ICD-10-CM

## 2022-05-01 DIAGNOSIS — R06.02 SOB (SHORTNESS OF BREATH): ICD-10-CM

## 2022-05-01 DIAGNOSIS — R09.02 HYPOXIA: ICD-10-CM

## 2022-05-01 LAB
ALBUMIN SERPL BCP-MCNC: 3.4 G/DL (ref 3.5–5.2)
ALP SERPL-CCNC: 83 U/L (ref 55–135)
ALT SERPL W/O P-5'-P-CCNC: 7 U/L (ref 10–44)
ANION GAP SERPL CALC-SCNC: 14 MMOL/L (ref 8–16)
AST SERPL-CCNC: 19 U/L (ref 10–40)
BACTERIA #/AREA URNS AUTO: NORMAL /HPF
BASOPHILS # BLD AUTO: 0.07 K/UL (ref 0–0.2)
BASOPHILS NFR BLD: 1.4 % (ref 0–1.9)
BILIRUB SERPL-MCNC: 1 MG/DL (ref 0.1–1)
BILIRUB UR QL STRIP: NEGATIVE
BNP SERPL-MCNC: 1483 PG/ML (ref 0–99)
BUN SERPL-MCNC: 44 MG/DL (ref 6–20)
BUN SERPL-MCNC: 48 MG/DL (ref 6–30)
CALCIUM SERPL-MCNC: 10.4 MG/DL (ref 8.7–10.5)
CHLORIDE SERPL-SCNC: 100 MMOL/L (ref 95–110)
CHLORIDE SERPL-SCNC: 97 MMOL/L (ref 95–110)
CLARITY UR REFRACT.AUTO: ABNORMAL
CO2 SERPL-SCNC: 28 MMOL/L (ref 23–29)
COLOR UR AUTO: YELLOW
CREAT SERPL-MCNC: 8.6 MG/DL (ref 0.5–1.4)
CREAT SERPL-MCNC: 8.8 MG/DL (ref 0.5–1.4)
DIFFERENTIAL METHOD: ABNORMAL
EOSINOPHIL # BLD AUTO: 0.7 K/UL (ref 0–0.5)
EOSINOPHIL NFR BLD: 14.7 % (ref 0–8)
ERYTHROCYTE [DISTWIDTH] IN BLOOD BY AUTOMATED COUNT: 17.6 % (ref 11.5–14.5)
EST. GFR  (AFRICAN AMERICAN): 5.2 ML/MIN/1.73 M^2
EST. GFR  (NON AFRICAN AMERICAN): 4.5 ML/MIN/1.73 M^2
GLUCOSE SERPL-MCNC: 94 MG/DL (ref 70–110)
GLUCOSE SERPL-MCNC: 95 MG/DL (ref 70–110)
GLUCOSE UR QL STRIP: NEGATIVE
HCT VFR BLD AUTO: 33.5 % (ref 37–48.5)
HCT VFR BLD CALC: 33 %PCV (ref 36–54)
HGB BLD-MCNC: 10.5 G/DL (ref 12–16)
HGB UR QL STRIP: NEGATIVE
HYALINE CASTS UR QL AUTO: 0 /LPF
IMM GRANULOCYTES # BLD AUTO: 0 K/UL (ref 0–0.04)
IMM GRANULOCYTES NFR BLD AUTO: 0 % (ref 0–0.5)
KETONES UR QL STRIP: NEGATIVE
LEUKOCYTE ESTERASE UR QL STRIP: NEGATIVE
LIPASE SERPL-CCNC: 38 U/L (ref 4–60)
LYMPHOCYTES # BLD AUTO: 1.3 K/UL (ref 1–4.8)
LYMPHOCYTES NFR BLD: 25.3 % (ref 18–48)
MAGNESIUM SERPL-MCNC: 2.6 MG/DL (ref 1.6–2.6)
MCH RBC QN AUTO: 26.3 PG (ref 27–31)
MCHC RBC AUTO-ENTMCNC: 31.3 G/DL (ref 32–36)
MCV RBC AUTO: 84 FL (ref 82–98)
MICROSCOPIC COMMENT: NORMAL
MONOCYTES # BLD AUTO: 0.8 K/UL (ref 0.3–1)
MONOCYTES NFR BLD: 15.2 % (ref 4–15)
NEUTROPHILS # BLD AUTO: 2.2 K/UL (ref 1.8–7.7)
NEUTROPHILS NFR BLD: 43.4 % (ref 38–73)
NITRITE UR QL STRIP: NEGATIVE
NRBC BLD-RTO: 0 /100 WBC
PH UR STRIP: >8 [PH] (ref 5–8)
PHOSPHATE SERPL-MCNC: 4.1 MG/DL (ref 2.7–4.5)
PLATELET # BLD AUTO: 159 K/UL (ref 150–450)
PMV BLD AUTO: 10.4 FL (ref 9.2–12.9)
POC IONIZED CALCIUM: 1.21 MMOL/L (ref 1.06–1.42)
POC TCO2 (MEASURED): 33 MMOL/L (ref 23–29)
POTASSIUM BLD-SCNC: 4.7 MMOL/L (ref 3.5–5.1)
POTASSIUM SERPL-SCNC: 4.8 MMOL/L (ref 3.5–5.1)
PROT SERPL-MCNC: 7.4 G/DL (ref 6–8.4)
PROT UR QL STRIP: ABNORMAL
RBC # BLD AUTO: 3.99 M/UL (ref 4–5.4)
RBC #/AREA URNS AUTO: 1 /HPF (ref 0–4)
SAMPLE: ABNORMAL
SODIUM BLD-SCNC: 138 MMOL/L (ref 136–145)
SODIUM SERPL-SCNC: 139 MMOL/L (ref 136–145)
SP GR UR STRIP: 1.01 (ref 1–1.03)
SQUAMOUS #/AREA URNS AUTO: 14 /HPF
TROPONIN I SERPL DL<=0.01 NG/ML-MCNC: 0.01 NG/ML (ref 0–0.03)
URN SPEC COLLECT METH UR: ABNORMAL
WBC # BLD AUTO: 5.05 K/UL (ref 3.9–12.7)
WBC #/AREA URNS AUTO: 1 /HPF (ref 0–5)

## 2022-05-01 PROCEDURE — 25000003 PHARM REV CODE 250: Mod: NTX | Performed by: PHYSICIAN ASSISTANT

## 2022-05-01 PROCEDURE — 86803 HEPATITIS C AB TEST: CPT | Mod: NTX | Performed by: EMERGENCY MEDICINE

## 2022-05-01 PROCEDURE — 99214 OFFICE O/P EST MOD 30 MIN: CPT | Mod: GC,NTX,, | Performed by: INTERNAL MEDICINE

## 2022-05-01 PROCEDURE — 63600175 PHARM REV CODE 636 W HCPCS: Mod: NTX | Performed by: STUDENT IN AN ORGANIZED HEALTH CARE EDUCATION/TRAINING PROGRAM

## 2022-05-01 PROCEDURE — 83690 ASSAY OF LIPASE: CPT | Mod: NTX | Performed by: STUDENT IN AN ORGANIZED HEALTH CARE EDUCATION/TRAINING PROGRAM

## 2022-05-01 PROCEDURE — 93010 EKG 12-LEAD: ICD-10-PCS | Mod: NTX,,, | Performed by: INTERNAL MEDICINE

## 2022-05-01 PROCEDURE — 83880 ASSAY OF NATRIURETIC PEPTIDE: CPT | Mod: NTX | Performed by: STUDENT IN AN ORGANIZED HEALTH CARE EDUCATION/TRAINING PROGRAM

## 2022-05-01 PROCEDURE — 93010 ELECTROCARDIOGRAM REPORT: CPT | Mod: NTX,,, | Performed by: INTERNAL MEDICINE

## 2022-05-01 PROCEDURE — 99234 PR OBSERV/HOSP SAME DATE,LEVL III: ICD-10-PCS | Mod: NTX,,, | Performed by: PHYSICIAN ASSISTANT

## 2022-05-01 PROCEDURE — G0257 UNSCHED DIALYSIS ESRD PT HOS: HCPCS | Mod: NTX

## 2022-05-01 PROCEDURE — 85025 COMPLETE CBC W/AUTO DIFF WBC: CPT | Mod: NTX | Performed by: STUDENT IN AN ORGANIZED HEALTH CARE EDUCATION/TRAINING PROGRAM

## 2022-05-01 PROCEDURE — 80053 COMPREHEN METABOLIC PANEL: CPT | Mod: NTX | Performed by: STUDENT IN AN ORGANIZED HEALTH CARE EDUCATION/TRAINING PROGRAM

## 2022-05-01 PROCEDURE — 80047 BASIC METABLC PNL IONIZED CA: CPT | Mod: NTX

## 2022-05-01 PROCEDURE — 83735 ASSAY OF MAGNESIUM: CPT | Mod: NTX | Performed by: STUDENT IN AN ORGANIZED HEALTH CARE EDUCATION/TRAINING PROGRAM

## 2022-05-01 PROCEDURE — 81001 URINALYSIS AUTO W/SCOPE: CPT | Mod: NTX | Performed by: STUDENT IN AN ORGANIZED HEALTH CARE EDUCATION/TRAINING PROGRAM

## 2022-05-01 PROCEDURE — G0378 HOSPITAL OBSERVATION PER HR: HCPCS | Mod: NTX

## 2022-05-01 PROCEDURE — 99291 CRITICAL CARE FIRST HOUR: CPT | Mod: 25,NTX

## 2022-05-01 PROCEDURE — 84100 ASSAY OF PHOSPHORUS: CPT | Mod: NTX | Performed by: STUDENT IN AN ORGANIZED HEALTH CARE EDUCATION/TRAINING PROGRAM

## 2022-05-01 PROCEDURE — 25000242 PHARM REV CODE 250 ALT 637 W/ HCPCS: Mod: NTX | Performed by: STUDENT IN AN ORGANIZED HEALTH CARE EDUCATION/TRAINING PROGRAM

## 2022-05-01 PROCEDURE — 96372 THER/PROPH/DIAG INJ SC/IM: CPT | Mod: 59,TXP | Performed by: PHYSICIAN ASSISTANT

## 2022-05-01 PROCEDURE — 87389 HIV-1 AG W/HIV-1&-2 AB AG IA: CPT | Mod: NTX | Performed by: EMERGENCY MEDICINE

## 2022-05-01 PROCEDURE — 99234 HOSP IP/OBS SM DT SF/LOW 45: CPT | Mod: NTX,,, | Performed by: PHYSICIAN ASSISTANT

## 2022-05-01 PROCEDURE — 63600175 PHARM REV CODE 636 W HCPCS: Mod: NTX | Performed by: PHYSICIAN ASSISTANT

## 2022-05-01 PROCEDURE — 96374 THER/PROPH/DIAG INJ IV PUSH: CPT | Mod: NTX

## 2022-05-01 PROCEDURE — 84484 ASSAY OF TROPONIN QUANT: CPT | Mod: NTX | Performed by: STUDENT IN AN ORGANIZED HEALTH CARE EDUCATION/TRAINING PROGRAM

## 2022-05-01 PROCEDURE — 99291 PR CRITICAL CARE, E/M 30-74 MINUTES: ICD-10-PCS | Mod: GC,NTX,, | Performed by: EMERGENCY MEDICINE

## 2022-05-01 PROCEDURE — 99214 PR OFFICE/OUTPT VISIT, EST, LEVL IV, 30-39 MIN: ICD-10-PCS | Mod: GC,NTX,, | Performed by: INTERNAL MEDICINE

## 2022-05-01 PROCEDURE — 25000003 PHARM REV CODE 250: Mod: NTX | Performed by: STUDENT IN AN ORGANIZED HEALTH CARE EDUCATION/TRAINING PROGRAM

## 2022-05-01 PROCEDURE — 99499 NO LOS: ICD-10-PCS | Mod: NTX,,, | Performed by: PHYSICIAN ASSISTANT

## 2022-05-01 PROCEDURE — 84484 ASSAY OF TROPONIN QUANT: CPT | Mod: NTX

## 2022-05-01 PROCEDURE — 93005 ELECTROCARDIOGRAM TRACING: CPT | Mod: NTX

## 2022-05-01 PROCEDURE — 99499 UNLISTED E&M SERVICE: CPT | Mod: NTX,,, | Performed by: PHYSICIAN ASSISTANT

## 2022-05-01 PROCEDURE — 99291 CRITICAL CARE FIRST HOUR: CPT | Mod: GC,NTX,, | Performed by: EMERGENCY MEDICINE

## 2022-05-01 RX ORDER — PROCHLORPERAZINE EDISYLATE 5 MG/ML
5 INJECTION INTRAMUSCULAR; INTRAVENOUS EVERY 6 HOURS PRN
Status: DISCONTINUED | OUTPATIENT
Start: 2022-05-01 | End: 2022-05-01 | Stop reason: HOSPADM

## 2022-05-01 RX ORDER — SIMETHICONE 80 MG
1 TABLET,CHEWABLE ORAL 4 TIMES DAILY PRN
Status: DISCONTINUED | OUTPATIENT
Start: 2022-05-01 | End: 2022-05-01 | Stop reason: HOSPADM

## 2022-05-01 RX ORDER — SODIUM CHLORIDE 9 MG/ML
INJECTION, SOLUTION INTRAVENOUS ONCE
Status: DISCONTINUED | OUTPATIENT
Start: 2022-05-01 | End: 2022-05-01 | Stop reason: HOSPADM

## 2022-05-01 RX ORDER — AMLODIPINE BESYLATE 10 MG/1
10 TABLET ORAL NIGHTLY
Status: DISCONTINUED | OUTPATIENT
Start: 2022-05-01 | End: 2022-05-01 | Stop reason: HOSPADM

## 2022-05-01 RX ORDER — GLUCAGON 1 MG
1 KIT INJECTION
Status: DISCONTINUED | OUTPATIENT
Start: 2022-05-01 | End: 2022-05-01 | Stop reason: HOSPADM

## 2022-05-01 RX ORDER — ACETAMINOPHEN 500 MG
1000 TABLET ORAL
Status: COMPLETED | OUTPATIENT
Start: 2022-05-01 | End: 2022-05-01

## 2022-05-01 RX ORDER — NALOXONE HCL 0.4 MG/ML
0.02 VIAL (ML) INJECTION
Status: DISCONTINUED | OUTPATIENT
Start: 2022-05-01 | End: 2022-05-01 | Stop reason: HOSPADM

## 2022-05-01 RX ORDER — LOSARTAN POTASSIUM 50 MG/1
100 TABLET ORAL DAILY
Status: DISCONTINUED | OUTPATIENT
Start: 2022-05-01 | End: 2022-05-01 | Stop reason: HOSPADM

## 2022-05-01 RX ORDER — SODIUM CHLORIDE 0.9 % (FLUSH) 0.9 %
10 SYRINGE (ML) INJECTION EVERY 8 HOURS PRN
Status: DISCONTINUED | OUTPATIENT
Start: 2022-05-01 | End: 2022-05-01 | Stop reason: HOSPADM

## 2022-05-01 RX ORDER — ACETAMINOPHEN 325 MG/1
650 TABLET ORAL EVERY 4 HOURS PRN
Status: DISCONTINUED | OUTPATIENT
Start: 2022-05-01 | End: 2022-05-01 | Stop reason: HOSPADM

## 2022-05-01 RX ORDER — LABETALOL 100 MG/1
300 TABLET, FILM COATED ORAL 3 TIMES DAILY
Status: DISCONTINUED | OUTPATIENT
Start: 2022-05-01 | End: 2022-05-01 | Stop reason: HOSPADM

## 2022-05-01 RX ORDER — INSULIN ASPART 100 [IU]/ML
0-5 INJECTION, SOLUTION INTRAVENOUS; SUBCUTANEOUS
Status: DISCONTINUED | OUTPATIENT
Start: 2022-05-01 | End: 2022-05-01 | Stop reason: HOSPADM

## 2022-05-01 RX ORDER — PREGABALIN 75 MG/1
75 CAPSULE ORAL DAILY
Status: DISCONTINUED | OUTPATIENT
Start: 2022-05-01 | End: 2022-05-01 | Stop reason: HOSPADM

## 2022-05-01 RX ORDER — IBUPROFEN 200 MG
24 TABLET ORAL
Status: DISCONTINUED | OUTPATIENT
Start: 2022-05-01 | End: 2022-05-01 | Stop reason: HOSPADM

## 2022-05-01 RX ORDER — AMLODIPINE BESYLATE 10 MG/1
10 TABLET ORAL
Status: COMPLETED | OUTPATIENT
Start: 2022-05-01 | End: 2022-05-01

## 2022-05-01 RX ORDER — POLYETHYLENE GLYCOL 3350 17 G/17G
17 POWDER, FOR SOLUTION ORAL DAILY PRN
Status: DISCONTINUED | OUTPATIENT
Start: 2022-05-01 | End: 2022-05-01 | Stop reason: HOSPADM

## 2022-05-01 RX ORDER — IBUPROFEN 200 MG
16 TABLET ORAL
Status: DISCONTINUED | OUTPATIENT
Start: 2022-05-01 | End: 2022-05-01 | Stop reason: HOSPADM

## 2022-05-01 RX ORDER — ACETAMINOPHEN 500 MG
500 TABLET ORAL EVERY 8 HOURS PRN
COMMUNITY
End: 2023-05-11 | Stop reason: SDUPTHER

## 2022-05-01 RX ORDER — HYDRALAZINE HYDROCHLORIDE 25 MG/1
50 TABLET, FILM COATED ORAL
Status: COMPLETED | OUTPATIENT
Start: 2022-05-01 | End: 2022-05-01

## 2022-05-01 RX ORDER — FUROSEMIDE 40 MG/1
80 TABLET ORAL DAILY
Status: DISCONTINUED | OUTPATIENT
Start: 2022-05-02 | End: 2022-05-01 | Stop reason: HOSPADM

## 2022-05-01 RX ORDER — HEPARIN SODIUM 5000 [USP'U]/ML
5000 INJECTION, SOLUTION INTRAVENOUS; SUBCUTANEOUS EVERY 8 HOURS
Status: DISCONTINUED | OUTPATIENT
Start: 2022-05-01 | End: 2022-05-01 | Stop reason: HOSPADM

## 2022-05-01 RX ORDER — HYDRALAZINE HYDROCHLORIDE 25 MG/1
50 TABLET, FILM COATED ORAL EVERY 8 HOURS
Status: DISCONTINUED | OUTPATIENT
Start: 2022-05-01 | End: 2022-05-01 | Stop reason: HOSPADM

## 2022-05-01 RX ORDER — MAG HYDROX/ALUMINUM HYD/SIMETH 200-200-20
30 SUSPENSION, ORAL (FINAL DOSE FORM) ORAL 4 TIMES DAILY PRN
Status: DISCONTINUED | OUTPATIENT
Start: 2022-05-01 | End: 2022-05-01 | Stop reason: HOSPADM

## 2022-05-01 RX ORDER — NITROGLYCERIN 0.4 MG/1
0.4 TABLET SUBLINGUAL
Status: COMPLETED | OUTPATIENT
Start: 2022-05-01 | End: 2022-05-01

## 2022-05-01 RX ORDER — TALC
6 POWDER (GRAM) TOPICAL NIGHTLY PRN
Status: DISCONTINUED | OUTPATIENT
Start: 2022-05-01 | End: 2022-05-01 | Stop reason: HOSPADM

## 2022-05-01 RX ORDER — FUROSEMIDE 10 MG/ML
80 INJECTION INTRAMUSCULAR; INTRAVENOUS
Status: COMPLETED | OUTPATIENT
Start: 2022-05-01 | End: 2022-05-01

## 2022-05-01 RX ORDER — SEVELAMER CARBONATE 800 MG/1
2400 TABLET, FILM COATED ORAL
Status: DISCONTINUED | OUTPATIENT
Start: 2022-05-01 | End: 2022-05-01 | Stop reason: HOSPADM

## 2022-05-01 RX ORDER — IPRATROPIUM BROMIDE AND ALBUTEROL SULFATE 2.5; .5 MG/3ML; MG/3ML
3 SOLUTION RESPIRATORY (INHALATION) EVERY 4 HOURS PRN
Status: DISCONTINUED | OUTPATIENT
Start: 2022-05-01 | End: 2022-05-01 | Stop reason: HOSPADM

## 2022-05-01 RX ORDER — ONDANSETRON 8 MG/1
8 TABLET, ORALLY DISINTEGRATING ORAL EVERY 8 HOURS PRN
Status: DISCONTINUED | OUTPATIENT
Start: 2022-05-01 | End: 2022-05-01 | Stop reason: HOSPADM

## 2022-05-01 RX ORDER — ASPIRIN 81 MG/1
81 TABLET ORAL DAILY
Status: DISCONTINUED | OUTPATIENT
Start: 2022-05-01 | End: 2022-05-01 | Stop reason: HOSPADM

## 2022-05-01 RX ADMIN — LABETALOL HYDROCHLORIDE 300 MG: 100 TABLET, FILM COATED ORAL at 09:05

## 2022-05-01 RX ADMIN — LOSARTAN POTASSIUM 100 MG: 50 TABLET, FILM COATED ORAL at 09:05

## 2022-05-01 RX ADMIN — ACETAMINOPHEN 1000 MG: 500 TABLET ORAL at 04:05

## 2022-05-01 RX ADMIN — SEVELAMER CARBONATE 2400 MG: 800 TABLET, FILM COATED ORAL at 09:05

## 2022-05-01 RX ADMIN — FUROSEMIDE 80 MG: 10 INJECTION, SOLUTION INTRAMUSCULAR; INTRAVENOUS at 04:05

## 2022-05-01 RX ADMIN — HEPARIN SODIUM 5000 UNITS: 5000 INJECTION INTRAVENOUS; SUBCUTANEOUS at 09:05

## 2022-05-01 RX ADMIN — NITROGLYCERIN 0.4 MG: 0.4 TABLET, ORALLY DISINTEGRATING SUBLINGUAL at 04:05

## 2022-05-01 RX ADMIN — AMLODIPINE BESYLATE 10 MG: 10 TABLET ORAL at 04:05

## 2022-05-01 RX ADMIN — HYDRALAZINE HYDROCHLORIDE 50 MG: 25 TABLET, FILM COATED ORAL at 04:05

## 2022-05-01 RX ADMIN — ASPIRIN 81 MG: 81 TABLET, COATED ORAL at 09:05

## 2022-05-01 RX ADMIN — PREGABALIN 75 MG: 75 CAPSULE ORAL at 09:05

## 2022-05-01 RX ADMIN — SEVELAMER CARBONATE 2400 MG: 800 TABLET, FILM COATED ORAL at 01:05

## 2022-05-01 NOTE — ED NOTES
Patient resting on stretcher in room. Patient on continuous cardiac, blood pressure, and pulse ox monitoring. Will continue to monitor.

## 2022-05-01 NOTE — ED NOTES
I-STAT Chem-8+ Results:   Value Reference Range   Sodium 138 136-145 mmol/L   Potassium  4.7 3.5-5.1 mmol/L   Chloride 100  mmol/L   Ionized Calcium 1.21 1.06-1.42 mmol/L   CO2 (measured) 33 23-29 mmol/L   Glucose 95  mg/dL   BUN 48 6-30 mg/dL   Creatinine 8.6 0.5-1.4 mg/dL   Hematocrit 33 36-54%         Yes/Pt's sister Elizabeth will bring copy

## 2022-05-01 NOTE — HPI
Jael Hall is a 56 y.o. female with ESRD on HD MWF, hypertension, and diabetes who presented to the ED with shortness of breath. Her symptoms started around 9:00 p.m. last night. She tried to lay down and rest to see if it would go away, but that made it worse. This morning she developed lower back pain that feels similar to the last time she had a lot of fluid building up. She denies any chest pain, cough, abdominal pain, dysuria. She still makes urine. Her last HD session was Friday and she is compliant with her lasix and diet. She does drink too many fluids and does not weight herself daily.  Nephrology consulted for ESRD and management of HD while inpatient.

## 2022-05-01 NOTE — ASSESSMENT & PLAN NOTE
- SBP>200 upon arrival, pt missed her home BP meds  - given hydralazine 50 and amlodipine 10 with improvement to 180s, suspect further improvement with diuresis  - continue home amlodipine, hydralazine, labetalol, and losartan

## 2022-05-01 NOTE — PHARMACY MED REC
"Admission Medication History     The home medication history was taken by Peri Rahman.    You may go to "Admission" then "Reconcile Home Medications" tabs to review and/or act upon these items.      The home medication list has been updated by the Pharmacy department.    Please read ALL comments highlighted in yellow.    Please address this information as you see fit.     Feel free to contact us if you have any questions or require assistance.      The medications listed below were removed from the home medication list. Please reorder if appropriate:  Patient reports no longer taking the following medication(s):   ONDANSETRON 4MG   OXYCODONE-ACETAMINOPHEN 5-325MG    Medications listed below were obtained from: Patient/family    Medication Sig    acetaminophen (TYLENOL) 325 MG tablet   Take 650 mg by mouth every 8 (eight) hours as needed for Pain.    amLODIPine (NORVASC) 10 MG tablet   Take 1 tablet (10 mg total) by mouth nightly.    aspirin (ECOTRIN) 81 MG EC tablet   Take 81 mg by mouth once daily.    furosemide (LASIX) 80 MG tablet   Take 1 tablet (80 mg total) by mouth once daily.    hydrALAZINE (APRESOLINE) 50 MG tablet   Take 1 tablet (50 mg total) by mouth every 8 (eight) hours.    labetaloL (NORMODYNE) 300 MG tablet   Take 1 tablet (300 mg total) by mouth 3 (three) times daily.    losartan (COZAAR) 100 MG tablet   Take 1 tablet (100 mg total) by mouth once daily.    multivitamin capsule   Take 1 capsule by mouth once daily.    pregabalin (LYRICA) 75 MG capsule   Take 75 mg by mouth once daily.    sevelamer carbonate (RENVELA) 800 mg Tab   Take 2,400 mg by mouth 3 (three) times daily with meals.    BLOOD PRESSURE CUFF Misc   Use daily. Preferred by insurance.           Peri Rahman  EXT [id number]                    .          "

## 2022-05-01 NOTE — CARE UPDATE
Pt seen and evaluated at bedside by me this am. Pt is afebrile and hypertensive. Pt feeling mildly improved on 2L NC. Labs and imaging reviewed. Nephrology consulted for HD needs. Pt is currently s/p IV lasix.

## 2022-05-01 NOTE — ED PROVIDER NOTES
Encounter Date: 5/1/2022       History     Chief Complaint   Patient presents with    Shortness of Breath     Sob and back pain starting today while laying down. MWF dialysis patient, pt endorses compliance with sessions. Pt denies n/v.     50 year old female with history of ESRD on HD MWF, hypertension, diabetes presenting to the ED with shortness of breath since approximately 9:00 p.m. yesterday night as well as thoracic and lower back pain since this morning.  Patient reports that she feels that the back pain is similar to the last time she had lot of fluid in her body.  This happened approximately 1 year ago.  Has not taken any medicine for the pain.  Denies any numbness or weakness in her arms or legs or saddle anesthesia.  Denies any blurry vision, lightheadedness or dizziness.  Also endorses shortness of breath similar to the last time she had excess fluid in her body.  Denies any chest pain, fevers, chills, cough, nausea, vomiting, abdominal pain, diarrhea, dysuria, black or bloody stools.  Still makes urine.        Review of patient's allergies indicates:  No Known Allergies  Past Medical History:   Diagnosis Date    Anemia of chronic disorder 11/8/2017    Overview:  Added automatically from request for surgery [id number]    Anemia of chronic renal failure, stage 5     Anxiety     Cyst of left ovary 5/14/2019    Cyst of left ovary 7/5/2019    Dyslipidemia 1/4/2013    End-stage renal disease on hemodialysis 8/20/2020    Hematuria 10/30/2019    Hx of sinus bradycardia 9/28/2017    Hyperkalemia 3/29/2017    Peripheral neuropathy 1/4/2013    Renovascular hypertension 8/17/2020    S/P dilation and curettage 5/22/2018    Thickened endometrium 5/22/2018    Thyroid nodule     Thyroid nodule 12/13/2021    Uncontrolled type 2 diabetes mellitus 4/5/2013    Overview:  poc a1c today 11.2-270/ up from 10.8-263, Pt. has very very stressed/ was incarcerated/marital stress/ will current meds/ less stress now/  will monitor.     Past Surgical History:   Procedure Laterality Date    AV FISTULA PLACEMENT      right arm     SECTION      x1    COLONOSCOPY N/A 2018    Procedure: COLONOSCOPY;  Surgeon: Pankaj Hinojosa MD;  Location: [facility] (4TH FLR);  Service: Endoscopy;  Laterality: N/A;  dialysis pt/labs prior to colon/MS    CYSTOSCOPY W/ RETROGRADES Bilateral 2020    Procedure: CYSTOSCOPY, WITH RETROGRADE PYELOGRAM;  Surgeon: Douglas Abraham MD;  Location: [facility] 1ST FLR;  Service: Urology;  Laterality: Bilateral;  45 min    FISTULOGRAM Right 2020    Procedure: Fistulogram;  Surgeon: Lester Gómez MD;  Location: [facility] CATH LAB;  Service: Nephrology;  Laterality: Right;    HYSTERECTOMY      IMPLANTATION OF COCHLEAR PROSTHESIS Right 2021    Procedure: INSERTION, PROSTHESIS, COCHLEAR;  Surgeon: Ramiro Zuleta MD;  Location: [facility] OR 2ND FLR;  Service: ENT;  Laterality: Right;  COCHLEAR BABAK    IMPLANTATION OF COCHLEAR PROSTHESIS Right 2021    Procedure: INSERTION, PROSTHESIS, COCHLEAR;  Surgeon: Ramiro Zuleta MD;  Location: [facility] OR 2ND FLR;  Service: ENT;  Laterality: Right;  COCHLEAR AMERICAS    ROBOT-ASSISTED LAPAROSCOPIC ABDOMINAL HYSTERECTOMY USING DA NATALYA XI N/A 2019    Procedure: XI ROBOTIC HYSTERECTOMY;  Surgeon: Trice Lei MD;  Location: [facility];  Service: OB/GYN;  Laterality: N/A;    ROBOT-ASSISTED LAPAROSCOPIC NEPHRECTOMY Right 10/8/2020    Procedure: ROBOTIC NEPHRECTOMY;  Surgeon: Douglas Abraham MD;  Location: [facility] OR 2ND FLR;  Service: Urology;  Laterality: Right;  3.5hrs gen with regional     ROBOT-ASSISTED LAPAROSCOPIC SALPINGO-OOPHORECTOMY USING DA NATALYA XI Bilateral 2019    Procedure: XI ROBOTIC SALPINGO-OOPHORECTOMY;  Surgeon: Trice Lei MD;  Location: [facility];  Service: OB/GYN;  Laterality: Bilateral;    TUBAL LIGATION      Vascular access surgeries      x5     Family History   Problem Relation Age of Onset    Cancer Mother     Ovarian  cancer Mother     Cancer Sister     Ovarian cancer Sister     Lung cancer Sister     Diabetes Brother     Cancer Sister     Ovarian cancer Sister     Diabetes Sister     Breast cancer Neg Hx     Colon cancer Neg Hx     Cervical cancer Neg Hx     Endometrial cancer Neg Hx     Vaginal cancer Neg Hx     Thyroid disease Neg Hx      Social History     Tobacco Use    Smoking status: Never Smoker    Smokeless tobacco: Never Used   Substance Use Topics    Alcohol use: No    Drug use: No     Review of Systems   Constitutional: Negative for chills and fever.   HENT: Negative for congestion and sore throat.    Eyes: Negative for visual disturbance.   Respiratory: Positive for shortness of breath. Negative for cough.    Cardiovascular: Negative for chest pain.   Gastrointestinal: Negative for abdominal pain, diarrhea, nausea and vomiting.   Genitourinary: Negative for dysuria.   Musculoskeletal: Positive for back pain. Negative for gait problem.   Skin: Negative for rash.   Neurological: Negative for dizziness, weakness, light-headedness and headaches.   Hematological: Does not bruise/bleed easily.       Physical Exam     Initial Vitals [05/01/22 0034]   BP Pulse Resp Temp SpO2   (!) 204/82 72 (!) 22 97.8 °F (36.6 °C) 98 %      MAP       --         Physical Exam    Nursing note and vitals reviewed.  Constitutional: She appears well-developed and well-nourished. She is not diaphoretic. No distress.   HENT:   Head: Normocephalic and atraumatic.   Eyes: Conjunctivae and EOM are normal. Right eye exhibits no discharge. Left eye exhibits no discharge. No scleral icterus.   Neck:   Normal range of motion.  Cardiovascular: Normal rate and regular rhythm. Exam reveals no gallop and no friction rub.    No murmur heard.  Pulmonary/Chest: No respiratory distress. She has no wheezes. She has no rhonchi. She has no rales. She exhibits no tenderness.   Abdominal: Abdomen is soft. She exhibits no distension and no mass. There  is no abdominal tenderness. There is no rebound and no guarding.   Musculoskeletal:      Cervical back: Normal range of motion.     Neurological: She is alert and oriented to person, place, and time. She has normal strength. No sensory deficit.   5/5 strength bilateral lower extremities.   Skin: Skin is warm and dry. No erythema. No pallor.         ED Course   Procedures  Labs Reviewed   CBC W/ AUTO DIFFERENTIAL - Abnormal; Notable for the following components:       Result Value    RBC 3.99 (*)     Hemoglobin 10.5 (*)     Hematocrit 33.5 (*)     MCH 26.3 (*)     MCHC 31.3 (*)     RDW 17.6 (*)     Eos # 0.7 (*)     Mono % 15.2 (*)     Eosinophil % 14.7 (*)     All other components within normal limits    Narrative:     Release to patient->Immediate   COMPREHENSIVE METABOLIC PANEL - Abnormal; Notable for the following components:    BUN 44 (*)     Creatinine 8.8 (*)     Albumin 3.4 (*)     ALT 7 (*)     eGFR if  5.2 (*)     eGFR if non  4.5 (*)     All other components within normal limits    Narrative:     Release to patient->Immediate   B-TYPE NATRIURETIC PEPTIDE - Abnormal; Notable for the following components:    BNP 1,483 (*)     All other components within normal limits    Narrative:     Release to patient->Immediate   ISTAT PROCEDURE - Abnormal; Notable for the following components:    POC BUN 48 (*)     POC Creatinine 8.6 (*)     POC TCO2 (MEASURED) 33 (*)     POC Hematocrit 33 (*)     All other components within normal limits   TROPONIN I    Narrative:     Release to patient->Immediate   LIPASE    Narrative:     Release to patient->Immediate   MAGNESIUM    Narrative:     Release to patient->Immediate   PHOSPHORUS    Narrative:     Release to patient->Immediate   HIV 1 / 2 ANTIBODY   HEPATITIS C ANTIBODY   URINALYSIS, REFLEX TO URINE CULTURE   POCT GLUCOSE, HAND-HELD DEVICE   ISTAT CHEM8     EKG Readings: (Independently Interpreted)   Normal sinus rhythm, rate of 69. Left  "bundle-branch block.  No ST elevations or depressions concerning for ischemia.  No STEMI per my independent interpretation.         Imaging Results          X-Ray Chest AP Portable (Final result)  Result time 05/01/22 03:32:45    Final result by Yohan Spear MD (05/01/22 03:32:45)                 Impression:      Cardiomegaly with probable interstitial edema, noting overall similar appearance when compared with 10/08/2021.      Electronically signed by: Yohan Spear MD  Date:    05/01/2022  Time:    03:32             Narrative:    EXAMINATION:  XR CHEST AP PORTABLE    CLINICAL HISTORY:  Provided history is "CHF;  ".    TECHNIQUE:  One view of the chest.    COMPARISON:  10/08/2021.    FINDINGS:  Cardiac wires and radiopaque clothing overlie the chest.  Cardiomediastinal silhouette is enlarged and similar to the prior study.  There is central vascular congestion with perihilar and lower lung zone interstitial opacities suggestive of pulmonary edema.  No large pleural effusion.  No distinct pneumothorax.  Upper lung fields are relatively clear.                              X-Rays:   Independently Interpreted Readings:   Other Readings:  Mild increased pulmonary vascular congestion compared to previous chest x-ray    Medications   sodium chloride 0.9% flush 10 mL (has no administration in time range)   albuterol-ipratropium 2.5 mg-0.5 mg/3 mL nebulizer solution 3 mL (has no administration in time range)   melatonin tablet 6 mg (has no administration in time range)   ondansetron disintegrating tablet 8 mg (has no administration in time range)   prochlorperazine injection Soln 5 mg (has no administration in time range)   polyethylene glycol packet 17 g (has no administration in time range)   simethicone chewable tablet 80 mg (has no administration in time range)   aluminum-magnesium hydroxide-simethicone 200-200-20 mg/5 mL suspension 30 mL (has no administration in time range)   acetaminophen tablet 650 mg (has " no administration in time range)   naloxone 0.4 mg/mL injection 0.02 mg (has no administration in time range)   glucose chewable tablet 16 g (has no administration in time range)   glucose chewable tablet 24 g (has no administration in time range)   dextrose 10% bolus 125 mL (has no administration in time range)   dextrose 10% bolus 250 mL (has no administration in time range)   glucagon (human recombinant) injection 1 mg (has no administration in time range)   insulin aspart U-100 pen 0-5 Units (has no administration in time range)   heparin (porcine) injection 5,000 Units (has no administration in time range)   amLODIPine tablet 10 mg (has no administration in time range)   aspirin EC tablet 81 mg (has no administration in time range)   furosemide tablet 80 mg (has no administration in time range)   hydrALAZINE tablet 50 mg (has no administration in time range)   labetaloL tablet 300 mg (has no administration in time range)   losartan tablet 100 mg (has no administration in time range)   pregabalin capsule 75 mg (has no administration in time range)   sevelamer carbonate tablet 2,400 mg (has no administration in time range)   acetaminophen tablet 1,000 mg (1,000 mg Oral Given 5/1/22 0414)   nitroGLYCERIN SL tablet 0.4 mg (0.4 mg Sublingual Given 5/1/22 0415)   furosemide injection 80 mg (80 mg Intravenous Given 5/1/22 0419)   hydrALAZINE tablet 50 mg (50 mg Oral Given 5/1/22 0414)   amLODIPine tablet 10 mg (10 mg Oral Given 5/1/22 0400)     Medical Decision Making:   History:   Old Medical Records: I decided to obtain old medical records.  Initial Assessment:   48-year-old female with ESRD on HD MWF presenting to the ED with worsening shortness of breath, thoracic and lumbar back pain.  No trauma.  No chest pain.  Says she has felt like this previously when she has had fluid overload.  Last dialysis was Friday.  No fevers, cough.  Has clear lung sounds.  Was noted to be hypoxic to 88% while sleeping and to 90% on  room air.  No history of COPD.    Differential includes but not limited to CHF exacerbation, pneumonia, low suspicion for aortic dissection as patient has normal neurologic exam, has no previous enlargement of thoracic or abdominal aorta on previous CT scans.    Patient placed on 2 L nasal cannula.  CBC without leukocytosis or anemia.  CMP showed no electrolyte abnormalities concerning for hospitalization.  BNP elevated, around patient's baseline.  Chest x-ray showed mild pulmonary edema, comparable to previous chest x-rays.  Patient given IV Lasix and home blood pressure medications as well as nitroglycerin.  Will defer to Hospital Medicine on emergent dialysis after seeing patient's response to Lasix.    Independently Interpreted Test(s):   I have ordered and independently interpreted X-rays - see prior notes.  I have ordered and independently interpreted EKG Reading(s) - see prior notes  Clinical Tests:   Lab Tests: Ordered and Reviewed  Radiological Study: Ordered and Reviewed  Medical Tests: Ordered and Reviewed  Other:   I have discussed this case with another health care provider.       <> Summary of the Discussion: Discussed with Hospital Medicine                      Clinical Impression:   Final diagnoses:  [R06.02] SOB (shortness of breath)  [R09.02] Hypoxia (Primary)  [I50.9] Congestive heart failure, unspecified HF chronicity, unspecified heart failure type          ED Disposition Condition    Observation               Gavin Morrissey MD  Resident  05/01/22 0275

## 2022-05-01 NOTE — ED NOTES
Jael VAZQUEZ Rafael, an 56 y.o. female presents to the ED reporting SOB since 9pm yest. Dialysis pt. Endorses back pain, states she usually feels this when she has a lot of fluid.Denies CP, fever, abd pain.      Review of patient's allergies indicates:  No Known Allergies  Chief Complaint   Patient presents with    Shortness of Breath     Sob and back pain starting today while laying down. MWF dialysis patient, pt endorses compliance with sessions. Pt denies n/v.     Past Medical History:   Diagnosis Date    Anemia of chronic disorder 11/8/2017    Overview:  Added automatically from request for surgery [id number]    Anemia of chronic renal failure, stage 5     Anxiety     Cyst of left ovary 5/14/2019    Cyst of left ovary 7/5/2019    Dyslipidemia 1/4/2013    End-stage renal disease on hemodialysis 8/20/2020    Hematuria 10/30/2019    Hx of sinus bradycardia 9/28/2017    Hyperkalemia 3/29/2017    Peripheral neuropathy 1/4/2013    Renovascular hypertension 8/17/2020    S/P dilation and curettage 5/22/2018    Thickened endometrium 5/22/2018    Thyroid nodule     Thyroid nodule 12/13/2021    Uncontrolled type 2 diabetes mellitus 4/5/2013    Overview:  poc a1c today 11.2-270/ up from 10.8-263, Pt. has very very stressed/ was incarcerated/marital stress/ will current meds/ less stress now/ will monitor.

## 2022-05-01 NOTE — SUBJECTIVE & OBJECTIVE
Past Medical History:   Diagnosis Date    Anemia of chronic disorder 2017    Overview:  Added automatically from request for surgery [id number]    Anemia of chronic renal failure, stage 5     Anxiety     Cyst of left ovary 2019    Cyst of left ovary 2019    Dyslipidemia 2013    End-stage renal disease on hemodialysis 2020    Hematuria 10/30/2019    Hx of sinus bradycardia 2017    Hyperkalemia 3/29/2017    Peripheral neuropathy 2013    Renovascular hypertension 2020    S/P dilation and curettage 2018    Thickened endometrium 2018    Thyroid nodule     Thyroid nodule 2021    Uncontrolled type 2 diabetes mellitus 2013    Overview:  poc a1c today 11.2-270/ up from 10.8-263, Pt. has very very stressed/ was incarcerated/marital stress/ will current meds/ less stress now/ will monitor.       Past Surgical History:   Procedure Laterality Date    AV FISTULA PLACEMENT      right arm     SECTION      x1    COLONOSCOPY N/A 2018    Procedure: COLONOSCOPY;  Surgeon: Pankaj Hinojosa MD;  Location: [facility] (4TH FLR);  Service: Endoscopy;  Laterality: N/A;  dialysis pt/labs prior to colon/MS    CYSTOSCOPY W/ RETROGRADES Bilateral 2020    Procedure: CYSTOSCOPY, WITH RETROGRADE PYELOGRAM;  Surgeon: Douglas Abraham MD;  [address]  Service: Urology;  Laterality: Bilateral;  45 min    FISTULOGRAM Right 2020    Procedure: Fistulogram;  Surgeon: Lester Gómez MD;  Location: [facility] CATH LAB;  Service: Nephrology;  Laterality: Right;    HYSTERECTOMY      IMPLANTATION OF COCHLEAR PROSTHESIS Right 2021    Procedure: INSERTION, PROSTHESIS, COCHLEAR;  Surgeon: Ramiro Zuleta MD;  Location: [facility] OR 2ND FLR;  Service: ENT;  Laterality: Right;  [facility]    IMPLANTATION OF COCHLEAR PROSTHESIS Right 2021    Procedure: INSERTION, PROSTHESIS, COCHLEAR;  Surgeon: Ramiro Zuleta MD;  [address];  Service: ENT;  Laterality: Right;   [facility]    ROBOT-ASSISTED LAPAROSCOPIC ABDOMINAL HYSTERECTOMY USING DA NATALYA XI N/A 7/5/2019    Procedure: XI ROBOTIC HYSTERECTOMY;  Surgeon: Trice Lei MD;  Location: [facility];  Service: OB/GYN;  Laterality: N/A;    ROBOT-ASSISTED LAPAROSCOPIC NEPHRECTOMY Right 10/8/2020    Procedure: ROBOTIC NEPHRECTOMY;  Surgeon: Douglas Abraham MD;  [facility];  Service: Urology;  Laterality: Right;  3.5hrs gen with regional     ROBOT-ASSISTED LAPAROSCOPIC SALPINGO-OOPHORECTOMY USING DA NATALYA XI Bilateral 7/5/2019    Procedure: XI ROBOTIC SALPINGO-OOPHORECTOMY;  Surgeon: Trice Lei MD;  Location: [facility];  Service: OB/GYN;  Laterality: Bilateral;    TUBAL LIGATION      Vascular access surgeries      x5       Review of patient's allergies indicates:  No Known Allergies  Current Facility-Administered Medications   Medication Frequency    acetaminophen tablet 650 mg Q4H PRN    albuterol-ipratropium 2.5 mg-0.5 mg/3 mL nebulizer solution 3 mL Q4H PRN    aluminum-magnesium hydroxide-simethicone 200-200-20 mg/5 mL suspension 30 mL QID PRN    amLODIPine tablet 10 mg Nightly    aspirin EC tablet 81 mg Daily    dextrose 10% bolus 125 mL PRN    dextrose 10% bolus 250 mL PRN    [START ON 5/2/2022] furosemide tablet 80 mg Daily    glucagon (human recombinant) injection 1 mg PRN    glucose chewable tablet 16 g PRN    glucose chewable tablet 24 g PRN    heparin (porcine) injection 5,000 Units Q8H    hydrALAZINE tablet 50 mg Q8H    insulin aspart U-100 pen 0-5 Units QID (AC + HS) PRN    labetaloL tablet 300 mg TID    losartan tablet 100 mg Daily    melatonin tablet 6 mg Nightly PRN    naloxone 0.4 mg/mL injection 0.02 mg PRN    ondansetron disintegrating tablet 8 mg Q8H PRN    polyethylene glycol packet 17 g Daily PRN    pregabalin capsule 75 mg Daily    prochlorperazine injection Soln 5 mg Q6H PRN    sevelamer carbonate tablet 2,400 mg TID WM    simethicone chewable tablet 80 mg QID PRN    sodium chloride  0.9% flush 10 mL Q8H PRN     Current Outpatient Medications   Medication    acetaminophen (TYLENOL) 325 MG tablet    amLODIPine (NORVASC) 10 MG tablet    aspirin (ECOTRIN) 81 MG EC tablet    furosemide (LASIX) 80 MG tablet    hydrALAZINE (APRESOLINE) 50 MG tablet    labetaloL (NORMODYNE) 300 MG tablet    losartan (COZAAR) 100 MG tablet    multivitamin capsule    pregabalin (LYRICA) 75 MG capsule    sevelamer carbonate (RENVELA) 800 mg Tab    BLOOD PRESSURE CUFF Misc     Family History       Problem Relation (Age of Onset)    Cancer Mother, Sister, Sister    Diabetes Brother, Sister    Lung cancer Sister    Ovarian cancer Mother, Sister, Sister          Tobacco Use    Smoking status: Never Smoker    Smokeless tobacco: Never Used   Substance and Sexual Activity    Alcohol use: No    Drug use: No    Sexual activity: Yes     Partners: Male     Birth control/protection: Post-menopausal     Review of Systems   Constitutional:  Negative for activity change, chills and fever.   Respiratory:  Positive for shortness of breath. Negative for choking.    Cardiovascular:  Negative for chest pain and leg swelling.        Recieves HD w/ RUE AVF   Gastrointestinal:  Negative for abdominal distention, abdominal pain, constipation, diarrhea, nausea and vomiting.   Genitourinary:         Still makes urine. On lasix daily    Musculoskeletal:  Positive for back pain.   Neurological:  Negative for dizziness and headaches.   Psychiatric/Behavioral:  Negative for confusion.    Objective:     Vital Signs (Most Recent):  Temp: 97.8 °F (36.6 °C) (05/01/22 0034)  Pulse: 63 (05/01/22 1102)  Resp: 18 (05/01/22 1102)  BP: (!) 168/75 (05/01/22 1102)  SpO2: 99 % (05/01/22 1102)  O2 Device (Oxygen Therapy): room air (05/01/22 0403)   Vital Signs (24h Range):  Temp:  [97.8 °F (36.6 °C)] 97.8 °F (36.6 °C)  Pulse:  [63-72] 63  Resp:  [16-22] 18  SpO2:  [93 %-99 %] 99 %  BP: (160-204)/() 168/75     Weight: 77.1 kg (170 lb) (05/01/22 0034)  Body  mass index is 29.18 kg/m².  Body surface area is 1.87 meters squared.    No intake/output data recorded.    Physical Exam  Constitutional:       General: She is not in acute distress.  HENT:      Head: Normocephalic and atraumatic.   Eyes:      General: No scleral icterus.     Pupils: Pupils are equal, round, and reactive to light.   Cardiovascular:      Rate and Rhythm: Normal rate and regular rhythm.      Comments: RUE AVF looks enlarged, but is patent with palpable thrill and bruit on auscultation.   Pulmonary:      Effort: Pulmonary effort is normal. No respiratory distress.   Abdominal:      General: There is no distension.      Palpations: Abdomen is soft.      Tenderness: There is no abdominal tenderness. There is no guarding or rebound.   Musculoskeletal:      Right lower leg: Edema present.      Left lower leg: Edema present.   Neurological:      Mental Status: She is alert.       Significant Labs:  All labs within the past 24 hours have been reviewed.    Significant Imaging:  Reviewed

## 2022-05-01 NOTE — H&P
[facility] - Emergency Dept  [facility] Medicine  History & Physical    Patient Name: Jael Hall  MRN: [id number]  Patient Class: OP- Observation  Admission Date: 5/1/2022  Attending Physician: Alexia De Oliveira, *   Primary Care Provider: Mary Solano MD         Patient information was obtained from patient, past medical records and ER records.     Subjective:     Principal Problem:Hypervolemia associated with renal insufficiency    Chief Complaint:   Chief Complaint   Patient presents with    Shortness of Breath     Sob and back pain starting today while laying down. MWF dialysis patient, pt endorses compliance with sessions. Pt denies n/v.        HPI: Jael Hall is a 56 y.o. female with ESRD on HD MWF, hypertension, and diabetes who presented to the ED with shortness of breath. Her symptoms started around 9:00 p.m. last night. She tried to lay down and rest to see if it would go away, but that made it worse. This morning she developed lower back pain that feels similar to the last time she had a lot of fluid building up. She denies any chest pain, cough, abdominal pain, dysuria. She still makes urine. Her last HD session was Friday and she is compliant with her lasix and diet. She does drink too many fluids and does not weight herself daily.    ED: /82, O2 88% on RA so placed on 2L NC. BNP 1,483. CXR with pulmonary edema. She was given lasix 80 mg IV, amlodipine 10, and hydralazine 50 mg.      Past Medical History:   Diagnosis Date    Anemia of chronic disorder 11/8/2017    Overview:  Added automatically from request for surgery [id number]    Anemia of chronic renal failure, stage 5     Anxiety     Cyst of left ovary 5/14/2019    Cyst of left ovary 7/5/2019    Dyslipidemia 1/4/2013    End-stage renal disease on hemodialysis 8/20/2020    Hematuria 10/30/2019    Hx of sinus bradycardia 9/28/2017    Hyperkalemia 3/29/2017    Peripheral neuropathy 1/4/2013    Renovascular hypertension  2020    S/P dilation and curettage 2018    Thickened endometrium 2018    Thyroid nodule     Thyroid nodule 2021    Uncontrolled type 2 diabetes mellitus 2013    Overview:  poc a1c today 11.2-270/ up from 10.8-263, Pt. has very very stressed/ was incarcerated/marital stress/ will current meds/ less stress now/ will monitor.       Past Surgical History:   Procedure Laterality Date    AV FISTULA PLACEMENT      right arm     SECTION      x1    COLONOSCOPY N/A 2018    Procedure: COLONOSCOPY;  Surgeon: Pankaj Hinojosa MD;  Location: [facility] ENDO (4TH FLR);  Service: Endoscopy;  Laterality: N/A;  dialysis pt/labs prior to colon/MS    CYSTOSCOPY W/ RETROGRADES Bilateral 2020    Procedure: CYSTOSCOPY, WITH RETROGRADE PYELOGRAM;  Surgeon: Douglas Abraham MD;  Location: [facility] 1ST FLR;  Service: Urology;  Laterality: Bilateral;  45 min    FISTULOGRAM Right 2020    Procedure: Fistulogram;  Surgeon: Lester Gómez MD;  Location: [facility] CATH LAB;  Service: Nephrology;  Laterality: Right;    HYSTERECTOMY      IMPLANTATION OF COCHLEAR PROSTHESIS Right 2021    Procedure: INSERTION, PROSTHESIS, COCHLEAR;  Surgeon: Ramiro Zuleta MD;  Location: [facility] 2ND FLR;  Service: ENT;  Laterality: Right;  COCHLEAR BABAK    IMPLANTATION OF COCHLEAR PROSTHESIS Right 2021    Procedure: INSERTION, PROSTHESIS, COCHLEAR;  Surgeon: Ramiro Zuleta MD;  Location: [facility] 2ND FLR;  Service: ENT;  Laterality: Right;  [employer]    ROBOT-ASSISTED LAPAROSCOPIC ABDOMINAL HYSTERECTOMY USING DA NATALYA XI N/A 2019    Procedure: XI ROBOTIC HYSTERECTOMY;  Surgeon: Trice Lei MD;  Location: [facility];  Service: OB/GYN;  Laterality: N/A;    ROBOT-ASSISTED LAPAROSCOPIC NEPHRECTOMY Right 10/8/2020    Procedure: ROBOTIC NEPHRECTOMY;  Surgeon: Douglas Abraham MD;  Location: [facility] 2ND FLR;  Service: Urology;  Laterality: Right;  3.5hrs gen with regional     ROBOT-ASSISTED  LAPAROSCOPIC SALPINGO-OOPHORECTOMY USING DA NATALYA XI Bilateral 7/5/2019    Procedure: XI ROBOTIC SALPINGO-OOPHORECTOMY;  Surgeon: Trice Lei MD;  Location: [facility];  Service: OB/GYN;  Laterality: Bilateral;    TUBAL LIGATION      Vascular access surgeries      x5       Review of patient's allergies indicates:  No Known Allergies    No current facility-administered medications on file prior to encounter.     Current Outpatient Medications on File Prior to Encounter   Medication Sig    amLODIPine (NORVASC) 10 MG tablet Take 1 tablet (10 mg total) by mouth nightly.    aspirin (ECOTRIN) 81 MG EC tablet Take 81 mg by mouth once daily.    BLOOD PRESSURE CUFF Misc Use daily. Preferred by insurance.    furosemide (LASIX) 80 MG tablet Take 1 tablet (80 mg total) by mouth once daily.    hydrALAZINE (APRESOLINE) 50 MG tablet Take 1 tablet (50 mg total) by mouth every 8 (eight) hours.    labetaloL (NORMODYNE) 300 MG tablet Take 1 tablet (300 mg total) by mouth 3 (three) times daily.    losartan (COZAAR) 100 MG tablet Take 1 tablet (100 mg total) by mouth once daily.    multivitamin capsule Take 1 capsule by mouth once daily.    ondansetron (ZOFRAN) 4 MG tablet Take 8 mg by mouth every 24 hours as needed for Nausea.    oxyCODONE-acetaminophen (PERCOCET) 5-325 mg per tablet Take 1 tablet by mouth every 6 (six) hours as needed.    pregabalin (LYRICA) 75 MG capsule Take 75 mg by mouth once daily.    sevelamer carbonate (RENVELA) 800 mg Tab Take 2,400 mg by mouth 3 (three) times daily with meals.     Family History       Problem Relation (Age of Onset)    Cancer Mother, Sister, Sister    Diabetes Brother, Sister    Lung cancer Sister    Ovarian cancer Mother, Sister, Sister          Tobacco Use    Smoking status: Never Smoker    Smokeless tobacco: Never Used   Substance and Sexual Activity    Alcohol use: No    Drug use: No    Sexual activity: Yes     Partners: Male     Birth control/protection:  Post-menopausal     Review of Systems   Constitutional:  Negative for chills and fever.   HENT:  Negative for congestion and rhinorrhea.    Eyes:  Negative for photophobia and visual disturbance.   Respiratory:  Positive for shortness of breath. Negative for cough and chest tightness.    Cardiovascular:  Positive for leg swelling. Negative for chest pain.   Gastrointestinal:  Negative for abdominal pain, nausea and vomiting.   Genitourinary:  Positive for decreased urine volume (ESRD). Negative for hematuria.   Musculoskeletal:  Positive for back pain. Negative for myalgias.   Skin:  Negative for rash and wound.   Neurological:  Negative for dizziness and weakness.   Objective:     Vital Signs (Most Recent):  Temp: 97.8 °F (36.6 °C) (05/01/22 0034)  Pulse: 70 (05/01/22 0403)  Resp: 20 (05/01/22 0403)  BP: (!) 185/79 (05/01/22 0432)  SpO2: (!) 93 % (05/01/22 0403)   Vital Signs (24h Range):  Temp:  [97.8 °F (36.6 °C)] 97.8 °F (36.6 °C)  Pulse:  [68-72] 70  Resp:  [20-22] 20  SpO2:  [93 %-98 %] 93 %  BP: (185-204)/() 185/79     Weight: 77.1 kg (170 lb)  Body mass index is 29.18 kg/m².    Physical Exam  Vitals and nursing note reviewed.   Constitutional:       General: She is not in acute distress.     Appearance: She is well-developed.   HENT:      Head: Normocephalic and atraumatic.      Mouth/Throat:      Mouth: Mucous membranes are moist.   Eyes:      Conjunctiva/sclera: Conjunctivae normal.      Pupils: Pupils are equal, round, and reactive to light.   Cardiovascular:      Rate and Rhythm: Normal rate and regular rhythm.      Heart sounds: Normal heart sounds.   Pulmonary:      Effort: Pulmonary effort is normal. No respiratory distress.      Breath sounds: Rales (bilateral lower lungs) present. No wheezing.      Comments: On 2L NC  Abdominal:      General: Bowel sounds are normal. There is no distension.      Palpations: Abdomen is soft.      Tenderness: There is no abdominal tenderness.   Musculoskeletal:   "       General: No tenderness. Normal range of motion.      Cervical back: Normal range of motion and neck supple.      Right lower leg: Edema present.      Left lower leg: Edema present.      Comments: +RUE AVF   Skin:     General: Skin is warm and dry.      Capillary Refill: Capillary refill takes less than 2 seconds.      Findings: No rash.   Neurological:      Mental Status: She is alert and oriented to person, place, and time.      Cranial Nerves: No cranial nerve deficit.      Sensory: No sensory deficit.      Coordination: Coordination normal.   Psychiatric:         Behavior: Behavior normal.         Thought Content: Thought content normal.         Judgment: Judgment normal.         CRANIAL NERVES     CN III, IV, VI   Pupils are equal, round, and reactive to light.     Significant Labs: All pertinent labs within the past 24 hours have been reviewed.  CBC:   Recent Labs   Lab 05/01/22 0323 05/01/22 0329   WBC 5.05  --    HGB 10.5*  --    HCT 33.5* 33*     --      CMP:   Recent Labs   Lab 05/01/22 0323      K 4.8   CL 97   CO2 28   GLU 94   BUN 44*   CREATININE 8.8*   CALCIUM 10.4   PROT 7.4   ALBUMIN 3.4*   BILITOT 1.0   ALKPHOS 83   AST 19   ALT 7*   ANIONGAP 14   EGFRNONAA 4.5*     Cardiac Markers:   Recent Labs   Lab 05/01/22 0323   BNP 1,483*     Troponin:   Recent Labs   Lab 05/01/22 0323   TROPONINI 0.011       Significant Imaging: I have reviewed all pertinent imaging results/findings within the past 24 hours.  X-Ray Chest AP Portable  Narrative: EXAMINATION:  XR CHEST AP PORTABLE    CLINICAL HISTORY:  Provided history is "CHF;  ".    TECHNIQUE:  One view of the chest.    COMPARISON:  10/08/2021.    FINDINGS:  Cardiac wires and radiopaque clothing overlie the chest.  Cardiomediastinal silhouette is enlarged and similar to the prior study.  There is central vascular congestion with perihilar and lower lung zone interstitial opacities suggestive of pulmonary edema.  No large pleural " effusion.  No distinct pneumothorax.  Upper lung fields are relatively clear.  Impression: Cardiomegaly with probable interstitial edema, noting overall similar appearance when compared with 10/08/2021.    Electronically signed by: Yohan Spear MD  Date:    05/01/2022  Time:    03:32      Assessment/Plan:     * Hypervolemia associated with renal insufficiency  Acute respiratory failure with hypoxia  Acute on chronic CHF    - O2 88% on RA upon arrival, placed on 2L NC\  - CXR with pulmonary edema, BNP>1000  - s/p 80 mg IV lasix in ED, monitor response prior to further diuresis  - volume removal with HD, nephrology consulted  - strict I&Os, daily weights, monitor O2 requirements  - fluid restricted diet      Hypertensive urgency  - SBP>200 upon arrival, pt missed her home BP meds  - given hydralazine 50 and amlodipine 10 with improvement to 180s, suspect further improvement with diuresis  - continue home amlodipine, hydralazine, labetalol, and losartan    Diabetes mellitus due to underlying condition, uncontrolled, with chronic kidney disease  - diet controlled, last A1C 5.6  - SSI  - diabetic diet  - ACHS accuchecks    ESRD (end stage renal disease) on dialysis  - on HD MWF  - nephrology consulted  - continue renvela  - renal, fluid restricted diet      VTE Risk Mitigation (From admission, onward)         Ordered     heparin (porcine) injection 5,000 Units  Every 8 hours         05/01/22 0511     IP VTE HIGH RISK PATIENT  Once         05/01/22 0511     Place sequential compression device  Until discontinued         05/01/22 0511                   Fior Cheng PA-C  Department of Hospital Medicine   [facility] - Emergency Dept

## 2022-05-01 NOTE — HPI
Jael Hall is a 56 y.o. female with ESRD on HD MWF, hypertension, and diabetes who presented to the ED with shortness of breath. Her symptoms started around 9:00 p.m. last night. She tried to lay down and rest to see if it would go away, but that made it worse. This morning she developed lower back pain that feels similar to the last time she had a lot of fluid building up. She denies any chest pain, cough, abdominal pain, dysuria. She still makes urine. Her last HD session was Friday and she is compliant with her lasix and diet. She does drink too many fluids and does not weight herself daily.    ED: /82, O2 88% on RA so placed on 2L NC. BNP 1,483. CXR with pulmonary edema. She was given lasix 80 mg IV, amlodipine 10, and hydralazine 50 mg.

## 2022-05-01 NOTE — SUBJECTIVE & OBJECTIVE
Past Medical History:   Diagnosis Date    Anemia of chronic disorder 2017    Overview:  Added automatically from request for surgery [id number]    Anemia of chronic renal failure, stage 5     Anxiety     Cyst of left ovary 2019    Cyst of left ovary 2019    Dyslipidemia 2013    End-stage renal disease on hemodialysis 2020    Hematuria 10/30/2019    Hx of sinus bradycardia 2017    Hyperkalemia 3/29/2017    Peripheral neuropathy 2013    Renovascular hypertension 2020    S/P dilation and curettage 2018    Thickened endometrium 2018    Thyroid nodule     Thyroid nodule 2021    Uncontrolled type 2 diabetes mellitus 2013    Overview:  poc a1c today 11.2-270/ up from 10.8-263, Pt. has very very stressed/ was incarcerated/marital stress/ will current meds/ less stress now/ will monitor.       Past Surgical History:   Procedure Laterality Date    AV FISTULA PLACEMENT      right arm     SECTION      x1    COLONOSCOPY N/A 2018    Procedure: COLONOSCOPY;  Surgeon: Pankaj Hinojosa MD;  Location: [facility] (4TH FLR);  Service: Endoscopy;  Laterality: N/A;  dialysis pt/labs prior to colon/MS    CYSTOSCOPY W/ RETROGRADES Bilateral 2020    Procedure: CYSTOSCOPY, WITH RETROGRADE PYELOGRAM;  Surgeon: Douglas Abraham MD;  [address];  Service: Urology;  Laterality: Bilateral;  45 min    FISTULOGRAM Right 2020    Procedure: Fistulogram;  Surgeon: Lester Gómez MD;  Location: [facility] CATH LAB;  Service: Nephrology;  Laterality: Right;    HYSTERECTOMY      IMPLANTATION OF COCHLEAR PROSTHESIS Right 2021    Procedure: INSERTION, PROSTHESIS, COCHLEAR;  Surgeon: Ramiro Zuleta MD;  Location: [facility] OR 2ND FLR;  Service: ENT;  Laterality: Right;  [facility]    IMPLANTATION OF COCHLEAR PROSTHESIS Right 2021    Procedure: INSERTION, PROSTHESIS, COCHLEAR;  Surgeon: Ramiro Zuleta MD;  [address];  Service: ENT;  Laterality: Right;   [facility]    ROBOT-ASSISTED LAPAROSCOPIC ABDOMINAL HYSTERECTOMY USING DA NATALYA XI N/A 7/5/2019    Procedure: XI ROBOTIC HYSTERECTOMY;  Surgeon: Trice Lei MD;  Location: [facility] OR;  Service: OB/GYN;  Laterality: N/A;    ROBOT-ASSISTED LAPAROSCOPIC NEPHRECTOMY Right 10/8/2020    Procedure: ROBOTIC NEPHRECTOMY;  Surgeon: Douglas Abraham MD;  [facility];  Service: Urology;  Laterality: Right;  3.5hrs gen with regional     ROBOT-ASSISTED LAPAROSCOPIC SALPINGO-OOPHORECTOMY USING DA NATALYA XI Bilateral 7/5/2019    Procedure: XI ROBOTIC SALPINGO-OOPHORECTOMY;  Surgeon: Trice Lei MD;  Location: [facility] OR;  Service: OB/GYN;  Laterality: Bilateral;    TUBAL LIGATION      Vascular access surgeries      x5       Review of patient's allergies indicates:  No Known Allergies    No current facility-administered medications on file prior to encounter.     Current Outpatient Medications on File Prior to Encounter   Medication Sig    amLODIPine (NORVASC) 10 MG tablet Take 1 tablet (10 mg total) by mouth nightly.    aspirin (ECOTRIN) 81 MG EC tablet Take 81 mg by mouth once daily.    BLOOD PRESSURE CUFF Misc Use daily. Preferred by insurance.    furosemide (LASIX) 80 MG tablet Take 1 tablet (80 mg total) by mouth once daily.    hydrALAZINE (APRESOLINE) 50 MG tablet Take 1 tablet (50 mg total) by mouth every 8 (eight) hours.    labetaloL (NORMODYNE) 300 MG tablet Take 1 tablet (300 mg total) by mouth 3 (three) times daily.    losartan (COZAAR) 100 MG tablet Take 1 tablet (100 mg total) by mouth once daily.    multivitamin capsule Take 1 capsule by mouth once daily.    ondansetron (ZOFRAN) 4 MG tablet Take 8 mg by mouth every 24 hours as needed for Nausea.    oxyCODONE-acetaminophen (PERCOCET) 5-325 mg per tablet Take 1 tablet by mouth every 6 (six) hours as needed.    pregabalin (LYRICA) 75 MG capsule Take 75 mg by mouth once daily.    sevelamer carbonate (RENVELA) 800 mg Tab Take 2,400 mg by mouth 3  (three) times daily with meals.     Family History       Problem Relation (Age of Onset)    Cancer Mother, Sister, Sister    Diabetes Brother, Sister    Lung cancer Sister    Ovarian cancer Mother, Sister, Sister          Tobacco Use    Smoking status: Never Smoker    Smokeless tobacco: Never Used   Substance and Sexual Activity    Alcohol use: No    Drug use: No    Sexual activity: Yes     Partners: Male     Birth control/protection: Post-menopausal     Review of Systems   Constitutional:  Negative for chills and fever.   HENT:  Negative for congestion and rhinorrhea.    Eyes:  Negative for photophobia and visual disturbance.   Respiratory:  Positive for shortness of breath. Negative for cough and chest tightness.    Cardiovascular:  Positive for leg swelling. Negative for chest pain.   Gastrointestinal:  Negative for abdominal pain, nausea and vomiting.   Genitourinary:  Positive for decreased urine volume (ESRD). Negative for hematuria.   Musculoskeletal:  Positive for back pain. Negative for myalgias.   Skin:  Negative for rash and wound.   Neurological:  Negative for dizziness and weakness.   Objective:     Vital Signs (Most Recent):  Temp: 97.8 °F (36.6 °C) (05/01/22 0034)  Pulse: 70 (05/01/22 0403)  Resp: 20 (05/01/22 0403)  BP: (!) 185/79 (05/01/22 0432)  SpO2: (!) 93 % (05/01/22 0403)   Vital Signs (24h Range):  Temp:  [97.8 °F (36.6 °C)] 97.8 °F (36.6 °C)  Pulse:  [68-72] 70  Resp:  [20-22] 20  SpO2:  [93 %-98 %] 93 %  BP: (185-204)/() 185/79     Weight: 77.1 kg (170 lb)  Body mass index is 29.18 kg/m².    Physical Exam  Vitals and nursing note reviewed.   Constitutional:       General: She is not in acute distress.     Appearance: She is well-developed.   HENT:      Head: Normocephalic and atraumatic.      Mouth/Throat:      Mouth: Mucous membranes are moist.   Eyes:      Conjunctiva/sclera: Conjunctivae normal.      Pupils: Pupils are equal, round, and reactive to light.   Cardiovascular:      Rate  and Rhythm: Normal rate and regular rhythm.      Heart sounds: Normal heart sounds.   Pulmonary:      Effort: Pulmonary effort is normal. No respiratory distress.      Breath sounds: Rales (bilateral lower lungs) present. No wheezing.      Comments: On 2L NC  Abdominal:      General: Bowel sounds are normal. There is no distension.      Palpations: Abdomen is soft.      Tenderness: There is no abdominal tenderness.   Musculoskeletal:         General: No tenderness. Normal range of motion.      Cervical back: Normal range of motion and neck supple.      Right lower leg: Edema present.      Left lower leg: Edema present.      Comments: +RUE AVF   Skin:     General: Skin is warm and dry.      Capillary Refill: Capillary refill takes less than 2 seconds.      Findings: No rash.   Neurological:      Mental Status: She is alert and oriented to person, place, and time.      Cranial Nerves: No cranial nerve deficit.      Sensory: No sensory deficit.      Coordination: Coordination normal.   Psychiatric:         Behavior: Behavior normal.         Thought Content: Thought content normal.         Judgment: Judgment normal.         CRANIAL NERVES     CN III, IV, VI   Pupils are equal, round, and reactive to light.     Significant Labs: All pertinent labs within the past 24 hours have been reviewed.  CBC:   Recent Labs   Lab 05/01/22 0323 05/01/22 0329   WBC 5.05  --    HGB 10.5*  --    HCT 33.5* 33*     --      CMP:   Recent Labs   Lab 05/01/22 0323      K 4.8   CL 97   CO2 28   GLU 94   BUN 44*   CREATININE 8.8*   CALCIUM 10.4   PROT 7.4   ALBUMIN 3.4*   BILITOT 1.0   ALKPHOS 83   AST 19   ALT 7*   ANIONGAP 14   EGFRNONAA 4.5*     Cardiac Markers:   Recent Labs   Lab 05/01/22 0323   BNP 1,483*     Troponin:   Recent Labs   Lab 05/01/22 0323   TROPONINI 0.011       Significant Imaging: I have reviewed all pertinent imaging results/findings within the past 24 hours.  X-Ray Chest AP Portable  Narrative:  "EXAMINATION:  XR CHEST AP PORTABLE    CLINICAL HISTORY:  Provided history is "CHF;  ".    TECHNIQUE:  One view of the chest.    COMPARISON:  10/08/2021.    FINDINGS:  Cardiac wires and radiopaque clothing overlie the chest.  Cardiomediastinal silhouette is enlarged and similar to the prior study.  There is central vascular congestion with perihilar and lower lung zone interstitial opacities suggestive of pulmonary edema.  No large pleural effusion.  No distinct pneumothorax.  Upper lung fields are relatively clear.  Impression: Cardiomegaly with probable interstitial edema, noting overall similar appearance when compared with 10/08/2021.    Electronically signed by: Yohan Spear MD  Date:    05/01/2022  Time:    03:32    "

## 2022-05-01 NOTE — PROGRESS NOTES
Arrived in emergency room 23 for dialysis treatment. Dialysis started via 15 gauge fistula needles x 2 to RFA fistula.

## 2022-05-01 NOTE — CONSULTS
[facility] - Emergency Dept  Nephrology  Consult Note    Patient Name: Jael Hall  MRN: [id number]  Admission Date: 5/1/2022  Hospital Length of Stay: 0 days  Attending Provider: Avel Mayen MD   Primary Care Physician: Mary Solano MD  Principal Problem:Hypervolemia associated with renal insufficiency    Inpatient consult to Nephrology  Consult performed by: Fior Tom MD  Consult ordered by: Fior Cheng PA-C        Subjective:     HPI: Jael Hall is a 56 y.o. female with ESRD on HD MWF, hypertension, and diabetes who presented to the ED with shortness of breath. Her symptoms started around 9:00 p.m. last night. She tried to lay down and rest to see if it would go away, but that made it worse. This morning she developed lower back pain that feels similar to the last time she had a lot of fluid building up. She denies any chest pain, cough, abdominal pain, dysuria. She still makes urine. Her last HD session was Friday and she is compliant with her lasix and diet. She does drink too many fluids and does not weight herself daily.  Nephrology consulted for ESRD and management of HD while inpatient.       Past Medical History:   Diagnosis Date    Anemia of chronic disorder 11/8/2017    Overview:  Added automatically from request for surgery [id number]    Anemia of chronic renal failure, stage 5     Anxiety     Cyst of left ovary 5/14/2019    Cyst of left ovary 7/5/2019    Dyslipidemia 1/4/2013    End-stage renal disease on hemodialysis 8/20/2020    Hematuria 10/30/2019    Hx of sinus bradycardia 9/28/2017    Hyperkalemia 3/29/2017    Peripheral neuropathy 1/4/2013    Renovascular hypertension 8/17/2020    S/P dilation and curettage 5/22/2018    Thickened endometrium 5/22/2018    Thyroid nodule     Thyroid nodule 12/13/2021    Uncontrolled type 2 diabetes mellitus 4/5/2013    Overview:  poc a1c today 11.2-270/ up from 10.8-263, Pt. has very very stressed/ was incarcerated/marital  stress/ will current meds/ less stress now/ will monitor.       Past Surgical History:   Procedure Laterality Date    AV FISTULA PLACEMENT      right arm     SECTION      x1    COLONOSCOPY N/A 2018    Procedure: COLONOSCOPY;  Surgeon: Pankaj Hinojosa MD;  Location: [facility] (4TH FLR);  Service: Endoscopy;  Laterality: N/A;  dialysis pt/labs prior to colon/MS    CYSTOSCOPY W/ RETROGRADES Bilateral 2020    Procedure: CYSTOSCOPY, WITH RETROGRADE PYELOGRAM;  Surgeon: Douglas Abraham MD;  Location: [facility] 1ST FLR;  Service: Urology;  Laterality: Bilateral;  45 min    FISTULOGRAM Right 2020    Procedure: Fistulogram;  Surgeon: Lester Gómez MD;  Location: [facility] CATH LAB;  Service: Nephrology;  Laterality: Right;    HYSTERECTOMY      IMPLANTATION OF COCHLEAR PROSTHESIS Right 2021    Procedure: INSERTION, PROSTHESIS, COCHLEAR;  Surgeon: Ramiro Zuleta MD;  Location: [facility] OR 2ND FLR;  Service: ENT;  Laterality: Right;  COCHLEAR BABAK    IMPLANTATION OF COCHLEAR PROSTHESIS Right 2021    Procedure: INSERTION, PROSTHESIS, COCHLEAR;  Surgeon: Ramiro Zuleta MD;  Location: [facility] OR 2ND FLR;  Service: ENT;  Laterality: Right;  [employer]    ROBOT-ASSISTED LAPAROSCOPIC ABDOMINAL HYSTERECTOMY USING DA NATALYA XI N/A 2019    Procedure: XI ROBOTIC HYSTERECTOMY;  Surgeon: Trice Lei MD;  Location: [facility];  Service: OB/GYN;  Laterality: N/A;    ROBOT-ASSISTED LAPAROSCOPIC NEPHRECTOMY Right 10/8/2020    Procedure: ROBOTIC NEPHRECTOMY;  Surgeon: Douglas Abraham MD;  Location: [facility] OR 2ND FLR;  Service: Urology;  Laterality: Right;  3.5hrs gen with regional     ROBOT-ASSISTED LAPAROSCOPIC SALPINGO-OOPHORECTOMY USING DA NATALYA XI Bilateral 2019    Procedure: XI ROBOTIC SALPINGO-OOPHORECTOMY;  Surgeon: Trice Lei MD;  Location: [facility];  Service: OB/GYN;  Laterality: Bilateral;    TUBAL LIGATION      Vascular access surgeries      x5       Review of patient's allergies  indicates:  No Known Allergies  Current Facility-Administered Medications   Medication Frequency    acetaminophen tablet 650 mg Q4H PRN    albuterol-ipratropium 2.5 mg-0.5 mg/3 mL nebulizer solution 3 mL Q4H PRN    aluminum-magnesium hydroxide-simethicone 200-200-20 mg/5 mL suspension 30 mL QID PRN    amLODIPine tablet 10 mg Nightly    aspirin EC tablet 81 mg Daily    dextrose 10% bolus 125 mL PRN    dextrose 10% bolus 250 mL PRN    [START ON 5/2/2022] furosemide tablet 80 mg Daily    glucagon (human recombinant) injection 1 mg PRN    glucose chewable tablet 16 g PRN    glucose chewable tablet 24 g PRN    heparin (porcine) injection 5,000 Units Q8H    hydrALAZINE tablet 50 mg Q8H    insulin aspart U-100 pen 0-5 Units QID (AC + HS) PRN    labetaloL tablet 300 mg TID    losartan tablet 100 mg Daily    melatonin tablet 6 mg Nightly PRN    naloxone 0.4 mg/mL injection 0.02 mg PRN    ondansetron disintegrating tablet 8 mg Q8H PRN    polyethylene glycol packet 17 g Daily PRN    pregabalin capsule 75 mg Daily    prochlorperazine injection Soln 5 mg Q6H PRN    sevelamer carbonate tablet 2,400 mg TID WM    simethicone chewable tablet 80 mg QID PRN    sodium chloride 0.9% flush 10 mL Q8H PRN     Current Outpatient Medications   Medication    acetaminophen (TYLENOL) 325 MG tablet    amLODIPine (NORVASC) 10 MG tablet    aspirin (ECOTRIN) 81 MG EC tablet    furosemide (LASIX) 80 MG tablet    hydrALAZINE (APRESOLINE) 50 MG tablet    labetaloL (NORMODYNE) 300 MG tablet    losartan (COZAAR) 100 MG tablet    multivitamin capsule    pregabalin (LYRICA) 75 MG capsule    sevelamer carbonate (RENVELA) 800 mg Tab    BLOOD PRESSURE CUFF Misc     Family History       Problem Relation (Age of Onset)    Cancer Mother, Sister, Sister    Diabetes Brother, Sister    Lung cancer Sister    Ovarian cancer Mother, Sister, Sister          Tobacco Use    Smoking status: Never Smoker    Smokeless tobacco: Never  Used   Substance and Sexual Activity    Alcohol use: No    Drug use: No    Sexual activity: Yes     Partners: Male     Birth control/protection: Post-menopausal     Review of Systems   Constitutional:  Negative for activity change, chills and fever.   Respiratory:  Positive for shortness of breath. Negative for choking.    Cardiovascular:  Negative for chest pain and leg swelling.        Recieves HD w/ RUE AVF   Gastrointestinal:  Negative for abdominal distention, abdominal pain, constipation, diarrhea, nausea and vomiting.   Genitourinary:         Still makes urine. On lasix daily    Musculoskeletal:  Positive for back pain.   Neurological:  Negative for dizziness and headaches.   Psychiatric/Behavioral:  Negative for confusion.    Objective:     Vital Signs (Most Recent):  Temp: 97.8 °F (36.6 °C) (05/01/22 0034)  Pulse: 63 (05/01/22 1102)  Resp: 18 (05/01/22 1102)  BP: (!) 168/75 (05/01/22 1102)  SpO2: 99 % (05/01/22 1102)  O2 Device (Oxygen Therapy): room air (05/01/22 0403)   Vital Signs (24h Range):  Temp:  [97.8 °F (36.6 °C)] 97.8 °F (36.6 °C)  Pulse:  [63-72] 63  Resp:  [16-22] 18  SpO2:  [93 %-99 %] 99 %  BP: (160-204)/() 168/75     Weight: 77.1 kg (170 lb) (05/01/22 0034)  Body mass index is 29.18 kg/m².  Body surface area is 1.87 meters squared.    No intake/output data recorded.    Physical Exam  Constitutional:       General: She is not in acute distress.  HENT:      Head: Normocephalic and atraumatic.   Eyes:      General: No scleral icterus.     Pupils: Pupils are equal, round, and reactive to light.   Cardiovascular:      Rate and Rhythm: Normal rate and regular rhythm.      Comments: RUE AVF looks enlarged, but is patent with palpable thrill and bruit on auscultation.   Pulmonary:      Effort: Pulmonary effort is normal. No respiratory distress.   Abdominal:      General: There is no distension.      Palpations: Abdomen is soft.      Tenderness: There is no abdominal tenderness. There is no  guarding or rebound.   Musculoskeletal:      Right lower leg: Edema present.      Left lower leg: Edema present.   Neurological:      Mental Status: She is alert.       Significant Labs:  All labs within the past 24 hours have been reviewed.    Significant Imaging:  Reviewed     Assessment/Plan:     ESRD (end stage renal disease) on dialysis  HD Schedule: MWF   [facility]MD: [facility]  Duration: 3 hours  EDW: 77  Access: RAVF  Residual Renal Function: minimal        End-Stage Renal Disease on HD  - Will provide dialysis for metabolic clearance and volume management per outpatient schedule; however, if patient is eligible for discharge today, we may be able to give short session of HD today prior to discharge.  -Recommend vascular surgery consult while inpatient vs outpatient for evaluation of enlarged AVF site   - Avoid nephrotoxic medications  - Medications to renal parameters  - Strict Input and Output and chart  - Daily standing weights    Nutrition   - Renal Diet                Thank you for your consult. I will follow-up with patient. Please contact us if you have any additional questions.    Fior Tom MD  Nephrology  [facility] - Emergency Dept

## 2022-05-01 NOTE — ASSESSMENT & PLAN NOTE
HD Schedule: MWF   Unit/MD: [facility]  Duration: 3 hours  EDW: 77  Access: RAVF  Residual Renal Function: minimal    End-Stage Renal Disease on HD  - Will provide dialysis for metabolic clearance and volume management per outpatient schedule; however, if patient is eligible for discharge today, we may be able to give short session of HD today prior to discharge.  - Avoid nephrotoxic medications  - Medications to renal parameters  - Strict Input and Output and chart  - Daily standing weights    Nutrition   - Renal Diet

## 2022-05-01 NOTE — ASSESSMENT & PLAN NOTE
Acute respiratory failure with hypoxia  Acute on chronic CHF    - O2 88% on RA upon arrival, placed on 2L NC\  - CXR with pulmonary edema, BNP>1000  - s/p 80 mg IV lasix in ED, monitor response prior to further diuresis  - volume removal with HD, nephrology consulted  - strict I&Os, daily weights, monitor O2 requirements  - fluid restricted diet

## 2022-05-01 NOTE — PROGRESS NOTES
Dialysis completed. Needles removed from RFA fistula with pressure held to sites for 5 minutes each with hemostasis achieved. Gauze and tape applied. Patient dialyzed for 3 hours with fluid removal of 3 liters. Tolerated well with stable vital signs.

## 2022-05-02 NOTE — HOSPITAL COURSE
Pt placed in observation for hypervolemia. Pt in hypertensive urgency in however improved w/ lasix IV, amlodipine and hydralazine. CXR remarkable for pulmonary edema. Nephrology consulted for HD needs. Pt was given HD session w/ improvement toward euvolemic state and improvement of dyspnea. Pt medically ready for discharge home and instructed to continue scheduled HD sessions. Pt educated on hospital course and plan, verbally agrees and understands. All questions answered.

## 2022-05-02 NOTE — ED NOTES
Telemetry Verification   Patient placed on Telemetry Box  Verified with War Room  Box # [id number]   Monitor Tech    Rate    Rhythm

## 2022-05-02 NOTE — ED NOTES
Discharge instructions, diagnosis, medications, and follow up discussed with patient. Patient verbalized understanding. All questions and concerns answered. No needs expressed at the time. Pt is awake, alert and oriented with no acute distress noted. Respirations even and unlabored. Ambulatory out of ED with daughter.

## 2022-05-02 NOTE — DISCHARGE SUMMARY
[facility] - Emergency Dept  Hospital Medicine  Discharge Summary      Patient Name: Jael Hall  MRN: [id number]  Patient Class: OP- Observation  Admission Date: 5/1/2022  Hospital Length of Stay: 0 days  Discharge Date and Time: 5/1/2022  8:14 PM  Attending Physician: Avel Mayen MD  Discharging Provider: Kai Alvarenga PA-C  Primary Care Provider: Mary Solano MD  [facility] Medicine Team: [facility] Kai Alvarenga PA-C    HPI:   Jael Hall is a 56 y.o. female with ESRD on HD MWF, hypertension, and diabetes who presented to the ED with shortness of breath. Her symptoms started around 9:00 p.m. last night. She tried to lay down and rest to see if it would go away, but that made it worse. This morning she developed lower back pain that feels similar to the last time she had a lot of fluid building up. She denies any chest pain, cough, abdominal pain, dysuria. She still makes urine. Her last HD session was Friday and she is compliant with her lasix and diet. She does drink too many fluids and does not weight herself daily.    ED: /82, O2 88% on RA so placed on 2L NC. BNP 1,483. CXR with pulmonary edema. She was given lasix 80 mg IV, amlodipine 10, and hydralazine 50 mg.      * No surgery found *      Hospital Course:   Pt placed in observation for hypervolemia. Pt in hypertensive urgency in however improved w/ lasix IV, amlodipine and hydralazine. CXR remarkable for pulmonary edema. Nephrology consulted for HD needs. Pt was given HD session w/ improvement toward euvolemic state and improvement of dyspnea. Pt medically ready for discharge home and instructed to continue scheduled HD sessions. Pt educated on hospital course and plan, verbally agrees and understands. All questions answered.        Goals of Care Treatment Preferences:  Code Status: Full Code      Consults:   Consults (From admission, onward)        Status Ordering Provider     Inpatient consult to Nephrology  Once         Provider:  (Not yet assigned)    VIET Blanco          * Hypervolemia associated with renal insufficiency  Acute respiratory failure with hypoxia  Acute on chronic CHF    - O2 88% on RA upon arrival, placed on 2L NC\  - CXR with pulmonary edema, BNP>1000  - s/p 80 mg IV lasix in ED, monitor response prior to further diuresis  - volume removal with HD, nephrology consulted  - strict I&Os, daily weights, monitor O2 requirements  - fluid restricted diet        Final Active Diagnoses:    Diagnosis Date Noted POA    PRINCIPAL PROBLEM:  Hypervolemia associated with renal insufficiency [E87.70, N28.9] 06/15/2021 Yes    Hypoxia [R09.02] 05/01/2022 Yes    Acute on chronic diastolic heart failure [I50.33] 10/08/2021 Yes    Diabetes mellitus due to underlying condition, uncontrolled, with chronic kidney disease [E08.22, E08.65] 07/27/2021 Yes    Hypertensive urgency [I16.0] 07/22/2021 Yes    Chronic kidney disease-mineral and bone disorder [N18.9, E83.9, M89.9] 10/08/2020 Yes    GERD (gastroesophageal reflux disease) [K21.9] 08/13/2020 Yes    Essential hypertension [I10]  Yes    ESRD (end stage renal disease) on dialysis [N18.6, Z99.2] 09/28/2017 Not Applicable      Problems Resolved During this Admission:       Discharged Condition: good    Disposition: Home or Self Care    Follow Up:    Patient Instructions:      Ambulatory referral/consult to Outpatient Case Management   Referral Priority: Routine Referral Type: Consultation   Referral Reason: Specialty Services Required   Number of Visits Requested: 1     Ambulatory referral/consult to Vascular Surgery   Standing Status: Future   Referral Priority: Urgent Referral Type: Consultation   Referral Reason: Specialty Services Required   Requested Specialty: Vascular Surgery   Number of Visits Requested: 1     Notify your health care provider if you experience any of the following:  increased confusion or weakness     Notify your health care provider  if you experience any of the following:  persistent dizziness, light-headedness, or visual disturbances     Notify your health care provider if you experience any of the following:  difficulty breathing or increased cough     Notify your health care provider if you experience any of the following:  severe uncontrolled pain     Notify your health care provider if you experience any of the following:  temperature >100.4     Activity as tolerated       Significant Diagnostic Studies: Labs: All labs within the past 24 hours have been reviewed    Pending Diagnostic Studies:     Procedure Component Value Units Date/Time    HIV 1/2 Ag/Ab (4th Gen) [[id number]] Collected: 05/01/22 0323    Order Status: Sent Lab Status: In process Updated: 05/01/22 0335    Specimen: Blood     Hepatitis C Antibody [[id number]] Collected: 05/01/22 0323    Order Status: Sent Lab Status: In process Updated: 05/01/22 0335    Specimen: Blood          Medications:  Reconciled Home Medications:      Medication List      CONTINUE taking these medications    acetaminophen 325 MG tablet  Commonly known as: TYLENOL  Take 650 mg by mouth every 8 (eight) hours as needed for Pain.     amLODIPine 10 MG tablet  Commonly known as: NORVASC  Take 1 tablet (10 mg total) by mouth nightly.     aspirin 81 MG EC tablet  Commonly known as: ECOTRIN  Take 81 mg by mouth once daily.     BLOOD PRESSURE CUFF Misc  Generic drug: miscellaneous medical supply  Use daily. Preferred by insurance.     furosemide 80 MG tablet  Commonly known as: LASIX  Take 1 tablet (80 mg total) by mouth once daily.     hydrALAZINE 50 MG tablet  Commonly known as: APRESOLINE  Take 1 tablet (50 mg total) by mouth every 8 (eight) hours.     labetaloL 300 MG tablet  Commonly known as: NORMODYNE  Take 1 tablet (300 mg total) by mouth 3 (three) times daily.     losartan 100 MG tablet  Commonly known as: COZAAR  Take 1 tablet (100 mg total) by mouth once daily.     multivitamin capsule  Take 1 capsule  by mouth once daily.     pregabalin 75 MG capsule  Commonly known as: LYRICA  Take 75 mg by mouth once daily.     sevelamer carbonate 800 mg Tab  Commonly known as: RENVELA  Take 2,400 mg by mouth 3 (three) times daily with meals.            Indwelling Lines/Drains at time of discharge:   Lines/Drains/Airways     Drain  Duration                Hemodialysis AV Fistula Left forearm -- days         Hemodialysis AV Fistula Right forearm -- days         Hemodialysis AV Fistula Right upper arm -- days                Time spent on the discharge of patient: 36 minutes         Kai Alvarenga PA-C  Department of Hospital Medicine  [facility] - Emergency Dept

## 2022-05-03 LAB — HCV AB SERPL QL IA: NEGATIVE

## 2022-05-04 LAB — HIV 1+2 AB+HIV1 P24 AG SERPL QL IA: NEGATIVE

## 2022-05-16 ENCOUNTER — HOSPITAL ENCOUNTER (OUTPATIENT)
Dept: RADIOLOGY | Facility: HOSPITAL | Age: 57
Discharge: HOME | End: 2022-05-16
Attending: UROLOGY
Payer: MEDICARE

## 2022-05-16 DIAGNOSIS — C64.1 RENAL CELL CARCINOMA OF RIGHT KIDNEY: ICD-10-CM

## 2022-05-16 PROCEDURE — 71046 X-RAY EXAM CHEST 2 VIEWS: CPT | Mod: 26,NTX,, | Performed by: RADIOLOGY

## 2022-05-16 PROCEDURE — 71046 X-RAY EXAM CHEST 2 VIEWS: CPT | Mod: TC,NTX

## 2022-05-16 PROCEDURE — 71046 XR CHEST PA AND LATERAL: ICD-10-PCS | Mod: 26,NTX,, | Performed by: RADIOLOGY

## 2022-05-17 ENCOUNTER — OFFICE VISIT (OUTPATIENT)
Dept: UROLOGY | Facility: CLINIC | Age: 57
End: 2022-05-17
Payer: MEDICARE

## 2022-05-17 VITALS
SYSTOLIC BLOOD PRESSURE: 133 MMHG | HEIGHT: 64 IN | BODY MASS INDEX: 29.76 KG/M2 | DIASTOLIC BLOOD PRESSURE: 66 MMHG | WEIGHT: 174.31 LBS | HEART RATE: 65 BPM

## 2022-05-17 DIAGNOSIS — C64.1 RENAL CELL CARCINOMA OF RIGHT KIDNEY: Primary | ICD-10-CM

## 2022-05-17 DIAGNOSIS — I10 PRIMARY HYPERTENSION: ICD-10-CM

## 2022-05-17 DIAGNOSIS — Z99.2 ESRD (END STAGE RENAL DISEASE) ON DIALYSIS: ICD-10-CM

## 2022-05-17 DIAGNOSIS — N18.6 ESRD (END STAGE RENAL DISEASE) ON DIALYSIS: ICD-10-CM

## 2022-05-17 PROCEDURE — 99999 PR PBB SHADOW E&M-EST. PATIENT-LVL III: CPT | Mod: PBBFAC,TXP,, | Performed by: UROLOGY

## 2022-05-17 PROCEDURE — 99213 OFFICE O/P EST LOW 20 MIN: CPT | Mod: PBBFAC,TXP | Performed by: UROLOGY

## 2022-05-17 PROCEDURE — 99999 PR PBB SHADOW E&M-EST. PATIENT-LVL III: ICD-10-PCS | Mod: PBBFAC,TXP,, | Performed by: UROLOGY

## 2022-05-17 PROCEDURE — 99214 PR OFFICE/OUTPT VISIT, EST, LEVL IV, 30-39 MIN: ICD-10-PCS | Mod: S$PBB,NTX,, | Performed by: UROLOGY

## 2022-05-17 PROCEDURE — 99214 OFFICE O/P EST MOD 30 MIN: CPT | Mod: S$PBB,NTX,, | Performed by: UROLOGY

## 2022-05-17 RX ORDER — HYDRALAZINE HYDROCHLORIDE 100 MG/1
100 TABLET, FILM COATED ORAL 3 TIMES DAILY
COMMUNITY
Start: 2022-05-10 | End: 2024-03-07

## 2022-05-17 RX ORDER — ATORVASTATIN CALCIUM 40 MG/1
40 TABLET, FILM COATED ORAL DAILY
COMMUNITY
Start: 2022-05-16 | End: 2022-09-22

## 2022-05-17 NOTE — PROGRESS NOTES
"    Subjective:       Patient ID: Jael Hall is a 56 y.o. female.    Chief Complaint:  RCC f/u       History of Present Illness  HPI  Patient is a 56 y.o. female who is established to our clinic and referred by Natalee Resendiz NP for evaluation of a right renal mass.  She underwent a right robotic nephrectomy on 10/8/20.  She returns today to review imaging.  She has been dealing with hearing loss for over a year.  She has right sided complete hearing loss.  She has been seen by ENT. She states her doctors do not know what caused this.     She had a CXR yesterday with no pulmonary mets.  She was supposed to have a CT abd but this was not performed due to the IV contrast shortage.     No unexpected weight loss.     Patient is a never smoker.     Has a h/o hysterectomy---no other abdominal surgeries.     Patient has ESRD and is on HD---Monday, Wednesday, Friday through a right wrist AVF.  She has DM and HTN.     +fh of ovarian cancer---sister.        PATHOLOGY:    Final Pathologic Diagnosis RIGHT KIDNEY:   RENAL CELL CARCINOMA WITH NO CARCINOMA IDENTIFIED IN PERINEPHRIC ADIPOSE   TISSUE OR IN MARGINS   END-STAGE DIABETIC RENAL DISEASE    Comment: Interp By Harsh Adame M.D., Signed on 10/12/2020 at 13:41   [facility] Label and Clinic/AP Number:  [id number]   Received in formalin labeled "1.  Right kidney" is a 663 g, 16.5 x 13.5 x 4.0   cm right total nephrectomy specimen consisting of a 5.8 cm in length by 0.6   cm in diameter ureter, 4.0 cm in length by 1.2 cm in diameter renal artery, a   2.5 cm in length by 0.9 cm in diameter renal vein, and an 11.0 x 7.0 x 5.8 cm   kidney encapsulated in 3.5 cm-thick perinephric fat.  The ureter is opened to   reveal a patent, pinpoint, grossly unremarkable lumen.  The renal vessels are   patent.  The kidney is bivalved to reveal a 2.7 x 2.4 x 2.3 cm variegated,   tan-yellow, focally hemorrhagic, partially glistening and gelatinous   well-circumscribed mass within the " cortical midpole of the kidney.  The mass   grossly extends less than 0.1 cm from the renal capsule, 3.0 cm from Gerota's   fascia, 0.5 cm from the renal sinus fat and renal pelvis.  No involvement of   renal vein is grossly identified.  Additionally, there is a 0.8 x 0.7 x 0.6   cm red-brown, hemorrhagic cystic nodule grossly abutting the upper pole of   the renal capsule, and grossly extending 3.1 cm from the primary mass.  The   remaining renal parenchyma consist of multiple simple cysts ranging from   0.1-1.0 cm in diameter.  The renal parenchyma is tan-pink with distinct   corticomedullary junctions.  [place] [id number] are submitted as follows:   Cassette key:   A:  Ureter and renal vessel resection margins   B-C:  Midpole renal mass with adjacent renal sinus and renal capsule   D:  Mid pole renal mass with adjacent grossly uninvolved renal parenchyma   E:  Additional upper pole hemorrhagic cystic nodule with adjacent renal   parenchyma   F:  Upper pole renal parenchyma with cysts   G:  Lower pole renal parenchyma with cysts   Gross by: Chastity Moss    Microscopic Exam Kidney:    Right nephrectomy   Tumor site:    Renal parenchyma   Tumor size:    2.7 cm.   Tumor focality:    There is the principal mass but in addition is a 1 mm   separate papillary lesion.   Anatomic extent of tumor:    Limited to the kidney   Histologic type:    Renal carcinoma, clear cell variant   thGthrthathdtheth:th th4th of 4.   Sarcomatoid features:    Not identified.   Rhabdoid Features:  Not identified   Tumor Necrosis:  Not identified   Adrenal gland:    Absent   Margins:    No carcinoma identified in margins   Kidney apart from tumor:    This is an end-stage kidney with glomerular   changes typical of diabetic disease   Hilar lymph nodes:    Absent   Pathologic stage (AJCC 2010): pT1a pNX pMX   Block for possible further studies: 1B          Review of Systems  Review of Systems  All other systems reviewed and negative except pertinent positives  noted in HPI.       Objective:     Physical Exam  Constitutional:       General: She is not in acute distress.     Appearance: She is well-developed.   HENT:      Head: Normocephalic and atraumatic.   Eyes:      General: No scleral icterus.  Neck:      Trachea: No tracheal deviation.   Pulmonary:      Effort: Pulmonary effort is normal. No respiratory distress.   Neurological:      Mental Status: She is alert and oriented to person, place, and time.   Psychiatric:         Behavior: Behavior normal.         Thought Content: Thought content normal.         Judgment: Judgment normal.         Lab Review  Lab Results   Component Value Date    COLORU Yellow 05/01/2022    SPECGRAV 1.010 05/01/2022    PHUR >8.0 (A) 05/01/2022    NITRITE Negative 05/01/2022    KETONESU Negative 05/01/2022    UROBILINOGEN Negative 10/18/2019         Assessment:        1. Renal cell carcinoma of right kidney    2. ESRD (end stage renal disease) on dialysis    3. Primary hypertension            Plan:     Renal cell carcinoma of right kidney  -     MRI Abdomen Without Contrast; Future; Expected date: 05/17/2022    ESRD (end stage renal disease) on dialysis    Primary hypertension      - pathology again reviewed.  -MRI abd now. Will notify of results.  -plan MRI abdomen and CXR  in 12 months.   -ok for transplant from  perspective pending review of MRI abd.     -continue dialysis for ESRD    --BP reviewed  -stable, continue meds and f/u with PCP

## 2022-05-26 DIAGNOSIS — Z76.82 ORGAN TRANSPLANT CANDIDATE: Primary | ICD-10-CM

## 2022-06-01 ENCOUNTER — TELEPHONE (OUTPATIENT)
Dept: TRANSPLANT | Facility: CLINIC | Age: 57
End: 2022-06-01
Payer: MEDICARE

## 2022-06-02 ENCOUNTER — TELEPHONE (OUTPATIENT)
Dept: TRANSPLANT | Facility: CLINIC | Age: 57
End: 2022-06-02
Payer: MEDICARE

## 2022-06-07 ENCOUNTER — OFFICE VISIT (OUTPATIENT)
Dept: OPTOMETRY | Facility: CLINIC | Age: 57
End: 2022-06-07
Payer: MEDICARE

## 2022-06-07 DIAGNOSIS — H52.03 HYPEROPIA WITH PRESBYOPIA, BILATERAL: ICD-10-CM

## 2022-06-07 DIAGNOSIS — E11.36 TYPE 2 DIABETES MELLITUS WITH CATARACT: ICD-10-CM

## 2022-06-07 DIAGNOSIS — H35.033 HYPERTENSIVE RETINOPATHY OF BOTH EYES, GRADE 1: ICD-10-CM

## 2022-06-07 DIAGNOSIS — H25.813 COMBINED FORMS OF AGE-RELATED CATARACT OF BOTH EYES: ICD-10-CM

## 2022-06-07 DIAGNOSIS — Z01.00 DIABETIC EYE EXAM: ICD-10-CM

## 2022-06-07 DIAGNOSIS — H52.4 HYPEROPIA WITH PRESBYOPIA, BILATERAL: ICD-10-CM

## 2022-06-07 DIAGNOSIS — E11.9 TYPE 2 DIABETES MELLITUS WITHOUT RETINOPATHY: Primary | ICD-10-CM

## 2022-06-07 DIAGNOSIS — E11.9 DIABETIC EYE EXAM: ICD-10-CM

## 2022-06-07 DIAGNOSIS — H11.153 PINGUECULA, BILATERAL: ICD-10-CM

## 2022-06-07 PROCEDURE — 92004 COMPRE OPH EXAM NEW PT 1/>: CPT | Mod: S$PBB,,, | Performed by: OPTOMETRIST

## 2022-06-07 PROCEDURE — 92015 PR REFRACTION: ICD-10-PCS | Mod: ,,, | Performed by: OPTOMETRIST

## 2022-06-07 PROCEDURE — 99211 OFF/OP EST MAY X REQ PHY/QHP: CPT | Mod: PBBFAC | Performed by: OPTOMETRIST

## 2022-06-07 PROCEDURE — 99999 PR PBB SHADOW E&M-EST. PATIENT-LVL I: CPT | Mod: PBBFAC,,, | Performed by: OPTOMETRIST

## 2022-06-07 PROCEDURE — 92015 DETERMINE REFRACTIVE STATE: CPT | Mod: ,,, | Performed by: OPTOMETRIST

## 2022-06-07 PROCEDURE — 99999 PR PBB SHADOW E&M-EST. PATIENT-LVL I: ICD-10-PCS | Mod: PBBFAC,,, | Performed by: OPTOMETRIST

## 2022-06-07 PROCEDURE — 92004 PR EYE EXAM, NEW PATIENT,COMPREHESV: ICD-10-PCS | Mod: S$PBB,,, | Performed by: OPTOMETRIST

## 2022-06-07 NOTE — PROGRESS NOTES
HPI     Pt is a 56 y.o femlae here today for a routine eye exam with complaints   of blurred vision at near and distance, states she wore glasses a long   time ago   Pt states she is on dyalisis   + Diabetic LBL 77 this morning     Hemoglobin A1C       Date                     Value               Ref Range             Status                04/20/2022               5.6                 4.7 - 5.6 %           Final                 01/18/2022               4.4 (L)             4.7 - 5.6 %           Final                 10/08/2021               4.3                 4.0 - 5.6 %           Final             Last edited by Flavia Eller on 6/7/2022  8:58 AM. (History)        ROS     Positive for: Gastrointestinal, Endocrine, Cardiovascular    Negative for: Constitutional, Neurological, Skin, Genitourinary,   Musculoskeletal, HENT, Eyes, Respiratory, Psychiatric, Allergic/Imm,   Heme/Lymph    Last edited by Tamhina Yoder, OD on 6/7/2022  9:22 AM. (History)        Assessment /Plan     For exam results, see Encounter Report.    Type 2 diabetes mellitus without retinopathy    Type 2 diabetes mellitus with cataract    Hypertensive retinopathy of both eyes, grade 1    Combined forms of age-related cataract of both eyes    Diabetes mellitus due to underlying condition, uncontrolled, with chronic kidney disease    Pinguecula, bilateral    Hyperopia with presbyopia, bilateral    Diabetic eye exam      MONITOR. ED PT ON ALL EXAM FINDINGS  RX FINAL SPECS   RTC 1 YR//PRN FOR REE/DFE   TYPE 2 DM W/O RETINOPATHY OU; CONTINUE WITH PCP FOR GLYCEMIC AND BP CARE/CONTROL.   MILD COMBO CATS; MILDLY VISUALLY SIGNIFICANT OU; NO SURGICAL INTERVENTION NEEDED AT THIS TIME; UV PROTECTION

## 2022-07-12 ENCOUNTER — CLINICAL SUPPORT (OUTPATIENT)
Dept: AUDIOLOGY | Facility: CLINIC | Age: 57
End: 2022-07-12
Payer: MEDICARE

## 2022-07-12 DIAGNOSIS — H90.3 SENSORINEURAL HEARING LOSS (SNHL) OF BOTH EARS: ICD-10-CM

## 2022-07-12 PROCEDURE — 92604 REPROGRAM COCHLEAR IMPLT 7/>: CPT | Mod: PBBFAC | Performed by: AUDIOLOGIST

## 2022-07-13 NOTE — PROGRESS NOTES
7/12/2022    Cochlear Implant Programming Session:    Right Ear:  Dr. Zuleta  :  [employer]  Internal Device/Serial Number:  [id number]   Processor:  [id number]   SN of Processors: [id number]  DOS:  6/30/21  DIS:  7/20/21  Processor Color: Black  Magnet Strength: 5i  Coil Length:  6cm  Battery Type:  Standard rechargeable  Warranty:  5 year - July 19, 2024     MINI REGINO issued  TV streamer issued    Jael Hall was seen for a follow up programming session with her cochlear implant sound processor.  Mrs. Hall stated the processor was not working properly and she was wearing the back up processor.  A new cable coil had been received.  Mrs. Hall needed assistance with setting up both processors.  Replacement cables were requested for each sound processor as well as covers for the 5i magnet.  These supplies will be mailed directly to her home address.  Mrs. Hall's home address was updated in the [facility] system and with [employer].    Mrs. Hall stated the processor sounded clear today.    The wireless accessories were reviewed again today with Mrs. Hall and her daughter.  Mrs. Hall acknowledged understanding of proper set up and usage of the wireless accessories.    Recommend:  1.  Cochlear implant supplies as needed.  2.  Follow up programming as needed.  3.  Annual evaluation.

## 2022-07-26 ENCOUNTER — TELEPHONE (OUTPATIENT)
Dept: CARDIOLOGY | Facility: HOSPITAL | Age: 57
End: 2022-07-26
Payer: MEDICARE

## 2022-08-03 ENCOUNTER — TELEPHONE (OUTPATIENT)
Dept: TRANSPLANT | Facility: CLINIC | Age: 57
End: 2022-08-03
Payer: MEDICARE

## 2022-08-14 ENCOUNTER — HOSPITAL ENCOUNTER (OUTPATIENT)
Facility: HOSPITAL | Age: 57
Discharge: HOME | End: 2022-08-15
Attending: EMERGENCY MEDICINE | Admitting: EMERGENCY MEDICINE
Payer: MEDICARE

## 2022-08-14 DIAGNOSIS — E87.5 HYPERKALEMIA: ICD-10-CM

## 2022-08-14 DIAGNOSIS — M25.471 ANKLE EDEMA, BILATERAL: Primary | ICD-10-CM

## 2022-08-14 DIAGNOSIS — M25.472 ANKLE EDEMA, BILATERAL: Primary | ICD-10-CM

## 2022-08-14 DIAGNOSIS — N18.6 ESRD (END STAGE RENAL DISEASE) ON DIALYSIS: ICD-10-CM

## 2022-08-14 DIAGNOSIS — R07.9 CHEST PAIN: ICD-10-CM

## 2022-08-14 DIAGNOSIS — I50.33 ACUTE ON CHRONIC DIASTOLIC CONGESTIVE HEART FAILURE: ICD-10-CM

## 2022-08-14 DIAGNOSIS — R60.0 LEG EDEMA: ICD-10-CM

## 2022-08-14 DIAGNOSIS — Z99.2 ESRD (END STAGE RENAL DISEASE) ON DIALYSIS: ICD-10-CM

## 2022-08-14 LAB
ALBUMIN SERPL BCP-MCNC: 3.1 G/DL (ref 3.5–5.2)
ALP SERPL-CCNC: 100 U/L (ref 55–135)
ALT SERPL W/O P-5'-P-CCNC: 8 U/L (ref 10–44)
ANION GAP SERPL CALC-SCNC: 15 MMOL/L (ref 8–16)
AST SERPL-CCNC: 12 U/L (ref 10–40)
BASOPHILS # BLD AUTO: 0.07 K/UL (ref 0–0.2)
BASOPHILS NFR BLD: 0.7 % (ref 0–1.9)
BILIRUB SERPL-MCNC: 0.7 MG/DL (ref 0.1–1)
BNP SERPL-MCNC: 537 PG/ML (ref 0–99)
BUN SERPL-MCNC: 61 MG/DL (ref 6–30)
BUN SERPL-MCNC: 64 MG/DL (ref 6–20)
BUN SERPL-MCNC: 67 MG/DL (ref 6–30)
CALCIUM SERPL-MCNC: 9.8 MG/DL (ref 8.7–10.5)
CHLORIDE SERPL-SCNC: 94 MMOL/L (ref 95–110)
CHLORIDE SERPL-SCNC: 99 MMOL/L (ref 95–110)
CHLORIDE SERPL-SCNC: 99 MMOL/L (ref 95–110)
CO2 SERPL-SCNC: 25 MMOL/L (ref 23–29)
CREAT SERPL-MCNC: 10.2 MG/DL (ref 0.5–1.4)
CREAT SERPL-MCNC: 9.5 MG/DL (ref 0.5–1.4)
CREAT SERPL-MCNC: 9.9 MG/DL (ref 0.5–1.4)
DIFFERENTIAL METHOD: ABNORMAL
EOSINOPHIL # BLD AUTO: 1.1 K/UL (ref 0–0.5)
EOSINOPHIL NFR BLD: 11.9 % (ref 0–8)
ERYTHROCYTE [DISTWIDTH] IN BLOOD BY AUTOMATED COUNT: 14.6 % (ref 11.5–14.5)
EST. GFR  (NO RACE VARIABLE): 4.4 ML/MIN/1.73 M^2
GLUCOSE SERPL-MCNC: 123 MG/DL (ref 70–110)
GLUCOSE SERPL-MCNC: 123 MG/DL (ref 70–110)
GLUCOSE SERPL-MCNC: 127 MG/DL (ref 70–110)
HCT VFR BLD AUTO: 27.7 % (ref 37–48.5)
HCT VFR BLD CALC: 27 %PCV (ref 36–54)
HCT VFR BLD CALC: 28 %PCV (ref 36–54)
HGB BLD-MCNC: 9.3 G/DL (ref 12–16)
IMM GRANULOCYTES # BLD AUTO: 0.02 K/UL (ref 0–0.04)
IMM GRANULOCYTES NFR BLD AUTO: 0.2 % (ref 0–0.5)
LYMPHOCYTES # BLD AUTO: 1.5 K/UL (ref 1–4.8)
LYMPHOCYTES NFR BLD: 16.2 % (ref 18–48)
MCH RBC QN AUTO: 27.6 PG (ref 27–31)
MCHC RBC AUTO-ENTMCNC: 33.6 G/DL (ref 32–36)
MCV RBC AUTO: 82 FL (ref 82–98)
MONOCYTES # BLD AUTO: 1.2 K/UL (ref 0.3–1)
MONOCYTES NFR BLD: 12.5 % (ref 4–15)
NEUTROPHILS # BLD AUTO: 5.5 K/UL (ref 1.8–7.7)
NEUTROPHILS NFR BLD: 58.5 % (ref 38–73)
NRBC BLD-RTO: 0 /100 WBC
PLATELET # BLD AUTO: 221 K/UL (ref 150–450)
PMV BLD AUTO: 11.1 FL (ref 9.2–12.9)
POC IONIZED CALCIUM: 1.05 MMOL/L (ref 1.06–1.42)
POC IONIZED CALCIUM: 1.1 MMOL/L (ref 1.06–1.42)
POC TCO2 (MEASURED): 30 MMOL/L (ref 23–29)
POC TCO2 (MEASURED): 33 MMOL/L (ref 23–29)
POCT GLUCOSE: 102 MG/DL (ref 70–110)
POTASSIUM BLD-SCNC: 6.7 MMOL/L (ref 3.5–5.1)
POTASSIUM BLD-SCNC: 7 MMOL/L (ref 3.5–5.1)
POTASSIUM SERPL-SCNC: 6.8 MMOL/L (ref 3.5–5.1)
PROT SERPL-MCNC: 7.4 G/DL (ref 6–8.4)
RBC # BLD AUTO: 3.37 M/UL (ref 4–5.4)
SAMPLE: ABNORMAL
SAMPLE: ABNORMAL
SODIUM BLD-SCNC: 134 MMOL/L (ref 136–145)
SODIUM BLD-SCNC: 135 MMOL/L (ref 136–145)
SODIUM SERPL-SCNC: 134 MMOL/L (ref 136–145)
WBC # BLD AUTO: 9.34 K/UL (ref 3.9–12.7)

## 2022-08-14 PROCEDURE — 25000003 PHARM REV CODE 250: Mod: NTX | Performed by: STUDENT IN AN ORGANIZED HEALTH CARE EDUCATION/TRAINING PROGRAM

## 2022-08-14 PROCEDURE — 93010 EKG 12-LEAD: ICD-10-PCS | Mod: NTX,,, | Performed by: INTERNAL MEDICINE

## 2022-08-14 PROCEDURE — 96368 THER/DIAG CONCURRENT INF: CPT | Mod: NTX

## 2022-08-14 PROCEDURE — G0378 HOSPITAL OBSERVATION PER HR: HCPCS | Mod: NTX

## 2022-08-14 PROCEDURE — 99291 CRITICAL CARE FIRST HOUR: CPT | Mod: NTX,,, | Performed by: EMERGENCY MEDICINE

## 2022-08-14 PROCEDURE — 80047 BASIC METABLC PNL IONIZED CA: CPT | Mod: NTX

## 2022-08-14 PROCEDURE — 82962 GLUCOSE BLOOD TEST: CPT | Mod: NTX

## 2022-08-14 PROCEDURE — 93010 ELECTROCARDIOGRAM REPORT: CPT | Mod: NTX,,, | Performed by: INTERNAL MEDICINE

## 2022-08-14 PROCEDURE — 80053 COMPREHEN METABOLIC PANEL: CPT | Mod: NTX | Performed by: EMERGENCY MEDICINE

## 2022-08-14 PROCEDURE — 85025 COMPLETE CBC W/AUTO DIFF WBC: CPT | Mod: NTX | Performed by: EMERGENCY MEDICINE

## 2022-08-14 PROCEDURE — 96361 HYDRATE IV INFUSION ADD-ON: CPT | Mod: NTX

## 2022-08-14 PROCEDURE — 83880 ASSAY OF NATRIURETIC PEPTIDE: CPT | Mod: NTX | Performed by: EMERGENCY MEDICINE

## 2022-08-14 PROCEDURE — 96375 TX/PRO/DX INJ NEW DRUG ADDON: CPT | Mod: NTX

## 2022-08-14 PROCEDURE — 25000242 PHARM REV CODE 250 ALT 637 W/ HCPCS: Mod: NTX | Performed by: EMERGENCY MEDICINE

## 2022-08-14 PROCEDURE — 94640 AIRWAY INHALATION TREATMENT: CPT | Mod: NTX

## 2022-08-14 PROCEDURE — 82330 ASSAY OF CALCIUM: CPT | Mod: 91

## 2022-08-14 PROCEDURE — 99291 PR CRITICAL CARE, E/M 30-74 MINUTES: ICD-10-PCS | Mod: NTX,,, | Performed by: EMERGENCY MEDICINE

## 2022-08-14 PROCEDURE — 96365 THER/PROPH/DIAG IV INF INIT: CPT | Mod: NTX

## 2022-08-14 PROCEDURE — 99291 CRITICAL CARE FIRST HOUR: CPT | Mod: 25,NTX

## 2022-08-14 PROCEDURE — 83036 HEMOGLOBIN GLYCOSYLATED A1C: CPT | Mod: NTX | Performed by: EMERGENCY MEDICINE

## 2022-08-14 PROCEDURE — 94761 N-INVAS EAR/PLS OXIMETRY MLT: CPT | Mod: NTX

## 2022-08-14 PROCEDURE — 96374 THER/PROPH/DIAG INJ IV PUSH: CPT | Mod: NTX

## 2022-08-14 PROCEDURE — 93005 ELECTROCARDIOGRAM TRACING: CPT | Mod: NTX

## 2022-08-14 PROCEDURE — 25000003 PHARM REV CODE 250: Mod: NTX | Performed by: EMERGENCY MEDICINE

## 2022-08-14 PROCEDURE — 63600175 PHARM REV CODE 636 W HCPCS: Mod: NTX | Performed by: EMERGENCY MEDICINE

## 2022-08-14 RX ORDER — SODIUM CHLORIDE 0.9 % (FLUSH) 0.9 %
10 SYRINGE (ML) INJECTION
Status: CANCELLED | OUTPATIENT
Start: 2022-08-14

## 2022-08-14 RX ORDER — GLUCAGON 1 MG
1 KIT INJECTION
Status: DISCONTINUED | OUTPATIENT
Start: 2022-08-15 | End: 2022-08-15 | Stop reason: HOSPADM

## 2022-08-14 RX ORDER — SEVELAMER CARBONATE 800 MG/1
2400 TABLET, FILM COATED ORAL
Status: DISCONTINUED | OUTPATIENT
Start: 2022-08-14 | End: 2022-08-15 | Stop reason: HOSPADM

## 2022-08-14 RX ORDER — IBUPROFEN 200 MG
16 TABLET ORAL
Status: DISCONTINUED | OUTPATIENT
Start: 2022-08-15 | End: 2022-08-15 | Stop reason: HOSPADM

## 2022-08-14 RX ORDER — INDOMETHACIN 25 MG/1
50 CAPSULE ORAL
Status: COMPLETED | OUTPATIENT
Start: 2022-08-14 | End: 2022-08-14

## 2022-08-14 RX ORDER — TALC
6 POWDER (GRAM) TOPICAL NIGHTLY PRN
Status: CANCELLED | OUTPATIENT
Start: 2022-08-14

## 2022-08-14 RX ORDER — LOSARTAN POTASSIUM 50 MG/1
100 TABLET ORAL DAILY
Status: DISCONTINUED | OUTPATIENT
Start: 2022-08-15 | End: 2022-08-15 | Stop reason: HOSPADM

## 2022-08-14 RX ORDER — CALCIUM GLUCONATE 20 MG/ML
1 INJECTION, SOLUTION INTRAVENOUS
Status: COMPLETED | OUTPATIENT
Start: 2022-08-15 | End: 2022-08-15

## 2022-08-14 RX ORDER — ACETAMINOPHEN 325 MG/1
650 TABLET ORAL EVERY 8 HOURS PRN
Status: DISCONTINUED | OUTPATIENT
Start: 2022-08-15 | End: 2022-08-15 | Stop reason: HOSPADM

## 2022-08-14 RX ORDER — IBUPROFEN 200 MG
24 TABLET ORAL
Status: DISCONTINUED | OUTPATIENT
Start: 2022-08-15 | End: 2022-08-15 | Stop reason: HOSPADM

## 2022-08-14 RX ORDER — ASPIRIN 81 MG/1
81 TABLET ORAL DAILY
Status: DISCONTINUED | OUTPATIENT
Start: 2022-08-15 | End: 2022-08-15 | Stop reason: HOSPADM

## 2022-08-14 RX ORDER — ALBUTEROL SULFATE 2.5 MG/.5ML
10 SOLUTION RESPIRATORY (INHALATION)
Status: COMPLETED | OUTPATIENT
Start: 2022-08-14 | End: 2022-08-14

## 2022-08-14 RX ORDER — TALC
6 POWDER (GRAM) TOPICAL NIGHTLY
Status: DISCONTINUED | OUTPATIENT
Start: 2022-08-14 | End: 2022-08-15 | Stop reason: HOSPADM

## 2022-08-14 RX ORDER — AMLODIPINE BESYLATE 10 MG/1
10 TABLET ORAL NIGHTLY
Status: DISCONTINUED | OUTPATIENT
Start: 2022-08-15 | End: 2022-08-15 | Stop reason: HOSPADM

## 2022-08-14 RX ORDER — HYDRALAZINE HYDROCHLORIDE 50 MG/1
100 TABLET, FILM COATED ORAL 3 TIMES DAILY
Status: DISCONTINUED | OUTPATIENT
Start: 2022-08-14 | End: 2022-08-15 | Stop reason: HOSPADM

## 2022-08-14 RX ORDER — POLYETHYLENE GLYCOL 3350 17 G/17G
17 POWDER, FOR SOLUTION ORAL 2 TIMES DAILY PRN
Status: DISCONTINUED | OUTPATIENT
Start: 2022-08-15 | End: 2022-08-15 | Stop reason: HOSPADM

## 2022-08-14 RX ORDER — NALOXONE HCL 0.4 MG/ML
0.02 VIAL (ML) INJECTION
Status: DISCONTINUED | OUTPATIENT
Start: 2022-08-15 | End: 2022-08-15 | Stop reason: HOSPADM

## 2022-08-14 RX ORDER — ATORVASTATIN CALCIUM 40 MG/1
40 TABLET, FILM COATED ORAL DAILY
Status: DISCONTINUED | OUTPATIENT
Start: 2022-08-15 | End: 2022-08-15 | Stop reason: HOSPADM

## 2022-08-14 RX ORDER — CALCIUM GLUCONATE 20 MG/ML
1 INJECTION, SOLUTION INTRAVENOUS
Status: COMPLETED | OUTPATIENT
Start: 2022-08-14 | End: 2022-08-14

## 2022-08-14 RX ORDER — PREGABALIN 75 MG/1
75 CAPSULE ORAL DAILY
Status: DISCONTINUED | OUTPATIENT
Start: 2022-08-15 | End: 2022-08-15 | Stop reason: HOSPADM

## 2022-08-14 RX ADMIN — DEXTROSE MONOHYDRATE 250 ML: 10 INJECTION, SOLUTION INTRAVENOUS at 11:08

## 2022-08-14 RX ADMIN — Medication 6 MG: at 11:08

## 2022-08-14 RX ADMIN — SODIUM BICARBONATE 50 MEQ: 84 INJECTION, SOLUTION INTRAVENOUS at 10:08

## 2022-08-14 RX ADMIN — LABETALOL HYDROCHLORIDE 300 MG: 100 TABLET, FILM COATED ORAL at 11:08

## 2022-08-14 RX ADMIN — INSULIN HUMAN 5 UNITS: 100 INJECTION, SOLUTION PARENTERAL at 10:08

## 2022-08-14 RX ADMIN — ALBUTEROL SULFATE 10 MG: 2.5 SOLUTION RESPIRATORY (INHALATION) at 10:08

## 2022-08-14 RX ADMIN — SEVELAMER CARBONATE 2400 MG: 800 TABLET, FILM COATED ORAL at 11:08

## 2022-08-14 RX ADMIN — ACETAMINOPHEN 650 MG: 325 TABLET ORAL at 11:08

## 2022-08-14 RX ADMIN — HYDRALAZINE HYDROCHLORIDE 100 MG: 25 TABLET ORAL at 11:08

## 2022-08-14 RX ADMIN — CALCIUM GLUCONATE 1 G: 20 INJECTION, SOLUTION INTRAVENOUS at 10:08

## 2022-08-14 RX ADMIN — SODIUM ZIRCONIUM CYCLOSILICATE 10 G: 10 POWDER, FOR SUSPENSION ORAL at 10:08

## 2022-08-15 VITALS
WEIGHT: 172 LBS | HEIGHT: 64 IN | RESPIRATION RATE: 16 BRPM | DIASTOLIC BLOOD PRESSURE: 62 MMHG | TEMPERATURE: 99 F | OXYGEN SATURATION: 95 % | BODY MASS INDEX: 29.37 KG/M2 | HEART RATE: 69 BPM | SYSTOLIC BLOOD PRESSURE: 143 MMHG

## 2022-08-15 PROBLEM — M25.472 ANKLE EDEMA, BILATERAL: Status: ACTIVE | Noted: 2022-08-15

## 2022-08-15 PROBLEM — M25.471 ANKLE EDEMA, BILATERAL: Status: ACTIVE | Noted: 2022-08-15

## 2022-08-15 PROBLEM — D63.1 ANEMIA IN ESRD (END-STAGE RENAL DISEASE): Status: ACTIVE | Noted: 2022-08-15

## 2022-08-15 PROBLEM — N18.6 ANEMIA IN ESRD (END-STAGE RENAL DISEASE): Status: ACTIVE | Noted: 2022-08-15

## 2022-08-15 LAB
ALBUMIN SERPL BCP-MCNC: 3.3 G/DL (ref 3.5–5.2)
ALP SERPL-CCNC: 110 U/L (ref 55–135)
ALT SERPL W/O P-5'-P-CCNC: 5 U/L (ref 10–44)
ANION GAP SERPL CALC-SCNC: 12 MMOL/L (ref 8–16)
ASCENDING AORTA: 3.03 CM
AST SERPL-CCNC: 12 U/L (ref 10–40)
AV INDEX (PROSTH): 0.81
AV MEAN GRADIENT: 4 MMHG
AV PEAK GRADIENT: 8 MMHG
AV VALVE AREA: 2.9 CM2
AV VELOCITY RATIO: 0.78
BILIRUB SERPL-MCNC: 1 MG/DL (ref 0.1–1)
BSA FOR ECHO PROCEDURE: 1.88 M2
BUN SERPL-MCNC: 22 MG/DL (ref 6–20)
CALCIUM SERPL-MCNC: 8.9 MG/DL (ref 8.7–10.5)
CHLORIDE SERPL-SCNC: 97 MMOL/L (ref 95–110)
CO2 SERPL-SCNC: 26 MMOL/L (ref 23–29)
CREAT SERPL-MCNC: 4.1 MG/DL (ref 0.5–1.4)
CV ECHO LV RWT: 0.51 CM
DOP CALC AO PEAK VEL: 1.43 M/S
DOP CALC AO VTI: 34.57 CM
DOP CALC LVOT AREA: 3.6 CM2
DOP CALC LVOT DIAMETER: 2.14 CM
DOP CALC LVOT PEAK VEL: 1.12 M/S
DOP CALC LVOT STROKE VOLUME: 100.37 CM3
DOP CALCLVOT PEAK VEL VTI: 27.92 CM
E WAVE DECELERATION TIME: 209.28 MSEC
E/A RATIO: 0.89
E/E' RATIO: 13.86 M/S
ECHO LV POSTERIOR WALL: 1.17 CM (ref 0.6–1.1)
EJECTION FRACTION: 60 %
EST. GFR  (NO RACE VARIABLE): 12 ML/MIN/1.73 M^2
ESTIMATED AVG GLUCOSE: 91 MG/DL (ref 68–131)
FRACTIONAL SHORTENING: 42 % (ref 28–44)
GLUCOSE SERPL-MCNC: 99 MG/DL (ref 70–110)
HBA1C MFR BLD: 4.8 % (ref 4–5.6)
INTERVENTRICULAR SEPTUM: 1.21 CM (ref 0.6–1.1)
IVRT: 82.78 MSEC
LA MAJOR: 5.44 CM
LA MINOR: 5.4 CM
LA WIDTH: 4.59 CM
LEFT ATRIUM SIZE: 3.5 CM
LEFT ATRIUM VOLUME INDEX MOD: 30.3 ML/M2
LEFT ATRIUM VOLUME INDEX: 40.4 ML/M2
LEFT ATRIUM VOLUME MOD: 55.37 CM3
LEFT ATRIUM VOLUME: 74.01 CM3
LEFT INTERNAL DIMENSION IN SYSTOLE: 2.68 CM (ref 2.1–4)
LEFT VENTRICLE DIASTOLIC VOLUME INDEX: 53 ML/M2
LEFT VENTRICLE DIASTOLIC VOLUME: 96.99 ML
LEFT VENTRICLE MASS INDEX: 110 G/M2
LEFT VENTRICLE SYSTOLIC VOLUME INDEX: 14.4 ML/M2
LEFT VENTRICLE SYSTOLIC VOLUME: 26.44 ML
LEFT VENTRICULAR INTERNAL DIMENSION IN DIASTOLE: 4.59 CM (ref 3.5–6)
LEFT VENTRICULAR MASS: 201.87 G
LV LATERAL E/E' RATIO: 13.86 M/S
LV SEPTAL E/E' RATIO: 13.86 M/S
MV A" WAVE DURATION": 11.99 MSEC
MV PEAK A VEL: 1.09 M/S
MV PEAK E VEL: 0.97 M/S
MV STENOSIS PRESSURE HALF TIME: 60.69 MS
MV VALVE AREA P 1/2 METHOD: 3.62 CM2
PISA TR MAX VEL: 3.25 M/S
POTASSIUM SERPL-SCNC: 3.8 MMOL/L (ref 3.5–5.1)
PROT SERPL-MCNC: 8 G/DL (ref 6–8.4)
PULM VEIN S/D RATIO: 1.04
PV PEAK D VEL: 0.48 M/S
PV PEAK S VEL: 0.5 M/S
RA MAJOR: 4.87 CM
RA PRESSURE: 3 MMHG
RA WIDTH: 4.13 CM
RIGHT VENTRICULAR END-DIASTOLIC DIMENSION: 3.7 CM
RV TISSUE DOPPLER FREE WALL SYSTOLIC VELOCITY 1 (APICAL 4 CHAMBER VIEW): 13.51 CM/S
SINUS: 3.11 CM
SODIUM SERPL-SCNC: 135 MMOL/L (ref 136–145)
STJ: 2.9 CM
TDI LATERAL: 0.07 M/S
TDI SEPTAL: 0.07 M/S
TDI: 0.07 M/S
TR MAX PG: 42 MMHG
TRICUSPID ANNULAR PLANE SYSTOLIC EXCURSION: 3.04 CM
TV REST PULMONARY ARTERY PRESSURE: 45 MMHG

## 2022-08-15 PROCEDURE — 25000003 PHARM REV CODE 250: Mod: NTX | Performed by: STUDENT IN AN ORGANIZED HEALTH CARE EDUCATION/TRAINING PROGRAM

## 2022-08-15 PROCEDURE — 80053 COMPREHEN METABOLIC PANEL: CPT | Mod: NTX | Performed by: INTERNAL MEDICINE

## 2022-08-15 PROCEDURE — 99214 PR OFFICE/OUTPT VISIT, EST, LEVL IV, 30-39 MIN: ICD-10-PCS | Mod: NTX,,, | Performed by: NURSE PRACTITIONER

## 2022-08-15 PROCEDURE — G0257 UNSCHED DIALYSIS ESRD PT HOS: HCPCS | Mod: NTX

## 2022-08-15 PROCEDURE — 99220 PR INITIAL OBSERVATION CARE,LEVL III: CPT | Mod: NTX,,, | Performed by: STUDENT IN AN ORGANIZED HEALTH CARE EDUCATION/TRAINING PROGRAM

## 2022-08-15 PROCEDURE — 96361 HYDRATE IV INFUSION ADD-ON: CPT | Mod: NTX

## 2022-08-15 PROCEDURE — 99217 PR OBSERVATION CARE DISCHARGE: ICD-10-PCS | Mod: NTX,,, | Performed by: PHYSICIAN ASSISTANT

## 2022-08-15 PROCEDURE — 99220 PR INITIAL OBSERVATION CARE,LEVL III: ICD-10-PCS | Mod: NTX,,, | Performed by: STUDENT IN AN ORGANIZED HEALTH CARE EDUCATION/TRAINING PROGRAM

## 2022-08-15 PROCEDURE — 96375 TX/PRO/DX INJ NEW DRUG ADDON: CPT | Mod: 59,NTX

## 2022-08-15 PROCEDURE — G0378 HOSPITAL OBSERVATION PER HR: HCPCS | Mod: NTX

## 2022-08-15 PROCEDURE — 80100014 HC HEMODIALYSIS 1:1: Mod: NTX

## 2022-08-15 PROCEDURE — 99217 PR OBSERVATION CARE DISCHARGE: CPT | Mod: NTX,,, | Performed by: PHYSICIAN ASSISTANT

## 2022-08-15 PROCEDURE — 96366 THER/PROPH/DIAG IV INF ADDON: CPT | Mod: NTX

## 2022-08-15 PROCEDURE — 63600175 PHARM REV CODE 636 W HCPCS: Mod: NTX

## 2022-08-15 PROCEDURE — 96368 THER/DIAG CONCURRENT INF: CPT | Mod: NTX

## 2022-08-15 PROCEDURE — 99214 OFFICE O/P EST MOD 30 MIN: CPT | Mod: NTX,,, | Performed by: NURSE PRACTITIONER

## 2022-08-15 RX ORDER — FUROSEMIDE 10 MG/ML
100 INJECTION INTRAMUSCULAR; INTRAVENOUS DAILY
Status: DISCONTINUED | OUTPATIENT
Start: 2022-08-15 | End: 2022-08-15 | Stop reason: HOSPADM

## 2022-08-15 RX ORDER — HEPARIN SODIUM 1000 [USP'U]/ML
1000 INJECTION, SOLUTION INTRAVENOUS; SUBCUTANEOUS
Status: DISCONTINUED | OUTPATIENT
Start: 2022-08-15 | End: 2022-08-15 | Stop reason: HOSPADM

## 2022-08-15 RX ORDER — FAMOTIDINE 20 MG/1
20 TABLET, FILM COATED ORAL 2 TIMES DAILY PRN
COMMUNITY
End: 2023-06-02

## 2022-08-15 RX ORDER — SODIUM CHLORIDE 9 MG/ML
INJECTION, SOLUTION INTRAVENOUS ONCE
Status: DISCONTINUED | OUTPATIENT
Start: 2022-08-15 | End: 2022-08-15 | Stop reason: HOSPADM

## 2022-08-15 RX ORDER — DIPHENHYDRAMINE HCL 25 MG
25 CAPSULE ORAL EVERY 6 HOURS PRN
COMMUNITY
End: 2024-03-08

## 2022-08-15 RX ADMIN — PREGABALIN 75 MG: 75 CAPSULE ORAL at 10:08

## 2022-08-15 RX ADMIN — FUROSEMIDE 100 MG: 10 INJECTION, SOLUTION INTRAMUSCULAR; INTRAVENOUS at 03:08

## 2022-08-15 RX ADMIN — ASPIRIN 81 MG: 81 TABLET, COATED ORAL at 10:08

## 2022-08-15 RX ADMIN — LABETALOL HYDROCHLORIDE 300 MG: 100 TABLET, FILM COATED ORAL at 10:08

## 2022-08-15 RX ADMIN — CALCIUM GLUCONATE 1 G: 20 INJECTION, SOLUTION INTRAVENOUS at 12:08

## 2022-08-15 RX ADMIN — LOSARTAN POTASSIUM 100 MG: 50 TABLET, FILM COATED ORAL at 10:08

## 2022-08-15 RX ADMIN — HYDRALAZINE HYDROCHLORIDE 100 MG: 25 TABLET ORAL at 03:08

## 2022-08-15 RX ADMIN — LABETALOL HYDROCHLORIDE 300 MG: 100 TABLET, FILM COATED ORAL at 03:08

## 2022-08-15 RX ADMIN — HYDRALAZINE HYDROCHLORIDE 100 MG: 25 TABLET ORAL at 10:08

## 2022-08-15 RX ADMIN — ATORVASTATIN CALCIUM 40 MG: 20 TABLET, FILM COATED ORAL at 10:08

## 2022-08-15 NOTE — ASSESSMENT & PLAN NOTE
- Unclear reason for potassium being at 6.8  - Patient states it is that she went to her dialysis as normal on Friday, and they did not cut her short  - Patient was given albuterol, calcium gluconate, Lokelma  - Potassium 3.8 following HD

## 2022-08-15 NOTE — ASSESSMENT & PLAN NOTE
- Scheduled for HD on MWF   - RAVF with thrill and bruit  - BUN/Cr 64/9.5 on admission >> 22/4.1 following HD overnight  - Nephrology consulted  - Continue chronic hemodialysis  - Avoid nephrotoxic medications

## 2022-08-15 NOTE — ED PROVIDER NOTES
Encounter Date: 2022       History     Chief Complaint   Patient presents with    Leg Swelling     Pt reports bilateral leg swelling for about a week now. Hx of ESRD with last dialysis on Friday     56-year-old female, history of ESRD on hemodialysis , hypertension, diabetes complaining of lower extremity edema, bilateral, mildly painful, patient reports that she fell this week and had some pain in her left ankle but that she feels that that pain has moved to her right foot and ankle.  She says that her legs do not normally swell for the last 2-3 days they have been mildly swollen.  She does report receiving her normal dialysis this week, last session on Friday.  Denies any chest pain or shortness of breath.  No fevers or chills    The history is provided by the patient.     Review of patient's allergies indicates:  No Known Allergies  Past Medical History:   Diagnosis Date    Anemia of chronic disorder 2017    Overview:  Added automatically from request for surgery [id number]    Anemia of chronic renal failure, stage 5     Anxiety     Cyst of left ovary 2019    Cyst of left ovary 2019    Dyslipidemia 2013    End-stage renal disease on hemodialysis 2020    Hematuria 10/30/2019    Hx of sinus bradycardia 2017    Hyperkalemia 3/29/2017    Peripheral neuropathy 2013    Renovascular hypertension 2020    S/P dilation and curettage 2018    Thickened endometrium 2018    Thyroid nodule     Thyroid nodule 2021    Uncontrolled type 2 diabetes mellitus 2013    Overview:  poc a1c today 11.2-270/ up from 10.8-263, Pt. has very very stressed/ was incarcerated/marital stress/ will current meds/ less stress now/ will monitor.     Past Surgical History:   Procedure Laterality Date    AV FISTULA PLACEMENT      right arm     SECTION      x1    COLONOSCOPY N/A 2018    Procedure: COLONOSCOPY;  Surgeon: Pankaj Hinojosa MD;   Location: [facility] ENDO (4TH FLR);  Service: Endoscopy;  Laterality: N/A;  dialysis pt/labs prior to colon/MS    CYSTOSCOPY W/ RETROGRADES Bilateral 8/20/2020    Procedure: CYSTOSCOPY, WITH RETROGRADE PYELOGRAM;  Surgeon: Douglas Abraham MD;  Location: [facility] 1ST FLR;  Service: Urology;  Laterality: Bilateral;  45 min    FISTULOGRAM Right 9/9/2020    Procedure: Fistulogram;  Surgeon: Lester Gómez MD;  Location: [facility] CATH LAB;  Service: Nephrology;  Laterality: Right;    HYSTERECTOMY      IMPLANTATION OF COCHLEAR PROSTHESIS Right 6/28/2021    Procedure: INSERTION, PROSTHESIS, COCHLEAR;  Surgeon: Ramiro Zuleta MD;  Location: [facility] OR 2ND FLR;  Service: ENT;  Laterality: Right;  COCHLEAR BABAK    IMPLANTATION OF COCHLEAR PROSTHESIS Right 6/30/2021    Procedure: INSERTION, PROSTHESIS, COCHLEAR;  Surgeon: Ramiro Zuleta MD;  Location: [facility] OR 2ND FLR;  Service: ENT;  Laterality: Right;  COCHLEAR AMERICAS    ROBOT-ASSISTED LAPAROSCOPIC ABDOMINAL HYSTERECTOMY USING DA NATALYA XI N/A 7/5/2019    Procedure: XI ROBOTIC HYSTERECTOMY;  Surgeon: Trice Lei MD;  Location: [facility];  Service: OB/GYN;  Laterality: N/A;    ROBOT-ASSISTED LAPAROSCOPIC NEPHRECTOMY Right 10/8/2020    Procedure: ROBOTIC NEPHRECTOMY;  Surgeon: Douglas Abraham MD;  Location: [facility] 2ND FLR;  Service: Urology;  Laterality: Right;  3.5hrs gen with regional     ROBOT-ASSISTED LAPAROSCOPIC SALPINGO-OOPHORECTOMY USING DA NATALYA XI Bilateral 7/5/2019    Procedure: XI ROBOTIC SALPINGO-OOPHORECTOMY;  Surgeon: Trice Lei MD;  Location: [facility];  Service: OB/GYN;  Laterality: Bilateral;    TUBAL LIGATION      Vascular access surgeries      x5     Family History   Problem Relation Age of Onset    Cancer Mother     Ovarian cancer Mother     Cancer Sister     Ovarian cancer Sister     Lung cancer Sister     Diabetes Brother     Cancer Sister     Ovarian cancer Sister     Diabetes Sister     Breast cancer Neg Hx     Colon cancer Neg Hx      Cervical cancer Neg Hx     Endometrial cancer Neg Hx     Vaginal cancer Neg Hx     Thyroid disease Neg Hx      Social History     Tobacco Use    Smoking status: Never Smoker    Smokeless tobacco: Never Used   Substance Use Topics    Alcohol use: No    Drug use: No     Review of Systems   Constitutional: Negative for chills, diaphoresis and fever.   Respiratory: Negative for cough and shortness of breath.    Cardiovascular: Positive for leg swelling. Negative for chest pain and palpitations.   Gastrointestinal: Negative for abdominal pain.   All other systems reviewed and are negative.      Physical Exam     Initial Vitals [08/14/22 1907]   BP Pulse Resp Temp SpO2   (!) 145/65 72 18 98.7 °F (37.1 °C) 95 %      MAP       --         Physical Exam    Nursing note and vitals reviewed.  Constitutional: Vital signs are normal. She appears well-developed and well-nourished. She is not diaphoretic.  Non-toxic appearance. She does not appear ill. No distress.   HENT:   Head: Normocephalic and atraumatic.   Mouth/Throat: Mucous membranes are normal. Mucous membranes are not dry.   Eyes: Conjunctivae and lids are normal.   Neck: Neck supple.   Normal range of motion.  Cardiovascular: Normal rate.   Pulmonary/Chest: Breath sounds normal. No respiratory distress.   Abdominal: Abdomen is soft. She exhibits no distension. There is no abdominal tenderness. There is no rebound.   Musculoskeletal:         General: Edema present.      Cervical back: Normal range of motion and neck supple.      Comments: Patient with minimal swelling to her actual legs with trace edema to her lower legs bilaterally though she does have edema/swelling to her feet bilaterally left greater than right.  No significant bony tenderness anywhere in her feet or ankles and no area of erythema or induration.  She is 2+ dorsalis pedis pulses bilaterally.     Neurological: She is alert and oriented to person, place, and time. She has normal strength. No  sensory deficit.   Skin: Skin is dry and intact. No pallor.   Psychiatric: She has a normal mood and affect. Her speech is normal and behavior is normal.         ED Course   Procedures  Labs Reviewed   COMPREHENSIVE METABOLIC PANEL - Abnormal; Notable for the following components:       Result Value    Sodium 134 (*)     Potassium 6.8 (*)     Chloride 94 (*)     Glucose 123 (*)     BUN 64 (*)     Creatinine 9.5 (*)     Albumin 3.1 (*)     ALT 8 (*)     eGFR 4.4 (*)     All other components within normal limits   CBC W/ AUTO DIFFERENTIAL - Abnormal; Notable for the following components:    RBC 3.37 (*)     Hemoglobin 9.3 (*)     Hematocrit 27.7 (*)     RDW 14.6 (*)     Mono # 1.2 (*)     Eos # 1.1 (*)     Lymph % 16.2 (*)     Eosinophil % 11.9 (*)     All other components within normal limits   B-TYPE NATRIURETIC PEPTIDE - Abnormal; Notable for the following components:     (*)     All other components within normal limits   ISTAT PROCEDURE - Abnormal; Notable for the following components:    POC Glucose 127 (*)     POC BUN 61 (*)     POC Creatinine 10.2 (*)     POC Sodium 134 (*)     POC Potassium 6.7 (*)     POC TCO2 (MEASURED) 30 (*)     POC Ionized Calcium 1.05 (*)     POC Hematocrit 27 (*)     All other components within normal limits   ISTAT PROCEDURE - Abnormal; Notable for the following components:    POC Glucose 123 (*)     POC BUN 67 (*)     POC Creatinine 9.9 (*)     POC Sodium 135 (*)     POC Potassium 7.0 (*)     POC TCO2 (MEASURED) 33 (*)     POC Hematocrit 28 (*)     All other components within normal limits   POCT GLUCOSE   ISTAT CHEM8   POCT GLUCOSE MONITORING CONTINUOUS          Imaging Results          X-Ray Chest 1 View (Final result)  Result time 08/14/22 22:45:56   Procedure changed from X-Ray Chest PA And Lateral     Final result by Carl Garza MD (08/14/22 22:45:56)                 Impression:      Cardiomegaly with bilateral edema.      Electronically signed by: Carl Garza  MD  Date:    08/14/2022  Time:    22:45             Narrative:    EXAMINATION:  XR CHEST 1 VIEW    CLINICAL HISTORY:  ESRD; End stage renal disease    TECHNIQUE:  Single frontal view of the chest was performed.    COMPARISON:  05/16/2022.    FINDINGS:  No consolidation, pleural effusion or pneumothorax.    Cardiomegaly with bilateral edema, similar to prior.                                 Medications   calcium gluconate 1 g in NS IVPB (premixed) (1 g Intravenous New Bag 8/14/22 6656)   insulin regular injection 5 Units 0.05 mL (has no administration in time range)   sodium bicarbonate solution 50 mEq (has no administration in time range)   dextrose 10% bolus 250 mL (has no administration in time range)   melatonin tablet 6 mg (has no administration in time range)   polyethylene glycol packet 17 g (has no administration in time range)   acetaminophen tablet 650 mg (has no administration in time range)   naloxone 0.4 mg/mL injection 0.02 mg (has no administration in time range)   glucose chewable tablet 16 g (has no administration in time range)   glucose chewable tablet 24 g (has no administration in time range)   dextrose 50% injection 12.5 g (has no administration in time range)   dextrose 50% injection 25 g (has no administration in time range)   glucagon (human recombinant) injection 1 mg (has no administration in time range)   sodium zirconium cyclosilicate packet 10 g (has no administration in time range)   amLODIPine tablet 10 mg (has no administration in time range)   aspirin EC tablet 81 mg (has no administration in time range)   atorvastatin tablet 40 mg (has no administration in time range)   hydrALAZINE tablet 100 mg (has no administration in time range)   losartan tablet 100 mg (has no administration in time range)   labetaloL tablet 300 mg (has no administration in time range)   multivitamin tablet (has no administration in time range)   pregabalin capsule 75 mg (has no administration in time range)    sevelamer carbonate tablet 2,400 mg (has no administration in time range)   calcium gluconate 1 g in NS IVPB (premixed) (has no administration in time range)   albuterol sulfate nebulizer solution 10 mg (10 mg Nebulization Given 8/14/22 2232)   sodium zirconium cyclosilicate packet 10 g (10 g Oral Given 8/14/22 2246)     Medical Decision Making:   History:   Old Medical Records: I decided to obtain old medical records.  Initial Assessment:   Challenging assessment is patient is a poor historian and is inconsistent    Vital signs stable  Well-appearing clinically  Differential Diagnosis:   1. Lower extremity edema.  Without shortness of breath or chest pain.  Differential diagnosis is volume overload, CHF, venous stasis disease, DVTs.  No clinical signs of an infection at this time.  -labs, lower extremity Dopplers    2.  ESRD.  Routine labs checked and patient's potassium is 6.7-7.  EKG does not look concerning however given the fact that she has 2 abnormal readings will treat empirically for hyperkalemia and admit to hospital medicine.  She is due  Clinical Tests:   Lab Tests: Ordered and Reviewed  Radiological Study: Reviewed and Ordered  Medical Tests: Ordered and Reviewed            Attending Attestation:         Attending Critical Care:   Critical Care Times:   ==============================================================  · Total Critical Care Time - exclusive of procedural time: 35 minutes.  ==============================================================  Critical care was necessary to treat or prevent imminent or life-threatening deterioration of the following conditions: metabolic crisis.   Critical care was time spent personally by me on the following activities: obtaining history from patient or relative, examination of patient, review of x-rays / CT sent with the patient, review of old charts, ordering lab, x-rays, and/or EKG, development of treatment plan with patient or relative, ordering and  performing treatments and interventions, evaluation of patient's response to treatment and re-evaluation of patient's conition.   Critical Care Condition: potentially life-threatening                    Clinical Impression:   Final diagnoses:  [N18.6, Z99.2] ESRD (end stage renal disease) on dialysis  [R60.0] Leg edema  [E87.5] Hyperkalemia (Primary)          ED Disposition Condition    Observation               Natalie Mendoza MD  08/14/22 1283

## 2022-08-15 NOTE — CARE UPDATE
ESRD mwf  Last hd Friday   Presents for LE swelling   Hyperkalemia 6.8   oredred HD   Patient will get hd sometime tonight.dilasysis nurse aware   Shift potassium if not done already

## 2022-08-15 NOTE — ASSESSMENT & PLAN NOTE
- CXR with cardiomegaly and bilateral edema similar to prior studies   -  on admission (improved from previous >1,000)  - Remove fluid with dialysis  - Last echo (8/24/21): EF of 60%, grade III LV diastolic dysfunction, CVP 8 mmHg, PA systolic pressure 57 mmHg  - Continue home losartan, labetalol, lasix & statin  - Repeat echo with EF of 65%, grade II LV diastolic dysfunction, CVP 3 mmHg, PA systolic pressure of 45 mmHg

## 2022-08-15 NOTE — ASSESSMENT & PLAN NOTE
Nephrology History  iHD Schedule: MWF   Unit/MD: [facility] Fields  Duration: 3 hours  UF: 1-2  EDW: 77   Access: RAVF  Residual Renal Function: minimal     Assessment:   - Hyperkalemia 6.8, HD overnight, K 3.8 this am   - Plan to keep on MWF schedule while IP   - Dialysate adjusted to current labs   - Will obtain OP dialysis records  - Continue to monitor intake and output, daily weights   - Avoid nephrotoxic medication and renal dose medications to GFR  - Will continue to monitor  -Daily RFP

## 2022-08-15 NOTE — PROGRESS NOTES
08/15/22 0615   Handoff Report   Given To Primary nurse.   Vital Signs   Pulse 70   Resp 16   SpO2 95 %   BP (!) 102/54   MAP (mmHg) 75   Pre-Hemodialysis Assessment   Treatment Status Completed        Hemodialysis AV Fistula Right forearm   No Placement Date or Time found.   Present Prior to Hospital Arrival?: Yes  Location: Right forearm   Status Deaccessed   Dressing Status Clean;Dry;Intact   Site Condition No complications   Dressing Gauze   During Hemodialysis Assessment   Blood Flow Rate (mL/min) 350 mL/min   Dialysate Flow Rate (mL/min) 800 ml/min   Ultrafiltration Rate (mL/Hr) 1030 mL/Hr   Arteriovenous Lines Secure Yes   Arterial Pressure (mmHg) -150 mmHg   Venous Pressure (mmHg) 130   Blood Volume Processed (Liters) 60.1 L   UF Removed (mL) 2900 mL   TMP 60   Venous Line in Air Detector Yes   Intake (mL) 0 mL   Transducer Dry Yes   Access Visible Yes   Intra-Hemodialysis Comments Patient treatment completed.   Post-Hemodialysis Assessment   Rinseback Volume (mL) 250 mL   Blood Volume Processed (Liters) 60.1 L   Dialyzer Clearance Lightly streaked   Additional Fluid Intake (mL) 500 mL   Total UF (mL) 2900 mL   Net Fluid Removal 2400   Patient Response to Treatment Patient completed all  blood returned, right forearm AVF deaccessed hemostasis maintained, guaze and tape CDI. VSS.   Post-Hemodialysis Comments Patient tolerated Hd.

## 2022-08-15 NOTE — ED NOTES
Pt's family member came to nurses station requesting a nurse at the bedside. Family member agitated and requesting name of primary nurse. Family member given nurse's name but agitated and verbally aggressive when last name wasn't provided.

## 2022-08-15 NOTE — CONSULTS
[facility] - Emergency Dept  Nephrology  Consult Note    Patient Name: Jael Hall  MRN: [id number]  Admission Date: 8/14/2022  Hospital Length of Stay: 0 days  Attending Provider: Aruna Carver MD   Primary Care Physician: Mary Solano MD  Principal Problem:Hyperkalemia    Inpatient consult to Nephrology  Consult performed by: Clara Busby DNP  Consult ordered by: Natalie Mendoza MD  Reason for consult: ESRD on HD         Subjective:     HPI: Jael Hall is a 56 y.o. female who  has a past medical history of Anemia of chronic disorder, Anemia of chronic renal failure, stage 5, Anxiety, Cyst of left ovary, Cyst of left ovary, Dyslipidemia, End-stage renal disease on hemodialysis (MWF), Hematuria, sinus bradycardia, Hyperkalemia, Peripheral neuropathy, Renovascular hypertension, S/P dilation and curettage, Thickened endometrium, Thyroid nodule, Thyroid nodule, and Uncontrolled type 2 diabetes mellitus, presented to the ED with CC of leg Swelling bilaterally.  Patient reports that she has been gaining fluid for about a week now, she states that she had a fall this week as well and that both her right and her left ankle are hurting her now.  On presentation to the ED her chest x-ray showed cardiomegaly with bilateral edema, . X-rays of both ankles did not show an acute fracture. Hyperkalemic in ED K 6.8.  Patient was shifted and given calcium gluconate and Lokelma.  Reports last HD was Friday 8/12. Denies missing any treatments. Patient denies SOB, chest pain, abdominal pain, or hematochezia. Nephrology consulted for ESRD on HD          Past Medical History:   Diagnosis Date    Anemia of chronic disorder 11/8/2017    Overview:  Added automatically from request for surgery [id number]    Anemia of chronic renal failure, stage 5     Anxiety     Cyst of left ovary 5/14/2019    Cyst of left ovary 7/5/2019    Dyslipidemia 1/4/2013    End-stage renal disease on hemodialysis 8/20/2020    Hematuria  10/30/2019    Hx of sinus bradycardia 2017    Hyperkalemia 3/29/2017    Peripheral neuropathy 2013    Renovascular hypertension 2020    S/P dilation and curettage 2018    Thickened endometrium 2018    Thyroid nodule     Thyroid nodule 2021    Uncontrolled type 2 diabetes mellitus 2013    Overview:  poc a1c today 11.2-270/ up from 10.8-263, Pt. has very very stressed/ was incarcerated/marital stress/ will current meds/ less stress now/ will monitor.       Past Surgical History:   Procedure Laterality Date    AV FISTULA PLACEMENT      right arm     SECTION      x1    COLONOSCOPY N/A 2018    Procedure: COLONOSCOPY;  Surgeon: Pankaj Hinojosa MD;  Location: [facility] (4TH FLR);  Service: Endoscopy;  Laterality: N/A;  dialysis pt/labs prior to colon/MS    CYSTOSCOPY W/ RETROGRADES Bilateral 2020    Procedure: CYSTOSCOPY, WITH RETROGRADE PYELOGRAM;  Surgeon: Douglas Abraham MD;  Location: [facility] 1ST FLR;  Service: Urology;  Laterality: Bilateral;  45 min    FISTULOGRAM Right 2020    Procedure: Fistulogram;  Surgeon: Lester Gómez MD;  Location: [facility] CATH LAB;  Service: Nephrology;  Laterality: Right;    HYSTERECTOMY      IMPLANTATION OF COCHLEAR PROSTHESIS Right 2021    Procedure: INSERTION, PROSTHESIS, COCHLEAR;  Surgeon: Ramiro Zuleta MD;  Location: [facility] OR 2ND FLR;  Service: ENT;  Laterality: Right;  COCHLEAR BABAK    IMPLANTATION OF COCHLEAR PROSTHESIS Right 2021    Procedure: INSERTION, PROSTHESIS, COCHLEAR;  Surgeon: Ramiro Zuleta MD;  Location: [facility] OR 2ND FLR;  Service: ENT;  Laterality: Right;  COCHLEAR AMERICAS    ROBOT-ASSISTED LAPAROSCOPIC ABDOMINAL HYSTERECTOMY USING DA NATALYA XI N/A 2019    Procedure: XI ROBOTIC HYSTERECTOMY;  Surgeon: Trice Lei MD;  Location: [facility] OR;  Service: OB/GYN;  Laterality: N/A;    ROBOT-ASSISTED LAPAROSCOPIC NEPHRECTOMY Right 10/8/2020    Procedure: ROBOTIC NEPHRECTOMY;  Surgeon:  Douglas Abraham MD;  Location: [facility] OR 2ND FLR;  Service: Urology;  Laterality: Right;  3.5hrs gen with regional     ROBOT-ASSISTED LAPAROSCOPIC SALPINGO-OOPHORECTOMY USING DA NATALYA XI Bilateral 7/5/2019    Procedure: XI ROBOTIC SALPINGO-OOPHORECTOMY;  Surgeon: Trice Lei MD;  Location: [facility];  Service: OB/GYN;  Laterality: Bilateral;    TUBAL LIGATION      Vascular access surgeries      x5       Review of patient's allergies indicates:  No Known Allergies  Current Facility-Administered Medications   Medication Frequency    0.9%  NaCl infusion Once    acetaminophen tablet 650 mg Q8H PRN    amLODIPine tablet 10 mg Nightly    aspirin EC tablet 81 mg Daily    atorvastatin tablet 40 mg Daily    dextrose 10% bolus 125 mL PRN    dextrose 10% bolus 250 mL PRN    glucagon (human recombinant) injection 1 mg PRN    glucose chewable tablet 16 g PRN    glucose chewable tablet 24 g PRN    heparin (porcine) injection 1,000 Units PRN    hydrALAZINE tablet 100 mg TID    labetaloL tablet 300 mg TID    losartan tablet 100 mg Daily    melatonin tablet 6 mg Nightly    multivitamin tablet Daily    naloxone 0.4 mg/mL injection 0.02 mg PRN    polyethylene glycol packet 17 g BID PRN    pregabalin capsule 75 mg Daily    sevelamer carbonate tablet 2,400 mg TID WM    sodium chloride 0.9% bolus 250 mL PRN    sodium zirconium cyclosilicate packet 10 g TID     Current Outpatient Medications   Medication    acetaminophen (TYLENOL) 500 MG tablet    amLODIPine (NORVASC) 10 MG tablet    aspirin (ECOTRIN) 81 MG EC tablet    diphenhydrAMINE (BENADRYL) 25 mg capsule    famotidine (PEPCID) 20 MG tablet    furosemide (LASIX) 80 MG tablet    hydrALAZINE (APRESOLINE) 100 MG tablet    labetaloL (NORMODYNE) 300 MG tablet    losartan (COZAAR) 100 MG tablet    pregabalin (LYRICA) 75 MG capsule    sevelamer carbonate (RENVELA) 800 mg Tab    atorvastatin (LIPITOR) 40 MG tablet    BLOOD PRESSURE CUFF Misc     multivitamin capsule     Family History       Problem Relation (Age of Onset)    Cancer Mother, Sister, Sister    Diabetes Brother, Sister    Lung cancer Sister    Ovarian cancer Mother, Sister, Sister          Tobacco Use    Smoking status: Never Smoker    Smokeless tobacco: Never Used   Substance and Sexual Activity    Alcohol use: No    Drug use: No    Sexual activity: Yes     Partners: Male     Birth control/protection: Post-menopausal     Review of Systems   Constitutional: Negative.    HENT: Negative.     Eyes: Negative.    Respiratory: Negative.     Cardiovascular:  Positive for leg swelling.   Gastrointestinal: Negative.    Endocrine: Negative.    Genitourinary:  Positive for decreased urine volume.   Musculoskeletal: Negative.    Allergic/Immunologic: Negative.    Neurological: Negative.    Hematological: Negative.    Psychiatric/Behavioral: Negative.     Objective:     Vital Signs (Most Recent):  Temp: 98.7 °F (37.1 °C) (08/15/22 0621)  Pulse: 70 (08/15/22 0856)  Resp: 16 (08/15/22 0615)  BP: (!) 159/73 (08/15/22 0856)  SpO2: (!) 92 % (08/15/22 0857)  O2 Device (Oxygen Therapy): room air (08/14/22 2311)   Vital Signs (24h Range):  Temp:  [98.2 °F (36.8 °C)-98.9 °F (37.2 °C)] 98.7 °F (37.1 °C)  Pulse:  [66-73] 70  Resp:  [14-22] 16  SpO2:  [92 %-98 %] 92 %  BP: (102-165)/(54-85) 159/73        There is no height or weight on file to calculate BMI.  There is no height or weight on file to calculate BSA.    I/O last 3 completed shifts:  In: 850 [Other:500; IV Piggyback:350]  Out: 2900 [Other:2900]    Physical Exam  Vitals and nursing note reviewed.   HENT:      Head: Normocephalic.      Mouth/Throat:      Pharynx: Oropharynx is clear.   Eyes:      Conjunctiva/sclera: Conjunctivae normal.   Cardiovascular:      Rate and Rhythm: Normal rate.      Pulses: Normal pulses.      Arteriovenous access: Right arteriovenous access is present.     Comments: JOCELYN AVF +thrill, +bruit   Pulmonary:      Effort: Pulmonary  effort is normal.      Breath sounds: Normal breath sounds.   Abdominal:      Palpations: Abdomen is soft.   Musculoskeletal:      Cervical back: Neck supple.      Right lower leg: No edema.      Left lower leg: No edema.   Neurological:      Mental Status: She is alert and oriented to person, place, and time.   Psychiatric:         Mood and Affect: Mood normal.     Significant Labs:  CBC:   Recent Labs   Lab 08/14/22  2146 08/14/22  2148 08/14/22  2205   WBC 9.34  --   --    RBC 3.37*  --   --    HGB 9.3*  --   --    HCT 27.7*   < > 28*     --   --    MCV 82  --   --    MCH 27.6  --   --    MCHC 33.6  --   --     < > = values in this interval not displayed.     CMP:   Recent Labs   Lab 08/15/22  0622   GLU 99   CALCIUM 8.9   ALBUMIN 3.3*   PROT 8.0   *   K 3.8   CO2 26   CL 97   BUN 22*   CREATININE 4.1*   ALKPHOS 110   ALT 5*   AST 12   BILITOT 1.0     All labs within the past 24 hours have been reviewed.      Assessment/Plan:     * Hyperkalemia  K 3.8 post HD treatment 8/15    Anemia in ESRD (end-stage renal disease)  hgb 9.3  Goal 10-11  Iron studies in am     Chronic kidney disease-mineral and bone disorder    Mineral Bone Disease in CKD   - Recommend renal diet.  - Novasource with meals  - Continue home phos binder , sevelamer 2400 TID with meals   - Daily renal panel so that phos and albumin is monitored daily.       ESRD on hemodialysis  Nephrology History  iHD Schedule: MWF   Unit/MD: [facility]  Duration: 3 hours  UF: 1-2  EDW: 77   Access: RAVF  Residual Renal Function: minimal     Assessment:   - Hyperkalemia 6.8, HD overnight, K 3.8 this am   - Plan to keep on MWF schedule while IP   - Dialysate adjusted to current labs   - Will obtain OP dialysis records  - Continue to monitor intake and output, daily weights   - Avoid nephrotoxic medication and renal dose medications to GFR  - Will continue to monitor  -Daily RFP          Thank you for your consult. I will follow-up with patient.  Please contact us if you have any additional questions.    Clara Busby DNP  Nephrology  [facility] - Emergency Dept

## 2022-08-15 NOTE — ED NOTES
Pt states she is unable to eat the provided red beans for lunch.  Requesting a different meal.  Dietary notified.

## 2022-08-15 NOTE — H&P
[facility] - Emergency Dept  [facility] Medicine  History & Physical    Patient Name: Jael Hall  MRN: [id number]  Patient Class: OP- Observation  Admission Date: 8/14/2022  Attending Physician: Aruna Carver MD   Primary Care Provider: Mary Solano MD         Patient information was obtained from patient, relative(s), past medical records and ER records.     Subjective:     Principal Problem:Hyperkalemia    Chief Complaint:   Chief Complaint   Patient presents with    Leg Swelling     Pt reports bilateral leg swelling for about a week now. Hx of ESRD with last dialysis on Friday        HPI:   Jael Hall is a 56 y.o. female who  has a past medical history of Anemia of chronic disorder, Anemia of chronic renal failure, stage 5, Anxiety, Cyst of left ovary, Cyst of left ovary, Dyslipidemia, End-stage renal disease on hemodialysis, Hematuria, sinus bradycardia, Hyperkalemia, Peripheral neuropathy, Renovascular hypertension, S/P dilation and curettage, Thickened endometrium, Thyroid nodule, Thyroid nodule, and Uncontrolled type 2 diabetes mellitus, presented to the ED with CC of leg Swelling bilaterally.  Patient reports that she has been gaining fluid for about a week now, she states that she had a fall this week as well and that both her right and her left ankle are hurting her now.  On presentation to the ED her chest x-ray showed cardiomegaly with bilateral edema, . X-rays of both ankles did not show an acute fracture.  However on labs her potassium was found to be 6.8.  Patient was shifted and given calcium gluconate and Lokelma and Nephrology consulted for emergent HD.  Patient denies chest pain, abdominal pain, or hematochezia.      Past Medical History:   Diagnosis Date    Anemia of chronic disorder 11/8/2017    Overview:  Added automatically from request for surgery [id number]    Anemia of chronic renal failure, stage 5     Anxiety     Cyst of left ovary 5/14/2019    Cyst of left ovary  2019    Dyslipidemia 2013    End-stage renal disease on hemodialysis 2020    Hematuria 10/30/2019    Hx of sinus bradycardia 2017    Hyperkalemia 3/29/2017    Peripheral neuropathy 2013    Renovascular hypertension 2020    S/P dilation and curettage 2018    Thickened endometrium 2018    Thyroid nodule     Thyroid nodule 2021    Uncontrolled type 2 diabetes mellitus 2013    Overview:  poc a1c today 11.2-270/ up from 10.8-263, Pt. has very very stressed/ was incarcerated/marital stress/ will current meds/ less stress now/ will monitor.       Past Surgical History:   Procedure Laterality Date    AV FISTULA PLACEMENT      right arm     SECTION      x1    COLONOSCOPY N/A 2018    Procedure: COLONOSCOPY;  Surgeon: Pankaj Hinojosa MD;  Location: [facility] (4TH FLR);  Service: Endoscopy;  Laterality: N/A;  dialysis pt/labs prior to colon/MS    CYSTOSCOPY W/ RETROGRADES Bilateral 2020    Procedure: CYSTOSCOPY, WITH RETROGRADE PYELOGRAM;  Surgeon: Douglas Abraham MD;  Location: [facility] 1ST FLR;  Service: Urology;  Laterality: Bilateral;  45 min    FISTULOGRAM Right 2020    Procedure: Fistulogram;  Surgeon: Lester Gómez MD;  Location: [facility] CATH LAB;  Service: Nephrology;  Laterality: Right;    HYSTERECTOMY      IMPLANTATION OF COCHLEAR PROSTHESIS Right 2021    Procedure: INSERTION, PROSTHESIS, COCHLEAR;  Surgeon: Ramiro Zuleta MD;  Location: [facility] OR 2ND FLR;  Service: ENT;  Laterality: Right;  COCHLEAR BABAK    IMPLANTATION OF COCHLEAR PROSTHESIS Right 2021    Procedure: INSERTION, PROSTHESIS, COCHLEAR;  Surgeon: Ramiro Zuleta MD;  Location: [facility] OR 2ND FLR;  Service: ENT;  Laterality: Right;  COCHLEAR [employer]IC[employer]    ROBOT-ASSISTED LAPAROSCOPIC ABDOMINAL HYSTERECTOMY USING DA NATALYA XI N/A 2019    Procedure: XI ROBOTIC HYSTERECTOMY;  Surgeon: Trice Lei MD;  Location: [facility];  Service: OB/GYN;  Laterality: N/A;     ROBOT-ASSISTED LAPAROSCOPIC NEPHRECTOMY Right 10/8/2020    Procedure: ROBOTIC NEPHRECTOMY;  Surgeon: Douglas Abraham MD;  Location: [address];  Service: Urology;  Laterality: Right;  3.5hrs gen with regional     ROBOT-ASSISTED LAPAROSCOPIC SALPINGO-OOPHORECTOMY USING DA NATALYA XI Bilateral 7/5/2019    Procedure: XI ROBOTIC SALPINGO-OOPHORECTOMY;  Surgeon: Trice Lei MD;  Location: [facility];  Service: OB/GYN;  Laterality: Bilateral;    TUBAL LIGATION      Vascular access surgeries      x5       Review of patient's allergies indicates:  No Known Allergies    No current facility-administered medications on file prior to encounter.     Current Outpatient Medications on File Prior to Encounter   Medication Sig    acetaminophen (TYLENOL) 325 MG tablet Take 650 mg by mouth every 8 (eight) hours as needed for Pain.    amLODIPine (NORVASC) 10 MG tablet Take 1 tablet (10 mg total) by mouth nightly.    aspirin (ECOTRIN) 81 MG EC tablet Take 81 mg by mouth once daily.    atorvastatin (LIPITOR) 40 MG tablet Take 40 mg by mouth once daily.    BLOOD PRESSURE CUFF Misc Use daily. Preferred by insurance.    furosemide (LASIX) 80 MG tablet Take 1 tablet (80 mg total) by mouth once daily.    hydrALAZINE (APRESOLINE) 100 MG tablet Take 100 mg by mouth 3 (three) times daily.    labetaloL (NORMODYNE) 300 MG tablet Take 1 tablet (300 mg total) by mouth 3 (three) times daily.    losartan (COZAAR) 100 MG tablet Take 1 tablet (100 mg total) by mouth once daily.    multivitamin capsule Take 1 capsule by mouth once daily.    pregabalin (LYRICA) 75 MG capsule Take 75 mg by mouth once daily.    sevelamer carbonate (RENVELA) 800 mg Tab Take 2,400 mg by mouth 3 (three) times daily with meals.     Family History       Problem Relation (Age of Onset)    Cancer Mother, Sister, Sister    Diabetes Brother, Sister    Lung cancer Sister    Ovarian cancer Mother, Sister, Sister          Tobacco Use    Smoking status: Never  Smoker    Smokeless tobacco: Never Used   Substance and Sexual Activity    Alcohol use: No    Drug use: No    Sexual activity: Yes     Partners: Male     Birth control/protection: Post-menopausal     Review of Systems   Constitutional:  Positive for activity change and fatigue. Negative for fever.   HENT:  Negative for sore throat and trouble swallowing.    Eyes:  Negative for photophobia and visual disturbance.   Respiratory:  Positive for shortness of breath. Negative for chest tightness.    Cardiovascular:  Positive for leg swelling. Negative for chest pain and palpitations.   Gastrointestinal:  Negative for abdominal distention, abdominal pain, blood in stool, constipation, diarrhea, nausea and vomiting.   Endocrine: Negative for polydipsia and polyuria.   Genitourinary:  Negative for dysuria and hematuria.   Musculoskeletal:  Positive for arthralgias, gait problem and joint swelling.   Skin:  Negative for pallor and rash.   Allergic/Immunologic: Negative for immunocompromised state.   Neurological:  Negative for dizziness, seizures, syncope and facial asymmetry.   Psychiatric/Behavioral:  Positive for decreased concentration. Negative for agitation, behavioral problems and confusion.    Objective:     Vital Signs (Most Recent):  Temp: 98.9 °F (37.2 °C) (08/14/22 2311)  Pulse: 71 (08/14/22 2319)  Resp: 15 (08/14/22 2319)  BP: (!) 161/73 (08/14/22 2330)  SpO2: 98 % (08/14/22 2311)   Vital Signs (24h Range):  Temp:  [98.7 °F (37.1 °C)-98.9 °F (37.2 °C)] 98.9 °F (37.2 °C)  Pulse:  [71-73] 71  Resp:  [14-18] 15  SpO2:  [95 %-98 %] 98 %  BP: (145-161)/(65-73) 161/73        There is no height or weight on file to calculate BMI.    Physical Exam  Vitals and nursing note reviewed.   Constitutional:       General: She is not in acute distress.     Appearance: She is well-developed. She is obese. She is ill-appearing. She is not diaphoretic.   HENT:      Head: Normocephalic and atraumatic.      Mouth/Throat:       Mouth: Mucous membranes are moist.      Pharynx: No oropharyngeal exudate.   Eyes:      General: No scleral icterus.     Pupils: Pupils are equal, round, and reactive to light.   Neck:      Thyroid: No thyromegaly.   Cardiovascular:      Rate and Rhythm: Normal rate and regular rhythm.      Pulses: Normal pulses.      Heart sounds:     No gallop.   Pulmonary:      Effort: Pulmonary effort is normal.      Breath sounds: Normal breath sounds. No stridor. No wheezing or rales.   Abdominal:      General: There is no distension.      Palpations: Abdomen is soft. There is no mass.      Tenderness: There is no abdominal tenderness. There is no guarding.   Musculoskeletal:         General: Swelling and tenderness present. Normal range of motion.      Cervical back: Normal range of motion and neck supple. No rigidity.      Right lower leg: Edema present.      Left lower leg: Edema present.   Lymphadenopathy:      Cervical: No cervical adenopathy.   Skin:     General: Skin is warm and dry.      Capillary Refill: Capillary refill takes less than 2 seconds.      Coloration: Skin is not jaundiced.      Findings: No bruising.   Neurological:      Mental Status: She is alert. Mental status is at baseline.      Cranial Nerves: No cranial nerve deficit.      Motor: No weakness.      Gait: Gait abnormal.   Psychiatric:         Mood and Affect: Mood normal.         Behavior: Behavior normal.         CRANIAL NERVES     CN III, IV, VI   Pupils are equal, round, and reactive to light.         Recent Results (from the past 24 hour(s))   Comprehensive metabolic panel    Collection Time: 08/14/22  9:46 PM   Result Value Ref Range    Sodium 134 (L) 136 - 145 mmol/L    Potassium 6.8 (HH) 3.5 - 5.1 mmol/L    Chloride 94 (L) 95 - 110 mmol/L    CO2 25 23 - 29 mmol/L    Glucose 123 (H) 70 - 110 mg/dL    BUN 64 (H) 6 - 20 mg/dL    Creatinine 9.5 (H) 0.5 - 1.4 mg/dL    Calcium 9.8 8.7 - 10.5 mg/dL    Total Protein 7.4 6.0 - 8.4 g/dL    Albumin 3.1  (L) 3.5 - 5.2 g/dL    Total Bilirubin 0.7 0.1 - 1.0 mg/dL    Alkaline Phosphatase 100 55 - 135 U/L    AST 12 10 - 40 U/L    ALT 8 (L) 10 - 44 U/L    Anion Gap 15 8 - 16 mmol/L    eGFR 4.4 (A) >60 mL/min/1.73 m^2   CBC auto differential    Collection Time: 08/14/22  9:46 PM   Result Value Ref Range    WBC 9.34 3.90 - 12.70 K/uL    RBC 3.37 (L) 4.00 - 5.40 M/uL    Hemoglobin 9.3 (L) 12.0 - 16.0 g/dL    Hematocrit 27.7 (L) 37.0 - 48.5 %    MCV 82 82 - 98 fL    MCH 27.6 27.0 - 31.0 pg    MCHC 33.6 32.0 - 36.0 g/dL    RDW 14.6 (H) 11.5 - 14.5 %    Platelets 221 150 - 450 K/uL    MPV 11.1 9.2 - 12.9 fL    Immature Granulocytes 0.2 0.0 - 0.5 %    Gran # (ANC) 5.5 1.8 - 7.7 K/uL    Immature Grans (Abs) 0.02 0.00 - 0.04 K/uL    Lymph # 1.5 1.0 - 4.8 K/uL    Mono # 1.2 (H) 0.3 - 1.0 K/uL    Eos # 1.1 (H) 0.0 - 0.5 K/uL    Baso # 0.07 0.00 - 0.20 K/uL    nRBC 0 0 /100 WBC    Gran % 58.5 38.0 - 73.0 %    Lymph % 16.2 (L) 18.0 - 48.0 %    Mono % 12.5 4.0 - 15.0 %    Eosinophil % 11.9 (H) 0.0 - 8.0 %    Basophil % 0.7 0.0 - 1.9 %    Differential Method Automated    Brain natriuretic peptide    Collection Time: 08/14/22  9:46 PM   Result Value Ref Range     (H) 0 - 99 pg/mL   ISTAT PROCEDURE    Collection Time: 08/14/22  9:48 PM   Result Value Ref Range    POC Glucose 127 (H) 70 - 110 mg/dL    POC BUN 61 (H) 6 - 30 mg/dL    POC Creatinine 10.2 (H) 0.5 - 1.4 mg/dL    POC Sodium 134 (L) 136 - 145 mmol/L    POC Potassium 6.7 (HH) 3.5 - 5.1 mmol/L    POC Chloride 99 95 - 110 mmol/L    POC TCO2 (MEASURED) 30 (H) 23 - 29 mmol/L    POC Ionized Calcium 1.05 (L) 1.06 - 1.42 mmol/L    POC Hematocrit 27 (L) 36 - 54 %PCV    Sample NIKO    ISTAT PROCEDURE    Collection Time: 08/14/22 10:05 PM   Result Value Ref Range    POC Glucose 123 (H) 70 - 110 mg/dL    POC BUN 67 (H) 6 - 30 mg/dL    POC Creatinine 9.9 (H) 0.5 - 1.4 mg/dL    POC Sodium 135 (L) 136 - 145 mmol/L    POC Potassium 7.0 (HH) 3.5 - 5.1 mmol/L    POC Chloride 99 95 - 110  mmol/L    POC TCO2 (MEASURED) 33 (H) 23 - 29 mmol/L    POC Ionized Calcium 1.10 1.06 - 1.42 mmol/L    POC Hematocrit 28 (L) 36 - 54 %PCV    Sample NIKO    POCT glucose    Collection Time: 08/14/22 10:51 PM   Result Value Ref Range    POCT Glucose 102 70 - 110 mg/dL       Microbiology Results (last 7 days)       ** No results found for the last 168 hours. **             Imaging Results              X-Ray Ankle 2 View Bilateral (Final result)  Result time 08/15/22 00:30:44      Final result by Tyler Green DO (08/15/22 00:30:44)                   Impression:      1. Nonspecific widening of the left medial clear space which may be due to a ligamentous injury.  2. No acute fracture.  3. Diffuse soft tissue edema of the bilateral ankles.      Electronically signed by: Tyler Green  Date:    08/15/2022  Time:    00:30               Narrative:    EXAMINATION:  XR ANKLE 2 VIEW BILATERAL    CLINICAL HISTORY:  fall, pain;    TECHNIQUE:  AP and lateral radiographs of the bilateral ankles.    COMPARISON:  None    FINDINGS:  Right ankle: No acute fracture or dislocation. Alignment is normal. The ankle mortise is congruent. The talar dome is intact. Joint spaces are preserved. No effusion.  There is soft tissue edema.  There are vascular calcifications.  There is a plantar calcaneal enthesophyte.    Left ankle: There is widening of the medial joint space, measuring 6 mm.  There is a remote fracture of the distal fibula.  There is no evidence of acute fracture of the left ankle there is a plantar calcaneal enthesophyte.  There is diffuse soft tissue edema.  There are vascular calcifications.                                       X-Ray Chest 1 View (Final result)  Result time 08/14/22 22:45:56   Procedure changed from X-Ray Chest PA And Lateral     Final result by Carl Garza MD (08/14/22 22:45:56)                   Impression:      Cardiomegaly with bilateral edema.      Electronically signed by: Carl Garza  MD  Date:    08/14/2022  Time:    22:45               Narrative:    EXAMINATION:  XR CHEST 1 VIEW    CLINICAL HISTORY:  ESRD; End stage renal disease    TECHNIQUE:  Single frontal view of the chest was performed.    COMPARISON:  05/16/2022.    FINDINGS:  No consolidation, pleural effusion or pneumothorax.    Cardiomegaly with bilateral edema, similar to prior.                                          Assessment/Plan:     * Hyperkalemia  · Unclear reason for potassium being so high at 6.8  · Patient states it is that she went to her dialysis as normal on Friday, and they did not cut her short  · Son states that she does not adhere to a strict diet  · Patient was given albuterol, calcium gluconate, Lokelma  · Nephrology consult for urgent HD      Acute on chronic diastolic heart failure  · Hypovolemia associated ESRD  ·   · Remove fluid with dialysis      Diabetes mellitus due to underlying condition, uncontrolled, with chronic kidney disease  Patient's FSGs are controlled on current medication regimen.  Last A1c reviewed-   Lab Results   Component Value Date    HGBA1C 5.6 04/20/2022     Most recent fingerstick glucose reviewed-   Recent Labs   Lab 08/14/22  2251   POCTGLUCOSE 102     Current correctional scale  Low  Maintain anti-hyperglycemic dose as follows-   Antihyperglycemics (From admission, onward)            None        · Hold Oral hypoglycemics while patient is in the hospital.  · Diabetic diet only    Vitamin D deficiency        Essential hypertension  · Continuing home meds      VTE Risk Mitigation (From admission, onward)         Ordered     heparin (porcine) injection 1,000 Units  As needed (PRN)         08/15/22 0004     IP VTE HIGH RISK PATIENT  Once         08/14/22 2325     Place sequential compression device  Until discontinued         08/14/22 2325                   Valentín Bhat MD  Department of Hospital Medicine   [facility] - Emergency Dept

## 2022-08-15 NOTE — ASSESSMENT & PLAN NOTE
Patient's FSGs are controlled on current medication regimen.  Last A1c reviewed-   Lab Results   Component Value Date    HGBA1C 5.6 04/20/2022     Most recent fingerstick glucose reviewed-   Recent Labs   Lab 08/14/22  2251   POCTGLUCOSE 102     Current correctional scale  Low  Maintain anti-hyperglycemic dose as follows-   Antihyperglycemics (From admission, onward)            None        · Hold Oral hypoglycemics while patient is in the hospital.  · Diabetic diet only

## 2022-08-15 NOTE — HOSPITAL COURSE
Patient is a 56 y.o. F with ESRD admitted for hyperkalemia of 6.8, now 3.8 following hemodialysis. Patient reports bilateral lower extremity pain and progressively worsening edema x1 week. Bilateral ankle xrays without acute fracture; L ankle with nonspecific widening of the L medial space, which may be 2/2 to ligamentous injury. Patient reports BLE edema greatly improved following HD. Pt is currently stable and ready for discharge. Education was provided, patient expressed understanding, and all questions were answered.

## 2022-08-15 NOTE — ASSESSMENT & PLAN NOTE
- Pt reports progressive bilateral ankle swelling s/p fall from standing ~ 1 week ago  - L ankle with widening of the medial joint space, likely 2/2 ligamentous injury & remote fracture of distal fibula  - Bilateral x-rays without acute fracture  - Bilateral U/S lower extremities negative for DVT  - Ortho referral placed

## 2022-08-15 NOTE — SUBJECTIVE & OBJECTIVE
Interval History: Patient seen and examined by me. Patient was admitted overnight with K+ of 6.8, now 3.8 following hemodialysis. Patient reports bilateral lower extremity pain and progressively worsening edema x1 week. Bilateral ankle xrays without acute fracture; L ankle with nonspecific widening of the L medial space, which may be 2/2 to ligamentous injury.     Review of Systems   Constitutional: Negative.  Negative for chills, fatigue and fever.   HENT:  Negative for trouble swallowing.    Eyes:  Negative for photophobia and visual disturbance.   Respiratory:  Negative for cough, shortness of breath and wheezing.    Cardiovascular:  Positive for leg swelling (bilateral lower legs). Negative for chest pain.   Gastrointestinal:  Negative for abdominal pain, constipation, diarrhea, nausea and vomiting.   Genitourinary:  Positive for decreased urine volume.   Musculoskeletal:  Positive for arthralgias (bilateral feet & ankles). Negative for back pain and neck pain.   Skin:  Negative for wound.   Neurological:  Negative for dizziness, weakness, light-headedness and headaches.   Objective:     Vital Signs (Most Recent):  Temp: 98.3 °F (36.8 °C) (08/15/22 1200)  Pulse: 66 (08/15/22 1200)  Resp: 16 (08/15/22 1200)  BP: 124/74 (08/15/22 1200)  SpO2: 96 % (08/15/22 1200) Vital Signs (24h Range):  Temp:  [98.2 °F (36.8 °C)-98.9 °F (37.2 °C)] 98.3 °F (36.8 °C)  Pulse:  [66-73] 66  Resp:  [14-22] 16  SpO2:  [92 %-98 %] 96 %  BP: (102-165)/(54-85) 124/74        There is no height or weight on file to calculate BMI.    Intake/Output Summary (Last 24 hours) at 8/15/2022 1223  Last data filed at 8/15/2022 0615  Gross per 24 hour   Intake 850 ml   Output 2900 ml   Net -2050 ml      Physical Exam  Vitals and nursing note reviewed.   Constitutional:       General: She is not in acute distress.     Appearance: She is well-developed.   HENT:      Mouth/Throat:      Mouth: Mucous membranes are moist.   Eyes:      Conjunctiva/sclera:  Conjunctivae normal.      Pupils: Pupils are equal, round, and reactive to light.   Cardiovascular:      Rate and Rhythm: Normal rate and regular rhythm.   Pulmonary:      Effort: Pulmonary effort is normal.      Breath sounds: Normal breath sounds.   Abdominal:      Palpations: Abdomen is soft.      Tenderness: There is no abdominal tenderness.   Musculoskeletal:         General: No tenderness. Normal range of motion.      Right lower leg: Edema present.      Left lower leg: Edema present.      Comments: RUE AVF +thrill +bruit   Skin:     General: Skin is warm and dry.      Capillary Refill: Capillary refill takes less than 2 seconds.   Neurological:      Mental Status: She is alert and oriented to person, place, and time.   Psychiatric:         Behavior: Behavior normal.       Significant Labs: All pertinent labs within the past 24 hours have been reviewed.  BMP:   Recent Labs   Lab 08/15/22  0622   GLU 99   *   K 3.8   CL 97   CO2 26   BUN 22*   CREATININE 4.1*   CALCIUM 8.9     CBC:   Recent Labs   Lab 08/14/22  2146 08/14/22  2148 08/14/22  2205   WBC 9.34  --   --    HGB 9.3*  --   --    HCT 27.7* 27* 28*     --   --        Significant Imaging: I have reviewed all pertinent imaging results/findings within the past 24 hours.    X-Ray Ankle 2 View Bilateral  Narrative: EXAMINATION:  XR ANKLE 2 VIEW BILATERAL    CLINICAL HISTORY:  fall, pain;    TECHNIQUE:  AP and lateral radiographs of the bilateral ankles.    COMPARISON:  None    FINDINGS:  Right ankle: No acute fracture or dislocation. Alignment is normal. The ankle mortise is congruent. The talar dome is intact. Joint spaces are preserved. No effusion.  There is soft tissue edema.  There are vascular calcifications.  There is a plantar calcaneal enthesophyte.    Left ankle: There is widening of the medial joint space, measuring 6 mm.  There is a remote fracture of the distal fibula.  There is no evidence of acute fracture of the left ankle there  is a plantar calcaneal enthesophyte.  There is diffuse soft tissue edema.  There are vascular calcifications.  Impression: 1. Nonspecific widening of the left medial clear space which may be due to a ligamentous injury.  2. No acute fracture.  3. Diffuse soft tissue edema of the bilateral ankles.    Electronically signed by: Tyler Green  Date:    08/15/2022  Time:    00:30

## 2022-08-15 NOTE — PHARMACY MED REC
"Admission Medication History     The home medication history was taken by Ynes Gomez.    You may go to "Admission" then "Reconcile Home Medications" tabs to review and/or act upon these items.      The home medication list has been updated by the Pharmacy department.    Please read ALL comments highlighted in yellow.    Please address this information as you see fit.     Feel free to contact us if you have any questions or require assistance.        Medications listed below were obtained from: Patient/family and Medications brought from home  Medication Sig    acetaminophen (TYLENOL) 500 MG tablet Take 500 mg by mouth every 8 (eight) hours as needed for Pain.    amLODIPine (NORVASC) 10 MG tablet Take 1 tablet (10 mg total) by mouth nightly.    aspirin (ECOTRIN) 81 MG EC tablet Take 81 mg by mouth once daily.    diphenhydrAMINE (BENADRYL) 25 mg capsule Take 25 mg by mouth every 6 (six) hours as needed for Itching.    famotidine (PEPCID) 20 MG tablet Take 20 mg by mouth 2 (two) times daily as needed for Heartburn.    furosemide (LASIX) 80 MG tablet Take 1 tablet (80 mg total) by mouth once daily.    hydrALAZINE (APRESOLINE) 100 MG tablet Take 100 mg by mouth 3 (three) times daily.    labetaloL (NORMODYNE) 300 MG tablet Take 1 tablet (300 mg total) by mouth 3 (three) times daily.    losartan (COZAAR) 100 MG tablet Take 1 tablet (100 mg total) by mouth once daily.    pregabalin (LYRICA) 75 MG capsule Take 75 mg by mouth once daily.    sevelamer carbonate (RENVELA) 800 mg Tab Take 2,400 mg by mouth 3 (three) times daily with meals.             Potential issues to be addressed PRIOR TO DISCHARGE  Patient reported not taking the following medications: (ATORVASTATIN 40 MG). These medications remain on the home medication list. Please address accordingly.     Patient requested refills for the following medications: (HYDRALAZINE 100 MG)    Ynes Gomez  EXT [id number]                    .          "

## 2022-08-15 NOTE — ED NOTES
Pt care assumed.  Pt lying on stretcher, sleeping.  Respirations even, non-labored.  Pt on cardiac, blood pressure & pulse ox monitoring.  No distress noted at this time.  Side rails up.  Will continue to monitor.

## 2022-08-15 NOTE — ED NOTES
First pt contact, taking over pt care, pt is sitting upright on stretcher with appearance of no distress. Pt states both her entire legs are hurting. Dependent edema non pitting is present in both lower legs. No deformity is present in lower legs. No pain of lower legs upon palpation. Pt denies other symptoms. Pt is aaox4, gcs15.

## 2022-08-15 NOTE — ED NOTES
Pt male family is being verbally aggressive with staff, raising his voice toward staff. Stating that the doctors are not addressing the patients concern about fluid in the legs. Demanding to know why the pt has fluid in her legs immediately

## 2022-08-15 NOTE — ED NOTES
Bed: EDOU01  Expected date: 8/15/22  Expected time: 2:13 AM  Means of arrival:   Comments:  Rafael

## 2022-08-15 NOTE — ASSESSMENT & PLAN NOTE
Mineral Bone Disease in CKD   - Recommend renal diet.  - Novasource with meals  - Continue home phos binder , sevelamer 2400 TID with meals   - Daily renal panel so that phos and albumin is monitored daily.

## 2022-08-15 NOTE — SUBJECTIVE & OBJECTIVE
Past Medical History:   Diagnosis Date    Anemia of chronic disorder 2017    Overview:  Added automatically from request for surgery [id number]    Anemia of chronic renal failure, stage 5     Anxiety     Cyst of left ovary 2019    Cyst of left ovary 2019    Dyslipidemia 2013    End-stage renal disease on hemodialysis 2020    Hematuria 10/30/2019    Hx of sinus bradycardia 2017    Hyperkalemia 3/29/2017    Peripheral neuropathy 2013    Renovascular hypertension 2020    S/P dilation and curettage 2018    Thickened endometrium 2018    Thyroid nodule     Thyroid nodule 2021    Uncontrolled type 2 diabetes mellitus 2013    Overview:  poc a1c today 11.2-270/ up from 10.8-263, Pt. has very very stressed/ was incarcerated/marital stress/ will current meds/ less stress now/ will monitor.       Past Surgical History:   Procedure Laterality Date    AV FISTULA PLACEMENT      right arm     SECTION      x1    COLONOSCOPY N/A 2018    Procedure: COLONOSCOPY;  Surgeon: Pankaj Hinojosa MD;  Location: [facility] (4TH FLR);  Service: Endoscopy;  Laterality: N/A;  dialysis pt/labs prior to colon/MS    CYSTOSCOPY W/ RETROGRADES Bilateral 2020    Procedure: CYSTOSCOPY, WITH RETROGRADE PYELOGRAM;  Surgeon: Douglas Abraham MD;  [address];  Service: Urology;  Laterality: Bilateral;  45 min    FISTULOGRAM Right 2020    Procedure: Fistulogram;  Surgeon: Lester Gómez MD;  Location: [facility] CATH LAB;  Service: Nephrology;  Laterality: Right;    HYSTERECTOMY      IMPLANTATION OF COCHLEAR PROSTHESIS Right 2021    Procedure: INSERTION, PROSTHESIS, COCHLEAR;  Surgeon: Ramiro Zuleta MD;  Location: [facility] OR 2ND FLR;  Service: ENT;  Laterality: Right;  [facility]    IMPLANTATION OF COCHLEAR PROSTHESIS Right 2021    Procedure: INSERTION, PROSTHESIS, COCHLEAR;  Surgeon: Ramiro Zuleta MD;  [address];  Service: ENT;  Laterality: Right;   [facility]    ROBOT-ASSISTED LAPAROSCOPIC ABDOMINAL HYSTERECTOMY USING DA NATALYA XI N/A 7/5/2019    Procedure: XI ROBOTIC HYSTERECTOMY;  Surgeon: Trice Lei MD;  Location: [facility];  Service: OB/GYN;  Laterality: N/A;    ROBOT-ASSISTED LAPAROSCOPIC NEPHRECTOMY Right 10/8/2020    Procedure: ROBOTIC NEPHRECTOMY;  Surgeon: Douglas Abraham MD;  [facility];  Service: Urology;  Laterality: Right;  3.5hrs gen with regional     ROBOT-ASSISTED LAPAROSCOPIC SALPINGO-OOPHORECTOMY USING DA NATALYA XI Bilateral 7/5/2019    Procedure: XI ROBOTIC SALPINGO-OOPHORECTOMY;  Surgeon: Trice Lei MD;  Location: [facility] [place] OR;  Service: OB/GYN;  Laterality: Bilateral;    TUBAL LIGATION      Vascular access surgeries      x5       Review of patient's allergies indicates:  No Known Allergies    No current facility-administered medications on file prior to encounter.     Current Outpatient Medications on File Prior to Encounter   Medication Sig    acetaminophen (TYLENOL) 325 MG tablet Take 650 mg by mouth every 8 (eight) hours as needed for Pain.    amLODIPine (NORVASC) 10 MG tablet Take 1 tablet (10 mg total) by mouth nightly.    aspirin (ECOTRIN) 81 MG EC tablet Take 81 mg by mouth once daily.    atorvastatin (LIPITOR) 40 MG tablet Take 40 mg by mouth once daily.    BLOOD PRESSURE CUFF Misc Use daily. Preferred by insurance.    furosemide (LASIX) 80 MG tablet Take 1 tablet (80 mg total) by mouth once daily.    hydrALAZINE (APRESOLINE) 100 MG tablet Take 100 mg by mouth 3 (three) times daily.    labetaloL (NORMODYNE) 300 MG tablet Take 1 tablet (300 mg total) by mouth 3 (three) times daily.    losartan (COZAAR) 100 MG tablet Take 1 tablet (100 mg total) by mouth once daily.    multivitamin capsule Take 1 capsule by mouth once daily.    pregabalin (LYRICA) 75 MG capsule Take 75 mg by mouth once daily.    sevelamer carbonate (RENVELA) 800 mg Tab Take 2,400 mg by mouth 3 (three) times daily with meals.     Family  History       Problem Relation (Age of Onset)    Cancer Mother, Sister, Sister    Diabetes Brother, Sister    Lung cancer Sister    Ovarian cancer Mother, Sister, Sister          Tobacco Use    Smoking status: Never Smoker    Smokeless tobacco: Never Used   Substance and Sexual Activity    Alcohol use: No    Drug use: No    Sexual activity: Yes     Partners: Male     Birth control/protection: Post-menopausal     Review of Systems   Constitutional:  Positive for activity change and fatigue. Negative for fever.   HENT:  Negative for sore throat and trouble swallowing.    Eyes:  Negative for photophobia and visual disturbance.   Respiratory:  Positive for shortness of breath. Negative for chest tightness.    Cardiovascular:  Positive for leg swelling. Negative for chest pain and palpitations.   Gastrointestinal:  Negative for abdominal distention, abdominal pain, blood in stool, constipation, diarrhea, nausea and vomiting.   Endocrine: Negative for polydipsia and polyuria.   Genitourinary:  Negative for dysuria and hematuria.   Musculoskeletal:  Positive for arthralgias, gait problem and joint swelling.   Skin:  Negative for pallor and rash.   Allergic/Immunologic: Negative for immunocompromised state.   Neurological:  Negative for dizziness, seizures, syncope and facial asymmetry.   Psychiatric/Behavioral:  Positive for decreased concentration. Negative for agitation, behavioral problems and confusion.    Objective:     Vital Signs (Most Recent):  Temp: 98.9 °F (37.2 °C) (08/14/22 2311)  Pulse: 71 (08/14/22 2319)  Resp: 15 (08/14/22 2319)  BP: (!) 161/73 (08/14/22 2330)  SpO2: 98 % (08/14/22 2311)   Vital Signs (24h Range):  Temp:  [98.7 °F (37.1 °C)-98.9 °F (37.2 °C)] 98.9 °F (37.2 °C)  Pulse:  [71-73] 71  Resp:  [14-18] 15  SpO2:  [95 %-98 %] 98 %  BP: (145-161)/(65-73) 161/73        There is no height or weight on file to calculate BMI.    Physical Exam  Vitals and nursing note reviewed.   Constitutional:        General: She is not in acute distress.     Appearance: She is well-developed. She is obese. She is ill-appearing. She is not diaphoretic.   HENT:      Head: Normocephalic and atraumatic.      Mouth/Throat:      Mouth: Mucous membranes are moist.      Pharynx: No oropharyngeal exudate.   Eyes:      General: No scleral icterus.     Pupils: Pupils are equal, round, and reactive to light.   Neck:      Thyroid: No thyromegaly.   Cardiovascular:      Rate and Rhythm: Normal rate and regular rhythm.      Pulses: Normal pulses.      Heart sounds:     No gallop.   Pulmonary:      Effort: Pulmonary effort is normal.      Breath sounds: Normal breath sounds. No stridor. No wheezing or rales.   Abdominal:      General: There is no distension.      Palpations: Abdomen is soft. There is no mass.      Tenderness: There is no abdominal tenderness. There is no guarding.   Musculoskeletal:         General: Swelling and tenderness present. Normal range of motion.      Cervical back: Normal range of motion and neck supple. No rigidity.      Right lower leg: Edema present.      Left lower leg: Edema present.   Lymphadenopathy:      Cervical: No cervical adenopathy.   Skin:     General: Skin is warm and dry.      Capillary Refill: Capillary refill takes less than 2 seconds.      Coloration: Skin is not jaundiced.      Findings: No bruising.   Neurological:      Mental Status: She is alert. Mental status is at baseline.      Cranial Nerves: No cranial nerve deficit.      Motor: No weakness.      Gait: Gait abnormal.   Psychiatric:         Mood and Affect: Mood normal.         Behavior: Behavior normal.         CRANIAL NERVES     CN III, IV, VI   Pupils are equal, round, and reactive to light.         Recent Results (from the past 24 hour(s))   Comprehensive metabolic panel    Collection Time: 08/14/22  9:46 PM   Result Value Ref Range    Sodium 134 (L) 136 - 145 mmol/L    Potassium 6.8 (HH) 3.5 - 5.1 mmol/L    Chloride 94 (L) 95 - 110  mmol/L    CO2 25 23 - 29 mmol/L    Glucose 123 (H) 70 - 110 mg/dL    BUN 64 (H) 6 - 20 mg/dL    Creatinine 9.5 (H) 0.5 - 1.4 mg/dL    Calcium 9.8 8.7 - 10.5 mg/dL    Total Protein 7.4 6.0 - 8.4 g/dL    Albumin 3.1 (L) 3.5 - 5.2 g/dL    Total Bilirubin 0.7 0.1 - 1.0 mg/dL    Alkaline Phosphatase 100 55 - 135 U/L    AST 12 10 - 40 U/L    ALT 8 (L) 10 - 44 U/L    Anion Gap 15 8 - 16 mmol/L    eGFR 4.4 (A) >60 mL/min/1.73 m^2   CBC auto differential    Collection Time: 08/14/22  9:46 PM   Result Value Ref Range    WBC 9.34 3.90 - 12.70 K/uL    RBC 3.37 (L) 4.00 - 5.40 M/uL    Hemoglobin 9.3 (L) 12.0 - 16.0 g/dL    Hematocrit 27.7 (L) 37.0 - 48.5 %    MCV 82 82 - 98 fL    MCH 27.6 27.0 - 31.0 pg    MCHC 33.6 32.0 - 36.0 g/dL    RDW 14.6 (H) 11.5 - 14.5 %    Platelets 221 150 - 450 K/uL    MPV 11.1 9.2 - 12.9 fL    Immature Granulocytes 0.2 0.0 - 0.5 %    Gran # (ANC) 5.5 1.8 - 7.7 K/uL    Immature Grans (Abs) 0.02 0.00 - 0.04 K/uL    Lymph # 1.5 1.0 - 4.8 K/uL    Mono # 1.2 (H) 0.3 - 1.0 K/uL    Eos # 1.1 (H) 0.0 - 0.5 K/uL    Baso # 0.07 0.00 - 0.20 K/uL    nRBC 0 0 /100 WBC    Gran % 58.5 38.0 - 73.0 %    Lymph % 16.2 (L) 18.0 - 48.0 %    Mono % 12.5 4.0 - 15.0 %    Eosinophil % 11.9 (H) 0.0 - 8.0 %    Basophil % 0.7 0.0 - 1.9 %    Differential Method Automated    Brain natriuretic peptide    Collection Time: 08/14/22  9:46 PM   Result Value Ref Range     (H) 0 - 99 pg/mL   ISTAT PROCEDURE    Collection Time: 08/14/22  9:48 PM   Result Value Ref Range    POC Glucose 127 (H) 70 - 110 mg/dL    POC BUN 61 (H) 6 - 30 mg/dL    POC Creatinine 10.2 (H) 0.5 - 1.4 mg/dL    POC Sodium 134 (L) 136 - 145 mmol/L    POC Potassium 6.7 (HH) 3.5 - 5.1 mmol/L    POC Chloride 99 95 - 110 mmol/L    POC TCO2 (MEASURED) 30 (H) 23 - 29 mmol/L    POC Ionized Calcium 1.05 (L) 1.06 - 1.42 mmol/L    POC Hematocrit 27 (L) 36 - 54 %PCV    Sample NIKO    ISTAT PROCEDURE    Collection Time: 08/14/22 10:05 PM   Result Value Ref Range    POC  Glucose 123 (H) 70 - 110 mg/dL    POC BUN 67 (H) 6 - 30 mg/dL    POC Creatinine 9.9 (H) 0.5 - 1.4 mg/dL    POC Sodium 135 (L) 136 - 145 mmol/L    POC Potassium 7.0 (HH) 3.5 - 5.1 mmol/L    POC Chloride 99 95 - 110 mmol/L    POC TCO2 (MEASURED) 33 (H) 23 - 29 mmol/L    POC Ionized Calcium 1.10 1.06 - 1.42 mmol/L    POC Hematocrit 28 (L) 36 - 54 %PCV    Sample NIKO    POCT glucose    Collection Time: 08/14/22 10:51 PM   Result Value Ref Range    POCT Glucose 102 70 - 110 mg/dL       Microbiology Results (last 7 days)       ** No results found for the last 168 hours. **             Imaging Results              X-Ray Ankle 2 View Bilateral (Final result)  Result time 08/15/22 00:30:44      Final result by Tyler Green DO (08/15/22 00:30:44)                   Impression:      1. Nonspecific widening of the left medial clear space which may be due to a ligamentous injury.  2. No acute fracture.  3. Diffuse soft tissue edema of the bilateral ankles.      Electronically signed by: Tyler Green  Date:    08/15/2022  Time:    00:30               Narrative:    EXAMINATION:  XR ANKLE 2 VIEW BILATERAL    CLINICAL HISTORY:  fall, pain;    TECHNIQUE:  AP and lateral radiographs of the bilateral ankles.    COMPARISON:  None    FINDINGS:  Right ankle: No acute fracture or dislocation. Alignment is normal. The ankle mortise is congruent. The talar dome is intact. Joint spaces are preserved. No effusion.  There is soft tissue edema.  There are vascular calcifications.  There is a plantar calcaneal enthesophyte.    Left ankle: There is widening of the medial joint space, measuring 6 mm.  There is a remote fracture of the distal fibula.  There is no evidence of acute fracture of the left ankle there is a plantar calcaneal enthesophyte.  There is diffuse soft tissue edema.  There are vascular calcifications.                                       X-Ray Chest 1 View (Final result)  Result time 08/14/22 22:45:56   Procedure changed  from X-Ray Chest PA And Lateral     Final result by Carl Garza MD (08/14/22 22:45:56)                   Impression:      Cardiomegaly with bilateral edema.      Electronically signed by: Carl Garza MD  Date:    08/14/2022  Time:    22:45               Narrative:    EXAMINATION:  XR CHEST 1 VIEW    CLINICAL HISTORY:  ESRD; End stage renal disease    TECHNIQUE:  Single frontal view of the chest was performed.    COMPARISON:  05/16/2022.    FINDINGS:  No consolidation, pleural effusion or pneumothorax.    Cardiomegaly with bilateral edema, similar to prior.

## 2022-08-15 NOTE — DISCHARGE SUMMARY
[facility] [facility]  Discharge Summary      Patient Name: Jael Hall  MRN: [id number]  Patient Class: OP- Observation  Admission Date: 8/14/2022  Hospital Length of Stay: 0 days  Discharge Date and Time:  08/15/2022 4:50 PM  Attending Physician: Aruna Carver MD   Discharging Provider: Bigg Meek PA-C  Primary Care Provider: Mary Solano MD  [facility] Medicine Team: [facility] Ruby Thacker PA-C    HPI:     Jael Hall is a 56 y.o. female who  has a past medical history of Anemia of chronic disorder, Anemia of chronic renal failure, stage 5, Anxiety, Cyst of left ovary, Cyst of left ovary, Dyslipidemia, End-stage renal disease on hemodialysis, Hematuria, sinus bradycardia, Hyperkalemia, Peripheral neuropathy, Renovascular hypertension, S/P dilation and curettage, Thickened endometrium, Thyroid nodule, Thyroid nodule, and Uncontrolled type 2 diabetes mellitus, presented to the ED with CC of leg Swelling bilaterally.  Patient reports that she has been gaining fluid for about a week now, she states that she had a fall this week as well and that both her right and her left ankle are hurting her now.  On presentation to the ED her chest x-ray showed cardiomegaly with bilateral edema, . X-rays of both ankles did not show an acute fracture.  However on labs her potassium was found to be 6.8.  Patient was shifted and given calcium gluconate and Lokelma and Nephrology consulted for emergent HD.  Patient denies chest pain, abdominal pain, or hematochezia.      * No surgery found *      Hospital Course:   Patient is a 56 y.o. F with ESRD admitted for hyperkalemia of 6.8, now 3.8 following hemodialysis. Patient reports bilateral lower extremity pain and progressively worsening edema x1 week. Bilateral ankle xrays without acute fracture; L ankle with nonspecific widening of the L medial space, which may be 2/2 to ligamentous injury. Patient reports BLE  edema greatly improved following HD. Pt is currently stable and ready for discharge. Education was provided, patient expressed understanding, and all questions were answered.        Goals of Care Treatment Preferences:  Code Status: Full Code      Consults:   Consults (From admission, onward)        Status Ordering Provider     Inpatient consult to Nephrology  Once        Provider:  (Not yet assigned)    Completed RUBIA TRIANA          * Hyperkalemia  - Unclear reason for potassium being at 6.8  - Patient states it is that she went to her dialysis as normal on Friday, and they did not cut her short  - Patient was given albuterol, calcium gluconate, Lokelma  - Potassium 3.8 following HD    Ankle edema, bilateral  - Pt reports progressive bilateral ankle swelling s/p fall from standing ~ 1 week ago  - L ankle with widening of the medial joint space, likely 2/2 ligamentous injury & remote fracture of distal fibula  - Bilateral x-rays without acute fracture  - Bilateral U/S lower extremities negative for DVT  - Ortho referral placed    Anemia in ESRD (end-stage renal disease)  - Hgb: 9.3 (goal: 10-11)  - Iron studies pending    Acute on chronic diastolic heart failure  - CXR with cardiomegaly and bilateral edema similar to prior studies   -  on admission (improved from previous >1,000)  - Remove fluid with dialysis  - Last echo (8/24/21): EF of 60%, grade III LV diastolic dysfunction, CVP 8 mmHg, PA systolic pressure 57 mmHg  - Continue home losartan, labetalol, lasix & statin  - Repeat echo with EF of 65%, grade II LV diastolic dysfunction, CVP 3 mmHg, PA systolic pressure of 45 mmHg    Diabetes mellitus due to underlying condition, uncontrolled, with chronic kidney disease  - Controlled with diet  Lab Results   Component Value Date    HGBA1C 4.8 08/14/2022       Chronic kidney disease-mineral and bone disorder  - Renal diet, Novasource with meals  - Continue home sevelamer 2400 TIDWM    Essential  hypertension  - Controlled  - Latest BP and vitals reviewed  - Continue home meds for HTN:   Hypertension Medications             amLODIPine (NORVASC) 10 MG tablet Take 1 tablet (10 mg total) by mouth nightly.         hydrALAZINE (APRESOLINE) 100 MG tablet Take 100 mg by mouth 3 (three) times daily.    labetaloL (NORMODYNE) 300 MG tablet Take 1 tablet (300 mg total) by mouth 3 (three) times daily.    losartan (COZAAR) 100 MG tablet Take 1 tablet (100 mg total) by mouth once daily.          ESRD on hemodialysis  - Scheduled for HD on MWF   - RAVF with thrill and bruit  - BUN/Cr 64/9.5 on admission >> 22/4.1 following HD overnight  - Nephrology consulted  - Continue chronic hemodialysis  - Avoid nephrotoxic medications      Final Active Diagnoses:    Diagnosis Date Noted POA    PRINCIPAL PROBLEM:  Hyperkalemia [E87.5] 09/09/2020 Yes    Anemia in ESRD (end-stage renal disease) [N18.6, D63.1] 08/15/2022 Yes    Ankle edema, bilateral [M25.471, M25.472] 08/15/2022 Yes    Acute on chronic diastolic heart failure [I50.33] 10/08/2021 Yes    Diabetes mellitus due to underlying condition, uncontrolled, with chronic kidney disease [E08.22, E08.65] 07/27/2021 Yes    Chronic kidney disease-mineral and bone disorder [N18.9, E83.9, M89.9] 10/08/2020 Yes    Essential hypertension [I10]  Yes    ESRD on hemodialysis [N18.6, Z99.2] 09/28/2017 Not Applicable      Problems Resolved During this Admission:       Discharged Condition: good    Disposition: Home or Self Care    Follow Up:   Follow-up Information     Mary Solano MD Follow up in 1 week(s).    Specialty: Family Medicine  Contact information:  [address] [facility] [address] [phone]                       Patient Instructions:      Ambulatory referral/consult to [facility]   Standing Status: Future   Referral Priority: Routine Referral Type: Consultation   Referral Reason: Specialty Services Required   Number of Visits  Requested: 1     Ambulatory referral/consult to Internal Medicine   Standing Status: Future   Referral Priority: Routine Referral Type: Consultation   Referral Reason: Specialty Services Required   Requested Specialty: Internal Medicine   Number of Visits Requested: 1     Ambulatory referral/consult to Orthopedics   Standing Status: Future   Referral Priority: Routine Referral Type: Consultation   Requested Specialty: Orthopedic Surgery   Number of Visits Requested: 1     Diet renal     Notify your health care provider if you experience any of the following:  temperature >100.4     Notify your health care provider if you experience any of the following:  persistent nausea and vomiting or diarrhea     Notify your health care provider if you experience any of the following:  persistent dizziness, light-headedness, or visual disturbances     Activity as tolerated       Significant Diagnostic Studies: Labs:   BMP:   Recent Labs   Lab 08/14/22  2146 08/15/22  0622   * 99   * 135*   K 6.8* 3.8   CL 94* 97   CO2 25 26   BUN 64* 22*   CREATININE 9.5* 4.1*   CALCIUM 9.8 8.9   , CBC   Recent Labs   Lab 08/14/22 2146 08/14/22 2148 08/14/22  2205   WBC 9.34  --   --    HGB 9.3*  --   --    HCT 27.7*   < > 28*     --   --     < > = values in this interval not displayed.    and All labs within the past 24 hours have been reviewed  Radiology:    Echo  · Mild left atrial enlargement.  · The left ventricle is normal in size with mild concentric hypertrophy   and normal systolic function.  · The estimated ejection fraction is 60%.  · Grade II left ventricular diastolic dysfunction.  · Normal right ventricular size with normal right ventricular systolic   function.  · There is pulmonary hypertension. The estimated PA systolic pressure is   45 mmHg.  · Normal central venous pressure (3 mmHg).     US Lower Extremity Veins Bilateral  Narrative: EXAMINATION:  US LOWER EXTREMITY VEINS BILATERAL    CLINICAL  HISTORY:  Localized edema    TECHNIQUE:  Duplex and color flow Doppler and dynamic compression was performed of the bilateral lower extremity veins was performed.    COMPARISON:  None    FINDINGS:  Right thigh veins: The common femoral, femoral, popliteal, upper greater saphenous, and deep femoral veins are patent and free of thrombus. The veins are normally compressible and have normal phasic flow and augmentation response.    Right calf veins: The visualized calf veins are patent.    Left thigh veins: The common femoral, femoral, popliteal, upper greater saphenous, and deep femoral veins are patent and free of thrombus. The veins are normally compressible and have normal phasic flow and augmentation response.    Left calf veins: The visualized calf veins are patent.    Miscellaneous: None  Impression: No evidence of deep venous thrombosis in either lower extremity.    Electronically signed by resident: Greg Leos  Date:    08/15/2022  Time:    13:53    Electronically signed by: Ziggy Rodriguez MD  Date:    08/15/2022  Time:    14:06  X-Ray Ankle 2 View Bilateral  Narrative: EXAMINATION:  XR ANKLE 2 VIEW BILATERAL    CLINICAL HISTORY:  fall, pain;    TECHNIQUE:  AP and lateral radiographs of the bilateral ankles.    COMPARISON:  None    FINDINGS:  Right ankle: No acute fracture or dislocation. Alignment is normal. The ankle mortise is congruent. The talar dome is intact. Joint spaces are preserved. No effusion.  There is soft tissue edema.  There are vascular calcifications.  There is a plantar calcaneal enthesophyte.    Left ankle: There is widening of the medial joint space, measuring 6 mm.  There is a remote fracture of the distal fibula.  There is no evidence of acute fracture of the left ankle there is a plantar calcaneal enthesophyte.  There is diffuse soft tissue edema.  There are vascular calcifications.  Impression: 1. Nonspecific widening of the left medial clear space which may be due to a  ligamentous injury.  2. No acute fracture.  3. Diffuse soft tissue edema of the bilateral ankles.    Electronically signed by: Tyler Green  Date:    08/15/2022  Time:    00:30      Pending Diagnostic Studies:     None         Medications:  Reconciled Home Medications:      Medication List      CONTINUE taking these medications    acetaminophen 500 MG tablet  Commonly known as: TYLENOL  Take 500 mg by mouth every 8 (eight) hours as needed for Pain.     amLODIPine 10 MG tablet  Commonly known as: NORVASC  Take 1 tablet (10 mg total) by mouth nightly.     aspirin 81 MG EC tablet  Commonly known as: ECOTRIN  Take 81 mg by mouth once daily.     atorvastatin 40 MG tablet  Commonly known as: LIPITOR  Take 40 mg by mouth once daily.     BLOOD PRESSURE CUFF Misc  Generic drug: miscellaneous medical supply  Use daily. Preferred by insurance.     diphenhydrAMINE 25 mg capsule  Commonly known as: BENADRYL  Take 25 mg by mouth every 6 (six) hours as needed for Itching.     famotidine 20 MG tablet  Commonly known as: PEPCID  Take 20 mg by mouth 2 (two) times daily as needed for Heartburn.     furosemide 80 MG tablet  Commonly known as: LASIX  Take 1 tablet (80 mg total) by mouth once daily.     hydrALAZINE 100 MG tablet  Commonly known as: APRESOLINE  Take 100 mg by mouth 3 (three) times daily.     labetaloL 300 MG tablet  Commonly known as: NORMODYNE  Take 1 tablet (300 mg total) by mouth 3 (three) times daily.     losartan 100 MG tablet  Commonly known as: COZAAR  Take 1 tablet (100 mg total) by mouth once daily.     multivitamin capsule  Take 1 capsule by mouth once daily.     pregabalin 75 MG capsule  Commonly known as: LYRICA  Take 75 mg by mouth once daily.     sevelamer carbonate 800 mg Tab  Commonly known as: RENVELA  Take 2,400 mg by mouth 3 (three) times daily with meals.            Indwelling Lines/Drains at time of discharge:   Lines/Drains/Airways     Drain  Duration                Hemodialysis AV Fistula Left  forearm -- days         Hemodialysis AV Fistula Right forearm -- days         Hemodialysis AV Fistula Right upper arm -- days                Time spent on the discharge of patient: 35 minutes         Bigg Meek PA-C  Department of Hospital Medicine  [facility] - [facility] ([place]-)

## 2022-08-15 NOTE — NURSING
Discharged home, IV removed with catheter intact, telemetry box removed, cleaned and returned to pocket. Left unit via wheelchair with son no signs of distress.

## 2022-08-15 NOTE — HPI
Jael Hall is a 56 y.o. female who  has a past medical history of Anemia of chronic disorder, Anemia of chronic renal failure, stage 5, Anxiety, Cyst of left ovary, Cyst of left ovary, Dyslipidemia, End-stage renal disease on hemodialysis (MWF), Hematuria, sinus bradycardia, Hyperkalemia, Peripheral neuropathy, Renovascular hypertension, S/P dilation and curettage, Thickened endometrium, Thyroid nodule, Thyroid nodule, and Uncontrolled type 2 diabetes mellitus, presented to the ED with CC of leg Swelling bilaterally.  Patient reports that she has been gaining fluid for about a week now, she states that she had a fall this week as well and that both her right and her left ankle are hurting her now.  On presentation to the ED her chest x-ray showed cardiomegaly with bilateral edema, . X-rays of both ankles did not show an acute fracture. Hyperkalemic in ED K 6.8.  Patient was shifted and given calcium gluconate and Lokelma.  Reports last HD was Friday 8/12. Denies missing any treatments. Patient denies SOB, chest pain, abdominal pain, or hematochezia. Nephrology consulted for ESRD on HD

## 2022-08-15 NOTE — ASSESSMENT & PLAN NOTE
- Controlled  - Latest BP and vitals reviewed  - Continue home meds for HTN:   Hypertension Medications             amLODIPine (NORVASC) 10 MG tablet Take 1 tablet (10 mg total) by mouth nightly.         hydrALAZINE (APRESOLINE) 100 MG tablet Take 100 mg by mouth 3 (three) times daily.    labetaloL (NORMODYNE) 300 MG tablet Take 1 tablet (300 mg total) by mouth 3 (three) times daily.    losartan (COZAAR) 100 MG tablet Take 1 tablet (100 mg total) by mouth once daily.

## 2022-08-15 NOTE — ED NOTES
Ultrasound called to check progress on dialysis so patient can have US. [employer] US tech pt will be complete in approximately one hour.

## 2022-08-15 NOTE — NURSING
Arrived to unit via stretcher, ambulated to bed. AAOx4, VSS, denies any pain or discomfort. Communication board updated. Bed locked and in lowest position, side rails up x2, call light in reach. Will continue to monitor.

## 2022-08-15 NOTE — ED NOTES
Bed: BHALL4  Expected date: 8/14/22  Expected time: 8:50 PM  Means of arrival:   Comments:  Rafael

## 2022-08-15 NOTE — HPI
Jael Hall is a 56 y.o. female who  has a past medical history of Anemia of chronic disorder, Anemia of chronic renal failure, stage 5, Anxiety, Cyst of left ovary, Cyst of left ovary, Dyslipidemia, End-stage renal disease on hemodialysis, Hematuria, sinus bradycardia, Hyperkalemia, Peripheral neuropathy, Renovascular hypertension, S/P dilation and curettage, Thickened endometrium, Thyroid nodule, Thyroid nodule, and Uncontrolled type 2 diabetes mellitus, presented to the ED with CC of leg Swelling bilaterally.  Patient reports that she has been gaining fluid for about a week now, she states that she had a fall this week as well and that both her right and her left ankle are hurting her now.  On presentation to the ED her chest x-ray showed cardiomegaly with bilateral edema, . X-rays of both ankles did not show an acute fracture.  However on labs her potassium was found to be 6.8.  Patient was shifted and given calcium gluconate and Lokelma and Nephrology consulted for emergent HD.  Patient denies chest pain, abdominal pain, or hematochezia.

## 2022-08-15 NOTE — PROGRESS NOTES
08/15/22 0315   Handoff Report   Received From Primary nurse.   Vital Signs   Temp 98.2 °F (36.8 °C)   Temp src Oral   Pulse 66   SpO2 96 %   BP (!) 159/72   MAP (mmHg) 104   BP Location Left arm   BP Method Automatic   Pre-Hemodialysis Assessment   Treatment Status Started   Gross Bleach Negative Yes   Total Chlorine is less than 0.1 ppm Yes   Machine Number [id number]   Alarms Verified Yes   Reverse Osmosis Machine Log Completed Yes   Extracorporeal Circuit Tested for Integrity Yes   pH 7   Machine Temperature 96.6 °F (35.9 °C)   Dialyzer F-160   Machine Conductivity 13.4   Meter Conductivity 13.4   Dialysate Na (mEq/L) Other (comment)  (135)   Dialysate K (mEq/L) 2   Dialysate CA (mEq/L) 2   Dialysate HCO3 (mEq/L) 30   Prime Ordered (mL) 500 mL   Treatment Initiation with Dialysis Precautions All connections secured;Saline line double clamped;Venous parameters set;Arterial parameters set;Prime given;Air foam detector engaged   Prime Amount Given (mL) 500 mL   Net UF Goal 3000   Total UF Goal 2500   Pre-Hemodialysis Comments Patient awake in bed, explained today POC.   UF Rate 1000   RO # / DI #  14   RO Rejection Within Limits? Yes        Hemodialysis AV Fistula Right forearm   No Placement Date or Time found.   Present Prior to Hospital Arrival?: Yes  Location: Right forearm   Needle Size 15ga   Site Assessment No redness;No swelling   Patency Thrill;Bruit;Present   Status Accessed   Flows Good   During Hemodialysis Assessment   Blood Flow Rate (mL/min) 350 mL/min   Dialysate Flow Rate (mL/min) 800 ml/min   Ultrafiltration Rate (mL/Hr) 1000 mL/Hr   Arteriovenous Lines Secure Yes   Arterial Pressure (mmHg) -100 mmHg   Venous Pressure (mmHg) 100   Blood Volume Processed (Liters) 0 L   UF Removed (mL) 0 mL   TMP 70   Venous Line in Air Detector Yes   Intake (mL) 250 mL   Transducer Dry Yes   Access Visible Yes    notified of access issue? N/A   Intra-Hemodialysis Comments Patient treatment initated per MD order.

## 2022-08-15 NOTE — ED NOTES
Pt transferred to 7070 with escort.  Pt on telemetry from 7th floor.  Son with pt.  All belongings sent with pt.

## 2022-08-15 NOTE — SUBJECTIVE & OBJECTIVE
Past Medical History:   Diagnosis Date    Anemia of chronic disorder 2017    Overview:  Added automatically from request for surgery [id number]    Anemia of chronic renal failure, stage 5     Anxiety     Cyst of left ovary 2019    Cyst of left ovary 2019    Dyslipidemia 2013    End-stage renal disease on hemodialysis 2020    Hematuria 10/30/2019    Hx of sinus bradycardia 2017    Hyperkalemia 3/29/2017    Peripheral neuropathy 2013    Renovascular hypertension 2020    S/P dilation and curettage 2018    Thickened endometrium 2018    Thyroid nodule     Thyroid nodule 2021    Uncontrolled type 2 diabetes mellitus 2013    Overview:  poc a1c today 11.2-270/ up from 10.8-263, Pt. has very very stressed/ was incarcerated/marital stress/ will current meds/ less stress now/ will monitor.       Past Surgical History:   Procedure Laterality Date    AV FISTULA PLACEMENT      right arm     SECTION      x1    COLONOSCOPY N/A 2018    Procedure: COLONOSCOPY;  Surgeon: Pankaj Hinojosa MD;  Location: [facility] (4TH FLR);  Service: Endoscopy;  Laterality: N/A;  dialysis pt/labs prior to colon/MS    CYSTOSCOPY W/ RETROGRADES Bilateral 2020    Procedure: CYSTOSCOPY, WITH RETROGRADE PYELOGRAM;  Surgeon: Douglas Abraham MD;  [address];  Service: Urology;  Laterality: Bilateral;  45 min    FISTULOGRAM Right 2020    Procedure: Fistulogram;  Surgeon: Lester Gómez MD;  Location: [facility] CATH LAB;  Service: Nephrology;  Laterality: Right;    HYSTERECTOMY      IMPLANTATION OF COCHLEAR PROSTHESIS Right 2021    Procedure: INSERTION, PROSTHESIS, COCHLEAR;  Surgeon: Ramiro Zuleta MD;  Location: [facility] OR 2ND FLR;  Service: ENT;  Laterality: Right;  [facility]    IMPLANTATION OF COCHLEAR PROSTHESIS Right 2021    Procedure: INSERTION, PROSTHESIS, COCHLEAR;  Surgeon: Ramiro Zuleta MD;  [address];  Service: ENT;  Laterality: Right;   [facility]    ROBOT-ASSISTED LAPAROSCOPIC ABDOMINAL HYSTERECTOMY USING DA NATALYA XI N/A 7/5/2019    Procedure: XI ROBOTIC HYSTERECTOMY;  Surgeon: Trice Lei MD;  Location: [facility];  Service: OB/GYN;  Laterality: N/A;    ROBOT-ASSISTED LAPAROSCOPIC NEPHRECTOMY Right 10/8/2020    Procedure: ROBOTIC NEPHRECTOMY;  Surgeon: Douglas Abraham MD;  Location: [facility] OR [facility] FLR;  Service: Urology;  Laterality: Right;  3.5hrs gen with regional     ROBOT-ASSISTED LAPAROSCOPIC SALPINGO-OOPHORECTOMY USING DA NATALYA XI Bilateral 7/5/2019    Procedure: XI ROBOTIC SALPINGO-OOPHORECTOMY;  Surgeon: Trice Lei MD;  Location: [facility];  Service: OB/GYN;  Laterality: Bilateral;    TUBAL LIGATION      Vascular access surgeries      x5       Review of patient's allergies indicates:  No Known Allergies  Current Facility-Administered Medications   Medication Frequency    0.9%  NaCl infusion Once    acetaminophen tablet 650 mg Q8H PRN    amLODIPine tablet 10 mg Nightly    aspirin EC tablet 81 mg Daily    atorvastatin tablet 40 mg Daily    dextrose 10% bolus 125 mL PRN    dextrose 10% bolus 250 mL PRN    glucagon (human recombinant) injection 1 mg PRN    glucose chewable tablet 16 g PRN    glucose chewable tablet 24 g PRN    heparin (porcine) injection 1,000 Units PRN    hydrALAZINE tablet 100 mg TID    labetaloL tablet 300 mg TID    losartan tablet 100 mg Daily    melatonin tablet 6 mg Nightly    multivitamin tablet Daily    naloxone 0.4 mg/mL injection 0.02 mg PRN    polyethylene glycol packet 17 g BID PRN    pregabalin capsule 75 mg Daily    sevelamer carbonate tablet 2,400 mg TID WM    sodium chloride 0.9% bolus 250 mL PRN    sodium zirconium cyclosilicate packet 10 g TID     Current Outpatient Medications   Medication    acetaminophen (TYLENOL) 500 MG tablet    amLODIPine (NORVASC) 10 MG tablet    aspirin (ECOTRIN) 81 MG EC tablet    diphenhydrAMINE (BENADRYL) 25 mg capsule    famotidine (PEPCID) 20 MG tablet    furosemide  (LASIX) 80 MG tablet    hydrALAZINE (APRESOLINE) 100 MG tablet    labetaloL (NORMODYNE) 300 MG tablet    losartan (COZAAR) 100 MG tablet    pregabalin (LYRICA) 75 MG capsule    sevelamer carbonate (RENVELA) 800 mg Tab    atorvastatin (LIPITOR) 40 MG tablet    BLOOD PRESSURE CUFF Misc    multivitamin capsule     Family History       Problem Relation (Age of Onset)    Cancer Mother, Sister, Sister    Diabetes Brother, Sister    Lung cancer Sister    Ovarian cancer Mother, Sister, Sister          Tobacco Use    Smoking status: Never Smoker    Smokeless tobacco: Never Used   Substance and Sexual Activity    Alcohol use: No    Drug use: No    Sexual activity: Yes     Partners: Male     Birth control/protection: Post-menopausal     Review of Systems   Constitutional: Negative.    HENT: Negative.     Eyes: Negative.    Respiratory: Negative.     Cardiovascular:  Positive for leg swelling.   Gastrointestinal: Negative.    Endocrine: Negative.    Genitourinary:  Positive for decreased urine volume.   Musculoskeletal: Negative.    Allergic/Immunologic: Negative.    Neurological: Negative.    Hematological: Negative.    Psychiatric/Behavioral: Negative.     Objective:     Vital Signs (Most Recent):  Temp: 98.7 °F (37.1 °C) (08/15/22 0621)  Pulse: 70 (08/15/22 0856)  Resp: 16 (08/15/22 0615)  BP: (!) 159/73 (08/15/22 0856)  SpO2: (!) 92 % (08/15/22 0857)  O2 Device (Oxygen Therapy): room air (08/14/22 2311)   Vital Signs (24h Range):  Temp:  [98.2 °F (36.8 °C)-98.9 °F (37.2 °C)] 98.7 °F (37.1 °C)  Pulse:  [66-73] 70  Resp:  [14-22] 16  SpO2:  [92 %-98 %] 92 %  BP: (102-165)/(54-85) 159/73        There is no height or weight on file to calculate BMI.  There is no height or weight on file to calculate BSA.    I/O last 3 completed shifts:  In: 850 [Other:500; IV Piggyback:350]  Out: 2900 [Other:2900]    Physical Exam  Vitals and nursing note reviewed.   HENT:      Head: Normocephalic.      Mouth/Throat:      Pharynx: Oropharynx is  clear.   Eyes:      Conjunctiva/sclera: Conjunctivae normal.   Cardiovascular:      Rate and Rhythm: Normal rate.      Pulses: Normal pulses.      Arteriovenous access: Right arteriovenous access is present.     Comments: JOCELYN AVF +thrill, +bruit   Pulmonary:      Effort: Pulmonary effort is normal.      Breath sounds: Normal breath sounds.   Abdominal:      Palpations: Abdomen is soft.   Musculoskeletal:      Cervical back: Neck supple.      Right lower leg: No edema.      Left lower leg: No edema.   Neurological:      Mental Status: She is alert and oriented to person, place, and time.   Psychiatric:         Mood and Affect: Mood normal.     Significant Labs:  CBC:   Recent Labs   Lab 08/14/22 2146 08/14/22  2148 08/14/22  2205   WBC 9.34  --   --    RBC 3.37*  --   --    HGB 9.3*  --   --    HCT 27.7*   < > 28*     --   --    MCV 82  --   --    MCH 27.6  --   --    MCHC 33.6  --   --     < > = values in this interval not displayed.     CMP:   Recent Labs   Lab 08/15/22  0622   GLU 99   CALCIUM 8.9   ALBUMIN 3.3*   PROT 8.0   *   K 3.8   CO2 26   CL 97   BUN 22*   CREATININE 4.1*   ALKPHOS 110   ALT 5*   AST 12   BILITOT 1.0     All labs within the past 24 hours have been reviewed.

## 2022-08-15 NOTE — ASSESSMENT & PLAN NOTE
· Unclear reason for potassium being so high at 6.8  · Patient states it is that she went to her dialysis as normal on Friday, and they did not cut her short  · Son states that she does not adhere to a strict diet  · Patient was given albuterol, calcium gluconate, Lokelma  · Nephrology consult for urgent HD

## 2022-08-22 ENCOUNTER — PES CALL (OUTPATIENT)
Dept: ADMINISTRATIVE | Facility: CLINIC | Age: 57
End: 2022-08-22
Payer: MEDICARE

## 2022-08-23 ENCOUNTER — OFFICE VISIT (OUTPATIENT)
Dept: INTERNAL MEDICINE | Facility: CLINIC | Age: 57
End: 2022-08-23
Payer: MEDICARE

## 2022-08-23 ENCOUNTER — PES CALL (OUTPATIENT)
Dept: ADMINISTRATIVE | Facility: CLINIC | Age: 57
End: 2022-08-23
Payer: MEDICARE

## 2022-08-23 ENCOUNTER — HOSPITAL ENCOUNTER (OUTPATIENT)
Dept: RADIOLOGY | Facility: HOSPITAL | Age: 57
Discharge: HOME | End: 2022-08-23
Attending: NURSE PRACTITIONER
Payer: MEDICARE

## 2022-08-23 VITALS
OXYGEN SATURATION: 95 % | SYSTOLIC BLOOD PRESSURE: 120 MMHG | WEIGHT: 166 LBS | HEART RATE: 73 BPM | BODY MASS INDEX: 28.34 KG/M2 | HEIGHT: 64 IN | DIASTOLIC BLOOD PRESSURE: 58 MMHG

## 2022-08-23 DIAGNOSIS — N18.6 ESRD ON HEMODIALYSIS: ICD-10-CM

## 2022-08-23 DIAGNOSIS — M79.672 FOOT PAIN, LEFT: Primary | ICD-10-CM

## 2022-08-23 DIAGNOSIS — M79.672 FOOT PAIN, LEFT: ICD-10-CM

## 2022-08-23 DIAGNOSIS — I50.32 CHRONIC DIASTOLIC HEART FAILURE: ICD-10-CM

## 2022-08-23 DIAGNOSIS — M25.471 ANKLE EDEMA, BILATERAL: ICD-10-CM

## 2022-08-23 DIAGNOSIS — D63.1 ANEMIA IN ESRD (END-STAGE RENAL DISEASE): ICD-10-CM

## 2022-08-23 DIAGNOSIS — N18.9 CHRONIC KIDNEY DISEASE-MINERAL AND BONE DISORDER: ICD-10-CM

## 2022-08-23 DIAGNOSIS — K76.0 NAFLD (NONALCOHOLIC FATTY LIVER DISEASE): ICD-10-CM

## 2022-08-23 DIAGNOSIS — E55.9 VITAMIN D DEFICIENCY: ICD-10-CM

## 2022-08-23 DIAGNOSIS — N18.6 ANEMIA IN ESRD (END-STAGE RENAL DISEASE): ICD-10-CM

## 2022-08-23 DIAGNOSIS — I50.33 ACUTE ON CHRONIC DIASTOLIC HEART FAILURE: ICD-10-CM

## 2022-08-23 DIAGNOSIS — I10 ESSENTIAL HYPERTENSION: ICD-10-CM

## 2022-08-23 DIAGNOSIS — E83.9 CHRONIC KIDNEY DISEASE-MINERAL AND BONE DISORDER: ICD-10-CM
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
## 2022-08-23 DIAGNOSIS — K21.9 GASTROESOPHAGEAL REFLUX DISEASE WITHOUT ESOPHAGITIS: ICD-10-CM

## 2022-08-23 DIAGNOSIS — M89.9 CHRONIC KIDNEY DISEASE-MINERAL AND BONE DISORDER: ICD-10-CM

## 2022-08-23 DIAGNOSIS — I15.1 HYPERTENSION SECONDARY TO OTHER RENAL DISORDERS: ICD-10-CM

## 2022-08-23 DIAGNOSIS — E04.1 THYROID NODULE: ICD-10-CM

## 2022-08-23 DIAGNOSIS — Z99.2 ESRD ON HEMODIALYSIS: ICD-10-CM

## 2022-08-23 DIAGNOSIS — M25.472 ANKLE EDEMA, BILATERAL: ICD-10-CM

## 2022-08-23 PROCEDURE — 73630 XR FOOT COMPLETE 3 VIEW LEFT: ICD-10-PCS | Mod: 26,LT,, | Performed by: RADIOLOGY

## 2022-08-23 PROCEDURE — 99215 PR OFFICE/OUTPT VISIT, EST, LEVL V, 40-54 MIN: ICD-10-PCS | Mod: S$PBB,,, | Performed by: NURSE PRACTITIONER

## 2022-08-23 PROCEDURE — 73630 X-RAY EXAM OF FOOT: CPT | Mod: TC,LT

## 2022-08-23 PROCEDURE — 99499 RISK ADDL DX/OHS AUDIT: ICD-10-PCS | Mod: S$PBB,,, | Performed by: NURSE PRACTITIONER

## 2022-08-23 PROCEDURE — 99999 PR PBB SHADOW E&M-EST. PATIENT-LVL IV: ICD-10-PCS | Mod: PBBFAC,,, | Performed by: NURSE PRACTITIONER

## 2022-08-23 PROCEDURE — 73630 X-RAY EXAM OF FOOT: CPT | Mod: 26,LT,, | Performed by: RADIOLOGY

## 2022-08-23 PROCEDURE — 99214 OFFICE O/P EST MOD 30 MIN: CPT | Mod: PBBFAC | Performed by: NURSE PRACTITIONER

## 2022-08-23 PROCEDURE — 99215 OFFICE O/P EST HI 40 MIN: CPT | Mod: S$PBB,,, | Performed by: NURSE PRACTITIONER

## 2022-08-23 PROCEDURE — 99499 UNLISTED E&M SERVICE: CPT | Mod: S$PBB,,, | Performed by: NURSE PRACTITIONER

## 2022-08-23 PROCEDURE — 99999 PR PBB SHADOW E&M-EST. PATIENT-LVL IV: CPT | Mod: PBBFAC,,, | Performed by: NURSE PRACTITIONER

## 2022-08-23 NOTE — PROGRESS NOTES
INTERNAL MEDICINE PROGRESS NOTE    CHIEF COMPLAINT     Chief Complaint   Patient presents with    Hospital Follow Up       HPI     Jael Hall is a 56 y.o. female with anxiety, HTN, CHF, HLD, alopecia, neuropathy, ESRD on HD, anemia in ESRD, DM, NAFLD, and GERD who presents for a follow up visit today.    Here for hospital follow up - pt presented to the ED at [facility] 8/15/2022 with lower extremity swelling.   Patient reports that she has been gaining fluid for about a week now, she states that she had a fall this week as well and that both her right and her left ankle are hurting her now.  On presentation to the ED her chest x-ray showed cardiomegaly with bilateral edema, . X-rays of both ankles did not show an acute fracture.  However on labs her potassium was found to be 6.8.  Patient was shifted and given calcium gluconate and Lokelma and Nephrology consulted for emergent HD.      PCP - Dr Susan Cruz - last seen 5/16/2022 [facility] - pt would like to get established here at [facility]     Hyperkalemia- 6.8 down to 3.8 following HD     Fall/ankle pain- Xrays negative for fractures. Left ankle with nonspecific widening of the medial space ?ligamental injury   Bilateral lower ext u/s- negative   Refer to ortho   Pt has a h/o left ankle fracture- was in [facility] - 2021 Dr Vazquez     CHF-   - CXR with cardiomegaly and bilateral edema similar to prior studies   -  on admission (improved from previous >1,000)  - Remove fluid with dialysis  - Last echo (8/24/21): EF of 60%, grade III LV diastolic dysfunction, CVP 8 mmHg, PA systolic pressure 57 mmHg  - Continue home losartan, labetalol, lasix & statin  - Repeat echo with EF of 65%, grade II LV diastolic dysfunction, CVP 3 mmHg, PA systolic pressure of 45 mmHg    DM2- controlled with diet     CKD - secondary hyperparathyroidism- taking sevelamer - renal diet     HTN- taking amlodipine 10mg, hydralazine 100mg TID, labetalol TID, losartan 100mg    Followed by  cardiology at [facility] - Dr Tapia     ESRD on HD-   - Scheduled for HD on MWF   - RAVF with thrill and bruit  - BUN/Cr 64/9.5 on admission >> 22/4.1 following HD overnight  - Nephrology consulted  - Continue chronic hemodialysis  - Avoid nephrotoxic medications      Past Medical History:  Past Medical History:   Diagnosis Date    Anemia of chronic disorder 11/8/2017    Overview:  Added automatically from request for surgery [id number]    Anemia of chronic renal failure, stage 5     Anxiety     Cyst of left ovary 5/14/2019    Cyst of left ovary 7/5/2019    Dyslipidemia 1/4/2013    End-stage renal disease on hemodialysis 8/20/2020    Hematuria 10/30/2019    Hx of sinus bradycardia 9/28/2017    Hyperkalemia 3/29/2017    Peripheral neuropathy 1/4/2013    Renovascular hypertension 8/17/2020    S/P dilation and curettage 5/22/2018    Thickened endometrium 5/22/2018    Thyroid nodule     Thyroid nodule 12/13/2021    Uncontrolled type 2 diabetes mellitus 4/5/2013    Overview:  poc a1c today 11.2-270/ up from 10.8-263, Pt. has very very stressed/ was incarcerated/marital stress/ will current meds/ less stress now/ will monitor.       Home Medications:  Prior to Admission medications    Medication Sig Start Date End Date Taking? Authorizing Provider   acetaminophen (TYLENOL) 500 MG tablet Take 500 mg by mouth every 8 (eight) hours as needed for Pain.   Yes Historical Provider   amLODIPine (NORVASC) 10 MG tablet Take 1 tablet (10 mg total) by mouth nightly. 3/30/21  Yes Jennifer Santoyo MD   aspirin (ECOTRIN) 81 MG EC tablet Take 81 mg by mouth once daily.   Yes Historical Provider   atorvastatin (LIPITOR) 40 MG tablet Take 40 mg by mouth once daily. 5/16/22  Yes Historical Provider   BLOOD PRESSURE CUFF Misc Use daily. Preferred by insurance. 7/26/21  Yes Bigg Meek PA-C   diphenhydrAMINE (BENADRYL) 25 mg capsule Take 25 mg by mouth every 6 (six) hours as needed for Itching.   Yes Historical Provider    famotidine (PEPCID) 20 MG tablet Take 20 mg by mouth 2 (two) times daily as needed for Heartburn.   Yes Historical Provider   hydrALAZINE (APRESOLINE) 100 MG tablet Take 100 mg by mouth 3 (three) times daily. 5/10/22  Yes Historical Provider   multivitamin capsule Take 1 capsule by mouth once daily.   Yes Historical Provider   pregabalin (LYRICA) 75 MG capsule Take 75 mg by mouth once daily. 5/12/21  Yes Historical Provider   sevelamer carbonate (RENVELA) 800 mg Tab Take 2,400 mg by mouth 3 (three) times daily with meals.   Yes Historical Provider   furosemide (LASIX) 80 MG tablet Take 1 tablet (80 mg total) by mouth once daily. 7/24/21 8/15/22  Bigg Meek PA-C   labetaloL (NORMODYNE) 300 MG tablet Take 1 tablet (300 mg total) by mouth 3 (three) times daily. 7/24/21 8/15/22  Bigg Meek PA-C   losartan (COZAAR) 100 MG tablet Take 1 tablet (100 mg total) by mouth once daily. 7/26/21 8/15/22  Bigg Meek PA-C       Review of Systems:  Review of Systems   Constitutional: Negative for chills, fatigue and fever.   Respiratory: Negative for cough, shortness of breath and wheezing.    Cardiovascular: Positive for leg swelling. Negative for chest pain and palpitations.   Gastrointestinal: Negative for abdominal pain, constipation, diarrhea, nausea and vomiting.   Musculoskeletal: Positive for arthralgias.   Neurological: Negative for dizziness, light-headedness and headaches.       Health Maintainence:   Immunizations:  Health Maintenance       Date Due Completion Date    Foot Exam Never done ---    Shingles Vaccine (1 of 2) Never done ---    Lipid Panel 03/08/2022 3/8/2021    COVID-19 Vaccine (4 - Booster for Moderna series) 05/07/2022 1/7/2022    Mammogram 07/27/2022 7/27/2021    Influenza Vaccine (1) 09/01/2022 10/27/2018    Hemoglobin A1c 02/15/2023 8/15/2022    Eye Exam 06/07/2023 6/7/2022    TETANUS VACCINE 09/28/2027 9/28/2017    Colorectal Cancer Screening 04/26/2028 4/26/2018    Pneumococcal  "Vaccines (Age 0-64) (4 - PPSV23 or PCV20) 08/27/2028 5/4/2020           PHYSICAL EXAM     BP (!) 120/58 (BP Location: Right arm, Patient Position: Sitting, BP Method: Medium (Manual))   Pulse 73   Ht 5' 4" (1.626 m)   Wt 75.3 kg (166 lb 0.1 oz)   LMP 03/15/2016 (LMP Unknown)   SpO2 95%   BMI 28.49 kg/m²     Physical Exam  Constitutional:       Appearance: She is well-developed.   HENT:      Head: Normocephalic and atraumatic.   Eyes:      Pupils: Pupils are equal, round, and reactive to light.   Cardiovascular:      Rate and Rhythm: Normal rate and regular rhythm.   Pulmonary:      Effort: Pulmonary effort is normal.   Musculoskeletal:      Right ankle: Normal.      Left ankle: Swelling and deformity present. No ecchymosis or lacerations. Tenderness present. Decreased range of motion.   Neurological:      Mental Status: She is alert and oriented to person, place, and time.         LABS     Lab Results   Component Value Date    HGBA1C 4.8 08/14/2022     CMP  Sodium   Date Value Ref Range Status   08/15/2022 135 (L) 136 - 145 mmol/L Final     Potassium   Date Value Ref Range Status   08/15/2022 3.8 3.5 - 5.1 mmol/L Final     Chloride   Date Value Ref Range Status   08/15/2022 97 95 - 110 mmol/L Final     CO2   Date Value Ref Range Status   08/15/2022 26 23 - 29 mmol/L Final     Glucose   Date Value Ref Range Status   08/15/2022 99 70 - 110 mg/dL Final     BUN   Date Value Ref Range Status   08/15/2022 22 (H) 6 - 20 mg/dL Final     Creatinine   Date Value Ref Range Status   08/15/2022 4.1 (H) 0.5 - 1.4 mg/dL Final     Calcium   Date Value Ref Range Status   08/15/2022 8.9 8.7 - 10.5 mg/dL Final     Total Protein   Date Value Ref Range Status   08/15/2022 8.0 6.0 - 8.4 g/dL Final     Albumin   Date Value Ref Range Status   08/15/2022 3.3 (L) 3.5 - 5.2 g/dL Final     Total Bilirubin   Date Value Ref Range Status   08/15/2022 1.0 0.1 - 1.0 mg/dL Final     Comment:     For infants and newborns, interpretation of " results should be based  on gestational age, weight and in agreement with clinical  observations.    Premature Infant recommended reference ranges:  Up to 24 hours.............<8.0 mg/dL  Up to 48 hours............<12.0 mg/dL  3-5 days..................<15.0 mg/dL  6-29 days.................<15.0 mg/dL       Alkaline Phosphatase   Date Value Ref Range Status   08/15/2022 110 55 - 135 U/L Final     AST   Date Value Ref Range Status   08/15/2022 12 10 - 40 U/L Final     ALT   Date Value Ref Range Status   08/15/2022 5 (L) 10 - 44 U/L Final     Anion Gap   Date Value Ref Range Status   08/15/2022 12 8 - 16 mmol/L Final     eGFR if    Date Value Ref Range Status   05/01/2022 5.2 (A) >60 mL/min/1.73 m^2 Final     eGFR if non    Date Value Ref Range Status   05/01/2022 4.5 (A) >60 mL/min/1.73 m^2 Final     Comment:     Calculation used to obtain the estimated glomerular filtration  rate (eGFR) is the CKD-EPI equation.        Lab Results   Component Value Date    WBC 9.34 08/14/2022    HGB 9.3 (L) 08/14/2022    HCT 28 (L) 08/14/2022    MCV 82 08/14/2022     08/14/2022     Lab Results   Component Value Date    CHOL 107 (L) 03/08/2021    CHOL 273 (H) 09/28/2017     Lab Results   Component Value Date    HDL 41 03/08/2021    HDL 39 (L) 09/28/2017     Lab Results   Component Value Date    LDLCALC 52.8 (L) 03/08/2021    LDLCALC Invalid, Trig>400.0 09/28/2017     Lab Results   Component Value Date    TRIG 66 03/08/2021    TRIG 490 (H) 09/28/2017     Lab Results   Component Value Date    CHOLHDL 38.3 03/08/2021    CHOLHDL 14.3 (L) 09/28/2017     Lab Results   Component Value Date    TSH 0.562 07/24/2021       ASSESSMENT/PLAN     Jaeljude Hall is a 56 y.o. female     Foot pain, left- will send for xrays of the foot and f/u with ortho.   -     X-Ray Foot Complete 3 view Left; Future; Expected date: 08/23/2022    Fall, subsequent encounter- will send for xrays of the foot and f/u with ortho.    -     X-Ray Foot Complete 3 view Left; Future; Expected date: 08/23/2022    Chronic diastolic heart failure- stable. Will cont current meds. Low na diet and daily weights.     Ankle edema, bilateral- will send for xrays of the foot and f/u with ortho.     Essential hypertension- at goal. Will cont current meds.     Hypertension secondary to other renal disorders    Acute on chronic diastolic heart failure- stable. Will cont current meds. Low na diet and daily weights.     ESRD on hemodialysis- stable. Will cont HD MWF     Chronic kidney disease-mineral and bone disorder    Anemia in ESRD (end-stage renal disease)    Vitamin D deficiency    Thyroid nodule    Diabetes mellitus due to underlying condition, uncontrolled, with chronic kidney disease    NAFLD (nonalcoholic fatty liver disease)    Gastroesophageal reflux disease without esophagitis          Follow up with PCP     Patient education provided from [facility]. Patient was counseled on when and how to seek emergent care.       Matilda WASSERMAN, JAMSHID, FNP-c   Department of Internal Medicine - [facility]  9:39 AM

## 2022-08-30 ENCOUNTER — CARE AT HOME (OUTPATIENT)
Dept: HOME HEALTH SERVICES | Facility: CLINIC | Age: 57
End: 2022-08-30
Payer: MEDICARE

## 2022-08-30 ENCOUNTER — CLINICAL SUPPORT (OUTPATIENT)
Dept: AUDIOLOGY | Facility: CLINIC | Age: 57
End: 2022-08-30
Payer: MEDICARE

## 2022-08-30 DIAGNOSIS — E87.5 HYPERKALEMIA: ICD-10-CM

## 2022-08-30 DIAGNOSIS — H90.3 SENSORINEURAL HEARING LOSS (SNHL), BILATERAL: Primary | ICD-10-CM

## 2022-08-30 DIAGNOSIS — I50.32 CHRONIC DIASTOLIC HEART FAILURE: Primary | ICD-10-CM

## 2022-08-30 PROCEDURE — 92626 EVAL AUD FUNCJ 1ST HOUR: CPT | Mod: S$PBB,,, | Performed by: AUDIOLOGIST

## 2022-08-30 PROCEDURE — 99350 HOME/RES VST EST HIGH MDM 60: CPT | Mod: NTX,S$GLB,, | Performed by: NURSE PRACTITIONER

## 2022-08-30 PROCEDURE — 99497 PR ADVNCD CARE PLAN 30 MIN: ICD-10-PCS | Mod: NTX,S$GLB,, | Performed by: NURSE PRACTITIONER

## 2022-08-30 PROCEDURE — 99497 ADVNCD CARE PLAN 30 MIN: CPT | Mod: NTX,S$GLB,, | Performed by: NURSE PRACTITIONER

## 2022-08-30 PROCEDURE — 92626 EVAL AUD FUNCJ 1ST HOUR: CPT | Mod: PBBFAC | Performed by: AUDIOLOGIST

## 2022-08-30 PROCEDURE — 99350 PR HOME VISIT,ESTAB PATIENT,LEVEL IV: ICD-10-PCS | Mod: NTX,S$GLB,, | Performed by: NURSE PRACTITIONER

## 2022-08-30 PROCEDURE — 92626 PR EVAL, AUDITORY FUNCTION, SURG IMPLANT DEVICE, 1ST HR: ICD-10-PCS | Mod: S$PBB,,, | Performed by: AUDIOLOGIST

## 2022-08-30 NOTE — PROGRESS NOTES
8/30/2022    Cochlear Implant Programming Session:    Right Ear:  Dr. Zuleta  :  Cochlear [employer]ons  Internal Device/Serial Number:  [id number]   Processor:  [id number]   SN of Processors: [id number]  DOS:  6/30/21  DIS:  7/20/21  Processor Color: Black  Magnet Strength: 5i  Coil Length:  6cm  Battery Type:  Standard rechargeable  Warranty:  5 year - July 19, 2024     MINI REGINO issued  TV streamer issued      Jael Hall was seen for concerns with her hearing.  Mrs. Hall stated the hearing in her left ear was decreased and she was not hearing on the phone well.    Pure tone threshold testing revealed a moderate to profound sensorineural hearing loss for the left ear.  A speech reception threshold was obtained at 60dBHL with 64% speech discrimination score for the left ear.  Aided responses with the cochlear implant were obtained at 20-30dBHL with 84% speech discrimination score in the right ear with the implant only.  The results of audiometric testing were considered stable.    The settings on her phone were reviewed.  Blue tooth was activated for streaming.  The ringer on her phone was increased to full volume.  Strategies to optimize performance on the telephone were reviewed today.    Recommend:  Follow up programming in 6 months or sooner if needed.  Annual evaluation.

## 2022-09-05 VITALS
OXYGEN SATURATION: 98 % | WEIGHT: 166 LBS | HEIGHT: 64 IN | TEMPERATURE: 97 F | RESPIRATION RATE: 18 BRPM | HEART RATE: 70 BPM | BODY MASS INDEX: 28.34 KG/M2

## 2022-09-05 NOTE — PROGRESS NOTES
[facility]  Transition of Care Home Visit    Visit Date: 8/30/22  Encounter Provider: Adelaida Wynne   PCP:  Mary Solano MD    PRESENTING HISTORY      Patient ID: Jael Hall is a 57 y.o. female.    Consult Requested By:  Ruby Thacker  Reason for Consult:  Hospital Follow Up.    Jael is being seen at home due to being seen at home due to physical debility that presents a taxing effort to leave the home, to mitigate high risk of hospital readmission and/or due to the limited availability of reliable or safe options for transportation to the point of access to the provider. Prior to treatment on this visit the chart was reviewed and patient verbal consent was obtained.      Chief Complaint: Transitional Care        History of Present Illness: Ms. Jael Hall is a 57 y.o. female who was recently admitted to [facility] ([place] [id number]).    Admission Date: 8/14/2022  Hospital Length of Stay: 0 days  Discharge Date and Time:  08/15/2022     HPI:      Jael Hall is a 56 y.o. female who  has a past medical history of Anemia of chronic disorder, Anemia of chronic renal failure, stage 5, Anxiety, Cyst of left ovary, Cyst of left ovary, Dyslipidemia, End-stage renal disease on hemodialysis, Hematuria, sinus bradycardia, Hyperkalemia, Peripheral neuropathy, Renovascular hypertension, S/P dilation and curettage, Thickened endometrium, Thyroid nodule, Thyroid nodule, and Uncontrolled type 2 diabetes mellitus, presented to the ED with CC of leg Swelling bilaterally.  Patient reports that she has been gaining fluid for about a week now, she states that she had a fall this week as well and that both her right and her left ankle are hurting her now.  On presentation to the ED her chest x-ray showed cardiomegaly with bilateral edema, . X-rays of both ankles did not show an acute fracture.  However on labs her potassium was found to be 6.8.  Patient was shifted and given calcium gluconate  and [facility] consulted for emergent HD.  Patient denies chest pain, abdominal pain, or hematochezia.        Hospital Course:   Patient is a 56 y.o. F with ESRD admitted for hyperkalemia of 6.8, now 3.8 following hemodialysis. Patient reports bilateral lower extremity pain and progressively worsening edema x1 week. Bilateral ankle xrays without acute fracture; L ankle with nonspecific widening of the L medial space, which may be 2/2 to ligamentous injury. Patient reports BLE edema greatly improved following HD. Pt is currently stable and ready for discharge. Education was provided, patient expressed understanding, and all questions were answered.      _________________________________________________________  Today: Ms. Hall is a 58 yo female with a PMH as above, being evaluated at home s/p hospitalization, see above.     With this visit today patient is found at home ambulatory with no assistive device. Patient is AAOx4, able to verify her name and . Most of patients other history was received from her medical record. He currently does have HH and has no medical necessity. She sees Dr. Solano as her PCP. She endorses eating x 3 meals per day. She endorses regular BMs.     Dietary restrictions reinforced. Education completed ~ 15 minutes. Plan to follow up.     VSS. Denies fever, chest pain, shortness of breath, nausea, vomiting, diarrhea. Risks of environmental exposure to coronavirus discussed including: social distancing, hand hygiene, and limiting departures from the home for necessities only.  Reports understanding and willingness to comply.  All hospital discharge orders reviewed and being followed, all medications reconciled and reviewed, patient and family verbalized understanding. No other needs identified at this time.     I initiated the process of advance care planning today and explained the importance of this process to the patient and family.  I introduced the concept of advance  directives to the patient, as well. Then the patient received detailed information about the importance of designating a Health Care Power of  (HCPOA). She was also instructed to communicate with this person about his wishes for future healthcare, should he become sick and lose decision-making capacity. FULL CODE STATUS    I Introduced [place] form with patient/family, explaining this is the patient's wishes, and this form will be uploaded into the patient's [facility] and the [place] Registry.     We spoke about ACP for 20 minutes.    Attestation: Screening criteria to assess the level of the patient's risk for infection with COVID-19 as recommended by the CDC at the time of the above documented home visit concluded appropriateness to proceed. Universal precautions were maintained at all times, including provider use of 60% alcohol gel hand  immediately prior to entry and upon departing the patient's home.            Review of Systems   Constitutional:  Negative for fatigue and unexpected weight change.   HENT:  Negative for congestion.    Eyes:  Negative for redness and visual disturbance.   Respiratory:  Negative for cough and shortness of breath.    Cardiovascular:  Negative for chest pain and palpitations.   Gastrointestinal:  Negative for abdominal distention, nausea and vomiting.   Endocrine: Negative for polydipsia and polyuria.   Genitourinary:  Negative for difficulty urinating.   Musculoskeletal:  Negative for arthralgias and gait problem.   Skin:  Negative for color change and rash.   Allergic/Immunologic: Negative for food allergies.   Neurological:  Negative for dizziness.   Psychiatric/Behavioral:  Negative for agitation.      Assessments:  Environmental: Lives in apartments  Functional Status: Independent with ADLs and mobility   Safety: Fall precautions  Nutritional: Renal diet  Home Health/DME/Supplies: n/a    PAST HISTORY:     Past Medical History:   Diagnosis Date    Anemia  of chronic disorder 2017    Overview:  Added automatically from request for surgery [id number]    Anemia of chronic renal failure, stage 5     Anxiety     Cyst of left ovary 2019    Cyst of left ovary 2019    Dyslipidemia 2013    End-stage renal disease on hemodialysis 2020    Hematuria 10/30/2019    Hx of sinus bradycardia 2017    Hyperkalemia 3/29/2017    Peripheral neuropathy 2013    Renovascular hypertension 2020    S/P dilation and curettage 2018    Thickened endometrium 2018    Thyroid nodule     Thyroid nodule 2021    Uncontrolled type 2 diabetes mellitus 2013    Overview:  poc a1c today 11.2-270/ up from 10.8-263, Pt. has very very stressed/ was incarcerated/marital stress/ will current meds/ less stress now/ will monitor.       Past Surgical History:   Procedure Laterality Date    AV FISTULA PLACEMENT      right arm     SECTION      x1    COLONOSCOPY N/A 2018    Procedure: COLONOSCOPY;  Surgeon: Pankaj Hinojosa MD;  Location: [facility] (4TH FLR);  Service: Endoscopy;  Laterality: N/A;  dialysis pt/labs prior to colon/MS    CYSTOSCOPY W/ RETROGRADES Bilateral 2020    Procedure: CYSTOSCOPY, WITH RETROGRADE PYELOGRAM;  Surgeon: Douglas Abraham MD;  [facility];  Service: Urology;  Laterality: Bilateral;  45 min    FISTULOGRAM Right 2020    Procedure: Fistulogram;  Surgeon: Lester Gómez MD;  Location: [facility] CATH LAB;  Service: Nephrology;  Laterality: Right;    HYSTERECTOMY      IMPLANTATION OF COCHLEAR PROSTHESIS Right 2021    Procedure: INSERTION, PROSTHESIS, COCHLEAR;  Surgeon: Ramiro Zuleta MD;  Location: [facility] OR 2ND FLR;  Service: ENT;  Laterality: Right;  COCHLEAR BABAK    IMPLANTATION OF COCHLEAR PROSTHESIS Right 2021    Procedure: INSERTION, PROSTHESIS, COCHLEAR;  Surgeon: Ramiro Zuleta MD;  Location: [facility] OR 2ND FLR;  Service: ENT;  Laterality: Right;  COCHLEAR AMERICAS    ROBOT-ASSISTED LAPAROSCOPIC  ABDOMINAL HYSTERECTOMY USING DA NATALYA XI N/A 7/5/2019    Procedure: XI ROBOTIC HYSTERECTOMY;  Surgeon: Trice Lei MD;  Location: [facility];  Service: OB/GYN;  Laterality: N/A;    ROBOT-ASSISTED LAPAROSCOPIC NEPHRECTOMY Right 10/8/2020    Procedure: ROBOTIC NEPHRECTOMY;  Surgeon: Douglas Abraham MD;  [address];  Service: Urology;  Laterality: Right;  3.5hrs gen with regional     ROBOT-ASSISTED LAPAROSCOPIC SALPINGO-OOPHORECTOMY USING DA NATALYA XI Bilateral 7/5/2019    Procedure: XI ROBOTIC SALPINGO-OOPHORECTOMY;  Surgeon: Trice Lei MD;  Location: [facility];  Service: OB/GYN;  Laterality: Bilateral;    TUBAL LIGATION      Vascular access surgeries      x5       Family History   Problem Relation Age of Onset    Cancer Mother     Ovarian cancer Mother     Cancer Sister     Ovarian cancer Sister     Lung cancer Sister     Diabetes Brother     Cancer Sister     Ovarian cancer Sister     Diabetes Sister     Breast cancer Neg Hx     Colon cancer Neg Hx     Cervical cancer Neg Hx     Endometrial cancer Neg Hx     Vaginal cancer Neg Hx     Thyroid disease Neg Hx        Social History     Socioeconomic History    Marital status:     Number of children: 2   Occupational History    Occupation: disability   Tobacco Use    Smoking status: Never    Smokeless tobacco: Never   Substance and Sexual Activity    Alcohol use: No    Drug use: No    Sexual activity: Yes     Partners: Male     Birth control/protection: Post-menopausal     Social Determinants of Health     Financial Resource Strain: Low Risk     Difficulty of Paying Living Expenses: Not very hard   Food Insecurity: Food Insecurity Present    Worried About Running Out of Food in the Last Year: Sometimes true    Ran Out of Food in the Last Year: Sometimes true   Transportation Needs: No Transportation Needs    Lack of Transportation (Medical): No    Lack of Transportation (Non-Medical): No   Physical Activity: Inactive    Days of Exercise per  Week: 0 days    Minutes of Exercise per Session: 0 min   Stress: Stress Concern Present    Feeling of Stress : To some extent   Social Connections: Unknown    Frequency of Communication with Friends and Family: More than three times a week    Frequency of Social Gatherings with Friends and Family: More than three times a week    Attends Religion Services: Never    Active Member of Clubs or Organizations: No    Attends Club or Organization Meetings: Never   Housing Stability: Low Risk     Unable to Pay for Housing in the Last Year: No    Number of Places Lived in the Last Year: 1    Unstable Housing in the Last Year: No       MEDICATIONS & ALLERGIES:     Current Outpatient Medications on File Prior to Visit   Medication Sig Dispense Refill    acetaminophen (TYLENOL) 500 MG tablet Take 500 mg by mouth every 8 (eight) hours as needed for Pain.      amLODIPine (NORVASC) 10 MG tablet Take 1 tablet (10 mg total) by mouth nightly. 90 tablet 3    aspirin (ECOTRIN) 81 MG EC tablet Take 81 mg by mouth once daily.      atorvastatin (LIPITOR) 40 MG tablet Take 40 mg by mouth once daily.      BLOOD PRESSURE CUFF Misc Use daily. Preferred by insurance.  0    diphenhydrAMINE (BENADRYL) 25 mg capsule Take 25 mg by mouth every 6 (six) hours as needed for Itching.      famotidine (PEPCID) 20 MG tablet Take 20 mg by mouth 2 (two) times daily as needed for Heartburn.      furosemide (LASIX) 80 MG tablet Take 1 tablet (80 mg total) by mouth once daily. 30 tablet 11    hydrALAZINE (APRESOLINE) 100 MG tablet Take 100 mg by mouth 3 (three) times daily.      labetaloL (NORMODYNE) 300 MG tablet Take 1 tablet (300 mg total) by mouth 3 (three) times daily. 90 tablet 11    losartan (COZAAR) 100 MG tablet Take 1 tablet (100 mg total) by mouth once daily. 30 tablet 11    multivitamin capsule Take 1 capsule by mouth once daily.      pregabalin (LYRICA) 75 MG capsule Take 75 mg by mouth once daily.      sevelamer carbonate (RENVELA) 800 mg Tab  Take 2,400 mg by mouth 3 (three) times daily with meals.       No current facility-administered medications on file prior to visit.        Review of patient's allergies indicates:  No Known Allergies    OBJECTIVE:     Vital Signs:  Vitals:    08/30/22 1334   Pulse: 70   Resp: 18   Temp: 97.1 °F (36.2 °C)     Body mass index is 28.49 kg/m².     Physical Exam:  Physical Exam  HENT:      Head: Atraumatic.      Nose: Nose normal.   Cardiovascular:      Rate and Rhythm: Normal rate.      Pulses: Normal pulses.   Pulmonary:      Effort: Pulmonary effort is normal.   Abdominal:      General: Abdomen is flat.   Skin:     General: Skin is warm and dry.      Capillary Refill: Capillary refill takes less than 2 seconds.   Neurological:      General: No focal deficit present.      Mental Status: She is alert and oriented to person, place, and time. Mental status is at baseline.   Psychiatric:         Mood and Affect: Mood normal.       Laboratory  Lab Results   Component Value Date    WBC 9.34 08/14/2022    HGB 9.3 (L) 08/14/2022    HCT 28 (L) 08/14/2022    MCV 82 08/14/2022     08/14/2022     Lab Results   Component Value Date    INR 1.1 07/23/2021    INR 1.0 08/17/2020    INR 1.0 03/14/2019     Lab Results   Component Value Date    HGBA1C 4.8 08/14/2022     No results for input(s): POCTGLUCOSE in the last 72 hours.    Diagnostic Results:  N/a    TRANSITION OF CARE:     [facility] Contact Note: 08/23/2022 date08/23/2022    Family and/or Caretaker present at visit?  Yes.  Diagnostic tests reviewed/disposition: No diagnosic tests pending after this hospitalization.  Disease/illness education: Fall precautions  Home health/community services discussion/referrals: Patient has home health established at n/a .   Establishment or re-establishment of referral orders for community resources: No other necessary community resources.   Discussion with other health care providers: No discussion with other health care providers  necessary.     Transition of Care Visit:  I have reviewed and updated the history and problem list.  I have reconciled the medication list.  I have discussed the hospitalization and current medical issues, prognosis and plans with the patient/family.  I  spent more than 50% of time discussing the care with the patient/family.  Total Face-to-Face Encounter: 60 minutes.    Medications Reconciliation:   I have reconciled the patient's home medications and discharge medications with the patient/family. I have updated all changes.  Refer to After-Visit Medication List.    ASSESSMENT & PLAN:     HIGH RISK CONDITION(S):  Patient has a condition that poses threat to life and bodily function n/a    1. Chronic diastolic heart failure  The current medical regimen is effective;  continue present plan and medications.     TTE 08/15/2022    Mild left atrial enlargement.  The left ventricle is normal in size with mild concentric hypertrophy and normal systolic function.  The estimated ejection fraction is 60%.  Grade II left ventricular diastolic dysfunction.  Normal right ventricular size with normal right ventricular systolic function.  There is pulmonary hypertension. The estimated PA systolic pressure is 45 mmHg.  Normal central venous pressure (3 mmHg)      2. Hyperkalemia  -     Ambulatory referral/consult to [facility]    resolved      Were controlled substances prescribed?  No      Patient Instructions Given:  - Continue all medications, treatments and therapies as ordered.   - Follow all instructions, recommendations as discussed.  - Maintain Safety Precautions at all times.  - Attend all medical appointments as scheduled.  - For worsening symptoms: call Primary Care Physician or Nurse Practitioner.  - For emergencies, call 911 or immediately report to the nearest emergency room.    After Visit Medication List :     Medication List            Accurate as of August 30, 2022 11:59 PM. If you  have any questions, ask your nurse or doctor.                CONTINUE taking these medications      acetaminophen 500 MG tablet  Commonly known as: TYLENOL     amLODIPine 10 MG tablet  Commonly known as: NORVASC  Take 1 tablet (10 mg total) by mouth nightly.     aspirin 81 MG EC tablet  Commonly known as: ECOTRIN     atorvastatin 40 MG tablet  Commonly known as: LIPITOR     BLOOD PRESSURE CUFF Misc  Generic drug: miscellaneous medical supply  Use daily. Preferred by insurance.     diphenhydrAMINE 25 mg capsule  Commonly known as: BENADRYL     famotidine 20 MG tablet  Commonly known as: PEPCID     furosemide 80 MG tablet  Commonly known as: LASIX  Take 1 tablet (80 mg total) by mouth once daily.     hydrALAZINE 100 MG tablet  Commonly known as: APRESOLINE     labetaloL 300 MG tablet  Commonly known as: NORMODYNE  Take 1 tablet (300 mg total) by mouth 3 (three) times daily.     losartan 100 MG tablet  Commonly known as: COZAAR  Take 1 tablet (100 mg total) by mouth once daily.     multivitamin capsule     pregabalin 75 MG capsule  Commonly known as: LYRICA     sevelamer carbonate 800 mg Tab  Commonly known as: RENVELA            Future Appointments   Date Time Provider Department Center   9/22/2022  8:30 AM [facility] OIC-XRAY [facility] XRAY [facility]   9/22/2022  9:30 AM [facility] OIC-US1 MASTER [facility] ULTR [facility]   9/22/2022 10:30 AM CARDIAC, PET IMAGING [facility] ANJELICA [facility]   9/22/2022 12:00 PM LAB, APPOINTMENT [facility] LAB VNP [facility]   9/22/2022 12:30 PM KIDNEY LISTED, TRANSPLANT [facility] KIDNTX [facility]   10/5/2022 10:00 AM [facility] OIC-MAMMO2 [facility] MAMMOIC Imaging Ctr   5/16/2023  8:15 AM [facility]-MRI3 [facility] MRI IC Imaging Ctr     Risks of environmental exposure to coronavirus discussed including: social distancing, hand hygiene, and limiting departures from the home for necessities only. Reports understanding and willingness to comply.     Signature:    Adelaida Wynne, MSN, APRN, FNP-C  [facility]

## 2022-09-05 NOTE — PATIENT INSTRUCTIONS
Instructions:  - [facility] to schedule home follow-up visit with patient as needed.  - Continue all medications, treatments and therapies as ordered.   - Follow all instructions, recommendations as discussed.  - Maintain Safety Precautions at all times.  - Attend all medical appointments as scheduled.  - For worsening symptoms: call Primary Care Physician or Nurse Practitioner.  - For emergencies, call 911 or immediately report to the nearest emergency room.  - Limit Risks of environmental exposure to coronavirus/COVID-19 as discussed including: social distancing, hand hygiene, and limiting departures from the home for necessities only.

## 2022-09-20 ENCOUNTER — TELEPHONE (OUTPATIENT)
Dept: CARDIOLOGY | Facility: HOSPITAL | Age: 57
End: 2022-09-20
Payer: MEDICARE

## 2022-09-21 NOTE — PROGRESS NOTES
Spoke with EMANUEL who confirmed that patient was not offered Hep B vaccine because she had antibodies. Pt stated she is not planning on taking Hep B vaccine at this time.

## 2022-09-22 ENCOUNTER — HOSPITAL ENCOUNTER (OUTPATIENT)
Dept: CARDIOLOGY | Facility: HOSPITAL | Age: 57
Discharge: HOME | End: 2022-09-22
Attending: NURSE PRACTITIONER
Payer: MEDICARE

## 2022-09-22 ENCOUNTER — HOSPITAL ENCOUNTER (OUTPATIENT)
Dept: RADIOLOGY | Facility: HOSPITAL | Age: 57
Discharge: HOME | End: 2022-09-22
Attending: NURSE PRACTITIONER
Payer: MEDICARE

## 2022-09-22 ENCOUNTER — OFFICE VISIT (OUTPATIENT)
Dept: TRANSPLANT | Facility: CLINIC | Age: 57
End: 2022-09-22
Attending: NURSE PRACTITIONER
Payer: MEDICARE

## 2022-09-22 ENCOUNTER — TELEPHONE (OUTPATIENT)
Dept: TRANSPLANT | Facility: CLINIC | Age: 57
End: 2022-09-22
Payer: MEDICARE

## 2022-09-22 VITALS
DIASTOLIC BLOOD PRESSURE: 53 MMHG | RESPIRATION RATE: 18 BRPM | WEIGHT: 166 LBS | BODY MASS INDEX: 28.34 KG/M2 | HEART RATE: 76 BPM | HEIGHT: 64 IN | SYSTOLIC BLOOD PRESSURE: 117 MMHG

## 2022-09-22 VITALS
DIASTOLIC BLOOD PRESSURE: 58 MMHG | OXYGEN SATURATION: 96 % | TEMPERATURE: 97 F | WEIGHT: 164.25 LBS | SYSTOLIC BLOOD PRESSURE: 127 MMHG | HEIGHT: 63 IN | RESPIRATION RATE: 16 BRPM | HEART RATE: 75 BPM | BODY MASS INDEX: 29.1 KG/M2

## 2022-09-22 DIAGNOSIS — Z76.82 PATIENT ON WAITING LIST FOR KIDNEY TRANSPLANT: Primary | ICD-10-CM

## 2022-09-22 DIAGNOSIS — Z76.82 ORGAN TRANSPLANT CANDIDATE: ICD-10-CM

## 2022-09-22 DIAGNOSIS — Z99.2 ESRD ON HEMODIALYSIS: ICD-10-CM

## 2022-09-22 DIAGNOSIS — I10 ESSENTIAL HYPERTENSION: ICD-10-CM

## 2022-09-22 DIAGNOSIS — N28.89 RENAL MASS: ICD-10-CM

## 2022-09-22 DIAGNOSIS — N18.6 ESRD ON HEMODIALYSIS: ICD-10-CM

## 2022-09-22 LAB
CFR FLOW - ANTERIOR: 1.27
CFR FLOW - INFERIOR: 1.3
CFR FLOW - LATERAL: 1.22
CFR FLOW - MAX: 1.8
CFR FLOW - MIN: 0.94
CFR FLOW - SEPTAL: 1.17
CFR FLOW - WHOLE HEART: 1.24
CV STRESS BASE HR: 68 BPM
DIASTOLIC BLOOD PRESSURE: 66 MMHG
EJECTION FRACTION- HIGH: 65 %
END DIASTOLIC INDEX-HIGH: 153 ML/M2
END DIASTOLIC INDEX-LOW: 93 ML/M2
END SYSTOLIC INDEX-HIGH: 71 ML/M2
END SYSTOLIC INDEX-LOW: 31 ML/M2
NUC REST DIASTOLIC VOLUME INDEX: 137
NUC REST EJECTION FRACTION: 64
NUC REST SYSTOLIC VOLUME INDEX: 49
NUC STRESS DIASTOLIC VOLUME INDEX: 120
NUC STRESS EJECTION FRACTION: 63 %
NUC STRESS SYSTOLIC VOLUME INDEX: 45
OHS CV CPX 1 MINUTE RECOVERY HEART RATE: 94 BPM
OHS CV CPX 85 PERCENT MAX PREDICTED HEART RATE MALE: 131
OHS CV CPX MAX PREDICTED HEART RATE: 154
OHS CV CPX PATIENT IS FEMALE: 1
OHS CV CPX PATIENT IS MALE: 0
OHS CV CPX PEAK DIASTOLIC BLOOD PRESSURE: 41 MMHG
OHS CV CPX PEAK HEAR RATE: 72 BPM
OHS CV CPX PEAK RATE PRESSURE PRODUCT: 7416
OHS CV CPX PEAK SYSTOLIC BLOOD PRESSURE: 103 MMHG
OHS CV CPX PERCENT MAX PREDICTED HEART RATE ACHIEVED: 47
OHS CV CPX RATE PRESSURE PRODUCT PRESENTING: 9928
REST FLOW - ANTERIOR: 0.91 CC/MIN/G
REST FLOW - INFERIOR: 0.85 CC/MIN/G
REST FLOW - LATERAL: 0.89 CC/MIN/G
REST FLOW - MAX: 1.13 CC/MIN/G
REST FLOW - MIN: 0.45 CC/MIN/G
REST FLOW - SEPTAL: 0.84 CC/MIN/G
REST FLOW - WHOLE HEART: 0.87 CC/MIN/G
RETIRED EF AND QEF - SEE NOTES: 53 %
STRESS FLOW - ANTERIOR: 1.15 CC/MIN/G
STRESS FLOW - INFERIOR: 1.11 CC/MIN/G
STRESS FLOW - LATERAL: 1.08 CC/MIN/G
STRESS FLOW - MAX: 1.47 CC/MIN/G
STRESS FLOW - MIN: 0.64 CC/MIN/G
STRESS FLOW - SEPTAL: 0.98 CC/MIN/G
STRESS FLOW - WHOLE HEART: 1.08 CC/MIN/G
SYSTOLIC BLOOD PRESSURE: 146 MMHG

## 2022-09-22 PROCEDURE — 93016 CV STRESS TEST SUPVJ ONLY: CPT | Mod: TXP,,, | Performed by: INTERNAL MEDICINE

## 2022-09-22 PROCEDURE — 99499 RISK ADDL DX/OHS AUDIT: ICD-10-PCS | Mod: S$PBB,,, | Performed by: NURSE PRACTITIONER

## 2022-09-22 PROCEDURE — 93016 CARDIAC PET SCAN STRESS (CUPID ONLY): ICD-10-PCS | Mod: TXP,,, | Performed by: INTERNAL MEDICINE

## 2022-09-22 PROCEDURE — 99499 UNLISTED E&M SERVICE: CPT | Mod: S$PBB,,, | Performed by: NURSE PRACTITIONER

## 2022-09-22 PROCEDURE — 76770 US EXAM ABDO BACK WALL COMP: CPT | Mod: TC,TXP

## 2022-09-22 PROCEDURE — 99214 OFFICE O/P EST MOD 30 MIN: CPT | Mod: PBBFAC,25,TXP | Performed by: NURSE PRACTITIONER

## 2022-09-22 PROCEDURE — 72170 XR PELVIS ROUTINE AP: ICD-10-PCS | Mod: 26,TXP,, | Performed by: RADIOLOGY

## 2022-09-22 PROCEDURE — 78434 CARDIAC PET SCAN STRESS (CUPID ONLY): ICD-10-PCS | Mod: 26,TXP,, | Performed by: INTERNAL MEDICINE

## 2022-09-22 PROCEDURE — 99999 PR PBB SHADOW E&M-EST. PATIENT-LVL IV: ICD-10-PCS | Mod: PBBFAC,TXP,, | Performed by: NURSE PRACTITIONER

## 2022-09-22 PROCEDURE — 78434 AQMBF PET REST & RX STRESS: CPT | Mod: 26,TXP,, | Performed by: INTERNAL MEDICINE

## 2022-09-22 PROCEDURE — 78431 MYOCRD IMG PET RST&STRS CT: CPT | Mod: 26,TXP,, | Performed by: INTERNAL MEDICINE

## 2022-09-22 PROCEDURE — 76770 US EXAM ABDO BACK WALL COMP: CPT | Mod: 26,TXP,, | Performed by: STUDENT IN AN ORGANIZED HEALTH CARE EDUCATION/TRAINING PROGRAM

## 2022-09-22 PROCEDURE — 72170 X-RAY EXAM OF PELVIS: CPT | Mod: TC,TXP

## 2022-09-22 PROCEDURE — 93018 CARDIAC PET SCAN STRESS (CUPID ONLY): ICD-10-PCS | Mod: TXP,,, | Performed by: INTERNAL MEDICINE

## 2022-09-22 PROCEDURE — 72170 X-RAY EXAM OF PELVIS: CPT | Mod: 26,TXP,, | Performed by: RADIOLOGY

## 2022-09-22 PROCEDURE — 93018 CV STRESS TEST I&R ONLY: CPT | Mod: TXP,,, | Performed by: INTERNAL MEDICINE

## 2022-09-22 PROCEDURE — 76770 US RETROPERITONEAL COMPLETE: ICD-10-PCS | Mod: 26,TXP,, | Performed by: STUDENT IN AN ORGANIZED HEALTH CARE EDUCATION/TRAINING PROGRAM

## 2022-09-22 PROCEDURE — 78434 AQMBF PET REST & RX STRESS: CPT | Mod: TXP

## 2022-09-22 PROCEDURE — 99999 PR PBB SHADOW E&M-EST. PATIENT-LVL IV: CPT | Mod: PBBFAC,TXP,, | Performed by: NURSE PRACTITIONER

## 2022-09-22 PROCEDURE — 99215 PR OFFICE/OUTPT VISIT, EST, LEVL V, 40-54 MIN: ICD-10-PCS | Mod: S$PBB,TXP,, | Performed by: NURSE PRACTITIONER

## 2022-09-22 PROCEDURE — 99215 OFFICE O/P EST HI 40 MIN: CPT | Mod: S$PBB,TXP,, | Performed by: NURSE PRACTITIONER

## 2022-09-22 PROCEDURE — 63600175 PHARM REV CODE 636 W HCPCS: Mod: TXP | Performed by: NURSE PRACTITIONER

## 2022-09-22 PROCEDURE — 78431 CARDIAC PET SCAN STRESS (CUPID ONLY): ICD-10-PCS | Mod: 26,TXP,, | Performed by: INTERNAL MEDICINE

## 2022-09-22 RX ORDER — CAFFEINE CITRATE 20 MG/ML
60 SOLUTION INTRAVENOUS ONCE
Status: COMPLETED | OUTPATIENT
Start: 2022-09-22 | End: 2022-09-22

## 2022-09-22 RX ORDER — REGADENOSON 0.08 MG/ML
0.4 INJECTION, SOLUTION INTRAVENOUS ONCE
Status: COMPLETED | OUTPATIENT
Start: 2022-09-22 | End: 2022-09-22

## 2022-09-22 RX ADMIN — CAFFEINE CITRATE 60 MG: 20 INJECTION INTRAVENOUS at 10:09

## 2022-09-22 RX ADMIN — REGADENOSON 0.4 MG: 0.08 INJECTION, SOLUTION INTRAVENOUS at 10:09

## 2022-09-22 NOTE — PROGRESS NOTES
Kidney Transplant Recipient Reevalulation    Referring Physician: Karlos Perry  Current Nephrologist: Karlos Perry  Waitlist Status: inactive  Dialysis Start Date: 4/19/2021    Subjective:     CC:  Annual reassessment of kidney transplant candidacy.    HPI:  Ms. Hall is a 57 y.o. year old Black or  female with ESRD secondary to diabetic nephropathy.  She has been on the wait list for a kidney transplant at [facility] since 7/5/2017. Patient is currently on hemodialysis started on 7/5/2017. Patient is dialyzing on MWF schedule.  Patient reports that she is tolerating dialysis well.. She has a RUE AV fistula. Running 3.5 hours per session, no hypotension with dialysis.     Currently inactive since 7/29/2021 due to thyroid nodules, foot fracture, and pulmonary HTN.    Had thyroid biopsy which was benign 12/2021, with 1 year follow up thyroid US and endocrinology visit.    Foot fracture completely healed, ambulating well. Very functional, looks great.    Had RCC right kidney 2020 s/p right nephrectomy. Had urology follow up May 2022 with Dr. Jarad mckay for transplant from  perspective pending review of MRI abd. MRI has not been done yet.    CXR 8/14/2022: Cardiomegaly with bilateral edema, similar to prior.   PXR 9/22/2022: advanced degree of iliac arterial calcification   Renal US 9/22/2022: Postoperative changes of right nephrectomy. Sonographic findings of chronic medical renal disease of the left kidney. Multiple simple and minimally complex left renal cysts. Small nonobstructing left renal stones.  Iliacs 1/9/2018: triphasic bilaterally  Echo 8/15/2022: EF 60%, PA 45   PET Stress 9/22/2022: pending results  Colonoscopy 4/26/18: tubular adenoma, repeat in 5 years (due 4/2023)  PAP 3/19/2019:  Negative for intraepithelial lesion or malignancy. (REPEAT DUE)  MMG 7/27/2021: No mammographic evidence of malignancy. (REPEAT DUE)    Current Outpatient Medications   Medication Sig Dispense Refill     acetaminophen (TYLENOL) 500 MG tablet Take 500 mg by mouth every 8 (eight) hours as needed for Pain.      amLODIPine (NORVASC) 10 MG tablet Take 1 tablet (10 mg total) by mouth nightly. 90 tablet 3    aspirin (ECOTRIN) 81 MG EC tablet Take 81 mg by mouth once daily.      diphenhydrAMINE (BENADRYL) 25 mg capsule Take 25 mg by mouth every 6 (six) hours as needed for Itching.      famotidine (PEPCID) 20 MG tablet Take 20 mg by mouth 2 (two) times daily as needed for Heartburn.      furosemide (LASIX) 80 MG tablet Take 1 tablet (80 mg total) by mouth once daily. 30 tablet 11    hydrALAZINE (APRESOLINE) 100 MG tablet Take 100 mg by mouth 3 (three) times daily.      labetaloL (NORMODYNE) 300 MG tablet Take 1 tablet (300 mg total) by mouth 3 (three) times daily. 90 tablet 11    losartan (COZAAR) 100 MG tablet Take 1 tablet (100 mg total) by mouth once daily. 30 tablet 11    pregabalin (LYRICA) 75 MG capsule Take 75 mg by mouth once daily.      sevelamer carbonate (RENVELA) 800 mg Tab Take 2,400 mg by mouth 3 (three) times daily with meals.       No current facility-administered medications for this visit.       Past Medical History:   Diagnosis Date    Anemia of chronic disorder 11/8/2017    Overview:  Added automatically from request for surgery [id number]    Anemia of chronic renal failure, stage 5     Anxiety     Cyst of left ovary 5/14/2019    Cyst of left ovary 7/5/2019    Dyslipidemia 1/4/2013    End-stage renal disease on hemodialysis 8/20/2020    Hematuria 10/30/2019    Hx of sinus bradycardia 9/28/2017    Hyperkalemia 3/29/2017    Peripheral neuropathy 1/4/2013    Renovascular hypertension 8/17/2020    S/P dilation and curettage 5/22/2018    Thickened endometrium 5/22/2018    Thyroid nodule     Thyroid nodule 12/13/2021    Uncontrolled type 2 diabetes mellitus 4/5/2013    Overview:  poc a1c today 11.2-270/ up from 10.8-263, Pt. has very very stressed/ was incarcerated/marital stress/ will current meds/ less stress  "now/ will monitor.       Review of Systems   Constitutional:  Negative for activity change, appetite change and fever.   HENT:  Positive for hearing loss. Negative for congestion, mouth sores and sore throat.    Eyes:  Negative for visual disturbance.   Respiratory:  Negative for cough, chest tightness and shortness of breath.    Cardiovascular:  Negative for chest pain, palpitations and leg swelling.   Gastrointestinal:  Negative for abdominal distention, constipation, diarrhea and nausea.   Genitourinary:  Positive for decreased urine volume. Negative for difficulty urinating, frequency and hematuria.        Still making small amount of urine   Musculoskeletal:  Negative for arthralgias, gait problem and myalgias.   Skin:  Negative for wound.   Allergic/Immunologic: Negative for environmental allergies, food allergies and immunocompromised state.   Neurological:  Negative for dizziness, weakness and numbness.   Psychiatric/Behavioral:  Negative for sleep disturbance. The patient is not nervous/anxious.      Objective:   body mass index is 28.92 kg/m².  BP (!) 127/58 (BP Location: Left arm, Patient Position: Sitting, BP Method: Medium (Automatic))   Pulse 75   Temp 97.3 °F (36.3 °C) (Temporal)   Resp 16   Ht 5' 3.19" (1.605 m)   Wt 74.5 kg (164 lb 3.9 oz)   LMP 03/15/2016 (LMP Unknown)   SpO2 96%   BMI 28.92 kg/m²     Physical Exam  Vitals and nursing note reviewed.   Constitutional:       Appearance: Normal appearance.   HENT:      Head: Normocephalic.   Cardiovascular:      Rate and Rhythm: Normal rate and regular rhythm.      Heart sounds: Normal heart sounds.   Pulmonary:      Effort: Pulmonary effort is normal.      Breath sounds: Normal breath sounds.   Abdominal:      General: Bowel sounds are normal. There is no distension.      Palpations: Abdomen is soft.      Tenderness: There is no abdominal tenderness.   Musculoskeletal:         General: Normal range of motion.      Comments: RUE AV fistula + " thrill    Skin:     General: Skin is warm and dry.   Neurological:      General: No focal deficit present.      Mental Status: She is alert.   Psychiatric:         Behavior: Behavior normal.       Labs:  Lab Results   Component Value Date    WBC 9.34 08/14/2022    HGB 9.3 (L) 08/14/2022    HCT 28 (L) 08/14/2022     (L) 08/15/2022    K 3.8 08/15/2022    CL 97 08/15/2022    CO2 26 08/15/2022    BUN 22 (H) 08/15/2022    CREATININE 4.1 (H) 08/15/2022    EGFRNONAA 4.5 (A) 05/01/2022    CALCIUM 8.9 08/15/2022    PHOS 4.1 05/01/2022    MG 2.6 05/01/2022    ALBUMIN 3.3 (L) 08/15/2022    AST 12 08/15/2022    ALT 5 (L) 08/15/2022    .2 (H) 09/22/2022       Lab Results   Component Value Date    BILIRUBINUA Negative 05/01/2022    LIPASE 38 05/01/2022    PROTEINUA 2+ (A) 05/01/2022    NITRITE Negative 05/01/2022    RBCUA 1 05/01/2022    WBCUA 1 05/01/2022       Lab Results   Component Value Date    CPRA 0 07/15/2022    KL3EQEB Negative 07/15/2022    CIABCLM WEAK---A29 02/14/2022    CIIAB Negative 07/15/2022    ABCMT Inconclusive 02/14/2022       Labs were reviewed with the patient.    Pre-transplant Workup:   Reviewed with the patient.    Assessment:     1. Patient on waiting list for kidney transplant    2. ESRD on hemodialysis    3. Essential hypertension    4. Renal mass    5. Diabetes mellitus due to underlying condition, uncontrolled, with chronic kidney disease    6. BMI 28.0-28.9,adult        Plan:   Will remain inactive at this time due to outdated work up.  Needs to complete MRI ordered by Dr. Abraham for RCC surveillance.  Needs updated pap and MMG.      Transplant Candidacy:   Ms. Hall is an unacceptable kidney transplant candidate.  Meets center eligibility for accepting HCV+ donor offer - yes.  Patient educated on HCV+ donors. Jael is willing  to accept HCV+ donor offer -  yes   Patient is a candidate for KDPI > 85 kidney donor offer - yes.  She  will remain inactive pending completion of testing  above .    Patient advised that it is recommended that all transplant candidates, and their close contacts and household members receive Covid vaccination.    Lanny Cole NP       Follow-up:   In addition to the tests noted in the plan, Ms. Hall will continue to have reevaluation as per the standing pre-kidney transplant protocol:  Monthly blood for PRA  Annual return to clinic, except HIV positive, > 65 years of age, or pancreas transplant candidates who will be scheduled to see transplant every 6 months while in pre-transplant phase  Annual re-testing: CXR, EKG, yearly mammograms for women over 40 and PSA for males over 40, cardiology follow-up as recommended by initial cardiology pre-transplant evaluation  Renal ultrasound every 2 years  Baseline colonoscopy after age 50 and repeated as recommended    UNOS Patient Status  Functional Status: 60% - Requires occasional assistance but is able to care for needs  Physical Capacity: No Limitations

## 2022-09-22 NOTE — LETTER
September 26, 2022        Karlos Perry  [address]  [address]  [facility] [address]  Phone: [phone]  Fax: [phone]             [facility] Trans[facility] 1st Fl  [address]  [facility] [address]  Phone: [phone]   Patient: Jael Hall   MR Number: [id number]   YOB: 1965   Date of Visit: 9/22/2022       Dear Dr. Karlos Perry    Thank you for referring Jael Hall to me for evaluation. Attached you will find relevant portions of my assessment and plan of care.    If you have questions, please do not hesitate to call me. I look forward to following Jael Hall along with you.    Sincerely,    Lanny Cole, [place]    Enclosure    If you would like to receive this communication electronically, please contact [email or web address] or [phone] to request [employer] Link access.    [employer] Link is a tool which provides read-only access to select patient information with whom you have a relationship. Its easy to use and provides real time access to review your patients record including encounter summaries, notes, results, and demographic information.    If you feel you have received this communication in error or would no longer like to receive these types of communications, please e-mail [email or web address]

## 2022-09-27 NOTE — PROGRESS NOTES
Patient's chart reviewed today. Patient due for follow-up in September 2023. Appointments to be scheduled per protocol. HLA sample current, in lab, and being received on a regular basis.

## 2022-10-01 ENCOUNTER — PES CALL (OUTPATIENT)
Dept: ADMINISTRATIVE | Facility: OTHER | Age: 57
End: 2022-10-01
Payer: MEDICARE

## 2022-10-03 NOTE — PROGRESS NOTES
"Transplant Recipient Adult Psychosocial Assessment UPDATE (Last Assessment completed on 2021)    Jael Hall  [address]  [address]  [facility] [address]  Telephone Information:   Mobile [phone]   Home  [phone] (home)  Work  There is no work phone number on file.  E-mail  No e-mail address on record    Sex: female  YOB: 1965  Age: 57 y.o.    Encounter Date: 2022  [place] Citizen: yes  Primary Language: English   Needed: no    Emergency Contact:  Name: Sudha Hall  Relationship: daughter  Address: same as pt  Phone Numbers:   [phone]    Family/Social Support:   Number of dependents/: Pt reports no dependents.  Marital history: Pt reports 1 previous marriage which ended in divorce.   Other family dynamics: Pt reports 2 adult children: Sudha and Emory. Pt identifies all family members as supportive. Pt reports both parents are .    Household Composition:  Name: Jael Hall  Age: 57  Relationship: patient  Does person drive? no    Name: Sudha Hall  Age: 32  Relationship: daughter  Does person drive? yes      Do you and your caregivers have access to reliable transportation? yes Pt reports utilizing  "transportation" for today's appointments.  PRIMARY CAREGIVER: Sudha Hall (daughter) will be primary caregiver, phone number [phone]. Pt's dtr could not present due to employer duties. SW confirmed plan via mobile.      provided in-depth information to patient and caregiver regarding pre- and post-transplant caregiver role.   strongly encourages patient and caregiver to have concrete plan regarding post-transplant care giving, including back-up caregiver(s) to ensure care giving needs are met as needed.    Patient and Caregiver states understanding all aspects of caregiver role/commitment and is able/willing/committed to being caregiver to the fullest extent necessary.    Patient and Caregiver verbalizes " understanding of the education provided today and caregiver responsibilities.         remains available. Patient and Caregiver agree to contact  in a timely manner if concerns arise.      Able to take time off work without financial concerns: yes. Pt's daughter reports that she will have to rotate w/ her brother so she can go to work.     Additional Significant Others who will Assist with Transplant:  Name: Emory Hall  Age: 29  Phone: [phone]  City: [place] State: [place]  Relationship: son  Does person drive? yes     Living Will: no  Healthcare Power of : no Pt reports trusting dtr with medical decisions as needed   Advance Directives on file: <<no information> per medical record.  Verbally reviewed LW/HCPA information.   provided patient with copy of LW/HCPA documents and provided education on completion of forms.    Living Donors: No.    Highest Education Level: High School (9-12) or GED  Reading Ability: 12th grade  Reports difficulty with: seeing and hearing Pt reports wearing reading glasses and reports issues with hearing starting in March 2021.  Learns Best By:  a combination of verbal, written, and tactile instructions.     Status: no  VA Benefits: no     Working for Income: No  If no, reason not working: Disability    Patient is disabled.  Prior to disability, patient  was employed as  at [employer].     Spouse/Significant Other Employment: Pt reports significant other is retired.    Disabled: yes: date disability began: 6/23/17, due to: ESRD.    Monthly Income:   Disability: $790      Able to afford all costs now and if transplanted, including medications: yes Pt reports costs are affordable with Medicare and Medicaid    Patient and Caregiver verbalizes understanding of personal responsibilities related to transplant costs and the importance of having a financial plan to ensure that patients transplant costs are fully  covered.      provided fundraising information/education.  Patient and Caregiver verbalizes understanding.   remains available.    Insurance:   Payor/Plan Subscr  Sex Relation Sub. Ins. ID Effective Group Num   1. MEDPOINT - ME* TANYA VELASQUEZ 1965 Female Self [id number]                                    [address]   2. MEDICARE - ME* TANYA VELASQUEZ 1965 Female Self [id number] 10/1/17                                    [address]     Primary Insurance (for UNOS reporting): Public Insurance - Medicare FFS (Fee For Service)  Secondary Insurance (for UNOS reporting): Public Insurance - Medicaid  Patient and Caregiver verbalizes clear understanding that patient may experience difficulty obtaining and/or be denied insurance coverage post-surgery. This includes and is not limited to disability insurance, life insurance, health insurance, burial insurance, long term care insurance, and other insurances.    Patient and Caregiver also reports understanding that future health concerns related to or unrelated to transplantation may not be covered by patient's insurance.  Resources and information provided and reviewed.      Patient and Caregiver provides verbal permission to release any necessary information to outside resources for patient care and discharge planning.  Resources and information provided are reviewed.      Dialysis Adherence:  Patient reports being on hemodialysis in center, attending all dialysis appointments, and staying for the entire course of treatment. According to dialysis unit medical record, in the last three months pt has, 0 AMAs, 3 No Shows (-pt missed treatment due to transportation, -dialysis access issues and -pt misses due to personal issues, and denies issues with transportation and labs.   Confirmed by dialysis unit-RN or LMSW    Infusion Service: patient utilizing? no  Home Health: patient utilizing? no  DME: yes Pt reports BP  Cuff, glucometer  Pulmonary/Cardiac Rehab: Pt denies   ADLS:  Pt reports that she does not drive. Pt reports no difficulties with walking, bathing, cooking, housekeeping, eating, shopping, and taking medication.    Adherence:   Pt reports suitable adherence with medications, dialysis, and health regimen.  Adherence education and counseling provided.     Per History Section:  Past Medical History:   Diagnosis Date    Anemia of chronic disorder 11/8/2017    Overview:  Added automatically from request for surgery [id number]    Anemia of chronic renal failure, stage 5     Anxiety     Cyst of left ovary 5/14/2019    Cyst of left ovary 7/5/2019    Dyslipidemia 1/4/2013    End-stage renal disease on hemodialysis 8/20/2020    Hematuria 10/30/2019    Hx of sinus bradycardia 9/28/2017    Hyperkalemia 3/29/2017    Peripheral neuropathy 1/4/2013    Renovascular hypertension 8/17/2020    S/P dilation and curettage 5/22/2018    Thickened endometrium 5/22/2018    Thyroid nodule     Thyroid nodule 12/13/2021    Uncontrolled type 2 diabetes mellitus 4/5/2013    Overview:  poc a1c today 11.2-270/ up from 10.8-263, Pt. has very very stressed/ was incarcerated/marital stress/ will current meds/ less stress now/ will monitor.     Social History     Tobacco Use    Smoking status: Never    Smokeless tobacco: Never   Substance Use Topics    Alcohol use: No     Social History     Substance and Sexual Activity   Drug Use No     Social History     Substance and Sexual Activity   Sexual Activity Yes    Partners: Male    Birth control/protection: Post-menopausal       Per Today's Psychosocial:  Tobacco: none, patient denies any use.  Alcohol: none, patient denies any use.  Illicit Drugs/Non-prescribed Medications: none, patient denies any use.    Patient and Caregiver states clear understanding of the potential impact of substance use as it relates to transplant candidacy and is aware of possible random substance screening.  Substance  abstinence/cessation counseling, education and resources provided and reviewed.     Arrests/DWI/Treatment/Rehab: patient denies    Psychiatric History:    Mental Health: Pt reports no history of or current mental health issues or concerns.   Psychiatrist/Counselor: Pt denies seeing a mental health professional and reports being open to seeing the psych department for talk therapy if necessary.  Medications:  Pt denies taking medications for mental health reasons.  Suicide/Homicide Issues: Pt denies any history of or current suicidal or homicidal ideations.   Safety at home: Pt reports no current or history of safety concerns in household; including mental, physical, verbal, or sexual abuse.    Knowledge: Patient and Caregiver states having clear understanding and realistic expectations regarding the potential risks and potential benefits of organ transplantation and organ donation, agrees to discuss with health care team members and support system members and to utilize available resources and express questions and/or concerns in order to further facilitate the pt informed decision-making.  Resources and information provided and reviewed.     Patient and Caregiver is aware of [facility]'s affiliation and/or partnership with agencies in home health care, LTAC, SNF, [facility], and other hospitals and clinics.    Understanding: Patient and Caregiver reports having a clear understanding of the many lifetime commitments involved with being a transplant recipient, including costs, compliance, medications, lab work, procedures, appointments, concrete and financial planning, preparedness, timely and appropriate communication of concerns, abstinence (ETOH, tobacco, illicit non-prescribed drugs), adherence to all health care team recommendations, support system and caregiver involvement, appropriate and timely resource utilization and follow-through, mental health counseling as needed/recommended, and patient and caregiver  responsibilities.  Social Service Handbook, resources and detailed educational information provided and reviewed.  Educational information provided.    Patient and Caregiver also reports current and expected compliance with health care regime and states having a clear understanding of the importance of compliance.      Patient and Caregiver reports a clear understanding that risks and benefits may be involved with organ transplantation and with organ donation.      Patient and Caregiver also reports clear understanding that psychosocial risk factors may affect patient, and include but are not limited to feelings of depression, generalized anxiety, anxiety regarding dependence on others, post traumatic stress disorder, feelings of guilt and other emotional and/or mental concerns, and/or exacerbation of existing mental health concerns.  Detailed resources provided and discussed.     Patient and Caregiver agrees to access appropriate resources in a timely manner as needed and/or as recommended, and to communicate concerns appropriately.  Patient and Caregiver also reports a clear understanding of treatment options available.      reviewed education, provided additional information, and answered questions.    Feelings or Concerns: Patient and Caregiver did not express any concerns at this time. Patient reports high motivation to pursue transplant at this time.    Coping: Pt reports coping well with the transplant process at this time and reports dancing, listening to music, and prayer as ways to cope.    Temple: Pt reports [facility] home as Living [facility] in [place], [place] with Letitia Sheriff presiding.     Goals: Pt reports getting off dialysis and returning to work as goals for post transplant. Patient referred to Vocational Rehabilitation.    Interview Behavior: Patient presents as alert and oriented x 4, pleasant, good eye contact, well groomed, recall good, concentration/judgement  good, average intelligence, calm, communicative, cooperative and asking and answering questions appropriately.          Transplant Social Work - Candidacy  Assessment/Plan:     Psychosocial Suitability: Based on psychosocial risk factors, at this time, patient presents as low risk, due to suitable careigver plan and financial plan in place. Pt reports current dialysis adherence. Adherence form sent to unit. .      Recommendations/Additional Comments: SW recommends that pt conduct fundraising to assist pt with pay for cost of medications, food, gas, and other transplant related needs. SW recommends that pt remain aware of potential mental health concerns and contact the team if any concerns arise. SW recommends that pt remain abstinent from tobacco, ETOH, and drug use. SW supports pt's continued adherence. SW remains available to answer any questions or concerns that arise as the pt moves through the transplant process.       Eleuterio Torres, ERIK, LMSW

## 2022-10-05 ENCOUNTER — HOSPITAL ENCOUNTER (OUTPATIENT)
Dept: RADIOLOGY | Facility: HOSPITAL | Age: 57
Discharge: HOME | End: 2022-10-05
Attending: NURSE PRACTITIONER
Payer: MEDICARE

## 2022-10-05 ENCOUNTER — TELEPHONE (OUTPATIENT)
Dept: TRANSPLANT | Facility: CLINIC | Age: 57
End: 2022-10-05
Payer: MEDICARE

## 2022-10-05 DIAGNOSIS — Z12.31 ENCOUNTER FOR SCREENING MAMMOGRAM FOR BREAST CANCER: ICD-10-CM

## 2022-10-05 DIAGNOSIS — Z76.82 ORGAN TRANSPLANT CANDIDATE: ICD-10-CM

## 2022-10-05 DIAGNOSIS — Z76.82 ORGAN TRANSPLANT CANDIDATE: Primary | ICD-10-CM

## 2022-10-05 PROCEDURE — 77067 MAMMO DIGITAL SCREENING BILAT WITH TOMO: ICD-10-PCS | Mod: 26,TXP,, | Performed by: RADIOLOGY

## 2022-10-05 PROCEDURE — 77067 SCR MAMMO BI INCL CAD: CPT | Mod: 26,TXP,, | Performed by: RADIOLOGY

## 2022-10-05 PROCEDURE — 77063 BREAST TOMOSYNTHESIS BI: CPT | Mod: TC,TXP

## 2022-10-05 PROCEDURE — 77063 BREAST TOMOSYNTHESIS BI: CPT | Mod: 26,TXP,, | Performed by: RADIOLOGY

## 2022-10-05 PROCEDURE — 77063 MAMMO DIGITAL SCREENING BILAT WITH TOMO: ICD-10-PCS | Mod: 26,TXP,, | Performed by: RADIOLOGY

## 2022-10-06 ENCOUNTER — TELEPHONE (OUTPATIENT)
Dept: TRANSPLANT | Facility: CLINIC | Age: 57
End: 2022-10-06
Payer: MEDICARE

## 2022-10-20 ENCOUNTER — HOSPITAL ENCOUNTER (OUTPATIENT)
Dept: RADIOLOGY | Facility: OTHER | Age: 57
Discharge: HOME | End: 2022-10-20
Attending: NURSE PRACTITIONER
Payer: MEDICARE

## 2022-10-20 DIAGNOSIS — Z76.82 ORGAN TRANSPLANT CANDIDATE: ICD-10-CM

## 2022-10-20 PROCEDURE — 93978 VASCULAR STUDY: CPT | Mod: TC,TXP

## 2022-10-20 PROCEDURE — 93978 US DOPP ILIACS BILATERAL: ICD-10-PCS | Mod: 26,TXP,, | Performed by: RADIOLOGY

## 2022-10-20 PROCEDURE — 74176 CT ABD & PELVIS W/O CONTRAST: CPT | Mod: 26,TXP,, | Performed by: RADIOLOGY

## 2022-10-20 PROCEDURE — 74176 CT ABD & PELVIS W/O CONTRAST: CPT | Mod: TC,TXP

## 2022-10-20 PROCEDURE — 93978 VASCULAR STUDY: CPT | Mod: 26,TXP,, | Performed by: RADIOLOGY

## 2022-10-20 PROCEDURE — 74176 CT ABDOMEN PELVIS WITHOUT CONTRAST: ICD-10-PCS | Mod: 26,TXP,, | Performed by: RADIOLOGY

## 2022-10-21 ENCOUNTER — TELEPHONE (OUTPATIENT)
Dept: UROLOGY | Facility: CLINIC | Age: 57
End: 2022-10-21
Payer: MEDICARE

## 2022-10-21 DIAGNOSIS — Z76.82 ORGAN TRANSPLANT CANDIDATE: Primary | ICD-10-CM

## 2022-10-21 NOTE — TELEPHONE ENCOUNTER
----- Message from Randa Garber RN sent at 10/21/2022  3:22 PM CDT -----  Regarding: Need MRI prior and Dr. Abraham appt- thanks Radha  Thanks for phone call now and scheduling this lady Radha :) Randa  ----- Message -----  From: Douglas Abraham MD  Sent: 10/20/2022   2:31 PM CDT  To: Telly Wiley RN, Randa Garber RN    Needs mri and f/u with me rescheduled.  Can't tell from a non-contrast ct scan.     MM    ----- Message -----  From: Telly Wiley RN  Sent: 10/20/2022   1:36 PM CDT  To: Douglas Abraham MD    Good Afternoon Dr. Abraham,    This patient is on the kidney transplant waitlist. She is currently inactive secondary to RCC. She was seen by you 5/17/22 and was scheduled for MRI abdomen, which she missed. She is also scheduled for repeat MRI 5/2023. We completed a CT abd/pelvis today for transplant purposes. Can you review the CT for clearance or would you prefer I re-schedule the MRI? Please advise.    Thank You in Advance  Rosaura Wiley RN  Kidney Transplant Coordinator

## 2022-11-03 ENCOUNTER — HOSPITAL ENCOUNTER (OUTPATIENT)
Dept: RADIOLOGY | Facility: OTHER | Age: 57
Discharge: HOME | End: 2022-11-03
Attending: NURSE PRACTITIONER
Payer: MEDICARE

## 2022-11-03 DIAGNOSIS — Z76.82 ORGAN TRANSPLANT CANDIDATE: ICD-10-CM

## 2022-11-03 PROCEDURE — 74181 MRI ABDOMEN WITHOUT CONTRAST: ICD-10-PCS | Mod: 26,TXP,, | Performed by: RADIOLOGY

## 2022-11-03 PROCEDURE — 74181 MRI ABDOMEN W/O CONTRAST: CPT | Mod: TC,TXP

## 2022-11-03 PROCEDURE — 74181 MRI ABDOMEN W/O CONTRAST: CPT | Mod: 26,TXP,, | Performed by: RADIOLOGY

## 2022-11-08 ENCOUNTER — OFFICE VISIT (OUTPATIENT)
Dept: UROLOGY | Facility: CLINIC | Age: 57
End: 2022-11-08
Payer: MEDICARE

## 2022-11-08 ENCOUNTER — TELEPHONE (OUTPATIENT)
Dept: UROLOGY | Facility: CLINIC | Age: 57
End: 2022-11-08

## 2022-11-08 VITALS
HEART RATE: 75 BPM | DIASTOLIC BLOOD PRESSURE: 65 MMHG | HEIGHT: 63 IN | BODY MASS INDEX: 30.6 KG/M2 | WEIGHT: 172.69 LBS | SYSTOLIC BLOOD PRESSURE: 127 MMHG

## 2022-11-08 DIAGNOSIS — N18.6 ESRD (END STAGE RENAL DISEASE) ON DIALYSIS: ICD-10-CM

## 2022-11-08 DIAGNOSIS — N28.89 RENAL MASS: ICD-10-CM

## 2022-11-08 DIAGNOSIS — C64.1 RENAL CELL CARCINOMA OF RIGHT KIDNEY: Primary | ICD-10-CM

## 2022-11-08 DIAGNOSIS — Z99.2 ESRD (END STAGE RENAL DISEASE) ON DIALYSIS: ICD-10-CM

## 2022-11-08 DIAGNOSIS — Z76.82 ORGAN TRANSPLANT CANDIDATE: ICD-10-CM

## 2022-11-08 DIAGNOSIS — I10 PRIMARY HYPERTENSION: ICD-10-CM

## 2022-11-08 PROCEDURE — 99999 PR PBB SHADOW E&M-EST. PATIENT-LVL III: CPT | Mod: PBBFAC,TXP,, | Performed by: UROLOGY

## 2022-11-08 PROCEDURE — 99499 RISK ADDL DX/OHS AUDIT: ICD-10-PCS | Mod: S$PBB,,, | Performed by: UROLOGY

## 2022-11-08 PROCEDURE — 99213 OFFICE O/P EST LOW 20 MIN: CPT | Mod: PBBFAC,TXP | Performed by: UROLOGY

## 2022-11-08 PROCEDURE — 99999 PR PBB SHADOW E&M-EST. PATIENT-LVL III: ICD-10-PCS | Mod: PBBFAC,TXP,, | Performed by: UROLOGY

## 2022-11-08 PROCEDURE — 99215 OFFICE O/P EST HI 40 MIN: CPT | Mod: S$PBB,TXP,, | Performed by: UROLOGY

## 2022-11-08 PROCEDURE — 99499 UNLISTED E&M SERVICE: CPT | Mod: S$PBB,,, | Performed by: UROLOGY

## 2022-11-08 PROCEDURE — 99215 PR OFFICE/OUTPT VISIT, EST, LEVL V, 40-54 MIN: ICD-10-PCS | Mod: S$PBB,TXP,, | Performed by: UROLOGY

## 2022-11-08 NOTE — PROGRESS NOTES
"    Subjective:       Patient ID: Jael Hall is a 57 y.o. female.    Chief Complaint:  RCC f/u       History of Present Illness  HPI  Patient is a 57 y.o. female who is established to our clinic and referred by Natalee Resendiz NP for evaluation of a right renal mass.  She underwent a right robotic nephrectomy on 10/8/20.  She returns today to review imaging.      She reports losing 30lbs over the last 6 months.  Doesn't have any lifestyle changes to attribute that to.       She had a CXR on 8/14/22  with no obvious pulmonary mets.    MRI from 11/3/22 was independently reviewed today and shared with the patient and shows a heterogeneous left upper pole small renal mass---concerning for possible neoplasm.     Patient is a never smoker.     Has a h/o hysterectomy---no other abdominal surgeries.     Patient has ESRD and is on HD---Monday, Wednesday, Friday through a right wrist AVF.  She has DM and HTN.     +fh of ovarian cancer---sister.        PATHOLOGY:    Final Pathologic Diagnosis RIGHT KIDNEY:   RENAL CELL CARCINOMA WITH NO CARCINOMA IDENTIFIED IN PERINEPHRIC ADIPOSE   TISSUE OR IN MARGINS   END-STAGE DIABETIC RENAL DISEASE    Comment: Interp By Harsh Adame M.D., Signed on 10/12/2020 at 13:41   Gross Container Label and Clinic/AP Number:  [id number]   Received in formalin labeled "1.  Right kidney" is a 663 g, 16.5 x 13.5 x 4.0   cm right total nephrectomy specimen consisting of a 5.8 cm in length by 0.6   cm in diameter ureter, 4.0 cm in length by 1.2 cm in diameter renal artery, a   2.5 cm in length by 0.9 cm in diameter renal vein, and an 11.0 x 7.0 x 5.8 cm   kidney encapsulated in 3.5 cm-thick perinephric fat.  The ureter is opened to   reveal a patent, pinpoint, grossly unremarkable lumen.  The renal vessels are   patent.  The kidney is bivalved to reveal a 2.7 x 2.4 x 2.3 cm variegated,   tan-yellow, focally hemorrhagic, partially glistening and gelatinous   well-circumscribed mass within the cortical " midpole of the kidney.  The mass   grossly extends less than 0.1 cm from the renal capsule, 3.0 cm from Gerota's   fascia, 0.5 cm from the renal sinus fat and renal pelvis.  No involvement of   renal vein is grossly identified.  Additionally, there is a 0.8 x 0.7 x 0.6   cm red-brown, hemorrhagic cystic nodule grossly abutting the upper pole of   the renal capsule, and grossly extending 3.1 cm from the primary mass.  The   remaining renal parenchyma consist of multiple simple cysts ranging from   0.1-1.0 cm in diameter.  The renal parenchyma is tan-pink with distinct   corticomedullary junctions.  Sections of [id number] are submitted as follows:   Cassette key:   A:  Ureter and renal vessel resection margins   B-C:  Midpole renal mass with adjacent renal sinus and renal capsule   D:  Mid pole renal mass with adjacent grossly uninvolved renal parenchyma   E:  Additional upper pole hemorrhagic cystic nodule with adjacent renal   parenchyma   F:  Upper pole renal parenchyma with cysts   G:  Lower pole renal parenchyma with cysts   Gross by: Chastity Moss    Microscopic Exam Kidney:    Right nephrectomy   Tumor site:    Renal parenchyma   Tumor size:    2.7 cm.   Tumor focality:    There is the principal mass but in addition is a 1 mm   separate papillary lesion.   Anatomic extent of tumor:    Limited to the kidney   Histologic type:    Renal carcinoma, clear cell variant   ndGndrndanddndend:nd nd2nd of 4.   Sarcomatoid features:    Not identified.   Rhabdoid Features:  Not identified   Tumor Necrosis:  Not identified   Adrenal gland:    Absent   Margins:    No carcinoma identified in margins   Kidney apart from tumor:    This is an end-stage kidney with glomerular   changes typical of diabetic disease   Hilar lymph nodes:    Absent   Pathologic stage (AJCC 2010): pT1a pNX pMX   Block for possible further studies: 1B          Review of Systems  Review of Systems  All other systems reviewed and negative except pertinent positives noted in  HPI.       Objective:     Physical Exam  Constitutional:       General: She is not in acute distress.     Appearance: She is well-developed.   HENT:      Head: Normocephalic and atraumatic.   Eyes:      General: No scleral icterus.  Neck:      Trachea: No tracheal deviation.   Pulmonary:      Effort: Pulmonary effort is normal. No respiratory distress.   Neurological:      Mental Status: She is alert and oriented to person, place, and time.   Psychiatric:         Behavior: Behavior normal.         Thought Content: Thought content normal.         Judgment: Judgment normal.       Lab Review  Lab Results   Component Value Date    COLORU Yellow 05/01/2022    SPECGRAV 1.010 05/01/2022    PHUR >8.0 (A) 05/01/2022    NITRITE Negative 05/01/2022    KETONESU Negative 05/01/2022    UROBILINOGEN Negative 10/18/2019         Assessment:        1. Renal cell carcinoma of right kidney    2. Organ transplant candidate    3. ESRD (end stage renal disease) on dialysis    4. Primary hypertension    5. Renal mass            Plan:     Renal cell carcinoma of right kidney    Organ transplant candidate  -     Ambulatory consult to Urology    ESRD (end stage renal disease) on dialysis    Primary hypertension    Renal mass    - pathology again reviewed.  -MRI abd reviewed--concerning for possible left RCC  -in response to her recent CT scan, the transplant team question whether she would be a candidate for renal transplant due to iliac calcifications.  I reached out to Dr. White to revisit this issue.  If the patient is a candidate for renal transplant, we would likely pursue a left robotic nephrectomy for possible low stage renal cell carcinoma.  However, if the patient is not a candidate for renal transplant, we would likely pursue active surveillance.  -after follow-up discussion with Dr. White, there was concensus that the patient was not a candidate for renal transplant---after the visit was complete, I discussed with the patient  the findings of the transplant team.    Patient voiced her understanding and wishes to proceed with active surveillance.  Plan for MRI abd and CXR in 6 months with f/u after.     -continue dialysis for ESRD    --BP reviewed  -stable, continue meds and f/u with PCP

## 2022-11-08 NOTE — TELEPHONE ENCOUNTER
1702-I spoke to Sudha Hall, dtr, now and gave [phone] # for Portal so that dtstephany and Jael can review progress notes.  Pt is set for 6 month imaging for left kidney mass surveillance as discussed already today.      ----- Message from Radha Aly sent at 11/8/2022 12:51 PM CST -----  Contact: pt  Pt would like provider to contact her daughter to discuss her medical conditions with the daughter.     Confirmed contact below:  Contact Name: Lexijude  Phone Number: [phone]

## 2022-12-02 ENCOUNTER — COMMITTEE REVIEW (OUTPATIENT)
Dept: TRANSPLANT | Facility: CLINIC | Age: 57
End: 2022-12-02
Payer: MEDICARE

## 2022-12-02 ENCOUNTER — TELEPHONE (OUTPATIENT)
Dept: TRANSPLANT | Facility: CLINIC | Age: 57
End: 2022-12-02
Payer: MEDICARE

## 2022-12-02 NOTE — COMMITTEE REVIEW
Native Organ Dx: Diabetes Mellitus - Type II      Not approved for LRD/CAD transplant due to iliac calcifications prohibitive for transplant.      Note written by Telly Wiley RN    ===============================================    I was present at the meeting and attest to the decision of the committee

## 2022-12-05 ENCOUNTER — CLINICAL SUPPORT (OUTPATIENT)
Dept: AUDIOLOGY | Facility: CLINIC | Age: 57
End: 2022-12-05
Payer: MEDICARE

## 2022-12-05 DIAGNOSIS — H90.3 SENSORINEURAL HEARING LOSS (SNHL), BILATERAL: Primary | ICD-10-CM

## 2022-12-05 DIAGNOSIS — H93.293 IMPAIRMENT OF AUDITORY DISCRIMINATION OF BOTH EARS: ICD-10-CM

## 2022-12-05 PROCEDURE — 92604 REPROGRAM COCHLEAR IMPLT 7/>: CPT | Mod: PBBFAC | Performed by: AUDIOLOGIST

## 2022-12-09 NOTE — PROGRESS NOTES
12/5/2022    Cochlear Implant Programming Session:    Right Ear:  Dr. Zuleta  :  [employer]  Internal Device/Serial Number:  [id number]   Processor:  [id number]   SN of Processors: [id number]  DOS:  6/30/21  DIS:  7/20/21  Processor Color: Black  Magnet Strength: 5i  Coil Length:  6cm  Battery Type:  Standard rechargeable  Warranty:  5 year - July 19, 2024     MINI REGINO issued  TV streamer issued    Jael Hall was seen for a follow up programming session with her cochlear implant sound processor.  Mrs. Hall stated the processor sounded like static at times.    A listening check was completed.  The microphone sound quality was good and there was no static noted in the microphones.  Impedance testing was completed.  The map was adjusted based on T and C level responses and adjusted for comfort.    Mrs. Hall was counseled on the microphone technology.  Anything rubbing across the microphone would cause a static sound.  Mrs. Hall's hair coverings may be interfering with the microphones causing static.  Mrs. Hall felt she was hearing better following the adjustments.  Mrs. Hall stated she would try not to cover the microphones.    A replacement cable coil was requested from [employer] to be sent directly to her home address.    Recommend:  Follow up programming as needed.  Replacement cochlear implant supplies as needed.  Annual evaluation.

## 2022-12-13 ENCOUNTER — TELEPHONE (OUTPATIENT)
Dept: TRANSPLANT | Facility: CLINIC | Age: 57
End: 2022-12-13
Payer: MEDICARE

## 2022-12-13 NOTE — TELEPHONE ENCOUNTER
Returned pt's call, no ans, LM.    ----- Message from Randa Garber RN sent at 12/9/2022  2:13 PM CST -----  Regarding: FW: pt advice  Contact: pt   Hi Team,     Please assist with explaining to this lady the reason she was taken off the transplant list.  It appears she does not understand her letter.    Thank you,    VALERIE Tolentino/BUDDY Chaudhry      ----- Message -----  From: Jimmie Rodriguez MA  Sent: 12/9/2022  12:18 PM CST  To: [facility] Staff  Subject: pt advice                                        Pt stated that she received a letter stating that she was taking off the transplant list due to her health please call pt  ask that a message be left if she don't answer

## 2022-12-14 ENCOUNTER — TELEPHONE (OUTPATIENT)
Dept: TRANSPLANT | Facility: CLINIC | Age: 57
End: 2022-12-14
Payer: MEDICARE

## 2022-12-14 NOTE — TELEPHONE ENCOUNTER
Returned pt's call and explained to family the reason for removal from transplant [facility] due to iliac calcification prohibitive for transplantation. All questions and concerns were addressed. Pt verbalized understanding.    ----- Message from Flavia Thacker sent at 12/13/2022  1:22 PM CST -----  Regarding: return call  Patient returning call to [facility]. Requesting a call back.    Call: [phone]

## 2023-01-23 ENCOUNTER — LAB VISIT (OUTPATIENT)
Dept: LAB | Facility: OTHER | Age: 58
End: 2023-01-23
Attending: INTERNAL MEDICINE
Payer: MEDICARE

## 2023-01-23 DIAGNOSIS — N18.6 END STAGE RENAL DISEASE: ICD-10-CM

## 2023-01-23 DIAGNOSIS — D64.9 ANEMIA, UNSPECIFIED TYPE: ICD-10-CM

## 2023-01-23 DIAGNOSIS — E55.9 VITAMIN D DEFICIENCY: ICD-10-CM

## 2023-01-23 LAB
25(OH)D3+25(OH)D2 SERPL-MCNC: 11 NG/ML (ref 30–96)
ALBUMIN SERPL BCP-MCNC: 3.7 G/DL (ref 3.5–5.2)
ALP SERPL-CCNC: 130 U/L (ref 55–135)
ALT SERPL W/O P-5'-P-CCNC: 6 U/L (ref 10–44)
ANION GAP SERPL CALC-SCNC: 8 MMOL/L (ref 8–16)
AST SERPL-CCNC: 13 U/L (ref 10–40)
BACTERIA #/AREA URNS HPF: ABNORMAL /HPF
BASOPHILS # BLD AUTO: 0.05 K/UL (ref 0–0.2)
BASOPHILS NFR BLD: 1 % (ref 0–1.9)
BILIRUB SERPL-MCNC: 0.9 MG/DL (ref 0.1–1)
BILIRUB UR QL STRIP: NEGATIVE
BUN SERPL-MCNC: 21 MG/DL (ref 6–20)
CALCIUM SERPL-MCNC: 10.3 MG/DL (ref 8.7–10.5)
CHLORIDE SERPL-SCNC: 97 MMOL/L (ref 95–110)
CLARITY UR: CLEAR
CO2 SERPL-SCNC: 32 MMOL/L (ref 23–29)
COLOR UR: YELLOW
CREAT SERPL-MCNC: 5.8 MG/DL (ref 0.5–1.4)
CREAT UR-MCNC: 95.1 MG/DL (ref 15–325)
DIFFERENTIAL METHOD: ABNORMAL
EOSINOPHIL # BLD AUTO: 1.1 K/UL (ref 0–0.5)
EOSINOPHIL NFR BLD: 21.1 % (ref 0–8)
ERYTHROCYTE [DISTWIDTH] IN BLOOD BY AUTOMATED COUNT: 15.6 % (ref 11.5–14.5)
EST. GFR  (NO RACE VARIABLE): 8 ML/MIN/1.73 M^2
FERRITIN SERPL-MCNC: 1929 NG/ML (ref 20–300)
GLUCOSE SERPL-MCNC: 109 MG/DL (ref 70–110)
GLUCOSE UR QL STRIP: NEGATIVE
HCT VFR BLD AUTO: 36 % (ref 37–48.5)
HGB BLD-MCNC: 11.7 G/DL (ref 12–16)
HGB UR QL STRIP: NEGATIVE
HYALINE CASTS #/AREA URNS LPF: 0 /LPF
IMM GRANULOCYTES # BLD AUTO: 0.01 K/UL (ref 0–0.04)
IMM GRANULOCYTES NFR BLD AUTO: 0.2 % (ref 0–0.5)
IRON SERPL-MCNC: 73 UG/DL (ref 30–160)
KETONES UR QL STRIP: NEGATIVE
LEUKOCYTE ESTERASE UR QL STRIP: ABNORMAL
LYMPHOCYTES # BLD AUTO: 1.2 K/UL (ref 1–4.8)
LYMPHOCYTES NFR BLD: 22.5 % (ref 18–48)
MAGNESIUM SERPL-MCNC: 2.4 MG/DL (ref 1.6–2.6)
MCH RBC QN AUTO: 27.9 PG (ref 27–31)
MCHC RBC AUTO-ENTMCNC: 32.5 G/DL (ref 32–36)
MCV RBC AUTO: 86 FL (ref 82–98)
MICROSCOPIC COMMENT: ABNORMAL
MONOCYTES # BLD AUTO: 0.5 K/UL (ref 0.3–1)
MONOCYTES NFR BLD: 9.8 % (ref 4–15)
NEUTROPHILS # BLD AUTO: 2.3 K/UL (ref 1.8–7.7)
NEUTROPHILS NFR BLD: 45.4 % (ref 38–73)
NITRITE UR QL STRIP: NEGATIVE
NRBC BLD-RTO: 0 /100 WBC
PH UR STRIP: 7 [PH] (ref 5–8)
PHOSPHATE SERPL-MCNC: 2.6 MG/DL (ref 2.7–4.5)
PLATELET # BLD AUTO: 210 K/UL (ref 150–450)
PMV BLD AUTO: 11.2 FL (ref 9.2–12.9)
POTASSIUM SERPL-SCNC: 3.6 MMOL/L (ref 3.5–5.1)
PROT SERPL-MCNC: 8.6 G/DL (ref 6–8.4)
PROT UR QL STRIP: ABNORMAL
PROT UR-MCNC: 85 MG/DL (ref 0–15)
PROT/CREAT UR: 0.89 MG/G{CREAT} (ref 0–0.2)
PTH-INTACT SERPL-MCNC: 873.7 PG/ML (ref 9–77)
RBC # BLD AUTO: 4.19 M/UL (ref 4–5.4)
RBC #/AREA URNS HPF: 1 /HPF (ref 0–4)
SATURATED IRON: 25 % (ref 20–50)
SODIUM SERPL-SCNC: 137 MMOL/L (ref 136–145)
SP GR UR STRIP: 1.02 (ref 1–1.03)
SQUAMOUS #/AREA URNS HPF: 10 /HPF
TOTAL IRON BINDING CAPACITY: 287 UG/DL (ref 250–450)
TRANSFERRIN SERPL-MCNC: 194 MG/DL (ref 200–375)
URATE SERPL-MCNC: 2.6 MG/DL (ref 2.4–5.7)
URN SPEC COLLECT METH UR: ABNORMAL
UROBILINOGEN UR STRIP-ACNC: NEGATIVE EU/DL
WBC # BLD AUTO: 5.12 K/UL (ref 3.9–12.7)
WBC #/AREA URNS HPF: 2 /HPF (ref 0–5)

## 2023-01-23 PROCEDURE — 83970 ASSAY OF PARATHORMONE: CPT | Performed by: INTERNAL MEDICINE

## 2023-01-23 PROCEDURE — 84466 ASSAY OF TRANSFERRIN: CPT | Performed by: INTERNAL MEDICINE

## 2023-01-23 PROCEDURE — 85025 COMPLETE CBC W/AUTO DIFF WBC: CPT | Performed by: INTERNAL MEDICINE

## 2023-01-23 PROCEDURE — 82306 VITAMIN D 25 HYDROXY: CPT | Performed by: INTERNAL MEDICINE

## 2023-01-23 PROCEDURE — 80053 COMPREHEN METABOLIC PANEL: CPT | Performed by: INTERNAL MEDICINE

## 2023-01-23 PROCEDURE — 84100 ASSAY OF PHOSPHORUS: CPT | Performed by: INTERNAL MEDICINE

## 2023-01-23 PROCEDURE — 83735 ASSAY OF MAGNESIUM: CPT | Performed by: INTERNAL MEDICINE

## 2023-01-23 PROCEDURE — 81000 URINALYSIS NONAUTO W/SCOPE: CPT | Performed by: INTERNAL MEDICINE

## 2023-01-23 PROCEDURE — 84156 ASSAY OF PROTEIN URINE: CPT | Performed by: INTERNAL MEDICINE

## 2023-01-23 PROCEDURE — 36415 COLL VENOUS BLD VENIPUNCTURE: CPT | Performed by: INTERNAL MEDICINE

## 2023-01-23 PROCEDURE — 84550 ASSAY OF BLOOD/URIC ACID: CPT | Performed by: INTERNAL MEDICINE

## 2023-01-23 PROCEDURE — 82728 ASSAY OF FERRITIN: CPT | Performed by: INTERNAL MEDICINE

## 2023-01-31 ENCOUNTER — PATIENT OUTREACH (OUTPATIENT)
Dept: ADMINISTRATIVE | Facility: OTHER | Age: 58
End: 2023-01-31
Payer: MEDICARE

## 2023-01-31 NOTE — PROGRESS NOTES
CHW - Outreach Attempt    Community Health Worker left a voicemail message for 1st attempt to contact patient regarding: case management referral     Community Health Worker to attempt to contact patient on: 1/31/23

## 2023-02-03 NOTE — PROGRESS NOTES
CHW - Outreach Attempt    Community Health Worker left a voicemail message for 2nd attempt to contact patient regarding: case management referral    Community Health Worker to attempt to contact patient on: 2/3/23

## 2023-02-07 NOTE — PROGRESS NOTES
CHW - Outreach Attempt    Community Health Worker left a voicemail message for 3rd attempt to contact patient regarding: case management referral    Community Health Worker to attempt to contact patient on: 2/7/23

## 2023-03-03 ENCOUNTER — TELEPHONE (OUTPATIENT)
Dept: OTOLARYNGOLOGY | Facility: CLINIC | Age: 58
End: 2023-03-03
Payer: MEDICARE

## 2023-03-09 PROBLEM — I50.33 ACUTE ON CHRONIC DIASTOLIC HEART FAILURE: Status: RESOLVED | Noted: 2021-10-08 | Resolved: 2023-03-09

## 2023-03-09 PROBLEM — I16.0 HYPERTENSIVE URGENCY: Status: RESOLVED | Noted: 2021-07-22 | Resolved: 2023-03-09

## 2023-03-09 PROBLEM — Z99.2 ESRD ON HEMODIALYSIS: Status: RESOLVED | Noted: 2017-09-28 | Resolved: 2023-03-09

## 2023-03-09 PROBLEM — H91.90 HEARING LOSS: Status: RESOLVED | Noted: 2021-05-08 | Resolved: 2023-03-09

## 2023-03-09 PROBLEM — N18.6 ESRD ON HEMODIALYSIS: Status: RESOLVED | Noted: 2017-09-28 | Resolved: 2023-03-09

## 2023-03-10 ENCOUNTER — OFFICE VISIT (OUTPATIENT)
Dept: INTERNAL MEDICINE | Facility: CLINIC | Age: 58
End: 2023-03-10
Payer: MEDICARE

## 2023-03-10 ENCOUNTER — CLINICAL SUPPORT (OUTPATIENT)
Dept: AUDIOLOGY | Facility: CLINIC | Age: 58
End: 2023-03-10
Payer: MEDICARE

## 2023-03-10 VITALS
HEART RATE: 80 BPM | SYSTOLIC BLOOD PRESSURE: 138 MMHG | OXYGEN SATURATION: 98 % | DIASTOLIC BLOOD PRESSURE: 60 MMHG | WEIGHT: 179.88 LBS | BODY MASS INDEX: 30.71 KG/M2 | HEIGHT: 64 IN

## 2023-03-10 DIAGNOSIS — N18.6 ESRD (END STAGE RENAL DISEASE) ON DIALYSIS: ICD-10-CM

## 2023-03-10 DIAGNOSIS — E11.43 DIABETIC AUTONOMIC NEUROPATHY ASSOCIATED WITH TYPE 2 DIABETES MELLITUS: Primary | ICD-10-CM

## 2023-03-10 DIAGNOSIS — E78.2 ELEVATED CHOLESTEROL WITH ELEVATED TRIGLYCERIDES: Chronic | ICD-10-CM

## 2023-03-10 DIAGNOSIS — Z99.2 ESRD (END STAGE RENAL DISEASE) ON DIALYSIS: ICD-10-CM

## 2023-03-10 DIAGNOSIS — E11.21 DIABETIC NEPHROPATHY ASSOCIATED WITH TYPE 2 DIABETES MELLITUS: ICD-10-CM

## 2023-03-10 DIAGNOSIS — H90.3 SENSORINEURAL HEARING LOSS, BILATERAL: Primary | ICD-10-CM

## 2023-03-10 PROCEDURE — 99386 PREV VISIT NEW AGE 40-64: CPT | Mod: S$PBB,GZ,GC,

## 2023-03-10 PROCEDURE — 99499 UNLISTED E&M SERVICE: CPT | Mod: S$PBB,,,

## 2023-03-10 PROCEDURE — 99386 PR PREVENTIVE VISIT,NEW,40-64: ICD-10-PCS | Mod: S$PBB,GZ,GC,

## 2023-03-10 PROCEDURE — 99499 NO LOS: ICD-10-PCS | Mod: S$PBB,,,

## 2023-03-10 PROCEDURE — 99999 PR PBB SHADOW E&M-EST. PATIENT-LVL III: ICD-10-PCS | Mod: PBBFAC,GC,,

## 2023-03-10 PROCEDURE — 99213 OFFICE O/P EST LOW 20 MIN: CPT | Mod: PBBFAC

## 2023-03-10 PROCEDURE — 99999 PR PBB SHADOW E&M-EST. PATIENT-LVL III: CPT | Mod: PBBFAC,GC,,

## 2023-03-10 RX ORDER — SEVELAMER CARBONATE 800 MG/1
2400 TABLET, FILM COATED ORAL
Qty: 120 TABLET | Refills: 3 | Status: SHIPPED | OUTPATIENT
Start: 2023-03-10 | End: 2023-06-08 | Stop reason: SDUPTHER

## 2023-03-10 NOTE — PROGRESS NOTES
Cochlear Implant Programming Session:     Right Ear:  Dr. Zuleta  :  Cochlear [employer]  Internal Device/Serial Number:  [id number]   Processor:  [id number]   SN of Processors: [id number]  DOS:  6/30/21  DIS:  7/20/21  Processor Color: Black  Magnet Strength: 5i  Coil Length:  6cm  Battery Type:  Standard rechargeable  Warranty:  5 year - July 19, 2024     MINI REGINO issued  TV streamer issued     Jael Rafael was seen for a follow up programming session with her cochlear implant sound processor.  Ms. Hall stated that she feels she is doing very well with the processor when it works, but that the processor sound is cutting on and off.     Ms. Hall's magnet was placed upside down in her coil. A listening check was completed following proper magnet placement, and the processor was found to be in good working condition. Demonstrated proper magnet orientation and Ms. Hall reported much improved sound quality following this fix.    She did not want any programming adjustments made to her processor today.     Recommend:  Follow up programming as needed.  Replacement cochlear implant supplies as needed.  Annual evaluation.

## 2023-03-10 NOTE — PATIENT INSTRUCTIONS
Thank you for coming to the clinic today!  Labs have been ordered for you. Please have them done at any [facility] lab at your convenience. Please fast starting at midnight the following night (for accurate cholesterol testing).  A referral to optometry has been sent for you.   Your medication has been sent to your pharmacy.   Please return to clinic to see me in 3 months.

## 2023-03-10 NOTE — PROGRESS NOTES
Subjective     Chief Complaint: establish care    History of Present Illness:  Ms. Jael Hall is a 57 y.o. female with history of T2 DM with diabetic neuropathy and nephropathy, hypertension, Diastolic HF, ESRD on dialysis, NAFLD, and sensorineural hearing loss presenting to [facility] care.    She has renal failure 2.2 diabetic nephropathy. She undergoes MWF dialysis. She was previously on kidney transplant list, but has since been denied due to iliac calcifications. She recently sought out a second opinion with Dr. Foy ([facility]), who again denied transplant.     She underwent R robotic nephrectomy 10/8/2020 due to clear cell RCC (confined to kidney). Per urology note 11/8/2022, there is concern for possible left RCC, and they were previously considering left nephrectomy. Patient elected to proceed with active surveillance rather than undergo surgery.     She has history of DM, though does not have history of elevated A1c since 2021 (A1c 8/14/2022 4.8). Does not take medication for diabetes. Glucose at home 117, checks every other week. Recently gained 25 lbs, wants to start dieting to come down. Numbness in feet. Lyrica not helping.     Echo 8/2022: EF 60% with Grade 2 ventricular diastolic dysfunction    Blood pressure well controlled on amlodipine, hydralazine, labetalol, and losartan.     Social history:  - Occupation: on disability  - Smoking: never  - Alcohol: denies  - Illicit drug use: denies    Health Maintenance:  - Lipid panel: 3/2021 , LDL 52  - Colon cancer screen (Average risk: 45-75): 2018  - Breast cancer screen (Women aged 50-74): 2022, negative  - Cervical cancer screen (Women aged 21-65): s/p hysterectomy  - Flu vaccine: UTD  - COVID vaccine: UTD  - Shingles vaccine: UTD    Review of Systems   Constitutional:  Negative for fever, malaise/fatigue and weight loss.   HENT:  Negative for congestion, sinus pain and sore throat.    Eyes:  Negative for blurred vision.    Respiratory:  Negative for shortness of breath.    Cardiovascular:  Positive for leg swelling. Negative for chest pain.   Gastrointestinal:  Negative for constipation and diarrhea.   Neurological:  Positive for dizziness. Negative for headaches.     PAST HISTORY:     Past Medical History:   Diagnosis Date    Acute on chronic diastolic heart failure 10/8/2021    Anemia of chronic disorder 2017    Overview:  Added automatically from request for surgery [id number]    Anemia of chronic renal failure, stage 5     Anxiety     Cyst of left ovary 2019    Cyst of left ovary 2019    Dyslipidemia 2013    End-stage renal disease on hemodialysis 2020    ESRD on hemodialysis 2017    Hematuria 10/30/2019    Hx of sinus bradycardia 2017    Hyperkalemia 3/29/2017    Hypertensive urgency 2021    Peripheral neuropathy 2013    Renovascular hypertension 2020    S/P dilation and curettage 2018    Thickened endometrium 2018    Thyroid nodule     Thyroid nodule 2021    Uncontrolled type 2 diabetes mellitus 2013    Overview:  poc a1c today 11.2-270/ up from 10.8-263, Pt. has very very stressed/ was incarcerated/marital stress/ will current meds/ less stress now/ will monitor.       Past Surgical History:   Procedure Laterality Date    AV FISTULA PLACEMENT      right arm     SECTION      x1    COLONOSCOPY N/A 2018    Procedure: COLONOSCOPY;  Surgeon: Pankaj Hinojosa MD;  Location: [facility] (4TH FLR);  Service: Endoscopy;  Laterality: N/A;  dialysis pt/labs prior to colon/MS    CYSTOSCOPY W/ RETROGRADES Bilateral 2020    Procedure: CYSTOSCOPY, WITH RETROGRADE PYELOGRAM;  Surgeon: Douglas Abraham MD;  Location: [address];  Service: Urology;  Laterality: Bilateral;  45 min    FISTULOGRAM Right 2020    Procedure: Fistulogram;  Surgeon: Lester Gómez MD;  Location: [facility] [place], operated by [facility] CATH LAB;  Service: Nephrology;  Laterality: Right;    HYSTERECTOMY       IMPLANTATION OF COCHLEAR PROSTHESIS Right 6/28/2021    Procedure: INSERTION, PROSTHESIS, COCHLEAR;  Surgeon: Ramiro Zuleta MD;  Location: [facility] OR 2ND FLR;  Service: ENT;  Laterality: Right;  COCHLEAR BABAK    IMPLANTATION OF COCHLEAR PROSTHESIS Right 6/30/2021    Procedure: INSERTION, PROSTHESIS, COCHLEAR;  Surgeon: Ramiro Zuleta MD;  Location: [facility] OR 2ND FLR;  Service: ENT;  Laterality: Right;  COCHLEAR AMERICAS    ROBOT-ASSISTED LAPAROSCOPIC ABDOMINAL HYSTERECTOMY USING DA NATALYA XI N/A 7/5/2019    Procedure: XI ROBOTIC HYSTERECTOMY;  Surgeon: Trice Lei MD;  Location: [facility];  Service: OB/GYN;  Laterality: N/A;    ROBOT-ASSISTED LAPAROSCOPIC NEPHRECTOMY Right 10/8/2020    Procedure: ROBOTIC NEPHRECTOMY;  Surgeon: Douglas Abraham MD;  Location: [facility] OR 2ND FLR;  Service: Urology;  Laterality: Right;  3.5hrs gen with regional     ROBOT-ASSISTED LAPAROSCOPIC SALPINGO-OOPHORECTOMY USING DA NATALYA XI Bilateral 7/5/2019    Procedure: XI ROBOTIC SALPINGO-OOPHORECTOMY;  Surgeon: Trice Lei MD;  Location: [facility];  Service: OB/GYN;  Laterality: Bilateral;    TUBAL LIGATION      Vascular access surgeries      x5       Family History   Problem Relation Age of Onset    Cancer Mother     Ovarian cancer Mother     Cancer Sister     Ovarian cancer Sister     Lung cancer Sister     Diabetes Brother     Cancer Sister     Ovarian cancer Sister     Diabetes Sister     Breast cancer Neg Hx     Colon cancer Neg Hx     Cervical cancer Neg Hx     Endometrial cancer Neg Hx     Vaginal cancer Neg Hx     Thyroid disease Neg Hx        Social History     Socioeconomic History    Marital status:     Number of children: 2   Occupational History    Occupation: disability   Tobacco Use    Smoking status: Never    Smokeless tobacco: Never   Substance and Sexual Activity    Alcohol use: No    Drug use: No    Sexual activity: Yes     Partners: Male     Birth control/protection: Post-menopausal       MEDICATIONS & ALLERGIES:  "    Current Outpatient Medications on File Prior to Visit   Medication Sig    acetaminophen (TYLENOL) 500 MG tablet Take 500 mg by mouth every 8 (eight) hours as needed for Pain.    amLODIPine (NORVASC) 10 MG tablet Take 1 tablet (10 mg total) by mouth nightly.    aspirin (ECOTRIN) 81 MG EC tablet Take 81 mg by mouth once daily.    diphenhydrAMINE (BENADRYL) 25 mg capsule Take 25 mg by mouth every 6 (six) hours as needed for Itching.    famotidine (PEPCID) 20 MG tablet Take 20 mg by mouth 2 (two) times daily as needed for Heartburn.    furosemide (LASIX) 80 MG tablet Take 1 tablet (80 mg total) by mouth once daily.    hydrALAZINE (APRESOLINE) 100 MG tablet Take 100 mg by mouth 3 (three) times daily.    labetaloL (NORMODYNE) 300 MG tablet Take 1 tablet (300 mg total) by mouth 3 (three) times daily.    losartan (COZAAR) 100 MG tablet Take 1 tablet (100 mg total) by mouth once daily.    pregabalin (LYRICA) 75 MG capsule Take 75 mg by mouth once daily.    sevelamer carbonate (RENVELA) 800 mg Tab Take 2,400 mg by mouth 3 (three) times daily with meals.     No current facility-administered medications on file prior to visit.       Review of patient's allergies indicates:  No Known Allergies    OBJECTIVE:     Vital Signs:  Vitals:    03/10/23 1309   BP: 138/60   BP Location: Left arm   Patient Position: Sitting   BP Method: Large (Manual)   Pulse: 80   SpO2: 98%   Weight: 81.6 kg (179 lb 14.3 oz)   Height: 5' 4" (1.626 m)       Body mass index is 30.88 kg/m².     Physical Exam  Vitals reviewed.   Constitutional:       General: She is not in acute distress.     Appearance: Normal appearance. She is not ill-appearing.   HENT:      Head: Normocephalic and atraumatic.   Eyes:      Extraocular Movements: Extraocular movements intact.   Cardiovascular:      Rate and Rhythm: Normal rate and regular rhythm.      Heart sounds: Normal heart sounds. No murmur heard.  Pulmonary:      Effort: Pulmonary effort is normal. No respiratory " distress.      Breath sounds: Normal breath sounds. No wheezing.   Abdominal:      General: Abdomen is flat. There is no distension.      Palpations: Abdomen is soft.      Tenderness: There is no abdominal tenderness.   Musculoskeletal:      Cervical back: Normal range of motion. No rigidity.      Right lower leg: Edema present.      Left lower leg: Edema present.   Skin:     General: Skin is warm and dry.   Neurological:      General: No focal deficit present.      Mental Status: She is alert. Mental status is at baseline.   Psychiatric:         Mood and Affect: Mood normal.         Behavior: Behavior normal.     Protective Sensation (w/ 10 gram monofilament):  Right: Intact  Left: Intact    Visual Inspection:  Normal -  Bilateral    Pedal Pulses:   Right: Present  Left: Present    Posterior Tibialis Pulses:   Right:Present  Left: Present     Diagnostic Results:      Health Maintenance Due   Topic Date Due    Foot Exam  Never done    Shingles Vaccine (1 of 2) Never done    COVID-19 Vaccine (4 - Booster for Moderna series) 03/04/2022    Lipid Panel  03/08/2022    Influenza Vaccine (1) 09/01/2022         ASSESSMENT & PLAN:   Ms. Jael Hall is a 57 y.o. female presenting to [facility] care. ESRD managed per nephrology. A1c currently not in diabetic range, not on diabetic medications, though she does have severe diabetic nephropathy and neuropathy. CBC and CMP recently completed per nephrology.     Diabetic autonomic neuropathy associated with type 2 diabetes mellitus  -     HEMOGLOBIN A1C; Future; Expected date: 03/10/2023  -     Ambulatory referral/consult to Optometry; Future; Expected date: 03/17/2023    Elevated cholesterol with elevated triglycerides  -     LIPID PANEL; Future; Expected date: 03/10/2023  - not currently on statin therapy    ESRD (end stage renal disease) on dialysis  -     sevelamer carbonate (RENVELA) 800 mg Tab; Take 3 tablets (2,400 mg total) by mouth 3 (three) times daily with meals.   Dispense: 120 tablet; Refill: 3      RTC in 3 months    Discussed with Dr. Shipman - staff attestation to follow      Maty Gayle DO PGY1

## 2023-03-17 NOTE — PROGRESS NOTES
I have reviewed the notes, assessments, and/or procedures performed this visit, and I concur with the assessment and plan as documented by Dr. Maty Gayle  for patient  Jael Hall.

## 2023-04-18 ENCOUNTER — TELEPHONE (OUTPATIENT)
Dept: INTERNAL MEDICINE | Facility: CLINIC | Age: 58
End: 2023-04-18
Payer: MEDICARE

## 2023-04-18 NOTE — TELEPHONE ENCOUNTER
LVM notifying pt of appt cancellation(WORTH), and that they may contact the clinic to reschedule.

## 2023-05-03 ENCOUNTER — TELEPHONE (OUTPATIENT)
Dept: TRANSPLANT | Facility: CLINIC | Age: 58
End: 2023-05-03
Payer: MEDICARE

## 2023-05-04 ENCOUNTER — DOCUMENTATION ONLY (OUTPATIENT)
Dept: TRANSPLANT | Facility: CLINIC | Age: 58
End: 2023-05-04
Payer: MEDICARE

## 2023-05-04 ENCOUNTER — TELEPHONE (OUTPATIENT)
Dept: TRANSPLANT | Facility: CLINIC | Age: 58
End: 2023-05-04
Payer: MEDICARE

## 2023-05-09 ENCOUNTER — HOSPITAL ENCOUNTER (OUTPATIENT)
Dept: RADIOLOGY | Facility: HOSPITAL | Age: 58
Discharge: HOME | End: 2023-05-09
Attending: UROLOGY
Payer: MEDICARE

## 2023-05-09 DIAGNOSIS — C64.1 RENAL CELL CARCINOMA OF RIGHT KIDNEY: ICD-10-CM

## 2023-05-09 PROCEDURE — 71046 XR CHEST PA AND LATERAL: ICD-10-PCS | Mod: 26,,, | Performed by: RADIOLOGY

## 2023-05-09 PROCEDURE — 71046 X-RAY EXAM CHEST 2 VIEWS: CPT | Mod: TC

## 2023-05-09 PROCEDURE — 71046 X-RAY EXAM CHEST 2 VIEWS: CPT | Mod: 26,,, | Performed by: RADIOLOGY

## 2023-05-11 ENCOUNTER — HOSPITAL ENCOUNTER (EMERGENCY)
Facility: HOSPITAL | Age: 58
Discharge: HOME | End: 2023-05-11
Attending: STUDENT IN AN ORGANIZED HEALTH CARE EDUCATION/TRAINING PROGRAM
Payer: MEDICARE

## 2023-05-11 ENCOUNTER — HOSPITAL ENCOUNTER (EMERGENCY)
Facility: HOSPITAL | Age: 58
Discharge: HOME | End: 2023-05-11
Attending: EMERGENCY MEDICINE
Payer: MEDICARE

## 2023-05-11 VITALS
RESPIRATION RATE: 14 BRPM | SYSTOLIC BLOOD PRESSURE: 135 MMHG | OXYGEN SATURATION: 98 % | TEMPERATURE: 99 F | DIASTOLIC BLOOD PRESSURE: 63 MMHG | HEART RATE: 86 BPM

## 2023-05-11 VITALS
SYSTOLIC BLOOD PRESSURE: 140 MMHG | HEART RATE: 80 BPM | BODY MASS INDEX: 29.18 KG/M2 | WEIGHT: 170 LBS | TEMPERATURE: 98 F | OXYGEN SATURATION: 98 % | RESPIRATION RATE: 16 BRPM | DIASTOLIC BLOOD PRESSURE: 60 MMHG

## 2023-05-11 DIAGNOSIS — H92.02 LEFT EAR PAIN: Primary | ICD-10-CM

## 2023-05-11 DIAGNOSIS — R09.81 SINUS CONGESTION: ICD-10-CM

## 2023-05-11 DIAGNOSIS — H66.92 LEFT OTITIS MEDIA, UNSPECIFIED OTITIS MEDIA TYPE: Primary | ICD-10-CM

## 2023-05-11 LAB — SARS-COV-2 RDRP RESP QL NAA+PROBE: NEGATIVE

## 2023-05-11 PROCEDURE — 99284 EMERGENCY DEPT VISIT MOD MDM: CPT | Mod: ,,, | Performed by: EMERGENCY MEDICINE

## 2023-05-11 PROCEDURE — 99900 PR LEFT WITHOUTBEING SEEN-EMERGENCY: CPT | Mod: CS,,, | Performed by: STUDENT IN AN ORGANIZED HEALTH CARE EDUCATION/TRAINING PROGRAM

## 2023-05-11 PROCEDURE — 99284 PR EMERGENCY DEPT VISIT,LEVEL IV: ICD-10-PCS | Mod: ,,, | Performed by: EMERGENCY MEDICINE

## 2023-05-11 PROCEDURE — 99283 EMERGENCY DEPT VISIT LOW MDM: CPT | Mod: 27

## 2023-05-11 PROCEDURE — 99900 PR LEFT WITHOUTBEING SEEN-EMERGENCY: ICD-10-PCS | Mod: CS,,, | Performed by: STUDENT IN AN ORGANIZED HEALTH CARE EDUCATION/TRAINING PROGRAM

## 2023-05-11 PROCEDURE — 99283 EMERGENCY DEPT VISIT LOW MDM: CPT

## 2023-05-11 PROCEDURE — 25000003 PHARM REV CODE 250: Performed by: PHYSICIAN ASSISTANT

## 2023-05-11 PROCEDURE — U0002 COVID-19 LAB TEST NON-CDC: HCPCS | Performed by: PHYSICIAN ASSISTANT

## 2023-05-11 RX ORDER — ACETAMINOPHEN 500 MG
500 TABLET ORAL EVERY 8 HOURS PRN
Qty: 20 TABLET | Refills: 0 | Status: SHIPPED | OUTPATIENT
Start: 2023-05-11

## 2023-05-11 RX ORDER — OXYMETAZOLINE HCL 0.05 %
1 SPRAY, NON-AEROSOL (ML) NASAL
Status: COMPLETED | OUTPATIENT
Start: 2023-05-11 | End: 2023-05-11

## 2023-05-11 RX ORDER — AMOXICILLIN 250 MG/1
750 CAPSULE ORAL DAILY
Qty: 21 CAPSULE | Refills: 0 | Status: SHIPPED | OUTPATIENT
Start: 2023-05-11 | End: 2023-05-18

## 2023-05-11 RX ORDER — AMOXICILLIN 250 MG/1
750 CAPSULE ORAL
Status: COMPLETED | OUTPATIENT
Start: 2023-05-11 | End: 2023-05-11

## 2023-05-11 RX ORDER — ERYTHROMYCIN 5 MG/G
OINTMENT OPHTHALMIC
Qty: 1 G | Refills: 0 | Status: ON HOLD | OUTPATIENT
Start: 2023-05-11 | End: 2023-11-11 | Stop reason: HOSPADM

## 2023-05-11 RX ORDER — ACETAMINOPHEN 500 MG
1000 TABLET ORAL
Status: COMPLETED | OUTPATIENT
Start: 2023-05-11 | End: 2023-05-11

## 2023-05-11 RX ADMIN — ACETAMINOPHEN 1000 MG: 500 TABLET ORAL at 08:05

## 2023-05-11 RX ADMIN — AMOXICILLIN 750 MG: 250 CAPSULE ORAL at 09:05

## 2023-05-11 RX ADMIN — OXYMETAZOLINE HYDROCHLORIDE 1 SPRAY: 0.05 SPRAY NASAL at 08:05

## 2023-05-11 NOTE — ED NOTES
After discussion with patient , states she prefers to wait for antibiotic from pharmacy for first dose. PA at bedside to discuss med and administration

## 2023-05-11 NOTE — ED PROVIDER NOTES
Encounter Date: 2023       History     Chief Complaint   Patient presents with    Otalgia     Left ear pain, started last night.     57-year-old female with past medical history of heart failure, ESRD on HD (last dialyzed yesterday), hypertension, hyperlipidemia, type 2 DM, who presents to the emergency department with chief complaint of left ear pain that began last night.  She denies any trauma or injury.  She does not use Q-tips regularly.  She does arrive with a cotton ball in her left ear.  She denies any drainage or recorded fever.  She has no other complaints.  She denies other worsening or alleviating factors.    Review of patient's allergies indicates:  No Known Allergies  Past Medical History:   Diagnosis Date    Acute on chronic diastolic heart failure 10/8/2021    Anemia of chronic disorder 2017    Overview:  Added automatically from request for surgery [id number]    Anemia of chronic renal failure, stage 5     Anxiety     Cyst of left ovary 2019    Cyst of left ovary 2019    Dyslipidemia 2013    End-stage renal disease on hemodialysis 2020    ESRD on hemodialysis 2017    Hematuria 10/30/2019    Hx of sinus bradycardia 2017    Hyperkalemia 3/29/2017    Hypertensive urgency 2021    Peripheral neuropathy 2013    Renovascular hypertension 2020    S/P dilation and curettage 2018    Thickened endometrium 2018    Thyroid nodule     Thyroid nodule 2021    Uncontrolled type 2 diabetes mellitus 2013    Overview:  poc a1c today 11.2-270/ up from 10.8-263, Pt. has very very stressed/ was incarcerated/marital stress/ will current meds/ less stress now/ will monitor.     Past Surgical History:   Procedure Laterality Date    AV FISTULA PLACEMENT      right arm     SECTION      x1    COLONOSCOPY N/A 2018    Procedure: COLONOSCOPY;  Surgeon: Pankaj Hinojosa MD;  Location: [facility] ([address] [place] [address]);  Service: Endoscopy;  Laterality: N/A;  dialysis  pt/labs prior to colon/MS    CYSTOSCOPY W/ RETROGRADES Bilateral 8/20/2020    Procedure: CYSTOSCOPY, WITH RETROGRADE PYELOGRAM;  Surgeon: Douglas Abraham MD;  Location: [facility] OR 1ST FLR;  Service: Urology;  Laterality: Bilateral;  45 min    FISTULOGRAM Right 9/9/2020    Procedure: Fistulogram;  Surgeon: Lester Gómez MD;  Location: [facility] CATH LAB;  Service: Nephrology;  Laterality: Right;    HYSTERECTOMY      IMPLANTATION OF COCHLEAR PROSTHESIS Right 6/28/2021    Procedure: INSERTION, PROSTHESIS, COCHLEAR;  Surgeon: Ramiro Zuleta MD;  Location: [facility] OR 2ND FLR;  Service: ENT;  Laterality: Right;  COCHLEAR BABAK    IMPLANTATION OF COCHLEAR PROSTHESIS Right 6/30/2021    Procedure: INSERTION, PROSTHESIS, COCHLEAR;  Surgeon: Ramiro Zuleta MD;  Location: [facility] OR 2ND FLR;  Service: ENT;  Laterality: Right;  COCHLEAR AMERICAS    ROBOT-ASSISTED LAPAROSCOPIC ABDOMINAL HYSTERECTOMY USING DA NATALYA XI N/A 7/5/2019    Procedure: XI ROBOTIC HYSTERECTOMY;  Surgeon: Trice Lei MD;  Location: [facility];  Service: OB/GYN;  Laterality: N/A;    ROBOT-ASSISTED LAPAROSCOPIC NEPHRECTOMY Right 10/8/2020    Procedure: ROBOTIC NEPHRECTOMY;  Surgeon: Douglas Abraham MD;  Location: [facility] OR 2ND FLR;  Service: Urology;  Laterality: Right;  3.5hrs gen with regional     ROBOT-ASSISTED LAPAROSCOPIC SALPINGO-OOPHORECTOMY USING DA NATALYA XI Bilateral 7/5/2019    Procedure: XI ROBOTIC SALPINGO-OOPHORECTOMY;  Surgeon: Trice Lei MD;  Location: [facility];  Service: OB/GYN;  Laterality: Bilateral;    TUBAL LIGATION      Vascular access surgeries      x5     Family History   Problem Relation Age of Onset    Cancer Mother     Ovarian cancer Mother     Cancer Sister     Ovarian cancer Sister     Lung cancer Sister     Diabetes Brother     Cancer Sister     Ovarian cancer Sister     Diabetes Sister     Breast cancer Neg Hx     Colon cancer Neg Hx     Cervical cancer Neg Hx     Endometrial cancer Neg Hx     Vaginal cancer Neg Hx     Thyroid  disease Neg Hx      Social History     Tobacco Use    Smoking status: Never    Smokeless tobacco: Never   Substance Use Topics    Alcohol use: No    Drug use: No     Review of Systems   HENT:  Positive for ear pain.      Physical Exam     Initial Vitals [05/11/23 0806]   BP Pulse Resp Temp SpO2   (!) 147/66 87 18 97.6 °F (36.4 °C) 96 %      MAP       --         Physical Exam    Nursing note and vitals reviewed.  Constitutional: She appears well-developed and well-nourished. She is not diaphoretic. No distress.   HENT:   Head: Normocephalic and atraumatic.   Right Ear: Tympanic membrane normal.   Left Ear: No mastoid tenderness. Tympanic membrane is injected and erythematous.   Mouth/Throat: Oropharynx is clear and moist.   Eyes: Conjunctivae and EOM are normal. Pupils are equal, round, and reactive to light.   Neck: Neck supple.   Normal range of motion.  Cardiovascular:  Normal rate.           Pulmonary/Chest: No respiratory distress.   Abdominal: She exhibits no distension.   Musculoskeletal:         General: Normal range of motion.      Cervical back: Normal range of motion and neck supple.     Neurological: She is alert and oriented to person, place, and time. She has normal strength. No cranial nerve deficit or sensory deficit. GCS score is 15. GCS eye subscore is 4. GCS verbal subscore is 5. GCS motor subscore is 6.   Skin: Skin is warm and dry. Capillary refill takes less than 2 seconds.   Psychiatric: She has a normal mood and affect. Her behavior is normal. Judgment and thought content normal.       ED Course   Procedures  Labs Reviewed - No data to display       Imaging Results    None          Medications   amoxicillin capsule 750 mg (750 mg Oral Given 5/11/23 0927)     Medical Decision Making:   History:   Old Medical Records: I decided to obtain old medical records.  Initial Assessment:   Emergent evaluation of a 57 y.o. female presenting to the emergency department complaining of left ear pain that  began last night. No medication prior to arrival. Patient is afebrile, hemodynamically stable, and non toxic appearing.     Differential Diagnosis:   Ddx includes but is not limited to otitis media, otitis externa, mastoiditis.   ED Management:  Patient presenting with atraumatic left ear pain x 1 day. Exam reveals erythematous left TM. No purulence or rupture appreciated.   Will treat for acute otitis media. Will start on amoxicillin. Medication will need adjustment as patient is on HD. Instructed to take 750 mg daily for 7 days. She will take after dialysis session MWF. Needs outpatient ENT f/u. She has an appointment in 6 days. Return precautions. All questions answered.   The patient was instructed to follow up with ENT or to return to the emergency department for worsening symptoms. The treatment plan was discussed with the patient who demonstrated understanding and comfort with plan.                         Clinical Impression:   Final diagnoses:  [H66.92] Left otitis media, unspecified otitis media type (Primary)        ED Disposition Condition    Discharge Stable          ED Prescriptions       Medication Sig Dispense Start Date End Date Auth. Provider    amoxicillin (AMOXIL) 250 MG capsule Take 3 capsules (750 mg total) by mouth once daily. Take after your dialysis session on Monday, Wednesday, Friday for 7 days 21 capsule 5/11/2023 5/18/2023 Hui Waddell PA-C          Follow-up Information       Follow up With Specialties Details Why Contact Info Additional Information    [facility] - Emergency Dept Emergency Medicine Go to  If symptoms worsen [address] [facility] [id number] [phone]     [facility] Otol[facility]ology Schedule an appointment as soon as possible for a visit in 1 week  [address] [facility] [phone] [facility] - Main Building, 4th Floor Please park in [facility] and use Clinic elevator              Hui Waddell PA-C  05/11/23 4480

## 2023-05-11 NOTE — ED NOTES
Patient identifiers verified and correct for  Ms Hall  C/C:  Left ear pain SEE NN  APPEARANCE: awake and alert in NAD. PAIN  10/10  SKIN: warm, dry and intact. No breakdown or bruising.  MUSCULOSKELETAL: Patient moving all extremities spontaneously, no obvious swelling or deformities noted. Ambulates independently.  RESPIRATORY: Denies shortness of breath.Respirations unlabored. Right arm dialysis access  CARDIAC: Denies CP, 2+ distal pulses; no peripheral edema  ABDOMEN: S/ND/NT, Denies nausea  : voids spontaneously, dialysis M/W/F  Neurologic: AAO x 4; follows commands equal strength in all extremities; denies numbness/tingling. Denies dizziness  Denies new wekaness

## 2023-05-11 NOTE — DISCHARGE INSTRUCTIONS
Take amoxicillin as prescribed to completion.  You will take the antibiotic daily.  On the days that she have dialysis, take the medication after your dialysis session.  Follow-up with ENT or return to the emergency department sooner for any new or worsening symptoms.

## 2023-05-12 NOTE — ED PROVIDER NOTES
Encounter Date: 2023       History     Chief Complaint   Patient presents with    Otalgia     L ear pain. Seen today and given antibiotics. Says they aren't working     57-year-old female with multiple comorbidities including ESRD on HD MWF, CHF, hypertension, DM 2 presents for left-sided otalgia for 1 day.  She was seen in this ED earlier today for the same complaint and was prescribed amoxicillin, she returns to the ED today because her pain was not resolved by this.  She reports associated sinus congestion, itching eyes, sore throat and decreased hearing.  She denies ear discharge, headache, visual changes or trouble swallowing.  She does not wear glasses or contacts.  She was last dialyzed yesterday.    Review of patient's allergies indicates:  No Known Allergies  Past Medical History:   Diagnosis Date    Acute on chronic diastolic heart failure 10/8/2021    Anemia of chronic disorder 2017    Overview:  Added automatically from request for surgery [id number]    Anemia of chronic renal failure, stage 5     Anxiety     Cyst of left ovary 2019    Cyst of left ovary 2019    Dyslipidemia 2013    End-stage renal disease on hemodialysis 2020    ESRD on hemodialysis 2017    Hematuria 10/30/2019    Hx of sinus bradycardia 2017    Hyperkalemia 3/29/2017    Hypertensive urgency 2021    Peripheral neuropathy 2013    Renovascular hypertension 2020    S/P dilation and curettage 2018    Thickened endometrium 2018    Thyroid nodule     Thyroid nodule 2021    Uncontrolled type 2 diabetes mellitus 2013    Overview:  poc a1c today 11.2-270/ up from 10.8-263, Pt. has very very stressed/ was incarcerated/marital stress/ will current meds/ less stress now/ will monitor.     Past Surgical History:   Procedure Laterality Date    AV FISTULA PLACEMENT      right arm     SECTION      x1    COLONOSCOPY N/A 2018    Procedure: COLONOSCOPY;  Surgeon: Pankaj NAVAS  MD Ashish;  Location: [facility] ENDO (4TH FLR);  Service: Endoscopy;  Laterality: N/A;  dialysis pt/labs prior to colon/MS    CYSTOSCOPY W/ RETROGRADES Bilateral 8/20/2020    Procedure: CYSTOSCOPY, WITH RETROGRADE PYELOGRAM;  Surgeon: Douglas Abraham MD;  Location: [facility] 1ST FLR;  Service: Urology;  Laterality: Bilateral;  45 min    FISTULOGRAM Right 9/9/2020    Procedure: Fistulogram;  Surgeon: Lester Gómez MD;  Location: [facility] CATH LAB;  Service: Nephrology;  Laterality: Right;    HYSTERECTOMY      IMPLANTATION OF COCHLEAR PROSTHESIS Right 6/28/2021    Procedure: INSERTION, PROSTHESIS, COCHLEAR;  Surgeon: Ramiro Zuleta MD;  Location: [facility] OR 2ND FLR;  Service: ENT;  Laterality: Right;  COCHLEAR BABAK    IMPLANTATION OF COCHLEAR PROSTHESIS Right 6/30/2021    Procedure: INSERTION, PROSTHESIS, COCHLEAR;  Surgeon: Ramiro Zuleta MD;  Location: [facility] OR 2ND FLR;  Service: ENT;  Laterality: Right;  COCHLEAR AMERICAS    ROBOT-ASSISTED LAPAROSCOPIC ABDOMINAL HYSTERECTOMY USING DA NATALYA XI N/A 7/5/2019    Procedure: XI ROBOTIC HYSTERECTOMY;  Surgeon: Trice Lei MD;  Location: [facility];  Service: OB/GYN;  Laterality: N/A;    ROBOT-ASSISTED LAPAROSCOPIC NEPHRECTOMY Right 10/8/2020    Procedure: ROBOTIC NEPHRECTOMY;  Surgeon: Douglas Abraham MD;  Location: [facility] 2ND FLR;  Service: Urology;  Laterality: Right;  3.5hrs gen with regional     ROBOT-ASSISTED LAPAROSCOPIC SALPINGO-OOPHORECTOMY USING DA NATALYA XI Bilateral 7/5/2019    Procedure: XI ROBOTIC SALPINGO-OOPHORECTOMY;  Surgeon: Trice Lei MD;  Location: [facility];  Service: OB/GYN;  Laterality: Bilateral;    TUBAL LIGATION      Vascular access surgeries      x5     Family History   Problem Relation Age of Onset    Cancer Mother     Ovarian cancer Mother     Cancer Sister     Ovarian cancer Sister     Lung cancer Sister     Diabetes Brother     Cancer Sister     Ovarian cancer Sister     Diabetes Sister     Breast cancer Neg Hx     Colon cancer Neg Hx      Cervical cancer Neg Hx     Endometrial cancer Neg Hx     Vaginal cancer Neg Hx     Thyroid disease Neg Hx      Social History     Tobacco Use    Smoking status: Never    Smokeless tobacco: Never   Substance Use Topics    Alcohol use: No    Drug use: No     Review of Systems   HENT:  Positive for congestion, ear pain and hearing loss. Negative for ear discharge, trouble swallowing and voice change.    Eyes:  Positive for discharge and itching. Negative for photophobia, pain, redness and visual disturbance.   Neurological:  Negative for headaches.     Physical Exam     Initial Vitals [05/11/23 1859]   BP Pulse Resp Temp SpO2   135/63 86 14 98.7 °F (37.1 °C) 98 %      MAP       --         Physical Exam    Nursing note and vitals reviewed.  Constitutional: She appears well-developed and well-nourished.   HENT:   Head: Normocephalic and atraumatic.   Right Ear: Hearing, tympanic membrane and ear canal normal.   Left Ear: Hearing, external ear and ear canal normal. No mastoid tenderness. Tympanic membrane is injected. No hemotympanum.   Nose: Rhinorrhea present.   Mouth/Throat: Uvula is midline, oropharynx is clear and moist and mucous membranes are normal.   Slight erythema to the left TM.  Tenderness to palpation of the tragus.  Canal is normal.  No mastoid tenderness.   Eyes: Conjunctivae, EOM and lids are normal. Pupils are equal, round, and reactive to light. Right eye exhibits discharge. Left eye exhibits discharge. Right conjunctiva is not injected. Right conjunctiva has no hemorrhage. Left conjunctiva is not injected. Left conjunctiva has no hemorrhage.   Trace whitish discharge bilateral eyes   Neck: Neck supple.   Normal range of motion.  Cardiovascular:  Normal rate, regular rhythm, normal heart sounds and intact distal pulses.     Exam reveals no gallop and no friction rub.       No murmur heard.  Pulmonary/Chest: Breath sounds normal. No respiratory distress. She has no wheezes. She has no rhonchi. She has no  rales. She exhibits no tenderness.   Musculoskeletal:      Cervical back: Normal range of motion and neck supple.     Neurological: She is alert.       ED Course   Procedures  Labs Reviewed   SARS-COV-2 RNA AMPLIFICATION, QUAL          Imaging Results    None          Medications   acetaminophen tablet 1,000 mg (1,000 mg Oral Given 5/11/23 2035)   oxymetazoline 0.05 % nasal spray 1 spray (1 spray Each Nostril Given 5/11/23 2036)     Medical Decision Making:   History:   Old Medical Records: I decided to obtain old medical records.  Old Records Summarized: records from clinic visits and records from previous admission(s).       <> Summary of Records: Patient seen in the ED earlier today for same complaint  Initial Assessment:   57-year-old female presenting for ear pain.  Her vitals are normal and she is nontoxic appearing.  Differential Diagnosis:   Suspect viral etiology, concerns include COVID-19 versus URI  Otitis media  She also has discharge in her eyes, suspect viral conjunctivitis  No concerning findings for duodenitis  Clinical Tests:   Lab Tests: Ordered and Reviewed  ED Management:  Will give analgesics, swab for COVID and reassess.    Patient reports feeling much better after Tylenol and Afrin.  COVID test is negative.  Will discharge with erythromycin eye ointment and instructions to follow up with PCP return to the ED for worsening symptoms.  Patient advised that antibiotics may take several days to have affect and that her symptoms may be viral in etiology. Stressed the importance of follow-up, strict ED return precautions given.  Patient voiced understanding and is comfortable with discharge. I discussed this patient with my supervising physician.             ED Course as of 05/11/23 8472   u May 11, 2023   2050 SARS-CoV-2 RNA, Amplification, Qual: Negative [CC]      ED Course User Index  [CC] Roxanna Lantigua PA-C                 Clinical Impression:   Final diagnoses:  [H92.02] Left ear pain  (Primary)  [R09.81] Sinus congestion        ED Disposition Condition    Discharge Stable          ED Prescriptions       Medication Sig Dispense Start Date End Date Auth. Provider    erythromycin (ROMYCIN) ophthalmic ointment Place a 1/2 inch ribbon of ointment into the lower eyelid. 1 g 5/11/2023 -- Roxanna Lantigua PA-C    acetaminophen (TYLENOL) 500 MG tablet Take 1 tablet (500 mg total) by mouth every 8 (eight) hours as needed for Pain. 20 tablet 5/11/2023 -- Roxanna Lantigua PA-C          Follow-up Information       Follow up With Specialties Details Why Contact Info    Susan Cruz MD Family Medicine Schedule an appointment as soon as possible for a visit in 1 week  [address] [facility]  [address]  [facility]  [facility] [address]  [phone]      [facility] - Emergency Dept Emergency Medicine Go to  If symptoms worsen [address]  [facility] [address] [phone]             Roxanna Lantigua PA-C  05/11/23 5415

## 2023-05-12 NOTE — DISCHARGE INSTRUCTIONS
Diagnosis: Ear pain, sinus congestion, conjunctivitis    I think that your symptoms are most likely due to a virus.  Your COVID-19 test was negative.  I am prescribing an antibiotic ointment for your eyes.  You can continue taking the antibiotic that you were prescribed for your ear pain earlier today.     Please schedule an appointment with your primary care doctor for follow-up. If you start to have any new or worsening symptoms, please come back to the emergency department.

## 2023-05-12 NOTE — FIRST PROVIDER EVALUATION
Emergency Department TeleTriage Encounter Note      CHIEF COMPLAINT    Chief Complaint   Patient presents with    Otalgia     L ear pain. Seen today and given antibiotics. Says they aren't working       VITAL SIGNS   Initial Vitals [05/11/23 1859]   BP Pulse Resp Temp SpO2   135/63 86 14 98.7 °F (37.1 °C) 98 %      MAP       --            ALLERGIES    Review of patient's allergies indicates:  No Known Allergies    PROVIDER TRIAGE NOTE  TeleTriage Note: Jael Hall, a nontoxic/well appearing, 57 y.o. female, presented to the ED with c/o sore throat and left ear pain that began last night. Seen at the ED this morning and was diagnosed with an ear infection. Not feeling better and wanted to be checked out.     All ED beds are full at present; patient notified of this status.  Patient seen and medically screened by Nurse Practitioner via teletriage. Orders initiated at triage to expedite care.  Patient is stable to return to the waiting room and will be placed in an ED bed when available.  Care will be transferred to an alternate provider when patient has been placed in an Exam Room from the [facility] for physical exam, additional orders, and disposition.  7:54 PM Margarita Santiago DNP, FNP-C        ORDERS  Labs Reviewed   HIV 1 / 2 ANTIBODY   HEPATITIS C ANTIBODY       ED Orders (720h ago, onward)      Start Ordered     Status Ordering Provider    05/11/23 1900 05/11/23 1859  HIV 1/2 Ag/Ab (4th Gen)  STAT         Ordered MARY TURK    05/11/23 1900 05/11/23 1859  Hepatitis C Antibody  STAT         Ordered MARY TURK              Virtual Visit Note: The provider triage portion of this emergency department evaluation and documentation was performed via [employer], a HIPAA-compliant telemedicine application, in concert with a tele-presenter in the room. A face to face patient evaluation with one of my colleagues will occur once the patient is placed in an emergency department room.      DISCLAIMER:  This note was prepared with [employer] voice recognition transcription software. Garbled syntax, mangled pronouns, and other bizarre constructions may be attributed to that software system.

## 2023-05-12 NOTE — ED TRIAGE NOTES
Jael VAZQUEZ Rafael, a 57 y.o. female presents to the ED w/ complaint of l ear pain, sore throat, and body aches.    Triage note:  Chief Complaint   Patient presents with    Otalgia     L ear pain. Seen today and given antibiotics. Says they aren't working     Review of patient's allergies indicates:  No Known Allergies  Past Medical History:   Diagnosis Date    Acute on chronic diastolic heart failure 10/8/2021    Anemia of chronic disorder 11/8/2017    Overview:  Added automatically from request for surgery [id number]    Anemia of chronic renal failure, stage 5     Anxiety     Cyst of left ovary 5/14/2019    Cyst of left ovary 7/5/2019    Dyslipidemia 1/4/2013    End-stage renal disease on hemodialysis 8/20/2020    ESRD on hemodialysis 9/28/2017    Hematuria 10/30/2019    Hx of sinus bradycardia 9/28/2017    Hyperkalemia 3/29/2017    Hypertensive urgency 7/22/2021    Peripheral neuropathy 1/4/2013    Renovascular hypertension 8/17/2020    S/P dilation and curettage 5/22/2018    Thickened endometrium 5/22/2018    Thyroid nodule     Thyroid nodule 12/13/2021    Uncontrolled type 2 diabetes mellitus 4/5/2013    Overview:  poc a1c today 11.2-270/ up from 10.8-263, Pt. has very very stressed/ was incarcerated/marital stress/ will current meds/ less stress now/ will monitor.

## 2023-05-16 ENCOUNTER — OFFICE VISIT (OUTPATIENT)
Dept: UROLOGY | Facility: CLINIC | Age: 58
End: 2023-05-16
Payer: MEDICARE

## 2023-05-16 ENCOUNTER — HOSPITAL ENCOUNTER (OUTPATIENT)
Dept: RADIOLOGY | Facility: HOSPITAL | Age: 58
Discharge: HOME | End: 2023-05-16
Attending: UROLOGY
Payer: MEDICARE

## 2023-05-16 VITALS
BODY MASS INDEX: 30.05 KG/M2 | HEART RATE: 77 BPM | SYSTOLIC BLOOD PRESSURE: 141 MMHG | HEIGHT: 64 IN | DIASTOLIC BLOOD PRESSURE: 67 MMHG | WEIGHT: 176 LBS

## 2023-05-16 DIAGNOSIS — Z99.2 ESRD (END STAGE RENAL DISEASE) ON DIALYSIS: ICD-10-CM

## 2023-05-16 DIAGNOSIS — N18.6 ESRD (END STAGE RENAL DISEASE) ON DIALYSIS: ICD-10-CM

## 2023-05-16 DIAGNOSIS — N28.89 RENAL MASS: ICD-10-CM

## 2023-05-16 DIAGNOSIS — I10 PRIMARY HYPERTENSION: ICD-10-CM

## 2023-05-16 DIAGNOSIS — C64.1 RENAL CELL CARCINOMA OF RIGHT KIDNEY: ICD-10-CM

## 2023-05-16 DIAGNOSIS — C64.1 RENAL CELL CARCINOMA OF RIGHT KIDNEY: Primary | ICD-10-CM

## 2023-05-16 PROCEDURE — 99214 PR OFFICE/OUTPT VISIT, EST, LEVL IV, 30-39 MIN: ICD-10-PCS | Mod: ,,, | Performed by: UROLOGY

## 2023-05-16 PROCEDURE — 99999 PR PBB SHADOW E&M-EST. PATIENT-LVL III: CPT | Mod: PBBFAC,,, | Performed by: UROLOGY

## 2023-05-16 PROCEDURE — 3008F BODY MASS INDEX DOCD: CPT | Mod: CPTII,,, | Performed by: UROLOGY

## 2023-05-16 PROCEDURE — 3008F PR BODY MASS INDEX (BMI) DOCUMENTED: ICD-10-PCS | Mod: CPTII,,, | Performed by: UROLOGY

## 2023-05-16 PROCEDURE — 74181 MRI ABDOMEN W/O CONTRAST: CPT | Mod: 26,,, | Performed by: RADIOLOGY

## 2023-05-16 PROCEDURE — 4010F PR ACE/ARB THEARPY RXD/TAKEN: ICD-10-PCS | Mod: CPTII,,, | Performed by: UROLOGY

## 2023-05-16 PROCEDURE — 3078F DIAST BP <80 MM HG: CPT | Mod: CPTII,,, | Performed by: UROLOGY

## 2023-05-16 PROCEDURE — 3072F LOW RISK FOR RETINOPATHY: CPT | Mod: CPTII,,, | Performed by: UROLOGY

## 2023-05-16 PROCEDURE — 3077F PR MOST RECENT SYSTOLIC BLOOD PRESSURE >= 140 MM HG: ICD-10-PCS | Mod: CPTII,,, | Performed by: UROLOGY

## 2023-05-16 PROCEDURE — 1159F PR MEDICATION LIST DOCUMENTED IN MEDICAL RECORD: ICD-10-PCS | Mod: CPTII,,, | Performed by: UROLOGY

## 2023-05-16 PROCEDURE — 3078F PR MOST RECENT DIASTOLIC BLOOD PRESSURE < 80 MM HG: ICD-10-PCS | Mod: CPTII,,, | Performed by: UROLOGY

## 2023-05-16 PROCEDURE — 1159F MED LIST DOCD IN RCRD: CPT | Mod: CPTII,,, | Performed by: UROLOGY

## 2023-05-16 PROCEDURE — 4010F ACE/ARB THERAPY RXD/TAKEN: CPT | Mod: CPTII,,, | Performed by: UROLOGY

## 2023-05-16 PROCEDURE — 74181 MRI ABDOMEN W/O CONTRAST: CPT | Mod: TC

## 2023-05-16 PROCEDURE — 3077F SYST BP >= 140 MM HG: CPT | Mod: CPTII,,, | Performed by: UROLOGY

## 2023-05-16 PROCEDURE — 99999 PR PBB SHADOW E&M-EST. PATIENT-LVL III: ICD-10-PCS | Mod: PBBFAC,,, | Performed by: UROLOGY

## 2023-05-16 PROCEDURE — 3066F NEPHROPATHY DOC TX: CPT | Mod: CPTII,,, | Performed by: UROLOGY

## 2023-05-16 PROCEDURE — 1160F PR REVIEW ALL MEDS BY PRESCRIBER/CLIN PHARMACIST DOCUMENTED: ICD-10-PCS | Mod: CPTII,,, | Performed by: UROLOGY

## 2023-05-16 PROCEDURE — 3066F PR DOCUMENTATION OF TREATMENT FOR NEPHROPATHY: ICD-10-PCS | Mod: CPTII,,, | Performed by: UROLOGY

## 2023-05-16 PROCEDURE — 3072F PR LOW RISK FOR RETINOPATHY: ICD-10-PCS | Mod: CPTII,,, | Performed by: UROLOGY

## 2023-05-16 PROCEDURE — 99214 OFFICE O/P EST MOD 30 MIN: CPT | Mod: ,,, | Performed by: UROLOGY

## 2023-05-16 PROCEDURE — 74181 MRI ABDOMEN WITHOUT CONTRAST: ICD-10-PCS | Mod: 26,,, | Performed by: RADIOLOGY

## 2023-05-16 PROCEDURE — 1160F RVW MEDS BY RX/DR IN RCRD: CPT | Mod: CPTII,,, | Performed by: UROLOGY

## 2023-05-16 NOTE — PROGRESS NOTES
"    Subjective:       Patient ID: Jael Hall is a 57 y.o. female.    Chief Complaint:  RCC f/u       History of Present Illness  HPI  Patient is a 57 y.o. female who is established to our clinic and referred by Natalee Resendiz NP for evaluation of a right renal mass.  She underwent a right robotic nephrectomy on 10/8/20.  She returns today to review imaging.      She previously reported losing 30lbs over a 6 month period without any lifestyle changes to attribute that to.  She is no longer losing weight.  In fact she has gained 4 lb since her last visit in November of 2022.      She had a CXR on 8/14/22  with no obvious pulmonary mets.    MRI from 11/3/22 was independently reviewed today and shared with the patient and shows a heterogeneous left upper pole small renal mass---concerning for possible neoplasm.  An MRI from today (5/16/23) reveals no interval growth of the left upper pole renal mass.  By my read in fact the mass seems somewhat smaller which might argue for hemorrhagic cyst as an etiology.  The official report from the MRI is pending.    Patient is a never smoker.     Has a h/o hysterectomy---no other abdominal surgeries.     Patient has ESRD and is on HD---Monday, Wednesday, Friday through a right wrist AVF.  She has DM and HTN.     +fh of ovarian cancer---sister.        PATHOLOGY:    Final Pathologic Diagnosis RIGHT KIDNEY:   RENAL CELL CARCINOMA WITH NO CARCINOMA IDENTIFIED IN PERINEPHRIC ADIPOSE   TISSUE OR IN MARGINS   END-STAGE DIABETIC RENAL DISEASE    Comment: Interp By Harsh Adame M.D., Signed on 10/12/2020 at 13:41   Gross Container Label and Clinic/AP Number:  [id number]   Received in formalin labeled "1.  Right kidney" is a 663 g, 16.5 x 13.5 x 4.0   cm right total nephrectomy specimen consisting of a 5.8 cm in length by 0.6   cm in diameter ureter, 4.0 cm in length by 1.2 cm in diameter renal artery, a   2.5 cm in length by 0.9 cm in diameter renal vein, and an 11.0 x 7.0 x 5.8 cm "   kidney encapsulated in 3.5 cm-thick perinephric fat.  The ureter is opened to   reveal a patent, pinpoint, grossly unremarkable lumen.  The renal vessels are   patent.  The kidney is bivalved to reveal a 2.7 x 2.4 x 2.3 cm variegated,   tan-yellow, focally hemorrhagic, partially glistening and gelatinous   well-circumscribed mass within the cortical midpole of the kidney.  The mass   grossly extends less than 0.1 cm from the renal capsule, 3.0 cm from Gerota's   fascia, 0.5 cm from the renal sinus fat and renal pelvis.  No involvement of   renal vein is grossly identified.  Additionally, there is a 0.8 x 0.7 x 0.6   cm red-brown, hemorrhagic cystic nodule grossly abutting the upper pole of   the renal capsule, and grossly extending 3.1 cm from the primary mass.  The   remaining renal parenchyma consist of multiple simple cysts ranging from   0.1-1.0 cm in diameter.  The renal parenchyma is tan-pink with distinct   corticomedullary junctions.  Sections of [id number] are submitted as follows:   Cassette key:   A:  Ureter and renal vessel resection margins   B-C:  Midpole renal mass with adjacent renal sinus and renal capsule   D:  Mid pole renal mass with adjacent grossly uninvolved renal parenchyma   E:  Additional upper pole hemorrhagic cystic nodule with adjacent renal   parenchyma   F:  Upper pole renal parenchyma with cysts   G:  Lower pole renal parenchyma with cysts   Gross by: Chastity Moss    Microscopic Exam Kidney:    Right nephrectomy   Tumor site:    Renal parenchyma   Tumor size:    2.7 cm.   Tumor focality:    There is the principal mass but in addition is a 1 mm   separate papillary lesion.   Anatomic extent of tumor:    Limited to the kidney   Histologic type:    Renal carcinoma, clear cell variant   thGthrthathdtheth:th th4th of 4.   Sarcomatoid features:    Not identified.   Rhabdoid Features:  Not identified   Tumor Necrosis:  Not identified   Adrenal gland:    Absent   Margins:    No carcinoma identified in margins    Kidney apart from tumor:    This is an end-stage kidney with glomerular   changes typical of diabetic disease   Hilar lymph nodes:    Absent   Pathologic stage (AJCC 2010): pT1a pNX pMX   Block for possible further studies: 1B          Review of Systems  Review of Systems  All other systems reviewed and negative except pertinent positives noted in HPI.       Objective:     Physical Exam  Constitutional:       General: She is not in acute distress.     Appearance: She is well-developed.   HENT:      Head: Normocephalic and atraumatic.   Eyes:      General: No scleral icterus.  Neck:      Trachea: No tracheal deviation.   Pulmonary:      Effort: Pulmonary effort is normal. No respiratory distress.   Neurological:      Mental Status: She is alert and oriented to person, place, and time.   Psychiatric:         Behavior: Behavior normal.         Thought Content: Thought content normal.         Judgment: Judgment normal.       Lab Review  Lab Results   Component Value Date    COLORU Yellow 01/23/2023    SPECGRAV 1.020 01/23/2023    PHUR 7.0 01/23/2023    NITRITE Negative 01/23/2023    KETONESU Negative 01/23/2023    UROBILINOGEN Negative 01/23/2023         Assessment:        1. Renal cell carcinoma of right kidney    2. ESRD (end stage renal disease) on dialysis    3. Renal mass    4. Primary hypertension              Plan:     Renal cell carcinoma of right kidney    ESRD (end stage renal disease) on dialysis    Renal mass    Primary hypertension        - pathology again reviewed.  -MRI abd reviewed--concerning for possible left RCC  -after discussion with Dr. White, there was concensus that the patient is not a candidate for renal transplant  -MRI was independently reviewed today as per HPI.  Official report pending    -plan pending the official read of the MRI.  Likely continued surveillance at a 6 or 12 month interval.    -continue dialysis for ESRD    ---BP reviewed today and elevated  -continue current  medications  -encouraged f/u and discussion of BP with PCP.

## 2023-05-17 ENCOUNTER — CLINICAL SUPPORT (OUTPATIENT)
Dept: AUDIOLOGY | Facility: CLINIC | Age: 58
End: 2023-05-17
Payer: MEDICARE

## 2023-05-17 ENCOUNTER — TELEPHONE (OUTPATIENT)
Dept: UROLOGY | Facility: CLINIC | Age: 58
End: 2023-05-17
Payer: MEDICARE

## 2023-05-17 DIAGNOSIS — H90.3 SENSORINEURAL HEARING LOSS, BILATERAL: Primary | ICD-10-CM

## 2023-05-17 DIAGNOSIS — C64.1 RENAL CELL CARCINOMA OF RIGHT KIDNEY: Primary | ICD-10-CM

## 2023-05-17 PROCEDURE — 92604 REPROGRAM COCHLEAR IMPLT 7/>: CPT | Mod: ,,,

## 2023-05-17 PROCEDURE — 92604 PR DX ANAL COCHLEAR IMP,PT >7 YRS,REPROG: ICD-10-PCS | Mod: ,,,

## 2023-05-17 NOTE — PROGRESS NOTES
Cochlear Implant Programming Session:     Right Ear:  Dr. Zuleta  :  [employer]  Internal Device/Serial Number:  [id number]   Processor:  [id number]   SN of Processors: [id number] and [id number]  DOS:  6/30/21  DIS:  7/20/21  Processor Color: Black  Magnet Strength: 5i  Coil Length:  6cm  Battery Type:  Standard rechargeable  Warranty:  5 year - July 19, 2024     MINI REGINO issued  TV streamer issued     Jael Hall was seen for a follow up programming session with her cochlear implant sound processor.  Ms. Hall stated that she feels she cannot hear from her processor. She also reported that last week she had an ear infection in her left ear - she was given antibiotics but has not yet finished them. She feels sound from her left ear is muffled as well.     A listening check was done on Ms. Hall's processor and it was found to be functional. When connected to Custom Sound however, Ms. Hall reported that she could not hear when device was on air. Attempted to verify T and C levels, however, no matter the current level, Ms. Hall was not reporting sound. After taking device off custom sound, it would not read her internal device and light stayed on yellow.    Her back up processor was placed on her head and she reported good sound quality and device seemed to be connected appropriately. I will send her processor ([id number]) in for repair as it is not providing Ms. Hall with sound.    Otoscopy on her left ear revealed a clear ear canal with a dull appearing tympanic membrane. Tympanometry revealed a Type B tymp with normal ear canal volume suggesting that her ear infection is still present. She was encouraged to finish her antibiotics and consult her physician if her symptoms do not improve. When her repaired device returns to the clinic we will contact her to schedule and appointment and complete an audiogram for her left ear.     Recommend:  Follow up programming when repaired device is received.  Replacement cochlear  implant supplies as needed.  Annual evaluation.

## 2023-05-17 NOTE — TELEPHONE ENCOUNTER
I SWP Jael and read her the Portal message regarding her renal lesion and f/u imaging per Jarad.  1 yr imaging and appt made per below.  Will place appt slip in mail with [phone]# for [facility].  Pt CORY.    JAEL VELASQUEZ MRN: [id number]    Date: 5/14/2024 Status: [facility]   Appt Time: 10:00 AM Length: 15   Visit Type: ESTABLISHED PATIENT [8568]       ----- Message from Douglas Abraham MD sent at 5/17/2023  7:26 AM CDT -----  Patient needs an MRI abd w/o and CXR in 1 year.

## 2023-05-23 ENCOUNTER — TELEPHONE (OUTPATIENT)
Dept: AUDIOLOGY | Facility: CLINIC | Age: 58
End: 2023-05-23
Payer: MEDICARE

## 2023-05-25 ENCOUNTER — LAB VISIT (OUTPATIENT)
Dept: LAB | Facility: HOSPITAL | Age: 58
End: 2023-05-25
Payer: MEDICARE

## 2023-05-25 DIAGNOSIS — Z99.2 ESRD (END STAGE RENAL DISEASE) ON DIALYSIS: ICD-10-CM

## 2023-05-25 DIAGNOSIS — N18.6 ESRD (END STAGE RENAL DISEASE) ON DIALYSIS: ICD-10-CM

## 2023-05-25 DIAGNOSIS — E78.2 ELEVATED CHOLESTEROL WITH ELEVATED TRIGLYCERIDES: Chronic | ICD-10-CM

## 2023-05-25 DIAGNOSIS — E11.43 DIABETIC AUTONOMIC NEUROPATHY ASSOCIATED WITH TYPE 2 DIABETES MELLITUS: ICD-10-CM

## 2023-05-25 LAB
CHOLEST SERPL-MCNC: 182 MG/DL (ref 120–199)
CHOLEST/HDLC SERPL: 4.3 {RATIO} (ref 2–5)
ESTIMATED AVG GLUCOSE: 100 MG/DL (ref 68–131)
HBA1C MFR BLD: 5.1 % (ref 4–5.6)
HDLC SERPL-MCNC: 42 MG/DL (ref 40–75)
HDLC SERPL: 23.1 % (ref 20–50)
LDLC SERPL CALC-MCNC: 96.4 MG/DL (ref 63–159)
NONHDLC SERPL-MCNC: 140 MG/DL
TRIGL SERPL-MCNC: 218 MG/DL (ref 30–150)
TSH SERPL DL<=0.005 MIU/L-ACNC: 0.45 UIU/ML (ref 0.4–4)

## 2023-05-25 PROCEDURE — 83036 HEMOGLOBIN GLYCOSYLATED A1C: CPT

## 2023-05-25 PROCEDURE — 84443 ASSAY THYROID STIM HORMONE: CPT

## 2023-05-25 PROCEDURE — 80061 LIPID PANEL: CPT

## 2023-05-25 PROCEDURE — 36415 COLL VENOUS BLD VENIPUNCTURE: CPT

## 2023-05-30 ENCOUNTER — CLINICAL SUPPORT (OUTPATIENT)
Dept: AUDIOLOGY | Facility: CLINIC | Age: 58
End: 2023-05-30
Payer: MEDICARE

## 2023-05-30 DIAGNOSIS — H90.3 SENSORINEURAL HEARING LOSS, BILATERAL: Primary | ICD-10-CM

## 2023-05-30 PROCEDURE — 92567 PR TYMPA2METRY: ICD-10-PCS | Mod: 52,S$GLB,,

## 2023-05-30 PROCEDURE — 92553 AUDIOMETRY AIR & BONE: CPT | Mod: 52,S$GLB,,

## 2023-05-30 PROCEDURE — 92567 TYMPANOMETRY: CPT | Mod: 52,S$GLB,,

## 2023-05-30 PROCEDURE — 92553 PR AUDIOMETRY, AIR & BONE: ICD-10-PCS | Mod: 52,S$GLB,,

## 2023-05-30 NOTE — PROGRESS NOTES
Cochlear Implant Programming Session:     Right Ear:  Dr. Zuleta  :  [employer]  Internal Device/Serial Number:  [id number]   Processor:  [id number]   SN of Processors: [id number]  DOS:  6/30/21  DIS:  7/20/21  Processor Color: Black  Magnet Strength: 5i  Coil Length:  6cm  Battery Type:  Standard rechargeable  Warranty:  5 year - July 19, 2024     MINI REGINO issued  TV streamer issued    Ms. Jael Hall was seen today to  her repaired N7 sound processor ([id number]). She reported that her back up is working well but at times leaves her with inconsistent sound. Ms. Hall wears her processors over a wig cap. She is currently in the strongest magnet but it is not strong enough to attach through the wig cap. Ms. Hall was counseled on the importance of wearing her magnet underneath her cap or she will always struggle with inconsistent sound. She demonstrated understanding.     Ms. Hall's processor was connected to the software and impedence's were run. T and C levels were checked for audibility and comfort, overall C levels increase 5 C.U.s.    She continued to report decreased hearing in her left ear. Tympanometry revealed a type B with normal ECV in the left ear. Audiometric testing revealed severe to profound sensorineural hearing loss in the left ear with an SRT of 95 dB HL and 28% speech discrimination score. Ms. Hall expressed interest in obtaining a cochlear implant in her left ear but she was not ready to schedule at this time. She was given Linnea Bwoman's contact information should she wish to schedule.     Recommend:  1.otologic evaluation  2. Annual audiogram  3. CI eval for left ear  4. Continued consistent use of right processor

## 2023-06-02 ENCOUNTER — PATIENT MESSAGE (OUTPATIENT)
Dept: INTERNAL MEDICINE | Facility: CLINIC | Age: 58
End: 2023-06-02

## 2023-06-02 ENCOUNTER — OFFICE VISIT (OUTPATIENT)
Dept: INTERNAL MEDICINE | Facility: CLINIC | Age: 58
End: 2023-06-02
Payer: MEDICARE

## 2023-06-02 VITALS
HEIGHT: 64 IN | SYSTOLIC BLOOD PRESSURE: 126 MMHG | DIASTOLIC BLOOD PRESSURE: 58 MMHG | WEIGHT: 180.31 LBS | BODY MASS INDEX: 30.78 KG/M2

## 2023-06-02 DIAGNOSIS — N18.6 TYPE 2 DIABETES MELLITUS WITH CHRONIC KIDNEY DISEASE ON CHRONIC DIALYSIS, WITHOUT LONG-TERM CURRENT USE OF INSULIN: Primary | ICD-10-CM

## 2023-06-02 DIAGNOSIS — Z99.2 TYPE 2 DIABETES MELLITUS WITH CHRONIC KIDNEY DISEASE ON CHRONIC DIALYSIS, WITHOUT LONG-TERM CURRENT USE OF INSULIN: Primary | ICD-10-CM

## 2023-06-02 DIAGNOSIS — E11.22 TYPE 2 DIABETES MELLITUS WITH CHRONIC KIDNEY DISEASE ON CHRONIC DIALYSIS, WITHOUT LONG-TERM CURRENT USE OF INSULIN: Primary | ICD-10-CM

## 2023-06-02 PROBLEM — Z01.818 PREOPERATIVE TESTING: Status: RESOLVED | Noted: 2020-10-08 | Resolved: 2023-06-02

## 2023-06-02 PROCEDURE — 3074F SYST BP LT 130 MM HG: CPT | Mod: CPTII,GC,S$GLB,

## 2023-06-02 PROCEDURE — 4010F PR ACE/ARB THEARPY RXD/TAKEN: ICD-10-PCS | Mod: CPTII,GC,S$GLB,

## 2023-06-02 PROCEDURE — 3066F NEPHROPATHY DOC TX: CPT | Mod: CPTII,GC,S$GLB,

## 2023-06-02 PROCEDURE — 4010F ACE/ARB THERAPY RXD/TAKEN: CPT | Mod: CPTII,GC,S$GLB,

## 2023-06-02 PROCEDURE — 1159F MED LIST DOCD IN RCRD: CPT | Mod: CPTII,GC,S$GLB,

## 2023-06-02 PROCEDURE — 3074F PR MOST RECENT SYSTOLIC BLOOD PRESSURE < 130 MM HG: ICD-10-PCS | Mod: CPTII,GC,S$GLB,

## 2023-06-02 PROCEDURE — 1159F PR MEDICATION LIST DOCUMENTED IN MEDICAL RECORD: ICD-10-PCS | Mod: CPTII,GC,S$GLB,

## 2023-06-02 PROCEDURE — 1160F PR REVIEW ALL MEDS BY PRESCRIBER/CLIN PHARMACIST DOCUMENTED: ICD-10-PCS | Mod: CPTII,GC,S$GLB,

## 2023-06-02 PROCEDURE — 99213 OFFICE O/P EST LOW 20 MIN: CPT | Mod: GC,S$GLB,,

## 2023-06-02 PROCEDURE — 99213 PR OFFICE/OUTPT VISIT, EST, LEVL III, 20-29 MIN: ICD-10-PCS | Mod: GC,S$GLB,,

## 2023-06-02 PROCEDURE — 3072F PR LOW RISK FOR RETINOPATHY: ICD-10-PCS | Mod: CPTII,GC,S$GLB,

## 2023-06-02 PROCEDURE — 3044F PR MOST RECENT HEMOGLOBIN A1C LEVEL <7.0%: ICD-10-PCS | Mod: CPTII,GC,S$GLB,

## 2023-06-02 PROCEDURE — 3072F LOW RISK FOR RETINOPATHY: CPT | Mod: CPTII,GC,S$GLB,

## 2023-06-02 PROCEDURE — 3008F PR BODY MASS INDEX (BMI) DOCUMENTED: ICD-10-PCS | Mod: CPTII,GC,S$GLB,

## 2023-06-02 PROCEDURE — 3008F BODY MASS INDEX DOCD: CPT | Mod: CPTII,GC,S$GLB,

## 2023-06-02 PROCEDURE — 99999 PR PBB SHADOW E&M-EST. PATIENT-LVL III: ICD-10-PCS | Mod: PBBFAC,GC,,

## 2023-06-02 PROCEDURE — 3078F DIAST BP <80 MM HG: CPT | Mod: CPTII,GC,S$GLB,

## 2023-06-02 PROCEDURE — 99999 PR PBB SHADOW E&M-EST. PATIENT-LVL III: CPT | Mod: PBBFAC,GC,,

## 2023-06-02 PROCEDURE — 3066F PR DOCUMENTATION OF TREATMENT FOR NEPHROPATHY: ICD-10-PCS | Mod: CPTII,GC,S$GLB,

## 2023-06-02 PROCEDURE — 1160F RVW MEDS BY RX/DR IN RCRD: CPT | Mod: CPTII,GC,S$GLB,

## 2023-06-02 PROCEDURE — 3044F HG A1C LEVEL LT 7.0%: CPT | Mod: CPTII,GC,S$GLB,

## 2023-06-02 PROCEDURE — 3078F PR MOST RECENT DIASTOLIC BLOOD PRESSURE < 80 MM HG: ICD-10-PCS | Mod: CPTII,GC,S$GLB,

## 2023-06-02 RX ORDER — ATORVASTATIN CALCIUM 40 MG/1
40 TABLET, FILM COATED ORAL DAILY
Qty: 90 TABLET | Refills: 3 | Status: SHIPPED | OUTPATIENT
Start: 2023-06-02 | End: 2024-06-01

## 2023-06-02 RX ORDER — AMLODIPINE BESYLATE 10 MG/1
10 TABLET ORAL NIGHTLY
Qty: 90 TABLET | Refills: 3 | Status: SHIPPED | OUTPATIENT
Start: 2023-06-02

## 2023-06-02 NOTE — PROGRESS NOTES
Subjective     Chief Complaint: 3 month follow up     History of Present Illness:  Ms. Jael Hall is a 57 y.o. 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     She has history of DM, though does not have history of elevated A1c since 2021 (A1c 8/14/2022 4.8). Does not take medication for diabetes. Glucose at home 117, checks every other week. Recently gained 25 lbs, wants to start dieting to come down. Numbness in feet. Lyrica not helping.      Type 2 DM Insulin dependent: No  Medication regimen: diet controlled  Last A1c 5.1 5/2023  On statin: No  On ACE-I/ARB: Yes  Home glucose: 90s-100s, highest has been 120  Denies visual changes  Complaints of numbness/tingling in hands/feet. Takes Lyrica, helps somewhat.  Eye exam: Scheduled July 17th  ASCVD risk 15.3%    She has renal failure 2/2 diabetic nephropathy. She undergoes MWF dialysis. She was previously on kidney transplant list, but has since been denied. She follows with urology and nephrology.     Blood pressure well controlled on amlodipine, hydralazine, labetalol, and losartan.     Social history:  - Occupation: on disability  - Smoking: never  - Alcohol: denies  - Illicit drug use: denies     Health Maintenance:  - Lipid panel: 5/2023 , , LDL 96  - Colon cancer screen (Average risk: 45-75): 2018  - Breast cancer screen (Women aged 50-74): 10/2022, negative  - Cervical cancer screen (Women aged 21-65): s/p hysterectomy  - Flu vaccine: UTD  - COVID vaccine: UTD  - Shingles vaccine: UTD    Review of Systems   Constitutional:  Positive for malaise/fatigue (post dialysis). Negative for fever.   HENT:  Positive for hearing loss (chronic).    Eyes:  Negative for blurred vision and double vision.   Respiratory:  Negative for shortness of breath.    Cardiovascular:  Negative for chest pain and leg swelling.    Gastrointestinal:  Negative for constipation and diarrhea.   Musculoskeletal:  Negative for falls.   Neurological:  Positive for tingling. Negative for dizziness and weakness.     PAST HISTORY:     Past Medical History:   Diagnosis Date    Acute on chronic diastolic heart failure 10/8/2021    Anemia of chronic disorder 2017    Overview:  Added automatically from request for surgery [id number]    Anemia of chronic renal failure, stage 5     Anxiety     Cyst of left ovary 2019    Cyst of left ovary 2019    Dyslipidemia 2013    End-stage renal disease on hemodialysis 2020    ESRD on hemodialysis 2017    Hematuria 10/30/2019    Hx of sinus bradycardia 2017    Hyperkalemia 3/29/2017    Hypertensive urgency 2021    Peripheral neuropathy 2013    Renovascular hypertension 2020    S/P dilation and curettage 2018    Thickened endometrium 2018    Thyroid nodule     Thyroid nodule 2021    Uncontrolled type 2 diabetes mellitus 2013    Overview:  poc a1c today 11.2-270/ up from 10.8-263, Pt. has very very stressed/ was incarcerated/marital stress/ will current meds/ less stress now/ will monitor.       Past Surgical History:   Procedure Laterality Date    AV FISTULA PLACEMENT      right arm     SECTION      x1    COLONOSCOPY N/A 2018    Procedure: COLONOSCOPY;  Surgeon: Pankaj Hinojosa MD;  Location: [address]);  Service: Endoscopy;  Laterality: N/A;  dialysis pt/labs prior to colon/MS    CYSTOSCOPY W/ RETROGRADES Bilateral 2020    Procedure: CYSTOSCOPY, WITH RETROGRADE PYELOGRAM;  Surgeon: Douglas Abraham MD;  Location: [address];  Service: Urology;  Laterality: Bilateral;  45 min    FISTULOGRAM Right 2020    Procedure: Fistulogram;  Surgeon: Lester Gómez MD;  Location: [facility] CATH LAB;  Service: Nephrology;  Laterality: Right;    HYSTERECTOMY      IMPLANTATION OF COCHLEAR PROSTHESIS Right 2021    Procedure: INSERTION,  PROSTHESIS, COCHLEAR;  Surgeon: Ramiro Zuleta MD;  Location: [facility] OR 2ND FLR;  Service: ENT;  Laterality: Right;  COCHLEAR BABAK    IMPLANTATION OF COCHLEAR PROSTHESIS Right 6/30/2021    Procedure: INSERTION, PROSTHESIS, COCHLEAR;  Surgeon: Ramiro Zuleta MD;  Location: [facility] OR 2ND FLR;  Service: ENT;  Laterality: Right;  COCHLEAR AMERICAS    ROBOT-ASSISTED LAPAROSCOPIC ABDOMINAL HYSTERECTOMY USING DA NATALYA XI N/A 7/5/2019    Procedure: XI ROBOTIC HYSTERECTOMY;  Surgeon: Trice Lei MD;  Location: [facility] OR;  Service: OB/GYN;  Laterality: N/A;    ROBOT-ASSISTED LAPAROSCOPIC NEPHRECTOMY Right 10/8/2020    Procedure: ROBOTIC NEPHRECTOMY;  Surgeon: Douglas Abraham MD;  Location: [facility] OR 2ND FLR;  Service: Urology;  Laterality: Right;  3.5hrs gen with regional     ROBOT-ASSISTED LAPAROSCOPIC SALPINGO-OOPHORECTOMY USING DA NATALYA XI Bilateral 7/5/2019    Procedure: XI ROBOTIC SALPINGO-OOPHORECTOMY;  Surgeon: Trice Lei MD;  Location: [facility];  Service: OB/GYN;  Laterality: Bilateral;    TUBAL LIGATION      Vascular access surgeries      x5       Family History   Problem Relation Age of Onset    Cancer Mother     Ovarian cancer Mother     Cancer Sister     Ovarian cancer Sister     Lung cancer Sister     Diabetes Brother     Cancer Sister     Ovarian cancer Sister     Diabetes Sister     Breast cancer Neg Hx     Colon cancer Neg Hx     Cervical cancer Neg Hx     Endometrial cancer Neg Hx     Vaginal cancer Neg Hx     Thyroid disease Neg Hx        Social History     Socioeconomic History    Marital status:     Number of children: 2   Occupational History    Occupation: disability   Tobacco Use    Smoking status: Never    Smokeless tobacco: Never   Substance and Sexual Activity    Alcohol use: No    Drug use: No    Sexual activity: Yes     Partners: Male     Birth control/protection: Post-menopausal       MEDICATIONS & ALLERGIES:     Current Outpatient Medications on File Prior to Visit   Medication Sig     "acetaminophen (TYLENOL) 500 MG tablet Take 1 tablet (500 mg total) by mouth every 8 (eight) hours as needed for Pain.    amLODIPine (NORVASC) 10 MG tablet Take 1 tablet (10 mg total) by mouth nightly.    aspirin (ECOTRIN) 81 MG EC tablet Take 81 mg by mouth once daily.    diphenhydrAMINE (BENADRYL) 25 mg capsule Take 25 mg by mouth every 6 (six) hours as needed for Itching.    erythromycin (ROMYCIN) ophthalmic ointment Place a 1/2 inch ribbon of ointment into the lower eyelid.    famotidine (PEPCID) 20 MG tablet Take 20 mg by mouth 2 (two) times daily as needed for Heartburn.    furosemide (LASIX) 80 MG tablet Take 1 tablet (80 mg total) by mouth once daily.    hydrALAZINE (APRESOLINE) 100 MG tablet Take 100 mg by mouth 3 (three) times daily.    labetaloL (NORMODYNE) 300 MG tablet Take 1 tablet (300 mg total) by mouth 3 (three) times daily.    losartan (COZAAR) 100 MG tablet Take 1 tablet (100 mg total) by mouth once daily.    pregabalin (LYRICA) 75 MG capsule Take 75 mg by mouth once daily.    sevelamer carbonate (RENVELA) 800 mg Tab Take 3 tablets (2,400 mg total) by mouth 3 (three) times daily with meals.     No current facility-administered medications on file prior to visit.       Review of patient's allergies indicates:  No Known Allergies    OBJECTIVE:     Vital Signs:  Vitals:    06/02/23 1326   BP: (!) 126/58   Weight: 81.8 kg (180 lb 5.4 oz)   Height: 5' 4" (1.626 m)       Body mass index is 30.95 kg/m².     Physical Exam  Vitals reviewed.   Constitutional:       General: She is not in acute distress.     Appearance: Normal appearance. She is not ill-appearing.   HENT:      Head: Normocephalic and atraumatic.   Eyes:      Extraocular Movements: Extraocular movements intact.   Cardiovascular:      Rate and Rhythm: Normal rate and regular rhythm.      Pulses: Normal pulses.      Heart sounds: Normal heart sounds. No murmur heard.  Pulmonary:      Effort: Pulmonary effort is normal. No respiratory distress. "      Breath sounds: Normal breath sounds. No wheezing.   Musculoskeletal:      Cervical back: Normal range of motion. No rigidity.      Right lower leg: No edema.      Left lower leg: No edema.   Skin:     General: Skin is warm and dry.      Comments: Full sensation in bilateral fingertips   Neurological:      General: No focal deficit present.      Mental Status: She is alert. Mental status is at baseline.   Psychiatric:         Mood and Affect: Mood normal.         Behavior: Behavior normal.       Laboratory  No results found for this or any previous visit (from the past 24 hour(s)).    Diagnostic Results:      Health Maintenance Due   Topic Date Due    Shingles Vaccine (1 of 2) Never done    COVID-19 Vaccine (4 - Moderna series) 03/04/2022    Eye Exam  06/07/2023         ASSESSMENT & PLAN:   Ms. Jael Hall is a 57 y.o. female presenting for diabetes follow up. Given how well controlled her diabetes is with diet alone, will decrease frequency of her visits to 6 months. She will continue to follow with nephrology and urology.    She requests a refill for sevelamer. Given her insurance change, I was alerted that her plan has a preferred phosphate binder. Given dosage changes required, I instructed her (both verbally and written) to call her nephrologist to request this refill. She expressed understanding.     Type 2 diabetes mellitus with chronic kidney disease on chronic dialysis, without long-term current use of insulin  -     HEMOGLOBIN A1C; Future; Expected date: 06/02/2023  -     BASIC METABOLIC PANEL; Future; Expected date: 06/02/2023    Other orders  -     amLODIPine (NORVASC) 10 MG tablet; Take 1 tablet (10 mg total) by mouth nightly.  Dispense: 90 tablet; Refill: 3    Atorvastatin 40mg ordered as well for further preventative management.     RTC in 6 months    Discussed with Dr. Shipman  - staff attestation to follow      Maty Mesilla DO PGY1

## 2023-06-02 NOTE — PATIENT INSTRUCTIONS
Thank you for visiting today!  A refill for amlodipine has been sent to your pharmacy.   Please call your nephrologist regarding your sevelamer refill  For [facility] appointment, please try the following number for the [facility] [place]: [phone]  Please return to clinic to see me in 6 months

## 2023-06-07 ENCOUNTER — TELEPHONE (OUTPATIENT)
Dept: INTERNAL MEDICINE | Facility: CLINIC | Age: 58
End: 2023-06-07
Payer: MEDICARE

## 2023-06-07 NOTE — TELEPHONE ENCOUNTER
----- Message from Claire Alvarenga sent at 6/7/2023 11:47 AM CDT -----  Contact: [phone]  Pt would like a call to discuss medication atorvastatin (LIPITOR) 40 MG tablet.  Pt would like to confirm if this is an accurate prescription that was given to her and what for.

## 2023-06-08 RX ORDER — SEVELAMER CARBONATE 800 MG/1
2400 TABLET, FILM COATED ORAL
Qty: 120 TABLET | Refills: 3 | Status: SHIPPED | OUTPATIENT
Start: 2023-06-08

## 2023-06-08 NOTE — TELEPHONE ENCOUNTER
----- Message from Natalie Thacker sent at 6/8/2023 11:00 AM CDT -----  Contact: Self [phone]  Requesting an RX refill or new RX.    Is this a refill or new RX: refill    RX name and strength (copy/paste from chart):  sevelamer carbonate (RENVELA) 800 mg Tab    Is this a 30 day or 90 day RX: 30    Pharmacy name and phone # (copy/paste from chart):      [facility] [id number] at [place], [place] - [id number]  [facility] [address]  Phone: [phone] Fax: [phone]

## 2023-06-09 ENCOUNTER — TELEPHONE (OUTPATIENT)
Dept: INTERNAL MEDICINE | Facility: CLINIC | Age: 58
End: 2023-06-09
Payer: MEDICARE

## 2023-06-09 NOTE — TELEPHONE ENCOUNTER
----- Message from Isamar Torres MA sent at 6/8/2023  2:02 PM CDT -----  Contact: [phone]    ----- Message -----  From: Margarita Queen  Sent: 6/8/2023   1:56 PM CDT  To: [facility]    Patient is returning a phone call.  Who left a message for the patient: Bea Duggan LPN  Does patient know what this is regarding:   discuss medication atorvastatin   Would you like a call back, or a response through your [facility] portal?:   call please  Comments:

## 2023-06-09 NOTE — TELEPHONE ENCOUNTER
Refill was sent to wrong pharmacy   Will pick at the other location  Changed in chart to [address]

## 2023-07-17 ENCOUNTER — OFFICE VISIT (OUTPATIENT)
Dept: OPTOMETRY | Facility: CLINIC | Age: 58
End: 2023-07-17
Payer: MEDICARE

## 2023-07-17 DIAGNOSIS — H25.13 NUCLEAR SCLEROSIS OF BOTH EYES: ICD-10-CM

## 2023-07-17 DIAGNOSIS — H35.033 HYPERTENSIVE RETINOPATHY OF BOTH EYES: ICD-10-CM

## 2023-07-17 DIAGNOSIS — H35.373 EPIRETINAL MEMBRANE (ERM) OF BOTH EYES: ICD-10-CM

## 2023-07-17 DIAGNOSIS — H52.4 PRESBYOPIA OF BOTH EYES: ICD-10-CM

## 2023-07-17 DIAGNOSIS — E11.43 DIABETIC AUTONOMIC NEUROPATHY ASSOCIATED WITH TYPE 2 DIABETES MELLITUS: ICD-10-CM

## 2023-07-17 DIAGNOSIS — H35.61 RETINAL HEMORRHAGE OF RIGHT EYE: ICD-10-CM

## 2023-07-17 DIAGNOSIS — E11.3291 CONTROLLED TYPE 2 DIABETES MELLITUS WITH RIGHT EYE AFFECTED BY MILD NONPROLIFERATIVE RETINOPATHY WITHOUT MACULAR EDEMA, WITHOUT LONG-TERM CURRENT USE OF INSULIN: Primary | ICD-10-CM

## 2023-07-17 PROCEDURE — 4010F PR ACE/ARB THEARPY RXD/TAKEN: ICD-10-PCS | Mod: CPTII,S$GLB,,

## 2023-07-17 PROCEDURE — 3066F PR DOCUMENTATION OF TREATMENT FOR NEPHROPATHY: ICD-10-PCS | Mod: CPTII,S$GLB,,

## 2023-07-17 PROCEDURE — 2023F PR DILATED RETINAL EXAM W/O EVID OF RETINOPATHY: ICD-10-PCS | Mod: CPTII,S$GLB,,

## 2023-07-17 PROCEDURE — 92014 PR EYE EXAM, EST PATIENT,COMPREHESV: ICD-10-PCS | Mod: S$GLB,,,

## 2023-07-17 PROCEDURE — 99999 PR PBB SHADOW E&M-EST. PATIENT-LVL II: CPT | Mod: PBBFAC,,,

## 2023-07-17 PROCEDURE — 99999 PR PBB SHADOW E&M-EST. PATIENT-LVL II: ICD-10-PCS | Mod: PBBFAC,,,

## 2023-07-17 PROCEDURE — 92134 CPTRZ OPH DX IMG PST SGM RTA: CPT | Mod: S$GLB,,,

## 2023-07-17 PROCEDURE — 1159F MED LIST DOCD IN RCRD: CPT | Mod: CPTII,S$GLB,,

## 2023-07-17 PROCEDURE — 3044F HG A1C LEVEL LT 7.0%: CPT | Mod: CPTII,S$GLB,,

## 2023-07-17 PROCEDURE — 1160F RVW MEDS BY RX/DR IN RCRD: CPT | Mod: CPTII,S$GLB,,

## 2023-07-17 PROCEDURE — 4010F ACE/ARB THERAPY RXD/TAKEN: CPT | Mod: CPTII,S$GLB,,

## 2023-07-17 PROCEDURE — 92134 POSTERIOR SEGMENT OCT RETINA (OCULAR COHERENCE TOMOGRAPHY)-BOTH EYES: ICD-10-PCS | Mod: S$GLB,,,

## 2023-07-17 PROCEDURE — 2023F DILAT RTA XM W/O RTNOPTHY: CPT | Mod: CPTII,S$GLB,,

## 2023-07-17 PROCEDURE — 1160F PR REVIEW ALL MEDS BY PRESCRIBER/CLIN PHARMACIST DOCUMENTED: ICD-10-PCS | Mod: CPTII,S$GLB,,

## 2023-07-17 PROCEDURE — 1159F PR MEDICATION LIST DOCUMENTED IN MEDICAL RECORD: ICD-10-PCS | Mod: CPTII,S$GLB,,

## 2023-07-17 PROCEDURE — 3044F PR MOST RECENT HEMOGLOBIN A1C LEVEL <7.0%: ICD-10-PCS | Mod: CPTII,S$GLB,,

## 2023-07-17 PROCEDURE — 92014 COMPRE OPH EXAM EST PT 1/>: CPT | Mod: S$GLB,,,

## 2023-07-17 PROCEDURE — 3066F NEPHROPATHY DOC TX: CPT | Mod: CPTII,S$GLB,,

## 2023-07-17 NOTE — PROGRESS NOTES
HPI    The patient reports for her diabetic eye exam. Sees Dr. Gayle annually. BS   fluctuates between . Reports occasional watering OU, not using gtts.   No f/f OU. Uses +2.50 OTC readers.    Hemoglobin A1C       Date                     Value               Ref Range             Status                05/25/2023               5.1                 4.0 - 5.6 %           Final                  08/14/2022               4.8                 4.0 - 5.6 %           Final                   04/20/2022               5.6                 4.7 - 5.6 %           Final                 01/18/2022               4.4 (L)             4.7 - 5.6 %           Final                 10/08/2021               4.3                 4.0 - 5.6 %           Final               Last edited by Nancy Rowland, OD on 7/19/2023  6:31 AM.        ROS    Positive for: Endocrine, Eyes  Negative for: Constitutional, Gastrointestinal, Neurological, Skin,   Genitourinary, Musculoskeletal, HENT, Cardiovascular, Respiratory,   Psychiatric, Allergic/Imm, Heme/Lymph  Last edited by Nancy Rowland, OD on 7/17/2023 12:43 PM.        Assessment /Plan     For exam results, see Encounter Report.    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 Patient educated on condition and findings. Nasal retinal hemorrhage OD, appears hypertensive in nature. Vessel changes, OU. Stable since previous exam. Educated patient on effects BP and BS can have on eyes. Discussed importance of BP/BS control, taking medications  as directed, and following-up with primary care. Monitor in 6 months with repeat DFE or sooner prn.  5.    Patient educated on condition and findings. Mac OCT revealed ERM causing distortion to inner retinal layers and foveal contour OU. Advised on possibility for ERM peel in the future as symptoms progress. Monitor annually or sooner prn.  6.    Pt educated on condition and findings, BCVA slightly reduced (20/20-- OD and OS) but cataracts are not clinically significant. Surgery not recommended at this time. Continue to monitor yearly.  7.    Pt happy with uncorrected DVA OU. Ok to continue with OTC spec use as needed. No rx given at this time. Monitor annually or sooner prn.    RTC in 6 months for DFE to recheck retinal hemorrhage

## 2023-08-04 ENCOUNTER — TELEPHONE (OUTPATIENT)
Dept: UROLOGY | Facility: CLINIC | Age: 58
End: 2023-08-04
Payer: MEDICARE

## 2023-08-04 NOTE — TELEPHONE ENCOUNTER
888 number and spoke with a female and gave her the reference number per below.  I told her she would need to get in contact with medical records here at [facility].  I gave her both the phone and the fax number to our Health information Management Department.  Thank you.    ----- Message from Norma Neff sent at 8/4/2023  3:57 PM CDT -----  Type:  Needs Medical Advice    Who Called: patient intake  Would the patient rather a call back or a response via [facility]? call  Best Call Back Number:  ref#[id number] fax#[phone]  Additional Information: would like to receive progress for pt records

## 2023-08-09 ENCOUNTER — TELEPHONE (OUTPATIENT)
Dept: TRANSPLANT | Facility: CLINIC | Age: 58
End: 2023-08-09
Payer: MEDICARE

## 2023-08-09 NOTE — TELEPHONE ENCOUNTER
Returned call, patient states she received a letter from transplant stating to call for her annual follow up appointment in September. Patient also states that she knows she is not a candidate for kidney transplant. Informed patient, that she does not have an active episode in kidney transplant and this coordinator does not see any letters indicating that patient needs to schedule a follow up appointment in the transplant clinic. Inquired if patient could tell me who sent her the letter; if there was a signature. Patient states she does not have the letter with her at this time. Patient verbalized understanding of the above.     ----- Message from Elaine Adams RN sent at 8/9/2023  2:49 PM CDT -----  Regarding: FW: Scheduling Request  Contact: Jael Hall  Denied in referral    ----- Message -----  From: Flavia Thacker  Sent: 8/9/2023  11:37 AM CDT  To: [facility] Pre-Kidney Transplant Non-Clinical  Subject: Scheduling Request                               Scheduling Request          Appt Type:  Follow up     Date/Time Preference: Any time      Caller Name: Jael Hall    Contact Preference: [phone] (home)      Comments/notes: Patient calling after getting notification to schedule a follow up appt from recall. Requesting a call back.

## 2023-09-29 ENCOUNTER — HOSPITAL ENCOUNTER (INPATIENT)
Facility: HOSPITAL | Age: 58
LOS: 1 days | Discharge: HOME | DRG: 640 | End: 2023-09-30
Attending: EMERGENCY MEDICINE | Admitting: INTERNAL MEDICINE
Payer: MEDICARE

## 2023-09-29 DIAGNOSIS — E87.5 ACUTE HYPERKALEMIA: ICD-10-CM

## 2023-09-29 DIAGNOSIS — I95.9 HYPOTENSION, UNSPECIFIED HYPOTENSION TYPE: Primary | ICD-10-CM

## 2023-09-29 DIAGNOSIS — N18.6 ESRD (END STAGE RENAL DISEASE): ICD-10-CM

## 2023-09-29 DIAGNOSIS — E87.5 HYPERKALEMIA: ICD-10-CM

## 2023-09-29 DIAGNOSIS — R00.1 BRADYCARDIA: ICD-10-CM

## 2023-09-29 PROBLEM — Z99.2 ESRD (END STAGE RENAL DISEASE) ON DIALYSIS: Chronic | Status: ACTIVE | Noted: 2017-09-28

## 2023-09-29 LAB
ALBUMIN SERPL BCP-MCNC: 3.3 G/DL (ref 3.5–5.2)
ALLENS TEST: ABNORMAL
ALP SERPL-CCNC: 104 U/L (ref 55–135)
ALT SERPL W/O P-5'-P-CCNC: 7 U/L (ref 10–44)
ANION GAP SERPL CALC-SCNC: 11 MMOL/L (ref 8–16)
ANION GAP SERPL CALC-SCNC: 8 MMOL/L (ref 8–16)
AST SERPL-CCNC: 14 U/L (ref 10–40)
BASOPHILS # BLD AUTO: 0.06 K/UL (ref 0–0.2)
BASOPHILS NFR BLD: 0.7 % (ref 0–1.9)
BILIRUB SERPL-MCNC: 0.9 MG/DL (ref 0.1–1)
BNP SERPL-MCNC: 739 PG/ML (ref 0–99)
BUN SERPL-MCNC: 23 MG/DL (ref 6–20)
BUN SERPL-MCNC: 65 MG/DL (ref 6–30)
BUN SERPL-MCNC: 66 MG/DL (ref 6–20)
CALCIUM SERPL-MCNC: 9.2 MG/DL (ref 8.7–10.5)
CALCIUM SERPL-MCNC: 9.3 MG/DL (ref 8.7–10.5)
CHLORIDE SERPL-SCNC: 103 MMOL/L (ref 95–110)
CHLORIDE SERPL-SCNC: 105 MMOL/L (ref 95–110)
CHLORIDE SERPL-SCNC: 105 MMOL/L (ref 95–110)
CO2 SERPL-SCNC: 22 MMOL/L (ref 23–29)
CO2 SERPL-SCNC: 26 MMOL/L (ref 23–29)
CREAT SERPL-MCNC: 12.7 MG/DL (ref 0.5–1.4)
CREAT SERPL-MCNC: 14.7 MG/DL (ref 0.5–1.4)
CREAT SERPL-MCNC: 5.7 MG/DL (ref 0.5–1.4)
DELSYS: ABNORMAL
DIFFERENTIAL METHOD: ABNORMAL
EOSINOPHIL # BLD AUTO: 0.8 K/UL (ref 0–0.5)
EOSINOPHIL NFR BLD: 9.3 % (ref 0–8)
ERYTHROCYTE [DISTWIDTH] IN BLOOD BY AUTOMATED COUNT: 16.2 % (ref 11.5–14.5)
EST. GFR  (NO RACE VARIABLE): 3.1 ML/MIN/1.73 M^2
EST. GFR  (NO RACE VARIABLE): 8 ML/MIN/1.73 M^2
FLOW: 4
GLUCOSE SERPL-MCNC: 116 MG/DL (ref 70–110)
GLUCOSE SERPL-MCNC: 201 MG/DL (ref 70–110)
GLUCOSE SERPL-MCNC: 208 MG/DL (ref 70–110)
HBV SURFACE AG SERPL QL IA: NORMAL
HCO3 UR-SCNC: 24.8 MMOL/L (ref 24–28)
HCT VFR BLD AUTO: 29.8 % (ref 37–48.5)
HCT VFR BLD CALC: 28 %PCV (ref 36–54)
HCV AB SERPL QL IA: NORMAL
HGB BLD-MCNC: 9.4 G/DL (ref 12–16)
HIV 1+2 AB+HIV1 P24 AG SERPL QL IA: NORMAL
IMM GRANULOCYTES # BLD AUTO: 0.04 K/UL (ref 0–0.04)
IMM GRANULOCYTES NFR BLD AUTO: 0.5 % (ref 0–0.5)
LACTATE SERPL-SCNC: 2.5 MMOL/L (ref 0.5–2.2)
LYMPHOCYTES # BLD AUTO: 1 K/UL (ref 1–4.8)
LYMPHOCYTES NFR BLD: 11.2 % (ref 18–48)
MAGNESIUM SERPL-MCNC: 2.6 MG/DL (ref 1.6–2.6)
MCH RBC QN AUTO: 26.6 PG (ref 27–31)
MCHC RBC AUTO-ENTMCNC: 31.5 G/DL (ref 32–36)
MCV RBC AUTO: 84 FL (ref 82–98)
MODE: ABNORMAL
MONOCYTES # BLD AUTO: 0.8 K/UL (ref 0.3–1)
MONOCYTES NFR BLD: 8.9 % (ref 4–15)
NEUTROPHILS # BLD AUTO: 5.9 K/UL (ref 1.8–7.7)
NEUTROPHILS NFR BLD: 69.4 % (ref 38–73)
NRBC BLD-RTO: 0 /100 WBC
PCO2 BLDA: 40.1 MMHG (ref 35–45)
PH SMN: 7.4 [PH] (ref 7.35–7.45)
PHOSPHATE SERPL-MCNC: 2.3 MG/DL (ref 2.7–4.5)
PLATELET # BLD AUTO: 128 K/UL (ref 150–450)
PMV BLD AUTO: ABNORMAL FL (ref 9.2–12.9)
PO2 BLDA: 35 MMHG (ref 40–60)
POC BE: 0 MMOL/L
POC IONIZED CALCIUM: 1.09 MMOL/L (ref 1.06–1.42)
POC SATURATED O2: 67 % (ref 95–100)
POC TCO2 (MEASURED): 23 MMOL/L (ref 23–29)
POC TCO2: 26 MMOL/L (ref 24–29)
POCT GLUCOSE: 104 MG/DL (ref 70–110)
POCT GLUCOSE: 113 MG/DL (ref 70–110)
POCT GLUCOSE: 118 MG/DL (ref 70–110)
POCT GLUCOSE: 70 MG/DL (ref 70–110)
POCT GLUCOSE: 76 MG/DL (ref 70–110)
POCT GLUCOSE: 89 MG/DL (ref 70–110)
POTASSIUM BLD-SCNC: 7.8 MMOL/L (ref 3.5–5.1)
POTASSIUM SERPL-SCNC: 4 MMOL/L (ref 3.5–5.1)
POTASSIUM SERPL-SCNC: 8 MMOL/L (ref 3.5–5.1)
PROT SERPL-MCNC: 7.2 G/DL (ref 6–8.4)
RBC # BLD AUTO: 3.53 M/UL (ref 4–5.4)
SAMPLE: ABNORMAL
SAMPLE: ABNORMAL
SITE: ABNORMAL
SODIUM BLD-SCNC: 136 MMOL/L (ref 136–145)
SODIUM SERPL-SCNC: 136 MMOL/L (ref 136–145)
SODIUM SERPL-SCNC: 139 MMOL/L (ref 136–145)
TROPONIN I SERPL DL<=0.01 NG/ML-MCNC: <0.006 NG/ML (ref 0–0.03)
WBC # BLD AUTO: 8.45 K/UL (ref 3.9–12.7)

## 2023-09-29 PROCEDURE — 20600001 HC STEP DOWN PRIVATE ROOM: Mod: HCNC

## 2023-09-29 PROCEDURE — 99291 CRITICAL CARE FIRST HOUR: CPT | Mod: HCNC,,, | Performed by: INTERNAL MEDICINE

## 2023-09-29 PROCEDURE — 25000242 PHARM REV CODE 250 ALT 637 W/ HCPCS: Mod: HCNC | Performed by: STUDENT IN AN ORGANIZED HEALTH CARE EDUCATION/TRAINING PROGRAM

## 2023-09-29 PROCEDURE — 94640 AIRWAY INHALATION TREATMENT: CPT | Mod: HCNC

## 2023-09-29 PROCEDURE — 99223 PR INITIAL HOSPITAL CARE,LEVL III: ICD-10-PCS | Mod: HCNC,FS,, | Performed by: NURSE PRACTITIONER

## 2023-09-29 PROCEDURE — 25000242 PHARM REV CODE 250 ALT 637 W/ HCPCS: Mod: HCNC

## 2023-09-29 PROCEDURE — 99291 PR CRITICAL CARE, E/M 30-74 MINUTES: ICD-10-PCS | Mod: HCNC,,, | Performed by: INTERNAL MEDICINE

## 2023-09-29 PROCEDURE — 99900035 HC TECH TIME PER 15 MIN (STAT): Mod: HCNC

## 2023-09-29 PROCEDURE — 87040 BLOOD CULTURE FOR BACTERIA: CPT | Mod: HCNC

## 2023-09-29 PROCEDURE — 84100 ASSAY OF PHOSPHORUS: CPT | Mod: HCNC

## 2023-09-29 PROCEDURE — 80048 BASIC METABOLIC PNL TOTAL CA: CPT | Mod: HCNC | Performed by: INTERNAL MEDICINE

## 2023-09-29 PROCEDURE — 83880 ASSAY OF NATRIURETIC PEPTIDE: CPT | Mod: HCNC

## 2023-09-29 PROCEDURE — 87340 HEPATITIS B SURFACE AG IA: CPT | Mod: HCNC | Performed by: NURSE PRACTITIONER

## 2023-09-29 PROCEDURE — 87389 HIV-1 AG W/HIV-1&-2 AB AG IA: CPT | Mod: HCNC | Performed by: INTERNAL MEDICINE

## 2023-09-29 PROCEDURE — 83605 ASSAY OF LACTIC ACID: CPT | Mod: HCNC

## 2023-09-29 PROCEDURE — 94761 N-INVAS EAR/PLS OXIMETRY MLT: CPT | Mod: HCNC

## 2023-09-29 PROCEDURE — 85025 COMPLETE CBC W/AUTO DIFF WBC: CPT | Mod: HCNC

## 2023-09-29 PROCEDURE — 99285 EMERGENCY DEPT VISIT HI MDM: CPT | Mod: 25,HCNC

## 2023-09-29 PROCEDURE — 93005 ELECTROCARDIOGRAM TRACING: CPT | Mod: HCNC

## 2023-09-29 PROCEDURE — 27000221 HC OXYGEN, UP TO 24 HOURS: Mod: HCNC

## 2023-09-29 PROCEDURE — 82803 BLOOD GASES ANY COMBINATION: CPT | Mod: HCNC

## 2023-09-29 PROCEDURE — 83735 ASSAY OF MAGNESIUM: CPT | Mod: HCNC

## 2023-09-29 PROCEDURE — 96365 THER/PROPH/DIAG IV INF INIT: CPT | Mod: HCNC

## 2023-09-29 PROCEDURE — 99223 1ST HOSP IP/OBS HIGH 75: CPT | Mod: HCNC,FS,, | Performed by: NURSE PRACTITIONER

## 2023-09-29 PROCEDURE — 86803 HEPATITIS C AB TEST: CPT | Mod: HCNC | Performed by: INTERNAL MEDICINE

## 2023-09-29 PROCEDURE — 84484 ASSAY OF TROPONIN QUANT: CPT | Mod: HCNC

## 2023-09-29 PROCEDURE — 93010 ELECTROCARDIOGRAM REPORT: CPT | Mod: HCNC,,, | Performed by: INTERNAL MEDICINE

## 2023-09-29 PROCEDURE — 25000003 PHARM REV CODE 250: Mod: HCNC

## 2023-09-29 PROCEDURE — 93010 EKG 12-LEAD: ICD-10-PCS | Mod: HCNC,,, | Performed by: INTERNAL MEDICINE

## 2023-09-29 PROCEDURE — 63600175 PHARM REV CODE 636 W HCPCS: Mod: HCNC

## 2023-09-29 PROCEDURE — 96375 TX/PRO/DX INJ NEW DRUG ADDON: CPT | Mod: HCNC

## 2023-09-29 PROCEDURE — 25000003 PHARM REV CODE 250: Mod: HCNC | Performed by: INTERNAL MEDICINE

## 2023-09-29 PROCEDURE — 63600175 PHARM REV CODE 636 W HCPCS: Mod: HCNC | Performed by: INTERNAL MEDICINE

## 2023-09-29 PROCEDURE — 80053 COMPREHEN METABOLIC PANEL: CPT | Mod: HCNC

## 2023-09-29 PROCEDURE — 80100014 HC HEMODIALYSIS 1:1: Mod: HCNC

## 2023-09-29 RX ORDER — CALCIUM GLUCONATE 20 MG/ML
1 INJECTION, SOLUTION INTRAVENOUS ONCE
Status: DISCONTINUED | OUTPATIENT
Start: 2023-09-29 | End: 2023-09-29

## 2023-09-29 RX ORDER — SODIUM CHLORIDE 9 MG/ML
INJECTION, SOLUTION INTRAVENOUS ONCE
Status: COMPLETED | OUTPATIENT
Start: 2023-09-30 | End: 2023-09-30

## 2023-09-29 RX ORDER — CALCIUM GLUCONATE 20 MG/ML
1 INJECTION, SOLUTION INTRAVENOUS
Status: COMPLETED | OUTPATIENT
Start: 2023-09-29 | End: 2023-09-29

## 2023-09-29 RX ORDER — CALCIUM GLUCONATE 20 MG/ML
1 INJECTION, SOLUTION INTRAVENOUS EVERY 10 MIN PRN
Status: DISCONTINUED | OUTPATIENT
Start: 2023-09-29 | End: 2023-09-30

## 2023-09-29 RX ORDER — INDOMETHACIN 25 MG/1
50 CAPSULE ORAL ONCE
Status: DISCONTINUED | OUTPATIENT
Start: 2023-09-29 | End: 2023-09-30

## 2023-09-29 RX ORDER — HEPARIN SODIUM 5000 [USP'U]/ML
5000 INJECTION, SOLUTION INTRAVENOUS; SUBCUTANEOUS EVERY 8 HOURS
Status: DISCONTINUED | OUTPATIENT
Start: 2023-09-29 | End: 2023-09-30 | Stop reason: HOSPADM

## 2023-09-29 RX ORDER — SODIUM CHLORIDE 9 MG/ML
INJECTION, SOLUTION INTRAVENOUS ONCE
Status: DISCONTINUED | OUTPATIENT
Start: 2023-09-29 | End: 2023-09-30 | Stop reason: HOSPADM

## 2023-09-29 RX ORDER — CALCIUM GLUCONATE 20 MG/ML
INJECTION, SOLUTION INTRAVENOUS
Status: DISPENSED
Start: 2023-09-29 | End: 2023-09-29

## 2023-09-29 RX ORDER — ATORVASTATIN CALCIUM 40 MG/1
40 TABLET, FILM COATED ORAL DAILY
Status: DISCONTINUED | OUTPATIENT
Start: 2023-09-29 | End: 2023-09-30 | Stop reason: HOSPADM

## 2023-09-29 RX ORDER — ALBUTEROL SULFATE 2.5 MG/.5ML
10 SOLUTION RESPIRATORY (INHALATION)
Status: COMPLETED | OUTPATIENT
Start: 2023-09-29 | End: 2023-09-29

## 2023-09-29 RX ORDER — SODIUM CHLORIDE 0.9 % (FLUSH) 0.9 %
10 SYRINGE (ML) INJECTION
Status: DISCONTINUED | OUTPATIENT
Start: 2023-09-29 | End: 2023-09-30 | Stop reason: HOSPADM

## 2023-09-29 RX ADMIN — ALBUTEROL SULFATE 10 MG: 2.5 SOLUTION RESPIRATORY (INHALATION) at 10:09

## 2023-09-29 RX ADMIN — HEPARIN SODIUM 5000 UNITS: 5000 INJECTION INTRAVENOUS; SUBCUTANEOUS at 09:09

## 2023-09-29 RX ADMIN — ATORVASTATIN CALCIUM 40 MG: 40 TABLET, FILM COATED ORAL at 02:09

## 2023-09-29 RX ADMIN — DEXTROSE MONOHYDRATE 250 ML: 100 INJECTION, SOLUTION INTRAVENOUS at 08:09

## 2023-09-29 RX ADMIN — HEPARIN SODIUM 5000 UNITS: 5000 INJECTION INTRAVENOUS; SUBCUTANEOUS at 02:09

## 2023-09-29 RX ADMIN — ALBUTEROL SULFATE 10 MG: 2.5 SOLUTION RESPIRATORY (INHALATION) at 08:09

## 2023-09-29 RX ADMIN — SODIUM ZIRCONIUM CYCLOSILICATE 5 G: 5 POWDER, FOR SUSPENSION ORAL at 09:09

## 2023-09-29 RX ADMIN — SODIUM ZIRCONIUM CYCLOSILICATE 5 G: 5 POWDER, FOR SUSPENSION ORAL at 04:09

## 2023-09-29 RX ADMIN — INSULIN HUMAN 10 UNITS: 100 INJECTION, SOLUTION PARENTERAL at 08:09

## 2023-09-29 RX ADMIN — CALCIUM GLUCONATE 1 G: 20 INJECTION, SOLUTION INTRAVENOUS at 07:09

## 2023-09-29 RX ADMIN — CALCIUM GLUCONATE 1 G: 20 INJECTION, SOLUTION INTRAVENOUS at 09:09

## 2023-09-29 NOTE — PLAN OF CARE
MICU DAILY GOALS     Family/Goals of care/Code Status   Code Status: Full Code    24H Vital Sign Range  Temp:  [97.5 °F (36.4 °C)-97.6 °F (36.4 °C)]   Pulse:  [49-79]   Resp:  [11-36]   BP: ()/(52-74)   SpO2:  [89 %-99 %]      Shift Events   Patient admitted from ED for emergent HD. HD completed and K 4.0. Patient stable and ready for step down. Step down orders placed.     AWAKE RASS: Goal -    Actual -      Restraint necessity: Not necessary   BREATHE SBT: Not intubated    Coordinate A & B, analgesics/sedatives Pain: managed   SAT: Not intubated   Delirium CAM-ICU: Overall CAM-ICU: Negative   Early(intubated/ Progressive (non-intubated) Mobility MOVE Screen (INTUBATED ONLY): Pass    Activity: Activity Management: Arm raise - L1   Feeding/Nutrition Diet order: Diet/Nutrition Received: NPO, Specialty Diet/Nutrition Received: renal diet   Thrombus DVT prophylaxis: VTE Required Core Measure: Pharmacological prophylaxis initiated/maintained   HOB Elevation Head of Bed (HOB) Positioning: HOB elevated   Ulcer Prophylaxis GI: yes   Glucose control managed     Skin Skin assessed during: Daily Assessment    [x] No Altered Skin Integrity Present    [x]Prevention Measures Documented      [] Yes- Altered Skin Integrity Present or Discovered   [] LDA Added if Not in Epic (Describe Wound)   [] New Altered Skin Integrity was Present on Admit and Documented in LDA   [] Wound Image Taken    Wound Care Consulted? No    Attending Nurse:  Geno Yi RN/Staff Member:      Bowel Function no issues    Indwelling Catheter Necessity         none   De-escalation Antibiotics No       VS and assessment per flow sheet, patient progressing towards goals as tolerated, plan of care reviewed with  patient , all concerns addressed, will continue to monitor.

## 2023-09-29 NOTE — SUBJECTIVE & OBJECTIVE
Past Medical History:   Diagnosis Date    Acute on chronic diastolic heart failure 10/8/2021    Anemia of chronic disorder 2017    Overview:  Added automatically from request for surgery [id number]    Anemia of chronic renal failure, stage 5     Anxiety     Cyst of left ovary 2019    Cyst of left ovary 2019    Dyslipidemia 2013    End-stage renal disease on hemodialysis 2020    ESRD on hemodialysis 2017    Hematuria 10/30/2019    Hx of sinus bradycardia 2017    Hyperkalemia 3/29/2017    Hypertensive urgency 2021    Peripheral neuropathy 2013    Preoperative testing 10/8/2020    Renovascular hypertension 2020    S/P dilation and curettage 2018    Thickened endometrium 2018    Thyroid nodule     Thyroid nodule 2021    Uncontrolled type 2 diabetes mellitus 2013    Overview:  poc a1c today 11.2-270/ up from 10.8-263, Pt. has very very stressed/ was incarcerated/marital stress/ will current meds/ less stress now/ will monitor.       Past Surgical History:   Procedure Laterality Date    AV FISTULA PLACEMENT      right arm     SECTION      x1    COLONOSCOPY N/A 2018    Procedure: COLONOSCOPY;  Surgeon: Pankaj Hinojosa MD;  Location: [facility] (4TH FLR);  Service: Endoscopy;  Laterality: N/A;  dialysis pt/labs prior to colon/MS    CYSTOSCOPY W/ RETROGRADES Bilateral 2020    Procedure: CYSTOSCOPY, WITH RETROGRADE PYELOGRAM;  Surgeon: Douglas Abraham MD;  [facility];  Service: Urology;  Laterality: Bilateral;  45 min    FISTULOGRAM Right 2020    Procedure: Fistulogram;  Surgeon: Lester Gómez MD;  Location: [facility] CATH LAB;  Service: Nephrology;  Laterality: Right;    HYSTERECTOMY      IMPLANTATION OF COCHLEAR PROSTHESIS Right 2021    Procedure: INSERTION, PROSTHESIS, COCHLEAR;  Surgeon: Ramiro Zuleta MD;  [facility];  Service: ENT;  Laterality: Right;  COCHLEAR BABAK    IMPLANTATION OF COCHLEAR PROSTHESIS  Right 6/30/2021    Procedure: INSERTION, PROSTHESIS, COCHLEAR;  Surgeon: Ramiro Zuleta MD;  Location: [facility] OR 2ND FLR;  Service: ENT;  Laterality: Right;  COCHLEAR AMERICAS    ROBOT-ASSISTED LAPAROSCOPIC ABDOMINAL HYSTERECTOMY USING DA NATALYA XI N/A 7/5/2019    Procedure: XI ROBOTIC HYSTERECTOMY;  Surgeon: Trice Lei MD;  Location: [facility];  Service: OB/GYN;  Laterality: N/A;    ROBOT-ASSISTED LAPAROSCOPIC NEPHRECTOMY Right 10/8/2020    Procedure: ROBOTIC NEPHRECTOMY;  Surgeon: Douglas Abraham MD;  Location: [facility] OR 2ND FLR;  Service: Urology;  Laterality: Right;  3.5hrs gen with regional     ROBOT-ASSISTED LAPAROSCOPIC SALPINGO-OOPHORECTOMY USING DA NATALYA XI Bilateral 7/5/2019    Procedure: XI ROBOTIC SALPINGO-OOPHORECTOMY;  Surgeon: Trice Lei MD;  Location: [facility];  Service: OB/GYN;  Laterality: Bilateral;    TUBAL LIGATION      Vascular access surgeries      x5       Review of patient's allergies indicates:  No Known Allergies    Family History       Problem Relation (Age of Onset)    Cancer Mother, Sister, Sister    Diabetes Sister, Brother    Lung cancer Sister    Macular degeneration Sister    Ovarian cancer Mother, Sister, Sister          Tobacco Use    Smoking status: Never    Smokeless tobacco: Never   Substance and Sexual Activity    Alcohol use: No    Drug use: No    Sexual activity: Yes     Partners: Male     Birth control/protection: Post-menopausal      Review of Systems   Constitutional:  Positive for activity change and fatigue. Negative for chills, fever and unexpected weight change.   HENT:  Negative for sinus pain, trouble swallowing and voice change.    Eyes:  Negative for photophobia, pain and visual disturbance.   Respiratory:  Positive for shortness of breath. Negative for cough and wheezing.    Cardiovascular:  Negative for chest pain, palpitations and leg swelling.   Gastrointestinal:  Negative for abdominal pain, blood in stool, constipation, diarrhea, nausea and vomiting.    Endocrine: Negative for polydipsia and polyuria.   Genitourinary:  Negative for dysuria, frequency, hematuria and urgency.   Musculoskeletal:  Negative for arthralgias, back pain and myalgias.   Skin:  Negative for color change and pallor.   Neurological:  Positive for light-headedness. Negative for syncope, weakness, numbness and headaches.   Hematological:  Does not bruise/bleed easily.   Psychiatric/Behavioral:  Positive for confusion. Negative for dysphoric mood and sleep disturbance.      Objective:     Vital Signs (Most Recent):  Temp: 97.5 °F (36.4 °C) (09/29/23 1103)  Pulse: 70 (09/29/23 1227)  Resp: (!) 26 (09/29/23 1218)  BP: 134/71 (09/29/23 1218)  SpO2: (!) 94 % (09/29/23 1218) Vital Signs (24h Range):  Temp:  [97.5 °F (36.4 °C)-97.6 °F (36.4 °C)] 97.5 °F (36.4 °C)  Pulse:  [49-70] 70  Resp:  [11-26] 26  SpO2:  [89 %-99 %] 94 %  BP: ()/(52-71) 134/71   Weight: 85 kg (187 lb 6.3 oz)  Body mass index is 32.17 kg/m².    No intake or output data in the 24 hours ending 09/29/23 1239       Physical Exam  Vitals and nursing note reviewed.   Constitutional:       General: She is in acute distress.      Appearance: She is obese. She is ill-appearing. She is not toxic-appearing or diaphoretic.      Interventions: Nasal cannula in place.   HENT:      Head: Normocephalic and atraumatic.      Right Ear: External ear normal.      Left Ear: External ear normal.      Mouth/Throat:      Mouth: Mucous membranes are moist.      Pharynx: No oropharyngeal exudate.   Eyes:      Extraocular Movements: Extraocular movements intact.      Pupils: Pupils are equal, round, and reactive to light.   Cardiovascular:      Rate and Rhythm: Normal rate and regular rhythm.      Pulses: Normal pulses.      Heart sounds: Normal heart sounds. No murmur heard.  Pulmonary:      Effort: Pulmonary effort is normal. No respiratory distress.      Breath sounds: Normal breath sounds. No wheezing or rales.   Abdominal:      General: Abdomen  is flat. Bowel sounds are normal. There is no distension.      Palpations: Abdomen is soft. There is no mass.      Tenderness: There is no abdominal tenderness.   Musculoskeletal:         General: No swelling or tenderness. Normal range of motion.      Cervical back: Normal range of motion and neck supple.      Right lower leg: No edema.      Left lower leg: No edema.   Skin:     General: Skin is warm and dry.      Capillary Refill: Capillary refill takes less than 2 seconds.   Neurological:      General: No focal deficit present.      Mental Status: She is alert and oriented to person, place, and time. Mental status is at baseline.   Psychiatric:         Mood and Affect: Mood normal.         Behavior: Behavior normal.            Vents:     Lines/Drains/Airways       Peripheral Intravenous Line  Duration                  Hemodialysis AV Fistula Left forearm -- days         Hemodialysis AV Fistula Right forearm -- days         Hemodialysis AV Fistula Right upper arm -- days         Peripheral IV - Single Lumen 09/29/23 0746 20 G Left;Posterior Hand <1 day         Peripheral IV - Single Lumen 09/29/23 0751 20 G Left Antecubital <1 day                  Significant Labs:    CBC/Anemia Profile:  Recent Labs   Lab 09/29/23  0746 09/29/23  0753   WBC  --  8.45   HGB  --  9.4*   HCT 28* 29.8*   PLT  --  128*   MCV  --  84   RDW  --  16.2*        Chemistries:  Recent Labs   Lab 09/29/23  0753      K 8.0*      CO2 22*   BUN 66*   CREATININE 12.7*   CALCIUM 9.3   ALBUMIN 3.3*   PROT 7.2   BILITOT 0.9   ALKPHOS 104   ALT 7*   AST 14   MG 2.6   PHOS 2.3*       All pertinent labs within the past 24 hours have been reviewed.    Significant Imaging: I have reviewed and interpreted all pertinent imaging results/findings within the past 24 hours.

## 2023-09-29 NOTE — H&P
[facility] - Cardiac Medical ICU  Critical Care Medicine  History & Physical    Patient Name: Jael Hall  MRN: [id number]  Admission Date: 9/29/2023  Hospital Length of Stay: 0 days  Code Status: Full Code  Attending Provider: Abrahan Watt MD  Primary Care Provider: Susan Cruz MD  Principal Problem: Acute hyperkalemia    Subjective:     HPI:  58 y.o. female w/ h/o ESRD on HD MWF, HFpEF (60%), DM2, HTN; p/w hypotension noted at HD clinic.  She missed HD on 09/27 Wed 2/2 transportation limitations as no one showed up.  She is otherwise compliant w/ HD & hasn't previously missed an appt.  When she presented to HD clinic, she was found to be hypotensive w/ BPs in 80s over 50s.  EMS was called & reports that she remained hypotensive & bradycardic en route w/ HR in 40s to 50s.  No neds were given.  Upon arrival to ED, pt c/o SOB, lightheadedness, fatigue.  Daughter not initially at bedside but presented shortly after & reported that pt seemed a bit more tired & confused from b/l but not completely off.    ED course:  Upon arrival patient hypotensive with a systolic blood pressures 89/69 .  Patient bradycardic with heart rate of 38 a monitor.  EKG obtained was notable for junctional bradycardia.  Patient alert and oriented but more fatigued and reporting lightheadedness.  Patient placed on pads.  At this time patient given atropine and heart rate improved but remained junctional bradycardia.  Given hypotension and junctional bradycardia concern for electrolyte derangements.  Patient presented hypotensive sepsis on the differential though less likely given bradycardia and missed dialysis with no complaints of shortness of breath, cough congestion urinary symptoms, abdominal pain.  Chest x-ray obtained with pleural effusion and pulmonary edema.  Labs notable for hyperkalemia of 8 on CMP.  VBG obtained with no acidosis noted.  Lactic acid of 2.5 likely secondary from hyperkalemia.     I-STAT Chem 8 obtained and  was notable for a potassium 7.8.  Given cardiac changes calcium gluconate ordered.  Patient also shifted with albuterol and insulin 10 with dextrose ordered and frequent glucose checks.  At this time patient's blood pressure improved at low systolics of 100.  Discussed with nephrology with plan for dialysis.  Given soft blood pressures, hyperkalemia of 8 noted on CMP and requirement for emergent dialysis discussed with ICU given possibility of requiring SLED.  ICU evaluated patient and plan for admission.  Plan discussed with patient and daughter who is at bedside.  Agreeable with plan.  Patient admitted to ICU.    On MICU eval upon admission to unit, pt on HD w/ significant improvement in Sxs, notably SOB.  Hypotension improved.  Pt admits to eating bananas, however denies orange, avocado, coconut water consumption.  Denies fevers/chills, CP/palpitations, SOB/cough, abdominal pain, nausea/vomiting, constipation/diarrhea, dysuria/hematuria, melena/hematochezia, weakness/numbness/tingling, HA/presyncope/syncope.       Hospital/ICU Course:  No notes on file    Past Medical History:   Diagnosis Date    Acute on chronic diastolic heart failure 10/8/2021    Anemia of chronic disorder 11/8/2017    Overview:  Added automatically from request for surgery [id number]    Anemia of chronic renal failure, stage 5     Anxiety     Cyst of left ovary 5/14/2019    Cyst of left ovary 7/5/2019    Dyslipidemia 1/4/2013    End-stage renal disease on hemodialysis 8/20/2020    ESRD on hemodialysis 9/28/2017    Hematuria 10/30/2019    Hx of sinus bradycardia 9/28/2017    Hyperkalemia 3/29/2017    Hypertensive urgency 7/22/2021    Peripheral neuropathy 1/4/2013    Preoperative testing 10/8/2020    Renovascular hypertension 8/17/2020    S/P dilation and curettage 5/22/2018    Thickened endometrium 5/22/2018    Thyroid nodule     Thyroid nodule 12/13/2021    Uncontrolled type 2 diabetes mellitus 4/5/2013    Overview:  poc a1c today 11.2-270/ up  from 10.8-263, Pt. has very very stressed/ was incarcerated/marital stress/ will current meds/ less stress now/ will monitor.       Past Surgical History:   Procedure Laterality Date    AV FISTULA PLACEMENT      right arm     SECTION      x1    COLONOSCOPY N/A 2018    Procedure: COLONOSCOPY;  Surgeon: Pankaj Hinojosa MD;  Location: [facility] (4TH FLR);  Service: Endoscopy;  Laterality: N/A;  dialysis pt/labs prior to colon/MS    CYSTOSCOPY W/ RETROGRADES Bilateral 2020    Procedure: CYSTOSCOPY, WITH RETROGRADE PYELOGRAM;  Surgeon: Douglas Abraham MD;  Location: [facility] 1ST FLR;  Service: Urology;  Laterality: Bilateral;  45 min    FISTULOGRAM Right 2020    Procedure: Fistulogram;  Surgeon: Lester Gómez MD;  Location: [facility], [place], operated by [facility] CATH LAB;  Service: Nephrology;  Laterality: Right;    HYSTERECTOMY      IMPLANTATION OF COCHLEAR PROSTHESIS Right 2021    Procedure: INSERTION, PROSTHESIS, COCHLEAR;  Surgeon: Ramiro Zuleta MD;  Location: [facility] OR 2ND FLR;  Service: ENT;  Laterality: Right;  COCHLEAR BABAK    IMPLANTATION OF COCHLEAR PROSTHESIS Right 2021    Procedure: INSERTION, PROSTHESIS, COCHLEAR;  Surgeon: Ramiro Zuleta MD;  Location: [facility] OR 2ND FLR;  Service: ENT;  Laterality: Right;  COCHLEAR AMERICAS    ROBOT-ASSISTED LAPAROSCOPIC ABDOMINAL HYSTERECTOMY USING DA NATALYA XI N/A 2019    Procedure: XI ROBOTIC HYSTERECTOMY;  Surgeon: Trice Lei MD;  Location: [facility];  Service: OB/GYN;  Laterality: N/A;    ROBOT-ASSISTED LAPAROSCOPIC NEPHRECTOMY Right 10/8/2020    Procedure: ROBOTIC NEPHRECTOMY;  Surgeon: Douglas Abraham MD;  Location: [facility] 2ND FLR;  Service: Urology;  Laterality: Right;  3.5hrs gen with regional     ROBOT-ASSISTED LAPAROSCOPIC SALPINGO-OOPHORECTOMY USING DA NATALYA XI Bilateral 2019    Procedure: XI ROBOTIC SALPINGO-OOPHORECTOMY;  Surgeon: Trice Lei MD;  Location: [facility];  Service: OB/GYN;  Laterality: Bilateral;    TUBAL LIGATION      Vascular  access surgeries      x5       Review of patient's allergies indicates:  No Known Allergies    Family History       Problem Relation (Age of Onset)    Cancer Mother, Sister, Sister    Diabetes Sister, Brother    Lung cancer Sister    Macular degeneration Sister    Ovarian cancer Mother, Sister, Sister          Tobacco Use    Smoking status: Never    Smokeless tobacco: Never   Substance and Sexual Activity    Alcohol use: No    Drug use: No    Sexual activity: Yes     Partners: Male     Birth control/protection: Post-menopausal      Review of Systems   Constitutional:  Positive for activity change and fatigue. Negative for chills, fever and unexpected weight change.   HENT:  Negative for sinus pain, trouble swallowing and voice change.    Eyes:  Negative for photophobia, pain and visual disturbance.   Respiratory:  Positive for shortness of breath. Negative for cough and wheezing.    Cardiovascular:  Negative for chest pain, palpitations and leg swelling.   Gastrointestinal:  Negative for abdominal pain, blood in stool, constipation, diarrhea, nausea and vomiting.   Endocrine: Negative for polydipsia and polyuria.   Genitourinary:  Negative for dysuria, frequency, hematuria and urgency.   Musculoskeletal:  Negative for arthralgias, back pain and myalgias.   Skin:  Negative for color change and pallor.   Neurological:  Positive for light-headedness. Negative for syncope, weakness, numbness and headaches.   Hematological:  Does not bruise/bleed easily.   Psychiatric/Behavioral:  Positive for confusion. Negative for dysphoric mood and sleep disturbance.      Objective:     Vital Signs (Most Recent):  Temp: 97.5 °F (36.4 °C) (09/29/23 1103)  Pulse: 70 (09/29/23 1227)  Resp: (!) 26 (09/29/23 1218)  BP: 134/71 (09/29/23 1218)  SpO2: (!) 94 % (09/29/23 1218) Vital Signs (24h Range):  Temp:  [97.5 °F (36.4 °C)-97.6 °F (36.4 °C)] 97.5 °F (36.4 °C)  Pulse:  [49-70] 70  Resp:  [11-26] 26  SpO2:  [89 %-99 %] 94 %  BP:  ()/(52-71) 134/71   Weight: 85 kg (187 lb 6.3 oz)  Body mass index is 32.17 kg/m².    No intake or output data in the 24 hours ending 09/29/23 1239       Physical Exam  Vitals and nursing note reviewed.   Constitutional:       General: She is in acute distress.      Appearance: She is obese. She is ill-appearing. She is not toxic-appearing or diaphoretic.      Interventions: Nasal cannula in place.   HENT:      Head: Normocephalic and atraumatic.      Right Ear: External ear normal.      Left Ear: External ear normal.      Mouth/Throat:      Mouth: Mucous membranes are moist.      Pharynx: No oropharyngeal exudate.   Eyes:      Extraocular Movements: Extraocular movements intact.      Pupils: Pupils are equal, round, and reactive to light.   Cardiovascular:      Rate and Rhythm: Normal rate and regular rhythm.      Pulses: Normal pulses.      Heart sounds: Normal heart sounds. No murmur heard.  Pulmonary:      Effort: Pulmonary effort is normal. No respiratory distress.      Breath sounds: Normal breath sounds. No wheezing or rales.   Abdominal:      General: Abdomen is flat. Bowel sounds are normal. There is no distension.      Palpations: Abdomen is soft. There is no mass.      Tenderness: There is no abdominal tenderness.   Musculoskeletal:         General: No swelling or tenderness. Normal range of motion.      Cervical back: Normal range of motion and neck supple.      Right lower leg: No edema.      Left lower leg: No edema.   Skin:     General: Skin is warm and dry.      Capillary Refill: Capillary refill takes less than 2 seconds.   Neurological:      General: No focal deficit present.      Mental Status: She is alert and oriented to person, place, and time. Mental status is at baseline.   Psychiatric:         Mood and Affect: Mood normal.         Behavior: Behavior normal.            Vents:     Lines/Drains/Airways       Peripheral Intravenous Line  Duration                  Hemodialysis AV Fistula  Left forearm -- days         Hemodialysis AV Fistula Right forearm -- days         Hemodialysis AV Fistula Right upper arm -- days         Peripheral IV - Single Lumen 09/29/23 0746 20 G Left;Posterior Hand <1 day         Peripheral IV - Single Lumen 09/29/23 0751 20 G Left Antecubital <1 day                  Significant Labs:    CBC/Anemia Profile:  Recent Labs   Lab 09/29/23  0746 09/29/23  0753   WBC  --  8.45   HGB  --  9.4*   HCT 28* 29.8*   PLT  --  128*   MCV  --  84   RDW  --  16.2*        Chemistries:  Recent Labs   Lab 09/29/23  0753      K 8.0*      CO2 22*   BUN 66*   CREATININE 12.7*   CALCIUM 9.3   ALBUMIN 3.3*   PROT 7.2   BILITOT 0.9   ALKPHOS 104   ALT 7*   AST 14   MG 2.6   PHOS 2.3*       All pertinent labs within the past 24 hours have been reviewed.    Significant Imaging: I have reviewed and interpreted all pertinent imaging results/findings within the past 24 hours.    Assessment/Plan:     Cardiac/Vascular  Essential hypertension  - hold Amlodipine, Hydralazine, Losartan, Labetalol 2/2 hypotension; resume PRN    Elevated cholesterol with elevated triglycerides  - home statin    Renal/  * Acute hyperkalemia  Symptomatic bradycardia  ESRD (end stage renal disease) on dialysis    58F w/ ESRD on HD; missed HD on 09/27; p/w hypotension at HD clinic on 09/29; Sxs SOB, fatigue, lightheadedness, confusion per daughter.  Sxs greatly improved while on emergent HD.  On ED arrival, hypotensive & in junctional bradycardia on ECG.  Labs w/ K 8.0.  Given hyperkalemia w/ ECG changes, pt admitted to MICU for close monitoring & emergent HD.  Insulin, Albuterol, Ca-gluconate given.    - Nephrology consulted for emergent HD  - trend K  - hold Sevelamer 2/2 hypophosphatemia; resume PRN  - cardiac tele monitoring  - can step-down once K stable       Critical Care Daily Checklist:    A: Awake: RASS Goal/Actual Goal:    Actual:     B: Spontaneous Breathing Trial Performed?     C: SAT & SBT Coordinated?   N/A   D: Delirium: CAM-ICU Overall CAM-ICU: Negative   E: Early Mobility Performed? Yes   F: Feeding Goal:    Status:    Current Diet Order   Procedures    Diet renal      AS: Analgesia/Sedation N/A   T: Thromboembolic Prophylaxis Heparin   H: HOB > 300 Yes   U: Stress Ulcer Prophylaxis (if needed) N/A, Pantoprazole   G: Glucose Control Yes   B: Bowel Function     I: Indwelling Catheter (Lines & Cazares) Necessity Reviewed   D: De-escalation of Antimicrobials/Pharmacotherapies Appropriate    Plan for the day/ETD HD, trend K, step-down if stable & K improved    Code Status:  Family/Goals of Care: Full Code  Ongoing       Critical secondary to Patient has a condition that poses threat to life and bodily function: Hyperkalemic emergency w/ junctional bradycardia      Critical care was time spent personally by me on the following activities: development of treatment plan with patient or surrogate and bedside caregivers, discussions with consultants, evaluation of patient's response to treatment, examination of patient, ordering and performing treatments and interventions, ordering and review of laboratory studies, ordering and review of radiographic studies, pulse oximetry, re-evaluation of patient's condition. This critical care time did not overlap with that of any other provider or involve time for any procedures.    Lamont Travis MD  Critical Care Medicine  [facility] - Cardiac Medical ICU

## 2023-09-29 NOTE — ED NOTES
Daughter Sunita called to inform her that her mom was in the ER. [phone]  
Dialysis at bedside   
Jael VAZQUEZ Rafael, a 58 y.o. female presents to the ED w/ complaint of hypotension sent from dialysis    Triage note:  Chief Complaint   Patient presents with    Hypotension     Arrives via EMS with c/o chest pain, was at Dialysis today and was found to be hypotension 80s/50, received ASA in route, chest pain 8/10, missed last session of HD      Review of patient's allergies indicates:  No Known Allergies  Past Medical History:   Diagnosis Date    Acute on chronic diastolic heart failure 10/8/2021    Anemia of chronic disorder 11/8/2017    Overview:  Added automatically from request for surgery [id number]    Anemia of chronic renal failure, stage 5     Anxiety     Cyst of left ovary 5/14/2019    Cyst of left ovary 7/5/2019    Dyslipidemia 1/4/2013    End-stage renal disease on hemodialysis 8/20/2020    ESRD on hemodialysis 9/28/2017    Hematuria 10/30/2019    Hx of sinus bradycardia 9/28/2017    Hyperkalemia 3/29/2017    Hypertensive urgency 7/22/2021    Peripheral neuropathy 1/4/2013    Preoperative testing 10/8/2020    Renovascular hypertension 8/17/2020    S/P dilation and curettage 5/22/2018    Thickened endometrium 5/22/2018    Thyroid nodule     Thyroid nodule 12/13/2021    Uncontrolled type 2 diabetes mellitus 4/5/2013    Overview:  poc a1c today 11.2-270/ up from 10.8-263, Pt. has very very stressed/ was incarcerated/marital stress/ will current meds/ less stress now/ will monitor.       
Patient placed on pacer pads, code cart in room, oxygen applied to patient, MD and resident at bedside  
Xray at bedside  
190

## 2023-09-29 NOTE — HPI
58 year old  female with multiple comorbidities including ESRD on HD MWF, CHF, hypertension, DM 2 presents via EMS for hypotension.  Patient missed dialysis on Wednesday due to transportation issues. She presented to her dialysis clinic today and was found to be hypotensive with systolic blood pressures in the 80s/50s.  EMS reports that patient remained hypotensive and bradycardic in route with a heart rates in the 40 to 50s. Upon arrival patient is complaining of shortness of breath, lightheadedness, fatigue. In ED Bp 80s/60s and EKG noted for junctional bradycardia.HR 30s. Given atropine in ED.  K 8.0. Shifted in ED. Lactic 2.5. Blood cultures pending. Chest x ray revealed Mild cardiomegaly and pulmonary vascular congestion.  Ill-defined patchy airspace disease at the right lung base possible edema however superimposed a infectious process as to be considered.  No significant pleural effusion. The lung apices are clear. Nephrology consulted for ESRD on HD.

## 2023-09-29 NOTE — RESIDENT HANDOFF
Transfer of Care Note  Critical Care to Hospital Medicine    Admit Date: 9/29/2023  LOS: 0    CC: Acute hyperkalemia    Code Status: Full Code    Transfer to Hospital Medicine Team S discussed with Dr. Julien.    Subjective:     HPI: 58 y.o. female w/ h/o ESRD on HD MWF, HFpEF (60%), DM2, HTN; p/w hypotension noted at HD clinic.  She missed HD on 09/27 Wed 2/2 transportation limitations as no one showed up.  She is otherwise compliant w/ HD & hasn't previously missed an appt.  When she presented to HD clinic, she was found to be hypotensive w/ BPs in 80s over 50s.  EMS was called & reports that she remained hypotensive & bradycardic en route w/ HR in 40s to 50s.  No neds were given.  Upon arrival to ED, pt c/o SOB, lightheadedness, fatigue.  Daughter not initially at bedside but presented shortly after & reported that pt seemed a bit more tired & confused from b/l but not completely off.    ED course:  Upon arrival patient hypotensive with a systolic blood pressures 89/69 .  Patient bradycardic with heart rate of 38 a monitor.  EKG obtained was notable for junctional bradycardia.  Patient alert and oriented but more fatigued and reporting lightheadedness.  Patient placed on pads.  At this time patient given atropine and heart rate improved but remained junctional bradycardia.  Given hypotension and junctional bradycardia concern for electrolyte derangements.  Patient presented hypotensive sepsis on the differential though less likely given bradycardia and missed dialysis with no complaints of shortness of breath, cough congestion urinary symptoms, abdominal pain.  Chest x-ray obtained with pleural effusion and pulmonary edema.  Labs notable for hyperkalemia of 8 on CMP.  VBG obtained with no acidosis noted.  Lactic acid of 2.5 likely secondary from hyperkalemia.     I-STAT Chem 8 obtained and was notable for a potassium 7.8.  Given cardiac changes calcium gluconate ordered.  Patient also shifted with albuterol and  insulin 10 with dextrose ordered and frequent glucose checks.  At this time patient's blood pressure improved at low systolics of 100.  Discussed with nephrology with plan for dialysis.  Given soft blood pressures, hyperkalemia of 8 noted on CMP and requirement for emergent dialysis discussed with ICU given possibility of requiring SLED.  ICU evaluated patient and plan for admission.  Plan discussed with patient and daughter who is at bedside.  Agreeable with plan.  Patient admitted to ICU.    On MICU eval upon admission to unit, pt on HD w/ significant improvement in Sxs, notably SOB.  Hypotension improved.  Pt admits to eating bananas, however denies orange, avocado, coconut water consumption.  Denies fevers/chills, CP/palpitations, SOB/cough, abdominal pain, nausea/vomiting, constipation/diarrhea, dysuria/hematuria, melena/hematochezia, weakness/numbness/tingling, HA/presyncope/syncope.     Hospital/ICU Course: Admitted to MICU for hyperkalemic emergency 2/2 missed HD session.  Sxs greatly improved & K normalized s/p emergent HD.    Medically stable for stepdown to  for further management.    To Follow Up:     - HD per Nephrology  - monitor K on BMP    Discharge Plan:     - likely dispo home    Call with any questions.  [phone]

## 2023-09-29 NOTE — PROGRESS NOTES
Dialysis completed without difficulty. 3 Hrs,1 Liter removed. Needles removed, hemostasis achieved. Report given to primary nurse, Pt left in room NAD.

## 2023-09-29 NOTE — HOSPITAL COURSE
Admitted to MICU for hyperkalemic emergency 2/2 missed HD session.  Sxs greatly improved & K normalized s/p emergent HD.

## 2023-09-29 NOTE — CONSULTS
[facility] - Cardiac Medical ICU  Nephrology  Consult Note    Patient Name: Jael Hall  MRN: [id number]  Admission Date: 9/29/2023  Hospital Length of Stay: 0 days  Attending Provider: Abrahan Watt MD   Primary Care Physician: Susan Cruz MD  Principal Problem:Acute hyperkalemia    Inpatient consult to Nephrology  Consult performed by: Clara Busby DNP  Consult ordered by: Mckenna Christensen MD  Reason for consult: ESRD on HD        Subjective:     HPI: 58 year old  female with multiple comorbidities including ESRD on HD MWF, CHF, hypertension, DM 2 presents via EMS for hypotension.  Patient missed dialysis on Wednesday due to transportation issues. She presented to her dialysis clinic today and was found to be hypotensive with systolic blood pressures in the 80s/50s.  EMS reports that patient remained hypotensive and bradycardic in route with a heart rates in the 40 to 50s. Upon arrival patient is complaining of shortness of breath, lightheadedness, fatigue. In ED Bp 80s/60s and EKG noted for junctional bradycardia.HR 30s. Given atropine in ED.  K 8.0. Shifted in ED. Lactic 2.5. Blood cultures pending. Chest x ray revealed Mild cardiomegaly and pulmonary vascular congestion.  Ill-defined patchy airspace disease at the right lung base possible edema however superimposed a infectious process as to be considered.  No significant pleural effusion. The lung apices are clear. Nephrology consulted for ESRD on HD.          Past Medical History:   Diagnosis Date    Acute on chronic diastolic heart failure 10/8/2021    Anemia of chronic disorder 11/8/2017    Overview:  Added automatically from request for surgery [id number]    Anemia of chronic renal failure, stage 5     Anxiety     Cyst of left ovary 5/14/2019    Cyst of left ovary 7/5/2019    Dyslipidemia 1/4/2013    End-stage renal disease on hemodialysis 8/20/2020    ESRD on hemodialysis 9/28/2017    Hematuria 10/30/2019    Hx of sinus bradycardia  2017    Hyperkalemia 3/29/2017    Hypertensive urgency 2021    Peripheral neuropathy 2013    Preoperative testing 10/8/2020    Renovascular hypertension 2020    S/P dilation and curettage 2018    Thickened endometrium 2018    Thyroid nodule     Thyroid nodule 2021    Uncontrolled type 2 diabetes mellitus 2013    Overview:  poc a1c today 11.2-270/ up from 10.8-263, Pt. has very very stressed/ was incarcerated/marital stress/ will current meds/ less stress now/ will monitor.       Past Surgical History:   Procedure Laterality Date    AV FISTULA PLACEMENT      right arm     SECTION      x1    COLONOSCOPY N/A 2018    Procedure: COLONOSCOPY;  Surgeon: Pankaj Hinojosa MD;  Location: [facility] (4TH FLR);  Service: Endoscopy;  Laterality: N/A;  dialysis pt/labs prior to colon/MS    CYSTOSCOPY W/ RETROGRADES Bilateral 2020    Procedure: CYSTOSCOPY, WITH RETROGRADE PYELOGRAM;  Surgeon: Douglas Abraham MD;  Location: [facility] 1ST FLR;  Service: Urology;  Laterality: Bilateral;  45 min    FISTULOGRAM Right 2020    Procedure: Fistulogram;  Surgeon: Lester Gómez MD;  Location: [facility] CATH LAB;  Service: Nephrology;  Laterality: Right;    HYSTERECTOMY      IMPLANTATION OF COCHLEAR PROSTHESIS Right 2021    Procedure: INSERTION, PROSTHESIS, COCHLEAR;  Surgeon: Ramiro Zuleta MD;  Location: [facility] OR 2ND FLR;  Service: ENT;  Laterality: Right;  COCHLEAR BABAK    IMPLANTATION OF COCHLEAR PROSTHESIS Right 2021    Procedure: INSERTION, PROSTHESIS, COCHLEAR;  Surgeon: Ramiro Zuleta MD;  Location: [facility] 2ND FLR;  Service: ENT;  Laterality: Right;  COCHLEAR AMERICAS    ROBOT-ASSISTED LAPAROSCOPIC ABDOMINAL HYSTERECTOMY USING DA NATALYA XI N/A 2019    Procedure: XI ROBOTIC HYSTERECTOMY;  Surgeon: Trice Lei MD;  Location: [facility];  Service: OB/GYN;  Laterality: N/A;    ROBOT-ASSISTED LAPAROSCOPIC NEPHRECTOMY Right 10/8/2020    Procedure:  ROBOTIC NEPHRECTOMY;  Surgeon: Douglas Abraham MD;  Location: [facility] [place] [address] [place] [address];  Service: Urology;  Laterality: Right;  3.5hrs gen with regional     ROBOT-ASSISTED LAPAROSCOPIC SALPINGO-OOPHORECTOMY USING DA NATALYA XI Bilateral 7/5/2019    Procedure: XI ROBOTIC SALPINGO-OOPHORECTOMY;  Surgeon: Trice Lei MD;  Location: [facility];  Service: OB/GYN;  Laterality: Bilateral;    TUBAL LIGATION      Vascular access surgeries      x5       Review of patient's allergies indicates:  No Known Allergies  Current Facility-Administered Medications   Medication Frequency    0.9%  NaCl infusion Once    atorvastatin tablet 40 mg Daily    calcium gluconate 1 g in NS IVPB (premixed) Q10 Min PRN    dextrose 10% bolus 500 mL 500 mL Once    And    dextrose 10% bolus 250 mL 250 mL PRN    heparin (porcine) injection 5,000 Units Q8H    sodium bicarbonate solution 50 mEq Once    sodium chloride 0.9% flush 10 mL PRN     Family History       Problem Relation (Age of Onset)    Cancer Mother, Sister, Sister    Diabetes Sister, Brother    Lung cancer Sister    Macular degeneration Sister    Ovarian cancer Mother, Sister, Sister          Tobacco Use    Smoking status: Never    Smokeless tobacco: Never   Substance and Sexual Activity    Alcohol use: No    Drug use: No    Sexual activity: Yes     Partners: Male     Birth control/protection: Post-menopausal     Review of Systems   Constitutional: Negative.    HENT: Negative.     Eyes: Negative.    Respiratory:  Positive for shortness of breath.    Cardiovascular: Negative.    Gastrointestinal: Negative.    Genitourinary:  Positive for decreased urine volume.   Musculoskeletal: Negative.    Skin: Negative.    Neurological: Negative.    Psychiatric/Behavioral: Negative.       Objective:     Vital Signs (Most Recent):  Temp: 97.5 °F (36.4 °C) (09/29/23 1103)  Pulse: 71 (09/29/23 1230)  Resp: (!) 26 (09/29/23 1218)  BP: (!) 153/67 (09/29/23 1230)  SpO2: (!) 94 % (09/29/23 1218)  Vital Signs (24h Range):  Temp:  [97.5 °F (36.4 °C)-97.6 °F (36.4 °C)] 97.5 °F (36.4 °C)  Pulse:  [49-71] 71  Resp:  [11-26] 26  SpO2:  [89 %-99 %] 94 %  BP: ()/(52-71) 153/67     Weight: 85 kg (187 lb 6.3 oz) (09/29/23 1103)  Body mass index is 32.17 kg/m².  Body surface area is 1.96 meters squared.    No intake/output data recorded.     Physical Exam  Vitals and nursing note reviewed.   HENT:      Mouth/Throat:      Pharynx: Oropharynx is clear.   Eyes:      Conjunctiva/sclera: Conjunctivae normal.   Cardiovascular:      Rate and Rhythm: Normal rate.      Pulses: Normal pulses.      Arteriovenous access: Right arteriovenous access is present.     Comments: JOCELYN AVF   Pulmonary:      Effort: Pulmonary effort is normal.   Abdominal:      Palpations: Abdomen is soft.   Musculoskeletal:      Cervical back: Neck supple.      Right lower leg: Edema present.      Left lower leg: Edema present.   Skin:     General: Skin is dry.   Neurological:      Mental Status: She is alert and oriented to person, place, and time.   Psychiatric:         Mood and Affect: Mood normal.          Significant Labs:  CBC:   Recent Labs   Lab 09/29/23  0753   WBC 8.45   RBC 3.53*   HGB 9.4*   HCT 29.8*   *   MCV 84   MCH 26.6*   MCHC 31.5*     CMP:   Recent Labs   Lab 09/29/23  0753   *   CALCIUM 9.3   ALBUMIN 3.3*   PROT 7.2      K 8.0*   CO2 22*      BUN 66*   CREATININE 12.7*   ALKPHOS 104   ALT 7*   AST 14   BILITOT 0.9     All labs within the past 24 hours have been reviewed.        Assessment/Plan:     Renal/  * Acute hyperkalemia  -Emergent HD   See esrd     ESRD (end stage renal disease) on dialysis  Pt admitted with hyperkalemia 8.0 Shifted in ED. Hypotensive 80s/50s. HR 30s. EKG noted with junctional bradycardia. Atropine given in ED. Pt transferred to ICU. HR now 50s and SBP 100s-110s. Lactic 2.5. Pending blood cultures.     ESRD on hemodialysis  Nephrology History  iHD Schedule: MWF   Unit/MD:   [address]   Duration: 3 hours  UF: 1-2  EDW: ?  Access: RAVF  Residual Renal Function: minimal   Last HD prior to admission: 9/25/23     Assessment:   - Emergent HD for metabolic clearance and volume management UF goal 1L as tolerated   -Seen on HD, tolerating well. UF goal 1-2L as tolerated  - Will eval HD needs daily   - Dialysate adjusted to current labs   - Will obtain OP dialysis records  - Continue to monitor intake and output, daily weights   - Avoid nephrotoxic medication and renal dose medications to GFR  -Renal diet if not NPO         Thank you for your consult. I will follow-up with patient. Please contact us if you have any additional questions.    Clara Busby DNP  Nephrology  [facility] - Cardiac Medical ICU

## 2023-09-29 NOTE — PROGRESS NOTES
Pt arrived from ER to MICU by stretcher. Right arm AVF cannulated X 2 without difficulty. BS Dialysis initiated without difficulty.

## 2023-09-29 NOTE — ASSESSMENT & PLAN NOTE
Pt admitted with hyperkalemia 8.0 Shifted in ED. Hypotensive 80s/50s. HR 30s. EKG noted with junctional bradycardia. Atropine given in ED. Pt transferred to ICU. HR now 50s and SBP 100s-110s. Lactic 2.5. Pending blood cultures.     ESRD on hemodialysis  Nephrology History  iHD Schedule: [facility]   Unit/MD:  [address]   Duration: 3 hours  UF: 1-2  EDW: ?  Access: RAVF  Residual Renal Function: minimal   Last HD prior to admission: 9/25/23     Assessment:   - Emergent HD for metabolic clearance and volume management UF goal 1L as tolerated   -Seen on HD, tolerating well. UF goal 1-2L as tolerated  - Will eval HD needs daily   - Dialysate adjusted to current labs   - Will obtain OP dialysis records  - Continue to monitor intake and output, daily weights   - Avoid nephrotoxic medication and renal dose medications to GFR  -Renal diet if not NPO

## 2023-09-29 NOTE — HPI
58 y.o. female w/ h/o ESRD on HD MWF, HFpEF (60%), DM2, HTN; p/w hypotension noted at HD clinic.  She missed HD on 09/27 Wed 2/2 transportation limitations as no one showed up.  She is otherwise compliant w/ HD & hasn't previously missed an appt.  When she presented to HD clinic, she was found to be hypotensive w/ BPs in 80s over 50s.  EMS was called & reports that she remained hypotensive & bradycardic en route w/ HR in 40s to 50s.  No neds were given.  Upon arrival to ED, pt c/o SOB, lightheadedness, fatigue.  Daughter not initially at bedside but presented shortly after & reported that pt seemed a bit more tired & confused from b/l but not completely off.    ED course:  Upon arrival patient hypotensive with a systolic blood pressures 89/69 .  Patient bradycardic with heart rate of 38 a monitor.  EKG obtained was notable for junctional bradycardia.  Patient alert and oriented but more fatigued and reporting lightheadedness.  Patient placed on pads.  At this time patient given atropine and heart rate improved but remained junctional bradycardia.  Given hypotension and junctional bradycardia concern for electrolyte derangements.  Patient presented hypotensive sepsis on the differential though less likely given bradycardia and missed dialysis with no complaints of shortness of breath, cough congestion urinary symptoms, abdominal pain.  Chest x-ray obtained with pleural effusion and pulmonary edema.  Labs notable for hyperkalemia of 8 on CMP.  VBG obtained with no acidosis noted.  Lactic acid of 2.5 likely secondary from hyperkalemia.     I-STAT Chem 8 obtained and was notable for a potassium 7.8.  Given cardiac changes calcium gluconate ordered.  Patient also shifted with albuterol and insulin 10 with dextrose ordered and frequent glucose checks.  At this time patient's blood pressure improved at low systolics of 100.  Discussed with nephrology with plan for dialysis.  Given soft blood pressures, hyperkalemia of 8  noted on CMP and requirement for emergent dialysis discussed with ICU given possibility of requiring SLED.  ICU evaluated patient and plan for admission.  Plan discussed with patient and daughter who is at bedside.  Agreeable with plan.  Patient admitted to ICU.    On MICU eval upon admission to unit, pt on HD w/ significant improvement in Sxs, notably SOB.  Hypotension improved.  Pt admits to eating bananas, however denies orange, avocado, coconut water consumption.  Denies fevers/chills, CP/palpitations, SOB/cough, abdominal pain, nausea/vomiting, constipation/diarrhea, dysuria/hematuria, melena/hematochezia, weakness/numbness/tingling, HA/presyncope/syncope.

## 2023-09-29 NOTE — CONSULTS
Critical Care team consulted. Patient seen and examined at bedside.   To be admitted under MICU Team 1.  Full H&P to follow.    Malik Velázquez MD  [employer] Pulmonary Critical Care Medicine Fellow

## 2023-09-29 NOTE — ASSESSMENT & PLAN NOTE
Symptomatic bradycardia  ESRD (end stage renal disease) on dialysis    58F w/ ESRD on HD; missed HD on 09/27; p/w hypotension at HD clinic on 09/29; Sxs SOB, fatigue, lightheadedness, confusion per daughter.  Sxs greatly improved while on emergent HD.  On ED arrival, hypotensive & in junctional bradycardia on ECG.  Labs w/ K 8.0.  Given hyperkalemia w/ ECG changes, pt admitted to MICU for close monitoring & emergent HD.  Insulin, Albuterol, Ca-gluconate given.    - Nephrology consulted for emergent HD  - trend K  - hold Sevelamer 2/2 hypophosphatemia; resume PRN  - cardiac tele monitoring  - can step-down once K stable

## 2023-09-29 NOTE — SUBJECTIVE & OBJECTIVE
Past Medical History:   Diagnosis Date    Acute on chronic diastolic heart failure 10/8/2021    Anemia of chronic disorder 2017    Overview:  Added automatically from request for surgery [id number]    Anemia of chronic renal failure, stage 5     Anxiety     Cyst of left ovary 2019    Cyst of left ovary 2019    Dyslipidemia 2013    End-stage renal disease on hemodialysis 2020    ESRD on hemodialysis 2017    Hematuria 10/30/2019    Hx of sinus bradycardia 2017    Hyperkalemia 3/29/2017    Hypertensive urgency 2021    Peripheral neuropathy 2013    Preoperative testing 10/8/2020    Renovascular hypertension 2020    S/P dilation and curettage 2018    Thickened endometrium 2018    Thyroid nodule     Thyroid nodule 2021    Uncontrolled type 2 diabetes mellitus 2013    Overview:  poc a1c today 11.2-270/ up from 10.8-263, Pt. has very very stressed/ was incarcerated/marital stress/ will current meds/ less stress now/ will monitor.       Past Surgical History:   Procedure Laterality Date    AV FISTULA PLACEMENT      right arm     SECTION      x1    COLONOSCOPY N/A 2018    Procedure: COLONOSCOPY;  Surgeon: Pankaj Hinojosa MD;  Location: [facility] (4TH FLR);  Service: Endoscopy;  Laterality: N/A;  dialysis pt/labs prior to colon/MS    CYSTOSCOPY W/ RETROGRADES Bilateral 2020    Procedure: CYSTOSCOPY, WITH RETROGRADE PYELOGRAM;  Surgeon: Douglas Abraham MD;  [facility];  Service: Urology;  Laterality: Bilateral;  45 min    FISTULOGRAM Right 2020    Procedure: Fistulogram;  Surgeon: Lester Gómez MD;  Location: [facility] CATH LAB;  Service: Nephrology;  Laterality: Right;    HYSTERECTOMY      IMPLANTATION OF COCHLEAR PROSTHESIS Right 2021    Procedure: INSERTION, PROSTHESIS, COCHLEAR;  Surgeon: Ramiro Zuleta MD;  [facility];  Service: ENT;  Laterality: Right;  COCHLEAR BABAK    IMPLANTATION OF COCHLEAR PROSTHESIS  Right 6/30/2021    Procedure: INSERTION, PROSTHESIS, COCHLEAR;  Surgeon: Ramiro Zuleta MD;  Location: [facility] OR 2ND FLR;  Service: ENT;  Laterality: Right;  COCHLEAR AMERICAS    ROBOT-ASSISTED LAPAROSCOPIC ABDOMINAL HYSTERECTOMY USING DA NATALYA XI N/A 7/5/2019    Procedure: XI ROBOTIC HYSTERECTOMY;  Surgeon: Trice Lei MD;  Location: [facility];  Service: OB/GYN;  Laterality: N/A;    ROBOT-ASSISTED LAPAROSCOPIC NEPHRECTOMY Right 10/8/2020    Procedure: ROBOTIC NEPHRECTOMY;  Surgeon: Douglas Abraham MD;  Location: [facility] OR 2ND FLR;  Service: Urology;  Laterality: Right;  3.5hrs gen with regional     ROBOT-ASSISTED LAPAROSCOPIC SALPINGO-OOPHORECTOMY USING DA NATALYA XI Bilateral 7/5/2019    Procedure: XI ROBOTIC SALPINGO-OOPHORECTOMY;  Surgeon: Trice Lei MD;  Location: [facility];  Service: OB/GYN;  Laterality: Bilateral;    TUBAL LIGATION      Vascular access surgeries      x5       Review of patient's allergies indicates:  No Known Allergies  Current Facility-Administered Medications   Medication Frequency    0.9%  NaCl infusion Once    atorvastatin tablet 40 mg Daily    calcium gluconate 1 g in NS IVPB (premixed) Q10 Min PRN    dextrose 10% bolus 500 mL 500 mL Once    And    dextrose 10% bolus 250 mL 250 mL PRN    heparin (porcine) injection 5,000 Units Q8H    sodium bicarbonate solution 50 mEq Once    sodium chloride 0.9% flush 10 mL PRN     Family History       Problem Relation (Age of Onset)    Cancer Mother, Sister, Sister    Diabetes Sister, Brother    Lung cancer Sister    Macular degeneration Sister    Ovarian cancer Mother, Sister, Sister          Tobacco Use    Smoking status: Never    Smokeless tobacco: Never   Substance and Sexual Activity    Alcohol use: No    Drug use: No    Sexual activity: Yes     Partners: Male     Birth control/protection: Post-menopausal     Review of Systems   Constitutional: Negative.    HENT: Negative.     Eyes: Negative.    Respiratory:  Positive for shortness of breath.     Cardiovascular: Negative.    Gastrointestinal: Negative.    Genitourinary:  Positive for decreased urine volume.   Musculoskeletal: Negative.    Skin: Negative.    Neurological: Negative.    Psychiatric/Behavioral: Negative.       Objective:     Vital Signs (Most Recent):  Temp: 97.5 °F (36.4 °C) (09/29/23 1103)  Pulse: 71 (09/29/23 1230)  Resp: (!) 26 (09/29/23 1218)  BP: (!) 153/67 (09/29/23 1230)  SpO2: (!) 94 % (09/29/23 1218) Vital Signs (24h Range):  Temp:  [97.5 °F (36.4 °C)-97.6 °F (36.4 °C)] 97.5 °F (36.4 °C)  Pulse:  [49-71] 71  Resp:  [11-26] 26  SpO2:  [89 %-99 %] 94 %  BP: ()/(52-71) 153/67     Weight: 85 kg (187 lb 6.3 oz) (09/29/23 1103)  Body mass index is 32.17 kg/m².  Body surface area is 1.96 meters squared.    No intake/output data recorded.     Physical Exam  Vitals and nursing note reviewed.   HENT:      Mouth/Throat:      Pharynx: Oropharynx is clear.   Eyes:      Conjunctiva/sclera: Conjunctivae normal.   Cardiovascular:      Rate and Rhythm: Normal rate.      Pulses: Normal pulses.      Arteriovenous access: Right arteriovenous access is present.     Comments: JOCELYN AVF   Pulmonary:      Effort: Pulmonary effort is normal.   Abdominal:      Palpations: Abdomen is soft.   Musculoskeletal:      Cervical back: Neck supple.      Right lower leg: Edema present.      Left lower leg: Edema present.   Skin:     General: Skin is dry.   Neurological:      Mental Status: She is alert and oriented to person, place, and time.   Psychiatric:         Mood and Affect: Mood normal.          Significant Labs:  CBC:   Recent Labs   Lab 09/29/23  0753   WBC 8.45   RBC 3.53*   HGB 9.4*   HCT 29.8*   *   MCV 84   MCH 26.6*   MCHC 31.5*     CMP:   Recent Labs   Lab 09/29/23  0753   *   CALCIUM 9.3   ALBUMIN 3.3*   PROT 7.2      K 8.0*   CO2 22*      BUN 66*   CREATININE 12.7*   ALKPHOS 104   ALT 7*   AST 14   BILITOT 0.9     All labs within the past 24 hours have been  reviewed.

## 2023-09-29 NOTE — Clinical Note
Diagnosis: Bradycardia [180493]   Admitting Provider:: RAVI KIMBLE [[id number]]   Future Attending Provider: RAVI KIMBLE [[id number]]   Reason for IP Medical Treatment  (Clinical interventions that can only be accomplished in the IP setting? ) :: Emergent dialysis, bradycardia, hypotension   I certify that Inpatient services for greater than or equal to 2 midnights are medically necessary:: Yes   Plans for Post-Acute care--if anticipated (pick the single best option):: A. No post acute care anticipated at this time   Special Needs:: No Special Needs [1]

## 2023-09-29 NOTE — PROGRESS NOTES
0930 At bedside for dialysis, pt to be moved to ICU prior to start of treatment. DIAMOND Hurtado NP aware.

## 2023-09-29 NOTE — ED PROVIDER NOTES
Encounter Date: 9/29/2023       History     Chief Complaint   Patient presents with    Hypotension     Arrives via EMS with c/o chest pain, was at Dialysis today and was found to be hypotension 80s/50, received ASA in route, chest pain 8/10, missed last session of HD      57 YO female with multiple comorbidities including ESRD on HD MWF, CHF, hypertension, DM 2 presents via EMS for hypotension.  Patient had missed dialysis on Wednesday due to transportation limitations as no one showed up.  Patient is compliant with dialysis and has not previously missed an appointment.  And presented to the dialysis clinic and was found to be hypotensive with systolic blood pressures in the 80s over 50s.  EMS reports that patient remained hypotensive and bradycardic in route with a heart rates in the 40 to 50s.  No medications were administered.  Upon arrival patient is complaining of shortness of breath, lightheadedness, fatigue.  Daughter not initially at bedside but presented shortly after and reported that mother seems a bit more tired and confused from baseline but not completely off from baseline.     The history is provided by the patient.     Review of patient's allergies indicates:  No Known Allergies  Past Medical History:   Diagnosis Date    Acute on chronic diastolic heart failure 10/8/2021    Anemia of chronic disorder 11/8/2017    Overview:  Added automatically from request for surgery [id number]    Anemia of chronic renal failure, stage 5     Anxiety     Cyst of left ovary 5/14/2019    Cyst of left ovary 7/5/2019    Dyslipidemia 1/4/2013    End-stage renal disease on hemodialysis 8/20/2020    ESRD on hemodialysis 9/28/2017    Hematuria 10/30/2019    Hx of sinus bradycardia 9/28/2017    Hyperkalemia 3/29/2017    Hypertensive urgency 7/22/2021    Peripheral neuropathy 1/4/2013    Preoperative testing 10/8/2020    Renovascular hypertension 8/17/2020    S/P dilation and curettage 5/22/2018    Thickened endometrium 5/22/2018     Thyroid nodule     Thyroid nodule 2021    Uncontrolled type 2 diabetes mellitus 2013    Overview:  poc a1c today 11.2-270/ up from 10.8-263, Pt. has very very stressed/ was incarcerated/marital stress/ will current meds/ less stress now/ will monitor.     Past Surgical History:   Procedure Laterality Date    AV FISTULA PLACEMENT      right arm     SECTION      x1    COLONOSCOPY N/A 2018    Procedure: COLONOSCOPY;  Surgeon: Pankaj Hinojosa MD;  Location: [facility] (4TH FLR);  Service: Endoscopy;  Laterality: N/A;  dialysis pt/labs prior to colon/MS    CYSTOSCOPY W/ RETROGRADES Bilateral 2020    Procedure: CYSTOSCOPY, WITH RETROGRADE PYELOGRAM;  Surgeon: Douglas Abraham MD;  Location: [facility] 1ST FLR;  Service: Urology;  Laterality: Bilateral;  45 min    FISTULOGRAM Right 2020    Procedure: Fistulogram;  Surgeon: Lester Gómez MD;  Location: [facility] CATH LAB;  Service: Nephrology;  Laterality: Right;    HYSTERECTOMY      IMPLANTATION OF COCHLEAR PROSTHESIS Right 2021    Procedure: INSERTION, PROSTHESIS, COCHLEAR;  Surgeon: Ramiro Zuleta MD;  [facility] [facility] [place] 2ND [facility];  Service: ENT;  Laterality: Right;  COCHLEAR BABAK    IMPLANTATION OF COCHLEAR PROSTHESIS Right 2021    Procedure: INSERTION, PROSTHESIS, COCHLEAR;  Surgeon: Ramiro Zuleta MD;  Location: [facility] [place] 2ND FLR;  Service: ENT;  Laterality: Right;  COCHLEAR AMERICAS    ROBOT-ASSISTED LAPAROSCOPIC ABDOMINAL HYSTERECTOMY USING DA NATALYA XI N/A 2019    Procedure: XI ROBOTIC HYSTERECTOMY;  Surgeon: Trice Lei MD;  Location: [facility];  Service: OB/GYN;  Laterality: N/A;    ROBOT-ASSISTED LAPAROSCOPIC NEPHRECTOMY Right 10/8/2020    Procedure: ROBOTIC NEPHRECTOMY;  Surgeon: Douglas Abraham MD;  [facility]: [facility] 2ND F[facility];  Service: Urology;  Laterality: Right;  3.5hrs gen with regional     ROBOT-ASSISTED LAPAROSCOPIC SALPINGO-OOPHORECTOMY USING DA NATALYA XI Bilateral 2019    Procedure: XI ROBOTIC  SALPINGO-OOPHORECTOMY;  Surgeon: Trice Lei MD;  Location: [facility];  Service: OB/GYN;  Laterality: Bilateral;    TUBAL LIGATION      Vascular access surgeries      x5     Family History   Problem Relation Age of Onset    Cancer Mother     Ovarian cancer Mother     Cancer Sister     Ovarian cancer Sister     Lung cancer Sister     Macular degeneration Sister     Cancer Sister     Ovarian cancer Sister     Diabetes Sister     Diabetes Brother     Breast cancer Neg Hx     Colon cancer Neg Hx     Cervical cancer Neg Hx     Endometrial cancer Neg Hx     Vaginal cancer Neg Hx     Thyroid disease Neg Hx     Glaucoma Neg Hx     Retinal detachment Neg Hx      Social History     Tobacco Use    Smoking status: Never    Smokeless tobacco: Never   Substance Use Topics    Alcohol use: No    Drug use: No     Review of Systems   Constitutional:  Positive for fatigue.   Respiratory:  Positive for chest tightness, shortness of breath and wheezing.    Cardiovascular:  Negative for chest pain and palpitations.   Gastrointestinal:  Negative for abdominal distention.   Neurological:  Positive for light-headedness.   Psychiatric/Behavioral:  Positive for confusion.        Physical Exam     Initial Vitals [09/29/23 0729]   BP Pulse Resp Temp SpO2   (!) 89/69 (!) 50 18 97.6 °F (36.4 °C) 97 %      MAP       --         Physical Exam    Nursing note and vitals reviewed.  Constitutional: She appears well-developed and well-nourished.   HENT:   Head: Normocephalic and atraumatic.   Eyes: Pupils are equal, round, and reactive to light.   Neck:   Normal range of motion.  Cardiovascular:  Intact distal pulses.   Bradycardia present.         No murmur heard.  Pulmonary/Chest: No respiratory distress. She has rales. She exhibits no tenderness.   Abdominal: She exhibits no distension. There is no abdominal tenderness.   Musculoskeletal:         General: No edema.      Cervical back: Normal range of motion.     Neurological: She is alert and  oriented to person, place, and time. GCS score is 15. GCS eye subscore is 4. GCS verbal subscore is 5. GCS motor subscore is 6.   Skin: Skin is warm and dry.         ED Course   Procedures  Labs Reviewed   CBC W/ AUTO DIFFERENTIAL - Abnormal; Notable for the following components:       Result Value    RBC 3.53 (*)     Hemoglobin 9.4 (*)     Hematocrit 29.8 (*)     MCH 26.6 (*)     MCHC 31.5 (*)     RDW 16.2 (*)     Platelets 128 (*)     Eos # 0.8 (*)     Lymph % 11.2 (*)     Eosinophil % 9.3 (*)     All other components within normal limits   COMPREHENSIVE METABOLIC PANEL - Abnormal; Notable for the following components:    Potassium 8.0 (*)     CO2 22 (*)     Glucose 208 (*)     BUN 66 (*)     Creatinine 12.7 (*)     Albumin 3.3 (*)     ALT 7 (*)     eGFR 3.1 (*)     All other components within normal limits   PHOSPHORUS - Abnormal; Notable for the following components:    Phosphorus 2.3 (*)     All other components within normal limits   B-TYPE NATRIURETIC PEPTIDE - Abnormal; Notable for the following components:     (*)     All other components within normal limits   LACTIC ACID, PLASMA - Abnormal; Notable for the following components:    Lactate (Lactic Acid) 2.5 (*)     All other components within normal limits   ISTAT PROCEDURE - Abnormal; Notable for the following components:    POC PO2 35 (*)     All other components within normal limits   ISTAT PROCEDURE - Abnormal; Notable for the following components:    POC Glucose 201 (*)     POC BUN 65 (*)     POC Creatinine 14.7 (*)     POC Potassium 7.8 (*)     POC Hematocrit 28 (*)     All other components within normal limits   POCT GLUCOSE - Abnormal; Notable for the following components:    POCT Glucose 118 (*)     All other components within normal limits   CULTURE, BLOOD   CULTURE, BLOOD   MAGNESIUM   TROPONIN I   URINALYSIS, REFLEX TO URINE CULTURE   URINALYSIS, REFLEX TO URINE CULTURE   HIV 1 / 2 ANTIBODY   HEPATITIS C ANTIBODY   POCT GLUCOSE   POCT  GLUCOSE MONITORING CONTINUOUS   POCT GLUCOSE MONITORING CONTINUOUS     EKG Readings: (Independently Interpreted)   Initial Reading: No STEMI. Previous EKG: Compared with most recent EKG Heart Rate: 38.   Junctional bradycardia     ECG Results              EKG 12-lead (Final result)  Result time 09/29/23 08:57:42      Final result by Interface, Lab In [facility] (09/29/23 08:57:42)                   Narrative:    Test Reason : R00.1,    Vent. Rate : 042 BPM     Atrial Rate : 035 BPM     P-R Int : 000 ms          QRS Dur : 108 ms      QT Int : 566 ms       P-R-T Axes : 000 -27 057 degrees     QTc Int : 472 ms    Junctional bradycardia with occasional Premature ventricular complexes  Low voltage QRS  Incomplete right bundle branch block  Cannot rule out Anterior infarct ,age undetermined  Abnormal ECG  When compared with ECG of 22-SEP-2022 10:03,  Junctional bradycardia has replaced sinus rhythm  QRS voltage has decreased  Minimal criteria for Anterior infarct are now Present  Confirmed by VIRAJ JOSÉ MD ([id number]) on 9/29/2023 8:57:34 AM    Referred By: System System           Confirmed By:VIRAJ JOSÉ MD                                  Imaging Results              X-Ray Chest AP Portable (Final result)  Result time 09/29/23 08:43:38      Final result by Daniel Seymour MD (09/29/23 08:43:38)                   Impression:      Pulmonary vascular congestion and the right lower lobe airspace disease.      Electronically signed by: Daniel Seymour MD  Date:    09/29/2023  Time:    08:43               Narrative:    EXAMINATION:  XR CHEST AP PORTABLE    CLINICAL HISTORY:  Sepsis;    TECHNIQUE:  Single frontal view of the chest was performed.    COMPARISON:  05/09/2023    FINDINGS:  Mild cardiomegaly and pulmonary vascular congestion.  Ill-defined patchy airspace disease at the right lung base possible edema however superimposed a infectious process as to be considered.  No significant pleural effusion.  The lung apices are  clear.                                       Medications   calcium gluconate 1 g in NS IVPB (premixed) (0 g Intravenous Stopped 9/29/23 0825)     And   calcium gluconate 1 g in NS IVPB (premixed) (0 g Intravenous Stopped 9/29/23 1020)   dextrose 10% bolus 500 mL 500 mL (500 mLs Intravenous Not Given 9/29/23 0900)     And   dextrose 10% bolus 250 mL 250 mL (0 mLs Intravenous Stopped 9/29/23 0828)     And   insulin regular injection 10 Units 0.1 mL (10 Units Intravenous Given 9/29/23 0827)   0.9%  NaCl infusion ( Intravenous Not Given 9/29/23 1000)   calcium gluconate 1 g in NS IVPB (premixed) (has no administration in time range)   sodium chloride 0.9% flush 10 mL (has no administration in time range)   sodium bicarbonate solution 50 mEq (has no administration in time range)   albuterol sulfate nebulizer solution 10 mg (10 mg Nebulization Given 9/29/23 0804)   albuterol sulfate nebulizer solution 10 mg (10 mg Nebulization Given 9/29/23 1021)     Medical Decision Making  Patient is a 58-year-old female presenting to the emergency department for evaluation for hypotension sent from dialysis clinic.  Upon arrival patient hypotensive with a systolic blood pressures 89/69 .  Patient bradycardic with heart rate of 38 a monitor.  EKG obtained was notable for junctional bradycardia.  Patient alert and oriented but more fatigued and reporting lightheadedness.  Patient placed on pads.  At this time patient given atropine and heart rate improved but remained junctional bradycardia.  Given hypotension and junctional bradycardia concern for electrolyte derangements.  Patient presented hypotensive sepsis on the differential though less likely given bradycardia and missed dialysis with no complaints of shortness of breath, cough congestion urinary symptoms, abdominal pain.  Chest x-ray obtained with pleural effusion and pulmonary edema.  Labs notable for hyperkalemia of 8 on CMP.  VBG obtained with no acidosis noted.  Lactic acid  of 2.5 likely secondary from hyperkalemia.    I-STAT Chem 8 obtained and was notable for a potassium 7.8.  Given cardiac changes calcium gluconate ordered.  Patient also shifted with albuterol and insulin 10 with dextrose ordered and frequent glucose checks.  At this time patient's blood pressure improved at low systolics of 100.  Discussed with nephrology with plan for dialysis.  Given soft blood pressures, hyperkalemia of 8 noted on CMP and requirement for emergent dialysis discussed with ICU given possibility of requiring SLED.  ICU evaluated patient and plan for admission.  Plan discussed with patient and daughter who is at bedside.  Agreeable with plan.  Patient admitted to ICU.    Amount and/or Complexity of Data Reviewed  Labs: ordered.  Radiology: ordered.    Risk  OTC drugs.  Prescription drug management.  Decision regarding hospitalization.                               Clinical Impression:   Final diagnoses:  [R00.1] Bradycardia  [I95.9] Hypotension, unspecified hypotension type (Primary)  [E87.5] Hyperkalemia        ED Disposition Condition    Admit                 Mckenna Christensen MD  Resident  09/29/23 3692

## 2023-09-29 NOTE — PLAN OF CARE
[facility] - Cardiac Medical ICU  Initial Discharge Assessment       Primary Care Provider: Susan Cruz MD    Admission Diagnosis: Hyperkalemia [E87.5]  Bradycardia [R00.1]  Hypotension, unspecified hypotension type [I95.9]    Admission Date: 9/29/2023  Expected Discharge Date: 10/2/2023    Transition of Care Barriers: None    Payor: HUMANA MANAGED MEDICARE / Plan: HUMANA SNP HMO PPO SPECIAL NEEDS / Product Type: Medicare Advantage /     Extended Emergency Contact Information  Primary Emergency Contact: Sudha Hall   [place]  Mobile Phone: [phone]  Relation: Daughter  Secondary Emergency Contact: Curtis Rodriguez  Home Phone: [phone]  Relation: Spouse    Discharge Plan A: Home  Discharge Plan B: Home      [id number] [address] [facility] [address]  [address] [facility] [address]  Phone: [phone] Fax: [phone]    [facility] [address] [place], [place] - [address]  [address]  [id number]  [facility] [address]  Phone: [phone] Fax: [phone]      Transferred from:     Past Medical History:   Diagnosis Date    Acute on chronic diastolic heart failure 10/8/2021    Anemia of chronic disorder 11/8/2017    Overview:  Added automatically from request for surgery [id number]    Anemia of chronic renal failure, stage 5     Anxiety     Cyst of left ovary 5/14/2019    Cyst of left ovary 7/5/2019    Dyslipidemia 1/4/2013    End-stage renal disease on hemodialysis 8/20/2020    ESRD on hemodialysis 9/28/2017    Hematuria 10/30/2019    Hx of sinus bradycardia 9/28/2017    Hyperkalemia 3/29/2017    Hypertensive urgency 7/22/2021    Peripheral neuropathy 1/4/2013    Preoperative testing 10/8/2020    Renovascular hypertension 8/17/2020    S/P dilation and curettage 5/22/2018    Thickened endometrium 5/22/2018    Thyroid nodule     Thyroid nodule 12/13/2021    Uncontrolled type 2 diabetes mellitus  4/5/2013    Overview:  poc a1c today 11.2-270/ up from 10.8-263, Pt. has very very stressed/ was incarcerated/marital stress/ will current meds/ less stress now/ will monitor.         CM met with patient  in room for Discharge Planning Assessment.  Patient was able to answer questions.  Per patient, she lives alone in a 3rd floor apartment with elevator to enter.   Per patient, she was independent with ADLS and used no DME for ambulation. Per patien, she is on dialysis with [facility], and does not take Coumadin.  Patient will have help from Sudha Hall (daughter) [phone] upon discharge.   Discharge Planning Booklet given to patient/family and discussed.  All questions addressed.  CM will follow for needs.      Initial Assessment (most recent)       Adult Discharge Assessment - 09/29/23 1617          Discharge Assessment    Assessment Type Discharge Planning Assessment     Confirmed/corrected address, phone number and insurance Yes     Confirmed Demographics Correct on Facesheet     Source of Information patient     When was your last doctors appointment? 07/17/23     Communicated MEGAN with patient/caregiver Date not available/Unable to determine     Reason For Admission Acute hyperkalemia     People in Home alone     Facility Arrived From: [facility]     Do you expect to return to your current living situation? Yes     Do you have help at home or someone to help you manage your care at home? Yes     Who are your caregiver(s) and their phone number(s)? Sudha Hall (daughter) [phone], Curtis Rodriguez (s/o) [phone]     Prior to hospitilization cognitive status: Alert/Oriented     Current cognitive status: Not Oriented to Time     Equipment Currently Used at Home none     Readmission within 30 days? No     Patient currently being followed by outpatient case management? No     Do you currently have service(s) that help you manage your care at home? No     Do you take prescription medications?  Yes     Do you have prescription coverage? Yes     Coverage HUMANA MANAGED MEDICARE - HUMANA [facility] HMO PPO SPECIAL NEEDS     Do you have any problems affording any of your prescribed medications? No     Is the patient taking medications as prescribed? yes     Who is going to help you get home at discharge? Sudha Hall (daughter) [phone]     How do you get to doctors appointments? family or friend will provide     Are you on dialysis? Yes     Dialysis Name and Scheduled days [facility]     Do you take coumadin? No     DME Needed Upon Discharge  other (see comments)   TBD    Discharge Plan discussed with: Patient     Transition of Care Barriers None     Discharge Plan A Home     Discharge Plan B Home        Physical Activity    On average, how many days per week do you engage in moderate to strenuous exercise (like a brisk walk)? 0 days     On average, how many minutes do you engage in exercise at this level? 0 min        Financial Resource Strain    How hard is it for you to pay for the very basics like food, housing, medical care, and heating? Somewhat hard        Housing Stability    In the last 12 months, was there a time when you were not able to pay the mortgage or rent on time? No     In the last 12 months, how many places have you lived? 1     In the last 12 months, was there a time when you did not have a steady place to sleep or slept in a shelter (including now)? No        Transportation Needs    In the past 12 months, has lack of transportation kept you from medical appointments or from getting medications? No     In the past 12 months, has lack of transportation kept you from meetings, work, or from getting things needed for daily living? No        Food Insecurity    Within the past 12 months, you worried that your food would run out before you got the money to buy more. Never true     Within the past 12 months, the food you bought just didn't last and you didn't have money to get more. Never true         Stress    Do you feel stress - tense, restless, nervous, or anxious, or unable to sleep at night because your mind is troubled all the time - these days? To some extent        Social Connections    In a typical week, how many times do you talk on the phone with family, friends, or neighbors? More than three times a week     How often do you get together with friends or relatives? More than three times a week     How often do you attend Adventist or Moravian services? More than 4 times per year     Do you belong to any clubs or organizations such as Adventist groups, unions, fraternal or athletic groups, or school groups? No     How often do you attend meetings of the clubs or organizations you belong to? Never     Are you , , , , never , or living with a partner? --   single       Alcohol Use    Q1: How often do you have a drink containing alcohol? Never     Q2: How many drinks containing alcohol do you have on a typical day when you are drinking? Patient does not drink     Q3: How often do you have six or more drinks on one occasion? Never                            PCP:  Susan Cruz MD  [phone]        Pharmacy:    [id number] [address] [address]  Phone: [phone] Fax: [phone]    [facility] [address] [place], [place] - [address]  [address]  [id number]  [facility] [address]  Phone: [phone] Fax: [phone]        Emergency Contacts:  Extended Emergency Contact Information  Primary Emergency Contact: Sudha Hall   [place]  Mobile Phone: [phone]  Relation: Daughter  Secondary Emergency Contact: MichaelCurtis  Home Phone: [phone]  Relation: Spouse      Insurance:    Payor: HUMANA MANAGED MEDICARE / Plan: HUMANA [facility] [place] HMO PPO SPECIAL NEEDS / Product Type: Medicare Advantage /     Munira Uriarte RN      [phone]      09/29/2023  4:25 PM

## 2023-09-30 VITALS
BODY MASS INDEX: 31.99 KG/M2 | HEART RATE: 84 BPM | SYSTOLIC BLOOD PRESSURE: 166 MMHG | HEIGHT: 64 IN | RESPIRATION RATE: 18 BRPM | WEIGHT: 187.38 LBS | DIASTOLIC BLOOD PRESSURE: 77 MMHG | TEMPERATURE: 98 F | OXYGEN SATURATION: 100 %

## 2023-09-30 PROBLEM — N18.6 ESRD (END STAGE RENAL DISEASE): Status: ACTIVE | Noted: 2023-09-30

## 2023-09-30 LAB
ALBUMIN SERPL BCP-MCNC: 3 G/DL (ref 3.5–5.2)
ALP SERPL-CCNC: 93 U/L (ref 55–135)
ALT SERPL W/O P-5'-P-CCNC: 6 U/L (ref 10–44)
ANION GAP SERPL CALC-SCNC: 9 MMOL/L (ref 8–16)
AST SERPL-CCNC: 11 U/L (ref 10–40)
BASOPHILS # BLD AUTO: 0.06 K/UL (ref 0–0.2)
BASOPHILS NFR BLD: 0.9 % (ref 0–1.9)
BILIRUB SERPL-MCNC: 0.9 MG/DL (ref 0.1–1)
BUN SERPL-MCNC: 10 MG/DL (ref 6–20)
BUN SERPL-MCNC: 37 MG/DL (ref 6–20)
CALCIUM SERPL-MCNC: 9.3 MG/DL (ref 8.7–10.5)
CHLORIDE SERPL-SCNC: 104 MMOL/L (ref 95–110)
CO2 SERPL-SCNC: 24 MMOL/L (ref 23–29)
CREAT SERPL-MCNC: 8.6 MG/DL (ref 0.5–1.4)
DIFFERENTIAL METHOD: ABNORMAL
EOSINOPHIL # BLD AUTO: 0.7 K/UL (ref 0–0.5)
EOSINOPHIL NFR BLD: 10.9 % (ref 0–8)
ERYTHROCYTE [DISTWIDTH] IN BLOOD BY AUTOMATED COUNT: 16.4 % (ref 11.5–14.5)
EST. GFR  (NO RACE VARIABLE): 4.9 ML/MIN/1.73 M^2
GLUCOSE SERPL-MCNC: 86 MG/DL (ref 70–110)
HCT VFR BLD AUTO: 26.9 % (ref 37–48.5)
HGB BLD-MCNC: 8.8 G/DL (ref 12–16)
IMM GRANULOCYTES # BLD AUTO: 0.02 K/UL (ref 0–0.04)
IMM GRANULOCYTES NFR BLD AUTO: 0.3 % (ref 0–0.5)
LYMPHOCYTES # BLD AUTO: 1.4 K/UL (ref 1–4.8)
LYMPHOCYTES NFR BLD: 20.7 % (ref 18–48)
MAGNESIUM SERPL-MCNC: 2.3 MG/DL (ref 1.6–2.6)
MCH RBC QN AUTO: 25.8 PG (ref 27–31)
MCHC RBC AUTO-ENTMCNC: 32.7 G/DL (ref 32–36)
MCV RBC AUTO: 79 FL (ref 82–98)
MONOCYTES # BLD AUTO: 0.8 K/UL (ref 0.3–1)
MONOCYTES NFR BLD: 11.8 % (ref 4–15)
NEUTROPHILS # BLD AUTO: 3.6 K/UL (ref 1.8–7.7)
NEUTROPHILS NFR BLD: 55.4 % (ref 38–73)
NRBC BLD-RTO: 0 /100 WBC
PHOSPHATE SERPL-MCNC: 3.6 MG/DL (ref 2.7–4.5)
PLATELET # BLD AUTO: 147 K/UL (ref 150–450)
PMV BLD AUTO: ABNORMAL FL (ref 9.2–12.9)
POCT GLUCOSE: 120 MG/DL (ref 70–110)
POCT GLUCOSE: 85 MG/DL (ref 70–110)
POTASSIUM SERPL-SCNC: 4.8 MMOL/L (ref 3.5–5.1)
PROT SERPL-MCNC: 6.8 G/DL (ref 6–8.4)
RBC # BLD AUTO: 3.41 M/UL (ref 4–5.4)
SODIUM SERPL-SCNC: 137 MMOL/L (ref 136–145)
WBC # BLD AUTO: 6.51 K/UL (ref 3.9–12.7)

## 2023-09-30 PROCEDURE — 25000003 PHARM REV CODE 250: Mod: HCNC | Performed by: STUDENT IN AN ORGANIZED HEALTH CARE EDUCATION/TRAINING PROGRAM

## 2023-09-30 PROCEDURE — 99239 HOSP IP/OBS DSCHRG MGMT >30: CPT | Mod: HCNC,,, | Performed by: STUDENT IN AN ORGANIZED HEALTH CARE EDUCATION/TRAINING PROGRAM

## 2023-09-30 PROCEDURE — 63600175 PHARM REV CODE 636 W HCPCS: Mod: HCNC | Performed by: INTERNAL MEDICINE

## 2023-09-30 PROCEDURE — 25000003 PHARM REV CODE 250: Mod: HCNC | Performed by: NURSE PRACTITIONER

## 2023-09-30 PROCEDURE — 36415 COLL VENOUS BLD VENIPUNCTURE: CPT | Mod: HCNC | Performed by: STUDENT IN AN ORGANIZED HEALTH CARE EDUCATION/TRAINING PROGRAM

## 2023-09-30 PROCEDURE — 84100 ASSAY OF PHOSPHORUS: CPT | Mod: HCNC | Performed by: STUDENT IN AN ORGANIZED HEALTH CARE EDUCATION/TRAINING PROGRAM

## 2023-09-30 PROCEDURE — 90935 HEMODIALYSIS ONE EVALUATION: CPT | Mod: HCNC

## 2023-09-30 PROCEDURE — 94761 N-INVAS EAR/PLS OXIMETRY MLT: CPT | Mod: HCNC

## 2023-09-30 PROCEDURE — 84520 ASSAY OF UREA NITROGEN: CPT | Mod: HCNC | Performed by: STUDENT IN AN ORGANIZED HEALTH CARE EDUCATION/TRAINING PROGRAM

## 2023-09-30 PROCEDURE — 90935 HEMODIALYSIS ONE EVALUATION: CPT | Mod: HCNC,,, | Performed by: NURSE PRACTITIONER

## 2023-09-30 PROCEDURE — 80053 COMPREHEN METABOLIC PANEL: CPT | Mod: HCNC | Performed by: STUDENT IN AN ORGANIZED HEALTH CARE EDUCATION/TRAINING PROGRAM

## 2023-09-30 PROCEDURE — 1111F DSCHRG MED/CURRENT MED MERGE: CPT | Mod: HCNC,CPTII,, | Performed by: STUDENT IN AN ORGANIZED HEALTH CARE EDUCATION/TRAINING PROGRAM

## 2023-09-30 PROCEDURE — 99239 PR HOSPITAL DISCHARGE DAY,>30 MIN: ICD-10-PCS | Mod: HCNC,,, | Performed by: STUDENT IN AN ORGANIZED HEALTH CARE EDUCATION/TRAINING PROGRAM

## 2023-09-30 PROCEDURE — 25000003 PHARM REV CODE 250: Mod: HCNC | Performed by: INTERNAL MEDICINE

## 2023-09-30 PROCEDURE — 1111F PR DISCHARGE MEDS RECONCILED W/ CURRENT OUTPATIENT MED LIST: ICD-10-PCS | Mod: HCNC,CPTII,, | Performed by: STUDENT IN AN ORGANIZED HEALTH CARE EDUCATION/TRAINING PROGRAM

## 2023-09-30 PROCEDURE — 85025 COMPLETE CBC W/AUTO DIFF WBC: CPT | Mod: HCNC | Performed by: STUDENT IN AN ORGANIZED HEALTH CARE EDUCATION/TRAINING PROGRAM

## 2023-09-30 PROCEDURE — 83735 ASSAY OF MAGNESIUM: CPT | Mod: HCNC | Performed by: STUDENT IN AN ORGANIZED HEALTH CARE EDUCATION/TRAINING PROGRAM

## 2023-09-30 PROCEDURE — 90935 PR HEMODIALYSIS, ONE EVALUATION: ICD-10-PCS | Mod: HCNC,,, | Performed by: NURSE PRACTITIONER

## 2023-09-30 RX ORDER — LABETALOL 300 MG/1
300 TABLET, FILM COATED ORAL EVERY 12 HOURS
Qty: 60 TABLET | Refills: 11
Start: 2023-09-30 | End: 2023-10-18 | Stop reason: SDUPTHER

## 2023-09-30 RX ORDER — FLUTICASONE PROPIONATE 50 MCG
2 SPRAY, SUSPENSION (ML) NASAL DAILY
Status: DISCONTINUED | OUTPATIENT
Start: 2023-09-30 | End: 2023-09-30 | Stop reason: HOSPADM

## 2023-09-30 RX ORDER — LOSARTAN POTASSIUM 100 MG/1
100 TABLET ORAL DAILY
Qty: 90 TABLET | Refills: 3
Start: 2023-09-30 | End: 2024-09-29

## 2023-09-30 RX ORDER — LABETALOL 100 MG/1
100 TABLET, FILM COATED ORAL EVERY 12 HOURS
Status: DISCONTINUED | OUTPATIENT
Start: 2023-09-30 | End: 2023-09-30 | Stop reason: HOSPADM

## 2023-09-30 RX ORDER — HYDRALAZINE HYDROCHLORIDE 25 MG/1
25 TABLET, FILM COATED ORAL EVERY 8 HOURS
Status: DISCONTINUED | OUTPATIENT
Start: 2023-09-30 | End: 2023-09-30 | Stop reason: HOSPADM

## 2023-09-30 RX ORDER — AMLODIPINE BESYLATE 10 MG/1
10 TABLET ORAL DAILY
Status: DISCONTINUED | OUTPATIENT
Start: 2023-09-30 | End: 2023-09-30 | Stop reason: HOSPADM

## 2023-09-30 RX ORDER — HYDRALAZINE HYDROCHLORIDE 50 MG/1
50 TABLET, FILM COATED ORAL EVERY 8 HOURS
Status: DISCONTINUED | OUTPATIENT
Start: 2023-09-30 | End: 2023-09-30

## 2023-09-30 RX ORDER — LOSARTAN POTASSIUM 100 MG/1
100 TABLET ORAL DAILY
Qty: 90 TABLET | Refills: 3
Start: 2023-09-30 | End: 2023-09-30 | Stop reason: HOSPADM

## 2023-09-30 RX ADMIN — LABETALOL HYDROCHLORIDE 100 MG: 100 TABLET, FILM COATED ORAL at 10:09

## 2023-09-30 RX ADMIN — HYDRALAZINE HYDROCHLORIDE 50 MG: 50 TABLET ORAL at 12:09

## 2023-09-30 RX ADMIN — SODIUM ZIRCONIUM CYCLOSILICATE 5 G: 5 POWDER, FOR SUSPENSION ORAL at 08:09

## 2023-09-30 RX ADMIN — HEPARIN SODIUM 5000 UNITS: 5000 INJECTION INTRAVENOUS; SUBCUTANEOUS at 05:09

## 2023-09-30 RX ADMIN — HYDRALAZINE HYDROCHLORIDE 25 MG: 25 TABLET, FILM COATED ORAL at 05:09

## 2023-09-30 RX ADMIN — AMLODIPINE BESYLATE 10 MG: 10 TABLET ORAL at 10:09

## 2023-09-30 RX ADMIN — SODIUM CHLORIDE: 9 INJECTION, SOLUTION INTRAVENOUS at 09:09

## 2023-09-30 RX ADMIN — ATORVASTATIN CALCIUM 40 MG: 40 TABLET, FILM COATED ORAL at 08:09

## 2023-09-30 NOTE — PROCEDURES
[facility] H[facility]ODIALYSIS NOTE     Patient currently on hemodialysis for removal of uremic toxins and volume.     Patient seen and evaluated on hemodialysis, tolerating treatment, see HD flowsheet for vitals and assessments.      Ultrafiltration goal is 3L     Labs have been reviewed and the dialysate bath has been adjusted.     Assessment/Plan:  Additional HD today for continued metabolic clearance/volume optimization.  HR and K has improved following hemodialysis yesterday.  5.4L above her EDW, will adjust UFG today to a net of 3L.  1L fluid restrictions, enforced low K diet  Plan for next HD session on Monday.  Will discontinue K binders  Continue hold phos binders as phos is low. Can liberalize diet, would place on low Na low K.     JAMSHID Soto, FN-BC  Nephrology  Pager:  [phone]

## 2023-09-30 NOTE — NURSING
Nurses Note -- 4 Eyes      9/30/2023   1:27 AM      Skin assessed during: Transfer      [x] No Altered Skin Integrity Present    [x]Prevention Measures Documented      [] Yes- Altered Skin Integrity Present or Discovered   [] LDA Added if Not in Epic (Describe Wound)   [] New Altered Skin Integrity was Present on Admit and Documented in LDA   [] Wound Image Taken    Wound Care Consulted? No    Attending Nurse:  Yudi Paulino RN    Second RN/Staff Member:  Flory Cartagena RN

## 2023-09-30 NOTE — PROGRESS NOTES
HD treatment started. No complications with access to the right lower arm. Lines secured and telemetry in place. No complaints of discomfort at this time.

## 2023-09-30 NOTE — PLAN OF CARE
[facility] - Telemetry Stepdown  Discharge Final Note    Primary Care Provider: Susan Cruz MD    Expected Discharge Date: 9/30/2023    Final Discharge Note (most recent)       Final Note - 09/30/23 1551          Final Note    Assessment Type Final Discharge Note     Anticipated Discharge Disposition Home or Self Care     Hospital Resources/Appts/Education Provided Provided patient/caregiver with written discharge plan information   per bedside nurse                    Important Message from Medicare           Pt was discharged home today with no needs.      Airam Gu, RN    [facility]  [phone]

## 2023-09-30 NOTE — PROGRESS NOTES
HD treatment complete. Duration of treatment 3 hours and 3 L removed. Treatment was tolerated well and no complications with access to the right lower arm. Needles removed and hemostasis achieved. Dressing intact and no drainage noted. Thrill and bruit present.

## 2023-09-30 NOTE — PLAN OF CARE
Problem: Adult Inpatient Plan of Care  Goal: Plan of Care Review  Outcome: Ongoing, Progressing  Goal: Patient-Specific Goal (Individualized)  Outcome: Ongoing, Progressing  Goal: Absence of Hospital-Acquired Illness or Injury  Outcome: Ongoing, Progressing  Goal: Optimal Comfort and Wellbeing  Outcome: Ongoing, Progressing  Goal: Readiness for Transition of Care  Outcome: Ongoing, Progressing     Problem: Device-Related Complication Risk (Hemodialysis)  Goal: Safe, Effective Therapy Delivery  Outcome: Ongoing, Progressing   Aaox4. Vital signs stable. Discharge instructions given. Pt discharged.

## 2023-09-30 NOTE — PLAN OF CARE
Problem: Adult Inpatient Plan of Care  Goal: Plan of Care Review  Outcome: Ongoing, Progressing  Goal: Patient-Specific Goal (Individualized)  Outcome: Ongoing, Progressing  Goal: Absence of Hospital-Acquired Illness or Injury  Outcome: Ongoing, Progressing  Goal: Optimal Comfort and Wellbeing  Outcome: Ongoing, Progressing  Goal: Readiness for Transition of Care  Outcome: Ongoing, Progressing     Problem: Device-Related Complication Risk (Hemodialysis)  Goal: Safe, Effective Therapy Delivery  Outcome: Ongoing, Progressing     Problem: Hemodynamic Instability (Hemodialysis)  Goal: Effective Tissue Perfusion  Outcome: Ongoing, Progressing     Problem: Infection (Hemodialysis)  Goal: Absence of Infection Signs and Symptoms  Outcome: Ongoing, Progressing     Problem: Diabetes Comorbidity  Goal: Blood Glucose Level Within Targeted Range  Outcome: Ongoing, Progressing     Problem: Skin Injury Risk Increased  Goal: Skin Health and Integrity  Outcome: Ongoing, Progressing     Pt AAOx4. VSS. RA. NSR on tele. HD planned for today. Pt anuric. Pt up ad xiang. No issues overnight. Safety maintained. Call bell in reach.

## 2023-10-02 ENCOUNTER — PATIENT MESSAGE (OUTPATIENT)
Dept: ADMINISTRATIVE | Facility: CLINIC | Age: 58
End: 2023-10-02
Payer: MEDICARE

## 2023-10-02 ENCOUNTER — PATIENT OUTREACH (OUTPATIENT)
Dept: ADMINISTRATIVE | Facility: CLINIC | Age: 58
End: 2023-10-02
Payer: MEDICARE

## 2023-10-02 NOTE — HPI
58 y.o. female w/ h/o ESRD on HD MWF, HFpEF (60%), DM2, HTN; p/w hypotension noted at HD clinic.  She missed HD on 09/27 Wed 2/2 transportation limitations as no one showed up.  She is otherwise compliant w/ HD & hasn't previously missed an appt.  When she presented to HD clinic, she was found to be hypotensive w/ BPs in 80s over 50s.  EMS was called & reports that she remained hypotensive & bradycardic en route w/ HR in 40s to 50s.  No neds were given.  Upon arrival to ED, pt c/o SOB, lightheadedness, fatigue.  Daughter not initially at bedside but presented shortly after & reported that pt seemed a bit more tired & confused from b/l but not completely off.     ED course:  Upon arrival patient hypotensive with a systolic blood pressures 89/69 .  Patient bradycardic with heart rate of 38 a monitor.  EKG obtained was notable for junctional bradycardia.  Patient alert and oriented but more fatigued and reporting lightheadedness.  Patient placed on pads.  At this time patient given atropine and heart rate improved but remained junctional bradycardia.  Given hypotension and junctional bradycardia concern for electrolyte derangements.  Patient presented hypotensive sepsis on the differential though less likely given bradycardia and missed dialysis with no complaints of shortness of breath, cough congestion urinary symptoms, abdominal pain.  Chest x-ray obtained with pleural effusion and pulmonary edema.  Labs notable for hyperkalemia of 8 on CMP.  VBG obtained with no acidosis noted.  Lactic acid of 2.5 likely secondary from hyperkalemia.     I-STAT Chem 8 obtained and was notable for a potassium 7.8.  Given cardiac changes calcium gluconate ordered.  Patient also shifted with albuterol and insulin 10 with dextrose ordered and frequent glucose checks.  At this time patient's blood pressure improved at low systolics of 100.  Discussed with nephrology with plan for dialysis.  Given soft blood pressures, hyperkalemia of 8  noted on CMP and requirement for emergent dialysis discussed with ICU given possibility of requiring SLED.  ICU evaluated patient and plan for admission.  Plan discussed with patient and daughter who is at bedside.  Agreeable with plan.  Patient admitted to ICU.

## 2023-10-02 NOTE — DISCHARGE SUMMARY
[facility] - Telemetry [facility] Medicine  Discharge Summary      Patient Name: Jael Hall  MRN: [id number]  CHUCK: [id number]  Patient Class: IP- Inpatient  Admission Date: 9/29/2023  Hospital Length of Stay: 1 days  Discharge Date and Time: 9/30/2023  3:29 PM  Attending Physician: No att. providers found   Discharging Provider: Kingston Julien MD  Primary Care Provider: Susan Cruz MD  [facility] Medicine Team: [facility] Kingston Julien MD  Primary Care Team: [facility]    HPI:   58 y.o. female w/ h/o ESRD on HD MWF, HFpEF (60%), DM2, HTN; p/w hypotension noted at HD clinic.  She missed HD on 09/27 Wed 2/2 transportation limitations as no one showed up.  She is otherwise compliant w/ HD & hasn't previously missed an appt.  When she presented to HD clinic, she was found to be hypotensive w/ BPs in 80s over 50s.  EMS was called & reports that she remained hypotensive & bradycardic en route w/ HR in 40s to 50s.  No neds were given.  Upon arrival to ED, pt c/o SOB, lightheadedness, fatigue.  Daughter not initially at bedside but presented shortly after & reported that pt seemed a bit more tired & confused from b/l but not completely off.     ED course:  Upon arrival patient hypotensive with a systolic blood pressures 89/69 .  Patient bradycardic with heart rate of 38 a monitor.  EKG obtained was notable for junctional bradycardia.  Patient alert and oriented but more fatigued and reporting lightheadedness.  Patient placed on pads.  At this time patient given atropine and heart rate improved but remained junctional bradycardia.  Given hypotension and junctional bradycardia concern for electrolyte derangements.  Patient presented hypotensive sepsis on the differential though less likely given bradycardia and missed dialysis with no complaints of shortness of breath, cough congestion urinary symptoms, abdominal pain.  Chest x-ray obtained with pleural effusion and pulmonary edema.  Labs  notable for hyperkalemia of 8 on CMP.  VBG obtained with no acidosis noted.  Lactic acid of 2.5 likely secondary from hyperkalemia.     I-STAT Chem 8 obtained and was notable for a potassium 7.8.  Given cardiac changes calcium gluconate ordered.  Patient also shifted with albuterol and insulin 10 with dextrose ordered and frequent glucose checks.  At this time patient's blood pressure improved at low systolics of 100.  Discussed with nephrology with plan for dialysis.  Given soft blood pressures, hyperkalemia of 8 noted on CMP and requirement for emergent dialysis discussed with ICU given possibility of requiring SLED.  ICU evaluated patient and plan for admission.  Plan discussed with patient and daughter who is at bedside.  Agreeable with plan.  Patient admitted to ICU.      * No surgery found *      Hospital Course:   Patient hyperkalemia and dyspnea improved with routine HD. Blood meds reduced to avoid hypotension. Follow up with primary care requested.        Goals of Care Treatment Preferences:  Code Status: Full Code      Consults:   Consults (From admission, onward)        Status Ordering Provider     Inpatient consult to Critical Care Medicine  Once        Provider:  (Not yet assigned)    Completed POPEYE, NOOR     Inpatient consult to Nephrology  Once        Provider:  (Not yet assigned)    Completed POPEYE, NOOR          No new Assessment & Plan notes have been filed under this hospital service since the last note was generated.  Service: Hospital Medicine    Final Active Diagnoses:    Diagnosis Date Noted POA    PRINCIPAL PROBLEM:  Acute hyperkalemia [E87.5] 09/09/2020 Yes    ESRD (end stage renal disease) [N18.6] 09/30/2023 Unknown    Symptomatic bradycardia [R00.1] 09/09/2020 Yes    Essential hypertension [I10]  Yes     Chronic    ESRD (end stage renal disease) on dialysis [N18.6, Z99.2] 09/28/2017 Not Applicable     Chronic    Elevated cholesterol with elevated triglycerides [E78.2] 09/28/2017 Yes      Chronic      Problems Resolved During this Admission:       Discharged Condition: good    Disposition: Home or Self Care    Follow Up:    Patient Instructions:      Ambulatory referral/consult to Internal Medicine   Standing Status: Future   Referral Priority: Routine Referral Type: Consultation   Referral Reason: Specialty Services Required   Requested Specialty: Internal Medicine   Number of Visits Requested: 1     Diet renal     Notify your health care provider if you experience any of the following:  persistent dizziness, light-headedness, or visual disturbances     Notify your health care provider if you experience any of the following:  increased confusion or weakness     Notify your health care provider if you experience any of the following:  worsening rash     Notify your health care provider if you experience any of the following:  severe persistent headache     Notify your health care provider if you experience any of the following:  difficulty breathing or increased cough     Notify your health care provider if you experience any of the following:  redness, tenderness, or signs of infection (pain, swelling, redness, odor or green/yellow discharge around incision site)     Notify your health care provider if you experience any of the following:  severe uncontrolled pain     Notify your health care provider if you experience any of the following:  temperature >100.4     Notify your health care provider if you experience any of the following:  persistent nausea and vomiting or diarrhea     Activity as tolerated       Significant Diagnostic Studies: Labs: All labs within the past 24 hours have been reviewed    Pending Diagnostic Studies:     None         Medications:  Reconciled Home Medications:      Medication List      CHANGE how you take these medications    labetaloL 300 MG tablet  Commonly known as: NORMODYNE  Take 1 tablet (300 mg total) by mouth every 12 (twelve) hours.  What changed: when to  take this        CONTINUE taking these medications    acetaminophen 500 MG tablet  Commonly known as: TYLENOL  Take 1 tablet (500 mg total) by mouth every 8 (eight) hours as needed for Pain.     amLODIPine 10 MG tablet  Commonly known as: NORVASC  Take 1 tablet (10 mg total) by mouth nightly.     aspirin 81 MG EC tablet  Commonly known as: ECOTRIN  Take 81 mg by mouth once daily.     atorvastatin 40 MG tablet  Commonly known as: LIPITOR  Take 1 tablet (40 mg total) by mouth once daily.     diphenhydrAMINE 25 mg capsule  Commonly known as: BENADRYL  Take 25 mg by mouth every 6 (six) hours as needed for Itching.     erythromycin ophthalmic ointment  Commonly known as: ROMYCIN  Place a 1/2 inch ribbon of ointment into the lower eyelid.     furosemide 80 MG tablet  Commonly known as: LASIX  Take 1 tablet (80 mg total) by mouth once daily.     hydrALAZINE 100 MG tablet  Commonly known as: APRESOLINE  Take 100 mg by mouth 3 (three) times daily.     losartan 100 MG tablet  Commonly known as: COZAAR  Take 1 tablet (100 mg total) by mouth once daily.     pregabalin 75 MG capsule  Commonly known as: LYRICA  Take 75 mg by mouth once daily.     sevelamer carbonate 800 mg Tab  Commonly known as: RENVELA  Take 3 tablets (2,400 mg total) by mouth 3 (three) times daily with meals.            Indwelling Lines/Drains at time of discharge:   Lines/Drains/Airways     Drain  Duration                Hemodialysis AV Fistula Right forearm -- days                Time spent on the discharge of patient: 35 minutes         Kingston Julien MD  Department of Hospital Medicine  [facility] - Telemetry Stepdown

## 2023-10-02 NOTE — HOSPITAL COURSE
Patient hyperkalemia and dyspnea improved with routine HD. Blood meds reduced to avoid hypotension. Follow up with primary care requested.

## 2023-10-02 NOTE — PROGRESS NOTES
C3 nurse attempted to contact Jael Hall  for a TCC post hospital discharge follow up call. No answer. Left voicemail with callback information. The patient does not have a scheduled HOSFU appointment. Message sent to PCP staff for assistance with scheduling visit with patient within 5-7 days post discharge.

## 2023-10-04 LAB
BACTERIA BLD CULT: NORMAL
BACTERIA BLD CULT: NORMAL

## 2023-10-04 NOTE — PROGRESS NOTES
C3 nurse spoke with Jael Hall   for a TCC post hospital discharge follow up call. The patient does not have a scheduled HOSFU appointment with she said her pcp she thinks is D ken?  She was unsure of name  fo PCP She went to dialysis today.

## 2023-10-04 NOTE — PROGRESS NOTES
2 nd call back C3 nurse attempted to contact Jael Hall  for a TCC post hospital discharge follow up call. No answer. Left voicemail with callback information.

## 2023-10-05 ENCOUNTER — TELEPHONE (OUTPATIENT)
Dept: INTERNAL MEDICINE | Facility: CLINIC | Age: 58
End: 2023-10-05
Payer: MEDICARE

## 2023-10-05 NOTE — TELEPHONE ENCOUNTER
----- Message from Yudi Cárdenas sent at 10/5/2023  8:58 AM CDT -----  Regarding: Sooner Appt  Contact: Patient  Type:  Sooner Apoointment Request    Caller is requesting a sooner appointment.  Caller declined first available appointment listed below.  Caller will not accept being placed on the waitlist and is requesting a message be sent to doctor.  Name of Caller:Patient   When is the first available appointment? 11/20/2023   Symptoms: hospital follow up   Would the patient rather a call back or a response via [facility]? Call back   Best Call Back Number:[phone]  Additional Information: Pt was discharged from hospital on 09/30/2023 and was advised to schedule a follow up appt with PCP within 7-10 days Please Assist

## 2023-10-06 ENCOUNTER — OFFICE VISIT (OUTPATIENT)
Dept: INTERNAL MEDICINE | Facility: CLINIC | Age: 58
End: 2023-10-06
Payer: MEDICARE

## 2023-10-06 VITALS
OXYGEN SATURATION: 95 % | BODY MASS INDEX: 30.34 KG/M2 | HEART RATE: 80 BPM | DIASTOLIC BLOOD PRESSURE: 54 MMHG | WEIGHT: 177.69 LBS | RESPIRATION RATE: 16 BRPM | SYSTOLIC BLOOD PRESSURE: 122 MMHG | HEIGHT: 64 IN

## 2023-10-06 DIAGNOSIS — I10 ESSENTIAL HYPERTENSION: Chronic | ICD-10-CM

## 2023-10-06 DIAGNOSIS — Z09 HOSPITAL DISCHARGE FOLLOW-UP: Primary | ICD-10-CM

## 2023-10-06 DIAGNOSIS — E87.5 ACUTE HYPERKALEMIA: ICD-10-CM

## 2023-10-06 PROCEDURE — 3074F PR MOST RECENT SYSTOLIC BLOOD PRESSURE < 130 MM HG: ICD-10-PCS | Mod: HCNC,CPTII,S$GLB, | Performed by: STUDENT IN AN ORGANIZED HEALTH CARE EDUCATION/TRAINING PROGRAM

## 2023-10-06 PROCEDURE — 1159F PR MEDICATION LIST DOCUMENTED IN MEDICAL RECORD: ICD-10-PCS | Mod: HCNC,CPTII,S$GLB, | Performed by: STUDENT IN AN ORGANIZED HEALTH CARE EDUCATION/TRAINING PROGRAM

## 2023-10-06 PROCEDURE — 3066F NEPHROPATHY DOC TX: CPT | Mod: HCNC,CPTII,S$GLB, | Performed by: STUDENT IN AN ORGANIZED HEALTH CARE EDUCATION/TRAINING PROGRAM

## 2023-10-06 PROCEDURE — 3078F PR MOST RECENT DIASTOLIC BLOOD PRESSURE < 80 MM HG: ICD-10-PCS | Mod: HCNC,CPTII,S$GLB, | Performed by: STUDENT IN AN ORGANIZED HEALTH CARE EDUCATION/TRAINING PROGRAM

## 2023-10-06 PROCEDURE — 1159F MED LIST DOCD IN RCRD: CPT | Mod: HCNC,CPTII,S$GLB, | Performed by: STUDENT IN AN ORGANIZED HEALTH CARE EDUCATION/TRAINING PROGRAM

## 2023-10-06 PROCEDURE — 1111F DSCHRG MED/CURRENT MED MERGE: CPT | Mod: HCNC,CPTII,S$GLB, | Performed by: STUDENT IN AN ORGANIZED HEALTH CARE EDUCATION/TRAINING PROGRAM

## 2023-10-06 PROCEDURE — 3078F DIAST BP <80 MM HG: CPT | Mod: HCNC,CPTII,S$GLB, | Performed by: STUDENT IN AN ORGANIZED HEALTH CARE EDUCATION/TRAINING PROGRAM

## 2023-10-06 PROCEDURE — 3008F PR BODY MASS INDEX (BMI) DOCUMENTED: ICD-10-PCS | Mod: HCNC,CPTII,S$GLB, | Performed by: STUDENT IN AN ORGANIZED HEALTH CARE EDUCATION/TRAINING PROGRAM

## 2023-10-06 PROCEDURE — 3008F BODY MASS INDEX DOCD: CPT | Mod: HCNC,CPTII,S$GLB, | Performed by: STUDENT IN AN ORGANIZED HEALTH CARE EDUCATION/TRAINING PROGRAM

## 2023-10-06 PROCEDURE — 3066F PR DOCUMENTATION OF TREATMENT FOR NEPHROPATHY: ICD-10-PCS | Mod: HCNC,CPTII,S$GLB, | Performed by: STUDENT IN AN ORGANIZED HEALTH CARE EDUCATION/TRAINING PROGRAM

## 2023-10-06 PROCEDURE — 3044F PR MOST RECENT HEMOGLOBIN A1C LEVEL <7.0%: ICD-10-PCS | Mod: HCNC,CPTII,S$GLB, | Performed by: STUDENT IN AN ORGANIZED HEALTH CARE EDUCATION/TRAINING PROGRAM

## 2023-10-06 PROCEDURE — 99999 PR PBB SHADOW E&M-EST. PATIENT-LVL III: ICD-10-PCS | Mod: PBBFAC,HCNC,, | Performed by: STUDENT IN AN ORGANIZED HEALTH CARE EDUCATION/TRAINING PROGRAM

## 2023-10-06 PROCEDURE — 3074F SYST BP LT 130 MM HG: CPT | Mod: HCNC,CPTII,S$GLB, | Performed by: STUDENT IN AN ORGANIZED HEALTH CARE EDUCATION/TRAINING PROGRAM

## 2023-10-06 PROCEDURE — 1111F PR DISCHARGE MEDS RECONCILED W/ CURRENT OUTPATIENT MED LIST: ICD-10-PCS | Mod: HCNC,CPTII,S$GLB, | Performed by: STUDENT IN AN ORGANIZED HEALTH CARE EDUCATION/TRAINING PROGRAM

## 2023-10-06 PROCEDURE — 99213 PR OFFICE/OUTPT VISIT, EST, LEVL III, 20-29 MIN: ICD-10-PCS | Mod: HCNC,S$GLB,, | Performed by: STUDENT IN AN ORGANIZED HEALTH CARE EDUCATION/TRAINING PROGRAM

## 2023-10-06 PROCEDURE — 3044F HG A1C LEVEL LT 7.0%: CPT | Mod: HCNC,CPTII,S$GLB, | Performed by: STUDENT IN AN ORGANIZED HEALTH CARE EDUCATION/TRAINING PROGRAM

## 2023-10-06 PROCEDURE — 4010F PR ACE/ARB THEARPY RXD/TAKEN: ICD-10-PCS | Mod: HCNC,CPTII,S$GLB, | Performed by: STUDENT IN AN ORGANIZED HEALTH CARE EDUCATION/TRAINING PROGRAM

## 2023-10-06 PROCEDURE — 4010F ACE/ARB THERAPY RXD/TAKEN: CPT | Mod: HCNC,CPTII,S$GLB, | Performed by: STUDENT IN AN ORGANIZED HEALTH CARE EDUCATION/TRAINING PROGRAM

## 2023-10-06 PROCEDURE — 99999 PR PBB SHADOW E&M-EST. PATIENT-LVL III: CPT | Mod: PBBFAC,HCNC,, | Performed by: STUDENT IN AN ORGANIZED HEALTH CARE EDUCATION/TRAINING PROGRAM

## 2023-10-06 PROCEDURE — 99213 OFFICE O/P EST LOW 20 MIN: CPT | Mod: HCNC,S$GLB,, | Performed by: STUDENT IN AN ORGANIZED HEALTH CARE EDUCATION/TRAINING PROGRAM

## 2023-10-06 NOTE — ASSESSMENT & PLAN NOTE
Events summarized in HPI. Pt went to dialysis today. Asked her to f/u with pcp and nephrology. Asked her to track her BP at home. Might have to reduce doses on some of her other BP meds as well.

## 2023-10-06 NOTE — ASSESSMENT & PLAN NOTE
Patient is on several antihypertensives. Her BP was low at dialysis today and she is feeling slightly dizzy and tired today after dialysis. She is still taking labetalol tid even though it was changed to bid. Asked her to skip the night dose and from tomorrow only take it bid. F/u with pcp and nephrology, might have to adjust more BP meds to avoid hypotension during dialysis

## 2023-10-06 NOTE — PROGRESS NOTES
Subjective     Patient ID: Jael Hall is a 58 y.o. female.    Chief Complaint: Hospital Follow Up and Dizziness    Dizziness: no fever, no headaches and no chest pain.    Patient is a 59 yo female with ESRD on dialysis on MWF is here for a hospital discharge visit. Pt was going to her dialysis last Friday when she felt like she could not breathe and felt dizzy. She was found to be hypotensive at dialysis facility. She had missed her Wednesday appointment due to not have a ride but otherwise is compliant. She was taken to the hospital in the ambulance. She was also noted to be bradycardic. She was in a junctional rhythm. She was given atropine and HR improved. Chest xray with pleural effusion and pulmonary edema.  Labs notable for hyperkalemia of 8. They gave her insulin, dextrose and calcium. They admitted her to ICU due to hyperkalemia and soft blood pressures due to the possibility of her requiring sled. Hyperkalemia improved with HD and her labetalol was reduced to bid for tid.she was asked to f/u with pcp.  -pt is still taking labetalol tid instead of bid. I told her to change it to bid. She understood.     Review of Systems   Constitutional:  Negative for chills and fever.   HENT:  Negative for rhinorrhea and sore throat.    Respiratory:  Negative for cough, chest tightness and shortness of breath.    Cardiovascular:  Negative for chest pain.   Gastrointestinal:  Negative for abdominal pain and blood in stool.   Genitourinary:  Negative for dysuria and hematuria.   Neurological:  Positive for dizziness. Negative for headaches.          Objective     Physical Exam  Vitals and nursing note reviewed.   Constitutional:       Appearance: Normal appearance.   Eyes:      Conjunctiva/sclera: Conjunctivae normal.   Cardiovascular:      Rate and Rhythm: Regular rhythm.      Pulses: Normal pulses.      Heart sounds: Normal heart sounds.   Pulmonary:      Effort: Pulmonary effort is normal. No respiratory distress.       Breath sounds: Normal breath sounds.   Abdominal:      General: Bowel sounds are normal.   Musculoskeletal:      Right lower leg: No edema.      Left lower leg: No edema.   Skin:     General: Skin is warm.   Neurological:      Mental Status: She is alert and oriented to person, place, and time.   Psychiatric:         Mood and Affect: Mood normal.         Behavior: Behavior normal.            Assessment and Plan     1. Hospital discharge follow-up  Assessment & Plan:  Events summarized in HPI. Pt went to dialysis today. Asked her to f/u with pcp and nephrology. Asked her to track her BP at home. Might have to reduce doses on some of her other BP meds as well.       2. Essential hypertension  Assessment & Plan:  Patient is on several antihypertensives. Her BP was low at dialysis today and she is feeling slightly dizzy and tired today after dialysis. She is still taking labetalol tid even though it was changed to bid. Asked her to skip the night dose and from tomorrow only take it bid. F/u with pcp and nephrology, might have to adjust more BP meds to avoid hypotension during dialysis      3. Acute hyperkalemia  Assessment & Plan:  Normal after dialysis. Pt is going to her regular dialysis sessions. F/u with nephrology.        I spent a total of 20 minutes on the day of the visit.  This includes face to face time and non-face to face time preparing to see the patient (eg, review of tests), obtaining and/or reviewing separately obtained history, documenting clinical information in the electronic or other health record, independently interpreting results and communicating results to the patient/family/caregiver, or care coordinator.

## 2023-10-12 NOTE — TELEPHONE ENCOUNTER
Spoke to pt. Pt states she had a hard time having a BM this afternoon. Pt wondering if she can take an over the counter stool softener. Advised pt she can take colace and to follow the directions on the box. Educated pt the importance of staying well hydrated. Pt voiced understanding.   ----- Message from Sha Park sent at 7/23/2019  3:33 PM CDT -----  Contact: TANYA VELASQUEZ [[id number]]  Name of Who is Calling:TANYA VELASQUEZ [[id number]]      What is the request in detail: Pt requesting to speak with staff regarding bowel discomfort. Contact at your earliest convenience.    Can the clinic reply by [facility]: N    What [facility] to [facility] Back [facility]: [phone]         Complication associated with peripherally inserted central catheter (PICC)

## 2023-10-18 RX ORDER — LABETALOL 300 MG/1
300 TABLET, FILM COATED ORAL EVERY 12 HOURS
Qty: 60 TABLET | Refills: 11
Start: 2023-10-18 | End: 2023-10-19 | Stop reason: SDUPTHER

## 2023-10-18 NOTE — TELEPHONE ENCOUNTER
----- Message from Ebony De Leon MA sent at 10/18/2023  2:04 PM CDT -----  Contact: Pt [phone]    ----- Message -----  From: Bryan Balderas  Sent: 10/18/2023  12:34 PM CDT  To: Nafisa Rutledge Staff    Requesting an RX refill or new RX.  Is this a refill or new RX: Refill  RX name and strength labetaloL (NORMODYNE) 300 MG tablet  Is this a 30 day or 90 day RX:   Pharmacy name and phone # [facility] [address] [facility] Phone:  [phone] Fax:  [phone]    She would like a 10day suppy sent to the local pharmacy below while she wait for it to come from [facility] [address] at [address]   Phone:  [phone] Fax:  [phone]

## 2023-10-19 RX ORDER — LABETALOL 300 MG/1
300 TABLET, FILM COATED ORAL EVERY 12 HOURS
Qty: 60 TABLET | Refills: 11
Start: 2023-10-19 | End: 2023-11-21 | Stop reason: SDUPTHER

## 2023-10-19 NOTE — TELEPHONE ENCOUNTER
----- Message from Chiquis Carranza sent at 10/19/2023  8:29 AM CDT -----  Contact: [phone] [facility]  Requesting an RX refill or new RX.  Is this a refill or new RX: new for a shortfill  RX name and strength (copy/paste from chart): labetaloL (NORMODYNE) 300 MG tablet   Is this a 30 day or 90 day RX: 30  Pharmacy name and phone # (copy/paste from chart):    [facility] [id number] at [place], [place] - [address]  [facility] [address]  Phone: [phone] Fax: [phone]    Requesting an RX refill or new RX.  Is this a refill or new RX: new  RX name and strength (copy/paste from chart):  labetaloL (NORMODYNE) 300 MG tablet  Is this a 30 day or 90 day RX: 90  Pharmacy name and phone # (copy/paste from chart):    [facility] - [facility] [address] [facility]  [address] [facility] [address]  Phone: [phone] Fax: [phone]      Geovanny also stated a request was faxed to . Geovanny was advised that is the incorrect fax number and given .

## 2023-10-31 ENCOUNTER — TELEPHONE (OUTPATIENT)
Dept: TRANSPLANT | Facility: CLINIC | Age: 58
End: 2023-10-31
Payer: MEDICARE

## 2023-10-31 NOTE — TELEPHONE ENCOUNTER
Returned call, left voicemail that [facility] is unable to provide any orders for patient as she is not an active patient.   ----- Message from Barby Leonardo RN sent at 10/31/2023 11:11 AM CDT -----  Regarding: FW: Mammogram Orders  Contact: [phone]    ----- Message -----  From: Basilia Chu  Sent: 10/31/2023   9:50 AM CDT  To: Douglas BEAVER Staff  Subject: Mammogram Orders                                 Pt requesting a call back regarding mammogram orders so her appt can be scheduled. Please call to discuss further.

## 2023-11-06 ENCOUNTER — HOSPITAL ENCOUNTER (EMERGENCY)
Facility: HOSPITAL | Age: 58
Discharge: HOME | End: 2023-11-07
Attending: EMERGENCY MEDICINE
Payer: MEDICARE

## 2023-11-06 DIAGNOSIS — R07.9 CHEST PAIN: ICD-10-CM

## 2023-11-06 DIAGNOSIS — J81.0 ACUTE PULMONARY EDEMA: Primary | ICD-10-CM

## 2023-11-06 DIAGNOSIS — E87.5 HYPERKALEMIA: ICD-10-CM

## 2023-11-06 LAB
ALBUMIN SERPL BCP-MCNC: 3.6 G/DL (ref 3.5–5.2)
ALP SERPL-CCNC: 90 U/L (ref 55–135)
ALT SERPL W/O P-5'-P-CCNC: 6 U/L (ref 10–44)
ANION GAP SERPL CALC-SCNC: 18 MMOL/L (ref 8–16)
AST SERPL-CCNC: 14 U/L (ref 10–40)
BASOPHILS # BLD AUTO: 0.05 K/UL (ref 0–0.2)
BASOPHILS NFR BLD: 0.6 % (ref 0–1.9)
BILIRUB SERPL-MCNC: 1 MG/DL (ref 0.1–1)
BNP SERPL-MCNC: 906 PG/ML (ref 0–99)
BUN SERPL-MCNC: 69 MG/DL (ref 6–20)
CALCIUM SERPL-MCNC: 9.3 MG/DL (ref 8.7–10.5)
CHLORIDE SERPL-SCNC: 102 MMOL/L (ref 95–110)
CO2 SERPL-SCNC: 20 MMOL/L (ref 23–29)
CREAT SERPL-MCNC: 12.2 MG/DL (ref 0.5–1.4)
DIFFERENTIAL METHOD: ABNORMAL
EOSINOPHIL # BLD AUTO: 0.3 K/UL (ref 0–0.5)
EOSINOPHIL NFR BLD: 3.6 % (ref 0–8)
ERYTHROCYTE [DISTWIDTH] IN BLOOD BY AUTOMATED COUNT: 17.2 % (ref 11.5–14.5)
EST. GFR  (NO RACE VARIABLE): 3 ML/MIN/1.73 M^2
GLUCOSE SERPL-MCNC: 109 MG/DL (ref 70–110)
HCT VFR BLD AUTO: 27.6 % (ref 37–48.5)
HGB BLD-MCNC: 8.9 G/DL (ref 12–16)
IMM GRANULOCYTES # BLD AUTO: 0.02 K/UL (ref 0–0.04)
IMM GRANULOCYTES NFR BLD AUTO: 0.2 % (ref 0–0.5)
LYMPHOCYTES # BLD AUTO: 0.8 K/UL (ref 1–4.8)
LYMPHOCYTES NFR BLD: 9.4 % (ref 18–48)
MCH RBC QN AUTO: 26.3 PG (ref 27–31)
MCHC RBC AUTO-ENTMCNC: 32.2 G/DL (ref 32–36)
MCV RBC AUTO: 81 FL (ref 82–98)
MONOCYTES # BLD AUTO: 0.8 K/UL (ref 0.3–1)
MONOCYTES NFR BLD: 9.2 % (ref 4–15)
NEUTROPHILS # BLD AUTO: 6.6 K/UL (ref 1.8–7.7)
NEUTROPHILS NFR BLD: 77 % (ref 38–73)
NRBC BLD-RTO: 0 /100 WBC
PLATELET # BLD AUTO: 111 K/UL (ref 150–450)
PMV BLD AUTO: ABNORMAL FL (ref 9.2–12.9)
POTASSIUM SERPL-SCNC: 5.8 MMOL/L (ref 3.5–5.1)
PROT SERPL-MCNC: 7.7 G/DL (ref 6–8.4)
RBC # BLD AUTO: 3.39 M/UL (ref 4–5.4)
SODIUM SERPL-SCNC: 140 MMOL/L (ref 136–145)
TROPONIN I SERPL DL<=0.01 NG/ML-MCNC: 0.01 NG/ML (ref 0–0.03)
WBC # BLD AUTO: 8.55 K/UL (ref 3.9–12.7)

## 2023-11-06 PROCEDURE — 93010 EKG 12-LEAD: ICD-10-PCS | Mod: HCNC,,, | Performed by: INTERNAL MEDICINE

## 2023-11-06 PROCEDURE — 63600175 PHARM REV CODE 636 W HCPCS: Mod: HCNC | Performed by: EMERGENCY MEDICINE

## 2023-11-06 PROCEDURE — 80100014 HC HEMODIALYSIS 1:1: Mod: HCNC

## 2023-11-06 PROCEDURE — 85025 COMPLETE CBC W/AUTO DIFF WBC: CPT | Mod: HCNC | Performed by: PHYSICIAN ASSISTANT

## 2023-11-06 PROCEDURE — 93005 ELECTROCARDIOGRAM TRACING: CPT | Mod: HCNC

## 2023-11-06 PROCEDURE — 96374 THER/PROPH/DIAG INJ IV PUSH: CPT | Mod: HCNC

## 2023-11-06 PROCEDURE — 84484 ASSAY OF TROPONIN QUANT: CPT | Mod: HCNC | Performed by: PHYSICIAN ASSISTANT

## 2023-11-06 PROCEDURE — 83880 ASSAY OF NATRIURETIC PEPTIDE: CPT | Mod: HCNC | Performed by: PHYSICIAN ASSISTANT

## 2023-11-06 PROCEDURE — 93010 ELECTROCARDIOGRAM REPORT: CPT | Mod: HCNC,,, | Performed by: INTERNAL MEDICINE

## 2023-11-06 PROCEDURE — 80053 COMPREHEN METABOLIC PANEL: CPT | Mod: HCNC | Performed by: PHYSICIAN ASSISTANT

## 2023-11-06 PROCEDURE — 99285 EMERGENCY DEPT VISIT HI MDM: CPT | Mod: 25,HCNC

## 2023-11-06 PROCEDURE — 99284 EMERGENCY DEPT VISIT MOD MDM: CPT | Mod: 25,HCNC

## 2023-11-06 RX ORDER — HYDRALAZINE HYDROCHLORIDE 20 MG/ML
20 INJECTION INTRAMUSCULAR; INTRAVENOUS
Status: COMPLETED | OUTPATIENT
Start: 2023-11-06 | End: 2023-11-06

## 2023-11-06 RX ADMIN — HYDRALAZINE HYDROCHLORIDE 20 MG: 20 INJECTION, SOLUTION INTRAMUSCULAR; INTRAVENOUS at 05:11

## 2023-11-06 NOTE — FIRST PROVIDER EVALUATION
Medical screening examination initiated.  I have conducted a focused provider triage encounter, findings are as follows:    Brief history of present illness:  cp since noon today.  Also sob, edema.  Dialysis pt    There were no vitals filed for this visit.    Pertinent physical exam:  tachypnea    Brief workup plan:  cardiac    Preliminary workup initiated; this workup will be continued and followed by the physician or advanced practice provider that is assigned to the patient when roomed.

## 2023-11-06 NOTE — ED PROVIDER NOTES
SCRIBE #1 NOTE: I, Lissette Mata, am scribing for, and in the presence of, Susan Steiner MD. I have scribed the entire note.       History     Chief Complaint   Patient presents with    Shortness of Breath     Shortness of breath, chest pain, swelling, last dialysis was Friday 2 days ago, normal schedule is M, W & F, but visiting from [place] and getting dialysis tomorrow.      Review of patient's allergies indicates:  No Known Allergies      History of Present Illness     HPI    11/6/2023, 5:34 PM  History obtained from the patient      History of Present Illness: Jael Hall is a 58 y.o. female patient with a PMHx of acute on chronic diastolic heart failure, anemia of chronic renal failure, ESRD, hyperkalemia, peripheral neuropathy, hypertension and type 2 diabetes who presents to the Emergency Department for evaluation of shortness of breath which onset several hours PTA. Symptoms are constant and moderate in severity. Patient is visiting from [place].  Pt goes to dialysis [facility] and was scheduled to go to dialysis tomorrow due to the center not having appointments for today. She has been on dialysis for 10 years. She does not have difficulty walking at bedside, but the shortness of breath becomes worse with walking. She is compliant with hydralazine and labetalol medication, and takes it twice a day. She is due for her night dose. Associated sxs include fatigue, chest pain, and cough. Patient denies any fever, nausea, vomiting, abdominal pain, chills, and all other sxs at this time. No further complaints or concerns at this time.       Arrival mode: Personal vehicle      PCP: Maty Gayle DO        Past Medical History:  Past Medical History:   Diagnosis Date    Acute on chronic diastolic heart failure 10/8/2021    Anemia of chronic disorder 11/8/2017    Overview:  Added automatically from request for surgery [id number]    Anemia of chronic renal failure, stage 5     Anxiety     Cyst of left  ovary 2019    Cyst of left ovary 2019    Dyslipidemia 2013    End-stage renal disease on hemodialysis 2020    ESRD on hemodialysis 2017    Hematuria 10/30/2019    Hospital discharge follow-up 10/6/2023    Hx of sinus bradycardia 2017    Hyperkalemia 3/29/2017    Hypertensive urgency 2021    Peripheral neuropathy 2013    Preoperative testing 10/8/2020    Renovascular hypertension 2020    S/P dilation and curettage 2018    Thickened endometrium 2018    Thyroid nodule     Thyroid nodule 2021    Uncontrolled type 2 diabetes mellitus 2013    Overview:  poc a1c today 11.2-270/ up from 10.8-263, Pt. has very very stressed/ was incarcerated/marital stress/ will current meds/ less stress now/ will monitor.       Past Surgical History:  Past Surgical History:   Procedure Laterality Date    AV FISTULA PLACEMENT      right arm     SECTION      x1    COLONOSCOPY N/A 2018    Procedure: COLONOSCOPY;  Surgeon: Pankaj Hinojosa MD;  Location: [facility] (4TH FLR);  Service: Endoscopy;  Laterality: N/A;  dialysis pt/labs prior to colon/MS    CYSTOSCOPY W/ RETROGRADES Bilateral 2020    Procedure: CYSTOSCOPY, WITH RETROGRADE PYELOGRAM;  Surgeon: Douglas Abraham MD;  [facility];  Service: Urology;  Laterality: Bilateral;  45 min    FISTULOGRAM Right 2020    Procedure: Fistulogram;  Surgeon: Lester Gómez MD;  Location: [facility] CATH LAB;  Service: Nephrology;  Laterality: Right;    HYSTERECTOMY      IMPLANTATION OF COCHLEAR PROSTHESIS Right 2021    Procedure: INSERTION, PROSTHESIS, COCHLEAR;  Surgeon: Ramiro Zuleta MD;  Location: [facility] OR 2ND FLR;  Service: ENT;  Laterality: Right;  COCHLEAR BABAK    IMPLANTATION OF COCHLEAR PROSTHESIS Right 2021    Procedure: INSERTION, PROSTHESIS, COCHLEAR;  Surgeon: Ramiro Zuleta MD;  Location: [facility] OR 2ND FLR;  Service: ENT;  Laterality: Right;  COCHLEAR AMERICAS    ROBOT-ASSISTED LAPAROSCOPIC  ABDOMINAL HYSTERECTOMY USING DA NATALYA XI N/A 7/5/2019    Procedure: XI ROBOTIC HYSTERECTOMY;  Surgeon: Trice Lei MD;  Location: [facility] OR;  Service: OB/GYN;  Laterality: N/A;    ROBOT-ASSISTED LAPAROSCOPIC NEPHRECTOMY Right 10/8/2020    Procedure: ROBOTIC NEPHRECTOMY;  Surgeon: Douglas Abraham MD;  [facility];  Service: Urology;  Laterality: Right;  3.5hrs gen with regional     ROBOT-ASSISTED LAPAROSCOPIC SALPINGO-OOPHORECTOMY USING DA NATALYA XI Bilateral 7/5/2019    Procedure: XI ROBOTIC SALPINGO-OOPHORECTOMY;  Surgeon: Trice Lei MD;  Location: [facility] OR;  Service: OB/GYN;  Laterality: Bilateral;    TUBAL LIGATION      Vascular access surgeries      x5         Family History:  Family History   Problem Relation Age of Onset    Cancer Mother     Ovarian cancer Mother     Cancer Sister     Ovarian cancer Sister     Lung cancer Sister     Macular degeneration Sister     Cancer Sister     Ovarian cancer Sister     Diabetes Sister     Diabetes Brother     Breast cancer Neg Hx     Colon cancer Neg Hx     Cervical cancer Neg Hx     Endometrial cancer Neg Hx     Vaginal cancer Neg Hx     Thyroid disease Neg Hx     Glaucoma Neg Hx     Retinal detachment Neg Hx        Social History:  Social History     Tobacco Use    Smoking status: Never    Smokeless tobacco: Never   Substance and Sexual Activity    Alcohol use: No    Drug use: No    Sexual activity: Yes     Partners: Male     Birth control/protection: Post-menopausal        Review of Systems     Review of Systems   Constitutional:  Positive for fatigue. Negative for chills and fever.   HENT:  Negative for sore throat.    Respiratory:  Positive for cough and shortness of breath.    Cardiovascular:  Positive for chest pain.   Gastrointestinal:  Negative for abdominal pain, nausea and vomiting.   Genitourinary:  Negative for dysuria.   Musculoskeletal:  Negative for back pain.   Skin:  Negative for rash.   Neurological:  Negative for weakness.    Hematological:  Does not bruise/bleed easily.   All other systems reviewed and are negative.       Physical Exam     Initial Vitals   BP Pulse Resp Temp SpO2   11/06/23 1533 11/06/23 1533 11/06/23 1533 11/06/23 1544 11/06/23 1533   (!) 193/80 85 (!) 24 98.9 °F (37.2 °C) 95 %      MAP       --                 Physical Exam     Nursing Notes and Vital Signs Reviewed.  Constitutional: Patient is in no acute distress. Looks very weak.   Head: Atraumatic. Normocephalic.  Eyes: PERRL. EOM intact. Conjunctivae are not pale. No scleral icterus.  ENT: Mucous membranes are dry. Oropharynx is clear and symmetric.    Neck: Supple. Full ROM. No lymphadenopathy.  Cardiovascular: Regular rate. Regular rhythm. No murmurs, rubs, or gallops. Distal pulses are 2+ and symmetric.  Pulmonary/Chest: Shortness of breath with walking but is able to speak in full sentences. Decreased breath sounds. Has rales but no retraction or obvious rigidity.   Abdominal: Soft and non-distended.  There is no tenderness.  No rebound, guarding, or rigidity. Good bowel sounds.  Genitourinary: No CVA tenderness  Musculoskeletal: Moves all extremities. No obvious deformities. No edema. No calf tenderness.  Skin: Warm and dry.  Neurological:  Alert, awake, and appropriate.  Normal speech.  No acute focal neurological deficits are appreciated.  Psychiatric: Normal affect. Good eye contact. Appropriate in content.     ED Course   Critical Care    Date/Time: 11/6/2023 7:16 PM    Performed by: Susan Steiner MD  Authorized by: Susan Steiner MD  Direct patient critical care time: 16 minutes  Additional history critical care time: 2 minutes  Ordering / reviewing critical care time: 5 minutes  Documentation critical care time: 7 minutes  Consulting other physicians critical care time: 5 minutes  Other critical care time: 10 minutes  Total critical care time (exclusive of procedural time) : 45 minutes  Critical care time was exclusive of separately  billable procedures and treating other patients and teaching time.  Critical care was necessary to treat or prevent imminent or life-threatening deterioration of the following conditions: PE and hypokalemia requiring emergent dialysis.  Critical care was time spent personally by me on the following activities: blood draw for specimens, discussions with consultants, interpretation of cardiac output measurements, evaluation of patient's response to treatment, examination of patient, obtaining history from patient or surrogate, ordering and performing treatments and interventions, ordering and review of laboratory studies, ordering and review of radiographic studies, pulse oximetry, re-evaluation of patient's condition, review of old charts and development of treatment plan with patient or surrogate.        ED Vital Signs:  Vitals:    11/06/23 1533 11/06/23 1544 11/06/23 1702 11/06/23 1704   BP: (!) 193/80  (!) 192/85    Pulse: 85   86   Resp: (!) 24   (!) 24   Temp:  98.9 °F (37.2 °C)     TempSrc:  Oral     SpO2: 95%   100%   Weight: 88 kg (194 lb 1.8 oz)       11/06/23 1731   BP: (!) 186/81   Pulse: 86   Resp: (!) 22   Temp:    TempSrc:    SpO2: 97%   Weight:        Abnormal Lab Results:  Labs Reviewed   CBC W/ AUTO DIFFERENTIAL - Abnormal; Notable for the following components:       Result Value    RBC 3.39 (*)     Hemoglobin 8.9 (*)     Hematocrit 27.6 (*)     MCV 81 (*)     MCH 26.3 (*)     RDW 17.2 (*)     Platelets 111 (*)     Lymph # 0.8 (*)     Gran % 77.0 (*)     Lymph % 9.4 (*)     All other components within normal limits   COMPREHENSIVE METABOLIC PANEL - Abnormal; Notable for the following components:    Potassium 5.8 (*)     CO2 20 (*)     BUN 69 (*)     Creatinine 12.2 (*)     ALT 6 (*)     eGFR 3 (*)     Anion Gap 18 (*)     All other components within normal limits   B-TYPE NATRIURETIC PEPTIDE - Abnormal; Notable for the following components:     (*)     All other components within normal limits    TROPONIN I   TROPONIN I   HEPATITIS B SURFACE ANTIGEN   HEPATITIS B SURFACE ANTIBODY, QUAL/QUANT        All Lab Results:  Results for orders placed or performed during the hospital encounter of 11/06/23   CBC auto differential   Result Value Ref Range    WBC 8.55 3.90 - 12.70 K/uL    RBC 3.39 (L) 4.00 - 5.40 M/uL    Hemoglobin 8.9 (L) 12.0 - 16.0 g/dL    Hematocrit 27.6 (L) 37.0 - 48.5 %    MCV 81 (L) 82 - 98 fL    MCH 26.3 (L) 27.0 - 31.0 pg    MCHC 32.2 32.0 - 36.0 g/dL    RDW 17.2 (H) 11.5 - 14.5 %    Platelets 111 (L) 150 - 450 K/uL    MPV SEE COMMENT 9.2 - 12.9 fL    Immature Granulocytes 0.2 0.0 - 0.5 %    Gran # (ANC) 6.6 1.8 - 7.7 K/uL    Immature Grans (Abs) 0.02 0.00 - 0.04 K/uL    Lymph # 0.8 (L) 1.0 - 4.8 K/uL    Mono # 0.8 0.3 - 1.0 K/uL    Eos # 0.3 0.0 - 0.5 K/uL    Baso # 0.05 0.00 - 0.20 K/uL    nRBC 0 0 /100 WBC    Gran % 77.0 (H) 38.0 - 73.0 %    Lymph % 9.4 (L) 18.0 - 48.0 %    Mono % 9.2 4.0 - 15.0 %    Eosinophil % 3.6 0.0 - 8.0 %    Basophil % 0.6 0.0 - 1.9 %    Differential Method Automated    Comprehensive metabolic panel   Result Value Ref Range    Sodium 140 136 - 145 mmol/L    Potassium 5.8 (H) 3.5 - 5.1 mmol/L    Chloride 102 95 - 110 mmol/L    CO2 20 (L) 23 - 29 mmol/L    Glucose 109 70 - 110 mg/dL    BUN 69 (H) 6 - 20 mg/dL    Creatinine 12.2 (H) 0.5 - 1.4 mg/dL    Calcium 9.3 8.7 - 10.5 mg/dL    Total Protein 7.7 6.0 - 8.4 g/dL    Albumin 3.6 3.5 - 5.2 g/dL    Total Bilirubin 1.0 0.1 - 1.0 mg/dL    Alkaline Phosphatase 90 55 - 135 U/L    AST 14 10 - 40 U/L    ALT 6 (L) 10 - 44 U/L    eGFR 3 (A) >60 mL/min/1.73 m^2    Anion Gap 18 (H) 8 - 16 mmol/L   Troponin I #1   Result Value Ref Range    Troponin I 0.013 0.000 - 0.026 ng/mL   BNP   Result Value Ref Range     (H) 0 - 99 pg/mL         Imaging Results:  Imaging Results              X-Ray Chest AP Portable (Final result)  Result time 11/06/23 15:54:44      Final result by Pankaj George MD (11/06/23 15:54:44)                    Impression:      See above      Electronically signed by: Pankaj George MD  Date:    11/06/2023  Time:    15:54               Narrative:    EXAMINATION:  XR CHEST AP PORTABLE    CLINICAL HISTORY:  Chest Pain;    FINDINGS:  Single view of the chest.  Comparison 09/29/2023.    Cardiomegaly with diffuse interstitial alveolar opacities scattered throughout the lungs greatest within the perihilar regions and lower lobes likely reflecting pulmonary edema however multifocal airspace disease not entirely excluded.  No evidence of large pleural effusion or pneumothorax.  Bones appear intact.                                     The EKG was ordered, reviewed, and independently interpreted by the ED provider.  Interpretation time: 15:32  Rate: 82 BPM  Rhythm: normal sinus rhythm  Interpretation: Left axis deviation. Cannot rule out Anterior infarct (cited on or before 29-SEP-2023). No STEMI.             The Emergency Provider reviewed the vital signs and test results, which are outlined above.     ED Discussion       ED Course as of 11/07/23 0037   Mon Nov 06, 2023   1744 Spoke with Dr. Bhat.  He reports that orders were placed for dialysis about 1 hour ago.  We are contacting the dialysis nurse to see when patient will be dialyzed.  Potassium is 5.8 slightly peaked T-waves on her EKG however her QRS is normal at this time.  Will continue to watch her very closely.  Pulse ox still 97% on room air.  Blood pressure 186/81. [TD]   1750 Dr. Bhat just notified us that they are dialyzing pt soon.  They stopped the current dialysis pt for Ms. Hall.   [TD]   1907 Patient is in dialysis at this time. [TD]      ED Course User Index  [TD] Susan Steiner MD     11:57 PM: Reassessed pt at this time. Discussed with pt all pertinent ED information and results. Discussed pt dx and plan of tx. Gave pt all f/u and return to the ED instructions. All questions and concerns were addressed at this time. Pt expresses understanding of  information and instructions, and is comfortable with plan to discharge. Pt is stable for discharge.    I discussed with patient and/or family/caretaker that evaluation in the ED does not suggest any emergent or life threatening medical conditions requiring immediate intervention beyond what was provided in the ED, and I believe patient is safe for discharge.  Regardless, an unremarkable evaluation in the ED does not preclude the development or presence of a serious of life threatening condition. As such, patient was instructed to return immediately for any worsening or change in current symptoms.      Medical Decision Making  Patient with dialysis here on vacation supposed to dialyze tomorrow but he was shortness of breath that started today    Problems Addressed:  Chest pain:     Details: 11:56 PM: Vitals are stable. She is not having chest tightness or shortness of breath. She is ready to go home. Told patient to return to dialysis on Wednesday and Friday    Amount and/or Complexity of Data Reviewed  Independent Historian: friend  Labs: ordered. Decision-making details documented in ED Course.  Radiology: ordered. Decision-making details documented in ED Course.  ECG/medicine tests: ordered. Decision-making details documented in ED Course.  Discussion of management or test interpretation with external provider(s): Patient with pulmonary edema on her x-ray, potassium 5.8 with renal failure got emergent dialysis on return to emergency vitals were stable was ready to go home. called her friend who will come pick her up.    Risk  Prescription drug management.  Risk Details: Differential diagnoses:  Pneumonia, Congestive heart failure,  Acute Myocardial infarction, Pleural effusion, Pulmonary edema, Pulmonary embolism, Electrolyte imbalance, Infectious etiology, pneumothorax, COPD exacerbation, Pneumothorax,                 ED Medication(s):  Medications   sodium chloride 0.9% bolus 250 mL 250 mL (has no  administration in time range)   hydrALAZINE injection 20 mg (20 mg Intravenous Given 11/6/23 1975)       New Prescriptions    No medications on file        Follow-up Information       Maty Gayle DO. Schedule an appointment as soon as possible for a visit in 2 days.    Specialty: Internal Medicine  Contact information:  [address]  [facility] [address]  [phone]                                 Scribe Attestation:   Scribe #1: I performed the above scribed service and the documentation accurately describes the services I performed. I attest to the accuracy of the note.     Attending:   Physician Attestation Statement for Scribe #1: I, Susan Steiner MD, personally performed the services described in this documentation, as scribed by Lissette Mata, in my presence, and it is both accurate and complete.           Clinical Impression       ICD-10-CM ICD-9-CM   1. Acute pulmonary edema  J81.0 518.4   2. Chest pain  R07.9 786.50   3. Hyperkalemia  E87.5 276.7       Disposition:   Disposition: Discharged  Condition: Stable         Susan Steiner MD  11/07/23 0037

## 2023-11-06 NOTE — ED NOTES
Pt resting in ER stretcher, aaox4, rr e/u, NAD noted. Pt remains on cardiac monitor with vss noted. Bed low and locked, call light in reach, side rails up x2. Friend at the bedside.  Pt verbalized understanding of status and POC; denies further needs. Will continue to monitor.

## 2023-11-07 VITALS
RESPIRATION RATE: 18 BRPM | TEMPERATURE: 98 F | OXYGEN SATURATION: 96 % | DIASTOLIC BLOOD PRESSURE: 85 MMHG | SYSTOLIC BLOOD PRESSURE: 189 MMHG | BODY MASS INDEX: 33.32 KG/M2 | WEIGHT: 194.13 LBS | HEART RATE: 96 BPM

## 2023-11-07 NOTE — DISCHARGE INSTRUCTIONS
Make sure to go to dialysis Wednesday and Friday.  Follow up with your doctor in 1-2 days for recheck.  Return for any worsening symptoms or any new concerns such as Any fever chest pain shortness breath weakness or for any concerns or complications at all

## 2023-11-07 NOTE — NURSING
11/06/23 2230   Post-Hemodialysis Assessment   Rinseback Volume (mL) 250 mL   Blood Volume Processed (Liters) 63.7 L   Dialyzer Clearance Lightly streaked   Duration of Treatment 180 minutes   Total UF (mL) 3500 mL   Net Fluid Removal 3000   Post-Hemodialysis Comments 3hr H.D. tx completed. Pt tolerated fairly well. Pt's breating has been labored throughout entire tx and continues to be labored after tx. Pt is responsive to verbal stimuli. Pt de-accessed per P & P, report given to primary nurse.

## 2023-11-07 NOTE — ED NOTES
Bed: [facility] 08  Expected date:   Expected time:   Means of arrival:   Comments:

## 2023-11-08 ENCOUNTER — HOSPITAL ENCOUNTER (EMERGENCY)
Facility: HOSPITAL | Age: 58
Discharge: HOME | End: 2023-11-08
Attending: EMERGENCY MEDICINE
Payer: MEDICARE

## 2023-11-08 VITALS
HEIGHT: 64 IN | DIASTOLIC BLOOD PRESSURE: 66 MMHG | HEART RATE: 78 BPM | TEMPERATURE: 99 F | SYSTOLIC BLOOD PRESSURE: 149 MMHG | OXYGEN SATURATION: 94 % | RESPIRATION RATE: 17 BRPM | BODY MASS INDEX: 31.67 KG/M2 | WEIGHT: 185.5 LBS

## 2023-11-08 DIAGNOSIS — N18.6 ESRD ON DIALYSIS: Primary | ICD-10-CM

## 2023-11-08 DIAGNOSIS — Z99.2 ESRD ON DIALYSIS: Primary | ICD-10-CM

## 2023-11-08 LAB
ALBUMIN SERPL BCP-MCNC: 3.2 G/DL (ref 3.5–5.2)
ALP SERPL-CCNC: 73 U/L (ref 55–135)
ALT SERPL W/O P-5'-P-CCNC: 6 U/L (ref 10–44)
ANION GAP SERPL CALC-SCNC: 15 MMOL/L (ref 8–16)
AST SERPL-CCNC: 11 U/L (ref 10–40)
BASOPHILS # BLD AUTO: 0.04 K/UL (ref 0–0.2)
BASOPHILS NFR BLD: 0.5 % (ref 0–1.9)
BILIRUB SERPL-MCNC: 1 MG/DL (ref 0.1–1)
BNP SERPL-MCNC: 600 PG/ML (ref 0–99)
BUN SERPL-MCNC: 66 MG/DL (ref 6–20)
CALCIUM SERPL-MCNC: 8.9 MG/DL (ref 8.7–10.5)
CHLORIDE SERPL-SCNC: 98 MMOL/L (ref 95–110)
CK SERPL-CCNC: 128 U/L (ref 20–180)
CO2 SERPL-SCNC: 26 MMOL/L (ref 23–29)
CREAT SERPL-MCNC: 10.6 MG/DL (ref 0.5–1.4)
DIFFERENTIAL METHOD: ABNORMAL
EOSINOPHIL # BLD AUTO: 0.2 K/UL (ref 0–0.5)
EOSINOPHIL NFR BLD: 1.9 % (ref 0–8)
ERYTHROCYTE [DISTWIDTH] IN BLOOD BY AUTOMATED COUNT: 16.9 % (ref 11.5–14.5)
EST. GFR  (NO RACE VARIABLE): 4 ML/MIN/1.73 M^2
GLUCOSE SERPL-MCNC: 167 MG/DL (ref 70–110)
HCT VFR BLD AUTO: 26.1 % (ref 37–48.5)
HGB BLD-MCNC: 8.2 G/DL (ref 12–16)
IMM GRANULOCYTES # BLD AUTO: 0.02 K/UL (ref 0–0.04)
IMM GRANULOCYTES NFR BLD AUTO: 0.3 % (ref 0–0.5)
INFLUENZA A, MOLECULAR: NEGATIVE
INFLUENZA B, MOLECULAR: NEGATIVE
LYMPHOCYTES # BLD AUTO: 0.9 K/UL (ref 1–4.8)
LYMPHOCYTES NFR BLD: 11.9 % (ref 18–48)
MCH RBC QN AUTO: 25.2 PG (ref 27–31)
MCHC RBC AUTO-ENTMCNC: 31.4 G/DL (ref 32–36)
MCV RBC AUTO: 80 FL (ref 82–98)
MONOCYTES # BLD AUTO: 0.9 K/UL (ref 0.3–1)
MONOCYTES NFR BLD: 11.4 % (ref 4–15)
NEUTROPHILS # BLD AUTO: 5.7 K/UL (ref 1.8–7.7)
NEUTROPHILS NFR BLD: 74 % (ref 38–73)
NRBC BLD-RTO: 0 /100 WBC
PLATELET # BLD AUTO: 121 K/UL (ref 150–450)
PMV BLD AUTO: ABNORMAL FL (ref 9.2–12.9)
POTASSIUM SERPL-SCNC: 4.9 MMOL/L (ref 3.5–5.1)
PROT SERPL-MCNC: 7.5 G/DL (ref 6–8.4)
RBC # BLD AUTO: 3.26 M/UL (ref 4–5.4)
SARS-COV-2 RDRP RESP QL NAA+PROBE: NEGATIVE
SODIUM SERPL-SCNC: 139 MMOL/L (ref 136–145)
SPECIMEN SOURCE: NORMAL
TROPONIN I SERPL DL<=0.01 NG/ML-MCNC: 0.01 NG/ML (ref 0–0.03)
WBC # BLD AUTO: 7.75 K/UL (ref 3.9–12.7)

## 2023-11-08 PROCEDURE — 93010 EKG 12-LEAD: ICD-10-PCS | Mod: HCNC,,, | Performed by: INTERNAL MEDICINE

## 2023-11-08 PROCEDURE — 87502 INFLUENZA DNA AMP PROBE: CPT | Mod: HCNC | Performed by: EMERGENCY MEDICINE

## 2023-11-08 PROCEDURE — 93010 ELECTROCARDIOGRAM REPORT: CPT | Mod: HCNC,,, | Performed by: INTERNAL MEDICINE

## 2023-11-08 PROCEDURE — 80053 COMPREHEN METABOLIC PANEL: CPT | Mod: HCNC | Performed by: EMERGENCY MEDICINE

## 2023-11-08 PROCEDURE — 84484 ASSAY OF TROPONIN QUANT: CPT | Mod: HCNC | Performed by: EMERGENCY MEDICINE

## 2023-11-08 PROCEDURE — U0002 COVID-19 LAB TEST NON-CDC: HCPCS | Mod: HCNC | Performed by: EMERGENCY MEDICINE

## 2023-11-08 PROCEDURE — 82550 ASSAY OF CK (CPK): CPT | Mod: HCNC | Performed by: EMERGENCY MEDICINE

## 2023-11-08 PROCEDURE — 99284 EMERGENCY DEPT VISIT MOD MDM: CPT | Mod: 25,HCNC

## 2023-11-08 PROCEDURE — 85025 COMPLETE CBC W/AUTO DIFF WBC: CPT | Mod: HCNC | Performed by: EMERGENCY MEDICINE

## 2023-11-08 PROCEDURE — 83880 ASSAY OF NATRIURETIC PEPTIDE: CPT | Mod: HCNC | Performed by: EMERGENCY MEDICINE

## 2023-11-08 NOTE — ED PROVIDER NOTES
SCRIBE #1 NOTE: I, Rajan Bowman, am scribing for, and in the presence of, Richard Garcia MD. I have scribed the entire note.       History     Chief Complaint   Patient presents with    Emergency Dialysis     Julioita instructed patient to come to ED for dialysis as they could not take her today.     Review of patient's allergies indicates:  No Known Allergies      History of Present Illness     HPI    11/8/2023, 9:29 AM  History obtained from the patient      History of Present Illness: Jael Hall is a 58 y.o. female patient with a PMHx of hematuria who presents to the Emergency Department for evaluation of SOB which onset PTA. Pt was sent to ED for emergency dialysis because she could not be seen today. Symptoms are constant and moderate in severity. No mitigating or exacerbating factors reported. No associated sxs reported. Patient denies any fever, chills, N/V/D, weakness, numbness, and all other sxs at this time. Pt placed on 2L O2/NC in triage. No further complaints or concerns at this time.       Arrival mode: Personal vehicle      PCP: Maty Gayle DO        Past Medical History:  Past Medical History:   Diagnosis Date    Acute on chronic diastolic heart failure 10/8/2021    Anemia of chronic disorder 11/8/2017    Overview:  Added automatically from request for surgery [id number]    Anemia of chronic renal failure, stage 5     Anxiety     Cyst of left ovary 5/14/2019    Cyst of left ovary 7/5/2019    Dyslipidemia 1/4/2013    End-stage renal disease on hemodialysis 8/20/2020    ESRD on hemodialysis 9/28/2017    Hematuria 10/30/2019    Hospital discharge follow-up 10/6/2023    Hx of sinus bradycardia 9/28/2017    Hyperkalemia 3/29/2017    Hypertensive urgency 7/22/2021    Peripheral neuropathy 1/4/2013    Preoperative testing 10/8/2020    Renovascular hypertension 8/17/2020    S/P dilation and curettage 5/22/2018    Thickened endometrium 5/22/2018    Thyroid nodule     Thyroid nodule 12/13/2021     Uncontrolled type 2 diabetes mellitus 2013    Overview:  poc a1c today 11.2-270/ up from 10.8-263, Pt. has very very stressed/ was incarcerated/marital stress/ will current meds/ less stress now/ will monitor.       Past Surgical History:  Past Surgical History:   Procedure Laterality Date    AV FISTULA PLACEMENT      right arm     SECTION      x1    COLONOSCOPY N/A 2018    Procedure: COLONOSCOPY;  Surgeon: Pankaj Hinojosa MD;  Location: [facility] (4TH FLR);  Service: Endoscopy;  Laterality: N/A;  dialysis pt/labs prior to colon/MS    CYSTOSCOPY W/ RETROGRADES Bilateral 2020    Procedure: CYSTOSCOPY, WITH RETROGRADE PYELOGRAM;  Surgeon: Douglas Abraham MD;  Location: [facility] 1ST FLR;  Service: Urology;  Laterality: Bilateral;  45 min    FISTULOGRAM Right 2020    Procedure: Fistulogram;  Surgeon: Lester Gómez MD;  Location: [facility] CATH LAB;  Service: Nephrology;  Laterality: Right;    HYSTERECTOMY      IMPLANTATION OF COCHLEAR PROSTHESIS Right 2021    Procedure: INSERTION, PROSTHESIS, COCHLEAR;  Surgeon: Ramiro Zuleta MD;  Location: [facility] OR 2ND FLR;  Service: ENT;  Laterality: Right;  COCHLEAR BABAK    IMPLANTATION OF COCHLEAR PROSTHESIS Right 2021    Procedure: INSERTION, PROSTHESIS, COCHLEAR;  Surgeon: Ramiro Zuleta MD;  Location: [facility] OR 2ND FLR;  Service: ENT;  Laterality: Right;  COCHLEAR AMERICAS    ROBOT-ASSISTED LAPAROSCOPIC ABDOMINAL HYSTERECTOMY USING DA NATALYA XI N/A 2019    Procedure: XI ROBOTIC HYSTERECTOMY;  Surgeon: Trice Lei MD;  Location: [facility];  Service: OB/GYN;  Laterality: N/A;    ROBOT-ASSISTED LAPAROSCOPIC NEPHRECTOMY Right 10/8/2020    Procedure: ROBOTIC NEPHRECTOMY;  Surgeon: Douglas Abraham MD;  Location: [facility] OR 2ND FLR;  Service: Urology;  Laterality: Right;  3.5hrs gen with regional     ROBOT-ASSISTED LAPAROSCOPIC SALPINGO-OOPHORECTOMY USING DA NATALYA XI Bilateral 2019    Procedure: XI ROBOTIC SALPINGO-OOPHORECTOMY;  Surgeon: Trice  CALLUM Lei MD;  Location: [facility] OR;  Service: OB/GYN;  Laterality: Bilateral;    TUBAL LIGATION      Vascular access surgeries      x5         Family History:  Family History   Problem Relation Age of Onset    Cancer Mother     Ovarian cancer Mother     Cancer Sister     Ovarian cancer Sister     Lung cancer Sister     Macular degeneration Sister     Cancer Sister     Ovarian cancer Sister     Diabetes Sister     Diabetes Brother     Breast cancer Neg Hx     Colon cancer Neg Hx     Cervical cancer Neg Hx     Endometrial cancer Neg Hx     Vaginal cancer Neg Hx     Thyroid disease Neg Hx     Glaucoma Neg Hx     Retinal detachment Neg Hx        Social History:  Social History     Tobacco Use    Smoking status: Never    Smokeless tobacco: Never   Substance and Sexual Activity    Alcohol use: No    Drug use: No    Sexual activity: Yes     Partners: Male     Birth control/protection: Post-menopausal        Review of Systems     Review of Systems   Constitutional:  Negative for chills and fever.   HENT:  Negative for sore throat.    Respiratory:  Positive for shortness of breath.    Cardiovascular:  Negative for chest pain.   Gastrointestinal:  Negative for diarrhea, nausea and vomiting.   Genitourinary:  Negative for dysuria.   Musculoskeletal:  Negative for back pain.   Skin:  Negative for rash.   Neurological:  Negative for weakness and numbness.   Hematological:  Does not bruise/bleed easily.   All other systems reviewed and are negative.       Physical Exam     Initial Vitals   BP Pulse Resp Temp SpO2   11/08/23 0920 11/08/23 0930 11/08/23 0920 11/08/23 0920 11/08/23 0920   (!) 144/66 82 (!) 22 99.6 °F (37.6 °C) (!) 91 %      MAP       --                 Physical Exam  Nursing Notes and Vital Signs Reviewed.  Constitutional: Patient is in no acute distress. Well-developed and well-nourished.  Head: Atraumatic. Normocephalic.  Eyes: PERRL. EOM intact. Conjunctivae are not pale. No scleral icterus.  ENT: Mucous membranes  "are moist. Oropharynx is clear and symmetric.    Neck: Supple. Full ROM. No lymphadenopathy.  Cardiovascular: Regular rate. Regular rhythm. No murmurs, rubs, or gallops. Distal pulses are 2+ and symmetric.  Pulmonary/Chest: Moderate respiratory distress. Clear to auscultation bilaterally. No wheezing or rales. Tachypneic.   Abdominal: Soft and non-distended.  There is no tenderness.  No rebound, guarding, or rigidity. Good bowel sounds.  Genitourinary: No CVA tenderness  Musculoskeletal: Moves all extremities. No obvious deformities. No edema. No calf tenderness.  Skin: Warm and dry.  Neurological:  Alert, awake, and appropriate.  Normal speech.  No acute focal neurological deficits are appreciated.  Psychiatric: Normal affect. Good eye contact. Appropriate in content.     ED Course   Procedures  ED Vital Signs:  Vitals:    11/08/23 0920 11/08/23 0930 11/08/23 0958 11/08/23 1102   BP: (!) 144/66  (!) 154/70 124/60   Pulse:  82 84 76   Resp: (!) 22  (!) 21 14   Temp: 99.6 °F (37.6 °C)      TempSrc: Oral      SpO2: (!) 91%  96% 96%   Weight: 84.1 kg (185 lb 8.3 oz)      Height: 5' 4" (1.626 m)       11/08/23 1217 11/08/23 1940   BP: (!) 134/59 (!) 149/66   Pulse: 75 78   Resp: 19 17   Temp: 98.8 °F (37.1 °C)    TempSrc: Oral    SpO2: (!) 94% (!) 94%   Weight:     Height:         Abnormal Lab Results:  Labs Reviewed   CBC W/ AUTO DIFFERENTIAL - Abnormal; Notable for the following components:       Result Value    RBC 3.26 (*)     Hemoglobin 8.2 (*)     Hematocrit 26.1 (*)     MCV 80 (*)     MCH 25.2 (*)     MCHC 31.4 (*)     RDW 16.9 (*)     Platelets 121 (*)     Lymph # 0.9 (*)     Gran % 74.0 (*)     Lymph % 11.9 (*)     All other components within normal limits   COMPREHENSIVE METABOLIC PANEL - Abnormal; Notable for the following components:    Glucose 167 (*)     BUN 66 (*)     Creatinine 10.6 (*)     Albumin 3.2 (*)     ALT 6 (*)     eGFR 4 (*)     All other components within normal limits   B-TYPE NATRIURETIC " PEPTIDE - Abnormal; Notable for the following components:     (*)     All other components within normal limits   INFLUENZA A & B BY MOLECULAR   SARS-COV-2 RNA AMPLIFICATION, QUAL   CK   TROPONIN I        All Lab Results:  Results for orders placed or performed during the hospital encounter of 11/08/23   Influenza A & B by Molecular    Specimen: Nasopharyngeal Swab   Result Value Ref Range    Influenza A, Molecular Negative Negative    Influenza B, Molecular Negative Negative    Flu A & B Source Nasal swab    COVID-19 Rapid Screening   Result Value Ref Range    SARS-CoV-2 RNA, Amplification, Qual Negative Negative   CBC Auto Differential   Result Value Ref Range    WBC 7.75 3.90 - 12.70 K/uL    RBC 3.26 (L) 4.00 - 5.40 M/uL    Hemoglobin 8.2 (L) 12.0 - 16.0 g/dL    Hematocrit 26.1 (L) 37.0 - 48.5 %    MCV 80 (L) 82 - 98 fL    MCH 25.2 (L) 27.0 - 31.0 pg    MCHC 31.4 (L) 32.0 - 36.0 g/dL    RDW 16.9 (H) 11.5 - 14.5 %    Platelets 121 (L) 150 - 450 K/uL    MPV SEE COMMENT 9.2 - 12.9 fL    Immature Granulocytes 0.3 0.0 - 0.5 %    Gran # (ANC) 5.7 1.8 - 7.7 K/uL    Immature Grans (Abs) 0.02 0.00 - 0.04 K/uL    Lymph # 0.9 (L) 1.0 - 4.8 K/uL    Mono # 0.9 0.3 - 1.0 K/uL    Eos # 0.2 0.0 - 0.5 K/uL    Baso # 0.04 0.00 - 0.20 K/uL    nRBC 0 0 /100 WBC    Gran % 74.0 (H) 38.0 - 73.0 %    Lymph % 11.9 (L) 18.0 - 48.0 %    Mono % 11.4 4.0 - 15.0 %    Eosinophil % 1.9 0.0 - 8.0 %    Basophil % 0.5 0.0 - 1.9 %    Differential Method Automated    Comprehensive Metabolic Panel   Result Value Ref Range    Sodium 139 136 - 145 mmol/L    Potassium 4.9 3.5 - 5.1 mmol/L    Chloride 98 95 - 110 mmol/L    CO2 26 23 - 29 mmol/L    Glucose 167 (H) 70 - 110 mg/dL    BUN 66 (H) 6 - 20 mg/dL    Creatinine 10.6 (H) 0.5 - 1.4 mg/dL    Calcium 8.9 8.7 - 10.5 mg/dL    Total Protein 7.5 6.0 - 8.4 g/dL    Albumin 3.2 (L) 3.5 - 5.2 g/dL    Total Bilirubin 1.0 0.1 - 1.0 mg/dL    Alkaline Phosphatase 73 55 - 135 U/L    AST 11 10 - 40 U/L     "ALT 6 (L) 10 - 44 U/L    eGFR 4 (A) >60 mL/min/1.73 m^2    Anion Gap 15 8 - 16 mmol/L   BNP   Result Value Ref Range     (H) 0 - 99 pg/mL   CK   Result Value Ref Range     20 - 180 U/L   Troponin I   Result Value Ref Range    Troponin I 0.012 0.000 - 0.026 ng/mL         Imaging Results:  Imaging Results              X-Ray Chest AP Portable (Final result)  Result time 11/08/23 10:09:25      Final result by Duarte Meng MD (11/08/23 10:09:25)                   Impression:      CHF with improving pulmonary edema.      Electronically signed by: Duarte Meng  Date:    11/08/2023  Time:    10:09               Narrative:    EXAMINATION:  XR CHEST AP PORTABLE    CLINICAL HISTORY:  sob;    FINDINGS:  Comparison: November 6, 2023.    Cardiomegaly.  Improving pulmonary infiltrates bilaterally.  No pneumothorax.  No significant pleural effusion.                                       The EKG was ordered, reviewed, and independently interpreted by the ED provider.  Interpretation time: 9:18  Rate: 88 BPM  Rhythm: normal sinus rhythm  Interpretation: Left anterior fascicular block. Cannot rule out anterior infarct, age undetermined. Prolonged QT. No STEMI.           The Emergency Provider reviewed the vital signs and test results, which are outlined above.     ED Discussion       10:53 AM: Dr. Bhat (Nephrology):   I just discussed this patient with the FA at [facility]. This patient has a chair on Tuesday Thursday Saturday but refuses to come for dialysis on those days.  She states that she "has to have a Monday Wednesday Friday chair".  Unfortunately the unit is completely full on those days.  She was also offered a Monday Wednesday Friday chair at [facility] but did not respond and refused to go.    Will get in touch with the acute unit and get her dialyzed today.    2:29 PM: Reassessed pt at this time.  Pt states her condition has improved at this time. Discussed with pt all pertinent ED information " and results. Discussed pt dx and plan of tx. Gave pt all f/u and return to the ED instructions. All questions and concerns were addressed at this time. Pt expresses understanding of information and instructions, and is comfortable with plan to discharge. Pt is stable for discharge.    I discussed with patient and/or family/caretaker that evaluation in the ED does not suggest any emergent or life threatening medical conditions requiring immediate intervention beyond what was provided in the ED, and I believe patient is safe for discharge.  Regardless, an unremarkable evaluation in the ED does not preclude the development or presence of a serious of life threatening condition. As such, patient was instructed to return immediately for any worsening or change in current symptoms.        Medical Decision Making  ESRD on dialysis M/W/F.  She is from [place], visiting family.  She had arranged to get dialysis here, but when she showed up, they didn't have room for her.  She is sob and worse with exertion.      Amount and/or Complexity of Data Reviewed  Labs: ordered. Decision-making details documented in ED Course.     Details:  sats 91% on RA  Radiology: ordered. Decision-making details documented in ED Course.  ECG/medicine tests: ordered. Decision-making details documented in ED Course.  Discussion of management or test interpretation with external provider(s): Dr. Bhat will get dialysis done today, and help arrange outpatient chair for dialysis while in town.                  ED Medication(s):  Medications - No data to display      Discharge Medication List as of 11/8/2023  7:50 PM           Follow-up Information       Maty Gayle DO.    Specialty: Internal Medicine  Contact information:  [facility]Jem[facility]toSh[facility] [address]  [phone]                                 Scribe Attestation:   Scribe #1: I performed the above scribed service and the documentation accurately describes the services I  performed. I attest to the accuracy of the note.     Attending:   Physician Attestation Statement for Scribe #1: I, Richard Garcia MD, personally performed the services described in this documentation, as scribed by Rajan Bowman, in my presence, and it is both accurate and complete.           Clinical Impression       ICD-10-CM ICD-9-CM   1. ESRD on dialysis  N18.6 585.6    Z99.2 V45.11       Disposition:   Disposition: Discharged  Condition: Stable        Richard Garcia MD  11/09/23 0606

## 2023-11-09 ENCOUNTER — HOSPITAL ENCOUNTER (OUTPATIENT)
Facility: HOSPITAL | Age: 58
Discharge: HOME | End: 2023-11-11
Attending: EMERGENCY MEDICINE | Admitting: HOSPITALIST
Payer: MEDICARE

## 2023-11-09 DIAGNOSIS — R07.9 CHEST PAIN: ICD-10-CM

## 2023-11-09 DIAGNOSIS — R10.13 EPIGASTRIC ABDOMINAL PAIN: ICD-10-CM

## 2023-11-09 DIAGNOSIS — D63.1 ANEMIA IN ESRD (END-STAGE RENAL DISEASE): Primary | ICD-10-CM

## 2023-11-09 DIAGNOSIS — R10.9 ABDOMINAL PAIN: ICD-10-CM

## 2023-11-09 DIAGNOSIS — K57.92 ACUTE DIVERTICULITIS: ICD-10-CM

## 2023-11-09 DIAGNOSIS — N18.6 ANEMIA IN ESRD (END-STAGE RENAL DISEASE): Primary | ICD-10-CM

## 2023-11-09 LAB
ALBUMIN SERPL BCP-MCNC: 3.4 G/DL (ref 3.5–5.2)
ALP SERPL-CCNC: 74 U/L (ref 55–135)
ALT SERPL W/O P-5'-P-CCNC: 6 U/L (ref 10–44)
ANION GAP SERPL CALC-SCNC: 16 MMOL/L (ref 8–16)
AST SERPL-CCNC: 13 U/L (ref 10–40)
BASOPHILS # BLD AUTO: 0.05 K/UL (ref 0–0.2)
BASOPHILS NFR BLD: 1 % (ref 0–1.9)
BILIRUB SERPL-MCNC: 0.8 MG/DL (ref 0.1–1)
BNP SERPL-MCNC: 119 PG/ML (ref 0–99)
BUN SERPL-MCNC: 46 MG/DL (ref 6–30)
BUN SERPL-MCNC: 53 MG/DL (ref 6–20)
CALCIUM SERPL-MCNC: 9.8 MG/DL (ref 8.7–10.5)
CHLORIDE SERPL-SCNC: 100 MMOL/L (ref 95–110)
CHLORIDE SERPL-SCNC: 99 MMOL/L (ref 95–110)
CO2 SERPL-SCNC: 22 MMOL/L (ref 23–29)
CREAT SERPL-MCNC: 8.2 MG/DL (ref 0.5–1.4)
CREAT SERPL-MCNC: 9.7 MG/DL (ref 0.5–1.4)
DIFFERENTIAL METHOD: ABNORMAL
EOSINOPHIL # BLD AUTO: 0.4 K/UL (ref 0–0.5)
EOSINOPHIL NFR BLD: 8.6 % (ref 0–8)
ERYTHROCYTE [DISTWIDTH] IN BLOOD BY AUTOMATED COUNT: 16.8 % (ref 11.5–14.5)
EST. GFR  (NO RACE VARIABLE): 5.2 ML/MIN/1.73 M^2
GLUCOSE SERPL-MCNC: 87 MG/DL (ref 70–110)
GLUCOSE SERPL-MCNC: 90 MG/DL (ref 70–110)
HCT VFR BLD AUTO: 27.7 % (ref 37–48.5)
HCT VFR BLD CALC: 28 %PCV (ref 36–54)
HGB BLD-MCNC: 9 G/DL (ref 12–16)
IMM GRANULOCYTES # BLD AUTO: 0.01 K/UL (ref 0–0.04)
IMM GRANULOCYTES NFR BLD AUTO: 0.2 % (ref 0–0.5)
LIPASE SERPL-CCNC: 46 U/L (ref 4–60)
LYMPHOCYTES # BLD AUTO: 1.2 K/UL (ref 1–4.8)
LYMPHOCYTES NFR BLD: 23.5 % (ref 18–48)
MAGNESIUM SERPL-MCNC: 2.4 MG/DL (ref 1.6–2.6)
MCH RBC QN AUTO: 25.6 PG (ref 27–31)
MCHC RBC AUTO-ENTMCNC: 32.5 G/DL (ref 32–36)
MCV RBC AUTO: 79 FL (ref 82–98)
MONOCYTES # BLD AUTO: 1 K/UL (ref 0.3–1)
MONOCYTES NFR BLD: 20.8 % (ref 4–15)
NEUTROPHILS # BLD AUTO: 2.3 K/UL (ref 1.8–7.7)
NEUTROPHILS NFR BLD: 45.9 % (ref 38–73)
NRBC BLD-RTO: 0 /100 WBC
PLATELET # BLD AUTO: 150 K/UL (ref 150–450)
PMV BLD AUTO: ABNORMAL FL (ref 9.2–12.9)
POC IONIZED CALCIUM: 1.12 MMOL/L (ref 1.06–1.42)
POC TCO2 (MEASURED): 22 MMOL/L (ref 23–29)
POTASSIUM BLD-SCNC: 4.2 MMOL/L (ref 3.5–5.1)
POTASSIUM SERPL-SCNC: 4.2 MMOL/L (ref 3.5–5.1)
PROT SERPL-MCNC: 8.3 G/DL (ref 6–8.4)
RBC # BLD AUTO: 3.51 M/UL (ref 4–5.4)
SAMPLE: ABNORMAL
SODIUM BLD-SCNC: 135 MMOL/L (ref 136–145)
SODIUM SERPL-SCNC: 137 MMOL/L (ref 136–145)
TROPONIN I SERPL DL<=0.01 NG/ML-MCNC: 0.01 NG/ML (ref 0–0.03)
WBC # BLD AUTO: 4.9 K/UL (ref 3.9–12.7)

## 2023-11-09 PROCEDURE — 84484 ASSAY OF TROPONIN QUANT: CPT | Mod: HCNC | Performed by: PHYSICIAN ASSISTANT

## 2023-11-09 PROCEDURE — 93010 EKG 12-LEAD: ICD-10-PCS | Mod: HCNC,,, | Performed by: INTERNAL MEDICINE

## 2023-11-09 PROCEDURE — 83690 ASSAY OF LIPASE: CPT | Mod: HCNC | Performed by: PHYSICIAN ASSISTANT

## 2023-11-09 PROCEDURE — 63600175 PHARM REV CODE 636 W HCPCS: Mod: HCNC | Performed by: EMERGENCY MEDICINE

## 2023-11-09 PROCEDURE — 83735 ASSAY OF MAGNESIUM: CPT | Mod: HCNC | Performed by: PHYSICIAN ASSISTANT

## 2023-11-09 PROCEDURE — 51701 INSERT BLADDER CATHETER: CPT | Mod: HCNC

## 2023-11-09 PROCEDURE — 93010 ELECTROCARDIOGRAM REPORT: CPT | Mod: HCNC,,, | Performed by: INTERNAL MEDICINE

## 2023-11-09 PROCEDURE — 83880 ASSAY OF NATRIURETIC PEPTIDE: CPT | Mod: HCNC | Performed by: PHYSICIAN ASSISTANT

## 2023-11-09 PROCEDURE — 93005 ELECTROCARDIOGRAM TRACING: CPT | Mod: 59,HCNC

## 2023-11-09 PROCEDURE — 80053 COMPREHEN METABOLIC PANEL: CPT | Mod: HCNC | Performed by: PHYSICIAN ASSISTANT

## 2023-11-09 PROCEDURE — 99285 EMERGENCY DEPT VISIT HI MDM: CPT | Mod: 25,HCNC

## 2023-11-09 PROCEDURE — 85025 COMPLETE CBC W/AUTO DIFF WBC: CPT | Mod: HCNC | Performed by: PHYSICIAN ASSISTANT

## 2023-11-09 PROCEDURE — 82330 ASSAY OF CALCIUM: CPT

## 2023-11-09 PROCEDURE — 96375 TX/PRO/DX INJ NEW DRUG ADDON: CPT | Mod: HCNC

## 2023-11-09 PROCEDURE — 25500020 PHARM REV CODE 255: Mod: HCNC | Performed by: EMERGENCY MEDICINE

## 2023-11-09 PROCEDURE — 80047 BASIC METABLC PNL IONIZED CA: CPT | Mod: HCNC

## 2023-11-09 RX ORDER — DICYCLOMINE HYDROCHLORIDE 10 MG/ML
20 INJECTION INTRAMUSCULAR
Status: DISCONTINUED | OUTPATIENT
Start: 2023-11-09 | End: 2023-11-09

## 2023-11-09 RX ORDER — CIPROFLOXACIN 2 MG/ML
400 INJECTION, SOLUTION INTRAVENOUS ONCE
Status: COMPLETED | OUTPATIENT
Start: 2023-11-10 | End: 2023-11-10

## 2023-11-09 RX ORDER — MORPHINE SULFATE 4 MG/ML
4 INJECTION, SOLUTION INTRAMUSCULAR; INTRAVENOUS
Status: COMPLETED | OUTPATIENT
Start: 2023-11-09 | End: 2023-11-09

## 2023-11-09 RX ORDER — METRONIDAZOLE 500 MG/100ML
500 INJECTION, SOLUTION INTRAVENOUS
Status: COMPLETED | OUTPATIENT
Start: 2023-11-10 | End: 2023-11-10

## 2023-11-09 RX ADMIN — MORPHINE SULFATE 4 MG: 4 INJECTION INTRAVENOUS at 09:11

## 2023-11-09 RX ADMIN — IOHEXOL 75 ML: 350 INJECTION, SOLUTION INTRAVENOUS at 09:11

## 2023-11-09 NOTE — NURSING
11/08/23 1900   Post-Hemodialysis Assessment   Rinseback Volume (mL) 250 mL   Blood Volume Processed (Liters) 68.8 L   Dialyzer Clearance Moderately streaked   Duration of Treatment 180 minutes   Total UF (mL) 4000 mL   Net Fluid Removal 3500   Post-Hemodialysis Comments 3hr H.D. tx completed.Pt tolerated w/o issues. Pt de-accessed per P & P, report given to primary nurse.

## 2023-11-10 PROBLEM — Z71.89 ACP (ADVANCE CARE PLANNING): Status: ACTIVE | Noted: 2020-10-08

## 2023-11-10 PROBLEM — K57.92 ACUTE DIVERTICULITIS: Status: ACTIVE | Noted: 2023-11-10

## 2023-11-10 LAB
ANION GAP SERPL CALC-SCNC: 17 MMOL/L (ref 8–16)
BASOPHILS # BLD AUTO: 0.05 K/UL (ref 0–0.2)
BASOPHILS NFR BLD: 1 % (ref 0–1.9)
BUN SERPL-MCNC: 56 MG/DL (ref 6–20)
CALCIUM SERPL-MCNC: 9.6 MG/DL (ref 8.7–10.5)
CHLORIDE SERPL-SCNC: 98 MMOL/L (ref 95–110)
CO2 SERPL-SCNC: 21 MMOL/L (ref 23–29)
CREAT SERPL-MCNC: 8.8 MG/DL (ref 0.5–1.4)
DIFFERENTIAL METHOD: ABNORMAL
EOSINOPHIL # BLD AUTO: 0.5 K/UL (ref 0–0.5)
EOSINOPHIL NFR BLD: 11 % (ref 0–8)
ERYTHROCYTE [DISTWIDTH] IN BLOOD BY AUTOMATED COUNT: 17 % (ref 11.5–14.5)
EST. GFR  (NO RACE VARIABLE): 4.7 ML/MIN/1.73 M^2
GLUCOSE SERPL-MCNC: 68 MG/DL (ref 70–110)
HBV CORE AB SERPL QL IA: NORMAL
HBV SURFACE AB SER-ACNC: 156.1 MIU/ML
HBV SURFACE AB SER-ACNC: REACTIVE M[IU]/ML
HBV SURFACE AG SERPL QL IA: NORMAL
HCT VFR BLD AUTO: 27.3 % (ref 37–48.5)
HGB BLD-MCNC: 9 G/DL (ref 12–16)
IMM GRANULOCYTES # BLD AUTO: 0.01 K/UL (ref 0–0.04)
IMM GRANULOCYTES NFR BLD AUTO: 0.2 % (ref 0–0.5)
LYMPHOCYTES # BLD AUTO: 1.4 K/UL (ref 1–4.8)
LYMPHOCYTES NFR BLD: 29.8 % (ref 18–48)
MAGNESIUM SERPL-MCNC: 2.5 MG/DL (ref 1.6–2.6)
MCH RBC QN AUTO: 25.9 PG (ref 27–31)
MCHC RBC AUTO-ENTMCNC: 33 G/DL (ref 32–36)
MCV RBC AUTO: 78 FL (ref 82–98)
MONOCYTES # BLD AUTO: 0.9 K/UL (ref 0.3–1)
MONOCYTES NFR BLD: 17.9 % (ref 4–15)
NEUTROPHILS # BLD AUTO: 1.9 K/UL (ref 1.8–7.7)
NEUTROPHILS NFR BLD: 40.1 % (ref 38–73)
NRBC BLD-RTO: 0 /100 WBC
PHOSPHATE SERPL-MCNC: 5.6 MG/DL (ref 2.7–4.5)
PLATELET # BLD AUTO: 165 K/UL (ref 150–450)
PMV BLD AUTO: ABNORMAL FL (ref 9.2–12.9)
POTASSIUM SERPL-SCNC: 4.1 MMOL/L (ref 3.5–5.1)
RBC # BLD AUTO: 3.48 M/UL (ref 4–5.4)
SODIUM SERPL-SCNC: 136 MMOL/L (ref 136–145)
WBC # BLD AUTO: 4.8 K/UL (ref 3.9–12.7)

## 2023-11-10 PROCEDURE — 63600175 PHARM REV CODE 636 W HCPCS: Mod: HCNC | Performed by: EMERGENCY MEDICINE

## 2023-11-10 PROCEDURE — 83735 ASSAY OF MAGNESIUM: CPT | Mod: HCNC | Performed by: HOSPITALIST

## 2023-11-10 PROCEDURE — 99214 PR OFFICE/OUTPT VISIT, EST, LEVL IV, 30-39 MIN: ICD-10-PCS | Mod: HCNC,,, | Performed by: NURSE PRACTITIONER

## 2023-11-10 PROCEDURE — 25000003 PHARM REV CODE 250: Mod: HCNC | Performed by: HOSPITALIST

## 2023-11-10 PROCEDURE — 86704 HEP B CORE ANTIBODY TOTAL: CPT | Mod: HCNC | Performed by: STUDENT IN AN ORGANIZED HEALTH CARE EDUCATION/TRAINING PROGRAM

## 2023-11-10 PROCEDURE — 80048 BASIC METABOLIC PNL TOTAL CA: CPT | Mod: HCNC | Performed by: HOSPITALIST

## 2023-11-10 PROCEDURE — 85025 COMPLETE CBC W/AUTO DIFF WBC: CPT | Mod: HCNC | Performed by: HOSPITALIST

## 2023-11-10 PROCEDURE — 99214 OFFICE O/P EST MOD 30 MIN: CPT | Mod: HCNC,,, | Performed by: NURSE PRACTITIONER

## 2023-11-10 PROCEDURE — 36415 COLL VENOUS BLD VENIPUNCTURE: CPT | Mod: HCNC | Performed by: HOSPITALIST

## 2023-11-10 PROCEDURE — 96372 THER/PROPH/DIAG INJ SC/IM: CPT | Mod: 59 | Performed by: HOSPITALIST

## 2023-11-10 PROCEDURE — G0378 HOSPITAL OBSERVATION PER HR: HCPCS | Mod: HCNC

## 2023-11-10 PROCEDURE — 87340 HEPATITIS B SURFACE AG IA: CPT | Mod: HCNC | Performed by: STUDENT IN AN ORGANIZED HEALTH CARE EDUCATION/TRAINING PROGRAM

## 2023-11-10 PROCEDURE — 96368 THER/DIAG CONCURRENT INF: CPT | Mod: HCNC

## 2023-11-10 PROCEDURE — 36415 COLL VENOUS BLD VENIPUNCTURE: CPT | Mod: HCNC | Performed by: STUDENT IN AN ORGANIZED HEALTH CARE EDUCATION/TRAINING PROGRAM

## 2023-11-10 PROCEDURE — 86706 HEP B SURFACE ANTIBODY: CPT | Mod: HCNC | Performed by: STUDENT IN AN ORGANIZED HEALTH CARE EDUCATION/TRAINING PROGRAM

## 2023-11-10 PROCEDURE — 96366 THER/PROPH/DIAG IV INF ADDON: CPT

## 2023-11-10 PROCEDURE — 84100 ASSAY OF PHOSPHORUS: CPT | Mod: HCNC | Performed by: HOSPITALIST

## 2023-11-10 PROCEDURE — 63600175 PHARM REV CODE 636 W HCPCS: Mod: HCNC | Performed by: HOSPITALIST

## 2023-11-10 PROCEDURE — 96365 THER/PROPH/DIAG IV INF INIT: CPT | Mod: HCNC

## 2023-11-10 RX ORDER — MORPHINE SULFATE 4 MG/ML
4 INJECTION, SOLUTION INTRAMUSCULAR; INTRAVENOUS EVERY 4 HOURS PRN
Status: DISCONTINUED | OUTPATIENT
Start: 2023-11-10 | End: 2023-11-11 | Stop reason: HOSPADM

## 2023-11-10 RX ORDER — HYDRALAZINE HYDROCHLORIDE 50 MG/1
100 TABLET, FILM COATED ORAL 3 TIMES DAILY
Status: DISCONTINUED | OUTPATIENT
Start: 2023-11-10 | End: 2023-11-11 | Stop reason: HOSPADM

## 2023-11-10 RX ORDER — IBUPROFEN 200 MG
16 TABLET ORAL
Status: DISCONTINUED | OUTPATIENT
Start: 2023-11-10 | End: 2023-11-11 | Stop reason: HOSPADM

## 2023-11-10 RX ORDER — ASPIRIN 81 MG/1
81 TABLET ORAL DAILY
Status: DISCONTINUED | OUTPATIENT
Start: 2023-11-10 | End: 2023-11-11 | Stop reason: HOSPADM

## 2023-11-10 RX ORDER — IBUPROFEN 200 MG
24 TABLET ORAL
Status: DISCONTINUED | OUTPATIENT
Start: 2023-11-10 | End: 2023-11-11 | Stop reason: HOSPADM

## 2023-11-10 RX ORDER — LOSARTAN POTASSIUM 50 MG/1
100 TABLET ORAL DAILY
Status: DISCONTINUED | OUTPATIENT
Start: 2023-11-10 | End: 2023-11-11 | Stop reason: HOSPADM

## 2023-11-10 RX ORDER — GLUCAGON 1 MG
1 KIT INJECTION
Status: DISCONTINUED | OUTPATIENT
Start: 2023-11-10 | End: 2023-11-11 | Stop reason: HOSPADM

## 2023-11-10 RX ORDER — PREGABALIN 75 MG/1
75 CAPSULE ORAL DAILY
Status: DISCONTINUED | OUTPATIENT
Start: 2023-11-10 | End: 2023-11-11 | Stop reason: HOSPADM

## 2023-11-10 RX ORDER — SEVELAMER CARBONATE 800 MG/1
2400 TABLET, FILM COATED ORAL
Status: DISCONTINUED | OUTPATIENT
Start: 2023-11-10 | End: 2023-11-11 | Stop reason: HOSPADM

## 2023-11-10 RX ORDER — SODIUM CHLORIDE 0.9 % (FLUSH) 0.9 %
10 SYRINGE (ML) INJECTION EVERY 12 HOURS PRN
Status: DISCONTINUED | OUTPATIENT
Start: 2023-11-10 | End: 2023-11-11 | Stop reason: HOSPADM

## 2023-11-10 RX ORDER — HEPARIN SODIUM 5000 [USP'U]/ML
5000 INJECTION, SOLUTION INTRAVENOUS; SUBCUTANEOUS EVERY 8 HOURS
Status: DISCONTINUED | OUTPATIENT
Start: 2023-11-10 | End: 2023-11-11 | Stop reason: HOSPADM

## 2023-11-10 RX ORDER — FUROSEMIDE 80 MG/1
80 TABLET ORAL DAILY
Status: DISCONTINUED | OUTPATIENT
Start: 2023-11-10 | End: 2023-11-11 | Stop reason: HOSPADM

## 2023-11-10 RX ORDER — METRONIDAZOLE 500 MG/100ML
500 INJECTION, SOLUTION INTRAVENOUS
Status: DISCONTINUED | OUTPATIENT
Start: 2023-11-10 | End: 2023-11-11

## 2023-11-10 RX ORDER — ATORVASTATIN CALCIUM 40 MG/1
40 TABLET, FILM COATED ORAL DAILY
Status: DISCONTINUED | OUTPATIENT
Start: 2023-11-10 | End: 2023-11-11 | Stop reason: HOSPADM

## 2023-11-10 RX ORDER — AMOXICILLIN 250 MG
2 CAPSULE ORAL 2 TIMES DAILY PRN
Status: DISCONTINUED | OUTPATIENT
Start: 2023-11-10 | End: 2023-11-11 | Stop reason: HOSPADM

## 2023-11-10 RX ORDER — CIPROFLOXACIN 2 MG/ML
400 INJECTION, SOLUTION INTRAVENOUS
Status: DISCONTINUED | OUTPATIENT
Start: 2023-11-11 | End: 2023-11-11

## 2023-11-10 RX ORDER — NALOXONE HCL 0.4 MG/ML
0.02 VIAL (ML) INJECTION
Status: DISCONTINUED | OUTPATIENT
Start: 2023-11-10 | End: 2023-11-11 | Stop reason: HOSPADM

## 2023-11-10 RX ORDER — POLYETHYLENE GLYCOL 3350 17 G/17G
17 POWDER, FOR SOLUTION ORAL DAILY
Status: DISCONTINUED | OUTPATIENT
Start: 2023-11-10 | End: 2023-11-11 | Stop reason: HOSPADM

## 2023-11-10 RX ORDER — DIPHENHYDRAMINE HCL 25 MG
25 CAPSULE ORAL EVERY 6 HOURS PRN
Status: DISCONTINUED | OUTPATIENT
Start: 2023-11-10 | End: 2023-11-11 | Stop reason: HOSPADM

## 2023-11-10 RX ORDER — ACETAMINOPHEN 325 MG/1
650 TABLET ORAL EVERY 6 HOURS PRN
Status: DISCONTINUED | OUTPATIENT
Start: 2023-11-10 | End: 2023-11-11 | Stop reason: HOSPADM

## 2023-11-10 RX ORDER — SODIUM CHLORIDE 9 MG/ML
INJECTION, SOLUTION INTRAVENOUS ONCE
Status: COMPLETED | OUTPATIENT
Start: 2023-11-10 | End: 2023-11-11

## 2023-11-10 RX ORDER — TALC
6 POWDER (GRAM) TOPICAL NIGHTLY PRN
Status: DISCONTINUED | OUTPATIENT
Start: 2023-11-10 | End: 2023-11-11 | Stop reason: HOSPADM

## 2023-11-10 RX ORDER — OXYCODONE HYDROCHLORIDE 5 MG/1
5 TABLET ORAL EVERY 6 HOURS PRN
Status: DISCONTINUED | OUTPATIENT
Start: 2023-11-10 | End: 2023-11-11 | Stop reason: HOSPADM

## 2023-11-10 RX ORDER — ONDANSETRON 2 MG/ML
4 INJECTION INTRAMUSCULAR; INTRAVENOUS EVERY 8 HOURS PRN
Status: DISCONTINUED | OUTPATIENT
Start: 2023-11-10 | End: 2023-11-11 | Stop reason: HOSPADM

## 2023-11-10 RX ORDER — AMLODIPINE BESYLATE 10 MG/1
10 TABLET ORAL NIGHTLY
Status: DISCONTINUED | OUTPATIENT
Start: 2023-11-10 | End: 2023-11-11 | Stop reason: HOSPADM

## 2023-11-10 RX ADMIN — HEPARIN SODIUM 5000 UNITS: 5000 INJECTION INTRAVENOUS; SUBCUTANEOUS at 03:11

## 2023-11-10 RX ADMIN — FUROSEMIDE 80 MG: 80 TABLET ORAL at 09:11

## 2023-11-10 RX ADMIN — Medication 6 MG: at 02:11

## 2023-11-10 RX ADMIN — HEPARIN SODIUM 5000 UNITS: 5000 INJECTION INTRAVENOUS; SUBCUTANEOUS at 06:11

## 2023-11-10 RX ADMIN — SEVELAMER CARBONATE 2400 MG: 800 TABLET, FILM COATED ORAL at 09:11

## 2023-11-10 RX ADMIN — METRONIDAZOLE 500 MG: 500 INJECTION, SOLUTION INTRAVENOUS at 03:11

## 2023-11-10 RX ADMIN — HEPARIN SODIUM 5000 UNITS: 5000 INJECTION INTRAVENOUS; SUBCUTANEOUS at 11:11

## 2023-11-10 RX ADMIN — ASPIRIN 81 MG: 81 TABLET, COATED ORAL at 09:11

## 2023-11-10 RX ADMIN — METRONIDAZOLE 500 MG: 5 INJECTION, SOLUTION INTRAVENOUS at 12:11

## 2023-11-10 RX ADMIN — HYDRALAZINE HYDROCHLORIDE 100 MG: 50 TABLET ORAL at 03:11

## 2023-11-10 RX ADMIN — METRONIDAZOLE 500 MG: 500 INJECTION, SOLUTION INTRAVENOUS at 10:11

## 2023-11-10 RX ADMIN — METRONIDAZOLE 500 MG: 500 INJECTION, SOLUTION INTRAVENOUS at 06:11

## 2023-11-10 RX ADMIN — POLYETHYLENE GLYCOL 3350 17 G: 17 POWDER, FOR SOLUTION ORAL at 09:11

## 2023-11-10 RX ADMIN — AMLODIPINE BESYLATE 10 MG: 10 TABLET ORAL at 02:11

## 2023-11-10 RX ADMIN — PREGABALIN 75 MG: 75 CAPSULE ORAL at 09:11

## 2023-11-10 RX ADMIN — SEVELAMER CARBONATE 2400 MG: 800 TABLET, FILM COATED ORAL at 05:11

## 2023-11-10 RX ADMIN — CIPROFLOXACIN 400 MG: 2 INJECTION, SOLUTION INTRAVENOUS at 12:11

## 2023-11-10 RX ADMIN — SEVELAMER CARBONATE 2400 MG: 800 TABLET, FILM COATED ORAL at 12:11

## 2023-11-10 RX ADMIN — ATORVASTATIN CALCIUM 40 MG: 40 TABLET, FILM COATED ORAL at 09:11

## 2023-11-10 NOTE — ASSESSMENT & PLAN NOTE
Creatine stable for now. BMP reviewed- noted Estimated Creatinine Clearance: 7.8 mL/min (A) (based on SCr of 8.2 mg/dL (H)). according to latest data. Based on current GFR, CKD stage is end stage.  Monitor UOP and serial BMP and adjust therapy as needed. Renally dose meds. Avoid nephrotoxic medications and procedures.  -will consult nephrology for chronic HD (MWF)  -continue home [place]

## 2023-11-10 NOTE — ED PROVIDER NOTES
CC: Abdominal Pain (Pt abdm pain 8/10 RUQ pain that radiates to back. Dialysis MWF and last dialysis yesterday. )      History provided by:   Patient     HPI: Jael Hall is a 58 y.o. year old female PMH of ESRD, HCL, HTN, DM, GERD who presents to the ED for lower abdominal pain that started this morning and progressively got worse throughout the day, sharp, nonradiating, associated with nausea no vomiting no fever   No chest pain  Makes very small amount of urine, denies any foul-smelling urine or dysuria   Last bowel movement today reported as normal, no diarrhea no constipation  History of hysterectomy and  before, denies having this type of pain before  Patient with history of end-stage renal disease on  schedule, this week she had dialysis on Monday and yesterday (reported as full dialysis sessions)    PHYSICAL EXAM:  Vitals:    23 1911   BP: 135/62   Pulse: 80   Resp: 18   Temp: 98.1 °F (36.7 °C)     VS: triage VS reviewed    general:  Uncomfortable due to pain  HENT: neck symmetric, trachea midline  Eyes: PERRL,no conjunctival injection and symmetrical eyelids  CV: RRR, no  murmurs, no rubs, no gallops, no LE edema  Resp: comfortable breathing, speaks in full sentences, CTAB, no wheezing, no crackles, no ronchi  ABD:  soft, ND, no masses, + normal BS, diffuse lower abdominal pain, no peritoneal signs, midline suprapubic surgical incision well healed  Renal: No CVAT  Neuro: AAO x 3, 5/5 strength x 4 extremities, sensation intact, face symmetric, speech normal  MSK: NC/AT, extremities w/out deformity   Skin: warm, dry      MDM:  Jael Hall is a 58 y.o. year old female who presents to the ED for  Lower abdominal pain that started earlier this morning got progressively worse with nausea no fever no abdominal distention no constipation or diarrhea  No URI since  DDX includes but not limited to:  UTI versus diverticulitis versus kidney stone versus enteritis versus  colitis    Labs ordered and reviewed:   CBC   CMP   Lipase   UA   Magnesium   I-STAT   Troponin  BNP       Medication given in the ED:  Morphine    CT abdomen pelvis (ordered and pending)  Imagings independently visualized: n/a      EKG (independantly reviewed):  Ventricular rate 76, normal sinus rhythm, QTC is 517, previous EKGs from earlier this month and  reviewed and no gross abnormalities today compared to prior also QT prolongation seen on previous EKGs    Patient was signed-out to Dr. Joe     at the change of shift with plan for: labs, UA, CT a/p pending      IMPRESSION:  1.) lower abdominal pain  2.) QT prolongation    Dispo: pending    Critical Care Time: N/A          Past Medical History:   Diagnosis Date    Acute on chronic diastolic heart failure 10/8/2021    Anemia of chronic disorder 2017    Overview:  Added automatically from request for surgery [id number]    Anemia of chronic renal failure, stage 5     Anxiety     Cyst of left ovary 2019    Cyst of left ovary 2019    Dyslipidemia 2013    End-stage renal disease on hemodialysis 2020    ESRD on hemodialysis 2017    Hematuria 10/30/2019    Hospital discharge follow-up 10/6/2023    Hx of sinus bradycardia 2017    Hyperkalemia 3/29/2017    Hypertensive urgency 2021    Peripheral neuropathy 2013    Preoperative testing 10/8/2020    Renovascular hypertension 2020    S/P dilation and curettage 2018    Thickened endometrium 2018    Thyroid nodule     Thyroid nodule 2021    Uncontrolled type 2 diabetes mellitus 2013    Overview:  poc a1c today 11.2-270/ up from 10.8-263, Pt. has very very stressed/ was incarcerated/marital stress/ will current meds/ less stress now/ will monitor.     Past Surgical History:   Procedure Laterality Date    AV FISTULA PLACEMENT      right arm     SECTION      x1    COLONOSCOPY N/A 2018    Procedure: COLONOSCOPY;  Surgeon: Pankaj Hinojosa MD;   Location: [facility] ENDO (4TH FLR);  Service: Endoscopy;  Laterality: N/A;  dialysis pt/labs prior to colon/MS    CYSTOSCOPY W/ RETROGRADES Bilateral 8/20/2020    Procedure: CYSTOSCOPY, WITH RETROGRADE PYELOGRAM;  Surgeon: Douglas Abraham MD;  Location: [facility] 1ST FLR;  Service: Urology;  Laterality: Bilateral;  45 min    FISTULOGRAM Right 9/9/2020    Procedure: Fistulogram;  Surgeon: Lester Gómez MD;  Location: [facility] LAB;  Service: Nephrology;  Laterality: Right;    HYSTERECTOMY      IMPLANTATION OF COCHLEAR PROSTHESIS Right 6/28/2021    Procedure: INSERTION, PROSTHESIS, COCHLEAR;  Surgeon: Ramiro Zuleta MD;  Location: [facility] OR 2ND FLR;  Service: ENT;  Laterality: Right;  COCHLEAR BABAK    IMPLANTATION OF COCHLEAR PROSTHESIS Right 6/30/2021    Procedure: INSERTION, PROSTHESIS, COCHLEAR;  Surgeon: Ramiro Zuleta MD;  Location: [facility] OR 2ND FLR;  Service: ENT;  Laterality: Right;  COCHLEAR AMERICAS    ROBOT-ASSISTED LAPAROSCOPIC ABDOMINAL HYSTERECTOMY USING DA NATALYA XI N/A 7/5/2019    Procedure: XI ROBOTIC HYSTERECTOMY;  Surgeon: Trice Lei MD;  Location: [facility];  Service: OB/GYN;  Laterality: N/A;    ROBOT-ASSISTED LAPAROSCOPIC NEPHRECTOMY Right 10/8/2020    Procedure: ROBOTIC NEPHRECTOMY;  Surgeon: Douglas Abraham MD;  Location: [facility] 2ND FLR;  Service: Urology;  Laterality: Right;  3.5hrs gen with regional     ROBOT-ASSISTED LAPAROSCOPIC SALPINGO-OOPHORECTOMY USING DA NATALYA XI Bilateral 7/5/2019    Procedure: XI ROBOTIC SALPINGO-OOPHORECTOMY;  Surgeon: Trice Lei MD;  Location: [facility];  Service: OB/GYN;  Laterality: Bilateral;    TUBAL LIGATION      Vascular access surgeries      x5     Family History   Problem Relation Age of Onset    Cancer Mother     Ovarian cancer Mother     Cancer Sister     Ovarian cancer Sister     Lung cancer Sister     Macular degeneration Sister     Cancer Sister     Ovarian cancer Sister     Diabetes Sister     Diabetes Brother     Breast cancer Neg Hx     Colon  cancer Neg Hx     Cervical cancer Neg Hx     Endometrial cancer Neg Hx     Vaginal cancer Neg Hx     Thyroid disease Neg Hx     Glaucoma Neg Hx     Retinal detachment Neg Hx      Current Facility-Administered Medications on File Prior to Encounter   Medication Dose Route Frequency Provider Last Rate Last Admin    [DISCONTINUED] sodium chloride 0.9% bolus 250 mL 250 mL  250 mL Intravenous PRN Lamont Bhat MD         Current Outpatient Medications on File Prior to Encounter   Medication Sig Dispense Refill    acetaminophen (TYLENOL) 500 MG tablet Take 1 tablet (500 mg total) by mouth every 8 (eight) hours as needed for Pain. 20 tablet 0    amLODIPine (NORVASC) 10 MG tablet Take 1 tablet (10 mg total) by mouth nightly. 90 tablet 3    aspirin (ECOTRIN) 81 MG EC tablet Take 81 mg by mouth once daily.      atorvastatin (LIPITOR) 40 MG tablet Take 1 tablet (40 mg total) by mouth once daily. 90 tablet 3    diphenhydrAMINE (BENADRYL) 25 mg capsule Take 25 mg by mouth every 6 (six) hours as needed for Itching.      erythromycin (ROMYCIN) ophthalmic ointment Place a 1/2 inch ribbon of ointment into the lower eyelid. (Patient not taking: Reported on 10/4/2023) 1 g 0    furosemide (LASIX) 80 MG tablet Take 1 tablet (80 mg total) by mouth once daily. 30 tablet 11    hydrALAZINE (APRESOLINE) 100 MG tablet Take 100 mg by mouth 3 (three) times daily.      labetaloL (NORMODYNE) 300 MG tablet Take 1 tablet (300 mg total) by mouth every 12 (twelve) hours. 60 tablet 11    losartan (COZAAR) 100 MG tablet Take 1 tablet (100 mg total) by mouth once daily. 90 tablet 3    pregabalin (LYRICA) 75 MG capsule Take 75 mg by mouth once daily.      sevelamer carbonate (RENVELA) 800 mg Tab Take 3 tablets (2,400 mg total) by mouth 3 (three) times daily with meals. 120 tablet 3     Patient has no known allergies.  Social History     Socioeconomic History    Marital status:     Number of children: 2   Occupational History    Occupation:  disability   Tobacco Use    Smoking status: Never    Smokeless tobacco: Never   Substance and Sexual Activity    Alcohol use: No    Drug use: No    Sexual activity: Yes     Partners: Male     Birth control/protection: Post-menopausal     Social Determinants of Health     Financial Resource Strain: Medium Risk (9/29/2023)    Overall Financial Resource Strain (CARDIA)     Difficulty of Paying Living Expenses: Somewhat hard   Food Insecurity: No Food Insecurity (9/29/2023)    Hunger Vital Sign     Worried About Running Out of Food in the Last Year: Never true     Ran Out of Food in the Last Year: Never true   Transportation Needs: No Transportation Needs (9/29/2023)    PRAPARE - Transportation     Lack of Transportation (Medical): No     Lack of Transportation (Non-Medical): No   Physical Activity: Inactive (9/29/2023)    Exercise Vital Sign     Days of Exercise per Week: 0 days     Minutes of Exercise per Session: 0 min   Stress: Stress Concern Present (9/29/2023)    [employer] [place] [employer] - Occupational Stress Questionnaire     Feeling of Stress : To some extent   Social Connections: Unknown (9/29/2023)    Social Connection and Isolation Panel [NHANES]     Frequency of Communication with Friends and Family: More than three times a week     Frequency of Social Gatherings with Friends and Family: More than three times a week     Attends [employer] Services: More than 4 times per year     Active Member of Clubs or Organizations: No     Attends Club or Organization Meetings: Never   Housing Stability: Low Risk  (9/29/2023)    Housing Stability Vital Sign     Unable to Pay for Housing in the Last Year: No     Number of Places Lived in the Last Year: 1     Unstable Housing in the Last Year: No                 DATA & INTERVENTIONS:    LABS reviewed:  Labs Reviewed   CBC W/ AUTO DIFFERENTIAL   COMPREHENSIVE METABOLIC PANEL   LIPASE   URINALYSIS, REFLEX TO URINE CULTURE   MAGNESIUM   TROPONIN I   B-TYPE  NATRIURETIC PEPTIDE   ISTAT CHEM8       RADIOLOGY reviewed:  Imaging Results    None         MEDICATIONS/FLUIDS:  Medications   dicyclomine injection 20 mg (has no administration in time range)              Jenni Marley MD  11/09/23 3833

## 2023-11-10 NOTE — PLAN OF CARE
[facility] 11[facility]  Discharge Assessment    Primary Care Provider: Maty Gayle DO     Discharge Assessment (most recent)       BRIEF DISCHARGE ASSESSMENT - 11/10/23 1037          Discharge Planning    Assessment Type Discharge Planning Brief Assessment     Resource/Environmental Concerns none     Support Systems Family members     Equipment Currently Used at Home none     Current Living Arrangements home     Patient/Family Anticipates Transition to home     Patient/Family Anticipated Services at Transition none     DME Needed Upon Discharge  none     Discharge Plan A Home     Discharge Plan B Home                     Pt is a 58 y.o. female admitted with diverticulitis and has a PMH of ESRD (MWF [facility])   She lives in a 3rd floor apartment , the building has an elevator. She is independent with her ADls and iADls and utilizes medicaid and public transportation. She will have transportation home. [facility] given to patient and/or family with understanding verbalized.   name and number and estimated discharge date written on white board in patient's room with request to call for any questions or concerns.  Will continue to follow for needs.  Daniel Kinney RN,BSN

## 2023-11-10 NOTE — ED NOTES
I-STAT Chem-8+ Results:   Value Reference Range   Sodium 135 136-145 mmol/L   Potassium  4.2 3.5-5.1 mmol/L   Chloride 100  mmol/L   Ionized Calcium 1.12 1.06-1.42 mmol/L   CO2 (measured) 22 23-29 mmol/L   Glucose 90  mg/dL   BUN 46 6-30 mg/dL   Creatinine 9.7 0.5-1.4 mg/dL   Hematocrit 28 36-54%

## 2023-11-10 NOTE — ASSESSMENT & PLAN NOTE
-presented with acute onset severe lower abdominal pain associated with nausea and decreased appetite  -CT abdomen with contrast showed early diverticulitis of descending and sigmoid colon without perforation or abscess   -she is afebrile and no leukocytosis   -received empiric ciprofloxacin and flagyl in ED and will continue   -trial of clear liquid diet and advance to renal diet at tolerated   -will need outpatient colonoscopy in future after treatment of diverticulitis for further evaluation   -multimodal pain control including IV morphine for prn severe pain

## 2023-11-10 NOTE — NURSING
Nurses Note -- 4 Eyes      11/10/2023   4:36 AM      Skin assessed during: Admit      [x] No Altered Skin Integrity Present    []Prevention Measures Documented      [] Yes- Altered Skin Integrity Present or Discovered   [] LDA Added if Not in Epic (Describe Wound)   [] New Altered Skin Integrity was Present on Admit and Documented in LDA   [] Wound Image Taken    Wound Care Consulted? No    Attending Nurse:  Angel Yi RN/Staff Member:   Radha

## 2023-11-10 NOTE — PROGRESS NOTES
[facility] [address] [place] [address] Medicine  Progress Note    Patient Name: Jael Hall  MRN: [id number]  Patient Class: OP- Observation   Admission Date: 11/9/2023  Length of Stay: 0 days  Attending Physician: Maurice Archer MD  Primary Care Provider: Maty Gayle DO        Subjective:     Principal Problem:Acute diverticulitis        HPI:  Ms Hall is a 58 year old female with PMH of HTN, DM2, ESRD on HD (MWF), RCC s/p robotic right nephrectomy in 2020, left renal mass and followed by urology with surveillance MRI abdomen who presented to ED for evaluation of worsening lower abdominal pain since this morning. Patient reports pain as sharp and started at the middle of her lower abdomen which later progressed across lower abdomen. She reports pain as constant w/o specific alleviating or exacerbating factors. She reports decreased appetite and nausea, but denies vomiting, fever, chills, chest pain, palpitation, dizziness, diarrhea, constipation, focal weakness/numbness, dark stool or bleeding from other sites. She still makes small amount of urine daily, but denies dysuria or hematuria. Of note, patient had an episode of acute fluid overload and SOB at the beginning of this week while visiting family members at [place] which improved after HD at the local emergency room. She returned home this morning from [place] after developing abdominal pain. She reports mild residual SOB but denies missing dialysis recently.      ED course: afebrile and vitals stable. WBC 5, K 4.2, bicarb 22, . CT abdomen pelvis with contrast showed scattered colonic diverticula with nonspecific colitis or early diverticulitis of the descending and sigmoid colon.   Patient received morphine 4 mg, ciprofloxacin and flagyl IV in ED. During my interview, patient is awake, conversant and not in distress. She reports her abdominal pain improved with morphine. She had colonoscopy many years ago and it was negative per patient. Her  daughter is present at bedside.         Overview/Hospital Course:  No notes on file    Interval History: Patient reports LLQ abd pain. Denies N/V. Reports No BM since am. Receiving IV cirpo and flagyl for acute diverticulitis.     Review of Systems  Objective:     Vital Signs (Most Recent):  Temp: 97.9 °F (36.6 °C) (11/10/23 0817)  Pulse: 73 (11/10/23 0817)  Resp: 18 (11/10/23 0817)  BP: (!) 107/55 (11/10/23 0817)  SpO2: (!) 92 % (11/10/23 0817) Vital Signs (24h Range):  Temp:  [97.9 °F (36.6 °C)-98.1 °F (36.7 °C)] 97.9 °F (36.6 °C)  Pulse:  [73-82] 73  Resp:  [14-18] 18  SpO2:  [90 %-97 %] 92 %  BP: (107-179)/(55-74) 107/55     Weight: 83.4 kg (183 lb 13.8 oz)  Body mass index is 31.56 kg/m².    Intake/Output Summary (Last 24 hours) at 11/10/2023 1104  Last data filed at 11/10/2023 0138  Gross per 24 hour   Intake 300 ml   Output --   Net 300 ml         Physical Exam  Constitutional:       General: She is not in acute distress.     Appearance: She is well-developed. She is ill-appearing. She is not diaphoretic.   HENT:      Head: Normocephalic and atraumatic.      Right Ear: External ear normal.      Left Ear: External ear normal.      Nose: Nose normal.      Mouth/Throat:      Pharynx: No oropharyngeal exudate.   Eyes:      General: No scleral icterus.     Conjunctiva/sclera: Conjunctivae normal.      Pupils: Pupils are equal, round, and reactive to light.   Neck:      Thyroid: No thyromegaly.      Vascular: No JVD.      Trachea: No tracheal deviation.   Cardiovascular:      Rate and Rhythm: Normal rate and regular rhythm.      Heart sounds: Normal heart sounds. No murmur heard.     No gallop.      Comments: Dialysis AVF in place over right forearm with positive thrills. Site C/D/I.   Pulmonary:      Effort: Pulmonary effort is normal. No respiratory distress.      Breath sounds: Normal breath sounds. No wheezing or rales.   Chest:      Chest wall: No tenderness.   Abdominal:      General: Bowel sounds are normal.  There is no distension.      Palpations: Abdomen is soft. There is no mass.      Tenderness: There is abdominal tenderness (moderate TTP across lower abdomen). There is no guarding or rebound.   Genitourinary:     Vagina: No vaginal discharge.   Musculoskeletal:         General: No tenderness.      Cervical back: Neck supple.   Lymphadenopathy:      Cervical: No cervical adenopathy.   Skin:     General: Skin is warm and dry.      Coloration: Skin is not pale.      Findings: No erythema or rash.   Neurological:      Mental Status: She is alert and oriented to person, place, and time.      Cranial Nerves: No cranial nerve deficit.      Motor: No abnormal muscle tone.      Coordination: Coordination normal.      Deep Tendon Reflexes: Reflexes are normal and symmetric. Reflexes normal.      Comments: Mild tremors of upper extremities    Psychiatric:         Behavior: Behavior normal.         Thought Content: Thought content normal.         Judgment: Judgment normal.      Comments: Flat affect                Assessment/Plan:      * Acute diverticulitis  -presented with acute onset severe lower abdominal pain associated with nausea and decreased appetite  -CT abdomen with contrast showed early diverticulitis of descending and sigmoid colon without perforation or abscess   -she is afebrile and no leukocytosis   -received empiric ciprofloxacin and flagyl in ED and will continue   -trial of clear liquid diet and advance to renal diet at tolerated   -will need outpatient colonoscopy in future after treatment of diverticulitis for further evaluation   -multimodal pain control including IV morphine for prn severe pain       Chronic diastolic heart failure  -echo from august 2022 showed GIIDD with EF 60% and elevated PASP 45  -no signs of ADHF   -continue home lasix and chronic HD MWF for optimal volume control       Renal mass  -h/o Right RCC s/p robotic nephrectomy  In 2020  -she is followed by urology (Dr. Abraham) for a left  renal mass with surveillance MRI abdomen.   -not a renal transplant candidate per urology       ACP (advance care planning)  Advance Care Planning     Date: 11/10/2023    [facility]  I engaged the patient and family in a voluntary conversation about advance care planning and we specifically addressed what the goals of care would be moving forward, in light of the patient's change in clinical status, specifically if her heart stops or she is unable to breath on her own.  We did specifically address the patient's likely prognosis, which is fair .  We explored the patient's values and preferences for future care.  The patient endorses that what is most important right now is to focus on remaining as independent as possible, symptom/pain control and extending life as long as possible, even it it means sacrificing quality    Accordingly, we have decided that the best plan to meet the patient's goals includes continuing with treatment      I spent a total of 10 minutes engaging the patient in this advance care planning discussion.             Essential hypertension  Chronic, controlled. Latest blood pressure and vitals reviewed-     Temp:  [98 °F (36.7 °C)-98.1 °F (36.7 °C)]   Pulse:  [74-82]   Resp:  [14-18]   BP: (133-179)/(61-74)   SpO2:  [90 %-97 %] .   Home meds for hypertension were reviewed and noted below.   Hypertension Medications             amLODIPine (NORVASC) 10 MG tablet Take 1 tablet (10 mg total) by mouth nightly.    furosemide (LASIX) 80 MG tablet Take 1 tablet (80 mg total) by mouth once daily.    hydrALAZINE (APRESOLINE) 100 MG tablet Take 100 mg by mouth 3 (three) times daily.    labetaloL (NORMODYNE) 300 MG tablet Take 1 tablet (300 mg total) by mouth every 12 (twelve) hours.    losartan (COZAAR) 100 MG tablet Take 1 tablet (100 mg total) by mouth once daily.          While in the hospital, will manage blood pressure as follows; Continue home antihypertensive regimen    Will utilize p.r.n. blood pressure  medication only if patient's blood pressure greater than 180/110 and she develops symptoms such as worsening chest pain or shortness of breath.    ESRD (end stage renal disease) on dialysis  Creatine stable for now. BMP reviewed- noted Estimated Creatinine Clearance: 7.8 mL/min (A) (based on SCr of 8.2 mg/dL (H)). according to latest data. Based on current GFR, CKD stage is end stage.  Monitor UOP and serial BMP and adjust therapy as needed. Renally dose meds. Avoid nephrotoxic medications and procedures.  -will consult nephrology for chronic HD (MWF)  -continue home Renvella     Elevated cholesterol with elevated triglycerides  -continue home statin         VTE Risk Mitigation (From admission, onward)         Ordered     heparin (porcine) injection 5,000 Units  Every 8 hours         11/10/23 0229     IP VTE HIGH RISK PATIENT  Once         11/10/23 0229     Place sequential compression device  Until discontinued         11/10/23 0229                Discharge Planning   MEGAN: 11/11/2023     Code Status: Full Code   Is the patient medically ready for discharge?:     Reason for patient still in hospital (select all that apply): Patient trending condition  Discharge Plan A: Home                  Maurice Archer MD  Department of Hospital Medicine   [facility] 11[facility]

## 2023-11-10 NOTE — ASSESSMENT & PLAN NOTE
Advance Care Planning     Date: 11/10/2023    [facility]  I engaged the patient and family in a voluntary conversation about advance care planning and we specifically addressed what the goals of care would be moving forward, in light of the patient's change in clinical status, specifically if her heart stops or she is unable to breath on her own.  We did specifically address the patient's likely prognosis, which is fair .  We explored the patient's values and preferences for future care.  The patient endorses that what is most important right now is to focus on remaining as independent as possible, symptom/pain control and extending life as long as possible, even it it means sacrificing quality    Accordingly, we have decided that the best plan to meet the patient's goals includes continuing with treatment      I spent a total of 10 minutes engaging the patient in this advance care planning discussion.

## 2023-11-10 NOTE — ED TRIAGE NOTES
Jael Hall, a 58 y.o. female presents to the ED w/ complaint of abdominal pain. Pt states she have lower abdominal pain that started earlier today. Denies vomiting today, but she feel nauseous.     Triage note:  Chief Complaint   Patient presents with    Abdominal Pain     Pt abdm pain 8/10 RUQ pain that radiates to back. Dialysis MWF and last dialysis yesterday.      Review of patient's allergies indicates:  No Known Allergies  Past Medical History:   Diagnosis Date    Acute on chronic diastolic heart failure 10/8/2021    Anemia of chronic disorder 11/8/2017    Overview:  Added automatically from request for surgery [id number]    Anemia of chronic renal failure, stage 5     Anxiety     Cyst of left ovary 5/14/2019    Cyst of left ovary 7/5/2019    Dyslipidemia 1/4/2013    End-stage renal disease on hemodialysis 8/20/2020    ESRD on hemodialysis 9/28/2017    Hematuria 10/30/2019    Hospital discharge follow-up 10/6/2023    Hx of sinus bradycardia 9/28/2017    Hyperkalemia 3/29/2017    Hypertensive urgency 7/22/2021    Peripheral neuropathy 1/4/2013    Preoperative testing 10/8/2020    Renovascular hypertension 8/17/2020    S/P dilation and curettage 5/22/2018    Thickened endometrium 5/22/2018    Thyroid nodule     Thyroid nodule 12/13/2021    Uncontrolled type 2 diabetes mellitus 4/5/2013    Overview:  poc a1c today 11.2-270/ up from 10.8-263, Pt. has very very stressed/ was incarcerated/marital stress/ will current meds/ less stress now/ will monitor.

## 2023-11-10 NOTE — FIRST PROVIDER EVALUATION
Emergency Department TeleTriage Encounter Note      CHIEF COMPLAINT    Chief Complaint   Patient presents with    Abdominal Pain     Pt abdm pain 8/10 RUQ pain that radiates to back. Dialysis MWF and last dialysis yesterday.        VITAL SIGNS   Initial Vitals [11/09/23 1911]   BP Pulse Resp Temp SpO2   135/62 80 18 98.1 °F (36.7 °C) 97 %      MAP       --            ALLERGIES    Review of patient's allergies indicates:  No Known Allergies    PROVIDER TRIAGE NOTE  57 yo F to the ED with CP and abdominal pain which began yesterday.  Symptoms continued today.  Received full session of dialysis yesterday.  Appears uncomfortable, vital signs normal.      ORDERS  Labs Reviewed - No data to display    ED Orders (720h ago, onward)      Start Ordered     Status Ordering Provider    11/09/23 1915 11/09/23 1914  EKG 12-lead  Once         Completed by EKTA DIXON on 11/9/2023 at  7:52 PM [place]              Virtual Visit Note: The provider triage portion of this emergency department evaluation and documentation was performed via [facility], a HIPAA-compliant telemedicine application, in concert with a tele-presenter in the room. A face to face patient evaluation with one of my colleagues will occur once the patient is placed in an emergency department room.      DISCLAIMER: This note was prepared with [employer] voice recognition transcription software. Garbled syntax, mangled pronouns, and other bizarre constructions may be attributed to that software system.

## 2023-11-10 NOTE — PROGRESS NOTES
Pharmacist Renal Dose Adjustment Note    Jael Hall is a 58 y.o. female being treated with the medication ciprofloxacin    Patient Data:    Vital Signs (Most Recent):  Temp: 98 °F (36.7 °C) (11/09/23 2327)  Pulse: 77 (11/10/23 0115)  Resp: 14 (11/10/23 0115)  BP: (!) 142/67 (11/10/23 0115)  SpO2: 96 % (11/10/23 0115) Vital Signs (72h Range):  Temp:  [98 °F (36.7 °C)-99.6 °F (37.6 °C)]   Pulse:  [75-84]   Resp:  [14-22]   BP: (124-179)/(59-74)   SpO2:  [90 %-97 %]      Recent Labs   Lab 11/06/23  1625 11/08/23  0947 11/09/23 2113   CREATININE 12.2* 10.6* 8.2*     Serum creatinine: 8.2 mg/dL (H) 11/09/23 2113  Estimated creatinine clearance: 7.8 mL/min (A)    Medication:ciprofloxacin dose: 400mg frequency every 12 hours will be changed to medication:ciprofloxacin dose:400mg frequency:every 24 hours    Pharmacist's Name: Daniel Beebe  Pharmacist's Extension: [phone]

## 2023-11-10 NOTE — ASSESSMENT & PLAN NOTE
-h/o Right RCC s/p robotic nephrectomy  In 2020  -she is followed by urology (Dr. Abraham) for a left renal mass with surveillance MRI abdomen.   -not a renal transplant candidate per urology

## 2023-11-10 NOTE — HPI
Ms Hall is a 58 year old female with PMH of HTN, DM2, ESRD on HD (MWF), RCC s/p robotic right nephrectomy in 2020, left renal mass and followed by urology with surveillance MRI abdomen who presented to ED for evaluation of worsening lower abdominal pain since this morning. Patient reports pain as sharp and started at the middle of her lower abdomen which later progressed across lower abdomen. She reports pain as constant w/o specific alleviating or exacerbating factors. She reports decreased appetite and nausea, but denies vomiting, fever, chills, chest pain, palpitation, dizziness, diarrhea, constipation, focal weakness/numbness, dark stool or bleeding from other sites. She still makes small amount of urine daily, but denies dysuria or hematuria. Of note, patient had an episode of acute fluid overload and SOB at the beginning of this week while visiting family members at [place] which improved after HD at the local emergency room. She returned home this morning from [place] after developing abdominal pain. She reports mild residual SOB but denies missing dialysis recently.      ED course: afebrile and vitals stable. WBC 5, K 4.2, bicarb 22, . CT abdomen pelvis with contrast showed scattered colonic diverticula with nonspecific colitis or early diverticulitis of the descending and sigmoid colon.   Patient received morphine 4 mg, ciprofloxacin and flagyl IV in ED. During my interview, patient is awake, conversant and not in distress. She reports her abdominal pain improved with morphine. She had colonoscopy many years ago and it was negative per patient. Her daughter is present at bedside.

## 2023-11-10 NOTE — ASSESSMENT & PLAN NOTE
58 y.o. Black or  Female ESRD-HD M-W-F presents to ED on 11/9/2023 with diagnosis of: Epigastric abdominal pain [R10.13];Abdominal pain [R10.9];Chest pain [R07.9];Acute diverticulitis [K57.92]   Nephrology consulted for inpatient ESRD-HD management    Outpatient HD Information:  -Dialysis modality: Hemodialysis  -Outpatient HD unit: [facility]  -Nephrologist: Ras TRIMBLE  -HD TX days: Monday/Wednesday/Friday, duration of treatment: 3.5 hrs  -Last HD TX prior to hospital admission: 11/7  -Dialysis access: RUE AV fistula   -Residual urine: [place]   -EDW: ?    Assessment:   - Will provide dialysis today (11/10/2023) with UF - 1-2L as BP tolerates for metabolic clearance and volume management   - Continue to monitor intake and output  - Please avoid gadolinium, fleets, phos-based laxatives, NSAIDs  - Dialysis thrice weekly unless more urgent indications arise. Will evaluate RRT requirements Daily.    Anemia of ESRD   Recent Labs   Lab 11/08/23  0947 11/09/23  2113 11/09/23  2122 11/10/23  0441   WBC 7.75 4.90  --  4.80   HGB 8.2* 9.0*  --  9.0*   HCT 26.1* 27.7* 28* 27.3*   * 150  --  165     Lab Results   Component Value Date    FESATURATED 25 01/23/2023    FERRITIN 1,929 (H) 01/23/2023       - Goal in ESRD is Hgb of 10-11. Hgb 9.0. Near target.   - EPO can be administered and dosed per his OP unit upon discharge.    Mineral Bone Disease in ESRD   Lab Results   Component Value Date    .7 (H) 01/23/2023    CALCIUM 9.6 11/10/2023    ALBUMIN 3.4 (L) 11/09/2023    PHOS 5.6 (H) 11/10/2023       - F/U PO4, Mg, Calcium. And albumin levels daily.   - Renal diet with protein intake goal 1.5 g/kg/d with 1 L fluid restriction   - Restart home phos binder, phos 5.6

## 2023-11-10 NOTE — SUBJECTIVE & OBJECTIVE
Past Medical History:   Diagnosis Date    Acute on chronic diastolic heart failure 10/8/2021    Anemia of chronic disorder 2017    Overview:  Added automatically from request for surgery [id number]    Anemia of chronic renal failure, stage 5     Anxiety     Cyst of left ovary 2019    Cyst of left ovary 2019    Dyslipidemia 2013    End-stage renal disease on hemodialysis 2020    ESRD on hemodialysis 2017    Hematuria 10/30/2019    Hospital discharge follow-up 10/6/2023    Hx of sinus bradycardia 2017    Hyperkalemia 3/29/2017    Hypertensive urgency 2021    Peripheral neuropathy 2013    Preoperative testing 10/8/2020    Renovascular hypertension 2020    S/P dilation and curettage 2018    Thickened endometrium 2018    Thyroid nodule     Thyroid nodule 2021    Uncontrolled type 2 diabetes mellitus 2013    Overview:  poc a1c today 11.2-270/ up from 10.8-263, Pt. has very very stressed/ was incarcerated/marital stress/ will current meds/ less stress now/ will monitor.       Past Surgical History:   Procedure Laterality Date    AV FISTULA PLACEMENT      right arm     SECTION      x1    COLONOSCOPY N/A 2018    Procedure: COLONOSCOPY;  Surgeon: Pankaj Hinojosa MD;  Location: [facility] (4TH FLR);  Service: Endoscopy;  Laterality: N/A;  dialysis pt/labs prior to colon/MS    CYSTOSCOPY W/ RETROGRADES Bilateral 2020    Procedure: CYSTOSCOPY, WITH RETROGRADE PYELOGRAM;  Surgeon: Douglas Abraham MD;  [facility];  Service: Urology;  Laterality: Bilateral;  45 min    FISTULOGRAM Right 2020    Procedure: Fistulogram;  Surgeon: Lester Gómez MD;  Location: [facility] CATH LAB;  Service: Nephrology;  Laterality: Right;    HYSTERECTOMY      IMPLANTATION OF COCHLEAR PROSTHESIS Right 2021    Procedure: INSERTION, PROSTHESIS, COCHLEAR;  Surgeon: Ramiro Zuleta MD;  Location: [facility] OR 2ND FLR;  Service: ENT;  Laterality: Right;  COCHLEAR  BABAK    IMPLANTATION OF COCHLEAR PROSTHESIS Right 6/30/2021    Procedure: INSERTION, PROSTHESIS, COCHLEAR;  Surgeon: Ramiro Zuleta MD;  Location: [facility] OR 2ND FLR;  Service: ENT;  Laterality: Right;  COCHLEAR AMERICAS    ROBOT-ASSISTED LAPAROSCOPIC ABDOMINAL HYSTERECTOMY USING DA NATALYA XI N/A 7/5/2019    Procedure: XI ROBOTIC HYSTERECTOMY;  Surgeon: Trice Lei MD;  Location: [facility] [place] OR;  Service: OB/GYN;  Laterality: N/A;    ROBOT-ASSISTED LAPAROSCOPIC NEPHRECTOMY Right 10/8/2020    Procedure: ROBOTIC NEPHRECTOMY;  Surgeon: Douglas Abraham MD;  Location: [facility] OR 2ND FLR;  Service: Urology;  Laterality: Right;  3.5hrs gen with regional     ROBOT-ASSISTED LAPAROSCOPIC SALPINGO-OOPHORECTOMY USING DA NATALYA XI Bilateral 7/5/2019    Procedure: XI ROBOTIC SALPINGO-OOPHORECTOMY;  Surgeon: Trice Lei MD;  Location: [facility];  Service: OB/GYN;  Laterality: Bilateral;    TUBAL LIGATION      Vascular access surgeries      x5       Review of patient's allergies indicates:  No Known Allergies    No current facility-administered medications on file prior to encounter.     Current Outpatient Medications on File Prior to Encounter   Medication Sig    acetaminophen (TYLENOL) 500 MG tablet Take 1 tablet (500 mg total) by mouth every 8 (eight) hours as needed for Pain.    amLODIPine (NORVASC) 10 MG tablet Take 1 tablet (10 mg total) by mouth nightly.    aspirin (ECOTRIN) 81 MG EC tablet Take 81 mg by mouth once daily.    atorvastatin (LIPITOR) 40 MG tablet Take 1 tablet (40 mg total) by mouth once daily.    diphenhydrAMINE (BENADRYL) 25 mg capsule Take 25 mg by mouth every 6 (six) hours as needed for Itching.    erythromycin (ROMYCIN) ophthalmic ointment Place a 1/2 inch ribbon of ointment into the lower eyelid. (Patient not taking: Reported on 10/4/2023)    furosemide (LASIX) 80 MG tablet Take 1 tablet (80 mg total) by mouth once daily.    hydrALAZINE (APRESOLINE) 100 MG tablet Take 100 mg by mouth 3 (three) times daily.     labetaloL (NORMODYNE) 300 MG tablet Take 1 tablet (300 mg total) by mouth every 12 (twelve) hours.    losartan (COZAAR) 100 MG tablet Take 1 tablet (100 mg total) by mouth once daily.    pregabalin (LYRICA) 75 MG capsule Take 75 mg by mouth once daily.    sevelamer carbonate (RENVELA) 800 mg Tab Take 3 tablets (2,400 mg total) by mouth 3 (three) times daily with meals.     Family History       Problem Relation (Age of Onset)    Cancer Mother, Sister, Sister    Diabetes Sister, Brother    Lung cancer Sister    Macular degeneration Sister    Ovarian cancer Mother, Sister, Sister          Tobacco Use    Smoking status: Never    Smokeless tobacco: Never   Substance and Sexual Activity    Alcohol use: No    Drug use: No    Sexual activity: Yes     Partners: Male     Birth control/protection: Post-menopausal     Review of Systems   Constitutional:  Positive for fatigue. Negative for activity change, appetite change, chills, diaphoresis and fever.   HENT:  Negative for congestion, dental problem, drooling, ear discharge, ear pain, facial swelling, hearing loss, mouth sores, nosebleeds, postnasal drip, rhinorrhea, sinus pressure, sneezing, sore throat, tinnitus, trouble swallowing and voice change.    Eyes:  Negative for photophobia, pain, discharge, redness, itching and visual disturbance.   Respiratory:  Positive for shortness of breath. Negative for apnea, cough, choking, chest tightness, wheezing and stridor.    Cardiovascular:  Negative for chest pain, palpitations and leg swelling.   Gastrointestinal:  Positive for abdominal pain and nausea. Negative for abdominal distention, anal bleeding, blood in stool, constipation, diarrhea, rectal pain and vomiting.   Endocrine: Negative for cold intolerance, heat intolerance, polydipsia, polyphagia and polyuria.   Genitourinary:  Negative for decreased urine volume, difficulty urinating, dyspareunia, dysuria, enuresis, flank pain, frequency, genital sores, hematuria,  menstrual problem, pelvic pain, urgency, vaginal bleeding, vaginal discharge and vaginal pain.   Musculoskeletal:  Negative for arthralgias, back pain, gait problem, joint swelling, myalgias, neck pain and neck stiffness.   Skin:  Negative for color change, pallor, rash and wound.   Allergic/Immunologic: Negative for environmental allergies, food allergies and immunocompromised state.   Neurological:  Negative for dizziness, tremors, seizures, syncope, facial asymmetry, speech difficulty, weakness, light-headedness, numbness and headaches.   Hematological:  Negative for adenopathy. Does not bruise/bleed easily.   Psychiatric/Behavioral:  Negative for agitation, behavioral problems, confusion, decreased concentration, dysphoric mood, hallucinations, self-injury, sleep disturbance and suicidal ideas. The patient is not nervous/anxious and is not hyperactive.      Objective:     Vital Signs (Most Recent):  Temp: 98 °F (36.7 °C) (11/09/23 2327)  Pulse: 82 (11/09/23 2327)  Resp: 16 (11/09/23 2327)  BP: (!) 152/70 (11/09/23 2327)  SpO2: 95 % (11/09/23 2328) Vital Signs (24h Range):  Temp:  [98 °F (36.7 °C)-98.1 °F (36.7 °C)] 98 °F (36.7 °C)  Pulse:  [79-82] 82  Resp:  [16-18] 16  SpO2:  [90 %-97 %] 95 %  BP: (135-179)/(62-74) 152/70     Weight: 88 kg (194 lb)  Body mass index is 33.3 kg/m².     Physical Exam  Constitutional:       General: She is not in acute distress.     Appearance: She is well-developed. She is ill-appearing. She is not diaphoretic.   HENT:      Head: Normocephalic and atraumatic.      Right Ear: External ear normal.      Left Ear: External ear normal.      Nose: Nose normal.      Mouth/Throat:      Pharynx: No oropharyngeal exudate.   Eyes:      General: No scleral icterus.     Conjunctiva/sclera: Conjunctivae normal.      Pupils: Pupils are equal, round, and reactive to light.   Neck:      Thyroid: No thyromegaly.      Vascular: No JVD.      Trachea: No tracheal deviation.   Cardiovascular:      Rate  and Rhythm: Normal rate and regular rhythm.      Heart sounds: Normal heart sounds. No murmur heard.     No gallop.      Comments: Dialysis AVF in place over right forearm with positive thrills. Site C/D/I.   Pulmonary:      Effort: Pulmonary effort is normal. No respiratory distress.      Breath sounds: Normal breath sounds. No wheezing or rales.   Chest:      Chest wall: No tenderness.   Abdominal:      General: Bowel sounds are normal. There is no distension.      Palpations: Abdomen is soft. There is no mass.      Tenderness: There is abdominal tenderness (moderate TTP across lower abdomen). There is no guarding or rebound.   Genitourinary:     Vagina: No vaginal discharge.   Musculoskeletal:         General: No tenderness.      Cervical back: Neck supple.   Lymphadenopathy:      Cervical: No cervical adenopathy.   Skin:     General: Skin is warm and dry.      Coloration: Skin is not pale.      Findings: No erythema or rash.   Neurological:      Mental Status: She is alert and oriented to person, place, and time.      Cranial Nerves: No cranial nerve deficit.      Motor: No abnormal muscle tone.      Coordination: Coordination normal.      Deep Tendon Reflexes: Reflexes are normal and symmetric. Reflexes normal.      Comments: Mild tremors of upper extremities    Psychiatric:         Behavior: Behavior normal.         Thought Content: Thought content normal.         Judgment: Judgment normal.      Comments: Flat affect               CRANIAL NERVES     CN III, IV, VI   Pupils are equal, round, and reactive to light.       Significant Labs: All pertinent labs within the past 24 hours have been reviewed.  Recent Lab Results         11/09/23 2122 11/09/23 2113        Albumin   3.4       ALP   74       ALT   6       Anion Gap   16       AST   13       Baso #   0.05       Basophil %   1.0       BILIRUBIN TOTAL   0.8  Comment: For infants and newborns, interpretation of results should be based  on gestational age,  weight and in agreement with clinical  observations.    Premature Infant recommended reference ranges:  Up to 24 hours.............<8.0 mg/dL  Up to 48 hours............<12.0 mg/dL  3-5 days..................<15.0 mg/dL  6-29 days.................<15.0 mg/dL         BNP   119  Comment: Values of less than 100 pg/ml are consistent with non-CHF populations.       BUN   53       Calcium   9.8       Chloride   99       CO2   22       Creatinine   8.2       Differential Method   Automated       eGFR   5.2       Eos #   0.4       Eosinophil %   8.6       Glucose   87       Gran # (ANC)   2.3       Gran %   45.9       Hematocrit   27.7       Hemoglobin   9.0       Immature Grans (Abs)   0.01  Comment: Mild elevation in immature granulocytes is non specific and   can be seen in a variety of conditions including stress response,   acute inflammation, trauma and pregnancy. Correlation with other   laboratory and clinical findings is essential.         Immature Granulocytes   0.2       Lipase   46       Lymph #   1.2       Lymph %   23.5       Magnesium    2.4       MCH   25.6       MCHC   32.5       MCV   79       Mono #   1.0       Mono %   20.8       MPV   SEE COMMENT  Comment: Result not available.       nRBC   0       Platelet Count   150       POC BUN 46         POC Chloride 100         POC Creatinine 9.7         POC Glucose 90         POC Hematocrit 28         POC Ionized Calcium 1.12         Potassium, Blood Gas 4.2         Sodium, Blood Gas 135         POC TCO2 (MEASURED) 22         Potassium   4.2       PROTEIN TOTAL   8.3       RBC   3.51       RDW   16.8       Sample NIKO         Sodium   137       Troponin I   0.007  Comment: The reference interval for Troponin I represents the 99th percentile   cutoff   for our facility and is consistent with 3rd generation assay   performance.         WBC   4.90               Significant Imaging: I have reviewed all pertinent imaging results/findings within the past 24 hours.

## 2023-11-10 NOTE — PLAN OF CARE
Problem: Infection  Goal: Absence of Infection Signs and Symptoms  Outcome: Ongoing, Progressing     Problem: Adult Inpatient Plan of Care  Goal: Plan of Care Review  Outcome: Ongoing, Progressing  Goal: Patient-Specific Goal (Individualized)  Outcome: Ongoing, Progressing  Goal: Absence of Hospital-Acquired Illness or Injury  Outcome: Ongoing, Progressing  Goal: Optimal Comfort and Wellbeing  Outcome: Ongoing, Progressing  Goal: Readiness for Transition of Care  Outcome: Ongoing, Progressing     Problem: Diabetes Comorbidity  Goal: Blood Glucose Level Within Targeted Range  Outcome: Ongoing, Progressing     Problem: Fall Injury Risk  Goal: Absence of Fall and Fall-Related Injury  Outcome: Ongoing, Progressing     Problem: Pain Acute  Goal: Acceptable Pain Control and Functional Ability  Outcome: Ongoing, Progressing     Problem: Skin Injury Risk Increased  Goal: Skin Health and Integrity  Outcome: Ongoing, Progressing     Problem: Nausea and Vomiting  Goal: Fluid and Electrolyte Balance  Outcome: Ongoing, Progressing

## 2023-11-10 NOTE — CONSULTS
[facility] - [facility]  Nephrology  Consult Note    Patient Name: Jael Hall  MRN: [id number]  Admission Date: 11/9/2023  Hospital Length of Stay: 0 days  Attending Provider: Maurice Archer MD   Primary Care Physician: Maty Gayle DO  Principal Problem:Acute diverticulitis    Inpatient consult to Nephrology  Consult performed by: Herlinda Toro, DNP, FNP-C  Consult ordered by: Flako Flaherty DO  Reason for consult: ESRD        Subjective:     HPI: The patient is a 58 y.o. Black or  Female with multiple co morbidities including HTN, DM2, ESRD on HD (MWF), RCC s/p robotic right nephrectomy in 2020, left renal mass and followed by urology with surveillance MRI abdomen who presents to ED on 11/9/2023 with complaints of  worsening lower abdominal pain since this morning. CT abdomen pelvis with contrast showed scattered colonic diverticula with nonspecific colitis or early diverticulitis of the descending and sigmoid colon. Patient being admitted with acute diverticulitis. Receiving IV cirpo and flagyl. No distress noted on exam. Last HD on Wednesday, 11/7. Nephrology consulted for management of ESRD and HD treatment.              Past Medical History:   Diagnosis Date    Acute on chronic diastolic heart failure 10/8/2021    Anemia of chronic disorder 11/8/2017    Overview:  Added automatically from request for surgery [id number]    Anemia of chronic renal failure, stage 5     Anxiety     Cyst of left ovary 5/14/2019    Cyst of left ovary 7/5/2019    Dyslipidemia 1/4/2013    End-stage renal disease on hemodialysis 8/20/2020    ESRD on hemodialysis 9/28/2017    Hematuria 10/30/2019    Hospital discharge follow-up 10/6/2023    Hx of sinus bradycardia 9/28/2017    Hyperkalemia 3/29/2017    Hypertensive urgency 7/22/2021    Peripheral neuropathy 1/4/2013    Preoperative testing 10/8/2020    Renovascular hypertension 8/17/2020    S/P dilation and curettage 5/22/2018    Thickened  endometrium 2018    Thyroid nodule     Thyroid nodule 2021    Uncontrolled type 2 diabetes mellitus 2013    Overview:  poc a1c today 11.2-270/ up from 10.8-263, Pt. has very very stressed/ was incarcerated/marital stress/ will current meds/ less stress now/ will monitor.       Past Surgical History:   Procedure Laterality Date    AV FISTULA PLACEMENT      right arm     SECTION      x1    COLONOSCOPY N/A 2018    Procedure: COLONOSCOPY;  Surgeon: Pankaj Hinojosa MD;  Location: [facility] ENDO (4TH FLR);  Service: Endoscopy;  Laterality: N/A;  dialysis pt/labs prior to colon/MS    CYSTOSCOPY W/ RETROGRADES Bilateral 2020    Procedure: CYSTOSCOPY, WITH RETROGRADE PYELOGRAM;  Surgeon: Douglas Abraham MD;  Location: [facility] 1ST FLR;  Service: Urology;  Laterality: Bilateral;  45 min    FISTULOGRAM Right 2020    Procedure: Fistulogram;  Surgeon: Lester Gómez MD;  Location: [facility] CATH LAB;  Service: Nephrology;  Laterality: Right;    HYSTERECTOMY      IMPLANTATION OF COCHLEAR PROSTHESIS Right 2021    Procedure: INSERTION, PROSTHESIS, COCHLEAR;  Surgeon: Ramiro Zuleta MD;  Location: [facility] OR 2ND FLR;  Service: ENT;  Laterality: Right;  COCHLEAR BABAK    IMPLANTATION OF COCHLEAR PROSTHESIS Right 2021    Procedure: INSERTION, PROSTHESIS, COCHLEAR;  Surgeon: Ramiro Zuleta MD;  Location: [facility] OR 2ND FLR;  Service: ENT;  Laterality: Right;  COCHLEAR [employer]IC[employer]    ROBOT-ASSISTED LAPAROSCOPIC ABDOMINAL HYSTERECTOMY USING DA NATALYA XI N/A 2019    Procedure: XI ROBOTIC HYSTERECTOMY;  Surgeon: Trice Lei MD;  Location: [facility];  Service: OB/GYN;  Laterality: N/A;    ROBOT-ASSISTED LAPAROSCOPIC NEPHRECTOMY Right 10/8/2020    Procedure: ROBOTIC NEPHRECTOMY;  Surgeon: Douglas Abraham MD;  Location: [facility] OR 2ND FLR;  Service: Urology;  Laterality: Right;  3.5hrs gen with regional     ROBOT-ASSISTED LAPAROSCOPIC SALPINGO-OOPHORECTOMY USING DA NATALYA XI Bilateral 2019     Procedure: XI ROBOTIC SALPINGO-OOPHORECTOMY;  Surgeon: Trice Lei MD;  Location: [facility];  Service: OB/GYN;  Laterality: Bilateral;    TUBAL LIGATION      Vascular access surgeries      x5       Review of patient's allergies indicates:  No Known Allergies  Current Facility-Administered Medications   Medication Frequency    acetaminophen tablet 650 mg Q6H PRN    amLODIPine tablet 10 mg Nightly    aspirin EC tablet 81 mg Daily    atorvastatin tablet 40 mg Daily    [START ON 11/11/2023] ciprofloxacin (CIPRO)400mg/200ml D5W IVPB 400 mg Q24H    dextrose 10% bolus 125 mL 125 mL PRN    dextrose 10% bolus 250 mL 250 mL PRN    diphenhydrAMINE capsule 25 mg Q6H PRN    furosemide tablet 80 mg Daily    glucagon (human recombinant) injection 1 mg PRN    glucose chewable tablet 16 g PRN    glucose chewable tablet 24 g PRN    heparin (porcine) injection 5,000 Units Q8H    hydrALAZINE tablet 100 mg TID    labetaloL tablet 300 mg Q12H    losartan tablet 100 mg Daily    melatonin tablet 6 mg Nightly PRN    metronidazole IVPB 500 mg Q8H    morphine injection 4 mg Q4H PRN    naloxone 0.4 mg/mL injection 0.02 mg PRN    ondansetron injection 4 mg Q8H PRN    oxyCODONE immediate release tablet 5 mg Q6H PRN    polyethylene glycol packet 17 g Daily    pregabalin capsule 75 mg Daily    senna-docusate 8.6-50 mg per tablet 2 tablet BID PRN    sevelamer carbonate tablet 2,400 mg TID WM    sodium chloride 0.9% flush 10 mL Q12H PRN     Family History       Problem Relation (Age of Onset)    Cancer Mother, Sister, Sister    Diabetes Sister, Brother    Lung cancer Sister    Macular degeneration Sister    Ovarian cancer Mother, Sister, Sister          Tobacco Use    Smoking status: Never    Smokeless tobacco: Never   Substance and Sexual Activity    Alcohol use: No    Drug use: No    Sexual activity: Yes     Partners: Male     Birth control/protection: Post-menopausal     Review of Systems   Constitutional:   Positive for appetite change. Negative for activity change, fatigue and fever.   HENT:  Negative for congestion and hearing loss.    Eyes:  Negative for visual disturbance.   Respiratory:  Negative for cough, chest tightness and shortness of breath.    Cardiovascular:  Negative for chest pain, palpitations and leg swelling.   Gastrointestinal:  Positive for abdominal distention and abdominal pain. Negative for blood in stool, constipation, diarrhea, nausea and vomiting.   Genitourinary:  Positive for decreased urine volume. Negative for dysuria and urgency.   Musculoskeletal:  Negative for gait problem.   Skin:  Negative for wound.   Allergic/Immunologic: Negative.    Neurological:  Negative for dizziness, weakness and headaches.   Psychiatric/Behavioral:  Negative for agitation, behavioral problems, confusion and decreased concentration.      Objective:     Vital Signs (Most Recent):  Temp: 96.2 °F (35.7 °C) (11/10/23 1226)  Pulse: 70 (11/10/23 1226)  Resp: 18 (11/10/23 1226)  BP: (!) 146/65 (11/10/23 1226)  SpO2: (!) 92 % (11/10/23 1226) Vital Signs (24h Range):  Temp:  [96.2 °F (35.7 °C)-98.1 °F (36.7 °C)] 96.2 °F (35.7 °C)  Pulse:  [70-82] 70  Resp:  [14-18] 18  SpO2:  [90 %-97 %] 92 %  BP: (107-179)/(55-74) 146/65     Weight: 83.4 kg (183 lb 13.8 oz) (11/10/23 0413)  Body mass index is 31.56 kg/m².  Body surface area is 1.94 meters squared.    I/O last 3 completed shifts:  In: 300 [IV Piggyback:300]  Out: -      Physical Exam  Vitals and nursing note reviewed.   Constitutional:       General: She is not in acute distress.     Appearance: She is well-developed. She is ill-appearing. She is not diaphoretic.   HENT:      Head: Normocephalic and atraumatic.      Right Ear: External ear normal.      Left Ear: External ear normal.      Nose: Nose normal.      Mouth/Throat:      Pharynx: No oropharyngeal exudate.   Eyes:      General: No scleral icterus.     Conjunctiva/sclera: Conjunctivae normal.   Neck:       Thyroid: No thyromegaly.      Vascular: No JVD.      Trachea: No tracheal deviation.   Cardiovascular:      Rate and Rhythm: Normal rate and regular rhythm.      Heart sounds: Normal heart sounds. No murmur heard.     No gallop.      Comments: Dialysis AVF in place over right forearm with positive thrills. Site C/D/I.   Pulmonary:      Effort: Pulmonary effort is normal. No respiratory distress.      Breath sounds: Normal breath sounds. No wheezing or rales.   Abdominal:      General: Bowel sounds are normal. There is no distension.      Palpations: Abdomen is soft.      Tenderness: There is abdominal tenderness (moderate TTP across lower abdomen).   Genitourinary:     Vagina: No vaginal discharge.   Musculoskeletal:         General: No tenderness.      Cervical back: Neck supple.   Lymphadenopathy:      Cervical: No cervical adenopathy.   Skin:     General: Skin is warm and dry.      Coloration: Skin is not pale.      Findings: No erythema or rash.   Neurological:      Mental Status: She is alert and oriented to person, place, and time.      Cranial Nerves: No cranial nerve deficit.      Motor: No abnormal muscle tone.      Coordination: Coordination normal.      Deep Tendon Reflexes: Reflexes are normal and symmetric. Reflexes normal.   Psychiatric:         Mood and Affect: Mood normal.         Behavior: Behavior normal.      Comments: Flat affect           Significant Labs:  CBC:   Recent Labs   Lab 11/10/23  0441   WBC 4.80   RBC 3.48*   HGB 9.0*   HCT 27.3*      MCV 78*   MCH 25.9*   MCHC 33.0     CMP:   Recent Labs   Lab 11/09/23  2113 11/10/23  0441   GLU 87 68*   CALCIUM 9.8 9.6   ALBUMIN 3.4*  --    PROT 8.3  --     136   K 4.2 4.1   CO2 22* 21*   CL 99 98   BUN 53* 56*   CREATININE 8.2* 8.8*   ALKPHOS 74  --    ALT 6*  --    AST 13  --    BILITOT 0.8  --      All labs within the past 24 hours have been reviewed.      Assessment/Plan:     Renal/  ESRD (end stage renal disease) on dialysis  58  y.o. Black or  Female ESRD-HD M-W-F presents to ED on 11/9/2023 with diagnosis of: Epigastric abdominal pain [R10.13];Abdominal pain [R10.9];Chest pain [R07.9];Acute diverticulitis [K57.92]   Nephrology consulted for inpatient ESRD-HD management    Outpatient HD Information:  -Dialysis modality: Hemodialysis  -Outpatient HD unit: [facility]  -Nephrologist: Ras TRIMBLE  -HD TX days: Monday/Wednesday/Friday, duration of treatment: 3.5 hrs  -Last HD TX prior to hospital admission: 11/7  -Dialysis access: RUE AV fistula   -Residual urine: [facility]   -EDW: ?    Assessment:   - Will provide dialysis today (11/10/2023) with UF - 1-2L as BP tolerates for metabolic clearance and volume management   - Continue to monitor intake and output  - Please avoid gadolinium, fleets, phos-based laxatives, NSAIDs  - Dialysis thrice weekly unless more urgent indications arise. Will evaluate RRT requirements Daily.    Anemia of ESRD   Recent Labs   Lab 11/08/23  0947 11/09/23  2113 11/09/23  2122 11/10/23  0441   WBC 7.75 4.90  --  4.80   HGB 8.2* 9.0*  --  9.0*   HCT 26.1* 27.7* 28* 27.3*   * 150  --  165     Lab Results   Component Value Date    FESATURATED 25 01/23/2023    FERRITIN 1,929 (H) 01/23/2023       - Goal in ESRD is Hgb of 10-11. Hgb 9.0. Near target.   - EPO can be administered and dosed per his OP unit upon discharge.    Mineral Bone Disease in ESRD   Lab Results   Component Value Date    .7 (H) 01/23/2023    CALCIUM 9.6 11/10/2023    ALBUMIN 3.4 (L) 11/09/2023    PHOS 5.6 (H) 11/10/2023       - F/U PO4, Mg, Calcium. And albumin levels daily.   - Renal diet with protein intake goal 1.5 g/kg/d with 1 L fluid restriction   - Restart home phos binder, phos 5.6    GI  * Acute diverticulitis  - defer to primary team         Thank you for your consult. I will follow-up with patient. Please contact us if you have any additional questions.    Herlinda Toro DNP, FNP-C  Nephrology  [facility]  - [facility] 11[facility]

## 2023-11-10 NOTE — PROVIDER PROGRESS NOTES - EMERGENCY DEPT.
Patient was signed out to me by Dr. Marley pending labs and CT a/p. Briefly, this is a 59yo F with hx ESRD on HD M/W/F presenting with abdominal pain.    Data:  Results for orders placed or performed during the hospital encounter of 11/09/23   CBC Auto Differential   Result Value Ref Range    WBC 4.90 3.90 - 12.70 K/uL    RBC 3.51 (L) 4.00 - 5.40 M/uL    Hemoglobin 9.0 (L) 12.0 - 16.0 g/dL    Hematocrit 27.7 (L) 37.0 - 48.5 %    MCV 79 (L) 82 - 98 fL    MCH 25.6 (L) 27.0 - 31.0 pg    MCHC 32.5 32.0 - 36.0 g/dL    RDW 16.8 (H) 11.5 - 14.5 %    Platelets 150 150 - 450 K/uL    MPV SEE COMMENT 9.2 - 12.9 fL    Immature Granulocytes 0.2 0.0 - 0.5 %    Gran # (ANC) 2.3 1.8 - 7.7 K/uL    Immature Grans (Abs) 0.01 0.00 - 0.04 K/uL    Lymph # 1.2 1.0 - 4.8 K/uL    Mono # 1.0 0.3 - 1.0 K/uL    Eos # 0.4 0.0 - 0.5 K/uL    Baso # 0.05 0.00 - 0.20 K/uL    nRBC 0 0 /100 WBC    Gran % 45.9 38.0 - 73.0 %    Lymph % 23.5 18.0 - 48.0 %    Mono % 20.8 (H) 4.0 - 15.0 %    Eosinophil % 8.6 (H) 0.0 - 8.0 %    Basophil % 1.0 0.0 - 1.9 %    Differential Method Automated    Comprehensive Metabolic Panel   Result Value Ref Range    Sodium 137 136 - 145 mmol/L    Potassium 4.2 3.5 - 5.1 mmol/L    Chloride 99 95 - 110 mmol/L    CO2 22 (L) 23 - 29 mmol/L    Glucose 87 70 - 110 mg/dL    BUN 53 (H) 6 - 20 mg/dL    Creatinine 8.2 (H) 0.5 - 1.4 mg/dL    Calcium 9.8 8.7 - 10.5 mg/dL    Total Protein 8.3 6.0 - 8.4 g/dL    Albumin 3.4 (L) 3.5 - 5.2 g/dL    Total Bilirubin 0.8 0.1 - 1.0 mg/dL    Alkaline Phosphatase 74 55 - 135 U/L    AST 13 10 - 40 U/L    ALT 6 (L) 10 - 44 U/L    eGFR 5.2 (A) >60 mL/min/1.73 m^2    Anion Gap 16 8 - 16 mmol/L   Lipase   Result Value Ref Range    Lipase 46 4 - 60 U/L   Magnesium   Result Value Ref Range    Magnesium 2.4 1.6 - 2.6 mg/dL   Troponin I   Result Value Ref Range    Troponin I 0.007 0.000 - 0.026 ng/mL   BNP   Result Value Ref Range     (H) 0 - 99 pg/mL   ISTAT PROCEDURE   Result Value Ref Range    POC  Glucose 90 70 - 110 mg/dL    POC BUN 46 (H) 6 - 30 mg/dL    POC Creatinine 9.7 (H) 0.5 - 1.4 mg/dL    POC Sodium 135 (L) 136 - 145 mmol/L    POC Potassium 4.2 3.5 - 5.1 mmol/L    POC Chloride 100 95 - 110 mmol/L    POC TCO2 (MEASURED) 22 (L) 23 - 29 mmol/L    POC Ionized Calcium 1.12 1.06 - 1.42 mmol/L    POC Hematocrit 28 (L) 36 - 54 %PCV    Sample NIKO       Imaging Results              CT Abdomen Pelvis With IV Contrast (Final result)  Result time 11/09/23 22:37:26      Final result by Yohan Spear MD (11/09/23 22:37:26)                   Impression:      Scattered colonic diverticula with minimal wall prominence of the descending and sigmoid colon with minimal edema adjacent to the descending colon.  Mild rectal wall thickening.  Suggest correlation for any clinical signs of a nonspecific colitis or early diverticulitis.  Endoscopy follow-up could also be considered if not recently performed.    2 cm lesion in the lower left kidney with internal hyperdensity, potentially related to solid renal mass or hemorrhagic/proteinaceous cyst.  Additional simple and complex renal cysts better evaluated on prior MRI.    Cholelithiasis.    Decompressed urinary bladder with possible bladder wall thickening.  Suggest correlation with urinalysis if there are clinical concerns for urinary tract infection.    Additional findings discussed in the body of the report.      Electronically signed by: Yohan Spear MD  Date:    11/09/2023  Time:    22:37               Narrative:    EXAMINATION:  CT ABDOMEN PELVIS WITH IV CONTRAST    CLINICAL HISTORY:  Abdominal pain, acute, nonlocalized;suprapubic abd pain, ESRD;    TECHNIQUE:  Low dose axial images, sagittal and coronal reformations were obtained from the lung bases to the pubic symphysis following the IV administration of 75 mL of Omnipaque 350 .  Oral contrast was not given.    COMPARISON:  CT with and without contrast, 05/06/2021.  MRI abdomen without contrast, 05/16/2023.   CT urogram, 12/06/2019.    FINDINGS:  Lower Chest:    Evaluation limited by motion.  Probable linear subsegmental atelectasis versus scarring in the left lung base.  Right lung base is centrally clear.  No pleural effusion.  Heart is enlarged.  Minimal pericardial fluid.    Abdomen:    Liver is normal in size and contour.  Stable benign-appearing enhancing lesion in the superior right hepatic lobe measuring approximately 2 cm in size, similar when compared with prior CT urogram in 2019.  No new or worrisome liver mass identified on this single phase study.  Few calcified stones noted in the gallbladder.  No pericholecystic inflammatory changes.  No intrahepatic biliary ductal dilatation.    Spleen is not enlarged.  Adrenal glands are unremarkable.  Pancreas is unremarkable.    Right kidney is absent.  There are multiple simple and mildly complex cysts in the left kidney, though some are too small to definitively characterize.  Additionally, there is a 2 cm lesion in the anteroinferior left kidney with internal hyperdensity which may reflect enhancement versus hemorrhagic/proteinaceous cyst.  Evaluation of this finding is limited by absence of precontrast images, though intralesional hyperdensity is similar when compared with a prior noncontrast CT dated 10/20/2022.  Similar appearance of scattered calcifications in the left kidney.  No hydronephrosis.  No significant left perinephric fat stranding.    Small hiatal hernia.  Stomach is not overly distended.  No small bowel obstruction.  Multiple colonic diverticula.  Mild wall thickening of the descending and sigmoid colon with questionable minimal edema adjacent to the descending colon.  Mild rectal wall thickening.  Normal appendix.    No pneumoperitoneum or organized fluid collection.    No bulky retroperitoneal lymphadenopathy.    Abdominal aorta is normal in caliber with moderate calcific atherosclerosis.    Portal vein is patent.  No portal venous  gas.    Pelvis:    Urinary bladder is decompressed and poorly evaluated.  There is possible mild urinary bladder wall thickening.  Mild rectal wall thickening.  Minimal pelvic free fluid.  Uterus appears surgically absent.  No bulky pelvic lymphadenopathy.    Bones and soft tissues:    Mild degenerative changes in the spine.  No aggressive osseous lesion.  Minimal superior endplate deformity of the L1 vertebral body as seen on the prior exam.  Similar minimal superior endplate deformity of the T12 vertebral body.  There are fat containing ventral abdominal wall hernias.  Postoperative changes in the anterior abdominal wall.  Diastasis recti.                                       MDM:   Laboratory studies generally unremarkable. CT concerning for early diverticulitis. Patient reports she is visiting family in [place] as she felt too bad to care for herself at home and does not have a seat for dialysis tomorrow. Plan for admission to  for IV abx, started on cipro/flagyl, and for dialysis tomorrow on her scheduled day.

## 2023-11-10 NOTE — ASSESSMENT & PLAN NOTE
-echo from august 2022 showed GIIDD with EF 60% and elevated PASP 45  -no signs of ADHF   -continue home lasix and chronic HD MWF for optimal volume control

## 2023-11-10 NOTE — SUBJECTIVE & OBJECTIVE
Interval History: Patient reports LLQ abd pain. Denies N/V. Reports No BM since am. Receiving IV cirpo and flagyl for acute diverticulitis.     Review of Systems  Objective:     Vital Signs (Most Recent):  Temp: 97.9 °F (36.6 °C) (11/10/23 0817)  Pulse: 73 (11/10/23 0817)  Resp: 18 (11/10/23 0817)  BP: (!) 107/55 (11/10/23 0817)  SpO2: (!) 92 % (11/10/23 0817) Vital Signs (24h Range):  Temp:  [97.9 °F (36.6 °C)-98.1 °F (36.7 °C)] 97.9 °F (36.6 °C)  Pulse:  [73-82] 73  Resp:  [14-18] 18  SpO2:  [90 %-97 %] 92 %  BP: (107-179)/(55-74) 107/55     Weight: 83.4 kg (183 lb 13.8 oz)  Body mass index is 31.56 kg/m².    Intake/Output Summary (Last 24 hours) at 11/10/2023 1104  Last data filed at 11/10/2023 0138  Gross per 24 hour   Intake 300 ml   Output --   Net 300 ml         Physical Exam  Constitutional:       General: She is not in acute distress.     Appearance: She is well-developed. She is ill-appearing. She is not diaphoretic.   HENT:      Head: Normocephalic and atraumatic.      Right Ear: External ear normal.      Left Ear: External ear normal.      Nose: Nose normal.      Mouth/Throat:      Pharynx: No oropharyngeal exudate.   Eyes:      General: No scleral icterus.     Conjunctiva/sclera: Conjunctivae normal.      Pupils: Pupils are equal, round, and reactive to light.   Neck:      Thyroid: No thyromegaly.      Vascular: No JVD.      Trachea: No tracheal deviation.   Cardiovascular:      Rate and Rhythm: Normal rate and regular rhythm.      Heart sounds: Normal heart sounds. No murmur heard.     No gallop.      Comments: Dialysis AVF in place over right forearm with positive thrills. Site C/D/I.   Pulmonary:      Effort: Pulmonary effort is normal. No respiratory distress.      Breath sounds: Normal breath sounds. No wheezing or rales.   Chest:      Chest wall: No tenderness.   Abdominal:      General: Bowel sounds are normal. There is no distension.      Palpations: Abdomen is soft. There is no mass.       Tenderness: There is abdominal tenderness (moderate TTP across lower abdomen). There is no guarding or rebound.   Genitourinary:     Vagina: No vaginal discharge.   Musculoskeletal:         General: No tenderness.      Cervical back: Neck supple.   Lymphadenopathy:      Cervical: No cervical adenopathy.   Skin:     General: Skin is warm and dry.      Coloration: Skin is not pale.      Findings: No erythema or rash.   Neurological:      Mental Status: She is alert and oriented to person, place, and time.      Cranial Nerves: No cranial nerve deficit.      Motor: No abnormal muscle tone.      Coordination: Coordination normal.      Deep Tendon Reflexes: Reflexes are normal and symmetric. Reflexes normal.      Comments: Mild tremors of upper extremities    Psychiatric:         Behavior: Behavior normal.         Thought Content: Thought content normal.         Judgment: Judgment normal.      Comments: Flat affect

## 2023-11-10 NOTE — SUBJECTIVE & OBJECTIVE
Past Medical History:   Diagnosis Date    Acute on chronic diastolic heart failure 10/8/2021    Anemia of chronic disorder 2017    Overview:  Added automatically from request for surgery [id number]    Anemia of chronic renal failure, stage 5     Anxiety     Cyst of left ovary 2019    Cyst of left ovary 2019    Dyslipidemia 2013    End-stage renal disease on hemodialysis 2020    ESRD on hemodialysis 2017    Hematuria 10/30/2019    Hospital discharge follow-up 10/6/2023    Hx of sinus bradycardia 2017    Hyperkalemia 3/29/2017    Hypertensive urgency 2021    Peripheral neuropathy 2013    Preoperative testing 10/8/2020    Renovascular hypertension 2020    S/P dilation and curettage 2018    Thickened endometrium 2018    Thyroid nodule     Thyroid nodule 2021    Uncontrolled type 2 diabetes mellitus 2013    Overview:  poc a1c today 11.2-270/ up from 10.8-263, Pt. has very very stressed/ was incarcerated/marital stress/ will current meds/ less stress now/ will monitor.       Past Surgical History:   Procedure Laterality Date    AV FISTULA PLACEMENT      right arm     SECTION      x1    COLONOSCOPY N/A 2018    Procedure: COLONOSCOPY;  Surgeon: Pankaj Hinojosa MD;  Location: [facility] (4TH FLR);  Service: Endoscopy;  Laterality: N/A;  dialysis pt/labs prior to colon/MS    CYSTOSCOPY W/ RETROGRADES Bilateral 2020    Procedure: CYSTOSCOPY, WITH RETROGRADE PYELOGRAM;  Surgeon: Douglas Abraham MD;  [facility];  Service: Urology;  Laterality: Bilateral;  45 min    FISTULOGRAM Right 2020    Procedure: Fistulogram;  Surgeon: Lester Gómez MD;  Location: [facility] CATH LAB;  Service: Nephrology;  Laterality: Right;    HYSTERECTOMY      IMPLANTATION OF COCHLEAR PROSTHESIS Right 2021    Procedure: INSERTION, PROSTHESIS, COCHLEAR;  Surgeon: Ramiro Zuleta MD;  Location: [facility] OR 2ND FLR;  Service: ENT;  Laterality: Right;  COCHLEAR  BABAK    IMPLANTATION OF COCHLEAR PROSTHESIS Right 6/30/2021    Procedure: INSERTION, PROSTHESIS, COCHLEAR;  Surgeon: Ramiro Zuleta MD;  Location: [facility] OR 2ND FLR;  Service: ENT;  Laterality: Right;  COCHLEAR AMERICAS    ROBOT-ASSISTED LAPAROSCOPIC ABDOMINAL HYSTERECTOMY USING DA NATALYA XI N/A 7/5/2019    Procedure: XI ROBOTIC HYSTERECTOMY;  Surgeon: Trice Lei MD;  Location: [facility] OR;  Service: OB/GYN;  Laterality: N/A;    ROBOT-ASSISTED LAPAROSCOPIC NEPHRECTOMY Right 10/8/2020    Procedure: ROBOTIC NEPHRECTOMY;  Surgeon: Dougals Abraham MD;  Location: [facility] OR 2ND FLR;  Service: Urology;  Laterality: Right;  3.5hrs gen with regional     ROBOT-ASSISTED LAPAROSCOPIC SALPINGO-OOPHORECTOMY USING DA NATALYA XI Bilateral 7/5/2019    Procedure: XI ROBOTIC SALPINGO-OOPHORECTOMY;  Surgeon: Trice Lei MD;  Location: [facility];  Service: OB/GYN;  Laterality: Bilateral;    TUBAL LIGATION      Vascular access surgeries      x5       Review of patient's allergies indicates:  No Known Allergies  Current Facility-Administered Medications   Medication Frequency    acetaminophen tablet 650 mg Q6H PRN    amLODIPine tablet 10 mg Nightly    aspirin EC tablet 81 mg Daily    atorvastatin tablet 40 mg Daily    [START ON 11/11/2023] ciprofloxacin (CIPRO)400mg/200ml D5W IVPB 400 mg Q24H    dextrose 10% bolus 125 mL 125 mL PRN    dextrose 10% bolus 250 mL 250 mL PRN    diphenhydrAMINE capsule 25 mg Q6H PRN    furosemide tablet 80 mg Daily    glucagon (human recombinant) injection 1 mg PRN    glucose chewable tablet 16 g PRN    glucose chewable tablet 24 g PRN    heparin (porcine) injection 5,000 Units Q8H    hydrALAZINE tablet 100 mg TID    labetaloL tablet 300 mg Q12H    losartan tablet 100 mg Daily    melatonin tablet 6 mg Nightly PRN    metronidazole IVPB 500 mg Q8H    morphine injection 4 mg Q4H PRN    naloxone 0.4 mg/mL injection 0.02 mg PRN    ondansetron injection 4 mg Q8H PRN    oxyCODONE immediate release tablet 5 mg Q6H  PRN    polyethylene glycol packet 17 g Daily    pregabalin capsule 75 mg Daily    senna-docusate 8.6-50 mg per tablet 2 tablet BID PRN    sevelamer carbonate tablet 2,400 mg TID WM    sodium chloride 0.9% flush 10 mL Q12H PRN     Family History       Problem Relation (Age of Onset)    Cancer Mother, Sister, Sister    Diabetes Sister, Brother    Lung cancer Sister    Macular degeneration Sister    Ovarian cancer Mother, Sister, Sister          Tobacco Use    Smoking status: Never    Smokeless tobacco: Never   Substance and Sexual Activity    Alcohol use: No    Drug use: No    Sexual activity: Yes     Partners: Male     Birth control/protection: Post-menopausal     Review of Systems   Constitutional:  Positive for appetite change. Negative for activity change, fatigue and fever.   HENT:  Negative for congestion and hearing loss.    Eyes:  Negative for visual disturbance.   Respiratory:  Negative for cough, chest tightness and shortness of breath.    Cardiovascular:  Negative for chest pain, palpitations and leg swelling.   Gastrointestinal:  Positive for abdominal distention and abdominal pain. Negative for blood in stool, constipation, diarrhea, nausea and vomiting.   Genitourinary:  Positive for decreased urine volume. Negative for dysuria and urgency.   Musculoskeletal:  Negative for gait problem.   Skin:  Negative for wound.   Allergic/Immunologic: Negative.    Neurological:  Negative for dizziness, weakness and headaches.   Psychiatric/Behavioral:  Negative for agitation, behavioral problems, confusion and decreased concentration.      Objective:     Vital Signs (Most Recent):  Temp: 96.2 °F (35.7 °C) (11/10/23 1226)  Pulse: 70 (11/10/23 1226)  Resp: 18 (11/10/23 1226)  BP: (!) 146/65 (11/10/23 1226)  SpO2: (!) 92 % (11/10/23 1226) Vital Signs (24h Range):  Temp:  [96.2 °F (35.7 °C)-98.1 °F (36.7 °C)] 96.2 °F (35.7 °C)  Pulse:  [70-82] 70  Resp:  [14-18] 18  SpO2:  [90 %-97 %] 92 %  BP: (107-179)/(55-74) 146/65      Weight: 83.4 kg (183 lb 13.8 oz) (11/10/23 0413)  Body mass index is 31.56 kg/m².  Body surface area is 1.94 meters squared.    I/O last 3 completed shifts:  In: 300 [IV Piggyback:300]  Out: -      Physical Exam  Vitals and nursing note reviewed.   Constitutional:       General: She is not in acute distress.     Appearance: She is well-developed. She is ill-appearing. She is not diaphoretic.   HENT:      Head: Normocephalic and atraumatic.      Right Ear: External ear normal.      Left Ear: External ear normal.      Nose: Nose normal.      Mouth/Throat:      Pharynx: No oropharyngeal exudate.   Eyes:      General: No scleral icterus.     Conjunctiva/sclera: Conjunctivae normal.   Neck:      Thyroid: No thyromegaly.      Vascular: No JVD.      Trachea: No tracheal deviation.   Cardiovascular:      Rate and Rhythm: Normal rate and regular rhythm.      Heart sounds: Normal heart sounds. No murmur heard.     No gallop.      Comments: Dialysis AVF in place over right forearm with positive thrills. Site C/D/I.   Pulmonary:      Effort: Pulmonary effort is normal. No respiratory distress.      Breath sounds: Normal breath sounds. No wheezing or rales.   Abdominal:      General: Bowel sounds are normal. There is no distension.      Palpations: Abdomen is soft.      Tenderness: There is abdominal tenderness (moderate TTP across lower abdomen).   Genitourinary:     Vagina: No vaginal discharge.   Musculoskeletal:         General: No tenderness.      Cervical back: Neck supple.   Lymphadenopathy:      Cervical: No cervical adenopathy.   Skin:     General: Skin is warm and dry.      Coloration: Skin is not pale.      Findings: No erythema or rash.   Neurological:      Mental Status: She is alert and oriented to person, place, and time.      Cranial Nerves: No cranial nerve deficit.      Motor: No abnormal muscle tone.      Coordination: Coordination normal.      Deep Tendon Reflexes: Reflexes are normal and symmetric.  Reflexes normal.   Psychiatric:         Mood and Affect: Mood normal.         Behavior: Behavior normal.      Comments: Flat affect           Significant Labs:  CBC:   Recent Labs   Lab 11/10/23  0441   WBC 4.80   RBC 3.48*   HGB 9.0*   HCT 27.3*      MCV 78*   MCH 25.9*   MCHC 33.0     CMP:   Recent Labs   Lab 11/09/23  2113 11/10/23  0441   GLU 87 68*   CALCIUM 9.8 9.6   ALBUMIN 3.4*  --    PROT 8.3  --     136   K 4.2 4.1   CO2 22* 21*   CL 99 98   BUN 53* 56*   CREATININE 8.2* 8.8*   ALKPHOS 74  --    ALT 6*  --    AST 13  --    BILITOT 0.8  --      All labs within the past 24 hours have been reviewed.

## 2023-11-10 NOTE — H&P
[facility] [address] [place] [address] Medicine  History & Physical    Patient Name: Jael Hall  MRN: [id number]  Patient Class: OP- Observation  Admission Date: 11/9/2023  Attending Physician: Manasa Leigh MD   Primary Care Provider: Maty Gayle DO         Patient information was obtained from patient, ER records and daughter .     Subjective:     Principal Problem:Acute diverticulitis    Chief Complaint:   Chief Complaint   Patient presents with    Abdominal Pain     Pt abdm pain 8/10 RUQ pain that radiates to back. Dialysis MWF and last dialysis yesterday.         HPI: Ms Hall is a 58 year old female with PMH of HTN, DM2, ESRD on HD (MWF), RCC s/p robotic right nephrectomy in 2020, left renal mass and followed by urology with surveillance MRI abdomen who presented to ED for evaluation of worsening lower abdominal pain since this morning. Patient reports pain as sharp and started at the middle of her lower abdomen which later progressed across lower abdomen. She reports pain as constant w/o specific alleviating or exacerbating factors. She reports decreased appetite and nausea, but denies vomiting, fever, chills, chest pain, palpitation, dizziness, diarrhea, constipation, focal weakness/numbness, dark stool or bleeding from other sites. She still makes small amount of urine daily, but denies dysuria or hematuria. Of note, patient had an episode of acute fluid overload and SOB at the beginning of this week while visiting family members at [facility] which improved after HD at the local emergency room. She returned home this morning from [facility] after developing abdominal pain. She reports mild residual SOB but denies missing dialysis recently.      ED course: afebrile and vitals stable. WBC 5, K 4.2, bicarb 22, . CT abdomen pelvis with contrast showed scattered colonic diverticula with nonspecific colitis or early diverticulitis of the descending and sigmoid colon.   Patient received morphine 4  mg, ciprofloxacin and flagyl IV in ED. During my interview, patient is awake, conversant and not in distress. She reports her abdominal pain improved with morphine. She had colonoscopy many years ago and it was negative per patient. Her daughter is present at bedside.         Past Medical History:   Diagnosis Date    Acute on chronic diastolic heart failure 10/8/2021    Anemia of chronic disorder 2017    Overview:  Added automatically from request for surgery [id number]    Anemia of chronic renal failure, stage 5     Anxiety     Cyst of left ovary 2019    Cyst of left ovary 2019    Dyslipidemia 2013    End-stage renal disease on hemodialysis 2020    ESRD on hemodialysis 2017    Hematuria 10/30/2019    Hospital discharge follow-up 10/6/2023    Hx of sinus bradycardia 2017    Hyperkalemia 3/29/2017    Hypertensive urgency 2021    Peripheral neuropathy 2013    Preoperative testing 10/8/2020    Renovascular hypertension 2020    S/P dilation and curettage 2018    Thickened endometrium 2018    Thyroid nodule     Thyroid nodule 2021    Uncontrolled type 2 diabetes mellitus 2013    Overview:  poc a1c today 11.2-270/ up from 10.8-263, Pt. has very very stressed/ was incarcerated/marital stress/ will current meds/ less stress now/ will monitor.       Past Surgical History:   Procedure Laterality Date    AV FISTULA PLACEMENT      right arm     SECTION      x1    COLONOSCOPY N/A 2018    Procedure: COLONOSCOPY;  Surgeon: Pankaj Hinojosa MD;  [facility]  Service: Endoscopy;  Laterality: N/A;  dialysis pt/labs prior to colon/MS    CYSTOSCOPY W/ RETROGRADES Bilateral 2020    Procedure: CYSTOSCOPY, WITH RETROGRADE PYELOGRAM;  Surgeon: Douglas Abraham MD;  Location: [address];  Service: Urology;  Laterality: Bilateral;  45 min    FISTULOGRAM Right 2020    Procedure: Fistulogram;  Surgeon: Lester  MD Rico;  Location: [facility] CATH LAB;  Service: Nephrology;  Laterality: Right;    HYSTERECTOMY      IMPLANTATION OF COCHLEAR PROSTHESIS Right 6/28/2021    Procedure: INSERTION, PROSTHESIS, COCHLEAR;  Surgeon: Ramiro Zuleta MD;  Location: [facility] OR 2ND FLR;  Service: ENT;  Laterality: Right;  COCHLEAR BABAK    IMPLANTATION OF COCHLEAR PROSTHESIS Right 6/30/2021    Procedure: INSERTION, PROSTHESIS, COCHLEAR;  Surgeon: Ramiro Zuleta MD;  Location: [facility] OR 2ND FLR;  Service: ENT;  Laterality: Right;  COCHLEAR AMERICAS    ROBOT-ASSISTED LAPAROSCOPIC ABDOMINAL HYSTERECTOMY USING DA NATALYA XI N/A 7/5/2019    Procedure: XI ROBOTIC HYSTERECTOMY;  Surgeon: Trice Lei MD;  Location: [facility] OR;  Service: OB/GYN;  Laterality: N/A;    ROBOT-ASSISTED LAPAROSCOPIC NEPHRECTOMY Right 10/8/2020    Procedure: ROBOTIC NEPHRECTOMY;  Surgeon: Douglas Abraham MD;  Location: [facility] OR 2ND FLR;  Service: Urology;  Laterality: Right;  3.5hrs gen with regional     ROBOT-ASSISTED LAPAROSCOPIC SALPINGO-OOPHORECTOMY USING DA NATALYA XI Bilateral 7/5/2019    Procedure: XI ROBOTIC SALPINGO-OOPHORECTOMY;  Surgeon: Trice Lei MD;  Location: [facility];  Service: OB/GYN;  Laterality: Bilateral;    TUBAL LIGATION      Vascular access surgeries      x5       Review of patient's allergies indicates:  No Known Allergies    No current facility-administered medications on file prior to encounter.     Current Outpatient Medications on File Prior to Encounter   Medication Sig    acetaminophen (TYLENOL) 500 MG tablet Take 1 tablet (500 mg total) by mouth every 8 (eight) hours as needed for Pain.    amLODIPine (NORVASC) 10 MG tablet Take 1 tablet (10 mg total) by mouth nightly.    aspirin (ECOTRIN) 81 MG EC tablet Take 81 mg by mouth once daily.    atorvastatin (LIPITOR) 40 MG tablet Take 1 tablet (40 mg total) by mouth once daily.    diphenhydrAMINE (BENADRYL) 25 mg capsule Take 25 mg by mouth every 6 (six) hours as needed for Itching.     erythromycin (ROMYCIN) ophthalmic ointment Place a 1/2 inch ribbon of ointment into the lower eyelid. (Patient not taking: Reported on 10/4/2023)    furosemide (LASIX) 80 MG tablet Take 1 tablet (80 mg total) by mouth once daily.    hydrALAZINE (APRESOLINE) 100 MG tablet Take 100 mg by mouth 3 (three) times daily.    labetaloL (NORMODYNE) 300 MG tablet Take 1 tablet (300 mg total) by mouth every 12 (twelve) hours.    losartan (COZAAR) 100 MG tablet Take 1 tablet (100 mg total) by mouth once daily.    pregabalin (LYRICA) 75 MG capsule Take 75 mg by mouth once daily.    sevelamer carbonate (RENVELA) 800 mg Tab Take 3 tablets (2,400 mg total) by mouth 3 (three) times daily with meals.     Family History       Problem Relation (Age of Onset)    Cancer Mother, Sister, Sister    Diabetes Sister, Brother    Lung cancer Sister    Macular degeneration Sister    Ovarian cancer Mother, Sister, Sister          Tobacco Use    Smoking status: Never    Smokeless tobacco: Never   Substance and Sexual Activity    Alcohol use: No    Drug use: No    Sexual activity: Yes     Partners: Male     Birth control/protection: Post-menopausal     Review of Systems   Constitutional:  Positive for fatigue. Negative for activity change, appetite change, chills, diaphoresis and fever.   HENT:  Negative for congestion, dental problem, drooling, ear discharge, ear pain, facial swelling, hearing loss, mouth sores, nosebleeds, postnasal drip, rhinorrhea, sinus pressure, sneezing, sore throat, tinnitus, trouble swallowing and voice change.    Eyes:  Negative for photophobia, pain, discharge, redness, itching and visual disturbance.   Respiratory:  Positive for shortness of breath. Negative for apnea, cough, choking, chest tightness, wheezing and stridor.    Cardiovascular:  Negative for chest pain, palpitations and leg swelling.   Gastrointestinal:  Positive for abdominal pain and nausea. Negative for abdominal distention, anal bleeding,  blood in stool, constipation, diarrhea, rectal pain and vomiting.   Endocrine: Negative for cold intolerance, heat intolerance, polydipsia, polyphagia and polyuria.   Genitourinary:  Negative for decreased urine volume, difficulty urinating, dyspareunia, dysuria, enuresis, flank pain, frequency, genital sores, hematuria, menstrual problem, pelvic pain, urgency, vaginal bleeding, vaginal discharge and vaginal pain.   Musculoskeletal:  Negative for arthralgias, back pain, gait problem, joint swelling, myalgias, neck pain and neck stiffness.   Skin:  Negative for color change, pallor, rash and wound.   Allergic/Immunologic: Negative for environmental allergies, food allergies and immunocompromised state.   Neurological:  Negative for dizziness, tremors, seizures, syncope, facial asymmetry, speech difficulty, weakness, light-headedness, numbness and headaches.   Hematological:  Negative for adenopathy. Does not bruise/bleed easily.   Psychiatric/Behavioral:  Negative for agitation, behavioral problems, confusion, decreased concentration, dysphoric mood, hallucinations, self-injury, sleep disturbance and suicidal ideas. The patient is not nervous/anxious and is not hyperactive.      Objective:     Vital Signs (Most Recent):  Temp: 98 °F (36.7 °C) (11/09/23 2327)  Pulse: 82 (11/09/23 2327)  Resp: 16 (11/09/23 2327)  BP: (!) 152/70 (11/09/23 2327)  SpO2: 95 % (11/09/23 2328) Vital Signs (24h Range):  Temp:  [98 °F (36.7 °C)-98.1 °F (36.7 °C)] 98 °F (36.7 °C)  Pulse:  [79-82] 82  Resp:  [16-18] 16  SpO2:  [90 %-97 %] 95 %  BP: (135-179)/(62-74) 152/70     Weight: 88 kg (194 lb)  Body mass index is 33.3 kg/m².     Physical Exam  Constitutional:       General: She is not in acute distress.     Appearance: She is well-developed. She is ill-appearing. She is not diaphoretic.   HENT:      Head: Normocephalic and atraumatic.      Right Ear: External ear normal.      Left Ear: External ear normal.      Nose: Nose normal.       Mouth/Throat:      Pharynx: No oropharyngeal exudate.   Eyes:      General: No scleral icterus.     Conjunctiva/sclera: Conjunctivae normal.      Pupils: Pupils are equal, round, and reactive to light.   Neck:      Thyroid: No thyromegaly.      Vascular: No JVD.      Trachea: No tracheal deviation.   Cardiovascular:      Rate and Rhythm: Normal rate and regular rhythm.      Heart sounds: Normal heart sounds. No murmur heard.     No gallop.      Comments: Dialysis AVF in place over right forearm with positive thrills. Site C/D/I.   Pulmonary:      Effort: Pulmonary effort is normal. No respiratory distress.      Breath sounds: Normal breath sounds. No wheezing or rales.   Chest:      Chest wall: No tenderness.   Abdominal:      General: Bowel sounds are normal. There is no distension.      Palpations: Abdomen is soft. There is no mass.      Tenderness: There is abdominal tenderness (moderate TTP across lower abdomen). There is no guarding or rebound.   Genitourinary:     Vagina: No vaginal discharge.   Musculoskeletal:         General: No tenderness.      Cervical back: Neck supple.   Lymphadenopathy:      Cervical: No cervical adenopathy.   Skin:     General: Skin is warm and dry.      Coloration: Skin is not pale.      Findings: No erythema or rash.   Neurological:      Mental Status: She is alert and oriented to person, place, and time.      Cranial Nerves: No cranial nerve deficit.      Motor: No abnormal muscle tone.      Coordination: Coordination normal.      Deep Tendon Reflexes: Reflexes are normal and symmetric. Reflexes normal.      Comments: Mild tremors of upper extremities    Psychiatric:         Behavior: Behavior normal.         Thought Content: Thought content normal.         Judgment: Judgment normal.      Comments: Flat affect               CRANIAL NERVES     CN III, IV, VI   Pupils are equal, round, and reactive to light.       Significant Labs: All pertinent labs within the past 24 hours have  been reviewed.  Recent Lab Results         11/09/23 2122 11/09/23 2113        Albumin   3.4       ALP   74       ALT   6       Anion Gap   16       AST   13       Baso #   0.05       Basophil %   1.0       BILIRUBIN TOTAL   0.8  Comment: For infants and newborns, interpretation of results should be based  on gestational age, weight and in agreement with clinical  observations.    Premature Infant recommended reference ranges:  Up to 24 hours.............<8.0 mg/dL  Up to 48 hours............<12.0 mg/dL  3-5 days..................<15.0 mg/dL  6-29 days.................<15.0 mg/dL         BNP   119  Comment: Values of less than 100 pg/ml are consistent with non-CHF populations.       BUN   53       Calcium   9.8       Chloride   99       CO2   22       Creatinine   8.2       Differential Method   Automated       eGFR   5.2       Eos #   0.4       Eosinophil %   8.6       Glucose   87       Gran # (ANC)   2.3       Gran %   45.9       Hematocrit   27.7       Hemoglobin   9.0       Immature Grans (Abs)   0.01  Comment: Mild elevation in immature granulocytes is non specific and   can be seen in a variety of conditions including stress response,   acute inflammation, trauma and pregnancy. Correlation with other   laboratory and clinical findings is essential.         Immature Granulocytes   0.2       Lipase   46       Lymph #   1.2       Lymph %   23.5       Magnesium    2.4       MCH   25.6       MCHC   32.5       MCV   79       Mono #   1.0       Mono %   20.8       MPV   SEE COMMENT  Comment: Result not available.       nRBC   0       Platelet Count   150       POC BUN 46         POC Chloride 100         POC Creatinine 9.7         POC Glucose 90         POC Hematocrit 28         POC Ionized Calcium 1.12         Potassium, Blood Gas 4.2         Sodium, Blood Gas 135         POC TCO2 (MEASURED) 22         Potassium   4.2       PROTEIN TOTAL   8.3       RBC   3.51       RDW   16.8       Sample NIKO         Sodium    137       Troponin I   0.007  Comment: The reference interval for Troponin I represents the 99th percentile   cutoff   for our facility and is consistent with 3rd generation assay   performance.         WBC   4.90               Significant Imaging: I have reviewed all pertinent imaging results/findings within the past 24 hours.    Assessment/Plan:     * Acute diverticulitis  -presented with acute onset severe lower abdominal pain associated with nausea and decreased appetite  -CT abdomen with contrast showed early diverticulitis of descending and sigmoid colon without perforation or abscess   -she is afebrile and no leukocytosis   -received empiric ciprofloxacin and flagyl in ED and will continue   -trial of clear liquid diet and advance to renal diet at tolerated   -will need outpatient colonoscopy in future after treatment of diverticulitis for further evaluation   -multimodal pain control including IV morphine for prn severe pain       ESRD (end stage renal disease) on dialysis  Creatine stable for now. BMP reviewed- noted Estimated Creatinine Clearance: 7.8 mL/min (A) (based on SCr of 8.2 mg/dL (H)). according to latest data. Based on current GFR, CKD stage is end stage.  Monitor UOP and serial BMP and adjust therapy as needed. Renally dose meds. Avoid nephrotoxic medications and procedures.  -will consult nephrology for chronic HD (MWF)  -continue home Renvella     Essential hypertension  Chronic, controlled. Latest blood pressure and vitals reviewed-     Temp:  [98 °F (36.7 °C)-98.1 °F (36.7 °C)]   Pulse:  [74-82]   Resp:  [14-18]   BP: (133-179)/(61-74)   SpO2:  [90 %-97 %] .   Home meds for hypertension were reviewed and noted below.   Hypertension Medications             amLODIPine (NORVASC) 10 MG tablet Take 1 tablet (10 mg total) by mouth nightly.    furosemide (LASIX) 80 MG tablet Take 1 tablet (80 mg total) by mouth once daily.    hydrALAZINE (APRESOLINE) 100 MG tablet Take 100 mg by mouth 3 (three)  times daily.    labetaloL (NORMODYNE) 300 MG tablet Take 1 tablet (300 mg total) by mouth every 12 (twelve) hours.    losartan (COZAAR) 100 MG tablet Take 1 tablet (100 mg total) by mouth once daily.          While in the hospital, will manage blood pressure as follows; Continue home antihypertensive regimen    Will utilize p.r.n. blood pressure medication only if patient's blood pressure greater than 180/110 and she develops symptoms such as worsening chest pain or shortness of breath.    Renal mass  -h/o Right RCC s/p robotic nephrectomy  In 2020  -she is followed by urology (Dr. Abraham) for a left renal mass with surveillance MRI abdomen.   -not a renal transplant candidate per urology       Chronic diastolic heart failure  -echo from august 2022 showed GIIDD with EF 60% and elevated PASP 45  -no signs of ADHF   -continue home lasix and chronic HD MWF for optimal volume control       Elevated cholesterol with elevated triglycerides  -continue home statin       ACP (advance care planning)  Advance Care Planning     Date: 11/10/2023    [facility]  I engaged the patient and family in a voluntary conversation about advance care planning and we specifically addressed what the goals of care would be moving forward, in light of the patient's change in clinical status, specifically if her heart stops or she is unable to breath on her own.  We did specifically address the patient's likely prognosis, which is fair .  We explored the patient's values and preferences for future care.  The patient endorses that what is most important right now is to focus on remaining as independent as possible, symptom/pain control and extending life as long as possible, even it it means sacrificing quality    Accordingly, we have decided that the best plan to meet the patient's goals includes continuing with treatment      I spent a total of 10 minutes engaging the patient in this advance care planning discussion.             VTE Risk Mitigation  (From admission, onward)         Ordered     heparin (porcine) injection 5,000 Units  Every 8 hours         11/10/23 0229     IP VTE HIGH RISK PATIENT  Once         11/10/23 0229     Place sequential compression device  Until discontinued         11/10/23 0229                   Flako Flaherty DO  Department of Hospital Medicine  [facility]H

## 2023-11-10 NOTE — HPI
The patient is a 58 y.o. Black or  Female with multiple co morbidities including HTN, DM2, ESRD on HD (MWF), RCC s/p robotic right nephrectomy in 2020, left renal mass and followed by urology with surveillance MRI abdomen who presents to ED on 11/9/2023 with complaints of  worsening lower abdominal pain since this morning. CT abdomen pelvis with contrast showed scattered colonic diverticula with nonspecific colitis or early diverticulitis of the descending and sigmoid colon. Patient being admitted with acute diverticulitis. Receiving IV cirpo and flagyl. No distress noted on exam. Last HD on Wednesday, 11/7. Nephrology consulted for management of ESRD and HD treatment.

## 2023-11-10 NOTE — ED NOTES
Pt care assumed. Report received by VALERIE Green. Pt lying in stretcher in low and locked position and side rails raised x2. Call light, pt's belongings, and bedside table within pt's reach. Pt on continuous cardiac monitoring, pulse oximetry, and BP cycling every 30 minutes. Pt in NAD and verbalized no needs at this time. Family member x1 at bedside.

## 2023-11-10 NOTE — ASSESSMENT & PLAN NOTE
Chronic, controlled. Latest blood pressure and vitals reviewed-     Temp:  [98 °F (36.7 °C)-98.1 °F (36.7 °C)]   Pulse:  [74-82]   Resp:  [14-18]   BP: (133-179)/(61-74)   SpO2:  [90 %-97 %] .   Home meds for hypertension were reviewed and noted below.   Hypertension Medications             amLODIPine (NORVASC) 10 MG tablet Take 1 tablet (10 mg total) by mouth nightly.    furosemide (LASIX) 80 MG tablet Take 1 tablet (80 mg total) by mouth once daily.    hydrALAZINE (APRESOLINE) 100 MG tablet Take 100 mg by mouth 3 (three) times daily.    labetaloL (NORMODYNE) 300 MG tablet Take 1 tablet (300 mg total) by mouth every 12 (twelve) hours.    losartan (COZAAR) 100 MG tablet Take 1 tablet (100 mg total) by mouth once daily.          While in the hospital, will manage blood pressure as follows; Continue home antihypertensive regimen    Will utilize p.r.n. blood pressure medication only if patient's blood pressure greater than 180/110 and she develops symptoms such as worsening chest pain or shortness of breath.

## 2023-11-11 VITALS
BODY MASS INDEX: 31.39 KG/M2 | SYSTOLIC BLOOD PRESSURE: 118 MMHG | HEIGHT: 64 IN | RESPIRATION RATE: 18 BRPM | TEMPERATURE: 98 F | DIASTOLIC BLOOD PRESSURE: 57 MMHG | WEIGHT: 183.88 LBS | HEART RATE: 85 BPM | OXYGEN SATURATION: 95 %

## 2023-11-11 LAB
ANION GAP SERPL CALC-SCNC: 16 MMOL/L (ref 8–16)
BASOPHILS # BLD AUTO: 0.04 K/UL (ref 0–0.2)
BASOPHILS NFR BLD: 0.8 % (ref 0–1.9)
BUN SERPL-MCNC: 68 MG/DL (ref 6–20)
CALCIUM SERPL-MCNC: 9.3 MG/DL (ref 8.7–10.5)
CHLORIDE SERPL-SCNC: 99 MMOL/L (ref 95–110)
CO2 SERPL-SCNC: 20 MMOL/L (ref 23–29)
CREAT SERPL-MCNC: 11.3 MG/DL (ref 0.5–1.4)
DIFFERENTIAL METHOD: ABNORMAL
EOSINOPHIL # BLD AUTO: 0.6 K/UL (ref 0–0.5)
EOSINOPHIL NFR BLD: 13.2 % (ref 0–8)
ERYTHROCYTE [DISTWIDTH] IN BLOOD BY AUTOMATED COUNT: 16.8 % (ref 11.5–14.5)
EST. GFR  (NO RACE VARIABLE): 3.5 ML/MIN/1.73 M^2
GLUCOSE SERPL-MCNC: 70 MG/DL (ref 70–110)
HCT VFR BLD AUTO: 26.6 % (ref 37–48.5)
HGB BLD-MCNC: 8.6 G/DL (ref 12–16)
IMM GRANULOCYTES # BLD AUTO: 0.01 K/UL (ref 0–0.04)
IMM GRANULOCYTES NFR BLD AUTO: 0.2 % (ref 0–0.5)
LYMPHOCYTES # BLD AUTO: 1.6 K/UL (ref 1–4.8)
LYMPHOCYTES NFR BLD: 32 % (ref 18–48)
MAGNESIUM SERPL-MCNC: 2.6 MG/DL (ref 1.6–2.6)
MCH RBC QN AUTO: 25.4 PG (ref 27–31)
MCHC RBC AUTO-ENTMCNC: 32.3 G/DL (ref 32–36)
MCV RBC AUTO: 79 FL (ref 82–98)
MONOCYTES # BLD AUTO: 0.8 K/UL (ref 0.3–1)
MONOCYTES NFR BLD: 15.9 % (ref 4–15)
NEUTROPHILS # BLD AUTO: 1.8 K/UL (ref 1.8–7.7)
NEUTROPHILS NFR BLD: 37.9 % (ref 38–73)
NRBC BLD-RTO: 0 /100 WBC
PHOSPHATE SERPL-MCNC: 6.4 MG/DL (ref 2.7–4.5)
PLATELET # BLD AUTO: 182 K/UL (ref 150–450)
PMV BLD AUTO: ABNORMAL FL (ref 9.2–12.9)
POTASSIUM SERPL-SCNC: 4.7 MMOL/L (ref 3.5–5.1)
RBC # BLD AUTO: 3.39 M/UL (ref 4–5.4)
SODIUM SERPL-SCNC: 135 MMOL/L (ref 136–145)
WBC # BLD AUTO: 4.85 K/UL (ref 3.9–12.7)

## 2023-11-11 PROCEDURE — 85025 COMPLETE CBC W/AUTO DIFF WBC: CPT | Mod: HCNC | Performed by: HOSPITALIST

## 2023-11-11 PROCEDURE — 83735 ASSAY OF MAGNESIUM: CPT | Mod: HCNC | Performed by: HOSPITALIST

## 2023-11-11 PROCEDURE — 80048 BASIC METABOLIC PNL TOTAL CA: CPT | Mod: HCNC | Performed by: HOSPITALIST

## 2023-11-11 PROCEDURE — 96366 THER/PROPH/DIAG IV INF ADDON: CPT

## 2023-11-11 PROCEDURE — 84100 ASSAY OF PHOSPHORUS: CPT | Mod: HCNC | Performed by: HOSPITALIST

## 2023-11-11 PROCEDURE — 25000003 PHARM REV CODE 250: Mod: HCNC | Performed by: NURSE PRACTITIONER

## 2023-11-11 PROCEDURE — 25000003 PHARM REV CODE 250: Mod: HCNC | Performed by: STUDENT IN AN ORGANIZED HEALTH CARE EDUCATION/TRAINING PROGRAM

## 2023-11-11 PROCEDURE — 36415 COLL VENOUS BLD VENIPUNCTURE: CPT | Mod: HCNC | Performed by: HOSPITALIST

## 2023-11-11 PROCEDURE — 25000003 PHARM REV CODE 250: Mod: HCNC | Performed by: HOSPITALIST

## 2023-11-11 PROCEDURE — 90935 PR HEMODIALYSIS, ONE EVALUATION: ICD-10-PCS | Mod: HCNC,,, | Performed by: NURSE PRACTITIONER

## 2023-11-11 PROCEDURE — G0257 UNSCHED DIALYSIS ESRD PT HOS: HCPCS | Mod: HCNC

## 2023-11-11 PROCEDURE — 96372 THER/PROPH/DIAG INJ SC/IM: CPT | Performed by: HOSPITALIST

## 2023-11-11 PROCEDURE — 63600175 PHARM REV CODE 636 W HCPCS: Mod: HCNC | Performed by: HOSPITALIST

## 2023-11-11 PROCEDURE — G0378 HOSPITAL OBSERVATION PER HR: HCPCS | Mod: HCNC

## 2023-11-11 PROCEDURE — 90935 HEMODIALYSIS ONE EVALUATION: CPT | Mod: HCNC,,, | Performed by: NURSE PRACTITIONER

## 2023-11-11 RX ORDER — AMOXICILLIN AND CLAVULANATE POTASSIUM 875; 125 MG/1; MG/1
1 TABLET, FILM COATED ORAL EVERY 8 HOURS
Status: DISCONTINUED | OUTPATIENT
Start: 2023-11-11 | End: 2023-11-11

## 2023-11-11 RX ORDER — AMOXICILLIN AND CLAVULANATE POTASSIUM 500; 125 MG/1; MG/1
1 TABLET, FILM COATED ORAL DAILY
Status: DISCONTINUED | OUTPATIENT
Start: 2023-11-11 | End: 2023-11-11 | Stop reason: HOSPADM

## 2023-11-11 RX ORDER — AMOXICILLIN AND CLAVULANATE POTASSIUM 500; 125 MG/1; MG/1
1 TABLET, FILM COATED ORAL DAILY
Qty: 12 TABLET | Refills: 0 | Status: SHIPPED | OUTPATIENT
Start: 2023-11-11 | End: 2023-11-23

## 2023-11-11 RX ADMIN — CIPROFLOXACIN 400 MG: 400 INJECTION, SOLUTION INTRAVENOUS at 12:11

## 2023-11-11 RX ADMIN — ASPIRIN 81 MG: 81 TABLET, COATED ORAL at 12:11

## 2023-11-11 RX ADMIN — FUROSEMIDE 80 MG: 80 TABLET ORAL at 12:11

## 2023-11-11 RX ADMIN — ATORVASTATIN CALCIUM 40 MG: 40 TABLET, FILM COATED ORAL at 12:11

## 2023-11-11 RX ADMIN — SEVELAMER CARBONATE 2400 MG: 800 TABLET, FILM COATED ORAL at 12:11

## 2023-11-11 RX ADMIN — LABETALOL HYDROCHLORIDE 300 MG: 100 TABLET, FILM COATED ORAL at 12:11

## 2023-11-11 RX ADMIN — HEPARIN SODIUM 5000 UNITS: 5000 INJECTION INTRAVENOUS; SUBCUTANEOUS at 05:11

## 2023-11-11 RX ADMIN — AMOXICILLIN AND CLAVULANATE POTASSIUM 500 MG: 500; 125 TABLET, FILM COATED ORAL at 12:11

## 2023-11-11 RX ADMIN — POLYETHYLENE GLYCOL 3350 17 G: 17 POWDER, FOR SOLUTION ORAL at 12:11

## 2023-11-11 RX ADMIN — SODIUM CHLORIDE: 9 INJECTION, SOLUTION INTRAVENOUS at 08:11

## 2023-11-11 RX ADMIN — PREGABALIN 75 MG: 75 CAPSULE ORAL at 12:11

## 2023-11-11 NOTE — PLAN OF CARE
Problem: Infection (Hemodialysis)  Goal: Absence of Infection Signs and Symptoms  Outcome: Ongoing, Progressing     Problem: Electrolyte Imbalance (Chronic Kidney Disease)  Goal: Electrolyte Balance  Outcome: Ongoing, Progressing

## 2023-11-11 NOTE — NURSING
Pt is sitting up in recliner,No acute distress noted. Able to voice needs to staff.Tolerates medications well. Bed in lowest position call light within reach. tm

## 2023-11-11 NOTE — PLAN OF CARE
Problem: Infection  Goal: Absence of Infection Signs and Symptoms  Outcome: Ongoing, Progressing     Problem: Adult Inpatient Plan of Care  Goal: Plan of Care Review  Outcome: Ongoing, Progressing  Goal: Patient-Specific Goal (Individualized)  Outcome: Ongoing, Progressing  Goal: Absence of Hospital-Acquired Illness or Injury  Outcome: Ongoing, Progressing  Goal: Optimal Comfort and Wellbeing  Outcome: Ongoing, Progressing  Goal: Readiness for Transition of Care  Outcome: Ongoing, Progressing     Problem: Diabetes Comorbidity  Goal: Blood Glucose Level Within Targeted Range  Outcome: Ongoing, Progressing     Problem: Fall Injury Risk  Goal: Absence of Fall and Fall-Related Injury  Outcome: Ongoing, Progressing     Problem: Pain Acute  Goal: Acceptable Pain Control and Functional Ability  Outcome: Ongoing, Progressing     Problem: Skin Injury Risk Increased  Goal: Skin Health and Integrity  Outcome: Ongoing, Progressing     Problem: Nausea and Vomiting  Goal: Fluid and Electrolyte Balance  Outcome: Ongoing, Progressing     Problem: Device-Related Complication Risk (Hemodialysis)  Goal: Safe, Effective Therapy Delivery  Outcome: Ongoing, Progressing     Problem: Hemodynamic Instability (Hemodialysis)  Goal: Effective Tissue Perfusion  Outcome: Ongoing, Progressing     Problem: Infection (Hemodialysis)  Goal: Absence of Infection Signs and Symptoms  Outcome: Ongoing, Progressing

## 2023-11-11 NOTE — DISCHARGE SUMMARY
[facility] [address] [place] [address] Medicine  Discharge Summary      Patient Name: Jael Hall  MRN: [id number]  CHUCK: [id number]  Patient Class: OP- Observation  Admission Date: 11/9/2023  Hospital Length of Stay: 0 days  Discharge Date and Time:  11/11/2023 11:14 AM  Attending Physician: Maurice Archer MD   Discharging Provider: Maurice Archer MD  Primary Care Provider: Maty Gayle DO  [facility] Medicine Team: [facility] MED  Maurice Archer MD  Primary Care Team: [facility]    HPI:   Ms Hall is a 58 year old female with PMH of HTN, DM2, ESRD on HD (MWF), RCC s/p robotic right nephrectomy in 2020, left renal mass and followed by urology with surveillance MRI abdomen who presented to ED for evaluation of worsening lower abdominal pain since this morning. Patient reports pain as sharp and started at the middle of her lower abdomen which later progressed across lower abdomen. She reports pain as constant w/o specific alleviating or exacerbating factors. She reports decreased appetite and nausea, but denies vomiting, fever, chills, chest pain, palpitation, dizziness, diarrhea, constipation, focal weakness/numbness, dark stool or bleeding from other sites. She still makes small amount of urine daily, but denies dysuria or hematuria. Of note, patient had an episode of acute fluid overload and SOB at the beginning of this week while visiting family members at [place] which improved after HD at the local emergency room. She returned home this morning from [place] after developing abdominal pain. She reports mild residual SOB but denies missing dialysis recently.      ED course: afebrile and vitals stable. WBC 5, K 4.2, bicarb 22, . CT abdomen pelvis with contrast showed scattered colonic diverticula with nonspecific colitis or early diverticulitis of the descending and sigmoid colon.   Patient received morphine 4 mg, ciprofloxacin and flagyl IV in ED. During my interview, patient is awake,  conversant and not in distress. She reports her abdominal pain improved with morphine. She had colonoscopy many years ago and it was negative per patient. Her daughter is present at bedside.         * No surgery found *      Hospital Course:   Presented with acute onset severe lower abdominal pain associated with nausea and decreased appetite. CT abdomen with contrast showed early diverticulitis of descending and sigmoid colon without perforation or abscess. Patient remained afebrile and no leukocytosis. Received empiric ciprofloxacin and flagyl with improvement in symptoms. Tolerated renal diet well. Patient's Qtc is prolonged 517, switched to Augmentin 500mg howell for total 14 days. On dialysis days augmentin is to to be taken after HD. Discussed with patient need for outpatient colonoscopy in future after treatment of diverticulitis for further evaluation. Patient reports that she is due to [facility] and will schedule it.    Patient was deemed appropriate for discharge.  At the time of discharge, the plan of care was discussed with patient/family, who were agreeable and amenable.  All medications were verbally reviewed and discussed with the patient/family. They endorsed understanding and compliance.  Informed them that these changes will be available on their discharge paperwork, as well. Outpatient follow-ups were scheduled, or if unable to be scheduled ambulatory referrals were placed, and ER return precautions were given. All questions were answered to the patient/family's satisfaction.  Patient was subsequently discharged in stable condition.         Goals of Care Treatment Preferences:  Code Status: Full Code          What is most important right now is to focus on remaining as independent as possible, symptom/pain control, extending life as long as possible, even it it means sacrificing quality.  Accordingly, we have decided that the best plan to meet the patient's goals includes continuing with  treatment.      Consults:   Consults (From admission, onward)        Status Ordering Provider     Inpatient consult to Nephrology  Once        Provider:  (Not yet assigned)    Completed EV KOEHLER          No new Assessment & Plan notes have been filed under this hospital service since the last note was generated.  Service: Hospital Medicine    Final Active Diagnoses:    Diagnosis Date Noted POA    PRINCIPAL PROBLEM:  Acute diverticulitis [K57.92] 11/10/2023 Yes    Chronic diastolic heart failure [I50.32] 02/25/2021 Yes    Renal mass [N28.89] 10/08/2020 Yes    ACP (advance care planning) [Z71.89] 10/08/2020 Not Applicable    Essential hypertension [I10]  Yes     Chronic    ESRD (end stage renal disease) on dialysis [N18.6, Z99.2] 09/28/2017 Not Applicable     Chronic    Elevated cholesterol with elevated triglycerides [E78.2] 09/28/2017 Yes     Chronic      Problems Resolved During this Admission:       Discharged Condition: good    Disposition: Home or Self Care    Follow Up:   Follow-up Information     Maty Gayle, DO Follow up in 3 day(s).    Specialty: Internal Medicine  Contact information:  [facility] [place] [address] [facility] [address]  [phone]                       Patient Instructions:      Ambulatory referral/consult to Outpatient Case Management   Referral Priority: Routine Referral Type: Consultation   Referral Reason: Specialty Services Required   Number of Visits Requested: 1       Significant Diagnostic Studies: N/A    Pending Diagnostic Studies:     None         Medications:  Reconciled Home Medications:      Medication List      START taking these medications    amoxicillin-clavulanate 500-125mg 500-125 mg Tab  Commonly known as: AUGMENTIN  Take 1 tablet (500 mg total) by mouth once daily. for 12 days        CONTINUE taking these medications    acetaminophen 500 MG tablet  Commonly known as: TYLENOL  Take 1 tablet (500 mg total) by mouth every 8 (eight) hours as needed for Pain.      amLODIPine 10 MG tablet  Commonly known as: NORVASC  Take 1 tablet (10 mg total) by mouth nightly.     aspirin 81 MG EC tablet  Commonly known as: ECOTRIN  Take 81 mg by mouth once daily.     atorvastatin 40 MG tablet  Commonly known as: LIPITOR  Take 1 tablet (40 mg total) by mouth once daily.     diphenhydrAMINE 25 mg capsule  Commonly known as: BENADRYL  Take 25 mg by mouth every 6 (six) hours as needed for Itching.     furosemide 80 MG tablet  Commonly known as: LASIX  Take 1 tablet (80 mg total) by mouth once daily.     hydrALAZINE 100 MG tablet  Commonly known as: APRESOLINE  Take 100 mg by mouth 3 (three) times daily.     labetaloL 300 MG tablet  Commonly known as: NORMODYNE  Take 1 tablet (300 mg total) by mouth every 12 (twelve) hours.     losartan 100 MG tablet  Commonly known as: COZAAR  Take 1 tablet (100 mg total) by mouth once daily.     pregabalin 75 MG capsule  Commonly known as: LYRICA  Take 75 mg by mouth once daily.     sevelamer carbonate 800 mg Tab  Commonly known as: RENVELA  Take 3 tablets (2,400 mg total) by mouth 3 (three) times daily with meals.        STOP taking these medications    erythromycin ophthalmic ointment  Commonly known as: ROMYCIN            Indwelling Lines/Drains at time of discharge:   Lines/Drains/Airways     Drain  Duration                Hemodialysis AV Fistula Right forearm -- days                Time spent on the discharge of patient: 35 minutes         Maurice Archer MD  Department of Hospital Medicine  [facility] 11H

## 2023-11-11 NOTE — HOSPITAL COURSE
Presented with acute onset severe lower abdominal pain associated with nausea and decreased appetite. CT abdomen with contrast showed early diverticulitis of descending and sigmoid colon without perforation or abscess. Patient remained afebrile and no leukocytosis. Received empiric ciprofloxacin and flagyl with improvement in symptoms. Tolerated renal diet well. Patient's Qtc is prolonged 517, switched to Augmentin 500mg howell for total 14 days. On dialysis days augmentin is to to be taken after HD. Discussed with patient need for outpatient colonoscopy in future after treatment of diverticulitis for further evaluation. Patient reports that she is due to [facility] and will schedule it.    Patient was deemed appropriate for discharge.  At the time of discharge, the plan of care was discussed with patient/family, who were agreeable and amenable.  All medications were verbally reviewed and discussed with the patient/family. They endorsed understanding and compliance.  Informed them that these changes will be available on their discharge paperwork, as well. Outpatient follow-ups were scheduled, or if unable to be scheduled ambulatory referrals were placed, and ER return precautions were given. All questions were answered to the patient/family's satisfaction.  Patient was subsequently discharged in stable condition.

## 2023-11-11 NOTE — PROGRESS NOTES
11/11/23 1126   During Hemodialysis Assessment   Intra-Hemodialysis Comments HD completed   Post-Hemodialysis Assessment   Rinseback Volume (mL) 250 mL   Blood Volume Processed (Liters) 66.9 L   Dialyzer Clearance Moderately streaked   Duration of Treatment 180 minutes   Total UF (mL) 2650 mL   Net Fluid Removal 2000   Patient Response to Treatment Tolerated well   Post-Treatment Weight 77.9 kg (171 lb 11.8 oz)   Treatment Weight Change -2     Pt tolerated HD well. Patient left [facility] by wheelchair NAD.

## 2023-11-11 NOTE — PLAN OF CARE
Patient will transport home at discharge with family. Patient has sn appt scheduled for 11/21/2023 with her PCP. No additional needs at this time.   11/11/23 0905   Final Note   Assessment Type Final Discharge Note   Anticipated Discharge Disposition Home   Hospital Resources/Appts/Education Provided Provided patient/caregiver with written discharge plan information;Appointments scheduled and added to AVS   Post-Acute Status   Discharge Delays None known at this time     [facility]  Discharge Final Note    Primary Care Provider: Maty Gayle DO    Expected Discharge Date: 11/11/2023    Final Discharge Note (most recent)       Final Note - 11/11/23 0905          Final Note    Assessment Type Final Discharge Note (P)      Anticipated Discharge Disposition Home or Self Care (P)      Hospital Resources/Appts/Education Provided Provided patient/caregiver with written discharge plan information;Appointments scheduled and added to AVS (P)         Post-Acute Status    Discharge Delays None known at this time (P)                      Important Message from Medicare             Contact Info       Maty Gayle DO   Specialty: Internal Medicine   Relationship: PCP - General    [address]  [facility] [address]   Phone: [phone]       Next Steps: Follow up in 3 day(s)

## 2023-11-11 NOTE — PROGRESS NOTES
11/11/23 0813 11/11/23 0825        Hemodialysis AV Fistula Right forearm   No Placement Date or Time found.   Present Prior to Hospital Arrival?: Yes  Location: Right forearm   Needle Size 15ga  --    Site Assessment Clean;Dry;Intact  --    Patency Present;Thrill;Bruit  --    Status Accessed  --    Flows Good  --    During Hemodialysis Assessment   Blood Flow Rate (mL/min)  --  400 mL/min   Dialysate Flow Rate (mL/min)  --  700 ml/min   Ultrafiltration Rate (mL/Hr)  --  880 mL/Hr   Arteriovenous Lines Secure  --  Yes   Arterial Pressure (mmHg)  --  -180 mmHg   Venous Pressure (mmHg)  --  150   Blood Volume Processed (Liters)  --  0 L   UF Removed (mL)  --  0 mL   TMP  --  10   Venous Line in Air Detector  --  Yes   Intake (mL)  --  250 mL   Intra-Hemodialysis Comments  --  HD started     Patient arrived [facility] by stretcher. Observation HD started without complications.

## 2023-11-11 NOTE — PLAN OF CARE
Problem: Infection  Goal: Absence of Infection Signs and Symptoms  Outcome: Met     Problem: Adult Inpatient Plan of Care  Goal: Plan of Care Review  Outcome: Met  Goal: Patient-Specific Goal (Individualized)  Outcome: Met  Goal: Absence of Hospital-Acquired Illness or Injury  Outcome: Met  Goal: Optimal Comfort and Wellbeing  Outcome: Met  Goal: Readiness for Transition of Care  Outcome: Met     Problem: Diabetes Comorbidity  Goal: Blood Glucose Level Within Targeted Range  Outcome: Met     Problem: Fall Injury Risk  Goal: Absence of Fall and Fall-Related Injury  Outcome: Met     Problem: Pain Acute  Goal: Acceptable Pain Control and Functional Ability  Outcome: Met     Problem: Skin Injury Risk Increased  Goal: Skin Health and Integrity  Outcome: Met     Problem: Nausea and Vomiting  Goal: Fluid and Electrolyte Balance  Outcome: Met     Problem: Device-Related Complication Risk (Hemodialysis)  Goal: Safe, Effective Therapy Delivery  Outcome: Met     Problem: Hemodynamic Instability (Hemodialysis)  Goal: Effective Tissue Perfusion  Outcome: Met     Problem: Infection (Hemodialysis)  Goal: Absence of Infection Signs and Symptoms  Outcome: Met     Problem: Electrolyte Imbalance (Chronic Kidney Disease)  Goal: Electrolyte Balance  Outcome: Met     Problem: Fluid Volume Excess (Chronic Kidney Disease)  Goal: Fluid Balance  Outcome: Met

## 2023-11-20 ENCOUNTER — CLINICAL SUPPORT (OUTPATIENT)
Dept: AUDIOLOGY | Facility: CLINIC | Age: 58
End: 2023-11-20
Payer: MEDICARE

## 2023-11-20 DIAGNOSIS — H90.3 SENSORINEURAL HEARING LOSS, BILATERAL: Primary | ICD-10-CM

## 2023-11-20 PROCEDURE — 92604 PR DX ANAL COCHLEAR IMP,PT >7 YRS,REPROG: ICD-10-PCS | Mod: HCNC,S$GLB,,

## 2023-11-20 PROCEDURE — 92604 REPROGRAM COCHLEAR IMPLT 7/>: CPT | Mod: HCNC,S$GLB,,

## 2023-11-20 NOTE — PROGRESS NOTES
Cochlear Implant Programming Session:     Right Ear:  Dr. Zuleta  :  [employer]  Internal Device/Serial Number:  [id number]   Processor:  [id number]   SN of Processors: [id number]  DOS:  6/30/21  DIS:  7/20/21  Processor Color: Black  Magnet Strength: 5i  Coil Length:  6cm  Battery Type:  Standard rechargeable  Warranty:  5 year - July 19, 2024     MINI REGINO issued  TV streamer issued     Ms. Jael Hall was seen today for a cochlear implant follow up appointment. Ms. Hall continues to report that at times her processors leave her with inconsistent sound. Ms. Hall wears her processors over a wig cap and her natural hair. She is currently in the strongest magnet but it is not strong enough to attach through the wig cap. Ms. Hall was counseled on the importance of wearing her magnet underneath her cap or she will always struggle with inconsistent sound. She demonstrated understanding.      Ms. Hall's processor was connected to the software and impedence's were run. T and C levels were checked for audibility and comfort, overall C levels increase 5 C.U.s.     Recommend:  Continued consistent use of sound processors  Cochlear implant supplies as needed  Annual audiogram with CI programming

## 2023-11-21 ENCOUNTER — OFFICE VISIT (OUTPATIENT)
Dept: INTERNAL MEDICINE | Facility: CLINIC | Age: 58
End: 2023-11-21
Payer: MEDICARE

## 2023-11-21 VITALS
SYSTOLIC BLOOD PRESSURE: 120 MMHG | DIASTOLIC BLOOD PRESSURE: 58 MMHG | BODY MASS INDEX: 31.12 KG/M2 | HEART RATE: 76 BPM | HEIGHT: 64 IN | WEIGHT: 182.31 LBS

## 2023-11-21 DIAGNOSIS — E11.42 DIABETIC POLYNEUROPATHY ASSOCIATED WITH TYPE 2 DIABETES MELLITUS: ICD-10-CM

## 2023-11-21 DIAGNOSIS — Z12.31 ENCOUNTER FOR SCREENING MAMMOGRAM FOR MALIGNANT NEOPLASM OF BREAST: ICD-10-CM

## 2023-11-21 DIAGNOSIS — N18.6 TYPE 2 DIABETES MELLITUS WITH CHRONIC KIDNEY DISEASE ON CHRONIC DIALYSIS, WITHOUT LONG-TERM CURRENT USE OF INSULIN: Primary | ICD-10-CM

## 2023-11-21 DIAGNOSIS — I10 ESSENTIAL HYPERTENSION: ICD-10-CM

## 2023-11-21 DIAGNOSIS — E11.22 TYPE 2 DIABETES MELLITUS WITH CHRONIC KIDNEY DISEASE ON CHRONIC DIALYSIS, WITHOUT LONG-TERM CURRENT USE OF INSULIN: Primary | ICD-10-CM

## 2023-11-21 DIAGNOSIS — Z00.00 ENCOUNTER FOR PREVENTATIVE ADULT HEALTH CARE EXAMINATION: ICD-10-CM

## 2023-11-21 DIAGNOSIS — Z99.2 TYPE 2 DIABETES MELLITUS WITH CHRONIC KIDNEY DISEASE ON CHRONIC DIALYSIS, WITHOUT LONG-TERM CURRENT USE OF INSULIN: Primary | ICD-10-CM

## 2023-11-21 PROBLEM — K57.92 ACUTE DIVERTICULITIS: Status: RESOLVED | Noted: 2023-11-10 | Resolved: 2023-11-21

## 2023-11-21 PROBLEM — R80.9 PROTEINURIA: Status: RESOLVED | Noted: 2019-10-30 | Resolved: 2023-11-21

## 2023-11-21 PROBLEM — Z09 HOSPITAL DISCHARGE FOLLOW-UP: Status: RESOLVED | Noted: 2023-10-06 | Resolved: 2023-11-21

## 2023-11-21 PROBLEM — Z91.148 NON COMPLIANCE W MEDICATION REGIMEN: Status: RESOLVED | Noted: 2020-08-19 | Resolved: 2023-11-21

## 2023-11-21 PROBLEM — M25.471 ANKLE EDEMA, BILATERAL: Status: RESOLVED | Noted: 2022-08-15 | Resolved: 2023-11-21

## 2023-11-21 PROBLEM — R09.02 HYPOXIA: Status: RESOLVED | Noted: 2022-05-01 | Resolved: 2023-11-21

## 2023-11-21 PROBLEM — M25.472 ANKLE EDEMA, BILATERAL: Status: RESOLVED | Noted: 2022-08-15 | Resolved: 2023-11-21

## 2023-11-21 PROBLEM — R31.9 HEMATURIA: Status: RESOLVED | Noted: 2019-10-30 | Resolved: 2023-11-21

## 2023-11-21 PROBLEM — R53.81 PHYSICAL DECONDITIONING: Status: RESOLVED | Noted: 2020-04-13 | Resolved: 2023-11-21

## 2023-11-21 PROCEDURE — 4010F PR ACE/ARB THEARPY RXD/TAKEN: ICD-10-PCS | Mod: HCNC,CPTII,GC,S$GLB

## 2023-11-21 PROCEDURE — 99999 PR PBB SHADOW E&M-EST. PATIENT-LVL III: ICD-10-PCS | Mod: PBBFAC,HCNC,GC,

## 2023-11-21 PROCEDURE — 3078F PR MOST RECENT DIASTOLIC BLOOD PRESSURE < 80 MM HG: ICD-10-PCS | Mod: HCNC,CPTII,GC,S$GLB

## 2023-11-21 PROCEDURE — 4010F ACE/ARB THERAPY RXD/TAKEN: CPT | Mod: HCNC,CPTII,GC,S$GLB

## 2023-11-21 PROCEDURE — 3008F BODY MASS INDEX DOCD: CPT | Mod: HCNC,CPTII,GC,S$GLB

## 2023-11-21 PROCEDURE — 3074F PR MOST RECENT SYSTOLIC BLOOD PRESSURE < 130 MM HG: ICD-10-PCS | Mod: HCNC,CPTII,GC,S$GLB

## 2023-11-21 PROCEDURE — 99213 OFFICE O/P EST LOW 20 MIN: CPT | Mod: HCNC,GC,S$GLB,

## 2023-11-21 PROCEDURE — 3008F PR BODY MASS INDEX (BMI) DOCUMENTED: ICD-10-PCS | Mod: HCNC,CPTII,GC,S$GLB

## 2023-11-21 PROCEDURE — 99213 PR OFFICE/OUTPT VISIT, EST, LEVL III, 20-29 MIN: ICD-10-PCS | Mod: HCNC,GC,S$GLB,

## 2023-11-21 PROCEDURE — 3044F HG A1C LEVEL LT 7.0%: CPT | Mod: HCNC,CPTII,GC,S$GLB

## 2023-11-21 PROCEDURE — 99999 PR PBB SHADOW E&M-EST. PATIENT-LVL III: CPT | Mod: PBBFAC,HCNC,GC,

## 2023-11-21 PROCEDURE — 3074F SYST BP LT 130 MM HG: CPT | Mod: HCNC,CPTII,GC,S$GLB

## 2023-11-21 PROCEDURE — 3066F PR DOCUMENTATION OF TREATMENT FOR NEPHROPATHY: ICD-10-PCS | Mod: HCNC,CPTII,GC,S$GLB

## 2023-11-21 PROCEDURE — 3078F DIAST BP <80 MM HG: CPT | Mod: HCNC,CPTII,GC,S$GLB

## 2023-11-21 PROCEDURE — 3044F PR MOST RECENT HEMOGLOBIN A1C LEVEL <7.0%: ICD-10-PCS | Mod: HCNC,CPTII,GC,S$GLB

## 2023-11-21 PROCEDURE — 3066F NEPHROPATHY DOC TX: CPT | Mod: HCNC,CPTII,GC,S$GLB

## 2023-11-21 RX ORDER — LABETALOL 200 MG/1
200 TABLET, FILM COATED ORAL EVERY 12 HOURS
Qty: 60 TABLET | Refills: 11
Start: 2023-11-21 | End: 2023-11-21 | Stop reason: SDUPTHER

## 2023-11-21 RX ORDER — LABETALOL 200 MG/1
200 TABLET, FILM COATED ORAL EVERY 12 HOURS
Qty: 60 TABLET | Refills: 11 | Status: SHIPPED | OUTPATIENT
Start: 2023-11-21 | End: 2024-11-20

## 2023-11-21 RX ORDER — PREGABALIN 75 MG/1
75 CAPSULE ORAL DAILY
Qty: 90 CAPSULE | Refills: 3 | Status: SHIPPED | OUTPATIENT
Start: 2023-11-21 | End: 2024-03-07 | Stop reason: SDUPTHER

## 2023-11-21 NOTE — PROGRESS NOTES
Subjective     Chief Complaint: follow up     History of Present Illness:  Ms. Jael Hall is a 58 y.o. female with history of T2DM c/b ESRD on HD and diabetic neuropathy, RCC s/p right nephrectomy 2020, htn, HFpEF (EF 60%), NAFLD, and sensorineural hearing loss presenting for follow up. She has no significant concerns today    She has presented to the emergency room a few times since I last saw her during the summer. She presented to [facility] ED 11/6 and 11/8 for SOB after missing dialysis (she was visiting family and the dialysis center where she was was full and could not accept her). She was also admitted 11/9-11/11 for diverticulitis and received a course of antibiotics and is feeling much better. No concerns with abdominal pain or diarrhea at this time.    Her DM is well controlled since 2021. Diet controlled, not on medication.   Last A1c 5.1 5/2023  On statin: Yes  On ACE-I/ARB: Yes, losartan  Home glucose: 80s  Denies visual changes  Eye exam: July 17th 2023  Complaints of numbness/tingling in hands/feet. Takes Lyrica, helps somewhat. Requests refill  ASCVD risk 15.3% as of June 2023    Blood pressure regimen: amlodipine 10mg, hydralazine 100mg BID, labetalol 300mg BID, losartan 100mg qd. States her BP has been low at home, SBP often in low 100's. She is asymptomatic, no dizziness or headaches. Per review of documented BP's, she fluctuates widely and does often have BP's in 190's. Likely related to HD and possible autonomic defects.      She has renal failure 2/2 diabetic nephropathy. She undergoes MWF dialysis. She was previously on kidney transplant list, but has since been denied. She follows with urology and nephrology. Last urology visit 5/2023, patient not a candidate for transplant. MRI was concerning for possible L RCC, recommended continued active surveillance every 6-12 months. Her next appointment is already scheduled for 5/14/24.     She is due for mammogram    Social history:  - Occupation:  on disability  - Smoking: never  - Alcohol: denies  - Illicit drug use: denies    Review of Systems   Constitutional:  Negative for chills, fever and malaise/fatigue.   HENT:  Positive for hearing loss.    Eyes:  Negative for blurred vision.   Respiratory:  Positive for shortness of breath (intermittent). Negative for cough and wheezing.    Cardiovascular:  Negative for chest pain, palpitations and leg swelling.   Gastrointestinal:  Negative for abdominal pain, constipation, diarrhea, nausea and vomiting.   Musculoskeletal:  Negative for back pain.   Neurological:  Positive for tingling. Negative for dizziness, weakness and headaches.   Psychiatric/Behavioral:  The patient is not nervous/anxious.        PAST HISTORY:     Past Medical History:   Diagnosis Date    Acute on chronic diastolic heart failure 10/8/2021    Anemia of chronic disorder 2017    Overview:  Added automatically from request for surgery [id number]    Anemia of chronic renal failure, stage 5     Anxiety     Cyst of left ovary 2019    Cyst of left ovary 2019    Dyslipidemia 2013    End-stage renal disease on hemodialysis 2020    ESRD on hemodialysis 2017    Hematuria 10/30/2019    Hospital discharge follow-up 10/6/2023    Hx of sinus bradycardia 2017    Hyperkalemia 3/29/2017    Hypertensive urgency 2021    Peripheral neuropathy 2013    Preoperative testing 10/8/2020    Renovascular hypertension 2020    S/P dilation and curettage 2018    Thickened endometrium 2018    Thyroid nodule     Thyroid nodule 2021    Uncontrolled type 2 diabetes mellitus 2013    Overview:  poc a1c today 11.2-270/ up from 10.8-263, Pt. has very very stressed/ was incarcerated/marital stress/ will current meds/ less stress now/ will monitor.       Past Surgical History:   Procedure Laterality Date    AV FISTULA PLACEMENT      right arm     SECTION      x1    COLONOSCOPY N/A 2018    Procedure:  COLONOSCOPY;  Surgeon: Pankaj Hinojosa MD;  Location: [facility] ENDO (4TH FLR);  Service: Endoscopy;  Laterality: N/A;  dialysis pt/labs prior to colon/MS    CYSTOSCOPY W/ RETROGRADES Bilateral 8/20/2020    Procedure: CYSTOSCOPY, WITH RETROGRADE PYELOGRAM;  Surgeon: Douglas Abraham MD;  Location: [facility] 1ST FLR;  Service: Urology;  Laterality: Bilateral;  45 min    FISTULOGRAM Right 9/9/2020    Procedure: Fistulogram;  Surgeon: Lester Gómez MD;  Location: [facility] CATH LAB;  Service: Nephrology;  Laterality: Right;    HYSTERECTOMY      IMPLANTATION OF COCHLEAR PROSTHESIS Right 6/28/2021    Procedure: INSERTION, PROSTHESIS, COCHLEAR;  Surgeon: Ramiro Zuleta MD;  Location: [facility] OR 2ND FLR;  Service: ENT;  Laterality: Right;  COCHLEAR BABAK    IMPLANTATION OF COCHLEAR PROSTHESIS Right 6/30/2021    Procedure: INSERTION, PROSTHESIS, COCHLEAR;  Surgeon: Ramiro Zuleta MD;  Location: [facility] OR 2ND FLR;  Service: ENT;  Laterality: Right;  COCHLEAR AMERICAS    ROBOT-ASSISTED LAPAROSCOPIC ABDOMINAL HYSTERECTOMY USING DA NATALYA XI N/A 7/5/2019    Procedure: XI ROBOTIC HYSTERECTOMY;  Surgeon: Trice Lei MD;  Location: [facility];  Service: OB/GYN;  Laterality: N/A;    ROBOT-ASSISTED LAPAROSCOPIC NEPHRECTOMY Right 10/8/2020    Procedure: ROBOTIC NEPHRECTOMY;  Surgeon: Douglas Abraham MD;  Location: [facility] 2ND FLR;  Service: Urology;  Laterality: Right;  3.5hrs gen with regional     ROBOT-ASSISTED LAPAROSCOPIC SALPINGO-OOPHORECTOMY USING DA NATALYA XI Bilateral 7/5/2019    Procedure: XI ROBOTIC SALPINGO-OOPHORECTOMY;  Surgeon: Trice Lei MD;  Location: [facility];  Service: OB/GYN;  Laterality: Bilateral;    TUBAL LIGATION      Vascular access surgeries      x5       Family History   Problem Relation Age of Onset    Cancer Mother     Ovarian cancer Mother     Cancer Sister     Ovarian cancer Sister     Lung cancer Sister     Macular degeneration Sister     Cancer Sister     Ovarian cancer Sister     Diabetes Sister     Diabetes  Brother     Breast cancer Neg Hx     Colon cancer Neg Hx     Cervical cancer Neg Hx     Endometrial cancer Neg Hx     Vaginal cancer Neg Hx     Thyroid disease Neg Hx     Glaucoma Neg Hx     Retinal detachment Neg Hx        Social History     Socioeconomic History    Marital status:     Number of children: 2   Occupational History    Occupation: disability   Tobacco Use    Smoking status: Never    Smokeless tobacco: Never   Substance and Sexual Activity    Alcohol use: No    Drug use: No    Sexual activity: Yes     Partners: Male     Birth control/protection: Post-menopausal     Social Determinants of Health     Financial Resource Strain: Medium Risk (9/29/2023)    Overall Financial Resource Strain (CARDIA)     Difficulty of Paying Living Expenses: Somewhat hard   Food Insecurity: No Food Insecurity (9/29/2023)    Hunger Vital Sign     Worried About Running Out of Food in the Last Year: Never true     Ran Out of Food in the Last Year: Never true   Transportation Needs: No Transportation Needs (9/29/2023)    PRAPARE - Transportation     Lack of Transportation (Medical): No     Lack of Transportation (Non-Medical): No   Physical Activity: Inactive (9/29/2023)    Exercise Vital Sign     Days of Exercise per Week: 0 days     Minutes of Exercise per Session: 0 min   Stress: Stress Concern Present (9/29/2023)    [employer] - Occupational Stress Questionnaire     Feeling of Stress : To some extent   Social Connections: Unknown (9/29/2023)    Social Connection and Isolation Panel [NHANES]     Frequency of Communication with Friends and Family: More than three times a week     Frequency of Social Gatherings with Friends and Family: More than three times a week     Attends [employer] Services: More than 4 times per year     Active Member of Clubs or Organizations: No     Attends Club or Organization Meetings: Never   Housing Stability: Low Risk  (9/29/2023)    Housing Stability Vital Sign  "    Unable to Pay for Housing in the Last Year: No     Number of Places Lived in the Last Year: 1     Unstable Housing in the Last Year: No       MEDICATIONS & ALLERGIES:     Current Outpatient Medications on File Prior to Visit   Medication Sig    acetaminophen (TYLENOL) 500 MG tablet Take 1 tablet (500 mg total) by mouth every 8 (eight) hours as needed for Pain.    amLODIPine (NORVASC) 10 MG tablet Take 1 tablet (10 mg total) by mouth nightly.    amoxicillin-clavulanate 500-125mg (AUGMENTIN) 500-125 mg Tab Take 1 tablet (500 mg total) by mouth once daily. for 12 days    aspirin (ECOTRIN) 81 MG EC tablet Take 81 mg by mouth once daily.    atorvastatin (LIPITOR) 40 MG tablet Take 1 tablet (40 mg total) by mouth once daily.    diphenhydrAMINE (BENADRYL) 25 mg capsule Take 25 mg by mouth every 6 (six) hours as needed for Itching.    furosemide (LASIX) 80 MG tablet Take 1 tablet (80 mg total) by mouth once daily.    hydrALAZINE (APRESOLINE) 100 MG tablet Take 100 mg by mouth 3 (three) times daily.    labetaloL (NORMODYNE) 300 MG tablet Take 1 tablet (300 mg total) by mouth every 12 (twelve) hours.    losartan (COZAAR) 100 MG tablet Take 1 tablet (100 mg total) by mouth once daily.    pregabalin (LYRICA) 75 MG capsule Take 75 mg by mouth once daily.    sevelamer carbonate (RENVELA) 800 mg Tab Take 3 tablets (2,400 mg total) by mouth 3 (three) times daily with meals.     No current facility-administered medications on file prior to visit.       Review of patient's allergies indicates:  No Known Allergies    OBJECTIVE:     Vital Signs:  Vitals:    11/21/23 1434   BP: (!) 120/58   Pulse: 76   Weight: 82.7 kg (182 lb 5.1 oz)   Height: 5' 4" (1.626 m)       Body mass index is 31.3 kg/m².     Physical Exam  Vitals reviewed.   Constitutional:       General: She is not in acute distress.     Appearance: Normal appearance. She is not ill-appearing.   HENT:      Head: Normocephalic and atraumatic.      Ears:      Comments: " Decreased hearing  Eyes:      Extraocular Movements: Extraocular movements intact.   Cardiovascular:      Rate and Rhythm: Normal rate and regular rhythm.      Heart sounds: Normal heart sounds. No murmur heard.  Pulmonary:      Effort: Pulmonary effort is normal. No respiratory distress.      Breath sounds: Normal breath sounds. No wheezing.   Abdominal:      General: Abdomen is flat.      Palpations: Abdomen is soft.   Musculoskeletal:      Cervical back: Normal range of motion. No rigidity.      Left lower leg: No edema.   Skin:     General: Skin is warm and dry.   Neurological:      General: No focal deficit present.      Mental Status: She is alert. Mental status is at baseline.      Motor: No weakness.   Psychiatric:         Mood and Affect: Mood normal.         Behavior: Behavior normal.         Laboratory  No results found for this or any previous visit (from the past 24 hour(s)).    Diagnostic Results:      Health Maintenance Due   Topic Date Due    Shingles Vaccine (1 of 2) Never done    RSV Vaccine (Age 60+ and Pregnant patients) (1 - 1-dose 60+ series) Never done    Influenza Vaccine (1) 09/01/2023    COVID-19 Vaccine (2 - 2023-24 season) 09/01/2023    Mammogram  10/05/2023         ASSESSMENT & PLAN:   Ms. Jael Hall is a 58 y.o. female presenting for follow up. DM well controlled, will recheck A1c and CMP today.     Discussed control of her BP. She is concerned the labetalol dose is too high given her recurrent low readings. Agreed to trial decreasing labetalol dose to 200mg BID and keep track of her BP. Follow up in 6 weeks to recheck.     Continue to follow with nephrology, urology, and audiology.    Type 2 diabetes mellitus with chronic kidney disease on chronic dialysis, without long-term current use of insulin  -     HEMOGLOBIN A1C; Future; Expected date: 11/21/2023  -     COMPREHENSIVE METABOLIC PANEL; Future; Expected date: 11/21/2023    Encounter for preventative adult health care  examination  -     Mammo Digital Screening Bilat w/ Jon; Future; Expected date: 11/21/2023  -     Ambulatory referral/consult to Endo Procedure ; Future; Expected date: 11/22/2023    Encounter for screening mammogram for malignant neoplasm of breast  -     Mammo Digital Screening Bilat w/ Jon; Future; Expected date: 11/21/2023    Diabetic polyneuropathy associated with type 2 diabetes mellitus  -     pregabalin (LYRICA) 75 MG capsule; Take 1 capsule (75 mg total) by mouth once daily.  Dispense: 90 capsule; Refill: 3    Essential hypertension  -     labetaloL (NORMODYNE) 200 MG tablet; Take 1 tablet (200 mg total) by mouth every 12 (twelve) hours.  Dispense: 60 tablet; Refill: 11        RTC in 6 weeks    Discussed with Dr. Sam  - staff attestation to follow      Maty Gayle DO PGY2

## 2023-11-21 NOTE — PATIENT INSTRUCTIONS
Please call [phone] to schedule your colonoscopy.  Mammogram has been ordered for you, please schedule on your way out.   Please have labs drawn at your convenience    Your labetalol dose has shelli decreased from 300mg twice a day to 200mg twice a day. Please come back to see me in 6 weeks to recheck your blood pressure

## 2023-11-22 ENCOUNTER — TELEPHONE (OUTPATIENT)
Dept: ENDOSCOPY | Facility: HOSPITAL | Age: 58
End: 2023-11-22
Payer: MEDICARE

## 2023-11-22 VITALS — BODY MASS INDEX: 31.07 KG/M2 | HEIGHT: 64 IN | WEIGHT: 182 LBS

## 2023-11-22 DIAGNOSIS — Z99.2 DIALYSIS PATIENT: Primary | ICD-10-CM

## 2023-11-22 DIAGNOSIS — Z00.00 ENCOUNTER FOR PREVENTATIVE ADULT HEALTH CARE EXAMINATION: ICD-10-CM

## 2023-11-22 DIAGNOSIS — Z12.11 SPECIAL SCREENING FOR MALIGNANT NEOPLASMS, COLON: ICD-10-CM

## 2023-11-22 RX ORDER — POLYETHYLENE GLYCOL 3350, SODIUM SULFATE ANHYDROUS, SODIUM BICARBONATE, SODIUM CHLORIDE, POTASSIUM CHLORIDE 236; 22.74; 6.74; 5.86; 2.97 G/4L; G/4L; G/4L; G/4L; G/4L
4 POWDER, FOR SOLUTION ORAL ONCE
Qty: 4000 ML | Refills: 0 | Status: SHIPPED | OUTPATIENT
Start: 2023-11-22 | End: 2023-11-23

## 2023-11-22 NOTE — TELEPHONE ENCOUNTER
Spoke to patient to schedule procedure(s) Colonoscopy       Physician to perform procedure(s) Dr. CALLUM Loyola  Date of Procedure (s) 11/28/23  Arrival Time 12:20 PM  Time of Procedure(s) 1:20 PM   Location of Procedure(s) [place] 4th Floor  Type of Rx Prep sent to patient: PEG  Instructions provided to patient via [facility]    Patient was informed on the following information and verbalized understanding. Screening questionnaire reviewed with patient and complete. If procedure requires anesthesia, a responsible adult needs to be present to accompany the patient home, patient cannot drive after receiving anesthesia. Appointment details are tentative, especially check-in time. Patient will receive a prep-op call 7 days prior to confirm check-in time for procedure. If applicable the patient should contact their pharmacy to verify Rx for procedure prep is ready for pick-up. Patient was advised to call the scheduling department at [phone] if pharmacy states no Rx is available. Patient was advised to call the endoscopy scheduling department if any questions or concerns arise.      [facility] Endoscopy Scheduling Department

## 2023-11-27 ENCOUNTER — TELEPHONE (OUTPATIENT)
Dept: ENDOSCOPY | Facility: HOSPITAL | Age: 58
End: 2023-11-27
Payer: MEDICARE

## 2023-11-27 NOTE — TELEPHONE ENCOUNTER
Instructions for colonoscopy procedure on 11/28/23 emailed to pt as requested [email or web address]      Colonoscopy Procedure Prep Instructions     Date of procedure: 11/28/23  Arrive at: 12:20 PM     Location of Department:   [facility] [address] [place] [address]  Take the [facility] Elevators to 4th Floor Endoscopy Lab     As soon as possible:   your prep from pharmacy and over the counter DULCOLAX LAXATIVE TABLETS             On the day before your procedure   What You CAN do:   You may have clear liquids ONLY-see below for list.      Liquids That Are OK to Drink:   Water  Sports drinks (Gatorade, Power-Aid)  Coffee or tea (no cream or nondairy creamer)  Clear juices without pulp (apple, white grape)  Gelatin desserts (no fruit or toppings)  Clear soda (sprite, coke, ginger ale)  Chicken broth (until 12 midnight the night before procedure)     What You CANNOT do:   Do not EAT solid food, drink milk or anything   colored red.  Do not drink alcohol.  Do not take oral medications within 1 hour of starting   each dose of prep.  No gum chewing or candy morning of procedure                       Note:   (Please disregard the insert instructions from pharmacy).  PEG Bowel Prep is indicated for cleansing of the colon as a preparation for colonoscopy in adults.   Be sure to tell your doctor about all the medicines you take, including prescription and non-prescription medicines, vitamins, and herbal supplements. PEG Bowel Prep may affect how other medicines work.  Medication taken by mouth may not be absorbed properly when taken within 1 hour before the start of each dose of PEG Bowel Prep.     It is not uncommon to experience some abdominal cramping, nausea and/or vomiting when taking the prep. If you have nausea and/or vomiting while taking the prep, stop drinking for 20 to 30 minutes then continue.        How to take prep:     PEG Bowel Prep is a (2-day) prep.    One (1) bottle of prep  are required for a complete preparation for colonoscopy. Dilute the solution concentrate as directed prior to use. You must drink water with each dose of prep, and additional water after each dose.     DOSE 1--Day Before Colonoscopy 11/27/23      Drink at least 6 to 8 glasses of clear liquids from time you wake up until you begin your prep and then continue until bedtime to avoid dehydration.      12:00 pm (NOON) Mix your entire container of prep with lukewarm water and refrigerate. Take four (4) Dulcolax (Bisacodyl) tablets with at least 8 ounces or more of clear liquids.        6:00 pm:     You must complete Steps 1 and 2 below before going to bed:     Step 1-Drink half the liquid in the container within one (1) hour.   Step 2-Refrigerate the remaining half of the liquid for dose 2. See below when to begin this step.                       IMPORTANT: If you experience preparation-related symptoms (for example, nausea, bloating, or cramping), stop, or slow the rate of drinking the additional water until your symptoms decrease.     DOSE 2--Day of the Colonoscopy 11/28/23 at 7-8 AM.     For this dose, repeat Step 1 shown above using the remaining half of the liquid prep.   You may continue drinking water/clear liquids until   4 hours before your colonoscopy or as directed by the scheduling nurse  9:20 AM.     For more information about your procedure, please watch this informational video. It is important to watch this animated consent video prior to your arrival.   If you haven't watched the video prior to arriving, you are required to watch it during admission which can cause delays.      Options for viewing:  Using a keyboard:  press and hold the control tab (Ctrl) and left mouse click to follow link          Colonoscopy Instructional Video                                                        OR     Type link address into your web browser's address bar:  [email or web address]     Using a  mobile phone: tap on web address/link.      Comments:        IMPORTANT INFORMATION TO KNOW BEFORE YOUR PROCEDURE     [facility] 4th Floor     If your procedure requires the administration of anesthesia, it is necessary for a responsible adult to drive you home. (Medical Transportation, Uber, Lyft, Taxi, etc. may ONLY be used if a responsible adult is present to accompany you home.  The responsible adult CAN'T be the  of the service).       person must be available to return to pick you up within 30 minutes of being notified of discharge.      Due to the limited socially distant seating in our waiting room, please limit your guest (1) who accompany you for this procedure. If someone accompanies you for this procedure into the facility:     Consider having them proceed to an area that is socially distant other than the lobby until a member of the medical team contacts them to provide an update after the procedure.      Also, please consider being dropped off and picked up from the facility.        Please bring a picture ID, insurance card, & copayment     Take Medications as directed below:              If you begin taking any blood thinning medications or injectable weight loss/diabetes medications (other than insulin) , please contact the endoscopy scheduling department listed below as soon as possible     If you are diabetic see the attached instruction sheet regarding your medication.      If you take HEART, BLOOD PRESSURE, SEIZURE, PAIN, LUNG (including inhalers/nebulizers), ANTI-REJECTION (transplant patients), or PSYCHIATRIC medications, please take at your regular times with a sip of water or as directed by the scheduling nurse.      Important contact information:     Endoscopy [phone] Hours of operation Monday-Friday 8:00-4:30pm.     Questions about insurance or financial obligations call [phone] or [phone].     If you have questions  regarding the prep or need to reschedule, please call [phone]. After hours questions requiring immediate assistance, contact [facility] nurse line at [phone] or [phone].   NOTE:      On occasion, unforeseen circumstances may cause a delay in your procedure start time. We respect your time and appreciate your patience during these circumstances.            Comments:           Are you ready for your Colonoscopy?                            __ If you take blood thinners,weight loss or diabetic injectable medications, have you stopped taking them according to your doctor's instructions before your procedures?  __ Have you stopped eating solid foods and followed a clear liquid diet a full day before your procedure or followed the diet       guidelines indicated in your instructions?       REMINDER: NO BROTH AFTER MIDNIGHT THE DAY BEFORE PROCEDURE.   __ Have you completed all your prep solution?      PLEASE DO NOT FOLLOW the insert/or box instructions from pharmacy)  __ Have you taken your blood pressure, heart, seizure, or other essential medications the morning of your procedure?  __ Have you planned for a ride to and from procedure?  (Medical Transportation, Uber, Lyft, Taxi, etc. may ONLY be used if a responsible adult is present to accompany you home). The responsible adult CANNOT be the  of the service.         Questions or Concerns?  Please call us!  [phone] (M-F) [phone] (Nights and weekends)     Listed below are some helpful tips:     Please bring protective cases for eyewear and hearing aids-wear comfortable clothing/shoes.  Follow prep instructions closely so you don't have to do it twice.  Bowel prep is prescription used to clean out the colon before a colonoscopy. The prep increases movement of your colon by causing you to have diarrhea (loose stools). Cleaning out stool from the colon helps your doctor to see in your colon clearly during this procedure.   It is  important to stay hydrated before, during and after bowel prep to prevent loss of fluid (dehydration). You can have water and your choice of clear liquids. Reminder: these liquids you will drink in addition to the bowel prep.      Preparing the mixture:     First, mix the prep with water.  Make the taste better by adding a sugar free drink mix (Crystal Light) can improve the taste of your prep.   Use a large bore (opening) straw. Place towards the back of mouth (throat) as tolerated.  Prepare a prep mixture that is lightly chilled, but not ice-cold. Drinking a large amount of ice-cold liquid can make you feel very ill.  Avoid drinking any RED beverages or eating popsicles with this color for the 24 hours leading up to your procedure. This color can look like blood in the colon.     Consuming the prep:     Take your time. If you feel ill, take a 15-minute break from drinking the prep mixture  Combat hunger and dehydration with clear liquids. Options like JELL-O, or popsicles will help.  Settle in with good reading material. The goal is to clean out 6 feet of colon, so you can plan on spending a good deal of time in the bathroom. Have some good reading material on-hand or an iPad ready to keep yourself entertained!  Keep yourself comfortable. We recommend applying personal hygiene wipes, Tucks pads and a soothing ointment, like A&D ointment, Desitin, or Vaseline to your bottom before starting and as needed to protect your skin.

## 2023-11-28 ENCOUNTER — HOSPITAL ENCOUNTER (OUTPATIENT)
Facility: HOSPITAL | Age: 58
Discharge: HOME | End: 2023-11-28
Attending: INTERNAL MEDICINE | Admitting: INTERNAL MEDICINE
Payer: MEDICARE

## 2023-11-28 ENCOUNTER — TELEPHONE (OUTPATIENT)
Dept: ENDOSCOPY | Facility: HOSPITAL | Age: 58
End: 2023-11-28
Payer: MEDICARE

## 2023-11-28 DIAGNOSIS — Z00.00 ENCOUNTER FOR PREVENTATIVE ADULT HEALTH CARE EXAMINATION: Primary | ICD-10-CM

## 2023-11-28 DIAGNOSIS — Z12.11 ENCOUNTER FOR SCREENING COLONOSCOPY: Primary | ICD-10-CM

## 2023-11-28 DIAGNOSIS — Z12.11 ENCOUNTER FOR SCREENING COLONOSCOPY: ICD-10-CM

## 2023-11-28 DIAGNOSIS — Z12.11 SCREENING FOR COLON CANCER: Primary | ICD-10-CM

## 2023-11-28 RX ORDER — SODIUM CHLORIDE 9 MG/ML
INJECTION, SOLUTION INTRAVENOUS CONTINUOUS
Status: DISCONTINUED | OUTPATIENT
Start: 2023-11-28 | End: 2023-11-28 | Stop reason: HOSPADM

## 2023-11-28 NOTE — TELEPHONE ENCOUNTER
Spoke to patient to schedule procedure(s) Colonoscopy       Physician to perform procedure(s) Dr. STUART Art  Date of Procedure (s) 11/30/2023  Arrival Time 6:30 Am   Time of Procedure(s) 7:30 Am    Location of Procedure(s) [facility] [address] Floor  Type of Rx Prep sent to patient: PEG  Instructions provided to patient via [facility]    Patient was informed on the following information and verbalized understanding. Screening questionnaire reviewed with patient and complete. If procedure requires anesthesia, a responsible adult needs to be present to accompany the patient home, patient cannot drive after receiving anesthesia. Appointment details are tentative, especially check-in time. Patient will receive a prep-op call 7 days prior to confirm check-in time for procedure. If applicable the patient should contact their pharmacy to verify Rx for procedure prep is ready for pick-up. Patient was advised to call the scheduling department at [phone] if pharmacy states no Rx is available. Patient was advised to call the endoscopy scheduling department if any questions or concerns arise.      [facility] Scheduling Department

## 2023-11-29 ENCOUNTER — TELEPHONE (OUTPATIENT)
Dept: ENDOSCOPY | Facility: HOSPITAL | Age: 58
End: 2023-11-29
Payer: MEDICARE

## 2023-11-29 ENCOUNTER — ANESTHESIA EVENT (OUTPATIENT)
Dept: ENDOSCOPY | Facility: HOSPITAL | Age: 58
End: 2023-11-29
Payer: MEDICARE

## 2023-11-29 DIAGNOSIS — Z01.812 PRE-PROCEDURE LAB EXAM: ICD-10-CM

## 2023-11-29 DIAGNOSIS — Z99.2 DIALYSIS PATIENT: Primary | ICD-10-CM

## 2023-11-29 NOTE — ANESTHESIA PREPROCEDURE EVALUATION
11/29/2023  Jael Hall is a 58 y.o., female.      Pre-op Assessment    I have reviewed the Patient Summary Reports.     I have reviewed the Nursing Notes.       Review of Systems  Anesthesia Hx:  No problems with previous Anesthesia                Hematology/Oncology:  Hematology Normal   Oncology Normal                                   EENT/Dental:  EENT/Dental Normal           Cardiovascular:     Hypertension                                        Pulmonary:  Pulmonary Normal                       Renal/:  Chronic Renal Disease                Hepatic/GI:     GERD Liver Disease,            Musculoskeletal:  Musculoskeletal Normal                Neurological:    Neuromuscular Disease,                                   Endocrine:  Diabetes           Dermatological:  Skin Normal    Psych:  Psychiatric History                  Physical Exam  General: Well nourished    Airway:  Mallampati: II   Mouth Opening: Normal  TM Distance: Normal  Tongue: Normal  Neck ROM: Normal ROM        Anesthesia Plan  Type of Anesthesia, risks & benefits discussed:    Anesthesia Type: Gen Natural Airway  Intra-op Monitoring Plan: Standard ASA Monitors  Post Op Pain Control Plan: multimodal analgesia  Induction:  IV  Airway Plan: Direct  Informed Consent: Informed consent signed with the Patient and all parties understand the risks and agree with anesthesia plan.  All questions answered. Patient consented to blood products? No  ASA Score: 4  Day of Surgery Review of History & Physical: H&P Update referred to the surgeon/provider.    Ready For Surgery From Anesthesia Perspective.     .

## 2023-11-30 ENCOUNTER — HOSPITAL ENCOUNTER (OUTPATIENT)
Facility: HOSPITAL | Age: 58
Discharge: HOME | End: 2023-11-30
Attending: INTERNAL MEDICINE | Admitting: INTERNAL MEDICINE
Payer: MEDICARE

## 2023-11-30 ENCOUNTER — ANESTHESIA (OUTPATIENT)
Dept: ENDOSCOPY | Facility: HOSPITAL | Age: 58
End: 2023-11-30
Payer: MEDICARE

## 2023-11-30 VITALS
OXYGEN SATURATION: 96 % | TEMPERATURE: 97 F | HEIGHT: 64 IN | HEART RATE: 74 BPM | RESPIRATION RATE: 18 BRPM | WEIGHT: 174 LBS | BODY MASS INDEX: 29.71 KG/M2 | SYSTOLIC BLOOD PRESSURE: 181 MMHG | DIASTOLIC BLOOD PRESSURE: 84 MMHG

## 2023-11-30 DIAGNOSIS — Z86.010 ENCOUNTER FOR COLONOSCOPY DUE TO HISTORY OF ADENOMATOUS COLONIC POLYPS: ICD-10-CM

## 2023-11-30 DIAGNOSIS — Z12.11 ENCOUNTER FOR COLONOSCOPY DUE TO HISTORY OF ADENOMATOUS COLONIC POLYPS: ICD-10-CM

## 2023-11-30 LAB
POCT GLUCOSE: 66 MG/DL (ref 70–110)
POCT GLUCOSE: 93 MG/DL (ref 70–110)

## 2023-11-30 PROCEDURE — 45385 COLONOSCOPY W/LESION REMOVAL: CPT | Mod: PT,HCNC,, | Performed by: INTERNAL MEDICINE

## 2023-11-30 PROCEDURE — 88305 TISSUE EXAM BY PATHOLOGIST: CPT | Mod: 26,HCNC,, | Performed by: PATHOLOGY

## 2023-11-30 PROCEDURE — 88305 TISSUE EXAM BY PATHOLOGIST: ICD-10-PCS | Mod: 26,HCNC,, | Performed by: PATHOLOGY

## 2023-11-30 PROCEDURE — 25000003 PHARM REV CODE 250: Mod: HCNC | Performed by: NURSE ANESTHETIST, CERTIFIED REGISTERED

## 2023-11-30 PROCEDURE — D9220A PRA ANESTHESIA: ICD-10-PCS | Mod: PT,HCNC,ANES, | Performed by: ANESTHESIOLOGY

## 2023-11-30 PROCEDURE — 37000009 HC ANESTHESIA EA ADD 15 MINS: Mod: HCNC | Performed by: INTERNAL MEDICINE

## 2023-11-30 PROCEDURE — 37000008 HC ANESTHESIA 1ST 15 MINUTES: Mod: HCNC | Performed by: INTERNAL MEDICINE

## 2023-11-30 PROCEDURE — 82962 GLUCOSE BLOOD TEST: CPT | Mod: HCNC | Performed by: INTERNAL MEDICINE

## 2023-11-30 PROCEDURE — D9220A PRA ANESTHESIA: Mod: PT,HCNC,ANES, | Performed by: ANESTHESIOLOGY

## 2023-11-30 PROCEDURE — D9220A PRA ANESTHESIA: Mod: PT,HCNC,CRNA, | Performed by: NURSE ANESTHETIST, CERTIFIED REGISTERED

## 2023-11-30 PROCEDURE — 88305 TISSUE EXAM BY PATHOLOGIST: CPT | Mod: HCNC | Performed by: PATHOLOGY

## 2023-11-30 PROCEDURE — 63600175 PHARM REV CODE 636 W HCPCS: Mod: HCNC | Performed by: NURSE ANESTHETIST, CERTIFIED REGISTERED

## 2023-11-30 PROCEDURE — 27201089 HC SNARE, DISP (ANY): Mod: HCNC | Performed by: INTERNAL MEDICINE

## 2023-11-30 PROCEDURE — 45385 PR COLONOSCOPY,REMV LESN,SNARE: ICD-10-PCS | Mod: PT,HCNC,, | Performed by: INTERNAL MEDICINE

## 2023-11-30 PROCEDURE — D9220A PRA ANESTHESIA: ICD-10-PCS | Mod: PT,HCNC,CRNA, | Performed by: NURSE ANESTHETIST, CERTIFIED REGISTERED

## 2023-11-30 PROCEDURE — 45385 COLONOSCOPY W/LESION REMOVAL: CPT | Mod: PT,HCNC | Performed by: INTERNAL MEDICINE

## 2023-11-30 RX ORDER — SODIUM CHLORIDE 0.9 % (FLUSH) 0.9 %
10 SYRINGE (ML) INJECTION
Status: DISCONTINUED | OUTPATIENT
Start: 2023-11-30 | End: 2023-11-30 | Stop reason: HOSPADM

## 2023-11-30 RX ORDER — LIDOCAINE HYDROCHLORIDE 20 MG/ML
INJECTION INTRAVENOUS
Status: DISCONTINUED | OUTPATIENT
Start: 2023-11-30 | End: 2023-11-30

## 2023-11-30 RX ORDER — PROPOFOL 10 MG/ML
VIAL (ML) INTRAVENOUS CONTINUOUS PRN
Status: DISCONTINUED | OUTPATIENT
Start: 2023-11-30 | End: 2023-11-30

## 2023-11-30 RX ORDER — SODIUM CHLORIDE 9 MG/ML
INJECTION, SOLUTION INTRAVENOUS CONTINUOUS
Status: DISCONTINUED | OUTPATIENT
Start: 2023-11-30 | End: 2023-11-30 | Stop reason: HOSPADM

## 2023-11-30 RX ORDER — HYDROMORPHONE HYDROCHLORIDE 1 MG/ML
0.2 INJECTION, SOLUTION INTRAMUSCULAR; INTRAVENOUS; SUBCUTANEOUS EVERY 5 MIN PRN
Status: DISCONTINUED | OUTPATIENT
Start: 2023-11-30 | End: 2023-11-30 | Stop reason: HOSPADM

## 2023-11-30 RX ORDER — PROPOFOL 10 MG/ML
VIAL (ML) INTRAVENOUS
Status: DISCONTINUED | OUTPATIENT
Start: 2023-11-30 | End: 2023-11-30

## 2023-11-30 RX ORDER — MEPERIDINE HYDROCHLORIDE 50 MG/ML
12.5 INJECTION INTRAMUSCULAR; INTRAVENOUS; SUBCUTANEOUS ONCE AS NEEDED
Status: DISCONTINUED | OUTPATIENT
Start: 2023-11-30 | End: 2023-11-30 | Stop reason: HOSPADM

## 2023-11-30 RX ORDER — LORAZEPAM 2 MG/ML
0.25 INJECTION INTRAMUSCULAR ONCE AS NEEDED
Status: DISCONTINUED | OUTPATIENT
Start: 2023-11-30 | End: 2023-11-30 | Stop reason: HOSPADM

## 2023-11-30 RX ORDER — ONDANSETRON 2 MG/ML
4 INJECTION INTRAMUSCULAR; INTRAVENOUS DAILY PRN
Status: DISCONTINUED | OUTPATIENT
Start: 2023-11-30 | End: 2023-11-30 | Stop reason: HOSPADM

## 2023-11-30 RX ADMIN — Medication 20 MG: at 09:11

## 2023-11-30 RX ADMIN — LIDOCAINE HYDROCHLORIDE 25 MG: 20 INJECTION INTRAVENOUS at 09:11

## 2023-11-30 RX ADMIN — SODIUM CHLORIDE: 0.9 INJECTION, SOLUTION INTRAVENOUS at 09:11

## 2023-11-30 RX ADMIN — PROPOFOL 150 MCG/KG/MIN: 10 INJECTION, EMULSION INTRAVENOUS at 09:11

## 2023-11-30 NOTE — TRANSFER OF CARE
"Anesthesia Transfer of Care Note    Patient: Jael Hall    Procedure(s) Performed: Procedure(s) (LRB):  COLONOSCOPY (N/A)    Patient location: [facility]    Anesthesia Type: general    Transport from OR: Transported from OR on 6-10 L/min O2 by face mask with adequate spontaneous ventilation    Post pain: adequate analgesia    Post assessment: no apparent anesthetic complications    Post vital signs: stable    Level of consciousness: awake and alert    Nausea/Vomiting: no nausea/vomiting    Complications: none    Transfer of care protocol was followed    Last vitals: Visit Vitals  BP (!) 199/83 (BP Location: Left arm, Patient Position: Lying)   Pulse 78   Temp 37 °C (98.6 °F) (Skin)   Resp 14   Ht 5' 4" (1.626 m)   Wt 78.9 kg (174 lb)   LMP 03/15/2016 (LMP Unknown)   SpO2 97%   BMI 29.87 kg/m²     "

## 2023-11-30 NOTE — H&P
Short Stay Endoscopy History and Physical    PCP - Maty Gayle DO  Referring Physician - Maty Gayle DO  [address] [place] [address]    Procedure - colonoscopy  ASA - per anesthesia  Mallampati - per anesthesia  History of Anesthesia problems - per anesthesia  Family history Anesthesia problems -  per anesthesia   Plan of anesthesia - per anesthesia    HPI:  This is a 58 y.o. female here for evaluation of: colonoscopy for surveillance (previous colon polyps)    Reflux - no  Dysphagia - no  Abdominal pain - no  Diarrhea - no    ROS:  Constitutional: No fevers, chills, No weight loss  CV: No chest pain  Pulm: No cough, No shortness of breath  Ophtho: No vision changes  GI: see HPI  Derm: No rash    Medical History:  has a past medical history of Acute on chronic diastolic heart failure (10/8/2021), Anemia of chronic disorder (2017), Anemia of chronic renal failure, stage 5, Anxiety, Cyst of left ovary (2019), Cyst of left ovary (2019), Dyslipidemia (2013), End-stage renal disease on hemodialysis (2020), ESRD on hemodialysis (2017), Hematuria (10/30/2019), Hospital discharge follow-up (10/6/2023), sinus bradycardia (2017), Hyperkalemia (3/29/2017), Hypertensive urgency (2021), Peripheral neuropathy (2013), Preoperative testing (10/8/2020), Renovascular hypertension (2020), S/P dilation and curettage (2018), Thickened endometrium (2018), Thyroid nodule, Thyroid nodule (2021), and Uncontrolled type 2 diabetes mellitus (2013).    Surgical History:  has a past surgical history that includes Tubal ligation; Vascular access surgeries;  section; Colonoscopy (N/A, 2018); AV fistula placement; Robot-assisted laparoscopic abdominal hysterectomy using da Kev Xi (N/A, 2019); Robot-assisted laparoscopic salpingo-oophorectomy using da Kev Xi (Bilateral, 2019); Hysterectomy; Cystoscopy w/ retrogrades (Bilateral,  8/20/2020); Fistulogram (Right, 9/9/2020); Robot-assisted laparoscopic nephrectomy (Right, 10/8/2020); Implantation of cochlear prosthesis (Right, 6/28/2021); and Implantation of cochlear prosthesis (Right, 6/30/2021).    Family History: family history includes Cancer in her mother, sister, and sister; Diabetes in her brother and sister; Lung cancer in her sister; Macular degeneration in her sister; Ovarian cancer in her mother, sister, and sister..    Social History:  reports that she has never smoked. She has never used smokeless tobacco. She reports that she does not drink alcohol and does not use drugs.    Review of patient's allergies indicates:  No Known Allergies    Medications:   Medications Prior to Admission   Medication Sig Dispense Refill Last Dose    acetaminophen (TYLENOL) 500 MG tablet Take 1 tablet (500 mg total) by mouth every 8 (eight) hours as needed for Pain. 20 tablet 0 Past Month    amLODIPine (NORVASC) 10 MG tablet Take 1 tablet (10 mg total) by mouth nightly. 90 tablet 3 11/29/2023    aspirin (ECOTRIN) 81 MG EC tablet Take 81 mg by mouth once daily.   Past Month    atorvastatin (LIPITOR) 40 MG tablet Take 1 tablet (40 mg total) by mouth once daily. 90 tablet 3 11/29/2023    diphenhydrAMINE (BENADRYL) 25 mg capsule Take 25 mg by mouth every 6 (six) hours as needed for Itching.   Past Month    furosemide (LASIX) 80 MG tablet Take 1 tablet (80 mg total) by mouth once daily. 30 tablet 11 11/29/2023    hydrALAZINE (APRESOLINE) 100 MG tablet Take 100 mg by mouth 3 (three) times daily.   11/29/2023    labetaloL (NORMODYNE) 200 MG tablet Take 1 tablet (200 mg total) by mouth every 12 (twelve) hours. 60 tablet 11 11/29/2023    losartan (COZAAR) 100 MG tablet Take 1 tablet (100 mg total) by mouth once daily. 90 tablet 3 11/29/2023    pregabalin (LYRICA) 75 MG capsule Take 1 capsule (75 mg total) by mouth once daily. 90 capsule 3 11/29/2023    sevelamer carbonate (RENVELA) 800 mg Tab Take 3 tablets  (2,400 mg total) by mouth 3 (three) times daily with meals. 120 tablet 3 11/29/2023    polyethylene glycol (COLYTE) 240-22.72-6.72 -5.84 gram SolR Take 4,000 mLs by mouth once. Please follow the instructions from the provider office. for 1 dose 4000 mL 0        Physical Exam:    Vital Signs:   Vitals:    11/30/23 0810   BP: (!) 199/83   Pulse: 78   Resp: 14   Temp: 98.6 °F (37 °C)       General Appearance: Well appearing in no acute distress    Labs:  Lab Results   Component Value Date    WBC 4.85 11/11/2023    HGB 8.6 (L) 11/11/2023    HCT 26.6 (L) 11/11/2023     11/11/2023    CHOL 182 05/25/2023    TRIG 218 (H) 05/25/2023    HDL 42 05/25/2023    ALT 6 (L) 11/09/2023    AST 13 11/09/2023     (L) 11/11/2023    K 4.2 11/30/2023    CL 99 11/11/2023    CREATININE 11.3 (H) 11/11/2023    BUN 68 (H) 11/11/2023    CO2 20 (L) 11/11/2023    TSH 0.451 05/25/2023    INR 1.1 07/23/2021    HGBA1C 5.1 05/25/2023       I have explained the risks and benefits of this endoscopic procedure to the patient including but not limited to bleeding, inflammation, infection, perforation, missing a lesion and death.      Angel Art MD

## 2023-11-30 NOTE — PLAN OF CARE
Patient discharged to home via wheelchair, escorted by friend. Pt alert and talkative, vitals stable on room air, tolerating PO intake. Discharge instructions (written and verbal) and follow-up information given to patient with friend present, both verbalized understanding, as well as a readiness for discharge. [facility] contact info provided for additional questions following discharge. ELISE

## 2023-11-30 NOTE — ANESTHESIA POSTPROCEDURE EVALUATION
Anesthesia Post Evaluation    Patient: Jael Hall    Procedure(s) Performed: Procedure(s) (LRB):  COLONOSCOPY (N/A)    Final Anesthesia Type: general      Patient location during evaluation: PACU  Patient participation: Yes- Able to Participate  Level of consciousness: awake and alert  Post-procedure vital signs: reviewed and stable  Pain management: adequate  Airway patency: patent    PONV status at discharge: No PONV  Anesthetic complications: no      Cardiovascular status: blood pressure returned to baseline  Respiratory status: spontaneous ventilation and room air  Hydration status: euvolemic  Follow-up not needed.              Vitals Value Taken Time   /81 11/30/23 1031   Temp 36.1 °C (97 °F) 11/30/23 1007   Pulse 79 11/30/23 1038   Resp 19 11/30/23 1038   SpO2 96 % 11/30/23 1038   Vitals shown include unvalidated device data.      No case tracking events are documented in the log.      Pain/Ami Score: Ami Score: 10 (11/30/2023 10:25 AM)

## 2023-11-30 NOTE — [employer] PATIENT INSTRUCTIONS
Discharge Summary/Instructions after an Endoscopic Procedure  Patient Name: Jael Hall  Patient MRN: [id number]  Patient YOB: 1965 Thursday, November 30, 2023  Angel Art MD  Dear patient,  As a result of recent federal legislation (The Federal Cures Act), you may   receive lab or pathology results from your procedure in your [facility]   account before your physician is able to contact you. Your physician or   their representative will relay the results to you with their   recommendations at their soonest availability.  Thank you,  RESTRICTIONS:  During your procedure today, you received medications for sedation.  These   medications may affect your judgment, balance and coordination.  Therefore,   for 24 hours, you have the following restrictions:   - DO NOT drive a car, operate machinery, make legal/financial decisions,   sign important papers or drink alcohol.    ACTIVITY:  Today: no heavy lifting, straining or running due to procedural   sedation/anesthesia.  The following day: return to full activity including work.  DIET:  Eat and drink normally unless instructed otherwise.     TREATMENT FOR COMMON SIDE EFFECTS:  - Mild abdominal pain, nausea, belching, bloating or excessive gas:  rest,   eat lightly and use a heating pad.  - Sore Throat: treat with throat lozenges and/or gargle with warm salt   water.  - Because air was used during the procedure, expelling large amounts of air   from your rectum or belching is normal.  - If a bowel prep was taken, you may not have a bowel movement for 1-3 days.    This is normal.  SYMPTOMS TO WATCH FOR AND REPORT TO YOUR PHYSICIAN:  1. Abdominal pain or bloating, other than gas cramps.  2. Chest pain.  3. Back pain.  4. Signs of infection such as: chills or fever occurring within 24 hours   after the procedure.  5. Rectal bleeding, which would show as bright red, maroon, or black stools.   (A tablespoon of blood from the rectum is not serious, especially if    hemorrhoids are present.)  6. Vomiting.  7. Weakness or dizziness.  GO DIRECTLY TO THE NEAREST EMERGENCY ROOM IF YOU HAVE ANY OF THE FOLLOWING:      Difficulty breathing              Chills and/or fever over 101 F   Persistent vomiting and/or vomiting blood   Severe abdominal pain   Severe chest pain   Black, tarry stools   Bleeding- more than one tablespoon   Any other symptom or condition that you feel may need urgent attention  Your doctor recommends these additional instructions:  If any biopsies were taken, your doctors clinic will contact you in 1 to 2   weeks with any results.  - Patient has a contact number available for emergencies.  The signs and   symptoms of potential delayed complications were discussed with the   patient.  Return to normal activities tomorrow.  Written discharge   instructions were provided to the patient.   - Discharge patient to home (ambulatory).   - Resume previous diet.   - For future colonoscopy the patient will require an extended preparation.    If there are any questions, please contact the gastroenterologist.   - Repeat colonoscopy in 3 years for surveillance.   - Return to referring physician.  For questions, problems or results please call your physician - Angel Art MD at Work:  [phone].  [facility], EMERGENCY ROOM PHONE NUMBER: [phone]  IF A COMPLICATION OR EMERGENCY SITUATION ARISES AND YOU ARE UNABLE TO REACH   YOUR PHYSICIAN - GO DIRECTLY TO THE EMERGENCY ROOM.  Angel Art MD  11/30/2023 10:06:49 AM  This report has been verified and signed electronically.  Dear patient,  As a result of recent federal legislation (The Federal Cures Act), you may   receive lab or pathology results from your procedure in your [facility]   account before your physician is able to contact you. Your physician or   their representative will relay the results to you with their   recommendations at their soonest availability.  Thank you,  [employer]

## 2023-12-04 LAB
FINAL PATHOLOGIC DIAGNOSIS: NORMAL
GROSS: NORMAL
Lab: NORMAL

## 2023-12-14 ENCOUNTER — OUTPATIENT CASE MANAGEMENT (OUTPATIENT)
Dept: ADMINISTRATIVE | Facility: OTHER | Age: 58
End: 2023-12-14
Payer: MEDICARE

## 2023-12-14 NOTE — LETTER
Jael Hall  [address]  [address]  [facility] [address]      Dear Jael Hall,     I work with [facility] Outpatient Care Management Department. We received a referral to call you to discuss your medical history. These services are free of charge and are offered to [facility] patients who have recently been discharged from any of our facilities or who have medical conditions that may require the skill of a nurse to assist with management.     I am a Registered Nurse who specializes in connecting patients with available medical and financial resources as well as addressing any educational needs that may be indicated.    I attempted to reach you by telephone, but I was unsuccessful. Please call our department so that we can go over some questions with you, regarding your health.    The Outpatient Care Management Department can be reached at [phone], from 8:00AM to 4:30 PM, on Monday thru Friday.     Additionally, [facility] also has a program where a nurse is available 24/7 to answer questions or provide medical advice, their number is [phone].      Thanks,    Adeline Nevarez RN  Outpatient Care Management  Phone #: [phone]

## 2023-12-21 ENCOUNTER — HOSPITAL ENCOUNTER (OUTPATIENT)
Dept: RADIOLOGY | Facility: HOSPITAL | Age: 58
Discharge: HOME | End: 2023-12-21
Payer: MEDICARE

## 2023-12-21 DIAGNOSIS — Z12.31 ENCOUNTER FOR SCREENING MAMMOGRAM FOR MALIGNANT NEOPLASM OF BREAST: ICD-10-CM

## 2023-12-21 DIAGNOSIS — Z00.00 ENCOUNTER FOR PREVENTATIVE ADULT HEALTH CARE EXAMINATION: ICD-10-CM

## 2023-12-21 PROCEDURE — 77067 MAMMO DIGITAL SCREENING BILAT WITH TOMO: ICD-10-PCS | Mod: 26,HCNC,, | Performed by: RADIOLOGY

## 2023-12-21 PROCEDURE — 77067 SCR MAMMO BI INCL CAD: CPT | Mod: TC,HCNC

## 2023-12-21 PROCEDURE — 77067 SCR MAMMO BI INCL CAD: CPT | Mod: 26,HCNC,, | Performed by: RADIOLOGY

## 2023-12-21 PROCEDURE — 77063 BREAST TOMOSYNTHESIS BI: CPT | Mod: 26,HCNC,, | Performed by: RADIOLOGY

## 2023-12-21 PROCEDURE — 77063 MAMMO DIGITAL SCREENING BILAT WITH TOMO: ICD-10-PCS | Mod: 26,HCNC,, | Performed by: RADIOLOGY

## 2024-01-08 ENCOUNTER — OUTPATIENT CASE MANAGEMENT (OUTPATIENT)
Dept: ADMINISTRATIVE | Facility: OTHER | Age: 59
End: 2024-01-08
Payer: MEDICARE

## 2024-01-08 NOTE — PROGRESS NOTES
Outpatient Care Management  Patient Does Not Consent    Patient: Jael Hall  MRN:  [id number]  Date of Service:  1/8/2024  Completed by:  Bridget Nevarez RN    Chief Complaint   Patient presents with    Case Closure       Patient Summary           Consent Received:  Decline   Unable to reach patient

## 2024-02-26 ENCOUNTER — TELEPHONE (OUTPATIENT)
Dept: INTERNAL MEDICINE | Facility: CLINIC | Age: 59
End: 2024-02-26
Payer: MEDICARE

## 2024-02-26 NOTE — TELEPHONE ENCOUNTER
Left voice message  She should let nurse in  dialysis know how she is feeling and have her check  Please advise

## 2024-02-26 NOTE — TELEPHONE ENCOUNTER
----- Message from Jacqueline Telles sent at 2/26/2024  3:31 PM CST -----    Patient Returning Call        Who Called:pt  Does the patient know what this is regarding?:please call blood pressure is dropping to low while on dialysis  Would the patient rather a call back or a response via [employer]? call  Best Call Back Number:[phone]  Additional Information: call back

## 2024-03-07 ENCOUNTER — OFFICE VISIT (OUTPATIENT)
Dept: INTERNAL MEDICINE | Facility: CLINIC | Age: 59
End: 2024-03-07
Payer: MEDICARE

## 2024-03-07 ENCOUNTER — LAB VISIT (OUTPATIENT)
Dept: LAB | Facility: HOSPITAL | Age: 59
End: 2024-03-07
Payer: MEDICARE

## 2024-03-07 VITALS
DIASTOLIC BLOOD PRESSURE: 73 MMHG | WEIGHT: 192.88 LBS | SYSTOLIC BLOOD PRESSURE: 131 MMHG | HEIGHT: 64 IN | HEART RATE: 86 BPM | BODY MASS INDEX: 32.93 KG/M2

## 2024-03-07 DIAGNOSIS — E11.42 DIABETIC POLYNEUROPATHY ASSOCIATED WITH TYPE 2 DIABETES MELLITUS: Primary | ICD-10-CM

## 2024-03-07 DIAGNOSIS — Z99.2 TYPE 2 DIABETES MELLITUS WITH CHRONIC KIDNEY DISEASE ON CHRONIC DIALYSIS, WITHOUT LONG-TERM CURRENT USE OF INSULIN: ICD-10-CM

## 2024-03-07 DIAGNOSIS — Z99.2 ESRD (END STAGE RENAL DISEASE) ON DIALYSIS: ICD-10-CM

## 2024-03-07 DIAGNOSIS — N18.6 TYPE 2 DIABETES MELLITUS WITH CHRONIC KIDNEY DISEASE ON CHRONIC DIALYSIS, WITHOUT LONG-TERM CURRENT USE OF INSULIN: ICD-10-CM

## 2024-03-07 DIAGNOSIS — N18.6 ESRD (END STAGE RENAL DISEASE) ON DIALYSIS: ICD-10-CM

## 2024-03-07 DIAGNOSIS — E11.42 DIABETIC POLYNEUROPATHY ASSOCIATED WITH TYPE 2 DIABETES MELLITUS: ICD-10-CM

## 2024-03-07 DIAGNOSIS — E11.22 TYPE 2 DIABETES MELLITUS WITH CHRONIC KIDNEY DISEASE ON CHRONIC DIALYSIS, WITHOUT LONG-TERM CURRENT USE OF INSULIN: ICD-10-CM

## 2024-03-07 PROBLEM — Z12.11 SCREENING FOR COLON CANCER: Status: RESOLVED | Noted: 2023-11-28 | Resolved: 2024-03-07

## 2024-03-07 PROBLEM — Z12.11 ENCOUNTER FOR SCREENING COLONOSCOPY: Status: RESOLVED | Noted: 2020-10-08 | Resolved: 2024-03-07

## 2024-03-07 PROBLEM — E87.5 ACUTE HYPERKALEMIA: Status: RESOLVED | Noted: 2020-09-09 | Resolved: 2024-03-07

## 2024-03-07 PROBLEM — Z86.16 HISTORY OF COVID-19: Status: RESOLVED | Noted: 2020-04-07 | Resolved: 2024-03-07

## 2024-03-07 LAB
ALBUMIN SERPL BCP-MCNC: 3.5 G/DL (ref 3.5–5.2)
ALP SERPL-CCNC: 63 U/L (ref 55–135)
ALT SERPL W/O P-5'-P-CCNC: 9 U/L (ref 10–44)
ANION GAP SERPL CALC-SCNC: 14 MMOL/L (ref 8–16)
ANION GAP SERPL CALC-SCNC: 14 MMOL/L (ref 8–16)
AST SERPL-CCNC: 13 U/L (ref 10–40)
BILIRUB SERPL-MCNC: 0.5 MG/DL (ref 0.1–1)
BUN SERPL-MCNC: 45 MG/DL (ref 6–20)
BUN SERPL-MCNC: 45 MG/DL (ref 6–20)
CALCIUM SERPL-MCNC: 9.7 MG/DL (ref 8.7–10.5)
CALCIUM SERPL-MCNC: 9.7 MG/DL (ref 8.7–10.5)
CHLORIDE SERPL-SCNC: 97 MMOL/L (ref 95–110)
CHLORIDE SERPL-SCNC: 97 MMOL/L (ref 95–110)
CO2 SERPL-SCNC: 27 MMOL/L (ref 23–29)
CO2 SERPL-SCNC: 27 MMOL/L (ref 23–29)
CREAT SERPL-MCNC: 9 MG/DL (ref 0.5–1.4)
CREAT SERPL-MCNC: 9 MG/DL (ref 0.5–1.4)
EST. GFR  (NO RACE VARIABLE): 4.6 ML/MIN/1.73 M^2
EST. GFR  (NO RACE VARIABLE): 4.6 ML/MIN/1.73 M^2
ESTIMATED AVG GLUCOSE: 114 MG/DL (ref 68–131)
ESTIMATED AVG GLUCOSE: 114 MG/DL (ref 68–131)
GLUCOSE SERPL-MCNC: 140 MG/DL (ref 70–110)
GLUCOSE SERPL-MCNC: 140 MG/DL (ref 70–110)
HBA1C MFR BLD: 5.6 % (ref 4–5.6)
HBA1C MFR BLD: 5.6 % (ref 4–5.6)
POTASSIUM SERPL-SCNC: 4.7 MMOL/L (ref 3.5–5.1)
POTASSIUM SERPL-SCNC: 4.7 MMOL/L (ref 3.5–5.1)
PROT SERPL-MCNC: 7.8 G/DL (ref 6–8.4)
SODIUM SERPL-SCNC: 138 MMOL/L (ref 136–145)
SODIUM SERPL-SCNC: 138 MMOL/L (ref 136–145)

## 2024-03-07 PROCEDURE — 3075F SYST BP GE 130 - 139MM HG: CPT | Mod: HCNC,CPTII,GC,S$GLB

## 2024-03-07 PROCEDURE — 3072F LOW RISK FOR RETINOPATHY: CPT | Mod: HCNC,CPTII,GC,S$GLB

## 2024-03-07 PROCEDURE — 82985 ASSAY OF GLYCATED PROTEIN: CPT | Mod: HCNC

## 2024-03-07 PROCEDURE — 36415 COLL VENOUS BLD VENIPUNCTURE: CPT | Mod: HCNC

## 2024-03-07 PROCEDURE — 99999 PR PBB SHADOW E&M-EST. PATIENT-LVL III: CPT | Mod: PBBFAC,HCNC,GC,

## 2024-03-07 PROCEDURE — 80053 COMPREHEN METABOLIC PANEL: CPT | Mod: HCNC

## 2024-03-07 PROCEDURE — 3078F DIAST BP <80 MM HG: CPT | Mod: HCNC,CPTII,GC,S$GLB

## 2024-03-07 PROCEDURE — 3008F BODY MASS INDEX DOCD: CPT | Mod: HCNC,CPTII,GC,S$GLB

## 2024-03-07 PROCEDURE — 4010F ACE/ARB THERAPY RXD/TAKEN: CPT | Mod: HCNC,CPTII,GC,S$GLB

## 2024-03-07 PROCEDURE — 83036 HEMOGLOBIN GLYCOSYLATED A1C: CPT | Mod: HCNC

## 2024-03-07 PROCEDURE — 99213 OFFICE O/P EST LOW 20 MIN: CPT | Mod: HCNC,GC,S$GLB,

## 2024-03-07 RX ORDER — PREGABALIN 75 MG/1
75 CAPSULE ORAL DAILY
Qty: 90 CAPSULE | Refills: 3 | Status: SHIPPED | OUTPATIENT
Start: 2024-03-07

## 2024-03-07 NOTE — PROGRESS NOTES
Subjective     Chief Complaint: follow up    History of Present Illness:  Ms. Jael Hall is a 58 y.o. female T2DM c/b ESRD on HD MWF and diabetic neuropathy, RCC s/p right nephrectomy 2020, htn, HFpEF (EF 60%), NAFLD, and sensorineural hearing loss presenting for follow up. Last seen by me Nov 2023. She gained about 20 lbs since she saw me last, which she attributes to depression regarding her kidney disease.     Her biggest concern today is regarding hypotension during dialysis. BP is 150 or 160, drops down to 80s and 90s on dialysis. She feels a little nauseous when this occurs, and especially can feel it when she stands up. It takes her 20-25 minutes to feel better. BP typically running around 130 when not at dialysis. She takes amlodpine 10mg, hydralazine 100mg prescribed as TID however she is only taking it at night, labetalol 200mg BID, and losartan 100 qd    She cut down on the amount of sevelamer she takes, as she is concerned about the amount of pills, however she is still taking it. She takes two in the morning and one at night.     Her DM is well controlled since 2021. Diet controlled, not on medication.   Last A1c 5.1 5/2023. Repeat was ordered at last visit, it was not completed  On statin: Yes  On ACE-I/ARB: Yes, losartan 100mg  Home glucose: 80s-100s  Denies visual changes  Last eye exam: July 2023  Complaints of numbness/tingling in hands/feet. She takes lyrica, which helps somewhat. She has tried gabapentin in the past, which did not work  ASCVD risk 15.3% as of June 2023    She has an appointment to see Dr. Abraham  for kidney transplant evaluation in May    LFTs 11.2023 WNL (AST 13, ALT 6, ALP 74)    Review of Systems   Constitutional:  Negative for chills, fever and malaise/fatigue.   Eyes:  Negative for blurred vision.   Respiratory:  Negative for cough and shortness of breath.    Cardiovascular:  Negative for chest pain, palpitations and leg swelling.   Gastrointestinal:  Negative for  Faxed referral office note, mri results abdominal pain, constipation and diarrhea.   Genitourinary:         Produces very little urine     Musculoskeletal:  Negative for back pain.   Neurological:  Positive for tingling. Negative for dizziness, weakness and headaches.   Psychiatric/Behavioral:  Positive for depression. The patient is not nervous/anxious.        PAST HISTORY:     Past Medical History:   Diagnosis Date    Acute on chronic diastolic heart failure 10/8/2021    Anemia of chronic disorder 2017    Overview:  Added automatically from request for surgery [id number]    Anemia of chronic renal failure, stage 5     Anxiety     Cyst of left ovary 2019    Cyst of left ovary 2019    Dyslipidemia 2013    End-stage renal disease on hemodialysis 2020    ESRD on hemodialysis 2017    Hematuria 10/30/2019    Hospital discharge follow-up 10/6/2023    Hx of sinus bradycardia 2017    Hyperkalemia 3/29/2017    Hypertensive urgency 2021    Peripheral neuropathy 2013    Preoperative testing 10/8/2020    Renovascular hypertension 2020    S/P dilation and curettage 2018    Thickened endometrium 2018    Thyroid nodule     Thyroid nodule 2021    Uncontrolled type 2 diabetes mellitus 2013    Overview:  poc a1c today 11.2-270/ up from 10.8-263, Pt. has very very stressed/ was incarcerated/marital stress/ will current meds/ less stress now/ will monitor.       Past Surgical History:   Procedure Laterality Date    AV FISTULA PLACEMENT      right arm     SECTION      x1    COLONOSCOPY N/A 2018    Procedure: COLONOSCOPY;  Surgeon: Pankaj Hinojosa MD;  Location: [facility] (4TH FLR);  Service: Endoscopy;  Laterality: N/A;  dialysis pt/labs prior to colon/MS    COLONOSCOPY N/A 2023    Procedure: COLONOSCOPY;  Surgeon: Angel Art MD;  Location: [facility] (2ND FLR);  Service: Endoscopy;  Laterality: N/A;  Ref By:  Maty Gayle MD  Procedure: Colonoscopy  Diagnosis: endounter for preventive  health   Procedure Timing: pt. pref  Provider: Dr. turner   Location: [facility] 4-Endo   Prep Specifications: Standard prep   Additional Info: Dialysis patient lab pending    CYSTOSCOPY W/ RETROGRADES Bilateral 8/20/2020    Procedure: CYSTOSCOPY, WITH RETROGRADE PYELOGRAM;  Surgeon: Douglas Abraham MD;  Location: [facility] 1ST FLR;  Service: Urology;  Laterality: Bilateral;  45 min    FISTULOGRAM Right 9/9/2020    Procedure: Fistulogram;  Surgeon: Lester Gómez MD;  Location: [facility] CATH LAB;  Service: Nephrology;  Laterality: Right;    HYSTERECTOMY      IMPLANTATION OF COCHLEAR PROSTHESIS Right 6/28/2021    Procedure: INSERTION, PROSTHESIS, COCHLEAR;  Surgeon: Ramiro Zuleta MD;  Location: [facility] OR 2ND FLR;  Service: ENT;  Laterality: Right;  COCHLEAR BABAK    IMPLANTATION OF COCHLEAR PROSTHESIS Right 6/30/2021    Procedure: INSERTION, PROSTHESIS, COCHLEAR;  Surgeon: Ramiro Zuleta MD;  Location: [facility] OR 2ND FLR;  Service: ENT;  Laterality: Right;  COCHLEAR AMERICAS    ROBOT-ASSISTED LAPAROSCOPIC ABDOMINAL HYSTERECTOMY USING DA NATALYA XI N/A 7/5/2019    Procedure: XI ROBOTIC HYSTERECTOMY;  Surgeon: Trice Lei MD;  Location: [facility];  Service: OB/GYN;  Laterality: N/A;    ROBOT-ASSISTED LAPAROSCOPIC NEPHRECTOMY Right 10/8/2020    Procedure: ROBOTIC NEPHRECTOMY;  Surgeon: Douglas Abraahm MD;  Location: [facility] 2ND FLR;  Service: Urology;  Laterality: Right;  3.5hrs gen with regional     ROBOT-ASSISTED LAPAROSCOPIC SALPINGO-OOPHORECTOMY USING DA NATALYA XI Bilateral 7/5/2019    Procedure: XI ROBOTIC SALPINGO-OOPHORECTOMY;  Surgeon: Trice Lei MD;  Location: [facility];  Service: OB/GYN;  Laterality: Bilateral;    TUBAL LIGATION      Vascular access surgeries      x5       Family History   Problem Relation Age of Onset    Cancer Mother     Ovarian cancer Mother     Cancer Sister     Ovarian cancer Sister     Lung cancer Sister     Macular degeneration Sister     Cancer Sister     Ovarian cancer Sister     Diabetes  Sister     Diabetes Brother     Breast cancer Neg Hx     Colon cancer Neg Hx     Cervical cancer Neg Hx     Endometrial cancer Neg Hx     Vaginal cancer Neg Hx     Thyroid disease Neg Hx     Glaucoma Neg Hx     Retinal detachment Neg Hx        Social History     Socioeconomic History    Marital status:     Number of children: 2   Occupational History    Occupation: disability   Tobacco Use    Smoking status: Never    Smokeless tobacco: Never   Substance and Sexual Activity    Alcohol use: No    Drug use: No    Sexual activity: Yes     Partners: Male     Birth control/protection: Post-menopausal     Social Determinants of Health     Financial Resource Strain: Medium Risk (9/29/2023)    Overall Financial Resource Strain (CARDIA)     Difficulty of Paying Living Expenses: Somewhat hard   Food Insecurity: No Food Insecurity (9/29/2023)    Hunger Vital Sign     Worried About Running Out of Food in the Last Year: Never true     Ran Out of Food in the Last Year: Never true   Transportation Needs: No Transportation Needs (9/29/2023)    PRAPARE - Transportation     Lack of Transportation (Medical): No     Lack of Transportation (Non-Medical): No   Physical Activity: Inactive (9/29/2023)    Exercise Vital Sign     Days of Exercise per Week: 0 days     Minutes of Exercise per Session: 0 min   Stress: Stress Concern Present (9/29/2023)    [employer] [place] [employer] - Occupational Stress Questionnaire     Feeling of Stress : To some extent   Social Connections: Unknown (9/29/2023)    Social Connection and Isolation Panel [NHANES]     Frequency of Communication with Friends and Family: More than three times a week     Frequency of Social Gatherings with Friends and Family: More than three times a week     Attends [employer] Services: More than 4 times per year     Active Member of Clubs or Organizations: No     Attends Club or Organization Meetings: Never   Housing Stability: Low Risk  (9/29/2023)    Housing  "Stability Vital Sign     Unable to Pay for Housing in the Last Year: No     Number of Places Lived in the Last Year: 1     Unstable Housing in the Last Year: No       MEDICATIONS & ALLERGIES:     Current Outpatient Medications on File Prior to Visit   Medication Sig    acetaminophen (TYLENOL) 500 MG tablet Take 1 tablet (500 mg total) by mouth every 8 (eight) hours as needed for Pain.    amLODIPine (NORVASC) 10 MG tablet Take 1 tablet (10 mg total) by mouth nightly.    aspirin (ECOTRIN) 81 MG EC tablet Take 81 mg by mouth once daily.    atorvastatin (LIPITOR) 40 MG tablet Take 1 tablet (40 mg total) by mouth once daily.    diphenhydrAMINE (BENADRYL) 25 mg capsule Take 25 mg by mouth every 6 (six) hours as needed for Itching.    furosemide (LASIX) 80 MG tablet Take 1 tablet (80 mg total) by mouth once daily.    hydrALAZINE (APRESOLINE) 100 MG tablet Take 100 mg by mouth 3 (three) times daily.    labetaloL (NORMODYNE) 200 MG tablet Take 1 tablet (200 mg total) by mouth every 12 (twelve) hours.    losartan (COZAAR) 100 MG tablet Take 1 tablet (100 mg total) by mouth once daily.    pregabalin (LYRICA) 75 MG capsule Take 1 capsule (75 mg total) by mouth once daily.    sevelamer carbonate (RENVELA) 800 mg Tab Take 3 tablets (2,400 mg total) by mouth 3 (three) times daily with meals.     No current facility-administered medications on file prior to visit.       Review of patient's allergies indicates:  No Known Allergies    OBJECTIVE:     Vital Signs:  Vitals:    03/07/24 1345   BP: 131/73   BP Location: Left arm   Patient Position: Sitting   BP Method: Large (Automatic)   Pulse: 86   Height: 5' 4" (1.626 m)       Body mass index is 29.87 kg/m².     Physical Exam  Vitals and nursing note reviewed.   Constitutional:       General: She is not in acute distress.     Appearance: Normal appearance. She is not ill-appearing.   HENT:      Head: Normocephalic and atraumatic.   Eyes:      Extraocular Movements: Extraocular " movements intact.   Cardiovascular:      Rate and Rhythm: Normal rate and regular rhythm.      Heart sounds: Normal heart sounds. No murmur heard.  Pulmonary:      Effort: Pulmonary effort is normal. No respiratory distress.      Breath sounds: Normal breath sounds. No wheezing.   Abdominal:      General: Abdomen is flat. There is no distension.      Palpations: Abdomen is soft.      Tenderness: There is no abdominal tenderness.   Musculoskeletal:      Cervical back: Normal range of motion. No rigidity.      Right lower leg: No edema.      Left lower leg: No edema.   Skin:     General: Skin is warm and dry.   Neurological:      General: No focal deficit present.      Mental Status: She is alert. Mental status is at baseline.   Psychiatric:         Mood and Affect: Mood normal.         Behavior: Behavior normal.       Protective Sensation (w/ 10 gram monofilament):  Right: Intact  Left: Intact    Visual Inspection:  Normal -  Bilateral and Nails Intact - without Evidence of Foot Deformity- Bilateral    Pedal Pulses:   Right: Present  Left: Present    Posterior Tibialis Pulses:   Right:Present  Left: Present      Laboratory  No results found for this or any previous visit (from the past 24 hour(s)).    Diagnostic Results:      Health Maintenance Due   Topic Date Due    Shingles Vaccine (1 of 2) Never done    RSV Vaccine (Age 60+ and Pregnant patients) (1 - 1-dose 60+ series) Never done    Influenza Vaccine (1) 09/01/2023    COVID-19 Vaccine (4 - 2023-24 season) 09/01/2023    Foot Exam  03/10/2024         ASSESSMENT & PLAN:   Ms. Jael Hall is a 58 y.o. female presenting for follow up.  Will order fructosamine for comparison to A1c, to ensure accuracy of DM management in ESRD.   Will order a phos given that she decreased her phos binder autonomously.     Diabetic polyneuropathy associated with type 2 diabetes mellitus  -     pregabalin (LYRICA) 75 MG capsule; Take 1 capsule (75 mg total) by mouth once daily.   Dispense: 90 capsule; Refill: 3  -     HEMOGLOBIN A1C; Future; Expected date: 03/07/2024  -     BASIC METABOLIC PANEL; Future; Expected date: 03/07/2024  -     FRUCTOSAMINE; Future; Expected date: 03/07/2024    ESRD (end stage renal disease) on dialysis  -     PHOSPHORUS; Future; Expected date: 03/07/2024        RTC in 3 months    Discussed with Dr. Hammond  - staff attestation to follow      Maty Gayle DO PGY2

## 2024-03-08 ENCOUNTER — HOSPITAL ENCOUNTER (EMERGENCY)
Facility: HOSPITAL | Age: 59
Discharge: HOME | End: 2024-03-08
Attending: EMERGENCY MEDICINE
Payer: MEDICARE

## 2024-03-08 VITALS
RESPIRATION RATE: 18 BRPM | SYSTOLIC BLOOD PRESSURE: 184 MMHG | HEART RATE: 79 BPM | WEIGHT: 191 LBS | BODY MASS INDEX: 32.79 KG/M2 | TEMPERATURE: 98 F | OXYGEN SATURATION: 100 % | DIASTOLIC BLOOD PRESSURE: 80 MMHG

## 2024-03-08 DIAGNOSIS — I95.9 HYPOTENSION: ICD-10-CM

## 2024-03-08 DIAGNOSIS — N18.6 ESRD (END STAGE RENAL DISEASE): ICD-10-CM

## 2024-03-08 LAB
ALBUMIN SERPL BCP-MCNC: 3.3 G/DL (ref 3.5–5.2)
ALLENS TEST: NORMAL
ALP SERPL-CCNC: 63 U/L (ref 55–135)
ALT SERPL W/O P-5'-P-CCNC: 9 U/L (ref 10–44)
ANION GAP SERPL CALC-SCNC: 16 MMOL/L (ref 8–16)
AST SERPL-CCNC: 22 U/L (ref 10–40)
BASOPHILS # BLD AUTO: 0.07 K/UL (ref 0–0.2)
BASOPHILS NFR BLD: 1.3 % (ref 0–1.9)
BILIRUB SERPL-MCNC: 0.6 MG/DL (ref 0.1–1)
BNP SERPL-MCNC: 276 PG/ML (ref 0–99)
BUN SERPL-MCNC: 25 MG/DL (ref 6–20)
CALCIUM SERPL-MCNC: 9.9 MG/DL (ref 8.7–10.5)
CHLORIDE SERPL-SCNC: 99 MMOL/L (ref 95–110)
CO2 SERPL-SCNC: 24 MMOL/L (ref 23–29)
CREAT SERPL-MCNC: 6.3 MG/DL (ref 0.5–1.4)
DIFFERENTIAL METHOD BLD: ABNORMAL
EOSINOPHIL # BLD AUTO: 0.6 K/UL (ref 0–0.5)
EOSINOPHIL NFR BLD: 11.3 % (ref 0–8)
ERYTHROCYTE [DISTWIDTH] IN BLOOD BY AUTOMATED COUNT: 18.9 % (ref 11.5–14.5)
EST. GFR  (NO RACE VARIABLE): 7.1 ML/MIN/1.73 M^2
GLUCOSE SERPL-MCNC: 216 MG/DL (ref 70–110)
HCT VFR BLD AUTO: 37.6 % (ref 37–48.5)
HGB BLD-MCNC: 11.9 G/DL (ref 12–16)
IMM GRANULOCYTES # BLD AUTO: 0.01 K/UL (ref 0–0.04)
IMM GRANULOCYTES NFR BLD AUTO: 0.2 % (ref 0–0.5)
LDH SERPL L TO P-CCNC: 2.15 MMOL/L (ref 0.5–2.2)
LYMPHOCYTES # BLD AUTO: 1.4 K/UL (ref 1–4.8)
LYMPHOCYTES NFR BLD: 25.3 % (ref 18–48)
MAGNESIUM SERPL-MCNC: 2.3 MG/DL (ref 1.6–2.6)
MCH RBC QN AUTO: 25.4 PG (ref 27–31)
MCHC RBC AUTO-ENTMCNC: 31.6 G/DL (ref 32–36)
MCV RBC AUTO: 80 FL (ref 82–98)
MONOCYTES # BLD AUTO: 0.8 K/UL (ref 0.3–1)
MONOCYTES NFR BLD: 14 % (ref 4–15)
NEUTROPHILS # BLD AUTO: 2.6 K/UL (ref 1.8–7.7)
NEUTROPHILS NFR BLD: 47.9 % (ref 38–73)
NRBC BLD-RTO: 0 /100 WBC
OHS QRS DURATION: 88 MS
OHS QTC CALCULATION: 503 MS
PHOSPHATE SERPL-MCNC: 4 MG/DL (ref 2.7–4.5)
PLATELET # BLD AUTO: 149 K/UL (ref 150–450)
PMV BLD AUTO: ABNORMAL FL (ref 9.2–12.9)
POTASSIUM SERPL-SCNC: 5.2 MMOL/L (ref 3.5–5.1)
PROT SERPL-MCNC: 8.1 G/DL (ref 6–8.4)
RBC # BLD AUTO: 4.69 M/UL (ref 4–5.4)
SAMPLE: NORMAL
SITE: NORMAL
SODIUM SERPL-SCNC: 139 MMOL/L (ref 136–145)
WBC # BLD AUTO: 5.49 K/UL (ref 3.9–12.7)

## 2024-03-08 PROCEDURE — 83880 ASSAY OF NATRIURETIC PEPTIDE: CPT | Mod: HCNC | Performed by: EMERGENCY MEDICINE

## 2024-03-08 PROCEDURE — 84100 ASSAY OF PHOSPHORUS: CPT | Mod: HCNC | Performed by: EMERGENCY MEDICINE

## 2024-03-08 PROCEDURE — 85025 COMPLETE CBC W/AUTO DIFF WBC: CPT | Mod: HCNC | Performed by: EMERGENCY MEDICINE

## 2024-03-08 PROCEDURE — 99900035 HC TECH TIME PER 15 MIN (STAT): Mod: HCNC

## 2024-03-08 PROCEDURE — 93010 ELECTROCARDIOGRAM REPORT: CPT | Mod: HCNC,,, | Performed by: INTERNAL MEDICINE

## 2024-03-08 PROCEDURE — 83605 ASSAY OF LACTIC ACID: CPT | Mod: HCNC

## 2024-03-08 PROCEDURE — 83735 ASSAY OF MAGNESIUM: CPT | Mod: HCNC | Performed by: EMERGENCY MEDICINE

## 2024-03-08 PROCEDURE — 99285 EMERGENCY DEPT VISIT HI MDM: CPT | Mod: 25,HCNC

## 2024-03-08 PROCEDURE — 80053 COMPREHEN METABOLIC PANEL: CPT | Mod: HCNC | Performed by: EMERGENCY MEDICINE

## 2024-03-08 PROCEDURE — 93005 ELECTROCARDIOGRAM TRACING: CPT | Mod: HCNC

## 2024-03-08 RX ORDER — HYDRALAZINE HYDROCHLORIDE 100 MG/1
100 TABLET, FILM COATED ORAL 3 TIMES DAILY
COMMUNITY

## 2024-03-08 NOTE — DISCHARGE INSTRUCTIONS
Diagnosis:  Low blood pressure.      Please ensure that you attend your dialysis appointment on Monday.      Tests today showed:   Labs Reviewed   CBC W/ AUTO DIFFERENTIAL - Abnormal; Notable for the following components:       Result Value    Hemoglobin 11.9 (*)     MCV 80 (*)     MCH 25.4 (*)     MCHC 31.6 (*)     RDW 18.9 (*)     Platelets 149 (*)     Eos # 0.6 (*)     Eosinophil % 11.3 (*)     All other components within normal limits   B-TYPE NATRIURETIC PEPTIDE - Abnormal; Notable for the following components:     (*)     All other components within normal limits   COMPREHENSIVE METABOLIC PANEL - Abnormal; Notable for the following components:    Potassium 5.2 (*)     Glucose 216 (*)     BUN 25 (*)     Creatinine 6.3 (*)     Albumin 3.3 (*)     ALT 9 (*)     eGFR 7.1 (*)     All other components within normal limits   MAGNESIUM   PHOSPHORUS   ISTAT LACTATE     X-Ray Chest AP Portable   Final Result      No acute intrathoracic process seen.         Electronically signed by: Ghada Baker MD   Date:    03/08/2024   Time:    16:21          Treatments you had today:   Medications - No data to display    Follow-Up Plan:  - Follow-up with primary care doctor within 3 - 5 days  - Additional testing and/or evaluation as directed by your primary doctor    Return to the Emergency Department for symptoms including but not limited to: worsening symptoms, shortness of breath or chest pain, vomiting with inability to hold down fluids, fevers greater than 100.4°F, passing out/fainting/unconsciousness, or other concerning symptoms.

## 2024-03-08 NOTE — ED NOTES
Pt to ED from HD for hypotension.  Pt states she was only able to get 2L pulled during HD, and had to stop HD due to hypotension.  Pt states this has happened before and has been seeing PCP for BP control.  Pt denies SOB, denies chest pain, denies HA.  Pt A/Ox4.  Systolic .  RR even/unlabored, SPO2 96% on RA.  Bed low/locked, siderails upx2, pt agrees to plan of care.

## 2024-03-08 NOTE — ED PROVIDER NOTES
Encounter Date: 3/8/2024       History     Chief Complaint   Patient presents with    Hypotension     Pt reports she was at dialysis when her blood pressure dropped while getting fluid taken off. Pt states get she gets dialysis M,W,F. Denies feeling dizzy, weak, or cp. Pt reports they were able to take 2L off but states she has almost 5L they could have taken had her pressure not dripped      Jael Hall is a 58 y.o. female with PMH of heart failure with an EF of 60%, ESRD on Monday/Wednesday/Friday dialysis, presenting to [facility] ED for hypotension.  Patient states that she was at dialysis today when she started feeling lightheaded, nauseous, was found to be hypotensive by staff at the dialysis center.  Reports that when she was being dialyzed Wednesday she had multiple similar episodes in which she had to sit down and rest.  Patient states that sometimes she experiences episodes of lightheadedness at home for which she sits for approximately 30 minutes.  Her 300 mg hydralazine yesterday by her doctor due to concern for her intermittent episodes of hypotension.  Patient states that she was told by dialysis staff that she was supposed to have 7 L removed and she only had 2 L removed.  Denies current headache, in vision or hearing, nausea/vomiting, chest pain/shortness of breath, abdominal pain.  Patient states she makes a minimal amount of urine.      Review of patient's allergies indicates:  No Known Allergies  Past Medical History:   Diagnosis Date    Acute hyperkalemia 09/09/2020    Acute on chronic diastolic heart failure 10/08/2021    Anemia of chronic disorder 11/08/2017    Overview:  Added automatically from request for surgery [id number]    Anemia of chronic renal failure, stage 5     Anxiety     Cyst of left ovary 05/14/2019    Cyst of left ovary 07/05/2019    Dyslipidemia 01/04/2013    Encounter for screening colonoscopy 10/08/2020    End-stage renal disease on hemodialysis 08/20/2020    ESRD on hemodialysis  2017    Hematuria 10/30/2019    History of COVID-19 2020    Hospital discharge follow-up 10/06/2023    Hx of sinus bradycardia 2017    Hyperkalemia 2017    Hypertensive urgency 2021    Peripheral neuropathy 2013    Preoperative testing 10/08/2020    Renovascular hypertension 2020    S/P dilation and curettage 2018    Screening for colon cancer 2023    Thickened endometrium 2018    Thyroid nodule     Thyroid nodule 2021    Uncontrolled type 2 diabetes mellitus 2013    Overview:  poc a1c today 11.2-270/ up from 10.8-263, Pt. has very very stressed/ was incarcerated/marital stress/ will current meds/ less stress now/ will monitor.     Past Surgical History:   Procedure Laterality Date    AV FISTULA PLACEMENT      right arm     SECTION      x1    COLONOSCOPY N/A 2018    Procedure: COLONOSCOPY;  Surgeon: Pankaj Hinojosa MD;  Location: [facility] (4TH FLR);  Service: Endoscopy;  Laterality: N/A;  dialysis pt/labs prior to colon/MS    COLONOSCOPY N/A 2023    Procedure: COLONOSCOPY;  Surgeon: Angel Turner MD;  Location: [facility] (2ND FLR);  Service: Endoscopy;  Laterality: N/A;  Ref By:  Maty Gayle MD  Procedure: Colonoscopy  Diagnosis: endounter for preventive health   Procedure Timing: pt. pref  Provider: Dr. turner   [facility] [facility]   Prep Specifications: Standard prep   Additional Info: Dialysis patient lab pending    CYSTOSCOPY W/ RETROGRADES Bilateral 2020    Procedure: CYSTOSCOPY, WITH RETROGRADE PYELOGRAM;  Surgeon: Douglas Abraham MD;  Location: [facility] OR 1ST FLR;  Service: Urology;  Laterality: Bilateral;  45 min    FISTULOGRAM Right 2020    Procedure: Fistulogram;  Surgeon: Lester Gómez MD;  Location: [facility] CATH LAB;  Service: Nephrology;  Laterality: Right;    HYSTERECTOMY      IMPLANTATION OF COCHLEAR PROSTHESIS Right 2021    Procedure: INSERTION, PROSTHESIS, COCHLEAR;  Surgeon: Ramiro Zuleta MD;   Location: [facility] OR 2ND FLR;  Service: ENT;  Laterality: Right;  COCHLEAR BABAK    IMPLANTATION OF COCHLEAR PROSTHESIS Right 6/30/2021    Procedure: INSERTION, PROSTHESIS, COCHLEAR;  Surgeon: Ramiro Zuleta MD;  Location: [facility] OR 2ND FLR;  Service: ENT;  Laterality: Right;  COCHLEAR AMERICAS    ROBOT-ASSISTED LAPAROSCOPIC ABDOMINAL HYSTERECTOMY USING DA NATALYA XI N/A 7/5/2019    Procedure: XI ROBOTIC HYSTERECTOMY;  Surgeon: Trice Lei MD;  Location: [facility] OR;  Service: OB/GYN;  Laterality: N/A;    ROBOT-ASSISTED LAPAROSCOPIC NEPHRECTOMY Right 10/8/2020    Procedure: ROBOTIC NEPHRECTOMY;  Surgeon: Douglas Abraham MD;  Location: [facility] OR 2ND FLR;  Service: Urology;  Laterality: Right;  3.5hrs gen with regional     ROBOT-ASSISTED LAPAROSCOPIC SALPINGO-OOPHORECTOMY USING DA NATALYA XI Bilateral 7/5/2019    Procedure: XI ROBOTIC SALPINGO-OOPHORECTOMY;  Surgeon: Trice Lei MD;  Location: [facility];  Service: OB/GYN;  Laterality: Bilateral;    TUBAL LIGATION      Vascular access surgeries      x5     Family History   Problem Relation Age of Onset    Cancer Mother     Ovarian cancer Mother     Cancer Sister     Ovarian cancer Sister     Lung cancer Sister     Macular degeneration Sister     Cancer Sister     Ovarian cancer Sister     Diabetes Sister     Diabetes Brother     Breast cancer Neg Hx     Colon cancer Neg Hx     Cervical cancer Neg Hx     Endometrial cancer Neg Hx     Vaginal cancer Neg Hx     Thyroid disease Neg Hx     Glaucoma Neg Hx     Retinal detachment Neg Hx      Social History     Tobacco Use    Smoking status: Never    Smokeless tobacco: Never   Substance Use Topics    Alcohol use: No    Drug use: No     Review of Systems   Constitutional:  Negative for fever.   HENT:  Negative for congestion, rhinorrhea and sore throat.    Eyes:  Negative for visual disturbance.   Respiratory:  Negative for cough and shortness of breath.    Cardiovascular:  Negative for chest pain and leg swelling.    Gastrointestinal:  Negative for abdominal pain, diarrhea, nausea and vomiting.   Genitourinary:  Negative for dysuria and hematuria.   Neurological:  Positive for light-headedness. Negative for weakness.       Physical Exam     Initial Vitals [03/08/24 1210]   BP Pulse Resp Temp SpO2   (!) 144/73 86 18 98.8 °F (37.1 °C) 95 %      MAP       --         Physical Exam    Nursing note and vitals reviewed.  Constitutional: She appears well-developed and well-nourished. She is cooperative.  Non-toxic appearance. She does not appear ill.   HENT:   Head: Normocephalic and atraumatic.   Mouth/Throat: Mucous membranes are normal. Mucous membranes are not dry.   Eyes: Conjunctivae are normal. Pupils are equal, round, and reactive to light.   Neck: Trachea normal and phonation normal.   Cardiovascular:  Normal rate, regular rhythm, normal heart sounds, intact distal pulses and normal pulses.     Exam reveals no gallop, no S3, no S4 and no friction rub.       No murmur heard.  Pulmonary/Chest: Breath sounds normal. No respiratory distress. She has no wheezes. She has no rhonchi. She has no rales.   Abdominal: Abdomen is soft. She exhibits no distension. There is no abdominal tenderness.   Musculoskeletal:      Right lower leg: Edema present.      Left lower leg: Edema present.     Neurological: She is alert and oriented to person, place, and time.   Pupils equal round reactive to light.    No facial asymmetry.    No dysarthria/dysphonia.  Patient moving all 4 extremities independently.    Sensation intact in all 4 extremities.    No Focal deficits.   Skin: Skin is warm, dry and intact. Capillary refill takes less than 2 seconds.   Psychiatric: She has a normal mood and affect. Her speech is normal.         ED Course   Procedures  Labs Reviewed   CBC W/ AUTO DIFFERENTIAL - Abnormal; Notable for the following components:       Result Value    Hemoglobin 11.9 (*)     MCV 80 (*)     MCH 25.4 (*)     MCHC 31.6 (*)     RDW 18.9 (*)      Platelets 149 (*)     Eos # 0.6 (*)     Eosinophil % 11.3 (*)     All other components within normal limits   B-TYPE NATRIURETIC PEPTIDE - Abnormal; Notable for the following components:     (*)     All other components within normal limits   COMPREHENSIVE METABOLIC PANEL - Abnormal; Notable for the following components:    Potassium 5.2 (*)     Glucose 216 (*)     BUN 25 (*)     Creatinine 6.3 (*)     Albumin 3.3 (*)     ALT 9 (*)     eGFR 7.1 (*)     All other components within normal limits   MAGNESIUM   PHOSPHORUS   ISTAT LACTATE     EKG Readings: (Independently Interpreted)   Initial Reading: No STEMI. Previous EKG: Compared with most recent EKG Rhythm: Normal Sinus Rhythm. Heart Rate: 78. Ectopy: No Ectopy. Conduction: Normal. ST Segments: Normal ST Segments. T Waves Flipped: AVR. Clinical Impression: Normal Sinus Rhythm     ECG Results              EKG 12-lead (Final result)        Collection Time Result Time QRS Duration OHS QTC Calculation    03/08/24 15:31:59 03/08/24 21:07:24 88 503                     Final result by Interface, Lab In [facility] (03/08/24 21:07:28)                   Narrative:    Test Reason : N18.6,    Vent. Rate : 078 BPM     Atrial Rate : 078 BPM     P-R Int : 136 ms          QRS Dur : 088 ms      QT Int : 442 ms       P-R-T Axes : 068 -51 066 degrees     QTc Int : 503 ms    Normal sinus rhythm  Left anterior fascicular block  Prolonged QT  Abnormal ECG  When compared with ECG of 09-NOV-2023 19:19,  No significant change was found  Confirmed by Michel ARMSTRONG MD ([id number]) on 3/8/2024 9:07:23 PM    Referred By: SHERLYERR   SELF           Confirmed By:Michel ARMSTRONG MD                                  Imaging Results              X-Ray Chest AP Portable (Final result)  Result time 03/08/24 16:21:38      Final result by Ghada Baker MD (03/08/24 16:21:38)                   Impression:      No acute intrathoracic process seen.      Electronically signed by: Ghada Baker  MD  Date:    03/08/2024  Time:    16:21               Narrative:    EXAMINATION:  XR CHEST AP PORTABLE    CLINICAL HISTORY:  Hypotension, unspecified    TECHNIQUE:  Single frontal view of the chest was performed.    COMPARISON:  11/08/2023    FINDINGS:  The cardiac silhouette is prominent.  The pulmonary vascularity is normal.  The lungs are clear.  No pleural effusion.  No pneumothorax.  The osseous structures appear normal.  Surgical clips left axillary region.                                       Medications - No data to display  Medical Decision Making  58-year-old female with ESRD on dialysis presenting for hypotension.  Initially, patient is mildly hypertensive but overall hemodynamically stable.    Suspect that patient experienced transient hypotension due to fluid shifts during hemodialysis.  Patient may require further titration of her blood pressure medications since she is on a pretty intense regimen.  Patient stopped her hydralazine yesterday so she may need more time to adjust.  Obtain chest x-ray/BNP to evaluate for signs of fluid overload since patient did not complete dialysis today and she will not be able to return to dialysis and so the 10th.  Will also evaluate for electrolyte abnormalities.  Based on physical exam, low suspicion for intracranial pathology.    Patient has very mild elevation in BNP.  Chest x-ray shows mild pulmonary edema.  No significant electrolyte abnormalities.      Patient is able to ambulate without any desaturations, subjective shortness of breath, tachypnea.  Shared decision-making conducted with patient, she feels that she is comfortable for discharge home.  Extensively discussed strict return precautions is a fluid overload or any other concerning symptoms, patient voices standing.  She is stable, well appearing, she is appropriate for discharge at this time.    Amount and/or Complexity of Data Reviewed  External Data Reviewed: labs and notes.  Labs: ordered.  Decision-making details documented in ED Course.  Radiology: ordered and independent interpretation performed. Decision-making details documented in ED Course.                                       Clinical Impression:  Final diagnoses:  [N18.6] ESRD (end stage renal disease)  [I95.9] Hypotension          ED Disposition Condition    Discharge Stable          ED Prescriptions    None       Follow-up Information       Follow up With Specialties Details Why Contact Info    Maty Gayle DO Internal Medicine In 1 week  [address]  [place] [address]  [phone]      [facility] - Emergency Dept Emergency Medicine  As needed [address] [facility] [id number] [phone]             Ruby Cruz MD  Resident  03/08/24 1646       Ruby Cruz MD  Resident  03/08/24 [id number]

## 2024-03-08 NOTE — FIRST PROVIDER EVALUATION
Medical screening examination initiated.  I have conducted a focused provider triage encounter, findings are as follows:    Brief history of present illness:  59 yo F presents w/ c/o inability to achieve full HD volume removal d/t hypotension. Referred to ED for dialysis    Vitals:    03/08/24 1210   BP: (!) 144/73   Pulse: 86   Resp: 18   Temp: 98.8 °F (37.1 °C)   TempSrc: Oral   SpO2: 95%   Weight: 86.6 kg (191 lb)       Pertinent physical exam:  nontoxic, well appearing    Brief workup plan:  cbc, cmp, ecg, mg, phos.    Preliminary workup initiated; this workup will be continued and followed by the physician or advanced practice provider that is assigned to the patient when roomed.

## 2024-03-09 NOTE — PHARMACY MED REC
"    Admission Medication History     The home medication history was taken by Susan Casillas.    You may go to "Admission" then "Reconcile Home Medications" tabs to review and/or act upon these items.     The home medication list has been updated by the Pharmacy department.   Please read ALL comments highlighted in yellow.   Please address this information as you see fit.    Feel free to contact us if you have any questions or require assistance.      The medications listed below were removed from the home medication list. Please reorder if appropriate:  Patient reports no longer taking the following medication(s):  diphenhydrAMINE (BENADRYL) 25 mg capsule    Medications listed below were obtained from: Patient/family and Analytic software- [employer]  Current Outpatient Medications on File Prior to Encounter   Medication Sig    acetaminophen (TYLENOL) 500 MG tablet Take 1 tablet (500 mg total) by mouth every 8 (eight) hours as needed for Pain.      amLODIPine (NORVASC) 10 MG tablet   Take 1 tablet (10 mg total) by mouth nightly.    aspirin (ECOTRIN) 81 MG EC tablet   Take 81 mg by mouth every morning.    atorvastatin (LIPITOR) 40 MG tablet    Take 40 mg by mouth every morning.    labetaloL (NORMODYNE) 200 MG tablet Take 1 tablet (200 mg total) by mouth every 12 (twelve) hours.      losartan (COZAAR) 100 MG tablet   Take 1 tablet (100 mg total) by mouth once daily.    pregabalin (LYRICA) 75 MG capsule   Take 1 capsule (75 mg total) by mouth once daily.    sevelamer carbonate (RENVELA) 800 mg Tab   Take 3 tablets (2,400 mg total) by mouth 3 (three) times daily with meals.    furosemide (LASIX) 80 MG tablet   Take 1 tablet (80 mg total) by mouth once daily.    hydrALAZINE (APRESOLINE) 100 MG tablet   Take 100 mg by mouth 3 (three) times daily.     Susan Casillas  EXT [id number]            .          "

## 2024-03-11 ENCOUNTER — PATIENT OUTREACH (OUTPATIENT)
Dept: EMERGENCY MEDICINE | Facility: HOSPITAL | Age: 59
End: 2024-03-11
Payer: MEDICARE

## 2024-03-11 LAB — FRUCTOSAMINE SERPL-SCNC: 322 UMOL /L (ref 151–300)

## 2024-03-12 NOTE — PROGRESS NOTES
Spoke with Pt who states she is doing good at this time and denies having any concerns or wanting to schedule any appt's at thi time. ED Navigator will continue to f/u periodically.

## 2024-03-18 ENCOUNTER — TELEPHONE (OUTPATIENT)
Dept: INTERNAL MEDICINE | Facility: CLINIC | Age: 59
End: 2024-03-18
Payer: MEDICARE

## 2024-04-01 ENCOUNTER — TELEPHONE (OUTPATIENT)
Dept: INTERNAL MEDICINE | Facility: CLINIC | Age: 59
End: 2024-04-01
Payer: MEDICARE

## 2024-04-01 NOTE — TELEPHONE ENCOUNTER
Contacted patient, discussed lab results. CMP largely stable. Fructosamine correlates with an A1c 7-8. Controlled, however she is not currently on any diabetic medication. Will consider starting at next visit. Follow up in 3 months.

## 2024-04-01 NOTE — TELEPHONE ENCOUNTER
Again attempted to call patient regarding lab results. Phone went straight to voicemail, no answer.

## 2024-04-02 NOTE — TRANSFER OF CARE
"Anesthesia Transfer of Care Note    Patient: Jael Hall    Procedure(s) Performed: Procedure(s) (LRB):  XI ROBOTIC HYSTERECTOMY (N/A)  XI ROBOTIC SALPINGO-OOPHORECTOMY (Bilateral)    Patient location: PACU    Anesthesia Type: general    Transport from OR: Transported from OR on 2-3 L/min O2 by NC with adequate spontaneous ventilation    Post pain: adequate analgesia    Post assessment: no apparent anesthetic complications    Post vital signs: stable    Level of consciousness: awake    Nausea/Vomiting: no nausea/vomiting    Complications: none    Transfer of care protocol was followed      Last vitals:   Visit Vitals  BP (!) 172/79   Pulse 99   Temp 36.6 °C (97.8 °F) (Oral)   Resp 20   Ht 5' 4" (1.626 m)   Wt 88.4 kg (194 lb 14.2 oz)   LMP 03/15/2016 (LMP Unknown)   Breastfeeding? No   BMI 33.45 kg/m²     "
normal

## 2024-04-29 ENCOUNTER — HOSPITAL ENCOUNTER (EMERGENCY)
Facility: HOSPITAL | Age: 59
Discharge: HOME | End: 2024-04-30
Attending: EMERGENCY MEDICINE
Payer: MEDICARE

## 2024-04-29 DIAGNOSIS — E87.70 HYPERVOLEMIA, UNSPECIFIED HYPERVOLEMIA TYPE: Primary | ICD-10-CM

## 2024-04-29 DIAGNOSIS — R06.02 SHORTNESS OF BREATH: ICD-10-CM

## 2024-04-29 LAB
ALBUMIN SERPL BCP-MCNC: 3.8 G/DL (ref 3.5–5.2)
ALP SERPL-CCNC: 74 U/L (ref 55–135)
ALT SERPL W/O P-5'-P-CCNC: 10 U/L (ref 10–44)
ANION GAP SERPL CALC-SCNC: 17 MMOL/L (ref 8–16)
AST SERPL-CCNC: 15 U/L (ref 10–40)
BASOPHILS # BLD AUTO: 0.07 K/UL (ref 0–0.2)
BASOPHILS NFR BLD: 1 % (ref 0–1.9)
BILIRUB SERPL-MCNC: 0.8 MG/DL (ref 0.1–1)
BUN SERPL-MCNC: 38 MG/DL (ref 6–20)
CALCIUM SERPL-MCNC: 10.1 MG/DL (ref 8.7–10.5)
CHLORIDE SERPL-SCNC: 97 MMOL/L (ref 95–110)
CO2 SERPL-SCNC: 26 MMOL/L (ref 23–29)
CREAT SERPL-MCNC: 9.1 MG/DL (ref 0.5–1.4)
DIFFERENTIAL METHOD BLD: ABNORMAL
EOSINOPHIL # BLD AUTO: 0.4 K/UL (ref 0–0.5)
EOSINOPHIL NFR BLD: 5.1 % (ref 0–8)
ERYTHROCYTE [DISTWIDTH] IN BLOOD BY AUTOMATED COUNT: 19.9 % (ref 11.5–14.5)
EST. GFR  (NO RACE VARIABLE): 4.6 ML/MIN/1.73 M^2
GLUCOSE SERPL-MCNC: 124 MG/DL (ref 70–110)
HCT VFR BLD AUTO: 39.4 % (ref 37–48.5)
HGB BLD-MCNC: 12.6 G/DL (ref 12–16)
IMM GRANULOCYTES # BLD AUTO: 0.01 K/UL (ref 0–0.04)
IMM GRANULOCYTES NFR BLD AUTO: 0.1 % (ref 0–0.5)
INFLUENZA A, MOLECULAR: NEGATIVE
INFLUENZA B, MOLECULAR: NEGATIVE
LACTATE SERPL-SCNC: 1 MMOL/L (ref 0.5–2.2)
LIPASE SERPL-CCNC: 99 U/L (ref 4–60)
LYMPHOCYTES # BLD AUTO: 1.6 K/UL (ref 1–4.8)
LYMPHOCYTES NFR BLD: 23.7 % (ref 18–48)
MAGNESIUM SERPL-MCNC: 2.5 MG/DL (ref 1.6–2.6)
MCH RBC QN AUTO: 26.1 PG (ref 27–31)
MCHC RBC AUTO-ENTMCNC: 32 G/DL (ref 32–36)
MCV RBC AUTO: 82 FL (ref 82–98)
MONOCYTES # BLD AUTO: 1 K/UL (ref 0.3–1)
MONOCYTES NFR BLD: 14.2 % (ref 4–15)
NEUTROPHILS # BLD AUTO: 3.8 K/UL (ref 1.8–7.7)
NEUTROPHILS NFR BLD: 55.9 % (ref 38–73)
NRBC BLD-RTO: 0 /100 WBC
PHOSPHATE SERPL-MCNC: 4.1 MG/DL (ref 2.7–4.5)
PLATELET # BLD AUTO: 152 K/UL (ref 150–450)
PMV BLD AUTO: ABNORMAL FL (ref 9.2–12.9)
POTASSIUM SERPL-SCNC: 4.1 MMOL/L (ref 3.5–5.1)
PROT SERPL-MCNC: 8.7 G/DL (ref 6–8.4)
RBC # BLD AUTO: 4.82 M/UL (ref 4–5.4)
SARS-COV-2 RDRP RESP QL NAA+PROBE: NEGATIVE
SODIUM SERPL-SCNC: 140 MMOL/L (ref 136–145)
SPECIMEN SOURCE: NORMAL
TROPONIN I SERPL DL<=0.01 NG/ML-MCNC: 0.01 NG/ML (ref 0–0.03)
WBC # BLD AUTO: 6.83 K/UL (ref 3.9–12.7)

## 2024-04-29 PROCEDURE — 25000003 PHARM REV CODE 250: Mod: HCNC | Performed by: STUDENT IN AN ORGANIZED HEALTH CARE EDUCATION/TRAINING PROGRAM

## 2024-04-29 PROCEDURE — 96374 THER/PROPH/DIAG INJ IV PUSH: CPT | Mod: HCNC

## 2024-04-29 PROCEDURE — 83605 ASSAY OF LACTIC ACID: CPT | Mod: HCNC | Performed by: EMERGENCY MEDICINE

## 2024-04-29 PROCEDURE — 99285 EMERGENCY DEPT VISIT HI MDM: CPT | Mod: 25,HCNC

## 2024-04-29 PROCEDURE — 93010 ELECTROCARDIOGRAM REPORT: CPT | Mod: HCNC,,, | Performed by: INTERNAL MEDICINE

## 2024-04-29 PROCEDURE — 87040 BLOOD CULTURE FOR BACTERIA: CPT | Mod: 59,HCNC | Performed by: EMERGENCY MEDICINE

## 2024-04-29 PROCEDURE — 83735 ASSAY OF MAGNESIUM: CPT | Mod: HCNC | Performed by: EMERGENCY MEDICINE

## 2024-04-29 PROCEDURE — U0002 COVID-19 LAB TEST NON-CDC: HCPCS | Mod: HCNC | Performed by: EMERGENCY MEDICINE

## 2024-04-29 PROCEDURE — 87502 INFLUENZA DNA AMP PROBE: CPT | Mod: HCNC | Performed by: EMERGENCY MEDICINE

## 2024-04-29 PROCEDURE — 83690 ASSAY OF LIPASE: CPT | Mod: HCNC | Performed by: EMERGENCY MEDICINE

## 2024-04-29 PROCEDURE — 84100 ASSAY OF PHOSPHORUS: CPT | Mod: HCNC | Performed by: EMERGENCY MEDICINE

## 2024-04-29 PROCEDURE — 84484 ASSAY OF TROPONIN QUANT: CPT | Mod: HCNC | Performed by: EMERGENCY MEDICINE

## 2024-04-29 PROCEDURE — 80053 COMPREHEN METABOLIC PANEL: CPT | Mod: HCNC | Performed by: EMERGENCY MEDICINE

## 2024-04-29 PROCEDURE — 85025 COMPLETE CBC W/AUTO DIFF WBC: CPT | Mod: HCNC | Performed by: EMERGENCY MEDICINE

## 2024-04-29 PROCEDURE — 93005 ELECTROCARDIOGRAM TRACING: CPT | Mod: HCNC

## 2024-04-29 RX ORDER — BENZONATATE 100 MG/1
100 CAPSULE ORAL 3 TIMES DAILY PRN
Qty: 21 CAPSULE | Refills: 0 | Status: SHIPPED | OUTPATIENT
Start: 2024-04-29 | End: 2024-05-09

## 2024-04-29 RX ORDER — FAMOTIDINE 10 MG/ML
20 INJECTION INTRAVENOUS
Status: COMPLETED | OUTPATIENT
Start: 2024-04-29 | End: 2024-04-29

## 2024-04-29 RX ADMIN — FAMOTIDINE 20 MG: 10 INJECTION, SOLUTION INTRAVENOUS at 09:04

## 2024-04-29 NOTE — FIRST PROVIDER EVALUATION
"Medical screening examination initiated.  I have conducted a focused provider triage encounter, findings are as follows:    Brief history of present illness:  58 year old ESRD-HD, dialyzed today presents for evaluation of burning chest sensation, abdominal pain and cough    Vitals:    04/29/24 1754   BP: (!) 163/73   Pulse: 86   Resp: (!) 26   Temp: 98.3 °F (36.8 °C)   TempSrc: Oral   SpO2: 99%   Weight: 81.6 kg (180 lb)   Height: 5' 4" (1.626 m)       Pertinent physical exam:  chronically ill appearing, fatigued    Brief workup plan:  labs, cxr, EKG, troponin, blood cx    Preliminary workup initiated; this workup will be continued and followed by the physician or advanced practice provider that is assigned to the patient when roomed.  "

## 2024-04-30 VITALS
OXYGEN SATURATION: 100 % | BODY MASS INDEX: 30.73 KG/M2 | HEIGHT: 64 IN | HEART RATE: 81 BPM | WEIGHT: 180 LBS | RESPIRATION RATE: 20 BRPM | DIASTOLIC BLOOD PRESSURE: 71 MMHG | SYSTOLIC BLOOD PRESSURE: 153 MMHG | TEMPERATURE: 98 F

## 2024-04-30 LAB
OHS QRS DURATION: 92 MS
OHS QRS DURATION: 94 MS
OHS QTC CALCULATION: 497 MS
OHS QTC CALCULATION: 502 MS

## 2024-04-30 NOTE — ED PROVIDER NOTES
Encounter Date: 4/29/2024       History     Chief Complaint   Patient presents with    Shortness of Breath     Sob, coughing up yellow, dialysis yest     This patient is a 58-year-old female Past Medical History:  09/09/2020: Acute hyperkalemia  10/08/2021: Acute on chronic diastolic heart failure  11/08/2017: Anemia of chronic disorder      Comment:  Overview:  Added automatically from request for surgery                [id number]  No date: Anemia of chronic renal failure, stage 5  No date: Anxiety  05/14/2019: Cyst of left ovary  07/05/2019: Cyst of left ovary  01/04/2013: Dyslipidemia  10/08/2020: Encounter for screening colonoscopy  08/20/2020: End-stage renal disease on hemodialysis  09/28/2017: ESRD on hemodialysis  10/30/2019: Hematuria  04/07/2020: History of COVID-19  10/06/2023: Hospital discharge follow-up  09/28/2017: Hx of sinus bradycardia  03/29/2017: Hyperkalemia  07/22/2021: Hypertensive urgency  01/04/2013: Peripheral neuropathy  10/08/2020: Preoperative testing  08/17/2020: Renovascular hypertension  05/22/2018: S/P dilation and curettage  11/28/2023: Screening for colon cancer  05/22/2018: Thickened endometrium  No date: Thyroid nodule  12/13/2021: Thyroid nodule  04/05/2013: Uncontrolled type 2 diabetes mellitus      Comment:  Overview:  poc a1c today 11.2-270/ up from 10.8-263, Pt.               has very very stressed/ was incarcerated/marital stress/                will current meds/ less stress now/ will monitor.  Monday Wednesday Friday dialysis presenting to the emergency department complaints of productive cough with clear sputum, runny nose, sore throat that has been present over the last 3 days.  She reports burning style, central chest pain worsened after receiving a full round of dialysis today.  She does report a previous history of GERD but now feels pain that is more localized to the area around her belly button.  She denies diarrhea, known sick contacts, fever, wheezing.  She reports  she was unable to tolerate Robitussin recommended by her primary care doctor.  She denies extremity swelling that is new or past history of DVT or PE.      Review of patient's allergies indicates:  No Known Allergies  Past Medical History:   Diagnosis Date    Acute hyperkalemia 2020    Acute on chronic diastolic heart failure 10/08/2021    Anemia of chronic disorder 2017    Overview:  Added automatically from request for surgery [id number]    Anemia of chronic renal failure, stage 5     Anxiety     Cyst of left ovary 2019    Cyst of left ovary 2019    Dyslipidemia 2013    Encounter for screening colonoscopy 10/08/2020    End-stage renal disease on hemodialysis 2020    ESRD on hemodialysis 2017    Hematuria 10/30/2019    History of COVID-19 2020    Hospital discharge follow-up 10/06/2023    Hx of sinus bradycardia 2017    Hyperkalemia 2017    Hypertensive urgency 2021    Peripheral neuropathy 2013    Preoperative testing 10/08/2020    Renovascular hypertension 2020    S/P dilation and curettage 2018    Screening for colon cancer 2023    Thickened endometrium 2018    Thyroid nodule     Thyroid nodule 2021    Uncontrolled type 2 diabetes mellitus 2013    Overview:  poc a1c today 11.2-270/ up from 10.8-263, Pt. has very very stressed/ was incarcerated/marital stress/ will current meds/ less stress now/ will monitor.     Past Surgical History:   Procedure Laterality Date    AV FISTULA PLACEMENT      right arm     SECTION      x1    COLONOSCOPY N/A 2018    Procedure: COLONOSCOPY;  Surgeon: Pankaj Hinojosa MD;  Location: [facility] PHIL (4TH FLR);  Service: Endoscopy;  Laterality: N/A;  dialysis pt/labs prior to colon/MS    COLONOSCOPY N/A 2023    Procedure: COLONOSCOPY;  Surgeon: Angel Art MD;  Location: [facility] PHIL (2ND FLR);  Service: Endoscopy;  Laterality: N/A;  Ref By:  Maty Gayle MD  Procedure:  Colonoscopy  Diagnosis: endounter for preventive health   Procedure Timing: pt. pref  Provider: Dr. turner   Location: [facility] 4-Endo   Prep Specifications: Standard prep   Additional Info: Dialysis patient lab pending    CYSTOSCOPY W/ RETROGRADES Bilateral 8/20/2020    Procedure: CYSTOSCOPY, WITH RETROGRADE PYELOGRAM;  Surgeon: Douglas Abraham MD;  Location: [facility] 1ST FLR;  Service: Urology;  Laterality: Bilateral;  45 min    FISTULOGRAM Right 9/9/2020    Procedure: Fistulogram;  Surgeon: Lester Gómze MD;  Location: [facility] CATH LAB;  Service: Nephrology;  Laterality: Right;    HYSTERECTOMY      IMPLANTATION OF COCHLEAR PROSTHESIS Right 6/28/2021    Procedure: INSERTION, PROSTHESIS, COCHLEAR;  Surgeon: Ramiro Zuleta MD;  Location: [facility] OR 2ND FLR;  Service: ENT;  Laterality: Right;  COCHLEAR BABAK    IMPLANTATION OF COCHLEAR PROSTHESIS Right 6/30/2021    Procedure: INSERTION, PROSTHESIS, COCHLEAR;  Surgeon: Ramiro Zuleta MD;  Location: [facility] OR 2ND FLR;  Service: ENT;  Laterality: Right;  COCHLEAR AMERICAS    ROBOT-ASSISTED LAPAROSCOPIC ABDOMINAL HYSTERECTOMY USING DA NATALYA XI N/A 7/5/2019    Procedure: XI ROBOTIC HYSTERECTOMY;  Surgeon: Trice Lei MD;  Location: [facility];  Service: OB/GYN;  Laterality: N/A;    ROBOT-ASSISTED LAPAROSCOPIC NEPHRECTOMY Right 10/8/2020    Procedure: ROBOTIC NEPHRECTOMY;  Surgeon: Douglas Abraham MD;  Location: [facility] 2ND FLR;  Service: Urology;  Laterality: Right;  3.5hrs gen with regional     ROBOT-ASSISTED LAPAROSCOPIC SALPINGO-OOPHORECTOMY USING DA NATALYA XI Bilateral 7/5/2019    Procedure: XI ROBOTIC SALPINGO-OOPHORECTOMY;  Surgeon: Trice Lei MD;  Location: [facility];  Service: OB/GYN;  Laterality: Bilateral;    TUBAL LIGATION      Vascular access surgeries      x5     Family History   Problem Relation Name Age of Onset    Cancer Mother      Ovarian cancer Mother      Cancer Sister      Ovarian cancer Sister      Lung cancer Sister      Macular degeneration Sister       Cancer Sister      Ovarian cancer Sister      Diabetes Sister      Diabetes Brother      Breast cancer Neg Hx      Colon cancer Neg Hx      Cervical cancer Neg Hx      Endometrial cancer Neg Hx      Vaginal cancer Neg Hx      Thyroid disease Neg Hx      Glaucoma Neg Hx      Retinal detachment Neg Hx       Social History     Tobacco Use    Smoking status: Never    Smokeless tobacco: Never   Substance Use Topics    Alcohol use: No    Drug use: No     Review of Systems   Respiratory:  Positive for cough and shortness of breath.        Physical Exam     Initial Vitals [04/29/24 1754]   BP Pulse Resp Temp SpO2   (!) 163/73 86 (!) 26 98.3 °F (36.8 °C) 99 %      MAP       --         Physical Exam    Nursing note and vitals reviewed.  Constitutional: Vital signs are normal. She appears well-nourished.   HENT:   Head: Normocephalic and atraumatic.   Eyes: Conjunctivae and EOM are normal.   Neck: Neck supple. No thyroid mass present.   Normal range of motion.  Cardiovascular:  Normal rate, regular rhythm and normal heart sounds.     Exam reveals no gallop and no friction rub.       No murmur heard.  Pulmonary/Chest: Breath sounds normal. No respiratory distress.   Abdominal: Abdomen is soft. Bowel sounds are normal. There is no abdominal tenderness.   Musculoskeletal:         General: No tenderness.      Cervical back: Normal range of motion and neck supple.      Comments: Right upper extremity AV fistula with palpable thrill     Neurological: She is alert and oriented to person, place, and time. She has normal strength. No cranial nerve deficit or sensory deficit.   Skin: Skin is warm and dry. No rash noted. No erythema.   Psychiatric: She has a normal mood and affect. Her speech is normal. Cognition and memory are normal.         ED Course   Procedures  Labs Reviewed   CBC W/ AUTO DIFFERENTIAL - Abnormal; Notable for the following components:       Result Value    MCH 26.1 (*)     RDW 19.9 (*)     All other components  within normal limits   COMPREHENSIVE METABOLIC PANEL - Abnormal; Notable for the following components:    Glucose 124 (*)     BUN 38 (*)     Creatinine 9.1 (*)     Total Protein 8.7 (*)     eGFR 4.6 (*)     Anion Gap 17 (*)     All other components within normal limits   LIPASE - Abnormal; Notable for the following components:    Lipase 99 (*)     All other components within normal limits   CULTURE, BLOOD    Narrative:     Blood Culture #1   CULTURE, BLOOD    Narrative:     Blood Culture #2   INFLUENZA A & B BY MOLECULAR   TROPONIN I   LACTIC ACID, PLASMA   MAGNESIUM   PHOSPHORUS   SARS-COV-2 RNA AMPLIFICATION, QUAL        ECG Results              EKG 12-lead (Final result)        Collection Time Result Time QRS Duration OHS QTC Calculation    04/29/24 20:48:27 04/30/24 13:31:20 92 502                     Final result by Interface, Lab In [facility] (04/30/24 13:31:27)                   Narrative:    Test Reason : R06.02,    Vent. Rate : 082 BPM     Atrial Rate : 082 BPM     P-R Int : 134 ms          QRS Dur : 092 ms      QT Int : 430 ms       P-R-T Axes : 068 -54 074 degrees     QTc Int : 502 ms    Normal sinus rhythm  Incomplete right bundle branch block  Left anterior fascicular block  Prolonged QT  Abnormal ECG  When compared with ECG of 29-APR-2024 17:58,  No significant change was found  Confirmed by Tessa Wright MD ([id number]) on 4/30/2024 1:31:19 PM    Referred By: [email or web address]   SELF           Confirmed By:Tessa Wright MD                                     EKG 12-lead (Final result)        Collection Time Result Time QRS Duration OHS QTC Calculation    04/29/24 17:58:34 04/30/24 13:34:20 94 497                     Final result by Interface, Lab In [facility] (04/30/24 13:34:26)                   Narrative:    Test Reason :     Vent. Rate : 085 BPM     Atrial Rate : 085 BPM     P-R Int : 132 ms          QRS Dur : 094 ms      QT Int : 418 ms       P-R-T Axes : 069 -42 096 degrees     QTc Int : 497 ms    Normal  sinus rhythm  Possible Left atrial enlargement  Left axis deviation  Incomplete right bundle branch block  Cannot rule out Anterior infarct ,age undetermined  Abnormal ECG  When compared with ECG of 08-MAR-2024 15:31,  Incomplete right bundle branch block is now Present  Confirmed by Tessa Wright MD ([id number]) on 4/30/2024 1:34:16 PM    Referred By: [email or web address]   SELF           Confirmed By:Tessa Wright MD                                  Imaging Results              CT Abdomen Pelvis  Without Contrast (Final result)  Result time 04/29/24 23:27:27      Final result by Yohan Spear MD (04/29/24 23:27:27)                   Impression:      No acute abdominopelvic findings.    Stable 2 cm solid-appearing lesion in the lower left kidney, stable in size dating back to 2020 CT, potentially a complex cyst given absence of intralesional enhancement on CT with and without contrast dated 05/06/2021.  Continued renal ultrasound follow-up could be considered to evaluate for solid component.    Cholelithiasis and colonic diverticulosis.    Other stable findings included in the body of the report.    Electronically signed by resident: Mario Alberto Massey  Date:    04/29/2024  Time:    22:42    Electronically signed by: Yohan Spear MD  Date:    04/29/2024  Time:    23:27               Narrative:    EXAMINATION:  CT ABDOMEN PELVIS WITHOUT CONTRAST    CLINICAL HISTORY:  Abdominal pain, acute, nonlocalized;Nausea/vomiting;    TECHNIQUE:  Axial images of the abdomen and pelvis were acquired without the use of IV contrast. No oral contrast was administered.  Coronal and sagittal reconstructions were also obtained.    COMPARISON:  CT abdomen pelvis 11/09/2023, 07/28/2020.  CT abdomen with and without contrast, 05/06/2021.    FINDINGS:  LUNG BASES: Heart is borderline enlarged.  Few bandlike opacities in the left lung, likely subsegmental atelectasis and/or scarring.    HEPATOBILIARY: Liver is normal in size and attenuation.  Stable  1.9 cm hypodense lesion at the hepatic dome.  No new focal hepatic lesions on this non-contrast examination.  Cholelithiasis.  No biliary duct dilatation.    SPLEEN: Normal size.    PANCREAS: No focal masses or ductal dilatation.    ADRENALS: No adrenal nodules.    KIDNEYS/URETERS: Right kidney is absent.  Multiple simple to minimally complex cystic lesions in the left kidney.  Stable 2 cm solid-appearing lesion along the anteromedial aspect of the left kidney lower pole, stable in size dating back to 2020 CT.  Scattered cortical calcifications throughout the left kidney.  No hydronephrosis.    BLADDER/PELVIC ORGANS: Bladder is nondistended limiting evaluation.  Uterus surgically absent.    PERITONEUM / RETROPERITONEUM: No free air or fluid.    LYMPH NODES: No lymphadenopathy.    VESSELS: Moderate calcified atherosclerosis including involvement of the several aortic visceral branches.  No aortic aneurysm.    GI TRACT: Small hiatal hernia.  The visualized loops of small and large bowel are normal in caliber.  No wall thickening.  Scattered colonic diverticula.  Appendix appears normal.    SOFT TISSUES: Similar small fat containing umbilical and paraumbilical hernias.  Diastasis recti.    BONES: Similar mild superior endplate irregularity involving the L1 and T12 vertebral bodies.  Mild degenerative change of the visualized spine.                                       X-Ray Chest AP Portable (Final result)  Result time 04/29/24 20:35:31      Final result by Jet Blanchard MD (04/29/24 20:35:31)                   Impression:      1. Interstitial findings are concerning for edema noting there may be superimposed effusions.  Correlation is advised noting artifact from habitus.      Electronically signed by: Jet Blanchard MD  Date:    04/29/2024  Time:    20:35               Narrative:    EXAMINATION:  XR CHEST AP PORTABLE    CLINICAL HISTORY:  shortness of breath;    TECHNIQUE:  Single frontal view of the chest  was performed.    COMPARISON:  03/08/2024    FINDINGS:  The cardiomediastinal silhouette is not enlarged, magnified by technique noting calcification of the aorta..  There is obscuration of the costophrenic angles, may reflect effusions noting overlying habitus limits evaluation..  The trachea is midline.  The lungs are symmetrically expanded bilaterally with mildly coarse interstitial attenuation..  No large focal consolidation seen.  There is no pneumothorax.  The osseous structures are remarkable for degenerative change..                                       Medications   famotidine (PF) injection 20 mg (20 mg Intravenous Given 4/29/24 2146)     Medical Decision Making  This patient was emergently assessed shortly after arrival.  Initial vital signs are stable.  The patient has normal oxygen saturation on room air and exhibits normal work of breathing.  She has not orthopneic.  She is reporting some concern to appears associated with sinus and upper airway congestion, cough, sore throat.  Differential diagnoses include, but not limited to, acute viral URI, pneumonia, acute decompensated congestive heart failure, myocarditis, atypical ACS, pneumothorax, pleural effusion, pancreatitis, gastritis, esophagitis, hollow viscus perforation.  Viral testing are negative.  Screening labs including comprehensive metabolic panel appears stable for the patient including evidence of ESRD without acute electrolyte abnormality.  CT scan obtained rule out evidence of atypical surgical disease including acute cholecystitis, cholangitis, ulcer perf.  It is negative for acute abdominal pelvic pathology.  Chest x-ray does show some findings that maybe concerning for edema however these findings may be chronic and the patient exhibits stable breathing course.  Patient is stable at the time of turnover to oncoming physician.  Anticipate discharge with close follow-up and resuming hemodialysis as recommended.    Amount and/or  Complexity of Data Reviewed  Radiology: ordered.    Risk  Prescription drug management.                                      Clinical Impression:  Final diagnoses:  [R06.02] Shortness of breath  [E87.70] Hypervolemia, unspecified hypervolemia type (Primary)          ED Disposition Condition    Discharge Stable          ED Prescriptions       Medication Sig Dispense Start Date End Date Auth. Provider    benzonatate (TESSALON) 100 MG capsule Take 1 capsule (100 mg total) by mouth 3 (three) times daily as needed for Cough. 21 capsule 4/29/2024 5/9/2024 Carmencita Starr MD          Follow-up Information       Follow up With Specialties Details Why Contact Info Additional Information    [facility] - Nephrology 5th Fl Nephrology  chronic dialysis patient f/u [id number]  [facility] [address] [phone] Nephrology - Main Building, 5th Floor Please park in [facility] and take Clinic elevator             Xavi Holbrook MD  05/01/24 [id number]

## 2024-04-30 NOTE — ED NOTES
Jael Hall, a 58 y.o. female presents to the ED w/ complaint of shortness of breath. Pt reports she went to dialysis today, however, she still feels SOB. Pt reported she is having epigastric chest pain. Pt stated it feels like her chest is burning. Productive cough with yellow sputum.    Adult Physical Assessment  LOC: Jael Hall, 58 y.o. female verified via two identifiers.  The patient is awake, alert, oriented and speaking appropriately at this time.  APPEARANCE: Patient resting comfortably and appears to be in no acute distress at this time. Patient is clean and well groomed, patient's clothing is properly fastened.  SKIN:The skin is warm and dry, color consistent with ethnicity, patient has normal skin turgor and moist mucus membranes, skin intact, no breakdown or brusing noted.  MUSCULOSKELETAL: Patient moving all extremities well, no obvious swelling or deformities noted.  RESPIRATORY: Airway is open and patent, respirations are spontaneous, patient has a normal effort and rate, no accessory muscle use noted. Pt feels SOB. Productive cough with yellow sputum.  CARDIAC: Patient has a normal rate and rhythm, no periphreal edema noted in any extremity, capillary refill < 3 seconds in all extremities. Epigastric chest pain w/ a burning sensation.   ABDOMEN: Soft and non tender to palpation, no abdominal distention noted.   NEUROLOGIC: Eyes open spontaneously, behavior appropriate to situation, follows commands, facial expression symmetrical, bilateral hand grasp equal and even, purposeful motor response noted, normal sensation in all extremities when touched with a finger.      Triage note:  Chief Complaint   Patient presents with    Shortness of Breath     Sob, coughing up yellow, dialysis yest     Review of patient's allergies indicates:  No Known Allergies  Past Medical History:   Diagnosis Date    Acute hyperkalemia 09/09/2020    Acute on chronic diastolic heart failure 10/08/2021    Anemia of chronic  disorder 11/08/2017    Overview:  Added automatically from request for surgery [id number]    Anemia of chronic renal failure, stage 5     Anxiety     Cyst of left ovary 05/14/2019    Cyst of left ovary 07/05/2019    Dyslipidemia 01/04/2013    Encounter for screening colonoscopy 10/08/2020    End-stage renal disease on hemodialysis 08/20/2020    ESRD on hemodialysis 09/28/2017    Hematuria 10/30/2019    History of COVID-19 04/07/2020    Hospital discharge follow-up 10/06/2023    Hx of sinus bradycardia 09/28/2017    Hyperkalemia 03/29/2017    Hypertensive urgency 07/22/2021    Peripheral neuropathy 01/04/2013    Preoperative testing 10/08/2020    Renovascular hypertension 08/17/2020    S/P dilation and curettage 05/22/2018    Screening for colon cancer 11/28/2023    Thickened endometrium 05/22/2018    Thyroid nodule     Thyroid nodule 12/13/2021    Uncontrolled type 2 diabetes mellitus 04/05/2013    Overview:  poc a1c today 11.2-270/ up from 10.8-263, Pt. has very very stressed/ was incarcerated/marital stress/ will current meds/ less stress now/ will monitor.

## 2024-04-30 NOTE — DISCHARGE INSTRUCTIONS

## 2024-04-30 NOTE — PROVIDER PROGRESS NOTES - EMERGENCY DEPT.
Encounter Date: 4/29/2024    ED Physician Progress Notes        ED Physician Hand-off Note:    ED Course: I assumed care of patient from off-going ED physician, Dr. Holbrook.  Briefly, Patient is presented for dyspnea, cough, burning chest pain, abdominal pain.    At the time of signout plan was pending CT AP.  No significant findings on CTAP- complex cyst on lower L kidney revisualized, discussed with patient. No pleuritic chest pain, satting 99% on RA, cough and congestion may be due to URI, allergies, vs needing more fluid taken off at dialysis. Patient reports she will discuss with dialysis clinic as she has been 1-1.5 lbs heavier than dry weight after dialysis, advised fluid restriction tomorrow before dialysis on Wed. Strict return precautions for dyspnea, chest pain, syncope. No significant consolidation on CXR, low concern for pneumonia.    Disposition: likely discharge if negative findings on CTAP    Patient comfortable with discharge. Patient counseled regarding exam, results, diagnosis, treatment, and plan.    Impression: cough, congestion    Final diagnoses:  [R06.02] Shortness of breath  [E87.70] Hypervolemia, unspecified hypervolemia type (Primary)

## 2024-05-01 ENCOUNTER — PATIENT OUTREACH (OUTPATIENT)
Dept: EMERGENCY MEDICINE | Facility: HOSPITAL | Age: 59
End: 2024-05-01
Payer: MEDICARE

## 2024-05-01 NOTE — PROGRESS NOTES
Call placed per ED Navigator to f/u from last encounter. No answer. V/m left. ED navigator will follow-up with patient periodically.

## 2024-05-04 LAB
BACTERIA BLD CULT: NORMAL
BACTERIA BLD CULT: NORMAL

## 2024-05-13 NOTE — PROGRESS NOTES
"    Subjective:       Patient ID: Jael Hall is a 58 y.o. female.    Chief Complaint:  RCC f/u       History of Present Illness  HPI  Patient is a 58 y.o. female who is established to our clinic and referred by Natalee Resendiz NP for evaluation of a right renal mass.  She underwent a right robotic nephrectomy on 10/8/20.  She returns today to review surveillance imaging.      She had a CXR on 8/14/22  with no obvious pulmonary mets.    MRI from 11/3/22 was previously reviewed and shared with the patient and shows a heterogeneous left upper pole small renal mass---concerning for possible neoplasm.  An MRI from 5/16/23 revealed no interval growth of the left upper pole renal mass.      CXR from 5/14/24 was independently reviewed today and reveals no obvious mets, official report pending.   MRI of the abdomen from 5/14/24 was independently reviewed today and shared with the patient and shows stable left renal mass, polycystic kidney---official report pending.       Patient is a never smoker.     Has a h/o hysterectomy---no other abdominal surgeries.     Patient has ESRD and is on HD---Monday, Wednesday, Friday through a right wrist AVF.  She has DM and HTN.     +fh of ovarian cancer---sister.        PATHOLOGY:    Final Pathologic Diagnosis RIGHT KIDNEY:   RENAL CELL CARCINOMA WITH NO CARCINOMA IDENTIFIED IN PERINEPHRIC ADIPOSE   TISSUE OR IN MARGINS   END-STAGE DIABETIC RENAL DISEASE    Comment: Interp By Harsh Adame M.D., Signed on 10/12/2020 at 13:41   [facility]/AP Number:  [id number]   Received in formalin labeled "1.  Right kidney" is a 663 g, 16.5 x 13.5 x 4.0   cm right total nephrectomy specimen consisting of a 5.8 cm in length by 0.6   cm in diameter ureter, 4.0 cm in length by 1.2 cm in diameter renal artery, a   2.5 cm in length by 0.9 cm in diameter renal vein, and an 11.0 x 7.0 x 5.8 cm   kidney encapsulated in 3.5 cm-thick perinephric fat.  The ureter is opened to   reveal a " patent, pinpoint, grossly unremarkable lumen.  The renal vessels are   patent.  The kidney is bivalved to reveal a 2.7 x 2.4 x 2.3 cm variegated,   tan-yellow, focally hemorrhagic, partially glistening and gelatinous   well-circumscribed mass within the cortical midpole of the kidney.  The mass   grossly extends less than 0.1 cm from the renal capsule, 3.0 cm from Gerota's   fascia, 0.5 cm from the renal sinus fat and renal pelvis.  No involvement of   renal vein is grossly identified.  Additionally, there is a 0.8 x 0.7 x 0.6   cm red-brown, hemorrhagic cystic nodule grossly abutting the upper pole of   the renal capsule, and grossly extending 3.1 cm from the primary mass.  The   remaining renal parenchyma consist of multiple simple cysts ranging from   0.1-1.0 cm in diameter.  The renal parenchyma is tan-pink with distinct   corticomedullary junctions.  Sections of [id number] are submitted as follows:   Cassette key:   A:  Ureter and renal vessel resection margins   B-C:  Midpole renal mass with adjacent renal sinus and renal capsule   D:  Mid pole renal mass with adjacent grossly uninvolved renal parenchyma   E:  Additional upper pole hemorrhagic cystic nodule with adjacent renal   parenchyma   F:  Upper pole renal parenchyma with cysts   G:  Lower pole renal parenchyma with cysts   Gross by: Chastity Moss    Microscopic Exam Kidney:    Right nephrectomy   Tumor site:    Renal parenchyma   Tumor size:    2.7 cm.   Tumor focality:    There is the principal mass but in addition is a 1 mm   separate papillary lesion.   Anatomic extent of tumor:    Limited to the kidney   Histologic type:    Renal carcinoma, clear cell variant   ndGndrndanddndend:nd nd2nd of 4.   Sarcomatoid features:    Not identified.   Rhabdoid Features:  Not identified   Tumor Necrosis:  Not identified   Adrenal gland:    Absent   Margins:    No carcinoma identified in margins   Kidney apart from tumor:    This is an end-stage kidney with glomerular   changes typical  "of diabetic disease   Hilar lymph nodes:    Absent   Pathologic stage (AJCC 2010): pT1a pNX pMX   Block for possible further studies: 1B          Review of Systems  Review of Systems  All other systems reviewed and negative except pertinent positives noted in HPI.       Objective:     Physical Exam  Constitutional:       General: She is not in acute distress.     Appearance: She is well-developed.   HENT:      Head: Normocephalic and atraumatic.   Eyes:      General: No scleral icterus.  Neck:      Trachea: No tracheal deviation.   Pulmonary:      Effort: Pulmonary effort is normal. No respiratory distress.   Neurological:      Mental Status: She is alert and oriented to person, place, and time.   Psychiatric:         Behavior: Behavior normal.         Thought Content: Thought content normal.         Judgment: Judgment normal.         Lab Review  Lab Results   Component Value Date    COLORU Yellow 01/23/2023    SPECGRAV 1.020 01/23/2023    PHUR 7.0 01/23/2023    NITRITE Negative 01/23/2023    KETONESU Negative 01/23/2023    UROBILINOGEN Negative 01/23/2023         Assessment:        1. Renal mass    2. Renal cell carcinoma of right kidney    3. ESRD (end stage renal disease) on dialysis    4. Primary hypertension                Plan:     Renal mass    Renal cell carcinoma of right kidney    ESRD (end stage renal disease) on dialysis    Primary hypertension          - pathology again reviewed.  --imaging reviewed as per HPI.       -plan pending the official read of the MRI.   Patient prefers to observe for now.     -continue dialysis for ESRD  Per transplant committee review 12/2022:  "Native Organ Dx: Diabetes Mellitus - Type II        Not approved for LRD/CAD transplant due to iliac calcifications prohibitive for transplant."       ---BP reviewed today and elevated  -continue current medications  -encouraged f/u and discussion of BP with PCP.               "

## 2024-05-14 ENCOUNTER — OFFICE VISIT (OUTPATIENT)
Dept: UROLOGY | Facility: CLINIC | Age: 59
End: 2024-05-14
Payer: MEDICARE

## 2024-05-14 ENCOUNTER — HOSPITAL ENCOUNTER (OUTPATIENT)
Dept: RADIOLOGY | Facility: HOSPITAL | Age: 59
Discharge: HOME | End: 2024-05-14
Attending: UROLOGY
Payer: MEDICARE

## 2024-05-14 VITALS
WEIGHT: 189.63 LBS | HEART RATE: 83 BPM | DIASTOLIC BLOOD PRESSURE: 82 MMHG | HEIGHT: 64 IN | BODY MASS INDEX: 32.37 KG/M2 | SYSTOLIC BLOOD PRESSURE: 150 MMHG

## 2024-05-14 DIAGNOSIS — N28.89 RENAL MASS: Primary | ICD-10-CM

## 2024-05-14 DIAGNOSIS — I10 PRIMARY HYPERTENSION: ICD-10-CM

## 2024-05-14 DIAGNOSIS — N28.89 OTHER SPECIFIED DISORDERS OF KIDNEY AND URETER: ICD-10-CM

## 2024-05-14 DIAGNOSIS — C64.1 RENAL CELL CARCINOMA OF RIGHT KIDNEY: ICD-10-CM

## 2024-05-14 DIAGNOSIS — Z99.2 ESRD (END STAGE RENAL DISEASE) ON DIALYSIS: Chronic | ICD-10-CM

## 2024-05-14 DIAGNOSIS — N18.6 ESRD (END STAGE RENAL DISEASE) ON DIALYSIS: Chronic | ICD-10-CM

## 2024-05-14 PROCEDURE — 3072F LOW RISK FOR RETINOPATHY: CPT | Mod: HCNC,CPTII,S$GLB, | Performed by: UROLOGY

## 2024-05-14 PROCEDURE — 3079F DIAST BP 80-89 MM HG: CPT | Mod: HCNC,CPTII,S$GLB, | Performed by: UROLOGY

## 2024-05-14 PROCEDURE — 1159F MED LIST DOCD IN RCRD: CPT | Mod: HCNC,CPTII,S$GLB, | Performed by: UROLOGY

## 2024-05-14 PROCEDURE — 4010F ACE/ARB THERAPY RXD/TAKEN: CPT | Mod: HCNC,CPTII,S$GLB, | Performed by: UROLOGY

## 2024-05-14 PROCEDURE — 99214 OFFICE O/P EST MOD 30 MIN: CPT | Mod: HCNC,S$GLB,, | Performed by: UROLOGY

## 2024-05-14 PROCEDURE — 74181 MRI ABDOMEN W/O CONTRAST: CPT | Mod: 26,,, | Performed by: INTERNAL MEDICINE

## 2024-05-14 PROCEDURE — 1160F RVW MEDS BY RX/DR IN RCRD: CPT | Mod: HCNC,CPTII,S$GLB, | Performed by: UROLOGY

## 2024-05-14 PROCEDURE — 71046 X-RAY EXAM CHEST 2 VIEWS: CPT | Mod: 26,,, | Performed by: RADIOLOGY

## 2024-05-14 PROCEDURE — 3008F BODY MASS INDEX DOCD: CPT | Mod: HCNC,CPTII,S$GLB, | Performed by: UROLOGY

## 2024-05-14 PROCEDURE — 3077F SYST BP >= 140 MM HG: CPT | Mod: HCNC,CPTII,S$GLB, | Performed by: UROLOGY

## 2024-05-14 PROCEDURE — 74181 MRI ABDOMEN W/O CONTRAST: CPT | Mod: TC

## 2024-05-14 PROCEDURE — 3044F HG A1C LEVEL LT 7.0%: CPT | Mod: HCNC,CPTII,S$GLB, | Performed by: UROLOGY

## 2024-05-14 PROCEDURE — 71046 X-RAY EXAM CHEST 2 VIEWS: CPT | Mod: TC

## 2024-05-14 PROCEDURE — 99999 PR PBB SHADOW E&M-EST. PATIENT-LVL III: CPT | Mod: PBBFAC,HCNC,, | Performed by: UROLOGY

## 2024-06-06 ENCOUNTER — PATIENT OUTREACH (OUTPATIENT)
Dept: EMERGENCY MEDICINE | Facility: HOSPITAL | Age: 59
End: 2024-06-06
Payer: MEDICARE

## 2024-06-06 NOTE — PROGRESS NOTES
Call placed per ED Navigator to f/u from last encounter. No answer. V/m left. ED navigator will follow-up with patient as needed.

## 2024-06-27 ENCOUNTER — CLINICAL SUPPORT (OUTPATIENT)
Dept: AUDIOLOGY | Facility: CLINIC | Age: 59
End: 2024-06-27
Payer: MEDICARE

## 2024-06-27 DIAGNOSIS — H90.3 SENSORINEURAL HEARING LOSS, BILATERAL: Primary | ICD-10-CM

## 2024-06-27 DIAGNOSIS — H93.293 IMPAIRMENT OF AUDITORY DISCRIMINATION OF BOTH EARS: ICD-10-CM

## 2024-06-27 PROCEDURE — 92604 REPROGRAM COCHLEAR IMPLT 7/>: CPT | Mod: HCNC,S$GLB,,

## 2024-06-27 NOTE — PROGRESS NOTES
Cochlear Implant Programming Session     Right Ear:  Dr. Zuleta  :  [employer]  Internal Device/Serial Number:  [id number]   Processor:  [id number]   SN of Processors: [id number]  DOS:  6/30/21  DIS:  7/20/21  Processor Color: Black  Magnet Strength: 5i  Coil Length:  6cm  Battery Type:  Standard rechargeable  Warranty:  5 year - July 19, 2024    Ms. Jael Hall was seen today for a cochlear implant follow up appointment. Ms. Hall continues to report that at times her processor leaves her with inconsistent sound. This occurred multiple times throughout the appointment. She is currently wearing the strongest magnet, but it does not appear to be strong enough to attach through her wig cap. Discussed with her that the device needs to be placed closer to the skin to get better contact. She expressed understanding. Ms. Hall reported that she has been hearing constant static as well.     Ms. Hall's processor was connected to the software. Impedances were ran and within normal limits. C levels were close to exceeding compliance, and pulse width was increased to 100. T levels were measured and increased 5 clinical units across the electrodes. Ms. Hall no longer heard the static noise. No other adjustments were made at this time.     Ms. Hall expressed concerns with her magnet and wondered if that could possibly be causing the intermittent sound quality. Discussed with her that is more than likely not the problem, but magnet was changed with one from stock. Coil was also changed. Ms. Hall reported an improvement in sound quality, but she was still having inconsistent. She was interested in sending her current processor off for repair before her warranty expires. Processor will be sent off.     Recommend:   repaired processor once it comes in   Continued consistent use of sound processors  Cochlear implant supplies as needed  Annual audiogram with CI programming

## 2024-07-02 ENCOUNTER — TELEPHONE (OUTPATIENT)
Dept: PRIMARY CARE CLINIC | Facility: CLINIC | Age: 59
End: 2024-07-02
Payer: MEDICARE

## 2024-07-16 ENCOUNTER — CLINICAL SUPPORT (OUTPATIENT)
Dept: AUDIOLOGY | Facility: CLINIC | Age: 59
End: 2024-07-16
Payer: MEDICARE

## 2024-07-16 DIAGNOSIS — H90.3 SENSORINEURAL HEARING LOSS (SNHL) OF BOTH EARS: Primary | ICD-10-CM

## 2024-07-16 DIAGNOSIS — H93.293 IMPAIRED AUDITORY DISCRIMINATION, BILATERAL: ICD-10-CM

## 2024-07-16 PROCEDURE — 99499 UNLISTED E&M SERVICE: CPT | Mod: HCNC,S$GLB,, | Performed by: AUDIOLOGIST

## 2024-07-16 NOTE — PROGRESS NOTES
Cochlear Implant Programming Session      Right Ear:  Dr. Zuleta  :  Cochlear [employer]  Internal Device/Serial Number:  [id number]   Processor:  [id number]   SN of Processors: [id number]  DOS:  6/30/21  DIS:  7/20/21  Processor Color: Black  Magnet Strength: 5i  Coil Length:  6cm  Battery Type:  Standard rechargeable  Warranty:  5 year - July 19, 2024     Ms. Jael Hall was seen today for a cochlear implant follow up appointment. Mrs. Hall reported she is still having retention issues that cause sound to be intermittent.     Patient's sound processors are working properly. She was counseled on importance of wearing coil under her wig cap as close to her skin as possible. It was noted that she has her hair braided and there is a braid right where the magnet sits. Patient was encouraged to either rearrange her tahir away from magnet site or shave a [place] around the magnet site to create a better connection between the two magnets. It was explained to the patient that as her wig cap and her wig shift and move throughout the day it will cause the magnet to move too and cause sounds to be intermittent. There is not a stronger magnet than a #5, so modifications to how she wears her hair and her processor need to be made in order for it to work properly.     No programming changes were done at today's appointment.     Recommend  Shave area around magnet site and wear coil underneath the wig cap  Annual evaluation  Follow-up programming as needed

## 2024-08-08 ENCOUNTER — HOSPITAL ENCOUNTER (EMERGENCY)
Facility: HOSPITAL | Age: 59
Discharge: HOME | End: 2024-08-08
Attending: STUDENT IN AN ORGANIZED HEALTH CARE EDUCATION/TRAINING PROGRAM
Payer: MEDICARE

## 2024-08-08 VITALS
HEART RATE: 81 BPM | SYSTOLIC BLOOD PRESSURE: 116 MMHG | RESPIRATION RATE: 18 BRPM | OXYGEN SATURATION: 97 % | BODY MASS INDEX: 31.99 KG/M2 | HEIGHT: 64 IN | DIASTOLIC BLOOD PRESSURE: 69 MMHG | TEMPERATURE: 98 F | WEIGHT: 187.38 LBS

## 2024-08-08 DIAGNOSIS — M54.2 NECK PAIN: ICD-10-CM

## 2024-08-08 LAB
ALBUMIN SERPL BCP-MCNC: 3.5 G/DL (ref 3.5–5.2)
ALP SERPL-CCNC: 67 U/L (ref 55–135)
ALT SERPL W/O P-5'-P-CCNC: 8 U/L (ref 10–44)
ANION GAP SERPL CALC-SCNC: 15 MMOL/L (ref 8–16)
AST SERPL-CCNC: 14 U/L (ref 10–40)
BASOPHILS # BLD AUTO: 0.08 K/UL (ref 0–0.2)
BASOPHILS NFR BLD: 0.7 % (ref 0–1.9)
BILIRUB SERPL-MCNC: 1.4 MG/DL (ref 0.1–1)
BUN SERPL-MCNC: 38 MG/DL (ref 6–20)
CALCIUM SERPL-MCNC: 9.9 MG/DL (ref 8.7–10.5)
CHLORIDE SERPL-SCNC: 97 MMOL/L (ref 95–110)
CO2 SERPL-SCNC: 27 MMOL/L (ref 23–29)
CREAT SERPL-MCNC: 9.2 MG/DL (ref 0.5–1.4)
DIFFERENTIAL METHOD BLD: ABNORMAL
EOSINOPHIL # BLD AUTO: 1.1 K/UL (ref 0–0.5)
EOSINOPHIL NFR BLD: 10.6 % (ref 0–8)
ERYTHROCYTE [DISTWIDTH] IN BLOOD BY AUTOMATED COUNT: 18.3 % (ref 11.5–14.5)
EST. GFR  (NO RACE VARIABLE): 4.5 ML/MIN/1.73 M^2
GLUCOSE SERPL-MCNC: 186 MG/DL (ref 70–110)
HCT VFR BLD AUTO: 21.7 % (ref 37–48.5)
HGB BLD-MCNC: 7.4 G/DL (ref 12–16)
IMM GRANULOCYTES # BLD AUTO: 0.04 K/UL (ref 0–0.04)
IMM GRANULOCYTES NFR BLD AUTO: 0.4 % (ref 0–0.5)
INFLUENZA A, MOLECULAR: NEGATIVE
INFLUENZA B, MOLECULAR: NEGATIVE
LYMPHOCYTES # BLD AUTO: 2.4 K/UL (ref 1–4.8)
LYMPHOCYTES NFR BLD: 22.7 % (ref 18–48)
MCH RBC QN AUTO: 28.4 PG (ref 27–31)
MCHC RBC AUTO-ENTMCNC: 34.1 G/DL (ref 32–36)
MCV RBC AUTO: 83 FL (ref 82–98)
MONOCYTES # BLD AUTO: 1.2 K/UL (ref 0.3–1)
MONOCYTES NFR BLD: 11 % (ref 4–15)
NEUTROPHILS # BLD AUTO: 5.9 K/UL (ref 1.8–7.7)
NEUTROPHILS NFR BLD: 54.6 % (ref 38–73)
NRBC BLD-RTO: 0 /100 WBC
OHS QRS DURATION: 92 MS
OHS QTC CALCULATION: 499 MS
PLATELET # BLD AUTO: 222 K/UL (ref 150–450)
PMV BLD AUTO: 10.8 FL (ref 9.2–12.9)
POTASSIUM SERPL-SCNC: 4.3 MMOL/L (ref 3.5–5.1)
PROT SERPL-MCNC: 8 G/DL (ref 6–8.4)
RBC # BLD AUTO: 2.61 M/UL (ref 4–5.4)
SARS-COV-2 RDRP RESP QL NAA+PROBE: NEGATIVE
SODIUM SERPL-SCNC: 139 MMOL/L (ref 136–145)
SPECIMEN SOURCE: NORMAL
T4 FREE SERPL-MCNC: 0.8 NG/DL (ref 0.71–1.51)
TROPONIN I SERPL DL<=0.01 NG/ML-MCNC: <0.006 NG/ML (ref 0–0.03)
TSH SERPL DL<=0.005 MIU/L-ACNC: 0.36 UIU/ML (ref 0.4–4)
WBC # BLD AUTO: 10.77 K/UL (ref 3.9–12.7)

## 2024-08-08 PROCEDURE — 80053 COMPREHEN METABOLIC PANEL: CPT | Mod: HCNC

## 2024-08-08 PROCEDURE — 25000003 PHARM REV CODE 250: Mod: HCNC

## 2024-08-08 PROCEDURE — 93010 ELECTROCARDIOGRAM REPORT: CPT | Mod: HCNC,,, | Performed by: INTERNAL MEDICINE

## 2024-08-08 PROCEDURE — 87502 INFLUENZA DNA AMP PROBE: CPT | Mod: HCNC

## 2024-08-08 PROCEDURE — U0002 COVID-19 LAB TEST NON-CDC: HCPCS | Mod: HCNC

## 2024-08-08 PROCEDURE — 85025 COMPLETE CBC W/AUTO DIFF WBC: CPT | Mod: HCNC

## 2024-08-08 PROCEDURE — 84443 ASSAY THYROID STIM HORMONE: CPT | Mod: HCNC

## 2024-08-08 PROCEDURE — 84484 ASSAY OF TROPONIN QUANT: CPT | Mod: HCNC

## 2024-08-08 PROCEDURE — 84439 ASSAY OF FREE THYROXINE: CPT | Mod: HCNC

## 2024-08-08 PROCEDURE — 99285 EMERGENCY DEPT VISIT HI MDM: CPT | Mod: 25,HCNC

## 2024-08-08 PROCEDURE — 93005 ELECTROCARDIOGRAM TRACING: CPT | Mod: HCNC

## 2024-08-08 RX ORDER — LIDOCAINE 50 MG/G
1 PATCH TOPICAL DAILY
Qty: 14 PATCH | Refills: 0 | Status: SHIPPED | OUTPATIENT
Start: 2024-08-08

## 2024-08-08 RX ORDER — OXYCODONE HYDROCHLORIDE 5 MG/1
5 TABLET ORAL
Status: COMPLETED | OUTPATIENT
Start: 2024-08-08 | End: 2024-08-08

## 2024-08-08 RX ORDER — ISOPROPYL ALCOHOL 70 ML/100ML
SWAB TOPICAL
COMMUNITY
Start: 2024-06-25

## 2024-08-08 RX ORDER — ACETAMINOPHEN 500 MG
1000 TABLET ORAL
Status: COMPLETED | OUTPATIENT
Start: 2024-08-08 | End: 2024-08-08

## 2024-08-08 RX ORDER — AMOXICILLIN 500 MG/1
TABLET, FILM COATED ORAL
Status: ON HOLD | COMMUNITY
Start: 2024-05-28 | End: 2024-08-12 | Stop reason: ALTCHOICE

## 2024-08-08 RX ORDER — LIDOCAINE 50 MG/G
1 PATCH TOPICAL
Status: DISCONTINUED | OUTPATIENT
Start: 2024-08-08 | End: 2024-08-08 | Stop reason: HOSPADM

## 2024-08-08 RX ORDER — ATORVASTATIN CALCIUM 40 MG/1
1 TABLET, FILM COATED ORAL DAILY
Status: ON HOLD | COMMUNITY
Start: 2024-06-24 | End: 2024-08-12 | Stop reason: SDUPTHER

## 2024-08-08 RX ORDER — LANCETS
EACH MISCELLANEOUS
COMMUNITY
Start: 2024-06-25

## 2024-08-08 RX ORDER — BLOOD-GLUCOSE METER
EACH MISCELLANEOUS
COMMUNITY
Start: 2024-06-25

## 2024-08-08 RX ORDER — BLOOD SUGAR DIAGNOSTIC
STRIP MISCELLANEOUS
COMMUNITY
Start: 2024-06-25

## 2024-08-08 RX ORDER — AMLODIPINE BESYLATE 10 MG/1
1 TABLET ORAL DAILY
Status: ON HOLD | COMMUNITY
Start: 2024-07-09 | End: 2024-08-12 | Stop reason: SDUPTHER

## 2024-08-08 RX ORDER — ACETAMINOPHEN AND CODEINE PHOSPHATE 300; 30 MG/1; MG/1
1 TABLET ORAL
Status: ON HOLD | COMMUNITY
Start: 2024-05-28 | End: 2024-08-12 | Stop reason: ALTCHOICE

## 2024-08-08 RX ORDER — BLOOD GLUCOSE CONTROL HIGH,LOW
EACH MISCELLANEOUS
COMMUNITY
Start: 2024-06-25

## 2024-08-08 RX ADMIN — OXYCODONE 5 MG: 5 TABLET ORAL at 11:08

## 2024-08-08 RX ADMIN — LIDOCAINE 5% 1 PATCH: 700 PATCH TOPICAL at 11:08

## 2024-08-08 RX ADMIN — ACETAMINOPHEN 1000 MG: 500 TABLET ORAL at 11:08

## 2024-08-08 NOTE — DISCHARGE INSTRUCTIONS
Diagnosis:  Neck pain.    Please call your primary care doctor to arrange follow-up as soon as possible for repeat evaluation    Take ibuprofen (also called Advil, Motrin) for your pain. This medicine is available over-the-counter in 200 mg tablets.  - You may take 600 mg every 6 hours, or 800 mg every 8 hours as needed   - Do not take more than this amount, as it can cause kidney problems, bleeding in your stomach, and other serious problems.   - Do not also take naproxen (Aleve) at the same time or on the same day  - If you have heart problems or uncontrolled high blood pressure, you should not take ibuprofen for more than 3 days without discussing with your doctor    If your pain is not controlled with ibuprofen, you may also take acetaminophen (also called Tylenol).  - You may take up to 1,000 mg of Tylenol every 6 hours as needed  - Do not take more than 4,000 mg in 24 hours (1 day) as this may cause liver damage  - Many other medicines include acetaminophen (Tylenol) such as: Norco, Vicodin, Tylenol #3, many cold medicines, etc.  - Please read all labels carefully and do not combine medicines that include acetaminophen.  - If you have a history of liver disease or drink alcohol heavily, do not take acetaminophen (Tylenol) since it can damage your liver      Tests today showed:   Labs Reviewed   CBC W/ AUTO DIFFERENTIAL - Abnormal       Result Value    WBC 10.77      RBC 2.61 (*)     Hemoglobin 7.4 (*)     Hematocrit 21.7 (*)     MCV 83      MCH 28.4      MCHC 34.1      RDW 18.3 (*)     Platelets 222      MPV 10.8      Immature Granulocytes 0.4      Gran # (ANC) 5.9      Immature Grans (Abs) 0.04      Lymph # 2.4      Mono # 1.2 (*)     Eos # 1.1 (*)     Baso # 0.08      nRBC 0      Gran % 54.6      Lymph % 22.7      Mono % 11.0      Eosinophil % 10.6 (*)     Basophil % 0.7      Differential Method Automated     COMPREHENSIVE METABOLIC PANEL - Abnormal    Sodium 139      Potassium 4.3      Chloride 97      CO2  27      Glucose 186 (*)     BUN 38 (*)     Creatinine 9.2 (*)     Calcium 9.9      Total Protein 8.0      Albumin 3.5      Total Bilirubin 1.4 (*)     Alkaline Phosphatase 67      AST 14      ALT 8 (*)     eGFR 4.5 (*)     Anion Gap 15     TSH - Abnormal    TSH 0.364 (*)    INFLUENZA A & B BY MOLECULAR    Influenza A, Molecular Negative      Influenza B, Molecular Negative      Flu A & B Source NP     TROPONIN I    Troponin I <0.006     SARS-COV-2 RNA AMPLIFICATION, QUAL    SARS-CoV-2 RNA, Amplification, Qual Negative     T4, FREE    Free T4 0.80       X-Ray Chest AP Portable   Final Result      Interstitial edema versus interstitial pneumonia.         Electronically signed by: Bernardo Winkler MD   Date:    08/08/2024   Time:    11:29          Treatments you had today:   Medications   LIDOcaine 5 % patch 1 patch (1 patch Transdermal Patch Applied 8/8/24 1119)   acetaminophen tablet 1,000 mg (1,000 mg Oral Given 8/8/24 1117)   oxyCODONE immediate release tablet 5 mg (5 mg Oral Given 8/8/24 1117)       Follow-Up Plan:  - Follow-up with primary care doctor within 3 - 5 days  - Additional testing and/or evaluation as directed by your primary doctor    Return to the Emergency Department for symptoms including but not limited to: worsening symptoms, shortness of breath or chest pain, vomiting with inability to hold down fluids, fevers greater than 100.4°F, passing out/fainting/unconsciousness, or other concerning symptoms.

## 2024-08-08 NOTE — ED PROVIDER NOTES
"Encounter Date: 8/8/2024       History     Chief Complaint   Patient presents with    Neck Pain     "I think my thyroid flaring up". Last dialysis session yesterday, took 2-3L off. Pt endorses swelling to bilateral shoulders and neck.      Jael Hall is a 58 y.o. female with PMH of ESRD on Monday/Wednesday/Friday dialysis, diabetes, hypertension, [place] [facility] [place].  Patient states that she has had bilateral neck pain, patient points to her bilateral trapezius muscles x1 week.  Patient is concerned that she may be having a fibroid flare.  States that she has been compliant with every session of her dialysis.  Reports that she may have ended 1 hour early yesterday, unclear why.  Denies fever, cough/congestion, rhinorrhea.  She has not had any nausea, vomiting, diarrhea.  Denies chest pain/abdominal pain or shortness of breath.        Review of patient's allergies indicates:  No Known Allergies  Past Medical History:   Diagnosis Date    Acute hyperkalemia 09/09/2020    Acute on chronic diastolic heart failure 10/08/2021    Anemia of chronic disorder 11/08/2017    Overview:  Added automatically from request for surgery [id number]    Anemia of chronic renal failure, stage 5     Anxiety     Cyst of left ovary 05/14/2019    Cyst of left ovary 07/05/2019    Dyslipidemia 01/04/2013    Encounter for screening colonoscopy 10/08/2020    End-stage renal disease on hemodialysis 08/20/2020    ESRD on hemodialysis 09/28/2017    Hematuria 10/30/2019    History of COVID-19 04/07/2020    Hospital discharge follow-up 10/06/2023    Hx of sinus bradycardia 09/28/2017    Hyperkalemia 03/29/2017    Hypertensive urgency 07/22/2021    Peripheral neuropathy 01/04/2013    Preoperative testing 10/08/2020    Renovascular hypertension 08/17/2020    S/P dilation and curettage 05/22/2018    Screening for colon cancer 11/28/2023    Thickened endometrium 05/22/2018    Thyroid nodule     Thyroid nodule 12/13/2021    Uncontrolled type " 2 diabetes mellitus 2013    Overview:  poc a1c today 11.2-270/ up from 10.8-263, Pt. has very very stressed/ was incarcerated/marital stress/ will current meds/ less stress now/ will monitor.     Past Surgical History:   Procedure Laterality Date    AV FISTULA PLACEMENT      right arm     SECTION      x1    COLONOSCOPY N/A 2018    Procedure: COLONOSCOPY;  Surgeon: Pankaj Hinojosa MD;  Location: [facility] ENDO (4TH FLR);  Service: Endoscopy;  Laterality: N/A;  dialysis pt/labs prior to colon/MS    COLONOSCOPY N/A 2023    Procedure: COLONOSCOPY;  Surgeon: Angel Turner MD;  Location: [facility] ENDO (2ND FLR);  Service: Endoscopy;  Laterality: N/A;  Ref By:  aMty Gayle MD  Procedure: Colonoscopy  Diagnosis: endounter for preventive health   Procedure Timing: pt. pref  Provider: Dr. turner   Location: [facility] 4-Endo   Prep Specifications: Standard prep   Additional Info: Dialysis patient lab pending    CYSTOSCOPY W/ RETROGRADES Bilateral 2020    Procedure: CYSTOSCOPY, WITH RETROGRADE PYELOGRAM;  Surgeon: Douglas Abraham MD;  Location: [facility] 1ST FLR;  Service: Urology;  Laterality: Bilateral;  45 min    FISTULOGRAM Right 2020    Procedure: Fistulogram;  Surgeon: Lester Gómez MD;  Location: [facility] CATH LAB;  Service: Nephrology;  Laterality: Right;    HYSTERECTOMY      IMPLANTATION OF COCHLEAR PROSTHESIS Right 2021    Procedure: INSERTION, PROSTHESIS, COCHLEAR;  Surgeon: Ramiro Zuleta MD;  Location: [facility] OR 2ND FLR;  Service: ENT;  Laterality: Right;  COCHLEAR BABAK    IMPLANTATION OF COCHLEAR PROSTHESIS Right 2021    Procedure: INSERTION, PROSTHESIS, COCHLEAR;  Surgeon: Ramiro Zuleta MD;  Location: [facility] OR 2ND FLR;  Service: ENT;  Laterality: Right;  COCHLEAR AMERICAS    ROBOT-ASSISTED LAPAROSCOPIC ABDOMINAL HYSTERECTOMY USING DA NATALYA XI N/A 2019    Procedure: XI ROBOTIC HYSTERECTOMY;  Surgeon: Trice Lei MD;  Location: [facility];  Service: OB/GYN;  Laterality: N/A;     ROBOT-ASSISTED LAPAROSCOPIC NEPHRECTOMY Right 10/8/2020    Procedure: ROBOTIC NEPHRECTOMY;  Surgeon: Douglas Abraham MD;  Location: [address];  Service: Urology;  Laterality: Right;  3.5hrs gen with regional     ROBOT-ASSISTED LAPAROSCOPIC SALPINGO-OOPHORECTOMY USING DA NATALYA XI Bilateral 7/5/2019    Procedure: XI ROBOTIC SALPINGO-OOPHORECTOMY;  Surgeon: Trice Lei MD;  Location: [facility];  Service: OB/GYN;  Laterality: Bilateral;    TUBAL LIGATION      Vascular access surgeries      x5     Family History   Problem Relation Name Age of Onset    Cancer Mother      Ovarian cancer Mother      Cancer Sister      Ovarian cancer Sister      Lung cancer Sister      Macular degeneration Sister      Cancer Sister      Ovarian cancer Sister      Diabetes Sister      Diabetes Brother      Breast cancer Neg Hx      Colon cancer Neg Hx      Cervical cancer Neg Hx      Endometrial cancer Neg Hx      Vaginal cancer Neg Hx      Thyroid disease Neg Hx      Glaucoma Neg Hx      Retinal detachment Neg Hx       Social History     Tobacco Use    Smoking status: Never    Smokeless tobacco: Never   Substance Use Topics    Alcohol use: No    Drug use: No     Review of Systems   Constitutional:  Negative for fever.   HENT:  Negative for congestion, rhinorrhea and sore throat.    Eyes:  Negative for visual disturbance.   Respiratory:  Negative for cough and shortness of breath.    Cardiovascular:  Negative for chest pain and leg swelling.   Gastrointestinal:  Negative for abdominal pain, diarrhea, nausea and vomiting.   Genitourinary:  Negative for dysuria and hematuria.   Musculoskeletal:  Positive for neck pain. Negative for back pain and neck stiffness.   Neurological:  Negative for weakness.       Physical Exam     Initial Vitals [08/08/24 0937]   BP Pulse Resp Temp SpO2   137/63 88 16 98.9 °F (37.2 °C) 97 %      MAP       --         Physical Exam    Nursing note and vitals reviewed.  Constitutional: She is cooperative.   Non-toxic appearance. She appears ill.   HENT:   Head: Normocephalic and atraumatic.   Mouth/Throat: Mucous membranes are normal. Mucous membranes are not dry.   Eyes: Conjunctivae are normal. Pupils are equal, round, and reactive to light.   Neck: Trachea normal and phonation normal.   Patient with full passive range of motion of the neck without any pain or discomfort.    Upon palpating the bilateral trapezius muscles, patient endorses tenderness to palpation.   Cardiovascular:  Normal rate, regular rhythm, normal heart sounds, intact distal pulses and normal pulses.     Exam reveals no gallop, no S3, no S4 and no friction rub.       No murmur heard.  Pulmonary/Chest: Breath sounds normal. No respiratory distress. She has no wheezes. She has no rhonchi. She has no rales.   Abdominal: Abdomen is soft. She exhibits no distension. There is no abdominal tenderness.   Musculoskeletal:      Cervical back: No rigidity.      Right lower leg: No edema.      Left lower leg: No edema.     Neurological: She is alert.   Pupils equal round responsive to light.    No facial asymmetry.    No dysphagia or dysarthria.    Patient observed moving all 4 extremities against gravity.    Sensation grossly intact.   Skin: Skin is warm, dry and intact. Capillary refill takes less than 2 seconds.   Psychiatric: She has a normal mood and affect. Her speech is normal.         ED Course   Procedures  Labs Reviewed   CBC W/ AUTO DIFFERENTIAL - Abnormal       Result Value    WBC 10.77      RBC 2.61 (*)     Hemoglobin 7.4 (*)     Hematocrit 21.7 (*)     MCV 83      MCH 28.4      MCHC 34.1      RDW 18.3 (*)     Platelets 222      MPV 10.8      Immature Granulocytes 0.4      Gran # (ANC) 5.9      Immature Grans (Abs) 0.04      Lymph # 2.4      Mono # 1.2 (*)     Eos # 1.1 (*)     Baso # 0.08      nRBC 0      Gran % 54.6      Lymph % 22.7      Mono % 11.0      Eosinophil % 10.6 (*)     Basophil % 0.7      Differential Method Automated      COMPREHENSIVE METABOLIC PANEL - Abnormal    Sodium 139      Potassium 4.3      Chloride 97      CO2 27      Glucose 186 (*)     BUN 38 (*)     Creatinine 9.2 (*)     Calcium 9.9      Total Protein 8.0      Albumin 3.5      Total Bilirubin 1.4 (*)     Alkaline Phosphatase 67      AST 14      ALT 8 (*)     eGFR 4.5 (*)     Anion Gap 15     TSH - Abnormal    TSH 0.364 (*)    INFLUENZA A & B BY MOLECULAR    Influenza A, Molecular Negative      Influenza B, Molecular Negative      Flu A & B Source NP     TROPONIN I    Troponin I <0.006     SARS-COV-2 RNA AMPLIFICATION, QUAL    SARS-CoV-2 RNA, Amplification, Qual Negative     T4, FREE    Free T4 0.80       EKG Readings: (Independently Interpreted)   Initial Reading: No STEMI. Previous EKG: Compared with most recent EKG Rhythm: Normal Sinus Rhythm. Heart Rate: 79. Ectopy: No Ectopy. Conduction: Normal. ST Segments: Normal ST Segments. Clinical Impression: Normal Sinus Rhythm       Imaging Results              X-Ray Chest AP Portable (Final result)  Result time 08/08/24 11:29:53      Final result by Bernardo Winkler III, MD (08/08/24 11:29:53)                   Impression:      Interstitial edema versus interstitial pneumonia.      Electronically signed by: Bernardo Winkler MD  Date:    08/08/2024  Time:    11:29               Narrative:    EXAMINATION:  XR CHEST AP PORTABLE    CLINICAL HISTORY:  Cervicalgia    FINDINGS:  Chest one view:    There is cardiomegaly and aortic plaque.  There is perihilar and lower lobe edema and/or atelectasis/infiltrate.  Bones bowel gas are noncontributory.  There is postoperative change of the left arm.                                       Medications   acetaminophen tablet 1,000 mg (1,000 mg Oral Given 8/8/24 1117)   oxyCODONE immediate release tablet 5 mg (5 mg Oral Given 8/8/24 1117)     Medical Decision Making  58-year-old female with history of dialysis presenting for bilateral trapezius muscle pain.  Initially, patient is afebrile,  hemodynamically stable and well-appearing.      Patient's physical exam is most consistent with muscle pain.  Unclear why patient is experiencing pain of her bilateral trapezius muscles.  Will obtain CMP to look for electrolyte abnormalities.  The patient does not endorse any infectious symptoms, will test for COVID/influenza since COVID has been calm and recently.  Patient is concerned about her thyroid but no palpated nodules, unlikely to cause bilateral trapezius muscle pain but will obtain thyroid levels.  Very low suspicion for ACS but will look for atypical ACS since patient is higher risk.  Patient has no meningeal signs, no focal deficits, very low suspicion for meningitis.  Gave Tylenol, oxycodone, lidocaine patches for patient's neck pain.    EKG, troponin, chest x-ray reassuring, low suspicion for active ACS.  TSH is mildly low but free T4 is within normal limits, low suspicion for thyroid disease as an etiology of patient's symptoms at this time.  COVID/influenza negative.  No significant electrolyte disturbances.  Patient has elevated BUN/creatinine which is consistent with her known ESRD.      Upon reassessment, patient states that her symptoms have improved.  Provided patient with prescription for lidocaine at time of discharge.  Discussed symptomatic management of her symptoms.  Extensively discussed strict return precautions, patient voices understanding.  She is hemodynamically stable, well-appearing, she is appropriate for discharge at this time.  Encouraged close PCP follow-up and encouraged close compliance with dialysis.    Amount and/or Complexity of Data Reviewed  Labs: ordered. Decision-making details documented in ED Course.  Radiology: ordered.    Risk  OTC drugs.  Prescription drug management.               ED Course as of 08/08/24 1804   Thu Aug 08, 2024   1209 TSH(!): 0.364 [ES]   1210 BUN(!): 38 [ES]   1210 Creatinine(!): 9.2  ESRD [ES]   1223 SARS-CoV-2 RNA, Amplification, Qual:  Negative [ES]   1249 Free T4: 0.80 [ES]   1321 Influenza A, Molecular: Negative [ES]   1321 Influenza B, Molecular: Negative [ES]      ED Course User Index  [ES] Ruby Cruz MD                           Clinical Impression:  Final diagnoses:  [M54.2] Neck pain          ED Disposition Condition    Discharge Stable          ED Prescriptions       Medication Sig Dispense Start Date End Date Auth. Provider    LIDOcaine (LIDODERM) 5 % Place 1 patch onto the skin once daily. Remove & Discard patch within 12 hours or as directed by MD 14 patch 8/8/2024 -- Ruby Cruz MD          Follow-up Information       Follow up With Specialties Details Why Contact Maty Fink, DO Internal Medicine In 1 week  [address]  [place] [address]  [phone]      [facility] - Emergency Dept Emergency Medicine  As needed [address] [facility] [id number] [phone]             Ruby Cruz MD  Resident  08/08/24 0959

## 2024-08-09 ENCOUNTER — PATIENT OUTREACH (OUTPATIENT)
Dept: EMERGENCY MEDICINE | Facility: HOSPITAL | Age: 59
End: 2024-08-09
Payer: MEDICARE

## 2024-08-11 ENCOUNTER — HOSPITAL ENCOUNTER (INPATIENT)
Facility: HOSPITAL | Age: 59
LOS: 2 days | Discharge: HOME HEALTH | DRG: 640 | End: 2024-08-14
Attending: EMERGENCY MEDICINE | Admitting: STUDENT IN AN ORGANIZED HEALTH CARE EDUCATION/TRAINING PROGRAM
Payer: MEDICARE

## 2024-08-11 DIAGNOSIS — R06.02 SHORTNESS OF BREATH: ICD-10-CM

## 2024-08-11 DIAGNOSIS — E87.70 HYPERVOLEMIA, UNSPECIFIED HYPERVOLEMIA TYPE: Primary | ICD-10-CM

## 2024-08-11 DIAGNOSIS — N18.9 CHRONIC KIDNEY DISEASE-MINERAL AND BONE DISORDER: ICD-10-CM

## 2024-08-11 DIAGNOSIS — M89.9 CHRONIC KIDNEY DISEASE-MINERAL AND BONE DISORDER: ICD-10-CM

## 2024-08-11 DIAGNOSIS — E83.9 CHRONIC KIDNEY DISEASE-MINERAL AND BONE DISORDER: ICD-10-CM

## 2024-08-11 DIAGNOSIS — N03.9 ANEMIA DUE TO PRE-ESRD TREATED WITH ERYTHROPOIETIN: ICD-10-CM

## 2024-08-11 DIAGNOSIS — E11.43 DIABETIC AUTONOMIC NEUROPATHY ASSOCIATED WITH TYPE 2 DIABETES MELLITUS: ICD-10-CM

## 2024-08-11 DIAGNOSIS — N18.6 ANEMIA IN ESRD (END-STAGE RENAL DISEASE): ICD-10-CM

## 2024-08-11 DIAGNOSIS — D59.10 AIHA (AUTOIMMUNE HEMOLYTIC ANEMIA): ICD-10-CM

## 2024-08-11 DIAGNOSIS — D63.1 ANEMIA DUE TO PRE-ESRD TREATED WITH ERYTHROPOIETIN: ICD-10-CM

## 2024-08-11 DIAGNOSIS — N18.6 ESRD (END STAGE RENAL DISEASE) ON DIALYSIS: Chronic | ICD-10-CM

## 2024-08-11 DIAGNOSIS — Z99.2 ESRD (END STAGE RENAL DISEASE) ON DIALYSIS: Chronic | ICD-10-CM

## 2024-08-11 DIAGNOSIS — R06.02 SOB (SHORTNESS OF BREATH): ICD-10-CM

## 2024-08-11 DIAGNOSIS — D64.9 ANEMIA, UNSPECIFIED TYPE: ICD-10-CM

## 2024-08-11 DIAGNOSIS — R07.9 CHEST PAIN: ICD-10-CM

## 2024-08-11 DIAGNOSIS — D63.1 ANEMIA IN ESRD (END-STAGE RENAL DISEASE): ICD-10-CM

## 2024-08-11 DIAGNOSIS — Z91.89 AT RISK FOR OPPORTUNISTIC INFECTIONS: ICD-10-CM

## 2024-08-11 DIAGNOSIS — I10 ESSENTIAL HYPERTENSION: Chronic | ICD-10-CM

## 2024-08-11 PROBLEM — E11.9 TYPE 2 DIABETES MELLITUS: Status: ACTIVE | Noted: 2021-07-27

## 2024-08-11 LAB
ABO + RH BLD: ABNORMAL
ABO + RH BLD: ABNORMAL
ALBUMIN SERPL BCP-MCNC: 3.5 G/DL (ref 3.5–5.2)
ALP SERPL-CCNC: 67 U/L (ref 55–135)
ALT SERPL W/O P-5'-P-CCNC: 8 U/L (ref 10–44)
ANION GAP SERPL CALC-SCNC: 17 MMOL/L (ref 8–16)
AST SERPL-CCNC: 14 U/L (ref 10–40)
BASOPHILS # BLD AUTO: 0.07 K/UL (ref 0–0.2)
BASOPHILS # BLD AUTO: 0.08 K/UL (ref 0–0.2)
BASOPHILS NFR BLD: 0.6 % (ref 0–1.9)
BASOPHILS NFR BLD: 0.6 % (ref 0–1.9)
BILIRUB SERPL-MCNC: 1.8 MG/DL (ref 0.1–1)
BLD GP AB SCN CELLS X3 SERPL QL: ABNORMAL
BLD GP AB SCN CELLS X3 SERPL QL: ABNORMAL
BLD PROD TYP BPU: NORMAL
BLOOD GROUP ANTIBODIES SERPL: NORMAL
BLOOD UNIT EXPIRATION DATE: NORMAL
BLOOD UNIT TYPE CODE: 5100
BLOOD UNIT TYPE: NORMAL
BNP SERPL-MCNC: 410 PG/ML (ref 0–99)
BUN SERPL-MCNC: 57 MG/DL (ref 6–20)
BUN SERPL-MCNC: 57 MG/DL (ref 6–30)
CALCIUM SERPL-MCNC: 10 MG/DL (ref 8.7–10.5)
CHLORIDE SERPL-SCNC: 100 MMOL/L (ref 95–110)
CHLORIDE SERPL-SCNC: 98 MMOL/L (ref 95–110)
CO2 SERPL-SCNC: 23 MMOL/L (ref 23–29)
CODING SYSTEM: NORMAL
CREAT SERPL-MCNC: 11 MG/DL (ref 0.5–1.4)
CREAT SERPL-MCNC: 11.8 MG/DL (ref 0.5–1.4)
CROSSMATCH INTERPRETATION: NORMAL
DAT IGG-SP REAG RBC-IMP: NORMAL
DIFFERENTIAL METHOD BLD: ABNORMAL
DIFFERENTIAL METHOD BLD: ABNORMAL
DISPENSE STATUS: NORMAL
EOSINOPHIL # BLD AUTO: 0.9 K/UL (ref 0–0.5)
EOSINOPHIL # BLD AUTO: 1 K/UL (ref 0–0.5)
EOSINOPHIL NFR BLD: 8 % (ref 0–8)
EOSINOPHIL NFR BLD: 8.3 % (ref 0–8)
ERYTHROCYTE [DISTWIDTH] IN BLOOD BY AUTOMATED COUNT: 19.3 % (ref 11.5–14.5)
ERYTHROCYTE [DISTWIDTH] IN BLOOD BY AUTOMATED COUNT: 21.3 % (ref 11.5–14.5)
EST. GFR  (NO RACE VARIABLE): 3.6 ML/MIN/1.73 M^2
FERRITIN SERPL-MCNC: 3411 NG/ML (ref 20–300)
GLUCOSE SERPL-MCNC: 151 MG/DL (ref 70–110)
GLUCOSE SERPL-MCNC: 153 MG/DL (ref 70–110)
HCT VFR BLD AUTO: 18 % (ref 37–48.5)
HCT VFR BLD AUTO: 18.3 % (ref 37–48.5)
HCT VFR BLD CALC: 29 %PCV (ref 36–54)
HGB BLD-MCNC: 5.4 G/DL (ref 12–16)
HGB BLD-MCNC: 6 G/DL (ref 12–16)
HGB BLD-MCNC: 6.2 G/DL (ref 12–16)
IMM GRANULOCYTES # BLD AUTO: 0.04 K/UL (ref 0–0.04)
IMM GRANULOCYTES # BLD AUTO: 0.06 K/UL (ref 0–0.04)
IMM GRANULOCYTES NFR BLD AUTO: 0.4 % (ref 0–0.5)
IMM GRANULOCYTES NFR BLD AUTO: 0.5 % (ref 0–0.5)
IRON SERPL-MCNC: 104 UG/DL (ref 30–160)
LYMPHOCYTES # BLD AUTO: 2.6 K/UL (ref 1–4.8)
LYMPHOCYTES # BLD AUTO: 3.1 K/UL (ref 1–4.8)
LYMPHOCYTES NFR BLD: 23.4 % (ref 18–48)
LYMPHOCYTES NFR BLD: 24.9 % (ref 18–48)
MCH RBC QN AUTO: 27.5 PG (ref 27–31)
MCH RBC QN AUTO: 28.7 PG (ref 27–31)
MCHC RBC AUTO-ENTMCNC: 33.3 G/DL (ref 32–36)
MCHC RBC AUTO-ENTMCNC: 33.9 G/DL (ref 32–36)
MCV RBC AUTO: 83 FL (ref 82–98)
MCV RBC AUTO: 85 FL (ref 82–98)
MONOCYTES # BLD AUTO: 1.2 K/UL (ref 0.3–1)
MONOCYTES # BLD AUTO: 1.4 K/UL (ref 0.3–1)
MONOCYTES NFR BLD: 12.4 % (ref 4–15)
MONOCYTES NFR BLD: 9.8 % (ref 4–15)
NEUTROPHILS # BLD AUTO: 6.2 K/UL (ref 1.8–7.7)
NEUTROPHILS # BLD AUTO: 7 K/UL (ref 1.8–7.7)
NEUTROPHILS NFR BLD: 55.2 % (ref 38–73)
NEUTROPHILS NFR BLD: 55.9 % (ref 38–73)
NRBC BLD-RTO: 0 /100 WBC
NRBC BLD-RTO: 0 /100 WBC
NUM UNITS TRANS PACKED RBC: NORMAL
OHS QRS DURATION: 92 MS
OHS QTC CALCULATION: 506 MS
PLATELET # BLD AUTO: 216 K/UL (ref 150–450)
PLATELET # BLD AUTO: 241 K/UL (ref 150–450)
PMV BLD AUTO: 10.7 FL (ref 9.2–12.9)
PMV BLD AUTO: 11.4 FL (ref 9.2–12.9)
POC IONIZED CALCIUM: 1.15 MMOL/L (ref 1.06–1.42)
POC TCO2 (MEASURED): 28 MMOL/L (ref 23–29)
POCT GLUCOSE: 137 MG/DL (ref 70–110)
POTASSIUM BLD-SCNC: 4.4 MMOL/L (ref 3.5–5.1)
POTASSIUM SERPL-SCNC: 4.5 MMOL/L (ref 3.5–5.1)
PROT SERPL-MCNC: 7.9 G/DL (ref 6–8.4)
RBC # BLD AUTO: 2.16 M/UL (ref 4–5.4)
RBC # BLD AUTO: 2.18 M/UL (ref 4–5.4)
SAMPLE: ABNORMAL
SARS-COV-2 RDRP RESP QL NAA+PROBE: NEGATIVE
SATURATED IRON: 36 % (ref 20–50)
SODIUM BLD-SCNC: 140 MMOL/L (ref 136–145)
SODIUM SERPL-SCNC: 140 MMOL/L (ref 136–145)
SPECIMEN OUTDATE: ABNORMAL
SPECIMEN OUTDATE: ABNORMAL
T4 FREE SERPL-MCNC: 0.86 NG/DL (ref 0.71–1.51)
TOTAL IRON BINDING CAPACITY: 287 UG/DL (ref 250–450)
TRANSFERRIN SERPL-MCNC: 194 MG/DL (ref 200–375)
TROPONIN I SERPL DL<=0.01 NG/ML-MCNC: 0.01 NG/ML (ref 0–0.03)
TSH SERPL DL<=0.005 MIU/L-ACNC: 0.38 UIU/ML (ref 0.4–4)
WBC # BLD AUTO: 11.26 K/UL (ref 3.9–12.7)
WBC # BLD AUTO: 12.51 K/UL (ref 3.9–12.7)

## 2024-08-11 PROCEDURE — 86900 BLOOD TYPING SEROLOGIC ABO: CPT | Mod: HCNC | Performed by: EMERGENCY MEDICINE

## 2024-08-11 PROCEDURE — P9016 RBC LEUKOCYTES REDUCED: HCPCS | Mod: HCNC | Performed by: EMERGENCY MEDICINE

## 2024-08-11 PROCEDURE — 93005 ELECTROCARDIOGRAM TRACING: CPT | Mod: HCNC

## 2024-08-11 PROCEDURE — 86850 RBC ANTIBODY SCREEN: CPT | Mod: HCNC | Performed by: EMERGENCY MEDICINE

## 2024-08-11 PROCEDURE — 84484 ASSAY OF TROPONIN QUANT: CPT | Mod: HCNC | Performed by: EMERGENCY MEDICINE

## 2024-08-11 PROCEDURE — 25000003 PHARM REV CODE 250: Mod: HCNC

## 2024-08-11 PROCEDURE — 86850 RBC ANTIBODY SCREEN: CPT | Mod: 91,HCNC | Performed by: STUDENT IN AN ORGANIZED HEALTH CARE EDUCATION/TRAINING PROGRAM

## 2024-08-11 PROCEDURE — 86902 BLOOD TYPE ANTIGEN DONOR EA: CPT | Mod: HCNC | Performed by: EMERGENCY MEDICINE

## 2024-08-11 PROCEDURE — 86978 RBC PRETREATMENT SERUM: CPT | Mod: HCNC | Performed by: STUDENT IN AN ORGANIZED HEALTH CARE EDUCATION/TRAINING PROGRAM

## 2024-08-11 PROCEDURE — G0378 HOSPITAL OBSERVATION PER HR: HCPCS | Mod: HCNC

## 2024-08-11 PROCEDURE — 83880 ASSAY OF NATRIURETIC PEPTIDE: CPT | Mod: HCNC | Performed by: EMERGENCY MEDICINE

## 2024-08-11 PROCEDURE — 84443 ASSAY THYROID STIM HORMONE: CPT | Mod: HCNC | Performed by: EMERGENCY MEDICINE

## 2024-08-11 PROCEDURE — 86922 COMPATIBILITY TEST ANTIGLOB: CPT | Mod: HCNC

## 2024-08-11 PROCEDURE — 84439 ASSAY OF FREE THYROXINE: CPT | Mod: HCNC | Performed by: EMERGENCY MEDICINE

## 2024-08-11 PROCEDURE — 80053 COMPREHEN METABOLIC PANEL: CPT | Mod: HCNC | Performed by: EMERGENCY MEDICINE

## 2024-08-11 PROCEDURE — 36415 COLL VENOUS BLD VENIPUNCTURE: CPT | Mod: HCNC

## 2024-08-11 PROCEDURE — 94761 N-INVAS EAR/PLS OXIMETRY MLT: CPT | Mod: HCNC

## 2024-08-11 PROCEDURE — 86922 COMPATIBILITY TEST ANTIGLOB: CPT | Mod: HCNC | Performed by: EMERGENCY MEDICINE

## 2024-08-11 PROCEDURE — U0002 COVID-19 LAB TEST NON-CDC: HCPCS | Mod: HCNC | Performed by: EMERGENCY MEDICINE

## 2024-08-11 PROCEDURE — 93010 ELECTROCARDIOGRAM REPORT: CPT | Mod: HCNC,,, | Performed by: INTERNAL MEDICINE

## 2024-08-11 PROCEDURE — 63600175 PHARM REV CODE 636 W HCPCS: Mod: HCNC

## 2024-08-11 PROCEDURE — 85025 COMPLETE CBC W/AUTO DIFF WBC: CPT | Mod: 91,HCNC

## 2024-08-11 PROCEDURE — 96372 THER/PROPH/DIAG INJ SC/IM: CPT

## 2024-08-11 PROCEDURE — 82728 ASSAY OF FERRITIN: CPT | Mod: HCNC | Performed by: EMERGENCY MEDICINE

## 2024-08-11 PROCEDURE — 85018 HEMOGLOBIN: CPT | Mod: HCNC | Performed by: EMERGENCY MEDICINE

## 2024-08-11 PROCEDURE — 83540 ASSAY OF IRON: CPT | Mod: HCNC | Performed by: EMERGENCY MEDICINE

## 2024-08-11 PROCEDURE — 86880 COOMBS TEST DIRECT: CPT | Mod: HCNC | Performed by: EMERGENCY MEDICINE

## 2024-08-11 PROCEDURE — 86900 BLOOD TYPING SEROLOGIC ABO: CPT | Mod: 91,HCNC | Performed by: STUDENT IN AN ORGANIZED HEALTH CARE EDUCATION/TRAINING PROGRAM

## 2024-08-11 PROCEDURE — 85025 COMPLETE CBC W/AUTO DIFF WBC: CPT | Mod: HCNC | Performed by: EMERGENCY MEDICINE

## 2024-08-11 PROCEDURE — 86901 BLOOD TYPING SEROLOGIC RH(D): CPT | Mod: HCNC | Performed by: EMERGENCY MEDICINE

## 2024-08-11 PROCEDURE — 99214 OFFICE O/P EST MOD 30 MIN: CPT | Mod: HCNC,GC,, | Performed by: INTERNAL MEDICINE

## 2024-08-11 PROCEDURE — 86860 RBC ANTIBODY ELUTION: CPT | Mod: HCNC | Performed by: EMERGENCY MEDICINE

## 2024-08-11 PROCEDURE — 86905 BLOOD TYPING RBC ANTIGENS: CPT | Mod: HCNC | Performed by: EMERGENCY MEDICINE

## 2024-08-11 PROCEDURE — 86870 RBC ANTIBODY IDENTIFICATION: CPT | Mod: HCNC | Performed by: EMERGENCY MEDICINE

## 2024-08-11 RX ORDER — INSULIN ASPART 100 [IU]/ML
6 INJECTION, SOLUTION INTRAVENOUS; SUBCUTANEOUS
Status: DISCONTINUED | OUTPATIENT
Start: 2024-08-11 | End: 2024-08-11

## 2024-08-11 RX ORDER — DIPHENHYDRAMINE HYDROCHLORIDE 50 MG/ML
25 INJECTION INTRAMUSCULAR; INTRAVENOUS ONCE
Status: COMPLETED | OUTPATIENT
Start: 2024-08-11 | End: 2024-08-11

## 2024-08-11 RX ORDER — HYDROCODONE BITARTRATE AND ACETAMINOPHEN 500; 5 MG/1; MG/1
TABLET ORAL
Status: DISCONTINUED | OUTPATIENT
Start: 2024-08-11 | End: 2024-08-12

## 2024-08-11 RX ORDER — ENOXAPARIN SODIUM 100 MG/ML
30 INJECTION SUBCUTANEOUS EVERY 24 HOURS
Status: DISCONTINUED | OUTPATIENT
Start: 2024-08-11 | End: 2024-08-12

## 2024-08-11 RX ORDER — MUPIROCIN 20 MG/G
OINTMENT TOPICAL 2 TIMES DAILY
Status: CANCELLED | OUTPATIENT
Start: 2024-08-11 | End: 2024-08-16

## 2024-08-11 RX ORDER — ATORVASTATIN CALCIUM 40 MG/1
40 TABLET, FILM COATED ORAL DAILY
Status: DISCONTINUED | OUTPATIENT
Start: 2024-08-12 | End: 2024-08-14 | Stop reason: HOSPADM

## 2024-08-11 RX ORDER — TALC
6 POWDER (GRAM) TOPICAL NIGHTLY PRN
Status: DISCONTINUED | OUTPATIENT
Start: 2024-08-11 | End: 2024-08-14 | Stop reason: HOSPADM

## 2024-08-11 RX ORDER — SODIUM CHLORIDE 9 MG/ML
INJECTION, SOLUTION INTRAVENOUS ONCE
Status: CANCELLED | OUTPATIENT
Start: 2024-08-11 | End: 2024-08-11

## 2024-08-11 RX ORDER — ASPIRIN 81 MG/1
81 TABLET ORAL EVERY MORNING
Status: DISCONTINUED | OUTPATIENT
Start: 2024-08-11 | End: 2024-08-14 | Stop reason: HOSPADM

## 2024-08-11 RX ORDER — GLUCAGON 1 MG
1 KIT INJECTION
Status: DISCONTINUED | OUTPATIENT
Start: 2024-08-11 | End: 2024-08-14 | Stop reason: HOSPADM

## 2024-08-11 RX ORDER — ACETAMINOPHEN 325 MG/1
650 TABLET ORAL EVERY 4 HOURS PRN
Status: DISCONTINUED | OUTPATIENT
Start: 2024-08-11 | End: 2024-08-14 | Stop reason: HOSPADM

## 2024-08-11 RX ORDER — ACETAMINOPHEN 325 MG/1
650 TABLET ORAL ONCE
Status: COMPLETED | OUTPATIENT
Start: 2024-08-11 | End: 2024-08-11

## 2024-08-11 RX ORDER — AMLODIPINE BESYLATE 10 MG/1
10 TABLET ORAL DAILY
Status: DISCONTINUED | OUTPATIENT
Start: 2024-08-12 | End: 2024-08-14 | Stop reason: HOSPADM

## 2024-08-11 RX ORDER — ACETAMINOPHEN 325 MG/1
650 TABLET ORAL EVERY 8 HOURS PRN
Status: DISCONTINUED | OUTPATIENT
Start: 2024-08-11 | End: 2024-08-14 | Stop reason: HOSPADM

## 2024-08-11 RX ORDER — INSULIN ASPART 100 [IU]/ML
0-5 INJECTION, SOLUTION INTRAVENOUS; SUBCUTANEOUS
Status: DISCONTINUED | OUTPATIENT
Start: 2024-08-11 | End: 2024-08-14 | Stop reason: HOSPADM

## 2024-08-11 RX ORDER — LOSARTAN POTASSIUM 50 MG/1
100 TABLET ORAL DAILY
Status: DISCONTINUED | OUTPATIENT
Start: 2024-08-11 | End: 2024-08-14 | Stop reason: HOSPADM

## 2024-08-11 RX ORDER — SEVELAMER CARBONATE 800 MG/1
2400 TABLET, FILM COATED ORAL
Status: DISCONTINUED | OUTPATIENT
Start: 2024-08-11 | End: 2024-08-14 | Stop reason: HOSPADM

## 2024-08-11 RX ORDER — NALOXONE HCL 0.4 MG/ML
0.02 VIAL (ML) INJECTION
Status: DISCONTINUED | OUTPATIENT
Start: 2024-08-11 | End: 2024-08-14 | Stop reason: HOSPADM

## 2024-08-11 RX ORDER — INSULIN GLARGINE 100 [IU]/ML
17 INJECTION, SOLUTION SUBCUTANEOUS DAILY
Status: DISCONTINUED | OUTPATIENT
Start: 2024-08-11 | End: 2024-08-11

## 2024-08-11 RX ORDER — SODIUM CHLORIDE 0.9 % (FLUSH) 0.9 %
10 SYRINGE (ML) INJECTION EVERY 12 HOURS PRN
Status: DISCONTINUED | OUTPATIENT
Start: 2024-08-11 | End: 2024-08-14 | Stop reason: HOSPADM

## 2024-08-11 RX ORDER — IBUPROFEN 200 MG
16 TABLET ORAL
Status: DISCONTINUED | OUTPATIENT
Start: 2024-08-11 | End: 2024-08-14 | Stop reason: HOSPADM

## 2024-08-11 RX ORDER — PREGABALIN 75 MG/1
75 CAPSULE ORAL DAILY
Status: DISCONTINUED | OUTPATIENT
Start: 2024-08-11 | End: 2024-08-14 | Stop reason: HOSPADM

## 2024-08-11 RX ORDER — IBUPROFEN 200 MG
24 TABLET ORAL
Status: DISCONTINUED | OUTPATIENT
Start: 2024-08-11 | End: 2024-08-14 | Stop reason: HOSPADM

## 2024-08-11 RX ORDER — SODIUM CHLORIDE 9 MG/ML
INJECTION, SOLUTION INTRAVENOUS
Status: CANCELLED | OUTPATIENT
Start: 2024-08-11

## 2024-08-11 RX ADMIN — SEVELAMER CARBONATE 2400 MG: 800 TABLET, FILM COATED ORAL at 12:08

## 2024-08-11 RX ADMIN — PREGABALIN 75 MG: 75 CAPSULE ORAL at 12:08

## 2024-08-11 RX ADMIN — DIPHENHYDRAMINE HYDROCHLORIDE 25 MG: 50 INJECTION, SOLUTION INTRAMUSCULAR; INTRAVENOUS at 11:08

## 2024-08-11 RX ADMIN — SEVELAMER CARBONATE 2400 MG: 800 TABLET, FILM COATED ORAL at 05:08

## 2024-08-11 RX ADMIN — ACETAMINOPHEN 650 MG: 325 TABLET ORAL at 11:08

## 2024-08-11 RX ADMIN — LOSARTAN POTASSIUM 100 MG: 50 TABLET, FILM COATED ORAL at 12:08

## 2024-08-11 RX ADMIN — ENOXAPARIN SODIUM 30 MG: 30 INJECTION SUBCUTANEOUS at 05:08

## 2024-08-11 RX ADMIN — ACETAMINOPHEN 650 MG: 325 TABLET ORAL at 07:08

## 2024-08-11 RX ADMIN — ASPIRIN 81 MG: 81 TABLET, COATED ORAL at 12:08

## 2024-08-11 NOTE — H&P
[facility] - Emergency Dept  [facility] Medicine  History & Physical    Patient Name: Jael Hall  MRN: [id number]  Patient Class: OP- Observation  Admission Date: 8/11/2024  Attending Physician: Darryn Reynolds MD   Primary Care Provider: Maty Gayle DO         Patient information was obtained from patient and ER records.     Subjective:     Principal Problem:ESRD (end stage renal disease) on dialysis    Chief Complaint:   Chief Complaint   Patient presents with    Shortness of Breath     Pt arrives c/o sob stating that she was dialyzed Friday but was unable to complete the whole treatment.        HPI: Jael Hall is a 58F with PMHx ESRD-HD mwf, anxiety, HTN, HLD, T2DM presents with SOB. She describes symptoms started around midnight that progressively worsened. Her symptoms worsened with laying down. She recently had dialysis on Friday but unable to complete due to back pain. Reportedly removed about 2L. In addition, she reports 1 dark stool last night but is unclear about the history/detail of this. Denies NSAID use. Denies chest pain, diarrhea, constipation, fevers, abdominal pain, hemoptysis, hematemesis.    On ED admission. AF. VSS. 4L NC. Hb 6.0 > 5.4, , trop normal. glucose 153. Electrolytes WNL. WBC normal FOBT and YUMIKO was negative. 1 unit of pRBC tranfused. CXR showed bilateral lower lobe opacities. Nephrology was consulted. Hemodialysis planned for today.     Past Medical History:   Diagnosis Date    Acute hyperkalemia 09/09/2020    Acute on chronic diastolic heart failure 10/08/2021    Anemia of chronic disorder 11/08/2017    Overview:  Added automatically from request for surgery [id number]    Anemia of chronic renal failure, stage 5     Anxiety     Cyst of left ovary 05/14/2019    Cyst of left ovary 07/05/2019    Dyslipidemia 01/04/2013    Encounter for screening colonoscopy 10/08/2020    End-stage renal disease on hemodialysis 08/20/2020    ESRD on hemodialysis 09/28/2017    Hematuria  10/30/2019    History of COVID-19 2020    Hospital discharge follow-up 10/06/2023    Hx of sinus bradycardia 2017    Hyperkalemia 2017    Hypertensive urgency 2021    Peripheral neuropathy 2013    Preoperative testing 10/08/2020    Renovascular hypertension 2020    S/P dilation and curettage 2018    Screening for colon cancer 2023    Thickened endometrium 2018    Thyroid nodule     Thyroid nodule 2021    Uncontrolled type 2 diabetes mellitus 2013    Overview:  poc a1c today 11.2-270/ up from 10.8-263, Pt. has very very stressed/ was incarcerated/marital stress/ will current meds/ less stress now/ will monitor.       Past Surgical History:   Procedure Laterality Date    AV FISTULA PLACEMENT      right arm     SECTION      x1    COLONOSCOPY N/A 2018    Procedure: COLONOSCOPY;  Surgeon: Pankaj Hinojosa MD;  Location: [facility] (4TH FLR);  Service: Endoscopy;  Laterality: N/A;  dialysis pt/labs prior to colon/MS    COLONOSCOPY N/A 2023    Procedure: COLONOSCOPY;  Surgeon: Angel Turner MD;  Location: [facility] (2ND FLR);  Service: Endoscopy;  Laterality: N/A;  Ref By:  Maty Gayle MD  Procedure: Colonoscopy  Diagnosis: endounter for preventive health   Procedure Timing: pt. pref  Provider: Dr. turner   [place] [facility]   Prep Specifications: Standard prep   Additional Info: Dialysis patient lab pending    CYSTOSCOPY W/ RETROGRADES Bilateral 2020    Procedure: CYSTOSCOPY, WITH RETROGRADE PYELOGRAM;  Surgeon: Douglas Abraham MD;  Location: [facility] OR 1ST FLR;  Service: Urology;  Laterality: Bilateral;  45 min    FISTULOGRAM Right 2020    Procedure: Fistulogram;  Surgeon: Lester Gómez MD;  Location: [facility] CATH LAB;  Service: Nephrology;  Laterality: Right;    HYSTERECTOMY      IMPLANTATION OF COCHLEAR PROSTHESIS Right 2021    Procedure: INSERTION, PROSTHESIS, COCHLEAR;  Surgeon: Ramiro Zuleta MD;  Location: [facility] OR 2ND  FLR;  Service: ENT;  Laterality: Right;  COCHLEAR BABAK    IMPLANTATION OF COCHLEAR PROSTHESIS Right 6/30/2021    Procedure: INSERTION, PROSTHESIS, COCHLEAR;  Surgeon: Ramiro Zuleta MD;  Location: [facility] OR 2ND FLR;  Service: ENT;  Laterality: Right;  COCHLEAR AMERICAS    ROBOT-ASSISTED LAPAROSCOPIC ABDOMINAL HYSTERECTOMY USING DA NATALYA XI N/A 7/5/2019    Procedure: XI ROBOTIC HYSTERECTOMY;  Surgeon: Trice Lei MD;  Location: [facility] OR;  Service: OB/GYN;  Laterality: N/A;    ROBOT-ASSISTED LAPAROSCOPIC NEPHRECTOMY Right 10/8/2020    Procedure: ROBOTIC NEPHRECTOMY;  Surgeon: Douglas Abraham MD;  Location: [facility] OR 2ND FLR;  Service: Urology;  Laterality: Right;  3.5hrs gen with regional     ROBOT-ASSISTED LAPAROSCOPIC SALPINGO-OOPHORECTOMY USING DA NATALYA XI Bilateral 7/5/2019    Procedure: XI ROBOTIC SALPINGO-OOPHORECTOMY;  Surgeon: Trice Lei MD;  Location: [facility] OR;  Service: OB/GYN;  Laterality: Bilateral;    TUBAL LIGATION      Vascular access surgeries      x5       Review of patient's allergies indicates:  No Known Allergies    No current facility-administered medications on file prior to encounter.     Current Outpatient Medications on File Prior to Encounter   Medication Sig    ACCU-CHEK GUIDE GLUCOSE METER Misc     ACCU-CHEK GUIDE L1-L2 CTRL SOL Soln     ACCU-CHEK GUIDE TEST STRIPS Strp     ACCU-CHEK SOFTCLIX LANCETS Misc     acetaminophen (TYLENOL) 500 MG tablet Take 1 tablet (500 mg total) by mouth every 8 (eight) hours as needed for Pain.    acetaminophen-codeine 300-30mg (TYLENOL #3) 300-30 mg Tab Take 1 tablet by mouth every 4 to 6 hours as needed.    amLODIPine (NORVASC) 10 MG tablet Take 1 tablet (10 mg total) by mouth nightly.    amLODIPine (NORVASC) 10 MG tablet Take 1 tablet by mouth once daily.    amoxicillin (AMOXIL) 500 MG Tab TAKE 1 TABLET BY MOUTH TWICE DAILY UNTIL GONE    aspirin (ECOTRIN) 81 MG EC tablet Take 81 mg by mouth every morning.    atorvastatin (LIPITOR) 40 MG tablet Take  1 tablet (40 mg total) by mouth once daily. (Patient taking differently: Take 40 mg by mouth every morning.)    atorvastatin (LIPITOR) 40 MG tablet Take 1 tablet by mouth once daily.    DROPSAFE ALCOHOL PREP PADS PadM Apply topically.    furosemide (LASIX) 80 MG tablet Take 1 tablet (80 mg total) by mouth once daily.    hydrALAZINE (APRESOLINE) 100 MG tablet Take 100 mg by mouth 3 (three) times daily.    labetaloL (NORMODYNE) 200 MG tablet Take 1 tablet (200 mg total) by mouth every 12 (twelve) hours.    LIDOcaine (LIDODERM) 5 % Place 1 patch onto the skin once daily. Remove & Discard patch within 12 hours or as directed by MD    losartan (COZAAR) 100 MG tablet Take 1 tablet (100 mg total) by mouth once daily.    pregabalin (LYRICA) 75 MG capsule Take 1 capsule (75 mg total) by mouth once daily.    sevelamer carbonate (RENVELA) 800 mg Tab Take 3 tablets (2,400 mg total) by mouth 3 (three) times daily with meals.     Family History       Problem Relation (Age of Onset)    Cancer Mother, Sister, Sister    Diabetes Sister, Brother    Lung cancer Sister    Macular degeneration Sister    Ovarian cancer Mother, Sister, Sister          Tobacco Use    Smoking status: Never    Smokeless tobacco: Never   Substance and Sexual Activity    Alcohol use: No    Drug use: No    Sexual activity: Yes     Partners: Male     Birth control/protection: Post-menopausal     Review of Systems   Constitutional:  Negative for activity change, appetite change, chills, fever and unexpected weight change.   Respiratory:  Positive for shortness of breath. Negative for apnea, cough, choking, chest tightness and wheezing.    Cardiovascular:  Positive for leg swelling (bilateral). Negative for chest pain.   Gastrointestinal:  Positive for blood in stool (?melena). Negative for abdominal distention, abdominal pain, constipation, diarrhea, nausea and vomiting.   Musculoskeletal:  Positive for back pain. Negative for joint swelling.   Skin:  Negative  for rash and wound.   Allergic/Immunologic: Negative for immunocompromised state.     Objective:     Vital Signs (Most Recent):  Temp: 99.8 °F (37.7 °C) (08/11/24 0452)  Pulse: 85 (08/11/24 0932)  Resp: 17 (08/11/24 0928)  BP: (!) 169/78 (08/11/24 0932)  SpO2: 100 % (08/11/24 0732) Vital Signs (24h Range):  Temp:  [99.8 °F (37.7 °C)] 99.8 °F (37.7 °C)  Pulse:  [82-87] 85  Resp:  [15-26] 17  SpO2:  [99 %-100 %] 100 %  BP: (155-171)/(67-78) 169/78     Weight: 85 kg (187 lb 6.3 oz)  Body mass index is 32.17 kg/m².     Physical Exam  Vitals reviewed.   Constitutional:       General: She is not in acute distress.     Appearance: She is ill-appearing. She is not toxic-appearing or diaphoretic.   Cardiovascular:      Rate and Rhythm: Normal rate and regular rhythm.      Pulses: Normal pulses.      Heart sounds: Normal heart sounds.   Pulmonary:      Effort: Respiratory distress present.      Breath sounds: No stridor. Rales (bilateral) present. No wheezing or rhonchi.      Comments: 4L NC  Chest:      Chest wall: No tenderness.   Abdominal:      General: Abdomen is flat. Bowel sounds are normal. There is no distension.      Palpations: Abdomen is soft. There is no mass.      Tenderness: There is no abdominal tenderness. There is no right CVA tenderness, left CVA tenderness, guarding or rebound.      Hernia: No hernia is present.   Musculoskeletal:         General: Tenderness (back) present. No deformity.      Right lower leg: Edema present.      Left lower leg: Edema present.   Skin:     General: Skin is warm and dry.   Neurological:      Mental Status: She is alert.                Significant Labs: All pertinent labs within the past 24 hours have been reviewed.  CBC:   Recent Labs   Lab 08/11/24  0544 08/11/24  0549 08/11/24  0610   WBC 11.26  --   --    HGB 6.0*  --  5.4*   HCT 18.0* 29*  --      --   --      CMP:   Recent Labs   Lab 08/11/24  0544      K 4.5      CO2 23   *   BUN 57*    CREATININE 11.0*   CALCIUM 10.0   PROT 7.9   ALBUMIN 3.5   BILITOT 1.8*   ALKPHOS 67   AST 14   ALT 8*   ANIONGAP 17*       Significant Imaging:   Imaging Results              X-Ray Chest AP Portable (Final result)  Result time 08/11/24 07:18:54      Final result by Ziggy Madrid MD (08/11/24 07:18:54)                   Impression:      Patchy opacities at the bilateral mid lower lung zones appear slightly progressed when compared to 08/08/2024, 11:14 hours.    Findings could relate to edema versus infectious or noninfectious inflammatory process.      Electronically signed by: Ziggy Madrid  Date:    08/11/2024  Time:    07:18               Narrative:    EXAMINATION:  XR CHEST AP PORTABLE    CLINICAL HISTORY:  shortness of breath;    TECHNIQUE:  Single frontal view of the chest was performed.    COMPARISON:  Chest radiograph performed 08/08/2024, 11:14 hours.    FINDINGS:  Monitoring leads overlie the chest.  Grossly unchanged cardiomediastinal contours, again noting enlargement the cardiac silhouette and prominence of central pulmonary vasculature.    Patchy opacities at the bilateral lower and mid lung zone similar slightly progressed when compared to 08/08/2024, 11:14 hours examination.  Question small layering pleural effusions.    No definite pneumothorax.    No acute findings in the visualized abdomen.    Osseous and soft tissue structures appear without definite acute change.  Operative sequela of the left upper extremity again noted.                                     Assessment/Plan:     * ESRD (end stage renal disease) on dialysis  Patient has ERSD and receives dialysis MWF. She recently had dialysis on Friday but was not able to complete the full session due to back pain. Last session removed about 2L. She complained of orthopnea and paroxysmal noctural dyspnea that progressively worsened throughout the night. On exam she is fluid overloaded and CXR with evidence of pulmonary edema. She is not  on lasix at home. BNP 400s     Plan  - Dialysis today; and plan to resume on regular schedule MWF   - Monitor UOP and serial BMP and adjust therapy as needed  - Renally dose meds  - Avoid nephrotoxic medications and procedures.    Anemia in ESRD (end-stage renal disease)  Patient presented with 6 hgb with repeat at 5.4. She was given 1 unit pRBC. She reports single dark stool this morning. In the ED, her FOBT/YUMIKO were negative. Baseline Hgb approx. 8-8.5. Anemia is likely due to chronic disease due to ESRD.   Recent Labs     08/11/24  0544 08/11/24  0549 08/11/24  0610   HGB 6.0*  --  5.4*   HCT 18.0* 29*  --    Ferritin 3000+. TIBC WNL. Iron WNL. Transferrin 194 (low)     Plan  - Monitor serial CBC: Daily  - Transfuse PRBC if patient becomes hemodynamically unstable, symptomatic or H/H drops below 7.  - Patient has received 1 units of PRBCs on 8/11 ; consider another 1 unit after recheck CBC   - consider EPO after following up with nephrology outpatient     Type 2 diabetes mellitus  Pt's glucose is controlled - 151. Recent A1c 5.6     Plan   -  consider insulin regimen with low dose SST if sugars are uncontrolled  - diabetic diet     Diabetic neuropathy  Plan   - continue home medication, lyrica     Essential hypertension  Plan  - continue home amlodipine and losartan       VTE Risk Mitigation (From admission, onward)           Ordered     enoxaparin injection 30 mg  Daily         08/11/24 0740     IP VTE HIGH RISK PATIENT  Once         08/11/24 0740     Place sequential compression device  Until discontinued         08/11/24 0740                           On 08/11/2024, patient should be placed in hospital observation services under my care in collaboration with Dr. Reynolds.         Dat Lynn MD  Department of Hospital Medicine  [facility] - Emergency Dept

## 2024-08-11 NOTE — ED NOTES
Assumed care of patient at this time. Pt awake and alert; resting quietly on stretcher.  Pt remains on continuous cardiac and pulse ox monitoring with non-invasive blood pressure to cycle every 30 minutes.  VS stable; NSR noted. Pt denies pain at this time; no acute distress or discomfort reported or observed.  Pt denies restroom needs at this time; is able to reposition self on stretcher. Bed locked in lowest position; side rails up and locked x 2; call light, bedside table, and personal belongings within reach. Room assessed for safety measures and cleanliness; no action needed at this time. Plan of care discussed.  Pt instructed to alert nurse for assistance and before attempting to get out of bed; verbalizes understanding. Pt denies needs or complaints at this time; monitoring ongoing.

## 2024-08-11 NOTE — ASSESSMENT & PLAN NOTE
Pt's glucose is controlled - 151. Recent A1c 5.6     Plan   -  consider insulin regimen with low dose SST if sugars are uncontrolled  - diabetic diet

## 2024-08-11 NOTE — SUBJECTIVE & OBJECTIVE
Past Medical History:   Diagnosis Date    Acute hyperkalemia 2020    Acute on chronic diastolic heart failure 10/08/2021    Anemia of chronic disorder 2017    Overview:  Added automatically from request for surgery [id number]    Anemia of chronic renal failure, stage 5     Anxiety     Cyst of left ovary 2019    Cyst of left ovary 2019    Dyslipidemia 2013    Encounter for screening colonoscopy 10/08/2020    End-stage renal disease on hemodialysis 2020    ESRD on hemodialysis 2017    Hematuria 10/30/2019    History of COVID-19 2020    Hospital discharge follow-up 10/06/2023    Hx of sinus bradycardia 2017    Hyperkalemia 2017    Hypertensive urgency 2021    Peripheral neuropathy 2013    Preoperative testing 10/08/2020    Renovascular hypertension 2020    S/P dilation and curettage 2018    Screening for colon cancer 2023    Thickened endometrium 2018    Thyroid nodule     Thyroid nodule 2021    Uncontrolled type 2 diabetes mellitus 2013    Overview:  poc a1c today 11.2-270/ up from 10.8-263, Pt. has very very stressed/ was incarcerated/marital stress/ will current meds/ less stress now/ will monitor.       Past Surgical History:   Procedure Laterality Date    AV FISTULA PLACEMENT      right arm     SECTION      x1    COLONOSCOPY N/A 2018    Procedure: COLONOSCOPY;  Surgeon: Pankaj Hinojosa MD;  [facility]  Service: Endoscopy;  Laterality: N/A;  dialysis pt/labs prior to colon/MS    COLONOSCOPY N/A 2023    Procedure: COLONOSCOPY;  Surgeon: Angel Turner MD;  Location: [facility] (2ND FLR);  Service: Endoscopy;  Laterality: N/A;  Ref By:  Maty Gayle MD  Procedure: Colonoscopy  Diagnosis: endounter for preventive health   Procedure Timing: pt. pref  Provider: Dr. turner   [facility] [facility]   Prep Specifications: Standard prep   Additional Info: Dialysis patient lab pending     CYSTOSCOPY W/ RETROGRADES Bilateral 8/20/2020    Procedure: CYSTOSCOPY, WITH RETROGRADE PYELOGRAM;  Surgeon: Douglas Abraham MD;  Location: [facility] OR 1ST FLR;  Service: Urology;  Laterality: Bilateral;  45 min    FISTULOGRAM Right 9/9/2020    Procedure: Fistulogram;  Surgeon: Lester Gómez MD;  Location: [facility] CATH LAB;  Service: Nephrology;  Laterality: Right;    HYSTERECTOMY      IMPLANTATION OF COCHLEAR PROSTHESIS Right 6/28/2021    Procedure: INSERTION, PROSTHESIS, COCHLEAR;  Surgeon: Ramiro Zuleta MD;  Location: [facility] OR 2ND FLR;  Service: ENT;  Laterality: Right;  COCHLEAR BABAK    IMPLANTATION OF COCHLEAR PROSTHESIS Right 6/30/2021    Procedure: INSERTION, PROSTHESIS, COCHLEAR;  Surgeon: Ramiro Zuleta MD;  Location: [facility] OR 2ND FLR;  Service: ENT;  Laterality: Right;  COCHLEAR AMERICAS    ROBOT-ASSISTED LAPAROSCOPIC ABDOMINAL HYSTERECTOMY USING DA NATALYA XI N/A 7/5/2019    Procedure: XI ROBOTIC HYSTERECTOMY;  Surgeon: Trice Lei MD;  Location: [facility];  Service: OB/GYN;  Laterality: N/A;    ROBOT-ASSISTED LAPAROSCOPIC NEPHRECTOMY Right 10/8/2020    Procedure: ROBOTIC NEPHRECTOMY;  Surgeon: Douglas Abraham MD;  Location: [facility] OR 2ND FLR;  Service: Urology;  Laterality: Right;  3.5hrs gen with regional     ROBOT-ASSISTED LAPAROSCOPIC SALPINGO-OOPHORECTOMY USING DA NATALYA XI Bilateral 7/5/2019    Procedure: XI ROBOTIC SALPINGO-OOPHORECTOMY;  Surgeon: Trice Lei MD;  Location: [facility];  Service: OB/GYN;  Laterality: Bilateral;    TUBAL LIGATION      Vascular access surgeries      x5       Review of patient's allergies indicates:  No Known Allergies    No current facility-administered medications on file prior to encounter.     Current Outpatient Medications on File Prior to Encounter   Medication Sig    ACCU-CHEK GUIDE GLUCOSE METER Misc     ACCU-CHEK GUIDE L1-L2 CTRL SOL Soln     ACCU-CHEK GUIDE TEST STRIPS Strp     ACCU-CHEK SOFTCLIX LANCETS Misc     acetaminophen (TYLENOL) 500 MG tablet Take 1  tablet (500 mg total) by mouth every 8 (eight) hours as needed for Pain.    acetaminophen-codeine 300-30mg (TYLENOL #3) 300-30 mg Tab Take 1 tablet by mouth every 4 to 6 hours as needed.    amLODIPine (NORVASC) 10 MG tablet Take 1 tablet (10 mg total) by mouth nightly.    amLODIPine (NORVASC) 10 MG tablet Take 1 tablet by mouth once daily.    amoxicillin (AMOXIL) 500 MG Tab TAKE 1 TABLET BY MOUTH TWICE DAILY UNTIL GONE    aspirin (ECOTRIN) 81 MG EC tablet Take 81 mg by mouth every morning.    atorvastatin (LIPITOR) 40 MG tablet Take 1 tablet (40 mg total) by mouth once daily. (Patient taking differently: Take 40 mg by mouth every morning.)    atorvastatin (LIPITOR) 40 MG tablet Take 1 tablet by mouth once daily.    DROPSAFE ALCOHOL PREP PADS PadM Apply topically.    furosemide (LASIX) 80 MG tablet Take 1 tablet (80 mg total) by mouth once daily.    hydrALAZINE (APRESOLINE) 100 MG tablet Take 100 mg by mouth 3 (three) times daily.    labetaloL (NORMODYNE) 200 MG tablet Take 1 tablet (200 mg total) by mouth every 12 (twelve) hours.    LIDOcaine (LIDODERM) 5 % Place 1 patch onto the skin once daily. Remove & Discard patch within 12 hours or as directed by MD    losartan (COZAAR) 100 MG tablet Take 1 tablet (100 mg total) by mouth once daily.    pregabalin (LYRICA) 75 MG capsule Take 1 capsule (75 mg total) by mouth once daily.    sevelamer carbonate (RENVELA) 800 mg Tab Take 3 tablets (2,400 mg total) by mouth 3 (three) times daily with meals.     Family History       Problem Relation (Age of Onset)    Cancer Mother, Sister, Sister    Diabetes Sister, Brother    Lung cancer Sister    Macular degeneration Sister    Ovarian cancer Mother, Sister, Sister          Tobacco Use    Smoking status: Never    Smokeless tobacco: Never   Substance and Sexual Activity    Alcohol use: No    Drug use: No    Sexual activity: Yes     Partners: Male     Birth control/protection: Post-menopausal     Review of Systems   Constitutional:   Negative for activity change, appetite change, chills, fever and unexpected weight change.   Respiratory:  Positive for shortness of breath. Negative for apnea, cough, choking, chest tightness and wheezing.    Cardiovascular:  Positive for leg swelling (bilateral). Negative for chest pain.   Gastrointestinal:  Positive for blood in stool (?melena). Negative for abdominal distention, abdominal pain, constipation, diarrhea, nausea and vomiting.   Musculoskeletal:  Positive for back pain. Negative for joint swelling.   Skin:  Negative for rash and wound.   Allergic/Immunologic: Negative for immunocompromised state.     Objective:     Vital Signs (Most Recent):  Temp: 99.8 °F (37.7 °C) (08/11/24 0452)  Pulse: 85 (08/11/24 0932)  Resp: 17 (08/11/24 0928)  BP: (!) 169/78 (08/11/24 0932)  SpO2: 100 % (08/11/24 0732) Vital Signs (24h Range):  Temp:  [99.8 °F (37.7 °C)] 99.8 °F (37.7 °C)  Pulse:  [82-87] 85  Resp:  [15-26] 17  SpO2:  [99 %-100 %] 100 %  BP: (155-171)/(67-78) 169/78     Weight: 85 kg (187 lb 6.3 oz)  Body mass index is 32.17 kg/m².     Physical Exam  Vitals reviewed.   Constitutional:       General: She is not in acute distress.     Appearance: She is ill-appearing. She is not toxic-appearing or diaphoretic.   Cardiovascular:      Rate and Rhythm: Normal rate and regular rhythm.      Pulses: Normal pulses.      Heart sounds: Normal heart sounds.   Pulmonary:      Effort: Respiratory distress present.      Breath sounds: No stridor. Rales (bilateral) present. No wheezing or rhonchi.      Comments: 4L NC  Chest:      Chest wall: No tenderness.   Abdominal:      General: Abdomen is flat. Bowel sounds are normal. There is no distension.      Palpations: Abdomen is soft. There is no mass.      Tenderness: There is no abdominal tenderness. There is no right CVA tenderness, left CVA tenderness, guarding or rebound.      Hernia: No hernia is present.   Musculoskeletal:         General: Tenderness (back) present. No  deformity.      Right lower leg: Edema present.      Left lower leg: Edema present.   Skin:     General: Skin is warm and dry.   Neurological:      Mental Status: She is alert.                Significant Labs: All pertinent labs within the past 24 hours have been reviewed.  CBC:   Recent Labs   Lab 08/11/24  0544 08/11/24  0549 08/11/24  0610   WBC 11.26  --   --    HGB 6.0*  --  5.4*   HCT 18.0* 29*  --      --   --      CMP:   Recent Labs   Lab 08/11/24  0544      K 4.5      CO2 23   *   BUN 57*   CREATININE 11.0*   CALCIUM 10.0   PROT 7.9   ALBUMIN 3.5   BILITOT 1.8*   ALKPHOS 67   AST 14   ALT 8*   ANIONGAP 17*       Significant Imaging:   Imaging Results              X-Ray Chest AP Portable (Final result)  Result time 08/11/24 07:18:54      Final result by Ziggy Madrid MD (08/11/24 07:18:54)                   Impression:      Patchy opacities at the bilateral mid lower lung zones appear slightly progressed when compared to 08/08/2024, 11:14 hours.    Findings could relate to edema versus infectious or noninfectious inflammatory process.      Electronically signed by: Ziggy Madrid  Date:    08/11/2024  Time:    07:18               Narrative:    EXAMINATION:  XR CHEST AP PORTABLE    CLINICAL HISTORY:  shortness of breath;    TECHNIQUE:  Single frontal view of the chest was performed.    COMPARISON:  Chest radiograph performed 08/08/2024, 11:14 hours.    FINDINGS:  Monitoring leads overlie the chest.  Grossly unchanged cardiomediastinal contours, again noting enlargement the cardiac silhouette and prominence of central pulmonary vasculature.    Patchy opacities at the bilateral lower and mid lung zone similar slightly progressed when compared to 08/08/2024, 11:14 hours examination.  Question small layering pleural effusions.    No definite pneumothorax.    No acute findings in the visualized abdomen.    Osseous and soft tissue structures appear without definite acute change.   Operative sequela of the left upper extremity again noted.

## 2024-08-11 NOTE — ASSESSMENT & PLAN NOTE
Anemia  HTN    Patient presenting with SOB in setting of abbreviated recent dialysis session and volume overload. Electrolytes non-emergent. Anemia with Hg down to 5.4, pending transfusion. BNP elevated and CXR with interstitial edema. Minimal UOP, will need HD session for volume removal.    Outpatient HD Information:  -Dialysis modality: Hemodialysis  -Outpatient HD unit: [facility]  -Nephrologist: Kahlil TRIMBLE  -HD TX days: Monday/Wednesday/Friday, duration of treatment: 3.5 hrs  -Last HD TX prior to hospital admission: partial session 8/9, full session 8/7  -Dialysis access: RUE AV fistula   -Residual urine: Minimum   -EDW: 85kg

## 2024-08-11 NOTE — ED NOTES
Telemetry Verification   Patient placed on Telemetry Box  Verified with War Room  Box # [id number]   Monitor Tech Yao   Rate 86   Rhythm NS

## 2024-08-11 NOTE — SUBJECTIVE & OBJECTIVE
Past Medical History:   Diagnosis Date    Acute hyperkalemia 2020    Acute on chronic diastolic heart failure 10/08/2021    Anemia of chronic disorder 2017    Overview:  Added automatically from request for surgery [id number]    Anemia of chronic renal failure, stage 5     Anxiety     Cyst of left ovary 2019    Cyst of left ovary 2019    Dyslipidemia 2013    Encounter for screening colonoscopy 10/08/2020    End-stage renal disease on hemodialysis 2020    ESRD on hemodialysis 2017    Hematuria 10/30/2019    History of COVID-19 2020    Hospital discharge follow-up 10/06/2023    Hx of sinus bradycardia 2017    Hyperkalemia 2017    Hypertensive urgency 2021    Peripheral neuropathy 2013    Preoperative testing 10/08/2020    Renovascular hypertension 2020    S/P dilation and curettage 2018    Screening for colon cancer 2023    Thickened endometrium 2018    Thyroid nodule     Thyroid nodule 2021    Uncontrolled type 2 diabetes mellitus 2013    Overview:  poc a1c today 11.2-270/ up from 10.8-263, Pt. has very very stressed/ was incarcerated/marital stress/ will current meds/ less stress now/ will monitor.       Past Surgical History:   Procedure Laterality Date    AV FISTULA PLACEMENT      right arm     SECTION      x1    COLONOSCOPY N/A 2018    Procedure: COLONOSCOPY;  Surgeon: Pankaj Hinojosa MD;  [facility]  Service: Endoscopy;  Laterality: N/A;  dialysis pt/labs prior to colon/MS    COLONOSCOPY N/A 2023    Procedure: COLONOSCOPY;  Surgeon: Angel Turner MD;  Location: [facility] (2ND FLR);  Service: Endoscopy;  Laterality: N/A;  Ref By:  Maty Gayle MD  Procedure: Colonoscopy  Diagnosis: endounter for preventive health   Procedure Timing: pt. pref  Provider: Dr. turner   [place] [facility]   Prep Specifications: Standard prep   Additional Info: Dialysis patient lab pending     CYSTOSCOPY W/ RETROGRADES Bilateral 8/20/2020    Procedure: CYSTOSCOPY, WITH RETROGRADE PYELOGRAM;  Surgeon: Douglas Abraham MD;  Location: [facility] OR 1ST FLR;  Service: Urology;  Laterality: Bilateral;  45 min    FISTULOGRAM Right 9/9/2020    Procedure: Fistulogram;  Surgeon: Lester Gómez MD;  [facility];  Service: Nephrology;  Laterality: Right;    HYSTERECTOMY      IMPLANTATION OF COCHLEAR PROSTHESIS Right 6/28/2021    Procedure: INSERTION, PROSTHESIS, COCHLEAR;  Surgeon: Ramiro Zuleta MD;  Location: [facility] OR 2ND FLR;  Service: ENT;  Laterality: Right;  COCHLEAR BABAK    IMPLANTATION OF COCHLEAR PROSTHESIS Right 6/30/2021    Procedure: INSERTION, PROSTHESIS, COCHLEAR;  Surgeon: Ramiro Zuleta MD;  Location: [facility] OR 2ND FLR;  Service: ENT;  Laterality: Right;  COCHLEAR AMERICAS    ROBOT-ASSISTED LAPAROSCOPIC ABDOMINAL HYSTERECTOMY USING DA NATALYA XI N/A 7/5/2019    Procedure: XI ROBOTIC HYSTERECTOMY;  Surgeon: Trice Lei MD;  Location: [facility];  Service: OB/GYN;  Laterality: N/A;    ROBOT-ASSISTED LAPAROSCOPIC NEPHRECTOMY Right 10/8/2020    Procedure: ROBOTIC NEPHRECTOMY;  Surgeon: Douglas Abraham MD;  Location: [facility] OR 2ND FLR;  Service: Urology;  Laterality: Right;  3.5hrs gen with regional     ROBOT-ASSISTED LAPAROSCOPIC SALPINGO-OOPHORECTOMY USING DA NATALYA XI Bilateral 7/5/2019    Procedure: XI ROBOTIC SALPINGO-OOPHORECTOMY;  Surgeon: Trice Lei MD;  Location: [facility];  Service: OB/GYN;  Laterality: Bilateral;    TUBAL LIGATION      Vascular access surgeries      x5       Review of patient's allergies indicates:  No Known Allergies  Current Facility-Administered Medications   Medication Frequency    0.9%  NaCl infusion (for blood administration) Q24H PRN    acetaminophen tablet 650 mg Q4H PRN    acetaminophen tablet 650 mg Q8H PRN    [START ON 8/12/2024] amLODIPine tablet 10 mg Daily    aspirin EC tablet 81 mg QAM    [START ON 8/12/2024] atorvastatin tablet 40 mg Daily    dextrose  10% bolus 125 mL 125 mL PRN    dextrose 10% bolus 250 mL 250 mL PRN    enoxaparin injection 30 mg Daily    glucagon (human recombinant) injection 1 mg PRN    glucose chewable tablet 16 g PRN    glucose chewable tablet 24 g PRN    insulin aspart U-100 pen 0-5 Units QID (AC + HS) PRN    losartan tablet 100 mg Daily    melatonin tablet 6 mg Nightly PRN    naloxone 0.4 mg/mL injection 0.02 mg PRN    pregabalin capsule 75 mg Daily    sevelamer carbonate tablet 2,400 mg TID WM    sodium chloride 0.9% flush 10 mL Q12H PRN     Current Outpatient Medications   Medication    ACCU-CHEK GUIDE GLUCOSE METER Misc    ACCU-CHEK GUIDE L1-L2 CTRL SOL Soln    ACCU-CHEK GUIDE TEST STRIPS Strp    ACCU-CHEK SOFTCLIX LANCETS Misc    acetaminophen (TYLENOL) 500 MG tablet    acetaminophen-codeine 300-30mg (TYLENOL #3) 300-30 mg Tab    amLODIPine (NORVASC) 10 MG tablet    amLODIPine (NORVASC) 10 MG tablet    amoxicillin (AMOXIL) 500 MG Tab    aspirin (ECOTRIN) 81 MG EC tablet    atorvastatin (LIPITOR) 40 MG tablet    atorvastatin (LIPITOR) 40 MG tablet    DROPSAFE ALCOHOL PREP PADS PadM    furosemide (LASIX) 80 MG tablet    hydrALAZINE (APRESOLINE) 100 MG tablet    labetaloL (NORMODYNE) 200 MG tablet    LIDOcaine (LIDODERM) 5 %    losartan (COZAAR) 100 MG tablet    pregabalin (LYRICA) 75 MG capsule    sevelamer carbonate (RENVELA) 800 mg Tab     Family History       Problem Relation (Age of Onset)    Cancer Mother, Sister, Sister    Diabetes Sister, Brother    Lung cancer Sister    Macular degeneration Sister    Ovarian cancer Mother, Sister, Sister          Tobacco Use    Smoking status: Never    Smokeless tobacco: Never   Substance and Sexual Activity    Alcohol use: No    Drug use: No    Sexual activity: Yes     Partners: Male     Birth control/protection: Post-menopausal     Review of Systems   Constitutional:  Negative for fever.   HENT:  Negative for congestion.    Respiratory:  Positive for shortness of breath. Negative for wheezing.     Cardiovascular:  Positive for leg swelling.   Gastrointestinal:  Negative for diarrhea.   Genitourinary:  Positive for decreased urine volume (minimal UOP, baseline). Negative for difficulty urinating.   Skin:  Negative for rash.   Neurological:  Negative for light-headedness.   Psychiatric/Behavioral:  Negative for confusion.      Objective:     Vital Signs (Most Recent):  Temp: 99.8 °F (37.7 °C) (08/11/24 0452)  Pulse: 83 (08/11/24 1132)  Resp: 17 (08/11/24 1134)  BP: (!) 171/77 (08/11/24 1201)  SpO2: 100 % (08/11/24 0732) Vital Signs (24h Range):  Temp:  [99.8 °F (37.7 °C)] 99.8 °F (37.7 °C)  Pulse:  [82-87] 83  Resp:  [15-26] 17  SpO2:  [99 %-100 %] 100 %  BP: (155-171)/(67-78) 171/77     Weight: 85 kg (187 lb 6.3 oz) (08/11/24 0452)  Body mass index is 32.17 kg/m².  Body surface area is 1.96 meters squared.    No intake/output data recorded.     Physical Exam  Vitals and nursing note reviewed.   Constitutional:       General: She is not in acute distress.     Appearance: She is normal weight. She is not toxic-appearing.   HENT:      Head: Normocephalic and atraumatic.   Eyes:      General: No scleral icterus.  Cardiovascular:      Rate and Rhythm: Normal rate and regular rhythm.   Pulmonary:      Effort: Pulmonary effort is normal.      Breath sounds: Rales present. No wheezing.   Abdominal:      General: There is no distension.   Musculoskeletal:      Right lower leg: Edema present.      Left lower leg: Edema (L>R) present.      Comments: RUE AVF with audible bruit   Neurological:      General: No focal deficit present.      Mental Status: She is alert.   Psychiatric:         Mood and Affect: Mood normal.         Behavior: Behavior normal.          Significant Labs:  CBC:   Recent Labs   Lab 08/12/24  0836   WBC 11.44   RBC 2.25*   HGB 6.4*   HCT 18.7*      MCV 83   MCH 28.4   MCHC 34.2     CMP:   Recent Labs   Lab 08/12/24  0403      CALCIUM 9.4   ALBUMIN 3.1*   PROT 7.1      K 4.9   CO2  23   CL 99   BUN 73*   CREATININE 13.1*   ALKPHOS 59   ALT 8*   AST 12   BILITOT 1.4*     All labs within the past 24 hours have been reviewed.

## 2024-08-11 NOTE — HPI
Jael Hall is a 58F with PMHx ESRD-HD mwf, anxiety, HTN, HLD, T2DM presents with SOB. She describes symptoms started around midnight that progressively worsened. Her symptoms worsened with laying down. She recently had dialysis on Friday but unable to complete due to back pain. Reportedly removed about 2L. In addition, she reports 1 dark stool last night but is unclear about the history/detail of this. Denies NSAID use. Denies chest pain, diarrhea, constipation, fevers, abdominal pain, hemoptysis, hematemesis.    On ED admission. AF. VSS. 4L NC. Hb 6.0 > 5.4, , trop normal. glucose 153. Electrolytes WNL. WBC normal FOBT and YUMIKO was negative. 1 unit of pRBC tranfused. CXR showed bilateral lower lobe opacities. Nephrology was consulted. Hemodialysis planned for today.

## 2024-08-11 NOTE — HOSPITAL COURSE
Patient presented with severe anemia with hemoglobin of 5.4. She received 1 unit of pRBC and went through HD. In addition, she was found to be Masha positive with elevated LDH, low haptoglobin, elevated bilirubin, normal fibrinogen and PT/PTT. Hematology consulted for possible hemolysis process and further workup. She responded appropriately to another 1 unit of pRBC and hemoglobin stabilized at 8.1. In addition, DVT US and CT PE were negative for her SOB workup. She was started on prednisone 1mg/kg PO and folic acid per hematology recommendations for warm AIHA. She underwent another scheduled HD with volume removal and she no longer required oxygen and returned to baseline requirement. Hematology recommended continuing steroid taper with close outpatient follow-up with lab work. Patient is to be discharged home with atovaquone for PJP prophylaxis

## 2024-08-11 NOTE — ED TRIAGE NOTES
Jael VAZQUEZ Rafael, a 58 y.o. female presents to the ED w/ complaint of increased SOB starting Friday. Dialysis M, W, F, was not able to complete session on Friday.     Triage note:  Chief Complaint   Patient presents with    Shortness of Breath     Pt arrives c/o sob stating that she was dialyzed Friday but was unable to complete the whole treatment.     Review of patient's allergies indicates:  No Known Allergies  Past Medical History:   Diagnosis Date    Acute hyperkalemia 09/09/2020    Acute on chronic diastolic heart failure 10/08/2021    Anemia of chronic disorder 11/08/2017    Overview:  Added automatically from request for surgery [id number]    Anemia of chronic renal failure, stage 5     Anxiety     Cyst of left ovary 05/14/2019    Cyst of left ovary 07/05/2019    Dyslipidemia 01/04/2013    Encounter for screening colonoscopy 10/08/2020    End-stage renal disease on hemodialysis 08/20/2020    ESRD on hemodialysis 09/28/2017    Hematuria 10/30/2019    History of COVID-19 04/07/2020    Hospital discharge follow-up 10/06/2023    Hx of sinus bradycardia 09/28/2017    Hyperkalemia 03/29/2017    Hypertensive urgency 07/22/2021    Peripheral neuropathy 01/04/2013    Preoperative testing 10/08/2020    Renovascular hypertension 08/17/2020    S/P dilation and curettage 05/22/2018    Screening for colon cancer 11/28/2023    Thickened endometrium 05/22/2018    Thyroid nodule     Thyroid nodule 12/13/2021    Uncontrolled type 2 diabetes mellitus 04/05/2013    Overview:  poc a1c today 11.2-270/ up from 10.8-263, Pt. has very very stressed/ was incarcerated/marital stress/ will current meds/ less stress now/ will monitor.

## 2024-08-11 NOTE — ED NOTES
[facility] [employer] and states 3-4 more labs and type and screens are needed to prepare patient's units of blood.

## 2024-08-11 NOTE — ASSESSMENT & PLAN NOTE
Patient has ERSD and receives dialysis MWF. She recently had dialysis on Friday but was not able to complete the full session due to back pain. Last session removed about 2L. She complained of orthopnea and paroxysmal noctural dyspnea that progressively worsened throughout the night. On exam she is fluid overloaded and CXR with evidence of pulmonary edema. She is not on lasix at home. BNP 400s     Plan  - Dialysis today; and plan to resume on regular schedule MWF   - Monitor UOP and serial BMP and adjust therapy as needed  - Renally dose meds  - Avoid nephrotoxic medications and procedures.

## 2024-08-11 NOTE — HPI
Jael Hall is a 58 y.o. Black or  Female with ESRD on HD (MWF) HTN, DM2, RCC s/p robotic right nephrectomy in 2020, left renal mass and followed by urology with surveillance MRI abdomen who presents with SOB. Patient states she was awoken from her sleep at 3am due to dyspnea which was a/w cough and CP. On Friday, she only completed 1hr of her dialysis session because of back pain, typical sessions are 3.5hrs.    On arrival, patient placed on 3L O2 NC, SpO2 100%. Labs notable for Hg 5.4, Na 140, K 4.5, bicarb 23, AG 17. . CXR with diffuse interstitial edema.    Nephrology consulted for management of ESRD and HD treatment in setting of volume overload.

## 2024-08-11 NOTE — ED PROVIDER NOTES
Encounter Date: 8/11/2024       History     Chief Complaint   Patient presents with    Shortness of Breath     Pt arrives c/o sob stating that she was dialyzed Friday but was unable to complete the whole treatment.     59 yo W with pmhx ESRD-HD, anxiety, HTN, HLD, QT prolongation presents with a chief complaint of shortness breath. Acutely worsened at 3am, awakening her from sleep. Also associated cough and mild chest pain. She notes on Friday she only completed 1 hour of dialysis and had to leave early due to pain in her back.  She was seen in the ER on August 8th for a trapezium spasm and was treated for that and discharged.  She notes that pain is still there too.  No known exposures to COVID-19.  Family member at bedside assisted with the history.    The history is provided by the patient and a relative.     Review of patient's allergies indicates:  No Known Allergies  Past Medical History:   Diagnosis Date    Acute hyperkalemia 09/09/2020    Acute on chronic diastolic heart failure 10/08/2021    Anemia of chronic disorder 11/08/2017    Overview:  Added automatically from request for surgery [id number]    Anemia of chronic renal failure, stage 5     Anxiety     Cyst of left ovary 05/14/2019    Cyst of left ovary 07/05/2019    Dyslipidemia 01/04/2013    Encounter for screening colonoscopy 10/08/2020    End-stage renal disease on hemodialysis 08/20/2020    ESRD on hemodialysis 09/28/2017    Hematuria 10/30/2019    History of COVID-19 04/07/2020    Hospital discharge follow-up 10/06/2023    Hx of sinus bradycardia 09/28/2017    Hyperkalemia 03/29/2017    Hypertensive urgency 07/22/2021    Peripheral neuropathy 01/04/2013    Preoperative testing 10/08/2020    Renovascular hypertension 08/17/2020    S/P dilation and curettage 05/22/2018    Screening for colon cancer 11/28/2023    Thickened endometrium 05/22/2018    Thyroid nodule     Thyroid nodule 12/13/2021    Uncontrolled type 2 diabetes mellitus 04/05/2013     Overview:  poc a1c today 11.2-270/ up from 10.8-263, Pt. has very very stressed/ was incarcerated/marital stress/ will current meds/ less stress now/ will monitor.     Past Surgical History:   Procedure Laterality Date    AV FISTULA PLACEMENT      right arm     SECTION      x1    COLONOSCOPY N/A 2018    Procedure: COLONOSCOPY;  Surgeon: Pankaj Hinojosa MD;  Location: [facility] (4TH FLR);  Service: Endoscopy;  Laterality: N/A;  dialysis pt/labs prior to colon/MS    COLONOSCOPY N/A 2023    Procedure: COLONOSCOPY;  Surgeon: Angel Turner MD;  Location: [facility] (2ND FLR);  Service: Endoscopy;  Laterality: N/A;  Ref By:  Maty Gayle MD  Procedure: Colonoscopy  Diagnosis: endounter for preventive health   Procedure Timing: pt. pref  Provider: Dr. turner   Location: [facility] 4-Endo   Prep Specifications: Standard prep   Additional Info: Dialysis patient lab pending    CYSTOSCOPY W/ RETROGRADES Bilateral 2020    Procedure: CYSTOSCOPY, WITH RETROGRADE PYELOGRAM;  Surgeon: Douglas Abraham MD;  Location: [facility] 1ST FLR;  Service: Urology;  Laterality: Bilateral;  45 min    FISTULOGRAM Right 2020    Procedure: Fistulogram;  Surgeon: Lester Gómez MD;  Location: [facility] CATH LAB;  Service: Nephrology;  Laterality: Right;    HYSTERECTOMY      IMPLANTATION OF COCHLEAR PROSTHESIS Right 2021    Procedure: INSERTION, PROSTHESIS, COCHLEAR;  Surgeon: Ramiro Zuleta MD;  Location: [facility] OR 2ND FLR;  Service: ENT;  Laterality: Right;  COCHLEAR BABAK    IMPLANTATION OF COCHLEAR PROSTHESIS Right 2021    Procedure: INSERTION, PROSTHESIS, COCHLEAR;  Surgeon: Ramiro Zuleta MD;  Location: [facility] 2ND FLR;  Service: ENT;  Laterality: Right;  COCHLEAR AMERICAS    ROBOT-ASSISTED LAPAROSCOPIC ABDOMINAL HYSTERECTOMY USING DA NATALYA XI N/A 2019    Procedure: XI ROBOTIC HYSTERECTOMY;  Surgeon: Trice Lei MD;  Location: [facility];  Service: OB/GYN;  Laterality: N/A;    ROBOT-ASSISTED LAPAROSCOPIC NEPHRECTOMY  Right 10/8/2020    Procedure: ROBOTIC NEPHRECTOMY;  Surgeon: Douglas Abraham MD;  Location: [facility] OR [address] [place] [address];  Service: Urology;  Laterality: Right;  3.5hrs gen with regional     ROBOT-ASSISTED LAPAROSCOPIC SALPINGO-OOPHORECTOMY USING DA NATALAY XI Bilateral 7/5/2019    Procedure: XI ROBOTIC SALPINGO-OOPHORECTOMY;  Surgeon: Trice Lei MD;  Location: [facility] OR;  Service: OB/GYN;  Laterality: Bilateral;    TUBAL LIGATION      Vascular access surgeries      x5     Family History   Problem Relation Name Age of Onset    Cancer Mother      Ovarian cancer Mother      Cancer Sister      Ovarian cancer Sister      Lung cancer Sister      Macular degeneration Sister      Cancer Sister      Ovarian cancer Sister      Diabetes Sister      Diabetes Brother      Breast cancer Neg Hx      Colon cancer Neg Hx      Cervical cancer Neg Hx      Endometrial cancer Neg Hx      Vaginal cancer Neg Hx      Thyroid disease Neg Hx      Glaucoma Neg Hx      Retinal detachment Neg Hx       Social History     Tobacco Use    Smoking status: Never    Smokeless tobacco: Never   Substance Use Topics    Alcohol use: No    Drug use: No     Review of Systems    Physical Exam     Initial Vitals [08/11/24 0452]   BP Pulse Resp Temp SpO2   (!) 156/67 87 (!) 24 99.8 °F (37.7 °C) 99 %      MAP       --         Physical Exam    Nursing note and vitals reviewed.  Constitutional: She appears well-developed and well-nourished. She is not diaphoretic. No distress.   HENT:   Head: Normocephalic and atraumatic.   Eyes: Right eye exhibits no discharge. Left eye exhibits no discharge. No scleral icterus.   Neck: Neck supple. No JVD present.   Normal range of motion.  Cardiovascular:  Normal rate, regular rhythm and normal heart sounds.     Exam reveals no gallop and no friction rub.       No murmur heard.  Pulmonary/Chest: No respiratory distress. She has no wheezes.   Appropriate work of breathing on nasal cannula, speaking complete sentences without cough,  bibasilar rhonchi   Abdominal: Abdomen is soft. She exhibits no distension and no mass. There is no abdominal tenderness. There is no rebound and no guarding.   Genitourinary: Rectum:      Guaiac result negative.   Guaiac negative stool.   Musculoskeletal:         General: No tenderness or edema. Normal range of motion.      Cervical back: Normal range of motion and neck supple.     Neurological: She is alert and oriented to person, place, and time. She has normal strength. No sensory deficit.   Skin: Skin is warm and dry. Capillary refill takes less than 2 seconds.   Psychiatric: Thought content normal.         ED Course   Procedures  Labs Reviewed   CBC W/ AUTO DIFFERENTIAL - Abnormal       Result Value    WBC 11.26      RBC 2.18 (*)     Hemoglobin 6.0 (*)     Hematocrit 18.0 (*)     MCV 83      MCH 27.5      MCHC 33.3      RDW 19.3 (*)     Platelets 216      MPV 10.7      Immature Granulocytes 0.4      Gran # (ANC) 6.2      Immature Grans (Abs) 0.04      Lymph # 2.6      Mono # 1.4 (*)     Eos # 0.9 (*)     Baso # 0.07      nRBC 0      Gran % 55.2      Lymph % 23.4      Mono % 12.4      Eosinophil % 8.0      Basophil % 0.6      Differential Method Automated      Narrative:       HCT critical result(s) called and verbal readback obtained from   JESSICA SHIPLEY RN by SUSY 08/11/2024 05:59   COMPREHENSIVE METABOLIC PANEL - Abnormal    Sodium 140      Potassium 4.5      Chloride 100      CO2 23      Glucose 151 (*)     BUN 57 (*)     Creatinine 11.0 (*)     Calcium 10.0      Total Protein 7.9      Albumin 3.5      Total Bilirubin 1.8 (*)     Alkaline Phosphatase 67      AST 14      ALT 8 (*)     eGFR 3.6 (*)     Anion Gap 17 (*)    B-TYPE NATRIURETIC PEPTIDE - Abnormal     (*)    HEMOGLOBIN - Abnormal    Hemoglobin 5.4 (*)     Narrative:       HGB critical result(s) called and verbal readback obtained from   TAMAR POE RN by SUSY 08/11/2024 06:26   ISTAT PROCEDURE - Abnormal    POC Glucose 153 (*)     POC BUN 57  (*)     POC Creatinine 11.8 (*)     POC Sodium 140      POC Potassium 4.4      POC Chloride 98      POC TCO2 (MEASURED) 28      POC Ionized Calcium 1.15      POC Hematocrit 29 (*)     Sample NIKO     TROPONIN I    Troponin I 0.006     SARS-COV-2 RNA AMPLIFICATION, QUAL    SARS-CoV-2 RNA, Amplification, Qual Negative     TYPE & SCREEN    Specimen Outdate 08/14/2024 23:59     PREPARE RBC SOFT          Imaging Results              X-Ray Chest AP Portable (In process)                      Medications   0.9%  NaCl infusion (for blood administration) (has no administration in time range)     Medical Decision Making  59 yo W with pmhx ESRD-HD, anxiety, HTN, HLD, QT prolongation presents with a chief complaint of shortness breath.     Differential includes, but isn't limited to: Hypervolemia, CHF, ACS, COVID, pneumonia, pneumothorax, electrolyte abnormalities, hypokalemia    Will obtain labs and chest x-ray. US guided peripheral IV placed by myself.    Reassessment:  Chem 8 without hyperkalemia.  It revealed a hematocrit of 29.  However CBC revealed a hematocrit 18 and a hemoglobin of 6.0.  Given discordance, I obtained a repeat hemoglobin was 5.4.  Will transfuse.  Uncertain etiology as patient denies any bleeding.  Guaiac was negative.  CMP confirms uremia from end-stage renal disease status but no emergent electrolyte abnormalities.  Troponin is normal.  BNP is elevated at 410 which is high for her.  Chest x-ray on independent interpretation reveals pulmonary edema and hypervolemia.  Nephrology consulted for hemodialysis.  Will admit for hemodialysis and further investigation for anemia. Obs per case management.    Amount and/or Complexity of Data Reviewed  Labs: ordered.  Radiology: ordered.    Risk  Prescription drug management.              Attending Attestation:         Attending Critical Care:   Critical Care Times:   ==============================================================  Total Critical Care Time - exclusive  of procedural time: 30 minutes.  ==============================================================  Critical care reasons: anemia.                                  Clinical Impression:  Final diagnoses:  [R06.02] SOB (shortness of breath)  [R06.02] Shortness of breath  [E87.70] Hypervolemia, unspecified hypervolemia type (Primary)  [D64.9] Anemia, unspecified type          ED Disposition Condition    Observation Stable                Wilmer Sutton MD  08/11/24 0711

## 2024-08-11 NOTE — CONSULTS
[facility] - Emergency Dept  Nephrology  Consult Note    Patient Name: Jael Hall  MRN: [id number]  Admission Date: 8/11/2024  Hospital Length of Stay: 0 days  Attending Provider: Darryn Reynolds MD   Primary Care Physician: Maty Gayle DO  Principal Problem:<principal problem not specified>    Inpatient consult to Nephrology  Consult performed by: Hina Santiago MD  Consult ordered by: Wilmer Sutton MD  Reason for consult: ESRD        Subjective:     HPI: Jael Hall is a 58 y.o. Black or  Female with ESRD on HD (MWF) HTN, DM2, RCC s/p robotic right nephrectomy in 2020, left renal mass and followed by urology with surveillance MRI abdomen who presents with SOB. Patient states she was awoken from her sleep at 3am due to dyspnea which was a/w cough and CP. On Friday, she only completed 1hr of her dialysis session because of back pain, typical sessions are 3.5hrs.    On arrival, patient placed on 3L NC, SpO2 100%. Labs notable for Hg 5.4, Na 140, K 4.5, bicarb 23, AG 17. . CXR with diffuse interstitial edema.    Nephrology consulted for management of ESRD and HD treatment in setting of volume overload.    Past Medical History:   Diagnosis Date    Acute hyperkalemia 09/09/2020    Acute on chronic diastolic heart failure 10/08/2021    Anemia of chronic disorder 11/08/2017    Overview:  Added automatically from request for surgery [id number]    Anemia of chronic renal failure, stage 5     Anxiety     Cyst of left ovary 05/14/2019    Cyst of left ovary 07/05/2019    Dyslipidemia 01/04/2013    Encounter for screening colonoscopy 10/08/2020    End-stage renal disease on hemodialysis 08/20/2020    ESRD on hemodialysis 09/28/2017    Hematuria 10/30/2019    History of COVID-19 04/07/2020    Hospital discharge follow-up 10/06/2023    Hx of sinus bradycardia 09/28/2017    Hyperkalemia 03/29/2017    Hypertensive urgency 07/22/2021    Peripheral neuropathy 01/04/2013    Preoperative testing  10/08/2020    Renovascular hypertension 2020    S/P dilation and curettage 2018    Screening for colon cancer 2023    Thickened endometrium 2018    Thyroid nodule     Thyroid nodule 2021    Uncontrolled type 2 diabetes mellitus 2013    Overview:  poc a1c today 11.2-270/ up from 10.8-263, Pt. has very very stressed/ was incarcerated/marital stress/ will current meds/ less stress now/ will monitor.       Past Surgical History:   Procedure Laterality Date    AV FISTULA PLACEMENT      right arm     SECTION      x1    COLONOSCOPY N/A 2018    Procedure: COLONOSCOPY;  Surgeon: Pankaj Hinojosa MD;  Location: [facility] (4TH FLR);  Service: Endoscopy;  Laterality: N/A;  dialysis pt/labs prior to colon/MS    COLONOSCOPY N/A 2023    Procedure: COLONOSCOPY;  Surgeon: Angel Turner MD;  Location: [facility] (2ND FLR);  Service: Endoscopy;  Laterality: N/A;  Ref By:  Maty Gayle MD  Procedure: Colonoscopy  Diagnosis: endounter for preventive health   Procedure Timing: pt. pref  Provider: Dr. turner   Location: [facility] 4-Endo   Prep Specifications: Standard prep   Additional Info: Dialysis patient lab pending    CYSTOSCOPY W/ RETROGRADES Bilateral 2020    Procedure: CYSTOSCOPY, WITH RETROGRADE PYELOGRAM;  Surgeon: Douglas Abraham MD;  Location: [facility] OR 1ST FLR;  Service: Urology;  Laterality: Bilateral;  45 min    FISTULOGRAM Right 2020    Procedure: Fistulogram;  Surgeon: Lester Gómez MD;  Location: [facility] CATH LAB;  Service: Nephrology;  Laterality: Right;    HYSTERECTOMY      IMPLANTATION OF COCHLEAR PROSTHESIS Right 2021    Procedure: INSERTION, PROSTHESIS, COCHLEAR;  Surgeon: Ramiro Zuleta MD;  Location: [facility] OR 2ND FLR;  Service: ENT;  Laterality: Right;  COCHLEAR BABAK    IMPLANTATION OF COCHLEAR PROSTHESIS Right 2021    Procedure: INSERTION, PROSTHESIS, COCHLEAR;  Surgeon: Ramiro Zuleta MD;  Location: [facility] OR 2ND FLR;  Service: ENT;  Laterality:  Right;  COCHLEAR AMERICAS    ROBOT-ASSISTED LAPAROSCOPIC ABDOMINAL HYSTERECTOMY USING DA NATALYA XI N/A 7/5/2019    Procedure: XI ROBOTIC HYSTERECTOMY;  Surgeon: Trice Lei MD;  Location: [facility] [place] OR;  Service: OB/GYN;  Laterality: N/A;    ROBOT-ASSISTED LAPAROSCOPIC NEPHRECTOMY Right 10/8/2020    Procedure: ROBOTIC NEPHRECTOMY;  Surgeon: Douglas Abraham MD;  Location: [facility] OR [facility] FLR;  Service: Urology;  Laterality: Right;  3.5hrs gen with regional     ROBOT-ASSISTED LAPAROSCOPIC SALPINGO-OOPHORECTOMY USING DA NATALYA XI Bilateral 7/5/2019    Procedure: XI ROBOTIC SALPINGO-OOPHORECTOMY;  Surgeon: Trice Lei MD;  Location: [facility] [place] OR;  Service: OB/GYN;  Laterality: Bilateral;    TUBAL LIGATION      Vascular access surgeries      x5       Review of patient's allergies indicates:  No Known Allergies  Current Facility-Administered Medications   Medication Frequency    0.9%  NaCl infusion (for blood administration) Q24H PRN    acetaminophen tablet 650 mg Q4H PRN    acetaminophen tablet 650 mg Q8H PRN    dextrose 10% bolus 125 mL 125 mL PRN    dextrose 10% bolus 250 mL 250 mL PRN    enoxaparin injection 30 mg Daily    glucagon (human recombinant) injection 1 mg PRN    glucose chewable tablet 16 g PRN    glucose chewable tablet 24 g PRN    insulin aspart U-100 pen 0-5 Units QID (AC + HS) PRN    melatonin tablet 6 mg Nightly PRN    naloxone 0.4 mg/mL injection 0.02 mg PRN    sodium chloride 0.9% flush 10 mL Q12H PRN     Current Outpatient Medications   Medication    ACCU-CHEK GUIDE GLUCOSE METER Misc    ACCU-CHEK GUIDE L1-L2 CTRL SOL Soln    ACCU-CHEK GUIDE TEST STRIPS Strp    ACCU-CHEK SOFTCLIX LANCETS Misc    acetaminophen (TYLENOL) 500 MG tablet    acetaminophen-codeine 300-30mg (TYLENOL #3) 300-30 mg Tab    amLODIPine (NORVASC) 10 MG tablet    amLODIPine (NORVASC) 10 MG tablet    amoxicillin (AMOXIL) 500 MG Tab    aspirin (ECOTRIN) 81 MG EC tablet    atorvastatin (LIPITOR) 40 MG tablet    atorvastatin (LIPITOR) 40  MG tablet    DROPSAFE ALCOHOL PREP PADS PadM    furosemide (LASIX) 80 MG tablet    hydrALAZINE (APRESOLINE) 100 MG tablet    labetaloL (NORMODYNE) 200 MG tablet    LIDOcaine (LIDODERM) 5 %    losartan (COZAAR) 100 MG tablet    pregabalin (LYRICA) 75 MG capsule    sevelamer carbonate (RENVELA) 800 mg Tab     Family History       Problem Relation (Age of Onset)    Cancer Mother, Sister, Sister    Diabetes Sister, Brother    Lung cancer Sister    Macular degeneration Sister    Ovarian cancer Mother, Sister, Sister          Tobacco Use    Smoking status: Never    Smokeless tobacco: Never   Substance and Sexual Activity    Alcohol use: No    Drug use: No    Sexual activity: Yes     Partners: Male     Birth control/protection: Post-menopausal     Review of Systems   Constitutional:  Negative for fever.   HENT:  Negative for congestion.    Respiratory:  Positive for shortness of breath. Negative for wheezing.    Cardiovascular:  Positive for leg swelling.   Gastrointestinal:  Negative for diarrhea.   Genitourinary:  Positive for decreased urine volume (minimal UOP, baseline). Negative for difficulty urinating.   Skin:  Negative for rash.   Neurological:  Negative for light-headedness.   Psychiatric/Behavioral:  Negative for confusion.      Objective:     Vital Signs (Most Recent):  Temp: 99.8 °F (37.7 °C) (08/11/24 0452)  Pulse: 86 (08/11/24 0732)  Resp: 15 (08/11/24 0732)  BP: (!) 155/69 (08/11/24 0732)  SpO2: 100 % (08/11/24 0732) Vital Signs (24h Range):  Temp:  [99.8 °F (37.7 °C)] 99.8 °F (37.7 °C)  Pulse:  [83-87] 86  Resp:  [15-26] 15  SpO2:  [99 %-100 %] 100 %  BP: (155-171)/(67-75) 155/69     Weight: 85 kg (187 lb 6.3 oz) (08/11/24 0452)  Body mass index is 32.17 kg/m².  Body surface area is 1.96 meters squared.    No intake/output data recorded.     Physical Exam  Vitals and nursing note reviewed.   Constitutional:       General: She is not in acute distress.     Appearance: She is normal weight. She is not  toxic-appearing.   HENT:      Head: Normocephalic and atraumatic.   Eyes:      General: No scleral icterus.  Cardiovascular:      Rate and Rhythm: Normal rate and regular rhythm.   Pulmonary:      Effort: Pulmonary effort is normal.      Breath sounds: Rales (bibasilar) present. No wheezing.   Abdominal:      General: There is no distension.   Musculoskeletal:      Right lower leg: Edema present.      Left lower leg: Edema (L>R) present.      Comments: RUE AVF with audible bruit   Neurological:      General: No focal deficit present.      Mental Status: She is alert.          Significant Labs:  CBC:   Recent Labs   Lab 08/11/24  0544 08/11/24  0549 08/11/24  0610   WBC 11.26  --   --    RBC 2.18*  --   --    HGB 6.0*  --  5.4*   HCT 18.0* 29*  --      --   --    MCV 83  --   --    MCH 27.5  --   --    MCHC 33.3  --   --      CMP:   Recent Labs   Lab 08/11/24  0544   *   CALCIUM 10.0   ALBUMIN 3.5   PROT 7.9      K 4.5   CO2 23      BUN 57*   CREATININE 11.0*   ALKPHOS 67   ALT 8*   AST 14   BILITOT 1.8*     All labs within the past 24 hours have been reviewed.    Significant Imaging:  CXR interstitial edema  Assessment/Plan:     Renal/  ESRD (end stage renal disease) on dialysis  Anemia  HTN    Patient presenting with SOB in setting of abbreviated recent dialysis session and volume overload. Electrolytes non-emergent. Anemia with Hg down to 5.4, pending transfusion. BNP elevated and CXR with interstitial edema. Minimal UOP, will need HD session for volume removal.    Outpatient HD Information:  -Dialysis modality: Hemodialysis  -Outpatient HD unit: [facility]  -Nephrologist: Kahlil TRIMBLE  -HD TX days: Monday/Wednesday/Friday, duration of treatment: 3.5 hrs  -Last HD TX prior to hospital admission: partial session 8/9, full session 8/7  -Dialysis access: RUE AV fistula   -Residual urine: Minimum   -EDW: 85kg    Final recommendations per attending attestation to follow.     Thank you  for your consult. I will follow-up with patient. Please contact us if you have any additional questions.    Hina Santiago MD  Nephrology  [facility] - Emergency Dept

## 2024-08-11 NOTE — ED NOTES
"Spoke with blood bank regarding where the ordered blood is-- blood bank states they are "working on it"  "

## 2024-08-11 NOTE — ASSESSMENT & PLAN NOTE
Patient presented with 6 hgb with repeat at 5.4. She was given 1 unit pRBC. She reports single dark stool this morning. In the ED, her FOBT/YUMIKO were negative. Baseline Hgb approx. 8-8.5. Anemia is likely due to chronic disease due to ESRD.   Recent Labs     08/11/24  0544 08/11/24  0549 08/11/24  0610   HGB 6.0*  --  5.4*   HCT 18.0* 29*  --    Ferritin 3000+. TIBC WNL. Iron WNL. Transferrin 194 (low)     Plan  - Monitor serial CBC: Daily  - Transfuse PRBC if patient becomes hemodynamically unstable, symptomatic or H/H drops below 7.  - Patient has received 1 units of PRBCs on 8/11 ; consider another 1 unit after recheck CBC   - consider EPO after following up with nephrology outpatient

## 2024-08-11 NOTE — SUBJECTIVE & OBJECTIVE
Past Medical History:   Diagnosis Date    Acute hyperkalemia 2020    Acute on chronic diastolic heart failure 10/08/2021    Anemia of chronic disorder 2017    Overview:  Added automatically from request for surgery [id number]    Anemia of chronic renal failure, stage 5     Anxiety     Cyst of left ovary 2019    Cyst of left ovary 2019    Dyslipidemia 2013    Encounter for screening colonoscopy 10/08/2020    End-stage renal disease on hemodialysis 2020    ESRD on hemodialysis 2017    Hematuria 10/30/2019    History of COVID-19 2020    Hospital discharge follow-up 10/06/2023    Hx of sinus bradycardia 2017    Hyperkalemia 2017    Hypertensive urgency 2021    Peripheral neuropathy 2013    Preoperative testing 10/08/2020    Renovascular hypertension 2020    S/P dilation and curettage 2018    Screening for colon cancer 2023    Thickened endometrium 2018    Thyroid nodule     Thyroid nodule 2021    Uncontrolled type 2 diabetes mellitus 2013    Overview:  poc a1c today 11.2-270/ up from 10.8-263, Pt. has very very stressed/ was incarcerated/marital stress/ will current meds/ less stress now/ will monitor.       Past Surgical History:   Procedure Laterality Date    AV FISTULA PLACEMENT      right arm     SECTION      x1    COLONOSCOPY N/A 2018    Procedure: COLONOSCOPY;  Surgeon: Pankaj Hinojosa MD;  [facility]  Service: Endoscopy;  Laterality: N/A;  dialysis pt/labs prior to colon/MS    COLONOSCOPY N/A 2023    Procedure: COLONOSCOPY;  Surgeon: Angel Turner MD;  Location: [facility] (2ND FLR);  Service: Endoscopy;  Laterality: N/A;  Ref By:  Maty Gayle MD  Procedure: Colonoscopy  Diagnosis: endounter for preventive health   Procedure Timing: pt. pref  Provider: Dr. turner   Location: [facility] 4-Endo   Prep Specifications: Standard prep   Additional Info: Dialysis patient lab pending     CYSTOSCOPY W/ RETROGRADES Bilateral 8/20/2020    Procedure: CYSTOSCOPY, WITH RETROGRADE PYELOGRAM;  Surgeon: Douglas Abraham MD;  Location: [facility] OR 1ST FLR;  Service: Urology;  Laterality: Bilateral;  45 min    FISTULOGRAM Right 9/9/2020    Procedure: Fistulogram;  Surgeon: Lester Gómez MD;  Location: [facility] CATH LAB;  Service: Nephrology;  Laterality: Right;    HYSTERECTOMY      IMPLANTATION OF COCHLEAR PROSTHESIS Right 6/28/2021    Procedure: INSERTION, PROSTHESIS, COCHLEAR;  Surgeon: Ramiro Zuleta MD;  Location: [facility] OR 2ND FLR;  Service: ENT;  Laterality: Right;  COCHLEAR BABAK    IMPLANTATION OF COCHLEAR PROSTHESIS Right 6/30/2021    Procedure: INSERTION, PROSTHESIS, COCHLEAR;  Surgeon: Ramiro Zuleta MD;  Location: [facility] OR 2ND FLR;  Service: ENT;  Laterality: Right;  COCHLEAR AMERICAS    ROBOT-ASSISTED LAPAROSCOPIC ABDOMINAL HYSTERECTOMY USING DA NATALYA XI N/A 7/5/2019    Procedure: XI ROBOTIC HYSTERECTOMY;  Surgeon: Trice Lei MD;  Location: [facility];  Service: OB/GYN;  Laterality: N/A;    ROBOT-ASSISTED LAPAROSCOPIC NEPHRECTOMY Right 10/8/2020    Procedure: ROBOTIC NEPHRECTOMY;  Surgeon: Douglas Abraham MD;  Location: [facility] OR 2ND FLR;  Service: Urology;  Laterality: Right;  3.5hrs gen with regional     ROBOT-ASSISTED LAPAROSCOPIC SALPINGO-OOPHORECTOMY USING DA NATALYA XI Bilateral 7/5/2019    Procedure: XI ROBOTIC SALPINGO-OOPHORECTOMY;  Surgeon: Trice Lei MD;  Location: [facility];  Service: OB/GYN;  Laterality: Bilateral;    TUBAL LIGATION      Vascular access surgeries      x5       Review of patient's allergies indicates:  No Known Allergies  Current Facility-Administered Medications   Medication Frequency    0.9%  NaCl infusion (for blood administration) Q24H PRN    acetaminophen tablet 650 mg Q4H PRN    acetaminophen tablet 650 mg Q8H PRN    dextrose 10% bolus 125 mL 125 mL PRN    dextrose 10% bolus 250 mL 250 mL PRN    enoxaparin injection 30 mg Daily    glucagon (human recombinant)  injection 1 mg PRN    glucose chewable tablet 16 g PRN    glucose chewable tablet 24 g PRN    insulin aspart U-100 pen 0-5 Units QID (AC + HS) PRN    melatonin tablet 6 mg Nightly PRN    naloxone 0.4 mg/mL injection 0.02 mg PRN    sodium chloride 0.9% flush 10 mL Q12H PRN     Current Outpatient Medications   Medication    ACCU-CHEK GUIDE GLUCOSE METER Misc    ACCU-CHEK GUIDE L1-L2 CTRL SOL Soln    ACCU-CHEK GUIDE TEST STRIPS Strp    ACCU-CHEK SOFTCLIX LANCETS Misc    acetaminophen (TYLENOL) 500 MG tablet    acetaminophen-codeine 300-30mg (TYLENOL #3) 300-30 mg Tab    amLODIPine (NORVASC) 10 MG tablet    amLODIPine (NORVASC) 10 MG tablet    amoxicillin (AMOXIL) 500 MG Tab    aspirin (ECOTRIN) 81 MG EC tablet    atorvastatin (LIPITOR) 40 MG tablet    atorvastatin (LIPITOR) 40 MG tablet    DROPSAFE ALCOHOL PREP PADS PadM    furosemide (LASIX) 80 MG tablet    hydrALAZINE (APRESOLINE) 100 MG tablet    labetaloL (NORMODYNE) 200 MG tablet    LIDOcaine (LIDODERM) 5 %    losartan (COZAAR) 100 MG tablet    pregabalin (LYRICA) 75 MG capsule    sevelamer carbonate (RENVELA) 800 mg Tab     Family History       Problem Relation (Age of Onset)    Cancer Mother, Sister, Sister    Diabetes Sister, Brother    Lung cancer Sister    Macular degeneration Sister    Ovarian cancer Mother, Sister, Sister          Tobacco Use    Smoking status: Never    Smokeless tobacco: Never   Substance and Sexual Activity    Alcohol use: No    Drug use: No    Sexual activity: Yes     Partners: Male     Birth control/protection: Post-menopausal     Review of Systems   Constitutional:  Negative for fever.   HENT:  Negative for congestion.    Respiratory:  Positive for shortness of breath. Negative for wheezing.    Cardiovascular:  Positive for leg swelling.   Gastrointestinal:  Negative for diarrhea.   Genitourinary:  Positive for decreased urine volume (minimal UOP, baseline). Negative for difficulty urinating.   Skin:  Negative for rash.   Neurological:   Negative for light-headedness.   Psychiatric/Behavioral:  Negative for confusion.      Objective:     Vital Signs (Most Recent):  Temp: 99.8 °F (37.7 °C) (08/11/24 0452)  Pulse: 86 (08/11/24 0732)  Resp: 15 (08/11/24 0732)  BP: (!) 155/69 (08/11/24 0732)  SpO2: 100 % (08/11/24 0732) Vital Signs (24h Range):  Temp:  [99.8 °F (37.7 °C)] 99.8 °F (37.7 °C)  Pulse:  [83-87] 86  Resp:  [15-26] 15  SpO2:  [99 %-100 %] 100 %  BP: (155-171)/(67-75) 155/69     Weight: 85 kg (187 lb 6.3 oz) (08/11/24 0452)  Body mass index is 32.17 kg/m².  Body surface area is 1.96 meters squared.    No intake/output data recorded.     Physical Exam  Vitals and nursing note reviewed.   Constitutional:       General: She is not in acute distress.     Appearance: She is normal weight. She is not toxic-appearing.   HENT:      Head: Normocephalic and atraumatic.   Eyes:      General: No scleral icterus.  Cardiovascular:      Rate and Rhythm: Normal rate and regular rhythm.   Pulmonary:      Effort: Pulmonary effort is normal.      Breath sounds: Rales (bibasilar) present. No wheezing.   Abdominal:      General: There is no distension.   Musculoskeletal:      Right lower leg: Edema present.      Left lower leg: Edema (L>R) present.      Comments: LUE AVF with audible bruit   Neurological:      General: No focal deficit present.      Mental Status: She is alert.          Significant Labs:  CBC:   Recent Labs   Lab 08/11/24  0544 08/11/24  0549 08/11/24  0610   WBC 11.26  --   --    RBC 2.18*  --   --    HGB 6.0*  --  5.4*   HCT 18.0* 29*  --      --   --    MCV 83  --   --    MCH 27.5  --   --    MCHC 33.3  --   --      CMP:   Recent Labs   Lab 08/11/24  0544   *   CALCIUM 10.0   ALBUMIN 3.5   PROT 7.9      K 4.5   CO2 23      BUN 57*   CREATININE 11.0*   ALKPHOS 67   ALT 8*   AST 14   BILITOT 1.8*     All labs within the past 24 hours have been reviewed.    Significant Imaging:  CXR interstitial edema

## 2024-08-12 PROBLEM — D59.10 AIHA (AUTOIMMUNE HEMOLYTIC ANEMIA): Status: ACTIVE | Noted: 2024-08-12

## 2024-08-12 PROBLEM — R07.9 CHEST PAIN: Status: ACTIVE | Noted: 2024-08-12

## 2024-08-12 LAB
ALBUMIN SERPL BCP-MCNC: 3.1 G/DL (ref 3.5–5.2)
ALP SERPL-CCNC: 59 U/L (ref 55–135)
ALT SERPL W/O P-5'-P-CCNC: 8 U/L (ref 10–44)
ANION GAP SERPL CALC-SCNC: 16 MMOL/L (ref 8–16)
APTT PPP: 26.8 SEC (ref 21–32)
AST SERPL-CCNC: 12 U/L (ref 10–40)
BASOPHILS # BLD AUTO: 0.07 K/UL (ref 0–0.2)
BASOPHILS # BLD AUTO: 0.08 K/UL (ref 0–0.2)
BASOPHILS # BLD AUTO: 0.08 K/UL (ref 0–0.2)
BASOPHILS NFR BLD: 0.6 % (ref 0–1.9)
BASOPHILS NFR BLD: 0.7 % (ref 0–1.9)
BASOPHILS NFR BLD: 0.7 % (ref 0–1.9)
BILIRUB DIRECT SERPL-MCNC: 0.5 MG/DL (ref 0.1–0.3)
BILIRUB SERPL-MCNC: 1.4 MG/DL (ref 0.1–1)
BLD PROD TYP BPU: NORMAL
BLOOD UNIT EXPIRATION DATE: NORMAL
BLOOD UNIT TYPE CODE: 9500
BLOOD UNIT TYPE: NORMAL
BUN SERPL-MCNC: 73 MG/DL (ref 6–20)
CALCIUM SERPL-MCNC: 9.4 MG/DL (ref 8.7–10.5)
CHLORIDE SERPL-SCNC: 99 MMOL/L (ref 95–110)
CO2 SERPL-SCNC: 23 MMOL/L (ref 23–29)
CODING SYSTEM: NORMAL
CREAT SERPL-MCNC: 13.1 MG/DL (ref 0.5–1.4)
CROSSMATCH INTERPRETATION: NORMAL
DIFFERENTIAL METHOD BLD: ABNORMAL
DISPENSE STATUS: NORMAL
EOSINOPHIL # BLD AUTO: 0.8 K/UL (ref 0–0.5)
EOSINOPHIL # BLD AUTO: 0.9 K/UL (ref 0–0.5)
EOSINOPHIL # BLD AUTO: 0.9 K/UL (ref 0–0.5)
EOSINOPHIL NFR BLD: 6.4 % (ref 0–8)
EOSINOPHIL NFR BLD: 7 % (ref 0–8)
EOSINOPHIL NFR BLD: 7.5 % (ref 0–8)
ERYTHROCYTE [DISTWIDTH] IN BLOOD BY AUTOMATED COUNT: 20.1 % (ref 11.5–14.5)
ERYTHROCYTE [DISTWIDTH] IN BLOOD BY AUTOMATED COUNT: 20.6 % (ref 11.5–14.5)
ERYTHROCYTE [DISTWIDTH] IN BLOOD BY AUTOMATED COUNT: 20.9 % (ref 11.5–14.5)
EST. GFR  (NO RACE VARIABLE): 2.9 ML/MIN/1.73 M^2
FIBRINOGEN PPP-MCNC: 438 MG/DL (ref 182–400)
FOLATE SERPL-MCNC: 4.4 NG/ML (ref 4–24)
GLUCOSE SERPL-MCNC: 101 MG/DL (ref 70–110)
HAPTOGLOB SERPL-MCNC: <10 MG/DL (ref 30–250)
HCT VFR BLD AUTO: 18.7 % (ref 37–48.5)
HCT VFR BLD AUTO: 22.9 % (ref 37–48.5)
HCT VFR BLD AUTO: 23.1 % (ref 37–48.5)
HGB BLD-MCNC: 6.4 G/DL (ref 12–16)
HGB BLD-MCNC: 7.9 G/DL (ref 12–16)
HGB BLD-MCNC: 7.9 G/DL (ref 12–16)
IMM GRANULOCYTES # BLD AUTO: 0.05 K/UL (ref 0–0.04)
IMM GRANULOCYTES NFR BLD AUTO: 0.4 % (ref 0–0.5)
INR PPP: 1 (ref 0.8–1.2)
LDH SERPL L TO P-CCNC: 325 U/L (ref 110–260)
LYMPHOCYTES # BLD AUTO: 1.7 K/UL (ref 1–4.8)
LYMPHOCYTES # BLD AUTO: 1.8 K/UL (ref 1–4.8)
LYMPHOCYTES # BLD AUTO: 2.1 K/UL (ref 1–4.8)
LYMPHOCYTES NFR BLD: 14 % (ref 18–48)
LYMPHOCYTES NFR BLD: 15.1 % (ref 18–48)
LYMPHOCYTES NFR BLD: 18 % (ref 18–48)
MAGNESIUM SERPL-MCNC: 2.5 MG/DL (ref 1.6–2.6)
MCH RBC QN AUTO: 28.4 PG (ref 27–31)
MCH RBC QN AUTO: 28.5 PG (ref 27–31)
MCH RBC QN AUTO: 28.7 PG (ref 27–31)
MCHC RBC AUTO-ENTMCNC: 34.2 G/DL (ref 32–36)
MCHC RBC AUTO-ENTMCNC: 34.2 G/DL (ref 32–36)
MCHC RBC AUTO-ENTMCNC: 34.5 G/DL (ref 32–36)
MCV RBC AUTO: 83 FL (ref 82–98)
MONOCYTES # BLD AUTO: 1.1 K/UL (ref 0.3–1)
MONOCYTES # BLD AUTO: 1.1 K/UL (ref 0.3–1)
MONOCYTES # BLD AUTO: 1.2 K/UL (ref 0.3–1)
MONOCYTES NFR BLD: 9 % (ref 4–15)
MONOCYTES NFR BLD: 9.3 % (ref 4–15)
MONOCYTES NFR BLD: 9.7 % (ref 4–15)
NEUTROPHILS # BLD AUTO: 7.3 K/UL (ref 1.8–7.7)
NEUTROPHILS # BLD AUTO: 8.1 K/UL (ref 1.8–7.7)
NEUTROPHILS # BLD AUTO: 8.3 K/UL (ref 1.8–7.7)
NEUTROPHILS NFR BLD: 64.2 % (ref 38–73)
NEUTROPHILS NFR BLD: 68.2 % (ref 38–73)
NEUTROPHILS NFR BLD: 68.4 % (ref 38–73)
NRBC BLD-RTO: 0 /100 WBC
NUM UNITS TRANS PACKED RBC: NORMAL
PHOSPHATE SERPL-MCNC: 6 MG/DL (ref 2.7–4.5)
PLATELET # BLD AUTO: 181 K/UL (ref 150–450)
PLATELET # BLD AUTO: 181 K/UL (ref 150–450)
PLATELET # BLD AUTO: 193 K/UL (ref 150–450)
PMV BLD AUTO: 10.7 FL (ref 9.2–12.9)
PMV BLD AUTO: 10.8 FL (ref 9.2–12.9)
PMV BLD AUTO: 10.9 FL (ref 9.2–12.9)
POCT GLUCOSE: 102 MG/DL (ref 70–110)
POCT GLUCOSE: 171 MG/DL (ref 70–110)
POTASSIUM SERPL-SCNC: 4.9 MMOL/L (ref 3.5–5.1)
PROT SERPL-MCNC: 7.1 G/DL (ref 6–8.4)
PROTHROMBIN TIME: 11.4 SEC (ref 9–12.5)
RBC # BLD AUTO: 2.25 M/UL (ref 4–5.4)
RBC # BLD AUTO: 2.75 M/UL (ref 4–5.4)
RBC # BLD AUTO: 2.77 M/UL (ref 4–5.4)
SODIUM SERPL-SCNC: 138 MMOL/L (ref 136–145)
VIT B12 SERPL-MCNC: 421 PG/ML (ref 210–950)
WBC # BLD AUTO: 11.44 K/UL (ref 3.9–12.7)
WBC # BLD AUTO: 11.81 K/UL (ref 3.9–12.7)
WBC # BLD AUTO: 12.12 K/UL (ref 3.9–12.7)

## 2024-08-12 PROCEDURE — P9016 RBC LEUKOCYTES REDUCED: HCPCS | Mod: HCNC

## 2024-08-12 PROCEDURE — 96372 THER/PROPH/DIAG INJ SC/IM: CPT | Performed by: STUDENT IN AN ORGANIZED HEALTH CARE EDUCATION/TRAINING PROGRAM

## 2024-08-12 PROCEDURE — 85384 FIBRINOGEN ACTIVITY: CPT | Mod: HCNC

## 2024-08-12 PROCEDURE — 80100016 HC MAINTENANCE HEMODIALYSIS: Mod: HCNC

## 2024-08-12 PROCEDURE — 36415 COLL VENOUS BLD VENIPUNCTURE: CPT | Mod: HCNC

## 2024-08-12 PROCEDURE — 20600001 HC STEP DOWN PRIVATE ROOM: Mod: HCNC

## 2024-08-12 PROCEDURE — 85730 THROMBOPLASTIN TIME PARTIAL: CPT | Mod: HCNC | Performed by: STUDENT IN AN ORGANIZED HEALTH CARE EDUCATION/TRAINING PROGRAM

## 2024-08-12 PROCEDURE — 84100 ASSAY OF PHOSPHORUS: CPT | Mod: HCNC

## 2024-08-12 PROCEDURE — 82248 BILIRUBIN DIRECT: CPT | Mod: HCNC

## 2024-08-12 PROCEDURE — 80053 COMPREHEN METABOLIC PANEL: CPT | Mod: HCNC

## 2024-08-12 PROCEDURE — 83735 ASSAY OF MAGNESIUM: CPT | Mod: HCNC

## 2024-08-12 PROCEDURE — 30233N1 TRANSFUSION OF NONAUTOLOGOUS RED BLOOD CELLS INTO PERIPHERAL VEIN, PERCUTANEOUS APPROACH: ICD-10-PCS | Performed by: EMERGENCY MEDICINE

## 2024-08-12 PROCEDURE — 83010 ASSAY OF HAPTOGLOBIN QUANT: CPT | Mod: HCNC

## 2024-08-12 PROCEDURE — 5A1D70Z PERFORMANCE OF URINARY FILTRATION, INTERMITTENT, LESS THAN 6 HOURS PER DAY: ICD-10-PCS | Performed by: STUDENT IN AN ORGANIZED HEALTH CARE EDUCATION/TRAINING PROGRAM

## 2024-08-12 PROCEDURE — 82607 VITAMIN B-12: CPT | Mod: HCNC | Performed by: STUDENT IN AN ORGANIZED HEALTH CARE EDUCATION/TRAINING PROGRAM

## 2024-08-12 PROCEDURE — 82746 ASSAY OF FOLIC ACID SERUM: CPT | Mod: HCNC | Performed by: STUDENT IN AN ORGANIZED HEALTH CARE EDUCATION/TRAINING PROGRAM

## 2024-08-12 PROCEDURE — 83615 LACTATE (LD) (LDH) ENZYME: CPT | Mod: HCNC

## 2024-08-12 PROCEDURE — 85025 COMPLETE CBC W/AUTO DIFF WBC: CPT | Mod: 91,HCNC

## 2024-08-12 PROCEDURE — 86077 PHYS BLOOD BANK SERV XMATCH: CPT | Mod: HCNC,,, | Performed by: PATHOLOGY

## 2024-08-12 PROCEDURE — 85610 PROTHROMBIN TIME: CPT | Mod: HCNC | Performed by: STUDENT IN AN ORGANIZED HEALTH CARE EDUCATION/TRAINING PROGRAM

## 2024-08-12 PROCEDURE — 25000003 PHARM REV CODE 250: Mod: HCNC

## 2024-08-12 PROCEDURE — 63600175 PHARM REV CODE 636 W HCPCS: Mod: HCNC | Performed by: STUDENT IN AN ORGANIZED HEALTH CARE EDUCATION/TRAINING PROGRAM

## 2024-08-12 PROCEDURE — 99232 SBSQ HOSP IP/OBS MODERATE 35: CPT | Mod: HCNC,,, | Performed by: NURSE PRACTITIONER

## 2024-08-12 PROCEDURE — 85025 COMPLETE CBC W/AUTO DIFF WBC: CPT | Mod: HCNC | Performed by: STUDENT IN AN ORGANIZED HEALTH CARE EDUCATION/TRAINING PROGRAM

## 2024-08-12 RX ORDER — SEVELAMER CARBONATE 800 MG/1
2400 TABLET, FILM COATED ORAL
Qty: 270 TABLET | Refills: 3 | Status: SHIPPED | OUTPATIENT
Start: 2024-08-12

## 2024-08-12 RX ORDER — HEPARIN SODIUM 5000 [USP'U]/ML
5000 INJECTION, SOLUTION INTRAVENOUS; SUBCUTANEOUS EVERY 8 HOURS
Status: DISCONTINUED | OUTPATIENT
Start: 2024-08-12 | End: 2024-08-14 | Stop reason: HOSPADM

## 2024-08-12 RX ORDER — PREDNISONE 1 MG/1
1 TABLET ORAL DAILY
Status: DISCONTINUED | OUTPATIENT
Start: 2024-08-13 | End: 2024-08-12

## 2024-08-12 RX ORDER — DIPHENHYDRAMINE HCL 25 MG
25 CAPSULE ORAL EVERY 6 HOURS PRN
Status: DISCONTINUED | OUTPATIENT
Start: 2024-08-12 | End: 2024-08-14 | Stop reason: HOSPADM

## 2024-08-12 RX ORDER — FOLIC ACID 1 MG/1
1 TABLET ORAL DAILY
Status: DISCONTINUED | OUTPATIENT
Start: 2024-08-13 | End: 2024-08-14 | Stop reason: HOSPADM

## 2024-08-12 RX ADMIN — ASPIRIN 81 MG: 81 TABLET, COATED ORAL at 09:08

## 2024-08-12 RX ADMIN — LOSARTAN POTASSIUM 100 MG: 50 TABLET, FILM COATED ORAL at 09:08

## 2024-08-12 RX ADMIN — HEPARIN SODIUM 5000 UNITS: 5000 INJECTION INTRAVENOUS; SUBCUTANEOUS at 02:08

## 2024-08-12 RX ADMIN — PREGABALIN 75 MG: 75 CAPSULE ORAL at 09:08

## 2024-08-12 RX ADMIN — ACETAMINOPHEN 650 MG: 325 TABLET ORAL at 09:08

## 2024-08-12 RX ADMIN — ATORVASTATIN CALCIUM 40 MG: 40 TABLET, FILM COATED ORAL at 09:08

## 2024-08-12 RX ADMIN — SEVELAMER CARBONATE 2400 MG: 800 TABLET, FILM COATED ORAL at 09:08

## 2024-08-12 RX ADMIN — HEPARIN SODIUM 5000 UNITS: 5000 INJECTION INTRAVENOUS; SUBCUTANEOUS at 10:08

## 2024-08-12 RX ADMIN — AMLODIPINE BESYLATE 10 MG: 10 TABLET ORAL at 09:08

## 2024-08-12 RX ADMIN — SEVELAMER CARBONATE 2400 MG: 800 TABLET, FILM COATED ORAL at 01:08

## 2024-08-12 RX ADMIN — ACETAMINOPHEN 650 MG: 325 TABLET ORAL at 07:08

## 2024-08-12 RX ADMIN — ACETAMINOPHEN 650 MG: 325 TABLET ORAL at 01:08

## 2024-08-12 NOTE — CARE UPDATE
I have reviewed the chart of Jael Hall who is hospitalized for the following:    Active Hospital Problems    Diagnosis    *ESRD (end stage renal disease) on dialysis    Chest pain    Anemia in ESRD (end-stage renal disease)    Type 2 diabetes mellitus    Hypervolemia associated with renal insufficiency    Chronic diastolic heart failure    Essential hypertension    Diabetic neuropathy     Overview:   stable/ cont current rxment.  dx update  Overview:   stable/ cont current rxment.  dx update          Kathya Cronin PA-C  [facility]

## 2024-08-12 NOTE — SUBJECTIVE & OBJECTIVE
Interval History: NAEON. VSS. AF. 4L. Pt's back pain has improved with pain regimen. Received 1 unit of pRBC yesterday (Hgb 5.4>6.2). Pathology  concerned for warm AIHA. Workup being done. HD today and received another unit. CTA pending     Review of Systems   Constitutional:  Negative for activity change, appetite change, chills, fever and unexpected weight change.   Respiratory:  Positive for shortness of breath (4L). Negative for apnea, cough, choking, chest tightness and wheezing.    Cardiovascular:  Positive for leg swelling (bilateral). Negative for chest pain.   Gastrointestinal:  Negative for abdominal distention, abdominal pain, blood in stool, constipation, diarrhea, nausea and vomiting.   Musculoskeletal:  Negative for back pain and joint swelling.   Skin:  Negative for rash and wound.   Allergic/Immunologic: Negative for immunocompromised state.     Objective:     Vital Signs (Most Recent):  Temp: 98 °F (36.7 °C) (08/12/24 1458)  Pulse: 76 (08/12/24 1600)  Resp: 18 (08/12/24 1458)  BP: (!) 161/69 (08/12/24 1600)  SpO2: (!) 92 % (08/12/24 1458) Vital Signs (24h Range):  Temp:  [97.7 °F (36.5 °C)-98.6 °F (37 °C)] 98 °F (36.7 °C)  Pulse:  [76-89] 76  Resp:  [16-18] 18  SpO2:  [92 %-99 %] 92 %  BP: (141-165)/(65-89) 161/69     Weight: 85 kg (187 lb 6.3 oz)  Body mass index is 32.17 kg/m².    Intake/Output Summary (Last 24 hours) at 8/12/2024 1606  Last data filed at 8/12/2024 1500  Gross per 24 hour   Intake 865.83 ml   Output --   Net 865.83 ml         Physical Exam  Vitals reviewed.   Constitutional:       General: She is not in acute distress.     Appearance: She is not ill-appearing, toxic-appearing or diaphoretic.   Cardiovascular:      Rate and Rhythm: Normal rate and regular rhythm.      Pulses: Normal pulses.      Heart sounds: Normal heart sounds.   Pulmonary:      Effort: No respiratory distress.      Breath sounds: No stridor. Rales (bilateral) present. No wheezing or rhonchi.      Comments: 4L  NC  Chest:      Chest wall: No tenderness.   Abdominal:      General: Abdomen is flat. Bowel sounds are normal. There is no distension.      Palpations: Abdomen is soft. There is no mass.      Tenderness: There is no abdominal tenderness. There is no right CVA tenderness, left CVA tenderness, guarding or rebound.      Hernia: No hernia is present.   Musculoskeletal:         General: No tenderness or deformity.      Right lower leg: Edema present.      Left lower leg: Edema present.   Skin:     General: Skin is warm and dry.   Neurological:      Mental Status: She is alert.             Significant Labs: All pertinent labs within the past 24 hours have been reviewed.  BMP:   Recent Labs   Lab 08/12/24  0403         K 4.9   CL 99   CO2 23   BUN 73*   CREATININE 13.1*   CALCIUM 9.4   MG 2.5     CBC:   Recent Labs   Lab 08/11/24  0544 08/11/24  0549 08/11/24  0610 08/11/24 2002 08/12/24  0836   WBC 11.26  --   --  12.51 11.44   HGB 6.0*  --  5.4* 6.2* 6.4*   HCT 18.0* 29*  --  18.3* 18.7*     --   --  241 193       Significant Imaging: I have reviewed all pertinent imaging results/findings within the past 24 hours.

## 2024-08-12 NOTE — PLAN OF CARE
Discharge Plan A and Plan B have been determined by review of patient's clinical status, future medical and therapeutic needs, and coverage/benefits for post-acute care in coordination with multidisciplinary team members.      08/12/24 1418   Discharge Planning   Assessment Type Discharge Planning Brief Assessment   Resource/Environmental Concerns none   Support Systems Children;Family members   Assistance Needed Independent   Equipment Currently Used at Home none   Current Living Arrangements home   Care Facility Name NA   Patient/Family Anticipates Transition to home with family   Patient/Family Anticipated Services at Transition none   DME Needed Upon Discharge  none   Discharge Plan A Home   Discharge Plan B Home with family   Physical Activity   On average, how many days per week do you engage in moderate to strenuous exercise (like a brisk walk)? 7 days   On average, how many minutes do you engage in exercise at this level? 30 min   Financial Resource Strain   How hard is it for you to pay for the very basics like food, housing, medical care, and heating? Not hard   Housing Stability   In the last 12 months, was there a time when you were not able to pay the mortgage or rent on time? N   At any time in the past 12 months, were you homeless or living in a shelter (including now)? N   Transportation Needs   Has the lack of transportation kept you from medical appointments, meetings, work or from getting things needed for daily living? No   Food Insecurity   Within the past 12 months, you worried that your food would run out before you got the money to buy more. Never true   Within the past 12 months, the food you bought just didn't last and you didn't have money to get more. Never true   Stress   Do you feel stress - tense, restless, nervous, or anxious, or unable to sleep at night because your mind is troubled all the time - these days? Not at all   Social Isolation   How often do you feel lonely or isolated  from those around you?  Never   Alcohol Use   Q1: How often do you have a drink containing alcohol? Never   Q2: How many drinks containing alcohol do you have on a typical day when you are drinking? None   Q3: How often do you have six or more drinks on one occasion? Never   Utilities   In the past 12 months has the electric, gas, oil, or water company threatened to shut off services in your home? No   Health Literacy   How often do you need to have someone help you when you read instructions, pamphlets, or other written material from your doctor or pharmacy? Never     CM met with patient at bedside to complete discharge planning assessment.  Patient alert and oriented xs 4.  Patient verified all demographic information on facesheet is correct.  Patient verified PCP is Maty Flower DO  Patient verified primary health insurance is  [employer] MA[employer]GED [employer] - [employer] [facility] HMO PPO SPECIAL NEEDS - CAPITATED Patient is agreeable with bedside medication delivery.   Patient is not active with  home health  (declines ) and has listed DME.  Patient with NO POA or Living Will.  Patient is  on dialysis:  [phone]  and not  medication coumadin.  Patient with no 30 day admission.  Patient with no financial issues at this time.  Patient  will need transportation upon discharge from facility.  Patient will have assistance from her daughter and other family members  upon discharge Patient independent with ADLs.  All questions answered regarding Case Management Discharge Planning, patient verbalized understanding.  Discharge booklet with CM's contact information given to patient.     3:56 pm  Patient will be upgraded to IP for continued care      Cheyanne Guevara RN  Case Management  [facility]  [phone]

## 2024-08-12 NOTE — PROGRESS NOTES
[facility] ([place] [id number])  Nephrology  Progress Note    Patient Name: Jael Hall  MRN: [id number]  Admission Date: 2024  Hospital Length of Stay: 0 days  Attending Provider: Darryn Reynolds MD   Primary Care Physician: Maty Gayle DO  Principal Problem:ESRD (end stage renal disease) on dialysis    Subjective:     Interval: No distress on exam. Repeat Hgb 6.4 after transfusion yesterday. Lizandro any obvious signs of bleeding on exam.   Due for HD today. Plans for pRBCs with HD.     Past Medical History:   Diagnosis Date    Acute hyperkalemia 2020    Acute on chronic diastolic heart failure 10/08/2021    Anemia of chronic disorder 2017    Overview:  Added automatically from request for surgery [id number]    Anemia of chronic renal failure, stage 5     Anxiety     Cyst of left ovary 2019    Cyst of left ovary 2019    Dyslipidemia 2013    Encounter for screening colonoscopy 10/08/2020    End-stage renal disease on hemodialysis 2020    ESRD on hemodialysis 2017    Hematuria 10/30/2019    History of COVID-19 2020    Hospital discharge follow-up 10/06/2023    Hx of sinus bradycardia 2017    Hyperkalemia 2017    Hypertensive urgency 2021    Peripheral neuropathy 2013    Preoperative testing 10/08/2020    Renovascular hypertension 2020    S/P dilation and curettage 2018    Screening for colon cancer 2023    Thickened endometrium 2018    Thyroid nodule     Thyroid nodule 2021    Uncontrolled type 2 diabetes mellitus 2013    Overview:  poc a1c today 11.2-270/ up from 10.8-263, Pt. has very very stressed/ was incarcerated/marital stress/ will current meds/ less stress now/ will monitor.       Past Surgical History:   Procedure Laterality Date    AV FISTULA PLACEMENT      right arm     SECTION      x1    COLONOSCOPY N/A 2018    Procedure: COLONOSCOPY;  Surgeon: Pankaj Hinojosa MD;   Location: [facility] ENDO (4TH FLR);  Service: Endoscopy;  Laterality: N/A;  dialysis pt/labs prior to colon/MS    COLONOSCOPY N/A 11/30/2023    Procedure: COLONOSCOPY;  Surgeon: Angel Turner MD;  Location: [facility] ENDO (2ND FLR);  Service: Endoscopy;  Laterality: N/A;  Ref By:  Maty Gayle MD  Procedure: Colonoscopy  Diagnosis: endounter for preventive health   Procedure Timing: pt. pref  Provider: Dr. turner   Location: [facility] 4-Endo   Prep Specifications: Standard prep   Additional Info: Dialysis patient lab pending    CYSTOSCOPY W/ RETROGRADES Bilateral 8/20/2020    Procedure: CYSTOSCOPY, WITH RETROGRADE PYELOGRAM;  Surgeon: Douglas Abraham MD;  Location: [facility] 1ST FLR;  Service: Urology;  Laterality: Bilateral;  45 min    FISTULOGRAM Right 9/9/2020    Procedure: Fistulogram;  Surgeon: Lester Gómez MD;  Location: [facility] CATH LAB;  Service: Nephrology;  Laterality: Right;    HYSTERECTOMY      IMPLANTATION OF COCHLEAR PROSTHESIS Right 6/28/2021    Procedure: INSERTION, PROSTHESIS, COCHLEAR;  Surgeon: Ramiro Zuleta MD;  Location: [facility] OR 2ND FLR;  Service: ENT;  Laterality: Right;  COCHLEAR BABAK    IMPLANTATION OF COCHLEAR PROSTHESIS Right 6/30/2021    Procedure: INSERTION, PROSTHESIS, COCHLEAR;  Surgeon: Ramiro Zuleta MD;  Location: [facility] OR 2ND FLR;  Service: ENT;  Laterality: Right;  COCHLEAR AMERICAS    ROBOT-ASSISTED LAPAROSCOPIC ABDOMINAL HYSTERECTOMY USING DA NATALYA XI N/A 7/5/2019    Procedure: XI ROBOTIC HYSTERECTOMY;  Surgeon: Trice Lei MD;  Location: [facility];  Service: OB/GYN;  Laterality: N/A;    ROBOT-ASSISTED LAPAROSCOPIC NEPHRECTOMY Right 10/8/2020    Procedure: ROBOTIC NEPHRECTOMY;  Surgeon: Douglas Abraham MD;  Location: [facility] OR 2ND FLR;  Service: Urology;  Laterality: Right;  3.5hrs gen with regional     ROBOT-ASSISTED LAPAROSCOPIC SALPINGO-OOPHORECTOMY USING DA NATALYA XI Bilateral 7/5/2019    Procedure: XI ROBOTIC SALPINGO-OOPHORECTOMY;  Surgeon: Trice Lei MD;  Location: [facility];   Service: OB/GYN;  Laterality: Bilateral;    TUBAL LIGATION      Vascular access surgeries      x5       Review of patient's allergies indicates:  No Known Allergies  Current Facility-Administered Medications   Medication Frequency    0.9%  NaCl infusion (for blood administration) Q24H PRN    acetaminophen tablet 650 mg Q4H PRN    acetaminophen tablet 650 mg Q8H PRN    [START ON 8/12/2024] amLODIPine tablet 10 mg Daily    aspirin EC tablet 81 mg QAM    [START ON 8/12/2024] atorvastatin tablet 40 mg Daily    dextrose 10% bolus 125 mL 125 mL PRN    dextrose 10% bolus 250 mL 250 mL PRN    enoxaparin injection 30 mg Daily    glucagon (human recombinant) injection 1 mg PRN    glucose chewable tablet 16 g PRN    glucose chewable tablet 24 g PRN    insulin aspart U-100 pen 0-5 Units QID (AC + HS) PRN    losartan tablet 100 mg Daily    melatonin tablet 6 mg Nightly PRN    naloxone 0.4 mg/mL injection 0.02 mg PRN    pregabalin capsule 75 mg Daily    sevelamer carbonate tablet 2,400 mg TID WM    sodium chloride 0.9% flush 10 mL Q12H PRN     Current Outpatient Medications   Medication    ACCU-CHEK GUIDE GLUCOSE METER Misc    ACCU-CHEK GUIDE L1-L2 CTRL SOL Soln    ACCU-CHEK GUIDE TEST STRIPS Strp    ACCU-CHEK SOFTCLIX LANCETS Misc    acetaminophen (TYLENOL) 500 MG tablet    acetaminophen-codeine 300-30mg (TYLENOL #3) 300-30 mg Tab    amLODIPine (NORVASC) 10 MG tablet    amLODIPine (NORVASC) 10 MG tablet    amoxicillin (AMOXIL) 500 MG Tab    aspirin (ECOTRIN) 81 MG EC tablet    atorvastatin (LIPITOR) 40 MG tablet    atorvastatin (LIPITOR) 40 MG tablet    DROPSAFE ALCOHOL PREP PADS PadM    furosemide (LASIX) 80 MG tablet    hydrALAZINE (APRESOLINE) 100 MG tablet    labetaloL (NORMODYNE) 200 MG tablet    LIDOcaine (LIDODERM) 5 %    losartan (COZAAR) 100 MG tablet    pregabalin (LYRICA) 75 MG capsule    sevelamer carbonate (RENVELA) 800 mg Tab     Family History       Problem Relation (Age of Onset)    Cancer Mother, Sister, Sister     Diabetes Sister, Brother    Lung cancer Sister    Macular degeneration Sister    Ovarian cancer Mother, Sister, Sister          Tobacco Use    Smoking status: Never    Smokeless tobacco: Never   Substance and Sexual Activity    Alcohol use: No    Drug use: No    Sexual activity: Yes     Partners: Male     Birth control/protection: Post-menopausal     Objective:     Vital Signs (Most Recent):  Temp: 99.8 °F (37.7 °C) (08/11/24 0452)  Pulse: 83 (08/11/24 1132)  Resp: 17 (08/11/24 1134)  BP: (!) 171/77 (08/11/24 1201)  SpO2: 100 % (08/11/24 0732) Vital Signs (24h Range):  Temp:  [99.8 °F (37.7 °C)] 99.8 °F (37.7 °C)  Pulse:  [82-87] 83  Resp:  [15-26] 17  SpO2:  [99 %-100 %] 100 %  BP: (155-171)/(67-78) 171/77     Weight: 85 kg (187 lb 6.3 oz) (08/11/24 0452)  Body mass index is 32.17 kg/m².  Body surface area is 1.96 meters squared.    No intake/output data recorded.     Physical Exam  Vitals and nursing note reviewed.   Constitutional:       General: She is not in acute distress.     Appearance: She is normal weight. She is not toxic-appearing.   HENT:      Head: Normocephalic and atraumatic.   Eyes:      General: No scleral icterus.  Cardiovascular:      Rate and Rhythm: Normal rate and regular rhythm.   Pulmonary:      Effort: Pulmonary effort is normal.      Breath sounds: Rales present. No wheezing.   Abdominal:      General: There is no distension.   Musculoskeletal:      Right lower leg: Edema present.      Left lower leg: Edema (L>R) present.      Comments: RUE AVF with audible bruit   Neurological:      General: No focal deficit present.      Mental Status: She is alert.   Psychiatric:         Mood and Affect: Mood normal.         Behavior: Behavior normal.          Significant Labs:  CBC:   Recent Labs   Lab 08/12/24  0836   WBC 11.44   RBC 2.25*   HGB 6.4*   HCT 18.7*      MCV 83   MCH 28.4   MCHC 34.2     CMP:   Recent Labs   Lab 08/12/24  0403      CALCIUM 9.4   ALBUMIN 3.1*   PROT 7.1       K 4.9   CO2 23   CL 99   BUN 73*   CREATININE 13.1*   ALKPHOS 59   ALT 8*   AST 12   BILITOT 1.4*     All labs within the past 24 hours have been reviewed.  Assessment/Plan:     Renal/  * ESRD (end stage renal disease) on dialysis  Anemia  HTN    Patient presenting with SOB in setting of abbreviated recent dialysis session and volume overload. Electrolytes non-emergent. Anemia with Hg down to 5.4, now 6.1 after pRBC transfusion 8/11/2024. BNP elevated and CXR with interstitial edema.     Outpatient HD Information:  -Dialysis modality: Hemodialysis  -Outpatient HD unit: [facility]  -Nephrologist: Kahlil TRIMBLE  -HD TX days: Monday/Wednesday/Friday, duration of treatment: 3.5 hrs  -Last HD TX prior to hospital admission: partial session 8/9, full session 8/7  -Dialysis access: RUE AV fistula   -Residual urine: Minimum   -EDW: 85kg    Assessment:   - Will provide dialysis today (08/12/2024) with UF - 1-2L as BP tolerates for metabolic clearance and volume management   - Labs reviewed and dialysate to be adjusted to current labs.   - Continue to monitor intake and output  - Please avoid gadolinium, fleets, phos-based laxatives, NSAIDs  - Dialysis thrice weekly unless more urgent indications arise. Will evaluate RRT requirements Daily.    Anemia of ESRD   Recent Labs   Lab 08/11/24  0544 08/11/24  0549 08/11/24  0610 08/11/24 2002 08/12/24  0836   WBC 11.26  --   --  12.51 11.44   HGB 6.0*  --  5.4* 6.2* 6.4*   HCT 18.0* 29*  --  18.3* 18.7*     --   --  241 193     Lab Results   Component Value Date    FESATURATED 36 08/11/2024    FERRITIN 3,411 (H) 08/11/2024       - Goal in ESRD is Hgb of 10-11. Hgb 6.4.  - Will review chronic care plan from outpatient dialysis center for NADEGE dosing.     Mineral Bone Disease in ESRD   Lab Results   Component Value Date    .7 (H) 01/23/2023    CALCIUM 9.4 08/12/2024    ALBUMIN 3.1 (L) 08/12/2024    PHOS 6.0 (H) 08/12/2024       - F/U PO4, Mg, Calcium.  And albumin levels daily.   - Renal diet with protein intake goal 1.5 g/kg/d with 1 L fluid restriction   - Novasource with meals  - Restart home phos binder, phos 6.0        Herlinda Toro DNP, FNP-C  Nephrology  [facility] ([place])   Melolabial Transposition Flap Text: The defect edges were debeveled with a #15 scalpel blade.  Given the location of the defect and the proximity to free margins a melolabial flap was deemed most appropriate.  Using a sterile surgical marker, an appropriate melolabial transposition flap was drawn incorporating the defect.    The area thus outlined was incised deep to adipose tissue with a #15 scalpel blade.  The skin margins were undermined to an appropriate distance in all directions utilizing iris scissors.

## 2024-08-12 NOTE — NURSING
Nurses Note -- 4 Eyes      8/12/2024   7:03 AM      Skin assessed during: Admit      [x] No Altered Skin Integrity Present    []Prevention Measures Documented      [] Yes- Altered Skin Integrity Present or Discovered   [] LDA Added if Not in Epic (Describe Wound)   [] New Altered Skin Integrity was Present on Admit and Documented in LDA   [] Wound Image Taken    Wound Care Consulted? No    Attending Nurse:  VALERIE Chopra    Second RN/Staff Member:  VALERIE Estrada

## 2024-08-12 NOTE — ASSESSMENT & PLAN NOTE
Patient presented with 6 hgb with repeat at 5.4. She was given 1 unit pRBC. She reports single dark stool this morning. In the ED, her FOBT/YUMIKO were negative. Baseline Hgb approx. 8-8.5. Anemia is likely due to chronic disease due to ESRD.   Recent Labs     08/11/24  0549 08/11/24  0610 08/11/24 2002 08/12/24  0836   HGB  --  5.4* 6.2* 6.4*   HCT 29*  --  18.3* 18.7*     Ferritin 3000+. TIBC WNL. Iron WNL. Transferrin 194 (low)     Plan  - Monitor serial CBC: Daily  - Transfuse PRBC if patient becomes hemodynamically unstable, symptomatic or H/H drops below 7.  - Patient has received 2 units of PRBCs on   ;  - f/u CBC

## 2024-08-12 NOTE — PLAN OF CARE
Problem: Adult Inpatient Plan of Care  Goal: Plan of Care Review  Outcome: Progressing  Goal: Patient-Specific Goal (Individualized)  Outcome: Progressing  Goal: Absence of Hospital-Acquired Illness or Injury  Outcome: Progressing  Goal: Optimal Comfort and Wellbeing  Outcome: Progressing  Goal: Readiness for Transition of Care  Outcome: Progressing     Problem: Hemodialysis  Goal: Safe, Effective Therapy Delivery  Outcome: Progressing  Goal: Effective Tissue Perfusion  Outcome: Progressing  Goal: Absence of Infection Signs and Symptoms  Outcome: Progressing     Problem: Diabetes Comorbidity  Goal: Blood Glucose Level Within Targeted Range  Outcome: Progressing  Pt Aox4, on 2L NC. Pt ambulates without assistance. No PRNs given. Call light within reach, safety measures in place, rounded per facility policy.

## 2024-08-12 NOTE — PLAN OF CARE
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   She continues to require daily labs, further testing/imaging, monitoring of vital signs, and further evaluation by consultants. Her condition is also complicated by the following comorbidities: obesity, type 2 diabetes mellitus, hypertension, metabolic associated steatotic liver disease, hypercholesterolemia, end-stage renal disease on HD, vitamin-D deficiency, anxiety disorder, history of symptomatic bradycardia. .

## 2024-08-12 NOTE — ASSESSMENT & PLAN NOTE
Recent Labs     08/11/24  0549 08/11/24  0610 08/11/24 2002 08/12/24  0836   HGB  --  5.4* 6.2* 6.4*   HCT 29*  --  18.3* 18.7*     Plan  - Monitor serial CBC: Daily  - Transfuse PRBC if patient becomes hemodynamically unstable, symptomatic or H/H drops below 7/21.  - Patient has received 2 units of PRBCs on    - Patient's anemia is currently stable  - blood smear showed evidence of spherocytes and spirocytes   - hematology consulted; appreciate recs   - LDH/folic acid daily   - prednisone 1mg/kg PO QD

## 2024-08-12 NOTE — PROGRESS NOTES
Patient arrived in a stretcher to dialysis unit.   Report received from primary nurse  VS's per dialysis Flowsheet.     Hemodialysis initiated using the following:     Dialysis Access: right arm fistula, pseudoaneurysm noted to arterial and venous access area of fistula, no drainage, positive thrill and bruit noted     Needle size: 15 G  Insertion with no complications.     Will Maintain telemetry and blood pressure monitoring throughout treatment.  Refer to dialysis flowsheet and MAR for details.

## 2024-08-12 NOTE — ASSESSMENT & PLAN NOTE
Patient has ERSD and receives dialysis MWF. She recently had dialysis on Friday but was not able to complete the full session due to back pain. Last session removed about 2L. She complained of orthopnea and paroxysmal noctural dyspnea that progressively worsened throughout the night. On exam she is fluid overloaded and CXR with evidence of pulmonary edema. She is not on lasix at home. BNP 400s     Plan  - Dialysis today; regular schedule MWF   - Monitor UOP and serial BMP and adjust therapy as needed  - Renally dose meds  - Avoid nephrotoxic medications and procedures.

## 2024-08-12 NOTE — ASSESSMENT & PLAN NOTE
Anemia  HTN    Patient presenting with SOB in setting of abbreviated recent dialysis session and volume overload. Electrolytes non-emergent. Anemia with Hg down to 5.4, now 6.1 after pRBC transfusion 8/11/2024. BNP elevated and CXR with interstitial edema.     Outpatient HD Information:  -Dialysis modality: Hemodialysis  -Outpatient HD unit: [facility]  -Nephrologist: Kahlil TRIMBLE  -HD TX days: Monday/Wednesday/Friday, duration of treatment: 3.5 hrs  -Last HD TX prior to hospital admission: partial session 8/9, full session 8/7  -Dialysis access: RUE AV fistula   -Residual urine: Minimum   -EDW: 85kg    Assessment:   - Will provide dialysis today (08/12/2024) with UF - 1-2L as BP tolerates for metabolic clearance and volume management   - Labs reviewed and dialysate to be adjusted to current labs.   - Continue to monitor intake and output  - Please avoid gadolinium, fleets, phos-based laxatives, NSAIDs  - Dialysis thrice weekly unless more urgent indications arise. Will evaluate RRT requirements Daily.    Anemia of ESRD   Recent Labs   Lab 08/11/24  0544 08/11/24  0549 08/11/24  0610 08/11/24 2002 08/12/24  0836   WBC 11.26  --   --  12.51 11.44   HGB 6.0*  --  5.4* 6.2* 6.4*   HCT 18.0* 29*  --  18.3* 18.7*     --   --  241 193     Lab Results   Component Value Date    FESATURATED 36 08/11/2024    FERRITIN 3,411 (H) 08/11/2024       - Goal in ESRD is Hgb of 10-11. Hgb 6.4.  - Will review chronic care plan from outpatient dialysis center for NADEGE dosing.     Mineral Bone Disease in ESRD   Lab Results   Component Value Date    .7 (H) 01/23/2023    CALCIUM 9.4 08/12/2024    ALBUMIN 3.1 (L) 08/12/2024    PHOS 6.0 (H) 08/12/2024       - F/U PO4, Mg, Calcium. And albumin levels daily.   - Renal diet with protein intake goal 1.5 g/kg/d with 1 L fluid restriction   - Novasource with meals  - Restart home phos binder, phos 6.0

## 2024-08-12 NOTE — CARE UPDATE
I have reviewed the chart of Jael Hall who is hospitalized for the following:    Active Hospital Problems    Diagnosis    *ESRD (end stage renal disease) on dialysis    Chest pain    Anemia in ESRD (end-stage renal disease)    Type 2 diabetes mellitus    Essential hypertension    Diabetic neuropathy     Overview:   stable/ cont current rxment.  dx update  Overview:   stable/ cont current rxment.  dx update          Kathya Cronin PA-C  [facility]

## 2024-08-12 NOTE — CONSULTS
Hematology Oncology Consult Note    Inpatient consult to Hematology/Oncology  Consult performed by: Gorge Hoffman MD  Consult ordered by: Aletha Del Rosario MD        SUBJECTIVE:     History of Present Illness:  Patient is a 58 y.o. female with a PMH of ESRD on HD, T2DM, fatty liver disease, obesity who presented to the ED with persistent chest and thoracic back pain since 8/8 also referred shortness of breath, lower extremity swelling. Per charts she presented to the ED 8/8 and was given tylenol, oxycodone and discharged. She was unable to complete her most recent HD session due to her symptoms and represented to the ED on 8/11.     At the ED she was found to have acute on chronic anemia with a Hgb of 5.4 (bl of around 9), MCV 83, PLTs 193. CMP revealed a new TB elevation to 1.8 and , hapto <10. She is ANSHUL positive, warm autoagglutinin positive. She was given one unit PRBC and hematology consulted for suspicion of WAHA. Nephrology following and patient undergoing HD.      Review of patient's allergies indicates:  No Known Allergies  Past Medical History:   Diagnosis Date    Acute hyperkalemia 09/09/2020    Acute on chronic diastolic heart failure 10/08/2021    Anemia of chronic disorder 11/08/2017    Overview:  Added automatically from request for surgery [id number]    Anemia of chronic renal failure, stage 5     Anxiety     Cyst of left ovary 05/14/2019    Cyst of left ovary 07/05/2019    Dyslipidemia 01/04/2013    Encounter for screening colonoscopy 10/08/2020    End-stage renal disease on hemodialysis 08/20/2020    ESRD on hemodialysis 09/28/2017    Hematuria 10/30/2019    History of COVID-19 04/07/2020    Hospital discharge follow-up 10/06/2023    Hx of sinus bradycardia 09/28/2017    Hyperkalemia 03/29/2017    Hypertensive urgency 07/22/2021    Peripheral neuropathy 01/04/2013    Preoperative testing 10/08/2020    Renovascular hypertension 08/17/2020    S/P dilation and curettage 05/22/2018     Screening for colon cancer 2023    Thickened endometrium 2018    Thyroid nodule     Thyroid nodule 2021    Uncontrolled type 2 diabetes mellitus 2013    Overview:  poc a1c today 11.2-270/ up from 10.8-263, Pt. has very very stressed/ was incarcerated/marital stress/ will current meds/ less stress now/ will monitor.     Past Surgical History:   Procedure Laterality Date    AV FISTULA PLACEMENT      right arm     SECTION      x1    COLONOSCOPY N/A 2018    Procedure: COLONOSCOPY;  Surgeon: Pankaj Hinojosa MD;  Location: [facility] (4TH FLR);  Service: Endoscopy;  Laterality: N/A;  dialysis pt/labs prior to colon/MS    COLONOSCOPY N/A 2023    Procedure: COLONOSCOPY;  Surgeon: Angel Turner MD;  Location: [facility] (2ND FLR);  Service: Endoscopy;  Laterality: N/A;  Ref By:  Maty Gayle MD  Procedure: Colonoscopy  Diagnosis: endounter for preventive health   Procedure Timing: pt. pref  Provider: Dr. turner   Location: [facility] 4-Endo   Prep Specifications: Standard prep   Additional Info: Dialysis patient lab pending    CYSTOSCOPY W/ RETROGRADES Bilateral 2020    Procedure: CYSTOSCOPY, WITH RETROGRADE PYELOGRAM;  Surgeon: Douglas Abraham MD;  Location: [facility] OR 1ST FLR;  Service: Urology;  Laterality: Bilateral;  45 min    FISTULOGRAM Right 2020    Procedure: Fistulogram;  Surgeon: Lester Gómez MD;  Location: [facility] [place] CATH LAB;  Service: Nephrology;  Laterality: Right;    HYSTERECTOMY      IMPLANTATION OF COCHLEAR PROSTHESIS Right 2021    Procedure: INSERTION, PROSTHESIS, COCHLEAR;  Surgeon: Ramiro Zuleta MD;  Location: [facility] OR 2ND FLR;  Service: ENT;  Laterality: Right;  COCHLEAR BABAK    IMPLANTATION OF COCHLEAR PROSTHESIS Right 2021    Procedure: INSERTION, PROSTHESIS, COCHLEAR;  Surgeon: Ramiro Zuleta MD;  Location: [facility] OR 2ND FLR;  Service: ENT;  Laterality: Right;  COCHLEAR AMERICAS    ROBOT-ASSISTED LAPAROSCOPIC ABDOMINAL HYSTERECTOMY USING DA NATALYA XI N/A  7/5/2019    Procedure: XI ROBOTIC HYSTERECTOMY;  Surgeon: Trice Lei MD;  Location: [facility];  Service: OB/GYN;  Laterality: N/A;    ROBOT-ASSISTED LAPAROSCOPIC NEPHRECTOMY Right 10/8/2020    Procedure: ROBOTIC NEPHRECTOMY;  Surgeon: Douglas Abraham MD;  [address]  Service: Urology;  Laterality: Right;  3.5hrs gen with regional     ROBOT-ASSISTED LAPAROSCOPIC SALPINGO-OOPHORECTOMY USING DA NATALYA XI Bilateral 7/5/2019    Procedure: XI ROBOTIC SALPINGO-OOPHORECTOMY;  Surgeon: Trice Lei MD;  Location: [facility];  Service: OB/GYN;  Laterality: Bilateral;    TUBAL LIGATION      Vascular access surgeries      x5     Family History   Problem Relation Name Age of Onset    Cancer Mother      Ovarian cancer Mother      Cancer Sister      Ovarian cancer Sister      Lung cancer Sister      Macular degeneration Sister      Cancer Sister      Ovarian cancer Sister      Diabetes Sister      Diabetes Brother      Breast cancer Neg Hx      Colon cancer Neg Hx      Cervical cancer Neg Hx      Endometrial cancer Neg Hx      Vaginal cancer Neg Hx      Thyroid disease Neg Hx      Glaucoma Neg Hx      Retinal detachment Neg Hx       Social History     Tobacco Use    Smoking status: Never    Smokeless tobacco: Never   Substance Use Topics    Alcohol use: No    Drug use: No     Review of Systems   Constitutional:  Negative for chills, fever, malaise/fatigue and weight loss.   HENT:  Negative for congestion and sore throat.    Eyes:  Negative for blurred vision and double vision.   Respiratory:  Positive for shortness of breath. Negative for cough.    Cardiovascular:  Positive for leg swelling. Negative for chest pain and orthopnea.   Gastrointestinal:  Negative for abdominal pain, heartburn, nausea and vomiting.   Genitourinary:  Negative for dysuria, frequency and urgency.   Musculoskeletal:  Positive for back pain. Negative for joint pain and myalgias.   Neurological:  Negative for dizziness and headaches.    Psychiatric/Behavioral:  The patient does not have insomnia.      OBJECTIVE:     Vital Signs:  Temp:  [98.2 °F (36.8 °C)-98.6 °F (37 °C)]   Pulse:  [82-89]   Resp:  [16-18]   BP: (150-165)/(67-74)   SpO2:  [95 %-98 %]     Physical Exam  Vitals and nursing note reviewed.   Constitutional:       General: She is not in acute distress.     Appearance: She is not toxic-appearing.   HENT:      Head: Normocephalic and atraumatic.      Mouth/Throat:      Mouth: Mucous membranes are moist.      Pharynx: No oropharyngeal exudate.   Eyes:      Extraocular Movements: Extraocular movements intact.      Pupils: Pupils are equal, round, and reactive to light.   Cardiovascular:      Rate and Rhythm: Normal rate and regular rhythm.      Heart sounds: No murmur heard.  Pulmonary:      Effort: Pulmonary effort is normal.      Breath sounds: Rales present. No wheezing.   Abdominal:      General: Abdomen is flat. Bowel sounds are normal. There is no distension.      Tenderness: There is no abdominal tenderness. There is no guarding.   Musculoskeletal:         General: No swelling or tenderness. Normal range of motion.      Cervical back: Normal range of motion.      Right lower leg: Edema present.      Left lower leg: Edema present.   Lymphadenopathy:      Cervical: No cervical adenopathy.   Skin:     General: Skin is warm.      Coloration: Skin is not jaundiced.      Findings: No bruising or rash.   Neurological:      General: No focal deficit present.      Mental Status: She is alert and oriented to person, place, and time.      Cranial Nerves: No cranial nerve deficit.       Laboratory:  CBC:   Recent Labs   Lab 08/12/24  0836   WBC 11.44   RBC 2.25*   HGB 6.4*   HCT 18.7*      MCV 83   MCH 28.4   MCHC 34.2     BMP:   Recent Labs   Lab 08/12/24  0403         K 4.9   CL 99   CO2 23   BUN 73*   CREATININE 13.1*   CALCIUM 9.4   MG 2.5         Diagnostic Results:  Labs: Reviewed      ASSESSMENT/PLAN:   Warm  Autoimmune Hemolytic Anemia    58F with ESRD on HD, T2DM, HTN who is coming in for subacute back and chest pain and found to be volume overloaded. She was unable to complete her most recent HD session due to her symptoms.     Found to have acute on chronic anemia  with a Hgb of 5.4 (bl of around 9), MCV 83, PLTs 193. CMP revealed a new TB elevation to 1.8 and , hapto <10. She is ANSHUL positive, warm autoagglutinin positive.     Peripheral smear shows spherocytes which go in line with WAIHA.     Recommendations:   -start prednisone 1mg/kg PO daily  -start folic acid  -daily CBC, CMP, LDH, haptoglobin      Discussed with Dr. Norman      We will follow. Let us know if any questions.      Gorge Hoffman MD  Hematology/Oncology PGY-IV

## 2024-08-12 NOTE — PROGRESS NOTES
[facility] ([place] [address])  [facility]  Progress Note    Patient Name: Jael Hall  MRN: [id number]  Patient Class: IP- Inpatient   Admission Date: 8/11/2024  Length of Stay: 0 days  Attending Physician: Darryn Reynolds MD  Primary Care Provider: Maty Gayle DO        Subjective:     Principal Problem:ESRD (end stage renal disease) on dialysis        HPI:  Jael Hall is a 58F with PMHx ESRD-HD mwf, anxiety, HTN, HLD, T2DM presents with SOB. She describes symptoms started around midnight that progressively worsened. Her symptoms worsened with laying down. She recently had dialysis on Friday but unable to complete due to back pain. Reportedly removed about 2L. In addition, she reports 1 dark stool last night but is unclear about the history/detail of this. Denies NSAID use. Denies chest pain, diarrhea, constipation, fevers, abdominal pain, hemoptysis, hematemesis.    On ED admission. AF. VSS. 4L NC. Hb 6.0 > 5.4, , trop normal. glucose 153. Electrolytes WNL. WBC normal FOBT and YUMIKO was negative. 1 unit of pRBC tranfused. CXR showed bilateral lower lobe opacities. Nephrology was consulted. Hemodialysis planned for today.     Overview/Hospital Course:  Hb increased to 6.2. Masha test positive. Lovenox stopped and heparin started. Hematology was consulted. CTA ordered. Dialysis done 08/12.    Interval History: NAEON. VSS. AF. 4L. Pt's back pain has improved with pain regimen. Received 1 unit of pRBC yesterday (Hgb 5.4>6.2). Pathology  concerned for warm AIHA. Workup being done. HD today and received another unit. CTA pending     Review of Systems   Constitutional:  Negative for activity change, appetite change, chills, fever and unexpected weight change.   Respiratory:  Positive for shortness of breath (4L). Negative for apnea, cough, choking, chest tightness and wheezing.    Cardiovascular:  Positive for leg swelling (bilateral). Negative for chest pain.   Gastrointestinal:   Negative for abdominal distention, abdominal pain, blood in stool, constipation, diarrhea, nausea and vomiting.   Musculoskeletal:  Negative for back pain and joint swelling.   Skin:  Negative for rash and wound.   Allergic/Immunologic: Negative for immunocompromised state.     Objective:     Vital Signs (Most Recent):  Temp: 98 °F (36.7 °C) (08/12/24 1458)  Pulse: 76 (08/12/24 1600)  Resp: 18 (08/12/24 1458)  BP: (!) 161/69 (08/12/24 1600)  SpO2: (!) 92 % (08/12/24 1458) Vital Signs (24h Range):  Temp:  [97.7 °F (36.5 °C)-98.6 °F (37 °C)] 98 °F (36.7 °C)  Pulse:  [76-89] 76  Resp:  [16-18] 18  SpO2:  [92 %-99 %] 92 %  BP: (141-165)/(65-89) 161/69     Weight: 85 kg (187 lb 6.3 oz)  Body mass index is 32.17 kg/m².    Intake/Output Summary (Last 24 hours) at 8/12/2024 1606  Last data filed at 8/12/2024 1500  Gross per 24 hour   Intake 865.83 ml   Output --   Net 865.83 ml         Physical Exam  Vitals reviewed.   Constitutional:       General: She is not in acute distress.     Appearance: She is not ill-appearing, toxic-appearing or diaphoretic.   Cardiovascular:      Rate and Rhythm: Normal rate and regular rhythm.      Pulses: Normal pulses.      Heart sounds: Normal heart sounds.   Pulmonary:      Effort: No respiratory distress.      Breath sounds: No stridor. Rales (bilateral) present. No wheezing or rhonchi.      Comments: 4L NC  Chest:      Chest wall: No tenderness.   Abdominal:      General: Abdomen is flat. Bowel sounds are normal. There is no distension.      Palpations: Abdomen is soft. There is no mass.      Tenderness: There is no abdominal tenderness. There is no right CVA tenderness, left CVA tenderness, guarding or rebound.      Hernia: No hernia is present.   Musculoskeletal:         General: No tenderness or deformity.      Right lower leg: Edema present.      Left lower leg: Edema present.   Skin:     General: Skin is warm and dry.   Neurological:      Mental Status: She is alert.              Significant Labs: All pertinent labs within the past 24 hours have been reviewed.  BMP:   Recent Labs   Lab 08/12/24  0403         K 4.9   CL 99   CO2 23   BUN 73*   CREATININE 13.1*   CALCIUM 9.4   MG 2.5     CBC:   Recent Labs   Lab 08/11/24  0544 08/11/24  0549 08/11/24  0610 08/11/24 2002 08/12/24  0836   WBC 11.26  --   --  12.51 11.44   HGB 6.0*  --  5.4* 6.2* 6.4*   HCT 18.0* 29*  --  18.3* 18.7*     --   --  241 193       Significant Imaging: I have reviewed all pertinent imaging results/findings within the past 24 hours.    Assessment/Plan:      * ESRD (end stage renal disease) on dialysis  Patient has ERSD and receives dialysis MWF. She recently had dialysis on Friday but was not able to complete the full session due to back pain. Last session removed about 2L. She complained of orthopnea and paroxysmal noctural dyspnea that progressively worsened throughout the night. On exam she is fluid overloaded and CXR with evidence of pulmonary edema. She is not on lasix at home. BNP 400s     Plan  - Dialysis today; regular schedule MWF   - Monitor UOP and serial BMP and adjust therapy as needed  - Renally dose meds  - Avoid nephrotoxic medications and procedures.    AIHA (autoimmune hemolytic anemia)    Recent Labs     08/11/24  0549 08/11/24  0610 08/11/24 2002 08/12/24  0836   HGB  --  5.4* 6.2* 6.4*   HCT 29*  --  18.3* 18.7*     Plan  - Monitor serial CBC: Daily  - Transfuse PRBC if patient becomes hemodynamically unstable, symptomatic or H/H drops below 7/21.  - Patient has received 2 units of PRBCs on    - Patient's anemia is currently stable  - blood smear showed evidence of spherocytes and spirocytes   - hematology consulted; appreciate recs   - LDH/folic acid daily   - prednisone 1mg/kg PO QD     Anemia in ESRD (end-stage renal disease)  Patient presented with 6 hgb with repeat at 5.4. She was given 1 unit pRBC. She reports single dark stool this morning. In the ED, her FOBT/YUMIKO  were negative. Baseline Hgb approx. 8-8.5. Anemia is likely due to chronic disease due to ESRD.   Recent Labs     08/11/24  0549 08/11/24  0610 08/11/24 2002 08/12/24  0836   HGB  --  5.4* 6.2* 6.4*   HCT 29*  --  18.3* 18.7*     Ferritin 3000+. TIBC WNL. Iron WNL. Transferrin 194 (low)     Plan  - Monitor serial CBC: Daily  - Transfuse PRBC if patient becomes hemodynamically unstable, symptomatic or H/H drops below 7.  - Patient has received 2 units of PRBCs on   ;  - f/u CBC     Type 2 diabetes mellitus  Pt's glucose is controlled - 151. Recent A1c 5.6     Plan   -  consider insulin regimen with low dose SST if sugars are uncontrolled  - diabetic diet     Hypervolemia associated with renal insufficiency    See ERSD     Chronic diastolic heart failure        Diabetic neuropathy  Plan   - continue home medication, lyrica     Essential hypertension  Plan  - continue home amlodipine and losartan       VTE Risk Mitigation (From admission, onward)           Ordered     heparin (porcine) injection 5,000 Units  Every 8 hours         08/12/24 0835     IP VTE HIGH RISK PATIENT  Once         08/11/24 0740     Place sequential compression device  Until discontinued         08/11/24 0740                    Discharge Planning   MEGAN: 8/13/2024     Code Status: Full Code   Is the patient medically ready for discharge?:     Reason for patient still in hospital (select all that apply): Patient new problem  Discharge Plan A: Home                  Dat Lynn MD  Department of Hospital Medicine   [facility] ([place]

## 2024-08-13 LAB
ALBUMIN SERPL BCP-MCNC: 3.2 G/DL (ref 3.5–5.2)
ALP SERPL-CCNC: 65 U/L (ref 55–135)
ALT SERPL W/O P-5'-P-CCNC: 7 U/L (ref 10–44)
ANION GAP SERPL CALC-SCNC: 12 MMOL/L (ref 8–16)
ANISOCYTOSIS BLD QL SMEAR: SLIGHT
AST SERPL-CCNC: 12 U/L (ref 10–40)
BASO STIPL BLD QL SMEAR: ABNORMAL
BASOPHILS # BLD AUTO: 0.05 K/UL (ref 0–0.2)
BASOPHILS NFR BLD: 0.6 % (ref 0–1.9)
BILIRUB SERPL-MCNC: 1.7 MG/DL (ref 0.1–1)
BUN SERPL-MCNC: 42 MG/DL (ref 6–20)
CALCIUM SERPL-MCNC: 9.6 MG/DL (ref 8.7–10.5)
CHLORIDE SERPL-SCNC: 101 MMOL/L (ref 95–110)
CO2 SERPL-SCNC: 22 MMOL/L (ref 23–29)
CREAT SERPL-MCNC: 8.6 MG/DL (ref 0.5–1.4)
DIFFERENTIAL METHOD BLD: ABNORMAL
DOHLE BOD BLD QL SMEAR: PRESENT
EOSINOPHIL # BLD AUTO: 0.1 K/UL (ref 0–0.5)
EOSINOPHIL NFR BLD: 0.9 % (ref 0–8)
ERYTHROCYTE [DISTWIDTH] IN BLOOD BY AUTOMATED COUNT: 19.2 % (ref 11.5–14.5)
EST. GFR  (NO RACE VARIABLE): 4.9 ML/MIN/1.73 M^2
GLUCOSE SERPL-MCNC: 103 MG/DL (ref 70–110)
HAPTOGLOB SERPL-MCNC: <10 MG/DL (ref 30–250)
HCT VFR BLD AUTO: 22.7 % (ref 37–48.5)
HGB BLD-MCNC: 8.1 G/DL (ref 12–16)
HYPOCHROMIA BLD QL SMEAR: ABNORMAL
IMM GRANULOCYTES # BLD AUTO: 0.05 K/UL (ref 0–0.04)
IMM GRANULOCYTES NFR BLD AUTO: 0.6 % (ref 0–0.5)
LDH SERPL L TO P-CCNC: 374 U/L (ref 110–260)
LYMPHOCYTES # BLD AUTO: 1 K/UL (ref 1–4.8)
LYMPHOCYTES NFR BLD: 12.3 % (ref 18–48)
MAGNESIUM SERPL-MCNC: 2.3 MG/DL (ref 1.6–2.6)
MCH RBC QN AUTO: 28.3 PG (ref 27–31)
MCHC RBC AUTO-ENTMCNC: 35.7 G/DL (ref 32–36)
MCV RBC AUTO: 79 FL (ref 82–98)
MONOCYTES # BLD AUTO: 0.2 K/UL (ref 0.3–1)
MONOCYTES NFR BLD: 2.8 % (ref 4–15)
NEUTROPHILS # BLD AUTO: 6.4 K/UL (ref 1.8–7.7)
NEUTROPHILS NFR BLD: 82.8 % (ref 38–73)
NRBC BLD-RTO: 0 /100 WBC
PATHOLOGIST INTERPRETATION AB/XM: NORMAL
PHOSPHATE SERPL-MCNC: 4.4 MG/DL (ref 2.7–4.5)
PLATELET # BLD AUTO: 193 K/UL (ref 150–450)
PLATELET BLD QL SMEAR: ABNORMAL
PMV BLD AUTO: 11.8 FL (ref 9.2–12.9)
POCT GLUCOSE: 105 MG/DL (ref 70–110)
POCT GLUCOSE: 197 MG/DL (ref 70–110)
POCT GLUCOSE: 279 MG/DL (ref 70–110)
POCT GLUCOSE: 309 MG/DL (ref 70–110)
POCT GLUCOSE: 400 MG/DL (ref 70–110)
POCT GLUCOSE: 431 MG/DL (ref 70–110)
POLYCHROMASIA BLD QL SMEAR: ABNORMAL
POTASSIUM SERPL-SCNC: 4.5 MMOL/L (ref 3.5–5.1)
PROT SERPL-MCNC: 7.6 G/DL (ref 6–8.4)
RBC # BLD AUTO: 2.86 M/UL (ref 4–5.4)
SCHISTOCYTES BLD QL SMEAR: ABNORMAL
SODIUM SERPL-SCNC: 135 MMOL/L (ref 136–145)
SPHEROCYTES BLD QL SMEAR: ABNORMAL
WBC # BLD AUTO: 7.78 K/UL (ref 3.9–12.7)

## 2024-08-13 PROCEDURE — 80053 COMPREHEN METABOLIC PANEL: CPT | Mod: HCNC

## 2024-08-13 PROCEDURE — 85025 COMPLETE CBC W/AUTO DIFF WBC: CPT | Mod: HCNC

## 2024-08-13 PROCEDURE — 25500020 PHARM REV CODE 255: Mod: HCNC | Performed by: STUDENT IN AN ORGANIZED HEALTH CARE EDUCATION/TRAINING PROGRAM

## 2024-08-13 PROCEDURE — 20600001 HC STEP DOWN PRIVATE ROOM: Mod: HCNC

## 2024-08-13 PROCEDURE — 83615 LACTATE (LD) (LDH) ENZYME: CPT | Mod: HCNC

## 2024-08-13 PROCEDURE — 25000003 PHARM REV CODE 250: Mod: HCNC

## 2024-08-13 PROCEDURE — 83010 ASSAY OF HAPTOGLOBIN QUANT: CPT | Mod: HCNC

## 2024-08-13 PROCEDURE — 83735 ASSAY OF MAGNESIUM: CPT | Mod: HCNC

## 2024-08-13 PROCEDURE — 36415 COLL VENOUS BLD VENIPUNCTURE: CPT | Mod: HCNC

## 2024-08-13 PROCEDURE — 84100 ASSAY OF PHOSPHORUS: CPT | Mod: HCNC

## 2024-08-13 PROCEDURE — 63600175 PHARM REV CODE 636 W HCPCS: Mod: HCNC | Performed by: STUDENT IN AN ORGANIZED HEALTH CARE EDUCATION/TRAINING PROGRAM

## 2024-08-13 PROCEDURE — 63600175 PHARM REV CODE 636 W HCPCS: Mod: HCNC

## 2024-08-13 PROCEDURE — 99232 SBSQ HOSP IP/OBS MODERATE 35: CPT | Mod: HCNC,,, | Performed by: NURSE PRACTITIONER

## 2024-08-13 PROCEDURE — 25000003 PHARM REV CODE 250: Mod: HCNC | Performed by: STUDENT IN AN ORGANIZED HEALTH CARE EDUCATION/TRAINING PROGRAM

## 2024-08-13 RX ORDER — ISOSORBIDE MONONITRATE 30 MG/1
30 TABLET, EXTENDED RELEASE ORAL NIGHTLY
Status: DISCONTINUED | OUTPATIENT
Start: 2024-08-13 | End: 2024-08-14 | Stop reason: HOSPADM

## 2024-08-13 RX ORDER — SODIUM CHLORIDE 9 MG/ML
INJECTION, SOLUTION INTRAVENOUS ONCE
Status: CANCELLED | OUTPATIENT
Start: 2024-08-14

## 2024-08-13 RX ADMIN — SEVELAMER CARBONATE 2400 MG: 800 TABLET, FILM COATED ORAL at 01:08

## 2024-08-13 RX ADMIN — IOHEXOL 75 ML: 350 INJECTION, SOLUTION INTRAVENOUS at 02:08

## 2024-08-13 RX ADMIN — HEPARIN SODIUM 5000 UNITS: 5000 INJECTION INTRAVENOUS; SUBCUTANEOUS at 03:08

## 2024-08-13 RX ADMIN — AMLODIPINE BESYLATE 10 MG: 10 TABLET ORAL at 09:08

## 2024-08-13 RX ADMIN — INSULIN ASPART 3 UNITS: 100 INJECTION, SOLUTION INTRAVENOUS; SUBCUTANEOUS at 10:08

## 2024-08-13 RX ADMIN — SEVELAMER CARBONATE 2400 MG: 800 TABLET, FILM COATED ORAL at 09:08

## 2024-08-13 RX ADMIN — ASPIRIN 81 MG: 81 TABLET, COATED ORAL at 09:08

## 2024-08-13 RX ADMIN — HEPARIN SODIUM 5000 UNITS: 5000 INJECTION INTRAVENOUS; SUBCUTANEOUS at 09:08

## 2024-08-13 RX ADMIN — LOSARTAN POTASSIUM 100 MG: 50 TABLET, FILM COATED ORAL at 09:08

## 2024-08-13 RX ADMIN — HEPARIN SODIUM 5000 UNITS: 5000 INJECTION INTRAVENOUS; SUBCUTANEOUS at 06:08

## 2024-08-13 RX ADMIN — ISOSORBIDE MONONITRATE 30 MG: 30 TABLET, EXTENDED RELEASE ORAL at 09:08

## 2024-08-13 RX ADMIN — PREDNISONE 85 MG: 20 TABLET ORAL at 09:08

## 2024-08-13 RX ADMIN — PREGABALIN 75 MG: 75 CAPSULE ORAL at 09:08

## 2024-08-13 RX ADMIN — SEVELAMER CARBONATE 2400 MG: 800 TABLET, FILM COATED ORAL at 05:08

## 2024-08-13 RX ADMIN — FOLIC ACID 1 MG: 1 TABLET ORAL at 09:08

## 2024-08-13 RX ADMIN — ATORVASTATIN CALCIUM 40 MG: 40 TABLET, FILM COATED ORAL at 09:08

## 2024-08-13 NOTE — PROGRESS NOTES
Lab attempted to collect CBC - pt requested lab to return later as she had just given blood at 6am. Lab will attempt to collect later today.

## 2024-08-13 NOTE — PLAN OF CARE
Problem: Adult Inpatient Plan of Care  Goal: Plan of Care Review  Outcome: Progressing  Flowsheets (Taken 8/12/2024 1901)  Plan of Care Reviewed With: patient  Goal: Patient-Specific Goal (Individualized)  Outcome: Progressing  Goal: Absence of Hospital-Acquired Illness or Injury  Outcome: Progressing  Intervention: Identify and Manage Fall Risk  Flowsheets (Taken 8/12/2024 1901)  Safety Promotion/Fall Prevention:   assistive device/personal item within reach   side rails raised x 2  Intervention: Prevent Skin Injury  Flowsheets (Taken 8/12/2024 1901)  Body Position: position changed independently  Skin Protection: incontinence pads utilized  Intervention: Prevent and Manage VTE (Venous Thromboembolism) Risk  Flowsheets (Taken 8/12/2024 1901)  VTE Prevention/Management: bleeding precations maintained  Intervention: Prevent Infection  Flowsheets (Taken 8/12/2024 1901)  Infection Prevention: single patient room provided    Patient alert and oriented x4; care plan discussed with patient; call light and personal belongings within reach; free from falls/injuries during the shift; patient given 1 u PRBC's during the shift, tolerated transfusion well; c/o abdominal pain and headache; x2 dose of tylenol given; transported to HD via stretcher; patient able to express needs, no needs at this time; will continue with poc.

## 2024-08-13 NOTE — SUBJECTIVE & OBJECTIVE
Interval History:   HD completed yesterday. Net UF 2217 mL. Hematology consulted due to concerns for wAIHA. Patient with positive cardoso test.   Hgb 7.9 after pRBCs yesterday.     Review of patient's allergies indicates:  No Known Allergies  Current Facility-Administered Medications   Medication Frequency    acetaminophen tablet 650 mg Q4H PRN    acetaminophen tablet 650 mg Q8H PRN    amLODIPine tablet 10 mg Daily    aspirin EC tablet 81 mg QAM    atorvastatin tablet 40 mg Daily    dextrose 10% bolus 125 mL 125 mL PRN    dextrose 10% bolus 250 mL 250 mL PRN    diphenhydrAMINE capsule 25 mg Q6H PRN    folic acid tablet 1 mg Daily    glucagon (human recombinant) injection 1 mg PRN    glucose chewable tablet 16 g PRN    glucose chewable tablet 24 g PRN    heparin (porcine) injection 5,000 Units Q8H    insulin aspart U-100 pen 0-5 Units QID (AC + HS) PRN    losartan tablet 100 mg Daily    melatonin tablet 6 mg Nightly PRN    naloxone 0.4 mg/mL injection 0.02 mg PRN    predniSONE tablet 85 mg Daily    pregabalin capsule 75 mg Daily    sevelamer carbonate tablet 2,400 mg TID WM    sodium chloride 0.9% flush 10 mL Q12H PRN       Objective:     Vital Signs (Most Recent):  Temp: 98.9 °F (37.2 °C) (08/13/24 0750)  Pulse: 92 (08/13/24 0750)  Resp: 17 (08/13/24 0750)  BP: (!) 164/70 (08/13/24 0750)  SpO2: 100 % (08/13/24 0750) Vital Signs (24h Range):  Temp:  [98 °F (36.7 °C)-99.5 °F (37.5 °C)] 98.9 °F (37.2 °C)  Pulse:  [75-92] 92  Resp:  [17-20] 17  SpO2:  [92 %-100 %] 100 %  BP: (138-177)/(63-79) 164/70     Weight: 85 kg (187 lb 6.3 oz) (08/11/24 1749)  Body mass index is 32.17 kg/m².  Body surface area is 1.96 meters squared.    I/O last 3 completed shifts:  In: 865.8 [Blood:865.8]  Out: 2467 [Other:2467]     Physical Exam  Vitals and nursing note reviewed.   Constitutional:       General: She is not in acute distress.     Appearance: She is normal weight. She is not toxic-appearing.   HENT:      Head: Normocephalic and  atraumatic.   Eyes:      General: No scleral icterus.  Cardiovascular:      Rate and Rhythm: Normal rate and regular rhythm.   Pulmonary:      Effort: Pulmonary effort is normal.      Breath sounds: No wheezing.   Abdominal:      General: There is no distension.   Musculoskeletal:      Right lower leg: Edema present.      Left lower leg: Edema (L>R) present.      Comments: RUE AVF with audible bruit   Neurological:      General: No focal deficit present.      Mental Status: She is alert.   Psychiatric:         Mood and Affect: Mood normal.         Behavior: Behavior normal.          Significant Labs:  CBC:   Recent Labs   Lab 08/12/24  2112   WBC 12.12  11.81   RBC 2.75*  2.77*   HGB 7.9*  7.9*   HCT 22.9*  23.1*     181   MCV 83  83   MCH 28.7  28.5   MCHC 34.5  34.2     CMP:   Recent Labs   Lab 08/13/24  0519      CALCIUM 9.6   ALBUMIN 3.2*   PROT 7.6   *   K 4.5   CO2 22*      BUN 42*   CREATININE 8.6*   ALKPHOS 65   ALT 7*   AST 12   BILITOT 1.7*     All labs within the past 24 hours have been reviewed.      27-Feb-2024 18:37

## 2024-08-13 NOTE — ASSESSMENT & PLAN NOTE
HFpEF  Managing with HD volume removal   Maintain normotension  - Maintain on telemetry and daily EKGs   - Up to date risk stratification : TSH, Lipids, HbA1c with optimization of risk factors as necessary  - Check Electrolytes, keep Mag >2 & K+ >4  - SCDs, TEDs, Nursing communication to elevated LE   - Ambulate as tolerated

## 2024-08-13 NOTE — PROGRESS NOTES
08/12/24 1814   Post-Hemodialysis Assessment   Rinseback Volume (mL) 250 mL   Blood Volume Processed (Liters) 47.6 L   Dialyzer Clearance Moderately streaked   Duration of Treatment 180 minutes   Additional Fluid Intake (mL) 0 mL   Total UF (mL) 2467 mL   Net Fluid Removal 2217   Patient Response to Treatment tolerated   Post-Hemodialysis Comments see notes     HD TX complete. Net removal 2217 ml. Pt awake, alert, oriented, NAD. Hemostasis achieved. Report given to primary nurse. Awaiting transport to escort pt to US then back to room.

## 2024-08-13 NOTE — SUBJECTIVE & OBJECTIVE
Interval History: Underwent HD yesterday with 2L net fluid removed, now weaned to RA, feeling better.     Continuing to hemolyze per labs. Received 1u pRBC yesterday with Hgb 6.4 -> 7.9. Receiving prednisone and folic acid.     CT PE and DVT US negative.    Review of Systems   Constitutional:  Negative for activity change, appetite change, chills, fever and unexpected weight change.   Respiratory:  Negative for apnea, cough, choking, chest tightness, shortness of breath (resolved) and wheezing.    Cardiovascular:  Positive for leg swelling (bilateral, improving). Negative for chest pain.   Gastrointestinal:  Negative for abdominal distention, abdominal pain, blood in stool, constipation, diarrhea, nausea and vomiting.   Musculoskeletal:  Negative for back pain and joint swelling.   Skin:  Negative for rash and wound.   Allergic/Immunologic: Negative for immunocompromised state.     Objective:     Vital Signs (Most Recent):  Temp: 98.8 °F (37.1 °C) (08/13/24 1201)  Pulse: 92 (08/13/24 1201)  Resp: 20 (08/13/24 1201)  BP: (!) 174/80 (08/13/24 1201)  SpO2: 96 % (08/13/24 1201) Vital Signs (24h Range):  Temp:  [98 °F (36.7 °C)-99.5 °F (37.5 °C)] 98.8 °F (37.1 °C)  Pulse:  [75-92] 92  Resp:  [17-20] 20  SpO2:  [92 %-100 %] 96 %  BP: (138-177)/(63-80) 174/80     Weight: 85 kg (187 lb 6.3 oz)  Body mass index is 32.17 kg/m².    Intake/Output Summary (Last 24 hours) at 8/13/2024 1344  Last data filed at 8/12/2024 1814  Gross per 24 hour   Intake 500 ml   Output 2467 ml   Net -1967 ml         Physical Exam  Vitals reviewed.   Constitutional:       General: She is not in acute distress.     Appearance: She is not ill-appearing, toxic-appearing or diaphoretic.   Cardiovascular:      Rate and Rhythm: Normal rate and regular rhythm.      Pulses: Normal pulses.      Heart sounds: Normal heart sounds.   Pulmonary:      Effort: No respiratory distress.      Breath sounds: No stridor. No wheezing, rhonchi or rales (bilateral).    Chest:      Chest wall: No tenderness.   Abdominal:      General: Abdomen is flat. Bowel sounds are normal. There is no distension.      Palpations: Abdomen is soft. There is no mass.      Tenderness: There is no abdominal tenderness. There is no right CVA tenderness, left CVA tenderness, guarding or rebound.      Hernia: No hernia is present.   Musculoskeletal:         General: No tenderness or deformity.      Right lower leg: Edema present.      Left lower leg: Edema present.   Skin:     General: Skin is warm and dry.   Neurological:      Mental Status: She is alert.             Significant Labs: All pertinent labs within the past 24 hours have been reviewed.    Significant Imaging: I have reviewed all pertinent imaging results/findings within the past 24 hours.

## 2024-08-13 NOTE — PROGRESS NOTES
[facility] ([place] [address])  Nephrology  Progress Note    Patient Name: Jael Hall  MRN: [id number]  Admission Date: 8/11/2024  Hospital Length of Stay: 1 days  Attending Provider: Darryn Reynolds MD   Primary Care Physician: Maty Gayle DO  Principal Problem:AIHA (autoimmune hemolytic anemia)    Subjective:     Interval History:   HD completed yesterday. Net UF 2217 mL. Hematology consulted due to concerns for wAIHA. Patient with positive cardoso test.   Hgb 7.9 after pRBCs yesterday.     Review of patient's allergies indicates:  No Known Allergies  Current Facility-Administered Medications   Medication Frequency    acetaminophen tablet 650 mg Q4H PRN    acetaminophen tablet 650 mg Q8H PRN    amLODIPine tablet 10 mg Daily    aspirin EC tablet 81 mg QAM    atorvastatin tablet 40 mg Daily    dextrose 10% bolus 125 mL 125 mL PRN    dextrose 10% bolus 250 mL 250 mL PRN    diphenhydrAMINE capsule 25 mg Q6H PRN    folic acid tablet 1 mg Daily    glucagon (human recombinant) injection 1 mg PRN    glucose chewable tablet 16 g PRN    glucose chewable tablet 24 g PRN    heparin (porcine) injection 5,000 Units Q8H    insulin aspart U-100 pen 0-5 Units QID (AC + HS) PRN    losartan tablet 100 mg Daily    melatonin tablet 6 mg Nightly PRN    naloxone 0.4 mg/mL injection 0.02 mg PRN    predniSONE tablet 85 mg Daily    pregabalin capsule 75 mg Daily    sevelamer carbonate tablet 2,400 mg TID WM    sodium chloride 0.9% flush 10 mL Q12H PRN       Objective:     Vital Signs (Most Recent):  Temp: 98.9 °F (37.2 °C) (08/13/24 0750)  Pulse: 92 (08/13/24 0750)  Resp: 17 (08/13/24 0750)  BP: (!) 164/70 (08/13/24 0750)  SpO2: 100 % (08/13/24 0750) Vital Signs (24h Range):  Temp:  [98 °F (36.7 °C)-99.5 °F (37.5 °C)] 98.9 °F (37.2 °C)  Pulse:  [75-92] 92  Resp:  [17-20] 17  SpO2:  [92 %-100 %] 100 %  BP: (138-177)/(63-79) 164/70     Weight: 85 kg (187 lb 6.3 oz) (08/11/24 0209)  Body mass index is 32.17 kg/m².  Body  surface area is 1.96 meters squared.    I/O last 3 completed shifts:  In: 865.8 [Blood:865.8]  Out: 2467 [Other:2467]     Physical Exam  Vitals and nursing note reviewed.   Constitutional:       General: She is not in acute distress.     Appearance: She is normal weight. She is not toxic-appearing.   HENT:      Head: Normocephalic and atraumatic.   Eyes:      General: No scleral icterus.  Cardiovascular:      Rate and Rhythm: Normal rate and regular rhythm.   Pulmonary:      Effort: Pulmonary effort is normal.      Breath sounds: No wheezing.   Abdominal:      General: There is no distension.   Musculoskeletal:      Right lower leg: Edema present.      Left lower leg: Edema (L>R) present.      Comments: RUE AVF with audible bruit   Neurological:      General: No focal deficit present.      Mental Status: She is alert.   Psychiatric:         Mood and Affect: Mood normal.         Behavior: Behavior normal.          Significant Labs:  CBC:   Recent Labs   Lab 08/12/24  2112   WBC 12.12  11.81   RBC 2.75*  2.77*   HGB 7.9*  7.9*   HCT 22.9*  23.1*     181   MCV 83  83   MCH 28.7  28.5   MCHC 34.5  34.2     CMP:   Recent Labs   Lab 08/13/24  0519      CALCIUM 9.6   ALBUMIN 3.2*   PROT 7.6   *   K 4.5   CO2 22*      BUN 42*   CREATININE 8.6*   ALKPHOS 65   ALT 7*   AST 12   BILITOT 1.7*     All labs within the past 24 hours have been reviewed.     Assessment/Plan:     Renal/  ESRD (end stage renal disease) on dialysis  Anemia  HTN    Patient presenting with SOB in setting of abbreviated recent dialysis session and volume overload. Electrolytes non-emergent. Anemia with Hg down to 5.4, now 6.1 after pRBC transfusion 8/12/2024. BNP elevated and CXR with interstitial edema.     Outpatient HD Information:  -Dialysis modality: Hemodialysis  -Outpatient HD unit: [facility]  -Nephrologist: Kahlil TRIMBLE  -HD TX days: Monday/Wednesday/Friday, duration of treatment: 3.5 hrs  -Last HD TX  prior to hospital admission: partial session 8/9, full session 8/7  -Dialysis access: RUE AV fistula   -Residual urine: Minimum   -EDW: 85kg    Assessment:   - No emergent need for HD today.   - Continue to monitor intake and output  - Please avoid gadolinium, fleets, phos-based laxatives, NSAIDs  - Dialysis thrice weekly unless more urgent indications arise. Will evaluate RRT requirements Daily.    Anemia of ESRD   Recent Labs   Lab 08/11/24 2002 08/12/24  0836 08/12/24  2112   WBC 12.51 11.44 12.12  11.81   HGB 6.2* 6.4* 7.9*  7.9*   HCT 18.3* 18.7* 22.9*  23.1*    193 181  181       Lab Results   Component Value Date    FESATURATED 36 08/11/2024    FERRITIN 3,411 (H) 08/11/2024       - Goal in ESRD is Hgb of 10-11. Hgb 7.9.  - Will plan for EPO with HD.     Mineral Bone Disease in ESRD   Lab Results   Component Value Date    .7 (H) 01/23/2023    CALCIUM 9.6 08/13/2024    ALBUMIN 3.2 (L) 08/13/2024    PHOS 4.4 08/13/2024       - F/U PO4, Mg, Calcium. And albumin levels daily.   - Renal diet with protein intake goal 1.5 g/kg/d with 1 L fluid restriction   - Novasource with meals  - Continue home phos binder, phos 4.4      Oncology  * AIHA (autoimmune hemolytic anemia)  - defer to primary team and hematology         Thank you for your consult. I will follow-up with patient. Please contact us if you have any additional questions.    Herlinda Toro, SERAFIN, FNP-C  Nephrology  [facility] ([place])

## 2024-08-13 NOTE — PROGRESS NOTES
[facility] ([place] [address])  [facility] Medicine  Progress Note    Patient Name: Jael Hall  MRN: [id number]  Patient Class: IP- Inpatient   Admission Date: 8/11/2024  Length of Stay: 1 days  Attending Physician: Darryn Reynolds MD  Primary Care Provider: Maty Gayle DO        Subjective:     Principal Problem:AIHA (autoimmune hemolytic anemia)        HPI:  Jael Hall is a 58F with PMHx ESRD-HD mwf, anxiety, HTN, HLD, T2DM presents with SOB. She describes symptoms started around midnight that progressively worsened. Her symptoms worsened with laying down. She recently had dialysis on Friday but unable to complete due to back pain. Reportedly removed about 2L. In addition, she reports 1 dark stool last night but is unclear about the history/detail of this. Denies NSAID use. Denies chest pain, diarrhea, constipation, fevers, abdominal pain, hemoptysis, hematemesis.    On ED admission. AF. VSS. 4L NC. Hb 6.0 > 5.4, , trop normal. glucose 153. Electrolytes WNL. WBC normal FOBT and YUMIKO was negative. 1 unit of pRBC tranfused. CXR showed bilateral lower lobe opacities. Nephrology was consulted. Hemodialysis planned for today.     Overview/Hospital Course:  Requiring multiple pRBC transfusions. Warm agglutinin positive, hemolysis labs +. Heme/Onc consulted. Concern for WAIHA, started on prednisone and folic acid. DVT US and CT PE negative. Underwent HD with volume removal and resolution of oxygen requirement.     Interval History: Underwent HD yesterday with 2L net fluid removed, now weaned to RA, feeling better.     Continuing to hemolyze per labs. Received 1u pRBC yesterday with Hgb 6.4 -> 7.9. Receiving prednisone and folic acid.     CT PE and DVT US negative.    Review of Systems   Constitutional:  Negative for activity change, appetite change, chills, fever and unexpected weight change.   Respiratory:  Negative for apnea, cough, choking, chest tightness, shortness of breath (resolved) and  wheezing.    Cardiovascular:  Positive for leg swelling (bilateral, improving). Negative for chest pain.   Gastrointestinal:  Negative for abdominal distention, abdominal pain, blood in stool, constipation, diarrhea, nausea and vomiting.   Musculoskeletal:  Negative for back pain and joint swelling.   Skin:  Negative for rash and wound.   Allergic/Immunologic: Negative for immunocompromised state.     Objective:     Vital Signs (Most Recent):  Temp: 98.8 °F (37.1 °C) (08/13/24 1201)  Pulse: 92 (08/13/24 1201)  Resp: 20 (08/13/24 1201)  BP: (!) 174/80 (08/13/24 1201)  SpO2: 96 % (08/13/24 1201) Vital Signs (24h Range):  Temp:  [98 °F (36.7 °C)-99.5 °F (37.5 °C)] 98.8 °F (37.1 °C)  Pulse:  [75-92] 92  Resp:  [17-20] 20  SpO2:  [92 %-100 %] 96 %  BP: (138-177)/(63-80) 174/80     Weight: 85 kg (187 lb 6.3 oz)  Body mass index is 32.17 kg/m².    Intake/Output Summary (Last 24 hours) at 8/13/2024 1344  Last data filed at 8/12/2024 1814  Gross per 24 hour   Intake 500 ml   Output 2467 ml   Net -1967 ml         Physical Exam  Vitals reviewed.   Constitutional:       General: She is not in acute distress.     Appearance: She is not ill-appearing, toxic-appearing or diaphoretic.   Cardiovascular:      Rate and Rhythm: Normal rate and regular rhythm.      Pulses: Normal pulses.      Heart sounds: Normal heart sounds.   Pulmonary:      Effort: No respiratory distress.      Breath sounds: No stridor. No wheezing, rhonchi or rales (bilateral).   Chest:      Chest wall: No tenderness.   Abdominal:      General: Abdomen is flat. Bowel sounds are normal. There is no distension.      Palpations: Abdomen is soft. There is no mass.      Tenderness: There is no abdominal tenderness. There is no right CVA tenderness, left CVA tenderness, guarding or rebound.      Hernia: No hernia is present.   Musculoskeletal:         General: No tenderness or deformity.      Right lower leg: Edema present.      Left lower leg: Edema present.   Skin:      General: Skin is warm and dry.   Neurological:      Mental Status: She is alert.             Significant Labs: All pertinent labs within the past 24 hours have been reviewed.    Significant Imaging: I have reviewed all pertinent imaging results/findings within the past 24 hours.    Assessment/Plan:      * AIHA (autoimmune hemolytic anemia)  In the context of significant AI history and Hep B. Warm agglutinin positive, hemolysis markers positive.  Recent Labs     08/12/24  0836 08/12/24  2112 08/13/24  1341   HGB 6.4* 7.9*  7.9* 8.1*   HCT 18.7* 22.9*  23.1* 22.7*       Plan  - Monitor serial CBC: Daily  - Transfuse PRBC if patient becomes hemodynamically unstable, symptomatic or H/H drops below 7/21.  - Patient has received 2 units of PRBCs  - Patient's anemia is currently stable  - blood smear showed evidence of spherocytes and spirocytes   - hematology consulted; appreciate recs   - Daily hapto, LDH,  - folic acid supplementation  - prednisone 1mg/kg PO QD     Anemia in ESRD (end-stage renal disease)  Patient presented with 6 hgb with repeat at 5.4. She was given 1 unit pRBC. She reports single dark stool this morning. In the ED, her FOBT/YUMIKO were negative. Baseline Hgb approx. 8-8.5. Anemia is likely due to chronic disease due to ESRD.   Recent Labs     08/11/24  0549 08/11/24  0610 08/11/24 2002 08/12/24  0836   HGB  --  5.4* 6.2* 6.4*   HCT 29*  --  18.3* 18.7*     Ferritin 3000+. TIBC WNL. Iron WNL. Transferrin 194 (low)     Plan  - Monitor serial CBC: Daily  - Transfuse PRBC if patient becomes hemodynamically unstable, symptomatic or H/H drops below 7.  - Patient has received 2 units of PRBCs on   ;  - f/u CBC     Type 2 diabetes mellitus  Pt's glucose is controlled - 151. Recent A1c 5.6     Plan   -  consider insulin regimen with low dose SST if sugars are uncontrolled  - diabetic diet     Hypervolemia associated with renal insufficiency    See ERSD     Chronic diastolic heart failure  HFpEF  Managing  with HD volume removal   Maintain normotension  - Maintain on telemetry and daily EKGs   - Up to date risk stratification : TSH, Lipids, HbA1c with optimization of risk factors as necessary  - Check Electrolytes, keep Mag >2 & K+ >4  - SCDs, TEDs, Nursing communication to elevated LE   - Ambulate as tolerated        Diabetic neuropathy  Plan   - continue home medication, lyrica     Essential hypertension  Plan  - continue home amlodipine and losartan     ESRD (end stage renal disease) on dialysis  Patient has ERSD and receives dialysis MWF. She recently had dialysis on Friday but was not able to complete the full session due to back pain. Last session removed about 2L. She complained of orthopnea and paroxysmal noctural dyspnea that progressively worsened throughout the night. On exam she is fluid overloaded and CXR with evidence of pulmonary edema. She is not on lasix at home. BNP 400s     Plan  - Dialysis today; regular schedule MWF   - Monitor UOP and serial BMP and adjust therapy as needed  - Renally dose meds  - Avoid nephrotoxic medications and procedures.      VTE Risk Mitigation (From admission, onward)           Ordered     heparin (porcine) injection 5,000 Units  Every 8 hours         08/12/24 0835     IP VTE HIGH RISK PATIENT  Once         08/11/24 0740     Place sequential compression device  Until discontinued         08/11/24 0740                    Discharge Planning   MEGAN: 8/14/2024     Code Status: Full Code   Is the patient medically ready for discharge?:     Reason for patient still in hospital (select all that apply): Treatment  Discharge Plan A: Home                  Aletha Del Rosario MD  Department of Hospital Medicine   [facility] ([place])

## 2024-08-13 NOTE — ASSESSMENT & PLAN NOTE
In the context of significant AI history and Hep B. Warm agglutinin positive, hemolysis markers positive.  Recent Labs     08/12/24  0836 08/12/24  2112 08/13/24  1341   HGB 6.4* 7.9*  7.9* 8.1*   HCT 18.7* 22.9*  23.1* 22.7*       Plan  - Monitor serial CBC: Daily  - Transfuse PRBC if patient becomes hemodynamically unstable, symptomatic or H/H drops below 7/21.  - Patient has received 2 units of PRBCs  - Patient's anemia is currently stable  - blood smear showed evidence of spherocytes and spirocytes   - hematology consulted; appreciate recs   - Daily hapto, LDH,  - folic acid supplementation  - prednisone 1mg/kg PO QD

## 2024-08-13 NOTE — PLAN OF CARE
Problem: Adult Inpatient Plan of Care  Goal: Plan of Care Review  Outcome: Progressing  Flowsheets (Taken 8/13/2024 1827)  Plan of Care Reviewed With: patient  Goal: Patient-Specific Goal (Individualized)  Outcome: Progressing  Goal: Absence of Hospital-Acquired Illness or Injury  Outcome: Progressing  Intervention: Identify and Manage Fall Risk  Flowsheets (Taken 8/13/2024 1827)  Safety Promotion/Fall Prevention:   assistive device/personal item within reach   side rails raised x 2  Intervention: Prevent Skin Injury  Flowsheets (Taken 8/13/2024 1827)  Body Position: position changed independently  Skin Protection: incontinence pads utilized  Device Skin Pressure Protection: absorbent pad utilized/changed  Intervention: Prevent and Manage VTE (Venous Thromboembolism) Risk  Flowsheets (Taken 8/13/2024 1827)  VTE Prevention/Management: ambulation promoted  Intervention: Prevent Infection  Flowsheets (Taken 8/13/2024 1827)  Infection Prevention: single patient room provided  Goal: Optimal Comfort and Wellbeing  Outcome: Progressing  Intervention: Monitor Pain and Promote Comfort  Flowsheets (Taken 8/13/2024 1827)  Pain Management Interventions: pain management plan reviewed with patient/caregiver      Patient alert and oriented x4; care plan discussed with patient; free from falls/injuries during the shift; call light and personal belongings within reach; patient able to express needs, no needs at this time; will continue with poc.

## 2024-08-13 NOTE — ASSESSMENT & PLAN NOTE
Anemia  HTN    Patient presenting with SOB in setting of abbreviated recent dialysis session and volume overload. Electrolytes non-emergent. Anemia with Hg down to 5.4, now 6.1 after pRBC transfusion 8/12/2024. BNP elevated and CXR with interstitial edema.     Outpatient HD Information:  -Dialysis modality: Hemodialysis  -Outpatient HD unit: [facility]  -Nephrologist: Kahlil TRIMBLE  -HD TX days: Monday/Wednesday/Friday, duration of treatment: 3.5 hrs  -Last HD TX prior to hospital admission: partial session 8/9, full session 8/7  -Dialysis access: RUE AV fistula   -Residual urine: Minimum   -EDW: 85kg    Assessment:   - No emergent need for HD today.   - Continue to monitor intake and output  - Please avoid gadolinium, fleets, phos-based laxatives, NSAIDs  - Dialysis thrice weekly unless more urgent indications arise. Will evaluate RRT requirements Daily.    Anemia of ESRD   Recent Labs   Lab 08/11/24 2002 08/12/24  0836 08/12/24  2112   WBC 12.51 11.44 12.12  11.81   HGB 6.2* 6.4* 7.9*  7.9*   HCT 18.3* 18.7* 22.9*  23.1*    193 181  181       Lab Results   Component Value Date    FESATURATED 36 08/11/2024    FERRITIN 3,411 (H) 08/11/2024       - Goal in ESRD is Hgb of 10-11. Hgb 7.9.  - Will plan for EPO with HD.     Mineral Bone Disease in ESRD   Lab Results   Component Value Date    .7 (H) 01/23/2023    CALCIUM 9.6 08/13/2024    ALBUMIN 3.2 (L) 08/13/2024    PHOS 4.4 08/13/2024       - F/U PO4, Mg, Calcium. And albumin levels daily.   - Renal diet with protein intake goal 1.5 g/kg/d with 1 L fluid restriction   - Novasource with meals  - Continue home phos binder, phos 4.4

## 2024-08-14 VITALS
HEART RATE: 101 BPM | BODY MASS INDEX: 31.99 KG/M2 | HEIGHT: 64 IN | TEMPERATURE: 98 F | RESPIRATION RATE: 17 BRPM | DIASTOLIC BLOOD PRESSURE: 59 MMHG | OXYGEN SATURATION: 98 % | SYSTOLIC BLOOD PRESSURE: 135 MMHG | WEIGHT: 187.38 LBS

## 2024-08-14 LAB
ALBUMIN SERPL BCP-MCNC: 3.3 G/DL (ref 3.5–5.2)
ALP SERPL-CCNC: 61 U/L (ref 55–135)
ALT SERPL W/O P-5'-P-CCNC: 8 U/L (ref 10–44)
ANION GAP SERPL CALC-SCNC: 14 MMOL/L (ref 8–16)
AST SERPL-CCNC: 11 U/L (ref 10–40)
BASOPHILS # BLD AUTO: 0.03 K/UL (ref 0–0.2)
BASOPHILS NFR BLD: 0.3 % (ref 0–1.9)
BILIRUB SERPL-MCNC: 1.1 MG/DL (ref 0.1–1)
BUN SERPL-MCNC: 71 MG/DL (ref 6–20)
CALCIUM SERPL-MCNC: 10.4 MG/DL (ref 8.7–10.5)
CHLORIDE SERPL-SCNC: 99 MMOL/L (ref 95–110)
CO2 SERPL-SCNC: 20 MMOL/L (ref 23–29)
CREAT SERPL-MCNC: 12.9 MG/DL (ref 0.5–1.4)
DIFFERENTIAL METHOD BLD: ABNORMAL
EOSINOPHIL # BLD AUTO: 0 K/UL (ref 0–0.5)
EOSINOPHIL NFR BLD: 0 % (ref 0–8)
ERYTHROCYTE [DISTWIDTH] IN BLOOD BY AUTOMATED COUNT: 21.1 % (ref 11.5–14.5)
EST. GFR  (NO RACE VARIABLE): 3 ML/MIN/1.73 M^2
GLUCOSE SERPL-MCNC: 181 MG/DL (ref 70–110)
HAPTOGLOB SERPL-MCNC: 17 MG/DL (ref 30–250)
HBV SURFACE AG SERPL QL IA: NORMAL
HCT VFR BLD AUTO: 22.1 % (ref 37–48.5)
HGB BLD-MCNC: 7.4 G/DL (ref 12–16)
IMM GRANULOCYTES # BLD AUTO: 0.08 K/UL (ref 0–0.04)
IMM GRANULOCYTES NFR BLD AUTO: 0.7 % (ref 0–0.5)
LDH SERPL L TO P-CCNC: 365 U/L (ref 110–260)
LYMPHOCYTES # BLD AUTO: 1.6 K/UL (ref 1–4.8)
LYMPHOCYTES NFR BLD: 13.8 % (ref 18–48)
MAGNESIUM SERPL-MCNC: 2.7 MG/DL (ref 1.6–2.6)
MCH RBC QN AUTO: 28.5 PG (ref 27–31)
MCHC RBC AUTO-ENTMCNC: 33.5 G/DL (ref 32–36)
MCV RBC AUTO: 85 FL (ref 82–98)
MONOCYTES # BLD AUTO: 1.4 K/UL (ref 0.3–1)
MONOCYTES NFR BLD: 12 % (ref 4–15)
NEUTROPHILS # BLD AUTO: 8.6 K/UL (ref 1.8–7.7)
NEUTROPHILS NFR BLD: 73.2 % (ref 38–73)
NRBC BLD-RTO: 0 /100 WBC
PHOSPHATE SERPL-MCNC: 4.5 MG/DL (ref 2.7–4.5)
PLATELET # BLD AUTO: 188 K/UL (ref 150–450)
PMV BLD AUTO: 11.3 FL (ref 9.2–12.9)
POCT GLUCOSE: 208 MG/DL (ref 70–110)
POCT GLUCOSE: 347 MG/DL (ref 70–110)
POTASSIUM SERPL-SCNC: 5.5 MMOL/L (ref 3.5–5.1)
PROT SERPL-MCNC: 7.8 G/DL (ref 6–8.4)
RBC # BLD AUTO: 2.6 M/UL (ref 4–5.4)
SODIUM SERPL-SCNC: 133 MMOL/L (ref 136–145)
WBC # BLD AUTO: 11.79 K/UL (ref 3.9–12.7)

## 2024-08-14 PROCEDURE — 87340 HEPATITIS B SURFACE AG IA: CPT | Mod: HCNC | Performed by: STUDENT IN AN ORGANIZED HEALTH CARE EDUCATION/TRAINING PROGRAM

## 2024-08-14 PROCEDURE — 63600175 PHARM REV CODE 636 W HCPCS: Mod: HCNC | Performed by: STUDENT IN AN ORGANIZED HEALTH CARE EDUCATION/TRAINING PROGRAM

## 2024-08-14 PROCEDURE — 83735 ASSAY OF MAGNESIUM: CPT | Mod: HCNC

## 2024-08-14 PROCEDURE — 25000003 PHARM REV CODE 250: Mod: HCNC | Performed by: STUDENT IN AN ORGANIZED HEALTH CARE EDUCATION/TRAINING PROGRAM

## 2024-08-14 PROCEDURE — 80100016 HC MAINTENANCE HEMODIALYSIS: Mod: HCNC

## 2024-08-14 PROCEDURE — 80053 COMPREHEN METABOLIC PANEL: CPT | Mod: HCNC

## 2024-08-14 PROCEDURE — 25000003 PHARM REV CODE 250: Mod: HCNC

## 2024-08-14 PROCEDURE — 63600175 PHARM REV CODE 636 W HCPCS: Mod: HCNC | Performed by: NURSE PRACTITIONER

## 2024-08-14 PROCEDURE — 36415 COLL VENOUS BLD VENIPUNCTURE: CPT | Mod: HCNC | Performed by: STUDENT IN AN ORGANIZED HEALTH CARE EDUCATION/TRAINING PROGRAM

## 2024-08-14 PROCEDURE — 63600175 PHARM REV CODE 636 W HCPCS: Mod: HCNC

## 2024-08-14 PROCEDURE — 83615 LACTATE (LD) (LDH) ENZYME: CPT | Mod: HCNC

## 2024-08-14 PROCEDURE — 36415 COLL VENOUS BLD VENIPUNCTURE: CPT | Mod: HCNC

## 2024-08-14 PROCEDURE — 85025 COMPLETE CBC W/AUTO DIFF WBC: CPT | Mod: HCNC

## 2024-08-14 PROCEDURE — 90935 HEMODIALYSIS ONE EVALUATION: CPT | Mod: HCNC,,, | Performed by: NURSE PRACTITIONER

## 2024-08-14 PROCEDURE — 83010 ASSAY OF HAPTOGLOBIN QUANT: CPT | Mod: HCNC

## 2024-08-14 PROCEDURE — 84100 ASSAY OF PHOSPHORUS: CPT | Mod: HCNC

## 2024-08-14 RX ORDER — ATOVAQUONE 750 MG/5ML
1500 SUSPENSION ORAL DAILY
Qty: 300 ML | Refills: 2 | Status: SHIPPED | OUTPATIENT
Start: 2024-08-14 | End: 2024-11-12

## 2024-08-14 RX ORDER — INSULIN GLARGINE 100 [IU]/ML
5 INJECTION, SOLUTION SUBCUTANEOUS
Qty: 3 ML | Refills: 11 | Status: SHIPPED | OUTPATIENT
Start: 2024-08-14 | End: 2025-08-14

## 2024-08-14 RX ORDER — ATOVAQUONE 750 MG/5ML
1500 SUSPENSION ORAL DAILY
Status: DISCONTINUED | OUTPATIENT
Start: 2024-08-14 | End: 2024-08-14 | Stop reason: HOSPADM

## 2024-08-14 RX ORDER — FOLIC ACID 1 MG/1
1 TABLET ORAL DAILY
Qty: 90 TABLET | Refills: 3 | Status: SHIPPED | OUTPATIENT
Start: 2024-08-15 | End: 2025-08-15

## 2024-08-14 RX ORDER — PREDNISONE 20 MG/1
85 TABLET ORAL DAILY
Qty: 135 TABLET | Refills: 0 | Status: SHIPPED | OUTPATIENT
Start: 2024-08-15 | End: 2024-09-14

## 2024-08-14 RX ORDER — HYDRALAZINE HYDROCHLORIDE 50 MG/1
50 TABLET, FILM COATED ORAL ONCE
Status: COMPLETED | OUTPATIENT
Start: 2024-08-14 | End: 2024-08-14

## 2024-08-14 RX ADMIN — SEVELAMER CARBONATE 2400 MG: 800 TABLET, FILM COATED ORAL at 08:08

## 2024-08-14 RX ADMIN — HEPARIN SODIUM 5000 UNITS: 5000 INJECTION INTRAVENOUS; SUBCUTANEOUS at 02:08

## 2024-08-14 RX ADMIN — ATOVAQUONE 1500 MG: 750 SUSPENSION ORAL at 02:08

## 2024-08-14 RX ADMIN — DIPHENHYDRAMINE HYDROCHLORIDE 25 MG: 25 CAPSULE ORAL at 11:08

## 2024-08-14 RX ADMIN — HYDRALAZINE HYDROCHLORIDE 50 MG: 50 TABLET ORAL at 12:08

## 2024-08-14 RX ADMIN — LOSARTAN POTASSIUM 100 MG: 50 TABLET, FILM COATED ORAL at 08:08

## 2024-08-14 RX ADMIN — INSULIN ASPART 2 UNITS: 100 INJECTION, SOLUTION INTRAVENOUS; SUBCUTANEOUS at 08:08

## 2024-08-14 RX ADMIN — FOLIC ACID 1 MG: 1 TABLET ORAL at 08:08

## 2024-08-14 RX ADMIN — DIPHENHYDRAMINE HYDROCHLORIDE 25 MG: 25 CAPSULE ORAL at 02:08

## 2024-08-14 RX ADMIN — ATORVASTATIN CALCIUM 40 MG: 40 TABLET, FILM COATED ORAL at 08:08

## 2024-08-14 RX ADMIN — PREDNISONE 85 MG: 20 TABLET ORAL at 08:08

## 2024-08-14 RX ADMIN — PREGABALIN 75 MG: 75 CAPSULE ORAL at 08:08

## 2024-08-14 RX ADMIN — AMLODIPINE BESYLATE 10 MG: 10 TABLET ORAL at 08:08

## 2024-08-14 RX ADMIN — ACETAMINOPHEN 650 MG: 325 TABLET ORAL at 11:08

## 2024-08-14 RX ADMIN — ERYTHROPOIETIN 8500 UNITS: 10000 INJECTION, SOLUTION INTRAVENOUS; SUBCUTANEOUS at 11:08

## 2024-08-14 RX ADMIN — HEPARIN SODIUM 5000 UNITS: 5000 INJECTION INTRAVENOUS; SUBCUTANEOUS at 06:08

## 2024-08-14 RX ADMIN — ASPIRIN 81 MG: 81 TABLET, COATED ORAL at 08:08

## 2024-08-14 NOTE — PROGRESS NOTES
[facility] NOTE     Patient currently on hemodialysis for removal of uremic toxins .     Patient seen and evaluated on hemodialysis, tolerating treatment, see HD flowsheet for vitals and assessments.      No Hypotension, chest pain, shortness of breath, cramping, nausea or vomiting.     Target UF: 3.5 L as tolerated, keep MAP >65.  Hematology following due to concerns for wAIHA, started on prednisone and folic acid. No distress on exam. Pre HD weight 89 kg (EDW 85 kg) - EDW adjusted. SBP >180s.  Continue Sevelamer.   Hgb 7.4, will start EPO  Consider renal diet restrictions; please limit daily intake to 32 oz per day.   No lab stick/BP intake on access site  Continue to monitor intake and output, daily weights   Please avoid gadolinium, fleets, phos-based laxatives, NSAIDs  Will follow closely and continue dialysis treatments while in-patient    HAYDER Toro DNP, APRN, FNP-C  Department of Nephrology  [facility]  Pager: [phone]

## 2024-08-14 NOTE — ASSESSMENT & PLAN NOTE
Patient has ERSD and receives dialysis MWF. She recently had dialysis on Friday but was not able to complete the full session due to back pain. Last session removed about 2L. She complained of orthopnea and paroxysmal noctural dyspnea that progressively worsened throughout the night. On exam she is fluid overloaded and CXR with evidence of pulmonary edema. She is not on lasix at home. BNP 400s. She received 2 sessions of HD while inpatient. Symptoms significantly improved and does not require oxygen anymore.     Plan  - Dialysis today; regular schedule MWF   - Monitor UOP and serial BMP and adjust therapy as needed  - Renally dose meds  - Avoid nephrotoxic medications and procedures.

## 2024-08-14 NOTE — PLAN OF CARE
No acute events during shift. Patient went to dialysis today . 3.5 hours and 3 L removed. Patient being dc home today           Problem: Adult Inpatient Plan of Care  Goal: Plan of Care Review  Outcome: Progressing  Goal: Patient-Specific Goal (Individualized)  Outcome: Progressing  Goal: Absence of Hospital-Acquired Illness or Injury  Outcome: Progressing  Goal: Optimal Comfort and Wellbeing  Outcome: Progressing  Goal: Readiness for Transition of Care  Outcome: Progressing     Problem: Hemodialysis  Goal: Safe, Effective Therapy Delivery  Outcome: Progressing  Goal: Effective Tissue Perfusion  Outcome: Progressing  Goal: Absence of Infection Signs and Symptoms  Outcome: Progressing     Problem: Diabetes Comorbidity  Goal: Blood Glucose Level Within Targeted Range  Outcome: Progressing

## 2024-08-14 NOTE — PLAN OF CARE
Hematology Plan of Care    58F with ESRD on HD, HTN, DM2, Obesity who was admitted for symptomatic anemia after she was unable to tolerate HD. Found to have acute on chronic anemia with a Hgb of 5.4 (bl of around 9), MCV 83, PLTs 193. CMP revealed a new TB elevation to 1.8 and , hapto <10. She is ANSHUL positive, warm autoagglutinin positive. Smear showed numerous spherocytes and microspherocytes which go in line with WAHA. She was started on prednisone 1mg/kg and folic acid. Counts stable and haptoglobin now detectable. TB improved to 1.1. Primary team looking to discharge patient.    Recommendations:  -continue prednisone 1mg/kg and folic acid  -will set up patient for hematology follow up in about a week, where her prednisone taper can be discussed and/or addition of rituximab  -please ensure patient is on PJP ppx as well    We will sign off. Let us know if any questions.    Gorge Hoffman MD  Hematology/Oncology PGY-V

## 2024-08-14 NOTE — PLAN OF CARE
Discharge Plan A and Plan B have been determined by review of patient's clinical status, future medical and therapeutic needs, and coverage/benefits for post-acute care in coordination with multidisciplinary team members.    08/14/24 1447   Post-Acute Status   Post-Acute Authorization Home Health   Home Health Status Referrals Sent   Hospital Resources/Appts/Education Provided Appointments scheduled and added to AVS   Patient choice form signed by patient/caregiver List from [facility] Post-Acute Care;List from CMS Compare;List with quality metrics by geographic area provided   Discharge Delays None known at this time   Discharge Plan   Discharge Plan A Home Health   Discharge Plan B Home with family     Spoke with patient  to review discharge recommendation of  HH  and is agreeable to plan    Patient/family provided list of facilities in-network with patient's payor plan. Providers that are owned, operated, or affiliated with [facility] are included on the list.     Notified that referral sent to below listed facilities from in-network list based on proximity to home/family support:   [facility] Phone: [phone]     Response: No, unable to accept patient  Reason: Staff Shortage   [address] [place] [address] - 8/14/2024 16:46 (CT)  -  -  -  -       [facility] Phone: [phone]    Response: No, unable to accept patient  Reason: Staff Shortage  [address] [place] [address] - 8/14/2024 16:46 (CT)  -  -  -  -       [facility] Phone: [phone]    Response: Referral Received   [address] [facility] [place] [address] - 8/14/2024 16:46 (CT)  -  -  -  -       [facility] Phone: [phone]    Response:  [address] [facility] [place] [address] - 8/14/2024 16:46 (CT)  -          Patient/family instructed to identify preference.    Preferred Facility: (if  more than 1, listed in order of descending preference)  No verbalized preference     If an additional preferred facility not listed above is identified, additional referral to be sent. If above facilities unable to accept, will send additional referrals to in-network providers.      2:50 pm  Care at home referred for potential  high readmit risk     2:55 pm  HH orders uploaded    2:57 pm  CM spoke with Linnea @ [facility] Phone: [phone]  and SOC is 8/20/24 and willing to accept.     2:59 pm  CM spoke with Shazia @ [facility] [employer] [facility] [employer] Phone: [phone]   and updated that HH orders were uploaded in Care port for review.        Cheyanne Guevara RN  Case Management  [facility]  [phone]

## 2024-08-14 NOTE — CARE UPDATE
Unit ESTER Care Support Interaction      I have reviewed the chart of Jael Hall who is hospitalized for AIHA (autoimmune hemolytic anemia). The patient is currently located in the following unit: 14w       I have seen and examined the patient and provided the following support:     Patient experience rounds - positive experience reported       Kathya Cronin PA-C  Unit Based ESTER

## 2024-08-14 NOTE — PLAN OF CARE
[facility] ([place] [address])      HOME HEALTH ORDERS  FACE TO FACE ENCOUNTER    Patient Name: Jael Hall  YOB: 1965    PCP: Maty Gayle DO   PCP Address: [address] [place] [address]  PCP Phone Number: [phone]  PCP Fax: [phone]    Encounter Date: 8/11/24    Admit to Home Health    Diagnoses:  Active Hospital Problems    Diagnosis  POA    *AIHA (autoimmune hemolytic anemia) [D59.10]  Yes    Anemia in ESRD (end-stage renal disease) [N18.6, D63.1]  Yes    Type 2 diabetes mellitus [E11.9]  Yes    Hypervolemia associated with renal insufficiency [E87.70, N28.9]  Yes    Chronic diastolic heart failure [I50.32]  Yes    Essential hypertension [I10]  Yes     Chronic    ESRD (end stage renal disease) on dialysis [N18.6, Z99.2]  Not Applicable     Chronic    Diabetic neuropathy [E11.40]  Yes     Overview:   stable/ cont current rxment.  dx update  Overview:   stable/ cont current rxment.  dx update        Resolved Hospital Problems   No resolved problems to display.       Follow Up Appointments:  Future Appointments   Date Time Provider Department Center   8/22/2024  9:00 AM Jed Cardenas OD [facility] OPTOMTY [facility]   8/22/2024  4:00 PM Ariadne Dixon MD [facility] PROSPER [facility]   11/8/2024  9:00 AM [facility] MRI1 350 LB LIMIT [facility]   11/12/2024  9:15 AM Douglas Abraham MD [facility] UROLOG [facility]       Allergies:Review of patient's allergies indicates:  No Known Allergies    Medications: Review discharge medications with patient and family and provide education.    Current Facility-Administered Medications   Medication Dose Route Frequency Provider Last Rate Last Admin    acetaminophen tablet 650 mg  650 mg Oral Q4H PRN Dat Lynn MD   650 mg at 08/14/24 1145    acetaminophen tablet 650 mg  650 mg Oral Q8H PRN Dat Lynn MD        amLODIPine tablet 10 mg  10 mg Oral Daily Dat Lynn MD   10 mg at 08/14/24 0809    aspirin EC tablet 81 mg  81 mg  Oral QAM Dat Lynn MD   81 mg at 08/14/24 0809    atorvastatin tablet 40 mg  40 mg Oral Daily Dat Lynn MD   40 mg at 08/14/24 0809    atovaquone 750 mg/5 mL oral liquid 1,500 mg  1,500 mg Oral Daily Darryn Reynolds MD        dextrose 10% bolus 125 mL 125 mL  12.5 g Intravenous PRN Dat Lynn MD        dextrose 10% bolus 250 mL 250 mL  25 g Intravenous PRN Dat Lynn MD        diphenhydrAMINE capsule 25 mg  25 mg Oral Q6H PRN Aletha Del Rosario MD   25 mg at 08/14/24 1144    epoetin shiela injection 8,500 Units  100 Units/kg Intravenous Every Mon, Wed, Fri Herlinda Toro, SERAFIN, FNP-C   8,500 Units at 08/14/24 1147    folic acid tablet 1 mg  1 mg Oral Daily Dat Lnyn MD   1 mg at 08/14/24 0809    glucagon (human recombinant) injection 1 mg  1 mg Intramuscular PRN Dat Lynn MD        glucose chewable tablet 16 g  16 g Oral PRN Dat Lynn MD        glucose chewable tablet 24 g  24 g Oral PRN Dat Lynn MD        heparin (porcine) injection 5,000 Units  5,000 Units Subcutaneous Q8H Darryn Reynolds MD   5,000 Units at 08/14/24 0634    insulin aspart U-100 pen 0-5 Units  0-5 Units Subcutaneous QID (AC + HS) PRN Dat Lynn MD   2 Units at 08/14/24 0810    isosorbide mononitrate 24 hr tablet 30 mg  30 mg Oral QHS Darryn Reynolds MD   30 mg at 08/13/24 2142    losartan tablet 100 mg  100 mg Oral Daily Dat Lynn MD   100 mg at 08/14/24 0809    melatonin tablet 6 mg  6 mg Oral Nightly PRN Dat Lynn MD        naloxone 0.4 mg/mL injection 0.02 mg  0.02 mg Intravenous PRN Dat Lynn MD        predniSONE tablet 85 mg  85 mg Oral Daily Darryn Reynolds MD   85 mg at 08/14/24 0808    pregabalin capsule 75 mg  75 mg Oral Daily Dat Lynn MD   75 mg at 08/14/24 0809    sevelamer carbonate tablet 2,400 mg  2,400 mg Oral TID  Dat Lynn MD   2,400 mg at 08/14/24 0809    sodium chloride 0.9% flush 10 mL  10 mL Intravenous Q12H PRN Dat Lynn MD            Medication List        START taking these  "medications      atovaquone 750 mg/5 mL Susp oral liquid  Commonly known as: MEPRON  Take 10 mLs (1,500 mg total) by mouth once daily.     epoetin shiela 10,000 unit/mL injection  Commonly known as: PROCRIT  Inject 0.85 mLs (8,500 Units total) into the skin every Mon, Wed, Fri.  Start taking on: August 16, 2024     folic acid 1 MG tablet  Commonly known as: FOLVITE  Take 1 tablet (1 mg total) by mouth once daily.  Start taking on: August 15, 2024     insulin glargine U-100 (Lantus) 100 unit/mL (3 mL) Inpn pen  Inject 5 Units into the skin before breakfast.     pen needle, diabetic 31 gauge x 3/16" Ndle  Use to inject insulin into the skin.     predniSONE 20 MG tablet  Commonly known as: DELTASONE  Take 4.5 tablets (90 mg total) by mouth once daily. Follow up with your hematologist to arrange for a taper  Start taking on: August 15, 2024            CHANGE how you take these medications      atorvastatin 40 MG tablet  Commonly known as: LIPITOR  Take 1 tablet (40 mg total) by mouth once daily.  What changed: when to take this            CONTINUE taking these medications      ACCU-CHEK GUIDE GLUCOSE METER Misc  Generic drug: blood-glucose meter     ACCU-CHEK GUIDE L1-L2 CTRL SOL Soln  Generic drug: blood glucose control high,low     ACCU-CHEK GUIDE TEST STRIPS Strp  Generic drug: blood sugar diagnostic     ACCU-CHEK SOFTCLIX LANCETS Misc  Generic drug: lancets     acetaminophen 500 MG tablet  Commonly known as: TYLENOL  Take 1 tablet (500 mg total) by mouth every 8 (eight) hours as needed for Pain.     amLODIPine 10 MG tablet  Commonly known as: NORVASC  Take 1 tablet (10 mg total) by mouth nightly.     aspirin 81 MG EC tablet  Commonly known as: ECOTRIN  Take 81 mg by mouth every morning.     DROPSAFE ALCOHOL PREP PADS Padm  Generic drug: alcohol swabs  Apply topically.     LIDOcaine 5 %  Commonly known as: LIDODERM  Place 1 patch onto the skin once daily. Remove & Discard patch within 12 hours or as directed by MD   "   losartan 100 MG tablet  Commonly known as: COZAAR  Take 1 tablet (100 mg total) by mouth once daily.     pregabalin 75 MG capsule  Commonly known as: LYRICA  Take 1 capsule (75 mg total) by mouth once daily.     sevelamer carbonate 800 mg Tab  Commonly known as: RENVELA  Take 3 tablets (2,400 mg total) by mouth 3 (three) times daily with meals.                I have seen and examined this patient within the last 30 days. My clinical findings that support the need for the home health skilled services and home bound status are the following:no     Diet:   renal diet    Labs:  Report Lab results to PCP.    Referrals/ Consults  Aide to provide assistance with personal care, ADLs, and vital signs.    Activities:   activity as tolerated    Nursing:   Agency to admit patient within 24 hours of hospital discharge unless specified on physician order or at patient request    SN to complete comprehensive assessment including routine vital signs. Instruct on disease process and s/s of complications to report to MD. Review/verify medication list sent home with the patient at time of discharge  and instruct patient/caregiver as needed. Frequency may be adjusted depending on start of care date.     Skilled nurse to perform up to 3 visits PRN for symptoms related to diagnosis    Notify MD if SBP > 160 or < 90; DBP > 90 or < 50; HR > 120 or < 50; Temp > 101; O2 < 88%; Other:       Ok to schedule additional visits based on staff availability and patient request on consecutive days within the home health episode.    When multiple disciplines ordered:    Start of Care occurs on Sunday - Wednesday schedule remaining discipline evaluations as ordered on separate consecutive days following the start of care.    Thursday SOC -schedule subsequent evaluations Friday and Monday the following week.     Friday - Saturday SOC - schedule subsequent discipline evaluations on consecutive days starting Monday of the following week.    For all  post-discharge communication and subsequent orders please contact patient's primary care physician    Miscellaneous   N/A    Home Health Aide:  Provide help taking the correct medications.    Wound Care Orders  no    I certify that this patient needs

## 2024-08-14 NOTE — ASSESSMENT & PLAN NOTE
In the context of significant AI history and Hep B. Warm agglutinin positive, hemolysis markers positive.  Recent Labs     08/12/24  2112 08/13/24  1341 08/14/24  0641   HGB 7.9*  7.9* 8.1* 7.4*   HCT 22.9*  23.1* 22.7* 22.1*       Plan  - Monitor serial CBC: Daily  - Transfuse PRBC if patient becomes hemodynamically unstable, symptomatic or H/H drops below 7/21.  - Patient has received 2 units of PRBCs  - Patient's anemia is currently stable  - blood smear showed evidence of spherocytes and spirocytes   - hematology consulted; appreciate recs --> folic acid supplementation and prednisone 1mg/kg PO QD   - continue steroid taper outpatient till hematology outpatient follow-up   - start atovaquone for PJP prophylaxis

## 2024-08-14 NOTE — ASSESSMENT & PLAN NOTE
Pt's glucose is controlled - 151. Recent A1c 5.6. Patient started on prednisone 1mg/kg PO and will be taper till hematology follow-up. Glucose was 300-400s.    Plan   - insulin glargine 5U

## 2024-08-14 NOTE — ASSESSMENT & PLAN NOTE
Patient presented with 6 hgb with repeat at 5.4. She was given 1 unit pRBC. She reports single dark stool this morning. In the ED, her FOBT/YUMIKO were negative. Baseline Hgb approx. 8-8.5. Anemia is likely due to chronic disease due to ESRD.   Recent Labs     08/12/24  2112 08/13/24  1341 08/14/24  0641   HGB 7.9*  7.9* 8.1* 7.4*   HCT 22.9*  23.1* 22.7* 22.1*     Ferritin 3000+. TIBC WNL. Iron WNL. Transferrin 194 (low)     Plan  - Monitor serial CBC: Daily  - Transfuse PRBC if patient becomes hemodynamically unstable, symptomatic or H/H drops below 7.  - Patient has received 2 units of PRBCs on   ;

## 2024-08-14 NOTE — PROGRESS NOTES
Patient arrived in the acute dialysis unit by wheelchair. Maintenance dialysis started via 15 gauge fistula needles x 2 to RFA fistula.

## 2024-08-14 NOTE — DISCHARGE SUMMARY
[facility] ([place] [address])  [facility] Medicine  Discharge Summary      Patient Name: Jael Hall  MRN: [id number]  CHUCK: [id number]  Patient Class: IP- Inpatient  Admission Date: 8/11/2024  Hospital Length of Stay: 2 days  Discharge Date and Time:  08/14/2024 2:42 PM  Attending Physician: Darryn Reynolds MD   Discharging Provider: Dat Lynn MD  Primary Care Provider: Maty Gayle DO  [facility] Medicine Team: [facility]OS[facility] MED 3 Dat Lynn MD  Primary Care Team: [facility] 3    HPI:   Jael Hall is a 58F with PMHx ESRD-HD mwf, anxiety, HTN, HLD, T2DM presents with SOB. She describes symptoms started around midnight that progressively worsened. Her symptoms worsened with laying down. She recently had dialysis on Friday but unable to complete due to back pain. Reportedly removed about 2L. In addition, she reports 1 dark stool last night but is unclear about the history/detail of this. Denies NSAID use. Denies chest pain, diarrhea, constipation, fevers, abdominal pain, hemoptysis, hematemesis.    On ED admission. AF. VSS. 4L NC. Hb 6.0 > 5.4, , trop normal. glucose 153. Electrolytes WNL. WBC normal FOBT and YUMIKO was negative. 1 unit of pRBC tranfused. CXR showed bilateral lower lobe opacities. Nephrology was consulted. Hemodialysis planned for today.     * No surgery found *      Hospital Course:   Patient presented with severe anemia with hemoglobin of 5.4. She received 1 unit of pRBC and went through HD. In addition, she was found to be Masha positive with elevated LDH, low haptoglobin, elevated bilirubin, normal fibrinogen and PT/PTT. Hematology consulted for possible hemolysis process and further workup. She responded appropriately to another 1 unit of pRBC and hemoglobin stabilized at 8.1. In addition, DVT US and CT PE were negative for her SOB workup. She was started on prednisone 1mg/kg PO and folic acid per hematology recommendations for warm AIHA. She underwent another scheduled HD  with volume removal and she no longer required oxygen and returned to baseline requirement. Hematology recommended continuing steroid taper with close outpatient follow-up with lab work. Patient is to be discharged home with atovaquone for PJP prophylaxis and insulin.              Goals of Care Treatment Preferences:  Code Status: Full Code          What is most important right now is to focus on remaining as independent as possible, symptom/pain control, extending life as long as possible, even it it means sacrificing quality.  Accordingly, we have decided that the best plan to meet the patient's goals includes continuing with treatment.      SDOH Screening:  The patient was screened for utility difficulties, food insecurity, transport difficulties, housing insecurity, and interpersonal safety and there were no concerns identified this admission.     Consults:   Consults (From admission, onward)          Status Ordering Provider     Inpatient consult to Hematology/Oncology  Once        Provider:  (Not yet assigned)    Completed FIONA MCGREGOR     Inpatient consult to Nephrology  Once        Provider:  (Not yet assigned)    Completed MIKHAIL SALGADO            Neuro  Diabetic neuropathy  Plan   - continue home medication, lyrica     Cardiac/Vascular  Chest pain        Chronic diastolic heart failure  HFpEF  Managing with HD volume removal   Maintain normotension  - Maintain on telemetry and daily EKGs   - Up to date risk stratification : TSH, Lipids, HbA1c with optimization of risk factors as necessary  - Check Electrolytes, keep Mag >2 & K+ >4  - SCDs, TEDs, Nursing communication to elevated LE   - Ambulate as tolerated        Essential hypertension  Plan  - continue home amlodipine and losartan     Renal/  ESRD (end stage renal disease) on dialysis  Patient has ERSD and receives dialysis MWF. She recently had dialysis on Friday but was not able to complete the full session due to back pain. Last session removed  about 2L. She complained of orthopnea and paroxysmal noctural dyspnea that progressively worsened throughout the night. On exam she is fluid overloaded and CXR with evidence of pulmonary edema. She is not on lasix at home. BNP 400s. She received 2 sessions of HD while inpatient. Symptoms significantly improved and does not require oxygen anymore.     Plan  - Dialysis today; regular schedule MWF   - Monitor UOP and serial BMP and adjust therapy as needed  - Renally dose meds  - Avoid nephrotoxic medications and procedures.    Oncology  * AIHA (autoimmune hemolytic anemia)  In the context of significant AI history and Hep B. Warm agglutinin positive, hemolysis markers positive.  Recent Labs     08/12/24 2112 08/13/24  1341 08/14/24  0641   HGB 7.9*  7.9* 8.1* 7.4*   HCT 22.9*  23.1* 22.7* 22.1*       Plan  - Monitor serial CBC: Daily  - Transfuse PRBC if patient becomes hemodynamically unstable, symptomatic or H/H drops below 7/21.  - Patient has received 2 units of PRBCs  - Patient's anemia is currently stable  - blood smear showed evidence of spherocytes and spirocytes   - hematology consulted; appreciate recs --> folic acid supplementation and prednisone 1mg/kg PO QD   - continue steroid taper outpatient till hematology outpatient follow-up   - start atovaquone for PJP prophylaxis     Anemia in ESRD (end-stage renal disease)  Patient presented with 6 hgb with repeat at 5.4. She was given 1 unit pRBC. She reports single dark stool this morning. In the ED, her FOBT/YUMIKO were negative. Baseline Hgb approx. 8-8.5. Anemia is likely due to chronic disease due to ESRD.   Recent Labs     08/12/24 2112 08/13/24  1341 08/14/24  0641   HGB 7.9*  7.9* 8.1* 7.4*   HCT 22.9*  23.1* 22.7* 22.1*     Ferritin 3000+. TIBC WNL. Iron WNL. Transferrin 194 (low)     Plan  - Monitor serial CBC: Daily  - Transfuse PRBC if patient becomes hemodynamically unstable, symptomatic or H/H drops below 7.  - Patient has received 2 units of  PRBCs on   ;    Endocrine  Type 2 diabetes mellitus  Pt's glucose is controlled - 151. Recent A1c 5.6. Patient started on prednisone 1mg/kg PO and will be taper till hematology follow-up. Glucose was 300-400s.    Plan   - insulin glargine 5U     Hypervolemia associated with renal insufficiency  See ERSD       Final Active Diagnoses:    Diagnosis Date Noted POA    PRINCIPAL PROBLEM:  AIHA (autoimmune hemolytic anemia) [D59.10] 08/12/2024 Yes    Anemia in ESRD (end-stage renal disease) [N18.6, D63.1] 08/15/2022 Yes    Type 2 diabetes mellitus [E11.9] 07/27/2021 Yes    Hypervolemia associated with renal insufficiency [E87.70, N28.9] 06/15/2021 Yes    Chronic diastolic heart failure [I50.32] 02/25/2021 Yes    Essential hypertension [I10]  Yes     Chronic    ESRD (end stage renal disease) on dialysis [N18.6, Z99.2] 09/28/2017 Not Applicable     Chronic    Diabetic neuropathy [E11.40] 06/07/2013 Yes      Problems Resolved During this Admission:       Discharged Condition: stable    Disposition:     Follow Up:    Patient Instructions:      CBC W/ AUTO DIFFERENTIAL   Standing Status: Future Standing Exp. Date: 11/12/25     COMPREHENSIVE METABOLIC PANEL   Standing Status: Future Standing Exp. Date: 11/12/25     Order Specific Question Answer Comments   Send normal result to authorizing provider's In Basket if patient is active on MyChart: Yes      LACTATE DEHYDROGENASE   Standing Status: Future Standing Exp. Date: 11/12/25     RETICULOCYTES   Standing Status: Future Standing Exp. Date: 11/12/25     HAPTOGLOBIN   Standing Status: Future Standing Exp. Date: 11/12/25     Ambulatory referral/consult to Hematology / Oncology   Standing Status: Future   Referral Priority: Routine Referral Type: Consultation   Referral Reason: Specialty Services Required   Requested Specialty: Hematology and Oncology   Number of Visits Requested: 1       Significant Diagnostic Studies: Labs: CMP   Recent Labs   Lab 08/13/24  0519 08/14/24  0641  "  * 133*   K 4.5 5.5*    99   CO2 22* 20*    181*   BUN 42* 71*   CREATININE 8.6* 12.9*   CALCIUM 9.6 10.4   PROT 7.6 7.8   ALBUMIN 3.2* 3.3*   BILITOT 1.7* 1.1*   ALKPHOS 65 61   AST 12 11   ALT 7* 8*   ANIONGAP 12 14   , CBC   Recent Labs   Lab 08/12/24  2112 08/13/24  1341 08/14/24  0641   WBC 12.12  11.81 7.78 11.79   HGB 7.9*  7.9* 8.1* 7.4*   HCT 22.9*  23.1* 22.7* 22.1*     181 193 188   , and INR   Lab Results   Component Value Date    INR 1.0 08/12/2024    INR 1.1 07/23/2021    INR 1.1 09/28/2020       Pending Diagnostic Studies:       Procedure Component Value Units Date/Time    APTT [[id number]]     Order Status: Sent Lab Status: No result     Specimen: Blood     Copper, serum [[id number]]     Order Status: Sent Lab Status: No result     Specimen: Blood     Folate [[id number]]     Order Status: Sent Lab Status: No result     Specimen: Blood     Protime-INR [[id number]]     Order Status: Sent Lab Status: No result     Specimen: Blood     Vitamin B12 [[id number]]     Order Status: Sent Lab Status: No result     Specimen: Blood            Medications:  Reconciled Home Medications:      Medication List        START taking these medications      atovaquone 750 mg/5 mL Susp oral liquid  Commonly known as: MEPRON  Take 10 mLs (1,500 mg total) by mouth once daily.     epoetin shiela 10,000 unit/mL injection  Commonly known as: PROCRIT  Inject 0.85 mLs (8,500 Units total) into the skin every Mon, Wed, Fri.  Start taking on: August 16, 2024     folic acid 1 MG tablet  Commonly known as: FOLVITE  Take 1 tablet (1 mg total) by mouth once daily.  Start taking on: August 15, 2024     insulin glargine U-100 (Lantus) 100 unit/mL (3 mL) Inpn pen  Inject 5 Units into the skin before breakfast.     pen needle, diabetic 31 gauge x 3/16" Ndle  Use to inject insulin into the skin.     predniSONE 20 MG tablet  Commonly known as: DELTASONE  Take 4.5 tablets (90 mg total) by mouth once daily. Follow up " with your hematologist to arrange for a taper  Start taking on: August 15, 2024            CHANGE how you take these medications      atorvastatin 40 MG tablet  Commonly known as: LIPITOR  Take 1 tablet (40 mg total) by mouth once daily.  What changed: when to take this            CONTINUE taking these medications      ACCU-CHEK GUIDE GLUCOSE METER Misc  Generic drug: blood-glucose meter     ACCU-CHEK GUIDE L1-L2 CTRL SOL Soln  Generic drug: blood glucose control high,low     ACCU-CHEK GUIDE TEST STRIPS Strp  Generic drug: blood sugar diagnostic     ACCU-CHEK SOFTCLIX LANCETS Misc  Generic drug: lancets     acetaminophen 500 MG tablet  Commonly known as: TYLENOL  Take 1 tablet (500 mg total) by mouth every 8 (eight) hours as needed for Pain.     amLODIPine 10 MG tablet  Commonly known as: NORVASC  Take 1 tablet (10 mg total) by mouth nightly.     aspirin 81 MG EC tablet  Commonly known as: ECOTRIN  Take 81 mg by mouth every morning.     DROPSAFE ALCOHOL PREP PADS Padm  Generic drug: alcohol swabs  Apply topically.     LIDOcaine 5 %  Commonly known as: LIDODERM  Place 1 patch onto the skin once daily. Remove & Discard patch within 12 hours or as directed by MD     losartan 100 MG tablet  Commonly known as: COZAAR  Take 1 tablet (100 mg total) by mouth once daily.     pregabalin 75 MG capsule  Commonly known as: LYRICA  Take 1 capsule (75 mg total) by mouth once daily.     sevelamer carbonate 800 mg Tab  Commonly known as: RENVELA  Take 3 tablets (2,400 mg total) by mouth 3 (three) times daily with meals.              Indwelling Lines/Drains at time of discharge:   Lines/Drains/Airways       Drain  Duration                  Hemodialysis AV Fistula Right forearm -- days                    Time spent on the discharge of patient: 60 minutes         Dat Lynn MD  Department of Hospital Medicine  [facility] ([place]

## 2024-08-14 NOTE — MED STUDENT SUBJECTIVE & OBJECTIVE
Medical Student Subjective & Objective     Principal Problem: AIHA (autoimmune hemolytic anemia)    Chief Complaint:   Chief Complaint   Patient presents with    Shortness of Breath     Pt arrives c/o sob stating that she was dialyzed Friday but was unable to complete the whole treatment.       HPI: Jael Hall is a 58F with PMHx ESRD-HD mwf, anxiety, HTN, HLD, T2DM presents with SOB. She describes symptoms started around midnight that progressively worsened. Her symptoms worsened with laying down. She recently had dialysis on Friday but unable to complete due to back pain. Reportedly removed about 2L. In addition, she reports 1 dark stool last night but is unclear about the history/detail of this. Denies NSAID use. Denies chest pain, diarrhea, constipation, fevers, abdominal pain, hemoptysis, hematemesis.    On ED admission. AF. VSS. 4L NC. Hb 6.0 > 5.4, , trop normal. glucose 153. Electrolytes WNL. WBC normal FOBT and YUMIKO was negative. 1 unit of pRBC tranfused. CXR showed bilateral lower lobe opacities. Nephrology was consulted. Hemodialysis planned for today.     Hospital Course: Requiring multiple pRBC transfusions. Warm agglutinin positive, hemolysis labs +. Heme/Onc consulted. Concern for WAIHA, started on prednisone and folic acid. DVT US and CT PE negative. Underwent HD with volume removal and resolution of oxygen requirement.     Interval History: ***  Overnight: BP was 202/88 that normalized after 50 mg hydralazine. Glucose was     Patient received hemodialysis where 3.5 L were removed. Nephro will start EPO        Past Medical History:   Diagnosis Date    Acute hyperkalemia 09/09/2020    Acute on chronic diastolic heart failure 10/08/2021    Anemia of chronic disorder 11/08/2017    Overview:  Added automatically from request for surgery [id number]    Anemia of chronic renal failure, stage 5     Anxiety     Cyst of left ovary 05/14/2019    Cyst of left ovary 07/05/2019    Dyslipidemia  2013    Encounter for screening colonoscopy 10/08/2020    End-stage renal disease on hemodialysis 2020    ESRD on hemodialysis 2017    Hematuria 10/30/2019    History of COVID-19 2020    Hospital discharge follow-up 10/06/2023    Hx of sinus bradycardia 2017    Hyperkalemia 2017    Hypertensive urgency 2021    Peripheral neuropathy 2013    Preoperative testing 10/08/2020    Renovascular hypertension 2020    S/P dilation and curettage 2018    Screening for colon cancer 2023    Thickened endometrium 2018    Thyroid nodule     Thyroid nodule 2021    Uncontrolled type 2 diabetes mellitus 2013    Overview:  poc a1c today 11.2-270/ up from 10.8-263, Pt. has very very stressed/ was incarcerated/marital stress/ will current meds/ less stress now/ will monitor.       Past Surgical History:   Procedure Laterality Date    AV FISTULA PLACEMENT      right arm     SECTION      x1    COLONOSCOPY N/A 2018    Procedure: COLONOSCOPY;  Surgeon: Pankaj Hinojosa MD;  Location: [facility] (4TH FLR);  Service: Endoscopy;  Laterality: N/A;  dialysis pt/labs prior to colon/MS    COLONOSCOPY N/A 2023    Procedure: COLONOSCOPY;  Surgeon: Angel Turenr MD;  Location: [facility] (2ND FLR);  Service: Endoscopy;  Laterality: N/A;  Ref By:  Maty Gayle MD  Procedure: Colonoscopy  Diagnosis: endounter for preventive health   Procedure Timing: pt. pref  Provider: Dr. turner   Location: [facility] 4-Endo   Prep Specifications: Standard prep   Additional Info: Dialysis patient lab pending    CYSTOSCOPY W/ RETROGRADES Bilateral 2020    Procedure: CYSTOSCOPY, WITH RETROGRADE PYELOGRAM;  Surgeon: Douglas Abraham MD;  [facility];  Service: Urology;  Laterality: Bilateral;  45 min    FISTULOGRAM Right 2020    Procedure: Fistulogram;  Surgeon: Lester Gómez MD;  Location: [facility] CATH LAB;  Service: Nephrology;   Laterality: Right;    HYSTERECTOMY      IMPLANTATION OF COCHLEAR PROSTHESIS Right 6/28/2021    Procedure: INSERTION, PROSTHESIS, COCHLEAR;  Surgeon: Ramiro Zuleta MD;  Location: [facility] OR 2ND FLR;  Service: ENT;  Laterality: Right;  COCHLEAR BABAK    IMPLANTATION OF COCHLEAR PROSTHESIS Right 6/30/2021    Procedure: INSERTION, PROSTHESIS, COCHLEAR;  Surgeon: Ramiro Zuleta MD;  Location: [facility] OR 2ND FLR;  Service: ENT;  Laterality: Right;  COCHLEAR AMERICAS    ROBOT-ASSISTED LAPAROSCOPIC ABDOMINAL HYSTERECTOMY USING DA NATALYA XI N/A 7/5/2019    Procedure: XI ROBOTIC HYSTERECTOMY;  Surgeon: Trice Lei MD;  Location: [facility];  Service: OB/GYN;  Laterality: N/A;    ROBOT-ASSISTED LAPAROSCOPIC NEPHRECTOMY Right 10/8/2020    Procedure: ROBOTIC NEPHRECTOMY;  Surgeon: Douglas Abraham MD;  [facility];  Service: Urology;  Laterality: Right;  3.5hrs gen with regional     ROBOT-ASSISTED LAPAROSCOPIC SALPINGO-OOPHORECTOMY USING DA NATALYA XI Bilateral 7/5/2019    Procedure: XI ROBOTIC SALPINGO-OOPHORECTOMY;  Surgeon: Trice Lei MD;  Location: [facility];  Service: OB/GYN;  Laterality: Bilateral;    TUBAL LIGATION      Vascular access surgeries      x5       Review of patient's allergies indicates:  No Known Allergies    No current facility-administered medications on file prior to encounter.     Current Outpatient Medications on File Prior to Encounter   Medication Sig    amLODIPine (NORVASC) 10 MG tablet Take 1 tablet (10 mg total) by mouth nightly.    atorvastatin (LIPITOR) 40 MG tablet Take 1 tablet (40 mg total) by mouth once daily. (Patient taking differently: Take 40 mg by mouth every morning.)    LIDOcaine (LIDODERM) 5 % Place 1 patch onto the skin once daily. Remove & Discard patch within 12 hours or as directed by MD    losartan (COZAAR) 100 MG tablet Take 1 tablet (100 mg total) by mouth once daily.    pregabalin (LYRICA) 75 MG capsule Take 1 capsule (75 mg total) by mouth once daily.  "   ACCU-CHEK GUIDE GLUCOSE METER Misc     ACCU-CHEK GUIDE L1-L2 CTRL SOL Soln     ACCU-CHEK GUIDE TEST STRIPS Strp     ACCU-CHEK SOFTCLIX LANCETS Misc     acetaminophen (TYLENOL) 500 MG tablet Take 1 tablet (500 mg total) by mouth every 8 (eight) hours as needed for Pain.    aspirin (ECOTRIN) 81 MG EC tablet Take 81 mg by mouth every morning.    DROPSAFE ALCOHOL PREP PADS PadM Apply topically.     Family History       Problem Relation (Age of Onset)    Cancer Mother, Sister, Sister    Diabetes Sister, Brother    Lung cancer Sister    Macular degeneration Sister    Ovarian cancer Mother, Sister, Sister          Tobacco Use    Smoking status: Never    Smokeless tobacco: Never   Substance and Sexual Activity    Alcohol use: No    Drug use: No    Sexual activity: Yes     Partners: Male     Birth control/protection: Post-menopausal     Review of Systems  Objective:     Vital Signs (Most Recent):  Temp: 98.5 °F (36.9 °C) (08/14/24 0910)  Pulse: 100 (08/14/24 1250)  Resp: 18 (08/14/24 0910)  BP: (!) 142/57 (08/14/24 1250)  SpO2: (!) 94 % (08/14/24 0920) Vital Signs (24h Range):  Temp:  [98 °F (36.7 °C)-99.1 °F (37.3 °C)] 98.5 °F (36.9 °C)  Pulse:  [] 100  Resp:  [16-18] 18  SpO2:  [93 %-98 %] 94 %  BP: (124-202)/(57-88) 142/57     Weight: 85 kg (187 lb 6.3 oz)  Body mass index is 32.17 kg/m².  No intake or output data in the 24 hours ending 08/14/24 1359   Physical Exam    Significant Labs: {Results:53816::"All pertinent labs within the past 24 hours have been reviewed."}    Significant Imaging: {Imaging Review:12500::"I have reviewed all pertinent imaging results/findings within the past 24 hours."}    Medical Student Subjective & Objective   "

## 2024-08-14 NOTE — PROGRESS NOTES
Dialysis completed. Needles removed from RFA fistula with manual pressure held for 7 minutes each with hemostasis achieved. Gauze dressing applied. Dialyzed for 3.5 hours with fluid removal of 3 liters. Tolerated well with stable vital signs. Returned to her room by wheelchair.

## 2024-08-14 NOTE — DISCHARGE INSTRUCTIONS
Please continue prednisone and atovaquone till outpatient hematology follow-up.    Please follow-up with lab work outpatient. CBC, CMP, LDH, Haptoglobin, Reticulocyte count labs.

## 2024-08-15 ENCOUNTER — PATIENT OUTREACH (OUTPATIENT)
Dept: ADMINISTRATIVE | Facility: CLINIC | Age: 59
End: 2024-08-15
Payer: MEDICARE

## 2024-08-15 NOTE — PROGRESS NOTES
2nd attempt made to reach patient for TCC call. Left voicemail please call [phone] and leave first name, last name and  for Dmitriy. I will return your call.

## 2024-08-15 NOTE — PLAN OF CARE
[facility] ([place])  Discharge Final Note    Primary Care Provider: Maty Gayle DO    Expected Discharge Date: 8/14/2024  Discharge Plan A and Plan B have been determined by review of patient's clinical status, future medical and therapeutic needs, and coverage/benefits for post-acute care in coordination with multidisciplinary team members.     Final Discharge Note (most recent)       Final Note - 08/15/24 0807          Final Note    Assessment Type Final Discharge Note     Anticipated Discharge Disposition Planned Readmission - Home-Health Care [facility] Phone: [phone]    What phone number can be called within the next 1-3 days to see how you are doing after discharge? [phone]   Sudha Hall (Daughter)    Hospital Resources/Appts/Education Provided Appointments scheduled and added to AVS        Post-Acute Status    Post-Acute Authorization Home Health     Home Health Status Set-up Complete/Auth obtained     Patient choice form signed by patient/caregiver List from CMS Compare;List from System Post-Acute Care;List with quality metrics by geographic area provided     Discharge Delays None known at this time                     Important Message from Medicare  Important Message from Medicare regarding Discharge Appeal Rights: Given to patient/caregiver, Explained to patient/caregiver, Signed/date by patient/caregiver     Date IMM was signed: 08/13/24  Time IMM was signed: 1101      Future Appointments   Date Time Provider Department Center   8/22/2024  9:00 AM Jed Cardenas OD [facility] OPTOMTY [facility]   8/22/2024  4:00 PM Ariadne Dixon MD [facility] PROSPER [facility]   11/8/2024  9:00 AM [facility] MRI1 350 LB LIMIT [facility]   11/12/2024  9:15 AM Douglas Abraham MD [facility] UROLOG [facility]       met with patient and/or family member:  and discussed discharge plans, patient to [place] home with . Patient provided contact information for  [facility]   Patients  medications delivered to bedside. No  HME/DME  recommended or not recommended and follow up appointment[s] scheduled.   Transportation provided by family (patients son:)Kalina La day # [phone] .     08/15/204    @0814 am  CM called and spoke with Linnea SORENSEN @ [facility] Phone: [phone]  and patient has been accepted and will check to see if SOC can be moved closer than 08/20/24 and patient referred to Care at home.     0816 am  CM called [facility] # [phone] and confirmed patient has a HD chair after speaking with Yudi.     Cheyanne Guevara RN  Case Management  [facility]  [phone]

## 2024-08-15 NOTE — PROGRESS NOTES
C3 nurse attempted to contact Jael Hall for a TCC post hospital discharge follow up call. No answer. Left voicemail with callback information. The patient does not have a scheduled HOSFU appointment. Message sent to PCP staff for assistance with scheduling visit with patient.

## 2024-08-15 NOTE — PROGRESS NOTES
3rd Attempt made to reach patient for TCC call. Left voicemail please call [phone] leave first name, last name, and  for Dmitriy.  I will return your call.

## 2024-08-20 ENCOUNTER — OFFICE VISIT (OUTPATIENT)
Dept: INTERNAL MEDICINE | Facility: CLINIC | Age: 59
End: 2024-08-20
Payer: MEDICARE

## 2024-08-20 VITALS
WEIGHT: 195.13 LBS | HEIGHT: 64 IN | OXYGEN SATURATION: 87 % | DIASTOLIC BLOOD PRESSURE: 60 MMHG | SYSTOLIC BLOOD PRESSURE: 138 MMHG | BODY MASS INDEX: 33.31 KG/M2 | HEART RATE: 87 BPM

## 2024-08-20 DIAGNOSIS — D59.11 WARM AUTOIMMUNE HEMOLYTIC ANEMIA: Primary | ICD-10-CM

## 2024-08-20 DIAGNOSIS — R79.9 ABNORMAL FINDING OF BLOOD CHEMISTRY, UNSPECIFIED: ICD-10-CM

## 2024-08-20 DIAGNOSIS — D64.9 ANEMIA, UNSPECIFIED TYPE: ICD-10-CM

## 2024-08-20 PROCEDURE — 3044F HG A1C LEVEL LT 7.0%: CPT | Mod: HCNC,CPTII,GC,S$GLB

## 2024-08-20 PROCEDURE — 1159F MED LIST DOCD IN RCRD: CPT | Mod: HCNC,CPTII,GC,S$GLB

## 2024-08-20 PROCEDURE — 99213 OFFICE O/P EST LOW 20 MIN: CPT | Mod: HCNC,GC,S$GLB,

## 2024-08-20 PROCEDURE — 3075F SYST BP GE 130 - 139MM HG: CPT | Mod: HCNC,CPTII,GC,S$GLB

## 2024-08-20 PROCEDURE — 3072F LOW RISK FOR RETINOPATHY: CPT | Mod: HCNC,CPTII,GC,S$GLB

## 2024-08-20 PROCEDURE — 4010F ACE/ARB THERAPY RXD/TAKEN: CPT | Mod: HCNC,CPTII,GC,S$GLB

## 2024-08-20 PROCEDURE — 3008F BODY MASS INDEX DOCD: CPT | Mod: HCNC,CPTII,GC,S$GLB

## 2024-08-20 PROCEDURE — 3066F NEPHROPATHY DOC TX: CPT | Mod: HCNC,CPTII,GC,S$GLB

## 2024-08-20 PROCEDURE — 99999 PR PBB SHADOW E&M-EST. PATIENT-LVL IV: CPT | Mod: PBBFAC,HCNC,GC,

## 2024-08-20 PROCEDURE — 3078F DIAST BP <80 MM HG: CPT | Mod: HCNC,CPTII,GC,S$GLB

## 2024-08-20 PROCEDURE — 1111F DSCHRG MED/CURRENT MED MERGE: CPT | Mod: HCNC,CPTII,GC,S$GLB

## 2024-08-20 NOTE — PROGRESS NOTES
[facility]   Clinic Note    Patient Name: Jael Hall   YOB: 1965     SUBJECTIVE:     Chief Complaint: Hospital Follow-up    History of Present Illness:  Ms. Jael Hall is a 58 y.o. female with history of ESRD on HD, HTN, T2DM, and recently diagnosed warm autoimmune hemolytic anemia who presents for hospital follow-up. Patient was admitted from 8/11 to 8/14 due to shortness of breath and volume overload. She was noted to have cut her Friday dialysis session short due to neck and back pain and discomfort in the HD chair. The subsequent weekend she developed worsening SOB and presented to the ED on Sunday requiring HD in the hospital. Of note, workup revealed that she had Hgb 5.4, which was a significant drop from her usual baseline of 8-9. Labs with positive ANSHUL, elevated LDH, decreased haptoglobin, and elevated T-bili. Found to have IgG autoantibody, confirming diagnosis of WAHA. Hematology was consulted, recommended starting Prednisone 1mg/kg and Folic, and patient was discharged with Atovaquone as PJP prophylaxis.    Today in clinic, she is feeling well and has no complaints. She primarily has questions about the medications she was discharged with and if she has been taking them appropriately. Reviewed her discharge medication bottles and discussed with her how she has been taking them. She has been taking her prednisone only as 4 tablets (80mg), because she misread 4.5 tablets as 4-5 tablets. She has been taking the atovaquone as prescribed. She reports she does not have a hematology follow-up appointment scheduled, but I see that she has one with Dr. Dixon on 8/22. Otherwise, she has been doing well since discharge, but has not had a repeat CBC done yet. ROS otherwise unremarkable.    Review of Systems   Constitutional:  Negative for chills and fever.   HENT:  Negative for congestion and sore throat.    Eyes:  Negative for double vision and photophobia.    Respiratory:  Negative for cough and shortness of breath.    Cardiovascular:  Negative for chest pain and palpitations.   Gastrointestinal:  Negative for abdominal pain and vomiting.   Genitourinary:  Negative for dysuria and urgency.   Musculoskeletal:  Negative for myalgias and neck pain.   Neurological:  Negative for dizziness and weakness.   Psychiatric/Behavioral:  Positive for memory loss. Negative for depression. The patient is not nervous/anxious.        PAST HISTORY:     Past Medical History:   Diagnosis Date    Acute hyperkalemia 2020    Acute on chronic diastolic heart failure 10/08/2021    Anemia of chronic disorder 2017    Overview:  Added automatically from request for surgery [id number]    Anemia of chronic renal failure, stage 5     Anxiety     Cyst of left ovary 2019    Cyst of left ovary 2019    Dyslipidemia 2013    Encounter for screening colonoscopy 10/08/2020    End-stage renal disease on hemodialysis 2020    ESRD on hemodialysis 2017    Hematuria 10/30/2019    History of COVID-19 2020    Hospital discharge follow-up 10/06/2023    Hx of sinus bradycardia 2017    Hyperkalemia 2017    Hypertensive urgency 2021    Peripheral neuropathy 2013    Preoperative testing 10/08/2020    Renovascular hypertension 2020    S/P dilation and curettage 2018    Screening for colon cancer 2023    Thickened endometrium 2018    Thyroid nodule     Thyroid nodule 2021    Uncontrolled type 2 diabetes mellitus 2013    Overview:  poc a1c today 11.2-270/ up from 10.8-263, Pt. has very very stressed/ was incarcerated/marital stress/ will current meds/ less stress now/ will monitor.       Past Surgical History:   Procedure Laterality Date    AV FISTULA PLACEMENT      right arm     SECTION      x1    COLONOSCOPY N/A 2018    Procedure: COLONOSCOPY;  Surgeon: Pankaj Hinojosa MD;  Location: [facility] ([address] [place] [address]);   Service: Endoscopy;  Laterality: N/A;  dialysis pt/labs prior to colon/MS    COLONOSCOPY N/A 11/30/2023    Procedure: COLONOSCOPY;  Surgeon: Angel Turner MD;  Location: [facility] (2ND FLR);  Service: Endoscopy;  Laterality: N/A;  Ref By:  Maty Gayle MD  Procedure: Colonoscopy  Diagnosis: endounter for preventive health   Procedure Timing: pt. pref  Provider: Dr. turner   Location: [facility] 4-Endo   Prep Specifications: Standard prep   Additional Info: Dialysis patient lab pending    CYSTOSCOPY W/ RETROGRADES Bilateral 8/20/2020    Procedure: CYSTOSCOPY, WITH RETROGRADE PYELOGRAM;  Surgeon: Douglas Abraham MD;  Location: [facility] OR 1ST FLR;  Service: Urology;  Laterality: Bilateral;  45 min    FISTULOGRAM Right 9/9/2020    Procedure: Fistulogram;  Surgeon: Lester Gómez MD;  Location: [facility] CATH LAB;  Service: Nephrology;  Laterality: Right;    HYSTERECTOMY      IMPLANTATION OF COCHLEAR PROSTHESIS Right 6/28/2021    Procedure: INSERTION, PROSTHESIS, COCHLEAR;  Surgeon: Ramiro Zuleta MD;  Location: [facility] OR 2ND FLR;  Service: ENT;  Laterality: Right;  COCHLEAR BABAK    IMPLANTATION OF COCHLEAR PROSTHESIS Right 6/30/2021    Procedure: INSERTION, PROSTHESIS, COCHLEAR;  Surgeon: Ramiro Zuleta MD;  Location: [facility] OR 2ND FLR;  Service: ENT;  Laterality: Right;  COCHLEAR AMERICAS    ROBOT-ASSISTED LAPAROSCOPIC ABDOMINAL HYSTERECTOMY USING DA NATALYA XI N/A 7/5/2019    Procedure: XI ROBOTIC HYSTERECTOMY;  Surgeon: Trice Lei MD;  Location: [facility];  Service: OB/GYN;  Laterality: N/A;    ROBOT-ASSISTED LAPAROSCOPIC NEPHRECTOMY Right 10/8/2020    Procedure: ROBOTIC NEPHRECTOMY;  Surgeon: Douglas Abraham MD;  Location: [facility] OR 2ND FLR;  Service: Urology;  Laterality: Right;  3.5hrs gen with regional     ROBOT-ASSISTED LAPAROSCOPIC SALPINGO-OOPHORECTOMY USING DA NATALYA XI Bilateral 7/5/2019    Procedure: XI ROBOTIC SALPINGO-OOPHORECTOMY;  Surgeon: Trice Lei MD;  Location: [facility];  Service: OB/GYN;  Laterality:  Bilateral;    TUBAL LIGATION      Vascular access surgeries      x5       Family History   Problem Relation Name Age of Onset    Cancer Mother      Ovarian cancer Mother      Cancer Sister      Ovarian cancer Sister      Lung cancer Sister      Macular degeneration Sister      Cancer Sister      Ovarian cancer Sister      Diabetes Sister      Diabetes Brother      Breast cancer Neg Hx      Colon cancer Neg Hx      Cervical cancer Neg Hx      Endometrial cancer Neg Hx      Vaginal cancer Neg Hx      Thyroid disease Neg Hx      Glaucoma Neg Hx      Retinal detachment Neg Hx         Social History     Socioeconomic History    Marital status:     Number of children: 2   Occupational History    Occupation: disability   Tobacco Use    Smoking status: Never    Smokeless tobacco: Never   Substance and Sexual Activity    Alcohol use: No    Drug use: No    Sexual activity: Yes     Partners: Male     Birth control/protection: Post-menopausal     Social Determinants of Health     Financial Resource Strain: Low Risk  (8/12/2024)    Overall Financial Resource Strain (CARDIA)     Difficulty of Paying Living Expenses: Not hard at all   Food Insecurity: No Food Insecurity (8/12/2024)    Hunger Vital Sign     Worried About Running Out of Food in the Last Year: Never true     Ran Out of Food in the Last Year: Never true   Transportation Needs: No Transportation Needs (8/12/2024)    TRANSPORTATION NEEDS     Transportation : No   Physical Activity: Sufficiently Active (8/12/2024)    Exercise Vital Sign     Days of Exercise per Week: 7 days     Minutes of Exercise per Session: 30 min   Stress: No Stress Concern Present (8/12/2024)    [employer] - Occupational Stress Questionnaire     Feeling of Stress : Not at all   Housing Stability: Low Risk  (8/12/2024)    Housing Stability Vital Sign     Unable to Pay for Housing in the Last Year: No     Homeless in the Last Year: No       MEDICATIONS &  "ALLERGIES:     Current Outpatient Medications on File Prior to Visit   Medication Sig    ACCU-CHEK GUIDE GLUCOSE METER Misc     ACCU-CHEK GUIDE L1-L2 CTRL SOL Soln     ACCU-CHEK GUIDE TEST STRIPS Strp     ACCU-CHEK SOFTCLIX LANCETS Misc     acetaminophen (TYLENOL) 500 MG tablet Take 1 tablet (500 mg total) by mouth every 8 (eight) hours as needed for Pain.    amLODIPine (NORVASC) 10 MG tablet Take 1 tablet (10 mg total) by mouth nightly.    aspirin (ECOTRIN) 81 MG EC tablet Take 81 mg by mouth every morning.    atovaquone (MEPRON) 750 mg/5 mL Susp oral liquid Take 10 mLs (1,500 mg total) by mouth once daily.    DROPSAFE ALCOHOL PREP PADS PadM Apply topically.    epoetin shiela (PROCRIT) 10,000 unit/mL injection Inject 0.85 mLs (8,500 Units total) into the skin every Mon, Wed, Fri.    folic acid (FOLVITE) 1 MG tablet Take 1 tablet (1 mg total) by mouth once daily.    insulin glargine U-100, Lantus, 100 unit/mL (3 mL) SubQ InPn pen Inject 5 Units into the skin before breakfast.    LIDOcaine (LIDODERM) 5 % Place 1 patch onto the skin once daily. Remove & Discard patch within 12 hours or as directed by MD    losartan (COZAAR) 100 MG tablet Take 1 tablet (100 mg total) by mouth once daily.    pen needle, diabetic 31 gauge x 3/16" Ndle Use to inject insulin into the skin.    predniSONE (DELTASONE) 20 MG tablet Take 4.5 tablets (90 mg total) by mouth once daily. Follow up with your hematologist to arrange for a taper    pregabalin (LYRICA) 75 MG capsule Take 1 capsule (75 mg total) by mouth once daily.    sevelamer carbonate (RENVELA) 800 mg Tab Take 3 tablets (2,400 mg total) by mouth 3 (three) times daily with meals.    atorvastatin (LIPITOR) 40 MG tablet Take 1 tablet (40 mg total) by mouth once daily. (Patient taking differently: Take 40 mg by mouth every morning.)     No current facility-administered medications on file prior to visit.       Review of patient's allergies indicates:  No Known Allergies    OBJECTIVE: " "    Vital Signs:  Vitals:    08/20/24 1528   BP: 138/60   BP Location: Right arm   Patient Position: Sitting   BP Method: Large (Manual)   Pulse: 87   SpO2: (!) 87%   Weight: 88.5 kg (195 lb 1.7 oz)   Height: 5' 4" (1.626 m)       Body mass index is 33.49 kg/m².     Physical Exam  Vitals reviewed.   Constitutional:       Appearance: Normal appearance.   HENT:      Head: Normocephalic and atraumatic.   Eyes:      Extraocular Movements: Extraocular movements intact.      Conjunctiva/sclera: Conjunctivae normal.   Cardiovascular:      Rate and Rhythm: Normal rate and regular rhythm.      Pulses: Normal pulses.      Heart sounds: Normal heart sounds.   Pulmonary:      Effort: Pulmonary effort is normal. No respiratory distress.      Breath sounds: Normal breath sounds.   Abdominal:      General: Abdomen is flat.      Palpations: Abdomen is soft.      Tenderness: There is no abdominal tenderness.   Musculoskeletal:         General: No tenderness. Normal range of motion.      Cervical back: Normal range of motion.      Comments: RUE AVF with palpable thrill   Skin:     General: Skin is warm.      Findings: No rash.   Neurological:      Mental Status: She is alert and oriented to person, place, and time. Mental status is at baseline.   Psychiatric:         Mood and Affect: Mood normal.         Behavior: Behavior normal.         Laboratory  Lab Results   Component Value Date    WBC 11.79 08/14/2024    HGB 7.4 (L) 08/14/2024    HCT 22.1 (L) 08/14/2024    MCV 85 08/14/2024     08/14/2024     BMP  Lab Results   Component Value Date     (L) 08/14/2024    K 5.5 (H) 08/14/2024    CL 99 08/14/2024    CO2 20 (L) 08/14/2024    BUN 71 (H) 08/14/2024    CREATININE 12.9 (H) 08/14/2024    CALCIUM 10.4 08/14/2024    ANIONGAP 14 08/14/2024    EGFRNORACEVR 3.0 (A) 08/14/2024     Lab Results   Component Value Date    INR 1.0 08/12/2024    INR 1.1 07/23/2021    INR 1.1 09/28/2020     Lab Results   Component Value Date    HGBA1C " 5.6 03/07/2024    HGBA1C 5.6 03/07/2024         Health Maintenance Due   Topic Date Due    Shingles Vaccine (1 of 2) Never done    RSV Vaccine (Age 60+ and Pregnant patients) (1 - 1-dose 60+ series) Never done    COVID-19 Vaccine (4 - 2023-24 season) 09/01/2023    Lipid Panel  05/25/2024    Eye Exam  07/17/2024       ASSESSMENT & PLAN:   Ms. Jael Hall is a 58 y.o. female w/PMH of ESRD, HTN, and WAHA who presents for hospital follow-up.  -     Patient doing well since discharge. No significant complaints today, primarily has questions about her discharge medications and if she has been taking them correctly. It appears she has only been taking prednisone 80mg daily instead of 90 (4 tablets instead of 4.5), but I imagine this will not make a huge difference.  - She has follow-up very soon with Hematology. Defer any further medication management or changes to her hematologist.  - Ordered a repeat CBC. Iron studies were initially added on since I did not see she had them done recently that showed normal iron levels. Cannot cancel now that they are scheduled.    Warm autoimmune hemolytic anemia  -     CBC W/ AUTO DIFFERENTIAL; Future; Expected date: 08/20/2024  -     IRON AND TIBC; Future; Expected date: 08/20/2024  -     FERRITIN; Future; Expected date: 08/20/2024    Anemia, unspecified type  -     IRON AND TIBC; Future; Expected date: 08/20/2024    Abnormal finding of blood chemistry, unspecified  -     FERRITIN; Future; Expected date: 08/20/2024        Patient was discussed with Dr. Marques.    Ryan Zhou MD  Internal Medicine, PGY-3  [facility]

## 2024-08-20 NOTE — PATIENT INSTRUCTIONS
"You were seen today for hospital follow-up. You were recently hospitalized and found to have something called "warm autoimmune hemolytic anemia". This is a form of anemia where your own immune system is attacking its own blood cells and causing your blood count to be low. You were told to take high dose steroids to help your immune system calm down until you saw your blood doctor (hematologist) outpatient. You will be seeing Dr. Dixon on 8/22 at 4PM at the [facility]. I do not recommend making any changes to your medications until you see him in clinic. I would like you to repeat some blood work to see how your blood count is doing.  "

## 2024-08-20 NOTE — LETTER
August 20, 2024    Jael Hall  [address]  [address]  [facility] [address]             [facility]  [address]  [facility] [address]  Phone: [phone]  Fax: [phone] To whom it may concern,    Mr. Kalina Hall is MsBridgette Hall's primary caretaker, and as such, she requires his assistance in transportation and medical care. Please excuse his absence from work so that he is able to bring Ms. Hall to and from her doctor's appointments. If you have any questions or concerns, please do not hesitate to reach out to me at [phone].    Ryan Henderson

## 2024-08-21 NOTE — PROGRESS NOTES
Reviewed documentation of history, physical, assessment, and plan.  The documentation of the history and physical is an accurate reflection of the residents presentation.  The assessment and plan reflects my discussion with the resident regarding the care and management of this patient.

## 2024-08-22 ENCOUNTER — OFFICE VISIT (OUTPATIENT)
Dept: HEMATOLOGY/ONCOLOGY | Facility: CLINIC | Age: 59
End: 2024-08-22
Payer: MEDICARE

## 2024-08-22 ENCOUNTER — OFFICE VISIT (OUTPATIENT)
Dept: OPTOMETRY | Facility: CLINIC | Age: 59
End: 2024-08-22
Payer: MEDICARE

## 2024-08-22 ENCOUNTER — LAB VISIT (OUTPATIENT)
Dept: LAB | Facility: HOSPITAL | Age: 59
End: 2024-08-22
Payer: MEDICARE

## 2024-08-22 VITALS
HEART RATE: 85 BPM | TEMPERATURE: 98 F | BODY MASS INDEX: 33.93 KG/M2 | OXYGEN SATURATION: 95 % | RESPIRATION RATE: 18 BRPM | SYSTOLIC BLOOD PRESSURE: 161 MMHG | HEIGHT: 64 IN | DIASTOLIC BLOOD PRESSURE: 69 MMHG | WEIGHT: 198.75 LBS

## 2024-08-22 DIAGNOSIS — R79.9 ABNORMAL FINDING OF BLOOD CHEMISTRY, UNSPECIFIED: ICD-10-CM

## 2024-08-22 DIAGNOSIS — E08.42 DIABETIC POLYNEUROPATHY ASSOCIATED WITH DIABETES MELLITUS DUE TO UNDERLYING CONDITION: Primary | ICD-10-CM

## 2024-08-22 DIAGNOSIS — D64.9 ANEMIA, UNSPECIFIED TYPE: ICD-10-CM

## 2024-08-22 DIAGNOSIS — H35.373 EPIRETINAL MEMBRANE (ERM) OF BOTH EYES: ICD-10-CM

## 2024-08-22 DIAGNOSIS — I10 ESSENTIAL HYPERTENSION: Chronic | ICD-10-CM

## 2024-08-22 DIAGNOSIS — H43.813 PVD (POSTERIOR VITREOUS DETACHMENT), BILATERAL: ICD-10-CM

## 2024-08-22 DIAGNOSIS — H25.13 NUCLEAR SCLEROSIS OF BOTH EYES: ICD-10-CM

## 2024-08-22 DIAGNOSIS — Z99.2 ESRD (END STAGE RENAL DISEASE) ON DIALYSIS: Chronic | ICD-10-CM

## 2024-08-22 DIAGNOSIS — N18.6 ESRD (END STAGE RENAL DISEASE) ON DIALYSIS: Chronic | ICD-10-CM

## 2024-08-22 DIAGNOSIS — D59.10 AIHA (AUTOIMMUNE HEMOLYTIC ANEMIA): ICD-10-CM

## 2024-08-22 DIAGNOSIS — E11.9 DIABETES MELLITUS TYPE 2 WITHOUT RETINOPATHY: Primary | ICD-10-CM

## 2024-08-22 DIAGNOSIS — D59.11 WARM AUTOIMMUNE HEMOLYTIC ANEMIA: ICD-10-CM

## 2024-08-22 LAB
BASOPHILS # BLD AUTO: 0.02 K/UL (ref 0–0.2)
BASOPHILS NFR BLD: 0.2 % (ref 0–1.9)
DIFFERENTIAL METHOD BLD: ABNORMAL
EOSINOPHIL # BLD AUTO: 0 K/UL (ref 0–0.5)
EOSINOPHIL NFR BLD: 0 % (ref 0–8)
ERYTHROCYTE [DISTWIDTH] IN BLOOD BY AUTOMATED COUNT: 20.9 % (ref 11.5–14.5)
FERRITIN SERPL-MCNC: 3164 NG/ML (ref 20–300)
HCT VFR BLD AUTO: 27 % (ref 37–48.5)
HGB BLD-MCNC: 9 G/DL (ref 12–16)
IMM GRANULOCYTES # BLD AUTO: 0.14 K/UL (ref 0–0.04)
IMM GRANULOCYTES NFR BLD AUTO: 1.1 % (ref 0–0.5)
IRON SERPL-MCNC: 117 UG/DL (ref 30–160)
LYMPHOCYTES # BLD AUTO: 1.1 K/UL (ref 1–4.8)
LYMPHOCYTES NFR BLD: 8.4 % (ref 18–48)
MCH RBC QN AUTO: 27.9 PG (ref 27–31)
MCHC RBC AUTO-ENTMCNC: 33.3 G/DL (ref 32–36)
MCV RBC AUTO: 84 FL (ref 82–98)
MONOCYTES # BLD AUTO: 0.4 K/UL (ref 0.3–1)
MONOCYTES NFR BLD: 2.9 % (ref 4–15)
NEUTROPHILS # BLD AUTO: 11.6 K/UL (ref 1.8–7.7)
NEUTROPHILS NFR BLD: 87.4 % (ref 38–73)
NRBC BLD-RTO: 0 /100 WBC
PLATELET # BLD AUTO: 274 K/UL (ref 150–450)
PMV BLD AUTO: 11 FL (ref 9.2–12.9)
RBC # BLD AUTO: 3.23 M/UL (ref 4–5.4)
SATURATED IRON: 36 % (ref 20–50)
TOTAL IRON BINDING CAPACITY: 323 UG/DL (ref 250–450)
TRANSFERRIN SERPL-MCNC: 218 MG/DL (ref 200–375)
WBC # BLD AUTO: 13.26 K/UL (ref 3.9–12.7)

## 2024-08-22 PROCEDURE — 3066F NEPHROPATHY DOC TX: CPT | Mod: HCNC,CPTII,S$GLB, | Performed by: OPTOMETRIST

## 2024-08-22 PROCEDURE — 83540 ASSAY OF IRON: CPT | Mod: HCNC

## 2024-08-22 PROCEDURE — 99999 PR PBB SHADOW E&M-EST. PATIENT-LVL III: CPT | Mod: PBBFAC,HCNC,, | Performed by: OPTOMETRIST

## 2024-08-22 PROCEDURE — 92134 CPTRZ OPH DX IMG PST SGM RTA: CPT | Mod: HCNC,S$GLB,, | Performed by: OPTOMETRIST

## 2024-08-22 PROCEDURE — 3044F HG A1C LEVEL LT 7.0%: CPT | Mod: HCNC,CPTII,S$GLB, | Performed by: OPTOMETRIST

## 2024-08-22 PROCEDURE — 99999 PR PBB SHADOW E&M-EST. PATIENT-LVL V: CPT | Mod: PBBFAC,HCNC,, | Performed by: INTERNAL MEDICINE

## 2024-08-22 PROCEDURE — 1160F RVW MEDS BY RX/DR IN RCRD: CPT | Mod: HCNC,CPTII,S$GLB, | Performed by: OPTOMETRIST

## 2024-08-22 PROCEDURE — 82728 ASSAY OF FERRITIN: CPT | Mod: HCNC

## 2024-08-22 PROCEDURE — 92014 COMPRE OPH EXAM EST PT 1/>: CPT | Mod: HCNC,S$GLB,, | Performed by: OPTOMETRIST

## 2024-08-22 PROCEDURE — 2023F DILAT RTA XM W/O RTNOPTHY: CPT | Mod: HCNC,CPTII,S$GLB, | Performed by: OPTOMETRIST

## 2024-08-22 PROCEDURE — 36415 COLL VENOUS BLD VENIPUNCTURE: CPT | Mod: HCNC

## 2024-08-22 PROCEDURE — 4010F ACE/ARB THERAPY RXD/TAKEN: CPT | Mod: HCNC,CPTII,S$GLB, | Performed by: OPTOMETRIST

## 2024-08-22 PROCEDURE — 85025 COMPLETE CBC W/AUTO DIFF WBC: CPT | Mod: HCNC

## 2024-08-22 PROCEDURE — 1159F MED LIST DOCD IN RCRD: CPT | Mod: HCNC,CPTII,S$GLB, | Performed by: OPTOMETRIST

## 2024-08-22 RX ORDER — OMEPRAZOLE 20 MG/1
20 CAPSULE, DELAYED RELEASE ORAL DAILY
Qty: 90 CAPSULE | Refills: 3 | Status: SHIPPED | OUTPATIENT
Start: 2024-08-22 | End: 2025-08-22

## 2024-08-22 NOTE — PATIENT INSTRUCTIONS
Cut prednisone to 70mg daily for 1 week 8/23-8/29  Predniosne 60mg daily for 1 week 8/30- 9/5  Prednisone 50mg daily for 1 week for 1 week 9/6-9/12  Predniosone 40mg  daily for 1 week 9/13-9/19  Prednisone 30mg daily for 1 week 9/20-9/26  Prednisone 20mg daily for 1 week 9/27-10/3

## 2024-08-22 NOTE — PROGRESS NOTES
Hematology Oncology  Note      SUBJECTIVE:     History of Present Illness:  Jael Hall is a 58 y.o. female with  ESRD on HD, T2DM, fatty liver disease, obesity here for hospital follow up. She was admitted to the hospital at [facility] in Aug 2024 with persistent chest and thoracic back pain since 8/8 also referred shortness of breath, lower extremity swelling. She was treated symptomatically on 8/8/24.She was unable to complete  a subsequent HD session due to her symptoms and represented to the ED on 8/11.   At the ED she was found to have acute on chronic anemia with a Hgb of 5.4 (baseline of around 9), MCV 83, PLTs 193. CMP revealed mild hyperbilirubinemia of 1.8 and , hapto <10. She was ANSHUL positive, warm autoagglutinin positive.  She has history of anti-S antibody. She was given one unit PRBC and hematology consulted for suspicion of WAHA. Nephrology following. She is  undergoing HD.      Review of patient's allergies indicates:  No Known Allergies  Past Medical History:   Diagnosis Date    Acute hyperkalemia 09/09/2020    Acute on chronic diastolic heart failure 10/08/2021    Anemia of chronic disorder 11/08/2017    Overview:  Added automatically from request for surgery [id number]    Anemia of chronic renal failure, stage 5     Anxiety     Cyst of left ovary 05/14/2019    Cyst of left ovary 07/05/2019    Dyslipidemia 01/04/2013    Encounter for screening colonoscopy 10/08/2020    End-stage renal disease on hemodialysis 08/20/2020    ESRD on hemodialysis 09/28/2017    Hematuria 10/30/2019    History of COVID-19 04/07/2020    Hospital discharge follow-up 10/06/2023    Hx of sinus bradycardia 09/28/2017    Hyperkalemia 03/29/2017    Hypertensive urgency 07/22/2021    Peripheral neuropathy 01/04/2013    Preoperative testing 10/08/2020    Renovascular hypertension 08/17/2020    S/P dilation and curettage 05/22/2018    Screening for colon cancer 11/28/2023    Thickened endometrium 05/22/2018     Thyroid nodule     Thyroid nodule 2021    Uncontrolled type 2 diabetes mellitus 2013    Overview:  poc a1c today 11.2-270/ up from 10.8-263, Pt. has very very stressed/ was incarcerated/marital stress/ will current meds/ less stress now/ will monitor.     Past Surgical History:   Procedure Laterality Date    AV FISTULA PLACEMENT      right arm     SECTION      x1    COLONOSCOPY N/A 2018    Procedure: COLONOSCOPY;  Surgeon: Pankaj Hinojosa MD;  Location: [facility] ENDO (4TH FLR);  Service: Endoscopy;  Laterality: N/A;  dialysis pt/labs prior to colon/MS    COLONOSCOPY N/A 2023    Procedure: COLONOSCOPY;  Surgeon: Angel Turner MD;  Location: [facility] ENDO (2ND FLR);  Service: Endoscopy;  Laterality: N/A;  Ref By:  Maty Gayle MD  Procedure: Colonoscopy  Diagnosis: endounter for preventive health   Procedure Timing: pt. pref  Provider: Dr. turner   [place] [facility] [facility]   Prep Specifications: Standard prep   Additional Info: Dialysis patient lab pending    CYSTOSCOPY W/ RETROGRADES Bilateral 2020    Procedure: CYSTOSCOPY, WITH RETROGRADE PYELOGRAM;  Surgeon: Douglas Abraham MD;  Location: [facility] OR 1ST FLR;  Service: Urology;  Laterality: Bilateral;  45 min    FISTULOGRAM Right 2020    Procedure: Fistulogram;  Surgeon: Lester Gómez MD;  Location: [facility] CATH LAB;  Service: Nephrology;  Laterality: Right;    HYSTERECTOMY      IMPLANTATION OF COCHLEAR PROSTHESIS Right 2021    Procedure: INSERTION, PROSTHESIS, COCHLEAR;  Surgeon: Ramiro Zuleta MD;  Location: [facility] OR 2ND FLR;  Service: ENT;  Laterality: Right;  COCHLEAR BABAK    IMPLANTATION OF COCHLEAR PROSTHESIS Right 2021    Procedure: INSERTION, PROSTHESIS, COCHLEAR;  Surgeon: Ramiro Zuleta MD;  Location: [facility] OR 2ND FLR;  Service: ENT;  Laterality: Right;  COCHLEAR AMERICAS    ROBOT-ASSISTED LAPAROSCOPIC ABDOMINAL HYSTERECTOMY USING DA NATALYA XI N/A 2019    Procedure: XI ROBOTIC HYSTERECTOMY;  Surgeon: Trice Lei,  MD;  Location: [facility];  Service: OB/GYN;  Laterality: N/A;    ROBOT-ASSISTED LAPAROSCOPIC NEPHRECTOMY Right 10/8/2020    Procedure: ROBOTIC NEPHRECTOMY;  Surgeon: Douglas Abraham MD;  Location: [address];  Service: Urology;  Laterality: Right;  3.5hrs gen with regional     ROBOT-ASSISTED LAPAROSCOPIC SALPINGO-OOPHORECTOMY USING DA NATALYA XI Bilateral 7/5/2019    Procedure: XI ROBOTIC SALPINGO-OOPHORECTOMY;  Surgeon: Trice Lei MD;  Location: [facility];  Service: OB/GYN;  Laterality: Bilateral;    TUBAL LIGATION      Vascular access surgeries      x5     Family History   Problem Relation Name Age of Onset    Cancer Mother      Ovarian cancer Mother      Cancer Sister      Ovarian cancer Sister      Lung cancer Sister      Macular degeneration Sister      Cancer Sister      Ovarian cancer Sister      Diabetes Sister      Diabetes Brother      Breast cancer Neg Hx      Colon cancer Neg Hx      Cervical cancer Neg Hx      Endometrial cancer Neg Hx      Vaginal cancer Neg Hx      Thyroid disease Neg Hx      Glaucoma Neg Hx      Retinal detachment Neg Hx       Social History     Tobacco Use    Smoking status: Never    Smokeless tobacco: Never   Substance Use Topics    Alcohol use: No    Drug use: No     Review of Systems   Constitutional:  Negative for chills, fever, malaise/fatigue and weight loss.   HENT:  Negative for congestion and sore throat.    Eyes:  Negative for blurred vision and double vision.   Respiratory:  Positive for shortness of breath. Negative for cough.    Cardiovascular:  Positive for leg swelling. Negative for chest pain and orthopnea.   Gastrointestinal:  Negative for abdominal pain, heartburn, nausea and vomiting.   Genitourinary:  Negative for dysuria, frequency and urgency.   Musculoskeletal:  Positive for back pain. Negative for joint pain and myalgias.   Neurological:  Negative for dizziness and headaches.   Psychiatric/Behavioral:  The patient does not have insomnia.       OBJECTIVE:     Vital Signs:  Temp:  [97.9 °F (36.6 °C)]   Pulse:  [85]   Resp:  [18]   BP: (161)/(69)   SpO2:  [95 %]     Physical Exam  Vitals and nursing note reviewed.   Constitutional:       General: She is not in acute distress.     Appearance: She is not toxic-appearing.   HENT:      Head: Normocephalic and atraumatic.      Mouth/Throat:      Mouth: Mucous membranes are moist.      Pharynx: No oropharyngeal exudate.   Eyes:      Extraocular Movements: Extraocular movements intact.      Pupils: Pupils are equal, round, and reactive to light.   Cardiovascular:      Rate and Rhythm: Normal rate and regular rhythm.      Heart sounds: No murmur heard.  Pulmonary:      Effort: Pulmonary effort is normal.      Breath sounds: Rales present. No wheezing.   Abdominal:      General: Abdomen is flat. Bowel sounds are normal. There is no distension.      Tenderness: There is no abdominal tenderness. There is no guarding.   Musculoskeletal:         General: No swelling or tenderness. Normal range of motion.      Cervical back: Normal range of motion.      Right lower leg: Edema present.      Left lower leg: Edema present.   Lymphadenopathy:      Cervical: No cervical adenopathy.   Skin:     General: Skin is warm.      Coloration: Skin is not jaundiced.      Findings: No bruising or rash.   Neurological:      General: No focal deficit present.      Mental Status: She is alert and oriented to person, place, and time.      Cranial Nerves: No cranial nerve deficit.         ASSESSMENT/PLAN:   Warm Autoimmune Hemolytic Anemia    Jael Hall is a 58F with ESRD on HD, T2DM, HTN  who was admitte din Aug 2024 for subacute back and chest pain and found to be volume overloaded. She was found to have acute on chronic anemia  with a Hgb of 5.4 (baseline of around 9), MCV 83, PLTs 193. CMP revealed mild hyperbilirubinemia of  1.8 and , hapto <10. She was ANSHUL positive, warm autoagglutinin positive.   Peripheral smear showed  spherocytes which go in line with WAIHA.   -she is on prednisone 1mg/kg PO daily (90mg daily currently)  -hemoglobin has improved to 9g/dl on 8/22/24  -she will start prednisone taper by 10mg every week  --she is on folic acid  --she will have CBC checked weekly  --she will have retic count, LDH, CMP, SPEP, MARY checked   -no adenopathy/ hepato-splenomegaly on CT done in April 2024  --She might require a bone marrow biopsy to r/io underlying lymph proliferative disorder    2. GERD: she will take omeprazole for GERD    3. ESRD on HD: Follows with nephrology      4. 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 HTn: Follows with her PCP        BMT Chart Routing      Follow up with physician 6 weeks. with benign heme MD   Follow up with [facility]    Provider visit type    Infusion scheduling note    Injection scheduling note    Labs CBC, CMP, LDH, reticulocytes, SPEP, immunofixation and free light chains   Scheduling:  Preferred lab:  Lab interval:  cbc weekly at [facility] ( Tuesdays); cbc, cmp, ldh, retic, spep, light chains, immunofix in 6 weeks   Imaging    Pharmacy appointment    Other referrals

## 2024-08-22 NOTE — PROGRESS NOTES
HPI    VERONICA: 07/17/2023 Dr. Rowland   Chief complaint (CC): 57 yo F here for annual diabetic eye exam. Pt   presents with c/o floaters OU (-) flashes. Pt denies any other ocular or   visual complaints today.     Glasses? Yes   Contacts? No   H/o eye surgery, injections or laser: No  H/o eye injury: No  Known eye conditions? No  Family h/o eye conditions? No  Eye gtts? None     (+) Flashes (+)  Floaters (-) Mucous   (-)  Tearing (-) Itching (-) Burning   (-) Headaches (-) Eye Pain/discomfort (-) Irritation   (-)  Redness (-) Double vision (+) Blurry vision    Diabetic? Yes  A1c? Lab Results       Component                Value               Date                       HGBA1C                   5.6                 03/07/2024                      Last edited by Jed Cardenas, OD on 8/22/2024  1:25 PM.            Assessment /Plan     For exam results, see Encounter Report.        Diabetes mellitus type 2 without retinopathy   - Pt educated on the importance of maintaining adequate glycemic and blood pressure control via diet, compliance with medications, exercise and timely follow up with PCP.  No diabetic retinopathy, No CSME. Return in 1 year for dilated eye exam.    Incomplete PVD (posterior vitreous detachment), bilateral; Vitreoretinal traction surrounding nerve, both eyes  Epiretinal membrane (ERM) of both eyes   - OCT macula 8/22/24: ERM OU; vitreoretinal traction surrounding ONH OU    - BCVA OD 20/30 OS 20/40   - No evidence of holes, tears or detachment of retina. Negative Shafers sign in vitreous. Patient educated on signs and symptoms of retinal detachment and advised to return to clinic immediately if symptoms arise.   - Pt referred to retina for further evaluation and potential treatment     Nuclear sclerosis of both eyes   - Not visually significant. Monitor.

## 2024-08-27 ENCOUNTER — LAB VISIT (OUTPATIENT)
Dept: LAB | Facility: HOSPITAL | Age: 59
End: 2024-08-27
Attending: INTERNAL MEDICINE
Payer: MEDICARE

## 2024-08-27 DIAGNOSIS — N18.6 ESRD (END STAGE RENAL DISEASE) ON DIALYSIS: Chronic | ICD-10-CM

## 2024-08-27 DIAGNOSIS — D59.10 AIHA (AUTOIMMUNE HEMOLYTIC ANEMIA): ICD-10-CM

## 2024-08-27 DIAGNOSIS — Z99.2 ESRD (END STAGE RENAL DISEASE) ON DIALYSIS: Chronic | ICD-10-CM

## 2024-08-27 LAB
BASOPHILS # BLD AUTO: 0.02 K/UL (ref 0–0.2)
BASOPHILS NFR BLD: 0.2 % (ref 0–1.9)
DIFFERENTIAL METHOD BLD: ABNORMAL
EOSINOPHIL # BLD AUTO: 0 K/UL (ref 0–0.5)
EOSINOPHIL NFR BLD: 0.1 % (ref 0–8)
ERYTHROCYTE [DISTWIDTH] IN BLOOD BY AUTOMATED COUNT: 21.6 % (ref 11.5–14.5)
HCT VFR BLD AUTO: 28.1 % (ref 37–48.5)
HGB BLD-MCNC: 9 G/DL (ref 12–16)
IMM GRANULOCYTES # BLD AUTO: 0.07 K/UL (ref 0–0.04)
IMM GRANULOCYTES NFR BLD AUTO: 0.6 % (ref 0–0.5)
LYMPHOCYTES # BLD AUTO: 1.7 K/UL (ref 1–4.8)
LYMPHOCYTES NFR BLD: 13.2 % (ref 18–48)
MCH RBC QN AUTO: 28.2 PG (ref 27–31)
MCHC RBC AUTO-ENTMCNC: 32 G/DL (ref 32–36)
MCV RBC AUTO: 88 FL (ref 82–98)
MONOCYTES # BLD AUTO: 1.3 K/UL (ref 0.3–1)
MONOCYTES NFR BLD: 10.3 % (ref 4–15)
NEUTROPHILS # BLD AUTO: 9.6 K/UL (ref 1.8–7.7)
NEUTROPHILS NFR BLD: 75.6 % (ref 38–73)
NRBC BLD-RTO: 0 /100 WBC
PLATELET # BLD AUTO: 225 K/UL (ref 150–450)
PMV BLD AUTO: 10.6 FL (ref 9.2–12.9)
RBC # BLD AUTO: 3.19 M/UL (ref 4–5.4)
WBC # BLD AUTO: 12.67 K/UL (ref 3.9–12.7)

## 2024-08-27 PROCEDURE — 36415 COLL VENOUS BLD VENIPUNCTURE: CPT | Mod: HCNC | Performed by: INTERNAL MEDICINE

## 2024-08-27 PROCEDURE — 85025 COMPLETE CBC W/AUTO DIFF WBC: CPT | Mod: HCNC | Performed by: INTERNAL MEDICINE

## 2024-08-31 ENCOUNTER — HOSPITAL ENCOUNTER (INPATIENT)
Facility: HOSPITAL | Age: 59
LOS: 1 days | Discharge: HOME | DRG: 637 | End: 2024-09-02
Attending: EMERGENCY MEDICINE | Admitting: HOSPITALIST
Payer: MEDICARE

## 2024-08-31 DIAGNOSIS — Z99.2 ESRD (END STAGE RENAL DISEASE) ON DIALYSIS: Chronic | ICD-10-CM

## 2024-08-31 DIAGNOSIS — D63.1 ANEMIA IN ESRD (END-STAGE RENAL DISEASE): ICD-10-CM

## 2024-08-31 DIAGNOSIS — R73.9 HYPERGLYCEMIA: Primary | ICD-10-CM

## 2024-08-31 DIAGNOSIS — R79.89 ELEVATED TROPONIN: ICD-10-CM

## 2024-08-31 DIAGNOSIS — N18.6 ANEMIA IN ESRD (END-STAGE RENAL DISEASE): ICD-10-CM

## 2024-08-31 DIAGNOSIS — N18.6 ESRD (END STAGE RENAL DISEASE) ON DIALYSIS: Chronic | ICD-10-CM

## 2024-08-31 DIAGNOSIS — N18.6 ESRD (END STAGE RENAL DISEASE): ICD-10-CM

## 2024-08-31 DIAGNOSIS — E87.5 HYPERKALEMIA: ICD-10-CM

## 2024-08-31 DIAGNOSIS — R06.02 SHORTNESS OF BREATH: ICD-10-CM

## 2024-08-31 DIAGNOSIS — E11.10 DIABETIC KETOACIDOSIS WITHOUT COMA ASSOCIATED WITH TYPE 2 DIABETES MELLITUS: ICD-10-CM

## 2024-08-31 DIAGNOSIS — E08.65 DIABETES MELLITUS DUE TO UNDERLYING CONDITION WITH HYPERGLYCEMIA, WITHOUT LONG-TERM CURRENT USE OF INSULIN: ICD-10-CM

## 2024-08-31 PROCEDURE — 99291 CRITICAL CARE FIRST HOUR: CPT | Mod: HCNC

## 2024-08-31 PROCEDURE — 93010 ELECTROCARDIOGRAM REPORT: CPT | Mod: HCNC,,, | Performed by: INTERNAL MEDICINE

## 2024-08-31 PROCEDURE — 93005 ELECTROCARDIOGRAM TRACING: CPT | Mod: HCNC

## 2024-08-31 NOTE — Clinical Note
Diagnosis: Shortness of breath [786.05.ICD-9-CM]   Reason for IP Medical Treatment  (Clinical interventions that can only be accomplished in the IP setting? ) :: DKA, ESRD, hyperkalemia   Plans for Post-Acute care--if anticipated (pick the single best option):: A. No post acute care anticipated at this time   Special Needs:: No Special Needs [1]

## 2024-08-31 NOTE — LETTER
General Steps for Sliding Scale Insulin  Measure Blood Glucose:  Use a blood glucose meter to check your blood sugar level. This is typically done before meals and sometimes before bedtime, depending on your treatment plan.  Refer to Your Sliding Scale Chart:  Your healthcare provider will give you a sliding scale chart, which specifies how much insulin you should take based on your blood glucose level. This chart usually includes ranges of blood glucose levels and the corresponding insulin doses.  Determine the Insulin Dose:  Compare your current blood glucose level to the ranges on your sliding scale chart. Find the range that your blood glucose falls into and note the corresponding insulin dose.  Administer Insulin:  Use your prescribed insulin to inject the amount indicated by the chart. This could be rapid-acting insulin taken before meals or another type as prescribed.  Record Your Blood Glucose and Insulin Dose:  Keep a log of your blood glucose readings and insulin doses. This helps in monitoring your blood sugar control and adjusting your treatment if necessary.  Example of a Sliding Scale Chart  Heres a simplified example of what a sliding scale chart might look like. This is just an example and your actual chart may vary:  Blood Glucose Level (mg/dL) Insulin Dose (units)   150 - 180 2   181 - 210 4   211 - 240 6   241 - 270 8   Above 270 10

## 2024-08-31 NOTE — LETTER
Family of Kalina Hall was seen and treated in our emergency department on 8/31/2024. Kalina Hall was present in hospital for family member's admission.      If you have any questions or concerns, please don't hesitate to call.      ________________________  Ashley Troncoso MD  [facility] PGY-2

## 2024-08-31 NOTE — LETTER
"September 2, 2024             [facility]  [address]  [place] [address]  Phone: [phone]  September 2, 2024     Patient: Jael Hall (Gloria)     YOB: 1965        To Whom It May Concern:    Jael Hall was admitted to the hospital on 8/31/2024 10:25 PM and discharged on 9/2/2024. Patient admitted for hyperglycemia in setting of new prednisone use for warm agglutinin AIHA. Patient presented with SOB and lower extremity swelling. Blood glucose just under 700 on admission with anion gap. BHB negative, ABG 7.47. Received regular insulin in ED as well as long acting insulin with improvement in sugars to 200s. Prednisone continued at 60mg per Hematology office visit note. Patient received HD while inpatient 9/2.   On the day of discharge patient was back to baseline and hemodynamically stable. She was sent home to resume home meds (as below). Prednisone decreased to 60mg and provided instructions on taper per outpatient Hematologist. Lantus increased to 12U with aspart sliding scale with meals as patient with elevated sugars in setting of prednisone. Close outpatient f/u was advised with PCP and hematologist to closely monitor sugars and insulin requirements. Instructed patient to keep blood glucose log to bring to PCP visit. The importance of medication adherence was again stressed to the patient. Patient agreeable to discharge plan, expressed understanding, all questions answered, return precautions were discussed.       ________________________  Ashley Troncoso MD  [facility] PGY-2         "

## 2024-09-01 PROBLEM — E11.10 DIABETIC KETOACIDOSIS WITHOUT COMA ASSOCIATED WITH TYPE 2 DIABETES MELLITUS: Status: ACTIVE | Noted: 2024-09-01

## 2024-09-01 PROBLEM — M25.474 BILATERAL SWELLING OF FEET AND ANKLES: Status: ACTIVE | Noted: 2022-08-15

## 2024-09-01 PROBLEM — M25.475 BILATERAL SWELLING OF FEET AND ANKLES: Status: ACTIVE | Noted: 2022-08-15

## 2024-09-01 PROBLEM — R06.02 SOB (SHORTNESS OF BREATH): Status: ACTIVE | Noted: 2024-09-01

## 2024-09-01 LAB
ALBUMIN SERPL BCP-MCNC: 2.9 G/DL (ref 3.5–5.2)
ALBUMIN SERPL BCP-MCNC: 3 G/DL (ref 3.5–5.2)
ALLENS TEST: YES
ALP SERPL-CCNC: 55 U/L (ref 55–135)
ALP SERPL-CCNC: 57 U/L (ref 55–135)
ALT SERPL W/O P-5'-P-CCNC: 12 U/L (ref 10–44)
ALT SERPL W/O P-5'-P-CCNC: 12 U/L (ref 10–44)
ANION GAP SERPL CALC-SCNC: 17 MMOL/L (ref 8–16)
ANION GAP SERPL CALC-SCNC: 19 MMOL/L (ref 8–16)
ANION GAP SERPL CALC-SCNC: 20 MMOL/L (ref 8–16)
AST SERPL-CCNC: 16 U/L (ref 10–40)
AST SERPL-CCNC: 7 U/L (ref 10–40)
B-OH-BUTYR BLD STRIP-SCNC: 0.1 MMOL/L (ref 0–0.5)
BASOPHILS # BLD AUTO: 0 K/UL (ref 0–0.2)
BASOPHILS NFR BLD: 0 % (ref 0–1.9)
BILIRUB SERPL-MCNC: 0.6 MG/DL (ref 0.1–1)
BILIRUB SERPL-MCNC: 0.8 MG/DL (ref 0.1–1)
BUN SERPL-MCNC: 107 MG/DL (ref 8–23)
BUN SERPL-MCNC: 108 MG/DL (ref 8–23)
BUN SERPL-MCNC: 108 MG/DL (ref 8–23)
BUN SERPL-MCNC: 111 MG/DL (ref 8–23)
BUN SERPL-MCNC: 99 MG/DL (ref 8–23)
CALCIUM SERPL-MCNC: 8.8 MG/DL (ref 8.7–10.5)
CALCIUM SERPL-MCNC: 9.4 MG/DL (ref 8.7–10.5)
CHLORIDE SERPL-SCNC: 90 MMOL/L (ref 95–110)
CHLORIDE SERPL-SCNC: 92 MMOL/L (ref 95–110)
CHLORIDE SERPL-SCNC: 94 MMOL/L (ref 95–110)
CHLORIDE SERPL-SCNC: 94 MMOL/L (ref 95–110)
CHLORIDE SERPL-SCNC: 95 MMOL/L (ref 95–110)
CO2 SERPL-SCNC: 20 MMOL/L (ref 23–29)
CO2 SERPL-SCNC: 24 MMOL/L (ref 23–29)
CO2 SERPL-SCNC: 25 MMOL/L (ref 23–29)
CREAT SERPL-MCNC: 10.4 MG/DL (ref 0.5–1.4)
CREAT SERPL-MCNC: 10.9 MG/DL (ref 0.5–1.4)
CREAT SERPL-MCNC: 9.8 MG/DL (ref 0.5–1.4)
DIFFERENTIAL METHOD BLD: ABNORMAL
EOSINOPHIL # BLD AUTO: 0 K/UL (ref 0–0.5)
EOSINOPHIL NFR BLD: 0 % (ref 0–8)
ERYTHROCYTE [DISTWIDTH] IN BLOOD BY AUTOMATED COUNT: 19.2 % (ref 11.5–14.5)
EST. GFR  (NO RACE VARIABLE): 4 ML/MIN/1.73 M^2
ESTIMATED AVG GLUCOSE: 171 MG/DL (ref 68–131)
FIO2: 21 %
GLUCOSE SERPL-MCNC: 196 MG/DL (ref 70–110)
GLUCOSE SERPL-MCNC: 254 MG/DL (ref 70–110)
GLUCOSE SERPL-MCNC: 685 MG/DL (ref 70–110)
HBA1C MFR BLD: 7.6 % (ref 4–5.6)
HCT VFR BLD AUTO: 23.5 % (ref 37–48.5)
HGB BLD-MCNC: 7.8 G/DL (ref 12–16)
IMM GRANULOCYTES # BLD AUTO: 0.06 K/UL (ref 0–0.04)
IMM GRANULOCYTES NFR BLD AUTO: 0.6 % (ref 0–0.5)
LACTATE SERPL-SCNC: 2.6 MMOL/L (ref 0.5–2.2)
LYMPHOCYTES # BLD AUTO: 0.4 K/UL (ref 1–4.8)
LYMPHOCYTES NFR BLD: 4.1 % (ref 18–48)
MAGNESIUM SERPL-MCNC: 2.4 MG/DL (ref 1.6–2.6)
MCH RBC QN AUTO: 28.5 PG (ref 27–31)
MCHC RBC AUTO-ENTMCNC: 33.2 G/DL (ref 32–36)
MCV RBC AUTO: 86 FL (ref 82–98)
MONOCYTES # BLD AUTO: 0.5 K/UL (ref 0.3–1)
MONOCYTES NFR BLD: 5 % (ref 4–15)
NEUTROPHILS # BLD AUTO: 8.5 K/UL (ref 1.8–7.7)
NEUTROPHILS NFR BLD: 90.3 % (ref 38–73)
NRBC BLD-RTO: 0 /100 WBC
PCO2 BLDA: 39 MMHG (ref 35–45)
PH SMN: 7.48 [PH] (ref 7.35–7.45)
PHOSPHATE SERPL-MCNC: 6.5 MG/DL (ref 2.7–4.5)
PLATELET # BLD AUTO: 172 K/UL (ref 150–450)
PMV BLD AUTO: 11.7 FL (ref 9.2–12.9)
PO2 BLDA: 63.4 MMHG (ref 80–100)
POC BASE DEFICIT: 4.9 MMOL/L (ref -2–2)
POC HCO3: 28.8 MMOL/L (ref 24–28)
POC PERFORMED BY: ABNORMAL
POC SATURATED O2: 94.3 % (ref 95–100)
POCT GLUCOSE: 222 MG/DL (ref 70–110)
POCT GLUCOSE: 273 MG/DL (ref 70–110)
POCT GLUCOSE: 341 MG/DL (ref 70–110)
POCT GLUCOSE: 405 MG/DL (ref 70–110)
POCT GLUCOSE: 418 MG/DL (ref 70–110)
POTASSIUM SERPL-SCNC: 4.6 MMOL/L (ref 3.5–5.1)
POTASSIUM SERPL-SCNC: 5.3 MMOL/L (ref 3.5–5.1)
POTASSIUM SERPL-SCNC: 5.9 MMOL/L (ref 3.5–5.1)
PROT SERPL-MCNC: 6.2 G/DL (ref 6–8.4)
PROT SERPL-MCNC: 6.2 G/DL (ref 6–8.4)
RBC # BLD AUTO: 2.74 M/UL (ref 4–5.4)
SODIUM SERPL-SCNC: 130 MMOL/L (ref 136–145)
SODIUM SERPL-SCNC: 135 MMOL/L (ref 136–145)
SODIUM SERPL-SCNC: 136 MMOL/L (ref 136–145)
SODIUM SERPL-SCNC: 136 MMOL/L (ref 136–145)
SODIUM SERPL-SCNC: 137 MMOL/L (ref 136–145)
SPECIMEN SOURCE: ABNORMAL
TROPONIN I SERPL DL<=0.01 NG/ML-MCNC: 0.04 NG/ML (ref 0–0.03)
TROPONIN I SERPL DL<=0.01 NG/ML-MCNC: 0.06 NG/ML (ref 0–0.03)
TROPONIN I SERPL DL<=0.01 NG/ML-MCNC: 0.07 NG/ML (ref 0–0.03)
WBC # BLD AUTO: 9.37 K/UL (ref 3.9–12.7)

## 2024-09-01 PROCEDURE — 80053 COMPREHEN METABOLIC PANEL: CPT | Mod: HCNC | Performed by: EMERGENCY MEDICINE

## 2024-09-01 PROCEDURE — 63600175 PHARM REV CODE 636 W HCPCS: Mod: HCNC

## 2024-09-01 PROCEDURE — 85025 COMPLETE CBC W/AUTO DIFF WBC: CPT | Mod: HCNC | Performed by: EMERGENCY MEDICINE

## 2024-09-01 PROCEDURE — 84484 ASSAY OF TROPONIN QUANT: CPT | Mod: 91,HCNC

## 2024-09-01 PROCEDURE — 84484 ASSAY OF TROPONIN QUANT: CPT | Mod: HCNC | Performed by: EMERGENCY MEDICINE

## 2024-09-01 PROCEDURE — 80048 BASIC METABOLIC PNL TOTAL CA: CPT | Mod: 91,HCNC,XB

## 2024-09-01 PROCEDURE — 25000003 PHARM REV CODE 250: Mod: HCNC

## 2024-09-01 PROCEDURE — 36415 COLL VENOUS BLD VENIPUNCTURE: CPT | Mod: HCNC

## 2024-09-01 PROCEDURE — 11000001 HC ACUTE MED/SURG PRIVATE ROOM: Mod: HCNC

## 2024-09-01 PROCEDURE — 25000003 PHARM REV CODE 250: Mod: HCNC | Performed by: EMERGENCY MEDICINE

## 2024-09-01 PROCEDURE — 36600 WITHDRAWAL OF ARTERIAL BLOOD: CPT | Mod: HCNC

## 2024-09-01 PROCEDURE — 99900035 HC TECH TIME PER 15 MIN (STAT): Mod: HCNC

## 2024-09-01 PROCEDURE — 25000242 PHARM REV CODE 250 ALT 637 W/ HCPCS: Mod: HCNC | Performed by: EMERGENCY MEDICINE

## 2024-09-01 PROCEDURE — 83605 ASSAY OF LACTIC ACID: CPT | Mod: HCNC

## 2024-09-01 PROCEDURE — 80053 COMPREHEN METABOLIC PANEL: CPT | Mod: 91,HCNC

## 2024-09-01 PROCEDURE — 93005 ELECTROCARDIOGRAM TRACING: CPT | Mod: HCNC

## 2024-09-01 PROCEDURE — 83036 HEMOGLOBIN GLYCOSYLATED A1C: CPT | Mod: HCNC

## 2024-09-01 PROCEDURE — 83735 ASSAY OF MAGNESIUM: CPT | Mod: HCNC

## 2024-09-01 PROCEDURE — 82803 BLOOD GASES ANY COMBINATION: CPT | Mod: HCNC

## 2024-09-01 PROCEDURE — 84100 ASSAY OF PHOSPHORUS: CPT | Mod: HCNC

## 2024-09-01 PROCEDURE — 63600175 PHARM REV CODE 636 W HCPCS: Mod: HCNC | Performed by: EMERGENCY MEDICINE

## 2024-09-01 PROCEDURE — 94644 CONT INHLJ TX 1ST HOUR: CPT | Mod: HCNC

## 2024-09-01 PROCEDURE — 80048 BASIC METABOLIC PNL TOTAL CA: CPT | Mod: HCNC,XB

## 2024-09-01 PROCEDURE — 93010 ELECTROCARDIOGRAM REPORT: CPT | Mod: HCNC,,, | Performed by: INTERNAL MEDICINE

## 2024-09-01 PROCEDURE — 82010 KETONE BODYS QUAN: CPT | Mod: HCNC

## 2024-09-01 RX ORDER — CALCIUM GLUCONATE 20 MG/ML
1 INJECTION, SOLUTION INTRAVENOUS
Status: COMPLETED | OUTPATIENT
Start: 2024-09-01 | End: 2024-09-01

## 2024-09-01 RX ORDER — HEPARIN SODIUM 5000 [USP'U]/ML
5000 INJECTION, SOLUTION INTRAVENOUS; SUBCUTANEOUS
Status: DISCONTINUED | OUTPATIENT
Start: 2024-09-01 | End: 2024-09-02 | Stop reason: HOSPADM

## 2024-09-01 RX ORDER — IBUPROFEN 200 MG
16 TABLET ORAL
Status: DISCONTINUED | OUTPATIENT
Start: 2024-09-01 | End: 2024-09-02 | Stop reason: HOSPADM

## 2024-09-01 RX ORDER — LABETALOL HYDROCHLORIDE 5 MG/ML
20 INJECTION, SOLUTION INTRAVENOUS ONCE
Status: COMPLETED | OUTPATIENT
Start: 2024-09-01 | End: 2024-09-01

## 2024-09-01 RX ORDER — SEVELAMER CARBONATE 800 MG/1
2400 TABLET, FILM COATED ORAL
Status: DISCONTINUED | OUTPATIENT
Start: 2024-09-01 | End: 2024-09-02 | Stop reason: HOSPADM

## 2024-09-01 RX ORDER — POLYETHYLENE GLYCOL 3350 17 G/17G
17 POWDER, FOR SOLUTION ORAL DAILY
Status: DISCONTINUED | OUTPATIENT
Start: 2024-09-01 | End: 2024-09-02 | Stop reason: HOSPADM

## 2024-09-01 RX ORDER — AMLODIPINE BESYLATE 5 MG/1
10 TABLET ORAL NIGHTLY
Status: DISCONTINUED | OUTPATIENT
Start: 2024-09-01 | End: 2024-09-02 | Stop reason: HOSPADM

## 2024-09-01 RX ORDER — IBUPROFEN 200 MG
24 TABLET ORAL
Status: DISCONTINUED | OUTPATIENT
Start: 2024-09-01 | End: 2024-09-02 | Stop reason: HOSPADM

## 2024-09-01 RX ORDER — INSULIN GLARGINE 100 [IU]/ML
5 INJECTION, SOLUTION SUBCUTANEOUS NIGHTLY
Status: DISCONTINUED | OUTPATIENT
Start: 2024-09-01 | End: 2024-09-01

## 2024-09-01 RX ORDER — SODIUM CHLORIDE 0.9 % (FLUSH) 0.9 %
10 SYRINGE (ML) INJECTION
Status: DISCONTINUED | OUTPATIENT
Start: 2024-09-01 | End: 2024-09-02 | Stop reason: HOSPADM

## 2024-09-01 RX ORDER — FOLIC ACID 1 MG/1
1 TABLET ORAL DAILY
Status: DISCONTINUED | OUTPATIENT
Start: 2024-09-01 | End: 2024-09-02 | Stop reason: HOSPADM

## 2024-09-01 RX ORDER — TALC
6 POWDER (GRAM) TOPICAL NIGHTLY PRN
Status: DISCONTINUED | OUTPATIENT
Start: 2024-09-01 | End: 2024-09-02 | Stop reason: HOSPADM

## 2024-09-01 RX ORDER — ATORVASTATIN CALCIUM 40 MG/1
40 TABLET, FILM COATED ORAL EVERY MORNING
Status: DISCONTINUED | OUTPATIENT
Start: 2024-09-01 | End: 2024-09-02 | Stop reason: HOSPADM

## 2024-09-01 RX ORDER — PANTOPRAZOLE SODIUM 40 MG/1
40 TABLET, DELAYED RELEASE ORAL DAILY
Status: DISCONTINUED | OUTPATIENT
Start: 2024-09-01 | End: 2024-09-02 | Stop reason: HOSPADM

## 2024-09-01 RX ORDER — ACETAMINOPHEN 325 MG/1
650 TABLET ORAL EVERY 4 HOURS PRN
Status: DISCONTINUED | OUTPATIENT
Start: 2024-09-01 | End: 2024-09-02 | Stop reason: HOSPADM

## 2024-09-01 RX ORDER — INSULIN GLARGINE 100 [IU]/ML
12 INJECTION, SOLUTION SUBCUTANEOUS ONCE
Status: COMPLETED | OUTPATIENT
Start: 2024-09-01 | End: 2024-09-01

## 2024-09-01 RX ORDER — INSULIN GLARGINE 100 [IU]/ML
8 INJECTION, SOLUTION SUBCUTANEOUS NIGHTLY
Status: DISCONTINUED | OUTPATIENT
Start: 2024-09-01 | End: 2024-09-02

## 2024-09-01 RX ORDER — HYDRALAZINE HYDROCHLORIDE 20 MG/ML
20 INJECTION INTRAMUSCULAR; INTRAVENOUS ONCE
Status: COMPLETED | OUTPATIENT
Start: 2024-09-01 | End: 2024-09-01

## 2024-09-01 RX ORDER — ALBUTEROL SULFATE 2.5 MG/.5ML
10 SOLUTION RESPIRATORY (INHALATION)
Status: COMPLETED | OUTPATIENT
Start: 2024-09-01 | End: 2024-09-01

## 2024-09-01 RX ORDER — ASPIRIN 81 MG/1
81 TABLET ORAL EVERY MORNING
Status: DISCONTINUED | OUTPATIENT
Start: 2024-09-01 | End: 2024-09-02 | Stop reason: HOSPADM

## 2024-09-01 RX ORDER — INSULIN ASPART 100 [IU]/ML
0-5 INJECTION, SOLUTION INTRAVENOUS; SUBCUTANEOUS
Status: DISCONTINUED | OUTPATIENT
Start: 2024-09-01 | End: 2024-09-02 | Stop reason: HOSPADM

## 2024-09-01 RX ORDER — AMOXICILLIN 250 MG
1 CAPSULE ORAL 2 TIMES DAILY
Status: DISCONTINUED | OUTPATIENT
Start: 2024-09-01 | End: 2024-09-02 | Stop reason: HOSPADM

## 2024-09-01 RX ORDER — LOSARTAN POTASSIUM 50 MG/1
100 TABLET ORAL DAILY
Status: DISCONTINUED | OUTPATIENT
Start: 2024-09-01 | End: 2024-09-02 | Stop reason: HOSPADM

## 2024-09-01 RX ORDER — INSULIN GLARGINE 100 [IU]/ML
10 INJECTION, SOLUTION SUBCUTANEOUS NIGHTLY
Status: DISCONTINUED | OUTPATIENT
Start: 2024-09-01 | End: 2024-09-01

## 2024-09-01 RX ORDER — PREDNISONE 20 MG/1
60 TABLET ORAL DAILY
Status: DISCONTINUED | OUTPATIENT
Start: 2024-09-02 | End: 2024-09-02 | Stop reason: HOSPADM

## 2024-09-01 RX ORDER — LABETALOL 100 MG/1
200 TABLET, FILM COATED ORAL EVERY 12 HOURS
Status: DISCONTINUED | OUTPATIENT
Start: 2024-09-01 | End: 2024-09-02 | Stop reason: HOSPADM

## 2024-09-01 RX ORDER — INDOMETHACIN 25 MG/1
50 CAPSULE ORAL
Status: COMPLETED | OUTPATIENT
Start: 2024-09-01 | End: 2024-09-01

## 2024-09-01 RX ORDER — ONDANSETRON HYDROCHLORIDE 2 MG/ML
4 INJECTION, SOLUTION INTRAVENOUS EVERY 6 HOURS PRN
Status: DISCONTINUED | OUTPATIENT
Start: 2024-09-01 | End: 2024-09-02 | Stop reason: HOSPADM

## 2024-09-01 RX ORDER — GLUCAGON 1 MG
1 KIT INJECTION
Status: DISCONTINUED | OUTPATIENT
Start: 2024-09-01 | End: 2024-09-02 | Stop reason: HOSPADM

## 2024-09-01 RX ADMIN — INSULIN ASPART 4 UNITS: 100 INJECTION, SOLUTION INTRAVENOUS; SUBCUTANEOUS at 05:09

## 2024-09-01 RX ADMIN — SEVELAMER CARBONATE 2400 MG: 800 TABLET, FILM COATED ORAL at 05:09

## 2024-09-01 RX ADMIN — INSULIN ASPART 3 UNITS: 100 INJECTION, SOLUTION INTRAVENOUS; SUBCUTANEOUS at 09:09

## 2024-09-01 RX ADMIN — SODIUM ZIRCONIUM CYCLOSILICATE 10 G: 10 POWDER, FOR SUSPENSION ORAL at 01:09

## 2024-09-01 RX ADMIN — PANTOPRAZOLE SODIUM 40 MG: 40 TABLET, DELAYED RELEASE ORAL at 08:09

## 2024-09-01 RX ADMIN — ASPIRIN 81 MG: 81 TABLET, COATED ORAL at 06:09

## 2024-09-01 RX ADMIN — INSULIN ASPART 2 UNITS: 100 INJECTION, SOLUTION INTRAVENOUS; SUBCUTANEOUS at 12:09

## 2024-09-01 RX ADMIN — LABETALOL HYDROCHLORIDE 20 MG: 5 INJECTION INTRAVENOUS at 02:09

## 2024-09-01 RX ADMIN — HYDRALAZINE HYDROCHLORIDE 20 MG: 20 INJECTION, SOLUTION INTRAMUSCULAR; INTRAVENOUS at 03:09

## 2024-09-01 RX ADMIN — ALBUTEROL SULFATE 10 MG: 2.5 SOLUTION RESPIRATORY (INHALATION) at 01:09

## 2024-09-01 RX ADMIN — HUMAN INSULIN 10 UNITS: 100 INJECTION, SOLUTION SUBCUTANEOUS at 01:09

## 2024-09-01 RX ADMIN — INSULIN GLARGINE 8 UNITS: 100 INJECTION, SOLUTION SUBCUTANEOUS at 09:09

## 2024-09-01 RX ADMIN — HEPARIN SODIUM 5000 UNITS: 5000 INJECTION INTRAVENOUS; SUBCUTANEOUS at 12:09

## 2024-09-01 RX ADMIN — LABETALOL HYDROCHLORIDE 200 MG: 100 TABLET, FILM COATED ORAL at 09:09

## 2024-09-01 RX ADMIN — ATORVASTATIN CALCIUM 40 MG: 40 TABLET, FILM COATED ORAL at 06:09

## 2024-09-01 RX ADMIN — FOLIC ACID 1 MG: 1 TABLET ORAL at 08:09

## 2024-09-01 RX ADMIN — LABETALOL HYDROCHLORIDE 200 MG: 100 TABLET, FILM COATED ORAL at 08:09

## 2024-09-01 RX ADMIN — SEVELAMER CARBONATE 2400 MG: 800 TABLET, FILM COATED ORAL at 12:09

## 2024-09-01 RX ADMIN — INSULIN ASPART 3 UNITS: 100 INJECTION, SOLUTION INTRAVENOUS; SUBCUTANEOUS at 06:09

## 2024-09-01 RX ADMIN — SEVELAMER CARBONATE 2400 MG: 800 TABLET, FILM COATED ORAL at 08:09

## 2024-09-01 RX ADMIN — AMLODIPINE BESYLATE 10 MG: 5 TABLET ORAL at 09:09

## 2024-09-01 RX ADMIN — SENNOSIDES AND DOCUSATE SODIUM 1 TABLET: 8.6; 5 TABLET ORAL at 09:09

## 2024-09-01 RX ADMIN — HEPARIN SODIUM 5000 UNITS: 5000 INJECTION INTRAVENOUS; SUBCUTANEOUS at 02:09

## 2024-09-01 RX ADMIN — PREDNISONE 85 MG: 5 TABLET ORAL at 08:09

## 2024-09-01 RX ADMIN — SODIUM BICARBONATE 50 MEQ: 84 INJECTION, SOLUTION INTRAVENOUS at 01:09

## 2024-09-01 RX ADMIN — LOSARTAN POTASSIUM 100 MG: 50 TABLET, FILM COATED ORAL at 08:09

## 2024-09-01 RX ADMIN — HEPARIN SODIUM 5000 UNITS: 5000 INJECTION INTRAVENOUS; SUBCUTANEOUS at 09:09

## 2024-09-01 RX ADMIN — CALCIUM GLUCONATE 1 G: 20 INJECTION, SOLUTION INTRAVENOUS at 01:09

## 2024-09-01 RX ADMIN — INSULIN GLARGINE 12 UNITS: 100 INJECTION, SOLUTION SUBCUTANEOUS at 02:09

## 2024-09-01 RX ADMIN — AMLODIPINE BESYLATE 10 MG: 5 TABLET ORAL at 02:09

## 2024-09-01 NOTE — CARE UPDATE
Inpatient Upgrade Note    Jael Hall has warranted treatment spanning two or more midnights of hospital level care for the management of hyperglycemia. She continues to require daily labs and medication adjustments. Her condition is also complicated by the following comorbidities: Diabetes and Chronic kidney disease.    ________________________  Ashley Troncoso MD  [facility] PGY-2

## 2024-09-01 NOTE — NURSING
VIRTUAL NURSE: Pt arrived to unit. Permission received to turn camera to view patient. VIP model explained; Patient informed this VN will be working with bedside nurse and the rest of the care team.      Admission questions completed.  Plan of care reviewed with patient.  Educated patient on VTE and fall risk. Safety precautions in place. Call light within reach, side rails up x2.     Patient instucted to ask staff for assistance. Patient verbalized complete understanding.  Will continue to be available and intervene as needed.    Labs, notes, orders, and careplan reviewed.      09/01/24 0409   Admission   Initial VN Admission Questions Complete   Shift   Virtual Nurse - Rounding Complete   Virtual Nurse - Patient Verbalized Approval Of Camera Use;VN Rounding   Type of Frequent Check   Type Patient Rounds   Safety/Activity   Patient Rounds bed in low position;bed wheels locked;call light in patient/parent reach;clutter free environment maintained;visualized patient;placement of personal items at bedside   Safety Promotion/Fall Prevention assistive device/personal item within reach;side rails raised x 2   Activity Assistance Provided assistance, 1 person   Positioning   Body Position position changed independently   Head of Bed (HOB) Positioning HOB elevated

## 2024-09-01 NOTE — ASSESSMENT & PLAN NOTE
Chronic, uncontrolled. Latest blood pressure and vitals reviewed-     Temp:  [98.5 °F (36.9 °C)-98.6 °F (37 °C)]   Pulse:  [87-96]   Resp:  [16-20]   BP: (180-229)/()   SpO2:  [92 %-97 %] .   Home meds for hypertension were reviewed and noted below.   Hypertension Medications               amLODIPine (NORVASC) 10 MG tablet Take 1 tablet (10 mg total) by mouth nightly.    losartan (COZAAR) 100 MG tablet Take 1 tablet (100 mg total) by mouth once daily.            While in the hospital, will manage blood pressure as follows; Continue home antihypertensive regimen    Will utilize p.r.n. blood pressure medication only if patient's blood pressure greater than 220/110 and she develops symptoms such as worsening chest pain or shortness of breath.    Patient with elevated BP to 220s/100s, s/p IV labetalol 20 mg once and IV Hydralazine 20 mg once.    Plan:  - Continue home meds  - Monitor BP closely  - Can give IV Labetalol 20 mg once again if BP no responding

## 2024-09-01 NOTE — ED NOTES
Notified Dr. LOPEZ Garcia about elevated /88, inquiring of treatment prior to patient going up to floor.

## 2024-09-01 NOTE — ASSESSMENT & PLAN NOTE
Anemia is likely due to chronic disease due to ESRD. Most recent hemoglobin and hematocrit are listed below.  Recent Labs     09/01/24  0000   HGB 7.8*   HCT 23.5*     Plan  - Monitor serial CBC: Daily  - Transfuse PRBC if patient becomes hemodynamically unstable, symptomatic or H/H drops below 7/21.  - Patient has not received any PRBC transfusions to date  - Patient's anemia is currently stable  - Continue Home Prednisone and Folic acid

## 2024-09-01 NOTE — SUBJECTIVE & OBJECTIVE
Past Medical History:   Diagnosis Date    Acute hyperkalemia 2020    Acute on chronic diastolic heart failure 10/08/2021    Anemia of chronic disorder 2017    Overview:  Added automatically from request for surgery [id number]    Anemia of chronic renal failure, stage 5     Anxiety     Cyst of left ovary 2019    Cyst of left ovary 2019    Dyslipidemia 2013    Encounter for screening colonoscopy 10/08/2020    End-stage renal disease on hemodialysis 2020    ESRD on hemodialysis 2017    Hematuria 10/30/2019    History of COVID-19 2020    Hospital discharge follow-up 10/06/2023    Hx of sinus bradycardia 2017    Hyperkalemia 2017    Hypertensive urgency 2021    Peripheral neuropathy 2013    Preoperative testing 10/08/2020    Renovascular hypertension 2020    S/P dilation and curettage 2018    Screening for colon cancer 2023    Thickened endometrium 2018    Thyroid nodule     Thyroid nodule 2021    Uncontrolled type 2 diabetes mellitus 2013    Overview:  poc a1c today 11.2-270/ up from 10.8-263, Pt. has very very stressed/ was incarcerated/marital stress/ will current meds/ less stress now/ will monitor.       Past Surgical History:   Procedure Laterality Date    AV FISTULA PLACEMENT      right arm     SECTION      x1    COLONOSCOPY N/A 2018    Procedure: COLONOSCOPY;  Surgeon: Pankaj Hinojosa MD;  [facility]  Service: Endoscopy;  Laterality: N/A;  dialysis pt/labs prior to colon/MS    COLONOSCOPY N/A 2023    Procedure: COLONOSCOPY;  Surgeon: Angel Turner MD;  Location: [facility] (2ND FLR);  Service: Endoscopy;  Laterality: N/A;  Ref By:  Maty Gayle MD  Procedure: Colonoscopy  Diagnosis: endounter for preventive health   Procedure Timing: pt. pref  Provider: Dr. turner   Location: [facility] 4-Endo   Prep Specifications: Standard prep   Additional Info: Dialysis patient lab pending     CYSTOSCOPY W/ RETROGRADES Bilateral 8/20/2020    Procedure: CYSTOSCOPY, WITH RETROGRADE PYELOGRAM;  Surgeon: Douglas Abraham MD;  Location: [facility] OR 1ST FLR;  Service: Urology;  Laterality: Bilateral;  45 min    FISTULOGRAM Right 9/9/2020    Procedure: Fistulogram;  Surgeon: Lester Gómez MD;  Location: [facility] CATH LAB;  Service: Nephrology;  Laterality: Right;    HYSTERECTOMY      IMPLANTATION OF COCHLEAR PROSTHESIS Right 6/28/2021    Procedure: INSERTION, PROSTHESIS, COCHLEAR;  Surgeon: Ramiro Zuleta MD;  Location: [facility] OR 2ND FLR;  Service: ENT;  Laterality: Right;  COCHLEAR BABAK    IMPLANTATION OF COCHLEAR PROSTHESIS Right 6/30/2021    Procedure: INSERTION, PROSTHESIS, COCHLEAR;  Surgeon: Ramiro Zuleta MD;  Location: [facility] OR 2ND FLR;  Service: ENT;  Laterality: Right;  COCHLEAR AMERICAS    ROBOT-ASSISTED LAPAROSCOPIC ABDOMINAL HYSTERECTOMY USING DA NATALYA XI N/A 7/5/2019    Procedure: XI ROBOTIC HYSTERECTOMY;  Surgeon: Trice Lei MD;  Location: [facility];  Service: OB/GYN;  Laterality: N/A;    ROBOT-ASSISTED LAPAROSCOPIC NEPHRECTOMY Right 10/8/2020    Procedure: ROBOTIC NEPHRECTOMY;  Surgeon: Douglas Abraham MD;  Location: [facility] OR 2ND FLR;  Service: Urology;  Laterality: Right;  3.5hrs gen with regional     ROBOT-ASSISTED LAPAROSCOPIC SALPINGO-OOPHORECTOMY USING DA NATALYA XI Bilateral 7/5/2019    Procedure: XI ROBOTIC SALPINGO-OOPHORECTOMY;  Surgeon: Trice Lei MD;  Location: [facility];  Service: OB/GYN;  Laterality: Bilateral;    TUBAL LIGATION      Vascular access surgeries      x5       Review of patient's allergies indicates:  No Known Allergies    No current facility-administered medications on file prior to encounter.     Current Outpatient Medications on File Prior to Encounter   Medication Sig    ACCU-CHEK GUIDE GLUCOSE METER Misc     ACCU-CHEK GUIDE L1-L2 CTRL SOL Soln     ACCU-CHEK GUIDE TEST STRIPS Strp     ACCU-CHEK SOFTCLIX LANCETS Misc     acetaminophen (TYLENOL) 500 MG tablet Take 1  "tablet (500 mg total) by mouth every 8 (eight) hours as needed for Pain.    amLODIPine (NORVASC) 10 MG tablet Take 1 tablet (10 mg total) by mouth nightly.    aspirin (ECOTRIN) 81 MG EC tablet Take 81 mg by mouth every morning.    atorvastatin (LIPITOR) 40 MG tablet Take 1 tablet (40 mg total) by mouth once daily. (Patient taking differently: Take 40 mg by mouth every morning.)    atovaquone (MEPRON) 750 mg/5 mL Susp oral liquid Take 10 mLs (1,500 mg total) by mouth once daily.    DROPSAFE ALCOHOL PREP PADS PadM Apply topically.    epoetin shiela (PROCRIT) 10,000 unit/mL injection Inject 0.85 mLs (8,500 Units total) into the skin every Mon, Wed, Fri.    folic acid (FOLVITE) 1 MG tablet Take 1 tablet (1 mg total) by mouth once daily.    insulin glargine U-100, Lantus, 100 unit/mL (3 mL) SubQ InPn pen Inject 5 Units into the skin before breakfast.    LIDOcaine (LIDODERM) 5 % Place 1 patch onto the skin once daily. Remove & Discard patch within 12 hours or as directed by MD    losartan (COZAAR) 100 MG tablet Take 1 tablet (100 mg total) by mouth once daily.    omeprazole (PRILOSEC) 20 MG capsule Take 1 capsule (20 mg total) by mouth once daily.    pen needle, diabetic 31 gauge x 3/16" Ndle Use to inject insulin into the skin.    predniSONE (DELTASONE) 20 MG tablet Take 4.5 tablets (90 mg total) by mouth once daily. Follow up with your hematologist to arrange for a taper    pregabalin (LYRICA) 75 MG capsule Take 1 capsule (75 mg total) by mouth once daily.    sevelamer carbonate (RENVELA) 800 mg Tab Take 3 tablets (2,400 mg total) by mouth 3 (three) times daily with meals.     Family History       Problem Relation (Age of Onset)    Cancer Mother, Sister, Sister    Diabetes Sister, Brother    Lung cancer Sister    Macular degeneration Sister    Ovarian cancer Mother, Sister, Sister          Tobacco Use    Smoking status: Never    Smokeless tobacco: Never   Substance and Sexual Activity    Alcohol use: No    Drug use: No    " Sexual activity: Yes     Partners: Male     Birth control/protection: Post-menopausal     Review of Systems   Constitutional:  Negative for appetite change, chills, diaphoresis, fatigue and fever.   Respiratory:  Negative for apnea, cough, chest tightness, shortness of breath (Resolved upon my examination) and wheezing.    Cardiovascular:  Positive for leg swelling. Negative for chest pain and palpitations.   Gastrointestinal:  Negative for abdominal distention, abdominal pain, blood in stool, constipation, diarrhea, nausea, rectal pain and vomiting.   Genitourinary:  Positive for decreased urine volume (Makes very little urine at baseline). Negative for dysuria.   Musculoskeletal:  Negative for back pain and myalgias.   Neurological:  Negative for dizziness, seizures, syncope, weakness, light-headedness, numbness and headaches.     Objective:     Vital Signs (Most Recent):  Temp: 98.6 °F (37 °C) (08/31/24 2214)  Pulse: 90 (09/01/24 0329)  Resp: 20 (09/01/24 0309)  BP: (!) 212/93 (09/01/24 0319)  SpO2: (!) 92 % (09/01/24 0309) Vital Signs (24h Range):  Temp:  [98.6 °F (37 °C)] 98.6 °F (37 °C)  Pulse:  [87-96] 90  Resp:  [16-20] 20  SpO2:  [92 %-96 %] 92 %  BP: (180-229)/() 212/93     Weight: 88 kg (194 lb)  Body mass index is 33.3 kg/m².     Physical Exam  Constitutional:       General: She is not in acute distress.     Appearance: Normal appearance. She is obese. She is not ill-appearing, toxic-appearing or diaphoretic.   HENT:      Head: Normocephalic and atraumatic.      Right Ear: External ear normal.      Left Ear: External ear normal.      Ears:      Comments: Hard of hearing     Nose: Nose normal.      Mouth/Throat:      Mouth: Mucous membranes are moist.      Pharynx: Oropharynx is clear.   Eyes:      Extraocular Movements: Extraocular movements intact.   Cardiovascular:      Rate and Rhythm: Normal rate and regular rhythm.      Pulses: Normal pulses.      Heart sounds: Normal heart sounds. No murmur  heard.     No friction rub. No gallop.   Pulmonary:      Effort: Pulmonary effort is normal. No respiratory distress.      Breath sounds: Normal breath sounds. No stridor. No wheezing, rhonchi or rales.   Chest:      Chest wall: No tenderness.   Abdominal:      General: Bowel sounds are normal. There is no distension.      Palpations: Abdomen is soft. There is no mass.      Tenderness: There is no abdominal tenderness. There is no guarding or rebound.      Hernia: No hernia is present.   Musculoskeletal:      Cervical back: Normal range of motion and neck supple.      Right lower leg: Edema (pedal edema) present.      Left lower leg: Edema (pedal edema) present.      Comments: Fistula present in right upper arm   Skin:     General: Skin is warm and dry.   Neurological:      Mental Status: She is alert and oriented to person, place, and time.   Psychiatric:         Mood and Affect: Mood normal.         Behavior: Behavior normal.         Thought Content: Thought content normal.         Judgment: Judgment normal.                Significant Labs: All pertinent labs within the past 24 hours have been reviewed.    Significant Imaging: I have reviewed all pertinent imaging results/findings within the past 24 hours.

## 2024-09-01 NOTE — ASSESSMENT & PLAN NOTE
Patient initially complaining of SOB that has resolved. Patient is sating at 96% on RA.  CXR: Improving infiltrates. Correlate for improving pulmonary edema.   Physical Exam: Lungs CTA  Well's Score: 0  Bedside US of BL LE: negative for blood clots    Plan:  - Will monitor for now

## 2024-09-01 NOTE — ED NOTES
Notified Dr. Green of critical glucose level. Also notified provider access sites are minimal. Provide to review same and determine plan.

## 2024-09-01 NOTE — ASSESSMENT & PLAN NOTE
Creatine stable for now. BMP reviewed- noted Estimated Creatinine Clearance: 6.5 mL/min (A) (based on SCr of 9.8 mg/dL (H)). according to latest data. Based on current GFR, CKD stage is end stage.  Monitor UOP and serial BMP and adjust therapy as needed. Renally dose meds. Avoid nephrotoxic medications and procedures.    Plan:  - ED contacted Dr. Guillory who stated patient will go for dialysis in the morning (9/1).   - Nephrology consulted as above; recommendations appreciated  - Continue home [place]

## 2024-09-01 NOTE — ED PROVIDER NOTES
Encounter Date: 8/31/2024       History     Chief Complaint   Patient presents with    Shortness of Breath     Patient is on dialysis and last received treatment yesterday. She states she started feeling short of breath 20 minutes ago and her legs are swollen. She reports having frequent blood transfusions. No resp distress in triage.      The patient is a 59-year-old female who came to the emergency department with shortness of breath and swelling to her feet.  She is a dialysis patient, she goes to dialysis on Monday Wednesday Fridays and went yesterday.  She states she makes very little urine.  She feels that her feet are swollen from the prednisone that she is on.  She is unsure why she is taking steroids.      Review of patient's allergies indicates:  No Known Allergies  Past Medical History:   Diagnosis Date    Acute hyperkalemia 09/09/2020    Acute on chronic diastolic heart failure 10/08/2021    Anemia of chronic disorder 11/08/2017    Overview:  Added automatically from request for surgery [id number]    Anemia of chronic renal failure, stage 5     Anxiety     Cyst of left ovary 05/14/2019    Cyst of left ovary 07/05/2019    Dyslipidemia 01/04/2013    Encounter for screening colonoscopy 10/08/2020    End-stage renal disease on hemodialysis 08/20/2020    ESRD on hemodialysis 09/28/2017    Hematuria 10/30/2019    History of COVID-19 04/07/2020    Hospital discharge follow-up 10/06/2023    Hx of sinus bradycardia 09/28/2017    Hyperkalemia 03/29/2017    Hypertensive urgency 07/22/2021    Peripheral neuropathy 01/04/2013    Preoperative testing 10/08/2020    Renovascular hypertension 08/17/2020    S/P dilation and curettage 05/22/2018    Screening for colon cancer 11/28/2023    Thickened endometrium 05/22/2018    Thyroid nodule     Thyroid nodule 12/13/2021    Uncontrolled type 2 diabetes mellitus 04/05/2013    Overview:  poc a1c today 11.2-270/ up from 10.8-263, Pt. has very very stressed/ was incarcerated/marital  stress/ will current meds/ less stress now/ will monitor.     Past Surgical History:   Procedure Laterality Date    AV FISTULA PLACEMENT      right arm     SECTION      x1    COLONOSCOPY N/A 2018    Procedure: COLONOSCOPY;  Surgeon: Pankaj Hinojosa MD;  Location: [facility] ENDO (4TH FLR);  Service: Endoscopy;  Laterality: N/A;  dialysis pt/labs prior to colon/MS    COLONOSCOPY N/A 2023    Procedure: COLONOSCOPY;  Surgeon: Angel Turner MD;  Location: [facility] ENDO (2ND FLR);  Service: Endoscopy;  Laterality: N/A;  Ref By:  Maty Gayle MD  Procedure: Colonoscopy  Diagnosis: endounter for preventive health   Procedure Timing: pt. pref  Provider: Dr. turner   Location: [facility] 4-Endo   Prep Specifications: Standard prep   Additional Info: Dialysis patient lab pending    CYSTOSCOPY W/ RETROGRADES Bilateral 2020    Procedure: CYSTOSCOPY, WITH RETROGRADE PYELOGRAM;  Surgeon: Douglas Abraham MD;  Location: [facility] 1ST FLR;  Service: Urology;  Laterality: Bilateral;  45 min    FISTULOGRAM Right 2020    Procedure: Fistulogram;  Surgeon: Lester Gómez MD;  Location: [facility] CATH LAB;  Service: Nephrology;  Laterality: Right;    HYSTERECTOMY      IMPLANTATION OF COCHLEAR PROSTHESIS Right 2021    Procedure: INSERTION, PROSTHESIS, COCHLEAR;  Surgeon: Ramiro Zuleta MD;  Location: [facility] OR 2ND FLR;  Service: ENT;  Laterality: Right;  COCHLEAR BABAK    IMPLANTATION OF COCHLEAR PROSTHESIS Right 2021    Procedure: INSERTION, PROSTHESIS, COCHLEAR;  Surgeon: Ramiro Zuleta MD;  Location: [facility] OR 2ND FLR;  Service: ENT;  Laterality: Right;  COCHLEAR AMERICAS    ROBOT-ASSISTED LAPAROSCOPIC ABDOMINAL HYSTERECTOMY USING DA NATALYA XI N/A 2019    Procedure: XI ROBOTIC HYSTERECTOMY;  Surgeon: Trice Lei MD;  Location: [facility];  Service: OB/GYN;  Laterality: N/A;    ROBOT-ASSISTED LAPAROSCOPIC NEPHRECTOMY Right 10/8/2020    Procedure: ROBOTIC NEPHRECTOMY;  Surgeon: Douglas Abraham MD;  Location: [facility]  2ND FLR;  Service: Urology;  Laterality: Right;  3.5hrs gen with regional     ROBOT-ASSISTED LAPAROSCOPIC SALPINGO-OOPHORECTOMY USING DA NATALYA XI Bilateral 7/5/2019    Procedure: XI ROBOTIC SALPINGO-OOPHORECTOMY;  Surgeon: Trice Lei MD;  Location: [facility] OR;  Service: OB/GYN;  Laterality: Bilateral;    TUBAL LIGATION      Vascular access surgeries      x5     Family History   Problem Relation Name Age of Onset    Cancer Mother      Ovarian cancer Mother      Cancer Sister      Ovarian cancer Sister      Lung cancer Sister      Macular degeneration Sister      Cancer Sister      Ovarian cancer Sister      Diabetes Sister      Diabetes Brother      Breast cancer Neg Hx      Colon cancer Neg Hx      Cervical cancer Neg Hx      Endometrial cancer Neg Hx      Vaginal cancer Neg Hx      Thyroid disease Neg Hx      Glaucoma Neg Hx      Retinal detachment Neg Hx       Social History     Tobacco Use    Smoking status: Never    Smokeless tobacco: Never   Substance Use Topics    Alcohol use: No    Drug use: No     Review of Systems   All other systems reviewed and are negative.      Physical Exam     Initial Vitals [08/31/24 2214]   BP Pulse Resp Temp SpO2   (!) 229/104 96 20 98.6 °F (37 °C) 96 %      MAP       --         Physical Exam    Nursing note and vitals reviewed.  Constitutional: She appears well-developed and well-nourished.   HENT:   Head: Normocephalic and atraumatic.   Mouth/Throat: Oropharynx is clear and moist.   Eyes: Conjunctivae and EOM are normal. Pupils are equal, round, and reactive to light.   Neck: Neck supple.   Normal range of motion.  Cardiovascular:  Normal rate, regular rhythm, normal heart sounds and intact distal pulses.     Exam reveals no gallop and no friction rub.       No murmur heard.  Pulmonary/Chest: Breath sounds normal.   Abdominal: Abdomen is soft. Bowel sounds are normal. She exhibits no distension. There is no abdominal tenderness. There is no rebound and no guarding.    Musculoskeletal:         General: No tenderness or edema. Normal range of motion.      Cervical back: Normal range of motion and neck supple.     Lymphadenopathy:     She has no cervical adenopathy.   Neurological: She is alert and oriented to person, place, and time. She has normal strength and normal reflexes.   Skin: Skin is warm and dry.   Psychiatric: She has a normal mood and affect. Her behavior is normal. Judgment and thought content normal.         ED Course   Critical Care    Date/Time: 9/1/2024 1:29 AM    Performed by: Diane Green MD  Authorized by: Diane Green MD  Direct patient critical care time: 10 minutes  Additional history critical care time: 10 minutes  Ordering / reviewing critical care time: 10 minutes  Documentation critical care time: 10 minutes  Consulting other physicians critical care time: 5 minutes  Consult with family critical care time: 5 minutes  Total critical care time (exclusive of procedural time) : 50 minutes  Critical care time was exclusive of separately billable procedures and treating other patients.  Critical care was necessary to treat or prevent imminent or life-threatening deterioration of the following conditions: metabolic crisis.  Critical care was time spent personally by me on the following activities: development of treatment plan with patient or surrogate, evaluation of patient's response to treatment, examination of patient, obtaining history from patient or surrogate, ordering and performing treatments and interventions, ordering and review of laboratory studies, ordering and review of radiographic studies, pulse oximetry and re-evaluation of patient's condition.        Labs Reviewed   CBC W/ AUTO DIFFERENTIAL - Abnormal       Result Value    WBC 9.37      RBC 2.74 (*)     Hemoglobin 7.8 (*)     Hematocrit 23.5 (*)     MCV 86      MCH 28.5      MCHC 33.2      RDW 19.2 (*)     Platelets 172      MPV 11.7      Immature Granulocytes 0.6 (*)     Gran # (ANC)  8.5 (*)     Immature Grans (Abs) 0.06 (*)     Lymph # 0.4 (*)     Mono # 0.5      Eos # 0.0      Baso # 0.00      nRBC 0      Gran % 90.3 (*)     Lymph % 4.1 (*)     Mono % 5.0      Eosinophil % 0.0      Basophil % 0.0      Differential Method Automated     COMPREHENSIVE METABOLIC PANEL - Abnormal    Sodium 130 (*)     Potassium 5.9 (*)     Chloride 90 (*)     CO2 20 (*)     Glucose 685 (*)     BUN 99 (*)     Creatinine 9.8 (*)     Calcium 8.8      Total Protein 6.2      Albumin 2.9 (*)     Total Bilirubin 0.6      Alkaline Phosphatase 57      AST 16      ALT 12      eGFR 4 (*)     Anion Gap 20 (*)     Narrative:     GLU critical result(s) called and verbal readback obtained from Stefania Mcneill RN. by RE3 09/01/2024 00:34   TROPONIN I - Abnormal    Troponin I 0.042 (*)      EKG Readings: (Independently Interpreted)   Initial: 2345. Rhythm: Normal Sinus Rhythm. Heart Rate: 83. Ectopy: No Ectopy. Conduction: Normal. ST Segments: Normal ST Segments. T Waves: Normal. Clinical Impression: Normal Sinus Rhythm       Imaging Results              X-Ray Chest AP Portable (Final result)  Result time 09/01/24 00:25:33      Final result by Pankaj Chin MD (09/01/24 00:25:33)                   Impression:      Improving infiltrates.  Correlate for improving pulmonary edema.      Electronically signed by: Pankaj Chin  Date:    09/01/2024  Time:    00:25               Narrative:    EXAMINATION:  XR CHEST AP PORTABLE    CLINICAL HISTORY:  CHF;    TECHNIQUE:  Single frontal view of the chest was performed.    COMPARISON:  None    FINDINGS:  The heart mediastinal contours appear stable.  Perihilar infiltrates have improved but not resolved.  No new effusion or pneumothorax.                                       Medications   insulin regular injection 10 Units 0.1 mL (has no administration in time range)   calcium gluconate 1 g in NS IVPB (premixed) (has no administration in time range)   sodium bicarbonate solution 50 mEq  (has no administration in time range)   sodium zirconium cyclosilicate packet 10 g (has no administration in time range)   albuterol sulfate nebulizer solution 10 mg (has no administration in time range)   insulin glargine U-100 (Lantus) pen 12 Units (has no administration in time range)     Medical Decision Making  Differential Diagnosis includes, but is not limited to:  PE, MI/ACS, pneumothorax, pericardial effusion/tamonade, pneumonia, lung abscess, pericarditis/myocarditis, pleural effusion, lung mass, CHF exacerbation, asthma exacerbation, COPD exacerbation, aspirated/ingested foreign body, airway obstruction, CO poisoning, anemia, metabolic derangement, allergy/atopy, influenza, viral URI, viral syndrome.     MDM:  The patient is a 59-year-old female who came in with shortness of breath however she is not tachypneic and not hypoxic.  Her chest x-ray shows mild pulmonary edema.  She states she has swelling on her feet but she does not have any noticeable pitting edema.  Her labs are concerning, her potassium is 5.6, CO2 is 20, gap is 20.  Glucose is 685.  I can not give this patient fluids as she is mildly fluid overloaded and she is a dialysis patient.  I will give her 10 units of insulin IV, albuterol 10 mg neb treatment, an amp of sodium bicarbonate, an amp of calcium gluconate and Lokelma.  The case was discussed with Dr. Angelic Guillory, Nephrology.  She recommends also giving Lantus 12 units subQ.  She feels the patient does not need emergent dialysis tonight and she will see the patient in the morning.  The case was discussed with the family medicine resident and the patient will be admitted to the family medicine service tonight.  The need for admission was discussed with the patient.    Amount and/or Complexity of Data Reviewed  Labs: ordered. Decision-making details documented in ED Course.  Radiology: ordered. Decision-making details documented in ED Course.  ECG/medicine tests: ordered and independent  interpretation performed. Decision-making details documented in ED Course.    Risk  OTC drugs.  Decision regarding hospitalization.               ED Course as of 09/01/24 0130   Sun Sep 01, 2024   0127 CBC auto differential(!) [ST]   0127 Comprehensive metabolic panel(!!) [ST]   0127 Troponin I(!) [ST]      ED Course User Index  [ST] Diane Green MD                           Clinical Impression:  Final diagnoses:  [R06.02] Shortness of breath  [R79.89] Elevated troponin (Primary)  [E11.10] Diabetic ketoacidosis without coma associated with type 2 diabetes mellitus  [E87.5] Hyperkalemia  [N18.6] ESRD (end stage renal disease)          ED Disposition Condition    Admit Stable                Diane Green MD  09/01/24 0130

## 2024-09-01 NOTE — ED NOTES
Ordering providers aware of elevated SBP; hydralazine IV to be ordered and parameters placed for patient to be transferred upstairs r/t same.

## 2024-09-01 NOTE — ED NOTES
Introduced self to patient and family member at bedside. Family member requesting ice water for self. Patient also requesting water, writer stated would have to check with provider before bringing fluids for patient to drink.

## 2024-09-01 NOTE — H&P
[facility] Medicine  History & Physical    Patient Name: Jael Hall  MRN: [id number]  Patient Class: IP- Inpatient  Admission Date: 8/31/2024  Attending Physician:  Dr. Nancy Desouza   Primary Care Provider: Maty Gayle DO         Patient information was obtained from patient, relative(s), and ER records.     Subjective:     Principal Problem:Diabetic ketoacidosis without coma associated with type 2 diabetes mellitus    Chief Complaint:   Chief Complaint   Patient presents with    Shortness of Breath     Patient is on dialysis and last received treatment yesterday. She states she started feeling short of breath 20 minutes ago and her legs are swollen. She reports having frequent blood transfusions. No resp distress in triage.         HPI: Ms.Gloria Hall is a 60 y/o female with a PMHx of ESRD on HD (MWF - last dialyzed on 8/30/24), HTN, T2DM with Insulin use, WAIHA, and GERD who presents to [place] ED with complaints of SOB. Patient states she had started feeling short of breath 20min prior to coming to the ED. She also states that she has recently been started on Prednisone for the WAIHA, 1 week ago, and now she has swollen BL feet. Upon my evaluation of patient, she states her SOB resolved, and she denies headaches, chest pain, calf pain, abdominal pain, N/V, diarrhea, constipation. She states she makes very little urine and has been on dialysis for 9-10 years. She reports being recently started on Lantus 5 units.     In the ED patient's glucose was elevated to 689, with an anion gap of 20, K+ 5.9, Na 130, BUN 99, Cr 9.8, H/H 7.8/23.5, WBC 9.37, Plt 172, and elevated troponin to 0.042. Vital signs: /104, 96 HR, Temp 98.6, SpO2 96% on RA. Her CXR showed: Improving infiltrates. Correlate for improving pulmonary edema. EKG showed normal sinus rhythm. She was started on 12 units Lantus and 10 U regular insulin, calcium gluconate, sodium bicarbonate, albuterol, and lokelma. [facility] was asked to admit this patient for treatment of DKA.    Past Medical History:   Diagnosis Date    Acute hyperkalemia 2020    Acute on chronic diastolic heart failure 10/08/2021    Anemia of chronic disorder 2017    Overview:  Added automatically from request for surgery [id number]    Anemia of chronic renal failure, stage 5     Anxiety     Cyst of left ovary 2019    Cyst of left ovary 2019    Dyslipidemia 2013    Encounter for screening colonoscopy 10/08/2020    End-stage renal disease on hemodialysis 2020    ESRD on hemodialysis 2017    Hematuria 10/30/2019    History of COVID-19 2020    Hospital discharge follow-up 10/06/2023    Hx of sinus bradycardia 2017    Hyperkalemia 2017    Hypertensive urgency 2021    Peripheral neuropathy 2013    Preoperative testing 10/08/2020    Renovascular hypertension 2020    S/P dilation and curettage 2018    Screening for colon cancer 2023    Thickened endometrium 2018    Thyroid nodule     Thyroid nodule 2021    Uncontrolled type 2 diabetes mellitus 2013    Overview:  poc a1c today 11.2-270/ up from 10.8-263, Pt. has very very stressed/ was incarcerated/marital stress/ will current meds/ less stress now/ will monitor.       Past Surgical History:   Procedure Laterality Date    AV FISTULA PLACEMENT      right arm     SECTION      x1    COLONOSCOPY N/A 2018    Procedure: COLONOSCOPY;  Surgeon: Pankaj Hinojosa MD;  Location: [facility] (4TH FLR);  Service: Endoscopy;  Laterality: N/A;  dialysis pt/labs prior to colon/MS    COLONOSCOPY N/A 2023    Procedure: COLONOSCOPY;  Surgeon: Angel Turner MD;  Location: [facility] (2ND FLR);  Service: Endoscopy;  Laterality: N/A;  Ref By:  Maty Gayle MD  Procedure: Colonoscopy  Diagnosis: endounter for preventive health   Procedure Timing: pt. pref  Provider: Dr. turner   [place] [facility]   Prep  Specifications: Standard prep   Additional Info: Dialysis patient lab pending    CYSTOSCOPY W/ RETROGRADES Bilateral 8/20/2020    Procedure: CYSTOSCOPY, WITH RETROGRADE PYELOGRAM;  Surgeon: Douglas Abraham MD;  Location: [facility] 1ST FLR;  Service: Urology;  Laterality: Bilateral;  45 min    FISTULOGRAM Right 9/9/2020    Procedure: Fistulogram;  Surgeon: Lester Gómez MD;  Location: [facility] CATH LAB;  Service: Nephrology;  Laterality: Right;    HYSTERECTOMY      IMPLANTATION OF COCHLEAR PROSTHESIS Right 6/28/2021    Procedure: INSERTION, PROSTHESIS, COCHLEAR;  Surgeon: Ramiro Zuleta MD;  Location: [facility] OR 2ND FLR;  Service: ENT;  Laterality: Right;  COCHLEAR BABAK    IMPLANTATION OF COCHLEAR PROSTHESIS Right 6/30/2021    Procedure: INSERTION, PROSTHESIS, COCHLEAR;  Surgeon: Ramiro Zuleta MD;  Location: [facility] OR 2ND FLR;  Service: ENT;  Laterality: Right;  COCHLEAR AMERICAS    ROBOT-ASSISTED LAPAROSCOPIC ABDOMINAL HYSTERECTOMY USING DA NATALYA XI N/A 7/5/2019    Procedure: XI ROBOTIC HYSTERECTOMY;  Surgeon: Trice Lei MD;  Location: [facility];  Service: OB/GYN;  Laterality: N/A;    ROBOT-ASSISTED LAPAROSCOPIC NEPHRECTOMY Right 10/8/2020    Procedure: ROBOTIC NEPHRECTOMY;  Surgeon: Douglas Abraham MD;  Location: [facility] OR 2ND FLR;  Service: Urology;  Laterality: Right;  3.5hrs gen with regional     ROBOT-ASSISTED LAPAROSCOPIC SALPINGO-OOPHORECTOMY USING DA NATALYA XI Bilateral 7/5/2019    Procedure: XI ROBOTIC SALPINGO-OOPHORECTOMY;  Surgeon: Trice Lei MD;  Location: [facility];  Service: OB/GYN;  Laterality: Bilateral;    TUBAL LIGATION      Vascular access surgeries      x5       Review of patient's allergies indicates:  No Known Allergies    No current facility-administered medications on file prior to encounter.     Current Outpatient Medications on File Prior to Encounter   Medication Sig    ACCU-CHEK GUIDE GLUCOSE METER Misc     ACCU-CHEK GUIDE L1-L2 CTRL SOL Soln     ACCU-CHEK GUIDE TEST STRIPS Strp      "ACCU-CHEK SOFTCLIX LANCETS Misc     acetaminophen (TYLENOL) 500 MG tablet Take 1 tablet (500 mg total) by mouth every 8 (eight) hours as needed for Pain.    amLODIPine (NORVASC) 10 MG tablet Take 1 tablet (10 mg total) by mouth nightly.    aspirin (ECOTRIN) 81 MG EC tablet Take 81 mg by mouth every morning.    atorvastatin (LIPITOR) 40 MG tablet Take 1 tablet (40 mg total) by mouth once daily. (Patient taking differently: Take 40 mg by mouth every morning.)    atovaquone (MEPRON) 750 mg/5 mL Susp oral liquid Take 10 mLs (1,500 mg total) by mouth once daily.    DROPSAFE ALCOHOL PREP PADS PadM Apply topically.    epoetin shiela (PROCRIT) 10,000 unit/mL injection Inject 0.85 mLs (8,500 Units total) into the skin every Mon, Wed, Fri.    folic acid (FOLVITE) 1 MG tablet Take 1 tablet (1 mg total) by mouth once daily.    insulin glargine U-100, Lantus, 100 unit/mL (3 mL) SubQ InPn pen Inject 5 Units into the skin before breakfast.    LIDOcaine (LIDODERM) 5 % Place 1 patch onto the skin once daily. Remove & Discard patch within 12 hours or as directed by MD    losartan (COZAAR) 100 MG tablet Take 1 tablet (100 mg total) by mouth once daily.    omeprazole (PRILOSEC) 20 MG capsule Take 1 capsule (20 mg total) by mouth once daily.    pen needle, diabetic 31 gauge x 3/16" Ndle Use to inject insulin into the skin.    predniSONE (DELTASONE) 20 MG tablet Take 4.5 tablets (90 mg total) by mouth once daily. Follow up with your hematologist to arrange for a taper    pregabalin (LYRICA) 75 MG capsule Take 1 capsule (75 mg total) by mouth once daily.    sevelamer carbonate (RENVELA) 800 mg Tab Take 3 tablets (2,400 mg total) by mouth 3 (three) times daily with meals.     Family History       Problem Relation (Age of Onset)    Cancer Mother, Sister, Sister    Diabetes Sister, Brother    Lung cancer Sister    Macular degeneration Sister    Ovarian cancer Mother, Sister, Sister          Tobacco Use    Smoking status: Never    Smokeless " tobacco: Never   Substance and Sexual Activity    Alcohol use: No    Drug use: No    Sexual activity: Yes     Partners: Male     Birth control/protection: Post-menopausal     Review of Systems   Constitutional:  Negative for appetite change, chills, diaphoresis, fatigue and fever.   Respiratory:  Negative for apnea, cough, chest tightness, shortness of breath (Resolved upon my examination) and wheezing.    Cardiovascular:  Positive for leg swelling. Negative for chest pain and palpitations.   Gastrointestinal:  Negative for abdominal distention, abdominal pain, blood in stool, constipation, diarrhea, nausea, rectal pain and vomiting.   Genitourinary:  Positive for decreased urine volume (Makes very little urine at baseline). Negative for dysuria.   Musculoskeletal:  Negative for back pain and myalgias.   Neurological:  Negative for dizziness, seizures, syncope, weakness, light-headedness, numbness and headaches.     Objective:     Vital Signs (Most Recent):  Temp: 98.6 °F (37 °C) (08/31/24 2214)  Pulse: 90 (09/01/24 0329)  Resp: 20 (09/01/24 0309)  BP: (!) 212/93 (09/01/24 0319)  SpO2: (!) 92 % (09/01/24 0309) Vital Signs (24h Range):  Temp:  [98.6 °F (37 °C)] 98.6 °F (37 °C)  Pulse:  [87-96] 90  Resp:  [16-20] 20  SpO2:  [92 %-96 %] 92 %  BP: (180-229)/() 212/93     Weight: 88 kg (194 lb)  Body mass index is 33.3 kg/m².     Physical Exam  Constitutional:       General: She is not in acute distress.     Appearance: Normal appearance. She is obese. She is not ill-appearing, toxic-appearing or diaphoretic.   HENT:      Head: Normocephalic and atraumatic.      Right Ear: External ear normal.      Left Ear: External ear normal.      Ears:      Comments: Hard of hearing     Nose: Nose normal.      Mouth/Throat:      Mouth: Mucous membranes are moist.      Pharynx: Oropharynx is clear.   Eyes:      Extraocular Movements: Extraocular movements intact.   Cardiovascular:      Rate and Rhythm: Normal rate and regular  rhythm.      Pulses: Normal pulses.      Heart sounds: Normal heart sounds. No murmur heard.     No friction rub. No gallop.   Pulmonary:      Effort: Pulmonary effort is normal. No respiratory distress.      Breath sounds: Normal breath sounds. No stridor. No wheezing, rhonchi or rales.   Chest:      Chest wall: No tenderness.   Abdominal:      General: Bowel sounds are normal. There is no distension.      Palpations: Abdomen is soft. There is no mass.      Tenderness: There is no abdominal tenderness. There is no guarding or rebound.      Hernia: No hernia is present.   Musculoskeletal:      Cervical back: Normal range of motion and neck supple.      Right lower leg: Edema (pedal edema) present.      Left lower leg: Edema (pedal edema) present.      Comments: Fistula present in right upper arm   Skin:     General: Skin is warm and dry.   Neurological:      Mental Status: She is alert and oriented to person, place, and time.   Psychiatric:         Mood and Affect: Mood normal.         Behavior: Behavior normal.         Thought Content: Thought content normal.         Judgment: Judgment normal.                Significant Labs: All pertinent labs within the past 24 hours have been reviewed.    Significant Imaging: I have reviewed all pertinent imaging results/findings within the past 24 hours.  Assessment/Plan:     * Diabetic ketoacidosis without coma associated with type 2 diabetes mellitus  Patient with elevated glucose and anion gap of 20 on admission. Asymptomatic at this time.     Plan:  - Patient started on Sub Q insulin: 12 U Lantus and 10 U Regular insulin;   - F/u BMP q4h, vitals q4h, POCT glucose q1h, CBC, CMP, Troponin, BHB, VBG        SOB (shortness of breath)  Patient initially complaining of SOB that has resolved. Patient is sating at 96% on RA.  CXR: Improving infiltrates. Correlate for improving pulmonary edema.   Physical Exam: Lungs CTA  Well's Score: 0  Bedside US of BL LE: negative for blood  clots    Plan:  - Will monitor for now      Bilateral swelling of feet and ankles  Patient stated she noticed both of her feet swelling after she started prednisone, 1 week ago.   Well's Score: 0  Bedside US of BL LE: negative for blood clots    Plan:  - Continue to monitor for now.    ESRD (end stage renal disease) on dialysis  Creatine stable for now. BMP reviewed- noted Estimated Creatinine Clearance: 6.5 mL/min (A) (based on SCr of 9.8 mg/dL (H)). according to latest data. Based on current GFR, CKD stage is end stage.  Monitor UOP and serial BMP and adjust therapy as needed. Renally dose meds. Avoid nephrotoxic medications and procedures.    Plan:  - ED contacted Dr. Guillory who stated patient will go for dialysis in the morning (9/1).   - Nephrology consulted as above; recommendations appreciated  - Continue home [place]     Essential hypertension  Chronic, uncontrolled. Latest blood pressure and vitals reviewed-     Temp:  [98.5 °F (36.9 °C)-98.6 °F (37 °C)]   Pulse:  [87-96]   Resp:  [16-20]   BP: (180-229)/()   SpO2:  [92 %-97 %] .   Home meds for hypertension were reviewed and noted below.   Hypertension Medications               amLODIPine (NORVASC) 10 MG tablet Take 1 tablet (10 mg total) by mouth nightly.    losartan (COZAAR) 100 MG tablet Take 1 tablet (100 mg total) by mouth once daily.            While in the hospital, will manage blood pressure as follows; Continue home antihypertensive regimen    Will utilize p.r.n. blood pressure medication only if patient's blood pressure greater than 220/110 and she develops symptoms such as worsening chest pain or shortness of breath.    Patient with elevated BP to 220s/100s, s/p IV labetalol 20 mg once and IV Hydralazine 20 mg once.    Plan:  - Continue home meds  - Monitor BP closely  - Can give IV Labetalol 20 mg once again if BP no responding    AIHA (autoimmune hemolytic anemia)  Anemia is likely due to chronic disease due to ESRD. Most recent  hemoglobin and hematocrit are listed below.  Recent Labs     09/01/24  0000   HGB 7.8*   HCT 23.5*     Plan  - Monitor serial CBC: Daily  - Transfuse PRBC if patient becomes hemodynamically unstable, symptomatic or H/H drops below 7/21.  - Patient has not received any PRBC transfusions to date  - Patient's anemia is currently stable  - Continue Home Prednisone and Folic acid     Type 2 diabetes mellitus  See plan as in DKA.    GERD (gastroesophageal reflux disease)  Patient on omeprazole 20 mg qd at home.    Plan:  - Continue home meds.      Elevated cholesterol with elevated triglycerides  Patient takes atorvastatin 40 mg qd at home.    Plan:   - Continue home meds        VTE Risk Mitigation (From admission, onward)           Ordered     heparin (porcine) injection 5,000 Units  Every 8 hours (non-standard times)         09/01/24 0215     IP VTE HIGH RISK PATIENT  Once         09/01/24 0215     Place SCOTT hose  Until discontinued         09/01/24 0215     Place sequential compression device  Until discontinued         09/01/24 0215                                    _________________________________________  Latasha Garcia MD, PGY-1  [facility] - [place]

## 2024-09-01 NOTE — RESPIRATORY THERAPY
Pt. Difficult stick for VBG due to limited access.  Multiple tries by nurse, sample clotted off before testing.  ABG obtained for sample for reading. Results in EPIC.

## 2024-09-01 NOTE — ASSESSMENT & PLAN NOTE
Patient with elevated glucose and anion gap of 20 on admission. Asymptomatic at this time.     Plan:  - Patient started on Sub Q insulin: 12 U Lantus and 10 U Regular insulin;   - F/u BMP q4h, vitals q4h, POCT glucose q1h, CBC, CMP, Troponin, BHB, VBG

## 2024-09-01 NOTE — ASSESSMENT & PLAN NOTE
Patient stated she noticed both of her feet swelling after she started prednisone, 1 week ago.   Well's Score: 0  Bedside US of BL LE: negative for blood clots    Plan:  - Continue to monitor for now.

## 2024-09-01 NOTE — HPI
Ms.Gloria Hall is a 58 y/o female with a PMHx of ESRD on HD (MWF - last dialyzed on 8/30/24), HTN, T2DM with Insulin use, WAIHA, and GERD who presents to [place] ED with complaints of SOB. Patient states she had started feeling short of breath 20min prior to coming to the ED. She also states that she has recently been started on Prednisone for the WAIHA, 1 week ago, and now she has swollen BL feet. Upon my evaluation of patient, she states her SOB resolved, and she denies headaches, chest pain, calf pain, abdominal pain, N/V, diarrhea, constipation. She states she makes very little urine and has been on dialysis for 9-10 years. She reports being recently started on Lantus 5 units.     In the ED patient's glucose was elevated to 689, with an anion gap of 20, K+ 5.9, Na 130, BUN 99, Cr 9.8, H/H 7.8/23.5, WBC 9.37, Plt 172, and elevated troponin to 0.042. Vital signs: /104, 96 HR, Temp 98.6, SpO2 96% on RA. Her CXR showed: Improving infiltrates. Correlate for improving pulmonary edema. EKG showed normal sinus rhythm. She was started on 12 units Lantus and 10 U regular insulin, calcium gluconate, sodium bicarbonate, albuterol, and lokelma. [facility] was asked to admit this patient for treatment of DKA.

## 2024-09-02 VITALS
BODY MASS INDEX: 35.68 KG/M2 | TEMPERATURE: 98 F | SYSTOLIC BLOOD PRESSURE: 147 MMHG | HEART RATE: 77 BPM | WEIGHT: 209 LBS | DIASTOLIC BLOOD PRESSURE: 74 MMHG | RESPIRATION RATE: 18 BRPM | OXYGEN SATURATION: 100 % | HEIGHT: 64 IN

## 2024-09-02 LAB
ANION GAP SERPL CALC-SCNC: 21 MMOL/L (ref 8–16)
BASOPHILS # BLD AUTO: 0 K/UL (ref 0–0.2)
BASOPHILS # BLD AUTO: 0.01 K/UL (ref 0–0.2)
BASOPHILS NFR BLD: 0 % (ref 0–1.9)
BASOPHILS NFR BLD: 0.1 % (ref 0–1.9)
BUN SERPL-MCNC: 123 MG/DL (ref 8–23)
CALCIUM SERPL-MCNC: 9.4 MG/DL (ref 8.7–10.5)
CHLORIDE SERPL-SCNC: 92 MMOL/L (ref 95–110)
CO2 SERPL-SCNC: 21 MMOL/L (ref 23–29)
CREAT SERPL-MCNC: 12.1 MG/DL (ref 0.5–1.4)
DIFFERENTIAL METHOD BLD: ABNORMAL
DIFFERENTIAL METHOD BLD: ABNORMAL
EOSINOPHIL # BLD AUTO: 0 K/UL (ref 0–0.5)
EOSINOPHIL # BLD AUTO: 0 K/UL (ref 0–0.5)
EOSINOPHIL NFR BLD: 0 % (ref 0–8)
EOSINOPHIL NFR BLD: 0 % (ref 0–8)
ERYTHROCYTE [DISTWIDTH] IN BLOOD BY AUTOMATED COUNT: 18.4 % (ref 11.5–14.5)
ERYTHROCYTE [DISTWIDTH] IN BLOOD BY AUTOMATED COUNT: 18.7 % (ref 11.5–14.5)
EST. GFR  (NO RACE VARIABLE): 3 ML/MIN/1.73 M^2
GLUCOSE SERPL-MCNC: 312 MG/DL (ref 70–110)
HCT VFR BLD AUTO: 23 % (ref 37–48.5)
HCT VFR BLD AUTO: 23.7 % (ref 37–48.5)
HGB BLD-MCNC: 7.7 G/DL (ref 12–16)
HGB BLD-MCNC: 7.8 G/DL (ref 12–16)
IMM GRANULOCYTES # BLD AUTO: 0.03 K/UL (ref 0–0.04)
IMM GRANULOCYTES # BLD AUTO: 0.03 K/UL (ref 0–0.04)
IMM GRANULOCYTES NFR BLD AUTO: 0.3 % (ref 0–0.5)
IMM GRANULOCYTES NFR BLD AUTO: 0.3 % (ref 0–0.5)
LACTATE SERPL-SCNC: 1.6 MMOL/L (ref 0.5–2.2)
LYMPHOCYTES # BLD AUTO: 0.5 K/UL (ref 1–4.8)
LYMPHOCYTES # BLD AUTO: 0.5 K/UL (ref 1–4.8)
LYMPHOCYTES NFR BLD: 5.6 % (ref 18–48)
LYMPHOCYTES NFR BLD: 5.6 % (ref 18–48)
MAGNESIUM SERPL-MCNC: 2.7 MG/DL (ref 1.6–2.6)
MCH RBC QN AUTO: 27.6 PG (ref 27–31)
MCH RBC QN AUTO: 28.1 PG (ref 27–31)
MCHC RBC AUTO-ENTMCNC: 32.9 G/DL (ref 32–36)
MCHC RBC AUTO-ENTMCNC: 33.5 G/DL (ref 32–36)
MCV RBC AUTO: 84 FL (ref 82–98)
MCV RBC AUTO: 84 FL (ref 82–98)
MONOCYTES # BLD AUTO: 0.4 K/UL (ref 0.3–1)
MONOCYTES # BLD AUTO: 0.4 K/UL (ref 0.3–1)
MONOCYTES NFR BLD: 4 % (ref 4–15)
MONOCYTES NFR BLD: 4.1 % (ref 4–15)
NEUTROPHILS # BLD AUTO: 8 K/UL (ref 1.8–7.7)
NEUTROPHILS # BLD AUTO: 8 K/UL (ref 1.8–7.7)
NEUTROPHILS NFR BLD: 89.9 % (ref 38–73)
NEUTROPHILS NFR BLD: 90.1 % (ref 38–73)
NRBC BLD-RTO: 0 /100 WBC
NRBC BLD-RTO: 0 /100 WBC
OHS QRS DURATION: 88 MS
OHS QRS DURATION: 90 MS
OHS QTC CALCULATION: 430 MS
OHS QTC CALCULATION: 444 MS
PHOSPHATE SERPL-MCNC: 9 MG/DL (ref 2.7–4.5)
PLATELET # BLD AUTO: 143 K/UL (ref 150–450)
PLATELET # BLD AUTO: 161 K/UL (ref 150–450)
PMV BLD AUTO: 12.2 FL (ref 9.2–12.9)
PMV BLD AUTO: 12.2 FL (ref 9.2–12.9)
POCT GLUCOSE: 285 MG/DL (ref 70–110)
POTASSIUM SERPL-SCNC: 5.4 MMOL/L (ref 3.5–5.1)
RBC # BLD AUTO: 2.74 M/UL (ref 4–5.4)
RBC # BLD AUTO: 2.83 M/UL (ref 4–5.4)
SODIUM SERPL-SCNC: 134 MMOL/L (ref 136–145)
TROPONIN I SERPL DL<=0.01 NG/ML-MCNC: 0.04 NG/ML (ref 0–0.03)
WBC # BLD AUTO: 8.85 K/UL (ref 3.9–12.7)
WBC # BLD AUTO: 8.93 K/UL (ref 3.9–12.7)

## 2024-09-02 PROCEDURE — 80048 BASIC METABOLIC PNL TOTAL CA: CPT | Mod: HCNC

## 2024-09-02 PROCEDURE — 63600175 PHARM REV CODE 636 W HCPCS: Mod: JZ,EC,JG,HCNC | Performed by: INTERNAL MEDICINE

## 2024-09-02 PROCEDURE — 5A1D70Z PERFORMANCE OF URINARY FILTRATION, INTERMITTENT, LESS THAN 6 HOURS PER DAY: ICD-10-PCS | Performed by: HOSPITALIST

## 2024-09-02 PROCEDURE — 63600175 PHARM REV CODE 636 W HCPCS: Mod: HCNC

## 2024-09-02 PROCEDURE — 85025 COMPLETE CBC W/AUTO DIFF WBC: CPT | Mod: HCNC

## 2024-09-02 PROCEDURE — 83605 ASSAY OF LACTIC ACID: CPT | Mod: HCNC

## 2024-09-02 PROCEDURE — 93005 ELECTROCARDIOGRAM TRACING: CPT | Mod: HCNC

## 2024-09-02 PROCEDURE — 36415 COLL VENOUS BLD VENIPUNCTURE: CPT | Mod: HCNC

## 2024-09-02 PROCEDURE — 93010 ELECTROCARDIOGRAM REPORT: CPT | Mod: HCNC,,, | Performed by: STUDENT IN AN ORGANIZED HEALTH CARE EDUCATION/TRAINING PROGRAM

## 2024-09-02 PROCEDURE — 25000003 PHARM REV CODE 250: Mod: HCNC

## 2024-09-02 PROCEDURE — 99900035 HC TECH TIME PER 15 MIN (STAT): Mod: HCNC

## 2024-09-02 PROCEDURE — 84484 ASSAY OF TROPONIN QUANT: CPT | Mod: HCNC

## 2024-09-02 PROCEDURE — 83735 ASSAY OF MAGNESIUM: CPT | Mod: HCNC

## 2024-09-02 PROCEDURE — 84100 ASSAY OF PHOSPHORUS: CPT | Mod: HCNC

## 2024-09-02 PROCEDURE — 94761 N-INVAS EAR/PLS OXIMETRY MLT: CPT | Mod: HCNC

## 2024-09-02 RX ORDER — PREDNISONE 20 MG/1
TABLET ORAL
Qty: 61 TABLET | Refills: 0 | Status: SHIPPED | OUTPATIENT
Start: 2024-09-02 | End: 2024-10-04

## 2024-09-02 RX ORDER — INSULIN GLARGINE 100 [IU]/ML
10 INJECTION, SOLUTION SUBCUTANEOUS NIGHTLY
Status: DISCONTINUED | OUTPATIENT
Start: 2024-09-02 | End: 2024-09-02

## 2024-09-02 RX ORDER — INSULIN ASPART 100 [IU]/ML
0-5 INJECTION, SOLUTION INTRAVENOUS; SUBCUTANEOUS
Qty: 3 ML | Refills: 0 | Status: SHIPPED | OUTPATIENT
Start: 2024-09-02 | End: 2024-09-02

## 2024-09-02 RX ORDER — PREDNISONE 20 MG/1
TABLET ORAL
Qty: 61 TABLET | Refills: 0 | Status: SHIPPED | OUTPATIENT
Start: 2024-09-02 | End: 2024-09-02

## 2024-09-02 RX ORDER — INSULIN GLARGINE 100 [IU]/ML
12 INJECTION, SOLUTION SUBCUTANEOUS NIGHTLY
Status: DISCONTINUED | OUTPATIENT
Start: 2024-09-02 | End: 2024-09-02 | Stop reason: HOSPADM

## 2024-09-02 RX ORDER — INSULIN GLARGINE 100 [IU]/ML
12 INJECTION, SOLUTION SUBCUTANEOUS DAILY
Qty: 7.2 ML | Refills: 0 | Status: SHIPPED | OUTPATIENT
Start: 2024-09-02 | End: 2024-11-01

## 2024-09-02 RX ORDER — INSULIN PUMP SYRINGE, 3 ML
EACH MISCELLANEOUS
Qty: 1 EACH | Refills: 0 | Status: SHIPPED | OUTPATIENT
Start: 2024-09-02 | End: 2025-09-02

## 2024-09-02 RX ORDER — LABETALOL 200 MG/1
200 TABLET, FILM COATED ORAL EVERY 12 HOURS
Qty: 60 TABLET | Refills: 0 | Status: SHIPPED | OUTPATIENT
Start: 2024-09-02 | End: 2024-09-02

## 2024-09-02 RX ORDER — MUPIROCIN 20 MG/G
OINTMENT TOPICAL 2 TIMES DAILY
Status: CANCELLED | OUTPATIENT
Start: 2024-09-02 | End: 2024-09-07

## 2024-09-02 RX ORDER — SODIUM CHLORIDE 9 MG/ML
INJECTION, SOLUTION INTRAVENOUS
Status: CANCELLED | OUTPATIENT
Start: 2024-09-02

## 2024-09-02 RX ORDER — INSULIN GLARGINE 100 [IU]/ML
12 INJECTION, SOLUTION SUBCUTANEOUS DAILY
Qty: 7.2 ML | Refills: 0 | Status: SHIPPED | OUTPATIENT
Start: 2024-09-02 | End: 2024-09-02

## 2024-09-02 RX ORDER — SODIUM CHLORIDE 9 MG/ML
INJECTION, SOLUTION INTRAVENOUS ONCE
Status: CANCELLED | OUTPATIENT
Start: 2024-09-02 | End: 2024-09-02

## 2024-09-02 RX ORDER — LANCETS
EACH MISCELLANEOUS
Qty: 200 EACH | Refills: 0 | Status: SHIPPED | OUTPATIENT
Start: 2024-09-02

## 2024-09-02 RX ORDER — LABETALOL 200 MG/1
200 TABLET, FILM COATED ORAL EVERY 12 HOURS
Qty: 60 TABLET | Refills: 0 | Status: SHIPPED | OUTPATIENT
Start: 2024-09-02 | End: 2025-09-02

## 2024-09-02 RX ORDER — INSULIN ASPART 100 [IU]/ML
0-5 INJECTION, SOLUTION INTRAVENOUS; SUBCUTANEOUS
Qty: 3 ML | Refills: 0 | Status: SHIPPED | OUTPATIENT
Start: 2024-09-02 | End: 2024-11-01

## 2024-09-02 RX ADMIN — PANTOPRAZOLE SODIUM 40 MG: 40 TABLET, DELAYED RELEASE ORAL at 09:09

## 2024-09-02 RX ADMIN — SEVELAMER CARBONATE 2400 MG: 800 TABLET, FILM COATED ORAL at 08:09

## 2024-09-02 RX ADMIN — INSULIN ASPART 3 UNITS: 100 INJECTION, SOLUTION INTRAVENOUS; SUBCUTANEOUS at 06:09

## 2024-09-02 RX ADMIN — EPOETIN ALFA-EPBX 10000 UNITS: 10000 INJECTION, SOLUTION INTRAVENOUS; SUBCUTANEOUS at 02:09

## 2024-09-02 RX ADMIN — LABETALOL HYDROCHLORIDE 200 MG: 100 TABLET, FILM COATED ORAL at 09:09

## 2024-09-02 RX ADMIN — ASPIRIN 81 MG: 81 TABLET, COATED ORAL at 06:09

## 2024-09-02 RX ADMIN — LOSARTAN POTASSIUM 100 MG: 50 TABLET, FILM COATED ORAL at 09:09

## 2024-09-02 RX ADMIN — FOLIC ACID 1 MG: 1 TABLET ORAL at 09:09

## 2024-09-02 RX ADMIN — ATORVASTATIN CALCIUM 40 MG: 40 TABLET, FILM COATED ORAL at 06:09

## 2024-09-02 RX ADMIN — PREDNISONE 60 MG: 20 TABLET ORAL at 09:09

## 2024-09-02 RX ADMIN — HEPARIN SODIUM 5000 UNITS: 5000 INJECTION INTRAVENOUS; SUBCUTANEOUS at 04:09

## 2024-09-02 NOTE — NURSING
Daughter called after pt had been discharged stating that pt does not know her sliding scale. RN asked pt prior to d/c if she knew about her sliding scale..the patient said yes. Informed daughter to contact PCP tomorrow for further diabetic teaching

## 2024-09-02 NOTE — PLAN OF CARE
SW met with pt to complete final d/c assessment. Pt will call her drt [place] [phone] after HD to have her help transport her home. SW requested f/u appts. Pt did not have any additional questions or concerns for sw. Rounds completed on pt. All questions addressed. Bedside nurse to discuss d/c medications. Discussed importance to attend all f/u appts and take medications as prescribed. Verbalized understanding. Case Management to sign off.     Lion Xavier, MSW  [phone]    Future Appointments   Date Time Provider Department Center   9/3/2024  8:10 AM LAB, APPOINTMENT [place] [facility]   9/10/2024  8:05 AM LAB, APPOINTMENT [place] [facility]   9/12/2024  9:15 AM Antione Ramsey MD [facility]   9/17/2024  8:10 AM LAB, APPOINTMENT [place] [facility]   9/24/2024  8:10 AM LAB, APPOINTMENT [place] [facility]   10/1/2024  8:05 AM LAB, APPOINTMENT [facility]   10/8/2024  8:05 AM LAB, APPOINTMENT [place] [facility]   10/15/2024  8:10 AM LAB, APPOINTMENT [facility] [place] [facility]   10/17/2024  2:00 PM Mojgan Gtz MD [facility]   11/8/2024  9:00 AM [facility] MRI1 350 LB LIMIT [facility] MRI [facility]   11/12/2024  9:15 AM Douglas Abraham MD [facility] UROLOG [facility]        09/02/24 0841   Final Note   Assessment Type Final Discharge Note   Anticipated Discharge Disposition Home   Post-Acute Status   Coverage [employer]   Discharge Delays None known at this time

## 2024-09-02 NOTE — PLAN OF CARE
AAOx4; POC reviewed & verbalizes understanding.  Admit DX: DKA   Afebrile. No acute events on shift. No deficits noted  IV access & IVF: PIV saline locked  BM: 9/1/2024  : anuria  Appetite: adequate  HD today: pulled off 1.5L; tolerated well  Bed alarm set; fall precautions maintained.   Bed locked in lowest position, side rails up x2, call light within reach, environment clear. Encouraged pt to call nurse with any concerns.  Safety measures maintained    D/C to home. PIV removed & D/C paperwork handed to pt

## 2024-09-02 NOTE — HOSPITAL COURSE
Patient admitted for hyperglycemia in setting of new prednisone use for warm agglutinin AIHA. Patient presented with SOB and lower extremity swelling. Blood glucose just under 700 on admission with anion gap. BHB negative, ABG 7.47. Received regular insulin in ED as well as long acting insulin with improvement in sugars to 200s. Prednisone continued at 60mg per Hematology office visit note. Patient received HD while inpatient 9/2.   On the day of discharge patient was back to baseline and hemodynamically stable. She was sent home to resume home meds (as below). Prednisone decreased to 60mg and provided instructions on taper per outpatient Hematologist. Lantus increased to 12U with aspart sliding scale with meals as patient with elevated sugars in setting of prednisone. Close outpatient f/u was advised with PCP and hematologist to closely monitor sugars and insulin requirements. Instructed patient to keep blood glucose log to bring to PCP visit. The importance of medication adherence was again stressed to the patient. Patient agreeable to discharge plan, expressed understanding, all questions answered, return precautions were discussed.

## 2024-09-02 NOTE — PROGRESS NOTES
09/02/24 1426   Vital Signs   Temp 98 °F (36.7 °C)   Temp Source Temporal   Pulse 77   Heart Rate Source Monitor   Resp 18   BP (!) 147/74   Post-Hemodialysis Assessment   Rinseback Volume (mL) 250 mL   Blood Volume Processed (Liters) 54 L   Dialyzer Clearance Clear   Duration of Treatment 180 minutes   Additional Fluid Intake (mL) 250 mL   Total UF (mL) 2000 mL   Net Fluid Removal 1500   Patient Response to Treatment tolerated   Post-Hemodialysis Comments post HD report given to the primary nurse

## 2024-09-02 NOTE — PLAN OF CARE
AVS and educational attachments shared with patient via [employer]. Discussed thoroughly. Patient verbalized clear understanding using teach back method. Notified bedside nurse of completion of education. At present no distress noted. Patient to be discharged via w/c with escort service and family with all of patient's belonings. Will cont to be available to patient and family and intervene prn.

## 2024-09-02 NOTE — CONSULTS
NEPHROLOGY CONSULT NOTE    HPI & INTERVAL HISTORY:    Past Medical History:   Diagnosis Date    Acute hyperkalemia 2020    Acute on chronic diastolic heart failure 10/08/2021    Anemia of chronic disorder 2017    Overview:  Added automatically from request for surgery [id number]    Anemia of chronic renal failure, stage 5     Anxiety     Cyst of left ovary 2019    Cyst of left ovary 2019    Dyslipidemia 2013    Encounter for screening colonoscopy 10/08/2020    End-stage renal disease on hemodialysis 2020    ESRD on hemodialysis 2017    Hematuria 10/30/2019    History of COVID-19 2020    Hospital discharge follow-up 10/06/2023    Hx of sinus bradycardia 2017    Hyperkalemia 2017    Hypertensive urgency 2021    Peripheral neuropathy 2013    Preoperative testing 10/08/2020    Renovascular hypertension 2020    S/P dilation and curettage 2018    Screening for colon cancer 2023    Thickened endometrium 2018    Thyroid nodule     Thyroid nodule 2021    Uncontrolled type 2 diabetes mellitus 2013    Overview:  poc a1c today 11.2-270/ up from 10.8-263, Pt. has very very stressed/ was incarcerated/marital stress/ will current meds/ less stress now/ will monitor.      Past Surgical History:   Procedure Laterality Date    AV FISTULA PLACEMENT      right arm     SECTION      x1    COLONOSCOPY N/A 2018    Procedure: COLONOSCOPY;  Surgeon: Pankaj Hinojosa MD;  [facility]  Service: Endoscopy;  Laterality: N/A;  dialysis pt/labs prior to colon/MS    COLONOSCOPY N/A 2023    Procedure: COLONOSCOPY;  Surgeon: Angel Turner MD;  Location: [facility] (2ND FLR);  Service: Endoscopy;  Laterality: N/A;  Ref By:  Maty Gayle MD  Procedure: Colonoscopy  Diagnosis: endounter for preventive health   Procedure Timing: pt. pref  Provider: Dr. turner   Location: [facility]-Endo   Prep Specifications: Standard  prep   Additional Info: Dialysis patient lab pending    CYSTOSCOPY W/ RETROGRADES Bilateral 8/20/2020    Procedure: CYSTOSCOPY, WITH RETROGRADE PYELOGRAM;  Surgeon: Douglas Abraham MD;  Location: [facility] OR 1ST FLR;  Service: Urology;  Laterality: Bilateral;  45 min    FISTULOGRAM Right 9/9/2020    Procedure: Fistulogram;  Surgeon: Lester Gómez MD;  Location: [facility] CATH LAB;  Service: Nephrology;  Laterality: Right;    HYSTERECTOMY      IMPLANTATION OF COCHLEAR PROSTHESIS Right 6/28/2021    Procedure: INSERTION, PROSTHESIS, COCHLEAR;  Surgeon: Ramiro Zuleta MD;  Location: [facility] OR 2ND FLR;  Service: ENT;  Laterality: Right;  COCHLEAR BABAK    IMPLANTATION OF COCHLEAR PROSTHESIS Right 6/30/2021    Procedure: INSERTION, PROSTHESIS, COCHLEAR;  Surgeon: Ramiro Zuleta MD;  Location: [facility] OR 2ND FLR;  Service: ENT;  Laterality: Right;  COCHLEAR AMERICAS    ROBOT-ASSISTED LAPAROSCOPIC ABDOMINAL HYSTERECTOMY USING DA NATALYA XI N/A 7/5/2019    Procedure: XI ROBOTIC HYSTERECTOMY;  Surgeon: Trice Lei MD;  Location: [facility];  Service: OB/GYN;  Laterality: N/A;    ROBOT-ASSISTED LAPAROSCOPIC NEPHRECTOMY Right 10/8/2020    Procedure: ROBOTIC NEPHRECTOMY;  Surgeon: Douglas Abraham MD;  Location: [facility] OR 2ND FLR;  Service: Urology;  Laterality: Right;  3.5hrs gen with regional     ROBOT-ASSISTED LAPAROSCOPIC SALPINGO-OOPHORECTOMY USING DA NATALYA XI Bilateral 7/5/2019    Procedure: XI ROBOTIC SALPINGO-OOPHORECTOMY;  Surgeon: Trice Lei MD;  Location: [facility];  Service: OB/GYN;  Laterality: Bilateral;    TUBAL LIGATION      Vascular access surgeries      x5      Review of patient's allergies indicates:  No Known Allergies   Medications Prior to Admission   Medication Sig Dispense Refill Last Dose    ACCU-CHEK GUIDE GLUCOSE METER Misc        ACCU-CHEK GUIDE L1-L2 CTRL SOL Soln        ACCU-CHEK GUIDE TEST STRIPS Strp        ACCU-CHEK SOFTCLIX LANCETS Misc        acetaminophen (TYLENOL) 500 MG tablet Take 1 tablet (500 mg  "total) by mouth every 8 (eight) hours as needed for Pain. 20 tablet 0     amLODIPine (NORVASC) 10 MG tablet Take 1 tablet (10 mg total) by mouth nightly. 90 tablet 3     aspirin (ECOTRIN) 81 MG EC tablet Take 81 mg by mouth every morning.       atorvastatin (LIPITOR) 40 MG tablet Take 1 tablet (40 mg total) by mouth once daily. (Patient taking differently: Take 40 mg by mouth every morning.) 90 tablet 3     atovaquone (MEPRON) 750 mg/5 mL Susp oral liquid Take 10 mLs (1,500 mg total) by mouth once daily. 300 mL 2     DROPSAFE ALCOHOL PREP PADS PadM Apply topically.       epoetin shiela (PROCRIT) 10,000 unit/mL injection Inject 0.85 mLs (8,500 Units total) into the skin every Mon, Wed, Fri.       folic acid (FOLVITE) 1 MG tablet Take 1 tablet (1 mg total) by mouth once daily. 90 tablet 3     LIDOcaine (LIDODERM) 5 % Place 1 patch onto the skin once daily. Remove & Discard patch within 12 hours or as directed by MD 14 patch 0     losartan (COZAAR) 100 MG tablet Take 1 tablet (100 mg total) by mouth once daily. 90 tablet 3     omeprazole (PRILOSEC) 20 MG capsule Take 1 capsule (20 mg total) by mouth once daily. 90 capsule 3     pen needle, diabetic 31 gauge x 3/16" Ndle Use to inject insulin into the skin. 100 each 0     pregabalin (LYRICA) 75 MG capsule Take 1 capsule (75 mg total) by mouth once daily. 90 capsule 3     sevelamer carbonate (RENVELA) 800 mg Tab Take 3 tablets (2,400 mg total) by mouth 3 (three) times daily with meals. 270 tablet 3     [DISCONTINUED] insulin glargine U-100, Lantus, 100 unit/mL (3 mL) SubQ InPn pen Inject 5 Units into the skin before breakfast. 3 mL 11     [DISCONTINUED] predniSONE (DELTASONE) 20 MG tablet Take 4.5 tablets (90 mg total) by mouth once daily. Follow up with your hematologist to arrange for a taper 135 tablet 0        Social History     Socioeconomic History    Marital status:     Number of children: 2   Occupational History    Occupation: disability   Tobacco Use    " Smoking status: Never    Smokeless tobacco: Never   Substance and Sexual Activity    Alcohol use: No    Drug use: No    Sexual activity: Yes     Partners: Male     Birth control/protection: Post-menopausal     Social Determinants of Health     Financial Resource Strain: Low Risk  (8/12/2024)    Overall Financial Resource Strain (CARDIA)     Difficulty of Paying Living Expenses: Not hard at all   Food Insecurity: No Food Insecurity (8/12/2024)    Hunger Vital Sign     Worried About Running Out of Food in the Last Year: Never true     Ran Out of Food in the Last Year: Never true   Transportation Needs: No Transportation Needs (8/12/2024)    TRANSPORTATION NEEDS     Transportation : No   Physical Activity: Sufficiently Active (8/12/2024)    Exercise Vital Sign     Days of Exercise per Week: 7 days     Minutes of Exercise per Session: 30 min   Stress: No Stress Concern Present (8/12/2024)    [employer] [employer] - Occupational Stress Questionnaire     Feeling of Stress : Not at all   Housing Stability: Low Risk  (8/12/2024)    Housing Stability Vital Sign     Unable to Pay for Housing in the Last Year: No     Homeless in the Last Year: No        MEDS   amLODIPine  10 mg Oral Nightly    aspirin  81 mg Oral QAM    atorvastatin  40 mg Oral QAM    epoetin shiela-epbx  10,000 Units Subcutaneous Once    folic acid  1 mg Oral Daily    heparin (porcine)  5,000 Units Subcutaneous Q8H    insulin glargine U-100  12 Units Subcutaneous QHS    labetaloL  200 mg Oral Q12H    losartan  100 mg Oral Daily    pantoprazole  40 mg Oral Daily    polyethylene glycol  17 g Oral Daily    predniSONE  60 mg Oral Daily    senna-docusate 8.6-50 mg  1 tablet Oral BID    sevelamer carbonate  2,400 mg Oral TID WM             CONTINOUS INFUSIONS:    No intake or output data in the 24 hours ending 09/02/24 1232     HEMODYNAMICS:    Temp:  [97.6 °F (36.4 °C)-98 °F (36.7 °C)] 97.7 °F (36.5 °C)  Pulse:  [72-81] 72  Resp:  [18-19]  18  SpO2:  [95 %-100 %] 100 %  BP: (125-193)/(59-85) 133/60   General:   No SOB  No cough  No CP  Cardiology : pulse 72  Pulmonary : RR 18  Pulse oximeter 100 % O2  Abdomen soft  Extremities : edema   Skin: dry   LABS   Lab Results   Component Value Date    WBC 8.93 09/02/2024    WBC 8.85 09/02/2024    HGB 7.8 (L) 09/02/2024    HGB 7.7 (L) 09/02/2024    HCT 23.7 (L) 09/02/2024    HCT 23.0 (L) 09/02/2024    MCV 84 09/02/2024    MCV 84 09/02/2024     09/02/2024     (L) 09/02/2024        Recent Labs   Lab 09/02/24  0356   *   CALCIUM 9.4   *   K 5.4*   CO2 21*   CL 92*   *   CREATININE 12.1*      Lab Results   Component Value Date    .7 (H) 01/23/2023    CALCIUM 9.4 09/02/2024    PHOS 9.0 (HH) 09/02/2024      Lab Results   Component Value Date    IRON 117 08/22/2024    TIBC 323 08/22/2024    FERRITIN 3,164 (H) 08/22/2024        ABG  Recent Labs   Lab 09/01/24  0645   PH 7.477*   PO2 63.4*   PCO2 39.0   HCO3 28.8*         IMAGING:  CXR    ASSESSMENT / PLAN  ESRD  Left arm AV fistula  K 5.4  Hyponatremia  AA Metabolic Acidosis  Metabolic bone disease  Hyperphosphatemia  Anemia multifactorial  Hb 7.7  Poor nutrition  Albumin 3  Blood pressure 133/60  EF 60 % in 2022  CXR   Improving infiltrates. Correlate for improving pulmonary   edema  Weight daily  I and O  Renal diet as tolerated  Dialysis

## 2024-09-02 NOTE — PLAN OF CARE
VN note: VN completed AVS and attachments and notified bedside nurse,Karen. Will cont to be available and intervene prn.

## 2024-09-02 NOTE — DISCHARGE SUMMARY
[facility]  Discharge Summary      Patient Name: Jael Hall  MRN: [id number]  CHUCK: [id number]  Patient Class: IP- Inpatient  Admission Date: 8/31/2024  Hospital Length of Stay: 1 days  Discharge Date and Time: No discharge date for patient encounter.  Attending Physician: Maurice Olvera,*   Discharging Provider: Jessica Troncoso MD  Primary Care Provider: Maty Gayle DO    Primary Care Team: Networked reference to record PCT     HPI:   Ms.Gloria Hall is a 58 y/o female with a PMHx of ESRD on HD (MWF - last dialyzed on 8/30/24), HTN, T2DM with Insulin use, WAIHA, and GERD who presents to [place] ED with complaints of SOB. Patient states she had started feeling short of breath 20min prior to coming to the ED. She also states that she has recently been started on Prednisone for the WAIHA, 1 week ago, and now she has swollen BL feet. Upon my evaluation of patient, she states her SOB resolved, and she denies headaches, chest pain, calf pain, abdominal pain, N/V, diarrhea, constipation. She states she makes very little urine and has been on dialysis for 9-10 years. She reports being recently started on Lantus 5 units.     In the ED patient's glucose was elevated to 689, with an anion gap of 20, K+ 5.9, Na 130, BUN 99, Cr 9.8, H/H 7.8/23.5, WBC 9.37, Plt 172, and elevated troponin to 0.042. Vital signs: /104, 96 HR, Temp 98.6, SpO2 96% on RA. Her CXR showed: Improving infiltrates. Correlate for improving pulmonary edema. EKG showed normal sinus rhythm. She was started on 12 units Lantus and 10 U regular insulin, calcium gluconate, sodium bicarbonate, albuterol, and lokelma. [facility] was asked to admit this patient for treatment of DKA.    * No surgery found *      Hospital Course:   Patient admitted for hyperglycemia in setting of new prednisone use for warm agglutinin AIHA. Patient presented with SOB and lower extremity swelling. Blood glucose just under 700 on  admission with anion gap. BHB negative, ABG 7.47. Received regular insulin in ED as well as long acting insulin with improvement in sugars to 200s. Prednisone continued at 60mg per Hematology office visit note. Patient received HD while inpatient 9/2.   On the day of discharge patient was back to baseline and hemodynamically stable. She was sent home to resume home meds (as below). Prednisone decreased to 60mg and provided instructions on taper per outpatient Hematologist. Lantus increased to 12U with aspart sliding scale with meals as patient with elevated sugars in setting of prednisone. Close outpatient f/u was advised with PCP and hematologist to closely monitor sugars and insulin requirements. Instructed patient to keep blood glucose log to bring to PCP visit. The importance of medication adherence was again stressed to the patient. Patient agreeable to discharge plan, expressed understanding, all questions answered, return precautions were discussed.        Goals of Care Treatment Preferences:  Code Status: Full Code          What is most important right now is to focus on remaining as independent as possible, symptom/pain control, extending life as long as possible, even it it means sacrificing quality.  Accordingly, we have decided that the best plan to meet the patient's goals includes continuing with treatment.      SDOH Screening:  The patient was screened for food insecurity, housing instability, transportation needs, utility difficulties, and interpersonal safety. The social determinant(s) of health identified as a concern this admission are:  Interpersonal Safety          Consults:   Consults (From admission, onward)          Status Ordering Provider     Inpatient consult to Nephrology  Once        Provider:  Angelic Guillory MD    Acknowledged JIAN LOPEZ            No new Assessment & Plan notes have been filed under this hospital service since the last note was generated.  Service: [facility]  Medicine    Final Active Diagnoses:    Diagnosis Date Noted POA    PRINCIPAL PROBLEM:  Diabetic ketoacidosis without coma associated with type 2 diabetes mellitus [E11.10] 09/01/2024 Unknown    SOB (shortness of breath) [R06.02] 09/01/2024 Yes    AIHA (autoimmune hemolytic anemia) [D59.10] 08/12/2024 Yes    Bilateral swelling of feet and ankles [M25.471, M25.474, M25.475, M25.472] 08/15/2022 Yes    Type 2 diabetes mellitus [E11.9] 07/27/2021 Yes    GERD (gastroesophageal reflux disease) [K21.9] 08/13/2020 Yes    Essential hypertension [I10]  Yes     Chronic    ESRD (end stage renal disease) on dialysis [N18.6, Z99.2] 09/28/2017 Not Applicable     Chronic    Elevated cholesterol with elevated triglycerides [E78.2] 09/28/2017 Yes     Chronic      Problems Resolved During this Admission:    Diagnosis Date Noted Date Resolved POA    Diabetic neuropathy [E11.40] 06/07/2013 09/01/2024 Yes       Discharged Condition: stable    Disposition: Home or Self Care    Follow Up:   Follow-up Information       Maty Gayle DO Follow up.    Specialty: Internal Medicine  Why: Follow up with PCP within one week of returning home.  Contact information:  [address]  [facility] [address] [phone]                           Patient Instructions:      ACCEPT - Ambulatory referral/consult to [facility]   Standing Status: Future   Referral Priority: Routine Referral Type: Consultation   Referral Reason: Specialty Services Required   Requested Specialty: Cardiology   Number of Visits Requested: 1     Ambulatory referral/consult to Diabetes Education   Standing Status: Future   Referral Priority: Routine Referral Type: Consultation   Referral Reason: Specialty Services Required   Requested Specialty: Diabetes   Number of Visits Requested: 1 Expiration Date: 09/02/25     Notify your health care provider if you experience any of the following:  difficulty breathing or increased cough     Notify your  health care provider if you experience any of the following:  increased confusion or weakness     Notify your health care provider if you experience any of the following:  redness, tenderness, or signs of infection (pain, swelling, redness, odor or green/yellow discharge around incision site)     Activity as tolerated       Significant Diagnostic Studies: Labs: CMP   Recent Labs   Lab 09/01/24  0001 09/01/24  0841 09/01/24  1341 09/02/24  0356   * 136  136  137 135* 134*   K 5.9* 4.6  4.6  4.6 5.3* 5.4*   CL 90* 94*  94*  95 92* 92*   CO2 20* 25  25  25 24 21*   * 196*  196*  196* 254* 312*   BUN 99* 108*  108*  107* 111* 123*   CREATININE 9.8* 10.4*  10.4*  10.4* 10.9* 12.1*   CALCIUM 8.8 9.4  9.4  9.4 9.4 9.4   PROT 6.2 6.2  --   --    ALBUMIN 2.9* 3.0*  --   --    BILITOT 0.6 0.8  --   --    ALKPHOS 57 55  --   --    AST 16 7*  --   --    ALT 12 12  --   --    ANIONGAP 20* 17*  17*  17* 19* 21*       Pending Diagnostic Studies:       None           Medications:  Reconciled Home Medications:      Medication List        START taking these medications      insulin aspart U-100 100 unit/mL (3 mL) Inpn pen  Commonly known as: NovoLOG  Inject 0-5 Units into the skin before meals and at bedtime as needed (Hyperglycemia).     labetaloL 200 MG tablet  Commonly known as: NORMODYNE  Take 1 tablet (200 mg total) by mouth every 12 (twelve) hours.            CHANGE how you take these medications      atorvastatin 40 MG tablet  Commonly known as: LIPITOR  Take 1 tablet (40 mg total) by mouth once daily.  What changed: when to take this     insulin glargine U-100 (Lantus) 100 unit/mL (3 mL) Inpn pen  Inject 12 Units into the skin once daily.  What changed:   how much to take  when to take this     predniSONE 20 MG tablet  Commonly known as: DELTASONE  Take 3 tablets (60 mg total) by mouth once daily for 4 days, THEN 2.5 tablets (50 mg total) once daily for 7 days, THEN 2 tablets (40 mg total) once  "daily for 7 days, THEN 1.5 tablets (30 mg total) once daily for 7 days, THEN 1 tablet (20 mg total) once daily for 7 days.  Start taking on: September 2, 2024  What changed: See the new instructions.            CONTINUE taking these medications      ACCU-CHEK GUIDE GLUCOSE METER Misc  Generic drug: blood-glucose meter     ACCU-CHEK GUIDE L1-L2 CTRL SOL Soln  Generic drug: blood glucose control high,low     ACCU-CHEK GUIDE TEST STRIPS Strp  Generic drug: blood sugar diagnostic     ACCU-CHEK SOFTCLIX LANCETS Misc  Generic drug: lancets     acetaminophen 500 MG tablet  Commonly known as: TYLENOL  Take 1 tablet (500 mg total) by mouth every 8 (eight) hours as needed for Pain.     amLODIPine 10 MG tablet  Commonly known as: NORVASC  Take 1 tablet (10 mg total) by mouth nightly.     aspirin 81 MG EC tablet  Commonly known as: ECOTRIN  Take 81 mg by mouth every morning.     atovaquone 750 mg/5 mL Susp oral liquid  Commonly known as: MEPRON  Take 10 mLs (1,500 mg total) by mouth once daily.     BD ULTRA-FINE MINI PEN NEEDLE 31 gauge x 3/16" Ndle  Generic drug: pen needle, diabetic  Use to inject insulin into the skin.     DROPSAFE ALCOHOL PREP PADS Padm  Generic drug: alcohol swabs  Apply topically.     epoetin shiela 10,000 unit/mL injection  Commonly known as: PROCRIT  Inject 0.85 mLs (8,500 Units total) into the skin every Mon, Wed, Fri.     folic acid 1 MG tablet  Commonly known as: FOLVITE  Take 1 tablet (1 mg total) by mouth once daily.     LIDOcaine 5 %  Commonly known as: LIDODERM  Place 1 patch onto the skin once daily. Remove & Discard patch within 12 hours or as directed by MD     losartan 100 MG tablet  Commonly known as: COZAAR  Take 1 tablet (100 mg total) by mouth once daily.     omeprazole 20 MG capsule  Commonly known as: PRILOSEC  Take 1 capsule (20 mg total) by mouth once daily.     pregabalin 75 MG capsule  Commonly known as: LYRICA  Take 1 capsule (75 mg total) by mouth once daily.     sevelamer carbonate " 800 mg Tab  Commonly known as: RENVELA  Take 3 tablets (2,400 mg total) by mouth 3 (three) times daily with meals.              Indwelling Lines/Drains at time of discharge:   Lines/Drains/Airways       Drain  Duration                  Hemodialysis AV Fistula Right forearm -- days                    Time spent on the discharge of patient: 30 minutes         Jessica Troncoso MD  Department of Hospital Medicine  [facility]

## 2024-09-02 NOTE — PLAN OF CARE
SW met with pt at bedside to complete DCA this AM. Pt reported that she lives at home with her  but will have her drt [place] [phone] help transport her home once she is finished with HD for the day. Pt stated that she does not use any HME at home. White board updated with CM name and contact information.  Discharge brochure provided.  Pt encouraged to call with any questions or concerns.  Cm will continue to follow pt through transitions of care and assist with any discharge needs.    Lion Xavier, MSW  [phone]    Future Appointments   Date Time Provider Department Center   9/3/2024  8:10 AM LAB, APPOINTMENT [place] [facility]   9/10/2024  8:05 AM LAB, APPOINTMENT [place] [facility]   9/12/2024  9:15 AM Antione Ramsey MD [facility]   9/17/2024  8:10 AM LAB, APPOINTMENT [place] [facility]   9/24/2024  8:10 AM LAB, [facility]POINTMENT [place] [facility]   10/1/2024  8:05 AM LAB, APPOINTMENT [place] [facility]   10/8/2024  8:05 AM LAB, APPOINTMENT [place] [facility]   10/15/2024  8:10 AM LAB, APPOINTMENT [place] [facility]   10/17/2024  2:00 PM Mojgan Gtz MD [facility] PROSPER [place]   11/8/2024  9:00 AM [facility] MRI1 350 LB LIMIT [facility]   11/12/2024  9:15 AM Douglas Abraham MD [facility] UROLOG [place]        09/02/24 0838   Discharge Assessment   Assessment Type Discharge Planning Assessment   Confirmed/corrected address, phone number and insurance Yes   Confirmed Demographics Correct on Facesheet   Source of Information patient   When was your last doctors appointment?   (pt not sure)   Communicated MEGAN with patient/caregiver Yes   Reason For Admission Diabetic ketoacidosis without coma associated with type 2 diabetes mellitus   Facility Arrived From: home   Do you expect to return to your current living situation? Yes   Do you have help at home or  someone to help you manage your care at home? Yes   Who are your caregiver(s) and their phone number(s)? yoselin Stark [phone]   Prior to hospitilization cognitive status: Alert/Oriented   Current cognitive status: Alert/Oriented   Walking or Climbing Stairs Difficulty no   Dressing/Bathing Difficulty no   Do you have any problems with: Errands/Grocery   Home Accessibility wheelchair accessible   Home Layout Able to live on 1st floor   Equipment Currently Used at Home none   Readmission within 30 days? No   Patient currently being followed by outpatient case management? No   Do you currently have service(s) that help you manage your care at home? No   Do you take prescription medications? Yes   Do you have prescription coverage? Yes   Coverage [employer]   Do you have any problems affording any of your prescribed medications? No   Is the patient taking medications as prescribed? yes   Who is going to help you get home at discharge? yoselin Stark [phone]   How do you get to doctors appointments? family or friend will provide   Are you on dialysis? No   Do you take coumadin? No   Discharge Plan A Home with family   DME Needed Upon Discharge  none   Discharge Plan discussed with: Patient

## 2024-09-03 ENCOUNTER — LAB VISIT (OUTPATIENT)
Dept: LAB | Facility: HOSPITAL | Age: 59
End: 2024-09-03
Attending: INTERNAL MEDICINE
Payer: MEDICARE

## 2024-09-03 DIAGNOSIS — Z99.2 ESRD (END STAGE RENAL DISEASE) ON DIALYSIS: Chronic | ICD-10-CM

## 2024-09-03 DIAGNOSIS — D59.10 AIHA (AUTOIMMUNE HEMOLYTIC ANEMIA): ICD-10-CM

## 2024-09-03 DIAGNOSIS — N18.6 ESRD (END STAGE RENAL DISEASE) ON DIALYSIS: Chronic | ICD-10-CM

## 2024-09-03 LAB
BASOPHILS # BLD AUTO: 0.01 K/UL (ref 0–0.2)
BASOPHILS NFR BLD: 0.1 % (ref 0–1.9)
DIFFERENTIAL METHOD BLD: ABNORMAL
EOSINOPHIL # BLD AUTO: 0 K/UL (ref 0–0.5)
EOSINOPHIL NFR BLD: 0 % (ref 0–8)
ERYTHROCYTE [DISTWIDTH] IN BLOOD BY AUTOMATED COUNT: 18.2 % (ref 11.5–14.5)
HCT VFR BLD AUTO: 25.2 % (ref 37–48.5)
HGB BLD-MCNC: 8.3 G/DL (ref 12–16)
IMM GRANULOCYTES # BLD AUTO: 0.05 K/UL (ref 0–0.04)
IMM GRANULOCYTES NFR BLD AUTO: 0.6 % (ref 0–0.5)
LYMPHOCYTES # BLD AUTO: 1.1 K/UL (ref 1–4.8)
LYMPHOCYTES NFR BLD: 12.1 % (ref 18–48)
MCH RBC QN AUTO: 27.8 PG (ref 27–31)
MCHC RBC AUTO-ENTMCNC: 32.9 G/DL (ref 32–36)
MCV RBC AUTO: 84 FL (ref 82–98)
MONOCYTES # BLD AUTO: 0.7 K/UL (ref 0.3–1)
MONOCYTES NFR BLD: 8.1 % (ref 4–15)
NEUTROPHILS # BLD AUTO: 7.1 K/UL (ref 1.8–7.7)
NEUTROPHILS NFR BLD: 79.1 % (ref 38–73)
NRBC BLD-RTO: 0 /100 WBC
PLATELET # BLD AUTO: 145 K/UL (ref 150–450)
PMV BLD AUTO: 12.7 FL (ref 9.2–12.9)
RBC # BLD AUTO: 2.99 M/UL (ref 4–5.4)
WBC # BLD AUTO: 9 K/UL (ref 3.9–12.7)

## 2024-09-03 PROCEDURE — 36415 COLL VENOUS BLD VENIPUNCTURE: CPT | Mod: HCNC | Performed by: INTERNAL MEDICINE

## 2024-09-03 PROCEDURE — 85025 COMPLETE CBC W/AUTO DIFF WBC: CPT | Mod: HCNC | Performed by: INTERNAL MEDICINE

## 2024-09-04 LAB
OHS QRS DURATION: 90 MS
OHS QTC CALCULATION: 439 MS

## 2024-09-04 NOTE — UM SECONDARY REVIEW
DENIAL NOTICE / PAYOR: [employer] MANAGED MEDICARE / MRN# [id number] / RON / JOSEP / DOS: 8- CAPITATED    Per Dr. Olvera DC Summary:     Ms. Hall presents with hyperglycemia and volume overload with pulmonary edema and hypertensive urgency in the setting of recent glucocorticoid initiation for WAHA. Initially was concern for DKA; however, appears to be only hyperglycemia given alkalotic pH and normal BOH. Correcting with long acting insulin and will likely have increased requirements while on high dose steroids. Suspect that volume overload is also steroid driven. Undergoing HD prior to discharge. May need to consider PJP prophylaxis as patient will be on high dose steroids for >1 month (per Hematology note, plan was for prednisone 90mg daily with taper by 10mg every week with follow up in 6 weeks).       Denial Questions Response   1. Denied Days Date of denied days 8/31/24 to 9/2/24   2. Admit date 8/31/2024 10:25 PM    3. Length of Stay (LOS) 2 days   4. If Medicare Advantage date and time of start of care (starts at date/time of first orderable in ED) Date/time first order:8/31/2024 11:32 PM    5. DC date or still in-house 9/2/2024  6:33 PM    6. Diagnosis Shortness of breath [R06.02]  Hyperkalemia [E87.5]  ESRD (end stage renal disease) [N18.6]  Elevated troponin [R79.89]  Diabetic ketoacidosis without coma associated with type 2 diabetes mellitus [E11.10]   7. Plan of Care (POC) What are we doing or did we do for the patient? What care/interventions were hospital only? Hospital only care > What were we doing on days denied causing delays and AVD? Monitoring labs, dialysis, shifted K of 5.9   8. Has the patient met IP criteria per MCG or IQ guidelines or is Two MN rule applicable? IF 2MN rule, How does patient meet the 2MN rule?) Met y/n or 2MN applicable? 2mn, monitoring labs/VS, specialty consult, HD   9. What have you done to help resolve the denial (sent for nurse reconsideration)?    10. Request UM  leader if you should send to PA or accept OBS (include your input/opinion on this if you want to share) Would like to send to Dr. Mancilla

## 2024-09-05 ENCOUNTER — TELEPHONE (OUTPATIENT)
Dept: INTERNAL MEDICINE | Facility: CLINIC | Age: 59
End: 2024-09-05
Payer: MEDICARE

## 2024-09-05 ENCOUNTER — PATIENT OUTREACH (OUTPATIENT)
Dept: ADMINISTRATIVE | Facility: CLINIC | Age: 59
End: 2024-09-05
Payer: MEDICARE

## 2024-09-05 DIAGNOSIS — N18.6 ESRD (END STAGE RENAL DISEASE): Primary | ICD-10-CM

## 2024-09-05 NOTE — TELEPHONE ENCOUNTER
----- Message from Nancy Shen MA sent at 9/5/2024  1:44 PM CDT -----  Marisol from [facility] calling to get resumption of care orders for the patient to restart home health. Please fax to [phone]. If any questions or concerns please give her a call back at [phone].

## 2024-09-05 NOTE — PROGRESS NOTES
C3 nurse spoke with Jael VAZQUEZ Rafael, patient's daughter, Josefina for a TCC post hospital discharge follow up call. The patient does not have a scheduled HOSFU appointment with Maty Gayle DO  within 5-7 days post hospital discharge date 9/2/2024. C3 nurse was unable to schedule HOSFU appointment in [facility].    Message sent to PCP staff requesting they contact patient and schedule follow up appointment.

## 2024-09-12 ENCOUNTER — HOSPITAL ENCOUNTER (INPATIENT)
Facility: HOSPITAL | Age: 59
LOS: 1 days | Discharge: HOME | DRG: 291 | End: 2024-09-14
Attending: STUDENT IN AN ORGANIZED HEALTH CARE EDUCATION/TRAINING PROGRAM | Admitting: FAMILY MEDICINE
Payer: MEDICARE

## 2024-09-12 DIAGNOSIS — E87.70 FLUID OVERLOAD: ICD-10-CM

## 2024-09-12 DIAGNOSIS — N18.6 ESRD (END STAGE RENAL DISEASE) ON DIALYSIS: Chronic | ICD-10-CM

## 2024-09-12 DIAGNOSIS — R07.9 CHEST PAIN: ICD-10-CM

## 2024-09-12 DIAGNOSIS — Z99.2 ESRD (END STAGE RENAL DISEASE) ON DIALYSIS: Chronic | ICD-10-CM

## 2024-09-12 DIAGNOSIS — I50.33 ACUTE ON CHRONIC DIASTOLIC CONGESTIVE HEART FAILURE: Primary | ICD-10-CM

## 2024-09-12 DIAGNOSIS — D59.10 AIHA (AUTOIMMUNE HEMOLYTIC ANEMIA): ICD-10-CM

## 2024-09-12 LAB
ALBUMIN SERPL BCP-MCNC: 3.3 G/DL (ref 3.5–5.2)
ALP SERPL-CCNC: 54 U/L (ref 55–135)
ALT SERPL W/O P-5'-P-CCNC: 15 U/L (ref 10–44)
ANION GAP SERPL CALC-SCNC: 13 MMOL/L (ref 8–16)
AST SERPL-CCNC: 10 U/L (ref 10–40)
BASOPHILS # BLD AUTO: 0.01 K/UL (ref 0–0.2)
BASOPHILS NFR BLD: 0.1 % (ref 0–1.9)
BILIRUB SERPL-MCNC: 1 MG/DL (ref 0.1–1)
BNP SERPL-MCNC: 238 PG/ML (ref 0–99)
BUN SERPL-MCNC: 41 MG/DL (ref 8–23)
CALCIUM SERPL-MCNC: 9.6 MG/DL (ref 8.7–10.5)
CHLORIDE SERPL-SCNC: 94 MMOL/L (ref 95–110)
CO2 SERPL-SCNC: 28 MMOL/L (ref 23–29)
CREAT SERPL-MCNC: 5.8 MG/DL (ref 0.5–1.4)
DIFFERENTIAL METHOD BLD: ABNORMAL
EOSINOPHIL # BLD AUTO: 0 K/UL (ref 0–0.5)
EOSINOPHIL NFR BLD: 0 % (ref 0–8)
ERYTHROCYTE [DISTWIDTH] IN BLOOD BY AUTOMATED COUNT: 19.8 % (ref 11.5–14.5)
EST. GFR  (NO RACE VARIABLE): 7.8 ML/MIN/1.73 M^2
GLUCOSE SERPL-MCNC: 334 MG/DL (ref 70–110)
HCT VFR BLD AUTO: 29.3 % (ref 37–48.5)
HGB BLD-MCNC: 9.2 G/DL (ref 12–16)
IMM GRANULOCYTES # BLD AUTO: 0.04 K/UL (ref 0–0.04)
IMM GRANULOCYTES NFR BLD AUTO: 0.5 % (ref 0–0.5)
LYMPHOCYTES # BLD AUTO: 0.4 K/UL (ref 1–4.8)
LYMPHOCYTES NFR BLD: 5.9 % (ref 18–48)
MAGNESIUM SERPL-MCNC: 2.3 MG/DL (ref 1.6–2.6)
MCH RBC QN AUTO: 28 PG (ref 27–31)
MCHC RBC AUTO-ENTMCNC: 31.4 G/DL (ref 32–36)
MCV RBC AUTO: 89 FL (ref 82–98)
MONOCYTES # BLD AUTO: 0.2 K/UL (ref 0.3–1)
MONOCYTES NFR BLD: 2.8 % (ref 4–15)
NEUTROPHILS # BLD AUTO: 6.8 K/UL (ref 1.8–7.7)
NEUTROPHILS NFR BLD: 90.7 % (ref 38–73)
NRBC BLD-RTO: 0 /100 WBC
OHS QRS DURATION: 96 MS
OHS QTC CALCULATION: 488 MS
PHOSPHATE SERPL-MCNC: 3.7 MG/DL (ref 2.7–4.5)
PLATELET # BLD AUTO: 149 K/UL (ref 150–450)
PMV BLD AUTO: 11.7 FL (ref 9.2–12.9)
POCT GLUCOSE: 344 MG/DL (ref 70–110)
POTASSIUM SERPL-SCNC: 4 MMOL/L (ref 3.5–5.1)
PROT SERPL-MCNC: 6.8 G/DL (ref 6–8.4)
RBC # BLD AUTO: 3.29 M/UL (ref 4–5.4)
SODIUM SERPL-SCNC: 135 MMOL/L (ref 136–145)
TROPONIN I SERPL DL<=0.01 NG/ML-MCNC: 0.03 NG/ML (ref 0–0.03)
TROPONIN I SERPL DL<=0.01 NG/ML-MCNC: 0.04 NG/ML (ref 0–0.03)
WBC # BLD AUTO: 7.52 K/UL (ref 3.9–12.7)

## 2024-09-12 PROCEDURE — 25000003 PHARM REV CODE 250: Mod: HCNC | Performed by: FAMILY MEDICINE

## 2024-09-12 PROCEDURE — 93010 ELECTROCARDIOGRAM REPORT: CPT | Mod: HCNC,,, | Performed by: INTERNAL MEDICINE

## 2024-09-12 PROCEDURE — G0378 HOSPITAL OBSERVATION PER HR: HCPCS | Mod: HCNC

## 2024-09-12 PROCEDURE — 80053 COMPREHEN METABOLIC PANEL: CPT | Mod: HCNC | Performed by: NURSE PRACTITIONER

## 2024-09-12 PROCEDURE — 84100 ASSAY OF PHOSPHORUS: CPT | Mod: HCNC | Performed by: NURSE PRACTITIONER

## 2024-09-12 PROCEDURE — 83880 ASSAY OF NATRIURETIC PEPTIDE: CPT | Mod: HCNC | Performed by: NURSE PRACTITIONER

## 2024-09-12 PROCEDURE — 63600175 PHARM REV CODE 636 W HCPCS: Mod: HCNC | Performed by: FAMILY MEDICINE

## 2024-09-12 PROCEDURE — 83735 ASSAY OF MAGNESIUM: CPT | Mod: HCNC | Performed by: NURSE PRACTITIONER

## 2024-09-12 PROCEDURE — 96374 THER/PROPH/DIAG INJ IV PUSH: CPT | Mod: HCNC

## 2024-09-12 PROCEDURE — 93005 ELECTROCARDIOGRAM TRACING: CPT | Mod: HCNC

## 2024-09-12 PROCEDURE — 96372 THER/PROPH/DIAG INJ SC/IM: CPT | Performed by: FAMILY MEDICINE

## 2024-09-12 PROCEDURE — 63600175 PHARM REV CODE 636 W HCPCS: Mod: HCNC

## 2024-09-12 PROCEDURE — 82962 GLUCOSE BLOOD TEST: CPT | Mod: HCNC

## 2024-09-12 PROCEDURE — 84484 ASSAY OF TROPONIN QUANT: CPT | Mod: 91,HCNC | Performed by: NURSE PRACTITIONER

## 2024-09-12 PROCEDURE — 99285 EMERGENCY DEPT VISIT HI MDM: CPT | Mod: 25,HCNC

## 2024-09-12 PROCEDURE — 85025 COMPLETE CBC W/AUTO DIFF WBC: CPT | Mod: HCNC | Performed by: NURSE PRACTITIONER

## 2024-09-12 RX ORDER — GLUCAGON 1 MG
1 KIT INJECTION
Status: DISCONTINUED | OUTPATIENT
Start: 2024-09-12 | End: 2024-09-14 | Stop reason: HOSPADM

## 2024-09-12 RX ORDER — FOLIC ACID 1 MG/1
1 TABLET ORAL DAILY
Status: DISCONTINUED | OUTPATIENT
Start: 2024-09-13 | End: 2024-09-14 | Stop reason: HOSPADM

## 2024-09-12 RX ORDER — ONDANSETRON HYDROCHLORIDE 2 MG/ML
4 INJECTION, SOLUTION INTRAVENOUS EVERY 6 HOURS PRN
Status: DISCONTINUED | OUTPATIENT
Start: 2024-09-12 | End: 2024-09-14 | Stop reason: HOSPADM

## 2024-09-12 RX ORDER — LABETALOL HYDROCHLORIDE 5 MG/ML
10 INJECTION, SOLUTION INTRAVENOUS
Status: COMPLETED | OUTPATIENT
Start: 2024-09-12 | End: 2024-09-12

## 2024-09-12 RX ORDER — SODIUM CHLORIDE 0.9 % (FLUSH) 0.9 %
10 SYRINGE (ML) INJECTION
Status: DISCONTINUED | OUTPATIENT
Start: 2024-09-12 | End: 2024-09-14 | Stop reason: HOSPADM

## 2024-09-12 RX ORDER — POLYETHYLENE GLYCOL 3350 17 G/17G
17 POWDER, FOR SOLUTION ORAL DAILY
Status: DISCONTINUED | OUTPATIENT
Start: 2024-09-13 | End: 2024-09-14 | Stop reason: HOSPADM

## 2024-09-12 RX ORDER — IBUPROFEN 200 MG
16 TABLET ORAL
Status: DISCONTINUED | OUTPATIENT
Start: 2024-09-12 | End: 2024-09-14 | Stop reason: HOSPADM

## 2024-09-12 RX ORDER — INSULIN ASPART 100 [IU]/ML
1-15 INJECTION, SOLUTION INTRAVENOUS; SUBCUTANEOUS
Status: DISCONTINUED | OUTPATIENT
Start: 2024-09-12 | End: 2024-09-14 | Stop reason: HOSPADM

## 2024-09-12 RX ORDER — SEVELAMER CARBONATE 800 MG/1
2400 TABLET, FILM COATED ORAL
Status: DISCONTINUED | OUTPATIENT
Start: 2024-09-12 | End: 2024-09-14 | Stop reason: HOSPADM

## 2024-09-12 RX ORDER — AMOXICILLIN 250 MG
1 CAPSULE ORAL 2 TIMES DAILY
Status: DISCONTINUED | OUTPATIENT
Start: 2024-09-12 | End: 2024-09-14 | Stop reason: HOSPADM

## 2024-09-12 RX ORDER — IBUPROFEN 200 MG
24 TABLET ORAL
Status: DISCONTINUED | OUTPATIENT
Start: 2024-09-12 | End: 2024-09-14 | Stop reason: HOSPADM

## 2024-09-12 RX ORDER — ACETAMINOPHEN 325 MG/1
650 TABLET ORAL EVERY 4 HOURS PRN
Status: DISCONTINUED | OUTPATIENT
Start: 2024-09-12 | End: 2024-09-14 | Stop reason: HOSPADM

## 2024-09-12 RX ORDER — TALC
6 POWDER (GRAM) TOPICAL NIGHTLY PRN
Status: DISCONTINUED | OUTPATIENT
Start: 2024-09-12 | End: 2024-09-14 | Stop reason: HOSPADM

## 2024-09-12 RX ORDER — ATORVASTATIN CALCIUM 40 MG/1
40 TABLET, FILM COATED ORAL EVERY MORNING
Status: DISCONTINUED | OUTPATIENT
Start: 2024-09-13 | End: 2024-09-14 | Stop reason: HOSPADM

## 2024-09-12 RX ORDER — INSULIN ASPART 100 [IU]/ML
0-5 INJECTION, SOLUTION INTRAVENOUS; SUBCUTANEOUS
Status: DISCONTINUED | OUTPATIENT
Start: 2024-09-12 | End: 2024-09-12

## 2024-09-12 RX ORDER — ASPIRIN 81 MG/1
81 TABLET ORAL EVERY MORNING
Status: DISCONTINUED | OUTPATIENT
Start: 2024-09-13 | End: 2024-09-14 | Stop reason: HOSPADM

## 2024-09-12 RX ORDER — AMLODIPINE BESYLATE 10 MG/1
10 TABLET ORAL NIGHTLY
Status: DISCONTINUED | OUTPATIENT
Start: 2024-09-12 | End: 2024-09-14 | Stop reason: HOSPADM

## 2024-09-12 RX ORDER — LOSARTAN POTASSIUM 50 MG/1
100 TABLET ORAL DAILY
Status: DISCONTINUED | OUTPATIENT
Start: 2024-09-13 | End: 2024-09-14 | Stop reason: HOSPADM

## 2024-09-12 RX ORDER — PANTOPRAZOLE SODIUM 40 MG/1
40 TABLET, DELAYED RELEASE ORAL DAILY
Status: DISCONTINUED | OUTPATIENT
Start: 2024-09-13 | End: 2024-09-14 | Stop reason: HOSPADM

## 2024-09-12 RX ORDER — LABETALOL 100 MG/1
200 TABLET, FILM COATED ORAL EVERY 12 HOURS
Status: DISCONTINUED | OUTPATIENT
Start: 2024-09-12 | End: 2024-09-14 | Stop reason: HOSPADM

## 2024-09-12 RX ORDER — HYDRALAZINE HYDROCHLORIDE 20 MG/ML
5 INJECTION INTRAMUSCULAR; INTRAVENOUS EVERY 6 HOURS PRN
Status: DISCONTINUED | OUTPATIENT
Start: 2024-09-12 | End: 2024-09-14 | Stop reason: HOSPADM

## 2024-09-12 RX ORDER — PREDNISONE 20 MG/1
40 TABLET ORAL DAILY
Status: DISCONTINUED | OUTPATIENT
Start: 2024-09-13 | End: 2024-09-14 | Stop reason: HOSPADM

## 2024-09-12 RX ORDER — INSULIN GLARGINE 100 [IU]/ML
12 INJECTION, SOLUTION SUBCUTANEOUS NIGHTLY
Status: DISCONTINUED | OUTPATIENT
Start: 2024-09-12 | End: 2024-09-13

## 2024-09-12 RX ORDER — HEPARIN SODIUM 5000 [USP'U]/ML
5000 INJECTION, SOLUTION INTRAVENOUS; SUBCUTANEOUS
Status: DISCONTINUED | OUTPATIENT
Start: 2024-09-12 | End: 2024-09-14 | Stop reason: HOSPADM

## 2024-09-12 RX ADMIN — ACETAMINOPHEN 650 MG: 325 TABLET, FILM COATED ORAL at 10:09

## 2024-09-12 RX ADMIN — HEPARIN SODIUM 5000 UNITS: 5000 INJECTION INTRAVENOUS; SUBCUTANEOUS at 06:09

## 2024-09-12 RX ADMIN — LABETALOL HYDROCHLORIDE 200 MG: 100 TABLET, FILM COATED ORAL at 10:09

## 2024-09-12 RX ADMIN — Medication 6 MG: at 10:09

## 2024-09-12 RX ADMIN — INSULIN GLARGINE 12 UNITS: 100 INJECTION, SOLUTION SUBCUTANEOUS at 10:09

## 2024-09-12 RX ADMIN — SEVELAMER CARBONATE 2400 MG: 800 TABLET, FILM COATED ORAL at 06:09

## 2024-09-12 RX ADMIN — LABETALOL HYDROCHLORIDE 10 MG: 5 INJECTION INTRAVENOUS at 03:09

## 2024-09-12 RX ADMIN — AMLODIPINE BESYLATE 10 MG: 10 TABLET ORAL at 10:09

## 2024-09-12 NOTE — SUBJECTIVE & OBJECTIVE
Past Medical History:   Diagnosis Date    Acute hyperkalemia 2020    Acute on chronic diastolic heart failure 10/08/2021    Anemia of chronic disorder 2017    Overview:  Added automatically from request for surgery [id number]    Anemia of chronic renal failure, stage 5     Anxiety     Cyst of left ovary 2019    Cyst of left ovary 2019    Dyslipidemia 2013    Encounter for screening colonoscopy 10/08/2020    End-stage renal disease on hemodialysis 2020    ESRD on hemodialysis 2017    Hematuria 10/30/2019    History of COVID-19 2020    Hospital discharge follow-up 10/06/2023    Hx of sinus bradycardia 2017    Hyperkalemia 2017    Hypertensive urgency 2021    Peripheral neuropathy 2013    Preoperative testing 10/08/2020    Renovascular hypertension 2020    S/P dilation and curettage 2018    Screening for colon cancer 2023    Thickened endometrium 2018    Thyroid nodule     Thyroid nodule 2021    Uncontrolled type 2 diabetes mellitus 2013    Overview:  poc a1c today 11.2-270/ up from 10.8-263, Pt. has very very stressed/ was incarcerated/marital stress/ will current meds/ less stress now/ will monitor.       Past Surgical History:   Procedure Laterality Date    AV FISTULA PLACEMENT      right arm     SECTION      x1    COLONOSCOPY N/A 2018    Procedure: COLONOSCOPY;  Surgeon: Pankaj Hinojosa MD;  [facility]  Service: Endoscopy;  Laterality: N/A;  dialysis pt/labs prior to colon/MS    COLONOSCOPY N/A 2023    Procedure: COLONOSCOPY;  Surgeon: Angel Turner MD;  Location: [facility] (2ND FLR);  Service: Endoscopy;  Laterality: N/A;  Ref By:  Maty Gayle MD  Procedure: Colonoscopy  Diagnosis: endounter for preventive health   Procedure Timing: pt. pref  Provider: Dr. turner   Location: [facility] 4-Endo   Prep Specifications: Standard prep   Additional Info: Dialysis patient lab pending     CYSTOSCOPY W/ RETROGRADES Bilateral 8/20/2020    Procedure: CYSTOSCOPY, WITH RETROGRADE PYELOGRAM;  Surgeon: Douglas Abraham MD;  Location: [facility] OR 1ST FLR;  Service: Urology;  Laterality: Bilateral;  45 min    FISTULOGRAM Right 9/9/2020    Procedure: Fistulogram;  Surgeon: Lester Gómez MD;  Location: [facility] CATH LAB;  Service: Nephrology;  Laterality: Right;    HYSTERECTOMY      IMPLANTATION OF COCHLEAR PROSTHESIS Right 6/28/2021    Procedure: INSERTION, PROSTHESIS, COCHLEAR;  Surgeon: Ramiro Zuleta MD;  Location: [facility] OR 2ND FLR;  Service: ENT;  Laterality: Right;  COCHLEAR BABAK    IMPLANTATION OF COCHLEAR PROSTHESIS Right 6/30/2021    Procedure: INSERTION, PROSTHESIS, COCHLEAR;  Surgeon: Ramiro Zuleta MD;  Location: [facility] OR 2ND FLR;  Service: ENT;  Laterality: Right;  COCHLEAR AMERICAS    ROBOT-ASSISTED LAPAROSCOPIC ABDOMINAL HYSTERECTOMY USING DA NATALYA XI N/A 7/5/2019    Procedure: XI ROBOTIC HYSTERECTOMY;  Surgeon: Trice Lei MD;  Location: [facility];  Service: OB/GYN;  Laterality: N/A;    ROBOT-ASSISTED LAPAROSCOPIC NEPHRECTOMY Right 10/8/2020    Procedure: ROBOTIC NEPHRECTOMY;  Surgeon: Douglas Abraham MD;  Location: [facility] OR 2ND FLR;  Service: Urology;  Laterality: Right;  3.5hrs gen with regional     ROBOT-ASSISTED LAPAROSCOPIC SALPINGO-OOPHORECTOMY USING DA NATALYA XI Bilateral 7/5/2019    Procedure: XI ROBOTIC SALPINGO-OOPHORECTOMY;  Surgeon: Trice Lei MD;  Location: [facility];  Service: OB/GYN;  Laterality: Bilateral;    TUBAL LIGATION      Vascular access surgeries      x5       Review of patient's allergies indicates:  No Known Allergies    No current facility-administered medications on file prior to encounter.     Current Outpatient Medications on File Prior to Encounter   Medication Sig    ACCU-CHEK GUIDE GLUCOSE METER Misc     ACCU-CHEK GUIDE L1-L2 CTRL SOL Soln     ACCU-CHEK GUIDE TEST STRIPS Strp     ACCU-CHEK SOFTCLIX LANCETS Misc     acetaminophen (TYLENOL) 500 MG tablet Take 1  "tablet (500 mg total) by mouth every 8 (eight) hours as needed for Pain.    amLODIPine (NORVASC) 10 MG tablet Take 1 tablet (10 mg total) by mouth nightly.    aspirin (ECOTRIN) 81 MG EC tablet Take 81 mg by mouth every morning.    atorvastatin (LIPITOR) 40 MG tablet Take 1 tablet (40 mg total) by mouth once daily. (Patient taking differently: Take 40 mg by mouth every morning.)    atovaquone (MEPRON) 750 mg/5 mL Susp oral liquid Take 10 mLs (1,500 mg total) by mouth once daily.    blood sugar diagnostic Strp To check BG 3 times daily, to use with insurance preferred meter    blood-glucose meter kit To check BG 3 times daily, to use with insurance preferred meter    DROPSAFE ALCOHOL PREP PADS PadM Apply topically.    epoetin shiela (PROCRIT) 10,000 unit/mL injection Inject 0.85 mLs (8,500 Units total) into the skin every Mon, Wed, Fri.    folic acid (FOLVITE) 1 MG tablet Take 1 tablet (1 mg total) by mouth once daily.    insulin aspart U-100 (NOVOLOG) 100 unit/mL (3 mL) InPn pen Inject 0-5 Units into the skin before meals and at bedtime as needed (Hyperglycemia).    insulin glargine U-100, Lantus, 100 unit/mL (3 mL) SubQ InPn pen Inject 12 Units into the skin once daily.    labetaloL (NORMODYNE) 200 MG tablet Take 1 tablet (200 mg total) by mouth every 12 (twelve) hours.    lancets Misc To check BG 3 times daily, to use with insurance preferred meter    LIDOcaine (LIDODERM) 5 % Place 1 patch onto the skin once daily. Remove & Discard patch within 12 hours or as directed by MD    losartan (COZAAR) 100 MG tablet Take 1 tablet (100 mg total) by mouth once daily.    omeprazole (PRILOSEC) 20 MG capsule Take 1 capsule (20 mg total) by mouth once daily.    pen needle, diabetic 31 gauge x 3/16" Ndle Use to inject insulin into the skin.    predniSONE (DELTASONE) 20 MG tablet Take 3 tablets (60 mg total) by mouth once daily for 4 days, THEN 2.5 tablets (50 mg total) once daily for 7 days, THEN 2 tablets (40 mg total) once daily " for 7 days, THEN 1.5 tablets (30 mg total) once daily for 7 days, THEN 1 tablet (20 mg total) once daily for 7 days.    pregabalin (LYRICA) 75 MG capsule Take 1 capsule (75 mg total) by mouth once daily.    sevelamer carbonate (RENVELA) 800 mg Tab Take 3 tablets (2,400 mg total) by mouth 3 (three) times daily with meals.     Family History       Problem Relation (Age of Onset)    Cancer Mother, Sister, Sister    Diabetes Sister, Brother    Lung cancer Sister    Macular degeneration Sister    Ovarian cancer Mother, Sister, Sister          Tobacco Use    Smoking status: Never    Smokeless tobacco: Never   Substance and Sexual Activity    Alcohol use: No    Drug use: No    Sexual activity: Yes     Partners: Male     Birth control/protection: Post-menopausal     Review of Systems   Constitutional:  Negative for activity change, appetite change, chills, diaphoresis, fatigue and fever.   HENT:  Negative for congestion, sinus pressure, sore throat and tinnitus.    Eyes:  Negative for visual disturbance.   Respiratory:  Positive for shortness of breath. Negative for cough, chest tightness and wheezing.    Cardiovascular:  Positive for chest pain and leg swelling. Negative for palpitations.   Gastrointestinal:  Negative for abdominal distention, abdominal pain, nausea and vomiting.   Endocrine: Negative for polydipsia, polyphagia and polyuria.   Genitourinary:  Negative for dysuria.   Musculoskeletal:  Negative for arthralgias and back pain.   Skin:  Negative for rash and wound.   Neurological:  Negative for dizziness, syncope, light-headedness and headaches.   Psychiatric/Behavioral:  Negative for confusion.      Objective:     Vital Signs (Most Recent):  Temp: 98.1 °F (36.7 °C) (09/12/24 1516)  Pulse: 84 (09/12/24 1516)  Resp: 19 (09/12/24 1516)  BP: (!) 179/81 (09/12/24 1539)  SpO2: 96 % (09/12/24 1516) Vital Signs (24h Range):  Temp:  [97.8 °F (36.6 °C)-98.1 °F (36.7 °C)] 98.1 °F (36.7 °C)  Pulse:  [84-91] 84  Resp:   [19-24] 19  SpO2:  [95 %-98 %] 96 %  BP: (179-212)/(79-91) 179/81     Weight: 94.8 kg (209 lb)  Body mass index is 35.87 kg/m².     Physical Exam  Vitals and nursing note reviewed.   Constitutional:       General: She is not in acute distress.     Appearance: Normal appearance. She is well-developed. She is obese. She is not ill-appearing or toxic-appearing.   HENT:      Head: Normocephalic and atraumatic.      Right Ear: External ear normal. Decreased hearing noted.      Left Ear: External ear normal. Decreased hearing noted.      Nose: Nose normal.      Mouth/Throat:      Pharynx: Uvula midline.   Eyes:      General: Lids are normal.      Extraocular Movements: EOM normal.      Conjunctiva/sclera: Conjunctivae normal.      Pupils: Pupils are equal, round, and reactive to light.   Neck:      Thyroid: No thyroid mass.      Vascular: No JVD.      Trachea: Trachea normal.   Cardiovascular:      Rate and Rhythm: Normal rate and regular rhythm.      Heart sounds: Normal heart sounds, S1 normal and S2 normal.   Pulmonary:      Effort: Pulmonary effort is normal.      Breath sounds: Normal breath sounds.   Abdominal:      General: Bowel sounds are normal. There is no distension.      Palpations: Abdomen is soft.      Tenderness: There is no abdominal tenderness.   Musculoskeletal:      Cervical back: Full passive range of motion without pain, normal range of motion and neck supple.      Right lower leg: 3+ Edema present.      Left lower leg: 3+ Edema present.   Neurological:      General: No focal deficit present.      Mental Status: She is alert.   Psychiatric:         Speech: Speech normal.         Behavior: Behavior normal. Behavior is cooperative.         Thought Content: Thought content normal.              CRANIAL NERVES     CN III, IV, VI   Pupils are equal, round, and reactive to light.  Extraocular motions are normal.        Significant Labs: All pertinent labs within the past 24 hours have been reviewed.  Recent  Lab Results         09/12/24  1426   09/12/24  1349        Albumin 3.3         ALP 54         ALT 15         Anion Gap 13         AST 10         Baso # 0.01         Basophil % 0.1         BILIRUBIN TOTAL 1.0  Comment: For infants and newborns, interpretation of results should be based  on gestational age, weight and in agreement with clinical  observations.    Premature Infant recommended reference ranges:  Up to 24 hours.............<8.0 mg/dL  Up to 48 hours............<12.0 mg/dL  3-5 days..................<15.0 mg/dL  6-29 days.................<15.0 mg/dL             Comment: Values of less than 100 pg/ml are consistent with non-CHF populations.         BUN 41         Calcium 9.6         Chloride 94         CO2 28         Creatinine 5.8         Differential Method Automated         eGFR 7.8         Eos # 0.0         Eos % 0.0         Glucose 334         Gran # (ANC) 6.8         Gran % 90.7         Hematocrit 29.3         Hemoglobin 9.2         Immature Grans (Abs) 0.04  Comment: Mild elevation in immature granulocytes is non specific and   can be seen in a variety of conditions including stress response,   acute inflammation, trauma and pregnancy. Correlation with other   laboratory and clinical findings is essential.           Immature Granulocytes 0.5         Lymph # 0.4         Lymph % 5.9         Magnesium  2.3         MCH 28.0         MCHC 31.4         MCV 89         Mono # 0.2         Mono % 2.8         MPV 11.7         nRBC 0         QRS Duration   96       OHS QTC Calculation   488       Phosphorus Level 3.7         Platelet Count 149         Potassium 4.0         PROTEIN TOTAL 6.8         RBC 3.29         RDW 19.8         Sodium 135         Troponin I 0.037  Comment: The reference interval for Troponin I represents the 99th percentile   cutoff   for our facility and is consistent with 3rd generation assay   performance.           WBC 7.52                 Significant Imaging: I have reviewed all  pertinent imaging results/findings within the past 24 hours.

## 2024-09-12 NOTE — FIRST PROVIDER EVALUATION
Emergency Department TeleTriage Encounter Note      CHIEF COMPLAINT    Chief Complaint   Patient presents with    Shortness of Breath     Pt went to dialysis today and only received half session and was on her way home when she started having SOB and midsternal CP. Hx CHF.        VITAL SIGNS   Initial Vitals [09/12/24 1343]   BP Pulse Resp Temp SpO2   (!) 212/91 91 (!) 24 97.8 °F (36.6 °C) 98 %      MAP       --            ALLERGIES    Review of patient's allergies indicates:  No Known Allergies    PROVIDER TRIAGE NOTE  Verbal consent for the teletriage evaluation was given by the patient at the start of the evaluation.  All efforts will be made to maintain patient's privacy during the evaluation.      This is a teletriage evaluation of a 59 y.o. female presenting to the ED with c/o CP and SOB that started after dialysis; only completed 1/2 dialysis treatment. Limited physical exam via telehealth: The patient is awake, alert, answering questions appropriately and is not in respiratory distress.  As the Teletriage provider, I performed an initial assessment and ordered appropriate labs and imaging studies, if any, to facilitate the patient's care once placed in the ED. Once a room is available, care and a full evaluation will be completed by an alternate ED provider.  Any additional orders and the final disposition will be determined by that provider.  All imaging and labs will not be followed-up by the Teletriage Team, including myself.          ORDERS  Labs Reviewed - No data to display    ED Orders (720h ago, onward)      Start Ordered     Status Ordering Provider    09/12/24 1406 09/12/24 1406  Vital signs  Every 15 min         Ordered ZIA CRANE    09/12/24 1406 09/12/24 1406  Cardiac Monitoring - Adult  Continuous        Comments: Notify Physician If:    Ordered ZIA CRANE    09/12/24 1406 09/12/24 1406  Pulse Oximetry Continuous  Continuous         Ordered ZIA CRANE    09/12/24 1406 09/12/24 1406   Diet NPO  Diet effective now         Ordered ZIA CRANE    09/12/24 1346 09/12/24 1345  EKG 12-lead  Once         Completed by SUKUMAR DALEY on 9/12/2024 at  1:52 PM CARLOS MURRIETA    Unscheduled 09/12/24 1406  Saline lock IV  Once         Ordered ZIA CRANE    Unscheduled 09/12/24 1406  EKG 12-lead  Once        Comments: Do not perform if previously done during this visit/ triage    Ordered ZIA CRANE    Unscheduled 09/12/24 1406  Comprehensive metabolic panel  STAT         Ordered ROYCEZIA COATES    Unscheduled 09/12/24 1406  Troponin I #1  STAT         Ordered ZIA CRANE    Unscheduled 09/12/24 1406  Troponin I #2  Once Timed         Ordered ZIA CRANE    Unscheduled 09/12/24 1406  B-Type natriuretic peptide (BNP)  STAT         Ordered ZIA CRANE    Unscheduled 09/12/24 1406  X-Ray Chest AP Portable  1 time imaging         Ordered ZIA CRANE    Unscheduled 09/12/24 1406  Magnesium  STAT         Ordered ZIA CRANE    Unscheduled 09/12/24 1406  Phosphorus  STAT         Ordered ZIA CRANE              Virtual Visit Note: The provider triage portion of this emergency department evaluation and documentation was performed via [employer], a HIPAA-compliant telemedicine application, in concert with a tele-presenter in the room. A face to face patient evaluation with one of my colleagues will occur once the patient is placed in an emergency department room.      DISCLAIMER: This note was prepared with [email or web address] voice recognition transcription software. Garbled syntax, mangled pronouns, and other bizarre constructions may be attributed to that software system.

## 2024-09-12 NOTE — ED PROVIDER NOTES
Encounter Date: 9/12/2024       History     Chief Complaint   Patient presents with    Shortness of Breath     Pt went to dialysis today and only received half session and was on her way home when she started having SOB and midsternal CP. Hx CHF.      59-year-old female with a past medical history of ESRD on HD (TTS - last dialyzed on 9/12/24), HTN, T2DM with Insulin use, and GERD now presenting with a chief complaint of shortness of breath and chest pain.  Patient reports that she was dialyzed today but was only able to complete 2 hours and has since experienced dyspnea on exertion associated with intermittent episodes of sharp substernal chest pain which does not really radiate.  Additionally patient tells me that both her legs have become progressively swollen.  Patient denies any fevers, headaches, dizziness, hemoptysis, or previous blood clots.  Patient tells me that she took her blood pressure medications this morning.  Patient reports making small bits of urine still.    The history is provided by the patient.     Review of patient's allergies indicates:  No Known Allergies  Past Medical History:   Diagnosis Date    Acute hyperkalemia 09/09/2020    Acute on chronic diastolic heart failure 10/08/2021    Anemia of chronic disorder 11/08/2017    Overview:  Added automatically from request for surgery [id number]    Anemia of chronic renal failure, stage 5     Anxiety     Cyst of left ovary 05/14/2019    Cyst of left ovary 07/05/2019    Dyslipidemia 01/04/2013    Encounter for screening colonoscopy 10/08/2020    End-stage renal disease on hemodialysis 08/20/2020    ESRD on hemodialysis 09/28/2017    Hematuria 10/30/2019    History of COVID-19 04/07/2020    Hospital discharge follow-up 10/06/2023    Hx of sinus bradycardia 09/28/2017    Hyperkalemia 03/29/2017    Hypertensive urgency 07/22/2021    Peripheral neuropathy 01/04/2013    Preoperative testing 10/08/2020    Renovascular hypertension 08/17/2020    S/P dilation  and curettage 2018    Screening for colon cancer 2023    Thickened endometrium 2018    Thyroid nodule     Thyroid nodule 2021    Uncontrolled type 2 diabetes mellitus 2013    Overview:  poc a1c today 11.2-270/ up from 10.8-263, Pt. has very very stressed/ was incarcerated/marital stress/ will current meds/ less stress now/ will monitor.     Past Surgical History:   Procedure Laterality Date    AV FISTULA PLACEMENT      right arm     SECTION      x1    COLONOSCOPY N/A 2018    Procedure: COLONOSCOPY;  Surgeon: Pankaj Hinojosa MD;  Location: [facility] ENDO (4TH FLR);  Service: Endoscopy;  Laterality: N/A;  dialysis pt/labs prior to colon/MS    COLONOSCOPY N/A 2023    Procedure: COLONOSCOPY;  Surgeon: Anegl Turner MD;  Location: [facility] ENDO (2ND FLR);  Service: Endoscopy;  Laterality: N/A;  Ref By:  Maty Gayle MD  Procedure: Colonoscopy  Diagnosis: endounter for preventive health   Procedure Timing: pt. pref  Provider: Dr. turner   Location: [facility] 4-Endo   Prep Specifications: Standard prep   Additional Info: Dialysis patient lab pending    CYSTOSCOPY W/ RETROGRADES Bilateral 2020    Procedure: CYSTOSCOPY, WITH RETROGRADE PYELOGRAM;  Surgeon: Douglas Abraham MD;  Location: [facility] OR 1ST FLR;  Service: Urology;  Laterality: Bilateral;  45 min    FISTULOGRAM Right 2020    Procedure: Fistulogram;  Surgeon: Lester Gómez MD;  Location: [facility] CATH LAB;  Service: Nephrology;  Laterality: Right;    HYSTERECTOMY      IMPLANTATION OF COCHLEAR PROSTHESIS Right 2021    Procedure: INSERTION, PROSTHESIS, COCHLEAR;  Surgeon: Ramiro Zuleta MD;  Location: [facility] OR 2ND FLR;  Service: ENT;  Laterality: Right;  COCHLEAR BABAK    IMPLANTATION OF COCHLEAR PROSTHESIS Right 2021    Procedure: INSERTION, PROSTHESIS, COCHLEAR;  Surgeon: Ramiro Zuleta MD;  Location: [facility] OR 2ND FLR;  Service: ENT;  Laterality: Right;  COCHLEAR AMERICAS    ROBOT-ASSISTED LAPAROSCOPIC ABDOMINAL  HYSTERECTOMY USING DA NATALYA XI N/A 7/5/2019    Procedure: XI ROBOTIC HYSTERECTOMY;  Surgeon: Trice Lei MD;  Location: [facility];  Service: OB/GYN;  Laterality: N/A;    ROBOT-ASSISTED LAPAROSCOPIC NEPHRECTOMY Right 10/8/2020    Procedure: ROBOTIC NEPHRECTOMY;  Surgeon: Douglas Abraham MD;  [address]  Service: Urology;  Laterality: Right;  3.5hrs gen with regional     ROBOT-ASSISTED LAPAROSCOPIC SALPINGO-OOPHORECTOMY USING DA NATALYA XI Bilateral 7/5/2019    Procedure: XI ROBOTIC SALPINGO-OOPHORECTOMY;  Surgeon: Trice Lei MD;  Location: [facility] OR;  Service: OB/GYN;  Laterality: Bilateral;    TUBAL LIGATION      Vascular access surgeries      x5     Family History   Problem Relation Name Age of Onset    Cancer Mother      Ovarian cancer Mother      Cancer Sister      Ovarian cancer Sister      Lung cancer Sister      Macular degeneration Sister      Cancer Sister      Ovarian cancer Sister      Diabetes Sister      Diabetes Brother      Breast cancer Neg Hx      Colon cancer Neg Hx      Cervical cancer Neg Hx      Endometrial cancer Neg Hx      Vaginal cancer Neg Hx      Thyroid disease Neg Hx      Glaucoma Neg Hx      Retinal detachment Neg Hx       Social History     Tobacco Use    Smoking status: Never    Smokeless tobacco: Never   Substance Use Topics    Alcohol use: No    Drug use: No     Review of Systems  See HPI     Physical Exam     Initial Vitals [09/12/24 1343]   BP Pulse Resp Temp SpO2   (!) 212/91 91 (!) 24 97.8 °F (36.6 °C) 98 %      MAP       --         Physical Exam    Nursing note and vitals reviewed.      Gen: AxOx3, well nourished, appears stated age, no pallor, no jaundice, appears well hydrated  Eye: EOMI, no scleral icterus, no periorbital edema or ecchymosis  Head: Normocephalic, atraumatic, no lesions, scalp appears normal  ENT: Neck supple, no stridor, no masses, no drooling or voice changes  CVS: All distal pulses intact with normal rate and rhythm, no JVD, normal  S1/S2, no murmur  Pulm:  No reproducible chest wall tenderness.  No increased work of breathing.  Soft bibasilar crackles    Abd:  Nondistended, soft, nontender, no organomegaly, no CVAT  Ext:  Bilateral 2+ lower limb pitting edema, no lesions, rashes, or deformity  Neuro: GCS15, moving all extremities, gait intact, face grossly symmetric  Psych: normal affect, cooperative, well groomed, makes good eye contact      ED Course   Procedures  Labs Reviewed   CBC W/ AUTO DIFFERENTIAL - Abnormal       Result Value    WBC 7.52      RBC 3.29 (*)     Hemoglobin 9.2 (*)     Hematocrit 29.3 (*)     MCV 89      MCH 28.0      MCHC 31.4 (*)     RDW 19.8 (*)     Platelets 149 (*)     MPV 11.7      Immature Granulocytes 0.5      Gran # (ANC) 6.8      Immature Grans (Abs) 0.04      Lymph # 0.4 (*)     Mono # 0.2 (*)     Eos # 0.0      Baso # 0.01      nRBC 0      Gran % 90.7 (*)     Lymph % 5.9 (*)     Mono % 2.8 (*)     Eosinophil % 0.0      Basophil % 0.1      Differential Method Automated     COMPREHENSIVE METABOLIC PANEL - Abnormal    Sodium 135 (*)     Potassium 4.0      Chloride 94 (*)     CO2 28      Glucose 334 (*)     BUN 41 (*)     Creatinine 5.8 (*)     Calcium 9.6      Total Protein 6.8      Albumin 3.3 (*)     Total Bilirubin 1.0      Alkaline Phosphatase 54 (*)     AST 10      ALT 15      eGFR 7.8 (*)     Anion Gap 13     TROPONIN I - Abnormal    Troponin I 0.037 (*)    B-TYPE NATRIURETIC PEPTIDE - Abnormal     (*)    TROPONIN I - Abnormal    Troponin I 0.029 (*)    POCT GLUCOSE - Abnormal    POCT Glucose 344 (*)    MAGNESIUM    Magnesium 2.3     PHOSPHORUS    Phosphorus 3.7     POCT GLUCOSE MONITORING CONTINUOUS     EKG Readings: (Independently Interpreted)   Initial Reading: No STEMI.   Normal sinus rhythm with rate 87 without ST depressions or elevations.     ECG Results              EKG 12-lead (Final result)        Collection Time Result Time QRS Duration OHS QTC Calculation    09/12/24 13:49:18 09/12/24  14:45:36 96 488                     Final result by Interface, Lab In [facility] (09/12/24 14:45:42)                   Narrative:    Test Reason : R07.9,    Vent. Rate : 087 BPM     Atrial Rate : 087 BPM     P-R Int : 156 ms          QRS Dur : 096 ms      QT Int : 406 ms       P-R-T Axes : 047 -13 067 degrees     QTc Int : 488 ms    Normal sinus rhythm  Normal ECG  When compared with ECG of 02-SEP-2024 09:04,  No significant change was found  Confirmed by Jennifer Santoyo MD ([id number]) on 9/12/2024 2:45:31 PM    Referred By: [email or web address]   SELF           Confirmed By:Jennifer Santoyo MD                                  Imaging Results              X-Ray Chest AP Portable (Final result)  Result time 09/12/24 14:58:55      Final result by Daniel Seymour MD (09/12/24 14:58:55)                   Impression:      See above      Electronically signed by: Daniel Seymour MD  Date:    09/12/2024  Time:    14:58               Narrative:    EXAMINATION:  XR CHEST AP PORTABLE    CLINICAL HISTORY:  Chest Pain;    TECHNIQUE:  Single frontal view of the chest was performed.    COMPARISON:  N 09/01/2024 one    FINDINGS:  Mild cardiomegaly.  There is slight prominence of the central pulmonary vascularity and mild bibasilar edema noted.  The lung apices are clear.  No significant pleural effusion.                                       Medications   amLODIPine tablet 10 mg (has no administration in time range)   aspirin EC tablet 81 mg (has no administration in time range)   atorvastatin tablet 40 mg (has no administration in time range)   folic acid tablet 1 mg (has no administration in time range)   losartan tablet 100 mg (has no administration in time range)   pantoprazole EC tablet 40 mg (has no administration in time range)   sevelamer carbonate tablet 2,400 mg (2,400 mg Oral Given 9/12/24 1805)   labetaloL tablet 200 mg (has no administration in time range)   sodium chloride 0.9% flush 10 mL (has no administration in time range)   ondansetron  injection 4 mg (has no administration in time range)   acetaminophen tablet 650 mg (has no administration in time range)   heparin (porcine) injection 5,000 Units (5,000 Units Subcutaneous Given 9/12/24 1803)   melatonin tablet 6 mg (has no administration in time range)   polyethylene glycol packet 17 g (has no administration in time range)   senna-docusate 8.6-50 mg per tablet 1 tablet (has no administration in time range)   glucose chewable tablet 16 g (has no administration in time range)   glucose chewable tablet 24 g (has no administration in time range)   glucagon (human recombinant) injection 1 mg (has no administration in time range)   dextrose 10% bolus 125 mL 125 mL (has no administration in time range)   dextrose 10% bolus 250 mL 250 mL (has no administration in time range)   predniSONE tablet 40 mg (has no administration in time range)   insulin glargine U-100 (Lantus) pen 12 Units (has no administration in time range)   insulin aspart U-100 pen 1-15 Units (has no administration in time range)   hydrALAZINE injection 5 mg (has no administration in time range)   labetaloL injection 10 mg (10 mg Intravenous Given 9/12/24 1511)     Medical Decision Making  Initial assessment  59-year-old female with a past medical history of ESRD on HD (TTS - last dialyzed on 9/12/24), HTN, T2DM with Insulin use, and GERD now presenting with a chief complaint of shortness of breath and chest pain. Patient is able to vocalise, breathing spontaneously, hemodynamically stable, oriented, moving all 4 limbs spontaneously.  Examination consistent with 2+ lower limb pitting edema and soft bibasilar crackles.      Differential diagnosis  Hypertensive emergency  ACS  Under dialysis  CHF exacerbation  Considered other life-threatening emergencies including PE and aortic dissection but lower suspicion given history and physical examination.      ED management  High suspicion for fluid overload in setting of under dialysis. Given Young  score 0 decided to not obtain D-dimer (patient has bilateral lower leg swelling likely in setting of fluid overload).    Workup consistent with CMP with CKD with grossly stable electrolytes.  CBC with hemoglobin 9.2 platelet 149.  Troponin 0.037 and grossly baseline.  .  Chest x-ray concerning for pulmonary edema.  EKG without signs concerning for ischemia or infarction.    I suspect patient's shortness of breath is due to pulmonary edema from under dialysis.  No Lasix was given as patient does not make large amounts of urine anymore.  Given patient's symptoms and workup I discussed patient with Hospital Medicine for admission for hemodialysis.  Patient remained stable in the emergency department and was admitted to Hospital Medicine.    Amount and/or Complexity of Data Reviewed  Labs:  Decision-making details documented in ED Course.  Radiology:  Decision-making details documented in ED Course.    Risk  Prescription drug management.      Additional MDM:     Well's Criteria Score:  -Clinical symptoms of DVT (leg swelling, pain with palpation) = 0.0  -PE is #1 diagnosis OR equally likely =            0.0  -Heart Rate >100 =   0.0  -Immobilization (= or > than 3 days) or surgery in the previous 4 weeks = 0.0  -Previous DVT/PE = 0.0  -Hemoptysis =          0.0  -Malignancy =           0.0  Well's Probability Score =    0              ED Course as of 09/12/24 2157   Thu Sep 12, 2024   1516 Sodium(!): 135 [PM]   1516 Potassium: 4.0 [PM]   1516 BUN(!): 41 [PM]   1516 Creatinine(!): 5.8 [PM]   1516 WBC: 7.52 [PM]   1516 Hemoglobin(!): 9.2 [PM]   1516 Platelet Count(!): 149 [PM]   1517 Troponin I(!): 0.037  Baseline [PM]   1517 BNP(!): 238 [PM]   1517 Phosphorus Level: 3.7 [PM]   1517 Magnesium : 2.3 [PM]   1517 X-Ray Chest AP Portable  As per my independent interpretation there is pulmonary edema [PM]      ED Course User Index  [PM] Thomas Calhoun MD                           Clinical Impression:  Final  diagnoses:  [R07.9] Chest pain  [E87.70] Fluid overload          ED Disposition Condition    Observation Stable                Thomas Calhoun MD  Resident  09/12/24 [id number]

## 2024-09-12 NOTE — ED NOTES
Assumed care of the patient. Pt in hospital stretcher, side rails up X2, bed low and locked, and call light is placed within reach. No family/visitors at bedside at this time. Pt denies any complaints or needs. Limb alert band applied.

## 2024-09-12 NOTE — PROVIDER PROGRESS NOTES - EMERGENCY DEPT.
Encounter Date: 9/12/2024    ED Physician Progress Notes         EKG - STEMI Decision  Initial Reading: No STEMI present.  Response: No Action Needed.

## 2024-09-12 NOTE — ED TRIAGE NOTES
Pt presents to ED with /o SOB and midsternal CP starting after dialysis session this morning. Pt only had 2L taken off at dialysis. Pt repors BLE swelling.

## 2024-09-12 NOTE — ASSESSMENT & PLAN NOTE
Patient is identified as having Diastolic (HFpEF) heart failure that is Acute on chronic. CHF is currently uncontrolled due to Continued edema of extremities and Pulmonary edema/pleural effusion on CXR. Latest ECHO performed and demonstrates- Results for orders placed during the hospital encounter of 08/14/22    Echo    Interpretation Summary  · Mild left atrial enlargement.  · The left ventricle is normal in size with mild concentric hypertrophy and normal systolic function.  · The estimated ejection fraction is 60%.  · Grade II left ventricular diastolic dysfunction.  · Normal right ventricular size with normal right ventricular systolic function.  · There is pulmonary hypertension. The estimated PA systolic pressure is 45 mmHg.  · Normal central venous pressure (3 mmHg).  Continue Beta Blocker and monitor clinical status closely. Monitor on telemetry. Patient is off CHF pathway.  Monitor strict Is&Os and daily weights.  Place on fluid restriction of 2 L. Cardiology has not been consulted. Continue to stress to patient importance of self efficacy and  on diet for CHF. Last BNP reviewed- and noted below   Recent Labs   Lab 09/12/24  1426   *       Will need UF with nephrology

## 2024-09-13 ENCOUNTER — EPISODE CHANGES (OUTPATIENT)
Dept: CARDIOLOGY | Facility: CLINIC | Age: 59
End: 2024-09-13

## 2024-09-13 LAB
ALBUMIN SERPL BCP-MCNC: 3.1 G/DL (ref 3.5–5.2)
ANION GAP SERPL CALC-SCNC: 16 MMOL/L (ref 8–16)
BUN SERPL-MCNC: 62 MG/DL (ref 8–23)
CALCIUM SERPL-MCNC: 8.7 MG/DL (ref 8.7–10.5)
CHLORIDE SERPL-SCNC: 93 MMOL/L (ref 95–110)
CO2 SERPL-SCNC: 25 MMOL/L (ref 23–29)
CREAT SERPL-MCNC: 8.4 MG/DL (ref 0.5–1.4)
EST. GFR  (NO RACE VARIABLE): 5 ML/MIN/1.73 M^2
GLUCOSE SERPL-MCNC: 314 MG/DL (ref 70–110)
PHOSPHATE SERPL-MCNC: 5.2 MG/DL (ref 2.7–4.5)
POCT GLUCOSE: 214 MG/DL (ref 70–110)
POCT GLUCOSE: 238 MG/DL (ref 70–110)
POCT GLUCOSE: 280 MG/DL (ref 70–110)
POCT GLUCOSE: 294 MG/DL (ref 70–110)
POCT GLUCOSE: 333 MG/DL (ref 70–110)
POCT GLUCOSE: 386 MG/DL (ref 70–110)
POTASSIUM SERPL-SCNC: 4.3 MMOL/L (ref 3.5–5.1)
SODIUM SERPL-SCNC: 134 MMOL/L (ref 136–145)

## 2024-09-13 PROCEDURE — 99222 1ST HOSP IP/OBS MODERATE 55: CPT | Mod: 25,HCNC,, | Performed by: NURSE PRACTITIONER

## 2024-09-13 PROCEDURE — 90935 HEMODIALYSIS ONE EVALUATION: CPT | Mod: HCNC,,, | Performed by: NURSE PRACTITIONER

## 2024-09-13 PROCEDURE — 96372 THER/PROPH/DIAG INJ SC/IM: CPT | Performed by: FAMILY MEDICINE

## 2024-09-13 PROCEDURE — 80069 RENAL FUNCTION PANEL: CPT | Mod: HCNC | Performed by: NURSE PRACTITIONER

## 2024-09-13 PROCEDURE — 25000003 PHARM REV CODE 250: Mod: HCNC | Performed by: FAMILY MEDICINE

## 2024-09-13 PROCEDURE — 21400001 HC TELEMETRY ROOM: Mod: HCNC

## 2024-09-13 PROCEDURE — 5A1D70Z PERFORMANCE OF URINARY FILTRATION, INTERMITTENT, LESS THAN 6 HOURS PER DAY: ICD-10-PCS | Performed by: NURSE PRACTITIONER

## 2024-09-13 PROCEDURE — G0257 UNSCHED DIALYSIS ESRD PT HOS: HCPCS | Mod: HCNC

## 2024-09-13 PROCEDURE — 63600175 PHARM REV CODE 636 W HCPCS: Mod: HCNC | Performed by: FAMILY MEDICINE

## 2024-09-13 RX ORDER — INSULIN GLARGINE 100 [IU]/ML
15 INJECTION, SOLUTION SUBCUTANEOUS 2 TIMES DAILY
Status: DISCONTINUED | OUTPATIENT
Start: 2024-09-13 | End: 2024-09-14

## 2024-09-13 RX ORDER — SODIUM CHLORIDE 9 MG/ML
INJECTION, SOLUTION INTRAVENOUS ONCE
Status: CANCELLED | OUTPATIENT
Start: 2024-09-13 | End: 2024-09-13

## 2024-09-13 RX ADMIN — INSULIN ASPART 9 UNITS: 100 INJECTION, SOLUTION INTRAVENOUS; SUBCUTANEOUS at 09:09

## 2024-09-13 RX ADMIN — SEVELAMER CARBONATE 2400 MG: 800 TABLET, FILM COATED ORAL at 09:09

## 2024-09-13 RX ADMIN — SEVELAMER CARBONATE 2400 MG: 800 TABLET, FILM COATED ORAL at 06:09

## 2024-09-13 RX ADMIN — PANTOPRAZOLE SODIUM 40 MG: 40 TABLET, DELAYED RELEASE ORAL at 09:09

## 2024-09-13 RX ADMIN — INSULIN ASPART 9 UNITS: 100 INJECTION, SOLUTION INTRAVENOUS; SUBCUTANEOUS at 12:09

## 2024-09-13 RX ADMIN — INSULIN GLARGINE 15 UNITS: 100 INJECTION, SOLUTION SUBCUTANEOUS at 09:09

## 2024-09-13 RX ADMIN — HEPARIN SODIUM 5000 UNITS: 5000 INJECTION INTRAVENOUS; SUBCUTANEOUS at 09:09

## 2024-09-13 RX ADMIN — HEPARIN SODIUM 5000 UNITS: 5000 INJECTION INTRAVENOUS; SUBCUTANEOUS at 06:09

## 2024-09-13 RX ADMIN — FOLIC ACID 1 MG: 1 TABLET ORAL at 09:09

## 2024-09-13 RX ADMIN — INSULIN ASPART 15 UNITS: 100 INJECTION, SOLUTION INTRAVENOUS; SUBCUTANEOUS at 01:09

## 2024-09-13 RX ADMIN — LABETALOL HYDROCHLORIDE 200 MG: 100 TABLET, FILM COATED ORAL at 09:09

## 2024-09-13 RX ADMIN — INSULIN ASPART 6 UNITS: 100 INJECTION, SOLUTION INTRAVENOUS; SUBCUTANEOUS at 06:09

## 2024-09-13 RX ADMIN — ATORVASTATIN CALCIUM 40 MG: 40 TABLET, FILM COATED ORAL at 06:09

## 2024-09-13 RX ADMIN — SEVELAMER CARBONATE 2400 MG: 800 TABLET, FILM COATED ORAL at 12:09

## 2024-09-13 RX ADMIN — LOSARTAN POTASSIUM 100 MG: 50 TABLET, FILM COATED ORAL at 09:09

## 2024-09-13 RX ADMIN — ASPIRIN 81 MG: 81 TABLET, COATED ORAL at 06:09

## 2024-09-13 RX ADMIN — INSULIN ASPART 6 UNITS: 100 INJECTION, SOLUTION INTRAVENOUS; SUBCUTANEOUS at 09:09

## 2024-09-13 RX ADMIN — PREDNISONE 40 MG: 20 TABLET ORAL at 09:09

## 2024-09-13 RX ADMIN — AMLODIPINE BESYLATE 10 MG: 10 TABLET ORAL at 09:09

## 2024-09-13 NOTE — ASSESSMENT & PLAN NOTE
Anemia is likely due to chronic disease due to ESRD. Most recent hemoglobin and hematocrit are listed below.  Recent Labs     09/12/24  1426   HGB 9.2*   HCT 29.3*       Plan  - Monitor serial CBC: Daily  - Transfuse PRBC if patient becomes hemodynamically unstable, symptomatic or H/H drops below 7/21.  - Patient has not received any PRBC transfusions to date  - Patient's anemia is currently stable

## 2024-09-13 NOTE — PROGRESS NOTES
Arrived to the acute dialysis unit by wheelchair. Observation dialysis started via 15 gauge fistula needles to RFA fistula without difficulty.

## 2024-09-13 NOTE — ASSESSMENT & PLAN NOTE
59 y.o. Black or  Female ESRD-HD T-T-S  presents to ED on 9/12/2024 with diagnosis of: Fluid overload [E87.70];Chest pain [R07.9]   Nephrology consulted for inpatient ESRD-HD management    Outpatient HD Information:  -Dialysis modality: Hemodialysis  -Outpatient HD unit: [facility]   -Nephrologist: Ras TRIMBLE   -HD TX days: Tuesday/Thursday/Saturday, duration of treatment: 3.5 hrs  -Last HD TX prior to hospital admission: 09/12/24  -Dialysis access: RUE AV fistula   -Residual urine: Minium  -EDW: 85 kg     Assessment:   - Will provide dialysis today (09/13/2024) with UF - 2.5L as BP tolerates for metabolic clearance and volume management   - Labs reviewed and dialysate to be adjusted to current labs.   - Continue to monitor intake and output  - Please avoid gadolinium, fleets, phos-based laxatives, NSAIDs  - Dialysis thrice weekly unless more urgent indications arise. Will evaluate RRT requirements Daily.    Anemia of ESRD   Recent Labs   Lab 09/12/24  1426   WBC 7.52   HGB 9.2*   HCT 29.3*   *     Lab Results   Component Value Date    FESATURATED 36 08/22/2024    FERRITIN 3,164 (H) 08/22/2024       - Goal in ESRD is Hgb of 10-11. Hgb 9.2, near target.   - EPO can be administered and dosed per his OP unit upon discharge.    Mineral Bone Disease in ESRD   Lab Results   Component Value Date    .7 (H) 01/23/2023    CALCIUM 9.6 09/12/2024    ALBUMIN 3.3 (L) 09/12/2024    PHOS 3.7 09/12/2024       - F/U PO4, Mg, Calcium. And albumin levels daily.   - Renal diet with protein intake goal 1.5 g/kg/d with 1 L fluid restriction   - Restart home phos binder

## 2024-09-13 NOTE — SUBJECTIVE & OBJECTIVE
Past Medical History:   Diagnosis Date    Acute hyperkalemia 2020    Acute on chronic diastolic heart failure 10/08/2021    Anemia of chronic disorder 2017    Overview:  Added automatically from request for surgery [id number]    Anemia of chronic renal failure, stage 5     Anxiety     Cyst of left ovary 2019    Cyst of left ovary 2019    Dyslipidemia 2013    Encounter for screening colonoscopy 10/08/2020    End-stage renal disease on hemodialysis 2020    ESRD on hemodialysis 2017    Hematuria 10/30/2019    History of COVID-19 2020    Hospital discharge follow-up 10/06/2023    Hx of sinus bradycardia 2017    Hyperkalemia 2017    Hypertensive urgency 2021    Peripheral neuropathy 2013    Preoperative testing 10/08/2020    Renovascular hypertension 2020    S/P dilation and curettage 2018    Screening for colon cancer 2023    Thickened endometrium 2018    Thyroid nodule     Thyroid nodule 2021    Uncontrolled type 2 diabetes mellitus 2013    Overview:  poc a1c today 11.2-270/ up from 10.8-263, Pt. has very very stressed/ was incarcerated/marital stress/ will current meds/ less stress now/ will monitor.       Past Surgical History:   Procedure Laterality Date    AV FISTULA PLACEMENT      right arm     SECTION      x1    COLONOSCOPY N/A 2018    Procedure: COLONOSCOPY;  Surgeon: Pankaj Hinojosa MD;  [facility]  Service: Endoscopy;  Laterality: N/A;  dialysis pt/labs prior to colon/MS    COLONOSCOPY N/A 2023    Procedure: COLONOSCOPY;  Surgeon: Angel Turner MD;  Location: [facility] (2ND FLR);  Service: Endoscopy;  Laterality: N/A;  Ref By:  Maty Gayle MD  Procedure: Colonoscopy  Diagnosis: endounter for preventive health   Procedure Timing: pt. pref  Provider: Dr. turner   Location: [facility] [place] 4-Endo   Prep Specifications: Standard prep   Additional Info: Dialysis patient lab pending     CYSTOSCOPY W/ RETROGRADES Bilateral 8/20/2020    Procedure: CYSTOSCOPY, WITH RETROGRADE PYELOGRAM;  Surgeon: Douglas Abraham MD;  Location: [facility] OR 1ST FLR;  Service: Urology;  Laterality: Bilateral;  45 min    FISTULOGRAM Right 9/9/2020    Procedure: Fistulogram;  Surgeon: Lester Gómez MD;  Location: [facility] CATH LAB;  Service: Nephrology;  Laterality: Right;    HYSTERECTOMY      IMPLANTATION OF COCHLEAR PROSTHESIS Right 6/28/2021    Procedure: INSERTION, PROSTHESIS, COCHLEAR;  Surgeon: Ramiro Zuleta MD;  Location: [facility] OR 2ND FLR;  Service: ENT;  Laterality: Right;  COCHLEAR BABAK    IMPLANTATION OF COCHLEAR PROSTHESIS Right 6/30/2021    Procedure: INSERTION, PROSTHESIS, COCHLEAR;  Surgeon: Ramiro Zuleta MD;  Location: [facility] OR 2ND FLR;  Service: ENT;  Laterality: Right;  COCHLEAR AMERICAS    ROBOT-ASSISTED LAPAROSCOPIC ABDOMINAL HYSTERECTOMY USING DA NATALYA XI N/A 7/5/2019    Procedure: XI ROBOTIC HYSTERECTOMY;  Surgeon: Trice Lei MD;  Location: [facility];  Service: OB/GYN;  Laterality: N/A;    ROBOT-ASSISTED LAPAROSCOPIC NEPHRECTOMY Right 10/8/2020    Procedure: ROBOTIC NEPHRECTOMY;  Surgeon: Douglas Abraham MD;  Location: [facility] OR 2ND FLR;  Service: Urology;  Laterality: Right;  3.5hrs gen with regional     ROBOT-ASSISTED LAPAROSCOPIC SALPINGO-OOPHORECTOMY USING DA NATALYA XI Bilateral 7/5/2019    Procedure: XI ROBOTIC SALPINGO-OOPHORECTOMY;  Surgeon: Trice Lei MD;  Location: [facility];  Service: OB/GYN;  Laterality: Bilateral;    TUBAL LIGATION      Vascular access surgeries      x5       Review of patient's allergies indicates:  No Known Allergies  Current Facility-Administered Medications   Medication Frequency    acetaminophen tablet 650 mg Q4H PRN    amLODIPine tablet 10 mg Nightly    aspirin EC tablet 81 mg QAM    atorvastatin tablet 40 mg QAM    dextrose 10% bolus 125 mL 125 mL PRN    dextrose 10% bolus 250 mL 250 mL PRN    folic acid tablet 1 mg Daily    glucagon (human recombinant) injection  1 mg PRN    glucose chewable tablet 16 g PRN    glucose chewable tablet 24 g PRN    heparin (porcine) injection 5,000 Units Q8H    hydrALAZINE injection 5 mg Q6H PRN    insulin aspart U-100 pen 1-15 Units QID (AC + HS) PRN    insulin glargine U-100 (Lantus) pen 15 Units BID    labetaloL tablet 200 mg Q12H    losartan tablet 100 mg Daily    melatonin tablet 6 mg Nightly PRN    ondansetron injection 4 mg Q6H PRN    pantoprazole EC tablet 40 mg Daily    polyethylene glycol packet 17 g Daily    predniSONE tablet 40 mg Daily    senna-docusate 8.6-50 mg per tablet 1 tablet BID    sevelamer carbonate tablet 2,400 mg TID WM    sodium chloride 0.9% flush 10 mL PRN     Family History       Problem Relation (Age of Onset)    Cancer Mother, Sister, Sister    Diabetes Sister, Brother    Lung cancer Sister    Macular degeneration Sister    Ovarian cancer Mother, Sister, Sister          Tobacco Use    Smoking status: Never    Smokeless tobacco: Never   Substance and Sexual Activity    Alcohol use: No    Drug use: No    Sexual activity: Yes     Partners: Male     Birth control/protection: Post-menopausal     Review of Systems   Constitutional:  Negative for activity change, appetite change, chills, diaphoresis, fatigue and fever.   HENT:  Negative for congestion, sinus pressure, sore throat and tinnitus.    Eyes:  Negative for visual disturbance.   Respiratory:  Positive for shortness of breath. Negative for cough, chest tightness and wheezing.    Cardiovascular:  Positive for chest pain and leg swelling. Negative for palpitations.   Gastrointestinal:  Negative for abdominal distention, abdominal pain, nausea and vomiting.   Endocrine: Negative for polydipsia, polyphagia and polyuria.   Genitourinary:  Negative for dysuria.   Musculoskeletal:  Negative for arthralgias and back pain.   Skin:  Negative for rash and wound.   Neurological:  Negative for dizziness, syncope, light-headedness and headaches.   Psychiatric/Behavioral:   Negative for confusion.      Objective:     Vital Signs (Most Recent):  Temp: 98.1 °F (36.7 °C) (09/13/24 0742)  Pulse: 75 (09/13/24 0742)  Resp: 18 (09/13/24 0742)  BP: (!) 167/76 (09/13/24 0742)  SpO2: 95 % (09/13/24 0742) Vital Signs (24h Range):  Temp:  [97.8 °F (36.6 °C)-98.5 °F (36.9 °C)] 98.1 °F (36.7 °C)  Pulse:  [75-91] 75  Resp:  [16-24] 18  SpO2:  [95 %-98 %] 95 %  BP: (158-212)/(76-91) 167/76     Weight: 94.8 kg (208 lb 15.9 oz) (09/13/24 0543)  Body mass index is 35.87 kg/m².  Body surface area is 2.07 meters squared.    I/O last 3 completed shifts:  In: 200 [P.O.:200]  Out: -      Physical Exam  Vitals and nursing note reviewed.   Constitutional:       General: She is not in acute distress.     Appearance: Normal appearance. She is well-developed. She is obese. She is not ill-appearing.   HENT:      Head: Normocephalic and atraumatic.      Right Ear: External ear normal.      Left Ear: External ear normal.      Nose: Nose normal.      Mouth/Throat:      Pharynx: Uvula midline.   Eyes:      General: Lids are normal.      Conjunctiva/sclera: Conjunctivae normal.   Neck:      Thyroid: No thyroid mass.      Vascular: No JVD.      Trachea: Trachea normal.   Cardiovascular:      Rate and Rhythm: Normal rate and regular rhythm.      Heart sounds: Normal heart sounds, S1 normal and S2 normal.   Pulmonary:      Effort: Pulmonary effort is normal.      Breath sounds: Normal breath sounds.   Abdominal:      General: Bowel sounds are normal. There is no distension.      Palpations: Abdomen is soft.   Musculoskeletal:      Cervical back: Full passive range of motion without pain, normal range of motion and neck supple.      Right lower leg: 3+ Edema present.      Left lower leg: 3+ Edema present.   Neurological:      General: No focal deficit present.      Mental Status: She is alert.   Psychiatric:         Mood and Affect: Mood normal.         Speech: Speech normal.         Behavior: Behavior normal. Behavior is  cooperative.          Significant Labs:  CBC:   Recent Labs   Lab 09/12/24  1426   WBC 7.52   RBC 3.29*   HGB 9.2*   HCT 29.3*   *   MCV 89   MCH 28.0   MCHC 31.4*     CMP:   Recent Labs   Lab 09/12/24  1426   *   CALCIUM 9.6   ALBUMIN 3.3*   PROT 6.8   *   K 4.0   CO2 28   CL 94*   BUN 41*   CREATININE 5.8*   ALKPHOS 54*   ALT 15   AST 10   BILITOT 1.0     All labs within the past 24 hours have been reviewed.

## 2024-09-13 NOTE — PROGRESS NOTES
[facility] Internal Medicine [facility] Medicine  Progress Note    Patient Name: Jael Hall  MRN: [id number]  Patient Class: OP- Observation   Admission Date: 9/12/2024  Length of Stay: 0 days  Attending Physician: Daria Lorenz MD  Primary Care Provider: Maty Gayle DO        Subjective:     Principal Problem:Acute on chronic diastolic congestive heart failure        HPI:  58 y/o female with a PMHx of ESRD on HD (MWF), HTN, T2DM with Insulin use, WAIHA, and GERD who presented to the ED from HD for SOB and CP. She was only able to complete 2 hours of dialysis and has since experienced dyspnea on exertion associated with intermittent episodes of sharp substernal chest pain which does not really radiate. Both of her legs have become progressively swollen since starting high dose steroids several weeks ago. Patient denies any fevers, headaches, dizziness, hemoptysis, or previous blood clots. Currently her symptoms have subsided.     In the ED, CXR with mild cardiomegaly and slight prominence of the central pulmonary vascularity and mild bibasilar edema. BP severely elevated.  consulted for observation admission.     Overview/Hospital Course:  No notes on file    Interval History: NAEON. No acute concerns. Awaiting HD.     Review of Systems   Constitutional:  Negative for chills and fever.   Respiratory:  Negative for shortness of breath.    Cardiovascular:  Negative for chest pain.   Gastrointestinal:  Negative for abdominal pain.   Genitourinary:  Negative for dysuria.   Neurological:  Negative for headaches.   Psychiatric/Behavioral:  Negative for confusion.      Objective:     Vital Signs (Most Recent):  Temp: 97.9 °F (36.6 °C) (09/13/24 1111)  Pulse: 72 (09/13/24 1132)  Resp: 18 (09/13/24 1111)  BP: 135/71 (09/13/24 1111)  SpO2: 97 % (09/13/24 1111) Vital Signs (24h Range):  Temp:  [97.8 °F (36.6 °C)-98.5 °F (36.9 °C)] 97.9 °F (36.6 °C)  Pulse:  [72-91] 72  Resp:  [16-24] 18  SpO2:  [95 %-98  %] 97 %  BP: (135-212)/(71-91) 135/71     Weight: 94.8 kg (208 lb 15.9 oz)  Body mass index is 35.87 kg/m².    Intake/Output Summary (Last 24 hours) at 9/13/2024 1135  Last data filed at 9/13/2024 0522  Gross per 24 hour   Intake 200 ml   Output --   Net 200 ml         Physical Exam  Vitals and nursing note reviewed.   Constitutional:       General: She is not in acute distress.     Appearance: She is well-developed. She is obese.   HENT:      Head: Normocephalic and atraumatic.   Eyes:      Conjunctiva/sclera: Conjunctivae normal.   Neck:      Vascular: No JVD.   Cardiovascular:      Rate and Rhythm: Normal rate and regular rhythm.      Heart sounds: Normal heart sounds.   Pulmonary:      Effort: Pulmonary effort is normal.      Breath sounds: Normal breath sounds.   Abdominal:      General: Bowel sounds are normal. There is no distension.      Palpations: Abdomen is soft.      Tenderness: There is no abdominal tenderness.   Musculoskeletal:      Cervical back: Neck supple.      Right lower leg: Edema present.      Left lower leg: Edema present.   Neurological:      Mental Status: She is alert.   Psychiatric:         Behavior: Behavior normal.             Significant Labs: All pertinent labs within the past 24 hours have been reviewed.  BMP:   Recent Labs   Lab 09/12/24  1426   *   *   K 4.0   CL 94*   CO2 28   BUN 41*   CREATININE 5.8*   CALCIUM 9.6   MG 2.3     CBC:   Recent Labs   Lab 09/12/24  1426   WBC 7.52   HGB 9.2*   HCT 29.3*   *       Significant Imaging: I have reviewed all pertinent imaging results/findings within the past 24 hours.    Assessment/Plan:      * Acute on chronic diastolic congestive heart failure  Patient is identified as having Diastolic (HFpEF) heart failure that is Acute on chronic. CHF is currently uncontrolled due to Continued edema of extremities and Pulmonary edema/pleural effusion on CXR. Latest ECHO performed and demonstrates- Results for orders placed during  "the hospital encounter of 08/14/22    Echo    Interpretation Summary  · Mild left atrial enlargement.  · The left ventricle is normal in size with mild concentric hypertrophy and normal systolic function.  · The estimated ejection fraction is 60%.  · Grade II left ventricular diastolic dysfunction.  · Normal right ventricular size with normal right ventricular systolic function.  · There is pulmonary hypertension. The estimated PA systolic pressure is 45 mmHg.  · Normal central venous pressure (3 mmHg).  Continue Beta Blocker and monitor clinical status closely. Monitor on telemetry. Patient is off CHF pathway.  Monitor strict Is&Os and daily weights.  Place on fluid restriction of 2 L. Cardiology has not been consulted. Continue to stress to patient importance of self efficacy and  on diet for CHF. Last BNP reviewed- and noted below   Recent Labs   Lab 09/12/24  1426   *         UF with nephrology today  "UF - 2.5L as BP tolerates for metabolic clearance and volume management"     Essential hypertension  With hypertensive emergency resolved    Chronic, controlled. Latest blood pressure and vitals reviewed-     Temp:  [97.8 °F (36.6 °C)-98.5 °F (36.9 °C)]   Pulse:  [72-91]   Resp:  [16-24]   BP: (135-212)/(71-91)   SpO2:  [95 %-98 %] .   Home meds for hypertension were reviewed and noted below.   Hypertension Medications               amLODIPine (NORVASC) 10 MG tablet Take 1 tablet (10 mg total) by mouth nightly.    labetaloL (NORMODYNE) 200 MG tablet Take 1 tablet (200 mg total) by mouth every 12 (twelve) hours.    losartan (COZAAR) 100 MG tablet Take 1 tablet (100 mg total) by mouth once daily.            While in the hospital, will manage blood pressure as follows; Continue home antihypertensive regimen    Will utilize p.r.n. blood pressure medication only if patient's blood pressure greater than 180/110 and she develops symptoms such as worsening chest pain or shortness of breath.    IAN " (autoimmune hemolytic anemia)  Continue steroid taper as recommended in OP heme/ onc notes  continue 40 mg daily      Anemia in ESRD (end-stage renal disease)  Anemia is likely due to chronic disease due to ESRD. Most recent hemoglobin and hematocrit are listed below.  Recent Labs     09/12/24  1426   HGB 9.2*   HCT 29.3*       Plan  - Monitor serial CBC: Daily  - Transfuse PRBC if patient becomes hemodynamically unstable, symptomatic or H/H drops below 7/21.  - Patient has not received any PRBC transfusions to date  - Patient's anemia is currently stable    Type 2 diabetes mellitus  Patient's FSGs are uncontrolled due to hyperglycemia on current medication regimen.  Last A1c reviewed-   Lab Results   Component Value Date    HGBA1C 7.6 (H) 09/01/2024     Most recent fingerstick glucose reviewed-   Recent Labs   Lab 09/13/24  0029 09/13/24  0151 09/13/24  0743 09/13/24  1113   POCTGLUCOSE 386* 333* 238* 280*     Current correctional scale  High  Increase anti-hyperglycemic dose as follows-   Antihyperglycemics (From admission, onward)      Start     Stop Route Frequency Ordered    09/13/24 0900  insulin glargine U-100 (Lantus) pen 15 Units         -- SubQ 2 times daily 09/13/24 0804    09/12/24 2136  insulin aspart U-100 pen 1-15 Units         -- SubQ Before meals & nightly PRN 09/12/24 2136          Hold Oral hypoglycemics while patient is in the hospital.    ESRD (end stage renal disease) on dialysis  Nephrology following. Recs:  HD today  -Will consider additional HD treatment tomorrow per OP schedule as patient appears to be over her EDW.       VTE Risk Mitigation (From admission, onward)           Ordered     heparin (porcine) injection 5,000 Units  Every 8 hours (non-standard times)         09/12/24 1707                    Discharge Planning   MEGAN: 9/16/2024     Code Status: Full Code   Is the patient medically ready for discharge?:     Reason for patient still in hospital (select all that apply): Patient  trending condition and Treatment  Discharge Plan A: Home          Daria Lorenz MD  Department of Hospital Medicine   [facility] - Internal Medicine Telemetry

## 2024-09-13 NOTE — PROGRESS NOTES
Dialysis completed. Needles removed from RFA fistula with manual pressure held for 5 minutes each with hemostasias achieved. Gauze dressing applied. Dialyzed for 3.5 hours with fluid removal of 2.5 liters. Tolerated well with stable vital signs. Returned to her room by wheelchair.

## 2024-09-13 NOTE — HOSPITAL COURSE
Nephrology consulted.  Patient had a session of hemodialysis on 09/13 with improvement in shortness of breath, volume status and blood pressure.  Patient has been prescribed prednisone taper by Heme-Onc currently on 40 mg daily.  Advised patient to reach out to Heme-Onc for further recommendations regarding prednisone taper.  Patient does not have any complaints today. Patient is currently medically and HDS. She is being d/c home. Plan of care discussed with patient, verbalized understanding. All questions were answered.

## 2024-09-13 NOTE — SUBJECTIVE & OBJECTIVE
Interval History: NAEON. No acute concerns. Awaiting HD.     Review of Systems   Constitutional:  Negative for chills and fever.   Respiratory:  Negative for shortness of breath.    Cardiovascular:  Negative for chest pain.   Gastrointestinal:  Negative for abdominal pain.   Genitourinary:  Negative for dysuria.   Neurological:  Negative for headaches.   Psychiatric/Behavioral:  Negative for confusion.      Objective:     Vital Signs (Most Recent):  Temp: 97.9 °F (36.6 °C) (09/13/24 1111)  Pulse: 72 (09/13/24 1132)  Resp: 18 (09/13/24 1111)  BP: 135/71 (09/13/24 1111)  SpO2: 97 % (09/13/24 1111) Vital Signs (24h Range):  Temp:  [97.8 °F (36.6 °C)-98.5 °F (36.9 °C)] 97.9 °F (36.6 °C)  Pulse:  [72-91] 72  Resp:  [16-24] 18  SpO2:  [95 %-98 %] 97 %  BP: (135-212)/(71-91) 135/71     Weight: 94.8 kg (208 lb 15.9 oz)  Body mass index is 35.87 kg/m².    Intake/Output Summary (Last 24 hours) at 9/13/2024 1135  Last data filed at 9/13/2024 0522  Gross per 24 hour   Intake 200 ml   Output --   Net 200 ml         Physical Exam  Vitals and nursing note reviewed.   Constitutional:       General: She is not in acute distress.     Appearance: She is well-developed. She is obese.   HENT:      Head: Normocephalic and atraumatic.   Eyes:      Conjunctiva/sclera: Conjunctivae normal.   Neck:      Vascular: No JVD.   Cardiovascular:      Rate and Rhythm: Normal rate and regular rhythm.      Heart sounds: Normal heart sounds.   Pulmonary:      Effort: Pulmonary effort is normal.      Breath sounds: Normal breath sounds.   Abdominal:      General: Bowel sounds are normal. There is no distension.      Palpations: Abdomen is soft.      Tenderness: There is no abdominal tenderness.   Musculoskeletal:      Cervical back: Neck supple.      Right lower leg: Edema present.      Left lower leg: Edema present.   Neurological:      Mental Status: She is alert.   Psychiatric:         Behavior: Behavior normal.             Significant Labs: All  pertinent labs within the past 24 hours have been reviewed.  BMP:   Recent Labs   Lab 09/12/24  1426   *   *   K 4.0   CL 94*   CO2 28   BUN 41*   CREATININE 5.8*   CALCIUM 9.6   MG 2.3     CBC:   Recent Labs   Lab 09/12/24  1426   WBC 7.52   HGB 9.2*   HCT 29.3*   *       Significant Imaging: I have reviewed all pertinent imaging results/findings within the past 24 hours.

## 2024-09-13 NOTE — ASSESSMENT & PLAN NOTE
Patient's FSGs are uncontrolled due to hyperglycemia on current medication regimen.  Last A1c reviewed-   Lab Results   Component Value Date    HGBA1C 7.6 (H) 09/01/2024     Most recent fingerstick glucose reviewed-   Recent Labs   Lab 09/13/24  0029 09/13/24  0151 09/13/24  0743 09/13/24  1113   POCTGLUCOSE 386* 333* 238* 280*     Current correctional scale  High  Increase anti-hyperglycemic dose as follows-   Antihyperglycemics (From admission, onward)      Start     Stop Route Frequency Ordered    09/13/24 0900  insulin glargine U-100 (Lantus) pen 15 Units         -- SubQ 2 times daily 09/13/24 0804    09/12/24 2136  insulin aspart U-100 pen 1-15 Units         -- SubQ Before meals & nightly PRN 09/12/24 2136          Hold Oral hypoglycemics while patient is in the hospital.

## 2024-09-13 NOTE — HPI
The patient is a 59 y.o. Black or  Female with multiple co morbidities including ESRD on HD (MWF), HTN, T2DM with Insulin use, WAIHA, and GERD who presents to ED on 9/12/2024 with complaints of SOB and CP. Patient had a 2 hr HD session yesterday but started to feel SOB on exertion associated with intermittent episodes of sharp substernal chest pain soon afterwards. CP was unrelieved with OTC tums prompting her visit to the ED. In the ED, CXR with mild cardiomegaly and slight prominence of the central pulmonary vascularity and mild bibasilar edema. BP was elevated. Troponin trend unremarkable, last level 0.029.  consulted for observation admission. Patient denies any fevers, headaches, dizziness, hemoptysis, or previous blood clots. No CP or SOB on exam. -160s mm Hg. Nephrology consulted for management of ESRD and HD treatment.

## 2024-09-13 NOTE — H&P
[facility] - Internal Medicine [facility] Medicine  History & Physical    Patient Name: Jael Hall  MRN: [id number]  Patient Class: OP- Observation  Admission Date: 9/12/2024  Attending Physician: Daria Lorenz MD  Primary Care Provider: Maty Gayle DO      Patient information was obtained from patient, past medical records, and ER records.     Subjective:     Principal Problem:Acute on chronic diastolic congestive heart failure    Chief Complaint:   Chief Complaint   Patient presents with    Shortness of Breath     Pt went to dialysis today and only received half session and was on her way home when she started having SOB and midsternal CP. Hx CHF.         HPI:   60 y/o female with a PMHx of ESRD on HD (MWF), HTN, T2DM with Insulin use, WAIHA, and GERD who presented to the ED from HD for SOB and CP. She was only able to complete 2 hours of dialysis and has since experienced dyspnea on exertion associated with intermittent episodes of sharp substernal chest pain which does not really radiate. Both of her legs have become progressively swollen since starting high dose steroids several weeks ago. Patient denies any fevers, headaches, dizziness, hemoptysis, or previous blood clots. Currently her symptoms have subsided.     In the ED, CXR with mild cardiomegaly and slight prominence of the central pulmonary vascularity and mild bibasilar edema. BP severely elevated.  consulted for observation admission.     Past Medical History:   Diagnosis Date    Acute hyperkalemia 09/09/2020    Acute on chronic diastolic heart failure 10/08/2021    Anemia of chronic disorder 11/08/2017    Overview:  Added automatically from request for surgery [id number]    Anemia of chronic renal failure, stage 5     Anxiety     Cyst of left ovary 05/14/2019    Cyst of left ovary 07/05/2019    Dyslipidemia 01/04/2013    Encounter for screening colonoscopy 10/08/2020    End-stage renal disease on hemodialysis 08/20/2020    ESRD on  hemodialysis 2017    Hematuria 10/30/2019    History of COVID-19 2020    Hospital discharge follow-up 10/06/2023    Hx of sinus bradycardia 2017    Hyperkalemia 2017    Hypertensive urgency 2021    Peripheral neuropathy 2013    Preoperative testing 10/08/2020    Renovascular hypertension 2020    S/P dilation and curettage 2018    Screening for colon cancer 2023    Thickened endometrium 2018    Thyroid nodule     Thyroid nodule 2021    Uncontrolled type 2 diabetes mellitus 2013    Overview:  poc a1c today 11.2-270/ up from 10.8-263, Pt. has very very stressed/ was incarcerated/marital stress/ will current meds/ less stress now/ will monitor.       Past Surgical History:   Procedure Laterality Date    AV FISTULA PLACEMENT      right arm     SECTION      x1    COLONOSCOPY N/A 2018    Procedure: COLONOSCOPY;  Surgeon: Pankaj Hinojosa MD;  Location: [facility] (4TH FLR);  Service: Endoscopy;  Laterality: N/A;  dialysis pt/labs prior to colon/MS    COLONOSCOPY N/A 2023    Procedure: COLONOSCOPY;  Surgeon: Angel Turner MD;  Location: [facility] (2ND FLR);  Service: Endoscopy;  Laterality: N/A;  Ref By:  Maty Gayle MD  Procedure: Colonoscopy  Diagnosis: endounter for preventive health   Procedure Timing: pt. pref  Provider: Dr. turner   Location: [facility] 4-Endo   Prep Specifications: Standard prep   Additional Info: Dialysis patient lab pending    CYSTOSCOPY W/ RETROGRADES Bilateral 2020    Procedure: CYSTOSCOPY, WITH RETROGRADE PYELOGRAM;  Surgeon: Douglas Abraham MD;  Location: [facility] OR 1ST FLR;  Service: Urology;  Laterality: Bilateral;  45 min    FISTULOGRAM Right 2020    Procedure: Fistulogram;  Surgeon: Lester Gómez MD;  Location: [facility] CATH LAB;  Service: Nephrology;  Laterality: Right;    HYSTERECTOMY      IMPLANTATION OF COCHLEAR PROSTHESIS Right 2021    Procedure: INSERTION, PROSTHESIS, COCHLEAR;  Surgeon:  Ramiro Zuleta MD;  Location: [facility] OR 2ND FLR;  Service: ENT;  Laterality: Right;  COCHLEAR BABAK    IMPLANTATION OF COCHLEAR PROSTHESIS Right 6/30/2021    Procedure: INSERTION, PROSTHESIS, COCHLEAR;  Surgeon: Ramiro Zuleta MD;  Location: [facility] OR 2ND FLR;  Service: ENT;  Laterality: Right;  COCHLEAR AMERICAS    ROBOT-ASSISTED LAPAROSCOPIC ABDOMINAL HYSTERECTOMY USING DA NATALYA XI N/A 7/5/2019    Procedure: XI ROBOTIC HYSTERECTOMY;  Surgeon: Trice Lei MD;  Location: [facility] OR;  Service: OB/GYN;  Laterality: N/A;    ROBOT-ASSISTED LAPAROSCOPIC NEPHRECTOMY Right 10/8/2020    Procedure: ROBOTIC NEPHRECTOMY;  Surgeon: Douglas Abraham MD;  Location: [facility] OR 2ND FLR;  Service: Urology;  Laterality: Right;  3.5hrs gen with regional     ROBOT-ASSISTED LAPAROSCOPIC SALPINGO-OOPHORECTOMY USING DA NATALYA XI Bilateral 7/5/2019    Procedure: XI ROBOTIC SALPINGO-OOPHORECTOMY;  Surgeon: Trice Lei MD;  Location: [facility];  Service: OB/GYN;  Laterality: Bilateral;    TUBAL LIGATION      Vascular access surgeries      x5       Review of patient's allergies indicates:  No Known Allergies    No current facility-administered medications on file prior to encounter.     Current Outpatient Medications on File Prior to Encounter   Medication Sig    ACCU-CHEK GUIDE GLUCOSE METER Misc     ACCU-CHEK GUIDE L1-L2 CTRL SOL Soln     ACCU-CHEK GUIDE TEST STRIPS Strp     ACCU-CHEK SOFTCLIX LANCETS Misc     acetaminophen (TYLENOL) 500 MG tablet Take 1 tablet (500 mg total) by mouth every 8 (eight) hours as needed for Pain.    amLODIPine (NORVASC) 10 MG tablet Take 1 tablet (10 mg total) by mouth nightly.    aspirin (ECOTRIN) 81 MG EC tablet Take 81 mg by mouth every morning.    atorvastatin (LIPITOR) 40 MG tablet Take 1 tablet (40 mg total) by mouth once daily. (Patient taking differently: Take 40 mg by mouth every morning.)    atovaquone (MEPRON) 750 mg/5 mL Susp oral liquid Take 10 mLs (1,500 mg total) by mouth once daily.    blood sugar  "diagnostic Strp To check BG 3 times daily, to use with insurance preferred meter    blood-glucose meter kit To check BG 3 times daily, to use with insurance preferred meter    DROPSAFE ALCOHOL PREP PADS PadM Apply topically.    epoetin shiela (PROCRIT) 10,000 unit/mL injection Inject 0.85 mLs (8,500 Units total) into the skin every Mon, Wed, Fri.    folic acid (FOLVITE) 1 MG tablet Take 1 tablet (1 mg total) by mouth once daily.    insulin aspart U-100 (NOVOLOG) 100 unit/mL (3 mL) InPn pen Inject 0-5 Units into the skin before meals and at bedtime as needed (Hyperglycemia).    insulin glargine U-100, Lantus, 100 unit/mL (3 mL) SubQ InPn pen Inject 12 Units into the skin once daily.    labetaloL (NORMODYNE) 200 MG tablet Take 1 tablet (200 mg total) by mouth every 12 (twelve) hours.    lancets Misc To check BG 3 times daily, to use with insurance preferred meter    LIDOcaine (LIDODERM) 5 % Place 1 patch onto the skin once daily. Remove & Discard patch within 12 hours or as directed by MD    losartan (COZAAR) 100 MG tablet Take 1 tablet (100 mg total) by mouth once daily.    omeprazole (PRILOSEC) 20 MG capsule Take 1 capsule (20 mg total) by mouth once daily.    pen needle, diabetic 31 gauge x 3/16" Ndle Use to inject insulin into the skin.    predniSONE (DELTASONE) 20 MG tablet Take 3 tablets (60 mg total) by mouth once daily for 4 days, THEN 2.5 tablets (50 mg total) once daily for 7 days, THEN 2 tablets (40 mg total) once daily for 7 days, THEN 1.5 tablets (30 mg total) once daily for 7 days, THEN 1 tablet (20 mg total) once daily for 7 days.    pregabalin (LYRICA) 75 MG capsule Take 1 capsule (75 mg total) by mouth once daily.    sevelamer carbonate (RENVELA) 800 mg Tab Take 3 tablets (2,400 mg total) by mouth 3 (three) times daily with meals.     Family History       Problem Relation (Age of Onset)    Cancer Mother, Sister, Sister    Diabetes Sister, Brother    Lung cancer Sister    Macular degeneration Sister    " Ovarian cancer Mother, Sister, Sister          Tobacco Use    Smoking status: Never    Smokeless tobacco: Never   Substance and Sexual Activity    Alcohol use: No    Drug use: No    Sexual activity: Yes     Partners: Male     Birth control/protection: Post-menopausal     Review of Systems   Constitutional:  Negative for activity change, appetite change, chills, diaphoresis, fatigue and fever.   HENT:  Negative for congestion, sinus pressure, sore throat and tinnitus.    Eyes:  Negative for visual disturbance.   Respiratory:  Positive for shortness of breath. Negative for cough, chest tightness and wheezing.    Cardiovascular:  Positive for chest pain and leg swelling. Negative for palpitations.   Gastrointestinal:  Negative for abdominal distention, abdominal pain, nausea and vomiting.   Endocrine: Negative for polydipsia, polyphagia and polyuria.   Genitourinary:  Negative for dysuria.   Musculoskeletal:  Negative for arthralgias and back pain.   Skin:  Negative for rash and wound.   Neurological:  Negative for dizziness, syncope, light-headedness and headaches.   Psychiatric/Behavioral:  Negative for confusion.      Objective:     Vital Signs (Most Recent):  Temp: 98.1 °F (36.7 °C) (09/12/24 1516)  Pulse: 84 (09/12/24 1516)  Resp: 19 (09/12/24 1516)  BP: (!) 179/81 (09/12/24 1539)  SpO2: 96 % (09/12/24 1516) Vital Signs (24h Range):  Temp:  [97.8 °F (36.6 °C)-98.1 °F (36.7 °C)] 98.1 °F (36.7 °C)  Pulse:  [84-91] 84  Resp:  [19-24] 19  SpO2:  [95 %-98 %] 96 %  BP: (179-212)/(79-91) 179/81     Weight: 94.8 kg (209 lb)  Body mass index is 35.87 kg/m².     Physical Exam  Vitals and nursing note reviewed.   Constitutional:       General: She is not in acute distress.     Appearance: Normal appearance. She is well-developed. She is obese. She is not ill-appearing or toxic-appearing.   HENT:      Head: Normocephalic and atraumatic.      Right Ear: External ear normal. Decreased hearing noted.      Left Ear: External ear  normal. Decreased hearing noted.      Nose: Nose normal.      Mouth/Throat:      Pharynx: Uvula midline.   Eyes:      General: Lids are normal.      Extraocular Movements: EOM normal.      Conjunctiva/sclera: Conjunctivae normal.      Pupils: Pupils are equal, round, and reactive to light.   Neck:      Thyroid: No thyroid mass.      Vascular: No JVD.      Trachea: Trachea normal.   Cardiovascular:      Rate and Rhythm: Normal rate and regular rhythm.      Heart sounds: Normal heart sounds, S1 normal and S2 normal.   Pulmonary:      Effort: Pulmonary effort is normal.      Breath sounds: Normal breath sounds.   Abdominal:      General: Bowel sounds are normal. There is no distension.      Palpations: Abdomen is soft.      Tenderness: There is no abdominal tenderness.   Musculoskeletal:      Cervical back: Full passive range of motion without pain, normal range of motion and neck supple.      Right lower leg: 3+ Edema present.      Left lower leg: 3+ Edema present.   Neurological:      General: No focal deficit present.      Mental Status: She is alert.   Psychiatric:         Speech: Speech normal.         Behavior: Behavior normal. Behavior is cooperative.         Thought Content: Thought content normal.              CRANIAL NERVES     CN III, IV, VI   Pupils are equal, round, and reactive to light.  Extraocular motions are normal.        Significant Labs: All pertinent labs within the past 24 hours have been reviewed.  Recent Lab Results         09/12/24  1426   09/12/24  1349        Albumin 3.3         ALP 54         ALT 15         Anion Gap 13         AST 10         Baso # 0.01         Basophil % 0.1         BILIRUBIN TOTAL 1.0  Comment: For infants and newborns, interpretation of results should be based  on gestational age, weight and in agreement with clinical  observations.    Premature Infant recommended reference ranges:  Up to 24 hours.............<8.0 mg/dL  Up to 48 hours............<12.0 mg/dL  3-5  days..................<15.0 mg/dL  6-29 days.................<15.0 mg/dL             Comment: Values of less than 100 pg/ml are consistent with non-CHF populations.         BUN 41         Calcium 9.6         Chloride 94         CO2 28         Creatinine 5.8         Differential Method Automated         eGFR 7.8         Eos # 0.0         Eos % 0.0         Glucose 334         Gran # (ANC) 6.8         Gran % 90.7         Hematocrit 29.3         Hemoglobin 9.2         Immature Grans (Abs) 0.04  Comment: Mild elevation in immature granulocytes is non specific and   can be seen in a variety of conditions including stress response,   acute inflammation, trauma and pregnancy. Correlation with other   laboratory and clinical findings is essential.           Immature Granulocytes 0.5         Lymph # 0.4         Lymph % 5.9         Magnesium  2.3         MCH 28.0         MCHC 31.4         MCV 89         Mono # 0.2         Mono % 2.8         MPV 11.7         nRBC 0         QRS Duration   96       OHS QTC Calculation   488       Phosphorus Level 3.7         Platelet Count 149         Potassium 4.0         PROTEIN TOTAL 6.8         RBC 3.29         RDW 19.8         Sodium 135         Troponin I 0.037  Comment: The reference interval for Troponin I represents the 99th percentile   cutoff   for our facility and is consistent with 3rd generation assay   performance.           WBC 7.52                 Significant Imaging: I have reviewed all pertinent imaging results/findings within the past 24 hours.  Assessment/Plan:     * Acute on chronic diastolic congestive heart failure  Patient is identified as having Diastolic (HFpEF) heart failure that is Acute on chronic. CHF is currently uncontrolled due to Continued edema of extremities and Pulmonary edema/pleural effusion on CXR. Latest ECHO performed and demonstrates- Results for orders placed during the hospital encounter of 08/14/22    Echo    Interpretation Summary  · Mild  left atrial enlargement.  · The left ventricle is normal in size with mild concentric hypertrophy and normal systolic function.  · The estimated ejection fraction is 60%.  · Grade II left ventricular diastolic dysfunction.  · Normal right ventricular size with normal right ventricular systolic function.  · There is pulmonary hypertension. The estimated PA systolic pressure is 45 mmHg.  · Normal central venous pressure (3 mmHg).  Continue Beta Blocker and monitor clinical status closely. Monitor on telemetry. Patient is off CHF pathway.  Monitor strict Is&Os and daily weights.  Place on fluid restriction of 2 L. Cardiology has not been consulted. Continue to stress to patient importance of self efficacy and  on diet for CHF. Last BNP reviewed- and noted below   Recent Labs   Lab 09/12/24  1426   *       Will need UF with nephrology    Essential hypertension  With hypertensive emergency  Chronic, uncontrolled. Latest blood pressure and vitals reviewed-     Temp:  [97.8 °F (36.6 °C)-98.5 °F (36.9 °C)]   Pulse:  [81-91]   Resp:  [16-24]   BP: (172-212)/(77-91)   SpO2:  [95 %-98 %] .   Home meds for hypertension were reviewed and noted below.   Hypertension Medications               amLODIPine (NORVASC) 10 MG tablet Take 1 tablet (10 mg total) by mouth nightly.    labetaloL (NORMODYNE) 200 MG tablet Take 1 tablet (200 mg total) by mouth every 12 (twelve) hours.    losartan (COZAAR) 100 MG tablet Take 1 tablet (100 mg total) by mouth once daily.            While in the hospital, will manage blood pressure as follows; Continue home antihypertensive regimen    Will utilize p.r.n. blood pressure medication only if patient's blood pressure greater than 180/110 and she develops symptoms such as worsening chest pain or shortness of breath.    AIHA (autoimmune hemolytic anemia)  Continue steroid taper as recommended in OP heme/ onc notes  Starts 40 mg daily tomorrow    Anemia in ESRD (end-stage renal  disease)  Anemia is likely due to chronic disease due to ESRD. Most recent hemoglobin and hematocrit are listed below.  Recent Labs     09/12/24  1426   HGB 9.2*   HCT 29.3*     Plan  - Monitor serial CBC: Daily  - Transfuse PRBC if patient becomes hemodynamically unstable, symptomatic or H/H drops below 7/21.  - Patient has not received any PRBC transfusions to date  - Patient's anemia is currently stable    Type 2 diabetes mellitus  Patient's FSGs are uncontrolled due to hyperglycemia on current medication regimen.  Last A1c reviewed-   Lab Results   Component Value Date    HGBA1C 7.6 (H) 09/01/2024     Most recent fingerstick glucose reviewed-   Recent Labs   Lab 09/12/24  1727   POCTGLUCOSE 344*     Current correctional scale  High  Maintain anti-hyperglycemic dose as follows-   Antihyperglycemics (From admission, onward)      Start     Stop Route Frequency Ordered    09/12/24 2136  insulin aspart U-100 pen 1-15 Units         -- SubQ Before meals & nightly PRN 09/12/24 2136    09/12/24 2100  insulin glargine U-100 (Lantus) pen 12 Units         -- SubQ Nightly 09/12/24 1707          Hold Oral hypoglycemics while patient is in the hospital.    ESRD (end stage renal disease) on dialysis  Nephrology consulted      VTE Risk Mitigation (From admission, onward)           Ordered     heparin (porcine) injection 5,000 Units  Every 8 hours (non-standard times)         09/12/24 1707                    On 09/12/2024, patient should be placed in hospital observation services under my care.         Daria Lorenz MD  Department of Hospital Medicine  [facility] - Internal Medicine Telemetry

## 2024-09-13 NOTE — ASSESSMENT & PLAN NOTE
With hypertensive emergency  Chronic, uncontrolled. Latest blood pressure and vitals reviewed-     Temp:  [97.8 °F (36.6 °C)-98.5 °F (36.9 °C)]   Pulse:  [81-91]   Resp:  [16-24]   BP: (172-212)/(77-91)   SpO2:  [95 %-98 %] .   Home meds for hypertension were reviewed and noted below.   Hypertension Medications               amLODIPine (NORVASC) 10 MG tablet Take 1 tablet (10 mg total) by mouth nightly.    labetaloL (NORMODYNE) 200 MG tablet Take 1 tablet (200 mg total) by mouth every 12 (twelve) hours.    losartan (COZAAR) 100 MG tablet Take 1 tablet (100 mg total) by mouth once daily.            While in the hospital, will manage blood pressure as follows; Continue home antihypertensive regimen    Will utilize p.r.n. blood pressure medication only if patient's blood pressure greater than 180/110 and she develops symptoms such as worsening chest pain or shortness of breath.

## 2024-09-13 NOTE — ASSESSMENT & PLAN NOTE
Patient's FSGs are uncontrolled due to hyperglycemia on current medication regimen.  Last A1c reviewed-   Lab Results   Component Value Date    HGBA1C 7.6 (H) 09/01/2024     Most recent fingerstick glucose reviewed-   Recent Labs   Lab 09/12/24  1727   POCTGLUCOSE 344*     Current correctional scale  High  Maintain anti-hyperglycemic dose as follows-   Antihyperglycemics (From admission, onward)      Start     Stop Route Frequency Ordered    09/12/24 2136  insulin aspart U-100 pen 1-15 Units         -- SubQ Before meals & nightly PRN 09/12/24 2136 09/12/24 2100  insulin glargine U-100 (Lantus) pen 12 Units         -- SubQ Nightly 09/12/24 1707          Hold Oral hypoglycemics while patient is in the hospital.

## 2024-09-13 NOTE — ASSESSMENT & PLAN NOTE
With hypertensive emergency resolved    Chronic, controlled. Latest blood pressure and vitals reviewed-     Temp:  [97.8 °F (36.6 °C)-98.5 °F (36.9 °C)]   Pulse:  [72-91]   Resp:  [16-24]   BP: (135-212)/(71-91)   SpO2:  [95 %-98 %] .   Home meds for hypertension were reviewed and noted below.   Hypertension Medications               amLODIPine (NORVASC) 10 MG tablet Take 1 tablet (10 mg total) by mouth nightly.    labetaloL (NORMODYNE) 200 MG tablet Take 1 tablet (200 mg total) by mouth every 12 (twelve) hours.    losartan (COZAAR) 100 MG tablet Take 1 tablet (100 mg total) by mouth once daily.            While in the hospital, will manage blood pressure as follows; Continue home antihypertensive regimen    Will utilize p.r.n. blood pressure medication only if patient's blood pressure greater than 180/110 and she develops symptoms such as worsening chest pain or shortness of breath.

## 2024-09-13 NOTE — PLAN OF CARE
[facility] - Internal Medicine Telemetry  Initial Discharge Assessment       Primary Care Provider: Maty Gayle DO    Admission Diagnosis: Fluid overload [E87.70]  Chest pain [R07.9]    Admission Date: 9/12/2024  Expected Discharge Date: 9/16/2024    Transition of Care Barriers: (P) None    Payor: HU[employer] MANAGED MEDICARE / Plan: HU[employer] SNP HMO PPO SPECIAL NEEDS / Product Type: Medicare Advantage /     Extended Emergency Contact Information  Primary Emergency Contact: Sudha Hall   [place]  Mobile Phone: [phone]  Relation: Daughter  Secondary Emergency Contact: Curtis Rodriguez  Home Phone: [phone]  Relation: Friend    Discharge Plan A: (P) Home  Discharge Plan B: (P) Home      [facility] - [facility] [address]  [address] [address]  Phone: [phone] Fax: [phone]      Initial Assessment (most recent)       Adult Discharge Assessment - 09/13/24 0917          Discharge Assessment    Assessment Type Discharge Planning Assessment (P)      Confirmed/corrected address, phone number and insurance Yes (P)      Confirmed Demographics -- (P)    CM updated demographics    Source of Information patient (P)      When was your last doctors appointment? -- (P)    [birth year] before her birthday    Does patient/caregiver understand observation status Yes (P)      Communicated MEGAN with patient/caregiver No (P)      Reason For Admission CHF (P)      People in Home alone (P)      Facility Arrived From: n/a (P)      Do you expect to return to your current living situation? Yes (P)      Do you have help at home or someone to help you manage your care at home? No (P)      Prior to hospitilization cognitive status: Unable to Assess (P)      Current cognitive status: Alert/Oriented (P)      Dressing/Bathing Difficulty no (P)      Home Accessibility wheelchair accessible (P)      Home Layout -- (P)    3rd floor with elevator; has no trouble navigating  stairs    Equipment Currently Used at Home none (P)      Readmission within 30 days? Yes (P)      Do you currently have service(s) that help you manage your care at home? Yes (P)      Name and Contact number of agency unsure (P)      Is the pt/caregiver preference to resume services with current agency No (P)    Declines HH    Do you take prescription medications? Yes (P)      Do you have prescription coverage? Yes (P)      Coverage [employer] (P)      Do you have any problems affording any of your prescribed medications? No (P)      Is the patient taking medications as prescribed? yes (P)      Who is going to help you get home at discharge? daughter or Curtis (P)      How do you get to doctors appointments? family or friend will provide;public transportation (P)      Are you on dialysis? Yes (P)      Dialysis Name and Scheduled days [facility] (P)      Do you take coumadin? No (P)      Discharge Plan A Home (P)      Discharge Plan B Home (P)      DME Needed Upon Discharge  none (P)      Discharge Plan discussed with: Patient (P)      Transition of Care Barriers None (P)         Physical Activity    On average, how many days per week do you engage in moderate to strenuous exercise (like a brisk walk)? 0 days (P)      On average, how many minutes do you engage in exercise at this level? 0 min (P)         Financial Resource Strain    How hard is it for you to pay for the very basics like food, housing, medical care, and heating? Somewhat hard (P)         Housing Stability    In the last 12 months, was there a time when you were not able to pay the mortgage or rent on time? No (P)      At any time in the past 12 months, were you homeless or living in a shelter (including now)? No (P)         Transportation Needs    Has the lack of transportation kept you from medical appointments, meetings, work or from getting things needed for daily living? No (P)         Food Insecurity    Within the past 12 months, you  worried that your food would run out before you got the money to buy more. Sometimes true (P)      Within the past 12 months, the food you bought just didn't last and you didn't have money to get more. Sometimes true (P)    Gets food stamps       Stress    Do you feel stress - tense, restless, nervous, or anxious, or unable to sleep at night because your mind is troubled all the time - these days? Not at all (P)         Social Isolation    How often do you feel lonely or isolated from those around you?  Never (P)         Alcohol Use    Q1: How often do you have a drink containing alcohol? Never (P)      Q2: How many drinks containing alcohol do you have on a typical day when you are drinking? Patient does not drink (P)      Q3: How often do you have six or more drinks on one occasion? Never (P)         Utilities    In the past 12 months has the electric, gas, oil, or water company threatened to shut off services in your home? No (P)         Health Literacy    How often do you need to have someone help you when you read instructions, pamphlets, or other written material from your doctor or pharmacy? Never (P)                  CM spoke with pt in room.  DC plan home no needs.  Family/friend will give her a ride home.  No DME needs.    SAMUEL Patel, BS, RN, CCM      Discharge Plan A and Plan B have been determined by review of patient's clinical status, future medical and therapeutic needs, and coverage/benefits for post-acute care in coordination with multidisciplinary team members.

## 2024-09-13 NOTE — PLAN OF CARE
Inpatient Upgrade Note    Jael Hall has warranted treatment spanning two or more midnights of hospital level care for the management of heart failure. She continues to require daily labs, monitoring of vital signs, medication adjustments, and further evaluation by consultants. Her condition is also complicated by the following comorbidities: ESRD on HD (MWF), HTN, T2DM with Insulin use, WAIHA, and GERD .

## 2024-09-13 NOTE — PROGRESS NOTES
[facility] NOTE     Patient currently on hemodialysis for removal of uremic toxins .     Patient seen and evaluated on hemodialysis, tolerating treatment, see HD flowsheet for vitals and assessments.      No Hypotension, chest pain, shortness of breath, cramping, nausea or vomiting.     Target UF: 2.5 L as tolerated, keep MAP >65  Pre HD weight 90 kg (EDW 85 kg); UFR adjusted.   Would recommend HD again tomorrow for clearance and volume management as tomorrow is her scheduled HD day.   See consult note for full plan.     HAYDER Toro DNP, APRN, FNP-C  Department of Nephrology  [facility]  Pager: [phone]

## 2024-09-13 NOTE — PLAN OF CARE
09/13/24 0928   Readmission   Was this a planned readmission? No   Why were you hospitalized in the last 30 days? SOB   Why were you readmitted? Alarmed about signs/symptoms   When you left the hospital how did you feel? better   When you left the hospital where did you go? Home Alone   Did patient/caregiver refused recommended DC plan? No   Tell me about what happened between when you left the hospital and the day you returned. everything was fine   When did you start not feeling well? yesterday when she left dialysis   Did you try to manage your symptoms your self? No   Did you call anyone? No   Why? Called daughter, came straight to hospital   Did you try to see or did see a doctor or nurse before you came? No   Why? Came straight to hospital   Did you have  a follow-up appointment on discharge? No     SAMUEL Patel, BS, RN, CCM      Discharge Plan A and Plan B have been determined by review of patient's clinical status, future medical and therapeutic needs, and coverage/benefits for post-acute care in coordination with multidisciplinary team members.

## 2024-09-13 NOTE — CONSULTS
[facility] - Internal Medicine Telemetry  Nephrology  Consult Note    Patient Name: Jael Hall  MRN: [id number]  Admission Date: 9/12/2024  Hospital Length of Stay: 0 days  Attending Provider: Daria Lorenz MD   Primary Care Physician: Maty Gayle DO  Principal Problem:Acute on chronic diastolic congestive heart failure    Inpatient consult to Nephrology  Consult performed by: Herlinda Toro, DNP, FNP-C  Consult ordered by: Daria Lorenz MD  Reason for consult: ESRD      Subjective:     HPI: The patient is a 59 y.o. Black or  Female with multiple co morbidities including ESRD on HD (MWF), HTN, T2DM with Insulin use, WAIHA, and GERD who presents to ED on 9/12/2024 with complaints of SOB and CP. Patient had a 2 hr HD session yesterday but started to feel SOB on exertion associated with intermittent episodes of sharp substernal chest pain soon afterwards. CP was unrelieved with OTC tums prompting her visit to the ED. In the ED, CXR with mild cardiomegaly and slight prominence of the central pulmonary vascularity and mild bibasilar edema. BP was elevated. Troponin trend unremarkable, last level 0.029.  consulted for observation admission. Patient denies any fevers, headaches, dizziness, hemoptysis, or previous blood clots. No CP or SOB on exam. -160s mm Hg. Nephrology consulted for management of ESRD and HD treatment.    Past Medical History:   Diagnosis Date    Acute hyperkalemia 09/09/2020    Acute on chronic diastolic heart failure 10/08/2021    Anemia of chronic disorder 11/08/2017    Overview:  Added automatically from request for surgery [id number]    Anemia of chronic renal failure, stage 5     Anxiety     Cyst of left ovary 05/14/2019    Cyst of left ovary 07/05/2019    Dyslipidemia 01/04/2013    Encounter for screening colonoscopy 10/08/2020    End-stage renal disease on hemodialysis 08/20/2020    ESRD on hemodialysis 09/28/2017    Hematuria 10/30/2019     History of COVID-19 2020    Hospital discharge follow-up 10/06/2023    Hx of sinus bradycardia 2017    Hyperkalemia 2017    Hypertensive urgency 2021    Peripheral neuropathy 2013    Preoperative testing 10/08/2020    Renovascular hypertension 2020    S/P dilation and curettage 2018    Screening for colon cancer 2023    Thickened endometrium 2018    Thyroid nodule     Thyroid nodule 2021    Uncontrolled type 2 diabetes mellitus 2013    Overview:  poc a1c today 11.2-270/ up from 10.8-263, Pt. has very very stressed/ was incarcerated/marital stress/ will current meds/ less stress now/ will monitor.       Past Surgical History:   Procedure Laterality Date    AV FISTULA PLACEMENT      right arm     SECTION      x1    COLONOSCOPY N/A 2018    Procedure: COLONOSCOPY;  Surgeon: Pankaj Hinojosa MD;  Location: [facility] (4TH FLR);  Service: Endoscopy;  Laterality: N/A;  dialysis pt/labs prior to colon/MS    COLONOSCOPY N/A 2023    Procedure: COLONOSCOPY;  Surgeon: Angel Turner MD;  Location: [facility] (2ND FLR);  Service: Endoscopy;  Laterality: N/A;  Ref By:  Maty Gayle MD  Procedure: Colonoscopy  Diagnosis: endounter for preventive health   Procedure Timing: pt. pref  Provider: Dr. turner   Location: [facility]-Endo   Prep Specifications: Standard prep   Additional Info: Dialysis patient lab pending    CYSTOSCOPY W/ RETROGRADES Bilateral 2020    Procedure: CYSTOSCOPY, WITH RETROGRADE PYELOGRAM;  Surgeon: Douglas Abraham MD;  Location: [facility] OR 1ST FLR;  Service: Urology;  Laterality: Bilateral;  45 min    FISTULOGRAM Right 2020    Procedure: Fistulogram;  Surgeon: Lester Gómez MD;  Location: [facility] CATH LAB;  Service: Nephrology;  Laterality: Right;    HYSTERECTOMY      IMPLANTATION OF COCHLEAR PROSTHESIS Right 2021    Procedure: INSERTION, PROSTHESIS, COCHLEAR;  Surgeon: Ramiro Zuleta MD;  Location: [facility]  OR 2ND FLR;  Service: ENT;  Laterality: Right;  COCHLEAR BABAK    IMPLANTATION OF COCHLEAR PROSTHESIS Right 6/30/2021    Procedure: INSERTION, PROSTHESIS, COCHLEAR;  Surgeon: Ramiro Zuleta MD;  Location: [facility] OR 2ND FLR;  Service: ENT;  Laterality: Right;  COCHLEAR AMERICAS    ROBOT-ASSISTED LAPAROSCOPIC ABDOMINAL HYSTERECTOMY USING DA NATALYA XI N/A 7/5/2019    Procedure: XI ROBOTIC HYSTERECTOMY;  Surgeon: Trice Lei MD;  Location: [facility] OR;  Service: OB/GYN;  Laterality: N/A;    ROBOT-ASSISTED LAPAROSCOPIC NEPHRECTOMY Right 10/8/2020    Procedure: ROBOTIC NEPHRECTOMY;  Surgeon: Douglas Abraham MD;  Location: [facility] OR 2ND FLR;  Service: Urology;  Laterality: Right;  3.5hrs gen with regional     ROBOT-ASSISTED LAPAROSCOPIC SALPINGO-OOPHORECTOMY USING DA NATALYA XI Bilateral 7/5/2019    Procedure: XI ROBOTIC SALPINGO-OOPHORECTOMY;  Surgeon: Trice Lei MD;  Location: [facility] OR;  Service: OB/GYN;  Laterality: Bilateral;    TUBAL LIGATION      Vascular access surgeries      x5       Review of patient's allergies indicates:  No Known Allergies  Current Facility-Administered Medications   Medication Frequency    acetaminophen tablet 650 mg Q4H PRN    amLODIPine tablet 10 mg Nightly    aspirin EC tablet 81 mg QAM    atorvastatin tablet 40 mg QAM    dextrose 10% bolus 125 mL 125 mL PRN    dextrose 10% bolus 250 mL 250 mL PRN    folic acid tablet 1 mg Daily    glucagon (human recombinant) injection 1 mg PRN    glucose chewable tablet 16 g PRN    glucose chewable tablet 24 g PRN    heparin (porcine) injection 5,000 Units Q8H    hydrALAZINE injection 5 mg Q6H PRN    insulin aspart U-100 pen 1-15 Units QID (AC + HS) PRN    insulin glargine U-100 (Lantus) pen 15 Units BID    labetaloL tablet 200 mg Q12H    losartan tablet 100 mg Daily    melatonin tablet 6 mg Nightly PRN    ondansetron injection 4 mg Q6H PRN    pantoprazole EC tablet 40 mg Daily    polyethylene glycol packet 17 g Daily     predniSONE tablet 40 mg Daily    senna-docusate 8.6-50 mg per tablet 1 tablet BID    sevelamer carbonate tablet 2,400 mg TID WM    sodium chloride 0.9% flush 10 mL PRN     Family History       Problem Relation (Age of Onset)    Cancer Mother, Sister, Sister    Diabetes Sister, Brother    Lung cancer Sister    Macular degeneration Sister    Ovarian cancer Mother, Sister, Sister          Tobacco Use    Smoking status: Never    Smokeless tobacco: Never   Substance and Sexual Activity    Alcohol use: No    Drug use: No    Sexual activity: Yes     Partners: Male     Birth control/protection: Post-menopausal     Review of Systems   Constitutional:  Negative for activity change, appetite change, chills, diaphoresis, fatigue and fever.   HENT:  Negative for congestion, sinus pressure, sore throat and tinnitus.    Eyes:  Negative for visual disturbance.   Respiratory:  Positive for shortness of breath. Negative for cough, chest tightness and wheezing.    Cardiovascular:  Positive for chest pain and leg swelling. Negative for palpitations.   Gastrointestinal:  Negative for abdominal distention, abdominal pain, nausea and vomiting.   Endocrine: Negative for polydipsia, polyphagia and polyuria.   Genitourinary:  Negative for dysuria.   Musculoskeletal:  Negative for arthralgias and back pain.   Skin:  Negative for rash and wound.   Neurological:  Negative for dizziness, syncope, light-headedness and headaches.   Psychiatric/Behavioral:  Negative for confusion.      Objective:     Vital Signs (Most Recent):  Temp: 98.1 °F (36.7 °C) (09/13/24 0742)  Pulse: 75 (09/13/24 0742)  Resp: 18 (09/13/24 0742)  BP: (!) 167/76 (09/13/24 0742)  SpO2: 95 % (09/13/24 0742) Vital Signs (24h Range):  Temp:  [97.8 °F (36.6 °C)-98.5 °F (36.9 °C)] 98.1 °F (36.7 °C)  Pulse:  [75-91] 75  Resp:  [16-24] 18  SpO2:  [95 %-98 %] 95 %  BP: (158-212)/(76-91) 167/76     Weight: 94.8 kg (208 lb 15.9 oz) (09/13/24 0543)  Body mass index is 35.87  kg/m².  Body surface area is 2.07 meters squared.    I/O last 3 completed shifts:  In: 200 [P.O.:200]  Out: -      Physical Exam  Vitals and nursing note reviewed.   Constitutional:       General: She is not in acute distress.     Appearance: Normal appearance. She is well-developed. She is obese. She is not ill-appearing.   HENT:      Head: Normocephalic and atraumatic.      Right Ear: External ear normal.      Left Ear: External ear normal.      Nose: Nose normal.      Mouth/Throat:      Pharynx: Uvula midline.   Eyes:      General: Lids are normal.      Conjunctiva/sclera: Conjunctivae normal.   Neck:      Thyroid: No thyroid mass.      Vascular: No JVD.      Trachea: Trachea normal.   Cardiovascular:      Rate and Rhythm: Normal rate and regular rhythm.      Heart sounds: Normal heart sounds, S1 normal and S2 normal.   Pulmonary:      Effort: Pulmonary effort is normal.      Breath sounds: Normal breath sounds.   Abdominal:      General: Bowel sounds are normal. There is no distension.      Palpations: Abdomen is soft.   Musculoskeletal:      Cervical back: Full passive range of motion without pain, normal range of motion and neck supple.      Right lower leg: 3+ Edema present.      Left lower leg: 3+ Edema present.   Neurological:      General: No focal deficit present.      Mental Status: She is alert.   Psychiatric:         Mood and Affect: Mood normal.         Speech: Speech normal.         Behavior: Behavior normal. Behavior is cooperative.          Significant Labs:  CBC:   Recent Labs   Lab 09/12/24  1426   WBC 7.52   RBC 3.29*   HGB 9.2*   HCT 29.3*   *   MCV 89   MCH 28.0   MCHC 31.4*     CMP:   Recent Labs   Lab 09/12/24  1426   *   CALCIUM 9.6   ALBUMIN 3.3*   PROT 6.8   *   K 4.0   CO2 28   CL 94*   BUN 41*   CREATININE 5.8*   ALKPHOS 54*   ALT 15   AST 10   BILITOT 1.0     All labs within the past 24 hours have been reviewed.    Assessment/Plan:     Cardiac/Vascular  * Acute on  chronic diastolic congestive heart failure  - defer to primary team. HD today for volume removal.   Recent Labs   Lab 09/12/24  1426   *       Renal/  ESRD (end stage renal disease) on dialysis  59 y.o. Black or  Female ESRD-HD T-T-S  presents to ED on 9/12/2024 with diagnosis of: Fluid overload [E87.70];Chest pain [R07.9]   Nephrology consulted for inpatient ESRD-HD management    Outpatient HD Information:  -Dialysis modality: Hemodialysis  -Outpatient HD unit: [facility]   -Nephrologist: Ras TRIMBLE   -HD TX days: Tuesday/Thursday/Saturday, duration of treatment: 3.5 hrs  -Last HD TX prior to hospital admission: 09/12/24  -Dialysis access: RUE AV fistula   -Residual urine: Minium  -EDW: 85 kg     Assessment:   - Will provide dialysis today (09/13/2024) with UF - 2.5L as BP tolerates for metabolic clearance and volume management   - Labs reviewed and dialysate to be adjusted to current labs.   - Will consider additional HD treatment tomorrow per OP schedule as patient appears to be over her EDW.   - Continue to monitor intake and output  - Please avoid gadolinium, fleets, phos-based laxatives, NSAIDs  - Dialysis thrice weekly unless more urgent indications arise. Will evaluate RRT requirements Daily.    Anemia of ESRD   Recent Labs   Lab 09/12/24  1426   WBC 7.52   HGB 9.2*   HCT 29.3*   *     Lab Results   Component Value Date    FESATURATED 36 08/22/2024    FERRITIN 3,164 (H) 08/22/2024       - Goal in ESRD is Hgb of 10-11. Hgb 9.2, near target.   - EPO can be administered and dosed per his OP unit upon discharge.    Mineral Bone Disease in ESRD   Lab Results   Component Value Date    .7 (H) 01/23/2023    CALCIUM 9.6 09/12/2024    ALBUMIN 3.3 (L) 09/12/2024    PHOS 3.7 09/12/2024       - F/U PO4, Mg, Calcium. And albumin levels daily.   - Renal diet with protein intake goal 1.5 g/kg/d with 1 L fluid restriction   - Restart home phos binder         Thank you for your  consult. I will follow-up with patient. Please contact us if you have any additional questions.    Herlinda Toro DNP, FNP-C  Nephrology  [facility] - Internal Medicine Telemetry

## 2024-09-13 NOTE — PLAN OF CARE
Plan of care reviewed with pt:  -AAOx4, RA, VS stable  -HD done with 2.5L removed  - Denied pain  -Tolerated her diet well, insulin given per order  -Ambulated in the room up ad xiang  -Call light in reach, WCTM

## 2024-09-13 NOTE — ED NOTES
Telemetry Verification   Patient placed on Telemetry Box  Verified with War Room  Box # [id number]   Monitor Tech War Room    Rate 87   Rhythm NSR

## 2024-09-13 NOTE — ASSESSMENT & PLAN NOTE
"Patient is identified as having Diastolic (HFpEF) heart failure that is Acute on chronic. CHF is currently uncontrolled due to Continued edema of extremities and Pulmonary edema/pleural effusion on CXR. Latest ECHO performed and demonstrates- Results for orders placed during the hospital encounter of 08/14/22    Echo    Interpretation Summary  · Mild left atrial enlargement.  · The left ventricle is normal in size with mild concentric hypertrophy and normal systolic function.  · The estimated ejection fraction is 60%.  · Grade II left ventricular diastolic dysfunction.  · Normal right ventricular size with normal right ventricular systolic function.  · There is pulmonary hypertension. The estimated PA systolic pressure is 45 mmHg.  · Normal central venous pressure (3 mmHg).  Continue Beta Blocker and monitor clinical status closely. Monitor on telemetry. Patient is off CHF pathway.  Monitor strict Is&Os and daily weights.  Place on fluid restriction of 2 L. Cardiology has not been consulted. Continue to stress to patient importance of self efficacy and  on diet for CHF. Last BNP reviewed- and noted below   Recent Labs   Lab 09/12/24  1426   *         UF with nephrology today  "UF - 2.5L as BP tolerates for metabolic clearance and volume management"   "

## 2024-09-13 NOTE — HPI
60 y/o female with a PMHx of ESRD on HD (MWF), HTN, T2DM with Insulin use, WAIHA, and GERD who presented to the ED from HD for SOB and CP. She was only able to complete 2 hours of dialysis and has since experienced dyspnea on exertion associated with intermittent episodes of sharp substernal chest pain which does not really radiate. Both of her legs have become progressively swollen since starting high dose steroids several weeks ago. Patient denies any fevers, headaches, dizziness, hemoptysis, or previous blood clots. Currently her symptoms have subsided.     In the ED, CXR with mild cardiomegaly and slight prominence of the central pulmonary vascularity and mild bibasilar edema. BP severely elevated. HM consulted for observation admission.

## 2024-09-13 NOTE — PLAN OF CARE
Problem: Adult Inpatient Plan of Care  Goal: Plan of Care Review  Outcome: Progressing  Goal: Patient-Specific Goal (Individualized)  Outcome: Progressing  Goal: Absence of Hospital-Acquired Illness or Injury  Outcome: Progressing  Goal: Optimal Comfort and Wellbeing  Outcome: Progressing  Goal: Readiness for Transition of Care  Outcome: Progressing     Problem: Diabetes Comorbidity  Goal: Blood Glucose Level Within Targeted Range  Outcome: Progressing     Problem: Fall Injury Risk  Goal: Absence of Fall and Fall-Related Injury  Outcome: Progressing   Pt AAO X 4; able to express needs.  No c/o pain.  Edema bilateral LLE's.  Blood pressure elevated upon arrival to unit.  Meds given as ordered.  Blood Glucose 386 during Midnight vitals.  Long acting and sliding scale insulins given.  Telemetry monitoring.    Safety maintained.  Bed in low position,  call  light in reach.

## 2024-09-13 NOTE — ASSESSMENT & PLAN NOTE
Nephrology following. Recs:  HD today  -Will consider additional HD treatment tomorrow per OP schedule as patient appears to be over her EDW.

## 2024-09-14 VITALS
WEIGHT: 209 LBS | HEIGHT: 64 IN | HEART RATE: 74 BPM | OXYGEN SATURATION: 94 % | DIASTOLIC BLOOD PRESSURE: 71 MMHG | TEMPERATURE: 99 F | RESPIRATION RATE: 18 BRPM | BODY MASS INDEX: 35.68 KG/M2 | SYSTOLIC BLOOD PRESSURE: 126 MMHG

## 2024-09-14 LAB — POCT GLUCOSE: 122 MG/DL (ref 70–110)

## 2024-09-14 PROCEDURE — 25000003 PHARM REV CODE 250: Mod: HCNC | Performed by: FAMILY MEDICINE

## 2024-09-14 PROCEDURE — 63600175 PHARM REV CODE 636 W HCPCS: Mod: HCNC | Performed by: FAMILY MEDICINE

## 2024-09-14 PROCEDURE — 99232 SBSQ HOSP IP/OBS MODERATE 35: CPT | Mod: HCNC,,, | Performed by: NURSE PRACTITIONER

## 2024-09-14 RX ORDER — INSULIN GLARGINE 100 [IU]/ML
8 INJECTION, SOLUTION SUBCUTANEOUS DAILY
Status: DISCONTINUED | OUTPATIENT
Start: 2024-09-14 | End: 2024-09-14 | Stop reason: HOSPADM

## 2024-09-14 RX ORDER — BENZONATATE 100 MG/1
100 CAPSULE ORAL 3 TIMES DAILY PRN
Status: DISCONTINUED | OUTPATIENT
Start: 2024-09-14 | End: 2024-09-14 | Stop reason: HOSPADM

## 2024-09-14 RX ORDER — INSULIN GLARGINE 100 [IU]/ML
12 INJECTION, SOLUTION SUBCUTANEOUS 2 TIMES DAILY
Status: DISCONTINUED | OUTPATIENT
Start: 2024-09-14 | End: 2024-09-14

## 2024-09-14 RX ORDER — PREDNISONE 20 MG/1
TABLET ORAL
Start: 2024-09-14 | End: 2024-10-27

## 2024-09-14 RX ADMIN — ATORVASTATIN CALCIUM 40 MG: 40 TABLET, FILM COATED ORAL at 07:09

## 2024-09-14 RX ADMIN — FOLIC ACID 1 MG: 1 TABLET ORAL at 09:09

## 2024-09-14 RX ADMIN — INSULIN GLARGINE 8 UNITS: 100 INJECTION, SOLUTION SUBCUTANEOUS at 09:09

## 2024-09-14 RX ADMIN — PREDNISONE 40 MG: 20 TABLET ORAL at 09:09

## 2024-09-14 RX ADMIN — PANTOPRAZOLE SODIUM 40 MG: 40 TABLET, DELAYED RELEASE ORAL at 09:09

## 2024-09-14 RX ADMIN — SEVELAMER CARBONATE 2400 MG: 800 TABLET, FILM COATED ORAL at 09:09

## 2024-09-14 RX ADMIN — LOSARTAN POTASSIUM 100 MG: 50 TABLET, FILM COATED ORAL at 09:09

## 2024-09-14 RX ADMIN — ASPIRIN 81 MG: 81 TABLET, COATED ORAL at 07:09

## 2024-09-14 RX ADMIN — LABETALOL HYDROCHLORIDE 200 MG: 100 TABLET, FILM COATED ORAL at 09:09

## 2024-09-14 RX ADMIN — HEPARIN SODIUM 5000 UNITS: 5000 INJECTION INTRAVENOUS; SUBCUTANEOUS at 09:09

## 2024-09-14 NOTE — DISCHARGE SUMMARY
[facility] - Internal Medicine [facility] Medicine  Discharge Summary      Patient Name: Jael Hall  MRN: [id number]  CHUCK: [id number]  Patient Class: IP- Inpatient  Admission Date: 9/12/2024  Hospital Length of Stay: 1 days  Discharge Date and Time:  09/14/2024 12:49 PM  Attending Physician: Maurice Archer MD   Discharging Provider: Maurice Archer MD  Primary Care Provider: Maty Gayle DO  [facility] Medicine Team: Networked reference to record PCT  Maurice Archer MD  Primary Care Team: Networked reference to record PCT     HPI:   58 y/o female with a PMHx of ESRD on HD (MWF), HTN, T2DM with Insulin use, WAIHA, and GERD who presented to the ED from HD for SOB and CP. She was only able to complete 2 hours of dialysis and has since experienced dyspnea on exertion associated with intermittent episodes of sharp substernal chest pain which does not really radiate. Both of her legs have become progressively swollen since starting high dose steroids several weeks ago. Patient denies any fevers, headaches, dizziness, hemoptysis, or previous blood clots. Currently her symptoms have subsided.     In the ED, CXR with mild cardiomegaly and slight prominence of the central pulmonary vascularity and mild bibasilar edema. BP severely elevated. HM consulted for observation admission.     * No surgery found *      Hospital Course:   Nephrology consulted.  Patient had a session of hemodialysis on 09/13 with improvement in shortness of breath, volume status and blood pressure.  Patient has been prescribed prednisone taper by Heme-Onc currently on 40 mg daily.  Advised patient to reach out to Heme-Onc for further recommendations regarding prednisone taper.  Patient does not have any complaints today. Patient is currently medically and HDS. She is being d/c home. Plan of care discussed with patient, verbalized understanding. All questions were answered.       Goals of Care Treatment Preferences:  Code Status: Full Code           What is most important right now is to focus on remaining as independent as possible, symptom/pain control, extending life as long as possible, even it it means sacrificing quality.  Accordingly, we have decided that the best plan to meet the patient's goals includes continuing with treatment.      SDOH Screening:  The patient was screened for food insecurity, housing instability, transportation needs, utility difficulties, and interpersonal safety. The social determinant(s) of health identified as a concern this admission are:  Food insecurity        Social Determinants of Health with Concerns     Food Insecurity: Food Insecurity Present (9/13/2024)        Consults:   Consults (From admission, onward)          Status Ordering Provider     Inpatient consult to Nephrology  Once        Provider:  (Not yet assigned)    CEM Falcon            No new Assessment & Plan notes have been filed under this hospital service since the last note was generated.  Service: Hospital Medicine    Final Active Diagnoses:    Diagnosis Date Noted POA    PRINCIPAL PROBLEM:  Acute on chronic diastolic congestive heart failure [I50.33] 09/12/2024 Yes    AIHA (autoimmune hemolytic anemia) [D59.10] 08/12/2024 Yes    Anemia in ESRD (end-stage renal disease) [N18.6, D63.1] 08/15/2022 Yes    Type 2 diabetes mellitus [E11.9] 07/27/2021 Yes    Essential hypertension [I10]  Yes     Chronic    ESRD (end stage renal disease) on dialysis [N18.6, Z99.2] 09/28/2017 Not Applicable     Chronic      Problems Resolved During this Admission:       Discharged Condition: stable    Disposition: Home or Self Care    Follow Up:   Follow-up Information       Hospital follow up. Go to.    Why: hospital follow up  Contact information:  9/18/2024 10:30 AM Natalie Rojas NP [facility]                         Patient Instructions:   No discharge procedures on file.    Significant Diagnostic Studies: N/A    Pending Diagnostic Studies:   "     None           Medications:  Reconciled Home Medications:      Medication List        CHANGE how you take these medications      atorvastatin 40 MG tablet  Commonly known as: LIPITOR  Take 1 tablet (40 mg total) by mouth once daily.  What changed: when to take this     predniSONE 20 MG tablet  Commonly known as: DELTASONE  Take 2 tablets (40 mg total) by mouth once daily for 6 days, THEN 1.5 tablets (30 mg total) once daily for 7 days, THEN 1 tablet (20 mg total) once daily. Follow up with Heme Onc for further tapering of prednisone.  Start taking on: September 14, 2024  What changed: See the new instructions.            CONTINUE taking these medications      * ACCU-CHEK GUIDE GLUCOSE METER Misc  Generic drug: blood-glucose meter     * blood-glucose meter kit  To check BG 3 times daily, to use with insurance preferred meter     ACCU-CHEK GUIDE L1-L2 CTRL SOL Soln  Generic drug: blood glucose control high,low     * ACCU-CHEK GUIDE TEST STRIPS Strp  Generic drug: blood sugar diagnostic     * blood sugar diagnostic Strp  To check BG 3 times daily, to use with insurance preferred meter     * ACCU-CHEK SOFTCLIX LANCETS Misc  Generic drug: lancets     * lancets Misc  To check BG 3 times daily, to use with insurance preferred meter     acetaminophen 500 MG tablet  Commonly known as: TYLENOL  Take 1 tablet (500 mg total) by mouth every 8 (eight) hours as needed for Pain.     amLODIPine 10 MG tablet  Commonly known as: NORVASC  Take 1 tablet (10 mg total) by mouth nightly.     aspirin 81 MG EC tablet  Commonly known as: ECOTRIN  Take 81 mg by mouth every morning.     atovaquone 750 mg/5 mL Susp oral liquid  Commonly known as: MEPRON  Take 10 mLs (1,500 mg total) by mouth once daily.     BD ULTRA-FINE MINI PEN NEEDLE 31 gauge x 3/16" Ndle  Generic drug: pen needle, diabetic  Use to inject insulin into the skin.     DROPSAFE ALCOHOL PREP PADS Padm  Generic drug: alcohol swabs  Apply topically.     epoetin shiela 10,000 " unit/mL injection  Commonly known as: PROCRIT  Inject 0.85 mLs (8,500 Units total) into the skin every Mon, Wed, Fri.     folic acid 1 MG tablet  Commonly known as: FOLVITE  Take 1 tablet (1 mg total) by mouth once daily.     insulin aspart U-100 100 unit/mL (3 mL) Inpn pen  Commonly known as: NovoLOG  Inject 0-5 Units into the skin before meals and at bedtime as needed (Hyperglycemia).     insulin glargine U-100 (Lantus) 100 unit/mL (3 mL) Inpn pen  Inject 12 Units into the skin once daily.     labetaloL 200 MG tablet  Commonly known as: NORMODYNE  Take 1 tablet (200 mg total) by mouth every 12 (twelve) hours.     LIDOcaine 5 %  Commonly known as: LIDODERM  Place 1 patch onto the skin once daily. Remove & Discard patch within 12 hours or as directed by MD     losartan 100 MG tablet  Commonly known as: COZAAR  Take 1 tablet (100 mg total) by mouth once daily.     omeprazole 20 MG capsule  Commonly known as: PRILOSEC  Take 1 capsule (20 mg total) by mouth once daily.     pregabalin 75 MG capsule  Commonly known as: LYRICA  Take 1 capsule (75 mg total) by mouth once daily.     sevelamer carbonate 800 mg Tab  Commonly known as: RENVELA  Take 3 tablets (2,400 mg total) by mouth 3 (three) times daily with meals.           * This list has 6 medication(s) that are the same as other medications prescribed for you. Read the directions carefully, and ask your doctor or other care provider to review them with you.                  Indwelling Lines/Drains at time of discharge:   Lines/Drains/Airways       Drain  Duration                  Hemodialysis AV Fistula Right forearm -- days         Hemodialysis AV Fistula Right forearm -- days                    Time spent on the discharge of patient: 35 minutes         Maurice Archer MD  Department of Hospital Medicine  [facility] - Internal Medicine Telemetry

## 2024-09-14 NOTE — PLAN OF CARE
[facility] - Internal Medicine Telemetry  Discharge Final Note    Primary Care Provider: Maty Gayle DO    Expected Discharge Date: 9/14/2024    Patient to be discharged home.  The patient does not have any home needs.  Family to provide transportation home.    Future Appointments   Date Time Provider Department Center   9/17/2024  8:10 AM LAB, APPOINTMENT [facility]   9/18/2024 10:30 AM Natalie Rojas NP [facility]   9/24/2024  8:10 AM LAB, APPOINTMENT [facility]   10/1/2024  8:05 AM LAB, APPOINTMENT [facility]   10/8/2024  8:05 AM LAB, APPOINTMENT [facility]   10/15/2024  8:10 AM LAB, APPOINTMENT [facility]   10/17/2024  2:00 PM Mojgan Gtz MD [facility] SHAYEHEM [facility]   10/22/2024 10:00 AM Gray Clark MD [facility] HEARTTX [facility]   11/8/2024  9:00 AM [facility] MRI1 350 LB LIMIT [facility]   11/12/2024  9:15 AM Douglas Abraham MD [facility] UROLOG [facility]      Final Discharge Note (most recent)       Final Note - 09/14/24 1122          Final Note    Assessment Type Final Discharge Note     Anticipated Discharge Disposition Home or Self Care     Hospital Resources/Appts/Education Provided Appointments scheduled and added to AVS        Post-Acute Status    Post-Acute Authorization Other     Other Status No Post-Acute Service Needs     Discharge Delays None known at this time                     Important Message from Medicare

## 2024-09-14 NOTE — ASSESSMENT & PLAN NOTE
59 y.o. Black or  Female ESRD-HD T-T-S  presents to ED on 9/12/2024 with diagnosis of: Fluid overload [E87.70];Chest pain [R07.9]   Nephrology consulted for inpatient ESRD-HD management    Outpatient HD Information:  -Dialysis modality: Hemodialysis  -Outpatient HD unit: [facility]   -Nephrologist: Ras TRIMBLE   -HD TX days: Tuesday/Thursday/Saturday, duration of treatment: 3.5 hrs  -Last HD TX prior to hospital admission: 09/12/24  -Dialysis access: RUE AV fistula   -Residual urine: Minium  -EDW: 85 kg     Assessment:   - discussed additional dialysis session today with patient as her next session won't be until Tuesday.  She does not want another session since she received 3 sessions last week and feels as though she can wait until Tuesday for her next session  Informed patient to watch her fluid intake over the next several days;  low salt diet.    -if she feels that her swelling is worsening or increased SOB, she is to notify her OP dialysis unit on Monday to arrange a dialysis treatment.    - Continue to monitor intake and output      Anemia of ESRD   Recent Labs   Lab 09/12/24  1426   WBC 7.52   HGB 9.2*   HCT 29.3*   *       Lab Results   Component Value Date    FESATURATED 36 08/22/2024    FERRITIN 3,164 (H) 08/22/2024       - Goal in ESRD is Hgb of 10-11. Hgb 9.2, near target.   - EPO can be administered and dosed per his OP unit upon discharge.    Mineral Bone Disease in ESRD   Lab Results   Component Value Date    .7 (H) 01/23/2023    CALCIUM 8.7 09/13/2024    ALBUMIN 3.1 (L) 09/13/2024    PHOS 5.2 (H) 09/13/2024       - F/U PO4, Mg, Calcium. And albumin levels daily.   - Renal diet with protein intake goal 1.5 g/kg/d with 1 L fluid restriction   - Restart home phos binder

## 2024-09-14 NOTE — SUBJECTIVE & OBJECTIVE
Interval History:   Seen at bedside this morning, eager to go home.   Oxygenating well on RA.  + lower extremity edema.  HD yesterday, 2.5L net fluid removal.      Review of patient's allergies indicates:  No Known Allergies  Current Facility-Administered Medications   Medication Frequency    acetaminophen tablet 650 mg Q4H PRN    amLODIPine tablet 10 mg Nightly    aspirin EC tablet 81 mg QAM    atorvastatin tablet 40 mg QAM    benzonatate capsule 100 mg TID PRN    dextrose 10% bolus 125 mL 125 mL PRN    dextrose 10% bolus 250 mL 250 mL PRN    folic acid tablet 1 mg Daily    glucagon (human recombinant) injection 1 mg PRN    glucose chewable tablet 16 g PRN    glucose chewable tablet 24 g PRN    heparin (porcine) injection 5,000 Units Q8H    hydrALAZINE injection 5 mg Q6H PRN    insulin aspart U-100 pen 1-15 Units QID (AC + HS) PRN    insulin glargine U-100 (Lantus) pen 8 Units Daily    labetaloL tablet 200 mg Q12H    losartan tablet 100 mg Daily    melatonin tablet 6 mg Nightly PRN    ondansetron injection 4 mg Q6H PRN    pantoprazole EC tablet 40 mg Daily    polyethylene glycol packet 17 g Daily    predniSONE tablet 40 mg Daily    senna-docusate 8.6-50 mg per tablet 1 tablet BID    sevelamer carbonate tablet 2,400 mg TID WM    sodium chloride 0.9% flush 10 mL PRN       Objective:     Vital Signs (Most Recent):  Temp: 99.1 °F (37.3 °C) (09/14/24 0738)  Pulse: 74 (09/14/24 0803)  Resp: 18 (09/14/24 0738)  BP: 126/71 (09/14/24 0738)  SpO2: (!) 94 % (09/14/24 0738) Vital Signs (24h Range):  Temp:  [97.3 °F (36.3 °C)-99.1 °F (37.3 °C)] 99.1 °F (37.3 °C)  Pulse:  [69-77] 74  Resp:  [18] 18  SpO2:  [94 %-99 %] 94 %  BP: (126-174)/(60-80) 126/71     Weight: 94.8 kg (208 lb 15.9 oz) (09/13/24 0543)  Body mass index is 35.87 kg/m².  Body surface area is 2.07 meters squared.    I/O last 3 completed shifts:  In: 560 [P.O.:560]  Out: 3100 [Other:3100]     Physical Exam  Vitals and nursing note reviewed.   Constitutional:        General: She is not in acute distress.     Appearance: Normal appearance. She is well-developed. She is obese. She is not ill-appearing.   HENT:      Head: Normocephalic and atraumatic.      Right Ear: External ear normal.      Left Ear: External ear normal.      Nose: Nose normal.      Mouth/Throat:      Pharynx: Uvula midline.   Eyes:      General: Lids are normal.      Conjunctiva/sclera: Conjunctivae normal.   Neck:      Thyroid: No thyroid mass.      Vascular: No JVD.      Trachea: Trachea normal.   Cardiovascular:      Rate and Rhythm: Normal rate and regular rhythm.      Heart sounds: Normal heart sounds, S1 normal and S2 normal.   Pulmonary:      Effort: Pulmonary effort is normal.      Breath sounds: Normal breath sounds.   Abdominal:      General: Bowel sounds are normal. There is no distension.      Palpations: Abdomen is soft.   Musculoskeletal:      Cervical back: Full passive range of motion without pain, normal range of motion and neck supple.      Right lower le+ Edema present.      Left lower le+ Edema present.   Neurological:      General: No focal deficit present.      Mental Status: She is alert.   Psychiatric:         Mood and Affect: Mood normal.         Speech: Speech normal.         Behavior: Behavior normal. Behavior is cooperative.          Significant Labs:  CBC:   Recent Labs   Lab 24  1426   WBC 7.52   RBC 3.29*   HGB 9.2*   HCT 29.3*   *   MCV 89   MCH 28.0   MCHC 31.4*     CMP:   Recent Labs   Lab 24  1426 24  1356   * 314*   CALCIUM 9.6 8.7   ALBUMIN 3.3* 3.1*   PROT 6.8  --    * 134*   K 4.0 4.3   CO2 28 25   CL 94* 93*   BUN 41* 62*   CREATININE 5.8* 8.4*   ALKPHOS 54*  --    ALT 15  --    AST 10  --    BILITOT 1.0  --

## 2024-09-14 NOTE — PROGRESS NOTES
[facility] - Internal Medicine Telemetry  Nephrology  Progress Note    Patient Name: Jael Hall  MRN: [id number]  Admission Date: 9/12/2024  Hospital Length of Stay: 1 days  Attending Provider: Maurice Archer MD   Primary Care Physician: Maty Gayle DO  Principal Problem:Acute on chronic diastolic congestive heart failure    Subjective:     HPI: The patient is a 59 y.o. Black or  Female with multiple co morbidities including ESRD on HD (MWF), HTN, T2DM with Insulin use, WAIHA, and GERD who presents to ED on 9/12/2024 with complaints of SOB and CP. Patient had a 2 hr HD session yesterday but started to feel SOB on exertion associated with intermittent episodes of sharp substernal chest pain soon afterwards. CP was unrelieved with OTC tums prompting her visit to the ED. In the ED, CXR with mild cardiomegaly and slight prominence of the central pulmonary vascularity and mild bibasilar edema. BP was elevated. Troponin trend unremarkable, last level 0.029.  consulted for observation admission. Patient denies any fevers, headaches, dizziness, hemoptysis, or previous blood clots. No CP or SOB on exam. -160s mm Hg. Nephrology consulted for management of ESRD and HD treatment.    Interval History:   Seen at bedside this morning, eager to go home.   Oxygenating well on RA.  + lower extremity edema.  HD yesterday, 2.5L net fluid removal.      Review of patient's allergies indicates:  No Known Allergies  Current Facility-Administered Medications   Medication Frequency    acetaminophen tablet 650 mg Q4H PRN    amLODIPine tablet 10 mg Nightly    aspirin EC tablet 81 mg QAM    atorvastatin tablet 40 mg QAM    benzonatate capsule 100 mg TID PRN    dextrose 10% bolus 125 mL 125 mL PRN    dextrose 10% bolus 250 mL 250 mL PRN    folic acid tablet 1 mg Daily    glucagon (human recombinant) injection 1 mg PRN    glucose chewable tablet 16 g PRN    glucose chewable tablet 24 g PRN    heparin (porcine)  injection 5,000 Units Q8H    hydrALAZINE injection 5 mg Q6H PRN    insulin aspart U-100 pen 1-15 Units QID (AC + HS) PRN    insulin glargine U-100 (Lantus) pen 8 Units Daily    labetaloL tablet 200 mg Q12H    losartan tablet 100 mg Daily    melatonin tablet 6 mg Nightly PRN    ondansetron injection 4 mg Q6H PRN    pantoprazole EC tablet 40 mg Daily    polyethylene glycol packet 17 g Daily    predniSONE tablet 40 mg Daily    senna-docusate 8.6-50 mg per tablet 1 tablet BID    sevelamer carbonate tablet 2,400 mg TID WM    sodium chloride 0.9% flush 10 mL PRN       Objective:     Vital Signs (Most Recent):  Temp: 99.1 °F (37.3 °C) (09/14/24 0738)  Pulse: 74 (09/14/24 0803)  Resp: 18 (09/14/24 0738)  BP: 126/71 (09/14/24 0738)  SpO2: (!) 94 % (09/14/24 0738) Vital Signs (24h Range):  Temp:  [97.3 °F (36.3 °C)-99.1 °F (37.3 °C)] 99.1 °F (37.3 °C)  Pulse:  [69-77] 74  Resp:  [18] 18  SpO2:  [94 %-99 %] 94 %  BP: (126-174)/(60-80) 126/71     Weight: 94.8 kg (208 lb 15.9 oz) (09/13/24 0543)  Body mass index is 35.87 kg/m².  Body surface area is 2.07 meters squared.    I/O last 3 completed shifts:  In: 560 [P.O.:560]  Out: 3100 [Other:3100]     Physical Exam  Vitals and nursing note reviewed.   Constitutional:       General: She is not in acute distress.     Appearance: Normal appearance. She is well-developed. She is obese. She is not ill-appearing.   HENT:      Head: Normocephalic and atraumatic.      Right Ear: External ear normal.      Left Ear: External ear normal.      Nose: Nose normal.      Mouth/Throat:      Pharynx: Uvula midline.   Eyes:      General: Lids are normal.      Conjunctiva/sclera: Conjunctivae normal.   Neck:      Thyroid: No thyroid mass.      Vascular: No JVD.      Trachea: Trachea normal.   Cardiovascular:      Rate and Rhythm: Normal rate and regular rhythm.      Heart sounds: Normal heart sounds, S1 normal and S2 normal.   Pulmonary:      Effort: Pulmonary effort is normal.      Breath sounds:  Normal breath sounds.   Abdominal:      General: Bowel sounds are normal. There is no distension.      Palpations: Abdomen is soft.   Musculoskeletal:      Cervical back: Full passive range of motion without pain, normal range of motion and neck supple.      Right lower le+ Edema present.      Left lower le+ Edema present.   Neurological:      General: No focal deficit present.      Mental Status: She is alert.   Psychiatric:         Mood and Affect: Mood normal.         Speech: Speech normal.         Behavior: Behavior normal. Behavior is cooperative.          Significant Labs:  CBC:   Recent Labs   Lab 24  1426   WBC 7.52   RBC 3.29*   HGB 9.2*   HCT 29.3*   *   MCV 89   MCH 28.0   MCHC 31.4*     CMP:   Recent Labs   Lab 24  1426 24  1356   * 314*   CALCIUM 9.6 8.7   ALBUMIN 3.3* 3.1*   PROT 6.8  --    * 134*   K 4.0 4.3   CO2 28 25   CL 94* 93*   BUN 41* 62*   CREATININE 5.8* 8.4*   ALKPHOS 54*  --    ALT 15  --    AST 10  --    BILITOT 1.0  --       Assessment/Plan:     Renal/  ESRD (end stage renal disease) on dialysis  59 y.o. Black or  Female ESRD-HD T-T-S  presents to ED on 2024 with diagnosis of: Fluid overload [E87.70];Chest pain [R07.9]   Nephrology consulted for inpatient ESRD-HD management    Outpatient HD Information:  -Dialysis modality: Hemodialysis  -Outpatient HD unit: [facility]   -Nephrologist: Ras TRIMBLE   -HD TX days: Tuesday/Thursday/Saturday, duration of treatment: 3.5 hrs  -Last HD TX prior to hospital admission: 24  -Dialysis access: RUE AV fistula   -Residual urine: Minium  -EDW: 85 kg     Assessment:   - discussed additional dialysis session today with patient as her next session won't be until Tuesday.  She does not want another session since she received 3 sessions last week and feels as though she can wait until Tuesday for her next session  Informed patient to watch her fluid intake over the next several  days;  low salt diet.    -if she feels that her swelling is worsening or increased SOB, she is to notify her OP dialysis unit on Monday to arrange a dialysis treatment.    - Continue to monitor intake and output      Anemia of ESRD   Recent Labs   Lab 09/12/24  1426   WBC 7.52   HGB 9.2*   HCT 29.3*   *       Lab Results   Component Value Date    FESATURATED 36 08/22/2024    FERRITIN 3,164 (H) 08/22/2024       - Goal in ESRD is Hgb of 10-11. Hgb 9.2, near target.   - EPO can be administered and dosed per his OP unit upon discharge.    Mineral Bone Disease in ESRD   Lab Results   Component Value Date    .7 (H) 01/23/2023    CALCIUM 8.7 09/13/2024    ALBUMIN 3.1 (L) 09/13/2024    PHOS 5.2 (H) 09/13/2024       - F/U PO4, Mg, Calcium. And albumin levels daily.   - Renal diet with protein intake goal 1.5 g/kg/d with 1 L fluid restriction   - Restart home phos binder       Jet Chen NP  Nephrology  [facility] - Internal Medicine Telemetry

## 2024-09-14 NOTE — PLAN OF CARE
Problem: Adult Inpatient Plan of Care  Goal: Plan of Care Review  Outcome: Progressing  Goal: Patient-Specific Goal (Individualized)  Outcome: Progressing  Goal: Absence of Hospital-Acquired Illness or Injury  Outcome: Progressing  Goal: Optimal Comfort and Wellbeing  Outcome: Progressing  Goal: Readiness for Transition of Care  Outcome: Progressing     Problem: Diabetes Comorbidity  Goal: Blood Glucose Level Within Targeted Range  Outcome: Progressing     Problem: Fall Injury Risk  Goal: Absence of Fall and Fall-Related Injury  Outcome: Progressing     Problem: Hemodialysis  Goal: Safe, Effective Therapy Delivery  Outcome: Progressing  Goal: Effective Tissue Perfusion  Outcome: Progressing  Goal: Absence of Infection Signs and Symptoms  Outcome: Progressing   Pt AAO X 4; able to express needs.  Dialysis yesterday.  Tolerated well and slept during the night.  Asking for meds for a dry cough this morning.  On call notified.    Safety maintained.  Bed in low position,  call  light in reach.

## 2024-09-17 ENCOUNTER — LAB VISIT (OUTPATIENT)
Dept: LAB | Facility: OTHER | Age: 59
End: 2024-09-17
Attending: INTERNAL MEDICINE
Payer: MEDICARE

## 2024-09-17 DIAGNOSIS — Z99.2 ESRD (END STAGE RENAL DISEASE) ON DIALYSIS: Chronic | ICD-10-CM

## 2024-09-17 DIAGNOSIS — D59.10 AIHA (AUTOIMMUNE HEMOLYTIC ANEMIA): ICD-10-CM

## 2024-09-17 DIAGNOSIS — N18.6 ESRD (END STAGE RENAL DISEASE) ON DIALYSIS: Chronic | ICD-10-CM

## 2024-09-17 LAB
BASOPHILS # BLD AUTO: 0.01 K/UL (ref 0–0.2)
BASOPHILS NFR BLD: 0.1 % (ref 0–1.9)
DIFFERENTIAL METHOD BLD: ABNORMAL
EOSINOPHIL # BLD AUTO: 0 K/UL (ref 0–0.5)
EOSINOPHIL NFR BLD: 0.1 % (ref 0–8)
ERYTHROCYTE [DISTWIDTH] IN BLOOD BY AUTOMATED COUNT: 18.4 % (ref 11.5–14.5)
HCT VFR BLD AUTO: 29.8 % (ref 37–48.5)
HGB BLD-MCNC: 9.8 G/DL (ref 12–16)
IMM GRANULOCYTES # BLD AUTO: 0.03 K/UL (ref 0–0.04)
IMM GRANULOCYTES NFR BLD AUTO: 0.4 % (ref 0–0.5)
LYMPHOCYTES # BLD AUTO: 0.3 K/UL (ref 1–4.8)
LYMPHOCYTES NFR BLD: 4.7 % (ref 18–48)
MCH RBC QN AUTO: 27.5 PG (ref 27–31)
MCHC RBC AUTO-ENTMCNC: 32.9 G/DL (ref 32–36)
MCV RBC AUTO: 84 FL (ref 82–98)
MONOCYTES # BLD AUTO: 0.3 K/UL (ref 0.3–1)
MONOCYTES NFR BLD: 4.7 % (ref 4–15)
NEUTROPHILS # BLD AUTO: 6.1 K/UL (ref 1.8–7.7)
NEUTROPHILS NFR BLD: 90 % (ref 38–73)
NRBC BLD-RTO: 0 /100 WBC
PLATELET # BLD AUTO: 158 K/UL (ref 150–450)
PMV BLD AUTO: 11.3 FL (ref 9.2–12.9)
RBC # BLD AUTO: 3.56 M/UL (ref 4–5.4)
WBC # BLD AUTO: 6.83 K/UL (ref 3.9–12.7)

## 2024-09-17 PROCEDURE — 85025 COMPLETE CBC W/AUTO DIFF WBC: CPT | Mod: HCNC | Performed by: INTERNAL MEDICINE

## 2024-09-17 PROCEDURE — 36415 COLL VENOUS BLD VENIPUNCTURE: CPT | Mod: HCNC | Performed by: INTERNAL MEDICINE

## 2024-09-18 ENCOUNTER — OFFICE VISIT (OUTPATIENT)
Dept: INTERNAL MEDICINE | Facility: CLINIC | Age: 59
End: 2024-09-18
Payer: MEDICARE

## 2024-09-18 ENCOUNTER — TELEPHONE (OUTPATIENT)
Dept: INTERNAL MEDICINE | Facility: CLINIC | Age: 59
End: 2024-09-18

## 2024-09-18 VITALS
SYSTOLIC BLOOD PRESSURE: 148 MMHG | DIASTOLIC BLOOD PRESSURE: 68 MMHG | OXYGEN SATURATION: 96 % | WEIGHT: 196.63 LBS | BODY MASS INDEX: 33.57 KG/M2 | HEART RATE: 86 BPM | HEIGHT: 64 IN

## 2024-09-18 DIAGNOSIS — N18.6 ANEMIA IN ESRD (END-STAGE RENAL DISEASE): ICD-10-CM

## 2024-09-18 DIAGNOSIS — D59.10 AIHA (AUTOIMMUNE HEMOLYTIC ANEMIA): ICD-10-CM

## 2024-09-18 DIAGNOSIS — Z09 HOSPITAL DISCHARGE FOLLOW-UP: Primary | ICD-10-CM

## 2024-09-18 DIAGNOSIS — N18.6 ESRD (END STAGE RENAL DISEASE) ON DIALYSIS: Chronic | ICD-10-CM

## 2024-09-18 DIAGNOSIS — E87.5 ACUTE HYPERKALEMIA: ICD-10-CM

## 2024-09-18 DIAGNOSIS — I10 ESSENTIAL HYPERTENSION: Chronic | ICD-10-CM

## 2024-09-18 DIAGNOSIS — I15.1 HYPERTENSION SECONDARY TO OTHER RENAL DISORDERS: ICD-10-CM

## 2024-09-18 DIAGNOSIS — I50.32 CHRONIC DIASTOLIC HEART FAILURE: ICD-10-CM

## 2024-09-18 DIAGNOSIS — K76.0 NAFLD (NONALCOHOLIC FATTY LIVER DISEASE): ICD-10-CM

## 2024-09-18 DIAGNOSIS — D63.1 ANEMIA IN ESRD (END-STAGE RENAL DISEASE): ICD-10-CM

## 2024-09-18 DIAGNOSIS — Z99.2 ESRD (END STAGE RENAL DISEASE) ON DIALYSIS: Chronic | ICD-10-CM

## 2024-09-18 PROCEDURE — 99999 PR PBB SHADOW E&M-EST. PATIENT-LVL IV: CPT | Mod: PBBFAC,HCNC,, | Performed by: NURSE PRACTITIONER

## 2024-09-18 NOTE — PROGRESS NOTES
[facility]  Follow-up Visit Progress Note     PRESENTING HISTORY     PCP: Maty Gayle DO  Chief Complaint/Reason for Visit:  Follow up from recent visit.      No chief complaint on file.    History of Present Illness & ROS: Ms. Jael Hall is a 59 y.o. female.  Since most recent discharge, reports that she has been having some continued swelling to both of her legs, not been able to tolerate her full dialysis sessions in the past due to 'cramping in the legs'. She does 'keep them elevated at night', but still having the 'swelling' by morning. No coughing or SOB endorsed and no chest wall discomforts.   Patient is calling in regards to his appointment this morning. He states that you were worried about his shortness of breath. He says it was because of his mask. As soon as he was outside and took it off he felt fine.                                                                                                                                                                                                                                                                                                                                                                                                                                                                                                                                                                                                                                                                                                                                                                                                                                                                                                                                                                                                                                                                                                                                                                                                                                                                                                                   Review of Systems:  Eyes: denies visual changes at this time denies floaters   ENT: no nasal congestion or sore throat  Respiratory: no cough or shorness of breath  Cardiovascular: no chest pain or palpitations  Gastrointestinal: no nausea or vomiting, no abdominal pain or change in bowel habits  Genitourinary: no hematuria or dysuria; denies frequency  Hematologic/Lymphatic: no easy bruising or lymphadenopathy  Musculoskeletal: no arthralgias or myalgias  Neurological: no seizures or tremors  Endocrine: no heat or cold intolerance      PAST HISTORY:     Past Medical History:   Diagnosis Date    Acute hyperkalemia 09/09/2020    Acute on chronic diastolic heart failure  10/08/2021    Anemia of chronic disorder 2017    Overview:  Added automatically from request for surgery [id number]    Anemia of chronic renal failure, stage 5     Anxiety     Cyst of left ovary 2019    Cyst of left ovary 2019    Dyslipidemia 2013    Encounter for screening colonoscopy 10/08/2020    End-stage renal disease on hemodialysis 2020    ESRD on hemodialysis 2017    Hematuria 10/30/2019    History of COVID-19 2020    Hospital discharge follow-up 10/06/2023    Hx of sinus bradycardia 2017    Hyperkalemia 2017    Hypertensive urgency 2021    Peripheral neuropathy 2013    Preoperative testing 10/08/2020    Renovascular hypertension 2020    S/P dilation and curettage 2018    Screening for colon cancer 2023    Thickened endometrium 2018    Thyroid nodule     Thyroid nodule 2021    Uncontrolled type 2 diabetes mellitus 2013    Overview:  poc a1c today 11.2-270/ up from 10.8-263, Pt. has very very stressed/ was incarcerated/marital stress/ will current meds/ less stress now/ will monitor.       Past Surgical History:   Procedure Laterality Date    AV FISTULA PLACEMENT      right arm     SECTION      x1    COLONOSCOPY N/A 2018    Procedure: COLONOSCOPY;  Surgeon: Pankaj Hinojosa MD;  Location: [facility] (4TH FLR);  Service: Endoscopy;  Laterality: N/A;  dialysis pt/labs prior to colon/MS    COLONOSCOPY N/A 2023    Procedure: COLONOSCOPY;  Surgeon: Angel Turner MD;  Location: [facility] (2ND FLR);  Service: Endoscopy;  Laterality: N/A;  Ref By:  Maty Gayle MD  Procedure: Colonoscopy  Diagnosis: endounter for preventive health   Procedure Timing: pt. pref  Provider: Dr. turner   Location: [facility]   Prep Specifications: Standard prep   Additional Info: Dialysis patient lab pending    CYSTOSCOPY W/ RETROGRADES Bilateral 2020    Procedure: CYSTOSCOPY, WITH RETROGRADE PYELOGRAM;  Surgeon:  Douglas Abraham MD;  Location: [facility] OR 1ST FLR;  Service: Urology;  Laterality: Bilateral;  45 min    FISTULOGRAM Right 9/9/2020    Procedure: Fistulogram;  Surgeon: Lester Gómez MD;  Location: [facility] CATH LAB;  Service: Nephrology;  Laterality: Right;    HYSTERECTOMY      IMPLANTATION OF COCHLEAR PROSTHESIS Right 6/28/2021    Procedure: INSERTION, PROSTHESIS, COCHLEAR;  Surgeon: Ramiro Zuleta MD;  Location: [facility] OR 2ND FLR;  Service: ENT;  Laterality: Right;  COCHLEAR BABAK    IMPLANTATION OF COCHLEAR PROSTHESIS Right 6/30/2021    Procedure: INSERTION, PROSTHESIS, COCHLEAR;  Surgeon: Ramiro Zuleta MD;  Location: [facility] OR 2ND FLR;  Service: ENT;  Laterality: Right;  COCHLEAR AMERICAS    ROBOT-ASSISTED LAPAROSCOPIC ABDOMINAL HYSTERECTOMY USING DA NATALYA XI N/A 7/5/2019    Procedure: XI ROBOTIC HYSTERECTOMY;  Surgeon: Trice Lei MD;  Location: [facility];  Service: OB/GYN;  Laterality: N/A;    ROBOT-ASSISTED LAPAROSCOPIC NEPHRECTOMY Right 10/8/2020    Procedure: ROBOTIC NEPHRECTOMY;  Surgeon: Douglas Abraham MD;  Location: [facility] 2ND FLR;  Service: Urology;  Laterality: Right;  3.5hrs gen with regional     ROBOT-ASSISTED LAPAROSCOPIC SALPINGO-OOPHORECTOMY USING DA NATALYA XI Bilateral 7/5/2019    Procedure: XI ROBOTIC SALPINGO-OOPHORECTOMY;  Surgeon: Trice Lei MD;  Location: [facility];  Service: OB/GYN;  Laterality: Bilateral;    TUBAL LIGATION      Vascular access surgeries      x5       Family History   Problem Relation Name Age of Onset    Cancer Mother      Ovarian cancer Mother      Cancer Sister      Ovarian cancer Sister      Lung cancer Sister      Macular degeneration Sister      Cancer Sister      Ovarian cancer Sister      Diabetes Sister      Diabetes Brother      Breast cancer Neg Hx      Colon cancer Neg Hx      Cervical cancer Neg Hx      Endometrial cancer Neg Hx      Vaginal cancer Neg Hx      Thyroid disease Neg Hx      Glaucoma Neg Hx      Retinal detachment Neg Hx         Social History      Socioeconomic History    Marital status:     Number of children: 2   Occupational History    Occupation: disability   Tobacco Use    Smoking status: Never    Smokeless tobacco: Never   Substance and Sexual Activity    Alcohol use: No    Drug use: No    Sexual activity: Yes     Partners: Male     Birth control/protection: Post-menopausal     Social Determinants of Health     Financial Resource Strain: Medium Risk (9/13/2024)    Overall Financial Resource Strain (CARDIA)     Difficulty of Paying Living Expenses: Somewhat hard   Food Insecurity: Food Insecurity Present (9/13/2024)    Hunger Vital Sign     Worried About Running Out of Food in the Last Year: Sometimes true     Ran Out of Food in the Last Year: Sometimes true   Transportation Needs: No Transportation Needs (9/13/2024)    TRANSPORTATION NEEDS     Transportation : No   Physical Activity: Inactive (9/13/2024)    Exercise Vital Sign     Days of Exercise per Week: 0 days     Minutes of Exercise per Session: 0 min   Stress: No Stress Concern Present (9/13/2024)    [employer] - Occupational Stress Questionnaire     Feeling of Stress : Not at all   Housing Stability: Low Risk  (9/13/2024)    Housing Stability Vital Sign     Unable to Pay for Housing in the Last Year: No     Homeless in the Last Year: No       MEDICATIONS & ALLERGIES:     Current Outpatient Medications on File Prior to Visit   Medication Sig Dispense Refill    ACCU-CHEK GUIDE GLUCOSE METER Misc       ACCU-CHEK GUIDE L1-L2 CTRL SOL Soln       ACCU-CHEK GUIDE TEST STRIPS Strp       ACCU-CHEK SOFTCLIX LANCETS Misc       acetaminophen (TYLENOL) 500 MG tablet Take 1 tablet (500 mg total) by mouth every 8 (eight) hours as needed for Pain. 20 tablet 0    amLODIPine (NORVASC) 10 MG tablet Take 1 tablet (10 mg total) by mouth nightly. 90 tablet 3    aspirin (ECOTRIN) 81 MG EC tablet Take 81 mg by mouth every morning.      atorvastatin (LIPITOR) 40 MG tablet Take 1  "tablet (40 mg total) by mouth once daily. (Patient taking differently: Take 40 mg by mouth every morning.) 90 tablet 3    atovaquone (MEPRON) 750 mg/5 mL Susp oral liquid Take 10 mLs (1,500 mg total) by mouth once daily. 300 mL 2    blood sugar diagnostic Strp To check BG 3 times daily, to use with insurance preferred meter 200 each 0    blood-glucose meter kit To check BG 3 times daily, to use with insurance preferred meter 1 each 0    DROPSAFE ALCOHOL PREP PADS PadM Apply topically.      epoetin shiela (PROCRIT) 10,000 unit/mL injection Inject 0.85 mLs (8,500 Units total) into the skin every Mon, Wed, Fri.      folic acid (FOLVITE) 1 MG tablet Take 1 tablet (1 mg total) by mouth once daily. 90 tablet 3    insulin aspart U-100 (NOVOLOG) 100 unit/mL (3 mL) InPn pen Inject 0-5 Units into the skin before meals and at bedtime as needed (Hyperglycemia). 3 mL 0    insulin glargine U-100, Lantus, 100 unit/mL (3 mL) SubQ InPn pen Inject 12 Units into the skin once daily. 7.2 mL 0    labetaloL (NORMODYNE) 200 MG tablet Take 1 tablet (200 mg total) by mouth every 12 (twelve) hours. 60 tablet 0    lancets Misc To check BG 3 times daily, to use with insurance preferred meter 200 each 0    LIDOcaine (LIDODERM) 5 % Place 1 patch onto the skin once daily. Remove & Discard patch within 12 hours or as directed by MD 14 patch 0    losartan (COZAAR) 100 MG tablet Take 1 tablet (100 mg total) by mouth once daily. 90 tablet 3    omeprazole (PRILOSEC) 20 MG capsule Take 1 capsule (20 mg total) by mouth once daily. 90 capsule 3    pen needle, diabetic 31 gauge x 3/16" Ndle Use to inject insulin into the skin. 100 each 0    predniSONE (DELTASONE) 20 MG tablet Take 2 tablets (40 mg total) by mouth once daily for 6 days, THEN 1.5 tablets (30 mg total) once daily for 7 days, THEN 1 tablet (20 mg total) once daily. Follow up with Heme Onc for further tapering of prednisone.      pregabalin (LYRICA) 75 MG capsule Take 1 capsule (75 mg total) by " mouth once daily. 90 capsule 3    sevelamer carbonate (RENVELA) 800 mg Tab Take 3 tablets (2,400 mg total) by mouth 3 (three) times daily with meals. 270 tablet 3     No current facility-administered medications on file prior to visit.        Review of patient's allergies indicates:  No Known Allergies    Medications Reconciliation:   I have reconciled the patient's home medications and discharge medications with the patient/family. I have updated all changes.  Refer to After-Visit Medication List.    OBJECTIVE:     Vital Signs:  There were no vitals filed for this visit.  Wt Readings from Last 3 Encounters:   09/13/24 0543 94.8 kg (208 lb 15.9 oz)   09/12/24 1817 94.8 kg (208 lb 15.9 oz)   09/12/24 1343 94.8 kg (209 lb)   09/01/24 0347 94.8 kg (208 lb 15.9 oz)   08/31/24 2214 88 kg (194 lb)   08/22/24 1555 90.2 kg (198 lb 11.9 oz)     There is no height or weight on file to calculate BMI.   Wt Readings from Last 3 Encounters:   09/18/24 89.2 kg (196 lb 10.4 oz)   09/13/24 94.8 kg (208 lb 15.9 oz)   09/01/24 94.8 kg (208 lb 15.9 oz)     Temp Readings from Last 3 Encounters:   09/14/24 99.1 °F (37.3 °C) (Oral)   09/02/24 98 °F (36.7 °C) (Temporal)   08/22/24 97.9 °F (36.6 °C) (Oral)     BP Readings from Last 3 Encounters:   09/18/24 (!) 148/68   09/14/24 126/71   09/02/24 (!) 147/74     Pulse Readings from Last 3 Encounters:   09/18/24 86   09/14/24 74   09/02/24 77       Physical Exam:  General: Well developed, well nourished. No distress.  HEENT: Head is normocephalic, atraumatic; ears are normal.   Eyes: Clear conjunctiva.  Neck: Supple, symmetrical neck; trachea midline.  Lungs: Clear to auscultation bilaterally and normal respiratory effort.  Cardiovascular: Heart with regular rate and rhythm. No murmurs, gallops or rubs  Extremities: No LE edema. Pulses 2+ and symmetric.   Abdomen: Abdomen is soft, non-tender non-distended with normal bowel sounds.  Skin: Skin color, texture, turgor normal. No  rashes.  Musculoskeletal: Normal gait.   Lymph Nodes: No cervical or supraclavicular adenopathy.  Neurologic: Normal strength and tone. No focal numbness or weakness.   Psychiatric: Not depressed.      Laboratory  Lab Results   Component Value Date    WBC 6.83 09/17/2024    HGB 9.8 (L) 09/17/2024    HCT 29.8 (L) 09/17/2024     09/17/2024    CHOL 182 05/25/2023    TRIG 218 (H) 05/25/2023    HDL 42 05/25/2023    ALT 15 09/12/2024    AST 10 09/12/2024     (L) 09/13/2024    K 4.3 09/13/2024    CL 93 (L) 09/13/2024    CREATININE 8.4 (H) 09/13/2024    BUN 62 (H) 09/13/2024    CO2 25 09/13/2024    TSH 0.378 (L) 08/11/2024    INR 1.0 08/12/2024    HGBA1C 7.6 (H) 09/01/2024           ASSESSMENT & PLAN:     Hospital discharge follow-up    Chronic diastolic heart failure    NAFLD (nonalcoholic fatty liver disease)    Essential hypertension    ESRD (end stage renal disease) on dialysis    Anemia in ESRD (end-stage renal disease)    AIHA (autoimmune hemolytic anemia)    Acute hyperkalemia    Hypertension secondary to other renal disorders        Recent admission for Acute on Chronic DHF with BP elevations and BLE edema since being commenced to high dose 'steroids;  *209 pounds on admit and 196 today   *BNP: 238 (9/12/2024)<< 410 in 8/2024  *CXR: mild cardiomegaly with bibasilar edema  *TTE:    · Mild left atrial enlargement.  · The left ventricle is normal in size with mild concentric hypertrophy and normal systolic function.  · The estimated ejection fraction is 60%.  · Grade II left ventricular diastolic dysfunction.  · Normal right ventricular size with normal right ventricular systolic function.  · There is pulmonary hypertension. The estimated PA systolic pressure is 45 mmHg.  · Normal central venous pressure (3 mmHg).  ` continue Labetalol   ` follow up with Cardiology needed... 10/22/2024 (apt noted)      Other medical issues:   DM II:   Lab Results   Component Value Date    HGBA1C 7.6 (H) 09/01/2024   `  Lantus    AIHA (Autoimmune hemolytic anemia):   Currently on steroid taper  ` Folic acid  Lab Results   Component Value Date    WBC 6.83 09/17/2024    HGB 9.8 (L) 09/17/2024    HCT 29.8 (L) 09/17/2024    MCV 84 09/17/2024     09/17/2024   *Dr. Gtz will be seeing her 10/17    ESRD:   HD while inpatient: T, Thur, Sat (new facility as of last Tuesday)  ` Renvela  ` follow up with Nephrology  ` follow up with Urology     HTN:   Today: 148/68  ` Norvasc  ` Labetalol   ` Losartan     *Follow up with PCP at this time. Single hospital follow up visit today.     Future Appointments   Date Time Provider Department Center   [facility]18[facility]2024  [facility]   9/24/2024  8:10 AM LAB, APPOINTMENT [facility] LAB [facility]   10/1/2024  8:05 AM LAB, APPOINTMENT [facility] LAB [facility]   10/4/2024  1:00 PM Antione Ramsey MD [facility]   10/8/2024  8:05 AM LAB, APPOINTMENT [facility] LAB [facility]   10/15/2024  8:10 AM LAB, APPOINTMENT [facility] LAB [facility]   10/17/2024  2:00 PM Mojgan Gtz MD [facility] BENHEM [facility]   10/22/2024 10:00 AM Gray Clark MD [facility] HEARTTX [facility]   11/8/2024  9:00 AM BAP MRI1 350 LB LIMIT [facility]   11/12/2024  9:15 AM Douglas Abraham MD [facility] UROLOG [facility]       After Visit Medication List :     Medication List            Accurate as of September 18, 2024  9:55 AM. If you have any questions, ask your nurse or doctor.                CHANGE how you take these medications      atorvastatin 40 MG tablet  Commonly known as: LIPITOR  Take 1 tablet (40 mg total) by mouth once daily.  What changed: when to take this            CONTINUE taking these medications      * ACCU-CHEK GUIDE GLUCOSE METER Misc  Generic drug: blood-glucose meter     * blood-glucose meter kit  To check BG 3 times daily, to use with insurance preferred meter     ACCU-CHEK GUIDE L1-L2 CTRL SOL  "Soln  Generic drug: blood glucose control high,low     * ACCU-CHEK GUIDE TEST STRIPS Strp  Generic drug: blood sugar diagnostic     * blood sugar diagnostic Strp  To check BG 3 times daily, to use with insurance preferred meter     * ACCU-CHEK SOFTCLIX LANCETS Misc  Generic drug: lancets     * lancets Misc  To check BG 3 times daily, to use with insurance preferred meter     acetaminophen 500 MG tablet  Commonly known as: TYLENOL  Take 1 tablet (500 mg total) by mouth every 8 (eight) hours as needed for Pain.     amLODIPine 10 MG tablet  Commonly known as: NORVASC  Take 1 tablet (10 mg total) by mouth nightly.     aspirin 81 MG EC tablet  Commonly known as: ECOTRIN     atovaquone 750 mg/5 mL Susp oral liquid  Commonly known as: MEPRON  Take 10 mLs (1,500 mg total) by mouth once daily.     BD ULTRA-FINE MINI PEN NEEDLE 31 gauge x 3/16" Ndle  Generic drug: pen needle, diabetic  Use to inject insulin into the skin.     DROPSAFE ALCOHOL PREP PADS Padm  Generic drug: alcohol swabs     epoetin shiela 10,000 unit/mL injection  Commonly known as: PROCRIT  Inject 0.85 mLs (8,500 Units total) into the skin every Mon, Wed, Fri.     folic acid 1 MG tablet  Commonly known as: FOLVITE  Take 1 tablet (1 mg total) by mouth once daily.     insulin aspart U-100 100 unit/mL (3 mL) Inpn pen  Commonly known as: NovoLOG  Inject 0-5 Units into the skin before meals and at bedtime as needed (Hyperglycemia).     insulin glargine U-100 (Lantus) 100 unit/mL (3 mL) Inpn pen  Inject 12 Units into the skin once daily.     labetaloL 200 MG tablet  Commonly known as: NORMODYNE  Take 1 tablet (200 mg total) by mouth every 12 (twelve) hours.     LIDOcaine 5 %  Commonly known as: LIDODERM  Place 1 patch onto the skin once daily. Remove & Discard patch within 12 hours or as directed by MD     losartan 100 MG tablet  Commonly known as: COZAAR  Take 1 tablet (100 mg total) by mouth once daily.     omeprazole 20 MG capsule  Commonly known as: " PRILOSEC  Take 1 capsule (20 mg total) by mouth once daily.     predniSONE 20 MG tablet  Commonly known as: DELTASONE  Take 2 tablets (40 mg total) by mouth once daily for 6 days, THEN 1.5 tablets (30 mg total) once daily for 7 days, THEN 1 tablet (20 mg total) once daily. Follow up with Heme Onc for further tapering of prednisone.  Start taking on: September 14, 2024     pregabalin 75 MG capsule  Commonly known as: LYRICA  Take 1 capsule (75 mg total) by mouth once daily.     sevelamer carbonate 800 mg Tab  Commonly known as: RENVELA  Take 3 tablets (2,400 mg total) by mouth 3 (three) times daily with meals.           * This list has 6 medication(s) that are the same as other medications prescribed for you. Read the directions carefully, and ask your doctor or other care provider to review them with you.                  Signing Physician:  ZEUS Jones

## 2024-09-18 NOTE — PROGRESS NOTES
[facility]  Follow-up Visit Progress Note     PRESENTING HISTORY     PCP: Maty Gayle DO  Chief Complaint/Reason for Visit:  Follow up from recent visit.      Chief Complaint   Patient presents with    Follow-up       History of Present Illness & ROS: Ms. Jael Hall is a 59 y.o. female.  Ms. Elder is a very pleasant lady.   Est'd with our [facility] providers.   Since most recent discharge, reports that she has been having some continued swelling to both of her legs, not been able to tolerate her full dialysis sessions in the past due to 'cramping in the legs'. She does 'keep them elevated at night', but still having the 'swelling' by morning. No coughing or SOB endorsed and no chest wall discomforts.    Reports that since discharge, as of 'last Tuesday', went to a 'new dialysis center'. Reportedly compliant with taking all of her prescribed medications.   Denies need for refills on routine medications at this time.     Review of Systems:  Eyes: denies visual changes at this time denies floaters   ENT: no nasal congestion or sore throat  Respiratory: no cough or shortness of breath  Cardiovascular: no chest pain or palpitations  Gastrointestinal: no nausea or vomiting, no abdominal pain or change in bowel habits  Genitourinary: no hematuria or dysuria; denies frequency  Hematologic/Lymphatic: no easy bruising or lymphadenopathy  Musculoskeletal: no arthralgias or myalgias  Neurological: no seizures or tremors  Endocrine: no heat or cold intolerance      PAST HISTORY:     Past Medical History:   Diagnosis Date    Acute hyperkalemia 09/09/2020    Acute on chronic diastolic heart failure 10/08/2021    Anemia of chronic disorder 11/08/2017    Overview:  Added automatically from request for surgery [id number]    Anemia of chronic renal failure, stage 5     Anxiety     Cyst of left ovary 05/14/2019    Cyst of left ovary 07/05/2019    Dyslipidemia 01/04/2013    Encounter for screening colonoscopy 10/08/2020     End-stage renal disease on hemodialysis 2020    ESRD on hemodialysis 2017    Hematuria 10/30/2019    History of COVID-19 2020    Hospital discharge follow-up 10/06/2023    Hx of sinus bradycardia 2017    Hyperkalemia 2017    Hypertensive urgency 2021    Peripheral neuropathy 2013    Preoperative testing 10/08/2020    Renovascular hypertension 2020    S/P dilation and curettage 2018    Screening for colon cancer 2023    Thickened endometrium 2018    Thyroid nodule     Thyroid nodule 2021    Uncontrolled type 2 diabetes mellitus 2013    Overview:  poc a1c today 11.2-270/ up from 10.8-263, Pt. has very very stressed/ was incarcerated/marital stress/ will current meds/ less stress now/ will monitor.       Past Surgical History:   Procedure Laterality Date    AV FISTULA PLACEMENT      right arm     SECTION      x1    COLONOSCOPY N/A 2018    Procedure: COLONOSCOPY;  Surgeon: Pankaj Hinojosa MD;  Location: [facility] (4TH FLR);  Service: Endoscopy;  Laterality: N/A;  dialysis pt/labs prior to colon/MS    COLONOSCOPY N/A 2023    Procedure: COLONOSCOPY;  Surgeon: Angel Turner MD;  Location: [facility] (2ND FLR);  Service: Endoscopy;  Laterality: N/A;  Ref By:  Maty Gayle MD  Procedure: Colonoscopy  Diagnosis: endounter for preventive health   Procedure Timing: pt. pref  Provider: Dr. turner   Location: [facility] [place] 4-Endo   Prep Specifications: Standard prep   Additional Info: Dialysis patient lab pending    CYSTOSCOPY W/ RETROGRADES Bilateral 2020    Procedure: CYSTOSCOPY, WITH RETROGRADE PYELOGRAM;  Surgeon: Douglas Abraham MD;  Location: [facility] OR 1ST FLR;  Service: Urology;  Laterality: Bilateral;  45 min    FISTULOGRAM Right 2020    Procedure: Fistulogram;  Surgeon: Lester Gómez MD;  Location: [facility] CATH LAB;  Service: Nephrology;  Laterality: Right;    HYSTERECTOMY      IMPLANTATION OF COCHLEAR PROSTHESIS Right  6/28/2021    Procedure: INSERTION, PROSTHESIS, COCHLEAR;  Surgeon: Ramiro Zuleta MD;  Location: [facility] OR 2ND FLR;  Service: ENT;  Laterality: Right;  COCHLEAR BABAK    IMPLANTATION OF COCHLEAR PROSTHESIS Right 6/30/2021    Procedure: INSERTION, PROSTHESIS, COCHLEAR;  Surgeon: Ramiro Zuleta MD;  Location: [facility] OR 2ND FLR;  Service: ENT;  Laterality: Right;  COCHLEAR AMERICAS    ROBOT-ASSISTED LAPAROSCOPIC ABDOMINAL HYSTERECTOMY USING DA NATALYA XI N/A 7/5/2019    Procedure: XI ROBOTIC HYSTERECTOMY;  Surgeon: Trice Lei MD;  Location: [facility];  Service: OB/GYN;  Laterality: N/A;    ROBOT-ASSISTED LAPAROSCOPIC NEPHRECTOMY Right 10/8/2020    Procedure: ROBOTIC NEPHRECTOMY;  Surgeon: Douglas Abraham MD;  Location: [facility] OR 2ND FLR;  Service: Urology;  Laterality: Right;  3.5hrs gen with regional     ROBOT-ASSISTED LAPAROSCOPIC SALPINGO-OOPHORECTOMY USING DA NATALYA XI Bilateral 7/5/2019    Procedure: XI ROBOTIC SALPINGO-OOPHORECTOMY;  Surgeon: Trice Lei MD;  Location: [facility];  Service: OB/GYN;  Laterality: Bilateral;    TUBAL LIGATION      Vascular access surgeries      x5       Family History   Problem Relation Name Age of Onset    Cancer Mother      Ovarian cancer Mother      Cancer Sister      Ovarian cancer Sister      Lung cancer Sister      Macular degeneration Sister      Cancer Sister      Ovarian cancer Sister      Diabetes Sister      Diabetes Brother      Breast cancer Neg Hx      Colon cancer Neg Hx      Cervical cancer Neg Hx      Endometrial cancer Neg Hx      Vaginal cancer Neg Hx      Thyroid disease Neg Hx      Glaucoma Neg Hx      Retinal detachment Neg Hx         Social History     Socioeconomic History    Marital status:     Number of children: 2   Occupational History    Occupation: disability   Tobacco Use    Smoking status: Never    Smokeless tobacco: Never   Substance and Sexual Activity    Alcohol use: No    Drug use: No    Sexual activity: Yes     Partners: Male     Birth  control/protection: Post-menopausal     Social Determinants of Health     Financial Resource Strain: Medium Risk (9/13/2024)    Overall Financial Resource Strain (CARDIA)     Difficulty of Paying Living Expenses: Somewhat hard   Food Insecurity: Food Insecurity Present (9/13/2024)    Hunger Vital Sign     Worried About Running Out of Food in the Last Year: Sometimes true     Ran Out of Food in the Last Year: Sometimes true   Transportation Needs: No Transportation Needs (9/13/2024)    TRANSPORTATION NEEDS     Transportation : No   Physical Activity: Inactive (9/13/2024)    Exercise Vital Sign     Days of Exercise per Week: 0 days     Minutes of Exercise per Session: 0 min   Stress: No Stress Concern Present (9/13/2024)    [employer] [place] [employer] - Occupational Stress Questionnaire     Feeling of Stress : Not at all   Housing Stability: Low Risk  (9/13/2024)    Housing Stability Vital Sign     Unable to Pay for Housing in the Last Year: No     Homeless in the Last Year: No       MEDICATIONS & ALLERGIES:     Current Outpatient Medications on File Prior to Visit   Medication Sig Dispense Refill    ACCU-CHEK GUIDE GLUCOSE METER Misc       ACCU-CHEK GUIDE L1-L2 CTRL SOL Soln       ACCU-CHEK GUIDE TEST STRIPS Strp       ACCU-CHEK SOFTCLIX LANCETS Misc       acetaminophen (TYLENOL) 500 MG tablet Take 1 tablet (500 mg total) by mouth every 8 (eight) hours as needed for Pain. 20 tablet 0    amLODIPine (NORVASC) 10 MG tablet Take 1 tablet (10 mg total) by mouth nightly. 90 tablet 3    aspirin (ECOTRIN) 81 MG EC tablet Take 81 mg by mouth every morning.      atovaquone (MEPRON) 750 mg/5 mL Susp oral liquid Take 10 mLs (1,500 mg total) by mouth once daily. 300 mL 2    blood sugar diagnostic Strp To check BG 3 times daily, to use with insurance preferred meter 200 each 0    blood-glucose meter kit To check BG 3 times daily, to use with insurance preferred meter 1 each 0    DROPSAFE ALCOHOL PREP PADS PadM Apply  "topically.      epoetin shiela (PROCRIT) 10,000 unit/mL injection Inject 0.85 mLs (8,500 Units total) into the skin every Mon, Wed, Fri.      folic acid (FOLVITE) 1 MG tablet Take 1 tablet (1 mg total) by mouth once daily. 90 tablet 3    insulin aspart U-100 (NOVOLOG) 100 unit/mL (3 mL) InPn pen Inject 0-5 Units into the skin before meals and at bedtime as needed (Hyperglycemia). 3 mL 0    insulin glargine U-100, Lantus, 100 unit/mL (3 mL) SubQ InPn pen Inject 12 Units into the skin once daily. 7.2 mL 0    labetaloL (NORMODYNE) 200 MG tablet Take 1 tablet (200 mg total) by mouth every 12 (twelve) hours. 60 tablet 0    lancets Misc To check BG 3 times daily, to use with insurance preferred meter 200 each 0    LIDOcaine (LIDODERM) 5 % Place 1 patch onto the skin once daily. Remove & Discard patch within 12 hours or as directed by MD Goldman patch 0    losartan (COZAAR) 100 MG tablet Take 1 tablet (100 mg total) by mouth once daily. 90 tablet 3    omeprazole (PRILOSEC) 20 MG capsule Take 1 capsule (20 mg total) by mouth once daily. 90 capsule 3    pen needle, diabetic 31 gauge x 3/16" Ndle Use to inject insulin into the skin. 100 each 0    predniSONE (DELTASONE) 20 MG tablet Take 2 tablets (40 mg total) by mouth once daily for 6 days, THEN 1.5 tablets (30 mg total) once daily for 7 days, THEN 1 tablet (20 mg total) once daily. Follow up with Heme Onc for further tapering of prednisone.      pregabalin (LYRICA) 75 MG capsule Take 1 capsule (75 mg total) by mouth once daily. 90 capsule 3    sevelamer carbonate (RENVELA) 800 mg Tab Take 3 tablets (2,400 mg total) by mouth 3 (three) times daily with meals. 270 tablet 3    atorvastatin (LIPITOR) 40 MG tablet Take 1 tablet (40 mg total) by mouth once daily. (Patient taking differently: Take 40 mg by mouth every morning.) 90 tablet 3     No current facility-administered medications on file prior to visit.        Review of patient's allergies indicates:  No Known " Allergies    Medications Reconciliation:   I have reconciled the patient's home medications and discharge medications with the patient/family. I have updated all changes.  Refer to After-Visit Medication List.    OBJECTIVE:     Vital Signs:  Vitals:    09/18/24 1358   BP: (!) 148/68   Pulse: 86     Wt Readings from Last 3 Encounters:   09/18/24 1358 89.2 kg (196 lb 10.4 oz)   09/13/24 0543 94.8 kg (208 lb 15.9 oz)   09/12/24 1817 94.8 kg (208 lb 15.9 oz)   09/12/24 1343 94.8 kg (209 lb)   09/01/24 0347 94.8 kg (208 lb 15.9 oz)   08/31/24 2214 88 kg (194 lb)     Body mass index is 33.76 kg/m².   Wt Readings from Last 3 Encounters:   09/18/24 89.2 kg (196 lb 10.4 oz)   09/13/24 94.8 kg (208 lb 15.9 oz)   09/01/24 94.8 kg (208 lb 15.9 oz)     Temp Readings from Last 3 Encounters:   09/14/24 99.1 °F (37.3 °C) (Oral)   09/02/24 98 °F (36.7 °C) (Temporal)   08/22/24 97.9 °F (36.6 °C) (Oral)     BP Readings from Last 3 Encounters:   09/18/24 (!) 148/68   09/14/24 126/71   09/02/24 (!) 147/74     Pulse Readings from Last 3 Encounters:   09/18/24 86   09/14/24 74   09/02/24 77       Physical Exam:  General: Well developed, well nourished. No distress.  HEENT: Head is normocephalic, atraumatic   Eyes: Clear conjunctiva.  Neck: Supple, symmetrical neck; trachea midline.  Lungs: Clear to auscultation bilaterally and normal respiratory effort.  Cardiovascular: Heart with regular rate and rhythm. No murmurs, gallops or rubs  Extremities: BLE pitting edema.  Skin: Skin color, texture, turgor normal. No rashes.  Musculoskeletal: Normal gait.   Neurologic: Normal strength and tone. No focal numbness or weakness.     Laboratory  Lab Results   Component Value Date    WBC 6.83 09/17/2024    HGB 9.8 (L) 09/17/2024    HCT 29.8 (L) 09/17/2024     09/17/2024    CHOL 182 05/25/2023    TRIG 218 (H) 05/25/2023    HDL 42 05/25/2023    ALT 15 09/12/2024    AST 10 09/12/2024     (L) 09/13/2024    K 4.3 09/13/2024    CL 93 (L)  09/13/2024    CREATININE 8.4 (H) 09/13/2024    BUN 62 (H) 09/13/2024    CO2 25 09/13/2024    TSH 0.378 (L) 08/11/2024    INR 1.0 08/12/2024    HGBA1C 7.6 (H) 09/01/2024         ASSESSMENT & PLAN:     Hospital discharge follow-up    Chronic diastolic heart failure    NAFLD (nonalcoholic fatty liver disease)    Essential hypertension    ESRD (end stage renal disease) on dialysis    Anemia in ESRD (end-stage renal disease)    AIHA (autoimmune hemolytic anemia)    Acute hyperkalemia    Hypertension secondary to other renal disorders    Recent admission for Acute on Chronic DHF with BP elevations and BLE edema since being commenced to high dose 'steroids':   *209 pounds on admit and 196 today; loss weight since discharge from inpatient admission.   *BNP: 238 (9/12/2024)<< 410 in 8/2024  *CXR: mild cardiomegaly with bibasilar edema  *TTE: (reserve noted below)   · Mild left atrial enlargement.  · The left ventricle is normal in size with mild concentric hypertrophy and normal systolic function.  · The estimated ejection fraction is 60%.  · Grade II left ventricular diastolic dysfunction.  · Normal right ventricular size with normal right ventricular systolic function.  · There is pulmonary hypertension. The estimated PA systolic pressure is 45 mmHg.  · Normal central venous pressure (3 mmHg).  ` continue Labetalol   ` followed by HD at [facility]; hopefully she will be able to undergo and tolerate increased ultrafiltration  / on her draw (she has not been able to tolerate in the recent past due to 'cramping'; this may afford her some advantage yield on her 'leg swelling' and circumvent 'heart failure' events)  ` follow up with Cardiology 10/22/2024 (apt noted)      Other medical issues:   DM II:   Lab Results   Component Value Date    HGBA1C 7.6 (H) 09/01/2024   ` Lantus  ` Novolog     AIHA (Autoimmune hemolytic anemia):   Currently on steroid taper  ` Folic acid  Lab Results   Component Value Date    WBC 6.83 09/17/2024     HGB 9.8 (L) 09/17/2024    HCT 29.8 (L) 09/17/2024    MCV 84 09/17/2024     09/17/2024   *Dr. Gtz will be seeing her 10/17    ESRD:   HD while inpatient: T, Thur, Sat (new facility as of last Tuesday)  ` Renvela  ` follow up with Nephrology  ` follow up with Urology     HTN:   Today: 148/68 (improved control)  ` Norvasc  ` Labetalol   ` Losartan     *Follow up with PCP at this time. Single hospital follow up visit today.    Future Appointments   Date Time Provider Department Center   9/24/2024  8:10 AM LAB[facility]   10/1/2024  8:05 AM LAB, APPOINTMENT [place] [facility]   10/4/2024  1:00 PM Antione Ramsey MD [facility]   10/8/2024  8:05 AM LAB[facility]POINT[facility]   10/15/2024  8:10 AM LAB, APPOINTMENT [facility] LAB [facility]   10/17/2024  2:00 PM Mojgan Gtz MD [facility] BENSHERIN [place]   10/22/2024 10:00 AM Gray Clark MD [facility] HEARTTX [address]   10/24/2024  1:00 PM Maty Gayle DO [facility]   11/8/2024  9:00 AM BAP MRI1 350 LB LIMIT BAP MRI [facility]   11/12/2024  9:15 AM Douglas Abraham MD [facility] UROLOG [place]       After Visit Medication List :     Medication List            Accurate as of September 18, 2024  3:23 PM. If you have any questions, ask your nurse or doctor.                CHANGE how you take these medications      atorvastatin 40 MG tablet  Commonly known as: LIPITOR  Take 1 tablet (40 mg total) by mouth once daily.  What changed: when to take this            CONTINUE taking these medications      * ACCU-CHEK GUIDE GLUCOSE METER Misc  Generic drug: blood-glucose meter     * blood-glucose meter kit  To check BG 3 times daily, to use with insurance preferred meter     ACCU-CHEK GUIDE L1-L2 CTRL SOL Soln  Generic drug: blood glucose control high,low     * ACCU-CHEK GUIDE TEST STRIPS Strp  Generic drug: blood sugar diagnostic     * blood  "sugar diagnostic Strp  To check BG 3 times daily, to use with insurance preferred meter     * ACCU-CHEK SOFTCLIX LANCETS Misc  Generic drug: lancets     * lancets Misc  To check BG 3 times daily, to use with insurance preferred meter     acetaminophen 500 MG tablet  Commonly known as: TYLENOL  Take 1 tablet (500 mg total) by mouth every 8 (eight) hours as needed for Pain.     amLODIPine 10 MG tablet  Commonly known as: NORVASC  Take 1 tablet (10 mg total) by mouth nightly.     aspirin 81 MG EC tablet  Commonly known as: ECOTRIN     atovaquone 750 mg/5 mL Susp oral liquid  Commonly known as: MEPRON  Take 10 mLs (1,500 mg total) by mouth once daily.     BD ULTRA-FINE MINI PEN NEEDLE 31 gauge x 3/16" Ndle  Generic drug: pen needle, diabetic  Use to inject insulin into the skin.     DROPSAFE ALCOHOL PREP PADS Padm  Generic drug: alcohol swabs     epoetin shiela 10,000 unit/mL injection  Commonly known as: PROCRIT  Inject 0.85 mLs (8,500 Units total) into the skin every Mon, Wed, Fri.     folic acid 1 MG tablet  Commonly known as: FOLVITE  Take 1 tablet (1 mg total) by mouth once daily.     insulin aspart U-100 100 unit/mL (3 mL) Inpn pen  Commonly known as: NovoLOG  Inject 0-5 Units into the skin before meals and at bedtime as needed (Hyperglycemia).     insulin glargine U-100 (Lantus) 100 unit/mL (3 mL) Inpn pen  Inject 12 Units into the skin once daily.     labetaloL 200 MG tablet  Commonly known as: NORMODYNE  Take 1 tablet (200 mg total) by mouth every 12 (twelve) hours.     LIDOcaine 5 %  Commonly known as: LIDODERM  Place 1 patch onto the skin once daily. Remove & Discard patch within 12 hours or as directed by MD     losartan 100 MG tablet  Commonly known as: COZAAR  Take 1 tablet (100 mg total) by mouth once daily.     omeprazole 20 MG capsule  Commonly known as: PRILOSEC  Take 1 capsule (20 mg total) by mouth once daily.     predniSONE 20 MG tablet  Commonly known as: DELTASONE  Take 2 tablets (40 mg total) by " mouth once daily for 6 days, THEN 1.5 tablets (30 mg total) once daily for 7 days, THEN 1 tablet (20 mg total) once daily. Follow up with Heme Onc for further tapering of prednisone.  Start taking on: September 14, 2024     pregabalin 75 MG capsule  Commonly known as: LYRICA  Take 1 capsule (75 mg total) by mouth once daily.     sevelamer carbonate 800 mg Tab  Commonly known as: RENVELA  Take 3 tablets (2,400 mg total) by mouth 3 (three) times daily with meals.           * This list has 6 medication(s) that are the same as other medications prescribed for you. Read the directions carefully, and ask your doctor or other care provider to review them with you.                Total time spent: > 120 mins including assessment, eduction / teaching, interventions and coordination of medical care.   Signing Physician:  ZEUS Jones

## 2024-09-19 ENCOUNTER — EXTERNAL HOME HEALTH (OUTPATIENT)
Dept: HOME HEALTH SERVICES | Facility: HOSPITAL | Age: 59
End: 2024-09-19
Payer: MEDICARE

## 2024-09-24 ENCOUNTER — APPOINTMENT (OUTPATIENT)
Dept: LAB | Facility: HOSPITAL | Age: 59
End: 2024-09-24
Attending: INTERNAL MEDICINE
Payer: MEDICARE

## 2024-10-02 ENCOUNTER — DOCUMENT SCAN (OUTPATIENT)
Dept: HOME HEALTH SERVICES | Facility: HOSPITAL | Age: 59
End: 2024-10-02
Payer: MEDICARE

## 2024-10-04 ENCOUNTER — TELEPHONE (OUTPATIENT)
Dept: INTERNAL MEDICINE | Facility: CLINIC | Age: 59
End: 2024-10-04
Payer: MEDICARE

## 2024-10-04 NOTE — TELEPHONE ENCOUNTER
----- Message from Elisa sent at 10/4/2024  1:44 PM CDT -----  Type :  Needs Medical Advice    Who Called:  pt   Symptoms (please be specific): leg swelling   How long has patient had these symptoms:  leg swelling   Pharmacy name and phone #:   no   Would the patient rather a call back or a response via [facility]? Call   Best Call Back Number: [phone]  Additional Information:  appt  pt was going to ED she didn't want to see any one else

## 2024-10-06 ENCOUNTER — HOSPITAL ENCOUNTER (OUTPATIENT)
Facility: HOSPITAL | Age: 59
Discharge: HOME | End: 2024-10-08
Attending: STUDENT IN AN ORGANIZED HEALTH CARE EDUCATION/TRAINING PROGRAM | Admitting: STUDENT IN AN ORGANIZED HEALTH CARE EDUCATION/TRAINING PROGRAM
Payer: MEDICARE

## 2024-10-06 DIAGNOSIS — M25.471 BILATERAL SWELLING OF FEET AND ANKLES: ICD-10-CM

## 2024-10-06 DIAGNOSIS — R07.9 CHEST PAIN: ICD-10-CM

## 2024-10-06 DIAGNOSIS — I50.9 ACUTE ON CHRONIC CONGESTIVE HEART FAILURE, UNSPECIFIED HEART FAILURE TYPE: ICD-10-CM

## 2024-10-06 DIAGNOSIS — M25.474 BILATERAL SWELLING OF FEET AND ANKLES: ICD-10-CM

## 2024-10-06 DIAGNOSIS — D63.1 ANEMIA IN ESRD (END-STAGE RENAL DISEASE): Primary | ICD-10-CM

## 2024-10-06 DIAGNOSIS — N18.6 ANEMIA IN ESRD (END-STAGE RENAL DISEASE): Primary | ICD-10-CM

## 2024-10-06 DIAGNOSIS — M25.472 BILATERAL SWELLING OF FEET AND ANKLES: ICD-10-CM

## 2024-10-06 DIAGNOSIS — M25.475 BILATERAL SWELLING OF FEET AND ANKLES: ICD-10-CM

## 2024-10-06 DIAGNOSIS — Z99.2 ESRD (END STAGE RENAL DISEASE) ON DIALYSIS: Chronic | ICD-10-CM

## 2024-10-06 DIAGNOSIS — N18.6 ESRD (END STAGE RENAL DISEASE) ON DIALYSIS: Chronic | ICD-10-CM

## 2024-10-06 DIAGNOSIS — M79.89 LEG SWELLING: ICD-10-CM

## 2024-10-06 LAB
ALBUMIN SERPL BCP-MCNC: 3.4 G/DL (ref 3.5–5.2)
ALP SERPL-CCNC: 54 U/L (ref 55–135)
ALT SERPL W/O P-5'-P-CCNC: 15 U/L (ref 10–44)
ANION GAP SERPL CALC-SCNC: 18 MMOL/L (ref 8–16)
AST SERPL-CCNC: 14 U/L (ref 10–40)
BACTERIA #/AREA URNS AUTO: ABNORMAL /HPF
BASOPHILS # BLD AUTO: 0.03 K/UL (ref 0–0.2)
BASOPHILS NFR BLD: 0.4 % (ref 0–1.9)
BILIRUB SERPL-MCNC: 0.6 MG/DL (ref 0.1–1)
BILIRUB UR QL STRIP: NEGATIVE
BNP SERPL-MCNC: 189 PG/ML (ref 0–99)
BUN SERPL-MCNC: 46 MG/DL (ref 8–23)
CALCIUM SERPL-MCNC: 10.2 MG/DL (ref 8.7–10.5)
CHLORIDE SERPL-SCNC: 92 MMOL/L (ref 95–110)
CLARITY UR REFRACT.AUTO: ABNORMAL
CO2 SERPL-SCNC: 25 MMOL/L (ref 23–29)
COLOR UR AUTO: YELLOW
CREAT SERPL-MCNC: 8.7 MG/DL (ref 0.5–1.4)
DIFFERENTIAL METHOD BLD: ABNORMAL
EOSINOPHIL # BLD AUTO: 0 K/UL (ref 0–0.5)
EOSINOPHIL NFR BLD: 0 % (ref 0–8)
ERYTHROCYTE [DISTWIDTH] IN BLOOD BY AUTOMATED COUNT: 18.2 % (ref 11.5–14.5)
EST. GFR  (NO RACE VARIABLE): 4.8 ML/MIN/1.73 M^2
GLUCOSE SERPL-MCNC: 442 MG/DL (ref 70–110)
GLUCOSE UR QL STRIP: ABNORMAL
HCT VFR BLD AUTO: 30 % (ref 37–48.5)
HGB BLD-MCNC: 9.1 G/DL (ref 12–16)
HGB UR QL STRIP: ABNORMAL
IMM GRANULOCYTES # BLD AUTO: 0.02 K/UL (ref 0–0.04)
IMM GRANULOCYTES NFR BLD AUTO: 0.3 % (ref 0–0.5)
KETONES UR QL STRIP: NEGATIVE
LEUKOCYTE ESTERASE UR QL STRIP: ABNORMAL
LYMPHOCYTES # BLD AUTO: 0.6 K/UL (ref 1–4.8)
LYMPHOCYTES NFR BLD: 8 % (ref 18–48)
MAGNESIUM SERPL-MCNC: 2.4 MG/DL (ref 1.6–2.6)
MCH RBC QN AUTO: 26.1 PG (ref 27–31)
MCHC RBC AUTO-ENTMCNC: 30.3 G/DL (ref 32–36)
MCV RBC AUTO: 86 FL (ref 82–98)
MICROSCOPIC COMMENT: ABNORMAL
MONOCYTES # BLD AUTO: 0.5 K/UL (ref 0.3–1)
MONOCYTES NFR BLD: 6.5 % (ref 4–15)
NEUTROPHILS # BLD AUTO: 5.9 K/UL (ref 1.8–7.7)
NEUTROPHILS NFR BLD: 84.8 % (ref 38–73)
NITRITE UR QL STRIP: NEGATIVE
NRBC BLD-RTO: 0 /100 WBC
PH UR STRIP: 8 [PH] (ref 5–8)
PLATELET # BLD AUTO: 151 K/UL (ref 150–450)
PMV BLD AUTO: 11 FL (ref 9.2–12.9)
POCT GLUCOSE: 385 MG/DL (ref 70–110)
POTASSIUM SERPL-SCNC: 4.3 MMOL/L (ref 3.5–5.1)
PROT SERPL-MCNC: 6.6 G/DL (ref 6–8.4)
PROT UR QL STRIP: ABNORMAL
RBC # BLD AUTO: 3.49 M/UL (ref 4–5.4)
RBC #/AREA URNS AUTO: 3 /HPF (ref 0–4)
SODIUM SERPL-SCNC: 135 MMOL/L (ref 136–145)
SP GR UR STRIP: 1.01 (ref 1–1.03)
SQUAMOUS #/AREA URNS AUTO: >100 /HPF
TROPONIN I SERPL DL<=0.01 NG/ML-MCNC: 0.02 NG/ML (ref 0–0.03)
URN SPEC COLLECT METH UR: ABNORMAL
WBC # BLD AUTO: 6.97 K/UL (ref 3.9–12.7)
WBC #/AREA URNS AUTO: 7 /HPF (ref 0–5)

## 2024-10-06 PROCEDURE — 82962 GLUCOSE BLOOD TEST: CPT | Mod: HCNC

## 2024-10-06 PROCEDURE — 99285 EMERGENCY DEPT VISIT HI MDM: CPT | Mod: 25,HCNC

## 2024-10-06 PROCEDURE — 83735 ASSAY OF MAGNESIUM: CPT | Mod: HCNC

## 2024-10-06 PROCEDURE — 94761 N-INVAS EAR/PLS OXIMETRY MLT: CPT | Mod: HCNC

## 2024-10-06 PROCEDURE — 93005 ELECTROCARDIOGRAM TRACING: CPT | Mod: HCNC

## 2024-10-06 PROCEDURE — 63600175 PHARM REV CODE 636 W HCPCS: Mod: HCNC

## 2024-10-06 PROCEDURE — 93010 ELECTROCARDIOGRAM REPORT: CPT | Mod: HCNC,,, | Performed by: INTERNAL MEDICINE

## 2024-10-06 PROCEDURE — 81001 URINALYSIS AUTO W/SCOPE: CPT | Mod: HCNC

## 2024-10-06 PROCEDURE — G0378 HOSPITAL OBSERVATION PER HR: HCPCS | Mod: HCNC

## 2024-10-06 PROCEDURE — 96375 TX/PRO/DX INJ NEW DRUG ADDON: CPT | Mod: HCNC

## 2024-10-06 PROCEDURE — 84484 ASSAY OF TROPONIN QUANT: CPT | Mod: HCNC

## 2024-10-06 PROCEDURE — 85025 COMPLETE CBC W/AUTO DIFF WBC: CPT | Mod: HCNC

## 2024-10-06 PROCEDURE — 80053 COMPREHEN METABOLIC PANEL: CPT | Mod: HCNC

## 2024-10-06 PROCEDURE — 83880 ASSAY OF NATRIURETIC PEPTIDE: CPT | Mod: HCNC

## 2024-10-06 PROCEDURE — 96374 THER/PROPH/DIAG INJ IV PUSH: CPT | Mod: HCNC

## 2024-10-06 PROCEDURE — 25000003 PHARM REV CODE 250: Mod: HCNC

## 2024-10-06 RX ORDER — SEVELAMER CARBONATE 800 MG/1
800 TABLET, FILM COATED ORAL
Status: COMPLETED | OUTPATIENT
Start: 2024-10-06 | End: 2024-10-06

## 2024-10-06 RX ORDER — ATOVAQUONE 750 MG/5ML
1500 SUSPENSION ORAL DAILY
Status: DISCONTINUED | OUTPATIENT
Start: 2024-10-07 | End: 2024-10-08 | Stop reason: HOSPADM

## 2024-10-06 RX ORDER — NALOXONE HCL 0.4 MG/ML
0.02 VIAL (ML) INJECTION
Status: DISCONTINUED | OUTPATIENT
Start: 2024-10-06 | End: 2024-10-08 | Stop reason: HOSPADM

## 2024-10-06 RX ORDER — AMLODIPINE BESYLATE 10 MG/1
10 TABLET ORAL
Status: COMPLETED | OUTPATIENT
Start: 2024-10-06 | End: 2024-10-06

## 2024-10-06 RX ORDER — AMLODIPINE BESYLATE 10 MG/1
10 TABLET ORAL NIGHTLY
Status: DISCONTINUED | OUTPATIENT
Start: 2024-10-07 | End: 2024-10-08 | Stop reason: HOSPADM

## 2024-10-06 RX ORDER — PANTOPRAZOLE SODIUM 40 MG/1
40 TABLET, DELAYED RELEASE ORAL DAILY
Status: DISCONTINUED | OUTPATIENT
Start: 2024-10-07 | End: 2024-10-08 | Stop reason: HOSPADM

## 2024-10-06 RX ORDER — FOLIC ACID 1 MG/1
1 TABLET ORAL DAILY
Status: DISCONTINUED | OUTPATIENT
Start: 2024-10-07 | End: 2024-10-08 | Stop reason: HOSPADM

## 2024-10-06 RX ORDER — POLYETHYLENE GLYCOL 3350 17 G/17G
17 POWDER, FOR SOLUTION ORAL 2 TIMES DAILY PRN
Status: DISCONTINUED | OUTPATIENT
Start: 2024-10-06 | End: 2024-10-08 | Stop reason: HOSPADM

## 2024-10-06 RX ORDER — TALC
6 POWDER (GRAM) TOPICAL NIGHTLY PRN
Status: DISCONTINUED | OUTPATIENT
Start: 2024-10-06 | End: 2024-10-08 | Stop reason: HOSPADM

## 2024-10-06 RX ORDER — ONDANSETRON 8 MG/1
8 TABLET, ORALLY DISINTEGRATING ORAL EVERY 8 HOURS PRN
Status: DISCONTINUED | OUTPATIENT
Start: 2024-10-06 | End: 2024-10-08 | Stop reason: HOSPADM

## 2024-10-06 RX ORDER — ASPIRIN 81 MG/1
81 TABLET ORAL EVERY MORNING
Status: DISCONTINUED | OUTPATIENT
Start: 2024-10-07 | End: 2024-10-08 | Stop reason: HOSPADM

## 2024-10-06 RX ORDER — SEVELAMER CARBONATE 800 MG/1
2400 TABLET, FILM COATED ORAL
Status: DISCONTINUED | OUTPATIENT
Start: 2024-10-07 | End: 2024-10-08 | Stop reason: HOSPADM

## 2024-10-06 RX ORDER — PREGABALIN 75 MG/1
75 CAPSULE ORAL DAILY
Status: DISCONTINUED | OUTPATIENT
Start: 2024-10-07 | End: 2024-10-08 | Stop reason: HOSPADM

## 2024-10-06 RX ORDER — GLUCAGON 1 MG
1 KIT INJECTION
Status: DISCONTINUED | OUTPATIENT
Start: 2024-10-06 | End: 2024-10-08 | Stop reason: HOSPADM

## 2024-10-06 RX ORDER — INSULIN GLARGINE 100 [IU]/ML
10 INJECTION, SOLUTION SUBCUTANEOUS DAILY
Status: DISCONTINUED | OUTPATIENT
Start: 2024-10-07 | End: 2024-10-08 | Stop reason: HOSPADM

## 2024-10-06 RX ORDER — LOSARTAN POTASSIUM 50 MG/1
100 TABLET ORAL DAILY
Status: DISCONTINUED | OUTPATIENT
Start: 2024-10-07 | End: 2024-10-08 | Stop reason: HOSPADM

## 2024-10-06 RX ORDER — IBUPROFEN 200 MG
24 TABLET ORAL
Status: DISCONTINUED | OUTPATIENT
Start: 2024-10-06 | End: 2024-10-08 | Stop reason: HOSPADM

## 2024-10-06 RX ORDER — INSULIN ASPART 100 [IU]/ML
0-5 INJECTION, SOLUTION INTRAVENOUS; SUBCUTANEOUS
Status: DISCONTINUED | OUTPATIENT
Start: 2024-10-06 | End: 2024-10-08 | Stop reason: HOSPADM

## 2024-10-06 RX ORDER — ATORVASTATIN CALCIUM 40 MG/1
40 TABLET, FILM COATED ORAL DAILY
Status: DISCONTINUED | OUTPATIENT
Start: 2024-10-07 | End: 2024-10-08 | Stop reason: HOSPADM

## 2024-10-06 RX ORDER — IBUPROFEN 200 MG
16 TABLET ORAL
Status: DISCONTINUED | OUTPATIENT
Start: 2024-10-06 | End: 2024-10-08 | Stop reason: HOSPADM

## 2024-10-06 RX ORDER — ACETAMINOPHEN 325 MG/1
650 TABLET ORAL EVERY 4 HOURS PRN
Status: DISCONTINUED | OUTPATIENT
Start: 2024-10-06 | End: 2024-10-08 | Stop reason: HOSPADM

## 2024-10-06 RX ORDER — LABETALOL HYDROCHLORIDE 5 MG/ML
10 INJECTION, SOLUTION INTRAVENOUS
Status: COMPLETED | OUTPATIENT
Start: 2024-10-06 | End: 2024-10-06

## 2024-10-06 RX ORDER — LABETALOL 200 MG/1
200 TABLET, FILM COATED ORAL EVERY 12 HOURS
Status: DISCONTINUED | OUTPATIENT
Start: 2024-10-07 | End: 2024-10-08 | Stop reason: HOSPADM

## 2024-10-06 RX ORDER — ACETAMINOPHEN 500 MG
1000 TABLET ORAL EVERY 8 HOURS PRN
Status: DISCONTINUED | OUTPATIENT
Start: 2024-10-06 | End: 2024-10-08 | Stop reason: HOSPADM

## 2024-10-06 RX ORDER — LABETALOL 100 MG/1
200 TABLET, FILM COATED ORAL
Status: COMPLETED | OUTPATIENT
Start: 2024-10-06 | End: 2024-10-06

## 2024-10-06 RX ORDER — PREDNISONE 20 MG/1
20 TABLET ORAL DAILY
Status: DISCONTINUED | OUTPATIENT
Start: 2024-10-07 | End: 2024-10-08 | Stop reason: HOSPADM

## 2024-10-06 RX ORDER — BISACODYL 10 MG/1
10 SUPPOSITORY RECTAL DAILY PRN
Status: DISCONTINUED | OUTPATIENT
Start: 2024-10-06 | End: 2024-10-08 | Stop reason: HOSPADM

## 2024-10-06 RX ORDER — SODIUM CHLORIDE 0.9 % (FLUSH) 0.9 %
5 SYRINGE (ML) INJECTION
Status: DISCONTINUED | OUTPATIENT
Start: 2024-10-06 | End: 2024-10-08 | Stop reason: HOSPADM

## 2024-10-06 RX ORDER — HEPARIN SODIUM 5000 [USP'U]/ML
5000 INJECTION, SOLUTION INTRAVENOUS; SUBCUTANEOUS EVERY 8 HOURS
Status: DISCONTINUED | OUTPATIENT
Start: 2024-10-07 | End: 2024-10-08 | Stop reason: HOSPADM

## 2024-10-06 RX ORDER — IPRATROPIUM BROMIDE AND ALBUTEROL SULFATE 2.5; .5 MG/3ML; MG/3ML
3 SOLUTION RESPIRATORY (INHALATION) EVERY 4 HOURS PRN
Status: DISCONTINUED | OUTPATIENT
Start: 2024-10-06 | End: 2024-10-08 | Stop reason: HOSPADM

## 2024-10-06 RX ADMIN — LABETALOL HYDROCHLORIDE 10 MG: 5 INJECTION INTRAVENOUS at 08:10

## 2024-10-06 RX ADMIN — LABETALOL HYDROCHLORIDE 200 MG: 100 TABLET, FILM COATED ORAL at 08:10

## 2024-10-06 RX ADMIN — SEVELAMER CARBONATE 800 MG: 800 TABLET, FILM COATED ORAL at 08:10

## 2024-10-06 RX ADMIN — AMLODIPINE BESYLATE 10 MG: 10 TABLET ORAL at 08:10

## 2024-10-06 RX ADMIN — INSULIN HUMAN 5 UNITS: 100 INJECTION, SOLUTION PARENTERAL at 08:10

## 2024-10-06 NOTE — ED PROVIDER NOTES
Encounter Date: 10/6/2024       History     Chief Complaint   Patient presents with    Leg Swelling     Bilateral leg swelling on prednisone and says that's why its swelling     59-year-old female with past medical history of ESRD on HD (TTHSat), HTN, T2DM with Insulin use, WAIHA, and GERD who presented to the ED regarding bilateral leg swelling x 1 month. States she was admitted recently for similar issues. She denies chest pain or shortness of breath. She reports leg swelling ever since starting on prednisone. She has also noticed swelling in her cheeks and abdomen. She denies leg or calf pain. She reports she has not noticed improvement after HD.  Had dialysis yesterday 4L off. She denies cough, chest pain, palpitations, nausea, vomiting, abdominal pain. She is currently on a steroid taper through October. She urinates only little daily and at baseline.    The history is provided by the patient and medical records.     Review of patient's allergies indicates:  No Known Allergies  Past Medical History:   Diagnosis Date    Acute hyperkalemia 09/09/2020    Acute on chronic diastolic heart failure 10/08/2021    Anemia of chronic disorder 11/08/2017    Overview:  Added automatically from request for surgery [id number]    Anemia of chronic renal failure, stage 5     Anxiety     Cyst of left ovary 05/14/2019    Cyst of left ovary 07/05/2019    Dyslipidemia 01/04/2013    Encounter for screening colonoscopy 10/08/2020    End-stage renal disease on hemodialysis 08/20/2020    ESRD on hemodialysis 09/28/2017    Hematuria 10/30/2019    History of COVID-19 04/07/2020    Hospital discharge follow-up 10/06/2023    Hx of sinus bradycardia 09/28/2017    Hyperkalemia 03/29/2017    Hypertensive urgency 07/22/2021    Peripheral neuropathy 01/04/2013    Preoperative testing 10/08/2020    Renovascular hypertension 08/17/2020    S/P dilation and curettage 05/22/2018    Screening for colon cancer 11/28/2023    Thickened endometrium  2018    Thyroid nodule     Thyroid nodule 2021    Uncontrolled type 2 diabetes mellitus 2013    Overview:  poc a1c today 11.2-270/ up from 10.8-263, Pt. has very very stressed/ was incarcerated/marital stress/ will current meds/ less stress now/ will monitor.     Past Surgical History:   Procedure Laterality Date    AV FISTULA PLACEMENT      right arm     SECTION      x1    COLONOSCOPY N/A 2018    Procedure: COLONOSCOPY;  Surgeon: Pankaj Hinojosa MD;  Location: [facility] ENDO (4TH FLR);  Service: Endoscopy;  Laterality: N/A;  dialysis pt/labs prior to colon/MS    COLONOSCOPY N/A 2023    Procedure: COLONOSCOPY;  Surgeon: Angel Turner MD;  Location: [facility] ENDO (2ND FLR);  Service: Endoscopy;  Laterality: N/A;  Ref By:  Maty Gayle MD  Procedure: Colonoscopy  Diagnosis: endounter for preventive health   Procedure Timing: pt. pref  Provider: Dr. turner   Location: [facility] 4-Endo   Prep Specifications: Standard prep   Additional Info: Dialysis patient lab pending    CYSTOSCOPY W/ RETROGRADES Bilateral 2020    Procedure: CYSTOSCOPY, WITH RETROGRADE PYELOGRAM;  Surgeon: Douglas Abraham MD;  Location: [facility] OR 1ST FLR;  Service: Urology;  Laterality: Bilateral;  45 min    FISTULOGRAM Right 2020    Procedure: Fistulogram;  Surgeon: Lester Gómez MD;  Location: [facility] CATH LAB;  Service: Nephrology;  Laterality: Right;    HYSTERECTOMY      IMPLANTATION OF COCHLEAR PROSTHESIS Right 2021    Procedure: INSERTION, PROSTHESIS, COCHLEAR;  Surgeon: Ramiro Zuleta MD;  Location: [facility] OR 2ND FLR;  Service: ENT;  Laterality: Right;  COCHLEAR BABAK    IMPLANTATION OF COCHLEAR PROSTHESIS Right 2021    Procedure: INSERTION, PROSTHESIS, COCHLEAR;  Surgeon: Ramiro Zuleta MD;  Location: [facility] OR 2ND FLR;  Service: ENT;  Laterality: Right;  COCHLEAR AMERICAS    ROBOT-ASSISTED LAPAROSCOPIC ABDOMINAL HYSTERECTOMY USING DA NATALYA XI N/A 2019    Procedure: XI ROBOTIC HYSTERECTOMY;  Surgeon:  Trice Lei MD;  Location: [facility];  Service: OB/GYN;  Laterality: N/A;    ROBOT-ASSISTED LAPAROSCOPIC NEPHRECTOMY Right 10/8/2020    Procedure: ROBOTIC NEPHRECTOMY;  Surgeon: Douglas Abraham MD;  [address]  Service: Urology;  Laterality: Right;  3.5hrs gen with regional     ROBOT-ASSISTED LAPAROSCOPIC SALPINGO-OOPHORECTOMY USING DA NATALYA XI Bilateral 7/5/2019    Procedure: XI ROBOTIC SALPINGO-OOPHORECTOMY;  Surgeon: Trice Lei MD;  Location: [facility];  Service: OB/GYN;  Laterality: Bilateral;    TUBAL LIGATION      Vascular access surgeries      x5     Family History   Problem Relation Name Age of Onset    Cancer Mother      Ovarian cancer Mother      Cancer Sister      Ovarian cancer Sister      Lung cancer Sister      Macular degeneration Sister      Cancer Sister      Ovarian cancer Sister      Diabetes Sister      Diabetes Brother      Breast cancer Neg Hx      Colon cancer Neg Hx      Cervical cancer Neg Hx      Endometrial cancer Neg Hx      Vaginal cancer Neg Hx      Thyroid disease Neg Hx      Glaucoma Neg Hx      Retinal detachment Neg Hx       Social History     Tobacco Use    Smoking status: Never    Smokeless tobacco: Never   Substance Use Topics    Alcohol use: No    Drug use: No     Review of Systems  See HPI  Physical Exam     Initial Vitals [10/06/24 1705]   BP Pulse Resp Temp SpO2   (!) 201/86 108 16 99.9 °F (37.7 °C) (!) 94 %      MAP       --         Physical Exam    Vitals reviewed.  Constitutional: She appears well-developed and well-nourished. She is not diaphoretic. No distress.   HENT:   Head: Normocephalic and atraumatic.   Neck: Neck supple.   Cardiovascular:  Regular rhythm and normal heart sounds.   Tachycardia present.   Exam reveals no gallop and no friction rub.       No murmur heard.  Pulmonary/Chest: Breath sounds normal. No respiratory distress. She has no wheezes. She has no rhonchi. She has no rales.   Abdominal: Abdomen is soft. She exhibits  distension. There is no abdominal tenderness. There is no rebound and no guarding.   Musculoskeletal:      Cervical back: Neck supple.      Right lower leg: Edema present.      Left lower leg: Edema present.      Right foot: Normal pulse.      Left foot: Normal pulse.      Comments: 2+ pitting edema in bilateral lower legs worse in ankles that extends to feet. No calf tenderness     Neurological: She is alert and oriented to person, place, and time.   Psychiatric: She has a normal mood and affect.                 ED Course   Procedures  Labs Reviewed   CBC W/ AUTO DIFFERENTIAL - Abnormal       Result Value    WBC 6.97      RBC 3.49 (*)     Hemoglobin 9.1 (*)     Hematocrit 30.0 (*)     MCV 86      MCH 26.1 (*)     MCHC 30.3 (*)     RDW 18.2 (*)     Platelets 151      MPV 11.0      Immature Granulocytes 0.3      Gran # (ANC) 5.9      Immature Grans (Abs) 0.02      Lymph # 0.6 (*)     Mono # 0.5      Eos # 0.0      Baso # 0.03      nRBC 0      Gran % 84.8 (*)     Lymph % 8.0 (*)     Mono % 6.5      Eosinophil % 0.0      Basophil % 0.4      Differential Method Automated      Narrative:     Add on Trop per Monicahector Bowensaux @ 18:32 pm to order # [id number]   COMPREHENSIVE METABOLIC PANEL - Abnormal    Sodium 135 (*)     Potassium 4.3      Chloride 92 (*)     CO2 25      Glucose 442 (*)     BUN 46 (*)     Creatinine 8.7 (*)     Calcium 10.2      Total Protein 6.6      Albumin 3.4 (*)     Total Bilirubin 0.6      Alkaline Phosphatase 54 (*)     AST 14      ALT 15      eGFR 4.8 (*)     Anion Gap 18 (*)     Narrative:     Add on Trop per Monica Chanell @ 18:32 pm to order # [id number]   B-TYPE NATRIURETIC PEPTIDE - Abnormal     (*)     Narrative:     Add on Trop per Monica Chanell @ 18:32 pm to order # [id number]   URINALYSIS, REFLEX TO URINE CULTURE - Abnormal    Specimen UA Urine, Clean Catch      Color, UA Yellow      Appearance, UA Hazy (*)     pH, UA 8.0      Specific Gravity, UA 1.010      Protein, UA  Trace (*)     Glucose, UA Trace (*)     Ketones, UA Negative      Bilirubin (UA) Negative      Occult Blood UA Trace (*)     Nitrite, UA Negative      Leukocytes, UA Trace (*)     Narrative:     Specimen Source->Urine   URINALYSIS MICROSCOPIC - Abnormal    RBC, UA 3      WBC, UA 7 (*)     Bacteria Many (*)     Squam Epithel, UA >100      Microscopic Comment SEE COMMENT      Narrative:     Specimen Source->Urine   POCT GLUCOSE - Abnormal    POCT Glucose 385 (*)    MAGNESIUM    Magnesium 2.4      Narrative:     Add on Trop per Monica Chanell @ 18:32 pm to order # [id number]   TROPONIN I   TROPONIN I    Troponin I 0.020      Narrative:     Add on Trop per Monica Chanell @ 18:32 pm to order # [id number]        ECG Results              EKG 12-lead (Final result)        Collection Time Result Time QRS Duration OHS QTC Calculation    10/06/24 18:24:32 10/07/24 07:42:41 90 482                     Final result by Interface, Lab [facility] (10/07/24 07:42:50)                   Narrative:    Test Reason : M79.89,    Vent. Rate : 101 BPM     Atrial Rate : 101 BPM     P-R Int : 134 ms          QRS Dur : 090 ms      QT Int : 372 ms       P-R-T Axes : 072 -50 073 degrees     QTc Int : 482 ms    Sinus tachycardia  Left anterior fascicular block  Low septal forces ; Abnormal R wave progression in the precordial leads -   Cannot rule out Anterior infarct ,age undetermined but doubt  Abnormal ECG  When compared with ECG of 12-SEP-2024 13:49,  Left anterior fascicular block is now Present  Confirmed by Michel ARMSTRONG MD ([id number]) on 10/7/2024 7:42:36 AM    Referred By: [email or web address]   SELF           Confirmed By:Michel ARMSTRONG MD                                  Imaging Results              X-Ray Chest AP Portable (Final result)  Result time 10/06/24 18:49:35      Final result by Marcos Pack MD (10/06/24 18:49:35)                   Impression:      Findings suggesting pulmonary edema/CHF pattern without consolidation.  Interstitial type  pneumonia not excluded.      Electronically signed by: Marcos Pack MD  Date:    10/06/2024  Time:    18:49               Narrative:    EXAMINATION:  XR CHEST AP PORTABLE    CLINICAL HISTORY:  leg swelling;    TECHNIQUE:  Single frontal view of the chest was performed.    COMPARISON:  Chest radiograph 09/12/2024 and CTA chest 08/13/2024    FINDINGS:  Monitoring leads and clothing artifacts overlie the chest.  Patient is slightly rotated.  Resolution is somewhat limited by body habitus with underpenetration.    Cardiomediastinal silhouette is midline and stable.  Hilar contours are within normal limits.  Lungs are well expanded.  Bilateral diffuse nonspecific interstitial coarsening with a perihilar and basilar predominance similar to 09/12/2024 chest radiograph but increased from chest CT 08/13/2024 likely reflecting pulmonary edema/CHF pattern, with interstitial pneumonia not excluded.  No consolidative process, pleural effusion or pneumothorax definitively seen.  No acute osseous process seen.  PA and lateral views can be obtained.                                       Medications   sodium chloride 0.9% flush 5 mL (has no administration in time range)   albuterol-ipratropium 2.5 mg-0.5 mg/3 mL nebulizer solution 3 mL (has no administration in time range)   melatonin tablet 6 mg (has no administration in time range)   ondansetron disintegrating tablet 8 mg (has no administration in time range)   polyethylene glycol packet 17 g (has no administration in time range)   bisacodyL suppository 10 mg (has no administration in time range)   acetaminophen tablet 650 mg (has no administration in time range)   acetaminophen tablet 1,000 mg (has no administration in time range)   naloxone 0.4 mg/mL injection 0.02 mg (has no administration in time range)   glucose chewable tablet 16 g (has no administration in time range)   glucose chewable tablet 24 g (has no administration in time range)   glucagon (human recombinant)  injection 1 mg (has no administration in time range)   heparin (porcine) injection 5,000 Units (5,000 Units Subcutaneous Given 10/7/24 0506)   insulin aspart U-100 pen 0-5 Units (has no administration in time range)   insulin glargine U-100 (Lantus) pen 10 Units (has no administration in time range)   dextrose 10% bolus 125 mL 125 mL (has no administration in time range)   dextrose 10% bolus 250 mL 250 mL (has no administration in time range)   amLODIPine tablet 10 mg (has no administration in time range)   aspirin EC tablet 81 mg (81 mg Oral Given 10/7/24 0506)   atorvastatin tablet 40 mg (40 mg Oral Given 10/7/24 0916)   atovaquone 750 mg/5 mL oral liquid 1,500 mg (1,500 mg Oral Given 10/7/24 0916)   folic acid tablet 1 mg (1 mg Oral Given 10/7/24 0916)   labetaloL tablet 200 mg (200 mg Oral Given 10/7/24 0916)   losartan tablet 100 mg (100 mg Oral Given 10/7/24 0916)   pantoprazole EC tablet 40 mg (40 mg Oral Given 10/7/24 0916)   predniSONE tablet 20 mg (20 mg Oral Given 10/7/24 0916)   pregabalin capsule 75 mg (75 mg Oral Given 10/7/24 0916)   sevelamer carbonate tablet 2,400 mg (2,400 mg Oral Given 10/7/24 0916)   diphenhydrAMINE capsule 25 mg (25 mg Oral Given 10/7/24 0044)   amLODIPine tablet 10 mg (10 mg Oral Given 10/6/24 2001)   labetaloL tablet 200 mg (200 mg Oral Given 10/6/24 2000)   sevelamer carbonate tablet 800 mg (800 mg Oral Given 10/6/24 2001)   labetaloL injection 10 mg (10 mg Intravenous Given 10/6/24 2036)   insulin regular injection 5 Units 0.05 mL (5 Units Intravenous Given 10/6/24 2039)     Medical Decision Making       APC / Resident Notes:   Emergent evaluation of 59-year-old female presenting with bilateral leg swelling x 1 month. Hypertensive and mildly tachycardic with other vitals stable. Clinically well appearing, in no acute distress. See physical exam findings above. Patient appears volume overload, will obtain labs including troponin and BNP plus CXR. I have low suspicion for PE  or DVT as swelling is bilateral with no calf pain/tenderness.     My differential diagnoses include but are not limited to: Volume overload, CHF exacerbation, prednisone, electrolyte abnormality  See ED course.  I have reviewed the patient's records and discussed with my supervising physician.        ED Course as of 10/07/24 0943   Sun Oct 06, 2024   1827 EKG with sinus tachycardia, rate 101, no STEMI [NN]   1829 Hx of ESRD on HD (last run yesterday), hx of CHF. Here for BLE edema, no cp or sob. No fever. Discharged on 9/14 for LE edema, cp and sob due to volume overload.  [NN]   1833 Lower extremity is symmetric, no unilateral leg swelling or calf tenderness.   [NN]   1858 X-Ray Chest AP Portable  Impression:     Findings suggesting pulmonary edema/CHF pattern without consolidation.  Interstitial type pneumonia not excluded. [KB]   1917 Hemoglobin(!): 9.1  Stable [KB]   1931 Impression:     Findings suggesting pulmonary edema/CHF pattern without consolidation.  Interstitial type pneumonia not excluded.   [NN]   2102 Admitting to hospital medicine for further workup and treatment. [KB]      ED Course User Index  [KB] Monica Lua PA-C  [NN] Ivonne Brady MD                           Clinical Impression:  Final diagnoses:  [M79.89] Leg swelling  [I50.9] Acute on chronic congestive heart failure, unspecified heart failure type  [M25.471, M25.474, M25.475, M25.472] Bilateral swelling of feet and ankles (Primary)          ED Disposition Condition    Observation Stable                Monica Lua PA-C  10/07/24 0943

## 2024-10-06 NOTE — ED TRIAGE NOTES
Jael VAZQUEZ Rafael, a 59 y.o. female presents to the ED w/ complaint of bilateral leg swelling. Pt arrives to ED via personal vehicle with complaints of bilateral leg swelling that has worsen over 1 week. Pt reports going to dialysis yesterday and having 4L removed but leg swelling not improved. Pt endorses pain when walking.     Triage note:  Chief Complaint   Patient presents with    Leg Swelling     Bilateral leg swelling on prednisone and says that's why its swelling     Review of patient's allergies indicates:  No Known Allergies  Past Medical History:   Diagnosis Date    Acute hyperkalemia 09/09/2020    Acute on chronic diastolic heart failure 10/08/2021    Anemia of chronic disorder 11/08/2017    Overview:  Added automatically from request for surgery [id number]    Anemia of chronic renal failure, stage 5     Anxiety     Cyst of left ovary 05/14/2019    Cyst of left ovary 07/05/2019    Dyslipidemia 01/04/2013    Encounter for screening colonoscopy 10/08/2020    End-stage renal disease on hemodialysis 08/20/2020    ESRD on hemodialysis 09/28/2017    Hematuria 10/30/2019    History of COVID-19 04/07/2020    Hospital discharge follow-up 10/06/2023    Hx of sinus bradycardia 09/28/2017    Hyperkalemia 03/29/2017    Hypertensive urgency 07/22/2021    Peripheral neuropathy 01/04/2013    Preoperative testing 10/08/2020    Renovascular hypertension 08/17/2020    S/P dilation and curettage 05/22/2018    Screening for colon cancer 11/28/2023    Thickened endometrium 05/22/2018    Thyroid nodule     Thyroid nodule 12/13/2021    Uncontrolled type 2 diabetes mellitus 04/05/2013    Overview:  poc a1c today 11.2-270/ up from 10.8-263, Pt. has very very stressed/ was incarcerated/marital stress/ will current meds/ less stress now/ will monitor.

## 2024-10-07 ENCOUNTER — EPISODE CHANGES (OUTPATIENT)
Dept: CARDIOLOGY | Facility: CLINIC | Age: 59
End: 2024-10-07

## 2024-10-07 PROBLEM — N25.81 SECONDARY HYPERPARATHYROIDISM: Status: ACTIVE | Noted: 2024-10-07

## 2024-10-07 LAB
ALBUMIN SERPL BCP-MCNC: 3.2 G/DL (ref 3.5–5.2)
ALP SERPL-CCNC: 46 U/L (ref 55–135)
ALT SERPL W/O P-5'-P-CCNC: 12 U/L (ref 10–44)
ANION GAP SERPL CALC-SCNC: 15 MMOL/L (ref 8–16)
AST SERPL-CCNC: 10 U/L (ref 10–40)
BASOPHILS # BLD AUTO: 0.05 K/UL (ref 0–0.2)
BASOPHILS NFR BLD: 0.7 % (ref 0–1.9)
BILIRUB SERPL-MCNC: 0.6 MG/DL (ref 0.1–1)
BUN SERPL-MCNC: 51 MG/DL (ref 8–23)
CALCIUM SERPL-MCNC: 10 MG/DL (ref 8.7–10.5)
CHLORIDE SERPL-SCNC: 96 MMOL/L (ref 95–110)
CO2 SERPL-SCNC: 26 MMOL/L (ref 23–29)
CREAT SERPL-MCNC: 9.4 MG/DL (ref 0.5–1.4)
D DIMER PPP IA.FEU-MCNC: 0.61 MG/L FEU
DIFFERENTIAL METHOD BLD: ABNORMAL
EOSINOPHIL # BLD AUTO: 0 K/UL (ref 0–0.5)
EOSINOPHIL NFR BLD: 0.3 % (ref 0–8)
ERYTHROCYTE [DISTWIDTH] IN BLOOD BY AUTOMATED COUNT: 18 % (ref 11.5–14.5)
EST. GFR  (NO RACE VARIABLE): 4.4 ML/MIN/1.73 M^2
FERRITIN SERPL-MCNC: 2828 NG/ML (ref 20–300)
GLUCOSE SERPL-MCNC: 173 MG/DL (ref 70–110)
HCT VFR BLD AUTO: 28.5 % (ref 37–48.5)
HGB BLD-MCNC: 8.8 G/DL (ref 12–16)
IMM GRANULOCYTES # BLD AUTO: 0.04 K/UL (ref 0–0.04)
IMM GRANULOCYTES NFR BLD AUTO: 0.5 % (ref 0–0.5)
IRON SERPL-MCNC: 54 UG/DL (ref 30–160)
LYMPHOCYTES # BLD AUTO: 1.5 K/UL (ref 1–4.8)
LYMPHOCYTES NFR BLD: 19.9 % (ref 18–48)
MAGNESIUM SERPL-MCNC: 2.5 MG/DL (ref 1.6–2.6)
MCH RBC QN AUTO: 26.4 PG (ref 27–31)
MCHC RBC AUTO-ENTMCNC: 30.9 G/DL (ref 32–36)
MCV RBC AUTO: 86 FL (ref 82–98)
MONOCYTES # BLD AUTO: 0.9 K/UL (ref 0.3–1)
MONOCYTES NFR BLD: 12.1 % (ref 4–15)
NEUTROPHILS # BLD AUTO: 5 K/UL (ref 1.8–7.7)
NEUTROPHILS NFR BLD: 66.5 % (ref 38–73)
NRBC BLD-RTO: 0 /100 WBC
OHS QRS DURATION: 90 MS
OHS QTC CALCULATION: 482 MS
PHOSPHATE SERPL-MCNC: 4.3 MG/DL (ref 2.7–4.5)
PLATELET # BLD AUTO: 161 K/UL (ref 150–450)
PMV BLD AUTO: 11.6 FL (ref 9.2–12.9)
POCT GLUCOSE: 144 MG/DL (ref 70–110)
POCT GLUCOSE: 151 MG/DL (ref 70–110)
POCT GLUCOSE: 358 MG/DL (ref 70–110)
POCT GLUCOSE: 389 MG/DL (ref 70–110)
POTASSIUM SERPL-SCNC: 3.8 MMOL/L (ref 3.5–5.1)
PROT SERPL-MCNC: 6.1 G/DL (ref 6–8.4)
PTH-INTACT SERPL-MCNC: 545.3 PG/ML (ref 9–77)
RBC # BLD AUTO: 3.33 M/UL (ref 4–5.4)
SATURATED IRON: 19 % (ref 20–50)
SODIUM SERPL-SCNC: 137 MMOL/L (ref 136–145)
TOTAL IRON BINDING CAPACITY: 289 UG/DL (ref 250–450)
TRANSFERRIN SERPL-MCNC: 195 MG/DL (ref 200–375)
WBC # BLD AUTO: 7.55 K/UL (ref 3.9–12.7)

## 2024-10-07 PROCEDURE — G0378 HOSPITAL OBSERVATION PER HR: HCPCS | Mod: HCNC

## 2024-10-07 PROCEDURE — 84100 ASSAY OF PHOSPHORUS: CPT | Mod: HCNC

## 2024-10-07 PROCEDURE — 80053 COMPREHEN METABOLIC PANEL: CPT | Mod: HCNC

## 2024-10-07 PROCEDURE — 85025 COMPLETE CBC W/AUTO DIFF WBC: CPT | Mod: HCNC

## 2024-10-07 PROCEDURE — 36415 COLL VENOUS BLD VENIPUNCTURE: CPT | Mod: HCNC

## 2024-10-07 PROCEDURE — 25000003 PHARM REV CODE 250: Mod: HCNC

## 2024-10-07 PROCEDURE — 63600175 PHARM REV CODE 636 W HCPCS: Mod: HCNC

## 2024-10-07 PROCEDURE — 99214 OFFICE O/P EST MOD 30 MIN: CPT | Mod: HCNC,,, | Performed by: HOSPITALIST

## 2024-10-07 PROCEDURE — 83970 ASSAY OF PARATHORMONE: CPT | Mod: HCNC | Performed by: STUDENT IN AN ORGANIZED HEALTH CARE EDUCATION/TRAINING PROGRAM

## 2024-10-07 PROCEDURE — 36415 COLL VENOUS BLD VENIPUNCTURE: CPT | Mod: HCNC | Performed by: STUDENT IN AN ORGANIZED HEALTH CARE EDUCATION/TRAINING PROGRAM

## 2024-10-07 PROCEDURE — 82728 ASSAY OF FERRITIN: CPT | Mod: HCNC | Performed by: STUDENT IN AN ORGANIZED HEALTH CARE EDUCATION/TRAINING PROGRAM

## 2024-10-07 PROCEDURE — 96372 THER/PROPH/DIAG INJ SC/IM: CPT

## 2024-10-07 PROCEDURE — 83735 ASSAY OF MAGNESIUM: CPT | Mod: HCNC

## 2024-10-07 PROCEDURE — 85379 FIBRIN DEGRADATION QUANT: CPT | Mod: HCNC

## 2024-10-07 PROCEDURE — 83540 ASSAY OF IRON: CPT | Mod: HCNC | Performed by: STUDENT IN AN ORGANIZED HEALTH CARE EDUCATION/TRAINING PROGRAM

## 2024-10-07 RX ORDER — MUPIROCIN 20 MG/G
OINTMENT TOPICAL 2 TIMES DAILY
Status: CANCELLED | OUTPATIENT
Start: 2024-10-07 | End: 2024-10-12

## 2024-10-07 RX ORDER — DIPHENHYDRAMINE HCL 25 MG
25 CAPSULE ORAL EVERY 6 HOURS PRN
Status: DISCONTINUED | OUTPATIENT
Start: 2024-10-07 | End: 2024-10-08 | Stop reason: HOSPADM

## 2024-10-07 RX ORDER — SODIUM CHLORIDE 9 MG/ML
INJECTION, SOLUTION INTRAVENOUS ONCE
Status: CANCELLED | OUTPATIENT
Start: 2024-10-07 | End: 2024-10-07

## 2024-10-07 RX ORDER — SODIUM CHLORIDE 9 MG/ML
INJECTION, SOLUTION INTRAVENOUS
Status: CANCELLED | OUTPATIENT
Start: 2024-10-07

## 2024-10-07 RX ADMIN — LOSARTAN POTASSIUM 100 MG: 50 TABLET, FILM COATED ORAL at 09:10

## 2024-10-07 RX ADMIN — SEVELAMER CARBONATE 2400 MG: 800 TABLET, FILM COATED ORAL at 04:10

## 2024-10-07 RX ADMIN — HEPARIN SODIUM 5000 UNITS: 5000 INJECTION INTRAVENOUS; SUBCUTANEOUS at 10:10

## 2024-10-07 RX ADMIN — INSULIN ASPART 5 UNITS: 100 INJECTION, SOLUTION INTRAVENOUS; SUBCUTANEOUS at 05:10

## 2024-10-07 RX ADMIN — HEPARIN SODIUM 5000 UNITS: 5000 INJECTION INTRAVENOUS; SUBCUTANEOUS at 02:10

## 2024-10-07 RX ADMIN — SEVELAMER CARBONATE 2400 MG: 800 TABLET, FILM COATED ORAL at 09:10

## 2024-10-07 RX ADMIN — LABETALOL HYDROCHLORIDE 200 MG: 200 TABLET, FILM COATED ORAL at 10:10

## 2024-10-07 RX ADMIN — ATORVASTATIN CALCIUM 40 MG: 40 TABLET, FILM COATED ORAL at 09:10

## 2024-10-07 RX ADMIN — FOLIC ACID 1 MG: 1 TABLET ORAL at 09:10

## 2024-10-07 RX ADMIN — LABETALOL HYDROCHLORIDE 200 MG: 200 TABLET, FILM COATED ORAL at 09:10

## 2024-10-07 RX ADMIN — ASPIRIN 81 MG: 81 TABLET, COATED ORAL at 05:10

## 2024-10-07 RX ADMIN — DIPHENHYDRAMINE HYDROCHLORIDE 25 MG: 25 CAPSULE ORAL at 12:10

## 2024-10-07 RX ADMIN — INSULIN ASPART 3 UNITS: 100 INJECTION, SOLUTION INTRAVENOUS; SUBCUTANEOUS at 10:10

## 2024-10-07 RX ADMIN — PREGABALIN 75 MG: 75 CAPSULE ORAL at 09:10

## 2024-10-07 RX ADMIN — ATOVAQUONE 1500 MG: 750 SUSPENSION ORAL at 09:10

## 2024-10-07 RX ADMIN — HEPARIN SODIUM 5000 UNITS: 5000 INJECTION INTRAVENOUS; SUBCUTANEOUS at 05:10

## 2024-10-07 RX ADMIN — AMLODIPINE BESYLATE 10 MG: 10 TABLET ORAL at 10:10

## 2024-10-07 RX ADMIN — SEVELAMER CARBONATE 2400 MG: 800 TABLET, FILM COATED ORAL at 11:10

## 2024-10-07 RX ADMIN — INSULIN GLARGINE 10 UNITS: 100 INJECTION, SOLUTION SUBCUTANEOUS at 09:10

## 2024-10-07 RX ADMIN — PREDNISONE 20 MG: 20 TABLET ORAL at 09:10

## 2024-10-07 RX ADMIN — PANTOPRAZOLE SODIUM 40 MG: 40 TABLET, DELAYED RELEASE ORAL at 09:10

## 2024-10-07 NOTE — ED NOTES
Telemetry Verification   Patient placed on Telemetry Box  Verified with War Room  Box # [id number]   Monitor Tech WARROOM   Rate 83   Rhythm NSR

## 2024-10-07 NOTE — H&P
[facility] - [facility] Dept  [facility] Medicine  History & Physical    Patient Name: Jael Hall  MRN: [id number]  Patient Class: OP- Observation  Admission Date: 10/6/2024  Attending Physician: Luis Antonio Coelho MD   Primary Care Provider: Maty Gayle DO         Patient information was obtained from patient, past medical records, and ER records.     Subjective:     Principal Problem:Bilateral swelling of feet and ankles    Chief Complaint:   Chief Complaint   Patient presents with    Leg Swelling     Bilateral leg swelling on prednisone and says that's why its swelling        HPI: Jael Hall is a 59 y.o. female with past medical history of ESRD on HD (TTHSat), HTN, T2DM with Insulin use, WAIHA, and GERD who presents to [facility] ED with bilateral leg swelling. She reports leg swelling for one month ever since starting on prednisone. She has also noticed swelling in her cheeks and abdomen. She denies leg pain or difficulty ambulating. She reports she has not noticed improvement after HD.  Had dialysis yesterday 4L off. She denies shortness of breath, cough, chest pain, palpitations, nausea, vomiting, abdominal pain. She is currently on a steroid taper through October.     In the ED: tachycardic and hypertensive. Afebrile and stable on room air. Labs notable for , Hbg 9, Bun  46 Cr 8.7. Glucose 442, AG 18, bicarb 22.  CXR with findings suggesting pulmonary edema/CHF pattern without consolidation. Admitted to .    Past Medical History:   Diagnosis Date    Acute hyperkalemia 09/09/2020    Acute on chronic diastolic heart failure 10/08/2021    Anemia of chronic disorder 11/08/2017    Overview:  Added automatically from request for surgery [id number]    Anemia of chronic renal failure, stage 5     Anxiety     Cyst of left ovary 05/14/2019    Cyst of left ovary 07/05/2019    Dyslipidemia 01/04/2013    Encounter for screening colonoscopy 10/08/2020    End-stage renal disease on hemodialysis 08/20/2020    ESRD on  hemodialysis 2017    Hematuria 10/30/2019    History of COVID-19 2020    Hospital discharge follow-up 10/06/2023    Hx of sinus bradycardia 2017    Hyperkalemia 2017    Hypertensive urgency 2021    Peripheral neuropathy 2013    Preoperative testing 10/08/2020    Renovascular hypertension 2020    S/P dilation and curettage 2018    Screening for colon cancer 2023    Thickened endometrium 2018    Thyroid nodule     Thyroid nodule 2021    Uncontrolled type 2 diabetes mellitus 2013    Overview:  poc a1c today 11.2-270/ up from 10.8-263, Pt. has very very stressed/ was incarcerated/marital stress/ will current meds/ less stress now/ will monitor.       Past Surgical History:   Procedure Laterality Date    AV FISTULA PLACEMENT      right arm     SECTION      x1    COLONOSCOPY N/A 2018    Procedure: COLONOSCOPY;  Surgeon: Pankaj Hinojosa MD;  Location: [facility] (4TH FLR);  Service: Endoscopy;  Laterality: N/A;  dialysis pt/labs prior to colon/MS    COLONOSCOPY N/A 2023    Procedure: COLONOSCOPY;  Surgeon: Angel Turner MD;  Location: [facility] (2ND FLR);  Service: Endoscopy;  Laterality: N/A;  Ref By:  Maty Gayle MD  Procedure: Colonoscopy  Diagnosis: endounter for preventive health   Procedure Timing: pt. pref  Provider: Dr. turner   Location: [facility] 4-Endo   Prep Specifications: Standard prep   Additional Info: Dialysis patient lab pending    CYSTOSCOPY W/ RETROGRADES Bilateral 2020    Procedure: CYSTOSCOPY, WITH RETROGRADE PYELOGRAM;  Surgeon: Douglas Abraham MD;  Location: [facility] OR 1ST FLR;  Service: Urology;  Laterality: Bilateral;  45 min    FISTULOGRAM Right 2020    Procedure: Fistulogram;  Surgeon: Lester Gómez MD;  Location: [facility] CATH LAB;  Service: Nephrology;  Laterality: Right;    HYSTERECTOMY      IMPLANTATION OF COCHLEAR PROSTHESIS Right 2021    Procedure: INSERTION, PROSTHESIS, COCHLEAR;  Surgeon:  Ramiro Zuleta MD;  Location: [facility] OR 2ND FLR;  Service: ENT;  Laterality: Right;  COCHLEAR BABAK    IMPLANTATION OF COCHLEAR PROSTHESIS Right 6/30/2021    Procedure: INSERTION, PROSTHESIS, COCHLEAR;  Surgeon: Ramiro Zuleta MD;  Location: [facility] OR 2ND FLR;  Service: ENT;  Laterality: Right;  COCHLEAR AMERICAS    ROBOT-ASSISTED LAPAROSCOPIC ABDOMINAL HYSTERECTOMY USING DA NATALYA XI N/A 7/5/2019    Procedure: XI ROBOTIC HYSTERECTOMY;  Surgeon: Trice Lei MD;  Location: [facility] OR;  Service: OB/GYN;  Laterality: N/A;    ROBOT-ASSISTED LAPAROSCOPIC NEPHRECTOMY Right 10/8/2020    Procedure: ROBOTIC NEPHRECTOMY;  Surgeon: Duoglas Abraham MD;  Location: [facility] OR 2ND FLR;  Service: Urology;  Laterality: Right;  3.5hrs gen with regional     ROBOT-ASSISTED LAPAROSCOPIC SALPINGO-OOPHORECTOMY USING DA NATALYA XI Bilateral 7/5/2019    Procedure: XI ROBOTIC SALPINGO-OOPHORECTOMY;  Surgeon: Trice Lei MD;  Location: [facility];  Service: OB/GYN;  Laterality: Bilateral;    TUBAL LIGATION      Vascular access surgeries      x5       Review of patient's allergies indicates:  No Known Allergies    No current facility-administered medications on file prior to encounter.     Current Outpatient Medications on File Prior to Encounter   Medication Sig    ACCU-CHEK GUIDE GLUCOSE METER Misc     ACCU-CHEK GUIDE L1-L2 CTRL SOL Soln     ACCU-CHEK GUIDE TEST STRIPS Strp     ACCU-CHEK SOFTCLIX LANCETS Misc     acetaminophen (TYLENOL) 500 MG tablet Take 1 tablet (500 mg total) by mouth every 8 (eight) hours as needed for Pain.    amLODIPine (NORVASC) 10 MG tablet Take 1 tablet (10 mg total) by mouth nightly.    aspirin (ECOTRIN) 81 MG EC tablet Take 81 mg by mouth every morning.    atorvastatin (LIPITOR) 40 MG tablet Take 1 tablet (40 mg total) by mouth once daily. (Patient taking differently: Take 40 mg by mouth every morning.)    atovaquone (MEPRON) 750 mg/5 mL Susp oral liquid Take 10 mLs (1,500 mg total) by mouth once daily.    blood sugar  "diagnostic Strp To check BG 3 times daily, to use with insurance preferred meter    blood-glucose meter kit To check BG 3 times daily, to use with insurance preferred meter    DROPSAFE ALCOHOL PREP PADS PadM Apply topically.    epoetin shiela (PROCRIT) 10,000 unit/mL injection Inject 0.85 mLs (8,500 Units total) into the skin every Mon, Wed, Fri.    folic acid (FOLVITE) 1 MG tablet Take 1 tablet (1 mg total) by mouth once daily.    insulin aspart U-100 (NOVOLOG) 100 unit/mL (3 mL) InPn pen Inject 0-5 Units into the skin before meals and at bedtime as needed (Hyperglycemia).    insulin glargine U-100, Lantus, 100 unit/mL (3 mL) SubQ InPn pen Inject 12 Units into the skin once daily.    labetaloL (NORMODYNE) 200 MG tablet Take 1 tablet (200 mg total) by mouth every 12 (twelve) hours.    lancets Misc To check BG 3 times daily, to use with insurance preferred meter    LIDOcaine (LIDODERM) 5 % Place 1 patch onto the skin once daily. Remove & Discard patch within 12 hours or as directed by MD    losartan (COZAAR) 100 MG tablet Take 1 tablet (100 mg total) by mouth once daily.    omeprazole (PRILOSEC) 20 MG capsule Take 1 capsule (20 mg total) by mouth once daily.    pen needle, diabetic 31 gauge x 3/16" Ndle Use to inject insulin into the skin.    predniSONE (DELTASONE) 20 MG tablet Take 2 tablets (40 mg total) by mouth once daily for 6 days, THEN 1.5 tablets (30 mg total) once daily for 7 days, THEN 1 tablet (20 mg total) once daily. Follow up with Heme Onc for further tapering of prednisone.    pregabalin (LYRICA) 75 MG capsule Take 1 capsule (75 mg total) by mouth once daily.    sevelamer carbonate (RENVELA) 800 mg Tab Take 3 tablets (2,400 mg total) by mouth 3 (three) times daily with meals.     Family History       Problem Relation (Age of Onset)    Cancer Mother, Sister, Sister    Diabetes Sister, Brother    Lung cancer Sister    Macular degeneration Sister    Ovarian cancer Mother, Sister, Sister          Tobacco Use "    Smoking status: Never    Smokeless tobacco: Never   Substance and Sexual Activity    Alcohol use: No    Drug use: No    Sexual activity: Yes     Partners: Male     Birth control/protection: Post-menopausal     Review of Systems   Constitutional:  Negative for chills and fever.   HENT:  Negative for trouble swallowing.    Eyes:  Negative for photophobia and visual disturbance.   Respiratory:  Negative for cough and shortness of breath.    Cardiovascular:  Positive for leg swelling. Negative for chest pain and palpitations.   Gastrointestinal:  Positive for abdominal distention. Negative for abdominal pain, constipation, diarrhea, nausea and vomiting.   Genitourinary:  Negative for dysuria and frequency.   Musculoskeletal:  Negative for arthralgias and back pain.   Skin:  Negative for rash and wound.   Neurological:  Negative for light-headedness and headaches.   Psychiatric/Behavioral:  Negative for agitation and confusion.      Objective:     Vital Signs (Most Recent):  Temp: 98.7 °F (37.1 °C) (10/06/24 1739)  Pulse: 91 (10/06/24 2135)  Resp: 20 (10/06/24 2135)  BP: (!) 173/76 (10/06/24 2152)  SpO2: 100 % (10/06/24 2135) Vital Signs (24h Range):  Temp:  [98.7 °F (37.1 °C)-99.9 °F (37.7 °C)] 98.7 °F (37.1 °C)  Pulse:  [] 91  Resp:  [16-20] 20  SpO2:  [94 %-100 %] 100 %  BP: (173-214)/(76-88) 173/76     Weight: 88 kg (194 lb)  Body mass index is 33.3 kg/m².     Physical Exam  Vitals and nursing note reviewed.   Constitutional:       General: She is not in acute distress.  HENT:      Head: Normocephalic and atraumatic.      Nose: Nose normal.      Mouth/Throat:      Pharynx: No oropharyngeal exudate.   Eyes:      Extraocular Movements: Extraocular movements intact.      Conjunctiva/sclera: Conjunctivae normal.   Cardiovascular:      Rate and Rhythm: Normal rate and regular rhythm.      Heart sounds: Normal heart sounds.   Pulmonary:      Effort: Pulmonary effort is normal. No respiratory distress.   Abdominal:       General: Bowel sounds are normal. There is no distension.      Palpations: Abdomen is soft.      Tenderness: There is no abdominal tenderness.   Musculoskeletal:         General: No tenderness.      Cervical back: Normal range of motion.      Right lower le+ Edema present.      Left lower le+ Edema present.      Right ankle: Swelling present.      Left ankle: Swelling present.      Right foot: Swelling present.      Left foot: Swelling present.   Skin:     General: Skin is warm and dry.   Neurological:      General: No focal deficit present.      Mental Status: She is alert and oriented to person, place, and time.   Psychiatric:         Mood and Affect: Mood normal.         Behavior: Behavior normal.                Significant Labs: All pertinent labs within the past 24 hours have been reviewed.  CBC:   Recent Labs   Lab 10/06/24  1846   WBC 6.97   HGB 9.1*   HCT 30.0*        CMP:   Recent Labs   Lab 10/06/24  1846   *   K 4.3   CL 92*   CO2 25   *   BUN 46*   CREATININE 8.7*   CALCIUM 10.2   PROT 6.6   ALBUMIN 3.4*   BILITOT 0.6   ALKPHOS 54*   AST 14   ALT 15   ANIONGAP 18*     Cardiac Markers:   Recent Labs   Lab 10/06/24  1846   *     Magnesium:   Recent Labs   Lab 10/06/24  1846   MG 2.4     POCT Glucose:   Recent Labs   Lab 10/06/24  2031   POCTGLUCOSE 385*     Troponin:   Recent Labs   Lab 10/06/24  1846   TROPONINI 0.020     Urine Studies:   Recent Labs   Lab 10/06/24  2128   COLORU Yellow   APPEARANCEUA Hazy*   PHUR 8.0   SPECGRAV 1.010   PROTEINUA Trace*   GLUCUA Trace*   KETONESU Negative   BILIRUBINUA Negative   OCCULTUA Trace*   NITRITE Negative   LEUKOCYTESUR Trace*       Significant Imaging: I have reviewed all pertinent imaging results/findings within the past 24 hours.  X-Ray Chest AP Portable  Narrative: EXAMINATION:  XR CHEST AP PORTABLE    CLINICAL HISTORY:  leg swelling;    TECHNIQUE:  Single frontal view of the chest was performed.    COMPARISON:  Chest  radiograph 09/12/2024 and CTA chest 08/13/2024    FINDINGS:  Monitoring leads and clothing artifacts overlie the chest.  Patient is slightly rotated.  Resolution is somewhat limited by body habitus with underpenetration.    Cardiomediastinal silhouette is midline and stable.  Hilar contours are within normal limits.  Lungs are well expanded.  Bilateral diffuse nonspecific interstitial coarsening with a perihilar and basilar predominance similar to 09/12/2024 chest radiograph but increased from chest CT 08/13/2024 likely reflecting pulmonary edema/CHF pattern, with interstitial pneumonia not excluded.  No consolidative process, pleural effusion or pneumothorax definitively seen.  No acute osseous process seen.  PA and lateral views can be obtained.  Impression: Findings suggesting pulmonary edema/CHF pattern without consolidation.  Interstitial type pneumonia not excluded.    Electronically signed by: Marcos Pack MD  Date:    10/06/2024  Time:    18:49     Assessment/Plan:     * Bilateral swelling of feet and ankles  - Reports swelling since starting on prednisone in August (tapering dose, currently on 20 mg prednisone daily).   - She is still able to ambulate, does not have any significant pain  - Denies SOB, chest pain, palpitations. On room air  - Nephrology consulted for HD to see if volume removal helps to alleviate symptoms at all  - DVT US 8/12/24 with no evidence of deep venous thrombosis in either lower extremity  - Continue to monitor    AIHA (autoimmune hemolytic anemia)  Anemia is likely due to chronic disease due to ESRD and AIHA . Most recent hemoglobin and hematocrit are listed below.  Recent Labs     10/06/24  1846   HGB 9.1*   HCT 30.0*     Plan  - Monitor serial CBC: Daily  - Transfuse PRBC if patient becomes hemodynamically unstable, symptomatic or H/H drops below 7/21.  - Patient has not received any PRBC transfusions to date  - Patient's anemia is currently stable  - Continue Home Prednisone and  Folic acid   - Outpatient heme/onc follow up  - On atovaquone for PJP prophylaxis     Type 2 diabetes mellitus  Patient's FSGs are uncontrolled due to hyperglycemia on current medication regimen.  Last A1c reviewed-   Lab Results   Component Value Date    HGBA1C 7.6 (H) 09/01/2024     Most recent fingerstick glucose reviewed-   Recent Labs   Lab 10/06/24  2031   POCTGLUCOSE 385*   Current correctional scale  Low  Maintain anti-hyperglycemic dose as follows-   Antihyperglycemics (From admission, onward)      Start     Stop Route Frequency Ordered    10/07/24 0900  insulin glargine U-100 (Lantus) pen 10 Units         -- SubQ Daily 10/06/24 2212    10/06/24 2308  insulin aspart U-100 pen 0-5 Units         -- SubQ Before meals & nightly PRN 10/06/24 2212          Hold Oral hypoglycemics while patient is in the hospital.    GERD (gastroesophageal reflux disease)  - Chronic, controlled  - Continue PPI     Essential hypertension  Patients blood pressure range in the last 24 hours was: BP  Min: 164/76  Max: 214/87.The patient's inpatient anti-hypertensive regimen is listed below:  Current Antihypertensives  amLODIPine tablet 10 mg, Nightly, Oral  labetaloL tablet 200 mg, Every 12 hours, Oral  losartan tablet 100 mg, Daily, Oral  Plan  - BP is controlled, no changes needed to their regimen    ESRD (end stage renal disease) on dialysis  - On HD T TH Sat  - Nephrology consulted for HD  - Daily bmp, phos  - Continue home renvela       VTE Risk Mitigation (From admission, onward)           Ordered     heparin (porcine) injection 5,000 Units  Every 8 hours         10/06/24 2212     IP VTE HIGH RISK PATIENT  Once         10/06/24 2212     Place sequential compression device  Until discontinued         10/06/24 2212                         On 10/06/2024, patient should be placed in hospital observation services under my care in collaboration with Marcos Robertson MD.           Magaly Villa PA-C  Department of [facility]  Medicine  [facility] - Emergency Dept

## 2024-10-07 NOTE — ASSESSMENT & PLAN NOTE
- On HD T TH Sat  - Nephrology consulted for HD   - Plan for iHD today if able  - Daily bmp, phos  - Continue home renvela

## 2024-10-07 NOTE — ASSESSMENT & PLAN NOTE
History Of Present Illness


55 y/o male presents to ED with complaints of nasal congestion and chronic 

headache that began few weeks ago. Patient has a Hx of HIV and adrenalitis. 

Patient had normal labs March 14th and normal white count. VL undetectable. CD4 

count 293. Patient was also seen by a physician on March 24th and was 

prescribed nasal steroids and sudafed for relief. He states he completed the 

mediations even though it is a chronic use medication. 


Time Seen by Provider: 03/30/18 19:09


Chief Complaint (Nursing): Headache


History Per: Patient


History/Exam Limitations: no limitations


Onset/Duration Of Symptoms: Days


Current Symptoms Are (Timing): Still Present


Preceeding Symptoms: None


Associated Symptoms: denies: Photophobia, Blurred Vision, Nausea, Vomiting, 

Extremity Weakness


Recent travel outside of the [place]: No





Past Medical History


Reviewed: Historical Data, Nursing Documentation, Vital Signs


Vital Signs: 


 Last Vital Signs











Temp  97.9 F   03/30/18 18:36


 


Pulse  70   03/30/18 18:36


 


Resp  16   03/30/18 18:36


 


BP  142/98 H  03/30/18 18:36


 


Pulse Ox  96   03/30/18 20:43














- Medical History


PMH: Gastritis (EGD 4/16 showed Candida, GERD and Gastritis), Gall Bladder 

Disease, HIV


Surgical History: Appendectomy, Cholecystectomy, Endoscopy





- [facility] Procedures








COLONOSCOPY (12/03/14)


DRAINAGE OF RIGHT LOWER LOBE BRONCHUS, ENDO, DIAGN (08/03/16)


RESECTION OF APPENDIX, OPEN APPROACH (05/23/17)








Family History: States: Unknown Family Hx





- Social History


Hx Tobacco Use: No


Hx Alcohol Use: No


Hx Substance Use: No





- Immunization History


Hx Tetanus Toxoid Vaccination: Yes


Hx Influenza Vaccination: Yes


Hx Pneumococcal Vaccination: Yes





Review Of Systems


Constitutional: Negative for: Fever, Chills


ENT: Positive for: Nose Congestion


Respiratory: Negative for: Cough, Shortness of Breath


Gastrointestinal: Negative for: Nausea, Vomiting, Abdominal Pain, Diarrhea


Neurological: Positive for: Headache.  Negative for: Weakness, Numbness





Physical Exam





- Physical Exam


Appears: Non-toxic, No Acute Distress


Skin: Normal Color, Warm, Dry


Head: Atraumatic, Normacephalic


Eye(s): bilateral: Normal Inspection


Ear(s): Bilateral: Normal


Nose: Other (Nasal passages of congestion and erythema without mucus discharge )


Oral Mucosa: Moist


Throat: Normal, No Erythema


Neck: Supple


Chest: Symmetrical, No Tenderness


Cardiovascular: Rhythm Regular


Respiratory: Normal Breath Sounds, No Decreased Breath Sounds, No Rales, No 

Rhonchi, No Wheezing


Gastrointestinal/Abdominal: Soft, No Tenderness


Neurological/Psych: Oriented x3, Normal Speech, Normal Cognition





ED Course And Treatment





- Laboratory Results


Result Diagrams: 


 03/30/18 20:14





 03/30/18 20:14


Lab Interpretation: Normal (cryptococcal Ag, neg)


O2 Sat by Pulse Oximetry: 96 (RA)


Pulse Ox Interpretation: Normal





- CT Scan/US


  ** CT Sinuses


Other Rad Studies (CT/US): Read By Radiologist, Radiology Report Reviewed


CT/US Interpretation: IMPRESSION:  1. Left nasal septal deviation and spur.  2. 

Minimal sinus disease. No acute sinusitis.


Progress Note: motrin 600 mg po


Reevaluation Time: 22:19


Reassessment Condition: Improved (pt slept a few hours comfortably after motrin 

PO)





- Physician Consult Information


Outcome Of Conversation: 1915, 2215, d/w Dr. OZ Hurst, neg w/u, ok for Flonase 

BID 1 spray b/l.  normal w/u and undetectable VL with normal CD4 count 2 weeks 

ago- defer repeat tonight.  CT sinus, no fungal infections, cryptococcal Ag 

neg.  pt with poor insight, anxiety, poor med compliance, consider psych 

etiolgies for minimal nasal erythema for which pt has been non-compliant with 

tx for this benign tx for >6 weeks and multiple ED, PMD, ID visits.





Medical Decision Making


Medical Decision Making: 


Ordered blood work and blood culture. 





- 7:24PM: 


Discussed with Dr.Norooz Hurst and he suggested Cryptococus Antigen, CT Sinuses, 

and no further treatment at this time.











Disposition


Doctor Will See Patient In The: Office


Counseled Patient/Family Regarding: Studies Performed, Diagnosis





- Disposition


Disposition: HOME/ ROUTINE


Disposition Time: 22:21


Condition: GOOD


Forms:  [facility] (English)





- Clinical Impression


Clinical Impression: 


 Nasal sinus congestion








- Scribe Statement


The provider has reviewed the documentation as recorded by the Clarisaibtrevin Mcduffie





All medical record entries made by the Clarisaibe were at my direction and 

personally dictated by me. I have reviewed the chart and agree that the record 

accurately reflects my personal performance of the history, physical exam, 

medical decision making, and the department course for this patient. I have 

also personally directed, reviewed, and agree with the discharge instructions 

and disposition. Outpatient HD Information:  -Dialysis modality: Hemodialysis  -Outpatient HD unit: [facility]   -Nephrologist: Ras TRIMBLE   -HD TX days: Tuesday/Thursday/Saturday, duration of treatment: 3.5 hrs  -Last HD TX prior to hospital admission: 10/5/24  -Dialysis access: RUE AV fistula   -Residual urine: Minium  -EDW: 85 kg     -Resume home HD schedule TThS  -Weight is up to 93 kg, so she probably does have extra fluid on her. Will d/w staff if we want to add an extra session today.   -On sevelamer for hyperphosphatemia

## 2024-10-07 NOTE — ASSESSMENT & PLAN NOTE
Patient's FSGs are uncontrolled due to hyperglycemia on current medication regimen.  Last A1c reviewed-   Lab Results   Component Value Date    HGBA1C 7.6 (H) 09/01/2024     Most recent fingerstick glucose reviewed-   Recent Labs   Lab 10/06/24  2031   POCTGLUCOSE 385*   Current correctional scale  Low  Maintain anti-hyperglycemic dose as follows-   Antihyperglycemics (From admission, onward)      Start     Stop Route Frequency Ordered    10/07/24 0900  insulin glargine U-100 (Lantus) pen 10 Units         -- SubQ Daily 10/06/24 2212    10/06/24 2308  insulin aspart U-100 pen 0-5 Units         -- SubQ Before meals & nightly PRN 10/06/24 2212          Hold Oral hypoglycemics while patient is in the hospital.

## 2024-10-07 NOTE — ED NOTES
Pt care assumed. Report received by VALERIE robbins. Pt lying in stretcher in low and locked position and side rails raised x2. Call light, pt's belongings, and bedside table within pt's reach. Pt on continuous cardiac monitoring,  Pt in NAD and verbalized no needs at this time. Hospital medicine at bedside.    Patient identifiers verified and correct for Jael GEORGE Mcnealvinh.    LOC: The patient is awake, alert and oriented x 4. Pt is speaking appropriately, no slurred speech.  APPEARANCE: Patient resting comfortably and in no acute distress. Pt is clean and well groomed. No JVD visible. Pt reports pain level of 0.  SKIN: Skin is warm dry and intact, and color is consistent with ethnicity. No tenting observed and capillary refill <3 seconds. No clubbing noted to nail beds. No breakdown or brusing visible and mucus membranes moist and acyanotic.  MUSCULOSKELETAL: Full range of motion present in all extremities. Hand  equal and leg strength strong +2 bilaterally.  RESPIRATORY: Airway is open and patent. Respirations-unlabored, regular rate, equal bilaterally on inspiration and expiration. No accessory muscle use noted. Lungs clear to auscultation in all fields bilaterally anterior and posterior.   CARDIAC: Patient has regular heart rate and rhythm.  Patient has +3 edema bilaterally to lower extremities  ABDOMEN: Soft and non-tender to palpation with no distention noted. Normoactive bowel sounds X4 quadrants. Pt has no complaints of abnormal bowel movements. Pt reports normal appetite.   NEUROLOGIC: Eyes open spontaneously and facial expression symmetrical. Pt behavior appropriate to situation, and pt follows commands.  Pt reports sensation present in all extremities when touched with a finger. No s/s of ischemic stroke. PERRLA  : No complaints of frequency, burning, urgency or blood in the urine.

## 2024-10-07 NOTE — PLAN OF CARE
[facility]  Discharge Assessment    Primary Care Provider: Maty Gayle DO     Discharge Assessment (most recent)       BRIEF DISCHARGE ASSESSMENT - 10/07/24 3186          Discharge Planning    Assessment Type Discharge Planning Brief Assessment     Resource/Environmental Concerns none     Support Systems Family members     Equipment Currently Used at Home none     Current Living Arrangements apartment     Patient/Family Anticipates Transition to home     Patient/Family Anticipated Services at Transition none     DME Needed Upon Discharge  none     Discharge Plan A Home     Discharge Plan B Home                   Pt is a 59 y.o. female admitted with Akbar swelling of ankles and feet and has a PMH of ESRD (T Th Sat @ [facility] on [address]) HTN and DM2. She lives alone nuha 3rd story apartment, building has an elevator. She has not required DME in the past and is independent with her ADls and iADls. She will have transportation home. [facility] Discharge Packet given to patient and/or family with understanding verbalized.   name and number and estimated discharge date written on white board in patient's room with request to call for any questions or concerns.  Will continue to follow for needs.  Discharge Plan A and Plan B have been determined by review of patient's clinical status, future medical and therapeutic needs, and coverage/benefits for post-acute care in coordination with multidisciplinary team members.  Daniel Kinney, RN,BSN

## 2024-10-07 NOTE — PROGRESS NOTES
[facility] [address] [place] [address] Medicine  Progress Note    Patient Name: Jael Hall  MRN: [id number]  Patient Class: OP- Observation   Admission Date: 10/6/2024  Length of Stay: 0 days  Attending Physician: Luis Antonio Coelho MD  Primary Care Provider: Maty Gayle DO        Subjective:     Principal Problem:Bilateral swelling of feet and ankles        HPI:  Jael Hall is a 59 y.o. female with past medical history of ESRD on HD (TTHSat), HTN, T2DM with Insulin use, WAIHA, and GERD who presents to [facility] ED with bilateral leg swelling. She reports leg swelling for one month ever since starting on prednisone. She has also noticed swelling in her cheeks and abdomen. She denies leg pain or difficulty ambulating. She reports she has not noticed improvement after HD.  Had dialysis yesterday 4L off. She denies shortness of breath, cough, chest pain, palpitations, nausea, vomiting, abdominal pain. She is currently on a steroid taper through October.     In the ED: tachycardic and hypertensive. Afebrile and stable on room air. Labs notable for , Hbg 9, Bun  46 Cr 8.7. Glucose 442, AG 18, bicarb 22.  CXR with findings suggesting pulmonary edema/CHF pattern without consolidation. Admitted to .    Overview/Hospital Course:  Patient admitted for volume overload with edema up to abdomen. Nephrology consulted for iHD for volume removal.     Interval History: Pt seen and examined this morning on rounds. NAEON. Patient resting comfortably in bed. Pleasantly conversant. On room air. Denies shortness of breath.  Care plan reviewed. Otherwise, doing well and with no further complaints at this time.        Objective:     Vital Signs (Most Recent):  Temp: 97.6 °F (36.4 °C) (10/07/24 1136)  Pulse: 78 (10/07/24 1136)  Resp: 20 (10/07/24 1136)  BP: (!) 142/68 (10/07/24 1136)  SpO2: 96 % (10/07/24 1136) Vital Signs (24h Range):  Temp:  [97.6 °F (36.4 °C)-99.9 °F (37.7 °C)] 97.6 °F (36.4 °C)  Pulse:  [] 78  Resp:   [16-20] 20  SpO2:  [94 %-100 %] 96 %  BP: (142-214)/(68-88) 142/68     Weight: 92.9 kg (204 lb 12.9 oz)  Body mass index is 35.16 kg/m².  No intake or output data in the 24 hours ending 10/07/24 1159      Physical Exam  Gen: in NAD, appears stated age, obese  Neuro: AAOx4, CN2-12 grossly intact BL; motor, sensory, and strength grossly intact BL  HEENT: NTNC, EOMI, PERRLA, MMM; no thyromegaly or lymphadenopathy; no JVD appreciated  CVS: RRR, no m/r/g; S1/S2 auscultated with no S3 or S4; capillary refill < 2 sec  Resp: lungs CTAB, no w/r/r; no belabored breathing or accessory muscle use appreciated   Abd: BS+ in all 4 quadrants; NTND, soft to palpation; no organomegaly appreciated   Extrem: 1+ LE edema. pulses full, equal, and regular over all 4 extremities; no UE or LE edema BL          Significant Labs: All pertinent labs within the past 24 hours have been reviewed.    Significant Imaging: I have reviewed all pertinent imaging results/findings within the past 24 hours.    Assessment/Plan:      * Bilateral swelling of feet and ankles  - Reports swelling since starting on prednisone in August (tapering dose, currently on 20 mg prednisone daily).   - She is still able to ambulate, does not have any significant pain  - Denies SOB, chest pain, palpitations. On room air  - Nephrology consulted for HD to see if volume removal helps to alleviate symptoms at all  - DVT US 8/12/24 with no evidence of deep venous thrombosis in either lower extremity  - Continue to monitor    AIHA (autoimmune hemolytic anemia)  Anemia is likely due to chronic disease due to ESRD and AIHA . Most recent hemoglobin and hematocrit are listed below.  Recent Labs     10/06/24  1846   HGB 9.1*   HCT 30.0*     Plan  - Monitor serial CBC: Daily  - Transfuse PRBC if patient becomes hemodynamically unstable, symptomatic or H/H drops below 7/21.  - Patient has not received any PRBC transfusions to date  - Patient's anemia is currently stable  - Continue  Home Prednisone and Folic acid   - Outpatient heme/onc follow up  - On atovaquone for PJP prophylaxis     Type 2 diabetes mellitus  Patient's FSGs are uncontrolled due to hyperglycemia on current medication regimen.  Last A1c reviewed-   Lab Results   Component Value Date    HGBA1C 7.6 (H) 09/01/2024     Most recent fingerstick glucose reviewed-   Recent Labs   Lab 10/06/24  2031   POCTGLUCOSE 385*   Current correctional scale  Low  Maintain anti-hyperglycemic dose as follows-   Antihyperglycemics (From admission, onward)      Start     Stop Route Frequency Ordered    10/07/24 0900  insulin glargine U-100 (Lantus) pen 10 Units         -- SubQ Daily 10/06/24 2212    10/06/24 2308  insulin aspart U-100 pen 0-5 Units         -- SubQ Before meals & nightly PRN 10/06/24 2212          Hold Oral hypoglycemics while patient is in the hospital.    GERD (gastroesophageal reflux disease)  - Chronic, controlled  - Continue PPI     Essential hypertension  Patients blood pressure range in the last 24 hours was: BP  Min: 164/76  Max: 214/87.The patient's inpatient anti-hypertensive regimen is listed below:  Current Antihypertensives  amLODIPine tablet 10 mg, Nightly, Oral  labetaloL tablet 200 mg, Every 12 hours, Oral  losartan tablet 100 mg, Daily, Oral  Plan  - BP is controlled, no changes needed to their regimen    ESRD (end stage renal disease) on dialysis  - On HD T TH Sat  - Nephrology consulted for HD   - Plan for iHD today if able  - Daily bmp, phos  - Continue home renvela       VTE Risk Mitigation (From admission, onward)           Ordered     heparin (porcine) injection 5,000 Units  Every 8 hours         10/06/24 2212     IP VTE HIGH RISK PATIENT  Once         10/06/24 2212     Place sequential compression device  Until discontinued         10/06/24 2212                    Discharge Planning   MEGAN: 10/8/2024     Code Status: Full Code   Is the patient medically ready for discharge?:     Reason for patient still in  [facility] (select all that apply): Treatment                     Luis Antonio Coelho MD  Department of Hospital Medicine   [facility] - Observation 11H

## 2024-10-07 NOTE — CONSULTS
[facility]  Nephrology  Consult Note    Patient Name: Jael Hall  MRN: [id number]  Admission Date: 10/6/2024  Hospital Length of Stay: 0 days  Attending Provider: Luis Antonio Coelho MD   Primary Care Physician: Maty Gayle DO  Principal Problem:Bilateral swelling of feet and ankles    Inpatient consult to Nephrology  Consult performed by: Walt Cazares MD  Consult ordered by: Magaly Villa, PA-C  Reason for consult: HD management        Subjective:     HPI: Jael Hall is a pleasant 59 y.o. woman with past medical history of ESRD on HD (TTHSat), HTN, T2DM with Insulin use, WAIHA, and GERD who presents to [facility] ED with bilateral leg swelling, ongoing about 1 mo, ever since starting prednisone, swelling of cheeks and abdomen, no improvement with hemodialysis, most recent dialysis session was Saturday 10/05/2024 had 4 L removed, denies shortness of breath chest pain abdominal pain. X-ray suggestive of pulmonary edema/CHF pattern, bicarb 22, potassium normal range, glucose 442.  No missed dialysis sessions.  Blood pressure this morning mildly elevated 161/71, was previously up in the 200s SBP.  Nephrology has been consulted regarding management of her hemodialysis.    Past Medical History:   Diagnosis Date    Acute hyperkalemia 09/09/2020    Acute on chronic diastolic heart failure 10/08/2021    Anemia of chronic disorder 11/08/2017    Overview:  Added automatically from request for surgery [id number]    Anemia of chronic renal failure, stage 5     Anxiety     Cyst of left ovary 05/14/2019    Cyst of left ovary 07/05/2019    Dyslipidemia 01/04/2013    Encounter for screening colonoscopy 10/08/2020    End-stage renal disease on hemodialysis 08/20/2020    ESRD on hemodialysis 09/28/2017    Hematuria 10/30/2019    History of COVID-19 04/07/2020    Hospital discharge follow-up 10/06/2023    Hx of sinus bradycardia 09/28/2017    Hyperkalemia 03/29/2017    Hypertensive urgency 07/22/2021    Peripheral  neuropathy 2013    Preoperative testing 10/08/2020    Renovascular hypertension 2020    S/P dilation and curettage 2018    Screening for colon cancer 2023    Thickened endometrium 2018    Thyroid nodule     Thyroid nodule 2021    Uncontrolled type 2 diabetes mellitus 2013    Overview:  poc a1c today 11.2-270/ up from 10.8-263, Pt. has very very stressed/ was incarcerated/marital stress/ will current meds/ less stress now/ will monitor.       Past Surgical History:   Procedure Laterality Date    AV FISTULA PLACEMENT      right arm     SECTION      x1    COLONOSCOPY N/A 2018    Procedure: COLONOSCOPY;  Surgeon: Pankaj Hinojosa MD;  Location: [facility] (4TH FLR);  Service: Endoscopy;  Laterality: N/A;  dialysis pt/labs prior to colon/MS    COLONOSCOPY N/A 2023    Procedure: COLONOSCOPY;  Surgeon: Angel Turner MD;  Location: [facility] (2ND FLR);  Service: Endoscopy;  Laterality: N/A;  Ref By:  Maty Gayle MD  Procedure: Colonoscopy  Diagnosis: endounter for preventive health   Procedure Timing: pt. pref  Provider: Dr. turner   Location: [facility] 4-Endo   Prep Specifications: Standard prep   Additional Info: Dialysis patient lab pending    CYSTOSCOPY W/ RETROGRADES Bilateral 2020    Procedure: CYSTOSCOPY, WITH RETROGRADE PYELOGRAM;  Surgeon: Douglas Abraham MD;  Location: [facility] OR 1ST FLR;  Service: Urology;  Laterality: Bilateral;  45 min    FISTULOGRAM Right 2020    Procedure: Fistulogram;  Surgeon: Lester Gómez MD;  Location: [facility] CATH LAB;  Service: Nephrology;  Laterality: Right;    HYSTERECTOMY      IMPLANTATION OF COCHLEAR PROSTHESIS Right 2021    Procedure: INSERTION, PROSTHESIS, COCHLEAR;  Surgeon: Ramiro Zuleta MD;  Location: [facility] OR 2ND FLR;  Service: ENT;  Laterality: Right;  COCHLEAR BABAK    IMPLANTATION OF COCHLEAR PROSTHESIS Right 2021    Procedure: INSERTION, PROSTHESIS, COCHLEAR;  Surgeon: Ramiro Zuleta MD;  Location: [facility]  OR 2ND FLR;  Service: ENT;  Laterality: Right;  COCHLEAR AMERICAS    ROBOT-ASSISTED LAPAROSCOPIC ABDOMINAL HYSTERECTOMY USING DA NATALYA XI N/A 7/5/2019    Procedure: XI ROBOTIC HYSTERECTOMY;  Surgeon: Trice Lei MD;  Location: [facility] OR;  Service: OB/GYN;  Laterality: N/A;    ROBOT-ASSISTED LAPAROSCOPIC NEPHRECTOMY Right 10/8/2020    Procedure: ROBOTIC NEPHRECTOMY;  Surgeon: Douglas Abraham MD;  Location: [facility] OR 2ND FLR;  Service: Urology;  Laterality: Right;  3.5hrs gen with regional     ROBOT-ASSISTED LAPAROSCOPIC SALPINGO-OOPHORECTOMY USING DA NATALYA XI Bilateral 7/5/2019    Procedure: XI ROBOTIC SALPINGO-OOPHORECTOMY;  Surgeon: Trice Lei MD;  Location: [facility] OR;  Service: OB/GYN;  Laterality: Bilateral;    TUBAL LIGATION      Vascular access surgeries      x5       Review of patient's allergies indicates:  No Known Allergies  Current Facility-Administered Medications   Medication Frequency    acetaminophen tablet 1,000 mg Q8H PRN    acetaminophen tablet 650 mg Q4H PRN    albuterol-ipratropium 2.5 mg-0.5 mg/3 mL nebulizer solution 3 mL Q4H PRN    amLODIPine tablet 10 mg Nightly    aspirin EC tablet 81 mg QAM    atorvastatin tablet 40 mg Daily    atovaquone 750 mg/5 mL oral liquid 1,500 mg Daily    bisacodyL suppository 10 mg Daily PRN    dextrose 10% bolus 125 mL 125 mL PRN    dextrose 10% bolus 250 mL 250 mL PRN    diphenhydrAMINE capsule 25 mg Q6H PRN    folic acid tablet 1 mg Daily    glucagon (human recombinant) injection 1 mg PRN    glucose chewable tablet 16 g PRN    glucose chewable tablet 24 g PRN    heparin (porcine) injection 5,000 Units Q8H    insulin aspart U-100 pen 0-5 Units QID (AC + HS) PRN    insulin glargine U-100 (Lantus) pen 10 Units Daily    labetaloL tablet 200 mg Q12H    losartan tablet 100 mg Daily    melatonin tablet 6 mg Nightly PRN    naloxone 0.4 mg/mL injection 0.02 mg PRN    ondansetron disintegrating tablet 8 mg Q8H PRN    pantoprazole EC tablet 40 mg Daily     polyethylene glycol packet 17 g BID PRN    predniSONE tablet 20 mg Daily    pregabalin capsule 75 mg Daily    sevelamer carbonate tablet 2,400 mg TID WM    sodium chloride 0.9% flush 5 mL PRN     Family History       Problem Relation (Age of Onset)    Cancer Mother, Sister, Sister    Diabetes Sister, Brother    Lung cancer Sister    Macular degeneration Sister    Ovarian cancer Mother, Sister, Sister          Tobacco Use    Smoking status: Never    Smokeless tobacco: Never   Substance and Sexual Activity    Alcohol use: No    Drug use: No    Sexual activity: Yes     Partners: Male     Birth control/protection: Post-menopausal     Review of Systems   Respiratory:  Negative for shortness of breath.    Cardiovascular:  Positive for leg swelling. Negative for chest pain.   Gastrointestinal:  Negative for abdominal pain.   Neurological:  Negative for weakness and headaches.     Objective:     Vital Signs (Most Recent):  Temp: 97.6 °F (36.4 °C) (10/07/24 0734)  Pulse: 81 (10/07/24 0734)  Resp: 20 (10/07/24 0734)  BP: (!) 161/71 (10/07/24 0734)  SpO2: 96 % (10/07/24 0734) Vital Signs (24h Range):  Temp:  [97.6 °F (36.4 °C)-99.9 °F (37.7 °C)] 97.6 °F (36.4 °C)  Pulse:  [] 81  Resp:  [16-20] 20  SpO2:  [94 %-100 %] 96 %  BP: (161-214)/(71-88) 161/71     Weight: 92.9 kg (204 lb 12.9 oz) (10/07/24 0311)  Body mass index is 35.16 kg/m².  Body surface area is 2.05 meters squared.    No intake/output data recorded.     Physical Exam  Constitutional:       Appearance: Normal appearance. She is obese.   HENT:      Head: Normocephalic.   Cardiovascular:      Rate and Rhythm: Normal rate.      Pulses: Normal pulses.   Pulmonary:      Effort: Pulmonary effort is normal. No respiratory distress.      Breath sounds: No wheezing or rales.   Abdominal:      General: Abdomen is flat.      Palpations: Abdomen is soft.      Tenderness: There is no abdominal tenderness.   Musculoskeletal:      Right lower leg: Edema (nonpitting 1+)  present.      Left lower leg: Edema (nonpitting 1+) present.   Skin:     General: Skin is warm.   Neurological:      General: No focal deficit present.      Mental Status: She is alert and oriented to person, place, and time. Mental status is at baseline.   Psychiatric:         Mood and Affect: Mood normal.          Significant Labs:  CBC:   Recent Labs   Lab 10/07/24  0404   WBC 7.55   RBC 3.33*   HGB 8.8*   HCT 28.5*      MCV 86   MCH 26.4*   MCHC 30.9*     CMP:   Recent Labs   Lab 10/07/24  0404   *   CALCIUM 10.0   ALBUMIN 3.2*   PROT 6.1      K 3.8   CO2 26   CL 96   BUN 51*   CREATININE 9.4*   ALKPHOS 46*   ALT 12   AST 10   BILITOT 0.6     All labs within the past 24 hours have been reviewed.  Assessment/Plan:   ESRD (end stage renal disease) on dialysis  Outpatient HD Information:  -Dialysis modality: Hemodialysis  -Outpatient HD unit: [facility]   -Nephrologist: Ras TRIMBLE   -HD TX days: Tuesday/Thursday/Saturday, duration of treatment: 3.5 hrs  -Last HD TX prior to hospital admission: 10/5/24  -Dialysis access: RUE AV fistula   -Residual urine: Minium  -EDW: 85 kg     -Resume home HD schedule TThS  -Weight is up to 93 kg, will attempt to fit in one extra session this afternoon, but d/t staffing limitations it may not be done until overnight. Then we can resume HD tomorrow afternoon.   -On sevelamer for hyperphosphatemia  -EPO for anemia  -Consider echocardiogram for worsened fluid status    Secondary hyperparathyroidism  -Of renal origin  -Check PTH    Thank you for your consult. I will follow-up with patient. Please contact us if you have any additional questions.    Walt Cazares MD  Nephrology  [facility]

## 2024-10-07 NOTE — HOSPITAL COURSE
Patient admitted for volume overload with lower extremity edema. Nephrology consulted for iHD for volume removal. Patient without symptoms of SOB and saturating well on room air. Patient had dialysis with 4L off and medically stable for discharge. Encouraged patient to continue prednisone given auto-immune hemolytic anemia and referral to hematology ordered. Patient unable to stay after dialysis session so unable to get TTE. Will order outpatient TTE. Patient seen and evaluated on day of discharge. Pt deemed appropriate for discharge. Plan discussed with pt, who was agreeable and amenable; medications were discussed and reviewed, outpatient follow-up scheduled, ER precautions were given, all questions were answered to the pt's satisfaction, and patient was subsequently discharged.

## 2024-10-07 NOTE — PROVIDER PROGRESS NOTES - EMERGENCY DEPT.
ED Attending Hand-off Note    I have discussed the patient's history and presentation in the ED with Luis Antonio Coelho MD    Brief H&P: Patient is a 59 y.o. female who presented to the ED with Leg Swelling (Bilateral leg swelling on prednisone and says that's why its swelling)        Pending studies and consultations: BP control, then admit    Disposition:  Will continue to monitor, update patient on results of testing and determine appropriate additional treatment for the duration of stay in ED.  Mika Foreman MD 9:13 PM 10/6/2024        UPDATES:    on recheck.     Discussed with  who placed patient in observation to their service.         Clinical Impression  :  Leg swelling  Acute on chronic congestive heart failure, unspecified heart failure type (Primary)       ED Disposition Condition    Observation Stable

## 2024-10-07 NOTE — SUBJECTIVE & OBJECTIVE
Past Medical History:   Diagnosis Date    Acute hyperkalemia 2020    Acute on chronic diastolic heart failure 10/08/2021    Anemia of chronic disorder 2017    Overview:  Added automatically from request for surgery [id number]    Anemia of chronic renal failure, stage 5     Anxiety     Cyst of left ovary 2019    Cyst of left ovary 2019    Dyslipidemia 2013    Encounter for screening colonoscopy 10/08/2020    End-stage renal disease on hemodialysis 2020    ESRD on hemodialysis 2017    Hematuria 10/30/2019    History of COVID-19 2020    Hospital discharge follow-up 10/06/2023    Hx of sinus bradycardia 2017    Hyperkalemia 2017    Hypertensive urgency 2021    Peripheral neuropathy 2013    Preoperative testing 10/08/2020    Renovascular hypertension 2020    S/P dilation and curettage 2018    Screening for colon cancer 2023    Thickened endometrium 2018    Thyroid nodule     Thyroid nodule 2021    Uncontrolled type 2 diabetes mellitus 2013    Overview:  poc a1c today 11.2-270/ up from 10.8-263, Pt. has very very stressed/ was incarcerated/marital stress/ will current meds/ less stress now/ will monitor.       Past Surgical History:   Procedure Laterality Date    AV FISTULA PLACEMENT      right arm     SECTION      x1    COLONOSCOPY N/A 2018    Procedure: COLONOSCOPY;  Surgeon: Pankaj Hinojosa MD;  [facility]  Service: Endoscopy;  Laterality: N/A;  dialysis pt/labs prior to colon/MS    COLONOSCOPY N/A 2023    Procedure: COLONOSCOPY;  Surgeon: Angel Turner MD;  Location: [facility] (2ND FLR);  Service: Endoscopy;  Laterality: N/A;  Ref By:  Maty Gayle MD  Procedure: Colonoscopy  Diagnosis: endounter for preventive health   Procedure Timing: pt. pref  Provider: Dr. turner   Location: [facility] [place] 4-Endo   Prep Specifications: Standard prep   Additional Info: Dialysis patient lab pending     CYSTOSCOPY W/ RETROGRADES Bilateral 8/20/2020    Procedure: CYSTOSCOPY, WITH RETROGRADE PYELOGRAM;  Surgeon: Douglas Abraham MD;  Location: [facility] OR 1ST FLR;  Service: Urology;  Laterality: Bilateral;  45 min    FISTULOGRAM Right 9/9/2020    Procedure: Fistulogram;  Surgeon: Lester Gómez MD;  Location: [facility] CATH LAB;  Service: Nephrology;  Laterality: Right;    HYSTERECTOMY      IMPLANTATION OF COCHLEAR PROSTHESIS Right 6/28/2021    Procedure: INSERTION, PROSTHESIS, COCHLEAR;  Surgeon: Ramiro Zuleta MD;  Location: [facility] OR 2ND FLR;  Service: ENT;  Laterality: Right;  COCHLEAR BABAK    IMPLANTATION OF COCHLEAR PROSTHESIS Right 6/30/2021    Procedure: INSERTION, PROSTHESIS, COCHLEAR;  Surgeon: Ramiro Zuleta MD;  Location: [facility] OR 2ND FLR;  Service: ENT;  Laterality: Right;  COCHLEAR AMERICAS    ROBOT-ASSISTED LAPAROSCOPIC ABDOMINAL HYSTERECTOMY USING DA NATALYA XI N/A 7/5/2019    Procedure: XI ROBOTIC HYSTERECTOMY;  Surgeon: Trice Lei MD;  Location: [facility];  Service: OB/GYN;  Laterality: N/A;    ROBOT-ASSISTED LAPAROSCOPIC NEPHRECTOMY Right 10/8/2020    Procedure: ROBOTIC NEPHRECTOMY;  Surgeon: Douglas Abraham MD;  Location: [facility] OR 2ND FLR;  Service: Urology;  Laterality: Right;  3.5hrs gen with regional     ROBOT-ASSISTED LAPAROSCOPIC SALPINGO-OOPHORECTOMY USING DA NATALYA XI Bilateral 7/5/2019    Procedure: XI ROBOTIC SALPINGO-OOPHORECTOMY;  Surgeon: Trice Lei MD;  Location: [facility];  Service: OB/GYN;  Laterality: Bilateral;    TUBAL LIGATION      Vascular access surgeries      x5       Review of patient's allergies indicates:  No Known Allergies  Current Facility-Administered Medications   Medication Frequency    acetaminophen tablet 1,000 mg Q8H PRN    acetaminophen tablet 650 mg Q4H PRN    albuterol-ipratropium 2.5 mg-0.5 mg/3 mL nebulizer solution 3 mL Q4H PRN    amLODIPine tablet 10 mg Nightly    aspirin EC tablet 81 mg QAM    atorvastatin tablet 40 mg Daily    atovaquone 750 mg/5 mL oral  liquid 1,500 mg Daily    bisacodyL suppository 10 mg Daily PRN    dextrose 10% bolus 125 mL 125 mL PRN    dextrose 10% bolus 250 mL 250 mL PRN    diphenhydrAMINE capsule 25 mg Q6H PRN    folic acid tablet 1 mg Daily    glucagon (human recombinant) injection 1 mg PRN    glucose chewable tablet 16 g PRN    glucose chewable tablet 24 g PRN    heparin (porcine) injection 5,000 Units Q8H    insulin aspart U-100 pen 0-5 Units QID (AC + HS) PRN    insulin glargine U-100 (Lantus) pen 10 Units Daily    labetaloL tablet 200 mg Q12H    losartan tablet 100 mg Daily    melatonin tablet 6 mg Nightly PRN    naloxone 0.4 mg/mL injection 0.02 mg PRN    ondansetron disintegrating tablet 8 mg Q8H PRN    pantoprazole EC tablet 40 mg Daily    polyethylene glycol packet 17 g BID PRN    predniSONE tablet 20 mg Daily    pregabalin capsule 75 mg Daily    sevelamer carbonate tablet 2,400 mg TID WM    sodium chloride 0.9% flush 5 mL PRN     Family History       Problem Relation (Age of Onset)    Cancer Mother, Sister, Sister    Diabetes Sister, Brother    Lung cancer Sister    Macular degeneration Sister    Ovarian cancer Mother, Sister, Sister          Tobacco Use    Smoking status: Never    Smokeless tobacco: Never   Substance and Sexual Activity    Alcohol use: No    Drug use: No    Sexual activity: Yes     Partners: Male     Birth control/protection: Post-menopausal     Review of Systems   Respiratory:  Negative for shortness of breath.    Cardiovascular:  Positive for leg swelling. Negative for chest pain.   Gastrointestinal:  Negative for abdominal pain.   Neurological:  Negative for weakness and headaches.     Objective:     Vital Signs (Most Recent):  Temp: 97.6 °F (36.4 °C) (10/07/24 0734)  Pulse: 81 (10/07/24 0734)  Resp: 20 (10/07/24 0734)  BP: (!) 161/71 (10/07/24 0734)  SpO2: 96 % (10/07/24 0734) Vital Signs (24h Range):  Temp:  [97.6 °F (36.4 °C)-99.9 °F (37.7 °C)] 97.6 °F (36.4 °C)  Pulse:  [] 81  Resp:  [16-20] 20  SpO2:   [94 %-100 %] 96 %  BP: (161-214)/(71-88) 161/71     Weight: 92.9 kg (204 lb 12.9 oz) (10/07/24 0311)  Body mass index is 35.16 kg/m².  Body surface area is 2.05 meters squared.    No intake/output data recorded.     Physical Exam  Constitutional:       Appearance: Normal appearance. She is obese.   HENT:      Head: Normocephalic.   Cardiovascular:      Rate and Rhythm: Normal rate.      Pulses: Normal pulses.   Pulmonary:      Effort: Pulmonary effort is normal. No respiratory distress.      Breath sounds: No wheezing or rales.   Abdominal:      General: Abdomen is flat.      Palpations: Abdomen is soft.      Tenderness: There is no abdominal tenderness.   Musculoskeletal:      Right lower leg: Edema (nonpitting 1+) present.      Left lower leg: Edema (nonpitting 1+) present.   Skin:     General: Skin is warm.   Neurological:      General: No focal deficit present.      Mental Status: She is alert and oriented to person, place, and time. Mental status is at baseline.   Psychiatric:         Mood and Affect: Mood normal.          Significant Labs:  CBC:   Recent Labs   Lab 10/07/24  0404   WBC 7.55   RBC 3.33*   HGB 8.8*   HCT 28.5*      MCV 86   MCH 26.4*   MCHC 30.9*     CMP:   Recent Labs   Lab 10/07/24  0404   *   CALCIUM 10.0   ALBUMIN 3.2*   PROT 6.1      K 3.8   CO2 26   CL 96   BUN 51*   CREATININE 9.4*   ALKPHOS 46*   ALT 12   AST 10   BILITOT 0.6     All labs within the past 24 hours have been reviewed.

## 2024-10-07 NOTE — ASSESSMENT & PLAN NOTE
- Reports swelling since starting on prednisone in August (tapering dose, currently on 20 mg prednisone daily).   - She is still able to ambulate, does not have any significant pain  - Denies SOB, chest pain, palpitations. On room air  - Nephrology consulted for HD to see if volume removal helps to alleviate symptoms at all  - DVT US 8/12/24 with no evidence of deep venous thrombosis in either lower extremity  - Continue to monitor

## 2024-10-07 NOTE — HPI
Jael Hall is a pleasant 59 y.o. woman with past medical history of ESRD on HD (TTHSat), HTN, T2DM with Insulin use, WAIHA, and GERD who presents to [facility] ED with bilateral leg swelling, ongoing about 1 mo, ever since starting prednisone, swelling of cheeks and abdomen, no improvement with hemodialysis, most recent dialysis session was Saturday 10/05/2024 had 4 L removed, denies shortness of breath chest pain abdominal pain. X-ray suggestive of pulmonary edema/CHF pattern, bicarb 22, potassium normal range, glucose 442.  No missed dialysis sessions.  Blood pressure this morning mildly elevated 161/71, was previously up in the 200s SBP.  Nephrology has been consulted regarding management of her hemodialysis.

## 2024-10-07 NOTE — ASSESSMENT & PLAN NOTE
Patients blood pressure range in the last 24 hours was: BP  Min: 164/76  Max: 214/87.The patient's inpatient anti-hypertensive regimen is listed below:  Current Antihypertensives  amLODIPine tablet 10 mg, Nightly, Oral  labetaloL tablet 200 mg, Every 12 hours, Oral  losartan tablet 100 mg, Daily, Oral  Plan  - BP is controlled, no changes needed to their regimen

## 2024-10-07 NOTE — SUBJECTIVE & OBJECTIVE
Past Medical History:   Diagnosis Date    Acute hyperkalemia 2020    Acute on chronic diastolic heart failure 10/08/2021    Anemia of chronic disorder 2017    Overview:  Added automatically from request for surgery [id number]    Anemia of chronic renal failure, stage 5     Anxiety     Cyst of left ovary 2019    Cyst of left ovary 2019    Dyslipidemia 2013    Encounter for screening colonoscopy 10/08/2020    End-stage renal disease on hemodialysis 2020    ESRD on hemodialysis 2017    Hematuria 10/30/2019    History of COVID-19 2020    Hospital discharge follow-up 10/06/2023    Hx of sinus bradycardia 2017    Hyperkalemia 2017    Hypertensive urgency 2021    Peripheral neuropathy 2013    Preoperative testing 10/08/2020    Renovascular hypertension 2020    S/P dilation and curettage 2018    Screening for colon cancer 2023    Thickened endometrium 2018    Thyroid nodule     Thyroid nodule 2021    Uncontrolled type 2 diabetes mellitus 2013    Overview:  poc a1c today 11.2-270/ up from 10.8-263, Pt. has very very stressed/ was incarcerated/marital stress/ will current meds/ less stress now/ will monitor.       Past Surgical History:   Procedure Laterality Date    AV FISTULA PLACEMENT      right arm     SECTION      x1    COLONOSCOPY N/A 2018    Procedure: COLONOSCOPY;  Surgeon: Pankaj Hinojosa MD;  [facility]  Service: Endoscopy;  Laterality: N/A;  dialysis pt/labs prior to colon/MS    COLONOSCOPY N/A 2023    Procedure: COLONOSCOPY;  Surgeon: Angel Turner MD;  Location: [facility] (2ND FLR);  Service: Endoscopy;  Laterality: N/A;  Ref By:  Maty Gayle MD  Procedure: Colonoscopy  Diagnosis: endounter for preventive health   Procedure Timing: pt. pref  Provider: Dr. turner   [place] [facility] [place]   Prep Specifications: Standard prep   Additional Info: Dialysis patient lab pending     CYSTOSCOPY W/ RETROGRADES Bilateral 8/20/2020    Procedure: CYSTOSCOPY, WITH RETROGRADE PYELOGRAM;  Surgeon: Douglas Abraham MD;  Location: [facility] OR 1ST FLR;  Service: Urology;  Laterality: Bilateral;  45 min    FISTULOGRAM Right 9/9/2020    Procedure: Fistulogram;  Surgeon: Lester Gómez MD;  Location: [facility] CATH LAB;  Service: Nephrology;  Laterality: Right;    HYSTERECTOMY      IMPLANTATION OF COCHLEAR PROSTHESIS Right 6/28/2021    Procedure: INSERTION, PROSTHESIS, COCHLEAR;  Surgeon: Ramiro Zuleta MD;  Location: [facility] OR 2ND FLR;  Service: ENT;  Laterality: Right;  COCHLEAR BABAK    IMPLANTATION OF COCHLEAR PROSTHESIS Right 6/30/2021    Procedure: INSERTION, PROSTHESIS, COCHLEAR;  Surgeon: Ramiro Zuleta MD;  Location: [facility] OR 2ND FLR;  Service: ENT;  Laterality: Right;  COCHLEAR AMERICAS    ROBOT-ASSISTED LAPAROSCOPIC ABDOMINAL HYSTERECTOMY USING DA NATALYA XI N/A 7/5/2019    Procedure: XI ROBOTIC HYSTERECTOMY;  Surgeon: Trice Lei MD;  Location: [facility];  Service: OB/GYN;  Laterality: N/A;    ROBOT-ASSISTED LAPAROSCOPIC NEPHRECTOMY Right 10/8/2020    Procedure: ROBOTIC NEPHRECTOMY;  Surgeon: Douglas Abraham MD;  Location: [facility] OR 2ND FLR;  Service: Urology;  Laterality: Right;  3.5hrs gen with regional     ROBOT-ASSISTED LAPAROSCOPIC SALPINGO-OOPHORECTOMY USING DA NATALYA XI Bilateral 7/5/2019    Procedure: XI ROBOTIC SALPINGO-OOPHORECTOMY;  Surgeon: Trice Lei MD;  Location: [facility];  Service: OB/GYN;  Laterality: Bilateral;    TUBAL LIGATION      Vascular access surgeries      x5       Review of patient's allergies indicates:  No Known Allergies    No current facility-administered medications on file prior to encounter.     Current Outpatient Medications on File Prior to Encounter   Medication Sig    ACCU-CHEK GUIDE GLUCOSE METER Misc     ACCU-CHEK GUIDE L1-L2 CTRL SOL Soln     ACCU-CHEK GUIDE TEST STRIPS Strp     ACCU-CHEK SOFTCLIX LANCETS Misc     acetaminophen (TYLENOL) 500 MG tablet Take 1  "tablet (500 mg total) by mouth every 8 (eight) hours as needed for Pain.    amLODIPine (NORVASC) 10 MG tablet Take 1 tablet (10 mg total) by mouth nightly.    aspirin (ECOTRIN) 81 MG EC tablet Take 81 mg by mouth every morning.    atorvastatin (LIPITOR) 40 MG tablet Take 1 tablet (40 mg total) by mouth once daily. (Patient taking differently: Take 40 mg by mouth every morning.)    atovaquone (MEPRON) 750 mg/5 mL Susp oral liquid Take 10 mLs (1,500 mg total) by mouth once daily.    blood sugar diagnostic Strp To check BG 3 times daily, to use with insurance preferred meter    blood-glucose meter kit To check BG 3 times daily, to use with insurance preferred meter    DROPSAFE ALCOHOL PREP PADS PadM Apply topically.    epoetin shiela (PROCRIT) 10,000 unit/mL injection Inject 0.85 mLs (8,500 Units total) into the skin every Mon, Wed, Fri.    folic acid (FOLVITE) 1 MG tablet Take 1 tablet (1 mg total) by mouth once daily.    insulin aspart U-100 (NOVOLOG) 100 unit/mL (3 mL) InPn pen Inject 0-5 Units into the skin before meals and at bedtime as needed (Hyperglycemia).    insulin glargine U-100, Lantus, 100 unit/mL (3 mL) SubQ InPn pen Inject 12 Units into the skin once daily.    labetaloL (NORMODYNE) 200 MG tablet Take 1 tablet (200 mg total) by mouth every 12 (twelve) hours.    lancets Misc To check BG 3 times daily, to use with insurance preferred meter    LIDOcaine (LIDODERM) 5 % Place 1 patch onto the skin once daily. Remove & Discard patch within 12 hours or as directed by MD    losartan (COZAAR) 100 MG tablet Take 1 tablet (100 mg total) by mouth once daily.    omeprazole (PRILOSEC) 20 MG capsule Take 1 capsule (20 mg total) by mouth once daily.    pen needle, diabetic 31 gauge x 3/16" Ndle Use to inject insulin into the skin.    predniSONE (DELTASONE) 20 MG tablet Take 2 tablets (40 mg total) by mouth once daily for 6 days, THEN 1.5 tablets (30 mg total) once daily for 7 days, THEN 1 tablet (20 mg total) once daily. " Follow up with Heme Onc for further tapering of prednisone.    pregabalin (LYRICA) 75 MG capsule Take 1 capsule (75 mg total) by mouth once daily.    sevelamer carbonate (RENVELA) 800 mg Tab Take 3 tablets (2,400 mg total) by mouth 3 (three) times daily with meals.     Family History       Problem Relation (Age of Onset)    Cancer Mother, Sister, Sister    Diabetes Sister, Brother    Lung cancer Sister    Macular degeneration Sister    Ovarian cancer Mother, Sister, Sister          Tobacco Use    Smoking status: Never    Smokeless tobacco: Never   Substance and Sexual Activity    Alcohol use: No    Drug use: No    Sexual activity: Yes     Partners: Male     Birth control/protection: Post-menopausal     Review of Systems   Constitutional:  Negative for chills and fever.   HENT:  Negative for trouble swallowing.    Eyes:  Negative for photophobia and visual disturbance.   Respiratory:  Negative for cough and shortness of breath.    Cardiovascular:  Positive for leg swelling. Negative for chest pain and palpitations.   Gastrointestinal:  Positive for abdominal distention. Negative for abdominal pain, constipation, diarrhea, nausea and vomiting.   Genitourinary:  Negative for dysuria and frequency.   Musculoskeletal:  Negative for arthralgias and back pain.   Skin:  Negative for rash and wound.   Neurological:  Negative for light-headedness and headaches.   Psychiatric/Behavioral:  Negative for agitation and confusion.      Objective:     Vital Signs (Most Recent):  Temp: 98.7 °F (37.1 °C) (10/06/24 1739)  Pulse: 91 (10/06/24 2135)  Resp: 20 (10/06/24 2135)  BP: (!) 173/76 (10/06/24 2152)  SpO2: 100 % (10/06/24 2135) Vital Signs (24h Range):  Temp:  [98.7 °F (37.1 °C)-99.9 °F (37.7 °C)] 98.7 °F (37.1 °C)  Pulse:  [] 91  Resp:  [16-20] 20  SpO2:  [94 %-100 %] 100 %  BP: (173-214)/(76-88) 173/76     Weight: 88 kg (194 lb)  Body mass index is 33.3 kg/m².     Physical Exam  Vitals and nursing note reviewed.    Constitutional:       General: She is not in acute distress.  HENT:      Head: Normocephalic and atraumatic.      Nose: Nose normal.      Mouth/Throat:      Pharynx: No oropharyngeal exudate.   Eyes:      Extraocular Movements: Extraocular movements intact.      Conjunctiva/sclera: Conjunctivae normal.   Cardiovascular:      Rate and Rhythm: Normal rate and regular rhythm.      Heart sounds: Normal heart sounds.   Pulmonary:      Effort: Pulmonary effort is normal. No respiratory distress.   Abdominal:      General: Bowel sounds are normal. There is no distension.      Palpations: Abdomen is soft.      Tenderness: There is no abdominal tenderness.   Musculoskeletal:         General: No tenderness.      Cervical back: Normal range of motion.      Right lower le+ Edema present.      Left lower le+ Edema present.      Right ankle: Swelling present.      Left ankle: Swelling present.      Right foot: Swelling present.      Left foot: Swelling present.   Skin:     General: Skin is warm and dry.   Neurological:      General: No focal deficit present.      Mental Status: She is alert and oriented to person, place, and time.   Psychiatric:         Mood and Affect: Mood normal.         Behavior: Behavior normal.                Significant Labs: All pertinent labs within the past 24 hours have been reviewed.  CBC:   Recent Labs   Lab 10/06/24  1846   WBC 6.97   HGB 9.1*   HCT 30.0*        CMP:   Recent Labs   Lab 10/06/24  1846   *   K 4.3   CL 92*   CO2 25   *   BUN 46*   CREATININE 8.7*   CALCIUM 10.2   PROT 6.6   ALBUMIN 3.4*   BILITOT 0.6   ALKPHOS 54*   AST 14   ALT 15   ANIONGAP 18*     Cardiac Markers:   Recent Labs   Lab 10/06/24  184   *     Magnesium:   Recent Labs   Lab 10/06/24  1846   MG 2.4     POCT Glucose:   Recent Labs   Lab 10/06/24  203   POCTGLUCOSE 385*     Troponin:   Recent Labs   Lab 10/06/24  184   TROPONINI 0.020     Urine Studies:   Recent Labs   Lab  10/06/24  2128   COLORU Yellow   APPEARANCEUA Hazy*   PHUR 8.0   SPECGRAV 1.010   PROTEINUA Trace*   GLUCUA Trace*   KETONESU Negative   BILIRUBINUA Negative   OCCULTUA Trace*   NITRITE Negative   LEUKOCYTESUR Trace*       Significant Imaging: I have reviewed all pertinent imaging results/findings within the past 24 hours.  X-Ray Chest AP Portable  Narrative: EXAMINATION:  XR CHEST AP PORTABLE    CLINICAL HISTORY:  leg swelling;    TECHNIQUE:  Single frontal view of the chest was performed.    COMPARISON:  Chest radiograph 09/12/2024 and CTA chest 08/13/2024    FINDINGS:  Monitoring leads and clothing artifacts overlie the chest.  Patient is slightly rotated.  Resolution is somewhat limited by body habitus with underpenetration.    Cardiomediastinal silhouette is midline and stable.  Hilar contours are within normal limits.  Lungs are well expanded.  Bilateral diffuse nonspecific interstitial coarsening with a perihilar and basilar predominance similar to 09/12/2024 chest radiograph but increased from chest CT 08/13/2024 likely reflecting pulmonary edema/CHF pattern, with interstitial pneumonia not excluded.  No consolidative process, pleural effusion or pneumothorax definitively seen.  No acute osseous process seen.  PA and lateral views can be obtained.  Impression: Findings suggesting pulmonary edema/CHF pattern without consolidation.  Interstitial type pneumonia not excluded.    Electronically signed by: Marcos Pack MD  Date:    10/06/2024  Time:    18:49

## 2024-10-07 NOTE — PLAN OF CARE
Problem: Adult Inpatient Plan of Care  Goal: Plan of Care Review  Reactivated  Goal: Patient-Specific Goal (Individualized)  Reactivated  Goal: Absence of Hospital-Acquired Illness or Injury  Reactivated  Goal: Optimal Comfort and Wellbeing  Reactivated  Goal: Readiness for Transition of Care  Reactivated     Problem: Infection  Goal: Absence of Infection Signs and Symptoms  Reactivated     Problem: Diabetes Comorbidity  Goal: Blood Glucose Level Within Targeted Range  Reactivated     Problem: Hypertension Acute  Goal: Blood Pressure Within Desired Range  Reactivated     Problem: Fluid Volume Excess  Goal: Fluid Balance  Reactivated     Problem: Hemodialysis  Goal: Safe, Effective Therapy Delivery  Reactivated  Goal: Effective Tissue Perfusion  Reactivated  Goal: Absence of Infection Signs and Symptoms  Reactivated

## 2024-10-07 NOTE — SUBJECTIVE & OBJECTIVE
Interval History: Pt seen and examined this morning on rounds. ISHA. Patient resting comfortably in bed. Pleasantly conversant. On room air. Denies shortness of breath.  Care plan reviewed. Otherwise, doing well and with no further complaints at this time.        Objective:     Vital Signs (Most Recent):  Temp: 97.6 °F (36.4 °C) (10/07/24 1136)  Pulse: 78 (10/07/24 1136)  Resp: 20 (10/07/24 1136)  BP: (!) 142/68 (10/07/24 1136)  SpO2: 96 % (10/07/24 1136) Vital Signs (24h Range):  Temp:  [97.6 °F (36.4 °C)-99.9 °F (37.7 °C)] 97.6 °F (36.4 °C)  Pulse:  [] 78  Resp:  [16-20] 20  SpO2:  [94 %-100 %] 96 %  BP: (142-214)/(68-88) 142/68     Weight: 92.9 kg (204 lb 12.9 oz)  Body mass index is 35.16 kg/m².  No intake or output data in the 24 hours ending 10/07/24 1159      Physical Exam  Gen: in NAD, appears stated age, obese  Neuro: AAOx4, CN2-12 grossly intact BL; motor, sensory, and strength grossly intact BL  HEENT: NTNC, EOMI, PERRLA, MMM; no thyromegaly or lymphadenopathy; no JVD appreciated  CVS: RRR, no m/r/g; S1/S2 auscultated with no S3 or S4; capillary refill < 2 sec  Resp: lungs CTAB, no w/r/r; no belabored breathing or accessory muscle use appreciated   Abd: BS+ in all 4 quadrants; NTND, soft to palpation; no organomegaly appreciated   Extrem: 1+ LE edema. pulses full, equal, and regular over all 4 extremities; no UE or LE edema BL          Significant Labs: All pertinent labs within the past 24 hours have been reviewed.    Significant Imaging: I have reviewed all pertinent imaging results/findings within the past 24 hours.

## 2024-10-07 NOTE — PLAN OF CARE
Problem: Adult Inpatient Plan of Care  Goal: Plan of Care Review  Outcome: Progressing  Goal: Patient-Specific Goal (Individualized)  Outcome: Progressing  Goal: Absence of Hospital-Acquired Illness or Injury  Outcome: Progressing  Goal: Optimal Comfort and Wellbeing  Outcome: Progressing  Goal: Readiness for Transition of Care  Outcome: Progressing     Problem: Infection  Goal: Absence of Infection Signs and Symptoms  Outcome: Progressing     Problem: Diabetes Comorbidity  Goal: Blood Glucose Level Within Targeted Range  Outcome: Progressing     Problem: Hypertension Acute  Goal: Blood Pressure Within Desired Range  Outcome: Progressing     Problem: Fluid Volume Excess  Goal: Fluid Balance  Outcome: Progressing     Problem: Hemodialysis  Goal: Safe, Effective Therapy Delivery  Outcome: Progressing  Goal: Effective Tissue Perfusion  Outcome: Progressing  Goal: Absence of Infection Signs and Symptoms  Outcome: Progressing   Pt progressing toward goals. No distress noted. No falls or injuries during shift. Pt bed in lowest position. Side rails x2. Call bell and personal belongs within reach. Telemetry maintained. Safety precautions maintained.

## 2024-10-07 NOTE — ASSESSMENT & PLAN NOTE
Anemia is likely due to chronic disease due to ESRD and AIHA . Most recent hemoglobin and hematocrit are listed below.  Recent Labs     10/06/24  1846   HGB 9.1*   HCT 30.0*     Plan  - Monitor serial CBC: Daily  - Transfuse PRBC if patient becomes hemodynamically unstable, symptomatic or H/H drops below 7/21.  - Patient has not received any PRBC transfusions to date  - Patient's anemia is currently stable  - Continue Home Prednisone and Folic acid   - Outpatient heme/onc follow up  - On atovaquone for PJP prophylaxis

## 2024-10-07 NOTE — HPI
Jael Hall is a 59 y.o. female with past medical history of ESRD on HD (TTHSat), HTN, T2DM with Insulin use, WAIHA, and GERD who presents to [facility] ED with bilateral leg swelling. She reports leg swelling for one month ever since starting on prednisone. She has also noticed swelling in her cheeks and abdomen. She denies leg pain or difficulty ambulating. She reports she has not noticed improvement after HD.  Had dialysis yesterday 4L off. She denies shortness of breath, cough, chest pain, palpitations, nausea, vomiting, abdominal pain. She is currently on a steroid taper through October.     In the ED: tachycardic and hypertensive. Afebrile and stable on room air. Labs notable for , Hbg 9, Bun  46 Cr 8.7. Glucose 442, AG 18, bicarb 22.  CXR with findings suggesting pulmonary edema/CHF pattern without consolidation. Admitted to .

## 2024-10-08 VITALS
TEMPERATURE: 98 F | RESPIRATION RATE: 20 BRPM | SYSTOLIC BLOOD PRESSURE: 146 MMHG | DIASTOLIC BLOOD PRESSURE: 67 MMHG | WEIGHT: 204.81 LBS | OXYGEN SATURATION: 96 % | HEIGHT: 64 IN | BODY MASS INDEX: 34.97 KG/M2 | HEART RATE: 75 BPM

## 2024-10-08 LAB
ALBUMIN SERPL BCP-MCNC: 3.2 G/DL (ref 3.5–5.2)
ALP SERPL-CCNC: 48 U/L (ref 55–135)
ALT SERPL W/O P-5'-P-CCNC: 11 U/L (ref 10–44)
ANION GAP SERPL CALC-SCNC: 14 MMOL/L (ref 8–16)
AST SERPL-CCNC: 10 U/L (ref 10–40)
BASOPHILS # BLD AUTO: 0.05 K/UL (ref 0–0.2)
BASOPHILS NFR BLD: 0.7 % (ref 0–1.9)
BILIRUB SERPL-MCNC: 0.5 MG/DL (ref 0.1–1)
BUN SERPL-MCNC: 71 MG/DL (ref 8–23)
CALCIUM SERPL-MCNC: 9.5 MG/DL (ref 8.7–10.5)
CHLORIDE SERPL-SCNC: 96 MMOL/L (ref 95–110)
CO2 SERPL-SCNC: 23 MMOL/L (ref 23–29)
CREAT SERPL-MCNC: 11.4 MG/DL (ref 0.5–1.4)
DIFFERENTIAL METHOD BLD: ABNORMAL
EOSINOPHIL # BLD AUTO: 0 K/UL (ref 0–0.5)
EOSINOPHIL NFR BLD: 0.4 % (ref 0–8)
ERYTHROCYTE [DISTWIDTH] IN BLOOD BY AUTOMATED COUNT: 17.9 % (ref 11.5–14.5)
EST. GFR  (NO RACE VARIABLE): 3.5 ML/MIN/1.73 M^2
GLUCOSE SERPL-MCNC: 157 MG/DL (ref 70–110)
HBV CORE AB SERPL QL IA: NORMAL
HBV SURFACE AB SER-ACNC: 66.58 MIU/ML
HBV SURFACE AB SER-ACNC: REACTIVE M[IU]/ML
HBV SURFACE AG SERPL QL IA: NORMAL
HCT VFR BLD AUTO: 29 % (ref 37–48.5)
HGB BLD-MCNC: 9 G/DL (ref 12–16)
IMM GRANULOCYTES # BLD AUTO: 0.04 K/UL (ref 0–0.04)
IMM GRANULOCYTES NFR BLD AUTO: 0.5 % (ref 0–0.5)
LYMPHOCYTES # BLD AUTO: 1.4 K/UL (ref 1–4.8)
LYMPHOCYTES NFR BLD: 18.4 % (ref 18–48)
MAGNESIUM SERPL-MCNC: 2.8 MG/DL (ref 1.6–2.6)
MCH RBC QN AUTO: 26.5 PG (ref 27–31)
MCHC RBC AUTO-ENTMCNC: 31 G/DL (ref 32–36)
MCV RBC AUTO: 86 FL (ref 82–98)
MONOCYTES # BLD AUTO: 0.8 K/UL (ref 0.3–1)
MONOCYTES NFR BLD: 11.4 % (ref 4–15)
NEUTROPHILS # BLD AUTO: 5 K/UL (ref 1.8–7.7)
NEUTROPHILS NFR BLD: 68.6 % (ref 38–73)
NRBC BLD-RTO: 0 /100 WBC
PHOSPHATE SERPL-MCNC: 4.9 MG/DL (ref 2.7–4.5)
PLATELET # BLD AUTO: 151 K/UL (ref 150–450)
PMV BLD AUTO: 11.1 FL (ref 9.2–12.9)
POCT GLUCOSE: 134 MG/DL (ref 70–110)
POCT GLUCOSE: 166 MG/DL (ref 70–110)
POTASSIUM SERPL-SCNC: 4.3 MMOL/L (ref 3.5–5.1)
PROT SERPL-MCNC: 6.2 G/DL (ref 6–8.4)
RBC # BLD AUTO: 3.39 M/UL (ref 4–5.4)
SODIUM SERPL-SCNC: 133 MMOL/L (ref 136–145)
WBC # BLD AUTO: 7.35 K/UL (ref 3.9–12.7)

## 2024-10-08 PROCEDURE — 63600175 PHARM REV CODE 636 W HCPCS: Mod: HCNC

## 2024-10-08 PROCEDURE — G0257 UNSCHED DIALYSIS ESRD PT HOS: HCPCS | Mod: HCNC

## 2024-10-08 PROCEDURE — 80053 COMPREHEN METABOLIC PANEL: CPT | Mod: HCNC

## 2024-10-08 PROCEDURE — 96372 THER/PROPH/DIAG INJ SC/IM: CPT

## 2024-10-08 PROCEDURE — 36415 COLL VENOUS BLD VENIPUNCTURE: CPT | Mod: HCNC

## 2024-10-08 PROCEDURE — 84100 ASSAY OF PHOSPHORUS: CPT | Mod: HCNC

## 2024-10-08 PROCEDURE — 83735 ASSAY OF MAGNESIUM: CPT | Mod: HCNC

## 2024-10-08 PROCEDURE — G0378 HOSPITAL OBSERVATION PER HR: HCPCS | Mod: HCNC

## 2024-10-08 PROCEDURE — 85025 COMPLETE CBC W/AUTO DIFF WBC: CPT | Mod: HCNC

## 2024-10-08 PROCEDURE — 86706 HEP B SURFACE ANTIBODY: CPT | Mod: 91,HCNC | Performed by: INTERNAL MEDICINE

## 2024-10-08 PROCEDURE — 87340 HEPATITIS B SURFACE AG IA: CPT | Mod: HCNC | Performed by: INTERNAL MEDICINE

## 2024-10-08 PROCEDURE — 25000003 PHARM REV CODE 250: Mod: HCNC

## 2024-10-08 PROCEDURE — 99214 OFFICE O/P EST MOD 30 MIN: CPT | Mod: HCNC,,, | Performed by: HOSPITALIST

## 2024-10-08 PROCEDURE — 63600175 PHARM REV CODE 636 W HCPCS: Mod: JG,HCNC | Performed by: STUDENT IN AN ORGANIZED HEALTH CARE EDUCATION/TRAINING PROGRAM

## 2024-10-08 PROCEDURE — 86704 HEP B CORE ANTIBODY TOTAL: CPT | Mod: HCNC | Performed by: INTERNAL MEDICINE

## 2024-10-08 RX ADMIN — SEVELAMER CARBONATE 2400 MG: 800 TABLET, FILM COATED ORAL at 01:10

## 2024-10-08 RX ADMIN — PREDNISONE 20 MG: 20 TABLET ORAL at 01:10

## 2024-10-08 RX ADMIN — FOLIC ACID 1 MG: 1 TABLET ORAL at 01:10

## 2024-10-08 RX ADMIN — ASPIRIN 81 MG: 81 TABLET, COATED ORAL at 05:10

## 2024-10-08 RX ADMIN — EPOETIN ALFA-EPBX 8000 UNITS: 10000 INJECTION, SOLUTION INTRAVENOUS; SUBCUTANEOUS at 10:10

## 2024-10-08 RX ADMIN — LOSARTAN POTASSIUM 100 MG: 50 TABLET, FILM COATED ORAL at 01:10

## 2024-10-08 RX ADMIN — PREGABALIN 75 MG: 75 CAPSULE ORAL at 01:10

## 2024-10-08 RX ADMIN — INSULIN GLARGINE 10 UNITS: 100 INJECTION, SOLUTION SUBCUTANEOUS at 01:10

## 2024-10-08 RX ADMIN — ATORVASTATIN CALCIUM 40 MG: 40 TABLET, FILM COATED ORAL at 01:10

## 2024-10-08 RX ADMIN — PANTOPRAZOLE SODIUM 40 MG: 40 TABLET, DELAYED RELEASE ORAL at 01:10

## 2024-10-08 RX ADMIN — ATOVAQUONE 1500 MG: 750 SUSPENSION ORAL at 01:10

## 2024-10-08 RX ADMIN — HEPARIN SODIUM 5000 UNITS: 5000 INJECTION INTRAVENOUS; SUBCUTANEOUS at 05:10

## 2024-10-08 RX ADMIN — LABETALOL HYDROCHLORIDE 200 MG: 200 TABLET, FILM COATED ORAL at 01:10

## 2024-10-08 NOTE — PROGRESS NOTES
[facility]  Nephrology  Progress Note    Patient Name: Jael Hall  MRN: [id number]  Admission Date: 10/6/2024  Hospital Length of Stay: 0 days  Attending Provider: Luis Antonio Coelho MD   Primary Care Physician: Maty Gayle DO  Principal Problem:Bilateral swelling of feet and ankles    Subjective:     HPI: Jael Hall is a pleasant 59 y.o. woman with past medical history of ESRD on HD (TTHSat), HTN, T2DM with Insulin use, WAIHA, and GERD who presents to [facility] ED with bilateral leg swelling, ongoing about 1 mo, ever since starting prednisone, swelling of cheeks and abdomen, no improvement with hemodialysis, most recent dialysis session was Saturday 10/05/2024 had 4 L removed, denies shortness of breath chest pain abdominal pain. X-ray suggestive of pulmonary edema/CHF pattern, bicarb 22, potassium normal range, glucose 442.  No missed dialysis sessions.  Blood pressure this morning mildly elevated 161/71, was previously up in the 200s SBP.  Nephrology has been consulted regarding management of her hemodialysis.    Interval history: Jael is laying in bed this morning, seen during HD. She thought today was Wednesday, but otherwise feels well and has no complaints.     Objective:     Vital Signs (Most Recent):  Temp: 97.8 °F (36.6 °C) (10/08/24 0723)  Pulse: 71 (10/08/24 0945)  Resp: 18 (10/08/24 0830)  BP: 139/67 (10/08/24 0945)  SpO2: 96 % (10/08/24 0723) Vital Signs (24h Range):  Temp:  [97 °F (36.1 °C)-97.8 °F (36.6 °C)] 97.8 °F (36.6 °C)  Pulse:  [71-87] 71  Resp:  [17-20] 18  SpO2:  [90 %-96 %] 96 %  BP: (135-155)/(59-73) 139/67     Weight: 92.9 kg (204 lb 12.9 oz) (10/07/24 0311)  Body mass index is 35.16 kg/m².  Body surface area is 2.05 meters squared.    No intake/output data recorded.     Physical Exam  Constitutional:       Appearance: Normal appearance. She is obese.   HENT:      Head: Normocephalic.   Cardiovascular:      Rate and Rhythm: Normal rate.      Pulses: Normal pulses.    Pulmonary:      Effort: Pulmonary effort is normal. No respiratory distress.      Breath sounds: No wheezing or rales.   Abdominal:      General: Abdomen is flat.      Palpations: Abdomen is soft.      Tenderness: There is no abdominal tenderness.   Musculoskeletal:      Right lower leg: Edema (nonpitting 1+) present.      Left lower leg: Edema (nonpitting 1+) present.   Skin:     General: Skin is warm.   Neurological:      General: No focal deficit present.      Mental Status: She is alert and oriented to person, place, and time. Mental status is at baseline.   Psychiatric:         Mood and Affect: Mood normal.          Significant Labs:  CBC:   Recent Labs   Lab 10/08/24  0356   WBC 7.35   RBC 3.39*   HGB 9.0*   HCT 29.0*      MCV 86   MCH 26.5*   MCHC 31.0*     CMP:   Recent Labs   Lab 10/08/24  0356   *   CALCIUM 9.5   ALBUMIN 3.2*   PROT 6.2   *   K 4.3   CO2 23   CL 96   BUN 71*   CREATININE 11.4*   ALKPHOS 48*   ALT 11   AST 10   BILITOT 0.5     All labs within the past 24 hours have been reviewed.  Assessment/Plan:   ESRD (end stage renal disease) on dialysis  Outpatient HD Information:  -Dialysis modality: Hemodialysis  -Outpatient HD unit: [facility]   -Nephrologist: Ras TRIMBLE   -HD TX days: Tuesday/Thursday/Saturday, duration of treatment: 3.5 hrs  -Last HD TX prior to hospital admission: 10/5/24  -Dialysis access: RUE AV fistula   -Residual urine: Minium  -EDW: 85 kg     -Resume home HD schedule TThS. Aim to remove 4 L today, as we were unable to slot her in for a session last night.   -On sevelamer for hyperphosphatemia  -EPO for anemia  -Consider echocardiogram for worsened fluid status  -epo 8000 units w HD for anemia hg 9, BP stable     Secondary hyperparathyroidism  -Of renal origin  -PTH elevated at 543. Since she is likely being discharged today after HD, check vit d in outpatient setting.     Thank you for your consult. I will follow-up with patient. Please contact us  if you have any additional questions.    Walt Cazares MD  Nephrology  [facility] - Observation 11H

## 2024-10-08 NOTE — PLAN OF CARE
Problem: Adult Inpatient Plan of Care  Goal: Plan of Care Review  Outcome: Met  Goal: Patient-Specific Goal (Individualized)  Outcome: Met  Goal: Absence of Hospital-Acquired Illness or Injury  Outcome: Met  Goal: Optimal Comfort and Wellbeing  Outcome: Met  Goal: Readiness for Transition of Care  Outcome: Met     Problem: Infection  Goal: Absence of Infection Signs and Symptoms  Outcome: Met     Problem: Diabetes Comorbidity  Goal: Blood Glucose Level Within Targeted Range  Outcome: Met     Problem: Hypertension Acute  Goal: Blood Pressure Within Desired Range  Outcome: Met     Problem: Fluid Volume Excess  Goal: Fluid Balance  Outcome: Met     Problem: Hemodialysis  Goal: Safe, Effective Therapy Delivery  Outcome: Met  Goal: Effective Tissue Perfusion  Outcome: Met  Goal: Absence of Infection Signs and Symptoms  Outcome: Met

## 2024-10-08 NOTE — PROGRESS NOTES
Patient arrived in a wheelchair to dialysis unit. Pt AAO, NAD   Report received from primary nurse  VS's per dialysis Flowsheet.     Hemodialysis initiated using the following:     Dialysis Access: Right arm fistula     Needle size: 15 G  Insertion with no complications.     Will Maintain telemetry and blood pressure monitoring throughout treatment.  Refer to dialysis flowsheet and MAR for details.

## 2024-10-08 NOTE — SUBJECTIVE & OBJECTIVE
Interval history: Jael is laying in bed this morning, seen during HD. She thought today was Wednesday, but otherwise feels well and has no complaints.     Objective:     Vital Signs (Most Recent):  Temp: 97.8 °F (36.6 °C) (10/08/24 0723)  Pulse: 71 (10/08/24 0945)  Resp: 18 (10/08/24 0830)  BP: 139/67 (10/08/24 0945)  SpO2: 96 % (10/08/24 0723) Vital Signs (24h Range):  Temp:  [97 °F (36.1 °C)-97.8 °F (36.6 °C)] 97.8 °F (36.6 °C)  Pulse:  [71-87] 71  Resp:  [17-20] 18  SpO2:  [90 %-96 %] 96 %  BP: (135-155)/(59-73) 139/67     Weight: 92.9 kg (204 lb 12.9 oz) (10/07/24 0311)  Body mass index is 35.16 kg/m².  Body surface area is 2.05 meters squared.    No intake/output data recorded.     Physical Exam  Constitutional:       Appearance: Normal appearance. She is obese.   HENT:      Head: Normocephalic.   Cardiovascular:      Rate and Rhythm: Normal rate.      Pulses: Normal pulses.   Pulmonary:      Effort: Pulmonary effort is normal. No respiratory distress.      Breath sounds: No wheezing or rales.   Abdominal:      General: Abdomen is flat.      Palpations: Abdomen is soft.      Tenderness: There is no abdominal tenderness.   Musculoskeletal:      Right lower leg: Edema (nonpitting 1+) present.      Left lower leg: Edema (nonpitting 1+) present.   Skin:     General: Skin is warm.   Neurological:      General: No focal deficit present.      Mental Status: She is alert and oriented to person, place, and time. Mental status is at baseline.   Psychiatric:         Mood and Affect: Mood normal.          Significant Labs:  CBC:   Recent Labs   Lab 10/08/24  0356   WBC 7.35   RBC 3.39*   HGB 9.0*   HCT 29.0*      MCV 86   MCH 26.5*   MCHC 31.0*     CMP:   Recent Labs   Lab 10/08/24  0356   *   CALCIUM 9.5   ALBUMIN 3.2*   PROT 6.2   *   K 4.3   CO2 23   CL 96   BUN 71*   CREATININE 11.4*   ALKPHOS 48*   ALT 11   AST 10   BILITOT 0.5     All labs within the past 24 hours have been reviewed.

## 2024-10-08 NOTE — PROGRESS NOTES
10/08/24 1212   Post-Hemodialysis Assessment   Rinseback Volume (mL) 250 mL   Blood Volume Processed (Liters) 74.7 L   Dialyzer Clearance Lightly streaked   Duration of Treatment 210 minutes   Additional Fluid Intake (mL) 350 mL   Total UF (mL) 4100 mL   Net Fluid Removal 3500   Patient Response to Treatment tolerated well   Post-Hemodialysis Comments see notes     HD TX complete. Pt tolerated well. Pt AAO, VSS, NAD. Hemostasis achieved. Report given to primary nurse. Awaiting transport to escort pt back to room via wheelchair

## 2024-10-08 NOTE — DISCHARGE SUMMARY
[facility] [address] [place] [address] Medicine  Discharge Summary      Patient Name: Jael Hall  MRN: [id number]  CHUCK: [id number]  Patient Class: OP- Observation  Admission Date: 10/6/2024  Hospital Length of Stay: 0 days  Discharge Date and Time:  10/08/2024 9:49 AM  Attending Physician: Betty att. providers found   Discharging Provider: Luis Antonio Coelho MD  Primary Care Provider: Maty Gayle DO  [facility] Medicine Team: [facility]OS[facility] MED  Luis Antonio Coelho MD  Primary Care Team: [facility] HOSP [facility]    HPI:   Jael Hall is a 59 y.o. female with past medical history of ESRD on HD (TTHSat), HTN, T2DM with Insulin use, WAIHA, and GERD who presents to [facility] ED with bilateral leg swelling. She reports leg swelling for one month ever since starting on prednisone. She has also noticed swelling in her cheeks and abdomen. She denies leg pain or difficulty ambulating. She reports she has not noticed improvement after HD.  Had dialysis yesterday 4L off. She denies shortness of breath, cough, chest pain, palpitations, nausea, vomiting, abdominal pain. She is currently on a steroid taper through October.     In the ED: tachycardic and hypertensive. Afebrile and stable on room air. Labs notable for , Hbg 9, Bun  46 Cr 8.7. Glucose 442, AG 18, bicarb 22.  CXR with findings suggesting pulmonary edema/CHF pattern without consolidation. Admitted to .    * No surgery found *      Hospital Course:   Patient admitted for volume overload with lower extremity edema. Nephrology consulted for iHD for volume removal. Patient without symptoms of SOB and saturating well on room air. Patient had dialysis with 4L off and medically stable for discharge. Encouraged patient to continue prednisone given auto-immune hemolytic anemia and referral to hematology ordered. Patient unable to stay after dialysis session so unable to get TTE. Will order outpatient TTE. Patient seen and evaluated on day of discharge. Pt deemed appropriate for discharge.  Plan discussed with pt, who was agreeable and amenable; medications were discussed and reviewed, outpatient follow-up scheduled, ER precautions were given, all questions were answered to the pt's satisfaction, and patient was subsequently discharged.     Goals of Care Treatment Preferences:  Code Status: Full Code          What is most important right now is to focus on remaining as independent as possible, symptom/pain control, extending life as long as possible, even it it means sacrificing quality.  Accordingly, we have decided that the best plan to meet the patient's goals includes continuing with treatment.      SDOH Screening:  The patient was screened for utility difficulties, food insecurity, transport difficulties, housing insecurity, and interpersonal safety and there were no concerns identified this admission.     Consults:   Consults (From admission, onward)          Status Ordering Provider     Inpatient consult to Nephrology  Once        Provider:  (Not yet assigned)    BENJA Stringer            No new Assessment & Plan notes have been filed under this hospital service since the last note was generated.  Service: Hospital Medicine    Final Active Diagnoses:    Diagnosis Date Noted POA    PRINCIPAL PROBLEM:  Bilateral swelling of feet and ankles [M25.471, M25.474, M25.475, M25.472] 08/15/2022 Yes    Secondary hyperparathyroidism [N25.81] 10/07/2024 Unknown    AIHA (autoimmune hemolytic anemia) [D59.10] 08/12/2024 Yes    Type 2 diabetes mellitus [E11.9] 07/27/2021 Yes    GERD (gastroesophageal reflux disease) [K21.9] 08/13/2020 Yes    Essential hypertension [I10]  Yes     Chronic    ESRD (end stage renal disease) on dialysis [N18.6, Z99.2] 09/28/2017 Not Applicable     Chronic      Problems Resolved During this Admission:       Discharged Condition: good    Disposition: Home or Self Care    Follow Up:    Patient Instructions:      Ambulatory referral/consult to Acute Care at Home   Standing  Status: Future   Referral Priority: Routine Referral Type: Consultation   Referred to Provider: LENNOX PROVIDER Requested Specialty: Internal Medicine   Number of Visits Requested: 1     Ambulatory referral/consult to Hematology / Oncology   Standing Status: Future   Referral Priority: Routine Referral Type: Consultation   Referral Reason: Specialty Services Required   Requested Specialty: Hematology and Oncology   Number of Visits Requested: 1     Echo   Standing Status: Future Standing Exp. Date: 10/08/25     Order Specific Question Answer Comments   Release to patient Immediate        Significant Diagnostic Studies: N/A    Pending Diagnostic Studies:       None           Medications:  Reconciled Home Medications:      Medication List        CHANGE how you take these medications      atorvastatin 40 MG tablet  Commonly known as: LIPITOR  Take 1 tablet (40 mg total) by mouth once daily.  What changed: when to take this            CONTINUE taking these medications      * ACCU-CHEK GUIDE GLUCOSE METER Misc  Generic drug: blood-glucose meter     * blood-glucose meter kit  To check BG 3 times daily, to use with insurance preferred meter     ACCU-CHEK GUIDE L1-L2 CTRL SOL Soln  Generic drug: blood glucose control high,low     * ACCU-CHEK GUIDE TEST STRIPS Strp  Generic drug: blood sugar diagnostic     * blood sugar diagnostic Strp  To check BG 3 times daily, to use with insurance preferred meter     * ACCU-CHEK SOFTCLIX LANCETS Misc  Generic drug: lancets     * lancets Misc  To check BG 3 times daily, to use with insurance preferred meter     acetaminophen 500 MG tablet  Commonly known as: TYLENOL  Take 1 tablet (500 mg total) by mouth every 8 (eight) hours as needed for Pain.     amLODIPine 10 MG tablet  Commonly known as: NORVASC  Take 1 tablet (10 mg total) by mouth nightly.     aspirin 81 MG EC tablet  Commonly known as: ECOTRIN  Take 81 mg by mouth every morning.     atovaquone 750 mg/5 mL Susp oral  "liquid  Commonly known as: MEPRON  Take 10 mLs (1,500 mg total) by mouth once daily.     BD ULTRA-FINE MINI PEN NEEDLE 31 gauge x 3/16" Ndle  Generic drug: pen needle, diabetic  Use to inject insulin into the skin.     DROPSAFE ALCOHOL PREP PADS Padm  Generic drug: alcohol swabs  Apply topically.     epoetin shiela 10,000 unit/mL injection  Commonly known as: PROCRIT  Inject 0.85 mLs (8,500 Units total) into the skin every Mon, Wed, Fri.     folic acid 1 MG tablet  Commonly known as: FOLVITE  Take 1 tablet (1 mg total) by mouth once daily.     insulin aspart U-100 100 unit/mL (3 mL) Inpn pen  Commonly known as: NovoLOG  Inject 0-5 Units into the skin before meals and at bedtime as needed (Hyperglycemia).     insulin glargine U-100 (Lantus) 100 unit/mL (3 mL) Inpn pen  Inject 12 Units into the skin once daily.     labetaloL 200 MG tablet  Commonly known as: NORMODYNE  Take 1 tablet (200 mg total) by mouth every 12 (twelve) hours.     LIDOcaine 5 %  Commonly known as: LIDODERM  Place 1 patch onto the skin once daily. Remove & Discard patch within 12 hours or as directed by MD     losartan 100 MG tablet  Commonly known as: COZAAR  Take 1 tablet (100 mg total) by mouth once daily.     omeprazole 20 MG capsule  Commonly known as: PRILOSEC  Take 1 capsule (20 mg total) by mouth once daily.     predniSONE 20 MG tablet  Commonly known as: DELTASONE  Take 2 tablets (40 mg total) by mouth once daily for 6 days, THEN 1.5 tablets (30 mg total) once daily for 7 days, THEN 1 tablet (20 mg total) once daily. Follow up with Heme Onc for further tapering of prednisone.  Start taking on: September 14, 2024     pregabalin 75 MG capsule  Commonly known as: LYRICA  Take 1 capsule (75 mg total) by mouth once daily.     sevelamer carbonate 800 mg Tab  Commonly known as: RENVELA  Take 3 tablets (2,400 mg total) by mouth 3 (three) times daily with meals.           * This list has 6 medication(s) that are the same as other medications " prescribed for you. Read the directions carefully, and ask your doctor or other care provider to review them with you.                  Indwelling Lines/Drains at time of discharge:   Lines/Drains/Airways       Drain  Duration                  Hemodialysis AV Fistula Right forearm -- days         Hemodialysis AV Fistula Right forearm -- days                    Time spent on the discharge of patient: 35 minutes         Luis Antonio Coelho MD  Department of Hospital Medicine  [facility] 11[facility]

## 2024-10-08 NOTE — PLAN OF CARE
Problem: Adult Inpatient Plan of Care  Goal: Plan of Care Review  Outcome: Not Progressing  Goal: Patient-Specific Goal (Individualized)  Outcome: Not Progressing  Goal: Absence of Hospital-Acquired Illness or Injury  Outcome: Not Progressing  Goal: Optimal Comfort and Wellbeing  Outcome: Not Progressing  Goal: Readiness for Transition of Care  Outcome: Not Progressing     Problem: Infection  Goal: Absence of Infection Signs and Symptoms  Outcome: Not Progressing     Problem: Diabetes Comorbidity  Goal: Blood Glucose Level Within Targeted Range  Outcome: Not Progressing     Problem: Hypertension Acute  Goal: Blood Pressure Within Desired Range  Outcome: Not Progressing     Problem: Fluid Volume Excess  Goal: Fluid Balance  Outcome: Not Progressing     Problem: Hemodialysis  Goal: Safe, Effective Therapy Delivery  Outcome: Not Progressing  Goal: Effective Tissue Perfusion  Outcome: Not Progressing  Goal: Absence of Infection Signs and Symptoms  Outcome: Not Progressing

## 2024-10-14 ENCOUNTER — PATIENT OUTREACH (OUTPATIENT)
Dept: ADMINISTRATIVE | Facility: CLINIC | Age: 59
End: 2024-10-14
Payer: MEDICARE

## 2024-10-14 NOTE — PROGRESS NOTES
C3 nurse spoke with Jael Hall for a TCC post hospital discharge follow up call. The patient has a scheduled HOSFU appointment with Maty Gayle DO  on 10/24/24 @ 1300.

## 2024-10-14 NOTE — PROGRESS NOTES
C3 nurse attempted to contact Jael Hall for a TCC post hospital discharge follow up call. No answer. Left voicemail with callback information. The patient has a scheduled HOSFU appointment with Maty Gayle DO on 10/24/24 @ 1300. Pt is currently on waiting list for a sooner appt.

## 2024-10-15 ENCOUNTER — LAB VISIT (OUTPATIENT)
Dept: LAB | Facility: HOSPITAL | Age: 59
End: 2024-10-15
Attending: INTERNAL MEDICINE
Payer: MEDICARE

## 2024-10-15 DIAGNOSIS — Z99.2 ESRD (END STAGE RENAL DISEASE) ON DIALYSIS: Chronic | ICD-10-CM

## 2024-10-15 DIAGNOSIS — N18.6 ESRD (END STAGE RENAL DISEASE) ON DIALYSIS: Chronic | ICD-10-CM

## 2024-10-15 DIAGNOSIS — D59.10 AIHA (AUTOIMMUNE HEMOLYTIC ANEMIA): ICD-10-CM

## 2024-10-15 LAB
ALBUMIN SERPL BCP-MCNC: 3.6 G/DL (ref 3.5–5.2)
ALP SERPL-CCNC: 52 U/L (ref 55–135)
ALT SERPL W/O P-5'-P-CCNC: 13 U/L (ref 10–44)
ANION GAP SERPL CALC-SCNC: 16 MMOL/L (ref 8–16)
ANISOCYTOSIS BLD QL SMEAR: SLIGHT
AST SERPL-CCNC: 10 U/L (ref 10–40)
BASOPHILS # BLD AUTO: 0.06 K/UL (ref 0–0.2)
BASOPHILS NFR BLD: 0.6 % (ref 0–1.9)
BILIRUB SERPL-MCNC: 0.5 MG/DL (ref 0.1–1)
BUN SERPL-MCNC: 90 MG/DL (ref 8–23)
CALCIUM SERPL-MCNC: 11.1 MG/DL (ref 8.7–10.5)
CHLORIDE SERPL-SCNC: 94 MMOL/L (ref 95–110)
CO2 SERPL-SCNC: 25 MMOL/L (ref 23–29)
CREAT SERPL-MCNC: 12.4 MG/DL (ref 0.5–1.4)
DIFFERENTIAL METHOD BLD: ABNORMAL
EOSINOPHIL # BLD AUTO: 0.1 K/UL (ref 0–0.5)
EOSINOPHIL NFR BLD: 0.7 % (ref 0–8)
ERYTHROCYTE [DISTWIDTH] IN BLOOD BY AUTOMATED COUNT: 17.6 % (ref 11.5–14.5)
EST. GFR  (NO RACE VARIABLE): 3.1 ML/MIN/1.73 M^2
GLUCOSE SERPL-MCNC: 123 MG/DL (ref 70–110)
HCT VFR BLD AUTO: 32.4 % (ref 37–48.5)
HGB BLD-MCNC: 10 G/DL (ref 12–16)
HYPOCHROMIA BLD QL SMEAR: ABNORMAL
IMM GRANULOCYTES # BLD AUTO: 0.07 K/UL (ref 0–0.04)
IMM GRANULOCYTES NFR BLD AUTO: 0.7 % (ref 0–0.5)
LDH SERPL L TO P-CCNC: 263 U/L (ref 110–260)
LYMPHOCYTES # BLD AUTO: 1.5 K/UL (ref 1–4.8)
LYMPHOCYTES NFR BLD: 14.6 % (ref 18–48)
MCH RBC QN AUTO: 26.4 PG (ref 27–31)
MCHC RBC AUTO-ENTMCNC: 30.9 G/DL (ref 32–36)
MCV RBC AUTO: 86 FL (ref 82–98)
MONOCYTES # BLD AUTO: 1.1 K/UL (ref 0.3–1)
MONOCYTES NFR BLD: 11.1 % (ref 4–15)
NEUTROPHILS # BLD AUTO: 7.3 K/UL (ref 1.8–7.7)
NEUTROPHILS NFR BLD: 72.3 % (ref 38–73)
NRBC BLD-RTO: 0 /100 WBC
OVALOCYTES BLD QL SMEAR: ABNORMAL
PLATELET # BLD AUTO: 195 K/UL (ref 150–450)
PMV BLD AUTO: 11.5 FL (ref 9.2–12.9)
POIKILOCYTOSIS BLD QL SMEAR: SLIGHT
POLYCHROMASIA BLD QL SMEAR: ABNORMAL
POTASSIUM SERPL-SCNC: 5.3 MMOL/L (ref 3.5–5.1)
PROT SERPL-MCNC: 7 G/DL (ref 6–8.4)
RBC # BLD AUTO: 3.79 M/UL (ref 4–5.4)
RETICS/RBC NFR AUTO: 1.5 % (ref 0.5–2.5)
SODIUM SERPL-SCNC: 135 MMOL/L (ref 136–145)
SPHEROCYTES BLD QL SMEAR: ABNORMAL
WBC # BLD AUTO: 10.11 K/UL (ref 3.9–12.7)

## 2024-10-15 PROCEDURE — 84165 PROTEIN E-PHORESIS SERUM: CPT | Mod: 26,HCNC,, | Performed by: PATHOLOGY

## 2024-10-15 PROCEDURE — 83615 LACTATE (LD) (LDH) ENZYME: CPT | Mod: HCNC | Performed by: INTERNAL MEDICINE

## 2024-10-15 PROCEDURE — 84165 PROTEIN E-PHORESIS SERUM: CPT | Mod: HCNC | Performed by: INTERNAL MEDICINE

## 2024-10-15 PROCEDURE — 80053 COMPREHEN METABOLIC PANEL: CPT | Mod: HCNC | Performed by: INTERNAL MEDICINE

## 2024-10-15 PROCEDURE — 83521 IG LIGHT CHAINS FREE EACH: CPT | Mod: 59,HCNC | Performed by: INTERNAL MEDICINE

## 2024-10-15 PROCEDURE — 86334 IMMUNOFIX E-PHORESIS SERUM: CPT | Mod: HCNC | Performed by: INTERNAL MEDICINE

## 2024-10-15 PROCEDURE — 86334 IMMUNOFIX E-PHORESIS SERUM: CPT | Mod: 26,HCNC,, | Performed by: PATHOLOGY

## 2024-10-15 PROCEDURE — 85045 AUTOMATED RETICULOCYTE COUNT: CPT | Mod: HCNC | Performed by: INTERNAL MEDICINE

## 2024-10-15 PROCEDURE — 36415 COLL VENOUS BLD VENIPUNCTURE: CPT | Mod: HCNC | Performed by: INTERNAL MEDICINE

## 2024-10-15 PROCEDURE — 85025 COMPLETE CBC W/AUTO DIFF WBC: CPT | Mod: HCNC | Performed by: INTERNAL MEDICINE

## 2024-10-16 LAB
INTERPRETATION SERPL IFE-IMP: NORMAL
KAPPA LC SER QL IA: 14.59 MG/DL (ref 0.33–1.94)
KAPPA LC/LAMBDA SER IA: 1.55 (ref 0.26–1.65)
LAMBDA LC SER QL IA: 9.44 MG/DL (ref 0.57–2.63)

## 2024-10-17 ENCOUNTER — OFFICE VISIT (OUTPATIENT)
Dept: HEMATOLOGY/ONCOLOGY | Facility: CLINIC | Age: 59
End: 2024-10-17
Payer: MEDICARE

## 2024-10-17 VITALS
DIASTOLIC BLOOD PRESSURE: 67 MMHG | TEMPERATURE: 98 F | OXYGEN SATURATION: 96 % | BODY MASS INDEX: 33.17 KG/M2 | WEIGHT: 194.31 LBS | HEIGHT: 64 IN | SYSTOLIC BLOOD PRESSURE: 153 MMHG | HEART RATE: 95 BPM

## 2024-10-17 DIAGNOSIS — M25.472 BILATERAL SWELLING OF FEET AND ANKLES: ICD-10-CM

## 2024-10-17 DIAGNOSIS — D59.10 AIHA (AUTOIMMUNE HEMOLYTIC ANEMIA): Primary | ICD-10-CM

## 2024-10-17 DIAGNOSIS — M25.471 BILATERAL SWELLING OF FEET AND ANKLES: ICD-10-CM

## 2024-10-17 DIAGNOSIS — M25.475 BILATERAL SWELLING OF FEET AND ANKLES: ICD-10-CM

## 2024-10-17 DIAGNOSIS — M25.474 BILATERAL SWELLING OF FEET AND ANKLES: ICD-10-CM

## 2024-10-17 LAB
ALBUMIN SERPL ELPH-MCNC: 4.05 G/DL (ref 3.35–5.55)
ALPHA1 GLOB SERPL ELPH-MCNC: 0.28 G/DL (ref 0.17–0.41)
ALPHA2 GLOB SERPL ELPH-MCNC: 0.55 G/DL (ref 0.43–0.99)
B-GLOBULIN SERPL ELPH-MCNC: 0.86 G/DL (ref 0.5–1.1)
GAMMA GLOB SERPL ELPH-MCNC: 0.86 G/DL (ref 0.67–1.58)
PROT SERPL-MCNC: 6.6 G/DL (ref 6–8.4)

## 2024-10-17 PROCEDURE — 99214 OFFICE O/P EST MOD 30 MIN: CPT | Mod: HCNC,S$GLB,, | Performed by: INTERNAL MEDICINE

## 2024-10-17 PROCEDURE — 3077F SYST BP >= 140 MM HG: CPT | Mod: HCNC,CPTII,S$GLB, | Performed by: INTERNAL MEDICINE

## 2024-10-17 PROCEDURE — 3008F BODY MASS INDEX DOCD: CPT | Mod: HCNC,CPTII,S$GLB, | Performed by: INTERNAL MEDICINE

## 2024-10-17 PROCEDURE — 4010F ACE/ARB THERAPY RXD/TAKEN: CPT | Mod: HCNC,CPTII,S$GLB, | Performed by: INTERNAL MEDICINE

## 2024-10-17 PROCEDURE — 1159F MED LIST DOCD IN RCRD: CPT | Mod: HCNC,CPTII,S$GLB, | Performed by: INTERNAL MEDICINE

## 2024-10-17 PROCEDURE — 3051F HG A1C>EQUAL 7.0%<8.0%: CPT | Mod: HCNC,CPTII,S$GLB, | Performed by: INTERNAL MEDICINE

## 2024-10-17 PROCEDURE — 3078F DIAST BP <80 MM HG: CPT | Mod: HCNC,CPTII,S$GLB, | Performed by: INTERNAL MEDICINE

## 2024-10-17 PROCEDURE — 99999 PR PBB SHADOW E&M-EST. PATIENT-LVL V: CPT | Mod: PBBFAC,HCNC,, | Performed by: INTERNAL MEDICINE

## 2024-10-17 PROCEDURE — 3066F NEPHROPATHY DOC TX: CPT | Mod: HCNC,CPTII,S$GLB, | Performed by: INTERNAL MEDICINE

## 2024-10-17 NOTE — PROGRESS NOTES
Subjective:       Patient ID: Jael Hall is a 59 y.o. female.    Chief Complaint: Anemia    HPI    Patient transfer from Dr. Dixon. History of anemia of chronic kidney disease worsened by diagnosis of autoimmune hemolytic anemia September 2024 on prednisone taper. Currently on 20mg daily.     Ready to get off steroids, having steroid side effects of moon face and lower extremity fluid retention. On atovaquone daily.     Reports ice cravings. Most recent ferritin was over 2000 at beginning of October.    Review of Systems   Constitutional: Negative.    HENT: Negative.     Eyes: Negative.    Respiratory: Negative.     Cardiovascular:  Positive for leg swelling.   Gastrointestinal: Negative.    Endocrine: Negative.    Genitourinary: Negative.    Musculoskeletal: Negative.    Integumentary:  Negative.   Allergic/Immunologic: Negative for environmental allergies, food allergies and immunocompromised state.   Neurological: Negative.    Hematological:  Negative for adenopathy. Does not bruise/bleed easily.   Psychiatric/Behavioral: Negative.           Objective:      Physical Exam  Vitals and nursing note reviewed.   Constitutional:       Appearance: She is well-developed.   HENT:      Head: Normocephalic and atraumatic.   Eyes:      General: No scleral icterus.     Conjunctiva/sclera: Conjunctivae normal.   Cardiovascular:      Rate and Rhythm: Normal rate.   Pulmonary:      Effort: Pulmonary effort is normal. No respiratory distress.   Abdominal:      General: There is no distension.      Palpations: Abdomen is soft.      Tenderness: There is no abdominal tenderness.   Musculoskeletal:         General: Normal range of motion.      Cervical back: Normal range of motion and neck supple.      Right lower leg: Edema present.      Left lower leg: Edema present.   Skin:     General: Skin is warm and dry.   Neurological:      Mental Status: She is alert and oriented to person, place, and time.      Cranial Nerves: No  cranial nerve deficit.   Psychiatric:         Behavior: Behavior normal.         Current Outpatient Medications   Medication Sig Dispense Refill    ACCU-CHEK GUIDE GLUCOSE METER Misc       ACCU-CHEK GUIDE L1-L2 CTRL SOL Soln       ACCU-CHEK GUIDE TEST STRIPS Strp       ACCU-CHEK SOFTCLIX LANCETS Misc       acetaminophen (TYLENOL) 500 MG tablet Take 1 tablet (500 mg total) by mouth every 8 (eight) hours as needed for Pain. 20 tablet 0    amLODIPine (NORVASC) 10 MG tablet Take 1 tablet (10 mg total) by mouth nightly. 90 tablet 3    aspirin (ECOTRIN) 81 MG EC tablet Take 81 mg by mouth every morning.      atorvastatin (LIPITOR) 40 MG tablet Take 1 tablet (40 mg total) by mouth once daily. 90 tablet 3    atovaquone (MEPRON) 750 mg/5 mL Susp oral liquid Take 10 mLs (1,500 mg total) by mouth once daily. 300 mL 2    blood sugar diagnostic Strp To check BG 3 times daily, to use with insurance preferred meter 200 each 0    blood-glucose meter kit To check BG 3 times daily, to use with insurance preferred meter 1 each 0    DROPSAFE ALCOHOL PREP PADS PadM Apply topically.      epoetin shiela (PROCRIT) 10,000 unit/mL injection Inject 0.85 mLs (8,500 Units total) into the skin every Mon, Wed, Fri. (Patient taking differently: Inject 100 Units/kg into the skin. Dialysis days Tues, Thurs and Sa)      folic acid (FOLVITE) 1 MG tablet Take 1 tablet (1 mg total) by mouth once daily. 90 tablet 3    insulin aspart U-100 (NOVOLOG) 100 unit/mL (3 mL) InPn pen Inject 0-5 Units into the skin before meals and at bedtime as needed (Hyperglycemia). 3 mL 0    insulin glargine U-100, Lantus, 100 unit/mL (3 mL) SubQ InPn pen Inject 12 Units into the skin once daily. 7.2 mL 0    labetaloL (NORMODYNE) 200 MG tablet Take 1 tablet (200 mg total) by mouth every 12 (twelve) hours. 60 tablet 0    lancets Misc To check BG 3 times daily, to use with insurance preferred meter 200 each 0    LIDOcaine (LIDODERM) 5 % Place 1 patch onto the skin once daily.  "Remove & Discard patch within 12 hours or as directed by MD 14 patch 0    losartan (COZAAR) 100 MG tablet Take 1 tablet (100 mg total) by mouth once daily. 90 tablet 3    pen needle, diabetic 31 gauge x 3/16" Ndle Use to inject insulin into the skin. 100 each 0    predniSONE (DELTASONE) 20 MG tablet Take 2 tablets (40 mg total) by mouth once daily for 6 days, THEN 1.5 tablets (30 mg total) once daily for 7 days, THEN 1 tablet (20 mg total) once daily. Follow up with Heme Onc for further tapering of prednisone.      pregabalin (LYRICA) 75 MG capsule Take 1 capsule (75 mg total) by mouth once daily. 90 capsule 3    sevelamer carbonate (RENVELA) 800 mg Tab Take 3 tablets (2,400 mg total) by mouth 3 (three) times daily with meals. 270 tablet 3    omeprazole (PRILOSEC) 20 MG capsule Take 1 capsule (20 mg total) by mouth once daily. (Patient not taking: Reported on 10/17/2024) 90 capsule 3     No current facility-administered medications for this visit.      Lab Results   Component Value Date    WBC 10.11 10/15/2024    HGB 10.0 (L) 10/15/2024    HCT 32.4 (L) 10/15/2024    MCV 86 10/15/2024     10/15/2024        CMP  Sodium   Date Value Ref Range Status   10/15/2024 135 (L) 136 - 145 mmol/L Final     Potassium   Date Value Ref Range Status   10/15/2024 5.3 (H) 3.5 - 5.1 mmol/L Final     Comment:     *No Visible Hemolysis     Chloride   Date Value Ref Range Status   10/15/2024 94 (L) 95 - 110 mmol/L Final     CO2   Date Value Ref Range Status   10/15/2024 25 23 - 29 mmol/L Final     Glucose   Date Value Ref Range Status   10/15/2024 123 (H) 70 - 110 mg/dL Final     BUN   Date Value Ref Range Status   10/15/2024 90 (H) 8 - 23 mg/dL Final     Creatinine   Date Value Ref Range Status   10/15/2024 12.4 (H) 0.5 - 1.4 mg/dL Final     Calcium   Date Value Ref Range Status   10/15/2024 11.1 (H) 8.7 - 10.5 mg/dL Final     Total Protein   Date Value Ref Range Status   10/15/2024 7.0 6.0 - 8.4 g/dL Final     Albumin   Date " Value Ref Range Status   10/15/2024 3.6 3.5 - 5.2 g/dL Final     Total Bilirubin   Date Value Ref Range Status   10/15/2024 0.5 0.1 - 1.0 mg/dL Final     Comment:     For infants and newborns, interpretation of results should be based  on gestational age, weight and in agreement with clinical  observations.    Premature Infant recommended reference ranges:  Up to 24 hours.............<8.0 mg/dL  Up to 48 hours............<12.0 mg/dL  3-5 days..................<15.0 mg/dL  6-29 days.................<15.0 mg/dL       Alkaline Phosphatase   Date Value Ref Range Status   10/15/2024 52 (L) 55 - 135 U/L Final     AST   Date Value Ref Range Status   10/15/2024 10 10 - 40 U/L Final     ALT   Date Value Ref Range Status   10/15/2024 13 10 - 44 U/L Final     Anion Gap   Date Value Ref Range Status   10/15/2024 16 8 - 16 mmol/L Final     eGFR if    Date Value Ref Range Status   05/01/2022 5.2 (A) >60 mL/min/1.73 m^2 Final     eGFR if non    Date Value Ref Range Status   05/01/2022 4.5 (A) >60 mL/min/1.73 m^2 Final     Comment:     Calculation used to obtain the estimated glomerular filtration  rate (eGFR) is the CKD-EPI equation.             Assessment:       Problem List Items Addressed This Visit          Orthopedic    Bilateral swelling of feet and ankles       Plan:       Acute (AIHA) on Chronic (CKD) anemia.    CBC is stable, goal hemoglobin 10 grams, on steroid taper  On steroid taper. Taking 1/2 of 20mg tablet at of 10/14  Instructed to take 1/4 20mg table 10/21-10/28 then stop steroids  Instructed to take atovaquone until 11/25  Continue labs every Tuesday until 11/25  Plan for December/January follow-up, needs appts mailed      BMT Chart Routing      Follow up with physician . December/january - needs appointments mailed   Follow up with [facility]    Provider visit type    Infusion scheduling note    Injection scheduling note    Labs CBC, reticulocytes and LDH   Scheduling:  Preferred  lab:  Lab interval:  weekly every Tuesday through 11/26   Imaging    Pharmacy appointment    Other referrals

## 2024-10-18 LAB
PATHOLOGIST INTERPRETATION IFE: NORMAL
PATHOLOGIST INTERPRETATION SPE: NORMAL

## 2024-10-21 ENCOUNTER — CLINICAL SUPPORT (OUTPATIENT)
Dept: DIABETES | Facility: CLINIC | Age: 59
End: 2024-10-21
Payer: MEDICARE

## 2024-10-21 ENCOUNTER — TELEPHONE (OUTPATIENT)
Dept: TRANSPLANT | Facility: CLINIC | Age: 59
End: 2024-10-21
Payer: MEDICARE

## 2024-10-21 DIAGNOSIS — E08.65 DIABETES MELLITUS DUE TO UNDERLYING CONDITION WITH HYPERGLYCEMIA, WITHOUT LONG-TERM CURRENT USE OF INSULIN: ICD-10-CM

## 2024-10-21 DIAGNOSIS — R73.9 HYPERGLYCEMIA: ICD-10-CM

## 2024-10-21 PROCEDURE — G0108 DIAB MANAGE TRN  PER INDIV: HCPCS | Mod: HCNC,S$GLB,, | Performed by: INTERNAL MEDICINE

## 2024-10-21 PROCEDURE — 99999 PR PBB SHADOW E&M-EST. PATIENT-LVL I: CPT | Mod: PBBFAC,HCNC,,

## 2024-10-21 NOTE — PROGRESS NOTES
Diabetes Care Specialist Progress Note  Author: Katy Liu RD, CDE  Date: 10/21/2024      Intake  Program Intake  Reason for Diabetes Program Visit:: Initial Diabetes Assessment  Type of Intervention:: Individual  Individual: Education  Education:  (hospital f/u)    Current diabetes risk level:: moderate    Lab Results   Component Value Date    HGBA1C 7.6 (H) 09/01/2024       In the last month, have you used the ER or been admitted to the hospital: Yes  Was the ER or hospital admission related to diabetes?: No          Current Diabetes Treatment: Insulin  Method of insulin delivery?: Injections  Injection Type: Pens  Pen Type/Dose: Novolog, lantus            Glucose Monitoring  Patient has CGM: No      SMBG 3 times daily  Range over last few days           Lifestyle Coping Support & Clinical  Lifestyle/Coping/Support  Psychosocial/Coping Skills Assessment Completed: : Yes  Area of need?: No    Problem Review  Active Comorbidities: End Stage Renal Disease, Hypertension        Diabetes Self-Management Skills Assessment  Medication Skills Assessment  Patient is able to identify current diabetes medications, dosages, and appropriate timing of medications.: yes  Patient reports problems or concerns with current medication regimen.: no  Patient is  aware that some diabetes medications can cause low blood sugar?: Yes  Medication Skills Assessment Completed:: Yes  Assessment indicates:: Instruction Needed  Area of need?: Yes    Diabetes Disease Process/Treatment Options  Is patient aware of what causes diabetes?: Yes  Diabetes Disease Process/Treatment Options: Skills Assessment Completed: Yes  Area of need?: No    Nutrition/Healthy Eating  Nutrition/Healthy Eating Skills Assessment Completed:: Yes  Assessment indicates:: Instruction Needed  Area of need?: Yes    Physical Activity/Exercise  Patient can identify forms of physical activity.: yes  Physical Activity/Exercise Skills Assessment Completed: :  Yes  Assessment indicates:: Instruction Needed  Area of need?: Yes    Home Blood Glucose Monitoring  Patient states that blood sugar is checked at home daily.: yes  Monitoring Method:: home glucometer  Home Blood Glucose Monitoring Skills Assessment Completed: : Yes  Assessment indicates:: Instruction Needed  Area of need?: Yes    Acute Complications  Acute Complications Skills Assessment Completed: : Yes  Assessment indicates:: Instruction Needed  Area of need?: Yes    Chronic Complications  Reviewed health maintenance: yes  Area of need?: No          Assessment Summary and Plan    Based on today's diabetes care assessment, the following areas of need were identified:          11/4/2024   Areas of Need   Medications/Current Diabetes Treatment Yes  See Care Plan     Lifestyle Coping Support No   Diabetes Disease Process/Treatment Options No   Nutrition/Healthy Eating Yes  Reviewed general dietary recs     Physical Activity/Exercise Yes  -Discussed goals and benefits of regular physical activity.      Home Blood Glucose Monitoring Yes  Discussed goal BGs for different times of day and in relation to meals. Instructed pt to test BG AC/HS:      Acute Complications Yes  - Discussed hypoglycemia vs hyperglycemia symptoms and discussed appropriate treatments for each.   - Reviewed that pt is at risk of hypo with current medication regimen.   - Discussed general vs severe hyperglycemia and risk of DKA/HHS.     Chronic Complications No      Today's interventions were provided through individual discussion, instruction, and written materials were provided.      Patient verbalized understanding of instruction and written materials.  Pt was able to return back demonstration of instructions today. Patient understood key points, needs reinforcement and further instruction.                   Diabetes Self-Management Care Plan:    Today's Diabetes Self-Management Care Plan was developed with Jael's input. Jael has agreed to  work toward the following goal(s) to improve his/her overall diabetes control.      Care Plan: Diabetes Management   Updates made since 11/5/2023 12:00 AM        Problem: Medications         Goal: Patient Agrees to take insulin as instructed    Start Date: 10/21/2024   Expected End Date: 2/28/2025   Priority: High   Barriers: Knowledge deficit   Note:        Ms Hall is here for DSM review after recent hospital admission for ESRD.   She was discharged from the hospital on prednisone taper. Expected to be on pred until 10/25.   Rise in BGs from prednisone - also d/c'd on MDI.   Lantus 12 units daily  Novolog 6 units AC      She did not bring BG logs with her today but reports BGs are ranging mostly at goal.     Instructed her to please reach out when she stops pred, lauren if BGs low, as may need to taper off insulin                   Follow Up Plan       Today's care plan and follow up schedule was discussed with patient.  Jael verbalized understanding of the care plan, goals, and agrees to follow up plan.        The patient was encouraged to communicate with his/her health care provider/physician and care team regarding his/her condition(s) and treatment.  I provided the patient with my contact information today and encouraged to contact me via phone or [facility] Patient Portal as needed.           Length of Visit   Total Time: 45 Minutes

## 2024-10-22 ENCOUNTER — LAB VISIT (OUTPATIENT)
Dept: LAB | Facility: HOSPITAL | Age: 59
End: 2024-10-22
Attending: INTERNAL MEDICINE
Payer: MEDICARE

## 2024-10-22 DIAGNOSIS — D59.10 AIHA (AUTOIMMUNE HEMOLYTIC ANEMIA): ICD-10-CM

## 2024-10-22 LAB
ANISOCYTOSIS BLD QL SMEAR: SLIGHT
BASOPHILS # BLD AUTO: 0.06 K/UL (ref 0–0.2)
BASOPHILS NFR BLD: 0.5 % (ref 0–1.9)
DIFFERENTIAL METHOD BLD: ABNORMAL
EOSINOPHIL # BLD AUTO: 0.2 K/UL (ref 0–0.5)
EOSINOPHIL NFR BLD: 1.7 % (ref 0–8)
ERYTHROCYTE [DISTWIDTH] IN BLOOD BY AUTOMATED COUNT: 17.1 % (ref 11.5–14.5)
HCT VFR BLD AUTO: 29 % (ref 37–48.5)
HGB BLD-MCNC: 8.9 G/DL (ref 12–16)
HYPOCHROMIA BLD QL SMEAR: ABNORMAL
IMM GRANULOCYTES # BLD AUTO: 0.05 K/UL (ref 0–0.04)
IMM GRANULOCYTES NFR BLD AUTO: 0.4 % (ref 0–0.5)
LDH SERPL L TO P-CCNC: 261 U/L (ref 110–260)
LYMPHOCYTES # BLD AUTO: 1.3 K/UL (ref 1–4.8)
LYMPHOCYTES NFR BLD: 11.7 % (ref 18–48)
MCH RBC QN AUTO: 26.5 PG (ref 27–31)
MCHC RBC AUTO-ENTMCNC: 30.7 G/DL (ref 32–36)
MCV RBC AUTO: 86 FL (ref 82–98)
MONOCYTES # BLD AUTO: 1.1 K/UL (ref 0.3–1)
MONOCYTES NFR BLD: 9.2 % (ref 4–15)
NEUTROPHILS # BLD AUTO: 8.8 K/UL (ref 1.8–7.7)
NEUTROPHILS NFR BLD: 76.5 % (ref 38–73)
NRBC BLD-RTO: 0 /100 WBC
OVALOCYTES BLD QL SMEAR: ABNORMAL
PLATELET # BLD AUTO: 176 K/UL (ref 150–450)
PMV BLD AUTO: 12.8 FL (ref 9.2–12.9)
POIKILOCYTOSIS BLD QL SMEAR: SLIGHT
POLYCHROMASIA BLD QL SMEAR: ABNORMAL
RBC # BLD AUTO: 3.36 M/UL (ref 4–5.4)
RETICS/RBC NFR AUTO: 0.8 % (ref 0.5–2.5)
WBC # BLD AUTO: 11.48 K/UL (ref 3.9–12.7)

## 2024-10-22 PROCEDURE — 36415 COLL VENOUS BLD VENIPUNCTURE: CPT | Mod: HCNC | Performed by: INTERNAL MEDICINE

## 2024-10-22 PROCEDURE — 83615 LACTATE (LD) (LDH) ENZYME: CPT | Mod: HCNC | Performed by: INTERNAL MEDICINE

## 2024-10-22 PROCEDURE — 85025 COMPLETE CBC W/AUTO DIFF WBC: CPT | Mod: HCNC | Performed by: INTERNAL MEDICINE

## 2024-10-22 PROCEDURE — 85045 AUTOMATED RETICULOCYTE COUNT: CPT | Mod: HCNC | Performed by: INTERNAL MEDICINE

## 2024-10-25 ENCOUNTER — TELEPHONE (OUTPATIENT)
Dept: AUDIOLOGY | Facility: CLINIC | Age: 59
End: 2024-10-25
Payer: MEDICARE

## 2024-10-25 NOTE — TELEPHONE ENCOUNTER
----- Message from Tech Jasimine sent at 10/25/2024  9:50 AM CDT -----  Regarding: Hearing aid assitance  Contact: [phone]  The patient is calling to schedule an appointment due to her hearing aids not being paired to her new phone.  Please contact patient to further discuss thank you.

## 2024-10-28 ENCOUNTER — LAB VISIT (OUTPATIENT)
Dept: LAB | Facility: HOSPITAL | Age: 59
End: 2024-10-28
Payer: MEDICARE

## 2024-10-28 ENCOUNTER — OFFICE VISIT (OUTPATIENT)
Dept: INTERNAL MEDICINE | Facility: CLINIC | Age: 59
End: 2024-10-28
Payer: MEDICARE

## 2024-10-28 VITALS
OXYGEN SATURATION: 94 % | DIASTOLIC BLOOD PRESSURE: 64 MMHG | WEIGHT: 200.38 LBS | BODY MASS INDEX: 34.21 KG/M2 | HEART RATE: 94 BPM | SYSTOLIC BLOOD PRESSURE: 140 MMHG | HEIGHT: 64 IN

## 2024-10-28 DIAGNOSIS — K76.0 NAFLD (NONALCOHOLIC FATTY LIVER DISEASE): ICD-10-CM

## 2024-10-28 DIAGNOSIS — D59.10 AIHA (AUTOIMMUNE HEMOLYTIC ANEMIA): ICD-10-CM

## 2024-10-28 DIAGNOSIS — I15.1 HYPERTENSION SECONDARY TO OTHER RENAL DISORDERS: ICD-10-CM

## 2024-10-28 DIAGNOSIS — I10 ESSENTIAL HYPERTENSION: Primary | ICD-10-CM

## 2024-10-28 DIAGNOSIS — N18.6 ESRD (END STAGE RENAL DISEASE) ON DIALYSIS: Chronic | ICD-10-CM

## 2024-10-28 DIAGNOSIS — Z99.2 ESRD (END STAGE RENAL DISEASE) ON DIALYSIS: Chronic | ICD-10-CM

## 2024-10-28 DIAGNOSIS — I50.32 CHRONIC DIASTOLIC HEART FAILURE: ICD-10-CM

## 2024-10-28 LAB
ALBUMIN SERPL BCP-MCNC: 3.7 G/DL (ref 3.5–5.2)
ALP SERPL-CCNC: 48 U/L (ref 40–150)
ALT SERPL W/O P-5'-P-CCNC: 13 U/L (ref 10–44)
ANION GAP SERPL CALC-SCNC: 14 MMOL/L (ref 8–16)
AST SERPL-CCNC: 15 U/L (ref 10–40)
BILIRUB SERPL-MCNC: 0.6 MG/DL (ref 0.1–1)
BUN SERPL-MCNC: 40 MG/DL (ref 8–23)
CALCIUM SERPL-MCNC: 10.6 MG/DL (ref 8.7–10.5)
CHLORIDE SERPL-SCNC: 97 MMOL/L (ref 95–110)
CHOLEST SERPL-MCNC: 141 MG/DL (ref 120–199)
CHOLEST/HDLC SERPL: 2.7 {RATIO} (ref 2–5)
CO2 SERPL-SCNC: 29 MMOL/L (ref 23–29)
CREAT SERPL-MCNC: 10.7 MG/DL (ref 0.5–1.4)
EST. GFR  (NO RACE VARIABLE): 3.7 ML/MIN/1.73 M^2
GLUCOSE SERPL-MCNC: 328 MG/DL (ref 70–110)
HDLC SERPL-MCNC: 53 MG/DL (ref 40–75)
HDLC SERPL: 37.6 % (ref 20–50)
LDLC SERPL CALC-MCNC: 52.2 MG/DL (ref 63–159)
NONHDLC SERPL-MCNC: 88 MG/DL
POTASSIUM SERPL-SCNC: 5 MMOL/L (ref 3.5–5.1)
PROT SERPL-MCNC: 7.3 G/DL (ref 6–8.4)
SODIUM SERPL-SCNC: 140 MMOL/L (ref 136–145)
TRIGL SERPL-MCNC: 179 MG/DL (ref 30–150)

## 2024-10-28 PROCEDURE — 80061 LIPID PANEL: CPT | Mod: HCNC

## 2024-10-28 PROCEDURE — 36415 COLL VENOUS BLD VENIPUNCTURE: CPT | Mod: HCNC

## 2024-10-28 PROCEDURE — 99999 PR PBB SHADOW E&M-EST. PATIENT-LVL III: CPT | Mod: PBBFAC,HCNC,GC,

## 2024-10-28 PROCEDURE — 80053 COMPREHEN METABOLIC PANEL: CPT | Mod: HCNC

## 2024-10-28 RX ORDER — ATORVASTATIN CALCIUM 40 MG/1
40 TABLET, FILM COATED ORAL DAILY
Qty: 90 TABLET | Refills: 3 | Status: SHIPPED | OUTPATIENT
Start: 2024-10-28 | End: 2025-10-28

## 2024-10-28 RX ORDER — AMLODIPINE BESYLATE 10 MG/1
10 TABLET ORAL NIGHTLY
Qty: 90 TABLET | Refills: 3 | Status: SHIPPED | OUTPATIENT
Start: 2024-10-28

## 2024-10-28 RX ORDER — LOSARTAN POTASSIUM 100 MG/1
100 TABLET ORAL DAILY
Qty: 90 TABLET | Refills: 3 | Status: SHIPPED | OUTPATIENT
Start: 2024-10-28 | End: 2025-10-28

## 2024-10-29 ENCOUNTER — CLINICAL SUPPORT (OUTPATIENT)
Dept: AUDIOLOGY | Facility: CLINIC | Age: 59
End: 2024-10-29
Payer: MEDICARE

## 2024-10-29 ENCOUNTER — TELEPHONE (OUTPATIENT)
Dept: INTERNAL MEDICINE | Facility: CLINIC | Age: 59
End: 2024-10-29
Payer: MEDICARE

## 2024-10-29 DIAGNOSIS — H93.293 IMPAIRED AUDITORY DISCRIMINATION, BILATERAL: Primary | ICD-10-CM

## 2024-10-29 DIAGNOSIS — H90.3 SENSORINEURAL HEARING LOSS (SNHL) OF BOTH EARS: ICD-10-CM

## 2024-10-29 PROCEDURE — 99499 UNLISTED E&M SERVICE: CPT | Mod: HCNC,S$GLB,,

## 2024-11-05 ENCOUNTER — LAB VISIT (OUTPATIENT)
Dept: LAB | Facility: OTHER | Age: 59
End: 2024-11-05
Attending: INTERNAL MEDICINE
Payer: MEDICARE

## 2024-11-05 DIAGNOSIS — D59.10 AIHA (AUTOIMMUNE HEMOLYTIC ANEMIA): ICD-10-CM

## 2024-11-05 LAB
ANISOCYTOSIS BLD QL SMEAR: SLIGHT
BASOPHILS # BLD AUTO: 0.05 K/UL (ref 0–0.2)
BASOPHILS NFR BLD: 0.9 % (ref 0–1.9)
DIFFERENTIAL METHOD BLD: ABNORMAL
EOSINOPHIL # BLD AUTO: 0.2 K/UL (ref 0–0.5)
EOSINOPHIL NFR BLD: 4 % (ref 0–8)
ERYTHROCYTE [DISTWIDTH] IN BLOOD BY AUTOMATED COUNT: 17.8 % (ref 11.5–14.5)
HCT VFR BLD AUTO: 35.3 % (ref 37–48.5)
HGB BLD-MCNC: 10.6 G/DL (ref 12–16)
HYPOCHROMIA BLD QL SMEAR: ABNORMAL
IMM GRANULOCYTES # BLD AUTO: 0.07 K/UL (ref 0–0.04)
IMM GRANULOCYTES NFR BLD AUTO: 1.2 % (ref 0–0.5)
LDH SERPL L TO P-CCNC: 238 U/L (ref 110–260)
LYMPHOCYTES # BLD AUTO: 1.2 K/UL (ref 1–4.8)
LYMPHOCYTES NFR BLD: 20.3 % (ref 18–48)
MCH RBC QN AUTO: 25.7 PG (ref 27–31)
MCHC RBC AUTO-ENTMCNC: 30 G/DL (ref 32–36)
MCV RBC AUTO: 86 FL (ref 82–98)
MONOCYTES # BLD AUTO: 0.9 K/UL (ref 0.3–1)
MONOCYTES NFR BLD: 15.1 % (ref 4–15)
NEUTROPHILS # BLD AUTO: 3.4 K/UL (ref 1.8–7.7)
NEUTROPHILS NFR BLD: 58.5 % (ref 38–73)
NRBC BLD-RTO: 0 /100 WBC
OVALOCYTES BLD QL SMEAR: ABNORMAL
PLATELET # BLD AUTO: 207 K/UL (ref 150–450)
PLATELET BLD QL SMEAR: ABNORMAL
PMV BLD AUTO: 11.3 FL (ref 9.2–12.9)
POIKILOCYTOSIS BLD QL SMEAR: SLIGHT
RBC # BLD AUTO: 4.12 M/UL (ref 4–5.4)
RETICS/RBC NFR AUTO: 1.5 % (ref 0.5–2.5)
SCHISTOCYTES BLD QL SMEAR: ABNORMAL
WBC # BLD AUTO: 5.76 K/UL (ref 3.9–12.7)

## 2024-11-05 PROCEDURE — 85045 AUTOMATED RETICULOCYTE COUNT: CPT | Mod: HCNC | Performed by: INTERNAL MEDICINE

## 2024-11-05 PROCEDURE — 85025 COMPLETE CBC W/AUTO DIFF WBC: CPT | Mod: HCNC | Performed by: INTERNAL MEDICINE

## 2024-11-05 PROCEDURE — 36415 COLL VENOUS BLD VENIPUNCTURE: CPT | Mod: HCNC | Performed by: INTERNAL MEDICINE

## 2024-11-05 PROCEDURE — 83615 LACTATE (LD) (LDH) ENZYME: CPT | Mod: HCNC | Performed by: INTERNAL MEDICINE

## 2024-11-06 ENCOUNTER — OFFICE VISIT (OUTPATIENT)
Dept: URGENT CARE | Facility: CLINIC | Age: 59
End: 2024-11-06
Payer: MEDICARE

## 2024-11-06 VITALS
WEIGHT: 200 LBS | HEIGHT: 64 IN | BODY MASS INDEX: 34.15 KG/M2 | TEMPERATURE: 99 F | SYSTOLIC BLOOD PRESSURE: 134 MMHG | DIASTOLIC BLOOD PRESSURE: 89 MMHG | OXYGEN SATURATION: 98 % | HEART RATE: 81 BPM

## 2024-11-06 DIAGNOSIS — H57.89 REDNESS OR DISCHARGE OF EYE: ICD-10-CM

## 2024-11-06 DIAGNOSIS — J02.9 SORE THROAT: ICD-10-CM

## 2024-11-06 DIAGNOSIS — J06.9 VIRAL URI WITH COUGH: Primary | ICD-10-CM

## 2024-11-06 DIAGNOSIS — J30.9 ALLERGIC RHINITIS, UNSPECIFIED SEASONALITY, UNSPECIFIED TRIGGER: ICD-10-CM

## 2024-11-06 RX ORDER — PROMETHAZINE HYDROCHLORIDE AND DEXTROMETHORPHAN HYDROBROMIDE 6.25; 15 MG/5ML; MG/5ML
5 SYRUP ORAL EVERY 8 HOURS PRN
Qty: 118 ML | Refills: 0 | Status: SHIPPED | OUTPATIENT
Start: 2024-11-06 | End: 2024-11-16

## 2024-11-06 RX ORDER — POLYMYXIN B SULFATE AND TRIMETHOPRIM 1; 10000 MG/ML; [USP'U]/ML
1 SOLUTION OPHTHALMIC EVERY 6 HOURS
Qty: 10 ML | Refills: 0 | Status: SHIPPED | OUTPATIENT
Start: 2024-11-06 | End: 2024-11-13

## 2024-11-06 NOTE — PATIENT INSTRUCTIONS
PLEASE READ YOUR DISCHARGE INSTRUCTIONS ENTIRELY AS IT CONTAINS IMPORTANT INFORMATION.      - Please drink plenty of fluids.  - Please get plenty of rest.  - You can take plain Mucinex (guaifenesin) 1200 mg twice a day to help loosen mucous.   - Use over the counter Flonase as directed  Please return here or go to the Emergency Department for any concerns or worsening of condition.  - Please take an over the counter antihistamine medication (Allegra/Claritin/Zyrtec/Xyzal) of your choice as directed. These are antihistamines that can help with runny nose, nasal congestion, sneezing, and helps to dry up post-nasal drip, which usually causes sore throat and cough.    -If you do NOT have high blood pressure, you may use a decongestant form (D)  of this medication (ie. Claritin- D, zyrtec-D, allegra-D, Mucinex-D) or if you do not take the D form, you can take sudafed (pseudoephedrine) over the counter, which is a decongestant. Do NOT take two decongestant (D) medications at the same time (such as mucinex-D and claritin-D or plain sudafed and claritin D). Dextromethorphan (DM) is a cough suppressant over the counter (ie. mucinex DM, robitussin, delsym; dayquil/nyquil has DM as well.)    If you do have Hypertension or palpitations, it is safe to take Coricidin HBP for relief of sinus symptoms.    - If not allergic, please take over the counter Tylenol (Acetaminophen) and/or Motrin (Ibuprofen) as directed for control of pain and/or fever.  Avoid tylenol if you have a history of liver disease. Do not take ibuprofen if you have a history of GI bleeding, kidney disease, or if you take blood thinners.  Please follow up with your primary care doctor or specialist as needed.    -IF YOU RECEIVED PRESCRIPTION COUGH SUPPRESSANTS: Take prescription cough meds (pills) as prescribed; take prescription cough syrup at night as needed for cough.  Do not take both the prescribed cough pills and syrup at the same time or within 6 hours of  each other.  Do not take the cough syrup with any other sedative medication as it can can cause drowsiness. Do not operate any heavy machinery, drink or drive while taking the cough syrup.    Try taking half a dose first of the cough syrup to see how it affects you.     Sore throat recommendations: Warm fluids, warm salt water gargles, throat lozenges, tea, honey, soup, rest, hydration.    Use over the counter flonase: one spray each nostril twice daily OR two sprays each nostril once daily for sinus congestion and postnasal drip. This is a steroid nasal spray that works locally over time to decrease the inflammation in your nose/sinuses and help with allergic symptoms. This is not an quick- relief spray like afrin, but it works well if used daily.  Discontinue if you develop nose bleed    Sinus rinses DO NOT USE TAP WATER, if you must, water must be a rolling boil for 1 minute, let it cool, then use.  May use distilled water, or over the counter nasal saline rinses.  Vics vapor rub in shower to help open nasal passages.  May use nasal gel to keep passages moisturized.  May use Nasal saline sprays during the day for added relief of congestion.   For those who go to the gym, please do not use the sauna or steam room now to clear sinuses.    If you  smoke, please stop smoking.    Please return or see your primary care doctor if you develop new or worsening symptoms.     Please arrange follow up with your primary medical clinic as soon as possible. You must understand that you've received an Urgent Care treatment only and that you may be released before all of your medical problems are known or treated. You, the patient, will arrange for follow up as instructed. If your symptoms worsen or fail to improve you should go to the Emergency Room.                                       Eye Discharge  If your condition worsens or fails to improve we recommend that you receive another evaluation at the ER immediately or contact  your PCP to discuss your concerns or return here. If you develop increase eye symptoms or change in your vision seek medical care immediately either with your ophthalomologist or the ER or return here. You must understand that you've received an urgent care treatment only and that you may be released before all your medical problems are known or treated. You the patient will arrange for followup care as instructed.     - Keep eyes cleaned.  You should avoid touching your eyes and wash your hands to avoid spreading the infection.  Infected individuals should not share handkerchiefs, tissues, towels, cosmetics, linens, or eating utensils.    - Use the eye drops/ointment as prescribed.      - Do not wear your contact lens ( if you use them) for at least 1 week after you stop having symptoms and are rechecked by your doctor. Throw away the contacts, contact solution and carrying case you were using and start with new material.   - Cool compresses to affected eye.  - You can use over the counter artificial tears for gritty or dry eye sensation    - Change/ wash bedding.

## 2024-11-06 NOTE — PROGRESS NOTES
"Subjective:      Patient ID: Jael Hall is a 59 y.o. female.    Vitals:  height is 5' 4" (1.626 m) and weight is 90.7 kg (200 lb). Her temperature is 98.6 °F (37 °C). Her blood pressure is 134/89 and her pulse is 81. Her oxygen saturation is 98%.     Chief Complaint: Cough and Eye Problem    The patient location is: [facility]  The chief complaint leading to consultation is: eye discharge, cough    Visit type: audiovisual    Face to Face time with patient: 20 minutes of total time spent on the encounter, which includes face to face time and non-face to face time preparing to see the patient (eg, review of tests), Obtaining and/or reviewing separately obtained history, Documenting clinical information in the electronic or other health record, Independently interpreting results (not separately reported) and communicating results to the patient/family/caregiver, or Care coordination (not separately reported).         Each patient to whom he or she provides medical services by telemedicine is:  (1) informed of the relationship between the physician and patient and the respective role of any other health care provider with respect to management of the patient; and (2) notified that he or she may decline to receive medical services by telemedicine and may withdraw from such care at any time.    Notes:     60 yo female patient with PMHx of allergic rhinitis, HTN, CHF, T2DM, CKD, presents to the clinic for sore throat, mostly dry coughing, runny nose, itchy eyes with colored "green" eye drainage, x 1 week. Denies fever.   Eye has been have discharge in both eye but states the left eye is worse   Pt denies burning, or change in vision, eye trauma.   Pt was taking sudafed for her sx but no relief.  NKDA.    Cough  Associated symptoms include eye redness, nasal congestion and a sore throat. Pertinent negatives include no chest pain, chills, ear pain, fever or shortness of breath.   Eye Problem   Associated symptoms include eye " redness and itching. Pertinent negatives include no blurred vision, double vision or fever.       Constitution: Negative for chills and fever.   HENT:  Positive for sore throat. Negative for ear pain, ear discharge, trouble swallowing and voice change.    Neck: Negative for neck stiffness.   Cardiovascular:  Negative for chest pain.   Eyes:  Positive for eye itching and eye redness. Negative for eye trauma, double vision and blurred vision.   Respiratory:  Positive for cough and sputum production (mostly dry). Negative for shortness of breath.    Allergic/Immunologic: Negative for sneezing.       Objective:     Vitals:    11/06/24 0929   BP: 134/89   Pulse: 81   Temp: 98.6 °F (37 °C)       Physical Exam   Constitutional: She is oriented to person, place, and time. She appears well-developed. She is cooperative.  Non-toxic appearance. She does not appear ill. No distress.   HENT:   Head: Normocephalic and atraumatic.   Ears:   Right Ear: Hearing, tympanic membrane, external ear and ear canal normal. Tympanic membrane is not erythematous and not bulging. no impacted cerumen  Left Ear: Hearing, tympanic membrane, external ear and ear canal normal. Tympanic membrane is not erythematous and not bulging. no impacted cerumen  Nose: Rhinorrhea present. No nasal deformity. No epistaxis.   Mouth/Throat: Uvula is midline, oropharynx is clear and moist and mucous membranes are normal. Mucous membranes are moist. She has dentures. No trismus in the jaw. No uvula swelling. No oropharyngeal exudate, posterior oropharyngeal edema, posterior oropharyngeal erythema or cobblestoning.   Eyes: Lids are normal. Right eye exhibits discharge. Left eye exhibits discharge. Right conjunctiva is injected. Right conjunctiva has no hemorrhage. Left conjunctiva is injected. Left conjunctiva has no hemorrhage. Extraocular movement intact gaze aligned appropriately periorbital hyperpigmentation      Comments: Patient presents non-toxic, afebrile  with VSS.  EOMI without pain. There is no proptosis. There is evidence of conjunctivitis on R/L eye. Low suspicion for iritis, acute angle closure glaucoma, orbital cellulitis, herpetic involvement, open globe injury.    Neck: Trachea normal and phonation normal. No edema present. No erythema present.   Cardiovascular: Normal rate, regular rhythm, normal heart sounds and normal pulses.   Pulmonary/Chest: Effort normal and breath sounds normal. No accessory muscle usage or stridor. No respiratory distress. She has no decreased breath sounds. She has no wheezes. She has no rhonchi. She has no rales.   Abdominal: Normal appearance.   Neurological: She is alert and oriented to person, place, and time. She exhibits normal muscle tone. Coordination normal.   Skin: Skin is dry, intact, not diaphoretic and not pale.   Psychiatric: Her speech is normal and behavior is normal. Judgment and thought content normal.   Nursing note and vitals reviewed.     TYTO Assisted Physical Exam  Assessment:     1. Viral URI with cough    2. Sore throat    3. Redness or discharge of eye    4. Allergic rhinitis, unspecified seasonality, unspecified trigger        Plan:       Viral URI with cough  -     promethazine-dextromethorphan (PROMETHAZINE-DM) 6.25-15 mg/5 mL Syrp; Take 5 mLs by mouth every 8 (eight) hours as needed (cough, congestion).  Dispense: 118 mL; Refill: 0    Sore throat    Redness or discharge of eye  -     polymyxin B sulf-trimethoprim (POLYTRIM) 10,000 unit- 1 mg/mL Drop; Place 1 drop into both eyes every 6 (six) hours. for 7 days  Dispense: 10 mL; Refill: 0    Allergic rhinitis, unspecified seasonality, unspecified trigger        Patient Instructions   PLEASE READ YOUR DISCHARGE INSTRUCTIONS ENTIRELY AS IT CONTAINS IMPORTANT INFORMATION.      - Please drink plenty of fluids.  - Please get plenty of rest.  - You can take plain Mucinex (guaifenesin) 1200 mg twice a day to help loosen mucous.   - Use over the counter Flonase  as directed  Please return here or go to the Emergency Department for any concerns or worsening of condition.  - Please take an over the counter antihistamine medication (Allegra/Claritin/Zyrtec/Xyzal) of your choice as directed. These are antihistamines that can help with runny nose, nasal congestion, sneezing, and helps to dry up post-nasal drip, which usually causes sore throat and cough.    -If you do NOT have high blood pressure, you may use a decongestant form (D)  of this medication (ie. Claritin- D, zyrtec-D, allegra-D, Mucinex-D) or if you do not take the D form, you can take sudafed (pseudoephedrine) over the counter, which is a decongestant. Do NOT take two decongestant (D) medications at the same time (such as mucinex-D and claritin-D or plain sudafed and claritin D). Dextromethorphan (DM) is a cough suppressant over the counter (ie. mucinex DM, robitussin, delsym; dayquil/nyquil has DM as well.)    If you do have Hypertension or palpitations, it is safe to take Coricidin HBP for relief of sinus symptoms.    - If not allergic, please take over the counter Tylenol (Acetaminophen) and/or Motrin (Ibuprofen) as directed for control of pain and/or fever.  Avoid tylenol if you have a history of liver disease. Do not take ibuprofen if you have a history of GI bleeding, kidney disease, or if you take blood thinners.  Please follow up with your primary care doctor or specialist as needed.    -IF YOU RECEIVED PRESCRIPTION COUGH SUPPRESSANTS: Take prescription cough meds (pills) as prescribed; take prescription cough syrup at night as needed for cough.  Do not take both the prescribed cough pills and syrup at the same time or within 6 hours of each other.  Do not take the cough syrup with any other sedative medication as it can can cause drowsiness. Do not operate any heavy machinery, drink or drive while taking the cough syrup.    Try taking half a dose first of the cough syrup to see how it affects you.     Sore  throat recommendations: Warm fluids, warm salt water gargles, throat lozenges, tea, honey, soup, rest, hydration.    Use over the counter flonase: one spray each nostril twice daily OR two sprays each nostril once daily for sinus congestion and postnasal drip. This is a steroid nasal spray that works locally over time to decrease the inflammation in your nose/sinuses and help with allergic symptoms. This is not an quick- relief spray like afrin, but it works well if used daily.  Discontinue if you develop nose bleed    Sinus rinses DO NOT USE TAP WATER, if you must, water must be a rolling boil for 1 minute, let it cool, then use.  May use distilled water, or over the counter nasal saline rinses.  Vics vapor rub in shower to help open nasal passages.  May use nasal gel to keep passages moisturized.  May use Nasal saline sprays during the day for added relief of congestion.   For those who go to the gym, please do not use the sauna or steam room now to clear sinuses.    If you  smoke, please stop smoking.    Please return or see your primary care doctor if you develop new or worsening symptoms.     Please arrange follow up with your primary medical clinic as soon as possible. You must understand that you've received an Urgent Care treatment only and that you may be released before all of your medical problems are known or treated. You, the patient, will arrange for follow up as instructed. If your symptoms worsen or fail to improve you should go to the Emergency Room.                                       Eye Discharge  If your condition worsens or fails to improve we recommend that you receive another evaluation at the ER immediately or contact your PCP to discuss your concerns or return here. If you develop increase eye symptoms or change in your vision seek medical care immediately either with your ophthalomologist or the ER or return here. You must understand that you've received an urgent care treatment only and  that you may be released before all your medical problems are known or treated. You the patient will arrange for followup care as instructed.     - Keep eyes cleaned.  You should avoid touching your eyes and wash your hands to avoid spreading the infection.  Infected individuals should not share handkerchiefs, tissues, towels, cosmetics, linens, or eating utensils.    - Use the eye drops/ointment as prescribed.      - Do not wear your contact lens ( if you use them) for at least 1 week after you stop having symptoms and are rechecked by your doctor. Throw away the contacts, contact solution and carrying case you were using and start with new material.   - Cool compresses to affected eye.  - You can use over the counter artificial tears for gritty or dry eye sensation    - Change/ wash bedding.       Medical Decision Making:   History:   Old Medical Records: I decided to obtain old medical records.  Urgent Care Management:  Advised patient that URI symptoms are viral in nature and should be treated symptomatically.  We discussed over-the-counter medications as well as home remedies to help with current symptoms.  Eye drops prescribed.   Discussed diagnosis and treatment plan with patient. We discussed over-the-counter medications to help treat symptoms as well as prescribed medication if any were prescribed.  Provided RTC and ER precautions. Patient educational handouts also included in discharge paperwork and given to patient who verbalized understanding and agrees with plan of care.  Patient denies any further questions or concerns at this time.  Patient exits exam room in no acute distress

## 2024-11-07 ENCOUNTER — TELEPHONE (OUTPATIENT)
Dept: URGENT CARE | Facility: CLINIC | Age: 59
End: 2024-11-07
Payer: MEDICARE

## 2024-11-07 NOTE — TELEPHONE ENCOUNTER
----- Message from Carolynn sent at 11/7/2024 11:33 AM CST -----  Type:  Needs Medical Advice    Who Called: Jael Hall      Symptoms (please be specific): Cough  How long has patient had these symptoms:  11/06/2024    Pharmacy name and phone #:  same on file    Would the patient rather a call back or a response via [facility]? The patient rather a call back     Best Call Back Number: [phone]    Additional Information: Pt. Stated she dropped her cough Rx by accident, requesting a call back for advice.    Thanks!

## 2024-11-08 ENCOUNTER — HOSPITAL ENCOUNTER (OUTPATIENT)
Dept: RADIOLOGY | Facility: OTHER | Age: 59
Discharge: HOME | End: 2024-11-08
Attending: UROLOGY
Payer: MEDICARE

## 2024-11-08 DIAGNOSIS — N28.89 OTHER SPECIFIED DISORDERS OF KIDNEY AND URETER: ICD-10-CM

## 2024-11-12 ENCOUNTER — LAB VISIT (OUTPATIENT)
Dept: LAB | Facility: OTHER | Age: 59
End: 2024-11-12
Attending: INTERNAL MEDICINE
Payer: MEDICARE

## 2024-11-12 DIAGNOSIS — D59.10 AIHA (AUTOIMMUNE HEMOLYTIC ANEMIA): ICD-10-CM

## 2024-11-12 LAB
ANISOCYTOSIS BLD QL SMEAR: SLIGHT
BASOPHILS # BLD AUTO: 0.08 K/UL (ref 0–0.2)
BASOPHILS NFR BLD: 0.9 % (ref 0–1.9)
DIFFERENTIAL METHOD BLD: ABNORMAL
EOSINOPHIL # BLD AUTO: 0.3 K/UL (ref 0–0.5)
EOSINOPHIL NFR BLD: 2.7 % (ref 0–8)
ERYTHROCYTE [DISTWIDTH] IN BLOOD BY AUTOMATED COUNT: 19.9 % (ref 11.5–14.5)
HCT VFR BLD AUTO: 35.1 % (ref 37–48.5)
HGB BLD-MCNC: 10.7 G/DL (ref 12–16)
IMM GRANULOCYTES # BLD AUTO: 0.07 K/UL (ref 0–0.04)
IMM GRANULOCYTES NFR BLD AUTO: 0.8 % (ref 0–0.5)
LDH SERPL L TO P-CCNC: 270 U/L (ref 110–260)
LYMPHOCYTES # BLD AUTO: 1.7 K/UL (ref 1–4.8)
LYMPHOCYTES NFR BLD: 18.6 % (ref 18–48)
MCH RBC QN AUTO: 26.1 PG (ref 27–31)
MCHC RBC AUTO-ENTMCNC: 30.5 G/DL (ref 32–36)
MCV RBC AUTO: 86 FL (ref 82–98)
MONOCYTES # BLD AUTO: 1 K/UL (ref 0.3–1)
MONOCYTES NFR BLD: 10.9 % (ref 4–15)
NEUTROPHILS # BLD AUTO: 6.1 K/UL (ref 1.8–7.7)
NEUTROPHILS NFR BLD: 66.1 % (ref 38–73)
NRBC BLD-RTO: 0 /100 WBC
PLATELET # BLD AUTO: 219 K/UL (ref 150–450)
PLATELET BLD QL SMEAR: ABNORMAL
PMV BLD AUTO: 11.3 FL (ref 9.2–12.9)
POIKILOCYTOSIS BLD QL SMEAR: SLIGHT
POLYCHROMASIA BLD QL SMEAR: ABNORMAL
RBC # BLD AUTO: 4.1 M/UL (ref 4–5.4)
RETICS/RBC NFR AUTO: 3.9 % (ref 0.5–2.5)
SPHEROCYTES BLD QL SMEAR: ABNORMAL
WBC # BLD AUTO: 9.2 K/UL (ref 3.9–12.7)

## 2024-11-12 PROCEDURE — 85025 COMPLETE CBC W/AUTO DIFF WBC: CPT | Mod: HCNC | Performed by: INTERNAL MEDICINE

## 2024-11-12 PROCEDURE — 83615 LACTATE (LD) (LDH) ENZYME: CPT | Mod: HCNC | Performed by: INTERNAL MEDICINE

## 2024-11-12 PROCEDURE — 85045 AUTOMATED RETICULOCYTE COUNT: CPT | Mod: HCNC | Performed by: INTERNAL MEDICINE

## 2024-11-12 PROCEDURE — 36415 COLL VENOUS BLD VENIPUNCTURE: CPT | Mod: HCNC | Performed by: INTERNAL MEDICINE

## 2024-11-13 ENCOUNTER — TELEPHONE (OUTPATIENT)
Dept: UROLOGY | Facility: CLINIC | Age: 59
End: 2024-11-13
Payer: MEDICARE

## 2024-11-19 ENCOUNTER — LAB VISIT (OUTPATIENT)
Dept: LAB | Facility: OTHER | Age: 59
End: 2024-11-19
Attending: INTERNAL MEDICINE
Payer: MEDICARE

## 2024-11-19 DIAGNOSIS — D59.10 AIHA (AUTOIMMUNE HEMOLYTIC ANEMIA): ICD-10-CM

## 2024-11-19 LAB
ANISOCYTOSIS BLD QL SMEAR: SLIGHT
BASOPHILS # BLD AUTO: 0.1 K/UL (ref 0–0.2)
BASOPHILS NFR BLD: 1.3 % (ref 0–1.9)
DIFFERENTIAL METHOD BLD: ABNORMAL
EOSINOPHIL # BLD AUTO: 0.4 K/UL (ref 0–0.5)
EOSINOPHIL NFR BLD: 5.7 % (ref 0–8)
ERYTHROCYTE [DISTWIDTH] IN BLOOD BY AUTOMATED COUNT: 18.6 % (ref 11.5–14.5)
HCT VFR BLD AUTO: 35.7 % (ref 37–48.5)
HGB BLD-MCNC: 10.9 G/DL (ref 12–16)
HYPOCHROMIA BLD QL SMEAR: ABNORMAL
IMM GRANULOCYTES # BLD AUTO: 0.03 K/UL (ref 0–0.04)
IMM GRANULOCYTES NFR BLD AUTO: 0.4 % (ref 0–0.5)
LDH SERPL L TO P-CCNC: 195 U/L (ref 110–260)
LYMPHOCYTES # BLD AUTO: 1.8 K/UL (ref 1–4.8)
LYMPHOCYTES NFR BLD: 23.7 % (ref 18–48)
MCH RBC QN AUTO: 25.4 PG (ref 27–31)
MCHC RBC AUTO-ENTMCNC: 30.5 G/DL (ref 32–36)
MCV RBC AUTO: 83 FL (ref 82–98)
MONOCYTES # BLD AUTO: 1.2 K/UL (ref 0.3–1)
MONOCYTES NFR BLD: 15.5 % (ref 4–15)
NEUTROPHILS # BLD AUTO: 4.1 K/UL (ref 1.8–7.7)
NEUTROPHILS NFR BLD: 53.4 % (ref 38–73)
NRBC BLD-RTO: 0 /100 WBC
OVALOCYTES BLD QL SMEAR: ABNORMAL
PLATELET # BLD AUTO: 186 K/UL (ref 150–450)
PLATELET BLD QL SMEAR: ABNORMAL
PMV BLD AUTO: ABNORMAL FL (ref 9.2–12.9)
POIKILOCYTOSIS BLD QL SMEAR: SLIGHT
RBC # BLD AUTO: 4.29 M/UL (ref 4–5.4)
RETICS/RBC NFR AUTO: 1.9 % (ref 0.5–2.5)
SCHISTOCYTES BLD QL SMEAR: ABNORMAL
SPHEROCYTES BLD QL SMEAR: ABNORMAL
WBC # BLD AUTO: 7.73 K/UL (ref 3.9–12.7)

## 2024-11-19 PROCEDURE — 85025 COMPLETE CBC W/AUTO DIFF WBC: CPT | Mod: HCNC | Performed by: INTERNAL MEDICINE

## 2024-11-19 PROCEDURE — 36415 COLL VENOUS BLD VENIPUNCTURE: CPT | Mod: HCNC | Performed by: INTERNAL MEDICINE

## 2024-11-19 PROCEDURE — 83615 LACTATE (LD) (LDH) ENZYME: CPT | Mod: HCNC | Performed by: INTERNAL MEDICINE

## 2024-11-19 PROCEDURE — 85045 AUTOMATED RETICULOCYTE COUNT: CPT | Mod: HCNC | Performed by: INTERNAL MEDICINE

## 2024-11-22 ENCOUNTER — PATIENT MESSAGE (OUTPATIENT)
Dept: CARDIOLOGY | Facility: OTHER | Age: 59
End: 2024-11-22
Payer: MEDICARE

## 2024-11-26 ENCOUNTER — LAB VISIT (OUTPATIENT)
Dept: LAB | Facility: HOSPITAL | Age: 59
End: 2024-11-26
Attending: INTERNAL MEDICINE
Payer: MEDICARE

## 2024-11-26 DIAGNOSIS — D59.10 AIHA (AUTOIMMUNE HEMOLYTIC ANEMIA): ICD-10-CM

## 2024-11-26 LAB
BASOPHILS # BLD AUTO: 0.08 K/UL (ref 0–0.2)
BASOPHILS NFR BLD: 0.9 % (ref 0–1.9)
DIFFERENTIAL METHOD BLD: ABNORMAL
EOSINOPHIL # BLD AUTO: 1.1 K/UL (ref 0–0.5)
EOSINOPHIL NFR BLD: 12.2 % (ref 0–8)
ERYTHROCYTE [DISTWIDTH] IN BLOOD BY AUTOMATED COUNT: 20.4 % (ref 11.5–14.5)
HCT VFR BLD AUTO: 35.2 % (ref 37–48.5)
HGB BLD-MCNC: 10.7 G/DL (ref 12–16)
IMM GRANULOCYTES # BLD AUTO: 0.02 K/UL (ref 0–0.04)
IMM GRANULOCYTES NFR BLD AUTO: 0.2 % (ref 0–0.5)
LDH SERPL L TO P-CCNC: 219 U/L (ref 110–260)
LYMPHOCYTES # BLD AUTO: 2.1 K/UL (ref 1–4.8)
LYMPHOCYTES NFR BLD: 23.7 % (ref 18–48)
MCH RBC QN AUTO: 26.3 PG (ref 27–31)
MCHC RBC AUTO-ENTMCNC: 30.4 G/DL (ref 32–36)
MCV RBC AUTO: 87 FL (ref 82–98)
MONOCYTES # BLD AUTO: 1.1 K/UL (ref 0.3–1)
MONOCYTES NFR BLD: 12 % (ref 4–15)
NEUTROPHILS # BLD AUTO: 4.6 K/UL (ref 1.8–7.7)
NEUTROPHILS NFR BLD: 51 % (ref 38–73)
NRBC BLD-RTO: 0 /100 WBC
PLATELET # BLD AUTO: 187 K/UL (ref 150–450)
PMV BLD AUTO: 11.9 FL (ref 9.2–12.9)
RBC # BLD AUTO: 4.07 M/UL (ref 4–5.4)
RETICS/RBC NFR AUTO: 4.1 % (ref 0.5–2.5)
WBC # BLD AUTO: 9 K/UL (ref 3.9–12.7)

## 2024-11-26 PROCEDURE — 85045 AUTOMATED RETICULOCYTE COUNT: CPT | Mod: HCNC | Performed by: INTERNAL MEDICINE

## 2024-11-26 PROCEDURE — 36415 COLL VENOUS BLD VENIPUNCTURE: CPT | Mod: HCNC | Performed by: INTERNAL MEDICINE

## 2024-11-26 PROCEDURE — 83615 LACTATE (LD) (LDH) ENZYME: CPT | Mod: HCNC | Performed by: INTERNAL MEDICINE

## 2024-11-26 PROCEDURE — 85025 COMPLETE CBC W/AUTO DIFF WBC: CPT | Mod: HCNC | Performed by: INTERNAL MEDICINE

## 2024-12-02 ENCOUNTER — HOSPITAL ENCOUNTER (INPATIENT)
Facility: HOSPITAL | Age: 59
LOS: 2 days | Discharge: HOME | DRG: 640 | End: 2024-12-04
Attending: STUDENT IN AN ORGANIZED HEALTH CARE EDUCATION/TRAINING PROGRAM | Admitting: STUDENT IN AN ORGANIZED HEALTH CARE EDUCATION/TRAINING PROGRAM
Payer: MEDICARE

## 2024-12-02 DIAGNOSIS — I50.33 ACUTE ON CHRONIC DIASTOLIC CONGESTIVE HEART FAILURE: Primary | ICD-10-CM

## 2024-12-02 DIAGNOSIS — E87.5 HYPERKALEMIA: ICD-10-CM

## 2024-12-02 DIAGNOSIS — E87.70 HYPERVOLEMIA, UNSPECIFIED HYPERVOLEMIA TYPE: ICD-10-CM

## 2024-12-02 DIAGNOSIS — R06.02 SHORTNESS OF BREATH: ICD-10-CM

## 2024-12-02 DIAGNOSIS — R06.02 SOB (SHORTNESS OF BREATH): ICD-10-CM

## 2024-12-02 DIAGNOSIS — R07.9 CHEST PAIN: ICD-10-CM

## 2024-12-02 PROBLEM — D64.9 ANEMIA: Status: ACTIVE | Noted: 2024-12-02

## 2024-12-02 PROBLEM — I16.1 HYPERTENSIVE EMERGENCY: Status: ACTIVE | Noted: 2024-12-02

## 2024-12-02 PROBLEM — R41.82 ALTERED MENTAL STATUS: Status: ACTIVE | Noted: 2024-12-02

## 2024-12-02 PROBLEM — D69.6 THROMBOCYTOPENIA: Status: ACTIVE | Noted: 2024-12-02

## 2024-12-02 PROBLEM — D64.9 ANEMIA: Status: RESOLVED | Noted: 2024-12-02 | Resolved: 2024-12-02

## 2024-12-02 LAB
ALBUMIN SERPL BCP-MCNC: 3.1 G/DL (ref 3.5–5.2)
ALLENS TEST: NORMAL
ALLENS TEST: NORMAL
ALP SERPL-CCNC: 54 U/L (ref 40–150)
ALT SERPL W/O P-5'-P-CCNC: 9 U/L (ref 10–44)
ANION GAP SERPL CALC-SCNC: 14 MMOL/L (ref 8–16)
ANION GAP SERPL CALC-SCNC: 19 MMOL/L (ref 8–16)
AST SERPL-CCNC: 18 U/L (ref 10–40)
BASOPHILS # BLD AUTO: 0.1 K/UL (ref 0–0.2)
BASOPHILS NFR BLD: 1.3 % (ref 0–1.9)
BILIRUB SERPL-MCNC: 0.9 MG/DL (ref 0.1–1)
BNP SERPL-MCNC: 1848 PG/ML (ref 0–99)
BUN SERPL-MCNC: 28 MG/DL (ref 8–23)
BUN SERPL-MCNC: 82 MG/DL (ref 8–23)
BUN SERPL-MCNC: 92 MG/DL (ref 6–30)
BUN SERPL-MCNC: 92 MG/DL (ref 6–30)
CALCIUM SERPL-MCNC: 10 MG/DL (ref 8.7–10.5)
CALCIUM SERPL-MCNC: 9.9 MG/DL (ref 8.7–10.5)
CHLORIDE SERPL-SCNC: 100 MMOL/L (ref 95–110)
CHLORIDE SERPL-SCNC: 102 MMOL/L (ref 95–110)
CHLORIDE SERPL-SCNC: 105 MMOL/L (ref 95–110)
CHLORIDE SERPL-SCNC: 105 MMOL/L (ref 95–110)
CO2 SERPL-SCNC: 18 MMOL/L (ref 23–29)
CO2 SERPL-SCNC: 21 MMOL/L (ref 23–29)
CREAT SERPL-MCNC: 15.3 MG/DL (ref 0.5–1.4)
CREAT SERPL-MCNC: 15.4 MG/DL (ref 0.5–1.4)
CREAT SERPL-MCNC: 15.5 MG/DL (ref 0.5–1.4)
CREAT SERPL-MCNC: 6.9 MG/DL (ref 0.5–1.4)
DIFFERENTIAL METHOD BLD: ABNORMAL
EOSINOPHIL # BLD AUTO: 0.5 K/UL (ref 0–0.5)
EOSINOPHIL NFR BLD: 6.5 % (ref 0–8)
ERYTHROCYTE [DISTWIDTH] IN BLOOD BY AUTOMATED COUNT: 19.4 % (ref 11.5–14.5)
EST. GFR  (NO RACE VARIABLE): 2.4 ML/MIN/1.73 M^2
EST. GFR  (NO RACE VARIABLE): 6.3 ML/MIN/1.73 M^2
GLUCOSE SERPL-MCNC: 78 MG/DL (ref 70–110)
GLUCOSE SERPL-MCNC: 83 MG/DL (ref 70–110)
GLUCOSE SERPL-MCNC: 86 MG/DL (ref 70–110)
GLUCOSE SERPL-MCNC: 94 MG/DL (ref 70–110)
HCO3 UR-SCNC: 24.7 MMOL/L (ref 24–28)
HCT VFR BLD AUTO: 32.3 % (ref 37–48.5)
HCT VFR BLD CALC: 29 %PCV (ref 36–54)
HCT VFR BLD CALC: 29 %PCV (ref 36–54)
HGB BLD-MCNC: 10.1 G/DL (ref 12–16)
IMM GRANULOCYTES # BLD AUTO: 0.01 K/UL (ref 0–0.04)
IMM GRANULOCYTES NFR BLD AUTO: 0.1 % (ref 0–0.5)
LDH SERPL L TO P-CCNC: 1.02 MMOL/L (ref 0.5–2.2)
LYMPHOCYTES # BLD AUTO: 1.4 K/UL (ref 1–4.8)
LYMPHOCYTES NFR BLD: 17.1 % (ref 18–48)
MCH RBC QN AUTO: 25.8 PG (ref 27–31)
MCHC RBC AUTO-ENTMCNC: 31.3 G/DL (ref 32–36)
MCV RBC AUTO: 83 FL (ref 82–98)
MONOCYTES # BLD AUTO: 1.1 K/UL (ref 0.3–1)
MONOCYTES NFR BLD: 13.1 % (ref 4–15)
NEUTROPHILS # BLD AUTO: 4.9 K/UL (ref 1.8–7.7)
NEUTROPHILS NFR BLD: 61.9 % (ref 38–73)
NRBC BLD-RTO: 0 /100 WBC
OHS QRS DURATION: 82 MS
OHS QRS DURATION: 82 MS
OHS QTC CALCULATION: 443 MS
OHS QTC CALCULATION: 463 MS
PCO2 BLDA: 41.8 MMHG (ref 35–45)
PH SMN: 7.38 [PH] (ref 7.35–7.45)
PLATELET # BLD AUTO: 133 K/UL (ref 150–450)
PMV BLD AUTO: ABNORMAL FL (ref 9.2–12.9)
PO2 BLDA: 41 MMHG (ref 40–60)
POC BE: 0 MMOL/L
POC IONIZED CALCIUM: 1.14 MMOL/L (ref 1.06–1.42)
POC IONIZED CALCIUM: 1.17 MMOL/L (ref 1.06–1.42)
POC SATURATED O2: 75 % (ref 95–100)
POC TCO2 (MEASURED): 23 MMOL/L (ref 23–29)
POC TCO2 (MEASURED): 25 MMOL/L (ref 23–29)
POC TCO2: 26 MMOL/L (ref 24–29)
POCT GLUCOSE: 100 MG/DL (ref 70–110)
POCT GLUCOSE: 247 MG/DL (ref 70–110)
POCT GLUCOSE: 71 MG/DL (ref 70–110)
POTASSIUM BLD-SCNC: 6.2 MMOL/L (ref 3.5–5.1)
POTASSIUM BLD-SCNC: 6.8 MMOL/L (ref 3.5–5.1)
POTASSIUM SERPL-SCNC: 3.9 MMOL/L (ref 3.5–5.1)
POTASSIUM SERPL-SCNC: 6.9 MMOL/L (ref 3.5–5.1)
PROT SERPL-MCNC: 7 G/DL (ref 6–8.4)
RBC # BLD AUTO: 3.91 M/UL (ref 4–5.4)
SAMPLE: ABNORMAL
SAMPLE: ABNORMAL
SAMPLE: NORMAL
SAMPLE: NORMAL
SITE: NORMAL
SITE: NORMAL
SODIUM BLD-SCNC: 138 MMOL/L (ref 136–145)
SODIUM BLD-SCNC: 138 MMOL/L (ref 136–145)
SODIUM SERPL-SCNC: 135 MMOL/L (ref 136–145)
SODIUM SERPL-SCNC: 139 MMOL/L (ref 136–145)
TROPONIN I SERPL DL<=0.01 NG/ML-MCNC: 0.03 NG/ML (ref 0–0.03)
WBC # BLD AUTO: 7.99 K/UL (ref 3.9–12.7)

## 2024-12-02 PROCEDURE — 63600175 PHARM REV CODE 636 W HCPCS: Mod: HCNC | Performed by: STUDENT IN AN ORGANIZED HEALTH CARE EDUCATION/TRAINING PROGRAM

## 2024-12-02 PROCEDURE — 83880 ASSAY OF NATRIURETIC PEPTIDE: CPT | Mod: HCNC | Performed by: EMERGENCY MEDICINE

## 2024-12-02 PROCEDURE — 84484 ASSAY OF TROPONIN QUANT: CPT | Mod: HCNC | Performed by: EMERGENCY MEDICINE

## 2024-12-02 PROCEDURE — 94761 N-INVAS EAR/PLS OXIMETRY MLT: CPT | Mod: HCNC,XB

## 2024-12-02 PROCEDURE — 80048 BASIC METABOLIC PNL TOTAL CA: CPT | Mod: HCNC,XB | Performed by: STUDENT IN AN ORGANIZED HEALTH CARE EDUCATION/TRAINING PROGRAM

## 2024-12-02 PROCEDURE — 12000002 HC ACUTE/MED SURGE SEMI-PRIVATE ROOM: Mod: HCNC

## 2024-12-02 PROCEDURE — 99222 1ST HOSP IP/OBS MODERATE 55: CPT | Mod: HCNC,,, | Performed by: NURSE PRACTITIONER

## 2024-12-02 PROCEDURE — G0257 UNSCHED DIALYSIS ESRD PT HOS: HCPCS | Mod: HCNC

## 2024-12-02 PROCEDURE — 82803 BLOOD GASES ANY COMBINATION: CPT | Mod: HCNC

## 2024-12-02 PROCEDURE — 85025 COMPLETE CBC W/AUTO DIFF WBC: CPT | Mod: HCNC | Performed by: EMERGENCY MEDICINE

## 2024-12-02 PROCEDURE — 80053 COMPREHEN METABOLIC PANEL: CPT | Mod: HCNC | Performed by: STUDENT IN AN ORGANIZED HEALTH CARE EDUCATION/TRAINING PROGRAM

## 2024-12-02 PROCEDURE — 94640 AIRWAY INHALATION TREATMENT: CPT | Mod: HCNC

## 2024-12-02 PROCEDURE — 96375 TX/PRO/DX INJ NEW DRUG ADDON: CPT | Mod: HCNC

## 2024-12-02 PROCEDURE — 99900035 HC TECH TIME PER 15 MIN (STAT): Mod: HCNC

## 2024-12-02 PROCEDURE — 25000003 PHARM REV CODE 250: Mod: JZ,JG,HCNC | Performed by: STUDENT IN AN ORGANIZED HEALTH CARE EDUCATION/TRAINING PROGRAM

## 2024-12-02 PROCEDURE — 99285 EMERGENCY DEPT VISIT HI MDM: CPT | Mod: 25,HCNC

## 2024-12-02 PROCEDURE — 25000003 PHARM REV CODE 250: Mod: HCNC | Performed by: STUDENT IN AN ORGANIZED HEALTH CARE EDUCATION/TRAINING PROGRAM

## 2024-12-02 PROCEDURE — 96361 HYDRATE IV INFUSION ADD-ON: CPT | Mod: HCNC

## 2024-12-02 PROCEDURE — 5A1D70Z PERFORMANCE OF URINARY FILTRATION, INTERMITTENT, LESS THAN 6 HOURS PER DAY: ICD-10-PCS | Performed by: NURSE PRACTITIONER

## 2024-12-02 PROCEDURE — 93010 ELECTROCARDIOGRAM REPORT: CPT | Mod: HCNC,,, | Performed by: INTERNAL MEDICINE

## 2024-12-02 PROCEDURE — 27000221 HC OXYGEN, UP TO 24 HOURS: Mod: HCNC

## 2024-12-02 PROCEDURE — 83605 ASSAY OF LACTIC ACID: CPT | Mod: HCNC

## 2024-12-02 PROCEDURE — 80100014 HC HEMODIALYSIS 1:1: Mod: HCNC

## 2024-12-02 PROCEDURE — 96374 THER/PROPH/DIAG INJ IV PUSH: CPT | Mod: HCNC

## 2024-12-02 PROCEDURE — 25000242 PHARM REV CODE 250 ALT 637 W/ HCPCS: Mod: HCNC | Performed by: STUDENT IN AN ORGANIZED HEALTH CARE EDUCATION/TRAINING PROGRAM

## 2024-12-02 PROCEDURE — 93005 ELECTROCARDIOGRAM TRACING: CPT | Mod: HCNC

## 2024-12-02 PROCEDURE — 82962 GLUCOSE BLOOD TEST: CPT | Mod: HCNC

## 2024-12-02 RX ORDER — FOLIC ACID 1 MG/1
1 TABLET ORAL DAILY
Status: DISCONTINUED | OUTPATIENT
Start: 2024-12-03 | End: 2024-12-04 | Stop reason: HOSPADM

## 2024-12-02 RX ORDER — CALCIUM GLUCONATE 20 MG/ML
1 INJECTION, SOLUTION INTRAVENOUS
Status: DISCONTINUED | OUTPATIENT
Start: 2024-12-02 | End: 2024-12-02

## 2024-12-02 RX ORDER — LIDOCAINE 50 MG/G
1 PATCH TOPICAL DAILY
Status: DISCONTINUED | OUTPATIENT
Start: 2024-12-03 | End: 2024-12-04 | Stop reason: HOSPADM

## 2024-12-02 RX ORDER — CALCIUM GLUCONATE 20 MG/ML
1 INJECTION, SOLUTION INTRAVENOUS EVERY 10 MIN PRN
Status: DISCONTINUED | OUTPATIENT
Start: 2024-12-02 | End: 2024-12-04 | Stop reason: HOSPADM

## 2024-12-02 RX ORDER — PREGABALIN 75 MG/1
75 CAPSULE ORAL DAILY
Status: DISCONTINUED | OUTPATIENT
Start: 2024-12-03 | End: 2024-12-04 | Stop reason: HOSPADM

## 2024-12-02 RX ORDER — IBUPROFEN 200 MG
24 TABLET ORAL
Status: DISCONTINUED | OUTPATIENT
Start: 2024-12-02 | End: 2024-12-04 | Stop reason: HOSPADM

## 2024-12-02 RX ORDER — PANTOPRAZOLE SODIUM 40 MG/1
40 TABLET, DELAYED RELEASE ORAL DAILY
Status: DISCONTINUED | OUTPATIENT
Start: 2024-12-03 | End: 2024-12-04 | Stop reason: HOSPADM

## 2024-12-02 RX ORDER — SODIUM CHLORIDE 0.9 % (FLUSH) 0.9 %
10 SYRINGE (ML) INJECTION EVERY 12 HOURS PRN
Status: DISCONTINUED | OUTPATIENT
Start: 2024-12-02 | End: 2024-12-04 | Stop reason: HOSPADM

## 2024-12-02 RX ORDER — ASPIRIN 81 MG/1
81 TABLET ORAL EVERY MORNING
Status: DISCONTINUED | OUTPATIENT
Start: 2024-12-03 | End: 2024-12-04 | Stop reason: HOSPADM

## 2024-12-02 RX ORDER — NITROGLYCERIN 0.4 MG/1
0.4 TABLET SUBLINGUAL EVERY 5 MIN PRN
Status: COMPLETED | OUTPATIENT
Start: 2024-12-02 | End: 2024-12-02

## 2024-12-02 RX ORDER — PROMETHAZINE HYDROCHLORIDE 12.5 MG/1
25 TABLET ORAL EVERY 6 HOURS PRN
Status: DISCONTINUED | OUTPATIENT
Start: 2024-12-02 | End: 2024-12-04 | Stop reason: HOSPADM

## 2024-12-02 RX ORDER — SODIUM CHLORIDE 9 MG/ML
INJECTION, SOLUTION INTRAVENOUS ONCE
OUTPATIENT
Start: 2024-12-02 | End: 2024-12-02

## 2024-12-02 RX ORDER — ACETAMINOPHEN 325 MG/1
650 TABLET ORAL EVERY 4 HOURS PRN
Status: DISCONTINUED | OUTPATIENT
Start: 2024-12-02 | End: 2024-12-04 | Stop reason: HOSPADM

## 2024-12-02 RX ORDER — ONDANSETRON HYDROCHLORIDE 2 MG/ML
4 INJECTION, SOLUTION INTRAVENOUS EVERY 8 HOURS PRN
Status: DISCONTINUED | OUTPATIENT
Start: 2024-12-02 | End: 2024-12-04 | Stop reason: HOSPADM

## 2024-12-02 RX ORDER — ALUMINUM HYDROXIDE, MAGNESIUM HYDROXIDE, AND SIMETHICONE 1200; 120; 1200 MG/30ML; MG/30ML; MG/30ML
30 SUSPENSION ORAL 4 TIMES DAILY PRN
Status: DISCONTINUED | OUTPATIENT
Start: 2024-12-02 | End: 2024-12-04 | Stop reason: HOSPADM

## 2024-12-02 RX ORDER — ACETAMINOPHEN 325 MG/1
650 TABLET ORAL EVERY 8 HOURS PRN
Status: DISCONTINUED | OUTPATIENT
Start: 2024-12-02 | End: 2024-12-04 | Stop reason: HOSPADM

## 2024-12-02 RX ORDER — GLUCAGON 1 MG
1 KIT INJECTION
Status: DISCONTINUED | OUTPATIENT
Start: 2024-12-02 | End: 2024-12-04 | Stop reason: HOSPADM

## 2024-12-02 RX ORDER — IBUPROFEN 200 MG
16 TABLET ORAL
Status: DISCONTINUED | OUTPATIENT
Start: 2024-12-02 | End: 2024-12-04 | Stop reason: HOSPADM

## 2024-12-02 RX ORDER — ATOVAQUONE 750 MG/5ML
1500 SUSPENSION ORAL DAILY
Status: DISCONTINUED | OUTPATIENT
Start: 2024-12-03 | End: 2024-12-04 | Stop reason: HOSPADM

## 2024-12-02 RX ORDER — HEPARIN SODIUM 5000 [USP'U]/ML
5000 INJECTION, SOLUTION INTRAVENOUS; SUBCUTANEOUS EVERY 8 HOURS
Status: DISCONTINUED | OUTPATIENT
Start: 2024-12-02 | End: 2024-12-04 | Stop reason: HOSPADM

## 2024-12-02 RX ORDER — SIMETHICONE 80 MG
1 TABLET,CHEWABLE ORAL 4 TIMES DAILY PRN
Status: DISCONTINUED | OUTPATIENT
Start: 2024-12-02 | End: 2024-12-04 | Stop reason: HOSPADM

## 2024-12-02 RX ORDER — LOSARTAN POTASSIUM 50 MG/1
100 TABLET ORAL DAILY
Status: DISCONTINUED | OUTPATIENT
Start: 2024-12-03 | End: 2024-12-02

## 2024-12-02 RX ORDER — CALCIUM GLUCONATE 20 MG/ML
1 INJECTION, SOLUTION INTRAVENOUS
Status: COMPLETED | OUTPATIENT
Start: 2024-12-02 | End: 2024-12-02

## 2024-12-02 RX ORDER — CALCIUM GLUCONATE 20 MG/ML
1 INJECTION, SOLUTION INTRAVENOUS EVERY 10 MIN PRN
Status: CANCELLED | OUTPATIENT
Start: 2024-12-02

## 2024-12-02 RX ORDER — ALBUTEROL SULFATE 2.5 MG/.5ML
10 SOLUTION RESPIRATORY (INHALATION)
Status: COMPLETED | OUTPATIENT
Start: 2024-12-02 | End: 2024-12-02

## 2024-12-02 RX ORDER — LABETALOL 100 MG/1
200 TABLET, FILM COATED ORAL EVERY 12 HOURS
Status: DISCONTINUED | OUTPATIENT
Start: 2024-12-02 | End: 2024-12-04 | Stop reason: HOSPADM

## 2024-12-02 RX ORDER — CALCIUM GLUCONATE 20 MG/ML
1 INJECTION, SOLUTION INTRAVENOUS EVERY 10 MIN PRN
Status: DISCONTINUED | OUTPATIENT
Start: 2024-12-02 | End: 2024-12-02

## 2024-12-02 RX ORDER — AMLODIPINE BESYLATE 10 MG/1
10 TABLET ORAL NIGHTLY
Status: DISCONTINUED | OUTPATIENT
Start: 2024-12-02 | End: 2024-12-04 | Stop reason: HOSPADM

## 2024-12-02 RX ORDER — NALOXONE HCL 0.4 MG/ML
0.02 VIAL (ML) INJECTION
Status: DISCONTINUED | OUTPATIENT
Start: 2024-12-02 | End: 2024-12-04 | Stop reason: HOSPADM

## 2024-12-02 RX ORDER — SEVELAMER CARBONATE 800 MG/1
2400 TABLET, FILM COATED ORAL
Status: DISCONTINUED | OUTPATIENT
Start: 2024-12-02 | End: 2024-12-04 | Stop reason: HOSPADM

## 2024-12-02 RX ORDER — CALCIUM GLUCONATE 20 MG/ML
1 INJECTION, SOLUTION INTRAVENOUS
Status: CANCELLED | OUTPATIENT
Start: 2024-12-02 | End: 2024-12-02

## 2024-12-02 RX ORDER — ATORVASTATIN CALCIUM 40 MG/1
40 TABLET, FILM COATED ORAL DAILY
Status: DISCONTINUED | OUTPATIENT
Start: 2024-12-03 | End: 2024-12-04 | Stop reason: HOSPADM

## 2024-12-02 RX ORDER — INSULIN ASPART 100 [IU]/ML
0-5 INJECTION, SOLUTION INTRAVENOUS; SUBCUTANEOUS
Status: DISCONTINUED | OUTPATIENT
Start: 2024-12-02 | End: 2024-12-04 | Stop reason: HOSPADM

## 2024-12-02 RX ORDER — POLYETHYLENE GLYCOL 3350 17 G/17G
17 POWDER, FOR SOLUTION ORAL DAILY
Status: DISCONTINUED | OUTPATIENT
Start: 2024-12-03 | End: 2024-12-04 | Stop reason: HOSPADM

## 2024-12-02 RX ADMIN — INSULIN HUMAN 9 UNITS: 100 INJECTION, SOLUTION PARENTERAL at 03:12

## 2024-12-02 RX ADMIN — HEPARIN SODIUM 5000 UNITS: 5000 INJECTION INTRAVENOUS; SUBCUTANEOUS at 09:12

## 2024-12-02 RX ADMIN — ACETAMINOPHEN 650 MG: 325 TABLET ORAL at 10:12

## 2024-12-02 RX ADMIN — DEXTROSE MONOHYDRATE 500 ML: 100 INJECTION, SOLUTION INTRAVENOUS at 03:12

## 2024-12-02 RX ADMIN — NITROGLYCERIN 0.4 MG: 0.4 TABLET, ORALLY DISINTEGRATING SUBLINGUAL at 01:12

## 2024-12-02 RX ADMIN — NITROGLYCERIN 0.4 MG: 0.4 TABLET, ORALLY DISINTEGRATING SUBLINGUAL at 02:12

## 2024-12-02 RX ADMIN — AMLODIPINE BESYLATE 10 MG: 10 TABLET ORAL at 10:12

## 2024-12-02 RX ADMIN — LABETALOL HYDROCHLORIDE 200 MG: 100 TABLET, FILM COATED ORAL at 10:12

## 2024-12-02 RX ADMIN — ALBUTEROL SULFATE 10 MG: 2.5 SOLUTION RESPIRATORY (INHALATION) at 03:12

## 2024-12-02 RX ADMIN — CALCIUM GLUCONATE 1 G: 20 INJECTION, SOLUTION INTRAVENOUS at 03:12

## 2024-12-02 NOTE — ASSESSMENT & PLAN NOTE
RESOLVED   Patient has a current diagnosis of Hypertensive emergency with end organ damage evidenced by acute pulmonary edema without heart failure which is uncontrolled.  Latest blood pressure and vitals reviewed-   Temp:  [98.3 °F (36.8 °C)-98.8 °F (37.1 °C)]   Pulse:  [59-87]   Resp:  [17-28]   BP: (152-238)/()   SpO2:  [92 %-99 %] .   Patient currently off IV antihypertensives.   Home meds for hypertension were reviewed and noted below.   Hypertension Medications               amLODIPine (NORVASC) 10 MG tablet Take 1 tablet (10 mg total) by mouth nightly.    labetaloL (NORMODYNE) 200 MG tablet Take 1 tablet (200 mg total) by mouth every 12 (twelve) hours.    losartan (COZAAR) 100 MG tablet Take 1 tablet (100 mg total) by mouth once daily.            Medication adjustment for hospital antihypertensives is as follows- Will restart home medications: Amlodipine and Labetolol. Hold Losartan given hyperkalemia.     Will aim for controlled BP reduction by medications noted above. Monitor and mitigate end organ damage as indicated.

## 2024-12-02 NOTE — HPI
Jael Hall is a 59 y.o. female with past medical history of ESRD on HD (TTHSat), HTN, T2DM with Insulin use who presented to the ED for shortness of breath and chest pain in the setting of missed dialysis.  She says it has been going on for about two days. She is unable to tell me any inciting factors or triggering illness. Friend present at bedside. History was difficult to obtain as patient was falling asleep and hard of hearing.       In the ED BP elevated systolics of 200s, labs notabel for istat K 6.8, Troponin 0.29, and chronic anemia. EKG with no significant changes. She was placed on 3 L NC agive NG for CP and BP. Nephrology consulted with plans of Emergent dialysis and admission to medicine close monitoring of potassium.

## 2024-12-02 NOTE — ASSESSMENT & PLAN NOTE
The likely etiology of thrombocytopenia is  ESRD . The patients 3 most recent labs are listed below.  Recent Labs     12/02/24  1318   *     Plan  - Will transfuse if platelet count is <10   - continue to monitor

## 2024-12-02 NOTE — FIRST PROVIDER EVALUATION
"Medical screening examination initiated.  I have conducted a focused provider triage encounter, findings are as follows:    Brief history of present illness:  60 yo f w/ h/o AIHA, ESRD on TThS HD, HpEF, RCC s/p resection. Presents w/ SOB and requests oxygen    Vitals:    12/02/24 1219   BP: (!) 204/85   Pulse: (!) 59   Resp: (!) 28   Temp: 98.8 °F (37.1 °C)   TempSrc: Oral   SpO2: (!) 92%   Weight: 90.7 kg (200 lb)   Height: 5' 4" (1.626 m)       Pertinent physical exam:  tachypneic, dysarthric    Brief workup plan: HF order set    Preliminary workup initiated; this workup will be continued and followed by the physician or advanced practice provider that is assigned to the patient when roomed.  "

## 2024-12-02 NOTE — ASSESSMENT & PLAN NOTE
Patient's blood pressure range in the last 24 hours was: BP  Min: 152/79  Max: 238/128.The patient's inpatient anti-hypertensive regimen is listed below:  Current Antihypertensives  amLODIPine tablet 10 mg, Nightly, Oral  labetaloL tablet 200 mg, Every 12 hours, Oral    Plan  - BP is uncontrolled, will adjust as follows: restart home medications and likely will improve with dialysis   - hold Losartan given hyperkalemia

## 2024-12-02 NOTE — Clinical Note
Diagnosis: Shortness of breath [786.05.ICD-9-CM]   Future Attending Provider: FRAN DELGADILLO [[id number]]   Reason for IP Medical Treatment  (Clinical interventions that can only be accomplished in the IP setting? ) :: Volume overload and hyperkalemia requiring dialysis

## 2024-12-02 NOTE — ED NOTES
Patient placed in gown and on continuous cardiac monitor. Fall risk band and limb alert placed at this time. Patient provided with pillow and blanket for comfort. Patient provided with nonslip socks

## 2024-12-02 NOTE — HPI
"Jael Hall is a pleasant 59 y.o. woman with past medical history of ESRD on HD (TTHSat), HTN, T2DM with Insulin use, WAIHA, and GERD who presents to the ED for missed HD. K 6.8 on POCT. Also c/o of SOB and chest pain. Placed on 3L NC and given nitro x 3. BNP 1848. Troponin .029. Chest x ray with edema. SBP 200s. Last HD was on Wednesday 11/27. Missed HD due to "feeling tired". Nephrology consulted for ESRD.   "

## 2024-12-02 NOTE — ASSESSMENT & PLAN NOTE
Anemia is likely due to chronic disease due to ESRD. Most recent hemoglobin and hematocrit are listed below.  Recent Labs     12/02/24  1318 12/02/24  1400 12/02/24  1458 12/03/24  0419   HGB 10.1*  --   --  9.3*   HCT 32.3* 29* 29* 29.4*     Plan  - Monitor serial CBC: Daily  - Transfuse PRBC if patient becomes hemodynamically unstable, symptomatic or H/H drops below 7/21.  - Patient has not received any PRBC transfusions to date  - Patient's anemia is currently stable  - transfuse for less than 7

## 2024-12-02 NOTE — ED PROVIDER NOTES
Encounter Date: 12/2/2024       History     Chief Complaint   Patient presents with    Shortness of Breath     Dialysis done on sat, placed on 2l nc in triage     Patient is a 60 yo female with PMH of ESRD on HD (TThSat),  NAFLD, HTN, Diastolic HF, T2DM, AIHA. Presented to [facility] ED 12/2 with complaints of shortness of breath. SOB started yesterday (12/1). Due to Thanksgiving, she received dialysis on Wednesday. She missed her Saturday session of dialysis. She also believes she has a cold, as she is endorsing a dry cough and recent sick contacts. Endorsing some substernal CP radiating to her left arm.     The history is provided by the patient and medical records. No  was used.     Review of patient's allergies indicates:  No Known Allergies  Past Medical History:   Diagnosis Date    Acute hyperkalemia 09/09/2020    Acute on chronic diastolic heart failure 10/08/2021    Anemia of chronic disorder 11/08/2017    Overview:  Added automatically from request for surgery [id number]    Anemia of chronic renal failure, stage 5     Anxiety     Cyst of left ovary 05/14/2019    Cyst of left ovary 07/05/2019    Dyslipidemia 01/04/2013    Encounter for screening colonoscopy 10/08/2020    End-stage renal disease on hemodialysis 08/20/2020    ESRD on hemodialysis 09/28/2017    Hematuria 10/30/2019    History of COVID-19 04/07/2020    Hospital discharge follow-up 10/06/2023    Hx of sinus bradycardia 09/28/2017    Hyperkalemia 03/29/2017    Hypertensive urgency 07/22/2021    Peripheral neuropathy 01/04/2013    Preoperative testing 10/08/2020    Renovascular hypertension 08/17/2020    S/P dilation and curettage 05/22/2018    Screening for colon cancer 11/28/2023    Thickened endometrium 05/22/2018    Thyroid nodule     Thyroid nodule 12/13/2021    Uncontrolled type 2 diabetes mellitus 04/05/2013    Overview:  poc a1c today 11.2-270/ up from 10.8-263, Pt. has very very stressed/ was incarcerated/marital stress/ will  current meds/ less stress now/ will monitor.     Past Surgical History:   Procedure Laterality Date    AV FISTULA PLACEMENT      right arm     SECTION      x1    COLONOSCOPY N/A 2018    Procedure: COLONOSCOPY;  Surgeon: Pankaj Hinojosa MD;  Location: [facility] ENDO (4TH FLR);  Service: Endoscopy;  Laterality: N/A;  dialysis pt/labs prior to colon/MS    COLONOSCOPY N/A 2023    Procedure: COLONOSCOPY;  Surgeon: Angel Turner MD;  Location: [facility] ENDO (2ND FLR);  Service: Endoscopy;  Laterality: N/A;  Ref By:  Maty Gayle MD  Procedure: Colonoscopy  Diagnosis: endounter for preventive health   Procedure Timing: pt. pref  Provider: Dr. turner   Location: [facility] 4-Endo   Prep Specifications: Standard prep   Additional Info: Dialysis patient lab pending    CYSTOSCOPY W/ RETROGRADES Bilateral 2020    Procedure: CYSTOSCOPY, WITH RETROGRADE PYELOGRAM;  Surgeon: Douglas Abraham MD;  Location: [facility] 1ST FLR;  Service: Urology;  Laterality: Bilateral;  45 min    FISTULOGRAM Right 2020    Procedure: Fistulogram;  Surgeon: Lester Gómez MD;  Location: [facility] CATH LAB;  Service: Nephrology;  Laterality: Right;    HYSTERECTOMY      IMPLANTATION OF COCHLEAR PROSTHESIS Right 2021    Procedure: INSERTION, PROSTHESIS, COCHLEAR;  Surgeon: Ramiro Zuleta MD;  Location: [facility] 2ND FLR;  Service: ENT;  Laterality: Right;  COCHLEAR BABAK    IMPLANTATION OF COCHLEAR PROSTHESIS Right 2021    Procedure: INSERTION, PROSTHESIS, COCHLEAR;  Surgeon: Ramiro Zuleta MD;  Location: [facility] OR 2ND FLR;  Service: ENT;  Laterality: Right;  COCHLEAR AMERICAS    ROBOT-ASSISTED LAPAROSCOPIC ABDOMINAL HYSTERECTOMY USING DA NATALYA XI N/A 2019    Procedure: XI ROBOTIC HYSTERECTOMY;  Surgeon: Trice Lei MD;  Location: [facility];  Service: OB/GYN;  Laterality: N/A;    ROBOT-ASSISTED LAPAROSCOPIC NEPHRECTOMY Right 10/8/2020    Procedure: ROBOTIC NEPHRECTOMY;  Surgeon: Douglas Abraham MD;  Location: [address];   Service: Urology;  Laterality: Right;  3.5hrs gen with regional     ROBOT-ASSISTED LAPAROSCOPIC SALPINGO-OOPHORECTOMY USING DA NATALYA XI Bilateral 7/5/2019    Procedure: XI ROBOTIC SALPINGO-OOPHORECTOMY;  Surgeon: Trice Lei MD;  Location: [facility];  Service: OB/GYN;  Laterality: Bilateral;    TUBAL LIGATION      Vascular access surgeries      x5     Family History   Problem Relation Name Age of Onset    Cancer Mother      Ovarian cancer Mother      Cancer Sister      Ovarian cancer Sister      Lung cancer Sister      Macular degeneration Sister      Cancer Sister      Ovarian cancer Sister      Diabetes Sister      Diabetes Brother      Breast cancer Neg Hx      Colon cancer Neg Hx      Cervical cancer Neg Hx      Endometrial cancer Neg Hx      Vaginal cancer Neg Hx      Thyroid disease Neg Hx      Glaucoma Neg Hx      Retinal detachment Neg Hx       Social History     Tobacco Use    Smoking status: Never    Smokeless tobacco: Never   Substance Use Topics    Alcohol use: No    Drug use: No     Review of systems:  A full ROS was obtained, see HPI for pertinent positives.     Physical Exam     Initial Vitals [12/02/24 1219]   BP Pulse Resp Temp SpO2   (!) 204/85 (!) 59 (!) 28 98.8 °F (37.1 °C) (!) 92 %      MAP       --         Physical Exam    Constitutional: She is not diaphoretic. She appears distressed.   HENT:   Head: Normocephalic and atraumatic.   Nose: Nose normal.   Eyes: Conjunctivae are normal.   Cardiovascular:  Normal rate, regular rhythm and normal heart sounds.           No murmur heard.  Pulmonary/Chest: She is in respiratory distress.   Diminished lung sounds BL    Abdominal: Abdomen is soft. There is no abdominal tenderness.   Musculoskeletal:         General: Edema (BL LE) present. No tenderness.     Neurological: She is alert and oriented to person, place, and time.   Skin: Skin is warm and dry.   Psychiatric: She has a normal mood and affect. Her behavior is normal. Judgment and thought  content normal.         ED Course   Procedures  Labs Reviewed   CBC W/ AUTO DIFFERENTIAL - Abnormal       Result Value    WBC 7.99      RBC 3.91 (*)     Hemoglobin 10.1 (*)     Hematocrit 32.3 (*)     MCV 83      MCH 25.8 (*)     MCHC 31.3 (*)     RDW 19.4 (*)     Platelets 133 (*)     MPV SEE COMMENT      Immature Granulocytes 0.1      Gran # (ANC) 4.9      Immature Grans (Abs) 0.01      Lymph # 1.4      Mono # 1.1 (*)     Eos # 0.5      Baso # 0.10      nRBC 0      Gran % 61.9      Lymph % 17.1 (*)     Mono % 13.1      Eosinophil % 6.5      Basophil % 1.3      Differential Method Automated     TROPONIN I - Abnormal    Troponin I 0.029 (*)    B-TYPE NATRIURETIC PEPTIDE - Abnormal    BNP 1,848 (*)    COMPREHENSIVE METABOLIC PANEL - Abnormal    Sodium 139      Potassium 6.9 (*)     Chloride 102      CO2 18 (*)     Glucose 78      BUN 82 (*)     Creatinine 15.5 (*)     Calcium 9.9      Total Protein 7.0      Albumin 3.1 (*)     Total Bilirubin 0.9      Alkaline Phosphatase 54      AST 18      ALT 9 (*)     eGFR 2.4 (*)     Anion Gap 19 (*)    ISTAT PROCEDURE - Abnormal    POC Glucose 86      POC BUN 92 (*)     POC Creatinine 15.3 (*)     POC Sodium 138      POC Potassium 6.2 (*)     POC Chloride 105      POC TCO2 (MEASURED) 23      POC Ionized Calcium 1.14      POC Hematocrit 29 (*)     Sample NIKO     ISTAT PROCEDURE - Abnormal    POC Glucose 83      POC BUN 92 (*)     POC Creatinine 15.4 (*)     POC Sodium 138      POC Potassium 6.8 (*)     POC Chloride 105      POC TCO2 (MEASURED) 25      POC Ionized Calcium 1.17      POC Hematocrit 29 (*)     Sample NIKO     POCT GLUCOSE - Abnormal    POCT Glucose 247 (*)    BASIC METABOLIC PANEL   ISTAT PROCEDURE    POC PH 7.379      POC PCO2 41.8      POC PO2 41      POC HCO3 24.7      POC BE 0      POC SATURATED O2 75      POC TCO2 26      Sample VENOUS      Site Other      Allens Test N/A     ISTAT LACTATE    POC Lactate 1.02      Sample VENOUS      Site Other      Allens Test  N/A     ISTAT CHEM8   POCT GLUCOSE MONITORING CONTINUOUS   POCT GLUCOSE MONITORING CONTINUOUS        ECG Results              EKG 12-lead (Final result)        Collection Time Result Time QRS Duration OHS QTC Calculation    12/02/24 12:22:40 12/02/24 16:33:32 82 463                     Final result by Interface, Lab In [facility] (12/02/24 16:33:40)                   Narrative:    Test Reason : R06.02,    Vent. Rate :  59 BPM     Atrial Rate :    BPM     P-R Int :    ms          QRS Dur :  82 ms      QT Int : 468 ms       P-R-T Axes :    -30  49 degrees    QTcB Int : 463 ms    Junctional rhythm  Left axis deviation  Abnormal ECG  No previous ECGs available  Confirmed by Douglas Valero ([id number]) on 12/2/2024 4:33:30 PM    Referred By:            Confirmed By: Douglas Valero                                  Imaging Results              X-Ray Chest AP Portable (Final result)  Result time 12/02/24 14:19:52      Final result by Daniel Seymour MD (12/02/24 14:19:52)                   Impression:      Findings consistent with CHF with edema.      Electronically signed by: Daniel Seymour MD  Date:    12/02/2024  Time:    14:19               Narrative:    EXAMINATION:  XR CHEST AP PORTABLE    CLINICAL HISTORY:  CHF;    TECHNIQUE:  Single frontal view of the chest was performed.    COMPARISON:  No 10/06/2024 ne    FINDINGS:  Mild cardiomegaly.  Prominent central pulmonary vascularity and diffuse accentuation interstitial markings probably edema.  Ill-defined right upper lobe opacity possible small area of airway disease or subsegmental atelectasis.  No significant pleural effusion.                                       Medications   calcium gluconate 1 g in NS IVPB (premixed) (0 g Intravenous Stopped 12/2/24 1525)     And   calcium gluconate 1 g in NS IVPB (premixed) (has no administration in time range)   dextrose 10% bolus 500 mL 500 mL (0 mLs Intravenous Stopped 12/2/24 1546)     And   dextrose 10% bolus 250 mL 250 mL (has no  administration in time range)     And   insulin regular injection 9 Units 0.09 mL (9 Units Intravenous Given 12/2/24 1536)   sodium chloride 0.9% flush 10 mL (has no administration in time range)   naloxone 0.4 mg/mL injection 0.02 mg (has no administration in time range)   glucose chewable tablet 16 g (has no administration in time range)   glucose chewable tablet 24 g (has no administration in time range)   glucagon (human recombinant) injection 1 mg (has no administration in time range)   heparin (porcine) injection 5,000 Units (has no administration in time range)   acetaminophen tablet 650 mg (has no administration in time range)   polyethylene glycol packet 17 g (has no administration in time range)   ondansetron injection 4 mg (has no administration in time range)   promethazine tablet 25 mg (has no administration in time range)   insulin aspart U-100 pen 0-5 Units (has no administration in time range)   acetaminophen tablet 650 mg (has no administration in time range)   simethicone chewable tablet 80 mg (has no administration in time range)   aluminum-magnesium hydroxide-simethicone 200-200-20 mg/5 mL suspension 30 mL (has no administration in time range)   dextrose 10% bolus 125 mL 125 mL (has no administration in time range)   dextrose 10% bolus 250 mL 250 mL (has no administration in time range)   amLODIPine tablet 10 mg (has no administration in time range)   aspirin EC tablet 81 mg (has no administration in time range)   atorvastatin tablet 40 mg (has no administration in time range)   atovaquone 750 mg/5 mL oral liquid 1,500 mg (has no administration in time range)   folic acid tablet 1 mg (has no administration in time range)   labetaloL tablet 200 mg (has no administration in time range)   LIDOcaine 5 % patch 1 patch (has no administration in time range)   pantoprazole EC tablet 40 mg (has no administration in time range)   pregabalin capsule 75 mg (has no administration in time range)   sevelamer  carbonate tablet 2,400 mg (has no administration in time range)   nitroGLYCERIN SL tablet 0.4 mg (0.4 mg Sublingual Given 12/2/24 1436)   albuterol sulfate nebulizer solution 10 mg (10 mg Nebulization Given 12/2/24 1511)     Medical Decision Making  Patient is a 58 yo female with PMH ESRD on HD, NAFLD, HTN, diastolic HF, T2DM here for sob. Missed HD this weekend. Last full run on Wednesday prior to Thanksgiving holiday. On arrival, patient is hypertensive. She is hypoxic and requiring 2L supplemental oxygen. She is mildly tachypneic with decreased BS in the lower lobes. She looks volume overloaded. I-STAT with hyperkalemia but no EKG changes. HyperK protocol initiated. Plan to repeat I-STAT after shifting. Nephrology consulted as patient will require emergent HD. Labs, CXR pending.     Labs and imaging reviewed. BNP elevated, troponin slightly elevated 0.029. CXR c/w volume overload. Elevated troponin likely in setting of ESRD/HD, no ACS protocol initiated. Patient admitted for volume overload, hyperkalemia, hypoxia.     Critical Care  Performed by: Ivonne Brady MD   Authorized by: Ivonne Brady MD    Total critical care time (exclusive of procedural time) : 31 minutes  Critical care was necessary to treat or prevent imminent or life-threatening deterioration of the following conditions:  hyperkalemia, hypoxia             Amount and/or Complexity of Data Reviewed  Labs: ordered. Decision-making details documented in ED Course.  ECG/medicine tests: ordered and independent interpretation performed. Decision-making details documented in ED Course.    Risk  OTC drugs.  Prescription drug management.  Decision regarding hospitalization.              Attending Attestation:   Physician Attestation Statement for Resident:  As the supervising MD   Physician Attestation Statement: I have personally seen and examined this patient.   I agree with the above history.  -:   As the supervising MD I agree with the above  PE.     As the supervising MD I agree with the above treatment, course, plan, and disposition.   -: See my additional documentation in the ED course                   ED Course as of 12/02/24 1639   Mon Dec 02, 2024   1347 No STEMI on EKG, morphology similar to prior, rate 56 [NN]   1353 The patient is a 59 y.o. female with past medical history of ESRD on HD (TTHSat), HTN, T2DM with Insulin use, WAIHA, and GERD who presents for shortness of breath.  Patient states that she has been feeling short of breath for the last 2-3 days.  She missed dialysis on Saturday this weekend.  Her last run of hemodialysis was Wednesday before Thanksgiving now 5 days ago.  She was had increased shortness of breath over the last 2 days.  She reports some mild chest discomfort that radiates to her left arm.  She reports dry cough and leg swelling.  No abdominal pain, nausea, vomiting, diarrhea.  Patient and uric.  No fevers or chills.  No headache. [NN]   1354 Patient satting in the low 90s on arrival.  She was not wear oxygen at home.  Improved on 2 L supplemental oxygen.  She was markedly hypertensive.  She appears volume overloaded.  Lungs diminished bilaterally and she has 1+ pitting edema in her lower extremities.  I am concerned for volume overload.  She was saturating well now on supplemental oxygen.  Chest x-ray and labs ordered.  Patient will require admission for hypoxia and shortness of breath. [NN]   1355 Will also treat her hypertension with sublingual nitro given her chest pain.  Will closely monitor and re-evaluate. [NN]   1502 Volume overloaded on chest x-ray [NN]   1502 POC Potassium(!!): 6.8 [NN]   1503 Plan to consult Nephrology for volume overload and hyperkalemia.  Hyper K protocol ordered.   [NN]   1504 Will repeat potassium after shifting. [NN]   1505 Admit critical care versus step-down to Hospital Medicine.  Labs still pending. [NN]      ED Course User Index  [NN] Ivonne Brady MD                            Clinical Impression:  Final diagnoses:  [R06.02] Shortness of breath  [R06.02] SOB (shortness of breath) (Primary)  [E87.70] Hypervolemia, unspecified hypervolemia type  [E87.5] Hyperkalemia          ED Disposition Condition    Admit                 Lisa Alvarado MD  Resident  12/02/24 [facility]       Ivonne Brady MD  12/02/24 2019

## 2024-12-02 NOTE — CONSULTS
"[facility] - Emergency Dept  Nephrology  Consult Note    Patient Name: Jael Hall  MRN: [id number]  Admission Date: 12/2/2024  Hospital Length of Stay: 0 days  Attending Provider: Ivonne Brady MD   Primary Care Physician: Maty Gayle DO  Principal Problem:<principal problem not specified>    Inpatient consult to Nephrology  Consult performed by: Clara Busby, SERAFIN  Consult ordered by: Ivonne Brady MD  Reason for consult: ESRD        Subjective:     HPI: Jael Hall is a pleasant 59 y.o. woman with past medical history of ESRD on HD (TTHSat), HTN, T2DM with Insulin use, WAIHA, and GERD who presents to the ED for missed HD. K 6.8 on POCT. Also c/o of SOB and chest pain. Placed on 3L NC and given nitro x 3. BNP 1848. Troponin .029. Chest x ray with edema. SBP 200s. Last HD was on Wednesday 11/27. Missed HD due to "feeling tired". Nephrology consulted for ESRD.     Past Medical History:   Diagnosis Date    Acute hyperkalemia 09/09/2020    Acute on chronic diastolic heart failure 10/08/2021    Anemia of chronic disorder 11/08/2017    Overview:  Added automatically from request for surgery [id number]    Anemia of chronic renal failure, stage 5     Anxiety     Cyst of left ovary 05/14/2019    Cyst of left ovary 07/05/2019    Dyslipidemia 01/04/2013    Encounter for screening colonoscopy 10/08/2020    End-stage renal disease on hemodialysis 08/20/2020    ESRD on hemodialysis 09/28/2017    Hematuria 10/30/2019    History of COVID-19 04/07/2020    Hospital discharge follow-up 10/06/2023    Hx of sinus bradycardia 09/28/2017    Hyperkalemia 03/29/2017    Hypertensive urgency 07/22/2021    Peripheral neuropathy 01/04/2013    Preoperative testing 10/08/2020    Renovascular hypertension 08/17/2020    S/P dilation and curettage 05/22/2018    Screening for colon cancer 11/28/2023    Thickened endometrium 05/22/2018    Thyroid nodule     Thyroid nodule 12/13/2021    Uncontrolled type 2 diabetes mellitus 04/05/2013 "    Overview:  poc a1c today 11.2-270/ up from 10.8-263, Pt. has very very stressed/ was incarcerated/marital stress/ will current meds/ less stress now/ will monitor.       Past Surgical History:   Procedure Laterality Date    AV FISTULA PLACEMENT      right arm     SECTION      x1    COLONOSCOPY N/A 2018    Procedure: COLONOSCOPY;  Surgeon: Pankaj Hinojosa MD;  Location: [facility] (4TH FLR);  Service: Endoscopy;  Laterality: N/A;  dialysis pt/labs prior to colon/MS    COLONOSCOPY N/A 2023    Procedure: COLONOSCOPY;  Surgeon: Angel Turner MD;  Location: [facility] (2ND FLR);  Service: Endoscopy;  Laterality: N/A;  Ref By:  Maty Gayle MD  Procedure: Colonoscopy  Diagnosis: endounter for preventive health   Procedure Timing: pt. pref  Provider: Dr. turner   [place] [facility]   Prep Specifications: Standard prep   Additional Info: Dialysis patient lab pending    CYSTOSCOPY W/ RETROGRADES Bilateral 2020    Procedure: CYSTOSCOPY, WITH RETROGRADE PYELOGRAM;  Surgeon: Douglas Abraham MD;  Location: [facility] 1ST FLR;  Service: Urology;  Laterality: Bilateral;  45 min    FISTULOGRAM Right 2020    Procedure: Fistulogram;  Surgeon: Lester Gómez MD;  Location: [facility] CATH LAB;  Service: Nephrology;  Laterality: Right;    HYSTERECTOMY      IMPLANTATION OF COCHLEAR PROSTHESIS Right 2021    Procedure: INSERTION, PROSTHESIS, COCHLEAR;  Surgeon: Ramiro Zuleta MD;  Location: [facility] OR 2ND FLR;  Service: ENT;  Laterality: Right;  COCHLEAR BABAK    IMPLANTATION OF COCHLEAR PROSTHESIS Right 2021    Procedure: INSERTION, PROSTHESIS, COCHLEAR;  Surgeon: Ramiro Zuleta MD;  Location: [facility] 2ND FLR;  Service: ENT;  Laterality: Right;  COCHLEAR AMERICAS    ROBOT-ASSISTED LAPAROSCOPIC ABDOMINAL HYSTERECTOMY USING DA NATALYA XI N/A 2019    Procedure: XI ROBOTIC HYSTERECTOMY;  Surgeon: Trice Lei MD;  Location: [facility];  Service: OB/GYN;  Laterality: N/A;    ROBOT-ASSISTED LAPAROSCOPIC  "NEPHRECTOMY Right 10/8/2020    Procedure: ROBOTIC NEPHRECTOMY;  Surgeon: Douglas Abraham MD;  Location: [facility] OR 2ND FLR;  Service: Urology;  Laterality: Right;  3.5hrs gen with regional     ROBOT-ASSISTED LAPAROSCOPIC SALPINGO-OOPHORECTOMY USING DA NATALYA XI Bilateral 7/5/2019    Procedure: XI ROBOTIC SALPINGO-OOPHORECTOMY;  Surgeon: Trice Lei MD;  Location: [facility];  Service: OB/GYN;  Laterality: Bilateral;    TUBAL LIGATION      Vascular access surgeries      x5       Review of patient's allergies indicates:  No Known Allergies  Current Facility-Administered Medications   Medication Frequency    calcium gluconate 1 g in NS IVPB (premixed) Q10 Min PRN    dextrose 10% bolus 250 mL 250 mL PRN     Current Outpatient Medications   Medication    ACCU-CHEK GUIDE GLUCOSE METER Misc    ACCU-CHEK GUIDE L1-L2 CTRL SOL Soln    ACCU-CHEK GUIDE TEST STRIPS Strp    ACCU-CHEK SOFTCLIX LANCETS Misc    acetaminophen (TYLENOL) 500 MG tablet    amLODIPine (NORVASC) 10 MG tablet    aspirin (ECOTRIN) 81 MG EC tablet    atorvastatin (LIPITOR) 40 MG tablet    atovaquone (MEPRON) 750 mg/5 mL Susp oral liquid    blood sugar diagnostic Strp    blood-glucose meter kit    DROPSAFE ALCOHOL PREP PADS PadM    epoetin shiela (PROCRIT) 10,000 unit/mL injection    folic acid (FOLVITE) 1 MG tablet    insulin aspart U-100 (NOVOLOG) 100 unit/mL (3 mL) InPn pen    insulin glargine U-100, Lantus, 100 unit/mL (3 mL) SubQ InPn pen    labetaloL (NORMODYNE) 200 MG tablet    lancets Misc    LIDOcaine (LIDODERM) 5 %    losartan (COZAAR) 100 MG tablet    omeprazole (PRILOSEC) 20 MG capsule    pen needle, diabetic 31 gauge x 3/16" Ndle    pregabalin (LYRICA) 75 MG capsule    sevelamer carbonate (RENVELA) 800 mg Tab     Family History       Problem Relation (Age of Onset)    Cancer Mother, Sister, Sister    Diabetes Sister, Brother    Lung cancer Sister    Macular degeneration Sister    Ovarian cancer Mother, Sister, Sister          Tobacco Use    Smoking " status: Never    Smokeless tobacco: Never   Substance and Sexual Activity    Alcohol use: No    Drug use: No    Sexual activity: Yes     Partners: Male     Birth control/protection: Post-menopausal     Review of Systems   Constitutional: Negative.    HENT: Negative.     Eyes: Negative.    Respiratory:  Positive for shortness of breath.    Cardiovascular:  Positive for chest pain and leg swelling.   Endocrine: Negative.    Genitourinary:  Positive for decreased urine volume.   Skin: Negative.    Neurological: Negative.    Objective:     Vital Signs (Most Recent):  Temp: 98.7 °F (37.1 °C) (12/02/24 1320)  Pulse: 76 (12/02/24 1511)  Resp: 18 (12/02/24 1511)  BP: (!) 215/86 (12/02/24 1500)  SpO2: 98 % (12/02/24 1511) Vital Signs (24h Range):  Temp:  [98.7 °F (37.1 °C)-98.8 °F (37.1 °C)] 98.7 °F (37.1 °C)  Pulse:  [59-81] 76  Resp:  [17-28] 18  SpO2:  [92 %-99 %] 98 %  BP: (203-238)/() 215/86     Weight: 90.7 kg (200 lb) (12/02/24 1219)  Body mass index is 34.33 kg/m².  Body surface area is 2.02 meters squared.    No intake/output data recorded.     Physical Exam  Vitals and nursing note reviewed.   Constitutional:       Appearance: She is ill-appearing.   HENT:      Mouth/Throat:      Pharynx: Oropharynx is clear.   Eyes:      Conjunctiva/sclera: Conjunctivae normal.   Cardiovascular:      Pulses: Normal pulses.      Arteriovenous access: Left arteriovenous access is present.     Comments: +thrill   Pulmonary:      Effort: Pulmonary effort is normal.      Breath sounds: Rales present.      Comments: 3L NC  Musculoskeletal:      Right lower leg: Edema present.      Left lower leg: Edema present.   Neurological:      Mental Status: She is oriented to person, place, and time.   Psychiatric:         Mood and Affect: Mood normal.      Significant Labs:  CBC:   Recent Labs   Lab 12/02/24  1318 12/02/24  1400 12/02/24  1458   WBC 7.99  --   --    RBC 3.91*  --   --    HGB 10.1*  --   --    HCT 32.3*   < > 29*   *   "--   --    MCV 83  --   --    MCH 25.8*  --   --    MCHC 31.3*  --   --     < > = values in this interval not displayed.     CMP: No results for input(s): "GLU", "CALCIUM", "ALBUMIN", "PROT", "NA", "K", "CO2", "CL", "BUN", "CREATININE", "ALKPHOS", "ALT", "AST", "BILITOT" in the last 168 hours.  All labs within the past 24 hours have been reviewed.      Assessment/Plan:     Renal/  ESRD (end stage renal disease) on dialysis  Missed HD. K 6.8 on POCT. X ray with edema. BNP 1848. Troponin .029. RFP pending. ~ 5 above EDW     Outpatient HD Information:  -Dialysis modality: Hemodialysis  -Outpatient HD unit: [facility]   -Nephrologist: Ras TRIMBLE   -HD TX days: Tuesday/Thursday/Saturday, duration of treatment: 3.5 hrs  -Last HD TX prior to hospital admission: 11/27  -Dialysis access: RUE AV fistula   -Residual urine: Minium  -EDW: 85 kg     Plan/Recommendations: .    -Emergent HD due to hyperkalemia. Chest x ray with edfema. UF goal 2.5-3L    -1L fluid restrictions   -pre and post HD weight   -will eval for additional HD tomorrow   -resume home phos binders.   -Hgb goal 10-11, hold epo in setting of HTN               Thank you for your consult. I will follow-up with patient. Please contact us if you have any additional questions.    Clara Busby DNP  Nephrology  [facility] - Emergency Dept  "

## 2024-12-02 NOTE — ASSESSMENT & PLAN NOTE
-likely 2/2 to uremia in the setting of volume overload and missed dialysis  -no focal findings, patient oriented but does fall back asleep quickly  -CT head negative   -improving   -if it mental status changes again or fever occurs will order full septic workup

## 2024-12-02 NOTE — ASSESSMENT & PLAN NOTE
-sob is 2/2 to volume overload in the setting of missed dialysis with ESRD  -elevated BNP and Troponin 2/2 to volume overload and likely will improve with Dialysis   -currently undergoing urgent dialysis, and will likely need repeat HD tomorrow   -weaned to RA

## 2024-12-02 NOTE — ASSESSMENT & PLAN NOTE
-on lantus 11 units at home   -will start SSI and accuchecks   -diabetic diet ordered   -BG at goal on SSI   -hold oral hyperglycemic agents while in the hospital

## 2024-12-02 NOTE — SUBJECTIVE & OBJECTIVE
Past Medical History:   Diagnosis Date    Acute hyperkalemia 2020    Acute on chronic diastolic heart failure 10/08/2021    Anemia of chronic disorder 2017    Overview:  Added automatically from request for surgery [id number]    Anemia of chronic renal failure, stage 5     Anxiety     Cyst of left ovary 2019    Cyst of left ovary 2019    Dyslipidemia 2013    Encounter for screening colonoscopy 10/08/2020    End-stage renal disease on hemodialysis 2020    ESRD on hemodialysis 2017    Hematuria 10/30/2019    History of COVID-19 2020    Hospital discharge follow-up 10/06/2023    Hx of sinus bradycardia 2017    Hyperkalemia 2017    Hypertensive urgency 2021    Peripheral neuropathy 2013    Preoperative testing 10/08/2020    Renovascular hypertension 2020    S/P dilation and curettage 2018    Screening for colon cancer 2023    Thickened endometrium 2018    Thyroid nodule     Thyroid nodule 2021    Uncontrolled type 2 diabetes mellitus 2013    Overview:  poc a1c today 11.2-270/ up from 10.8-263, Pt. has very very stressed/ was incarcerated/marital stress/ will current meds/ less stress now/ will monitor.       Past Surgical History:   Procedure Laterality Date    AV FISTULA PLACEMENT      right arm     SECTION      x1    COLONOSCOPY N/A 2018    Procedure: COLONOSCOPY;  Surgeon: Pankaj Hinojosa MD;  [facility]  Service: Endoscopy;  Laterality: N/A;  dialysis pt/labs prior to colon/MS    COLONOSCOPY N/A 2023    Procedure: COLONOSCOPY;  Surgeon: Angel Turner MD;  Location: [facility] (2ND FLR);  Service: Endoscopy;  Laterality: N/A;  Ref By:  Maty Gayle MD  Procedure: Colonoscopy  Diagnosis: endounter for preventive health   Procedure Timing: pt. pref  Provider: Dr. turner   [place] [facility] [place]   Prep Specifications: Standard prep   Additional Info: Dialysis patient lab pending     CYSTOSCOPY W/ RETROGRADES Bilateral 8/20/2020    Procedure: CYSTOSCOPY, WITH RETROGRADE PYELOGRAM;  Surgeon: Douglas Abraham MD;  Location: [facility] OR 1ST FLR;  Service: Urology;  Laterality: Bilateral;  45 min    FISTULOGRAM Right 9/9/2020    Procedure: Fistulogram;  Surgeon: Lester Gómez MD;  Location: [facility] CATH LAB;  Service: Nephrology;  Laterality: Right;    HYSTERECTOMY      IMPLANTATION OF COCHLEAR PROSTHESIS Right 6/28/2021    Procedure: INSERTION, PROSTHESIS, COCHLEAR;  Surgeon: Ramiro Zuleta MD;  Location: [facility] OR 2ND FLR;  Service: ENT;  Laterality: Right;  COCHLEAR BABAK    IMPLANTATION OF COCHLEAR PROSTHESIS Right 6/30/2021    Procedure: INSERTION, PROSTHESIS, COCHLEAR;  Surgeon: Ramiro Zuleta MD;  Location: [facility] OR 2ND FLR;  Service: ENT;  Laterality: Right;  COCHLEAR AMERICAS    ROBOT-ASSISTED LAPAROSCOPIC ABDOMINAL HYSTERECTOMY USING DA NATALYA XI N/A 7/5/2019    Procedure: XI ROBOTIC HYSTERECTOMY;  Surgeon: Trice Lei MD;  Location: [facility] OR;  Service: OB/GYN;  Laterality: N/A;    ROBOT-ASSISTED LAPAROSCOPIC NEPHRECTOMY Right 10/8/2020    Procedure: ROBOTIC NEPHRECTOMY;  Surgeon: Douglas Abraham MD;  Location: [facility] OR 2ND FLR;  Service: Urology;  Laterality: Right;  3.5hrs gen with regional     ROBOT-ASSISTED LAPAROSCOPIC SALPINGO-OOPHORECTOMY USING DA NATALYA XI Bilateral 7/5/2019    Procedure: XI ROBOTIC SALPINGO-OOPHORECTOMY;  Surgeon: Trice Lei MD;  Location: [facility];  Service: OB/GYN;  Laterality: Bilateral;    TUBAL LIGATION      Vascular access surgeries      x5       Review of patient's allergies indicates:  No Known Allergies  Current Facility-Administered Medications   Medication Frequency    calcium gluconate 1 g in NS IVPB (premixed) Q10 Min PRN    dextrose 10% bolus 250 mL 250 mL PRN     Current Outpatient Medications   Medication    ACCU-CHEK GUIDE GLUCOSE METER Misc    ACCU-CHEK GUIDE L1-L2 CTRL SOL Soln    ACCU-CHEK GUIDE TEST STRIPS Strp    ACCU-CHEK SOFTCLIX LANCETS  "Misc    acetaminophen (TYLENOL) 500 MG tablet    amLODIPine (NORVASC) 10 MG tablet    aspirin (ECOTRIN) 81 MG EC tablet    atorvastatin (LIPITOR) 40 MG tablet    atovaquone (MEPRON) 750 mg/5 mL Susp oral liquid    blood sugar diagnostic Strp    blood-glucose meter kit    DROPSAFE ALCOHOL PREP PADS PadM    epoetin shiela (PROCRIT) 10,000 unit/mL injection    folic acid (FOLVITE) 1 MG tablet    insulin aspart U-100 (NOVOLOG) 100 unit/mL (3 mL) InPn pen    insulin glargine U-100, Lantus, 100 unit/mL (3 mL) SubQ InPn pen    labetaloL (NORMODYNE) 200 MG tablet    lancets Misc    LIDOcaine (LIDODERM) 5 %    losartan (COZAAR) 100 MG tablet    omeprazole (PRILOSEC) 20 MG capsule    pen needle, diabetic 31 gauge x 3/16" Ndle    pregabalin (LYRICA) 75 MG capsule    sevelamer carbonate (RENVELA) 800 mg Tab     Family History       Problem Relation (Age of Onset)    Cancer Mother, Sister, Sister    Diabetes Sister, Brother    Lung cancer Sister    Macular degeneration Sister    Ovarian cancer Mother, Sister, Sister          Tobacco Use    Smoking status: Never    Smokeless tobacco: Never   Substance and Sexual Activity    Alcohol use: No    Drug use: No    Sexual activity: Yes     Partners: Male     Birth control/protection: Post-menopausal     Review of Systems   Constitutional: Negative.    HENT: Negative.     Eyes: Negative.    Respiratory:  Positive for shortness of breath.    Cardiovascular:  Positive for chest pain and leg swelling.   Endocrine: Negative.    Genitourinary:  Positive for decreased urine volume.   Skin: Negative.    Neurological: Negative.    Objective:     Vital Signs (Most Recent):  Temp: 98.7 °F (37.1 °C) (12/02/24 1320)  Pulse: 76 (12/02/24 1511)  Resp: 18 (12/02/24 1511)  BP: (!) 215/86 (12/02/24 1500)  SpO2: 98 % (12/02/24 1511) Vital Signs (24h Range):  Temp:  [98.7 °F (37.1 °C)-98.8 °F (37.1 °C)] 98.7 °F (37.1 °C)  Pulse:  [59-81] 76  Resp:  [17-28] 18  SpO2:  [92 %-99 %] 98 %  BP: (203-238)/() " "215/86     Weight: 90.7 kg (200 lb) (12/02/24 1219)  Body mass index is 34.33 kg/m².  Body surface area is 2.02 meters squared.    No intake/output data recorded.     Physical Exam  Vitals and nursing note reviewed.   Constitutional:       Appearance: She is ill-appearing.   HENT:      Mouth/Throat:      Pharynx: Oropharynx is clear.   Eyes:      Conjunctiva/sclera: Conjunctivae normal.   Cardiovascular:      Pulses: Normal pulses.      Arteriovenous access: Left arteriovenous access is present.     Comments: +thrill   Pulmonary:      Effort: Pulmonary effort is normal.      Breath sounds: Rales present.      Comments: 3L NC  Musculoskeletal:      Right lower leg: Edema present.      Left lower leg: Edema present.   Neurological:      Mental Status: She is oriented to person, place, and time.   Psychiatric:         Mood and Affect: Mood normal.      Significant Labs:  CBC:   Recent Labs   Lab 12/02/24  1318 12/02/24  1400 12/02/24  1458   WBC 7.99  --   --    RBC 3.91*  --   --    HGB 10.1*  --   --    HCT 32.3*   < > 29*   *  --   --    MCV 83  --   --    MCH 25.8*  --   --    MCHC 31.3*  --   --     < > = values in this interval not displayed.     CMP: No results for input(s): "GLU", "CALCIUM", "ALBUMIN", "PROT", "NA", "K", "CO2", "CL", "BUN", "CREATININE", "ALKPHOS", "ALT", "AST", "BILITOT" in the last 168 hours.  All labs within the past 24 hours have been reviewed.      "

## 2024-12-02 NOTE — ED NOTES
I-STAT Chem-8+ Results:   Value Reference Range   Sodium 138 136-145 mmol/L   Potassium  6.2 3.5-5.1 mmol/L   Chloride 105  mmol/L   Ionized Calcium 1.14 1.06-1.42 mmol/L   CO2 (measured) 23 23-29 mmol/L   Glucose 86  mg/dL   BUN 92 6-30 mg/dL   Creatinine 15.3 0.5-1.4 mg/dL   Hematocrit 29 36-54%

## 2024-12-02 NOTE — ASSESSMENT & PLAN NOTE
-ESRD, receives dialysis T/TH/S   -Nephrology consulted. Receiving Dialysis today and will repeat tomorrow 12/3/24   -continue home phos binders   -continue home BP meds, hold Losartan

## 2024-12-02 NOTE — PROGRESS NOTES
[facility] - Emergency Dept  Hospital Medicine  Progress Note    Patient Name: Jael Hall  MRN: [id number]  Patient Class: IP- Inpatient   Admission Date: 12/2/2024  Length of Stay: 0 days  Attending Physician: Ivonne Cortez MD  Primary Care Provider: Maty Gayle DO        Subjective     Principal Problem:Hyperkalemia        HPI:  Jael Hall is a 59 y.o. female with past medical history of ESRD on HD (TTHSat), HTN, T2DM with Insulin use who presented to the ED for shortness of breath and chest pain in the setting of missed dialysis.  She says it has been going on for about two days. She is unable to tell me any inciting factors or triggering illness. Friend present at bedside. History was difficult to obtain as patient was falling asleep and hard of hearing.       In the ED BP elevated systolics of 200s, labs notabel for istat K 6.8, Troponin 0.29, and chronic anemia. EKG with no significant changes. She was placed on 3 L NC agive NG for CP and BP. Nephrology consulted with plans of Emergent dialysis and admission to medicine close monitoring of potassium.        Overview/Hospital Course:  No notes on file    Past Medical History:   Diagnosis Date    Acute hyperkalemia 09/09/2020    Acute on chronic diastolic heart failure 10/08/2021    Anemia of chronic disorder 11/08/2017    Overview:  Added automatically from request for surgery [id number]    Anemia of chronic renal failure, stage 5     Anxiety     Cyst of left ovary 05/14/2019    Cyst of left ovary 07/05/2019    Dyslipidemia 01/04/2013    Encounter for screening colonoscopy 10/08/2020    End-stage renal disease on hemodialysis 08/20/2020    ESRD on hemodialysis 09/28/2017    Hematuria 10/30/2019    History of COVID-19 04/07/2020    Hospital discharge follow-up 10/06/2023    Hx of sinus bradycardia 09/28/2017    Hyperkalemia 03/29/2017    Hypertensive urgency 07/22/2021    Peripheral neuropathy 01/04/2013    Preoperative testing 10/08/2020     Renovascular hypertension 2020    S/P dilation and curettage 2018    Screening for colon cancer 2023    Thickened endometrium 2018    Thyroid nodule     Thyroid nodule 2021    Uncontrolled type 2 diabetes mellitus 2013    Overview:  poc a1c today 11.2-270/ up from 10.8-263, Pt. has very very stressed/ was incarcerated/marital stress/ will current meds/ less stress now/ will monitor.       Past Surgical History:   Procedure Laterality Date    AV FISTULA PLACEMENT      right arm     SECTION      x1    COLONOSCOPY N/A 2018    Procedure: COLONOSCOPY;  Surgeon: Pankaj Hinojosa MD;  Location: [facility] (4TH FLR);  Service: Endoscopy;  Laterality: N/A;  dialysis pt/labs prior to colon/MS    COLONOSCOPY N/A 2023    Procedure: COLONOSCOPY;  Surgeon: Angel Turner MD;  Location: [facility] (2ND FLR);  Service: Endoscopy;  Laterality: N/A;  Ref By:  Maty Gayle MD  Procedure: Colonoscopy  Diagnosis: endounter for preventive health   Procedure Timing: pt. pref  Provider: Dr. turner   Location: [facility] 4-Endo   Prep Specifications: Standard prep   Additional Info: Dialysis patient lab pending    CYSTOSCOPY W/ RETROGRADES Bilateral 2020    Procedure: CYSTOSCOPY, WITH RETROGRADE PYELOGRAM;  Surgeon: Douglas Abraham MD;  Location: [facility] OR 1ST FLR;  Service: Urology;  Laterality: Bilateral;  45 min    FISTULOGRAM Right 2020    Procedure: Fistulogram;  Surgeon: Lester Gómez MD;  Location: [facility] CATH LAB;  Service: Nephrology;  Laterality: Right;    HYSTERECTOMY      IMPLANTATION OF COCHLEAR PROSTHESIS Right 2021    Procedure: INSERTION, PROSTHESIS, COCHLEAR;  Surgeon: Ramiro Zlueta MD;  Location: [facility] 2ND [facility];  Service: ENT;  Laterality: Right;  [facility] BABAK    IMPLANTATION OF COCHLEAR PROSTHESIS Right 2021    Procedure: INSERTION, PROSTHESIS, COCHLEAR;  Surgeon: Ramiro Zuleta MD;  Location: [facility] OR 2ND [facility];  Service: ENT;  Laterality: Right;  COCHLEAR  AMERICAS    ROBOT-ASSISTED LAPAROSCOPIC ABDOMINAL HYSTERECTOMY USING DA NATALYA XI N/A 7/5/2019    Procedure: XI ROBOTIC HYSTERECTOMY;  Surgeon: Trice Lei MD;  Location: [facility];  Service: OB/GYN;  Laterality: N/A;    ROBOT-ASSISTED LAPAROSCOPIC NEPHRECTOMY Right 10/8/2020    Procedure: ROBOTIC NEPHRECTOMY;  Surgeon: Douglas Abraham MD;  [facility];  Service: Urology;  Laterality: Right;  3.5hrs gen with regional     ROBOT-ASSISTED LAPAROSCOPIC SALPINGO-OOPHORECTOMY USING DA NATALYA XI Bilateral 7/5/2019    Procedure: XI ROBOTIC SALPINGO-OOPHORECTOMY;  Surgeon: Trice Lei MD;  Location: [facility] OR;  Service: OB/GYN;  Laterality: Bilateral;    TUBAL LIGATION      Vascular access surgeries      x5       Review of patient's allergies indicates:  No Known Allergies    No current facility-administered medications on file prior to encounter.     Current Outpatient Medications on File Prior to Encounter   Medication Sig    ACCU-CHEK GUIDE GLUCOSE METER Misc     ACCU-CHEK GUIDE L1-L2 CTRL SOL Soln     ACCU-CHEK GUIDE TEST STRIPS Strp     ACCU-CHEK SOFTCLIX LANCETS Misc     acetaminophen (TYLENOL) 500 MG tablet Take 1 tablet (500 mg total) by mouth every 8 (eight) hours as needed for Pain.    amLODIPine (NORVASC) 10 MG tablet Take 1 tablet (10 mg total) by mouth nightly.    aspirin (ECOTRIN) 81 MG EC tablet Take 81 mg by mouth every morning.    atorvastatin (LIPITOR) 40 MG tablet Take 1 tablet (40 mg total) by mouth once daily.    atovaquone (MEPRON) 750 mg/5 mL Susp oral liquid Take 10 mLs (1,500 mg total) by mouth once daily.    blood sugar diagnostic Strp To check BG 3 times daily, to use with insurance preferred meter    blood-glucose meter kit To check BG 3 times daily, to use with insurance preferred meter    DROPSAFE ALCOHOL PREP PADS PadM Apply topically.    epoetin shiela (PROCRIT) 10,000 unit/mL injection Inject 0.85 mLs (8,500 Units total) into the skin every Mon, Wed, Fri.    folic acid  "(FOLVITE) 1 MG tablet Take 1 tablet (1 mg total) by mouth once daily.    insulin aspart U-100 (NOVOLOG) 100 unit/mL (3 mL) InPn pen Inject 0-5 Units into the skin before meals and at bedtime as needed (Hyperglycemia).    insulin glargine U-100, Lantus, 100 unit/mL (3 mL) SubQ InPn pen Inject 12 Units into the skin once daily.    labetaloL (NORMODYNE) 200 MG tablet Take 1 tablet (200 mg total) by mouth every 12 (twelve) hours.    lancets Misc To check BG 3 times daily, to use with insurance preferred meter    LIDOcaine (LIDODERM) 5 % Place 1 patch onto the skin once daily. Remove & Discard patch within 12 hours or as directed by MD (Patient not taking: Reported on 11/6/2024)    losartan (COZAAR) 100 MG tablet Take 1 tablet (100 mg total) by mouth once daily.    omeprazole (PRILOSEC) 20 MG capsule Take 1 capsule (20 mg total) by mouth once daily.    pen needle, diabetic 31 gauge x 3/16" Ndle Use to inject insulin into the skin.    pregabalin (LYRICA) 75 MG capsule Take 1 capsule (75 mg total) by mouth once daily.    sevelamer carbonate (RENVELA) 800 mg Tab Take 3 tablets (2,400 mg total) by mouth 3 (three) times daily with meals.     Family History       Problem Relation (Age of Onset)    Cancer Mother, Sister, Sister    Diabetes Sister, Brother    Lung cancer Sister    Macular degeneration Sister    Ovarian cancer Mother, Sister, Sister          Tobacco Use    Smoking status: Never    Smokeless tobacco: Never   Substance and Sexual Activity    Alcohol use: No    Drug use: No    Sexual activity: Yes     Partners: Male     Birth control/protection: Post-menopausal     Review of Systems   Reason unable to perform ROS: Limited due to mental status.   Constitutional:  Positive for activity change. Negative for appetite change and fatigue.   Respiratory:  Positive for cough and shortness of breath.    Cardiovascular:  Positive for chest pain and leg swelling.   Gastrointestinal:  Negative for vomiting.   Skin:  Negative " for color change, pallor and rash.     Objective:     Vital Signs (Most Recent):  Temp: 98.3 °F (36.8 °C) (12/02/24 1610)  Pulse: 79 (12/02/24 1610)  Resp: 20 (12/02/24 1610)  BP: (!) 185/80 (12/02/24 1610)  SpO2: 99 % (12/02/24 1610) Vital Signs (24h Range):  Temp:  [98.3 °F (36.8 °C)-98.8 °F (37.1 °C)] 98.3 °F (36.8 °C)  Pulse:  [59-81] 79  Resp:  [17-28] 20  SpO2:  [92 %-99 %] 99 %  BP: (185-238)/() 185/80     Weight: 90.7 kg (200 lb)  Body mass index is 34.33 kg/m².     Physical Exam  Constitutional:       Appearance: Normal appearance. She is ill-appearing.   HENT:      Head: Normocephalic and atraumatic.      Nose: Nose normal.      Mouth/Throat:      Mouth: Mucous membranes are moist.   Eyes:      Extraocular Movements: Extraocular movements intact.      Pupils: Pupils are equal, round, and reactive to light.   Cardiovascular:      Rate and Rhythm: Normal rate and regular rhythm.      Heart sounds: No murmur heard.     No gallop.   Pulmonary:      Effort: Pulmonary effort is normal.      Breath sounds: Rales present. No wheezing.   Abdominal:      General: Abdomen is flat. Bowel sounds are normal. There is no distension.      Palpations: Abdomen is soft.      Tenderness: There is no abdominal tenderness.   Musculoskeletal:         General: No swelling or tenderness. Normal range of motion.      Cervical back: Normal range of motion and neck supple.      Right lower leg: Edema present.      Left lower leg: Edema present.   Skin:     General: Skin is warm and dry.      Capillary Refill: Capillary refill takes less than 2 seconds.   Neurological:      General: No focal deficit present.      Mental Status: She is alert. Mental status is at baseline.   Psychiatric:         Mood and Affect: Mood normal.              CRANIAL NERVES     CN III, IV, VI   Pupils are equal, round, and reactive to light.       Significant Labs: All pertinent labs within the past 24 hours have been reviewed.    Significant Imaging:  I have reviewed all pertinent imaging results/findings within the past 24 hours.    Assessment and Plan     * Hyperkalemia  Hyperkalemia is likely due to ESRD and missed dialysis.The patients most recent potassium results are listed below.  Recent Labs     12/02/24  1456   K 6.9*     Plan  - Monitor for arrhythmias with EKG and/or continuous telemetry.   - Treat the hyperkalemia with Potassium Binders, Calcium gluconate, IV insulin and dextrose, and Dialysis  .   - Monitor potassium: Every 6 hours  - The patient's hyperkalemia is stable  -Nephrology following for ESRD           Altered mental status  -likely 2/2 to uremia in the setting of volume overload and missed dialysis  -no focal findings, patient oriented but does fall back asleep quickly  -suspect it will improve with dialysis, if she fails to improve will consider CT head       Hypertensive emergency  Patient has a current diagnosis of Hypertensive emergency with end organ damage evidenced by acute pulmonary edema without heart failure which is uncontrolled.  Latest blood pressure and vitals reviewed-   Temp:  [98.3 °F (36.8 °C)-98.8 °F (37.1 °C)]   Pulse:  [59-87]   Resp:  [17-28]   BP: (152-238)/()   SpO2:  [92 %-99 %] .   Patient currently off IV antihypertensives.   Home meds for hypertension were reviewed and noted below.   Hypertension Medications               amLODIPine (NORVASC) 10 MG tablet Take 1 tablet (10 mg total) by mouth nightly.    labetaloL (NORMODYNE) 200 MG tablet Take 1 tablet (200 mg total) by mouth every 12 (twelve) hours.    losartan (COZAAR) 100 MG tablet Take 1 tablet (100 mg total) by mouth once daily.            Medication adjustment for hospital antihypertensives is as follows- Will restart home medications: Amlodipine and Labetolol. Hold Losartan given hyperkalemia.     Will aim for controlled BP reduction by medications noted above. Monitor and mitigate end organ damage as indicated.    SOB (shortness of breath)  -sob is  2/2 to volume overload in the setting of missed dialysis with ESRD  -elevated BNP and Troponin 2/2 to volume overload and likely will improve with Dialysis   -currently undergoing urgent dialysis, and will likely need repeat HD tomorrow       Essential hypertension  Patient's blood pressure range in the last 24 hours was: BP  Min: 152/79  Max: 238/128.The patient's inpatient anti-hypertensive regimen is listed below:  Current Antihypertensives  amLODIPine tablet 10 mg, Nightly, Oral  labetaloL tablet 200 mg, Every 12 hours, Oral    Plan  - BP is uncontrolled, will adjust as follows: restart home medications and likely will improve with dialysis   - hold Losartan given hyperkalemia     Anemia in ESRD (end-stage renal disease)  Anemia is likely due to chronic disease due to ESRD. Most recent hemoglobin and hematocrit are listed below.  Recent Labs     12/02/24  1318 12/02/24  1400 12/02/24  1458   HGB 10.1*  --   --    HCT 32.3* 29* 29*     Plan  - Monitor serial CBC: Daily  - Transfuse PRBC if patient becomes hemodynamically unstable, symptomatic or H/H drops below 7/21.  - Patient has not received any PRBC transfusions to date  - Patient's anemia is currently stable  - transfuse for less than 7     ESRD (end stage renal disease) on dialysis  -ESRD, receives dialysis T/TH/S   -Nephrology consulted. Receiving Dialysis today and will repeat tomorrow 12/3/24   -continue home phos binders   -continue home BP meds, hold Losartan       AIHA (autoimmune hemolytic anemia)  -as above in problem 4    Thrombocytopenia  The likely etiology of thrombocytopenia is  ESRD . The patients 3 most recent labs are listed below.  Recent Labs     12/02/24  1318   *     Plan  - Will transfuse if platelet count is <10   - continue to monitor       Type 2 diabetes mellitus  -on lantus 11 units at home   -will start SSI and accuchecks   -diabetic diet ordered   -likely will need long acting insulin while admitted but will adjust  accordingly   -given recent insulin administration and potential need for shifting with further insulin will hold off further insulin management and monitor   -hold oral hyperglycemic agents while in the hospital    Secondary hyperparathyroidism  -restart home medications       GERD (gastroesophageal reflux disease)  -restart PPI         VTE Risk Mitigation (From admission, onward)           Ordered     heparin (porcine) injection 5,000 Units  Every 8 hours         12/02/24 1600     IP VTE HIGH RISK PATIENT  Once         12/02/24 1600     Place sequential compression device  Until discontinued         12/02/24 1600                    Discharge Planning   MEGAN:      Code Status: Full Code   Is the patient medically ready for discharge?:                               Ivonne Cortez MD  Department of Hospital Medicine   [facility] - Emergency Dept

## 2024-12-02 NOTE — ED TRIAGE NOTES
Patient presents to the ED today with reports of SOB onset yesterday. Hx of end stage renal on dialysis. Patient reports scheduled dialysis for TUESDAY THURSDAY SATURDAY. Patient states missing Saturday appointment reports last treatment Wednesday. Patient arrives at 92% on RA.Patient placed on 3L NC in triage. Patient improved to 99% on 3L NC. Patient endorses mild chest pain at this time with L arm pain. Patient awake alert and oriented x4 at this time.

## 2024-12-02 NOTE — ASSESSMENT & PLAN NOTE
Hyperkalemia is likely due to ESRD and missed dialysis.The patients most recent potassium results are listed below.  Recent Labs     12/02/24  1456 12/02/24  1948 12/03/24  0419   K 6.9* 3.9 4.9     Plan  - Monitor for arrhythmias with EKG and/or continuous telemetry.   - s/p Insulin and D50 and dialysis with improvement in K   -continue K binders   - Monitor potassium: Every 6 hours  - The patient's hyperkalemia is stable  -Nephrology following for ESRD, Dialysis schedule T/TH/S

## 2024-12-02 NOTE — PROGRESS NOTES
12/02/24 1610   Vital Signs   Temp 98.3 °F (36.8 °C)   Temp Source Oral   Pulse 79   Heart Rate Source Monitor   Resp 20   SpO2 99 %   Pulse Oximetry Type Continuous   Flow (L/min) (Oxygen Therapy) 3   Device (Oxygen Therapy) nasal cannula   BP (!) 185/80   MAP (mmHg) 115   BP Location Left arm   BP Method Automatic   Patient Position Lying     Pt is in ED02 drowsy with a friend at bedside.  Bedside HD1:1 tx started aseptically via RFA AVF with good flows.  /. UF goal of 2.5-3L as tolerated.

## 2024-12-02 NOTE — ASSESSMENT & PLAN NOTE
Missed HD. K 6.8 on POCT. X ray with edema. BNP 1848. Troponin .029. RFP pending. ~ 5 above EDW     Outpatient HD Information:  -Dialysis modality: Hemodialysis  -Outpatient HD unit: [facility]   -Nephrologist: Ras TRIMBLE   -HD TX days: Tuesday/Thursday/Saturday, duration of treatment: 3.5 hrs  -Last HD TX prior to hospital admission: 11/27  -Dialysis access: RUE AV fistula   -Residual urine: Minium  -EDW: 85 kg     Plan/Recommendations: .    -Emergent HD due to hyperkalemia. Chest x ray with edfema. UF goal 2.5-3L    -1L fluid restrictions   -pre and post HD weight   -will eval for additional HD tomorrow   -resume home phos binders.

## 2024-12-02 NOTE — ED NOTES
I-STAT Chem-8+ Results:   Value Reference Range   Sodium 138 136-145 mmol/L   Potassium  6.8 3.5-5.1 mmol/L   Chloride 105  mmol/L   Ionized Calcium 1.17 1.06-1.42 mmol/L   CO2 (measured) 25 23-29 mmol/L   Glucose 83  mg/dL   BUN 92 6-30 mg/dL   Creatinine 15.4 0.5-1.4 mg/dL   Hematocrit 29 36-54%

## 2024-12-03 PROBLEM — J96.01 ACUTE HYPOXEMIC RESPIRATORY FAILURE: Status: ACTIVE | Noted: 2024-12-03

## 2024-12-03 LAB
ANION GAP SERPL CALC-SCNC: 11 MMOL/L (ref 8–16)
ANION GAP SERPL CALC-SCNC: 14 MMOL/L (ref 8–16)
BASOPHILS # BLD AUTO: 0.09 K/UL (ref 0–0.2)
BASOPHILS NFR BLD: 1.1 % (ref 0–1.9)
BUN SERPL-MCNC: 13 MG/DL (ref 8–23)
BUN SERPL-MCNC: 33 MG/DL (ref 8–23)
CALCIUM SERPL-MCNC: 9.1 MG/DL (ref 8.7–10.5)
CALCIUM SERPL-MCNC: 9.7 MG/DL (ref 8.7–10.5)
CHLORIDE SERPL-SCNC: 100 MMOL/L (ref 95–110)
CHLORIDE SERPL-SCNC: 101 MMOL/L (ref 95–110)
CO2 SERPL-SCNC: 19 MMOL/L (ref 23–29)
CO2 SERPL-SCNC: 22 MMOL/L (ref 23–29)
CREAT SERPL-MCNC: 4.5 MG/DL (ref 0.5–1.4)
CREAT SERPL-MCNC: 8.6 MG/DL (ref 0.5–1.4)
DIFFERENTIAL METHOD BLD: ABNORMAL
EOSINOPHIL # BLD AUTO: 0.8 K/UL (ref 0–0.5)
EOSINOPHIL NFR BLD: 9.5 % (ref 0–8)
ERYTHROCYTE [DISTWIDTH] IN BLOOD BY AUTOMATED COUNT: 19.3 % (ref 11.5–14.5)
EST. GFR  (NO RACE VARIABLE): 10.5 ML/MIN/1.73 M^2
EST. GFR  (NO RACE VARIABLE): 4.8 ML/MIN/1.73 M^2
GLUCOSE SERPL-MCNC: 82 MG/DL (ref 70–110)
GLUCOSE SERPL-MCNC: 88 MG/DL (ref 70–110)
HCT VFR BLD AUTO: 29.4 % (ref 37–48.5)
HGB BLD-MCNC: 9.3 G/DL (ref 12–16)
IMM GRANULOCYTES # BLD AUTO: 0.02 K/UL (ref 0–0.04)
IMM GRANULOCYTES NFR BLD AUTO: 0.3 % (ref 0–0.5)
LYMPHOCYTES # BLD AUTO: 1.3 K/UL (ref 1–4.8)
LYMPHOCYTES NFR BLD: 16.9 % (ref 18–48)
MAGNESIUM SERPL-MCNC: 2.3 MG/DL (ref 1.6–2.6)
MCH RBC QN AUTO: 26.2 PG (ref 27–31)
MCHC RBC AUTO-ENTMCNC: 31.6 G/DL (ref 32–36)
MCV RBC AUTO: 83 FL (ref 82–98)
MONOCYTES # BLD AUTO: 1.3 K/UL (ref 0.3–1)
MONOCYTES NFR BLD: 16.7 % (ref 4–15)
NEUTROPHILS # BLD AUTO: 4.4 K/UL (ref 1.8–7.7)
NEUTROPHILS NFR BLD: 55.5 % (ref 38–73)
NRBC BLD-RTO: 0 /100 WBC
PHOSPHATE SERPL-MCNC: 5.3 MG/DL (ref 2.7–4.5)
PLATELET # BLD AUTO: 115 K/UL (ref 150–450)
PMV BLD AUTO: ABNORMAL FL (ref 9.2–12.9)
POCT GLUCOSE: 103 MG/DL (ref 70–110)
POCT GLUCOSE: 112 MG/DL (ref 70–110)
POCT GLUCOSE: 134 MG/DL (ref 70–110)
POCT GLUCOSE: 77 MG/DL (ref 70–110)
POCT GLUCOSE: 97 MG/DL (ref 70–110)
POTASSIUM SERPL-SCNC: 3.8 MMOL/L (ref 3.5–5.1)
POTASSIUM SERPL-SCNC: 4.9 MMOL/L (ref 3.5–5.1)
RBC # BLD AUTO: 3.55 M/UL (ref 4–5.4)
SODIUM SERPL-SCNC: 133 MMOL/L (ref 136–145)
SODIUM SERPL-SCNC: 134 MMOL/L (ref 136–145)
WBC # BLD AUTO: 7.86 K/UL (ref 3.9–12.7)

## 2024-12-03 PROCEDURE — 63600175 PHARM REV CODE 636 W HCPCS: Mod: HCNC | Performed by: STUDENT IN AN ORGANIZED HEALTH CARE EDUCATION/TRAINING PROGRAM

## 2024-12-03 PROCEDURE — 80100014 HC HEMODIALYSIS 1:1: Mod: HCNC

## 2024-12-03 PROCEDURE — 85025 COMPLETE CBC W/AUTO DIFF WBC: CPT | Mod: HCNC | Performed by: STUDENT IN AN ORGANIZED HEALTH CARE EDUCATION/TRAINING PROGRAM

## 2024-12-03 PROCEDURE — 80100016 HC MAINTENANCE HEMODIALYSIS: Mod: HCNC

## 2024-12-03 PROCEDURE — 25000003 PHARM REV CODE 250: Mod: HCNC | Performed by: STUDENT IN AN ORGANIZED HEALTH CARE EDUCATION/TRAINING PROGRAM

## 2024-12-03 PROCEDURE — 80048 BASIC METABOLIC PNL TOTAL CA: CPT | Mod: 91,HCNC | Performed by: STUDENT IN AN ORGANIZED HEALTH CARE EDUCATION/TRAINING PROGRAM

## 2024-12-03 PROCEDURE — 36415 COLL VENOUS BLD VENIPUNCTURE: CPT | Mod: HCNC | Performed by: STUDENT IN AN ORGANIZED HEALTH CARE EDUCATION/TRAINING PROGRAM

## 2024-12-03 PROCEDURE — 11000001 HC ACUTE MED/SURG PRIVATE ROOM: Mod: HCNC

## 2024-12-03 PROCEDURE — 83735 ASSAY OF MAGNESIUM: CPT | Mod: HCNC | Performed by: STUDENT IN AN ORGANIZED HEALTH CARE EDUCATION/TRAINING PROGRAM

## 2024-12-03 PROCEDURE — 84100 ASSAY OF PHOSPHORUS: CPT | Mod: HCNC | Performed by: STUDENT IN AN ORGANIZED HEALTH CARE EDUCATION/TRAINING PROGRAM

## 2024-12-03 PROCEDURE — 21400001 HC TELEMETRY ROOM: Mod: HCNC

## 2024-12-03 PROCEDURE — 99232 SBSQ HOSP IP/OBS MODERATE 35: CPT | Mod: HCNC,,, | Performed by: NURSE PRACTITIONER

## 2024-12-03 RX ADMIN — ACETAMINOPHEN 650 MG: 325 TABLET ORAL at 09:12

## 2024-12-03 RX ADMIN — SEVELAMER CARBONATE 2400 MG: 800 TABLET, FILM COATED ORAL at 12:12

## 2024-12-03 RX ADMIN — LABETALOL HYDROCHLORIDE 200 MG: 100 TABLET, FILM COATED ORAL at 09:12

## 2024-12-03 RX ADMIN — ATOVAQUONE 1500 MG: 750 SUSPENSION ORAL at 09:12

## 2024-12-03 RX ADMIN — HEPARIN SODIUM 5000 UNITS: 5000 INJECTION INTRAVENOUS; SUBCUTANEOUS at 09:12

## 2024-12-03 RX ADMIN — AMLODIPINE BESYLATE 10 MG: 10 TABLET ORAL at 09:12

## 2024-12-03 RX ADMIN — ASPIRIN 81 MG: 81 TABLET, COATED ORAL at 06:12

## 2024-12-03 RX ADMIN — PANTOPRAZOLE SODIUM 40 MG: 40 TABLET, DELAYED RELEASE ORAL at 09:12

## 2024-12-03 RX ADMIN — PREGABALIN 75 MG: 75 CAPSULE ORAL at 09:12

## 2024-12-03 RX ADMIN — DEXTROSE MONOHYDRATE 125 ML: 100 INJECTION, SOLUTION INTRAVENOUS at 12:12

## 2024-12-03 RX ADMIN — LIDOCAINE 1 PATCH: 50 PATCH CUTANEOUS at 09:12

## 2024-12-03 RX ADMIN — FOLIC ACID 1 MG: 1 TABLET ORAL at 09:12

## 2024-12-03 RX ADMIN — ATORVASTATIN CALCIUM 40 MG: 40 TABLET, FILM COATED ORAL at 09:12

## 2024-12-03 RX ADMIN — SEVELAMER CARBONATE 2400 MG: 800 TABLET, FILM COATED ORAL at 09:12

## 2024-12-03 RX ADMIN — HEPARIN SODIUM 5000 UNITS: 5000 INJECTION INTRAVENOUS; SUBCUTANEOUS at 06:12

## 2024-12-03 RX ADMIN — POLYETHYLENE GLYCOL 3350 17 G: 17 POWDER, FOR SOLUTION ORAL at 09:12

## 2024-12-03 NOTE — PROGRESS NOTES
3.5H HD tx completed     Net UF: 3L as ordered    Needles x2 pulled; manual pressure held for 15 minutes; hemostasis achieved; dressing dry and intact; no oozing or bleeding noted.    Report given to primary RN

## 2024-12-03 NOTE — CARE UPDATE
"RAPID RESPONSE NURSE CHART REVIEW        Chart Reviewed: 12/02/2024, 10:56 PM    MRN: [id number]  Bed: ED 02/02    Dx: Hyperkalemia    Jael Hall has a past medical history of Acute hyperkalemia, Acute on chronic diastolic heart failure, Anemia of chronic disorder, Anemia of chronic renal failure, stage 5, Anxiety, Cyst of left ovary, Cyst of left ovary, Dyslipidemia, Encounter for screening colonoscopy, End-stage renal disease on hemodialysis, ESRD on hemodialysis, Hematuria, History of COVID-19, Hospital discharge follow-up, sinus bradycardia, Hyperkalemia, Hypertensive urgency, Peripheral neuropathy, Preoperative testing, Renovascular hypertension, S/P dilation and curettage, Screening for colon cancer, Thickened endometrium, Thyroid nodule, Thyroid nodule, and Uncontrolled type 2 diabetes mellitus.    Last VS: BP (!) 176/74   Pulse 91   Temp (!) 101.7 °F (38.7 °C)   Resp 19   Ht 5' 4" (1.626 m)   Wt 90.7 kg (200 lb)   LMP 03/15/2016 (LMP Unknown)   SpO2 97%   Breastfeeding No   BMI 34.33 kg/m²     24H Vital Sign Range:  Temp:  [98.3 °F (36.8 °C)-101.7 °F (38.7 °C)]   Pulse:  [59-93]   Resp:  [17-28]   BP: (152-238)/()   SpO2:  [92 %-100 %]     Level of Consciousness (AVPU): responds to voice    Recent Labs     12/02/24  1318 12/02/24  1400 12/02/24  1458   WBC 7.99  --   --    HGB 10.1*  --   --    HCT 32.3* 29* 29*   *  --   --        Recent Labs     12/02/24  1456 12/02/24  1948    135*   K 6.9* 3.9    100   CO2 18* 21*   BUN 82* 28*   CREATININE 15.5* 6.9*   GLU 78 94        Recent Labs     12/02/24  1358   PH 7.379   PCO2 41.8   PO2 41   HCO3 24.7   POCSATURATED 75   BE 0        OXYGEN:  Flow (L/min) (Oxygen Therapy): 3          MEWS score: 4    Rounding completed with bedside RN Tod reports patient continues to be febrile and too lethargic for safe PO consumption of medications. Dr. Alvarez notified by VALERIE Baron, rectal Tylenol and STAT CTH orders placed. No additional " concerns verbalized at this time. Instructed to call [phone] for further concerns or assistance.    Mari Banegas RN

## 2024-12-03 NOTE — ED NOTES
Assumed care for pt after recieving report from nightshift RN. Pt. resting in bed in NAD, RR e/u. Vital signs stable and within desired limits at this time of assessment. Pt. offered bathroom assistance and denies need at this time. Explanation of care/wait provided. Pt verbalizes no needs at this time. Bed in low, locked position with rails up and call bell in reach. Pt's white board updated with today's care team and plan.     Patient identifiers for Jael Hall 59 y.o. female checked and correct.  Chief Complaint   Patient presents with    Shortness of Breath     Dialysis done on sat, placed on 2l nc in triage     Past Medical History:   Diagnosis Date    Acute hyperkalemia 09/09/2020    Acute on chronic diastolic heart failure 10/08/2021    Anemia of chronic disorder 11/08/2017    Overview:  Added automatically from request for surgery [id number]    Anemia of chronic renal failure, stage 5     Anxiety     Cyst of left ovary 05/14/2019    Cyst of left ovary 07/05/2019    Dyslipidemia 01/04/2013    Encounter for screening colonoscopy 10/08/2020    End-stage renal disease on hemodialysis 08/20/2020    ESRD on hemodialysis 09/28/2017    Hematuria 10/30/2019    History of COVID-19 04/07/2020    Hospital discharge follow-up 10/06/2023    Hx of sinus bradycardia 09/28/2017    Hyperkalemia 03/29/2017    Hypertensive urgency 07/22/2021    Peripheral neuropathy 01/04/2013    Preoperative testing 10/08/2020    Renovascular hypertension 08/17/2020    S/P dilation and curettage 05/22/2018    Screening for colon cancer 11/28/2023    Thickened endometrium 05/22/2018    Thyroid nodule     Thyroid nodule 12/13/2021    Uncontrolled type 2 diabetes mellitus 04/05/2013    Overview:  poc a1c today 11.2-270/ up from 10.8-263, Pt. has very very stressed/ was incarcerated/marital stress/ will current meds/ less stress now/ will monitor.     Allergies reported: Review of patient's allergies indicates:  No Known Allergies      LOC: Pt  resting comfortable. Awaken to verbal and light tactile stimuli. Pt appears lethargic, eyes open spontaneously.    APPEARANCE: Patient resting comfortably and in no acute distress. Patient is clean and well groomed, patient's clothing is properly fastened.  HEENT: WDL  SKIN: The skin is warm and dry. Patient has normal skin turgor and moist mucus membranes.   MUSCULOSKELETAL: Patient is moving all extremities well, no obvious deformities noted. Pulses intact.   RESPIRATORY: Airway is open and patent. Respirations are spontaneous and non-labored with normal effort and rate. Pt on 2L nasal canula   CARDIAC: Patient has a normal rate and rhythm. 76 on cardiac monitor. No peripheral edema noted. Denies chest pain and SOB at at time of assessment.   ABDOMEN: No distention noted. Soft and non-tender upon palpation.  NEUROLOGICAL: pupils 3mm, PERRL. Facial expression is symmetrical.

## 2024-12-03 NOTE — PROGRESS NOTES
[facility] - Emergency Dept  Nephrology  Progress Note    Patient Name: Jael Hall  MRN: [id number]  Admission Date: 12/2/2024  Hospital Length of Stay: 1 days  Attending Provider: Ivonne Cortez MD   Primary Care Physician: Maty Gayle DO  Principal Problem:Hyperkalemia    Subjective:     Interval History: HD yesterday, tolerated well. Net UF 3L. Mental status improved from yesterday. CT head negative.     Review of patient's allergies indicates:  No Known Allergies  Current Facility-Administered Medications   Medication Frequency    acetaminophen suppository 325 mg Q6H PRN    acetaminophen tablet 650 mg Q4H PRN    acetaminophen tablet 650 mg Q8H PRN    aluminum-magnesium hydroxide-simethicone 200-200-20 mg/5 mL suspension 30 mL QID PRN    amLODIPine tablet 10 mg Nightly    aspirin EC tablet 81 mg QAM    atorvastatin tablet 40 mg Daily    atovaquone 750 mg/5 mL oral liquid 1,500 mg Daily    calcium gluconate 1 g in NS IVPB (premixed) Q10 Min PRN    dextrose 10% bolus 125 mL 125 mL PRN    dextrose 10% bolus 250 mL 250 mL PRN    dextrose 10% bolus 250 mL 250 mL PRN    folic acid tablet 1 mg Daily    glucagon (human recombinant) injection 1 mg PRN    glucose chewable tablet 16 g PRN    glucose chewable tablet 24 g PRN    heparin (porcine) injection 5,000 Units Q8H    insulin aspart U-100 pen 0-5 Units QID (AC + HS) PRN    labetaloL tablet 200 mg Q12H    LIDOcaine 5 % patch 1 patch Daily    naloxone 0.4 mg/mL injection 0.02 mg PRN    ondansetron injection 4 mg Q8H PRN    pantoprazole EC tablet 40 mg Daily    polyethylene glycol packet 17 g Daily    pregabalin capsule 75 mg Daily    promethazine tablet 25 mg Q6H PRN    sevelamer carbonate tablet 2,400 mg TID WM    simethicone chewable tablet 80 mg QID PRN    sodium chloride 0.9% flush 10 mL Q12H PRN     Current Outpatient Medications   Medication    ACCU-CHEK GUIDE GLUCOSE METER Misc    ACCU-CHEK GUIDE L1-L2 CTRL SOL Soln    ACCU-CHEK GUIDE TEST STRIPS Strp     "ACCU-CHEK SOFTCLIX LANCETS Misc    acetaminophen (TYLENOL) 500 MG tablet    amLODIPine (NORVASC) 10 MG tablet    aspirin (ECOTRIN) 81 MG EC tablet    atorvastatin (LIPITOR) 40 MG tablet    atovaquone (MEPRON) 750 mg/5 mL Susp oral liquid    blood sugar diagnostic Strp    blood-glucose meter kit    DROPSAFE ALCOHOL PREP PADS PadM    epoetin shiela (PROCRIT) 10,000 unit/mL injection    folic acid (FOLVITE) 1 MG tablet    insulin aspart U-100 (NOVOLOG) 100 unit/mL (3 mL) InPn pen    insulin glargine U-100, Lantus, 100 unit/mL (3 mL) SubQ InPn pen    labetaloL (NORMODYNE) 200 MG tablet    lancets Misc    LIDOcaine (LIDODERM) 5 %    losartan (COZAAR) 100 MG tablet    omeprazole (PRILOSEC) 20 MG capsule    pen needle, diabetic 31 gauge x 3/16" Ndle    pregabalin (LYRICA) 75 MG capsule    sevelamer carbonate (RENVELA) 800 mg Tab       Objective:     Vital Signs (Most Recent):  Temp: 98.6 °F (37 °C) (12/03/24 1231)  Pulse: 65 (12/03/24 1231)  Resp: 20 (12/03/24 1231)  BP: (!) 117/56 (12/03/24 1032)  SpO2: 96 % (12/03/24 1231) Vital Signs (24h Range):  Temp:  [98.1 °F (36.7 °C)-101.7 °F (38.7 °C)] 98.6 °F (37 °C)  Pulse:  [65-93] 65  Resp:  [16-22] 20  SpO2:  [95 %-100 %] 96 %  BP: (117-225)/(56-87) 117/56     Weight: 90.7 kg (200 lb) (12/02/24 1219)  Body mass index is 34.33 kg/m².  Body surface area is 2.02 meters squared.    I/O last 3 completed shifts:  In: 675 [Other:500; IV Piggyback:175]  Out: 3500 [Other:3500]     Physical Exam  Vitals and nursing note reviewed.   Constitutional:       Appearance: Normal appearance. She is ill-appearing.   HENT:      Head: Normocephalic and atraumatic.      Nose: Nose normal.   Eyes:      Pupils: Pupils are equal, round, and reactive to light.   Cardiovascular:      Rate and Rhythm: Normal rate.      Arteriovenous access: Left arteriovenous access is present.     Comments: +thrill   Pulmonary:      Effort: Pulmonary effort is normal.      Comments: RA  Abdominal:      General: Abdomen " is flat. Bowel sounds are normal. There is no distension.      Palpations: Abdomen is soft.      Tenderness: There is no abdominal tenderness.   Musculoskeletal:         General: No swelling or tenderness. Normal range of motion.      Cervical back: Normal range of motion and neck supple.      Right lower leg: Edema present.      Left lower leg: Edema present.   Skin:     General: Skin is warm and dry.      Capillary Refill: Capillary refill takes less than 2 seconds.   Neurological:      General: No focal deficit present.      Mental Status: She is alert and oriented to person, place, and time. Mental status is at baseline.   Psychiatric:         Mood and Affect: Mood normal.          Significant Labs:  CBC:   Recent Labs   Lab 12/03/24  0419   WBC 7.86   RBC 3.55*   HGB 9.3*   HCT 29.4*   *   MCV 83   MCH 26.2*   MCHC 31.6*     CMP:   Recent Labs   Lab 12/02/24  1456 12/02/24  1948 12/03/24  0419   GLU 78   < > 88   CALCIUM 9.9   < > 9.7   ALBUMIN 3.1*  --   --    PROT 7.0  --   --       < > 133*   K 6.9*   < > 4.9   CO2 18*   < > 22*      < > 100   BUN 82*   < > 33*   CREATININE 15.5*   < > 8.6*   ALKPHOS 54  --   --    ALT 9*  --   --    AST 18  --   --    BILITOT 0.9  --   --     < > = values in this interval not displayed.     All labs within the past 24 hours have been reviewed.       Assessment/Plan:     Renal/  * Hyperkalemia  Resolved     ESRD (end stage renal disease) on dialysis  Missed HD. K 6.8 on POCT. X ray with edema. BNP 1848. Troponin .029. RFP pending. ~ 5 above EDW     Outpatient HD Information:  -Dialysis modality: Hemodialysis  -Outpatient HD unit: [facility]   -Nephrologist: Ras TRIMBLE   -HD TX days: Tuesday/Thursday/Saturday, duration of treatment: 3.5 hrs  -Last HD TX prior to hospital admission: 11/27  -Dialysis access: RUE AV fistula   -Residual urine: Minium  -EDW: 85 kg     Plan/Recommendations: .    - Resume TTS iHD schedule. HD again today.    -1L fluid  restrictions   - pre and post HD weight   - resume home phos binders               Thank you for your consult. I will follow-up with patient. Please contact us if you have any additional questions.    Clara Busby DNP  Nephrology  [facility] - Emergency Dept

## 2024-12-03 NOTE — PROGRESS NOTES
[facility] - Emergency Dept  Hospital Medicine  Progress Note    Patient Name: Jael Hall  MRN: [id number]  Patient Class: IP- Inpatient   Admission Date: 12/2/2024  Length of Stay: 1 days  Attending Physician: Ivonne Cortez MD  Primary Care Provider: Maty Gayle DO Subjective     Principal Problem:Hyperkalemia        HPI:  Jael Hall is a 59 y.o. female with past medical history of ESRD on HD (TTHSat), HTN, T2DM with Insulin use who presented to the ED for shortness of breath and chest pain in the setting of missed dialysis.  She says it has been going on for about two days. She is unable to tell me any inciting factors or triggering illness. Friend present at bedside. History was difficult to obtain as patient was falling asleep and hard of hearing.       In the ED BP elevated systolics of 200s, labs notabel for istat K 6.8, Troponin 0.29, and chronic anemia. EKG with no significant changes. She was placed on 3 L NC agive NG for CP and BP. Nephrology consulted with plans of Emergent dialysis and admission to medicine close monitoring of potassium.        Overview/Hospital Course:  No notes on file    Interval History: Pt seen and examined this morning on rounds. CT head overnight was negative. Had an episode of fever that responded to Tylenol. Care plan reviewed. This AM is improved from yesterday and able to respond to questions appropriately.        Objective:     Vital Signs (Most Recent):  Temp: 98.6 °F (37 °C) (12/03/24 1231)  Pulse: 65 (12/03/24 1231)  Resp: 20 (12/03/24 1231)  BP: (!) 117/56 (12/03/24 1032)  SpO2: 96 % (12/03/24 1231) Vital Signs (24h Range):  Temp:  [98.1 °F (36.7 °C)-101.7 °F (38.7 °C)] 98.6 °F (37 °C)  Pulse:  [65-93] 65  Resp:  [16-23] 20  SpO2:  [95 %-100 %] 96 %  BP: (117-238)/() 117/56     Weight: 90.7 kg (200 lb)  Body mass index is 34.33 kg/m².    Intake/Output Summary (Last 24 hours) at 12/3/2024 1244  Last data filed at 12/3/2024 0054  Gross per 24 hour    Intake 675 ml   Output 3500 ml   Net -2825 ml         Physical Exam  Constitutional:       Appearance: Normal appearance.   HENT:      Head: Normocephalic and atraumatic.      Nose: Nose normal.      Mouth/Throat:      Mouth: Mucous membranes are moist.   Eyes:      Extraocular Movements: Extraocular movements intact.      Pupils: Pupils are equal, round, and reactive to light.   Cardiovascular:      Rate and Rhythm: Normal rate and regular rhythm.      Heart sounds: No murmur heard.     No gallop.   Pulmonary:      Effort: Pulmonary effort is normal.      Breath sounds: Rales present. No wheezing.   Abdominal:      General: Abdomen is flat. Bowel sounds are normal. There is no distension.      Palpations: Abdomen is soft.      Tenderness: There is no abdominal tenderness.   Musculoskeletal:         General: No swelling or tenderness. Normal range of motion.      Cervical back: Normal range of motion and neck supple.      Right lower leg: Edema present.      Left lower leg: Edema present.   Skin:     General: Skin is warm and dry.      Capillary Refill: Capillary refill takes less than 2 seconds.   Neurological:      General: No focal deficit present.      Mental Status: She is alert. Mental status is at baseline.   Psychiatric:         Mood and Affect: Mood normal.             Significant Labs: All pertinent labs within the past 24 hours have been reviewed.    Significant Imaging: I have reviewed all pertinent imaging results/findings within the past 24 hours.    Assessment and Plan     * Hyperkalemia  Hyperkalemia is likely due to ESRD and missed dialysis.The patients most recent potassium results are listed below.  Recent Labs     12/02/24  1456 12/02/24  1948 12/03/24  0419   K 6.9* 3.9 4.9       Plan  - Monitor for arrhythmias with EKG and/or continuous telemetry.   - s/p Insulin and D50 and dialysis with improvement in K   -continue K binders   - Monitor potassium: BID   - The patient's hyperkalemia is  stable  -Nephrology following for ESRD, Dialysis schedule T/TH/S            Altered mental status  -likely 2/2 to uremia in the setting of volume overload and missed dialysis  -no focal findings, patient oriented but does fall back asleep quickly  -CT head negative   -improving   -if it mental status changes again or fever occurs will order full septic workup     Hypertensive emergency  RESOLVED   Patient has a current diagnosis of Hypertensive emergency with end organ damage evidenced by acute pulmonary edema without heart failure which is uncontrolled.  Latest blood pressure and vitals reviewed-   Temp:  [98.3 °F (36.8 °C)-98.8 °F (37.1 °C)]   Pulse:  [59-87]   Resp:  [17-28]   BP: (152-238)/()   SpO2:  [92 %-99 %] .   Patient currently off IV antihypertensives.   Home meds for hypertension were reviewed and noted below.   Hypertension Medications               amLODIPine (NORVASC) 10 MG tablet Take 1 tablet (10 mg total) by mouth nightly.    labetaloL (NORMODYNE) 200 MG tablet Take 1 tablet (200 mg total) by mouth every 12 (twelve) hours.    losartan (COZAAR) 100 MG tablet Take 1 tablet (100 mg total) by mouth once daily.            Medication adjustment for hospital antihypertensives is as follows- Will restart home medications: Amlodipine and Labetolol. Hold Losartan given hyperkalemia.     Will aim for controlled BP reduction by medications noted above. Monitor and mitigate end organ damage as indicated.    SOB (shortness of breath)  -sob is 2/2 to volume overload in the setting of missed dialysis with ESRD  -elevated BNP and Troponin 2/2 to volume overload and likely will improve with Dialysis   -currently undergoing urgent dialysis, and will likely need repeat HD tomorrow   -weaned to RA       Acute hypoxemic respiratory failure  Resolved   Patient with Hypoxic Respiratory failure which is Acute.  she is not on home oxygen. Supplemental oxygen was provided and noted-      .   Signs/symptoms of  respiratory failure include- increased work of breathing and respiratory distress. Contributing diagnoses includes -  Fluid overload  Labs and images were reviewed. Patient Has not had a recent ABG. Will treat underlying causes and adjust management of respiratory failure as follows-   -weaned to RA off supplemental O2 after receiving Dialysis   -2/2 to volume overload     Essential hypertension  Patient's blood pressure range in the last 24 hours was: BP  Min: 152/79  Max: 238/128.The patient's inpatient anti-hypertensive regimen is listed below:  Current Antihypertensives  amLODIPine tablet 10 mg, Nightly, Oral  labetaloL tablet 200 mg, Every 12 hours, Oral    Plan  - BP is uncontrolled, will adjust as follows: restart home medications and likely will improve with dialysis   - hold Losartan given hyperkalemia     Anemia in ESRD (end-stage renal disease)  Anemia is likely due to chronic disease due to ESRD. Most recent hemoglobin and hematocrit are listed below.  Recent Labs     12/02/24  1318 12/02/24  1400 12/02/24  1458 12/03/24  0419   HGB 10.1*  --   --  9.3*   HCT 32.3* 29* 29* 29.4*     Plan  - Monitor serial CBC: Daily  - Transfuse PRBC if patient becomes hemodynamically unstable, symptomatic or H/H drops below 7/21.  - Patient has not received any PRBC transfusions to date  - Patient's anemia is currently stable  - transfuse for less than 7     ESRD (end stage renal disease) on dialysis  -ESRD, receives dialysis T/TH/S   -Nephrology consulted. Receiving Dialysis today and will repeat tomorrow 12/3/24   -continue home phos binders   -continue home BP meds, hold Losartan       AIHA (autoimmune hemolytic anemia)  -as above in problem 4    Thrombocytopenia  The likely etiology of thrombocytopenia is  ESRD . The patients 3 most recent labs are listed below.  Recent Labs     12/02/24  1318   *     Plan  - Will transfuse if platelet count is <10   - continue to monitor       Type 2 diabetes mellitus  -on  lantus 11 units at home   -will start SSI and accuchecks   -diabetic diet ordered   -BG at goal on SSI   -hold oral hyperglycemic agents while in the hospital    Secondary hyperparathyroidism  -restart home medications       GERD (gastroesophageal reflux disease)  -restart PPI         VTE Risk Mitigation (From admission, onward)           Ordered     heparin (porcine) injection 5,000 Units  Every 8 hours         12/02/24 1600     IP VTE HIGH RISK PATIENT  Once         12/02/24 1600     Place sequential compression device  Until discontinued         12/02/24 1600                    Discharge Planning   MEGAN: 12/4/2024     Code Status: Full Code   Is the patient medically ready for discharge?:                     Ivonne Cortez MD  Department of Hospital Medicine   [facility] - Emergency Dept

## 2024-12-03 NOTE — ASSESSMENT & PLAN NOTE
Missed HD. K 6.8 on POCT. X ray with edema. BNP 1848. Troponin .029. RFP pending. ~ 5 above EDW     Outpatient HD Information:  -Dialysis modality: Hemodialysis  -Outpatient HD unit: [facility]   -Nephrologist: Ras TRIMBLE   -HD TX days: Tuesday/Thursday/Saturday, duration of treatment: 3.5 hrs  -Last HD TX prior to hospital admission: 11/27  -Dialysis access: RUE AV fistula   -Residual urine: Minium  -EDW: 85 kg     Plan/Recommendations: .    - Resume TTS iHD schedule. HD again today.    -1L fluid restrictions   - pre and post HD weight   - resume home phos binders

## 2024-12-03 NOTE — SUBJECTIVE & OBJECTIVE
Interval History: HD yesterday, tolerated well. Net UF 3L. Mental status improved from yesterday. CT head negative.     Review of patient's allergies indicates:  No Known Allergies  Current Facility-Administered Medications   Medication Frequency    acetaminophen suppository 325 mg Q6H PRN    acetaminophen tablet 650 mg Q4H PRN    acetaminophen tablet 650 mg Q8H PRN    aluminum-magnesium hydroxide-simethicone 200-200-20 mg/5 mL suspension 30 mL QID PRN    amLODIPine tablet 10 mg Nightly    aspirin EC tablet 81 mg QAM    atorvastatin tablet 40 mg Daily    atovaquone 750 mg/5 mL oral liquid 1,500 mg Daily    calcium gluconate 1 g in NS IVPB (premixed) Q10 Min PRN    dextrose 10% bolus 125 mL 125 mL PRN    dextrose 10% bolus 250 mL 250 mL PRN    dextrose 10% bolus 250 mL 250 mL PRN    folic acid tablet 1 mg Daily    glucagon (human recombinant) injection 1 mg PRN    glucose chewable tablet 16 g PRN    glucose chewable tablet 24 g PRN    heparin (porcine) injection 5,000 Units Q8H    insulin aspart U-100 pen 0-5 Units QID (AC + HS) PRN    labetaloL tablet 200 mg Q12H    LIDOcaine 5 % patch 1 patch Daily    naloxone 0.4 mg/mL injection 0.02 mg PRN    ondansetron injection 4 mg Q8H PRN    pantoprazole EC tablet 40 mg Daily    polyethylene glycol packet 17 g Daily    pregabalin capsule 75 mg Daily    promethazine tablet 25 mg Q6H PRN    sevelamer carbonate tablet 2,400 mg TID WM    simethicone chewable tablet 80 mg QID PRN    sodium chloride 0.9% flush 10 mL Q12H PRN     Current Outpatient Medications   Medication    ACCU-CHEK GUIDE GLUCOSE METER Misc    ACCU-CHEK GUIDE L1-L2 CTRL SOL Soln    ACCU-CHEK GUIDE TEST STRIPS Strp    ACCU-CHEK SOFTCLIX LANCETS Misc    acetaminophen (TYLENOL) 500 MG tablet    amLODIPine (NORVASC) 10 MG tablet    aspirin (ECOTRIN) 81 MG EC tablet    atorvastatin (LIPITOR) 40 MG tablet    atovaquone (MEPRON) 750 mg/5 mL Susp oral liquid    blood sugar diagnostic Strp    blood-glucose meter kit     "DROPSAFE ALCOHOL PREP PADS PadM    epoetin shiela (PROCRIT) 10,000 unit/mL injection    folic acid (FOLVITE) 1 MG tablet    insulin aspart U-100 (NOVOLOG) 100 unit/mL (3 mL) InPn pen    insulin glargine U-100, Lantus, 100 unit/mL (3 mL) SubQ InPn pen    labetaloL (NORMODYNE) 200 MG tablet    lancets Misc    LIDOcaine (LIDODERM) 5 %    losartan (COZAAR) 100 MG tablet    omeprazole (PRILOSEC) 20 MG capsule    pen needle, diabetic 31 gauge x 3/16" Ndle    pregabalin (LYRICA) 75 MG capsule    sevelamer carbonate (RENVELA) 800 mg Tab       Objective:     Vital Signs (Most Recent):  Temp: 98.6 °F (37 °C) (12/03/24 1231)  Pulse: 65 (12/03/24 1231)  Resp: 20 (12/03/24 1231)  BP: (!) 117/56 (12/03/24 1032)  SpO2: 96 % (12/03/24 1231) Vital Signs (24h Range):  Temp:  [98.1 °F (36.7 °C)-101.7 °F (38.7 °C)] 98.6 °F (37 °C)  Pulse:  [65-93] 65  Resp:  [16-22] 20  SpO2:  [95 %-100 %] 96 %  BP: (117-225)/(56-87) 117/56     Weight: 90.7 kg (200 lb) (12/02/24 1219)  Body mass index is 34.33 kg/m².  Body surface area is 2.02 meters squared.    I/O last 3 completed shifts:  In: 675 [Other:500; IV Piggyback:175]  Out: 3500 [Other:3500]     Physical Exam  Vitals and nursing note reviewed.   Constitutional:       Appearance: Normal appearance. She is ill-appearing.   HENT:      Head: Normocephalic and atraumatic.      Nose: Nose normal.   Eyes:      Pupils: Pupils are equal, round, and reactive to light.   Cardiovascular:      Rate and Rhythm: Normal rate.      Arteriovenous access: Left arteriovenous access is present.     Comments: +thrill   Pulmonary:      Effort: Pulmonary effort is normal.      Comments: RA  Abdominal:      General: Abdomen is flat. Bowel sounds are normal. There is no distension.      Palpations: Abdomen is soft.      Tenderness: There is no abdominal tenderness.   Musculoskeletal:         General: No swelling or tenderness. Normal range of motion.      Cervical back: Normal range of motion and neck supple.      " Right lower leg: Edema present.      Left lower leg: Edema present.   Skin:     General: Skin is warm and dry.      Capillary Refill: Capillary refill takes less than 2 seconds.   Neurological:      General: No focal deficit present.      Mental Status: She is alert and oriented to person, place, and time. Mental status is at baseline.   Psychiatric:         Mood and Affect: Mood normal.          Significant Labs:  CBC:   Recent Labs   Lab 12/03/24  0419   WBC 7.86   RBC 3.55*   HGB 9.3*   HCT 29.4*   *   MCV 83   MCH 26.2*   MCHC 31.6*     CMP:   Recent Labs   Lab 12/02/24  1456 12/02/24  1948 12/03/24  0419   GLU 78   < > 88   CALCIUM 9.9   < > 9.7   ALBUMIN 3.1*  --   --    PROT 7.0  --   --       < > 133*   K 6.9*   < > 4.9   CO2 18*   < > 22*      < > 100   BUN 82*   < > 33*   CREATININE 15.5*   < > 8.6*   ALKPHOS 54  --   --    ALT 9*  --   --    AST 18  --   --    BILITOT 0.9  --   --     < > = values in this interval not displayed.     All labs within the past 24 hours have been reviewed.

## 2024-12-03 NOTE — SUBJECTIVE & OBJECTIVE
Interval History: Pt seen and examined this morning on rounds. CT head overnight was negative. Had an episode of fever that responded to Tylenol. Care plan reviewed. This AM is improved from yesterday and able to respond to questions appropriately.        Objective:     Vital Signs (Most Recent):  Temp: 98.6 °F (37 °C) (12/03/24 1231)  Pulse: 65 (12/03/24 1231)  Resp: 20 (12/03/24 1231)  BP: (!) 117/56 (12/03/24 1032)  SpO2: 96 % (12/03/24 1231) Vital Signs (24h Range):  Temp:  [98.1 °F (36.7 °C)-101.7 °F (38.7 °C)] 98.6 °F (37 °C)  Pulse:  [65-93] 65  Resp:  [16-23] 20  SpO2:  [95 %-100 %] 96 %  BP: (117-238)/() 117/56     Weight: 90.7 kg (200 lb)  Body mass index is 34.33 kg/m².    Intake/Output Summary (Last 24 hours) at 12/3/2024 1244  Last data filed at 12/3/2024 0054  Gross per 24 hour   Intake 675 ml   Output 3500 ml   Net -2825 ml         Physical Exam  Constitutional:       Appearance: Normal appearance.   HENT:      Head: Normocephalic and atraumatic.      Nose: Nose normal.      Mouth/Throat:      Mouth: Mucous membranes are moist.   Eyes:      Extraocular Movements: Extraocular movements intact.      Pupils: Pupils are equal, round, and reactive to light.   Cardiovascular:      Rate and Rhythm: Normal rate and regular rhythm.      Heart sounds: No murmur heard.     No gallop.   Pulmonary:      Effort: Pulmonary effort is normal.      Breath sounds: Rales present. No wheezing.   Abdominal:      General: Abdomen is flat. Bowel sounds are normal. There is no distension.      Palpations: Abdomen is soft.      Tenderness: There is no abdominal tenderness.   Musculoskeletal:         General: No swelling or tenderness. Normal range of motion.      Cervical back: Normal range of motion and neck supple.      Right lower leg: Edema present.      Left lower leg: Edema present.   Skin:     General: Skin is warm and dry.      Capillary Refill: Capillary refill takes less than 2 seconds.   Neurological:       General: No focal deficit present.      Mental Status: She is alert. Mental status is at baseline.   Psychiatric:         Mood and Affect: Mood normal.             Significant Labs: All pertinent labs within the past 24 hours have been reviewed.    Significant Imaging: I have reviewed all pertinent imaging results/findings within the past 24 hours.

## 2024-12-03 NOTE — H&P
"[facility] - Emergency Dept  Hospital Medicine  History & Physical    Patient Name: Jael Hall  MRN: [id number]  Patient Class: IP- Inpatient  Admission Date: 12/2/2024  Attending Physician: Ivonne Cortez MD   Primary Care Provider: Maty Gayle DO         Patient information was obtained from {info source:[id number]::"ER records"}.     Subjective:     Principal Problem:Hyperkalemia    Chief Complaint:   Chief Complaint   Patient presents with    Shortness of Breath     Dialysis done on sat, placed on 2l nc in triage        HPI: Jael Hall is a 59 y.o. female with past medical history of ESRD on HD (TTHSat), HTN, T2DM with Insulin use who presented to the ED for shortness of breath and chest pain in the setting of missed dialysis.  She says it has been going on for about two days. She is unable to tell me any inciting factors or triggering illness. Friend present at bedside. History was difficult to obtain as patient was falling asleep and hard of hearing.       In the ED BP elevated systolics of 200s, labs notabel for istat K 6.8, Troponin 0.29, and chronic anemia. EKG with no significant changes. She was placed on 3 L NC agive NG for CP and BP. Nephrology consulted with plans of Emergent dialysis and admission to medicine close monitoring of potassium.        No new subjective & objective note has been filed under this hospital service since the last note was generated.    Assessment/Plan:     * Hyperkalemia  Hyperkalemia is likely due to ESRD and missed dialysis.The patients most recent potassium results are listed below.  Recent Labs     12/02/24  1456 12/02/24  1948 12/03/24  0419   K 6.9* 3.9 4.9     Plan  - Monitor for arrhythmias with EKG and/or continuous telemetry.   - s/p Insulin and D50 and dialysis with improvement in K   -continue K binders   - Monitor potassium: Every 6 hours  - The patient's hyperkalemia is stable  -Nephrology following for ESRD, Dialysis schedule T/TH/S            Altered " mental status  -likely 2/2 to uremia in the setting of volume overload and missed dialysis  -no focal findings, patient oriented but does fall back asleep quickly  -CT head negative   -improving       Hypertensive emergency  RESOLVED   Patient has a current diagnosis of Hypertensive emergency with end organ damage evidenced by acute pulmonary edema without heart failure which is uncontrolled.  Latest blood pressure and vitals reviewed-   Temp:  [98.3 °F (36.8 °C)-98.8 °F (37.1 °C)]   Pulse:  [59-87]   Resp:  [17-28]   BP: (152-238)/()   SpO2:  [92 %-99 %] .   Patient currently off IV antihypertensives.   Home meds for hypertension were reviewed and noted below.   Hypertension Medications               amLODIPine (NORVASC) 10 MG tablet Take 1 tablet (10 mg total) by mouth nightly.    labetaloL (NORMODYNE) 200 MG tablet Take 1 tablet (200 mg total) by mouth every 12 (twelve) hours.    losartan (COZAAR) 100 MG tablet Take 1 tablet (100 mg total) by mouth once daily.            Medication adjustment for hospital antihypertensives is as follows- Will restart home medications: Amlodipine and Labetolol. Hold Losartan given hyperkalemia.     Will aim for controlled BP reduction by medications noted above. Monitor and mitigate end organ damage as indicated.    SOB (shortness of breath)  -sob is 2/2 to volume overload in the setting of missed dialysis with ESRD  -elevated BNP and Troponin 2/2 to volume overload and likely will improve with Dialysis   -currently undergoing urgent dialysis, and will likely need repeat HD tomorrow   -weaned to RA       Acute hypoxemic respiratory failure  Resolved   Patient with Hypoxic Respiratory failure which is Acute.  she is not on home oxygen. Supplemental oxygen was provided and noted-      .   Signs/symptoms of respiratory failure include- increased work of breathing and respiratory distress. Contributing diagnoses includes -  Fluid overload  Labs and images were reviewed. Patient  Has not had a recent ABG. Will treat underlying causes and adjust management of respiratory failure as follows-   -weaned to RA off supplemental O2 after receiving Dialysis   -2/2 to volume overload     Essential hypertension  Patient's blood pressure range in the last 24 hours was: BP  Min: 152/79  Max: 238/128.The patient's inpatient anti-hypertensive regimen is listed below:  Current Antihypertensives  amLODIPine tablet 10 mg, Nightly, Oral  labetaloL tablet 200 mg, Every 12 hours, Oral    Plan  - BP is uncontrolled, will adjust as follows: restart home medications and likely will improve with dialysis   - hold Losartan given hyperkalemia     Anemia in ESRD (end-stage renal disease)  Anemia is likely due to chronic disease due to ESRD. Most recent hemoglobin and hematocrit are listed below.  Recent Labs     12/02/24  1318 12/02/24  1400 12/02/24  1458 12/03/24  0419   HGB 10.1*  --   --  9.3*   HCT 32.3* 29* 29* 29.4*     Plan  - Monitor serial CBC: Daily  - Transfuse PRBC if patient becomes hemodynamically unstable, symptomatic or H/H drops below 7/21.  - Patient has not received any PRBC transfusions to date  - Patient's anemia is currently stable  - transfuse for less than 7     ESRD (end stage renal disease) on dialysis  -ESRD, receives dialysis T/TH/S   -Nephrology consulted. Receiving Dialysis today and will repeat tomorrow 12/3/24   -continue home phos binders   -continue home BP meds, hold Losartan       AIHA (autoimmune hemolytic anemia)  -as above in problem 4    Thrombocytopenia  The likely etiology of thrombocytopenia is  ESRD . The patients 3 most recent labs are listed below.  Recent Labs     12/02/24  1318   *     Plan  - Will transfuse if platelet count is <10   - continue to monitor       Type 2 diabetes mellitus  -on lantus 11 units at home   -will start SSI and accuchecks   -diabetic diet ordered   -BG at goal on SSI   -hold oral hyperglycemic agents while in the hospital    Secondary  hyperparathyroidism  -restart home medications       GERD (gastroesophageal reflux disease)  -restart PPI         VTE Risk Mitigation (From admission, onward)           Ordered     heparin (porcine) injection 5,000 Units  Every 8 hours         12/02/24 1600     IP VTE HIGH RISK PATIENT  Once         12/02/24 1600     Place sequential compression device  Until discontinued         12/02/24 1600                               3.5H HD tx completed     Net UF: 3L as ordered    Needles x2 pulled; manual pressure held for 15 minutes; hemostasis achieved; dressing dry and intact; no oozing or bleeding noted.    Report given to primary RN       Ivonne Cortez MD  Department of Hospital Medicine  [facility] - Emergency Dept

## 2024-12-03 NOTE — PROGRESS NOTES
12/03/24 1500   Vital Signs   Temp 98.5 °F (36.9 °C)   Temp Source Oral   Pulse 68   Heart Rate Source Monitor   Resp 18   Device (Oxygen Therapy) room air   BP (!) 185/72   MAP (mmHg) 103   BP Location Left forearm   BP Method Automatic   Patient Position Lying     Received pt via w/c alert and oriented.  POC discussed with the pt.  HD tx started via RFA AVF without issue.  Pt is hypertensive and asymptomatic.

## 2024-12-03 NOTE — ED NOTES
Antonio TRIMBLE notified of patient not being able to stay awake to tolerate PO meds. Awaiting future orders and will continue to monitor

## 2024-12-03 NOTE — PLAN OF CARE
[facility] - Emergency Dept  Initial Discharge Assessment       Primary Care Provider: Maty Gayle DO    Admission Diagnosis: Shortness of breath [R06.02]    Admission Date: 12/2/2024  Expected Discharge Date: 12/4/2024    SW communicated and completed initial discharge assessment with pt's daughter Sudha [phone]. Pt was in dialysis.     Pt is independent with their ADL's and ambulation.     Post acute was discussed with pt's daughter. Pt daughter was unsure of any Home Health services. On chart review pt [facility], discharge 10/19.     ANNA/BRYAN to follow up post acute.    Transition of Care Barriers: (P) None    Payor: [employer] MEDICARE / Plan: [employer] HMO PPO SPECIAL NEEDS / Product Type: Medicare Advantage /     Extended Emergency Contact Information  Primary Emergency Contact: Sudha Hall   [place]  Mobile Phone: [phone]  Relation: Daughter  Secondary Emergency Contact: MichaelCurtis  [place] Phone: [phone]  Relation: Friend    Discharge Plan A: (P) Home  Discharge Plan B: (P) Home, Home with family      [facility] - [facility] [address]  [address] [facility] [address] [address]  Phone: [phone] Fax: [phone]    [facility] #[id number] @ [facility] – [place], [place] - [address]  [address]  [id number]  [facility] [address]  Phone: [phone] Fax: [phone]    [id number] - [place], [place] - [address] [place] [address]  [address]  [facility] [address]  Phone: [phone] Fax: [phone]      Initial Assessment (most recent)       Adult Discharge Assessment - 12/03/24 1536          Discharge Assessment    Assessment Type Discharge Planning Assessment (P)      Confirmed/corrected address, phone number and insurance Yes (P)      Confirmed Demographics Correct on Facesheet (P)      Source of Information family (P)    Daughter Sudha  [phone]    Does patient/caregiver understand observation status Yes (P)      Reason For Admission Acute hypoxemic respiratory failure (P)      People in Home alone (P)      Do you expect to return to your current living situation? Yes (P)      Do you have help at home or someone to help you manage your care at home? No (P)      Prior to hospitilization cognitive status: Alert/Oriented;No Deficits (P)      Current cognitive status: No Deficits;Alert/Oriented (P)      Walking or Climbing Stairs Difficulty no (P)      Dressing/Bathing Difficulty no (P)      Home Accessibility other (see comments) (P)    Pt lives on 3rd floor, uses elevator    Equipment Currently Used at Home none (P)      Readmission within 30 days? No (P)      Patient currently being followed by outpatient case management? No (P)      Do you currently have service(s) that help you manage your care at home? No (P)      Do you take prescription medications? Yes (P)      Do you have prescription coverage? Yes (P)      Coverage [employer] MANAGED MEDICARE (P)      Do you have any problems affording any of your prescribed medications? No (P)      Is the patient taking medications as prescribed? yes (P)      Who is going to help you get home at discharge? family/friends (P)      How do you get to doctors appointments? health plan transportation (P)      Are you on dialysis? Yes (P)      Dialysis Name and Scheduled days [facility] - [address] [place] [id number]. [id number] 6:00 am (P)      Do you take coumadin? No (P)      Discharge Plan A Home (P)      Discharge Plan B Home;Home with family (P)      DME Needed Upon Discharge  none (P)      Discharge Plan discussed with: Adult children (P)    Daughter Sudha    Transition of Care Barriers None (P)         Physical Activity    On average, how many days per week do you engage in moderate to strenuous exercise (like a brisk walk)? 0 days (P)      On average, how many  minutes do you engage in exercise at this level? 0 min (P)         Transportation Needs    Has the lack of transportation kept you from medical appointments, meetings, work or from getting things needed for daily living? No (P)         Alcohol Use    Q1: How often do you have a drink containing alcohol? Never (P)      Q2: How many drinks containing alcohol do you have on a typical day when you are drinking? Patient does not drink (P)      Q3: How often do you have six or more drinks on one occasion? Never (P)                         MAKSIM Chawla, MSW   Case Management  [facility]  Email: [email or web address]

## 2024-12-03 NOTE — H&P
[facility] - Emergency Dept  Hospital Medicine  History & Physical    Patient Name: Jael Hall  MRN: [id number]  Patient Class: IP- Inpatient  Admission Date: 12/2/2024  Attending Physician: Ivonne Cortez MD   Primary Care Provider: Maty Gayle DO         Patient information was obtained from patient, caregiver / friend, and ER records.     Subjective:     Principal Problem:Hyperkalemia    Chief Complaint:   Chief Complaint   Patient presents with    Shortness of Breath     Dialysis done on sat, placed on 2l nc in triage        HPI: Jael Hall is a 59 y.o. female with past medical history of ESRD on HD (TTHSat), HTN, T2DM with Insulin use who presented to the ED for shortness of breath and chest pain in the setting of missed dialysis.  She says it has been going on for about two days. She is unable to tell me any inciting factors or triggering illness. Friend present at bedside. History was difficult to obtain as patient was falling asleep and hard of hearing.       In the ED BP elevated systolics of 200s, labs notabel for istat K 6.8, Troponin 0.29, and chronic anemia. EKG with no significant changes. She was placed on 3 L NC agive NG for CP and BP. Nephrology consulted with plans of Emergent dialysis and admission to medicine close monitoring of potassium.        No new subjective & objective note has been filed under this hospital service since the last note was generated.      .  Past Medical History:   Diagnosis Date    Acute hyperkalemia 09/09/2020    Acute on chronic diastolic heart failure 10/08/2021    Anemia of chronic disorder 11/08/2017     Overview:  Added automatically from request for surgery [id number]    Anemia of chronic renal failure, stage 5      Anxiety      Cyst of left ovary 05/14/2019    Cyst of left ovary 07/05/2019    Dyslipidemia 01/04/2013    Encounter for screening colonoscopy 10/08/2020    End-stage renal disease on hemodialysis 08/20/2020    ESRD on hemodialysis 09/28/2017     Hematuria 10/30/2019    History of COVID-19 2020    Hospital discharge follow-up 10/06/2023    Hx of sinus bradycardia 2017    Hyperkalemia 2017    Hypertensive urgency 2021    Peripheral neuropathy 2013    Preoperative testing 10/08/2020    Renovascular hypertension 2020    S/P dilation and curettage 2018    Screening for colon cancer 2023    Thickened endometrium 2018    Thyroid nodule      Thyroid nodule 2021    Uncontrolled type 2 diabetes mellitus 2013     Overview:  poc a1c today 11.2-270/ up from 10.8-263, Pt. has very very stressed/ was incarcerated/marital stress/ will current meds/ less stress now/ will monitor.               Past Surgical History:   Procedure Laterality Date    AV FISTULA PLACEMENT         right arm     SECTION         x1    COLONOSCOPY N/A 2018     Procedure: COLONOSCOPY;  Surgeon: Pankaj Hinojosa MD;  Location: [facility] (4TH FLR);  Service: Endoscopy;  Laterality: N/A;  dialysis pt/labs prior to colon/MS    COLONOSCOPY N/A 2023     Procedure: COLONOSCOPY;  Surgeon: Angel Turner MD;  Location: [facility] (2ND FLR);  Service: Endoscopy;  Laterality: N/A;  Ref By:  Maty Gayle MD  Procedure: Colonoscopy  Diagnosis: endounter for preventive health   Procedure Timing: pt. pref  Provider: Dr. turner   Location: [facility] 4-Endo   Prep Specifications: Standard prep   Additional Info: Dialysis patient lab pending    CYSTOSCOPY W/ RETROGRADES Bilateral 2020     Procedure: CYSTOSCOPY, WITH RETROGRADE PYELOGRAM;  Surgeon: Douglas Abraham MD;  Location: [facility] OR 1ST FLR;  Service: Urology;  Laterality: Bilateral;  45 min    FISTULOGRAM Right 2020     Procedure: Fistulogram;  Surgeon: Lester Gómez MD;  Location: [facility] CATH LAB;  Service: Nephrology;  Laterality: Right;    HYSTERECTOMY        IMPLANTATION OF COCHLEAR PROSTHESIS Right 2021     Procedure: INSERTION, PROSTHESIS, COCHLEAR;  Surgeon: Ramiro  MD Song;  Location: [facility] OR 2ND FLR;  Service: ENT;  Laterality: Right;  COCHLEAR BABAK    IMPLANTATION OF COCHLEAR PROSTHESIS Right 6/30/2021     Procedure: INSERTION, PROSTHESIS, COCHLEAR;  Surgeon: Ramiro Zuleta MD;  Location: [facility] OR 2ND FLR;  Service: ENT;  Laterality: Right;  COCHLEAR AMERICAS    ROBOT-ASSISTED LAPAROSCOPIC ABDOMINAL HYSTERECTOMY USING DA NATALYA XI N/A 7/5/2019     Procedure: XI ROBOTIC HYSTERECTOMY;  Surgeon: Trice Lei MD;  Location: [facility] OR;  Service: OB/GYN;  Laterality: N/A;    ROBOT-ASSISTED LAPAROSCOPIC NEPHRECTOMY Right 10/8/2020     Procedure: ROBOTIC NEPHRECTOMY;  Surgeon: Douglas Abraham MD;  Location: [facility] OR 2ND FLR;  Service: Urology;  Laterality: Right;  3.5hrs gen with regional     ROBOT-ASSISTED LAPAROSCOPIC SALPINGO-OOPHORECTOMY USING DA NATALYA XI Bilateral 7/5/2019     Procedure: XI ROBOTIC SALPINGO-OOPHORECTOMY;  Surgeon: Trice Lei MD;  Location: [facility];  Service: OB/GYN;  Laterality: Bilateral;    TUBAL LIGATION        Vascular access surgeries         x5         Review of patient's allergies indicates:  No Known Allergies     No current facility-administered medications on file prior to encounter.           Current Outpatient Medications on File Prior to Encounter   Medication Sig    ACCU-CHEK GUIDE GLUCOSE METER Misc      ACCU-CHEK GUIDE L1-L2 CTRL SOL Soln      ACCU-CHEK GUIDE TEST STRIPS Strp      ACCU-CHEK SOFTCLIX LANCETS Misc      acetaminophen (TYLENOL) 500 MG tablet Take 1 tablet (500 mg total) by mouth every 8 (eight) hours as needed for Pain.    amLODIPine (NORVASC) 10 MG tablet Take 1 tablet (10 mg total) by mouth nightly.    aspirin (ECOTRIN) 81 MG EC tablet Take 81 mg by mouth every morning.    atorvastatin (LIPITOR) 40 MG tablet Take 1 tablet (40 mg total) by mouth once daily.    atovaquone (MEPRON) 750 mg/5 mL Susp oral liquid Take 10 mLs (1,500 mg total) by mouth once daily.    blood sugar diagnostic Strp To check BG 3 times daily, to  "use with insurance preferred meter    blood-glucose meter kit To check BG 3 times daily, to use with insurance preferred meter    DROPSAFE ALCOHOL PREP PADS PadM Apply topically.    epoetin shiela (PROCRIT) 10,000 unit/mL injection Inject 0.85 mLs (8,500 Units total) into the skin every Mon, Wed, Fri.    folic acid (FOLVITE) 1 MG tablet Take 1 tablet (1 mg total) by mouth once daily.    insulin aspart U-100 (NOVOLOG) 100 unit/mL (3 mL) InPn pen Inject 0-5 Units into the skin before meals and at bedtime as needed (Hyperglycemia).    insulin glargine U-100, Lantus, 100 unit/mL (3 mL) SubQ InPn pen Inject 12 Units into the skin once daily.    labetaloL (NORMODYNE) 200 MG tablet Take 1 tablet (200 mg total) by mouth every 12 (twelve) hours.    lancets Misc To check BG 3 times daily, to use with insurance preferred meter    LIDOcaine (LIDODERM) 5 % Place 1 patch onto the skin once daily. Remove & Discard patch within 12 hours or as directed by MD (Patient not taking: Reported on 11/6/2024)    losartan (COZAAR) 100 MG tablet Take 1 tablet (100 mg total) by mouth once daily.    omeprazole (PRILOSEC) 20 MG capsule Take 1 capsule (20 mg total) by mouth once daily.    pen needle, diabetic 31 gauge x 3/16" Ndle Use to inject insulin into the skin.    pregabalin (LYRICA) 75 MG capsule Take 1 capsule (75 mg total) by mouth once daily.    sevelamer carbonate (RENVELA) 800 mg Tab Take 3 tablets (2,400 mg total) by mouth 3 (three) times daily with meals.      Family History         Problem Relation (Age of Onset)     Cancer Mother, Sister, Sister     Diabetes Sister, Brother     Lung cancer Sister     Macular degeneration Sister     Ovarian cancer Mother, Sister, Sister                   Tobacco Use    Smoking status: Never    Smokeless tobacco: Never   Substance and Sexual Activity    Alcohol use: No    Drug use: No    Sexual activity: Yes       Partners: Male       Birth control/protection: Post-menopausal      Review of Systems "   Reason unable to perform ROS: Limited due to mental status.   Constitutional:  Positive for activity change. Negative for appetite change and fatigue.   Respiratory:  Positive for cough and shortness of breath.    Cardiovascular:  Positive for chest pain and leg swelling.   Gastrointestinal:  Negative for vomiting.   Skin:  Negative for color change, pallor and rash.      Objective:      Vital Signs (Most Recent):  Temp: 98.3 °F (36.8 °C) (12/02/24 1610)  Pulse: 79 (12/02/24 1610)  Resp: 20 (12/02/24 1610)  BP: (!) 185/80 (12/02/24 1610)  SpO2: 99 % (12/02/24 1610) Vital Signs (24h Range):  Temp:  [98.3 °F (36.8 °C)-98.8 °F (37.1 °C)] 98.3 °F (36.8 °C)  Pulse:  [59-81] 79  Resp:  [17-28] 20  SpO2:  [92 %-99 %] 99 %  BP: (185-238)/() 185/80      Weight: 90.7 kg (200 lb)  Body mass index is 34.33 kg/m².  Physical Exam  Constitutional:       Appearance: Normal appearance. She is ill-appearing.   HENT:      Head: Normocephalic and atraumatic.      Nose: Nose normal.      Mouth/Throat:      Mouth: Mucous membranes are moist.   Eyes:      Extraocular Movements: Extraocular movements intact.      Pupils: Pupils are equal, round, and reactive to light.   Cardiovascular:      Rate and Rhythm: Normal rate and regular rhythm.      Heart sounds: No murmur heard.     No gallop.   Pulmonary:      Effort: Pulmonary effort is normal.      Breath sounds: Rales present. No wheezing.   Abdominal:      General: Abdomen is flat. Bowel sounds are normal. There is no distension.      Palpations: Abdomen is soft.      Tenderness: There is no abdominal tenderness.   Musculoskeletal:         General: No swelling or tenderness. Normal range of motion.      Cervical back: Normal range of motion and neck supple.      Right lower leg: Edema present.      Left lower leg: Edema present.   Skin:     General: Skin is warm and dry.      Capillary Refill: Capillary refill takes less than 2 seconds.   Neurological:      General: No focal  deficit present.      Mental Status: She is alert. Mental status is at baseline.   Psychiatric:         Mood and Affect: Mood normal.               CRANIAL NERVES      CN III, IV, VI   Pupils are equal, round, and reactive to light.        Significant Labs: All pertinent labs within the past 24 hours have been reviewed.     Significant Imaging: I have reviewed all pertinent imaging results/findings within the past 24 hours.      Assessment/Plan:     * Hyperkalemia  Hyperkalemia is likely due to ESRD and missed dialysis.The patients most recent potassium results are listed below.  Recent Labs     12/02/24  1456   K 6.9*     Plan  - Monitor for arrhythmias with EKG and/or continuous telemetry.   - Treat the hyperkalemia with Potassium Binders, Calcium gluconate, IV insulin and dextrose, and Dialysis  .   - Monitor potassium: Every 6 hours  - The patient's hyperkalemia is stable  -Nephrology following for ESRD           Altered mental status  -likely 2/2 to uremia in the setting of volume overload and missed dialysis  -no focal findings, patient oriented but does fall back asleep quickly  -suspect it will improve with dialysis, if she fails to improve will consider CT head       Hypertensive emergency  Patient has a current diagnosis of Hypertensive emergency with end organ damage evidenced by acute pulmonary edema without heart failure which is uncontrolled.  Latest blood pressure and vitals reviewed-   Temp:  [98.3 °F (36.8 °C)-98.8 °F (37.1 °C)]   Pulse:  [59-87]   Resp:  [17-28]   BP: (152-238)/()   SpO2:  [92 %-99 %] .   Patient currently off IV antihypertensives.   Home meds for hypertension were reviewed and noted below.   Hypertension Medications               amLODIPine (NORVASC) 10 MG tablet Take 1 tablet (10 mg total) by mouth nightly.    labetaloL (NORMODYNE) 200 MG tablet Take 1 tablet (200 mg total) by mouth every 12 (twelve) hours.    losartan (COZAAR) 100 MG tablet Take 1 tablet (100 mg total) by  mouth once daily.            Medication adjustment for hospital antihypertensives is as follows- Will restart home medications: Amlodipine and Labetolol. Hold Losartan given hyperkalemia.     Will aim for controlled BP reduction by medications noted above. Monitor and mitigate end organ damage as indicated.    SOB (shortness of breath)  -sob is 2/2 to volume overload in the setting of missed dialysis with ESRD  -elevated BNP and Troponin 2/2 to volume overload and likely will improve with Dialysis   -currently undergoing urgent dialysis, and will likely need repeat HD tomorrow       Essential hypertension  Patient's blood pressure range in the last 24 hours was: BP  Min: 152/79  Max: 238/128.The patient's inpatient anti-hypertensive regimen is listed below:  Current Antihypertensives  amLODIPine tablet 10 mg, Nightly, Oral  labetaloL tablet 200 mg, Every 12 hours, Oral    Plan  - BP is uncontrolled, will adjust as follows: restart home medications and likely will improve with dialysis   - hold Losartan given hyperkalemia     Anemia in ESRD (end-stage renal disease)  Anemia is likely due to chronic disease due to ESRD. Most recent hemoglobin and hematocrit are listed below.  Recent Labs     12/02/24  1318 12/02/24  1400 12/02/24  1458   HGB 10.1*  --   --    HCT 32.3* 29* 29*     Plan  - Monitor serial CBC: Daily  - Transfuse PRBC if patient becomes hemodynamically unstable, symptomatic or H/H drops below 7/21.  - Patient has not received any PRBC transfusions to date  - Patient's anemia is currently stable  - transfuse for less than 7     ESRD (end stage renal disease) on dialysis  -ESRD, receives dialysis T/TH/S   -Nephrology consulted. Receiving Dialysis today and will repeat tomorrow 12/3/24   -continue home phos binders   -continue home BP meds, hold Losartan       AIHA (autoimmune hemolytic anemia)  -as above in problem 4    Thrombocytopenia  The likely etiology of thrombocytopenia is  ESRD . The patients 3  most recent labs are listed below.  Recent Labs     12/02/24  1318   *     Plan  - Will transfuse if platelet count is <10   - continue to monitor       Type 2 diabetes mellitus  -on lantus 11 units at home   -will start SSI and accuchecks   -diabetic diet ordered   -likely will need long acting insulin while admitted but will adjust accordingly   -given recent insulin administration and potential need for shifting with further insulin will hold off further insulin management and monitor   -hold oral hyperglycemic agents while in the hospital    Secondary hyperparathyroidism  -restart home medications       GERD (gastroesophageal reflux disease)  -restart PPI         VTE Risk Mitigation (From admission, onward)           Ordered     heparin (porcine) injection 5,000 Units  Every 8 hours         12/02/24 1600     IP VTE HIGH RISK PATIENT  Once         12/02/24 1600     Place sequential compression device  Until discontinued         12/02/24 1600                               [facility] - Emergency Dept  [place] [facility] Medicine  Progress Note    Patient Name: Jael Hall  MRN: [id number]  Patient Class: IP- Inpatient   Admission Date: 12/2/2024  Length of Stay: 0 days  Attending Physician: Ivonne Cortez MD  Primary Care Provider: Maty Gayle DO        Subjective    Principal Problem:Hyperkalemia        HPI:  Jael Hall is a 59 y.o. female with past medical history of ESRD on HD (TTHSat), HTN, T2DM with Insulin use who presented to the ED for shortness of breath and chest pain in the setting of missed dialysis.  She says it has been going on for about two days. She is unable to tell me any inciting factors or triggering illness. Friend present at bedside. History was difficult to obtain as patient was falling asleep and hard of hearing.       In the ED BP elevated systolics of 200s, labs notabel for istat K 6.8, Troponin 0.29, and chronic anemia. EKG with no significant changes. She was placed on 3 L NC  cody FERREIRA for CP and BP. Nephrology consulted with plans of Emergent dialysis and admission to medicine close monitoring of potassium.        Overview/Hospital Course:  No notes on file    Past Medical History:   Diagnosis Date    Acute hyperkalemia 2020    Acute on chronic diastolic heart failure 10/08/2021    Anemia of chronic disorder 2017    Overview:  Added automatically from request for surgery [id number]    Anemia of chronic renal failure, stage 5     Anxiety     Cyst of left ovary 2019    Cyst of left ovary 2019    Dyslipidemia 2013    Encounter for screening colonoscopy 10/08/2020    End-stage renal disease on hemodialysis 2020    ESRD on hemodialysis 2017    Hematuria 10/30/2019    History of COVID-19 2020    Hospital discharge follow-up 10/06/2023    Hx of sinus bradycardia 2017    Hyperkalemia 2017    Hypertensive urgency 2021    Peripheral neuropathy 2013    Preoperative testing 10/08/2020    Renovascular hypertension 2020    S/P dilation and curettage 2018    Screening for colon cancer 2023    Thickened endometrium 2018    Thyroid nodule     Thyroid nodule 2021    Uncontrolled type 2 diabetes mellitus 2013    Overview:  poc a1c today 11.2-270/ up from 10.8-263, Pt. has very very stressed/ was incarcerated/marital stress/ will current meds/ less stress now/ will monitor.       Past Surgical History:   Procedure Laterality Date    AV FISTULA PLACEMENT      right arm     SECTION      x1    COLONOSCOPY N/A 2018    Procedure: COLONOSCOPY;  Surgeon: Pankaj Hinojosa MD;  Location: [facility] (4TH FLR);  Service: Endoscopy;  Laterality: N/A;  dialysis pt/labs prior to colon/MS    COLONOSCOPY N/A 2023    Procedure: COLONOSCOPY;  Surgeon: Angel Art MD;  Location: [facility] (2ND FLR);  Service: Endoscopy;  Laterality: N/A;  Ref By:  Maty Gayle MD  Procedure: Colonoscopy  Diagnosis:  endounter for preventive health   Procedure Timing: pt. pref  Provider: Dr. turner   Location: [facility] 4-Endo   Prep Specifications: Standard prep   Additional Info: Dialysis patient lab pending    CYSTOSCOPY W/ RETROGRADES Bilateral 8/20/2020    Procedure: CYSTOSCOPY, WITH RETROGRADE PYELOGRAM;  Surgeon: Douglas Abraham MD;  Location: [facility] 1ST FLR;  Service: Urology;  Laterality: Bilateral;  45 min    FISTULOGRAM Right 9/9/2020    Procedure: Fistulogram;  Surgeon: Lesetr Góemz MD;  Location: [facility] CATH LAB;  Service: Nephrology;  Laterality: Right;    HYSTERECTOMY      IMPLANTATION OF COCHLEAR PROSTHESIS Right 6/28/2021    Procedure: INSERTION, PROSTHESIS, COCHLEAR;  Surgeon: Ramiro Zuleta MD;  Location: [facility] OR 2ND FLR;  Service: ENT;  Laterality: Right;  COCHLEAR BABAK    IMPLANTATION OF COCHLEAR PROSTHESIS Right 6/30/2021    Procedure: INSERTION, PROSTHESIS, COCHLEAR;  Surgeon: Ramiro Zuleta MD;  Location: [facility] OR 2ND FLR;  Service: ENT;  Laterality: Right;  COCHLEAR AMERICAS    ROBOT-ASSISTED LAPAROSCOPIC ABDOMINAL HYSTERECTOMY USING DA NATALYA XI N/A 7/5/2019    Procedure: XI ROBOTIC HYSTERECTOMY;  Surgeon: Trice Lei MD;  Location: [facility];  Service: OB/GYN;  Laterality: N/A;    ROBOT-ASSISTED LAPAROSCOPIC NEPHRECTOMY Right 10/8/2020    Procedure: ROBOTIC NEPHRECTOMY;  Surgeon: Douglas Abraham MD;  Location: [facility] 2ND FLR;  Service: Urology;  Laterality: Right;  3.5hrs gen with regional     ROBOT-ASSISTED LAPAROSCOPIC SALPINGO-OOPHORECTOMY USING DA NATALYA XI Bilateral 7/5/2019    Procedure: XI ROBOTIC SALPINGO-OOPHORECTOMY;  Surgeon: Trice Lei MD;  Location: [facility];  Service: OB/GYN;  Laterality: Bilateral;    TUBAL LIGATION      Vascular access surgeries      x5       Review of patient's allergies indicates:  No Known Allergies    No current facility-administered medications on file prior to encounter.     Current Outpatient Medications on File Prior to Encounter   Medication Sig     "ACCU-CHEK GUIDE GLUCOSE METER Misc     ACCU-CHEK GUIDE L1-L2 CTRL SOL Soln     ACCU-CHEK GUIDE TEST STRIPS Strp     ACCU-CHEK SOFTCLIX LANCETS Misc     acetaminophen (TYLENOL) 500 MG tablet Take 1 tablet (500 mg total) by mouth every 8 (eight) hours as needed for Pain.    amLODIPine (NORVASC) 10 MG tablet Take 1 tablet (10 mg total) by mouth nightly.    aspirin (ECOTRIN) 81 MG EC tablet Take 81 mg by mouth every morning.    atorvastatin (LIPITOR) 40 MG tablet Take 1 tablet (40 mg total) by mouth once daily.    atovaquone (MEPRON) 750 mg/5 mL Susp oral liquid Take 10 mLs (1,500 mg total) by mouth once daily.    blood sugar diagnostic Strp To check BG 3 times daily, to use with insurance preferred meter    blood-glucose meter kit To check BG 3 times daily, to use with insurance preferred meter    DROPSAFE ALCOHOL PREP PADS PadM Apply topically.    epoetin shiela (PROCRIT) 10,000 unit/mL injection Inject 0.85 mLs (8,500 Units total) into the skin every Mon, Wed, Fri.    folic acid (FOLVITE) 1 MG tablet Take 1 tablet (1 mg total) by mouth once daily.    insulin aspart U-100 (NOVOLOG) 100 unit/mL (3 mL) InPn pen Inject 0-5 Units into the skin before meals and at bedtime as needed (Hyperglycemia).    insulin glargine U-100, Lantus, 100 unit/mL (3 mL) SubQ InPn pen Inject 12 Units into the skin once daily.    labetaloL (NORMODYNE) 200 MG tablet Take 1 tablet (200 mg total) by mouth every 12 (twelve) hours.    lancets Mis To check BG 3 times daily, to use with insurance preferred meter    LIDOcaine (LIDODERM) 5 % Place 1 patch onto the skin once daily. Remove & Discard patch within 12 hours or as directed by MD (Patient not taking: Reported on 11/6/2024)    losartan (COZAAR) 100 MG tablet Take 1 tablet (100 mg total) by mouth once daily.    omeprazole (PRILOSEC) 20 MG capsule Take 1 capsule (20 mg total) by mouth once daily.    pen needle, diabetic 31 gauge x 3/16" Ndle Use to inject insulin into the skin.    pregabalin " (LYRICA) 75 MG capsule Take 1 capsule (75 mg total) by mouth once daily.    sevelamer carbonate (RENVELA) 800 mg Tab Take 3 tablets (2,400 mg total) by mouth 3 (three) times daily with meals.     Family History       Problem Relation (Age of Onset)    Cancer Mother, Sister, Sister    Diabetes Sister, Brother    Lung cancer Sister    Macular degeneration Sister    Ovarian cancer Mother, Sister, Sister          Tobacco Use    Smoking status: Never    Smokeless tobacco: Never   Substance and Sexual Activity    Alcohol use: No    Drug use: No    Sexual activity: Yes     Partners: Male     Birth control/protection: Post-menopausal     Review of Systems   Reason unable to perform ROS: Limited due to mental status.   Constitutional:  Positive for activity change. Negative for appetite change and fatigue.   Respiratory:  Positive for cough and shortness of breath.    Cardiovascular:  Positive for chest pain and leg swelling.   Gastrointestinal:  Negative for vomiting.   Skin:  Negative for color change, pallor and rash.     Objective:     Vital Signs (Most Recent):  Temp: 98.3 °F (36.8 °C) (12/02/24 1610)  Pulse: 79 (12/02/24 1610)  Resp: 20 (12/02/24 1610)  BP: (!) 185/80 (12/02/24 1610)  SpO2: 99 % (12/02/24 1610) Vital Signs (24h Range):  Temp:  [98.3 °F (36.8 °C)-98.8 °F (37.1 °C)] 98.3 °F (36.8 °C)  Pulse:  [59-81] 79  Resp:  [17-28] 20  SpO2:  [92 %-99 %] 99 %  BP: (185-238)/() 185/80     Weight: 90.7 kg (200 lb)  Body mass index is 34.33 kg/m².     Physical Exam  Constitutional:       Appearance: Normal appearance. She is ill-appearing.   HENT:      Head: Normocephalic and atraumatic.      Nose: Nose normal.      Mouth/Throat:      Mouth: Mucous membranes are moist.   Eyes:      Extraocular Movements: Extraocular movements intact.      Pupils: Pupils are equal, round, and reactive to light.   Cardiovascular:      Rate and Rhythm: Normal rate and regular rhythm.      Heart sounds: No murmur heard.     No gallop.    Pulmonary:      Effort: Pulmonary effort is normal.      Breath sounds: Rales present. No wheezing.   Abdominal:      General: Abdomen is flat. Bowel sounds are normal. There is no distension.      Palpations: Abdomen is soft.      Tenderness: There is no abdominal tenderness.   Musculoskeletal:         General: No swelling or tenderness. Normal range of motion.      Cervical back: Normal range of motion and neck supple.      Right lower leg: Edema present.      Left lower leg: Edema present.   Skin:     General: Skin is warm and dry.      Capillary Refill: Capillary refill takes less than 2 seconds.   Neurological:      General: No focal deficit present.      Mental Status: She is alert. Mental status is at baseline.   Psychiatric:         Mood and Affect: Mood normal.              CRANIAL NERVES     CN III, IV, VI   Pupils are equal, round, and reactive to light.       Significant Labs: All pertinent labs within the past 24 hours have been reviewed.    Significant Imaging: I have reviewed all pertinent imaging results/findings within the past 24 hours.    Assessment and Plan    * Hyperkalemia  Hyperkalemia is likely due to ESRD and missed dialysis.The patients most recent potassium results are listed below.  Recent Labs     12/02/24  1456   K 6.9*     Plan  - Monitor for arrhythmias with EKG and/or continuous telemetry.   - Treat the hyperkalemia with Potassium Binders, Calcium gluconate, IV insulin and dextrose, and Dialysis  .   - Monitor potassium: Every 6 hours  - The patient's hyperkalemia is stable  -Nephrology following for ESRD           Altered mental status  -likely 2/2 to uremia in the setting of volume overload and missed dialysis  -no focal findings, patient oriented but does fall back asleep quickly  -suspect it will improve with dialysis, if she fails to improve will consider CT head       Hypertensive emergency  Patient has a current diagnosis of Hypertensive emergency with end organ damage  evidenced by acute pulmonary edema without heart failure which is uncontrolled.  Latest blood pressure and vitals reviewed-   Temp:  [98.3 °F (36.8 °C)-98.8 °F (37.1 °C)]   Pulse:  [59-87]   Resp:  [17-28]   BP: (152-238)/()   SpO2:  [92 %-99 %] .   Patient currently off IV antihypertensives.   Home meds for hypertension were reviewed and noted below.   Hypertension Medications               amLODIPine (NORVASC) 10 MG tablet Take 1 tablet (10 mg total) by mouth nightly.    labetaloL (NORMODYNE) 200 MG tablet Take 1 tablet (200 mg total) by mouth every 12 (twelve) hours.    losartan (COZAAR) 100 MG tablet Take 1 tablet (100 mg total) by mouth once daily.            Medication adjustment for hospital antihypertensives is as follows- Will restart home medications: Amlodipine and Labetolol. Hold Losartan given hyperkalemia.     Will aim for controlled BP reduction by medications noted above. Monitor and mitigate end organ damage as indicated.    SOB (shortness of breath)  -sob is 2/2 to volume overload in the setting of missed dialysis with ESRD  -elevated BNP and Troponin 2/2 to volume overload and likely will improve with Dialysis   -currently undergoing urgent dialysis, and will likely need repeat HD tomorrow       Essential hypertension  Patient's blood pressure range in the last 24 hours was: BP  Min: 152/79  Max: 238/128.The patient's inpatient anti-hypertensive regimen is listed below:  Current Antihypertensives  amLODIPine tablet 10 mg, Nightly, Oral  labetaloL tablet 200 mg, Every 12 hours, Oral    Plan  - BP is uncontrolled, will adjust as follows: restart home medications and likely will improve with dialysis   - hold Losartan given hyperkalemia     Anemia in ESRD (end-stage renal disease)  Anemia is likely due to chronic disease due to ESRD. Most recent hemoglobin and hematocrit are listed below.  Recent Labs     12/02/24  1318 12/02/24  1400 12/02/24  1458   HGB 10.1*  --   --    HCT 32.3* 29* 29*      Plan  - Monitor serial CBC: Daily  - Transfuse PRBC if patient becomes hemodynamically unstable, symptomatic or H/H drops below 7/21.  - Patient has not received any PRBC transfusions to date  - Patient's anemia is currently stable  - transfuse for less than 7     ESRD (end stage renal disease) on dialysis  -ESRD, receives dialysis T/TH/S   -Nephrology consulted. Receiving Dialysis today and will repeat tomorrow 12/3/24   -continue home phos binders   -continue home BP meds, hold Losartan       AIHA (autoimmune hemolytic anemia)  -as above in problem 4    Thrombocytopenia  The likely etiology of thrombocytopenia is  ESRD . The patients 3 most recent labs are listed below.  Recent Labs     12/02/24  1318   *     Plan  - Will transfuse if platelet count is <10   - continue to monitor       Type 2 diabetes mellitus  -on lantus 11 units at home   -will start SSI and accuchecks   -diabetic diet ordered   -likely will need long acting insulin while admitted but will adjust accordingly   -given recent insulin administration and potential need for shifting with further insulin will hold off further insulin management and monitor   -hold oral hyperglycemic agents while in the hospital    Secondary hyperparathyroidism  -restart home medications       GERD (gastroesophageal reflux disease)  -restart PPI         VTE Risk Mitigation (From admission, onward)           Ordered     heparin (porcine) injection 5,000 Units  Every 8 hours         12/02/24 1600     IP VTE HIGH RISK PATIENT  Once         12/02/24 1600     Place sequential compression device  Until discontinued         12/02/24 1600                    Discharge Planning   MEGAN:      Code Status: Full Code   Is the patient medically ready for discharge?:                               Ivonne Cortez MD  Department of Hospital Medicine   [facility] - Emergency Dept         Ivonne Cortez MD  Department of Hospital Medicine  [facility] - Emergency  Dept

## 2024-12-03 NOTE — ED NOTES
Took report from Ebony RN and Wendy RN, and assumed care of pt at this time with Tod PADILLA. Pt resting comfortably and independently repositioned in stretcher with bed locked in lowest position for safety. NAD noted at this time. Respirations even and unlabored and visible chest rise noted.  Patient offered bathroom assistance and denies need at this time. Pt instructed to call if assistance is needed. Pt on continuous cardiac, BP, and O2 monitoring. Call light within reach. No needs at this time. Will continue to monitor. Pt currently receiving dialysis.

## 2024-12-03 NOTE — ASSESSMENT & PLAN NOTE
Resolved   Patient with Hypoxic Respiratory failure which is Acute.  she is not on home oxygen. Supplemental oxygen was provided and noted-      .   Signs/symptoms of respiratory failure include- increased work of breathing and respiratory distress. Contributing diagnoses includes -  Fluid overload  Labs and images were reviewed. Patient Has not had a recent ABG. Will treat underlying causes and adjust management of respiratory failure as follows-   -weaned to RA off supplemental O2 after receiving Dialysis   -2/2 to volume overload

## 2024-12-03 NOTE — ED NOTES
Xiomara TRIMBLE notified of pt temp. MD states no need to treat at this time, will continue to monitor.

## 2024-12-03 NOTE — NURSING
Report given to receiving nurse Hue on 11th floor.  Patient's belongings brought to room 1109 and daughter notified.  Patient will transfer from dialysis to room 1109.

## 2024-12-04 VITALS
WEIGHT: 200 LBS | HEIGHT: 64 IN | RESPIRATION RATE: 18 BRPM | OXYGEN SATURATION: 95 % | SYSTOLIC BLOOD PRESSURE: 117 MMHG | TEMPERATURE: 99 F | DIASTOLIC BLOOD PRESSURE: 66 MMHG | BODY MASS INDEX: 34.15 KG/M2 | HEART RATE: 78 BPM

## 2024-12-04 LAB
ANION GAP SERPL CALC-SCNC: 11 MMOL/L (ref 8–16)
BASOPHILS # BLD AUTO: 0.07 K/UL (ref 0–0.2)
BASOPHILS NFR BLD: 1.1 % (ref 0–1.9)
BUN SERPL-MCNC: 18 MG/DL (ref 8–23)
CALCIUM SERPL-MCNC: 9.2 MG/DL (ref 8.7–10.5)
CHLORIDE SERPL-SCNC: 101 MMOL/L (ref 95–110)
CO2 SERPL-SCNC: 21 MMOL/L (ref 23–29)
CREAT SERPL-MCNC: 5.9 MG/DL (ref 0.5–1.4)
DIFFERENTIAL METHOD BLD: ABNORMAL
EOSINOPHIL # BLD AUTO: 1 K/UL (ref 0–0.5)
EOSINOPHIL NFR BLD: 15.4 % (ref 0–8)
ERYTHROCYTE [DISTWIDTH] IN BLOOD BY AUTOMATED COUNT: 18.8 % (ref 11.5–14.5)
EST. GFR  (NO RACE VARIABLE): 7.6 ML/MIN/1.73 M^2
GLUCOSE SERPL-MCNC: 79 MG/DL (ref 70–110)
HCT VFR BLD AUTO: 28.9 % (ref 37–48.5)
HGB BLD-MCNC: 8.8 G/DL (ref 12–16)
IMM GRANULOCYTES # BLD AUTO: 0.01 K/UL (ref 0–0.04)
IMM GRANULOCYTES NFR BLD AUTO: 0.2 % (ref 0–0.5)
LYMPHOCYTES # BLD AUTO: 1.4 K/UL (ref 1–4.8)
LYMPHOCYTES NFR BLD: 21.9 % (ref 18–48)
MAGNESIUM SERPL-MCNC: 2.2 MG/DL (ref 1.6–2.6)
MCH RBC QN AUTO: 25.4 PG (ref 27–31)
MCHC RBC AUTO-ENTMCNC: 30.4 G/DL (ref 32–36)
MCV RBC AUTO: 83 FL (ref 82–98)
MONOCYTES # BLD AUTO: 1.2 K/UL (ref 0.3–1)
MONOCYTES NFR BLD: 18.6 % (ref 4–15)
NEUTROPHILS # BLD AUTO: 2.7 K/UL (ref 1.8–7.7)
NEUTROPHILS NFR BLD: 42.8 % (ref 38–73)
NRBC BLD-RTO: 0 /100 WBC
PHOSPHATE SERPL-MCNC: 4.7 MG/DL (ref 2.7–4.5)
PLATELET # BLD AUTO: 119 K/UL (ref 150–450)
PMV BLD AUTO: ABNORMAL FL (ref 9.2–12.9)
POCT GLUCOSE: 101 MG/DL (ref 70–110)
POTASSIUM SERPL-SCNC: 4.1 MMOL/L (ref 3.5–5.1)
RBC # BLD AUTO: 3.47 M/UL (ref 4–5.4)
SODIUM SERPL-SCNC: 133 MMOL/L (ref 136–145)
WBC # BLD AUTO: 6.22 K/UL (ref 3.9–12.7)

## 2024-12-04 PROCEDURE — 85025 COMPLETE CBC W/AUTO DIFF WBC: CPT | Mod: HCNC | Performed by: STUDENT IN AN ORGANIZED HEALTH CARE EDUCATION/TRAINING PROGRAM

## 2024-12-04 PROCEDURE — 25000003 PHARM REV CODE 250: Mod: HCNC | Performed by: STUDENT IN AN ORGANIZED HEALTH CARE EDUCATION/TRAINING PROGRAM

## 2024-12-04 PROCEDURE — 84100 ASSAY OF PHOSPHORUS: CPT | Mod: HCNC | Performed by: STUDENT IN AN ORGANIZED HEALTH CARE EDUCATION/TRAINING PROGRAM

## 2024-12-04 PROCEDURE — 25000242 PHARM REV CODE 250 ALT 637 W/ HCPCS: Mod: HCNC | Performed by: STUDENT IN AN ORGANIZED HEALTH CARE EDUCATION/TRAINING PROGRAM

## 2024-12-04 PROCEDURE — 63600175 PHARM REV CODE 636 W HCPCS: Mod: HCNC | Performed by: STUDENT IN AN ORGANIZED HEALTH CARE EDUCATION/TRAINING PROGRAM

## 2024-12-04 PROCEDURE — 83735 ASSAY OF MAGNESIUM: CPT | Mod: HCNC | Performed by: STUDENT IN AN ORGANIZED HEALTH CARE EDUCATION/TRAINING PROGRAM

## 2024-12-04 PROCEDURE — 80048 BASIC METABOLIC PNL TOTAL CA: CPT | Mod: HCNC | Performed by: STUDENT IN AN ORGANIZED HEALTH CARE EDUCATION/TRAINING PROGRAM

## 2024-12-04 PROCEDURE — 36415 COLL VENOUS BLD VENIPUNCTURE: CPT | Mod: HCNC | Performed by: STUDENT IN AN ORGANIZED HEALTH CARE EDUCATION/TRAINING PROGRAM

## 2024-12-04 RX ORDER — FLUTICASONE PROPIONATE 50 MCG
1 SPRAY, SUSPENSION (ML) NASAL DAILY
Qty: 18.2 ML | Refills: 0 | Status: SHIPPED | OUTPATIENT
Start: 2024-12-04

## 2024-12-04 RX ORDER — FLUTICASONE PROPIONATE 50 MCG
2 SPRAY, SUSPENSION (ML) NASAL DAILY
Status: DISCONTINUED | OUTPATIENT
Start: 2024-12-04 | End: 2024-12-04 | Stop reason: HOSPADM

## 2024-12-04 RX ADMIN — ATORVASTATIN CALCIUM 40 MG: 40 TABLET, FILM COATED ORAL at 09:12

## 2024-12-04 RX ADMIN — LIDOCAINE 1 PATCH: 50 PATCH CUTANEOUS at 09:12

## 2024-12-04 RX ADMIN — ATOVAQUONE 1500 MG: 750 SUSPENSION ORAL at 09:12

## 2024-12-04 RX ADMIN — ASPIRIN 81 MG: 81 TABLET, COATED ORAL at 06:12

## 2024-12-04 RX ADMIN — FLUTICASONE PROPIONATE 100 MCG: 50 SPRAY, METERED NASAL at 09:12

## 2024-12-04 RX ADMIN — FOLIC ACID 1 MG: 1 TABLET ORAL at 09:12

## 2024-12-04 RX ADMIN — PREGABALIN 75 MG: 75 CAPSULE ORAL at 09:12

## 2024-12-04 RX ADMIN — LABETALOL HYDROCHLORIDE 200 MG: 100 TABLET, FILM COATED ORAL at 09:12

## 2024-12-04 RX ADMIN — SEVELAMER CARBONATE 2400 MG: 800 TABLET, FILM COATED ORAL at 05:12

## 2024-12-04 RX ADMIN — PANTOPRAZOLE SODIUM 40 MG: 40 TABLET, DELAYED RELEASE ORAL at 09:12

## 2024-12-04 RX ADMIN — HEPARIN SODIUM 5000 UNITS: 5000 INJECTION INTRAVENOUS; SUBCUTANEOUS at 05:12

## 2024-12-04 NOTE — PROGRESS NOTES
[facility] - Internal Medicine Telemetry  Discharge Final Note    Primary Care Provider: Maty Gayle DO    Expected Discharge Date: 12/4/2024    Final Discharge Note (most recent)       Final Note - 12/04/24 1106          Final Note    Assessment Type Final Discharge Note     Anticipated Discharge Disposition Home or Self Care     What phone number can be called within the next 1-3 days to see how you are doing after discharge? [phone]        Post-Acute Status    Post-Acute Authorization Other   Approved for Acute Care at Home    Coverage [employer] Medicare, Medicaid of [place]     Discharge Delays None known at this time                     Important Message from Medicare             Contact Info       Monica Foy MD   Specialty: Nephrology    [address]  [address]  [facility] [address]   Phone: [phone]       Next Steps: Follow up    Instructions: As needed    Maty Gayle DO   Specialty: Internal Medicine   Relationship: PCP - General    [facility]  [facility] [address]   Phone: [phone]       Next Steps: Follow up in 1 week(s)    Instructions: If symptoms worsen, As needed          Haydee Prieto RN   [phone]

## 2024-12-04 NOTE — HOSPITAL COURSE
While on the floor patient was started on emergent Dialsis for potassium removal s/p shifting with insulin. CT head obtained and negative for focal findings. Her mental status, sob, and electrolytes improved with Dialysis. Repeat dialysis session on 12/3/24 with less volume removal. She was weaned to RA 12/3/24 and tolerating PO. Patient afebrile with symptomatic improvement aside from chronic dry cough. Plans made for discharge home with MRI outpatient and acute care arrangements.     Pt deemed appropriate for discharge; seen and examined prior to departure. Plan discussed with pt, who was agreeable and amenable; medications were discussed and reviewed, outpatient follow-up scheduled, ER precautions were given, all questions were answered to the pt's satisfaction, and she was subsequently discharged.

## 2024-12-04 NOTE — PLAN OF CARE
Problem: Hemodialysis  Goal: Safe, Effective Therapy Delivery  Outcome: Progressing  Goal: Effective Tissue Perfusion  Outcome: Progressing  Goal: Absence of Infection Signs and Symptoms  Outcome: Progressing     Problem: Adult Inpatient Plan of Care  Goal: Plan of Care Review  Outcome: Progressing  Goal: Patient-Specific Goal (Individualized)  Outcome: Progressing  Goal: Absence of Hospital-Acquired Illness or Injury  Outcome: Progressing  Goal: Optimal Comfort and Wellbeing  Outcome: Progressing  Goal: Readiness for Transition of Care  Outcome: Progressing     Problem: Diabetes Comorbidity  Goal: Blood Glucose Level Within Targeted Range  Outcome: Progressing

## 2024-12-04 NOTE — PROGRESS NOTES
12/03/24 1800   Vital Signs   Temp 98.4 °F (36.9 °C)   Temp Source Oral   Pulse 73   Heart Rate Source Monitor   Resp 18   Device (Oxygen Therapy) room air   BP (!) 142/64   MAP (mmHg) 92   BP Location Left forearm   BP Method Automatic   Patient Position Lying     HD tx completed and pt tolerated well.  Pt c/o of cramping towards the end of the tx.  Tx time: 3hrs.  Net UF: 1685ml  Pt is alert and stable.VSS.  Report given to the primary RN.  Departed the unit via w/c.

## 2024-12-04 NOTE — DISCHARGE INSTRUCTIONS
You were treated for fluid overload and shortness of breath as well as high K   Please continue your dialysis. Next session is tomorrow 12/5/24.   I will send you home with a nasal spray for your cough and continue your reflux med if it does not improve follow up with your PCP .  Please return if worsening fever, chills, shortness of breath or altered mental status         Our goal at [facility] is to always give you outstanding care and exceptional service. You may receive a survey by mail, text or e-mail in the next 7-10 days from Shannan Owens and our leadership team asking about the care you received with us. The survey should only take 5-10 minutes to complete and is very important to us.     Your feedback provides us with a way to recognize our staff who work tirelessly to provide the best care! Also, your responses help us learn how to improve when your experience was below our aspiration of excellence. We WILL use your feedback to continue making improvements to help us provide the highest quality care. We keep your personal information and feedback confidential. We appreciate your time completing this survey and can't wait to hear from you!!!     We want you to leave us today feeling like you can DEFINITELY recommend us to others! We look forward to your continued care with us! Thanks so much for choosing [facility] for your healthcare needs!

## 2024-12-04 NOTE — DISCHARGE SUMMARY
[facility] - Internal Medicine [facility] Medicine  Discharge Summary      Patient Name: Jael Hall  MRN: [id number]  CHUCK: [id number]  Patient Class: IP- Inpatient  Admission Date: 12/2/2024  Hospital Length of Stay: 2 days  Discharge Date and Time:  12/04/2024 10:30 AM  Attending Physician: Ivonne Cortez MD   Discharging Provider: Ivonne Cortez MD  Primary Care Provider: Maty Gayle DO  [facility] Medicine Team: [facility] Ivonne Cortez MD  Primary Care Team: [facility]    HPI:   Jael Hall is a 59 y.o. female with past medical history of ESRD on HD (TTHSat), HTN, T2DM with Insulin use who presented to the ED for shortness of breath and chest pain in the setting of missed dialysis.  She says it has been going on for about two days. She is unable to tell me any inciting factors or triggering illness. Friend present at bedside. History was difficult to obtain as patient was falling asleep and hard of hearing.       In the ED BP elevated systolics of 200s, labs notabel for istat K 6.8, Troponin 0.29, and chronic anemia. EKG with no significant changes. She was placed on 3 L NC agive NG for CP and BP. Nephrology consulted with plans of Emergent dialysis and admission to medicine close monitoring of potassium.        * No surgery found *      Hospital Course:   While on the floor patient was started on emergent Dialsis for potassium removal s/p shifting with insulin. CT head obtained and negative for focal findings. Her mental status, sob, and electrolytes improved with Dialysis. Repeat dialysis session on 12/3/24 with less volume removal. She was weaned to RA 12/3/24 and tolerating PO. Patient afebrile with symptomatic improvement aside from chronic dry cough. Plans made for discharge home with MRI outpatient and acute care arrangements.     Pt deemed appropriate for discharge; seen and examined prior to departure. Plan discussed with pt, who was agreeable and amenable;  medications were discussed and reviewed, outpatient follow-up scheduled, ER precautions were given, all questions were answered to the pt's satisfaction, and she was subsequently discharged.       Goals of Care Treatment Preferences:  Code Status: Full Code         Consults:   Consults (From admission, onward)          Status Ordering Provider     Inpatient consult to Nephrology  Once        Provider:  (Not yet assigned)    Completed FRAN PABLO            * Hyperkalemia  Hyperkalemia is likely due to ESRD and missed dialysis.The patients most recent potassium results are listed below.  Recent Labs     12/02/24  1456 12/02/24  1948 12/03/24  0419   K 6.9* 3.9 4.9       Plan  - Monitor for arrhythmias with EKG and/or continuous telemetry.   - s/p Insulin and D50 and dialysis with improvement in K   -continue K binders   - Monitor potassium: BID   - The patient's hyperkalemia is stable  -Nephrology following for ESRD, Dialysis schedule T/TH/S            Altered mental status  -likely 2/2 to uremia in the setting of volume overload and missed dialysis  -no focal findings, patient oriented but does fall back asleep quickly  -CT head negative   -improving   -if it mental status changes again or fever occurs will order full septic workup     Hypertensive emergency  RESOLVED   Patient has a current diagnosis of Hypertensive emergency with end organ damage evidenced by acute pulmonary edema without heart failure which is uncontrolled.  Latest blood pressure and vitals reviewed-   Temp:  [98.3 °F (36.8 °C)-98.8 °F (37.1 °C)]   Pulse:  [59-87]   Resp:  [17-28]   BP: (152-238)/()   SpO2:  [92 %-99 %] .   Patient currently off IV antihypertensives.   Home meds for hypertension were reviewed and noted below.   Hypertension Medications               amLODIPine (NORVASC) 10 MG tablet Take 1 tablet (10 mg total) by mouth nightly.    labetaloL (NORMODYNE) 200 MG tablet Take 1 tablet (200 mg total) by mouth every 12  (twelve) hours.    losartan (COZAAR) 100 MG tablet Take 1 tablet (100 mg total) by mouth once daily.            Medication adjustment for hospital antihypertensives is as follows- Will restart home medications: Amlodipine and Labetolol. Hold Losartan given hyperkalemia.     Will aim for controlled BP reduction by medications noted above. Monitor and mitigate end organ damage as indicated.    SOB (shortness of breath)  -sob is 2/2 to volume overload in the setting of missed dialysis with ESRD  -elevated BNP and Troponin 2/2 to volume overload and likely will improve with Dialysis   -currently undergoing urgent dialysis, and will likely need repeat HD tomorrow   -weaned to RA       Acute hypoxemic respiratory failure  Resolved   Patient with Hypoxic Respiratory failure which is Acute.  she is not on home oxygen. Supplemental oxygen was provided and noted-      .   Signs/symptoms of respiratory failure include- increased work of breathing and respiratory distress. Contributing diagnoses includes -  Fluid overload  Labs and images were reviewed. Patient Has not had a recent ABG. Will treat underlying causes and adjust management of respiratory failure as follows-   -weaned to RA off supplemental O2 after receiving Dialysis   -2/2 to volume overload     Essential hypertension  Patient's blood pressure range in the last 24 hours was: BP  Min: 152/79  Max: 238/128.The patient's inpatient anti-hypertensive regimen is listed below:  Current Antihypertensives  amLODIPine tablet 10 mg, Nightly, Oral  labetaloL tablet 200 mg, Every 12 hours, Oral    Plan  - BP is uncontrolled, will adjust as follows: restart home medications and likely will improve with dialysis   - hold Losartan given hyperkalemia     Anemia in ESRD (end-stage renal disease)  Anemia is likely due to chronic disease due to ESRD. Most recent hemoglobin and hematocrit are listed below.  Recent Labs     12/02/24  1318 12/02/24  1400 12/02/24  1458 12/03/24  6749    HGB 10.1*  --   --  9.3*   HCT 32.3* 29* 29* 29.4*     Plan  - Monitor serial CBC: Daily  - Transfuse PRBC if patient becomes hemodynamically unstable, symptomatic or H/H drops below 7/21.  - Patient has not received any PRBC transfusions to date  - Patient's anemia is currently stable  - transfuse for less than 7     ESRD (end stage renal disease) on dialysis  -ESRD, receives dialysis T/TH/S   -Nephrology consulted. Receiving Dialysis today and will repeat tomorrow 12/3/24   -continue home phos binders   -continue home BP meds, hold Losartan       AIHA (autoimmune hemolytic anemia)  -as above in problem 4    Thrombocytopenia  The likely etiology of thrombocytopenia is  ESRD . The patients 3 most recent labs are listed below.  Recent Labs     12/02/24  1318   *     Plan  - Will transfuse if platelet count is <10   - continue to monitor       Type 2 diabetes mellitus  -on lantus 11 units at home   -will start SSI and accuchecks   -diabetic diet ordered   -BG at goal on SSI   -hold oral hyperglycemic agents while in the hospital    Secondary hyperparathyroidism  -restart home medications       GERD (gastroesophageal reflux disease)  -restart PPI         Final Active Diagnoses:    Diagnosis Date Noted POA    PRINCIPAL PROBLEM:  Hyperkalemia [E87.5] 03/29/2017 Yes    Hypertensive emergency [I16.1] 12/02/2024 Yes    Altered mental status [R41.82] 12/02/2024 Yes    SOB (shortness of breath) [R06.02] 09/01/2024 Yes    Acute hypoxemic respiratory failure [J96.01] 12/03/2024 Yes    Essential hypertension [I10]  Yes     Chronic    Anemia in ESRD (end-stage renal disease) [N18.6, D63.1] 08/15/2022 Yes    ESRD (end stage renal disease) on dialysis [N18.6, Z99.2] 09/28/2017 Not Applicable     Chronic    AIHA (autoimmune hemolytic anemia) [D59.10] 08/12/2024 Yes    Thrombocytopenia [D69.6] 12/02/2024 Yes    Type 2 diabetes mellitus [E11.9] 07/27/2021 Yes    Secondary hyperparathyroidism [N25.81] 10/07/2024 Yes    GERD  (gastroesophageal reflux disease) [K21.9] 08/13/2020 Yes      Problems Resolved During this Admission:    Diagnosis Date Noted Date Resolved POA    Anemia [D64.9] 12/02/2024 12/02/2024 Yes    Anxiety [F41.9]  12/02/2024 Yes       Discharged Condition: good    Disposition:     Follow Up:   Follow-up Information       Monica Foy MD Follow up.    Specialty: Nephrology  Why: As needed  Contact information:  [address]  [address]  [facility] [id number] [phone]               Maty Gayle, DO Follow up in 1 week(s).    Specialty: Internal Medicine  Why: If symptoms worsen, As needed  Contact information:  [address]  [facility] [id number] [phone]                           Patient Instructions:      Ambulatory referral/consult to Acute Care at Home   Standing Status: Future   Referral Priority: Routine Referral Type: Consultation   Referred to Provider: LENNOX PROVIDER Requested Specialty: Internal Medicine   Number of Visits Requested: 1       Significant Diagnostic Studies: N/A    Pending Diagnostic Studies:       None           Medications:  Reconciled Home Medications:      Medication List        START taking these medications      fluticasone propionate 50 mcg/actuation nasal spray  Commonly known as: FLONASE  1 spray (50 mcg total) by Each Nostril route once daily.            CHANGE how you take these medications      losartan 100 MG tablet  Commonly known as: COZAAR  Take 1 tablet (100 mg total) by mouth once daily.  Notes to patient: Please hold until recheck of labs or Nephrology follow up             CONTINUE taking these medications      * ACCU-CHEK GUIDE GLUCOSE METER Misc  Generic drug: blood-glucose meter     * blood-glucose meter kit  To check BG 3 times daily, to use with insurance preferred meter     ACCU-CHEK GUIDE L1-L2 CTRL SOL Soln  Generic drug: blood glucose control high,low     * ACCU-CHEK GUIDE TEST STRIPS Strp  Generic drug: blood sugar diagnostic     * blood sugar  "diagnostic Strp  To check BG 3 times daily, to use with insurance preferred meter     * ACCU-CHEK SOFTCLIX LANCETS Misc  Generic drug: lancets     * lancets Misc  To check BG 3 times daily, to use with insurance preferred meter     acetaminophen 500 MG tablet  Commonly known as: TYLENOL  Take 1 tablet (500 mg total) by mouth every 8 (eight) hours as needed for Pain.     amLODIPine 10 MG tablet  Commonly known as: NORVASC  Take 1 tablet (10 mg total) by mouth nightly.     aspirin 81 MG EC tablet  Commonly known as: ECOTRIN  Take 81 mg by mouth every morning.     atorvastatin 40 MG tablet  Commonly known as: LIPITOR  Take 1 tablet (40 mg total) by mouth once daily.     atovaquone 750 mg/5 mL Susp oral liquid  Commonly known as: MEPRON  Take 10 mLs (1,500 mg total) by mouth once daily.     BD ULTRA-FINE MINI PEN NEEDLE 31 gauge x 3/16" Ndle  Generic drug: pen needle, diabetic  Use to inject insulin into the skin.     DROPSAFE ALCOHOL PREP PADS Padm  Generic drug: alcohol swabs  Apply topically.     epoetin shiela 10,000 unit/mL injection  Commonly known as: PROCRIT  Inject 0.85 mLs (8,500 Units total) into the skin every Mon, Wed, Fri.     folic acid 1 MG tablet  Commonly known as: FOLVITE  Take 1 tablet (1 mg total) by mouth once daily.     insulin aspart U-100 100 unit/mL (3 mL) Inpn pen  Commonly known as: NovoLOG  Inject 0-5 Units into the skin before meals and at bedtime as needed (Hyperglycemia).     insulin glargine U-100 (Lantus) 100 unit/mL (3 mL) Inpn pen  Inject 12 Units into the skin once daily.     labetaloL 200 MG tablet  Commonly known as: NORMODYNE  Take 1 tablet (200 mg total) by mouth every 12 (twelve) hours.     omeprazole 20 MG capsule  Commonly known as: PRILOSEC  Take 1 capsule (20 mg total) by mouth once daily.     pregabalin 75 MG capsule  Commonly known as: LYRICA  Take 1 capsule (75 mg total) by mouth once daily.     sevelamer carbonate 800 mg Tab  Commonly known as: RENVELA  Take 3 tablets " (2,400 mg total) by mouth 3 (three) times daily with meals.           * This list has 6 medication(s) that are the same as other medications prescribed for you. Read the directions carefully, and ask your doctor or other care provider to review them with you.                ASK your doctor about these medications      LIDOcaine 5 %  Commonly known as: LIDODERM  Place 1 patch onto the skin once daily. Remove & Discard patch within 12 hours or as directed by MD              Indwelling Lines/Drains at time of discharge:   Lines/Drains/Airways       Drain  Duration                  Hemodialysis AV Fistula Right forearm -- days                    Time spent on the discharge of patient: 35 minutes         Ivonne Cortez MD  Department of Hospital Medicine  [facility] - Internal Medicine Telemetry

## 2024-12-04 NOTE — ASSESSMENT & PLAN NOTE
Hyperkalemia is likely due to ESRD and missed dialysis.The patients most recent potassium results are listed below.  Recent Labs     12/02/24  1456 12/02/24  1948 12/03/24  0419   K 6.9* 3.9 4.9       Plan  - Monitor for arrhythmias with EKG and/or continuous telemetry.   - s/p Insulin and D50 and dialysis with improvement in K   -continue K binders   - Monitor potassium: BID   - The patient's hyperkalemia is stable  -Nephrology following for ESRD, Dialysis schedule T/TH/S

## 2024-12-04 NOTE — PLAN OF CARE
Problem: Hemodialysis  Goal: Safe, Effective Therapy Delivery  Outcome: Adequate for Care Transition  Goal: Effective Tissue Perfusion  Outcome: Adequate for Care Transition  Goal: Absence of Infection Signs and Symptoms  Outcome: Adequate for Care Transition     Problem: Adult Inpatient Plan of Care  Goal: Plan of Care Review  Outcome: Adequate for Care Transition  Goal: Patient-Specific Goal (Individualized)  Outcome: Adequate for Care Transition  Goal: Absence of Hospital-Acquired Illness or Injury  Outcome: Adequate for Care Transition  Goal: Optimal Comfort and Wellbeing  Outcome: Adequate for Care Transition  Goal: Readiness for Transition of Care  Outcome: Adequate for Care Transition     Problem: Diabetes Comorbidity  Goal: Blood Glucose Level Within Targeted Range  Outcome: Adequate for Care Transition     Problem: Fall Injury Risk  Goal: Absence of Fall and Fall-Related Injury  Outcome: Adequate for Care Transition

## 2024-12-04 NOTE — PROGRESS NOTES
AVS virtually reviewed with patient in its entirety with emphasis on diet, medications, follow-up appointments and reasons to return to the ED or contact the [facility] On Call Nurse Care Line. Patient also encouraged to utilize their patient portal. Ease and convenience of use reiterated. Education complete and patient voiced understanding. All questions answered. Discharge teaching complete. Encouraged to complete patient survey.  Active with patient portal. Pt. Very hard or hearing.  Nurse present at bedside during AVS education to assist.

## 2024-12-05 ENCOUNTER — HOSPITAL ENCOUNTER (OUTPATIENT)
Facility: OTHER | Age: 59
Discharge: HOME | End: 2024-12-05
Attending: INTERNAL MEDICINE | Admitting: INTERNAL MEDICINE
Payer: MEDICARE

## 2024-12-05 ENCOUNTER — PATIENT OUTREACH (OUTPATIENT)
Dept: ADMINISTRATIVE | Facility: CLINIC | Age: 59
End: 2024-12-05
Payer: MEDICARE

## 2024-12-05 VITALS
OXYGEN SATURATION: 95 % | HEIGHT: 64 IN | TEMPERATURE: 98 F | RESPIRATION RATE: 18 BRPM | DIASTOLIC BLOOD PRESSURE: 67 MMHG | BODY MASS INDEX: 34.15 KG/M2 | SYSTOLIC BLOOD PRESSURE: 161 MMHG | WEIGHT: 200 LBS | HEART RATE: 77 BPM

## 2024-12-05 DIAGNOSIS — T82.590A MECHANICAL COMPLICATION OF ARTERIOVENOUS FISTULA SURGICALLY CREATED, INITIAL ENCOUNTER: Primary | ICD-10-CM

## 2024-12-05 DIAGNOSIS — N18.6 END STAGE RENAL DISEASE: ICD-10-CM

## 2024-12-05 LAB — POCT GLUCOSE: 90 MG/DL (ref 70–110)

## 2024-12-05 PROCEDURE — 25500020 PHARM REV CODE 255: Mod: HCNC | Performed by: INTERNAL MEDICINE

## 2024-12-05 PROCEDURE — 36901 INTRO CATH DIALYSIS CIRCUIT: CPT | Mod: RT

## 2024-12-05 PROCEDURE — C1894 INTRO/SHEATH, NON-LASER: HCPCS | Mod: HCNC | Performed by: INTERNAL MEDICINE

## 2024-12-05 PROCEDURE — 36901 INTRO CATH DIALYSIS CIRCUIT: CPT | Mod: HCNC,RT | Performed by: INTERNAL MEDICINE

## 2024-12-05 PROCEDURE — 63600175 PHARM REV CODE 636 W HCPCS: Mod: HCNC | Performed by: INTERNAL MEDICINE

## 2024-12-05 PROCEDURE — 82962 GLUCOSE BLOOD TEST: CPT | Mod: HCNC | Performed by: INTERNAL MEDICINE

## 2024-12-05 RX ORDER — LIDOCAINE HYDROCHLORIDE 10 MG/ML
INJECTION, SOLUTION INFILTRATION; PERINEURAL
Status: DISCONTINUED | OUTPATIENT
Start: 2024-12-05 | End: 2024-12-05 | Stop reason: HOSPADM

## 2024-12-05 RX ORDER — HEPARIN SOD,PORCINE/0.9 % NACL 1000/500ML
INTRAVENOUS SOLUTION INTRAVENOUS
Status: DISCONTINUED | OUTPATIENT
Start: 2024-12-05 | End: 2024-12-05 | Stop reason: HOSPADM

## 2024-12-05 NOTE — PROCEDURES
[facility] Interventional Nephrology Service    Fistulogram Procedure Report    Date of Procedure:  12/05/2024 12:46 PM    Procedures Performed: Fistulogram with no interventions    Indication: elevated pressures    Referring Provider: Donnie Johnston    Primary Surgeon: Lester Gómez MD  Assist: none    Medications Administered:  1% lidocaine    Procedure Report in Detail:  After informed consent was obtained the patient was prepped and draped in the usual sterile fashion.  Her right upper Radiocephalic AVF was cannulated in the venous facing direction with a micropuncture system, confirmed in correct placement under fluroscopy, and a fistulogram was performed using iodinated contrast.  Fistulogram revealed aneurysmal cannulation zone with 2-3 collateral vessels coming off the distal aneurysm.  At some point during this process we lost access with the micro sheath and some contrast was injected under the subcutaneous tissue overlying the aneurysm, so the microsheath was removed and was replaced again in the aneurysm, this was done again under fluoroscopy.  Central vasculature was then evaluated with contrasted film revealing widely patent central vasculature.  Reflux arteriogram was attempted on numerous occasions but due to the collateral vessels (attempted compression of these as well) the contrast was unable to reach the anastomosis.  At this point it was felt no interventions were warranted so the microsheath was pulled and direct pressure was used to achieve hemostasis.  No complications occurred during the procedure.     Estimated blood loss: < 10 mL   Estimated contrast used: 40 mL    Impression/Plan:  This was a successful fistulogram with no interventions performed in the fistula.  We will have the patient return in 1 month for follow up ultrasound and exam.  Please do refer the patient back if there are any further signs of stenosis (increased venous pressure alarms, prolonged bleeding or worsening adequacy).   At that point we would focus on the inflow/anastomosis and referral for surgical ligation of her collateral vessels.    Lester Gómez MD  [phone]

## 2024-12-05 NOTE — H&P
[facility] - Cath Lab  History & Physical    Subjective:      Chief Complaint/Reason for Admission: Here for fistulogram    Jael Hall is a 59 y.o. female with ESRD (MWF at [facility] under my care) who presents today for fistulogram after noting elevated venous pressures on dialysis.    Past Medical History:   Diagnosis Date    Acute hyperkalemia 2020    Acute on chronic diastolic heart failure 10/08/2021    Anemia of chronic disorder 2017    Overview:  Added automatically from request for surgery [id number]    Anemia of chronic renal failure, stage 5     Anxiety     Cyst of left ovary 2019    Cyst of left ovary 2019    Dyslipidemia 2013    Encounter for screening colonoscopy 10/08/2020    End-stage renal disease on hemodialysis 2020    ESRD on hemodialysis 2017    Hematuria 10/30/2019    History of COVID-19 2020    Hospital discharge follow-up 10/06/2023    Hx of sinus bradycardia 2017    Hyperkalemia 2017    Hypertensive urgency 2021    Peripheral neuropathy 2013    Preoperative testing 10/08/2020    Renovascular hypertension 2020    S/P dilation and curettage 2018    Screening for colon cancer 2023    Thickened endometrium 2018    Thyroid nodule     Thyroid nodule 2021    Uncontrolled type 2 diabetes mellitus 2013    Overview:  poc a1c today 11.2-270/ up from 10.8-263, Pt. has very very stressed/ was incarcerated/marital stress/ will current meds/ less stress now/ will monitor.     Past Surgical History:   Procedure Laterality Date    AV FISTULA PLACEMENT      right arm     SECTION      x1    COLONOSCOPY N/A 2018    Procedure: COLONOSCOPY;  Surgeon: Pankaj Hinojosa MD;  Location: [facility] (4TH FLR);  Service: Endoscopy;  Laterality: N/A;  dialysis pt/labs prior to colon/MS    COLONOSCOPY N/A 2023    Procedure: COLONOSCOPY;  Surgeon: Angel Art MD;  Location: [facility] (2ND FLR);   Service: Endoscopy;  Laterality: N/A;  Ref By:  Maty Gayle MD  Procedure: Colonoscopy  Diagnosis: endounter for preventive health   Procedure Timing: pt. pref  Provider: Dr. turner   Location: [facility] 4-Endo   Prep Specifications: Standard prep   Additional Info: Dialysis patient lab pending    CYSTOSCOPY W/ RETROGRADES Bilateral 8/20/2020    Procedure: CYSTOSCOPY, WITH RETROGRADE PYELOGRAM;  Surgeon: Douglas Abraham MD;  Location: [facility] [place] 1ST FLR;  Service: Urology;  Laterality: Bilateral;  45 min    FISTULOGRAM Right 9/9/2020    Procedure: Fistulogram;  Surgeon: Lester Gómez MD;  Location: [facility] CATH LAB;  Service: Nephrology;  Laterality: Right;    HYSTERECTOMY      IMPLANTATION OF COCHLEAR PROSTHESIS Right 6/28/2021    Procedure: INSERTION, PROSTHESIS, COCHLEAR;  Surgeon: Ramiro Zuleta MD;  Location: [facility] OR 2ND FLR;  Service: ENT;  Laterality: Right;  COCHLEAR BABAK    IMPLANTATION OF COCHLEAR PROSTHESIS Right 6/30/2021    Procedure: INSERTION, PROSTHESIS, COCHLEAR;  Surgeon: Ramiro Zuleta MD;  Location: [facility] 2ND FLR;  Service: ENT;  Laterality: Right;  COCHLEAR AMERICAS    ROBOT-ASSISTED LAPAROSCOPIC ABDOMINAL HYSTERECTOMY USING DA NATALYA XI N/A 7/5/2019    Procedure: XI ROBOTIC HYSTERECTOMY;  Surgeon: Trice Lei MD;  Location: [facility];  Service: OB/GYN;  Laterality: N/A;    ROBOT-ASSISTED LAPAROSCOPIC NEPHRECTOMY Right 10/8/2020    Procedure: ROBOTIC NEPHRECTOMY;  Surgeon: Douglas Abraham MD;  Location: [facility] 2ND FLR;  Service: Urology;  Laterality: Right;  3.5hrs gen with regional     ROBOT-ASSISTED LAPAROSCOPIC SALPINGO-OOPHORECTOMY USING DA NATALYA XI Bilateral 7/5/2019    Procedure: XI ROBOTIC SALPINGO-OOPHORECTOMY;  Surgeon: Trice Lei MD;  Location: [facility];  Service: OB/GYN;  Laterality: Bilateral;    TUBAL LIGATION      Vascular access surgeries      x5     Social History     Tobacco Use    Smoking status: Never    Smokeless tobacco: Never   Substance Use Topics    Alcohol use: No  "   Drug use: No       PTA Medications   Medication Sig    ACCU-CHEK GUIDE GLUCOSE METER Misc     ACCU-CHEK GUIDE L1-L2 CTRL SOL Soln     ACCU-CHEK GUIDE TEST STRIPS Strp     ACCU-CHEK SOFTCLIX LANCETS Misc     acetaminophen (TYLENOL) 500 MG tablet Take 1 tablet (500 mg total) by mouth every 8 (eight) hours as needed for Pain.    amLODIPine (NORVASC) 10 MG tablet Take 1 tablet (10 mg total) by mouth nightly.    aspirin (ECOTRIN) 81 MG EC tablet Take 81 mg by mouth every morning.    atorvastatin (LIPITOR) 40 MG tablet Take 1 tablet (40 mg total) by mouth once daily.    blood sugar diagnostic Strp To check BG 3 times daily, to use with insurance preferred meter    blood-glucose meter kit To check BG 3 times daily, to use with insurance preferred meter    DROPSAFE ALCOHOL PREP PADS PadM Apply topically.    insulin aspart U-100 (NOVOLOG) 100 unit/mL (3 mL) InPn pen Inject 0-5 Units into the skin before meals and at bedtime as needed (Hyperglycemia).    insulin glargine U-100, Lantus, 100 unit/mL (3 mL) SubQ InPn pen Inject 12 Units into the skin once daily.    labetaloL (NORMODYNE) 200 MG tablet Take 1 tablet (200 mg total) by mouth every 12 (twelve) hours.    lancets Misc To check BG 3 times daily, to use with insurance preferred meter    losartan (COZAAR) 100 MG tablet Take 1 tablet (100 mg total) by mouth once daily.    pen needle, diabetic 31 gauge x 3/16" Ndle Use to inject insulin into the skin.    pregabalin (LYRICA) 75 MG capsule Take 1 capsule (75 mg total) by mouth once daily.    sevelamer carbonate (RENVELA) 800 mg Tab Take 3 tablets (2,400 mg total) by mouth 3 (three) times daily with meals.    atovaquone (MEPRON) 750 mg/5 mL Susp oral liquid Take 10 mLs (1,500 mg total) by mouth once daily.    epoetin shiela (PROCRIT) 10,000 unit/mL injection Inject 0.85 mLs (8,500 Units total) into the skin every Mon, Wed, Fri.    fluticasone propionate (FLONASE) 50 mcg/actuation nasal spray 1 spray (50 mcg total) by Each " Nostril route once daily.    folic acid (FOLVITE) 1 MG tablet Take 1 tablet (1 mg total) by mouth once daily.    LIDOcaine (LIDODERM) 5 % Place 1 patch onto the skin once daily. Remove & Discard patch within 12 hours or as directed by MD (Patient not taking: Reported on 11/6/2024)    omeprazole (PRILOSEC) 20 MG capsule Take 1 capsule (20 mg total) by mouth once daily.     Review of patient's allergies indicates:  No Known Allergies     Review of Systems   Constitutional: Negative.    Respiratory: Negative.     Cardiovascular: Negative.        Objective:      Vital Signs (Most Recent)  Temp: 98.1 °F (36.7 °C) (12/05/24 0938)  Pulse: 79 (12/05/24 0938)  Resp: 18 (12/05/24 0938)  BP: (!) 143/63 (12/05/24 0938)  SpO2: 96 % (12/05/24 0938)    Vital Signs Range (Last 24H):  Temp:  [98.1 °F (36.7 °C)]   Pulse:  [79]   Resp:  [18]   BP: (143-175)/(63-84)   SpO2:  [96 %]     Physical Exam  Constitutional:       General: She is not in acute distress.     Appearance: She is well-developed. She is not diaphoretic.   HENT:      Head: Normocephalic and atraumatic.   Pulmonary:      Effort: Pulmonary effort is normal. No respiratory distress.   Musculoskeletal:      Cervical back: Neck supple.      Comments: RUE radiocephalic AVF with aneurysmal appearance, soft thrill, mild pulsatility, appropriate pulse augmentation and full collapse with arm elevation.   Skin:     General: Skin is warm and dry.   Neurological:      Mental Status: She is alert and oriented to person, place, and time.   Psychiatric:         Behavior: Behavior normal.       Assessment:      There are no hospital problems to display for this patient.      Plan:      Fistulogram planned today.  Risks explained, consent signed.

## 2024-12-05 NOTE — BRIEF OP NOTE
[facility] - Cath Lab  Brief Operative Note    Surgery Date: 12/5/2024     Surgeons and Role:     * Lester Gómez MD - Primary    Assisting Surgeon: None    Pre-op Diagnosis:  End stage renal disease [N18.6]    Post-op Diagnosis:  Post-Op Diagnosis Codes:     * End stage renal disease [N18.6]    Procedure(s) (LRB):  Fistulogram RIGHT FOREARM (Right)    Anesthesia: 1% lidocaine    Operative Findings: Patient was prepped and draped in a sterile fashion.  This was a successful fistulogram of her RUE radiocephalic AV fistula with no interventions performed in the fistula.  Patient tolerated the procedure well and no immediate complications noted.    Estimated Blood Loss: < 10 mL         Specimens:   Specimen (24h ago, onward)      None              Discharge Note    OUTCOME:  Patient was prepped and draped in a sterile fashion.  This was a successful fistulogram of her RUE radiocephalic AV fistula with no interventions performed in the fistula.  Patient tolerated the procedure well and no immediate complications noted.    DISPOSITION: Home or Self Care    FINAL DIAGNOSIS:  <principal problem not specified>    FOLLOWUP: In clinic in 1 month for follow up exam and ultrasound    DISCHARGE INSTRUCTIONS:    Discharge Procedure Orders   Lifting restrictions   Order Comments: No heavy lifting for 24 hours.     Call MD for:  temperature >100.4     Call MD for:  severe uncontrolled pain     Call MD for:  redness, tenderness, or signs of infection (pain, swelling, redness, odor or green/yellow discharge around incision site)     Call MD for:   Order Comments: Bleeding from the access site.     Activity as tolerated

## 2024-12-05 NOTE — Clinical Note
The site was marked. The right radial and right brachial was prepped. The site was prepped with ChloraPrep. The patient was draped.

## 2024-12-12 ENCOUNTER — TELEPHONE (OUTPATIENT)
Dept: INTERNAL MEDICINE | Facility: CLINIC | Age: 59
End: 2024-12-12
Payer: MEDICARE

## 2024-12-12 NOTE — TELEPHONE ENCOUNTER
----- Message from Darin Paez sent at 12/11/2024  8:51 AM CST -----  Regarding: FW: Need appt    ----- Message -----  From: Jose R Pelaez MA  Sent: 12/10/2024   2:31 PM CST  To: [facility] Clinical Staff  Subject: Need appt                                        Hi    Can you please assist pt w/ scheduling an appointment for hospital f/u?     ThanksJose R

## 2024-12-16 ENCOUNTER — TELEPHONE (OUTPATIENT)
Dept: INTERNAL MEDICINE | Facility: CLINIC | Age: 59
End: 2024-12-16
Payer: MEDICARE

## 2024-12-16 NOTE — TELEPHONE ENCOUNTER
----- Message from Nelson sent at 12/16/2024  8:05 AM CST -----  Regarding: Orders  Who call ? Jael Hall     What is the request Details : Pt calling to speak with someone in provider office regards requesting mammogram orders.  Please call pt back.       Can clinic  use patient portal  : No     What number to call back : [phone]

## 2024-12-23 ENCOUNTER — HOSPITAL ENCOUNTER (OUTPATIENT)
Dept: RADIOLOGY | Facility: HOSPITAL | Age: 59
Discharge: HOME | End: 2024-12-23
Attending: UROLOGY
Payer: MEDICARE

## 2024-12-23 PROCEDURE — 74181 MRI ABDOMEN W/O CONTRAST: CPT | Mod: 26,HCNC,, | Performed by: RADIOLOGY

## 2024-12-23 PROCEDURE — 74181 MRI ABDOMEN W/O CONTRAST: CPT | Mod: TC,HCNC

## 2024-12-30 DIAGNOSIS — Z12.31 SCREENING MAMMOGRAM FOR BREAST CANCER: Primary | ICD-10-CM

## 2025-01-06 ENCOUNTER — HOSPITAL ENCOUNTER (OUTPATIENT)
Dept: RADIOLOGY | Facility: HOSPITAL | Age: 60
Discharge: HOME | End: 2025-01-06
Payer: MEDICARE

## 2025-01-06 DIAGNOSIS — Z12.31 SCREENING MAMMOGRAM FOR BREAST CANCER: ICD-10-CM

## 2025-01-06 PROCEDURE — 77067 SCR MAMMO BI INCL CAD: CPT | Mod: 26,HCNC,, | Performed by: RADIOLOGY

## 2025-01-06 PROCEDURE — 77063 BREAST TOMOSYNTHESIS BI: CPT | Mod: 26,HCNC,, | Performed by: RADIOLOGY

## 2025-01-06 PROCEDURE — 77067 SCR MAMMO BI INCL CAD: CPT | Mod: TC,HCNC

## 2025-01-08 ENCOUNTER — OFFICE VISIT (OUTPATIENT)
Dept: NEPHROLOGY | Facility: CLINIC | Age: 60
End: 2025-01-08
Payer: MEDICARE

## 2025-01-08 VITALS
TEMPERATURE: 98 F | OXYGEN SATURATION: 98 % | BODY MASS INDEX: 33.49 KG/M2 | HEART RATE: 78 BPM | HEIGHT: 64 IN | WEIGHT: 196.19 LBS | RESPIRATION RATE: 18 BRPM | DIASTOLIC BLOOD PRESSURE: 86 MMHG | SYSTOLIC BLOOD PRESSURE: 187 MMHG

## 2025-01-08 DIAGNOSIS — Z99.2 ESRD ON DIALYSIS: Primary | ICD-10-CM

## 2025-01-08 DIAGNOSIS — N18.6 ESRD ON DIALYSIS: Primary | ICD-10-CM

## 2025-01-08 PROCEDURE — 99999 PR PBB SHADOW E&M-EST. PATIENT-LVL IV: CPT | Mod: PBBFAC,HCNC,,

## 2025-01-08 NOTE — PROGRESS NOTES
[facility] Interventional Nephrology Follow-up Exam    Date:   01/08/2025 1:37 PM    HPI:  59 year old woman with ESRD (MWF at [facility] under my care) who presents today for ultrasound clinic appt to maintain patency of her RUE radiocephalic AVF.  Recent fistulogram was done on 12/5/24.  We did not perform any angioplasty at that time because we did not find a clear stenosis near the arterial anastomosis, because all of the contrast flowed up collateral vessels.  Here for follow up exam and ultrasound.  She has had no recent issues with prolonged bleeding, difficulty in cannulation or increased frequency of pressure alarms while on dialysis.    Vitals:    01/08/25 1310   BP: (!) 187/86   Pulse: 78   Resp: 18   Temp: 98.2 °F (36.8 °C)       Exam:   ACCESS:  RUE radiocephalic AV fistula with very soft thrill and minimal pulsatility, appropriate pulse augmentation and partial collapse with arm elevation    Ultrasound:  - arterial anastomosis patent without stenosis, measured at 6 mm, two collateral vessels (one measuring 6 mm in diameter) coming off the fistula  - mid-AVF patent with no stenosis seen, vessel diameter measured at 6-8 mm throughout, cannulation zone aneurysms measuring 18 x 28 mm  - AVF outflow tract patent with no stenosis  - BFV approximately 480 ml/min    Impression/Plan:  Patent AV fistula following recent fistulogram with no intervention.  Plan follow up exam in 2 months.  Please do refer her sooner if she develops signs of stenosis or impending thrombosis.      Lester Gómez MD  [phone]

## 2025-01-13 DIAGNOSIS — Z00.00 ENCOUNTER FOR MEDICARE ANNUAL WELLNESS EXAM: ICD-10-CM

## 2025-01-14 ENCOUNTER — OFFICE VISIT (OUTPATIENT)
Dept: UROLOGY | Facility: CLINIC | Age: 60
End: 2025-01-14
Payer: MEDICARE

## 2025-01-14 VITALS
SYSTOLIC BLOOD PRESSURE: 172 MMHG | BODY MASS INDEX: 33.49 KG/M2 | WEIGHT: 196.19 LBS | HEIGHT: 64 IN | DIASTOLIC BLOOD PRESSURE: 74 MMHG | HEART RATE: 77 BPM

## 2025-01-14 DIAGNOSIS — Z99.2 ESRD (END STAGE RENAL DISEASE) ON DIALYSIS: ICD-10-CM

## 2025-01-14 DIAGNOSIS — N28.89 RENAL MASS: Primary | ICD-10-CM

## 2025-01-14 DIAGNOSIS — C64.1 RENAL CELL CARCINOMA OF RIGHT KIDNEY: ICD-10-CM

## 2025-01-14 DIAGNOSIS — N18.6 ESRD (END STAGE RENAL DISEASE) ON DIALYSIS: ICD-10-CM

## 2025-01-14 DIAGNOSIS — I10 PRIMARY HYPERTENSION: ICD-10-CM

## 2025-01-14 PROCEDURE — 1159F MED LIST DOCD IN RCRD: CPT | Mod: HCNC,CPTII,S$GLB, | Performed by: UROLOGY

## 2025-01-14 PROCEDURE — 1160F RVW MEDS BY RX/DR IN RCRD: CPT | Mod: HCNC,CPTII,S$GLB, | Performed by: UROLOGY

## 2025-01-14 PROCEDURE — 99214 OFFICE O/P EST MOD 30 MIN: CPT | Mod: HCNC,S$GLB,, | Performed by: UROLOGY

## 2025-01-14 PROCEDURE — 3072F LOW RISK FOR RETINOPATHY: CPT | Mod: HCNC,CPTII,S$GLB, | Performed by: UROLOGY

## 2025-01-14 PROCEDURE — 3077F SYST BP >= 140 MM HG: CPT | Mod: HCNC,CPTII,S$GLB, | Performed by: UROLOGY

## 2025-01-14 PROCEDURE — 3008F BODY MASS INDEX DOCD: CPT | Mod: HCNC,CPTII,S$GLB, | Performed by: UROLOGY

## 2025-01-14 PROCEDURE — G2211 COMPLEX E/M VISIT ADD ON: HCPCS | Mod: HCNC,S$GLB,, | Performed by: UROLOGY

## 2025-01-14 PROCEDURE — 99999 PR PBB SHADOW E&M-EST. PATIENT-LVL IV: CPT | Mod: PBBFAC,HCNC,, | Performed by: UROLOGY

## 2025-01-14 PROCEDURE — 3078F DIAST BP <80 MM HG: CPT | Mod: HCNC,CPTII,S$GLB, | Performed by: UROLOGY

## 2025-01-14 NOTE — PROGRESS NOTES
"    Subjective:       Patient ID: Jael Hall is a 59 y.o. female.    Chief Complaint:  RCC f/u       History of Present Illness  HPI  Patient is a 59 y.o. female who is established to our clinic and referred by Natalee Resendiz NP for evaluation of a right renal mass.  She underwent a right robotic nephrectomy on 10/8/20.  She returns today to review surveillance imaging.    She reports having some right sided "sharp pains".  "Tingling" in nature/character.  Denies any unexpected weight loss.     CXR from 12/2/24 was independently reviewed today and reveals no obvious mets, some findings that could suggest CHF.  MRI of the abdomen from 12/23/24 was independently reviewed today and shared with the patient and shows stable left renal mass, polycystic kidney, no evidence of recurrence.       Patient is a never smoker.     Has a h/o hysterectomy---no other abdominal surgeries.     Patient has ESRD and is on HD---Monday, Wednesday, Friday through a right wrist AVF.  She has DM and HTN.     +fh of ovarian cancer---sister.        PATHOLOGY:    Final Pathologic Diagnosis RIGHT KIDNEY:   RENAL CELL CARCINOMA WITH NO CARCINOMA IDENTIFIED IN PERINEPHRIC ADIPOSE   TISSUE OR IN MARGINS   END-STAGE DIABETIC RENAL DISEASE    Comment: Interp By Harsh Adame M.D., Signed on 10/12/2020 at 13:41   [facility] Label and Clinic/AP Number:  [id number]   Received in formalin labeled "1.  Right kidney" is a 663 g, 16.5 x 13.5 x 4.0   cm right total nephrectomy specimen consisting of a 5.8 cm in length by 0.6   cm in diameter ureter, 4.0 cm in length by 1.2 cm in diameter renal artery, a   2.5 cm in length by 0.9 cm in diameter renal vein, and an 11.0 x 7.0 x 5.8 cm   kidney encapsulated in 3.5 cm-thick perinephric fat.  The ureter is opened to   reveal a patent, pinpoint, grossly unremarkable lumen.  The renal vessels are   patent.  The kidney is bivalved to reveal a 2.7 x 2.4 x 2.3 cm variegated,   tan-yellow, focally hemorrhagic, " partially glistening and gelatinous   well-circumscribed mass within the cortical midpole of the kidney.  The mass   grossly extends less than 0.1 cm from the renal capsule, 3.0 cm from Gerota's   fascia, 0.5 cm from the renal sinus fat and renal pelvis.  No involvement of   renal vein is grossly identified.  Additionally, there is a 0.8 x 0.7 x 0.6   cm red-brown, hemorrhagic cystic nodule grossly abutting the upper pole of   the renal capsule, and grossly extending 3.1 cm from the primary mass.  The   remaining renal parenchyma consist of multiple simple cysts ranging from   0.1-1.0 cm in diameter.  The renal parenchyma is tan-pink with distinct   corticomedullary junctions.  Sections of [id number] are submitted as follows:   Cassette key:   A:  Ureter and renal vessel resection margins   B-C:  Midpole renal mass with adjacent renal sinus and renal capsule   D:  Mid pole renal mass with adjacent grossly uninvolved renal parenchyma   E:  Additional upper pole hemorrhagic cystic nodule with adjacent renal   parenchyma   F:  Upper pole renal parenchyma with cysts   G:  Lower pole renal parenchyma with cysts   Gross by: Chastity Moss    Microscopic Exam Kidney:    Right nephrectomy   Tumor site:    Renal parenchyma   Tumor size:    2.7 cm.   Tumor focality:    There is the principal mass but in addition is a 1 mm   separate papillary lesion.   Anatomic extent of tumor:    Limited to the kidney   Histologic type:    Renal carcinoma, clear cell variant   ndGndrndanddndend:nd nd2nd of 4.   Sarcomatoid features:    Not identified.   Rhabdoid Features:  Not identified   Tumor Necrosis:  Not identified   Adrenal gland:    Absent   Margins:    No carcinoma identified in margins   Kidney apart from tumor:    This is an end-stage kidney with glomerular   changes typical of diabetic disease   Hilar lymph nodes:    Absent   Pathologic stage (AJCC 2010): pT1a pNX pMX   Block for possible further studies: 1B          Review of Systems  Review of  "Systems  All other systems reviewed and negative except pertinent positives noted in HPI.       Objective:     Physical Exam  Constitutional:       General: She is not in acute distress.     Appearance: She is well-developed.   HENT:      Head: Normocephalic and atraumatic.   Eyes:      General: No scleral icterus.  Neck:      Trachea: No tracheal deviation.   Pulmonary:      Effort: Pulmonary effort is normal. No respiratory distress.   Neurological:      Mental Status: She is alert and oriented to person, place, and time.   Psychiatric:         Behavior: Behavior normal.         Thought Content: Thought content normal.         Judgment: Judgment normal.         Lab Review  Lab Results   Component Value Date    COLORU Yellow 10/06/2024    SPECGRAV 1.010 10/06/2024    PHUR 8.0 10/06/2024    NITRITE Negative 10/06/2024    KETONESU Negative 10/06/2024    UROBILINOGEN Negative 01/23/2023         Assessment:        1. Renal mass    2. Renal cell carcinoma of right kidney    3. ESRD (end stage renal disease) on dialysis    4. Primary hypertension                  Plan:     Renal mass    Renal cell carcinoma of right kidney    ESRD (end stage renal disease) on dialysis    Primary hypertension        1. RCC right kidney/Left renal mass    - pathology again reviewed.  --imaging reviewed as per HPI.     -Plan for surveillance of the left kidney---contrasted renal US in 6 months   -continued annual cross-sectional surveillance----MRI abd, cxr in 12 months.     2. CKD/ESRD  -continue dialysis for ESRD  Per transplant committee review 12/2022:  "Native Organ Dx: Diabetes Mellitus - Type II        Not approved for LRD/CAD transplant due to iliac calcifications prohibitive for transplant."     3. HTN:      ---BP reviewed today and elevated  -continue current medications  -encouraged f/u and discussion of BP with PCP.         - code applied: patient requires or will require a continuous, longitudinal, and active collaborative " plan of care related to this patient's health condition, renal cell carcinoma --the management of which requires the direction of a practitioner with specialized clinical knowledge, skill, and expertise.

## 2025-02-14 ENCOUNTER — OFFICE VISIT (OUTPATIENT)
Dept: TRANSPLANT | Facility: CLINIC | Age: 60
End: 2025-02-14
Payer: MEDICARE

## 2025-02-14 VITALS
HEIGHT: 64 IN | HEART RATE: 75 BPM | SYSTOLIC BLOOD PRESSURE: 175 MMHG | WEIGHT: 189.13 LBS | DIASTOLIC BLOOD PRESSURE: 80 MMHG | BODY MASS INDEX: 32.29 KG/M2

## 2025-02-14 DIAGNOSIS — Z79.4 TYPE 2 DIABETES MELLITUS WITH OTHER SPECIFIED COMPLICATION, WITH LONG-TERM CURRENT USE OF INSULIN: ICD-10-CM

## 2025-02-14 DIAGNOSIS — R73.9 HYPERGLYCEMIA: ICD-10-CM

## 2025-02-14 DIAGNOSIS — I50.32 CHRONIC DIASTOLIC HEART FAILURE: Primary | ICD-10-CM

## 2025-02-14 DIAGNOSIS — I25.10 CORONARY ARTERY DISEASE INVOLVING NATIVE CORONARY ARTERY OF NATIVE HEART WITHOUT ANGINA PECTORIS: ICD-10-CM

## 2025-02-14 DIAGNOSIS — Z99.2 ESRD ON DIALYSIS: ICD-10-CM

## 2025-02-14 DIAGNOSIS — E11.69 TYPE 2 DIABETES MELLITUS WITH OTHER SPECIFIED COMPLICATION, WITH LONG-TERM CURRENT USE OF INSULIN: ICD-10-CM

## 2025-02-14 DIAGNOSIS — I10 PRIMARY HYPERTENSION: ICD-10-CM

## 2025-02-14 DIAGNOSIS — N18.6 ESRD ON DIALYSIS: ICD-10-CM

## 2025-02-14 PROCEDURE — 99999 PR PBB SHADOW E&M-EST. PATIENT-LVL V: CPT | Mod: PBBFAC,HCNC,, | Performed by: INTERNAL MEDICINE

## 2025-02-14 RX ORDER — LOSARTAN POTASSIUM 100 MG/1
100 TABLET ORAL DAILY
Qty: 30 TABLET | Refills: 0 | Status: SHIPPED | OUTPATIENT
Start: 2025-02-14 | End: 2026-02-14

## 2025-02-14 RX ORDER — LOSARTAN POTASSIUM 100 MG/1
100 TABLET ORAL DAILY
Qty: 90 TABLET | Refills: 3 | Status: SHIPPED | OUTPATIENT
Start: 2025-02-14 | End: 2025-02-14 | Stop reason: SDUPTHER

## 2025-02-14 NOTE — PROGRESS NOTES
Subjective:       HPI:  Ms. Hall is a 59 y.o. black female with stage C HFpEF (EF=60%), CAD, HTN and ESRD on HD who comes for evaluation. She was previously seen by our General Cardiology team. Clinically reports NYHA class IIm symptoms. NO PND or orthopnea. She gets HD on Tues/thurs/Sat. Dialysis sessions last usually 4 hours. Reports no issues during HD. Her BP today in clinic is elevated. Her last cardiac work-up wa done ijn 9/2022. Of note she was evaluated for kidney Transplant but deemed not  a candidate due to her CAD and PAD.     Cardiac PET stress done on 9/22/2022  The myocardial perfusion images are normal without evidence of perfusion defects.    The stress flow is severely reduced diffusely throughout the heart consistent with non- response to vasodilator stress or caffeine or possibly three vessel balanced ischemia.    The whole heart absolute myocardial perfusion values averaged 0.87 cc/min/g at rest, which is normal; 1.08 cc/min/g at stress, which is moderately reduced; and CFR is 1.24 , which is severely reduced.    CT attenuation images demonstrate mild diffuse coronary calcifications in the LAD and LCX territory and mild diffuse aortic calcifications in the descending aorta.    The gated perfusion images showed an ejection fraction of 64% at rest and 63% during stress. A normal ejection fraction is greater than 53%.    The wall motion is normal at rest and during stress.    The LV cavity size is normal at rest and stress.    The EKG portion of this study is negative for ischemia.    There were no arrhythmias during stress.    The patient reported no chest pain during the stress test.    When compared to the previous study from 3/8/2021, stress myocardial blood flow is significantly lower.  These findings could be due to non-response to vasodilator stress or caffeine or possibly three vessel balanced ischemia.     2D Echo with CFD done on 8/15/2022  Mild left atrial enlargement.  The left  ventricle is normal in size with mild concentric hypertrophy and normal systolic function.  The estimated ejection fraction is 60%.  Grade II left ventricular diastolic dysfunction.  Normal right ventricular size with normal right ventricular systolic function.  There is pulmonary hypertension. The estimated PA systolic pressure is 45 mmHg.  Normal central venous pressure (3 mmHg).    Past Medical History:   Diagnosis Date    Acute hyperkalemia 2020    Acute on chronic diastolic heart failure 10/08/2021    Anemia of chronic disorder 2017    Overview:  Added automatically from request for surgery [id number]    Anemia of chronic renal failure, stage 5     Anxiety     Cyst of left ovary 2019    Cyst of left ovary 2019    Dyslipidemia 2013    Encounter for screening colonoscopy 10/08/2020    End-stage renal disease on hemodialysis 2020    ESRD on hemodialysis 2017    Hematuria 10/30/2019    History of COVID-19 2020    Hospital discharge follow-up 10/06/2023    Hx of sinus bradycardia 2017    Hyperkalemia 2017    Hypertensive urgency 2021    Peripheral neuropathy 2013    Preoperative testing 10/08/2020    Renovascular hypertension 2020    S/P dilation and curettage 2018    Screening for colon cancer 2023    Thickened endometrium 2018    Thyroid nodule     Thyroid nodule 2021    Uncontrolled type 2 diabetes mellitus 2013    Overview:  poc a1c today 11.2-270/ up from 10.8-263, Pt. has very very stressed/ was incarcerated/marital stress/ will current meds/ less stress now/ will monitor.     Past Surgical History:   Procedure Laterality Date    AV FISTULA PLACEMENT      right arm     SECTION      x1    COLONOSCOPY N/A 2018    Procedure: COLONOSCOPY;  Surgeon: Pankaj Hinojosa MD;  Location: [facility] ([address] [place] [address]);  Service: Endoscopy;  Laterality: N/A;  dialysis pt/labs prior to colon/MS    COLONOSCOPY N/A  11/30/2023    Procedure: COLONOSCOPY;  Surgeon: Angel Turner MD;  Location: [facility] ENDO (2ND FLR);  Service: Endoscopy;  Laterality: N/A;  Ref By:  Maty Gayle MD  Procedure: Colonoscopy  Diagnosis: endounter for preventive health   Procedure Timing: pt. pref  Provider: Dr. turner   [place] [facility] [place] [facility]   Prep Specifications: Standard prep   Additional Info: Dialysis patient lab pending    CYSTOSCOPY W/ RETROGRADES Bilateral 8/20/2020    Procedure: CYSTOSCOPY, WITH RETROGRADE PYELOGRAM;  Surgeon: Douglas Abraham MD;  Location: [facility] OR 1ST FLR;  Service: Urology;  Laterality: Bilateral;  45 min    FISTULOGRAM Right 9/9/2020    Procedure: Fistulogram;  Surgeon: Lester Gómez MD;  Location: [facility] CATH LAB;  Service: Nephrology;  Laterality: Right;    FISTULOGRAM Right 12/5/2024    Procedure: Fistulogram RIGHT FOREARM;  Surgeon: Lester Gómez MD;  Location: [facility] CATH LAB;  Service: Nephrology;  Laterality: Right;    HYSTERECTOMY      IMPLANTATION OF COCHLEAR PROSTHESIS Right 6/28/2021    Procedure: INSERTION, PROSTHESIS, COCHLEAR;  Surgeon: Ramiro Zuleta MD;  Location: [facility] OR 2ND FLR;  Service: ENT;  Laterality: Right;  COCHLEAR BABAK    IMPLANTATION OF COCHLEAR PROSTHESIS Right 6/30/2021    Procedure: INSERTION, PROSTHESIS, COCHLEAR;  Surgeon: Ramiro Zuleta MD;  Location: [facility] OR 2ND FLR;  Service: ENT;  Laterality: Right;  COCHLEAR AMERICAS    ROBOT-ASSISTED LAPAROSCOPIC ABDOMINAL HYSTERECTOMY USING DA NATALYA XI N/A 7/5/2019    Procedure: XI ROBOTIC HYSTERECTOMY;  Surgeon: Trice Lei MD;  Location: [facility];  Service: OB/GYN;  Laterality: N/A;    ROBOT-ASSISTED LAPAROSCOPIC NEPHRECTOMY Right 10/8/2020    Procedure: ROBOTIC NEPHRECTOMY;  Surgeon: Douglas Abraham MD;  Location: [facility] OR 2ND FLR;  Service: Urology;  Laterality: Right;  3.5hrs gen with regional     ROBOT-ASSISTED LAPAROSCOPIC SALPINGO-OOPHORECTOMY USING DA NATALYA XI Bilateral 7/5/2019    Procedure: XI ROBOTIC SALPINGO-OOPHORECTOMY;  Surgeon:  "Trice Lei MD;  Location: [facility];  Service: OB/GYN;  Laterality: Bilateral;    TUBAL LIGATION      Vascular access surgeries      x5     OB History          5    Para   4    Term   2            AB   1    Living   2         SAB   1    IAB        Ectopic        Multiple        Live Births   2               Review of Systems   Constitutional: Negative. Negative for chills, decreased appetite, diaphoresis, fever, malaise/fatigue, night sweats, weight gain and weight loss.   Eyes: Negative.    Cardiovascular:  Positive for dyspnea on exertion. Negative for chest pain, claudication, cyanosis, irregular heartbeat, leg swelling, near-syncope, orthopnea, palpitations, paroxysmal nocturnal dyspnea and syncope.   Respiratory:  Negative for cough, hemoptysis and shortness of breath.    Endocrine: Negative.    Hematologic/Lymphatic: Negative.    Skin:  Negative for color change, dry skin and nail changes.   Musculoskeletal: Negative.    Gastrointestinal: Negative.    Genitourinary: Negative.    Neurological:  Negative for weakness.       Objective:   Blood pressure (!) 175/80, pulse 75, height 5' 4" (1.626 m), weight 85.8 kg (189 lb 2.5 oz), last menstrual period 03/15/2016.body mass index is 32.47 kg/m².  Physical Exam  Vitals and nursing note reviewed.   Constitutional:       Appearance: She is well-developed.   HENT:      Head: Normocephalic.   Eyes:      Pupils: Pupils are equal, round, and reactive to light.   Neck:      Vascular: No JVD.   Cardiovascular:      Rate and Rhythm: Normal rate and regular rhythm.      Chest Wall: PMI is not displaced.      Pulses: Intact distal pulses.      Heart sounds: Normal heart sounds. No murmur heard.     No friction rub. No gallop.   Pulmonary:      Effort: Pulmonary effort is normal.      Breath sounds: Normal breath sounds. No wheezing or rales.   Abdominal:      General: Bowel sounds are normal.      Palpations: Abdomen is soft.   Musculoskeletal:      Cervical " back: Neck supple.   Neurological:      Mental Status: She is alert and oriented to person, place, and time.         Labs:    Chemistry        Component Value Date/Time     (L) 12/04/2024 0335    K 4.1 12/04/2024 0335     12/04/2024 0335    CO2 21 (L) 12/04/2024 0335    BUN 18 12/04/2024 0335    CREATININE 5.9 (H) 12/04/2024 0335    GLU 79 12/04/2024 0335        Component Value Date/Time    CALCIUM 9.2 12/04/2024 0335    ALKPHOS 54 12/02/2024 1456    AST 18 12/02/2024 1456    ALT 9 (L) 12/02/2024 1456    BILITOT 0.9 12/02/2024 1456    ESTGFRAFRICA 5.2 (A) 05/01/2022 0323    EGFRNONAA 4.5 (A) 05/01/2022 0323          Magnesium   Date Value Ref Range Status   12/04/2024 2.2 1.6 - 2.6 mg/dL Final     Lab Results   Component Value Date    WBC 6.22 12/04/2024    HGB 8.8 (L) 12/04/2024    HCT 28.9 (L) 12/04/2024     (L) 12/04/2024     Lab Results   Component Value Date    INR 1.0 08/12/2024    INR 1.1 07/23/2021    INR 1.1 09/28/2020     BNP   Date Value Ref Range Status   12/02/2024 1,848 (H) 0 - 99 pg/mL Final     Comment:     Values of less than 100 pg/ml are consistent with non-CHF populations.   10/06/2024 189 (H) 0 - 99 pg/mL Final     Comment:     Values of less than 100 pg/ml are consistent with non-CHF populations.   09/12/2024 238 (H) 0 - 99 pg/mL Final     Comment:     Values of less than 100 pg/ml are consistent with non-CHF populations.     LD   Date Value Ref Range Status   11/26/2024 219 110 - 260 U/L Final     Comment:     Results are increased in hemolyzed samples.   11/19/2024 195 110 - 260 U/L Final     Comment:     Results are increased in hemolyzed samples.   11/12/2024 270 (H) 110 - 260 U/L Final     Comment:     Results are increased in hemolyzed samples.         Assessment:      1. Chronic diastolic heart failure    2. Coronary artery disease involving native coronary artery of native heart without angina pectoris    3. Primary hypertension    4. Hyperglycemia    5. Type 2  diabetes mellitus with other specified complication, with long-term current use of insulin    6. ESRD on dialysis        Plan:   Stage C HFpEF with NYHA class II symptoms. Euvolemic on exam.   BP is not controlled. For some reason her losartan was discontinued. I do not see any reason documented so I am restarting her losartan 100 mg daily. She is also on amlodipine.   ESRD on HD  Recommend 2 gram sodium restriction and 1500cc fluid restriction.  Encourage physical activity with graded exercise program.  Requested patient to weigh themselves daily, and to notify us if their weight increases by more than 3 lbs in 1 day or 5 lbs in 1 week.   Echo to reassess LV/RV function.   RTC in 6 months     Kaci Singh MD

## 2025-02-17 ENCOUNTER — OFFICE VISIT (OUTPATIENT)
Dept: INTERNAL MEDICINE | Facility: CLINIC | Age: 60
End: 2025-02-17
Payer: MEDICARE

## 2025-02-17 ENCOUNTER — LAB VISIT (OUTPATIENT)
Dept: LAB | Facility: HOSPITAL | Age: 60
End: 2025-02-17
Payer: MEDICARE

## 2025-02-17 VITALS
WEIGHT: 193.56 LBS | OXYGEN SATURATION: 97 % | HEART RATE: 77 BPM | SYSTOLIC BLOOD PRESSURE: 134 MMHG | BODY MASS INDEX: 33.05 KG/M2 | DIASTOLIC BLOOD PRESSURE: 76 MMHG | HEIGHT: 64 IN

## 2025-02-17 DIAGNOSIS — E11.22 TYPE 2 DIABETES MELLITUS WITH CHRONIC KIDNEY DISEASE ON CHRONIC DIALYSIS, WITHOUT LONG-TERM CURRENT USE OF INSULIN: ICD-10-CM

## 2025-02-17 DIAGNOSIS — Z99.2 ESRD (END STAGE RENAL DISEASE) ON DIALYSIS: ICD-10-CM

## 2025-02-17 DIAGNOSIS — I50.30 HEART FAILURE WITH PRESERVED EJECTION FRACTION, UNSPECIFIED HF CHRONICITY: ICD-10-CM

## 2025-02-17 DIAGNOSIS — N18.6 TYPE 2 DIABETES MELLITUS WITH CHRONIC KIDNEY DISEASE ON CHRONIC DIALYSIS, WITHOUT LONG-TERM CURRENT USE OF INSULIN: ICD-10-CM

## 2025-02-17 DIAGNOSIS — Z99.2 TYPE 2 DIABETES MELLITUS WITH CHRONIC KIDNEY DISEASE ON CHRONIC DIALYSIS, WITHOUT LONG-TERM CURRENT USE OF INSULIN: ICD-10-CM

## 2025-02-17 DIAGNOSIS — Z76.89 ENCOUNTER TO ESTABLISH CARE: Primary | ICD-10-CM

## 2025-02-17 DIAGNOSIS — C64.9 RENAL CELL CARCINOMA, UNSPECIFIED LATERALITY: ICD-10-CM

## 2025-02-17 DIAGNOSIS — N18.6 ESRD (END STAGE RENAL DISEASE) ON DIALYSIS: ICD-10-CM

## 2025-02-17 DIAGNOSIS — I10 HYPERTENSION, UNSPECIFIED TYPE: ICD-10-CM

## 2025-02-17 LAB
BASOPHILS # BLD AUTO: 0.09 K/UL (ref 0–0.2)
BASOPHILS NFR BLD: 1.1 % (ref 0–1.9)
DIFFERENTIAL METHOD BLD: ABNORMAL
EOSINOPHIL # BLD AUTO: 1.1 K/UL (ref 0–0.5)
EOSINOPHIL NFR BLD: 13.6 % (ref 0–8)
ERYTHROCYTE [DISTWIDTH] IN BLOOD BY AUTOMATED COUNT: 23 % (ref 11.5–14.5)
HCT VFR BLD AUTO: 32.8 % (ref 37–48.5)
HGB BLD-MCNC: 10.4 G/DL (ref 12–16)
IMM GRANULOCYTES # BLD AUTO: 0.04 K/UL (ref 0–0.04)
IMM GRANULOCYTES NFR BLD AUTO: 0.5 % (ref 0–0.5)
LYMPHOCYTES # BLD AUTO: 2.2 K/UL (ref 1–4.8)
LYMPHOCYTES NFR BLD: 27 % (ref 18–48)
MCH RBC QN AUTO: 27 PG (ref 27–31)
MCHC RBC AUTO-ENTMCNC: 31.7 G/DL (ref 32–36)
MCV RBC AUTO: 85 FL (ref 82–98)
MONOCYTES # BLD AUTO: 0.9 K/UL (ref 0.3–1)
MONOCYTES NFR BLD: 10.9 % (ref 4–15)
NEUTROPHILS # BLD AUTO: 3.8 K/UL (ref 1.8–7.7)
NEUTROPHILS NFR BLD: 46.9 % (ref 38–73)
NRBC BLD-RTO: 0 /100 WBC
PLATELET # BLD AUTO: 264 K/UL (ref 150–450)
PMV BLD AUTO: 10.5 FL (ref 9.2–12.9)
RBC # BLD AUTO: 3.85 M/UL (ref 4–5.4)
WBC # BLD AUTO: 8.04 K/UL (ref 3.9–12.7)

## 2025-02-17 PROCEDURE — 83036 HEMOGLOBIN GLYCOSYLATED A1C: CPT | Mod: HCNC

## 2025-02-17 PROCEDURE — 36415 COLL VENOUS BLD VENIPUNCTURE: CPT | Mod: HCNC

## 2025-02-17 PROCEDURE — 85025 COMPLETE CBC W/AUTO DIFF WBC: CPT | Mod: HCNC

## 2025-02-17 RX ORDER — INSULIN ASPART 100 [IU]/ML
0-5 INJECTION, SOLUTION INTRAVENOUS; SUBCUTANEOUS
Qty: 3 ML | Refills: 0 | Status: SHIPPED | OUTPATIENT
Start: 2025-02-17 | End: 2025-04-18

## 2025-02-17 NOTE — PROGRESS NOTES
Clinic Note  2/17/2025      Subjective:       Patient ID:  Jael is a 59 y.o. female  with history of T2DM c/b ESRD on HD TTHS and diabetic neuropathy, RCC s/p right nephrectomy 2020, htn, HFpEF (EF 60%), NAFLD, here for establishment of care.  Patient was previously seen by Dr. Gayle, but since she will be leaving she needs a new PCP.  Patient undergoes hemodialysis Tuesday, Thursday, Saturday.  Her last appointment was Saturday.  Explains that session went well and without complications.  She follows here with Cardiology.  Last seen 02/14.  She is not currently on GDMT.  Takes amlodipine and losartan for blood pressure control.  For her diabetes, she takes Lantus 12 units and short-acting sliding scale insulin with meals.  Last A1c was 7.6 in September of last year. She was recently diagnosed warm AIHA in Sept 2024, for which she completed a steroid taper and course of atovaquone.  Follows with Nephrology for past RCC, now status post right nephrectomy.          Past Medical History:   Diagnosis Date    Acute hyperkalemia 09/09/2020    Acute on chronic diastolic heart failure 10/08/2021    Anemia of chronic disorder 11/08/2017    Overview:  Added automatically from request for surgery [id number]    Anemia of chronic renal failure, stage 5     Anxiety     Cyst of left ovary 05/14/2019    Cyst of left ovary 07/05/2019    Dyslipidemia 01/04/2013    Encounter for screening colonoscopy 10/08/2020    End-stage renal disease on hemodialysis 08/20/2020    ESRD on hemodialysis 09/28/2017    Hematuria 10/30/2019    History of COVID-19 04/07/2020    Hospital discharge follow-up 10/06/2023    Hx of sinus bradycardia 09/28/2017    Hyperkalemia 03/29/2017    Hypertensive urgency 07/22/2021    Peripheral neuropathy 01/04/2013    Preoperative testing 10/08/2020    Renovascular hypertension 08/17/2020    S/P dilation and curettage 05/22/2018    Screening for colon cancer 11/28/2023    Thickened endometrium 05/22/2018    Thyroid  nodule     Thyroid nodule 2021    Uncontrolled type 2 diabetes mellitus 2013    Overview:  poc a1c today 11.2-270/ up from 10.8-263, Pt. has very very stressed/ was incarcerated/marital stress/ will current meds/ less stress now/ will monitor.       Past Surgical History:   Procedure Laterality Date    AV FISTULA PLACEMENT      right arm     SECTION      x1    COLONOSCOPY N/A 2018    Procedure: COLONOSCOPY;  Surgeon: Pankaj Hinojosa MD;  Location: [facility] ENDO (4TH FLR);  Service: Endoscopy;  Laterality: N/A;  dialysis pt/labs prior to colon/MS    COLONOSCOPY N/A 2023    Procedure: COLONOSCOPY;  Surgeon: Angel Turner MD;  Location: [facility] ENDO (2ND FLR);  Service: Endoscopy;  Laterality: N/A;  Ref By:  Maty Gayle MD  Procedure: Colonoscopy  Diagnosis: endounter for preventive health   Procedure Timing: pt. pref  Provider: Dr. turner   [place] [facility]   Prep Specifications: Standard prep   Additional Info: Dialysis patient lab pending    CYSTOSCOPY W/ RETROGRADES Bilateral 2020    Procedure: CYSTOSCOPY, WITH RETROGRADE PYELOGRAM;  Surgeon: Douglas Abraham MD;  Location: [facility] OR 1ST FLR;  Service: Urology;  Laterality: Bilateral;  45 min    FISTULOGRAM Right 2020    Procedure: Fistulogram;  Surgeon: Lester Gómez MD;  Location: [facility] CATH LAB;  Service: Nephrology;  Laterality: Right;    FISTULOGRAM Right 2024    Procedure: Fistulogram RIGHT FOREARM;  Surgeon: Lester Gómez MD;  Location: [facility] CATH LAB;  Service: Nephrology;  Laterality: Right;    HYSTERECTOMY      IMPLANTATION OF COCHLEAR PROSTHESIS Right 2021    Procedure: INSERTION, PROSTHESIS, COCHLEAR;  Surgeon: Ramiro Zuleta MD;  Location: [facility] OR 2ND FLR;  Service: ENT;  Laterality: Right;  COCHLEAR BABAK    IMPLANTATION OF COCHLEAR PROSTHESIS Right 2021    Procedure: INSERTION, PROSTHESIS, COCHLEAR;  Surgeon: Ramiro Zuleta MD;  Location: [facility] OR 2ND FLR;  Service: ENT;  Laterality: Right;   [facility]    ROBOT-ASSISTED LAPAROSCOPIC ABDOMINAL HYSTERECTOMY USING DA NATALYA XI N/A 7/5/2019    Procedure: XI ROBOTIC HYSTERECTOMY;  Surgeon: Trice Lei MD;  Location: [facility] OR;  Service: OB/GYN;  Laterality: N/A;    ROBOT-ASSISTED LAPAROSCOPIC NEPHRECTOMY Right 10/8/2020    Procedure: ROBOTIC NEPHRECTOMY;  Surgeon: Douglas Abraham MD;  [facility];  Service: Urology;  Laterality: Right;  3.5hrs gen with regional     ROBOT-ASSISTED LAPAROSCOPIC SALPINGO-OOPHORECTOMY USING DA NATALYA XI Bilateral 7/5/2019    Procedure: XI ROBOTIC SALPINGO-OOPHORECTOMY;  Surgeon: Trice Lei MD;  Location: [facility] OR;  Service: OB/GYN;  Laterality: Bilateral;    TUBAL LIGATION      Vascular access surgeries      x5       Family History   Problem Relation Name Age of Onset    Cancer Mother      Ovarian cancer Mother      Cancer Sister      Ovarian cancer Sister      Lung cancer Sister      Macular degeneration Sister      Cancer Sister      Ovarian cancer Sister      Diabetes Sister      Diabetes Brother      Breast cancer Neg Hx      Colon cancer Neg Hx      Cervical cancer Neg Hx      Endometrial cancer Neg Hx      Vaginal cancer Neg Hx      Thyroid disease Neg Hx      Glaucoma Neg Hx      Retinal detachment Neg Hx         Social History[1]    Review of Systems   Constitutional:  Negative for chills and fever.   HENT:  Positive for hearing loss. Negative for congestion and sore throat.    Eyes:  Positive for blurred vision.   Respiratory:  Negative for cough and shortness of breath.    Cardiovascular:  Positive for leg swelling. Negative for chest pain and palpitations.   Gastrointestinal:  Negative for abdominal pain, nausea and vomiting.   Genitourinary:  Negative for dysuria, frequency and urgency.   Musculoskeletal:  Negative for myalgias.   Neurological:  Negative for dizziness, weakness and headaches.       Medication List with Changes/Refills   Current Medications    ACCU-CHEK GUIDE GLUCOSE  "METER MISC        ACCU-CHEK GUIDE L1-L2 CTRL SOL SOLN        ACCU-CHEK GUIDE TEST STRIPS STRP        ACCU-CHEK SOFTCLIX LANCETS MISC        ACETAMINOPHEN (TYLENOL) 500 MG TABLET    Take 1 tablet (500 mg total) by mouth every 8 (eight) hours as needed for Pain.    AMLODIPINE (NORVASC) 10 MG TABLET    Take 1 tablet (10 mg total) by mouth nightly.    ASPIRIN (ECOTRIN) 81 MG EC TABLET    Take 81 mg by mouth every morning.    ATORVASTATIN (LIPITOR) 40 MG TABLET    Take 1 tablet (40 mg total) by mouth once daily.    BLOOD SUGAR DIAGNOSTIC STRP    To check BG 3 times daily, to use with insurance preferred meter    BLOOD-GLUCOSE METER KIT    To check BG 3 times daily, to use with insurance preferred meter    DROPSAFE ALCOHOL PREP PADS PADM    Apply topically.    EPOETIN ELENA (PROCRIT) 10,000 UNIT/ML INJECTION    Inject 0.85 mLs (8,500 Units total) into the skin every Mon, Wed, Fri.    INSULIN GLARGINE U-100, LANTUS, 100 UNIT/ML (3 ML) SUBQ INPN PEN    Inject 12 Units into the skin once daily.    LABETALOL (NORMODYNE) 200 MG TABLET    Take 1 tablet (200 mg total) by mouth every 12 (twelve) hours.    LANCETS MISC    To check BG 3 times daily, to use with insurance preferred meter    LOSARTAN (COZAAR) 100 MG TABLET    Take 1 tablet (100 mg total) by mouth once daily.    PEN NEEDLE, DIABETIC 31 GAUGE X 3/16" NDLE    Use to inject insulin into the skin.    PREGABALIN (LYRICA) 75 MG CAPSULE    Take 1 capsule (75 mg total) by mouth once daily.    SEVELAMER CARBONATE (RENVELA) 800 MG TAB    Take 3 tablets (2,400 mg total) by mouth 3 (three) times daily with meals.   Changed and/or Refilled Medications    Modified Medication Previous Medication    INSULIN ASPART U-100 (NOVOLOG) 100 UNIT/ML (3 ML) INPN PEN insulin aspart U-100 (NOVOLOG) 100 unit/mL (3 mL) InPn pen       Inject 0-5 Units into the skin before meals and at bedtime as needed (Hyperglycemia).    Inject 0-5 Units into the skin before meals and at bedtime as needed " "(Hyperglycemia).       Problem List[2]            Objective:      /76 (BP Location: Right arm, Patient Position: Sitting)   Pulse 77   Ht 5' 4" (1.626 m)   Wt 87.8 kg (193 lb 9 oz)   LMP 03/15/2016 (LMP Unknown)   SpO2 97%   BMI 33.23 kg/m²   Estimated body mass index is 33.23 kg/m² as calculated from the following:    Height as of this encounter: 5' 4" (1.626 m).    Weight as of this encounter: 87.8 kg (193 lb 9 oz).      Physical Exam  Vitals reviewed.   Constitutional:       Appearance: Normal appearance. She is obese.   HENT:      Head: Normocephalic and atraumatic.      Mouth/Throat:      Mouth: Mucous membranes are moist.      Pharynx: Oropharynx is clear.   Eyes:      General: No scleral icterus.  Cardiovascular:      Rate and Rhythm: Normal rate and regular rhythm.      Pulses: Normal pulses.      Heart sounds: Normal heart sounds.   Pulmonary:      Effort: Pulmonary effort is normal.      Breath sounds: Normal breath sounds.   Abdominal:      General: Abdomen is flat. Bowel sounds are normal. There is no distension.      Tenderness: There is no abdominal tenderness.   Musculoskeletal:      Right lower leg: Edema present.      Left lower leg: Edema present.   Skin:     General: Skin is warm and dry.   Neurological:      General: No focal deficit present.      Mental Status: She is alert.           Assessment and Plan:         Problem List Items Addressed This Visit         Encounter to establish care    -  Primary         Renal/    ESRD (end stage renal disease) on dialysis (Chronic)      - undergoes dialysis Monday, Wednesday, Friday           Endocrine    Type 2 diabetes mellitus    -Last A1c 7.6 9/2024  -on insulin Lantus 12 units daily with SSI with meals  -atorvastatin 40 mg daily  -pregabalin for neuropathy    Relevant Medications    insulin aspart U-100 (NOVOLOG) 100 unit/mL (3 mL) InPn pen    Other Relevant Orders    HEMOGLOBIN A1C          Hypertension, unspecified type    "     -well-controlled  -continue amlodipine, losartan      Renal cell carcinoma, right    - s/p nephrectomy  - follows with the Urology        Heart failure with preserved ejection fraction                   Other Orders Placed This Visit:  Orders Placed This Encounter   Procedures    HEMOGLOBIN A1C    CBC W/ AUTO DIFFERENTIAL             Interview, Assessment, Findings, and Plan discussed with Dr. Almanzar    No follow-ups on file.    Kenyon Willams MD PGY-2  Internal Medicine                [1]   Social History  Socioeconomic History    Marital status:     Number of children: 2   Occupational History    Occupation: disability   Tobacco Use    Smoking status: Never    Smokeless tobacco: Never   Substance and Sexual Activity    Alcohol use: No    Drug use: No    Sexual activity: Yes     Partners: Male     Birth control/protection: Post-menopausal     Social Drivers of Health     Financial Resource Strain: Low Risk  (10/7/2024)    Overall Financial Resource Strain (CARDIA)     Difficulty of Paying Living Expenses: Not very hard   Recent Concern: Financial Resource Strain - Medium Risk (9/13/2024)    Overall Financial Resource Strain (CARDIA)     Difficulty of Paying Living Expenses: Somewhat hard   Food Insecurity: No Food Insecurity (10/7/2024)    Hunger Vital Sign     Worried About Running Out of Food in the Last Year: Never true     Ran Out of Food in the Last Year: Never true   Recent Concern: Food Insecurity - Food Insecurity Present (9/13/2024)    Hunger Vital Sign     Worried About Running Out of Food in the Last Year: Sometimes true     Ran Out of Food in the Last Year: Sometimes true   Transportation Needs: No Transportation Needs (12/3/2024)    TRANSPORTATION NEEDS     Transportation : No   Physical Activity: Inactive (12/3/2024)    Exercise Vital Sign     Days of Exercise per Week: 0 days     Minutes of Exercise per Session: 0 min   Stress: No Stress Concern Present (10/7/2024)    [facility] of  Occupational Health - Occupational Stress Questionnaire     Feeling of Stress : Only a little   Housing Stability: Unknown (10/7/2024)    Housing Stability Vital Sign     Unable to Pay for Housing in the Last Year: No     Homeless in the Last Year: No   [2]   Patient Active Problem List  Diagnosis    Elevated cholesterol with elevated triglycerides    ESRD (end stage renal disease) on dialysis    Patient on waiting list for kidney transplant    NAFLD (nonalcoholic fatty liver disease)    s/p RA-TLH/BSO    Essential hypertension    Allergic rhinitis    Cystitis cystica    Hyperkalemia    Poor dentition    GERD (gastroesophageal reflux disease)    Chorioretinal scar, left eye    Hypertension secondary to other renal disorders    Scarring alopecia    Vitamin D deficiency    Symptomatic bradycardia    Pica in adults    Renal mass    Chronic kidney disease-mineral and bone disorder    Chronic diastolic heart failure    Obesity, diabetes, and hypertension syndrome    Hypervolemia associated with renal insufficiency    Sensorineural hearing loss    Morbid obesity    QT prolongation    Type 2 diabetes mellitus    Thyroid nodule    Anemia in ESRD (end-stage renal disease)    Bilateral swelling of feet and ankles    Chest pain    AIHA (autoimmune hemolytic anemia)    Diabetic ketoacidosis without coma associated with type 2 diabetes mellitus    SOB (shortness of breath)    Acute on chronic diastolic congestive heart failure    Secondary hyperparathyroidism    Thrombocytopenia    Hypertensive emergency    Altered mental status    Acute hypoxemic respiratory failure

## 2025-02-18 ENCOUNTER — HOSPITAL ENCOUNTER (OUTPATIENT)
Dept: CARDIOLOGY | Facility: HOSPITAL | Age: 60
Discharge: HOME | End: 2025-02-18
Attending: INTERNAL MEDICINE
Payer: MEDICARE

## 2025-02-18 VITALS
BODY MASS INDEX: 32.95 KG/M2 | HEART RATE: 78 BPM | HEIGHT: 64 IN | SYSTOLIC BLOOD PRESSURE: 134 MMHG | DIASTOLIC BLOOD PRESSURE: 76 MMHG | WEIGHT: 193 LBS

## 2025-02-18 DIAGNOSIS — I50.32 CHRONIC DIASTOLIC HEART FAILURE: ICD-10-CM

## 2025-02-18 LAB
ASCENDING AORTA: 3.08 CM
AV AREA BY CONTINUOUS VTI: 3 CM2
AV INDEX (PROSTH): 0.79
AV LVOT MEAN GRADIENT: 2 MMHG
AV LVOT PEAK GRADIENT: 4 MMHG
AV MEAN GRADIENT: 3 MMHG
AV PEAK GRADIENT: 6 MMHG
AV VALVE AREA BY VELOCITY RATIO: 3.2 CM²
AV VALVE AREA: 3 CM2
AV VELOCITY RATIO: 0.83
BSA FOR ECHO PROCEDURE: 1.99 M2
CV ECHO LV RWT: 0.55 CM
DOP CALC AO PEAK VEL: 1.2 M/S
DOP CALC AO VTI: 32.2 CM
DOP CALC LVOT AREA: 3.8 CM2
DOP CALC LVOT DIAMETER: 2.2 CM
DOP CALC LVOT PEAK VEL: 1 M/S
DOP CALC LVOT STROKE VOLUME: 96.5 CM3
DOP CALCLVOT PEAK VEL VTI: 25.4 CM
E WAVE DECELERATION TIME: 158 MS
E/A RATIO: 0.94
E/E' RATIO: 15 M/S
ECHO EF ESTIMATED: 61 %
ECHO LV POSTERIOR WALL: 1.3 CM (ref 0.6–1.1)
ESTIMATED AVG GLUCOSE: 85 MG/DL (ref 68–131)
FRACTIONAL SHORTENING: 31.9 % (ref 28–44)
HBA1C MFR BLD: 4.6 % (ref 4–5.6)
INTERVENTRICULAR SEPTUM: 1.2 CM (ref 0.6–1.1)
IVC DIAMETER: 2.1 CM
IVRT: 63 MS
LA MAJOR: 5.2 CM
LA MINOR: 5.2 CM
LA WIDTH: 4.7 CM
LEFT ATRIUM SIZE: 4.1 CM
LEFT ATRIUM VOLUME INDEX MOD: 55 ML/M2
LEFT ATRIUM VOLUME INDEX: 44 ML/M2
LEFT ATRIUM VOLUME MOD: 106 ML
LEFT ATRIUM VOLUME: 85 CM3
LEFT INTERNAL DIMENSION IN SYSTOLE: 3.2 CM (ref 2.1–4)
LEFT VENTRICLE DIASTOLIC VOLUME INDEX: 52.81 ML/M2
LEFT VENTRICLE DIASTOLIC VOLUME: 101.93 ML
LEFT VENTRICLE MASS INDEX: 116.5 G/M2
LEFT VENTRICLE SYSTOLIC VOLUME INDEX: 20.8 ML/M2
LEFT VENTRICLE SYSTOLIC VOLUME: 40.06 ML
LEFT VENTRICULAR INTERNAL DIMENSION IN DIASTOLE: 4.7 CM (ref 3.5–6)
LEFT VENTRICULAR MASS: 224.8 G
LV LATERAL E/E' RATIO: 15.2
LV SEPTAL E/E' RATIO: 15.2
MV A" WAVE DURATION": 171.27 MS
MV PEAK A VEL: 0.97 M/S
MV PEAK E VEL: 0.91 M/S
OHS CV RV/LV RATIO: 0.83 CM
PISA TR MAX VEL: 2.8 M/S
PULM VEIN A" WAVE DURATION": 171.27 MS
PULM VEIN S/D RATIO: 0.74
PULMONIC VEIN PEAK A VELOCITY: 0.2 M/S
PV PEAK D VEL: 0.53 M/S
PV PEAK S VEL: 0.39 M/S
RA MAJOR: 4.65 CM
RA PRESSURE ESTIMATED: 3 MMHG
RA WIDTH: 3.98 CM
RIGHT ATRIAL AREA: 16.6 CM2
RIGHT VENTRICLE DIASTOLIC BASEL DIMENSION: 3.9 CM
RV TB RVSP: 6 MMHG
RV TISSUE DOPPLER FREE WALL SYSTOLIC VELOCITY 1 (APICAL 4 CHAMBER VIEW): 12.06 CM/S
SINUS: 3.65 CM
STJ: 2.81 CM
TDI LATERAL: 0.06 M/S
TDI SEPTAL: 0.06 M/S
TDI: 0.06 M/S
TR MAX PG: 31 MMHG
TRICUSPID ANNULAR PLANE SYSTOLIC EXCURSION: 2.53 CM
TV PEAK GRADIENT: 32 MMHG
TV REST PULMONARY ARTERY PRESSURE: 34 MMHG
Z-SCORE OF LEFT VENTRICULAR DIMENSION IN END DIASTOLE: -1.51
Z-SCORE OF LEFT VENTRICULAR DIMENSION IN END SYSTOLE: -0.39

## 2025-02-18 PROCEDURE — 93306 TTE W/DOPPLER COMPLETE: CPT | Mod: HCNC

## 2025-02-21 ENCOUNTER — RESULTS FOLLOW-UP (OUTPATIENT)
Dept: INTERNAL MEDICINE | Facility: CLINIC | Age: 60
End: 2025-02-21

## 2025-02-26 ENCOUNTER — CLINICAL SUPPORT (OUTPATIENT)
Dept: AUDIOLOGY | Facility: CLINIC | Age: 60
End: 2025-02-26
Payer: MEDICARE

## 2025-02-26 ENCOUNTER — OFFICE VISIT (OUTPATIENT)
Dept: OTOLARYNGOLOGY | Facility: CLINIC | Age: 60
End: 2025-02-26
Payer: MEDICARE

## 2025-02-26 DIAGNOSIS — H90.A22 SENSORINEURAL HEARING LOSS (SNHL) OF LEFT EAR WITH RESTRICTED HEARING OF RIGHT EAR: Primary | ICD-10-CM

## 2025-02-26 DIAGNOSIS — Z96.21 COCHLEAR IMPLANT IN PLACE: ICD-10-CM

## 2025-02-26 DIAGNOSIS — H91.22 SUDDEN LEFT HEARING LOSS: Primary | ICD-10-CM

## 2025-02-26 PROCEDURE — 92557 COMPREHENSIVE HEARING TEST: CPT | Mod: 52,HCNC,S$GLB,

## 2025-02-26 PROCEDURE — 99999 PR PBB SHADOW E&M-EST. PATIENT-LVL III: CPT | Mod: PBBFAC,HCNC,, | Performed by: NURSE PRACTITIONER

## 2025-02-26 PROCEDURE — 92567 TYMPANOMETRY: CPT | Mod: 52,HCNC,S$GLB,

## 2025-02-26 NOTE — PROGRESS NOTES
Subjective:   Jael Hall is a 59 y.o. female presents for a sudden hearing change in her left ear that happened on  (2 days ago). States she was watching TV when she noticed her left ear was not hearing well. She denies any tinnitus or dizziness. Denies otalgia or otorrhea. She has a history of a sudden hearing loss bilaterally and subsequently a CI for the right ear per Dr. Zuleta in 2021. Her last audiogram was in May 2023 which showed she was a candidate for a left cochlear implant. At the time of that audiogram there was mention of an active ear infection that could have affected the audiogram. She was never seen by a ENT provider for the infection and no repeat audio was obtained.     Past Medical History  She has a past medical history of Acute hyperkalemia, Acute on chronic diastolic heart failure, Anemia of chronic disorder, Anemia of chronic renal failure, stage 5, Anxiety, Cyst of left ovary, Cyst of left ovary, Dyslipidemia, Encounter for screening colonoscopy, End-stage renal disease on hemodialysis, ESRD on hemodialysis, Hematuria, History of COVID-19, Hospital discharge follow-up, sinus bradycardia, Hyperkalemia, Hypertensive urgency, Peripheral neuropathy, Preoperative testing, Renovascular hypertension, S/P dilation and curettage, Screening for colon cancer, Thickened endometrium, Thyroid nodule, Thyroid nodule, and Uncontrolled type 2 diabetes mellitus.    Past Surgical History  She has a past surgical history that includes Tubal ligation; Vascular access surgeries;  section; Colonoscopy (N/A, 2018); AV fistula placement; Robot-assisted laparoscopic abdominal hysterectomy using da Kev Xi (N/A, 2019); Robot-assisted laparoscopic salpingo-oophorectomy using da Kev Xi (Bilateral, 2019); Hysterectomy; Cystoscopy w/ retrogrades (Bilateral, 2020); Fistulogram (Right, 2020); Robot-assisted laparoscopic nephrectomy (Right, 10/8/2020); Implantation of cochlear  prosthesis (Right, 6/28/2021); Implantation of cochlear prosthesis (Right, 6/30/2021); Colonoscopy (N/A, 11/30/2023); and Fistulogram (Right, 12/5/2024).    Family History  Her family history includes Cancer in her mother, sister, and sister; Diabetes in her brother and sister; Lung cancer in her sister; Macular degeneration in her sister; Ovarian cancer in her mother, sister, and sister.    Social History  She reports that she has never smoked. She has never used smokeless tobacco. She reports that she does not drink alcohol and does not use drugs.    Allergies  She has no known allergies.    Medications  She has a current medication list which includes the following prescription(s): accu-chek guide glucose meter, accu-chek guide l1-l2 ctrl sol, accu-chek guide test strips, accu-chek softclix lancets, acetaminophen, amlodipine, aspirin, atorvastatin, blood sugar diagnostic, blood-glucose meter, dropsafe alcohol prep pads, epoetin shiela, insulin aspart u-100, insulin glargine u-100 (lantus), labetalol, lancets, losartan, pen needle, diabetic, pregabalin, and sevelamer carbonate.  Review of Systems   HENT: Positive for hearing loss and ringing in the ears.  Negative for ear discharge, ear infection and ear pain.      Neurological: Negative for dizziness and light-headedness.        Objective:     Constitutional:   She is oriented to person, place, and time. She appears well-developed and well-nourished. She appears alert. She is cooperative.  Non-toxic appearance. She does not have a sickly appearance. She does not appear ill. Normal speech.      Head:  Normocephalic and atraumatic. Not macrocephalic and not microcephalic. Head is without abrasion, without right periorbital erythema, without left periorbital erythema and without TMJ tenderness.     Ears:    Right Ear: No drainage, swelling or tenderness. No mastoid tenderness. Tympanic membrane is not scarred, not perforated, not erythematous, not retracted and not  bulging. Tympanic membrane mobility is normal. No middle ear effusion.   Left Ear: No drainage, swelling or tenderness. No mastoid tenderness. Tympanic membrane is not scarred, not perforated, not erythematous, not retracted and not bulging. Tympanic membrane mobility is normal.  No middle ear effusion.   Ears:    Right CI    Pulmonary/Chest:   Effort normal.     Psychiatric:   She has a normal mood and affect. Her speech is normal and behavior is normal.     Neurological:   She is alert and oriented to person, place, and time.     Procedure  None    Imaging  MRI IAC/TEMPORAL BONES WITHOUT CONTRAST   3/16/2021  CLINICAL HISTORY:  sudden hearing loss on right; Sudden idiopathic hearing loss, right ear     TECHNIQUE:  Multiplanar multisequence MR imaging of the brain and IAC's was performed without the use of intravenous contrast.     Examination mildly degraded by patient motion artifact.     COMPARISON:  Temporal bone CT 03/11/2021     FINDINGS:  Ventricles normal in size for age.  No hydrocephalus.     Mild patchy T2/FLAIR hyperintensity in the supratentorial white matter, nonspecific but most likely reflecting chronic microvascular ischemic changes.  Punctate remote hemorrhage in the bilateral centrum semiovale.  No acute infarct.  No acute hemorrhage.  No parenchymal mass effect or midline shift.     No extra-axial blood or fluid collections.     The inner ear structures maintain normal morphology and signal without abnormal enhancement.  Cranial nerve 7-8 complexes appear within normal limits.  No intra canalicular or CP angle cistern mass.     The T2 skull base flow voids are preserved.     Bone marrow signal intensity unremarkable.     Bilateral proptosis without evidence of discrete intraorbital mass.     Impression:     Examination mildly degraded by patient motion artifact.     Inner ear structures appear within normal limits on noncontrast examination.     No evidence of vestibular schwannoma.     Mild  chronic microvascular ischemic change throughout the supratentorial white matter with few punctate remote microhemorrhage.     Bilateral proptosis without evidence of discrete intraorbital mass.    CT TEMPORAL BONE WITHOUT CONTRAST  3/11/2021  CLINICAL HISTORY:  sudden hearing loss right;Sudden idiopathic hearing loss, right ear     TECHNIQUE:  Low dose axial images were acquired through the temporal bones and skull base without contrast administration.  Coronal and sagittal reconstructions were performed.     COMPARISON:  None     FINDINGS:  Right side:  The   external auditory canal, middle ear cavity, and mastoid air cells are clear. The bony coverings of the vestibule, semicircular canals and cochlea are intact.  No evidence for enlargement of the vestibular aqueduct.     The middle ear ossicular chain is intact without evidence for focal bony erosion.     Left side:The external auditory canal, middle ear cavity, and mastoid air cells are clear. The bony coverings of the vestibule, semicircular canals and cochlea are intact.  No evidence for enlargement of the vestibular aqueduct.     .     The middle ear ossicular chain is intact without evidence for focal bony erosion.     The IACs are relatively symmetric.     Incidental bilateral globe proptosis     There is mild patchy ethmoid air cell opacities with lobular focus in the left maxillary antra.     Impression:     unremarkable noncontrast CT of the temporal bones as detailed above     Incidental bilateral globe proptosis clinical correlation for underlying thyroid orbitopathy..     Audiogram          I independently reviewed the tracings of the complete audiometric evaluation. I reviewed the audiogram with the patient as well. Pertinent findings include moderate sloping to profound sensorineural hearing loss in the left ear.   Assessment:     1. Sudden left hearing loss    2. Cochlear implant in place      Plan:     Sudden left hearing loss  Discussed the  findings from today's exam, including the patient's reported symptoms of sudden hearing loss which confirmed on audiometric testing, however it is hard to tell how significant the hearing change was objectively. While most cases of sudden SNHL are idiopathic, we discussed the possible etiologies: viral, autoimmune, vascular or structural.     Treatment includes a course of high dose systemic glucocorticoid steroids. However we discussed that given her medical history of hypertension, ESRD and diabetes she would be a better candidate for intratympanic steroid injections. She agreed that systemic steriods should be avoided, but she declined intratympanic steroids. She will keep her appointment with Audiology on 3/12 for a left CI eval. She will also contact clinic if she is interested in the steroid injections (she is aware that there is a treatment window).     Cochlear implant in place  AD, no issues today

## 2025-02-26 NOTE — PROGRESS NOTES
Jael Hall, a 59 y.o. female, was seen today in the clinic for an audiologic evaluation. Mrs. Hall reported a sudden decrease in hearing in the left ear. She reported she was watching television and then felt like her hearing changed. Mrs. Hall has a cochlear implant in the right ear. Patient denied otalgia.    Tympanometry revealed Type As tympanogram in the left ear. Audiogram results revealed moderate sloping to profound sensorineural hearing loss in the left ear.  Speech reception thresholds were noted at 60 dBHL in the left ear.  Speech discrimination scores were 40% in the left ear.    Recommendations:  Otologic evaluation  Repeat audiogram per ENT plan of care  Follow up with CI audiologist

## 2025-02-27 RX ORDER — LABETALOL 200 MG/1
200 TABLET, FILM COATED ORAL 2 TIMES DAILY
Qty: 60 TABLET | Refills: 0 | Status: SHIPPED | OUTPATIENT
Start: 2025-02-27 | End: 2026-02-27

## 2025-02-27 NOTE — TELEPHONE ENCOUNTER
----- Message from Linnea sent at 2/27/2025  9:29 AM CST -----  Contact: [phone] Patient  Pt is asking if the medicine can please be called in today ASAP because she is out and it is for her pressure. Please advise. Thank youPHARMACY CHANGEPLE[facility]. Thank youRequesting an RX refill or new RX.Is this a refill or new RX: newRX name and strength (copy/paste from chart):  labetaloL (NORMODYNE) 200 MG tabletIs this a 30 day or 90 day RX: Pharmacy name and phone # (copy/paste from chart):  [facility] [facility] [address]Ptatg: [phone] Fax: [phone]

## 2025-02-27 NOTE — TELEPHONE ENCOUNTER
Called pt; pt answered    Informed pt that Rx had been sent to [facility]    Pt requesting Rx be sent to [facility] given that [facility] is taking too long and she needs to get her medication today     Pended rx

## 2025-03-12 ENCOUNTER — CLINICAL SUPPORT (OUTPATIENT)
Dept: AUDIOLOGY | Facility: CLINIC | Age: 60
End: 2025-03-12
Payer: MEDICARE

## 2025-03-12 DIAGNOSIS — H90.3 SENSORINEURAL HEARING LOSS (SNHL) OF BOTH EARS: ICD-10-CM

## 2025-03-12 DIAGNOSIS — H93.293 IMPAIRED AUDITORY DISCRIMINATION, BILATERAL: Primary | ICD-10-CM

## 2025-03-12 NOTE — PROGRESS NOTES
Cochlear Implant Programming Session      Right Ear:  Dr. Zuleta  :  [employer]och[employer]ar [employer]  Internal Device/Serial Number:  [id number]   Processor:  [id number]   SN of Processors: [id number]  DOS:  6/30/21  DIS:  7/20/21  Processor Color: Black  Magnet Strength: 5i  Coil Length:  6cm  Battery Type:  Standard rechargeable  Warranty:  5 year     Ms. Jael Hall was seen today for a follow up cochlear implant programming session. She reported continued difficulty with understanding conversations as well as constant static in the processor. Ms. Hall mentioned being interested in left cochlear implantation and reported not being able to hear at all from that ear at the moment. She is not wearing a hearing aid in her left ear at this time.     Impedance measurements were completed and there was a noticeable shift in the them. C levels were close to compliance levels once again, and pulse width was changed to 300. Ms. Hall reported a slight improvement in the static, but she also mentioned not being able to hear well on that side either. C levels were increased 5 units, and the T levels were decreased 5 units. Ms. Hall reported improvement in static and mentioned that it was more comfortable than before. No other adjustments were made at this time. Changed both the coil and microphone cover, but Ms. Hall did not notice much of a change in sound quality with these changes. It was recommended that she bring her backup processor to the next appointment to see if that one sounds any better.     Briefly discussed cochlear implantation of Ms. Hall's left ear as well as the differences between the N7 and N8 processors. Ms. Hall reported that she would like to go ahead and schedule an appointment with Dr. Zuleta to discuss surgery and possibly moving forward with it. An evaluation and appointment with Dr. Zuleta was scheduled for 3/21.     Recommendations:  1) Daily use of sound processor  2) Follow up with Dr. Zuleta  to discuss second implant surgery  3) Follow up on 3/21 for cochlear implant evaluation/programming changes to current processor  4) Annual evaluation

## 2025-03-19 ENCOUNTER — OFFICE VISIT (OUTPATIENT)
Dept: NEPHROLOGY | Facility: CLINIC | Age: 60
End: 2025-03-19
Payer: MEDICARE

## 2025-03-19 VITALS
SYSTOLIC BLOOD PRESSURE: 178 MMHG | BODY MASS INDEX: 32.63 KG/M2 | WEIGHT: 191.13 LBS | TEMPERATURE: 98 F | HEIGHT: 64 IN | DIASTOLIC BLOOD PRESSURE: 74 MMHG | OXYGEN SATURATION: 95 % | RESPIRATION RATE: 16 BRPM | HEART RATE: 80 BPM

## 2025-03-19 DIAGNOSIS — Z99.2 ESRD ON DIALYSIS: Primary | ICD-10-CM

## 2025-03-19 DIAGNOSIS — N18.6 ESRD ON DIALYSIS: Primary | ICD-10-CM

## 2025-03-19 PROCEDURE — 99999 PR PBB SHADOW E&M-EST. PATIENT-LVL IV: CPT | Mod: PBBFAC,HCNC,,

## 2025-03-19 NOTE — PROGRESS NOTES
[facility] Follow-up Exam    Date:   03/19/2025 1:37 PM    HPI:  59 year old woman with ESRD (MWF at [facility] under my care) who presents today for ultrasound clinic appt to maintain patency of her RUE radiocephalic AVF.  Recent fistulogram was done on 12/5/24.  We did not perform any angioplasty at that time because we did not find a clear stenosis near the arterial anastomosis, because all of the contrast flowed up collateral vessels.  Here for follow up exam and ultrasound.  She has had no recent issues with prolonged bleeding, difficulty in cannulation or increased frequency of pressure alarms while on dialysis.      Vitals:    03/19/25 1411   BP: (!) 194/81   Pulse: 80   Resp: 16   Temp: 98.4 °F (36.9 °C)     Exam:   ACCESS:  RUE radiocephalic AV fistula with very soft thrill and minimal pulsatility, appropriate pulse augmentation and partial collapse with arm elevation    Ultrasound:  - arterial anastomosis patent without stenosis, measured at 6 mm, two collateral vessels (one measuring 6 mm in diameter) coming off the fistula  - mid-AVF patent with no stenosis seen, vessel diameter measured at 6-8 mm throughout, cannulation zone aneurysms measuring 18 x 28 mm  - AVF outflow tract patent with no stenosis  - BFV approximately 1100 ml/min (from 480)    Impression/Plan:  Patent AV fistula following recent fistulogram with no intervention.  Plan follow up exam in 3 months.  Please do refer her sooner if she develops signs of stenosis or impending thrombosis.      Lester Gómez MD  [phone]

## 2025-03-20 RX ORDER — LABETALOL 200 MG/1
200 TABLET, FILM COATED ORAL ONCE
Qty: 60 TABLET | Refills: 0 | OUTPATIENT
Start: 2025-03-20 | End: 2025-03-20

## 2025-03-21 ENCOUNTER — CLINICAL SUPPORT (OUTPATIENT)
Dept: AUDIOLOGY | Facility: CLINIC | Age: 60
End: 2025-03-21
Payer: MEDICARE

## 2025-03-21 DIAGNOSIS — H90.3 SENSORINEURAL HEARING LOSS (SNHL) OF BOTH EARS: ICD-10-CM

## 2025-03-21 DIAGNOSIS — H93.293 IMPAIRED AUDITORY DISCRIMINATION, BILATERAL: Primary | ICD-10-CM

## 2025-03-21 NOTE — PROGRESS NOTES
Cochlear Implant Evaluation      Ms. Jael Hall was seen today for a cochlear implant evaluation. She has a N7 sound processor on the right side and has been curious about getting the left side done. Ms. Hall continues to struggle in background noise as well as being able to localize where sound is coming from. Ms. Hall communicates orally and reported having a very active lifestyle.      Pure tone thresholds revealed a profound sensorineural hearing loss in her right ear and a moderate sloping to severe sensorineural hearing loss in her left ear. Speech reception threshold was noted at 60 dBHL in the left ear. Speech discrimination score was 40% in the left ear. Speech audiometry could not be completed for the right ear due to the severity of her hearing loss.      Aided testing in soundfield for the left ear revealed responses at 40-60 dB HL from 500-4000 Hz. Aided speech reception threshold was 35 dB HL, and discrimination was 32%. There was no significant benefit observed from amplification in the left ear. [employer] sentences were presented in the best aided condition at 60 dB SPL. Ms. Hall scored 0% in quiet without her right cochlear implant on. She scored 0% in quiet on CNC.     Aided testing was also completed with her right cochlear implant on. Aided thresholds were obtained at 25-30 dB HL, and speech reception threshold was 25 dB HL. Aided discrimination score was 64%.     Ms. Hall was a candidate for implantation of her left ear, but she is not interested in moving forward at this time. She was very impressed with the sound quality of the hearing aid that she was wearing, and Ms. Hall would like to pursue amplification. She has [employer] insurance, which has a benefit. Ms. Hall will call them to figure out where she can go to purchase a left sided hearing aid. She was given a [employer] device to use till then.      Recommend:  1. Daily use of processor and hearing aid  2. Follow up programming as  needed  3. Hearing aid consultation  4. Annual audiogram, or sooner if any changes in hearing are noted

## 2025-03-27 DIAGNOSIS — E78.2 ELEVATED CHOLESTEROL WITH ELEVATED TRIGLYCERIDES: Primary | Chronic | ICD-10-CM

## 2025-03-27 RX ORDER — ATORVASTATIN CALCIUM 40 MG/1
40 TABLET, FILM COATED ORAL DAILY
Qty: 90 TABLET | Refills: 0 | Status: SHIPPED | OUTPATIENT
Start: 2025-03-27

## 2025-03-30 ENCOUNTER — TELEPHONE (OUTPATIENT)
Dept: HOME HEALTH SERVICES | Facility: CLINIC | Age: 60
End: 2025-03-30
Payer: MEDICARE

## 2025-03-31 ENCOUNTER — TELEPHONE (OUTPATIENT)
Dept: HOME HEALTH SERVICES | Facility: CLINIC | Age: 60
End: 2025-03-31
Payer: MEDICARE

## 2025-03-31 ENCOUNTER — TELEPHONE (OUTPATIENT)
Dept: ADMINISTRATIVE | Facility: CLINIC | Age: 60
End: 2025-03-31
Payer: MEDICARE

## 2025-04-01 ENCOUNTER — OFFICE VISIT (OUTPATIENT)
Dept: HOME HEALTH SERVICES | Facility: CLINIC | Age: 60
End: 2025-04-01
Payer: MEDICARE

## 2025-04-01 VITALS
WEIGHT: 191.13 LBS | SYSTOLIC BLOOD PRESSURE: 178 MMHG | HEART RATE: 82 BPM | OXYGEN SATURATION: 99 % | DIASTOLIC BLOOD PRESSURE: 80 MMHG | RESPIRATION RATE: 16 BRPM | TEMPERATURE: 98 F | BODY MASS INDEX: 32.63 KG/M2 | HEIGHT: 64 IN

## 2025-04-01 DIAGNOSIS — N25.81 SECONDARY HYPERPARATHYROIDISM: ICD-10-CM

## 2025-04-01 DIAGNOSIS — E11.59 HYPERTENSION ASSOCIATED WITH DIABETES: ICD-10-CM

## 2025-04-01 DIAGNOSIS — E66.811 CLASS 1 OBESITY DUE TO EXCESS CALORIES WITH SERIOUS COMORBIDITY AND BODY MASS INDEX (BMI) OF 32.0 TO 32.9 IN ADULT: ICD-10-CM

## 2025-04-01 DIAGNOSIS — Z99.2 TYPE 2 DIABETES MELLITUS WITH CHRONIC KIDNEY DISEASE ON CHRONIC DIALYSIS, WITH LONG-TERM CURRENT USE OF INSULIN: ICD-10-CM

## 2025-04-01 DIAGNOSIS — H90.3 SENSORINEURAL HEARING LOSS (SNHL) OF BOTH EARS: ICD-10-CM

## 2025-04-01 DIAGNOSIS — R00.1 SYMPTOMATIC BRADYCARDIA: ICD-10-CM

## 2025-04-01 DIAGNOSIS — Z79.4 TYPE 2 DIABETES MELLITUS WITH CHRONIC KIDNEY DISEASE ON CHRONIC DIALYSIS, WITH LONG-TERM CURRENT USE OF INSULIN: ICD-10-CM

## 2025-04-01 DIAGNOSIS — I15.2 OBESITY, DIABETES, AND HYPERTENSION SYNDROME: ICD-10-CM

## 2025-04-01 DIAGNOSIS — C64.9 RENAL CELL CARCINOMA, UNSPECIFIED LATERALITY: ICD-10-CM

## 2025-04-01 DIAGNOSIS — N18.6 TYPE 2 DIABETES MELLITUS WITH CHRONIC KIDNEY DISEASE ON CHRONIC DIALYSIS, WITH LONG-TERM CURRENT USE OF INSULIN: ICD-10-CM

## 2025-04-01 DIAGNOSIS — Z00.00 ENCOUNTER FOR MEDICARE ANNUAL WELLNESS EXAM: Primary | ICD-10-CM

## 2025-04-01 DIAGNOSIS — E11.59 OBESITY, DIABETES, AND HYPERTENSION SYNDROME: ICD-10-CM

## 2025-04-01 DIAGNOSIS — I15.2 HYPERTENSION ASSOCIATED WITH DIABETES: ICD-10-CM

## 2025-04-01 DIAGNOSIS — D59.10 AIHA (AUTOIMMUNE HEMOLYTIC ANEMIA): ICD-10-CM

## 2025-04-01 DIAGNOSIS — N18.6 ESRD (END STAGE RENAL DISEASE) ON DIALYSIS: Chronic | ICD-10-CM

## 2025-04-01 DIAGNOSIS — E66.09 CLASS 1 OBESITY DUE TO EXCESS CALORIES WITH SERIOUS COMORBIDITY AND BODY MASS INDEX (BMI) OF 32.0 TO 32.9 IN ADULT: ICD-10-CM

## 2025-04-01 DIAGNOSIS — K76.0 NAFLD (NONALCOHOLIC FATTY LIVER DISEASE): ICD-10-CM

## 2025-04-01 DIAGNOSIS — E83.9 CHRONIC KIDNEY DISEASE-MINERAL AND BONE DISORDER: ICD-10-CM

## 2025-04-01 DIAGNOSIS — I50.33 ACUTE ON CHRONIC DIASTOLIC CONGESTIVE HEART FAILURE: ICD-10-CM

## 2025-04-01 DIAGNOSIS — N18.9 CHRONIC KIDNEY DISEASE-MINERAL AND BONE DISORDER: ICD-10-CM

## 2025-04-01 DIAGNOSIS — E55.9 VITAMIN D DEFICIENCY: ICD-10-CM

## 2025-04-01 DIAGNOSIS — K21.9 GASTROESOPHAGEAL REFLUX DISEASE WITHOUT ESOPHAGITIS: ICD-10-CM

## 2025-04-01 DIAGNOSIS — N18.6 ANEMIA IN ESRD (END-STAGE RENAL DISEASE): ICD-10-CM

## 2025-04-01 DIAGNOSIS — D63.1 ANEMIA IN ESRD (END-STAGE RENAL DISEASE): ICD-10-CM

## 2025-04-01 DIAGNOSIS — E66.9 OBESITY, DIABETES, AND HYPERTENSION SYNDROME: ICD-10-CM

## 2025-04-01 DIAGNOSIS — E11.22 TYPE 2 DIABETES MELLITUS WITH CHRONIC KIDNEY DISEASE ON CHRONIC DIALYSIS, WITH LONG-TERM CURRENT USE OF INSULIN: ICD-10-CM

## 2025-04-01 DIAGNOSIS — E11.69 OBESITY, DIABETES, AND HYPERTENSION SYNDROME: ICD-10-CM

## 2025-04-01 DIAGNOSIS — M89.9 CHRONIC KIDNEY DISEASE-MINERAL AND BONE DISORDER: ICD-10-CM

## 2025-04-01 DIAGNOSIS — Z99.2 ESRD (END STAGE RENAL DISEASE) ON DIALYSIS: Chronic | ICD-10-CM

## 2025-04-01 PROBLEM — D69.6 THROMBOCYTOPENIA: Status: RESOLVED | Noted: 2024-12-02 | Resolved: 2025-04-01

## 2025-04-01 PROBLEM — R41.82 ALTERED MENTAL STATUS: Status: RESOLVED | Noted: 2024-12-02 | Resolved: 2025-04-01

## 2025-04-01 PROBLEM — I16.1 HYPERTENSIVE EMERGENCY: Status: RESOLVED | Noted: 2024-12-02 | Resolved: 2025-04-01

## 2025-04-01 NOTE — PROGRESS NOTES
"Jael Hall presented for an initial Medicare AWV today. The following components were reviewed and updated:    Medical history  Family History  Social history  Allergies and Current Medications  Health Risk Assessment  Health Maintenance  Care Team    **See Completed Assessments for Annual Wellness visit with in the encounter summary    The following assessments were completed:  Depression Screening  Cognitive function Screening    Timed Get Up Test  Whisper Test: Deferred, hearing impaired      Opioid documentation:      Patient does not have a current opioid prescription.          Vitals:    04/01/25 0843   BP: (!) 178/80   BP Location: Left arm   Patient Position: Sitting   Pulse: 82   Resp: 16   Temp: 98.2 °F (36.8 °C)   SpO2: 99%   Weight: 86.7 kg (191 lb 2.2 oz)   Height: 5' 4" (1.626 m)     Body mass index is 32.81 kg/m².       Physical Exam  Vitals reviewed.   Constitutional:       General: She is not in acute distress.     Appearance: Normal appearance.   HENT:      Head: Normocephalic.      Right Ear: Decreased hearing noted.      Left Ear: Decreased hearing noted.   Cardiovascular:      Rate and Rhythm: Normal rate and regular rhythm.      Pulses: Normal pulses.      Heart sounds: Normal heart sounds.   Pulmonary:      Effort: Pulmonary effort is normal. No respiratory distress.      Breath sounds: Normal breath sounds. No wheezing.   Abdominal:      General: Bowel sounds are normal.      Tenderness: There is no abdominal tenderness.   Musculoskeletal:         General: Normal range of motion.      Cervical back: Normal range of motion.      Right lower leg: No edema.      Left lower leg: No edema.   Skin:     General: Skin is warm and dry.      Capillary Refill: Capillary refill takes less than 2 seconds.   Neurological:      General: No focal deficit present.      Mental Status: She is alert and oriented to person, place, and time.   Psychiatric:         Mood and Affect: Mood normal.         Behavior: " Behavior normal.           Diagnoses and health risks identified today and associated recommendations/orders:    1. Encounter for Medicare annual wellness exam  Assessments completed.   recommendations reviewed.  F/u with PCP as instructed.      - Ambulatory Referral/Consult to Enhanced Annual Wellness Visit (eAWV)    2. ESRD (end stage renal disease) on dialysis  Chronic, stable on current Renvela regimen. Followed by Dialysis.   Lab Results   Component Value Date    CREATININE 5.9 (H) 12/04/2024    BUN 18 12/04/2024     (L) 12/04/2024    K 4.1 12/04/2024     12/04/2024    CO2 21 (L) 12/04/2024     3. Type 2 diabetes mellitus with chronic kidney disease on chronic dialysis, with long-term current use of insulin  Chronic, stable on current Novolog, Lantus regimen. Followed by PCP.   Lab Results   Component Value Date    HGBA1C 4.6 02/17/2025     - Ambulatory referral/consult to Podiatry; Future    4. Acute on chronic diastolic congestive heart failure  Chronic, stable on current Labetalol regimen. Followed by Cardiology.     5. AIHA (autoimmune hemolytic anemia)  Chronic, stable on current regimen. Followed by PCP.  Lab Results   Component Value Date    WBC 8.04 02/17/2025    HGB 10.4 (L) 02/17/2025    HCT 32.8 (L) 02/17/2025    MCV 85 02/17/2025     02/17/2025     6. Secondary hyperparathyroidism  Chronic, stable on current regimen. Followed by Dialysis.   Lab Results   Component Value Date    .3 (H) 10/07/2024    CALCIUM 9.2 12/04/2024    PHOS 4.7 (H) 12/04/2024     7. Renal cell carcinoma, unspecified laterality  Chronic, stable on current regimen. Followed by Oncology.     8. Hypertension associated with diabetes  Chronic, stable on current Amlodipine, Labetalol, Losartan regimen. Followed by PCP.     9. Obesity, diabetes, and hypertension syndrome  Chronic, stable on current regimen. Followed by PCP.     10. Symptomatic bradycardia  Chronic, stable on current regimen. Followed by  PCP.     11. Chronic kidney disease-mineral and bone disorder  Chronic, stable on current regimen. Followed by Dialysis.     12. Anemia in ESRD (end-stage renal disease)  Chronic, stable on current regimen. Followed by PCP.     13. Class 1 obesity due to excess calories with serious comorbidity and body mass index (BMI) of 32.0 to 32.9 in adult  Chronic, stable on current regimen. Followed by PCP.     14. Vitamin D deficiency  Chronic, stable on current regimen. Followed by PCP.     15. NAFLD (nonalcoholic fatty liver disease)  Chronic, stable on current regimen. Followed by PCP.   Lab Results   Component Value Date    ALT 9 (L) 12/02/2024    AST 18 12/02/2024    ALKPHOS 54 12/02/2024    BILITOT 0.9 12/02/2024     16. Gastroesophageal reflux disease without esophagitis  Chronic, stable on current regimen. Followed by PCP.     17. Sensorineural hearing loss (SNHL) of both ears  Chronic, stable on current regimen. Followed by Audiology.       Provided Jael with a 5-10 year written screening schedule and personal prevention plan. Recommendations were developed using the USPSTF age appropriate recommendations. Education, counseling, and referrals were provided as needed.  After Visit Summary printed and given to patient which includes a list of additional screenings\tests needed.    Follow up in about 1 year (around 4/1/2026) for Medicare AWV.      Ibeth Kumar NP    I offered to discuss advanced care planning, including how to pick a person who would make decisions for you if you were unable to make them for yourself, called a health care power of , and what kind of decisions you might make such as use of life sustaining treatments such as ventilators and tube feeding when faced with a life limiting illness recorded on a living will that they will need to know. (How you want to be cared for as you near the end of your natural life)     X Patient is interested in learning more about how to make advanced  directives.  I provided them paperwork and offered to discuss this with them.

## 2025-04-01 NOTE — PATIENT INSTRUCTIONS
Counseling and Referral of Other Preventative  (Italic type indicates deductible and co-insurance are waived)    Patient Name: Jael Hall  Today's Date: 4/1/2025    Health Maintenance       Date Due Completion Date    Shingles Vaccine (1 of 2) Never done ---    RSV Vaccine (Age 60+ and Pregnant patients) (1 - Risk 60-74 years 1-dose series) Never done ---    Influenza Vaccine (1) 09/01/2024 10/27/2018    COVID-19 Vaccine (4 - 2024-25 season) 09/01/2024 1/7/2022    Foot Exam 03/07/2025 3/7/2024    Pneumococcal Vaccines (Age 50+) (4 of 4 - PCV20 or PCV21) 05/04/2025 5/4/2020    Hemoglobin A1c 08/17/2025 2/17/2025    Diabetic Eye Exam 08/22/2025 8/22/2024    Lipid Panel 10/28/2025 10/28/2024    Mammogram 01/06/2026 1/6/2025    Colorectal Cancer Screening 11/30/2026 11/30/2023    TETANUS VACCINE 09/28/2027 9/28/2017        No orders of the defined types were placed in this encounter.    The following information is provided to all patients.  This information is to help you find resources for any of the problems found today that may be affecting your health:                  Living healthy guide: www.[facility].[place].gov      Understanding Diabetes: www.diabetes.org      Eating healthy: www.cdc.gov/healthyweight      CDC home safety checklist: www.cdc.gov/steadi/patient.html      Agency on Aging: www.goea.[place].gov      Alcoholics anonymous (AA): www.aa.org      Physical Activity: www.james.nih.gov/fc6vcqq      Tobacco use: www.quitwithusla.org

## 2025-04-15 DIAGNOSIS — E11.42 DIABETIC POLYNEUROPATHY ASSOCIATED WITH TYPE 2 DIABETES MELLITUS: ICD-10-CM

## 2025-04-15 NOTE — TELEPHONE ENCOUNTER
----- Message from Sowmya sent at 4/15/2025 10:26 AM CDT -----  Contact: [facility]  Cc: Kenyon Willams MDRequesting an RX refill or new RX.Is this a refill or new RX: refill 1RX name and strength (copy/paste from chart):  pregabalin (LYRICA) 75 MG capsuleIs this a 30 day or 90 day RX: Pharmacy name and phone # (copy/paste from chart):  [facility] - [facility] [address] Phone: [phone]: [phone] doctors have asked that we provide their patients with the following 2 reminders -- prescription refills can take up to 72 hours, and a friendly reminder that in the future you can use your [facility] account to request refills:

## 2025-04-16 RX ORDER — PREGABALIN 75 MG/1
75 CAPSULE ORAL DAILY
Qty: 90 CAPSULE | Refills: 3 | Status: SHIPPED | OUTPATIENT
Start: 2025-04-16

## 2025-05-02 ENCOUNTER — OFFICE VISIT (OUTPATIENT)
Dept: OTOLARYNGOLOGY | Facility: CLINIC | Age: 60
End: 2025-05-02
Payer: MEDICARE

## 2025-05-02 DIAGNOSIS — H91.22 SUDDEN LEFT HEARING LOSS: ICD-10-CM

## 2025-05-02 DIAGNOSIS — Z96.21 COCHLEAR IMPLANT IN PLACE: Primary | ICD-10-CM

## 2025-05-02 PROCEDURE — 99999 PR PBB SHADOW E&M-EST. PATIENT-LVL III: CPT | Mod: PBBFAC,HCNC,, | Performed by: OTOLARYNGOLOGY

## 2025-05-02 PROCEDURE — 3072F LOW RISK FOR RETINOPATHY: CPT | Mod: CPTII,HCNC,S$GLB, | Performed by: OTOLARYNGOLOGY

## 2025-05-02 PROCEDURE — 4010F ACE/ARB THERAPY RXD/TAKEN: CPT | Mod: CPTII,HCNC,S$GLB, | Performed by: OTOLARYNGOLOGY

## 2025-05-02 PROCEDURE — 1159F MED LIST DOCD IN RCRD: CPT | Mod: CPTII,HCNC,S$GLB, | Performed by: OTOLARYNGOLOGY

## 2025-05-02 PROCEDURE — 99213 OFFICE O/P EST LOW 20 MIN: CPT | Mod: HCNC,S$GLB,, | Performed by: OTOLARYNGOLOGY

## 2025-05-02 PROCEDURE — 3044F HG A1C LEVEL LT 7.0%: CPT | Mod: CPTII,HCNC,S$GLB, | Performed by: OTOLARYNGOLOGY

## 2025-05-03 NOTE — PROGRESS NOTES
Subjective     Patient ID: Jael Hall is a 59 y.o. female.    Chief Complaint: Follow-up    Follow-up  Pertinent negatives include no chest pain, chills, fever or sore throat.       Jael Hall is a 59 y.o. female with hx of right CI presents for check up. She reports having issues with her CI battery and that her hearing aids is not working well in her left ear.  She is also seeing audiology today. She wants to make sure there are no outer or middle ear issues contributing additionally .     Review of Systems   Constitutional:  Negative for chills and fever.   HENT:  Positive for hearing loss. Negative for sore throat and trouble swallowing.    Respiratory:  Negative for apnea and chest tightness.    Cardiovascular:  Negative for chest pain.          Objective     Physical Exam  Vitals and nursing note reviewed.   Constitutional:       Appearance: Normal appearance.   HENT:      Head: Normocephalic and atraumatic.      Right Ear: Tympanic membrane, ear canal and external ear normal. There is no impacted cerumen.      Left Ear: Tympanic membrane, ear canal and external ear normal. There is no impacted cerumen.   Neurological:      Mental Status: She is alert.           Data Reviewed:               Assessment and Plan     1. Cochlear implant in place    2. Sudden left hearing loss        No evidence of ear canal or middle ear problem  Continue f/u with audiology  Discussed option of CI for left ear, pt will think about this.          No follow-ups on file.

## 2025-05-06 ENCOUNTER — OFFICE VISIT (OUTPATIENT)
Dept: PODIATRY | Facility: CLINIC | Age: 60
End: 2025-05-06
Payer: MEDICARE

## 2025-05-06 VITALS
SYSTOLIC BLOOD PRESSURE: 149 MMHG | WEIGHT: 187.25 LBS | BODY MASS INDEX: 32.15 KG/M2 | DIASTOLIC BLOOD PRESSURE: 72 MMHG | HEART RATE: 78 BPM

## 2025-05-06 DIAGNOSIS — E11.22 TYPE 2 DIABETES MELLITUS WITH CHRONIC KIDNEY DISEASE ON CHRONIC DIALYSIS, WITH LONG-TERM CURRENT USE OF INSULIN: ICD-10-CM

## 2025-05-06 DIAGNOSIS — E11.69 OBESITY, DIABETES, AND HYPERTENSION SYNDROME: ICD-10-CM

## 2025-05-06 DIAGNOSIS — E55.9 VITAMIN D DEFICIENCY: Primary | ICD-10-CM

## 2025-05-06 DIAGNOSIS — N18.6 TYPE 2 DIABETES MELLITUS WITH CHRONIC KIDNEY DISEASE ON CHRONIC DIALYSIS, WITH LONG-TERM CURRENT USE OF INSULIN: ICD-10-CM

## 2025-05-06 DIAGNOSIS — Z79.4 TYPE 2 DIABETES MELLITUS WITH CHRONIC KIDNEY DISEASE ON CHRONIC DIALYSIS, WITH LONG-TERM CURRENT USE OF INSULIN: ICD-10-CM

## 2025-05-06 DIAGNOSIS — Z99.2 TYPE 2 DIABETES MELLITUS WITH CHRONIC KIDNEY DISEASE ON CHRONIC DIALYSIS, WITH LONG-TERM CURRENT USE OF INSULIN: ICD-10-CM

## 2025-05-06 DIAGNOSIS — E66.9 OBESITY, DIABETES, AND HYPERTENSION SYNDROME: ICD-10-CM

## 2025-05-06 DIAGNOSIS — I15.2 OBESITY, DIABETES, AND HYPERTENSION SYNDROME: ICD-10-CM

## 2025-05-06 DIAGNOSIS — E11.59 OBESITY, DIABETES, AND HYPERTENSION SYNDROME: ICD-10-CM

## 2025-05-06 PROCEDURE — 3044F HG A1C LEVEL LT 7.0%: CPT | Mod: CPTII,HCNC,S$GLB, | Performed by: PODIATRIST

## 2025-05-06 PROCEDURE — 3078F DIAST BP <80 MM HG: CPT | Mod: CPTII,HCNC,S$GLB, | Performed by: PODIATRIST

## 2025-05-06 PROCEDURE — 99999 PR PBB SHADOW E&M-EST. PATIENT-LVL IV: CPT | Mod: PBBFAC,HCNC,, | Performed by: PODIATRIST

## 2025-05-06 PROCEDURE — 3077F SYST BP >= 140 MM HG: CPT | Mod: CPTII,HCNC,S$GLB, | Performed by: PODIATRIST

## 2025-05-06 PROCEDURE — 1159F MED LIST DOCD IN RCRD: CPT | Mod: CPTII,HCNC,S$GLB, | Performed by: PODIATRIST

## 2025-05-06 PROCEDURE — 1160F RVW MEDS BY RX/DR IN RCRD: CPT | Mod: CPTII,HCNC,S$GLB, | Performed by: PODIATRIST

## 2025-05-06 PROCEDURE — 3008F BODY MASS INDEX DOCD: CPT | Mod: CPTII,HCNC,S$GLB, | Performed by: PODIATRIST

## 2025-05-06 PROCEDURE — 99203 OFFICE O/P NEW LOW 30 MIN: CPT | Mod: HCNC,S$GLB,, | Performed by: PODIATRIST

## 2025-05-06 PROCEDURE — 3072F LOW RISK FOR RETINOPATHY: CPT | Mod: CPTII,HCNC,S$GLB, | Performed by: PODIATRIST

## 2025-05-06 PROCEDURE — 4010F ACE/ARB THERAPY RXD/TAKEN: CPT | Mod: CPTII,HCNC,S$GLB, | Performed by: PODIATRIST

## 2025-05-12 NOTE — PROGRESS NOTES
Subjective:      Patient ID: Jael Hall is a 59 y.o. female.    Chief Complaint:   Foot Swelling (Right foot) and Diabetic Foot Exam (Pcp Kenyon Willams MD 02/17/2025)    Jael is a 59 y.o. female who presents to the clinic upon referral from Dr. Kumar  for evaluation and treatment of diabetic feet. Jael has a past medical history of Acute hyperkalemia (09/09/2020), Acute on chronic diastolic heart failure (10/08/2021), Anemia of chronic disorder (11/08/2017), Anemia of chronic renal failure, stage 5, Anxiety, Cyst of left ovary (05/14/2019), Cyst of left ovary (07/05/2019), Dyslipidemia (01/04/2013), Encounter for screening colonoscopy (10/08/2020), End-stage renal disease on hemodialysis (08/20/2020), ESRD on hemodialysis (09/28/2017), Hematuria (10/30/2019), History of COVID-19 (04/07/2020), Hospital discharge follow-up (10/06/2023), sinus bradycardia (09/28/2017), Hyperkalemia (03/29/2017), Hypertensive urgency (07/22/2021), Peripheral neuropathy (01/04/2013), Preoperative testing (10/08/2020), Renovascular hypertension (08/17/2020), S/P dilation and curettage (05/22/2018), Screening for colon cancer (11/28/2023), Thickened endometrium (05/22/2018), Thyroid nodule, Thyroid nodule (12/13/2021), and Uncontrolled type 2 diabetes mellitus (04/05/2013). Patient relates no major problem with feet. Only complaints today consist of routine diabetic foot evaluation.    PCP: Kenyon Willams MD    Date Last Seen by PCP:   Chief Complaint   Patient presents with    Foot Swelling     Right foot    Diabetic Foot Exam     Pcp Kenyon Willams MD 02/17/2025         Current shoe gear: Casual shoes    Hemoglobin A1C   Date Value Ref Range Status   02/17/2025 4.6 4.0 - 5.6 % Final     Comment:     ADA Screening Guidelines:  5.7-6.4%  Consistent with prediabetes  >or=6.5%  Consistent with diabetes    High levels of fetal hemoglobin interfere with the HbA1C  assay. Heterozygous hemoglobin variants (HbS, HgC, etc)do  not  significantly interfere with this assay.   However, presence of multiple variants may affect accuracy.     09/01/2024 7.6 (H) 4.0 - 5.6 % Final     Comment:     ADA Screening Guidelines:  5.7-6.4%  Consistent with prediabetes  >or=6.5%  Consistent with diabetes    High levels of fetal hemoglobin interfere with the HbA1C  assay. Heterozygous hemoglobin variants (HbS, HgC, etc)do  not significantly interfere with this assay.   However, presence of multiple variants may affect accuracy.     03/07/2024 5.6 4.0 - 5.6 % Final     Comment:     ADA Screening Guidelines:  5.7-6.4%  Consistent with prediabetes  >or=6.5%  Consistent with diabetes    High levels of fetal hemoglobin interfere with the HbA1C  assay. Heterozygous hemoglobin variants (HbS, HgC, etc)do  not significantly interfere with this assay.   However, presence of multiple variants may affect accuracy.     03/07/2024 5.6 4.0 - 5.6 % Final     Comment:     ADA Screening Guidelines:  5.7-6.4%  Consistent with prediabetes  >or=6.5%  Consistent with diabetes    High levels of fetal hemoglobin interfere with the HbA1C  assay. Heterozygous hemoglobin variants (HbS, HgC, etc)do  not significantly interfere with this assay.   However, presence of multiple variants may affect accuracy.           Review of Systems   Constitutional: Negative for chills, decreased appetite, fever and malaise/fatigue.   HENT:  Negative for congestion, hearing loss, nosebleeds and tinnitus.    Eyes:  Negative for double vision, pain, photophobia and visual disturbance.   Cardiovascular:  Negative for chest pain, claudication, cyanosis and leg swelling.   Respiratory:  Negative for cough, hemoptysis, shortness of breath and wheezing.    Endocrine: Negative for cold intolerance and heat intolerance.   Hematologic/Lymphatic: Negative for adenopathy and bleeding problem.   Skin:  Negative for color change, dry skin, itching, nail changes and suspicious lesions.   Musculoskeletal:  Negative for  arthritis, joint pain, myalgias and stiffness.   Gastrointestinal:  Negative for abdominal pain, jaundice, nausea and vomiting.   Genitourinary:  Negative for dysuria, frequency and hematuria.   Neurological:  Negative for difficulty with concentration, loss of balance, numbness, paresthesias and sensory change.   Psychiatric/Behavioral:  Negative for altered mental status, hallucinations and suicidal ideas. The patient is not nervous/anxious.    Allergic/Immunologic: Negative for environmental allergies and persistent infections.           Objective:      Physical Exam  Vitals reviewed.   Constitutional:       Appearance: She is well-developed.   HENT:      Head: Normocephalic and atraumatic.   Cardiovascular:      Pulses:           Dorsalis pedis pulses are 2+ on the right side and 2+ on the left side.        Posterior tibial pulses are 2+ on the right side and 2+ on the left side.   Pulmonary:      Effort: Pulmonary effort is normal.   Musculoskeletal:         General: Normal range of motion.      Comments: Inspection and palpation of the muscles joints and bones of both lower extremities reveal that muscle strength for the anterior lateral and posterior muscle groups and intrinsic muscle groups of the foot are all 5 over 5 symmetrical.  Ankle subtalar midtarsal and digital joint range of motion are within normal limits, nonpainful, without crepitus or effusion.  Patient exhibits a normal angle and base of gait.  Palpation of the tendons reveal no defects.   Skin:     General: Skin is warm and dry.      Capillary Refill: Capillary refill takes 2 to 3 seconds.      Comments: Skin turgor is normal bilaterally.  Skin texture is well hydrated to both lower extremities.  No lesions or rashes or wounds appreciated bilaterally.  Nail plates 1 through 5 bilaterally are within normal limits for length and thickness.  No nail clubbing or incurvation noted.   Neurological:      Mental Status: She is alert and oriented to  person, place, and time.      Comments: Sharp dull light touch vibratory proprioceptive sensation are intact bilaterally.  Deep tendon reflexes to patellar and Achilles tendon are symmetrical 2 over 4 bilaterally.  No ankle clonus or Babinski reflexes noted bilaterally.  Coordination is normal to both feet and lower extremities.   Psychiatric:         Behavior: Behavior normal.               Assessment:       Encounter Diagnoses   Name Primary?    Type 2 diabetes mellitus with chronic kidney disease on chronic dialysis, with long-term current use of insulin     Vitamin D deficiency Yes    Obesity, diabetes, and hypertension syndrome      Independent visualization of imaging was performed.  Results were reviewed in detail with patient.       Plan:       Jael was seen today for foot swelling and diabetic foot exam.    Diagnoses and all orders for this visit:    Vitamin D deficiency    Type 2 diabetes mellitus with chronic kidney disease on chronic dialysis, with long-term current use of insulin  -     Ambulatory referral/consult to Podiatry    Obesity, diabetes, and hypertension syndrome      I counseled the patient on her conditions, their implications and medical management.    Ongoing monitoring, evaluating, assessing, and treatment options are recognized and reviewed in detail with the patient.    Shoe inspection. Diabetic Foot Education. Patient reminded of the importance of good nutrition and blood sugar control to help prevent podiatric complications of diabetes. Patient instructed on proper foot hygeine. We discussed wearing proper shoe gear, daily foot inspections and Diabetic foot education in detail.    Return to clinic in 3-6 months or sooner if problems arise

## 2025-05-20 ENCOUNTER — OFFICE VISIT (OUTPATIENT)
Dept: URGENT CARE | Facility: CLINIC | Age: 60
End: 2025-05-20
Payer: MEDICARE

## 2025-05-20 VITALS
HEART RATE: 83 BPM | TEMPERATURE: 99 F | DIASTOLIC BLOOD PRESSURE: 70 MMHG | OXYGEN SATURATION: 98 % | HEIGHT: 64 IN | BODY MASS INDEX: 31.24 KG/M2 | WEIGHT: 183 LBS | SYSTOLIC BLOOD PRESSURE: 144 MMHG | RESPIRATION RATE: 20 BRPM

## 2025-05-20 DIAGNOSIS — A08.4 VIRAL GASTROENTERITIS: Primary | ICD-10-CM

## 2025-05-20 PROCEDURE — 99213 OFFICE O/P EST LOW 20 MIN: CPT | Mod: S$GLB,,, | Performed by: INTERNAL MEDICINE

## 2025-05-20 NOTE — PATIENT INSTRUCTIONS
Go to the ER with severe abdominal pain or fever or inabilty to hold down fluids.     Continue BLAND or BRAT (Bananas, rice, apples, toast) diet and grafually progress until you have no more nausea.

## 2025-05-20 NOTE — PROGRESS NOTES
"Subjective:      Patient ID: Jael Hall is a 59 y.o. female.    Vitals:  height is 5' 4" (1.626 m) and weight is 83 kg (182 lb 15.7 oz). Her oral temperature is 99 °F (37.2 °C). Her blood pressure is 144/70 (abnormal) and her pulse is 83. Her respiration is 20 and oxygen saturation is 98%.     Chief Complaint: Abdominal Pain    Jael Hall is a 59 y.o. female who presents today with a chief complaint of abdominal pain & nausea that began yesterday.  Patient did not take any OTC medications for treatment.  Patient was able to hold down soup this morning.  Had diarrhea yesterday (3x); has not had any episode today.  Denies any fever.  Patient has PMH of End-stage renal disease on hemodialysis & Anemia of chronic renal failure, stage 5.    60 yo female with ESRD (MWF), HTN, diabetes, HLD who presents with one day of n/v/d. She was around her step son this weekend who had similar symptoms. His symptoms went away on Monday and the patient started with n/v/d yesterday. It has improved today. She has a slightly sore stomach, but tolerated soup yesterday and this morning. She had diarrhea x3 yesterday, but none today. She has not vomited today. She makes very small amounts of urine. She has had no fever.         Abdominal Pain  This is a new problem. The current episode started yesterday. The onset quality is sudden. The problem occurs constantly. The problem has been unchanged. The pain is located in the generalized abdominal region. The pain is at a severity of 6/10. The pain is moderate. The quality of the pain is cramping. The abdominal pain does not radiate. Associated symptoms include nausea. Pertinent negatives include no anorexia, arthralgias, belching, constipation, diarrhea, dysuria, fever, flatus, frequency, headaches, hematochezia, hematuria, melena, myalgias, vomiting or weight loss. The pain is aggravated by eating. The pain is relieved by Nothing. She has tried nothing for the symptoms. "     Constitution: Negative for fever.   Gastrointestinal:  Positive for abdominal pain and nausea. Negative for vomiting, constipation, diarrhea and bright red blood in stool.   Genitourinary:  Negative for dysuria, frequency and hematuria.   Musculoskeletal:  Negative for joint pain and muscle ache.   Skin:  Negative for erythema.   Neurological:  Negative for headaches.      Objective:     Physical Exam   Constitutional: She is oriented to person, place, and time. She appears well-developed.  Non-toxic appearance. She does not appear ill. No distress.   HENT:   Head: Normocephalic and atraumatic.   Ears:   Right Ear: External ear normal.   Left Ear: External ear normal.   Nose: Nose normal.   Mouth/Throat: Oropharynx is clear and moist. Mucous membranes are moist. No oropharyngeal exudate. Oropharynx is clear.   Eyes: Conjunctivae and EOM are normal. Pupils are equal, round, and reactive to light. Right eye exhibits no discharge. Left eye exhibits no discharge. No scleral icterus.   Neck: Neck supple.   Cardiovascular: Normal rate, regular rhythm and normal heart sounds.   No murmur heard.Exam reveals no gallop and no friction rub.   Pulmonary/Chest: Effort normal. No respiratory distress. She has no wheezes. She has no rales.   Abdominal: There is no abdominal tenderness. There is no rebound and no guarding. No hernia.   Lymphadenopathy:     She has no cervical adenopathy.   Neurological: She is alert and oriented to person, place, and time.   Skin: Skin is warm, dry, not diaphoretic and no rash. Capillary refill takes less than 2 seconds. No erythema   Psychiatric: Her behavior is normal.   Nursing note and vitals reviewed.      Assessment:     1. Viral gastroenteritis        Plan:       Viral gastroenteritis    Clinically improving, tolerating fluids and bland diet. Slightly high BP but otherwise normal vitals. Do not think any testing warranting with history highly suggestive of hyperacute viral  gastroenteritis which is improving. Recommended continued bland diet. Discussed ED precautions such as severe abdominal pain, fever. She has dialysis scheduled for tomorrow, appears euvolumic on exam.

## 2025-05-22 ENCOUNTER — CLINICAL SUPPORT (OUTPATIENT)
Dept: AUDIOLOGY | Facility: CLINIC | Age: 60
End: 2025-05-22
Payer: MEDICARE

## 2025-05-22 DIAGNOSIS — H90.3 SENSORINEURAL HEARING LOSS (SNHL) OF BOTH EARS: Primary | ICD-10-CM

## 2025-05-22 PROCEDURE — 92604 REPROGRAM COCHLEAR IMPLT 7/>: CPT | Mod: HCNC,S$GLB,, | Performed by: AUDIOLOGIST

## 2025-05-22 NOTE — PROGRESS NOTES
Cochlear Implant Programming Session      Right Ear:  Dr. Zuleta  :  Cochlear Tindies  Internal Device/Serial Number:  [id number]   Processor:  [id number]   SN of Processors: [id number]  DOS:  6/30/21  DIS:  7/20/21  Processor Color: Black  Magnet Strength: 5i  Coil Length:  6cm  Battery Type:  Standard rechargeable  Warranty:  5 year      Ms. Jael Hall was seen today for a follow up cochlear implant programming session. Patient reprted she has been having difficutly hearing with her implant. Patient reported she hears a lot of static and has trouble understanding. Patient also reported one of her processors is not working properly and needs to be sent to Cochlear.     Impedances were measured with both processors. It was noted that impedances for channel one were elevated. While measuring Cs no loudness growth was noted for electrode 1, so it was disabled. Pulse width was increased in order to be able to increase stimulation and set Cs at loud. Patient reported improved sound perception. Both processors were found to be in good working condition and programmed equally.     Patient was given business cards to clinics that work with [employer] so she can go and purchase a hearing aid for her left ear.    Recommend  Possible programming with ESRT measurements in the future  Annual evaluation  Follow-up programming as needed

## 2025-06-18 ENCOUNTER — OFFICE VISIT (OUTPATIENT)
Dept: NEPHROLOGY | Facility: CLINIC | Age: 60
End: 2025-06-18
Payer: MEDICARE

## 2025-06-18 VITALS
SYSTOLIC BLOOD PRESSURE: 188 MMHG | BODY MASS INDEX: 31.99 KG/M2 | RESPIRATION RATE: 12 BRPM | TEMPERATURE: 99 F | WEIGHT: 187.38 LBS | OXYGEN SATURATION: 97 % | HEIGHT: 64 IN | DIASTOLIC BLOOD PRESSURE: 86 MMHG | HEART RATE: 83 BPM

## 2025-06-18 DIAGNOSIS — N18.6 ESRD ON DIALYSIS: Primary | ICD-10-CM

## 2025-06-18 DIAGNOSIS — Z99.2 ESRD ON DIALYSIS: Primary | ICD-10-CM

## 2025-06-18 PROCEDURE — 3079F DIAST BP 80-89 MM HG: CPT | Mod: CPTII,HCNC,S$GLB, | Performed by: INTERNAL MEDICINE

## 2025-06-18 PROCEDURE — 3072F LOW RISK FOR RETINOPATHY: CPT | Mod: CPTII,HCNC,S$GLB, | Performed by: INTERNAL MEDICINE

## 2025-06-18 PROCEDURE — 99999 PR PBB SHADOW E&M-EST. PATIENT-LVL III: CPT | Mod: PBBFAC,HCNC,,

## 2025-06-18 PROCEDURE — 1160F RVW MEDS BY RX/DR IN RCRD: CPT | Mod: CPTII,HCNC,S$GLB, | Performed by: INTERNAL MEDICINE

## 2025-06-18 PROCEDURE — 3008F BODY MASS INDEX DOCD: CPT | Mod: CPTII,HCNC,S$GLB, | Performed by: INTERNAL MEDICINE

## 2025-06-18 PROCEDURE — 4010F ACE/ARB THERAPY RXD/TAKEN: CPT | Mod: CPTII,HCNC,S$GLB, | Performed by: INTERNAL MEDICINE

## 2025-06-18 PROCEDURE — 3044F HG A1C LEVEL LT 7.0%: CPT | Mod: CPTII,HCNC,S$GLB, | Performed by: INTERNAL MEDICINE

## 2025-06-18 PROCEDURE — 3077F SYST BP >= 140 MM HG: CPT | Mod: CPTII,HCNC,S$GLB, | Performed by: INTERNAL MEDICINE

## 2025-06-18 PROCEDURE — 99213 OFFICE O/P EST LOW 20 MIN: CPT | Mod: HCNC,S$GLB,, | Performed by: INTERNAL MEDICINE

## 2025-06-18 PROCEDURE — 1159F MED LIST DOCD IN RCRD: CPT | Mod: CPTII,HCNC,S$GLB, | Performed by: INTERNAL MEDICINE

## 2025-06-18 NOTE — PROGRESS NOTES
U Interventional Nephrology Follow-up Exam    Date:   06/18/2025 2:37 PM    HPI:  59 year old woman with ESRD (MWF at [facility] under my care) who presents today for ultrasound clinic appt to maintain patency of her RUE radiocephalic AVF.  Recent fistulogram was done on 12/5/24.  We did not perform any angioplasty at that time because we did not find a clear stenosis near the arterial anastomosis, because all of the contrast flowed up collateral vessels.  Here for follow up exam and ultrasound.  She has had no recent issues with prolonged bleeding, difficulty in cannulation or increased frequency of pressure alarms while on dialysis.      Vitals:    06/18/25 1429   BP: (!) 188/86   Pulse: 83   Resp: 12   Temp: 98.6 °F (37 °C)     Exam:   ACCESS:  RUE radiocephalic AV fistula with very soft thrill and minimal pulsatility, appropriate pulse augmentation and partial collapse with arm elevation.    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  Plan follow up exam in 3 months.  Please do refer her sooner if she develops signs of stenosis or impending thrombosis.      Lester Gómez MD  [phone]

## 2025-06-28 RX ORDER — INSULIN GLARGINE 100 [IU]/ML
12 INJECTION, SOLUTION SUBCUTANEOUS DAILY
Qty: 10.8 ML | Refills: 1 | Status: SHIPPED | OUTPATIENT
Start: 2025-06-28

## 2025-07-03 ENCOUNTER — LAB VISIT (OUTPATIENT)
Dept: LAB | Facility: HOSPITAL | Age: 60
End: 2025-07-03
Payer: MEDICARE

## 2025-07-03 ENCOUNTER — OFFICE VISIT (OUTPATIENT)
Dept: INTERNAL MEDICINE | Facility: CLINIC | Age: 60
End: 2025-07-03
Payer: MEDICARE

## 2025-07-03 VITALS
DIASTOLIC BLOOD PRESSURE: 83 MMHG | OXYGEN SATURATION: 94 % | SYSTOLIC BLOOD PRESSURE: 164 MMHG | WEIGHT: 188.06 LBS | BODY MASS INDEX: 32.11 KG/M2 | HEART RATE: 80 BPM | HEIGHT: 64 IN

## 2025-07-03 DIAGNOSIS — D63.1 ANEMIA IN ESRD (END-STAGE RENAL DISEASE): ICD-10-CM

## 2025-07-03 DIAGNOSIS — N18.6 ESRD (END STAGE RENAL DISEASE) ON DIALYSIS: ICD-10-CM

## 2025-07-03 DIAGNOSIS — I15.1 HYPERTENSION SECONDARY TO OTHER RENAL DISORDERS: ICD-10-CM

## 2025-07-03 DIAGNOSIS — Z90.5 HISTORY OF NEPHRECTOMY: ICD-10-CM

## 2025-07-03 DIAGNOSIS — I27.20 PULMONARY HTN: ICD-10-CM

## 2025-07-03 DIAGNOSIS — Z87.09 HISTORY OF RESPIRATORY FAILURE: ICD-10-CM

## 2025-07-03 DIAGNOSIS — E04.1 THYROID NODULE: ICD-10-CM

## 2025-07-03 DIAGNOSIS — Z09 HOSPITAL DISCHARGE FOLLOW-UP: Primary | ICD-10-CM

## 2025-07-03 DIAGNOSIS — N25.0: ICD-10-CM

## 2025-07-03 DIAGNOSIS — E21.3: ICD-10-CM

## 2025-07-03 DIAGNOSIS — R79.9 ABNORMAL FINDING OF BLOOD CHEMISTRY, UNSPECIFIED: ICD-10-CM

## 2025-07-03 DIAGNOSIS — N18.6 TYPE 2 DIABETES MELLITUS WITH CHRONIC KIDNEY DISEASE ON CHRONIC DIALYSIS, WITHOUT LONG-TERM CURRENT USE OF INSULIN: ICD-10-CM

## 2025-07-03 DIAGNOSIS — K80.20 GALLSTONES: ICD-10-CM

## 2025-07-03 DIAGNOSIS — E11.22 TYPE 2 DIABETES MELLITUS WITH CHRONIC KIDNEY DISEASE ON CHRONIC DIALYSIS, WITHOUT LONG-TERM CURRENT USE OF INSULIN: ICD-10-CM

## 2025-07-03 DIAGNOSIS — Z99.2 ESRD (END STAGE RENAL DISEASE) ON DIALYSIS: ICD-10-CM

## 2025-07-03 DIAGNOSIS — E78.2 MIXED HYPERLIPIDEMIA: ICD-10-CM

## 2025-07-03 DIAGNOSIS — Z85.528 HISTORY OF RENAL CELL CANCER: ICD-10-CM

## 2025-07-03 DIAGNOSIS — Z99.2 TYPE 2 DIABETES MELLITUS WITH CHRONIC KIDNEY DISEASE ON CHRONIC DIALYSIS, WITHOUT LONG-TERM CURRENT USE OF INSULIN: ICD-10-CM

## 2025-07-03 DIAGNOSIS — I25.10 CORONARY ARTERY DISEASE, UNSPECIFIED VESSEL OR LESION TYPE, UNSPECIFIED WHETHER ANGINA PRESENT, UNSPECIFIED WHETHER NATIVE OR TRANSPLANTED HEART: ICD-10-CM

## 2025-07-03 DIAGNOSIS — Z87.19 HISTORY OF PANCREATITIS: ICD-10-CM

## 2025-07-03 DIAGNOSIS — I50.32 CHRONIC HEART FAILURE WITH PRESERVED EJECTION FRACTION: ICD-10-CM

## 2025-07-03 DIAGNOSIS — Z09 HOSPITAL DISCHARGE FOLLOW-UP: ICD-10-CM

## 2025-07-03 DIAGNOSIS — N18.6 ANEMIA IN ESRD (END-STAGE RENAL DISEASE): ICD-10-CM

## 2025-07-03 PROBLEM — I50.30 HEART FAILURE WITH PRESERVED EJECTION FRACTION: Status: ACTIVE | Noted: 2025-07-03

## 2025-07-03 LAB
ALBUMIN SERPL BCP-MCNC: 4.1 G/DL (ref 3.5–5.2)
ALP SERPL-CCNC: 152 UNIT/L (ref 40–150)
ALT SERPL W/O P-5'-P-CCNC: 6 UNIT/L (ref 10–44)
ANION GAP (OHS): 15 MMOL/L (ref 8–16)
AST SERPL-CCNC: 14 UNIT/L (ref 11–45)
BILIRUB SERPL-MCNC: 1.5 MG/DL (ref 0.1–1)
BUN SERPL-MCNC: 39 MG/DL (ref 8–23)
CALCIUM SERPL-MCNC: 8.9 MG/DL (ref 8.7–10.5)
CHLORIDE SERPL-SCNC: 99 MMOL/L (ref 95–110)
CHOLEST SERPL-MCNC: 90 MG/DL (ref 120–199)
CHOLEST/HDLC SERPL: 2.6 {RATIO} (ref 2–5)
CO2 SERPL-SCNC: 25 MMOL/L (ref 23–29)
CREAT SERPL-MCNC: 8.3 MG/DL (ref 0.5–1.4)
EAG (OHS): 85 MG/DL (ref 68–131)
GFR SERPLBLD CREATININE-BSD FMLA CKD-EPI: 5 ML/MIN/1.73/M2
GLUCOSE SERPL-MCNC: 94 MG/DL (ref 70–110)
HBA1C MFR BLD: 4.6 % (ref 4–5.6)
HDLC SERPL-MCNC: 35 MG/DL (ref 40–75)
HDLC SERPL: 38.9 % (ref 20–50)
LDLC SERPL CALC-MCNC: 35.6 MG/DL (ref 63–159)
NONHDLC SERPL-MCNC: 55 MG/DL
POTASSIUM SERPL-SCNC: 4.3 MMOL/L (ref 3.5–5.1)
PROT SERPL-MCNC: 8.3 GM/DL (ref 6–8.4)
SODIUM SERPL-SCNC: 139 MMOL/L (ref 136–145)
T4 FREE SERPL-MCNC: 1.02 NG/DL (ref 0.71–1.51)
TRIGL SERPL-MCNC: 97 MG/DL (ref 30–150)
TSH SERPL-ACNC: 0.33 UIU/ML (ref 0.4–4)

## 2025-07-03 PROCEDURE — 99999 PR PBB SHADOW E&M-EST. PATIENT-LVL III: CPT | Mod: PBBFAC,HCNC,GC,

## 2025-07-03 PROCEDURE — 84443 ASSAY THYROID STIM HORMONE: CPT | Mod: HCNC

## 2025-07-03 PROCEDURE — 84439 ASSAY OF FREE THYROXINE: CPT | Mod: HCNC

## 2025-07-03 PROCEDURE — 83036 HEMOGLOBIN GLYCOSYLATED A1C: CPT | Mod: HCNC

## 2025-07-03 PROCEDURE — 36415 COLL VENOUS BLD VENIPUNCTURE: CPT | Mod: HCNC

## 2025-07-03 PROCEDURE — 80061 LIPID PANEL: CPT | Mod: HCNC

## 2025-07-03 PROCEDURE — 80053 COMPREHEN METABOLIC PANEL: CPT | Mod: HCNC

## 2025-07-03 RX ORDER — SEVELAMER HYDROCHLORIDE 800 MG/1
800 TABLET, FILM COATED ORAL
COMMUNITY

## 2025-07-03 RX ORDER — OMEPRAZOLE 20 MG/1
20 CAPSULE, DELAYED RELEASE ORAL EVERY MORNING
COMMUNITY

## 2025-07-03 RX ORDER — NIFEDIPINE 30 MG/1
30 TABLET, EXTENDED RELEASE ORAL DAILY
Qty: 30 TABLET | Refills: 11 | Status: SHIPPED | OUTPATIENT
Start: 2025-07-03 | End: 2026-07-03

## 2025-07-03 NOTE — PROGRESS NOTES
I have reviewed and concur with the resident's history, assessment, and plan. See below addendum for my evaluation and additional recommendations:     -Switch Amlodipine to Nifedipine for better BP control. Labile BP with HD. Can discuss switching ARB with Nephro (has been on different ones before but concern for high K). Hx of hyperkalemia but on HD MWF. Has HD tomorrow. If K very high today will send to ED for emergent HD, has needed this before. Sees Nephro outside [facility]. No longer on diuretic  due to not making much urine anymore per pt.  -Consider re-adding Hydralazine, used to be on before.  Also consider switching labetalol to coreg.  -Due for cards f/u, last saw years ago. PA HTN and Grade II DD.  -Considering GLP1 but hx of pancreatitis her chart and gallstones on imaging.  Recommend gen surg eval. ED precautions.

## 2025-07-03 NOTE — PROGRESS NOTES
Clinic Note  7/3/2025      Subjective:       Patient ID: Jael is a 59 y.o. female  with history of T2DM c/b ESRD on HD MWF and diabetic neuropathy, RCC s/p right nephrectomy 2020, htn, HFpEF (EF 60%), NAFLD here for a follow up appointment.    Patient was recently admitted 4/21/25-4/22/25 with AHRF 2/2 volume overload. She was dialysed and discharged a day later with no complications.     Since discharge, patient states that she has been doing.  She does endorse frequent lower extremity swelling and shortness of breath with moderate exertion.  She had dialysis yesterday, but states that they had to stop early due to her having severe abdominal cramping.  States that they removed 1 L of fluid.  In clinic today she does appear volume overloaded.  Reports that she will have dialysis tomorrow morning.  Blood pressure is elevated in clinic as well with the reading showing 160s over 80s.  Reports that BP has been in the 140s systolic at home.  States that she is compliant with her home medications which are losartan amlodipine and labetalol.    Patient with no further complaints today.          Past Medical History:   Diagnosis Date    Acute hyperkalemia 09/09/2020    Acute on chronic diastolic heart failure 10/08/2021    Anemia of chronic disorder 11/08/2017    Overview:  Added automatically from request for surgery [id number]    Anemia of chronic renal failure, stage 5     Anxiety     Coronary artery disease 7/3/2025    Cyst of left ovary 05/14/2019    Cyst of left ovary 07/05/2019    Dyslipidemia 01/04/2013    Encounter for screening colonoscopy 10/08/2020    End-stage renal disease on hemodialysis 08/20/2020    ESRD on hemodialysis 09/28/2017    Hematuria 10/30/2019    History of COVID-19 04/07/2020    Hospital discharge follow-up 10/06/2023    Hx of sinus bradycardia 09/28/2017    Hyperkalemia 03/29/2017    Hypertensive urgency 07/22/2021    Peripheral neuropathy 01/04/2013    Preoperative testing 10/08/2020     Renovascular hypertension 2020    S/P dilation and curettage 2018    Screening for colon cancer 2023    Thickened endometrium 2018    Thyroid nodule     Thyroid nodule 2021    Uncontrolled type 2 diabetes mellitus 2013    Overview:  poc a1c today 11.2-270/ up from 10.8-263, Pt. has very very stressed/ was incarcerated/marital stress/ will current meds/ less stress now/ will monitor.       Past Surgical History:   Procedure Laterality Date    AV FISTULA PLACEMENT      right arm     SECTION      x1    COLONOSCOPY N/A 2018    Procedure: COLONOSCOPY;  Surgeon: Pankaj iHnojosa MD;  Location: [facility] (4TH FLR);  Service: Endoscopy;  Laterality: N/A;  dialysis pt/labs prior to colon/MS    COLONOSCOPY N/A 2023    Procedure: COLONOSCOPY;  Surgeon: Angel Turner MD;  Location: [facility] (2ND FLR);  Service: Endoscopy;  Laterality: N/A;  Ref By:  Maty Gayle MD  Procedure: Colonoscopy  Diagnosis: endounter for preventive health   Procedure Timing: pt. pref  Provider: Dr. turner   Location: [facility] [place] 4-Endo   Prep Specifications: Standard prep   Additional Info: Dialysis patient lab pending    CYSTOSCOPY W/ RETROGRADES Bilateral 2020    Procedure: CYSTOSCOPY, WITH RETROGRADE PYELOGRAM;  Surgeon: Douglas Abraham MD;  Location: [facility] OR 1ST FLR;  Service: Urology;  Laterality: Bilateral;  45 min    FISTULOGRAM Right 2020    Procedure: Fistulogram;  Surgeon: Lester Gómez MD;  Location: [facility] CATH LAB;  Service: Nephrology;  Laterality: Right;    FISTULOGRAM Right 2024    Procedure: Fistulogram RIGHT FOREARM;  Surgeon: Lester Gómez MD;  Location: [facility] CATH LAB;  Service: Nephrology;  Laterality: Right;    HYSTERECTOMY      IMPLANTATION OF COCHLEAR PROSTHESIS Right 2021    Procedure: INSERTION, PROSTHESIS, COCHLEAR;  Surgeon: Ramiro Zuleta MD;  Location: [facility] OR 2ND FLR;  Service: ENT;  Laterality: Right;  COCHLEAR BABAK    IMPLANTATION OF COCHLEAR  PROSTHESIS Right 6/30/2021    Procedure: INSERTION, PROSTHESIS, COCHLEAR;  Surgeon: Ramiro Zuleta MD;  Location: [facility] OR 2ND FLR;  Service: ENT;  Laterality: Right;  COCHLEAR AMERICAS    ROBOT-ASSISTED LAPAROSCOPIC ABDOMINAL HYSTERECTOMY USING DA NATALYA XI N/A 7/5/2019    Procedure: XI ROBOTIC HYSTERECTOMY;  Surgeon: Trice Lei MD;  Location: [facility];  Service: OB/GYN;  Laterality: N/A;    ROBOT-ASSISTED LAPAROSCOPIC NEPHRECTOMY Right 10/8/2020    Procedure: ROBOTIC NEPHRECTOMY;  Surgeon: Douglas Abraham MD;  Location: [facility] OR 2ND FLR;  Service: Urology;  Laterality: Right;  3.5hrs gen with regional     ROBOT-ASSISTED LAPAROSCOPIC SALPINGO-OOPHORECTOMY USING DA NATALYA XI Bilateral 7/5/2019    Procedure: XI ROBOTIC SALPINGO-OOPHORECTOMY;  Surgeon: Trice Lei MD;  Location: [facility];  Service: OB/GYN;  Laterality: Bilateral;    TUBAL LIGATION      Vascular access surgeries      x5       Family History   Problem Relation Name Age of Onset    Cancer Mother      Ovarian cancer Mother      Cancer Sister      Ovarian cancer Sister      Lung cancer Sister      Macular degeneration Sister      Cancer Sister      Ovarian cancer Sister      Diabetes Sister      Diabetes Brother      Breast cancer Neg Hx      Colon cancer Neg Hx      Cervical cancer Neg Hx      Endometrial cancer Neg Hx      Vaginal cancer Neg Hx      Thyroid disease Neg Hx      Glaucoma Neg Hx      Retinal detachment Neg Hx         Social History[1]    Review of Systems   Constitutional:  Negative for chills and fever.   Respiratory:  Positive for shortness of breath.    Cardiovascular:  Positive for leg swelling. Negative for chest pain.   Gastrointestinal:  Negative for heartburn, nausea and vomiting.   Neurological:  Negative for dizziness and headaches.       Medication List with Changes/Refills   New Medications    NIFEDIPINE (PROCARDIA-XL) 30 MG (OSM) 24 HR TABLET    Take 1 tablet (30 mg total) by mouth once daily.   Current Medications     "ACCU-CHEK GUIDE GLUCOSE METER MISC        ACCU-CHEK GUIDE L1-L2 CTRL SOL SOLN        ACCU-CHEK GUIDE TEST STRIPS STRP        ACCU-CHEK SOFTCLIX LANCETS MISC        ACETAMINOPHEN (TYLENOL) 500 MG TABLET    Take 1 tablet (500 mg total) by mouth every 8 (eight) hours as needed for Pain.    ASPIRIN (ECOTRIN) 81 MG EC TABLET    Take 81 mg by mouth every morning.    ATORVASTATIN (LIPITOR) 40 MG TABLET    Take 1 tablet (40 mg total) by mouth once daily.    BLOOD SUGAR DIAGNOSTIC STRP    To check BG 3 times daily, to use with insurance preferred meter    BLOOD-GLUCOSE METER KIT    To check BG 3 times daily, to use with insurance preferred meter    DROPSAFE ALCOHOL PREP PADS PADM    Apply topically.    EPOETIN ELENA (PROCRIT) 10,000 UNIT/ML INJECTION    Inject 0.85 mLs (8,500 Units total) into the skin every Mon, Wed, Fri.    INSULIN ASPART U-100 (NOVOLOG) 100 UNIT/ML (3 ML) INPN PEN    Inject 0-5 Units into the skin before meals and at bedtime as needed (Hyperglycemia).    INSULIN GLARGINE U-100, LANTUS, (LANTUS SOLOSTAR U-100 INSULIN) 100 UNIT/ML (3 ML) INPN PEN    Inject 12 Units into the skin once daily.    LABETALOL (NORMODYNE) 200 MG TABLET    Take 1 tablet (200 mg total) by mouth 2 (two) times daily.    LANCETS MISC    To check BG 3 times daily, to use with insurance preferred meter    LOSARTAN (COZAAR) 100 MG TABLET    Take 1 tablet (100 mg total) by mouth once daily.    OMEPRAZOLE (PRILOSEC) 20 MG CAPSULE    Take 20 mg by mouth every morning.    PEN NEEDLE, DIABETIC 31 GAUGE X 3/16" NDLE    Use to inject insulin into the skin.    PREGABALIN (LYRICA) 75 MG CAPSULE    Take 1 capsule (75 mg total) by mouth once daily.    SEVELAMER CARBONATE (RENVELA) 800 MG TAB    Take 3 tablets (2,400 mg total) by mouth 3 (three) times daily with meals.    SEVELAMER HCL (RENAGEL) 800 MG TAB    Take 800 mg by mouth.   Discontinued Medications    AMLODIPINE (NORVASC) 10 MG TABLET    Take 1 tablet (10 mg total) by mouth nightly. " "      Problem List[2]            Objective:      BP (!) 164/83 (BP Location: Left arm, Patient Position: Sitting)   Pulse 80   Ht 5' 4" (1.626 m)   Wt 85.3 kg (188 lb 0.8 oz)   LMP 03/15/2016 (LMP Unknown)   SpO2 (!) 94%   BMI 32.28 kg/m²   Estimated body mass index is 32.28 kg/m² as calculated from the following:    Height as of this encounter: 5' 4" (1.626 m).    Weight as of this encounter: 85.3 kg (188 lb 0.8 oz).      Physical Exam  Constitutional:       Appearance: Normal appearance. She is obese.   HENT:      Mouth/Throat:      Mouth: Mucous membranes are moist.      Pharynx: Oropharynx is clear.   Cardiovascular:      Rate and Rhythm: Normal rate and regular rhythm.      Pulses: Normal pulses.      Heart sounds: Normal heart sounds.   Pulmonary:      Effort: Pulmonary effort is normal.      Breath sounds: Normal breath sounds.   Abdominal:      General: Abdomen is flat. There is distension.   Musculoskeletal:      Right lower leg: Edema present.      Left lower leg: Edema present.      Comments: fistula present to RUE w/ palpable thrill   Skin:     General: Skin is warm and dry.      Capillary Refill: Capillary refill takes less than 2 seconds.   Neurological:      General: No focal deficit present.      Mental Status: She is alert.           Assessment and Plan:         Problem List Items Addressed This Visit          Pulmonary    History of respiratory failure       Cardiac/Vascular    Mixed hyperlipidemia    - continue home atorvastatin 40  - repeat lipid panel ordered    Hypertension secondary to other renal disorders    -patient blood pressure currently appears uncontrolled.  Elevated to 160 systolic in clinic today.  Reports that blood pressure is usually 140 systolic at home.  We will switch amlodipine over to nifedipine.  Plan to see patient in clinic next week for possible titration.  We will keep patient on losartan as of now due to history of hyperkalemia.    Relevant Medications    " NIFEdipine (PROCARDIA-XL) 30 MG (OSM) 24 hr tablet    Heart failure with preserved ejection fraction    - follows with Dr. Clemons in heart failure clinic.  Follow-up scheduled for    Relevant Orders    COMPRESSION STOCKINGS    Pulmonary HTN    -noted on recent echo from 02/18/2025    Coronary artery disease    - continue home statin       Renal/    ESRD (end stage renal disease) on dialysis (Chronic)    On hemodialysis MWF.  Noted to have recurrent episodes of volume overload and hyperkalemia.  Follows in Nephro clinic with .  We will recheck renal function panel today.    Relevant Orders    Comprehensive Metabolic Panel    History of nephrectomy    - history of R. renal cell carcinoma s/p robotic nephrectomy in 2020.  Follows with Dr. Abraham in urology clinic.  Undergoes surveillance of left kidney.  Scheduled for contrasted renal ultrasound for this month.       Oncology    Anemia in ESRD (end-stage renal disease)    History of renal cell cancer       Endocrine    Type 2 diabetes mellitus    - latest A1c in February was 4.6.  We will recheck today.  - continue Lantus 12u daily, correctional scale insulin    Relevant Orders    Hemoglobin A1C    Thyroid nodule    - Hx of thyroid nodule.  We will order repeat maintenance ultrasound.    Relevant Orders    US Thyroid       GI    Gallstones    - Hx noted.  Asymptomatic.      History of pancreatitis    - Hx noted.       Orthopedic    Renal osteodystrophy due to hyperparathyroidism     Other Visit Diagnoses         Hospital discharge follow-up    -  Primary    Relevant Orders    TSH    Lipid Panel     Follow Up:       Jael was seen today for follow-up.    Diagnoses and all orders for this visit:    Hospital discharge follow-up  -     TSH; Future  -     Lipid Panel; Future    Hypertension secondary to other renal disorders  -     NIFEdipine (PROCARDIA-XL) 30 MG (OSM) 24 hr tablet; Take 1 tablet (30 mg total) by mouth once daily.    ESRD (end stage renal disease)  on dialysis  -     Comprehensive Metabolic Panel; Future    History of nephrectomy    History of renal cell cancer    Anemia in ESRD (end-stage renal disease)    Renal osteodystrophy due to hyperparathyroidism    Type 2 diabetes mellitus with chronic kidney disease on chronic dialysis, without long-term current use of insulin  -     Hemoglobin A1C; Future    Chronic heart failure with preserved ejection fraction  -     COMPRESSION STOCKINGS    Coronary artery disease, unspecified vessel or lesion type, unspecified whether angina present, unspecified whether native or transplanted heart    History of respiratory failure    Pulmonary HTN    Mixed hyperlipidemia    Gallstones    History of pancreatitis    Thyroid nodule  -     US Thyroid; Future    Abnormal finding of blood chemistry, unspecified  -     Lipid Panel; Future            Other Orders Placed This Visit:  Orders Placed This Encounter   Procedures    COMPRESSION STOCKINGS    US Thyroid    Comprehensive Metabolic Panel    Hemoglobin A1C    TSH    Lipid Panel             Interview, Assessment, Findings, and Plan discussed with Dr. Rivera    Follow up in about 5 days (around 7/8/2025).    Kenyon Willams MD PGY-3  Internal Medicine           [1]   Social History  Socioeconomic History    Marital status:     Number of children: 2   Occupational History    Occupation: disability   Tobacco Use    Smoking status: Never    Smokeless tobacco: Never   Substance and Sexual Activity    Alcohol use: No    Drug use: No    Sexual activity: Yes     Partners: Male     Birth control/protection: Post-menopausal     Social Drivers of Health     Financial Resource Strain: Low Risk  (4/21/2025)    Received from [facility]sidi[facility] Aff[facility]    Overall Financial Resource Strain (CARDIA)     Difficulty of Paying Living Expenses: Not very hard   Food Insecurity: No Food Insecurity (4/21/2025)    Received from [facility] and [facility]    Hunger Vital Sign     Worried About Running Out of Food in the Last Year: Never true     Ran Out of Food in the Last Year: Never true   Transportation Needs: No Transportation Needs (4/1/2025)    PRAPARE - Transportation     Lack of Transportation (Medical): No     Lack of Transportation (Non-Medical): No   Physical Activity: Inactive (4/1/2025)    Exercise Vital Sign     Days of Exercise per Week: 0 days     Minutes of Exercise per Session: 0 min   Stress: No Stress Concern Present (4/1/2025)    [employer] - Occupational Stress Questionnaire     Feeling of Stress : Not at all   Housing Stability: Unknown (4/21/2025)    Received from [facility] and [facility] and [facility]    Housing Stability Vital Sign     Unable to Pay for Housing in the Last Year: No     Number of Times Moved in the Last Year: 0   [2]   Patient Active Problem List  Diagnosis    Mixed hyperlipidemia    ESRD (end stage renal disease) on dialysis    Patient on waiting list for kidney transplant    NAFLD (nonalcoholic fatty liver disease)    History of nephrectomy    Essential hypertension    Allergic rhinitis    Cystitis cystica    Hyperkalemia    Poor dentition    GERD (gastroesophageal reflux disease)    Chorioretinal scar, left eye    Hypertension secondary to other renal disorders    Scarring alopecia    Vitamin D deficiency    Symptomatic bradycardia    Pica in adults    Renal mass    Chronic kidney disease-mineral and bone disorder    Chronic diastolic heart failure    Obesity, diabetes, and hypertension syndrome    Hypervolemia associated with renal insufficiency    Sensorineural hearing loss    Class 1 obesity due to excess calories with serious comorbidity and body mass index (BMI) of 32.0 to 32.9 in adult    QT prolongation    Type 2 diabetes mellitus    Thyroid nodule    Anemia in ESRD  (end-stage renal disease)    Bilateral swelling of feet and ankles    Chest pain    AIHA (autoimmune hemolytic anemia)    Diabetic ketoacidosis without coma associated with type 2 diabetes mellitus    SOB (shortness of breath)    Acute on chronic diastolic congestive heart failure    Secondary hyperparathyroidism    Acute hypoxemic respiratory failure    History of respiratory failure    Heart failure with preserved ejection fraction    Pulmonary HTN    Coronary artery disease    Renal osteodystrophy due to hyperparathyroidism    History of renal cell cancer    Gallstones    History of pancreatitis

## 2025-07-09 ENCOUNTER — HOSPITAL ENCOUNTER (OUTPATIENT)
Dept: RADIOLOGY | Facility: HOSPITAL | Age: 60
Discharge: HOME | End: 2025-07-09
Payer: MEDICARE

## 2025-07-09 DIAGNOSIS — E04.1 THYROID NODULE: ICD-10-CM

## 2025-07-09 PROCEDURE — 76536 US EXAM OF HEAD AND NECK: CPT | Mod: TC,HCNC

## 2025-07-09 PROCEDURE — 76536 US EXAM OF HEAD AND NECK: CPT | Mod: 26,HCNC,, | Performed by: STUDENT IN AN ORGANIZED HEALTH CARE EDUCATION/TRAINING PROGRAM

## 2025-07-10 ENCOUNTER — OFFICE VISIT (OUTPATIENT)
Dept: INTERNAL MEDICINE | Facility: CLINIC | Age: 60
End: 2025-07-10
Payer: MEDICARE

## 2025-07-10 VITALS
SYSTOLIC BLOOD PRESSURE: 132 MMHG | DIASTOLIC BLOOD PRESSURE: 78 MMHG | HEIGHT: 64 IN | BODY MASS INDEX: 33.16 KG/M2 | WEIGHT: 194.25 LBS

## 2025-07-10 DIAGNOSIS — Z09 FOLLOW-UP EXAM: Primary | ICD-10-CM

## 2025-07-10 DIAGNOSIS — L29.9 PRURITUS: ICD-10-CM

## 2025-07-10 DIAGNOSIS — I10 ESSENTIAL HYPERTENSION: ICD-10-CM

## 2025-07-10 DIAGNOSIS — E04.1 THYROID NODULE GREATER THAN OR EQUAL TO 1.5 CM IN DIAMETER INCIDENTALLY NOTED ON IMAGING STUDY: ICD-10-CM

## 2025-07-10 PROCEDURE — 3072F LOW RISK FOR RETINOPATHY: CPT | Mod: CPTII,HCNC,S$GLB, | Performed by: INTERNAL MEDICINE

## 2025-07-10 PROCEDURE — 3078F DIAST BP <80 MM HG: CPT | Mod: CPTII,HCNC,S$GLB, | Performed by: INTERNAL MEDICINE

## 2025-07-10 PROCEDURE — 3044F HG A1C LEVEL LT 7.0%: CPT | Mod: CPTII,HCNC,S$GLB, | Performed by: INTERNAL MEDICINE

## 2025-07-10 PROCEDURE — 3075F SYST BP GE 130 - 139MM HG: CPT | Mod: CPTII,HCNC,S$GLB, | Performed by: INTERNAL MEDICINE

## 2025-07-10 PROCEDURE — 99213 OFFICE O/P EST LOW 20 MIN: CPT | Mod: HCNC,S$GLB,, | Performed by: INTERNAL MEDICINE

## 2025-07-10 PROCEDURE — 4010F ACE/ARB THERAPY RXD/TAKEN: CPT | Mod: CPTII,HCNC,S$GLB, | Performed by: INTERNAL MEDICINE

## 2025-07-10 PROCEDURE — 3008F BODY MASS INDEX DOCD: CPT | Mod: CPTII,HCNC,S$GLB, | Performed by: INTERNAL MEDICINE

## 2025-07-10 PROCEDURE — 99999 PR PBB SHADOW E&M-EST. PATIENT-LVL III: CPT | Mod: PBBFAC,HCNC,GC,

## 2025-07-10 RX ORDER — HYDROXYZINE HYDROCHLORIDE 10 MG/1
10 TABLET, FILM COATED ORAL 2 TIMES DAILY PRN
Qty: 60 TABLET | Refills: 0 | Status: SHIPPED | OUTPATIENT
Start: 2025-07-10 | End: 2025-08-09

## 2025-07-10 NOTE — PATIENT INSTRUCTIONS
- Purchase Cerave moisturizing cream for itching/pruritus. If this does not help, then use medication atarax that I am prescribing as needed.  - Discuss with nephrologist alternative medications for itching since pregablin is no longer helping

## 2025-07-10 NOTE — PROGRESS NOTES
I have reviewed the notes, assessments, and/or procedures performed by Dr. Willams, I concur with her/his documentation of Jael Hall.

## 2025-07-10 NOTE — PROGRESS NOTES
Clinic Note  7/10/2025      Subjective:       Patient ID: Jael is a 59 y.o. female  with history of T2DM c/b ESRD on HD MWF and diabetic neuropathy, RCC s/p right nephrectomy 2020, htn, HFpEF (EF 60%), NAFLD here for a follow up appointment.     I evaluated patient last week in clinic wherein her blood pressure was elevated to 164/83 with a systolic pressures over 160 on multiple readings.  Decision was made to switch amlodipine to nifedipine.  Patient has been taking new medication for 1 week without problems.  States that she has been taking blood pressure at home with improved systolic readings in the 140s.    Also ordered repeat thyroid ultrasound that showed a new right upper lobe nodule measuring 2.8 x 2.6 x 2.3 cm.       Past Medical History:   Diagnosis Date    Acute hyperkalemia 09/09/2020    Acute on chronic diastolic heart failure 10/08/2021    Anemia of chronic disorder 11/08/2017    Overview:  Added automatically from request for surgery [id number]    Anemia of chronic renal failure, stage 5     Anxiety     Coronary artery disease 7/3/2025    Cyst of left ovary 05/14/2019    Cyst of left ovary 07/05/2019    Dyslipidemia 01/04/2013    Encounter for screening colonoscopy 10/08/2020    End-stage renal disease on hemodialysis 08/20/2020    ESRD on hemodialysis 09/28/2017    Hematuria 10/30/2019    History of COVID-19 04/07/2020    Hospital discharge follow-up 10/06/2023    Hx of sinus bradycardia 09/28/2017    Hyperkalemia 03/29/2017    Hypertensive urgency 07/22/2021    Peripheral neuropathy 01/04/2013    Preoperative testing 10/08/2020    Renovascular hypertension 08/17/2020    S/P dilation and curettage 05/22/2018    Screening for colon cancer 11/28/2023    Thickened endometrium 05/22/2018    Thyroid nodule     Thyroid nodule 12/13/2021    Uncontrolled type 2 diabetes mellitus 04/05/2013    Overview:  poc a1c today 11.2-270/ up from 10.8-263, Pt. has very very stressed/ was incarcerated/marital stress/  will current meds/ less stress now/ will monitor.       Past Surgical History:   Procedure Laterality Date    AV FISTULA PLACEMENT      right arm     SECTION      x1    COLONOSCOPY N/A 2018    Procedure: COLONOSCOPY;  Surgeon: Pankaj Hinojosa MD;  Location: [facility] ENDO (4TH FLR);  Service: Endoscopy;  Laterality: N/A;  dialysis pt/labs prior to colon/MS    COLONOSCOPY N/A 2023    Procedure: COLONOSCOPY;  Surgeon: Angel Turner MD;  Location: [facility] ENDO (2ND FLR);  Service: Endoscopy;  Laterality: N/A;  Ref By:  Maty Gayle MD  Procedure: Colonoscopy  Diagnosis: endounter for preventive health   Procedure Timing: pt. pref  Provider: Dr. turner   [place] [facility]   Prep Specifications: Standard prep   Additional Info: Dialysis patient lab pending    CYSTOSCOPY W/ RETROGRADES Bilateral 2020    Procedure: CYSTOSCOPY, WITH RETROGRADE PYELOGRAM;  Surgeon: Douglas Abraham MD;  Location: [facility] 1ST FLR;  Service: Urology;  Laterality: Bilateral;  45 min    FISTULOGRAM Right 2020    Procedure: Fistulogram;  Surgeon: Lester Gómez MD;  Location: [facility];  Service: Nephrology;  Laterality: Right;    FISTULOGRAM Right 2024    Procedure: Fistulogram RIGHT FOREARM;  Surgeon: Lester Gómez MD;  Location: [facility];  Service: Nephrology;  Laterality: Right;    HYSTERECTOMY      IMPLANTATION OF COCHLEAR PROSTHESIS Right 2021    Procedure: INSERTION, PROSTHESIS, COCHLEAR;  Surgeon: Ramiro Zuleta MD;  Location: [facility] 2ND FLR;  Service: ENT;  Laterality: Right;  COCHLEAR BABAK    IMPLANTATION OF COCHLEAR PROSTHESIS Right 2021    Procedure: INSERTION, PROSTHESIS, COCHLEAR;  Surgeon: Ramiro Zuleta MD;  Location: [facility] OR 2ND FLR;  Service: ENT;  Laterality: Right;  COCHLEAR AMERICAS    ROBOT-ASSISTED LAPAROSCOPIC ABDOMINAL HYSTERECTOMY USING DA NATALYA XI N/A 2019    Procedure: XI ROBOTIC HYSTERECTOMY;  Surgeon: Trice Lei MD;  Location: [facility];  Service: OB/GYN;   Laterality: N/A;    ROBOT-ASSISTED LAPAROSCOPIC NEPHRECTOMY Right 10/8/2020    Procedure: ROBOTIC NEPHRECTOMY;  Surgeon: Douglas Abraham MD;  Location: [address];  Service: Urology;  Laterality: Right;  3.5hrs gen with regional     ROBOT-ASSISTED LAPAROSCOPIC SALPINGO-OOPHORECTOMY USING DA NATALYA XI Bilateral 7/5/2019    Procedure: XI ROBOTIC SALPINGO-OOPHORECTOMY;  Surgeon: Trice Lei MD;  Location: [facility];  Service: OB/GYN;  Laterality: Bilateral;    TUBAL LIGATION      Vascular access surgeries      x5       Family History   Problem Relation Name Age of Onset    Cancer Mother      Ovarian cancer Mother      Cancer Sister      Ovarian cancer Sister      Lung cancer Sister      Macular degeneration Sister      Cancer Sister      Ovarian cancer Sister      Diabetes Sister      Diabetes Brother      Breast cancer Neg Hx      Colon cancer Neg Hx      Cervical cancer Neg Hx      Endometrial cancer Neg Hx      Vaginal cancer Neg Hx      Thyroid disease Neg Hx      Glaucoma Neg Hx      Retinal detachment Neg Hx         Social History[1]    ROS    Medication List with Changes/Refills   New Medications    HYDROXYZINE HCL (ATARAX) 10 MG TAB    Take 1 tablet (10 mg total) by mouth 2 (two) times daily as needed.   Current Medications    ACCU-CHEK GUIDE GLUCOSE METER MISC        ACCU-CHEK GUIDE L1-L2 CTRL SOL SOLN        ACCU-CHEK GUIDE TEST STRIPS STRP        ACCU-CHEK SOFTCLIX LANCETS MISC        ACETAMINOPHEN (TYLENOL) 500 MG TABLET    Take 1 tablet (500 mg total) by mouth every 8 (eight) hours as needed for Pain.    ASPIRIN (ECOTRIN) 81 MG EC TABLET    Take 81 mg by mouth every morning.    ATORVASTATIN (LIPITOR) 40 MG TABLET    Take 1 tablet (40 mg total) by mouth once daily.    BLOOD SUGAR DIAGNOSTIC STRP    To check BG 3 times daily, to use with insurance preferred meter    BLOOD-GLUCOSE METER KIT    To check BG 3 times daily, to use with insurance preferred meter    DROPSAFE ALCOHOL PREP PADS PADM     "Apply topically.    EPOETIN ELENA (PROCRIT) 10,000 UNIT/ML INJECTION    Inject 0.85 mLs (8,500 Units total) into the skin every Mon, Wed, Fri.    INSULIN ASPART U-100 (NOVOLOG) 100 UNIT/ML (3 ML) INPN PEN    Inject 0-5 Units into the skin before meals and at bedtime as needed (Hyperglycemia).    INSULIN GLARGINE U-100, LANTUS, (LANTUS SOLOSTAR U-100 INSULIN) 100 UNIT/ML (3 ML) INPN PEN    Inject 12 Units into the skin once daily.    LABETALOL (NORMODYNE) 200 MG TABLET    Take 1 tablet (200 mg total) by mouth 2 (two) times daily.    LANCETS MISC    To check BG 3 times daily, to use with insurance preferred meter    LOSARTAN (COZAAR) 100 MG TABLET    Take 1 tablet (100 mg total) by mouth once daily.    NIFEDIPINE (PROCARDIA-XL) 30 MG (OSM) 24 HR TABLET    Take 1 tablet (30 mg total) by mouth once daily.    OMEPRAZOLE (PRILOSEC) 20 MG CAPSULE    Take 20 mg by mouth every morning.    PEN NEEDLE, DIABETIC 31 GAUGE X 3/16" NDLE    Use to inject insulin into the skin.    PREGABALIN (LYRICA) 75 MG CAPSULE    Take 1 capsule (75 mg total) by mouth once daily.    SEVELAMER CARBONATE (RENVELA) 800 MG TAB    Take 3 tablets (2,400 mg total) by mouth 3 (three) times daily with meals.    SEVELAMER HCL (RENAGEL) 800 MG TAB    Take 800 mg by mouth.       Problem List[2]            Objective:      /78 (BP Location: Left arm, Patient Position: Sitting)   Ht 5' 4" (1.626 m)   Wt 88.1 kg (194 lb 3.6 oz)   LMP 03/15/2016 (LMP Unknown)   BMI 33.34 kg/m²   Estimated body mass index is 33.34 kg/m² as calculated from the following:    Height as of this encounter: 5' 4" (1.626 m).    Weight as of this encounter: 88.1 kg (194 lb 3.6 oz).      Physical Exam  Constitutional:       Appearance: Normal appearance. She is obese.   HENT:      Mouth/Throat:      Mouth: Mucous membranes are moist.      Pharynx: Oropharynx is clear.   Cardiovascular:      Rate and Rhythm: Normal rate and regular rhythm.      Pulses: Normal pulses.      Heart " sounds: Normal heart sounds.   Pulmonary:      Effort: Pulmonary effort is normal.      Breath sounds: Normal breath sounds.   Abdominal:      General: Abdomen is flat.   Musculoskeletal:      Right lower leg: Edema present.      Left lower leg: Edema present.      Comments: fistula present to RUE w/ palpable thrill   Skin:     General: Skin is warm and dry.      Capillary Refill: Capillary refill takes less than 2 seconds.   Neurological:      General: No focal deficit present.      Mental Status: She is alert.           Assessment and Plan:         Problem List Items Addressed This Visit          Cardiac/Vascular    Essential hypertension (Chronic)    - /78 in clinic today.  Patient reports improvement on home readings as well.  - continue nifedipine, labetalol, losartan.        Follow-up exam       Pruritus      - patient reports taking pregabalin for uremic pruritus.  States that it is no longer helping.  Instructed patient to by CeraVe moisturizing cream and make sure that skin is well moisturized.  Will prescribe Atarax to take PRN, renally dosed.    Relevant Medications    hydrOXYzine HCL (ATARAX) 10 MG Tab        Thyroid nodule greater than or equal to 1.5 cm in diameter    - New right upper lobe nodule measuring 2.8 x 2.6 x 2.3 cm   - Previous isthmus nodule which was biopsied with a benign pathology  - Referral sent to Endocrinology for possible Repeat FNA.    Relevant Orders    Ambulatory referral/consult to Endocrine Surgery            Follow Up:       Jael was seen today for follow-up.    Diagnoses and all orders for this visit:    Follow-up exam    Pruritus  -     hydrOXYzine HCL (ATARAX) 10 MG Tab; Take 1 tablet (10 mg total) by mouth 2 (two) times daily as needed.    Thyroid nodule greater than or equal to 1.5 cm in diameter incidentally noted on imaging study  -     Ambulatory referral/consult to Endocrine Surgery; Future    Essential hypertension            Other Orders Placed This  Visit:  Orders Placed This Encounter   Procedures    Ambulatory referral/consult to Endocrine Surgery             Interview, Assessment, Findings, and Plan discussed with Dr. Henrik Willams MD PGY-3  Internal Medicine         [1]   Social History  Socioeconomic History    Marital status:     Number of children: 2   Occupational History    Occupation: disability   Tobacco Use    Smoking status: Never    Smokeless tobacco: Never   Substance and Sexual Activity    Alcohol use: No    Drug use: No    Sexual activity: Yes     Partners: Male     Birth control/protection: Post-menopausal     Social Drivers of Health     Financial Resource Strain: Low Risk  (4/21/2025)    Received from [facility] and Its SubsidFavesies and Affiliates    Overall Financial Resource Strain (CARDIA)     Difficulty of Paying Living Expenses: Not very hard   Food Insecurity: No Food Insecurity (4/21/2025)    Received from [facility] and Its Subsid[facility] Hospital[facility] and Affiliates    Hunger Vital Sign     Worried About Running Out of Food in the Last Year: Never true     Ran Out of Food in the Last Year: Never true   Transportation Needs: No Transportation Needs (4/1/2025)    PRAPARE - Transportation     Lack of Transportation (Medical): No     Lack of Transportation (Non-Medical): No   Physical Activity: Inactive (4/1/2025)    Exercise Vital Sign     Days of Exercise per Week: 0 days     Minutes of Exercise per Session: 0 min   Stress: No Stress Concern Present (4/1/2025)    [employer] - Occupational Stress Questionnaire     Feeling of Stress : Not at all   Housing Stability: Unknown (4/21/2025)    Received from [facility] and Its Subsidiaries and Affiliates    Housing Stability Vital Sign     Unable to Pay for Housing in the Last Year: No     Number of Times Moved in the Last Year: 0   [2]    Patient Active Problem List  Diagnosis    Mixed hyperlipidemia    ESRD (end stage renal disease) on dialysis    Patient on waiting list for kidney transplant    NAFLD (nonalcoholic fatty liver disease)    History of nephrectomy    Essential hypertension    Allergic rhinitis    Cystitis cystica    Hyperkalemia    Poor dentition    GERD (gastroesophageal reflux disease)    Chorioretinal scar, left eye    Hypertension secondary to other renal disorders    Scarring alopecia    Vitamin D deficiency    Symptomatic bradycardia    Pica in adults    Renal mass    Chronic kidney disease-mineral and bone disorder    Chronic diastolic heart failure    Obesity, diabetes, and hypertension syndrome    Hypervolemia associated with renal insufficiency    Sensorineural hearing loss    Class 1 obesity due to excess calories with serious comorbidity and body mass index (BMI) of 32.0 to 32.9 in adult    QT prolongation    Type 2 diabetes mellitus    Thyroid nodule    Anemia in ESRD (end-stage renal disease)    Bilateral swelling of feet and ankles    Chest pain    AIHA (autoimmune hemolytic anemia)    Diabetic ketoacidosis without coma associated with type 2 diabetes mellitus    SOB (shortness of breath)    Acute on chronic diastolic congestive heart failure    Secondary hyperparathyroidism    Acute hypoxemic respiratory failure    History of respiratory failure    Heart failure with preserved ejection fraction    Pulmonary HTN    Coronary artery disease    Renal osteodystrophy due to hyperparathyroidism    History of renal cell cancer    Gallstones    History of pancreatitis

## 2025-07-11 ENCOUNTER — TELEPHONE (OUTPATIENT)
Dept: SURGERY | Facility: CLINIC | Age: 60
End: 2025-07-11
Payer: MEDICARE

## 2025-07-11 DIAGNOSIS — E04.1 THYROID NODULE: Primary | ICD-10-CM

## 2025-07-15 ENCOUNTER — HOSPITAL ENCOUNTER (OUTPATIENT)
Dept: RADIOLOGY | Facility: HOSPITAL | Age: 60
Discharge: HOME | End: 2025-07-15
Attending: UROLOGY
Payer: MEDICARE

## 2025-07-15 DIAGNOSIS — N28.89 RENAL MASS: ICD-10-CM

## 2025-07-15 DIAGNOSIS — C64.1 RENAL CELL CARCINOMA OF RIGHT KIDNEY: ICD-10-CM

## 2025-07-15 PROCEDURE — 71250 CT THORAX DX C-: CPT | Mod: 26,HCNC,, | Performed by: RADIOLOGY

## 2025-07-15 PROCEDURE — 71250 CT THORAX DX C-: CPT | Mod: TC,HCNC

## 2025-07-15 PROCEDURE — 76978 US TRGT DYN MBUBB 1ST LES: CPT | Mod: TC,HCNC

## 2025-07-15 PROCEDURE — 76978 US TRGT DYN MBUBB 1ST LES: CPT | Mod: 26,HCNC,, | Performed by: RADIOLOGY

## 2025-07-15 NOTE — NURSING
patient arrived for scheduled outpatient ultrasound contrast study in [facility], patient identification verified X 2, allergies reviewed, 20G IV started to left shoulder on 4th attempt by VALERIE Lares using aseptic technique, IV with good blood return and flushes easily, Lumason contrast prepared as directed, 5 ml Lumason injected per Dr. Rodriguez's request followed by 10 ml NS flush, IV to left shoulder removed without difficulty, catheter tip intact, patient tolerated study well, patient escorted to CT scan for next appointment per Radiology staff.

## 2025-07-16 ENCOUNTER — TELEPHONE (OUTPATIENT)
Dept: SURGERY | Facility: CLINIC | Age: 60
End: 2025-07-16
Payer: MEDICARE

## 2025-07-16 ENCOUNTER — HOSPITAL ENCOUNTER (OUTPATIENT)
Facility: HOSPITAL | Age: 60
Discharge: HOME | End: 2025-07-16
Attending: EMERGENCY MEDICINE | Admitting: EMERGENCY MEDICINE
Payer: MEDICARE

## 2025-07-16 VITALS
OXYGEN SATURATION: 95 % | TEMPERATURE: 98 F | HEART RATE: 73 BPM | DIASTOLIC BLOOD PRESSURE: 84 MMHG | BODY MASS INDEX: 33.16 KG/M2 | HEIGHT: 64 IN | SYSTOLIC BLOOD PRESSURE: 184 MMHG | WEIGHT: 194.25 LBS | RESPIRATION RATE: 18 BRPM

## 2025-07-16 DIAGNOSIS — I10 UNCONTROLLED HYPERTENSION: ICD-10-CM

## 2025-07-16 DIAGNOSIS — N18.6 ESRD (END STAGE RENAL DISEASE): Primary | ICD-10-CM

## 2025-07-16 DIAGNOSIS — R06.02 SHORTNESS OF BREATH: ICD-10-CM

## 2025-07-16 DIAGNOSIS — R06.02 SOB (SHORTNESS OF BREATH): ICD-10-CM

## 2025-07-16 LAB
ABSOLUTE EOSINOPHIL (OHS): 0.64 K/UL
ABSOLUTE MONOCYTE (OHS): 0.59 K/UL (ref 0.3–1)
ABSOLUTE NEUTROPHIL COUNT (OHS): 4.75 K/UL (ref 1.8–7.7)
ALBUMIN SERPL BCP-MCNC: 3.8 G/DL (ref 3.5–5.2)
ALP SERPL-CCNC: 150 UNIT/L (ref 40–150)
ALT SERPL W/O P-5'-P-CCNC: 9 UNIT/L (ref 10–44)
ANION GAP (OHS): 14 MMOL/L (ref 8–16)
AST SERPL-CCNC: 29 UNIT/L (ref 11–45)
BASOPHILS # BLD AUTO: 0.06 K/UL
BASOPHILS NFR BLD AUTO: 0.8 %
BILIRUB SERPL-MCNC: 1.1 MG/DL (ref 0.1–1)
BNP SERPL-MCNC: 879 PG/ML (ref 0–99)
BUN SERPL-MCNC: 42 MG/DL (ref 8–23)
BUN SERPL-MCNC: 49 MG/DL (ref 6–30)
CALCIUM SERPL-MCNC: 9.2 MG/DL (ref 8.7–10.5)
CHLORIDE SERPL-SCNC: 98 MMOL/L (ref 95–110)
CHLORIDE SERPL-SCNC: 99 MMOL/L (ref 95–110)
CO2 SERPL-SCNC: 27 MMOL/L (ref 23–29)
CREAT SERPL-MCNC: 9.2 MG/DL (ref 0.5–1.4)
CREAT SERPL-MCNC: 9.9 MG/DL (ref 0.5–1.4)
ERYTHROCYTE [DISTWIDTH] IN BLOOD BY AUTOMATED COUNT: 20.3 % (ref 11.5–14.5)
GFR SERPLBLD CREATININE-BSD FMLA CKD-EPI: 4 ML/MIN/1.73/M2
GLUCOSE SERPL-MCNC: 101 MG/DL (ref 70–110)
GLUCOSE SERPL-MCNC: 109 MG/DL (ref 70–110)
HCT VFR BLD AUTO: 28.5 % (ref 37–48.5)
HCT VFR BLD CALC: 26 %PCV (ref 36–54)
HGB BLD-MCNC: 9.2 GM/DL (ref 12–16)
IMM GRANULOCYTES # BLD AUTO: 0.01 K/UL (ref 0–0.04)
IMM GRANULOCYTES NFR BLD AUTO: 0.1 % (ref 0–0.5)
LYMPHOCYTES # BLD AUTO: 1.2 K/UL (ref 1–4.8)
MCH RBC QN AUTO: 25.4 PG (ref 27–31)
MCHC RBC AUTO-ENTMCNC: 32.3 G/DL (ref 32–36)
MCV RBC AUTO: 79 FL (ref 82–98)
NUCLEATED RBC (/100WBC) (OHS): 0 /100 WBC
OHS QRS DURATION: 102 MS
OHS QRS DURATION: 96 MS
OHS QTC CALCULATION: 517 MS
OHS QTC CALCULATION: 521 MS
PLATELET # BLD AUTO: 206 K/UL (ref 150–450)
PMV BLD AUTO: 10 FL (ref 9.2–12.9)
POC IONIZED CALCIUM: 1.07 MMOL/L (ref 1.06–1.42)
POC TCO2 (MEASURED): 31 MMOL/L (ref 23–29)
POCT GLUCOSE: 103 MG/DL (ref 70–110)
POCT GLUCOSE: 88 MG/DL (ref 70–110)
POTASSIUM BLD-SCNC: 4.6 MMOL/L (ref 3.5–5.1)
POTASSIUM SERPL-SCNC: 4.8 MMOL/L (ref 3.5–5.1)
PROT SERPL-MCNC: 8.3 GM/DL (ref 6–8.4)
RBC # BLD AUTO: 3.62 M/UL (ref 4–5.4)
RELATIVE EOSINOPHIL (OHS): 8.8 %
RELATIVE LYMPHOCYTE (OHS): 16.6 % (ref 18–48)
RELATIVE MONOCYTE (OHS): 8.1 % (ref 4–15)
RELATIVE NEUTROPHIL (OHS): 65.6 % (ref 38–73)
SAMPLE: ABNORMAL
SODIUM BLD-SCNC: 139 MMOL/L (ref 136–145)
SODIUM SERPL-SCNC: 140 MMOL/L (ref 136–145)
TROPONIN I SERPL HS-MCNC: 7 NG/L
WBC # BLD AUTO: 7.25 K/UL (ref 3.9–12.7)

## 2025-07-16 PROCEDURE — 25000003 PHARM REV CODE 250: Mod: HCNC | Performed by: NURSE PRACTITIONER

## 2025-07-16 PROCEDURE — 93005 ELECTROCARDIOGRAM TRACING: CPT | Mod: HCNC

## 2025-07-16 PROCEDURE — 90935 HEMODIALYSIS ONE EVALUATION: CPT | Mod: HCNC,,, | Performed by: NURSE PRACTITIONER

## 2025-07-16 PROCEDURE — 80053 COMPREHEN METABOLIC PANEL: CPT | Mod: HCNC | Performed by: PHYSICIAN ASSISTANT

## 2025-07-16 PROCEDURE — G0257 UNSCHED DIALYSIS ESRD PT HOS: HCPCS | Mod: HCNC

## 2025-07-16 PROCEDURE — 85025 COMPLETE CBC W/AUTO DIFF WBC: CPT | Mod: HCNC | Performed by: PHYSICIAN ASSISTANT

## 2025-07-16 PROCEDURE — G0378 HOSPITAL OBSERVATION PER HR: HCPCS | Mod: HCNC

## 2025-07-16 PROCEDURE — 99215 OFFICE O/P EST HI 40 MIN: CPT | Mod: 25,HCNC,, | Performed by: NURSE PRACTITIONER

## 2025-07-16 PROCEDURE — 93010 ELECTROCARDIOGRAM REPORT: CPT | Mod: HCNC,,, | Performed by: INTERNAL MEDICINE

## 2025-07-16 PROCEDURE — 83880 ASSAY OF NATRIURETIC PEPTIDE: CPT | Mod: HCNC | Performed by: PHYSICIAN ASSISTANT

## 2025-07-16 PROCEDURE — 63600175 PHARM REV CODE 636 W HCPCS: Mod: HCNC | Performed by: PHYSICIAN ASSISTANT

## 2025-07-16 PROCEDURE — 84484 ASSAY OF TROPONIN QUANT: CPT | Mod: HCNC | Performed by: PHYSICIAN ASSISTANT

## 2025-07-16 RX ORDER — GLUCAGON 1 MG
1 KIT INJECTION
Status: DISCONTINUED | OUTPATIENT
Start: 2025-07-16 | End: 2025-07-16 | Stop reason: HOSPADM

## 2025-07-16 RX ORDER — SODIUM CHLORIDE 0.9 % (FLUSH) 0.9 %
10 SYRINGE (ML) INJECTION
Status: DISCONTINUED | OUTPATIENT
Start: 2025-07-16 | End: 2025-07-16 | Stop reason: HOSPADM

## 2025-07-16 RX ORDER — ATORVASTATIN CALCIUM 40 MG/1
40 TABLET, FILM COATED ORAL DAILY
Status: DISCONTINUED | OUTPATIENT
Start: 2025-07-16 | End: 2025-07-16 | Stop reason: HOSPADM

## 2025-07-16 RX ORDER — LABETALOL 100 MG/1
200 TABLET, FILM COATED ORAL 2 TIMES DAILY
Status: DISCONTINUED | OUTPATIENT
Start: 2025-07-16 | End: 2025-07-16 | Stop reason: HOSPADM

## 2025-07-16 RX ORDER — LORAZEPAM 2 MG/ML
1 INJECTION INTRAMUSCULAR
Status: COMPLETED | OUTPATIENT
Start: 2025-07-16 | End: 2025-07-16

## 2025-07-16 RX ORDER — ASPIRIN 81 MG/1
81 TABLET ORAL EVERY MORNING
Status: DISCONTINUED | OUTPATIENT
Start: 2025-07-16 | End: 2025-07-16 | Stop reason: HOSPADM

## 2025-07-16 RX ORDER — PREGABALIN 75 MG/1
75 CAPSULE ORAL DAILY
Status: DISCONTINUED | OUTPATIENT
Start: 2025-07-16 | End: 2025-07-16 | Stop reason: HOSPADM

## 2025-07-16 RX ORDER — LOSARTAN POTASSIUM 50 MG/1
100 TABLET ORAL DAILY
Status: DISCONTINUED | OUTPATIENT
Start: 2025-07-16 | End: 2025-07-16 | Stop reason: HOSPADM

## 2025-07-16 RX ORDER — INSULIN ASPART 100 [IU]/ML
0-5 INJECTION, SOLUTION INTRAVENOUS; SUBCUTANEOUS
Status: DISCONTINUED | OUTPATIENT
Start: 2025-07-16 | End: 2025-07-16 | Stop reason: HOSPADM

## 2025-07-16 RX ORDER — IBUPROFEN 200 MG
24 TABLET ORAL
Status: DISCONTINUED | OUTPATIENT
Start: 2025-07-16 | End: 2025-07-16 | Stop reason: HOSPADM

## 2025-07-16 RX ORDER — LABETALOL HYDROCHLORIDE 5 MG/ML
20 INJECTION, SOLUTION INTRAVENOUS
Status: COMPLETED | OUTPATIENT
Start: 2025-07-16 | End: 2025-07-16

## 2025-07-16 RX ORDER — SODIUM CHLORIDE 9 MG/ML
INJECTION, SOLUTION INTRAVENOUS ONCE
Status: COMPLETED | OUTPATIENT
Start: 2025-07-16 | End: 2025-07-16

## 2025-07-16 RX ORDER — NIFEDIPINE 30 MG/1
30 TABLET, EXTENDED RELEASE ORAL DAILY
Status: DISCONTINUED | OUTPATIENT
Start: 2025-07-16 | End: 2025-07-16 | Stop reason: HOSPADM

## 2025-07-16 RX ORDER — SEVELAMER CARBONATE 800 MG/1
2400 TABLET, FILM COATED ORAL
Status: DISCONTINUED | OUTPATIENT
Start: 2025-07-16 | End: 2025-07-16 | Stop reason: HOSPADM

## 2025-07-16 RX ORDER — IBUPROFEN 200 MG
16 TABLET ORAL
Status: DISCONTINUED | OUTPATIENT
Start: 2025-07-16 | End: 2025-07-16 | Stop reason: HOSPADM

## 2025-07-16 RX ADMIN — ATORVASTATIN CALCIUM 40 MG: 40 TABLET, FILM COATED ORAL at 10:07

## 2025-07-16 RX ADMIN — LORAZEPAM 1 MG: 2 INJECTION INTRAMUSCULAR; INTRAVENOUS at 04:07

## 2025-07-16 RX ADMIN — NIFEDIPINE 30 MG: 30 TABLET, FILM COATED, EXTENDED RELEASE ORAL at 10:07

## 2025-07-16 RX ADMIN — LABETALOL HYDROCHLORIDE 200 MG: 100 TABLET, FILM COATED ORAL at 10:07

## 2025-07-16 RX ADMIN — PREGABALIN 75 MG: 75 CAPSULE ORAL at 10:07

## 2025-07-16 RX ADMIN — SEVELAMER CARBONATE 2400 MG: 800 TABLET, FILM COATED ORAL at 06:07

## 2025-07-16 RX ADMIN — LOSARTAN POTASSIUM 100 MG: 50 TABLET, FILM COATED ORAL at 10:07

## 2025-07-16 RX ADMIN — LABETALOL HYDROCHLORIDE 20 MG: 5 INJECTION INTRAVENOUS at 04:07

## 2025-07-16 RX ADMIN — ASPIRIN 81 MG: 81 TABLET, COATED ORAL at 10:07

## 2025-07-16 RX ADMIN — SODIUM CHLORIDE: 9 INJECTION, SOLUTION INTRAVENOUS at 02:07

## 2025-07-16 RX ADMIN — SEVELAMER CARBONATE 2400 MG: 800 TABLET, FILM COATED ORAL at 12:07

## 2025-07-16 NOTE — PROGRESS NOTES
[facility] NOTE     Patient currently on hemodialysis for removal of uremic toxins .     Patient seen and evaluated on hemodialysis, tolerating treatment, see HD flowsheet for vitals and assessments.      No Hypotension, chest pain, shortness of breath, cramping, nausea or vomiting.     Following patient for the management of ESRD    Target UF: 1 L as tolerated, keep MAP >65.  Seen on HD. No distress. Pre HD weight 85.8 kg (EDW 85 kg); UFR adjusted.   See full consult note for further recommendations.   Please avoid gadolinium, fleets, phos-based laxatives, NSAIDs  Will follow closely and continue dialysis treatments while in-patient    HAYDER Toro DNP, APRN, FNP-C  Department of Nephrology  [facility]  Pager: [phone]

## 2025-07-16 NOTE — NURSING
3.5 hours dialysis treatment completed  . 1  L UF removed . Both HD needles were deaccessed and pressure held in each site for 5 minutes and wrapped with tape and gauze .   Report given to Primary nurse.   Patient was transported in wheelchair .

## 2025-07-16 NOTE — ED NOTES
Assumed care of pt at this time.    LOC: The patient is awake, alert and aware of environment with an appropriate affect, the patient is oriented x 3 and speaking appropriately.   APPEARANCE: Patient appears comfortable and in no acute distress, patient is clean and well groomed.  SKIN: The skin is warm and dry, color consistent with ethnicity, patient has normal skin turgor and moist mucus membranes, skin intact, no breakdown or bruising noted.   MUSCULOSKELETAL: Patient moving all extremities spontaneously, no swelling noted. Pt is ambulatory.  RESPIRATORY: Airway is open and patent, respirations are spontaneous, patient has a normal effort and rate, no accessory muscle. Denies current SOB, currently on 2L via NC, no labored breathing noted.  CARDIAC: Pt placed on cardiac monitor. Patient has a normal rate and regular rhythm, no edema noted, capillary refill < 3 seconds. Denies CP.  GASTRO: Soft and non tender to palpation, no distention noted. Denies N/V/D.  : Pt denies any pain or frequency with urination. Pt reports oliguria.  NEURO: Pt opens eyes spontaneously, behavior appropriate to situation, follows commands, facial expression symmetrical, bilateral hand grasp equal and even, purposeful motor response noted, normal sensation in all extremities when touched with a finger.

## 2025-07-16 NOTE — HPI
The patient is a 59 y.o. Black or  Female with multiple co morbidities including ESRD on dialysis Monday Wednesday Friday. Patient has hypertension, hyperlipidemia, diabetes, and CAD who presents to ED on 7/16/2025 with complaints of  shortness, anxiety, and feeling flushed. Admitted with concerns for a allergic reaction vs panic attack. Yesterday, the patient received a ultrasound and CT scan with contrast prior to developing flushing of the skin and becoming extremely anxious and shortness of breath. Remain short of breath on exam, but denies chest pain. CMP with evidence of ESRD baseline. Nephrology consulted for management of ESRD and HD treatment.

## 2025-07-16 NOTE — CONSULTS
[facility] - Emergency Dept  Nephrology  Consult Note    Patient Name: Jael Hall  MRN: [id number]  Admission Date: 7/16/2025  Hospital Length of Stay: 0 days  Attending Provider: No att. providers found   Primary Care Physician: Kenyon Willams MD  Principal Problem:<principal problem not specified>    Inpatient consult to Nephrology  Consult performed by: Herlinda Toro, DNP, FNP-C  Consult ordered by: Jet Merchant, PAAlfonsoC  Reason for consult: ESRD        Subjective:     HPI: The patient is a 59 y.o. Black or  Female with multiple co morbidities including ESRD on dialysis Monday Wednesday Friday. Patient has hypertension, hyperlipidemia, diabetes, and CAD who presents to ED on 7/16/2025 with complaints of  shortness, anxiety, and feeling flushed. Admitted with concerns for a allergic reaction vs panic attack. Yesterday, the patient received a ultrasound and CT scan with contrast prior to developing flushing of the skin and becoming extremely anxious and shortness of breath. Remain short of breath on exam, but denies chest pain. CMP with evidence of ESRD baseline. Nephrology consulted for management of ESRD and HD treatment.    Past Medical History:   Diagnosis Date    Acute hyperkalemia 09/09/2020    Acute on chronic diastolic heart failure 10/08/2021    Anemia of chronic disorder 11/08/2017    Overview:  Added automatically from request for surgery [id number]    Anemia of chronic renal failure, stage 5     Anxiety     Coronary artery disease 7/3/2025    Cyst of left ovary 05/14/2019    Cyst of left ovary 07/05/2019    Dyslipidemia 01/04/2013    Encounter for screening colonoscopy 10/08/2020    End-stage renal disease on hemodialysis 08/20/2020    ESRD on hemodialysis 09/28/2017    Hematuria 10/30/2019    History of COVID-19 04/07/2020    Hospital discharge follow-up 10/06/2023    Hx of sinus bradycardia 09/28/2017    Hyperkalemia 03/29/2017    Hypertensive urgency 07/22/2021    Peripheral  neuropathy 2013    Preoperative testing 10/08/2020    Renovascular hypertension 2020    S/P dilation and curettage 2018    Screening for colon cancer 2023    Thickened endometrium 2018    Thyroid nodule     Thyroid nodule 2021    Uncontrolled type 2 diabetes mellitus 2013    Overview:  poc a1c today 11.2-270/ up from 10.8-263, Pt. has very very stressed/ was incarcerated/marital stress/ will current meds/ less stress now/ will monitor.       Past Surgical History:   Procedure Laterality Date    AV FISTULA PLACEMENT      right arm     SECTION      x1    COLONOSCOPY N/A 2018    Procedure: COLONOSCOPY;  Surgeon: Pankaj Hinojosa MD;  Location: [facility] (4TH FLR);  Service: Endoscopy;  Laterality: N/A;  dialysis pt/labs prior to colon/MS    COLONOSCOPY N/A 2023    Procedure: COLONOSCOPY;  Surgeon: Angel Turner MD;  Location: [facility] (2ND FLR);  Service: Endoscopy;  Laterality: N/A;  Ref By:  Maty Gayle MD  Procedure: Colonoscopy  Diagnosis: endounter for preventive health   Procedure Timing: pt. pref  Provider: Dr. turner   [place] [facility]   Prep Specifications: Standard prep   Additional Info: Dialysis patient lab pending    CYSTOSCOPY W/ RETROGRADES Bilateral 2020    Procedure: CYSTOSCOPY, WITH RETROGRADE PYELOGRAM;  Surgeon: Douglas Abraham MD;  Location: [facility] OR 1ST FLR;  Service: Urology;  Laterality: Bilateral;  45 min    FISTULOGRAM Right 2020    Procedure: Fistulogram;  Surgeon: Lester Gómez MD;  Location: [facility] CATH LAB;  Service: Nephrology;  Laterality: Right;    FISTULOGRAM Right 2024    Procedure: Fistulogram RIGHT FOREARM;  Surgeon: Lester Gómez MD;  Location: [facility] CATH LAB;  Service: Nephrology;  Laterality: Right;    HYSTERECTOMY      IMPLANTATION OF COCHLEAR PROSTHESIS Right 2021    Procedure: INSERTION, PROSTHESIS, COCHLEAR;  Surgeon: Ramiro Zuleta MD;  Location: [facility] OR 2ND FLR;  Service: ENT;  Laterality:  Right;  COCHLEAR BABAK    IMPLANTATION OF COCHLEAR PROSTHESIS Right 6/30/2021    Procedure: INSERTION, PROSTHESIS, COCHLEAR;  Surgeon: Ramiro Zuleta MD;  Location: [facility] OR 2ND FLR;  Service: ENT;  Laterality: Right;  COCHLEAR AMERICAS    ROBOT-ASSISTED LAPAROSCOPIC ABDOMINAL HYSTERECTOMY USING DA NATALYA XI N/A 7/5/2019    Procedure: XI ROBOTIC HYSTERECTOMY;  Surgeon: Trice Lei MD;  Location: [facility] OR;  Service: OB/GYN;  Laterality: N/A;    ROBOT-ASSISTED LAPAROSCOPIC NEPHRECTOMY Right 10/8/2020    Procedure: ROBOTIC NEPHRECTOMY;  Surgeon: Douglas Arbaham MD;  Location: [facility] OR 2ND FLR;  Service: Urology;  Laterality: Right;  3.5hrs gen with regional     ROBOT-ASSISTED LAPAROSCOPIC SALPINGO-OOPHORECTOMY USING DA NATALYA XI Bilateral 7/5/2019    Procedure: XI ROBOTIC SALPINGO-OOPHORECTOMY;  Surgeon: Trice Lei MD;  Location: [facility] OR;  Service: OB/GYN;  Laterality: Bilateral;    TUBAL LIGATION      Vascular access surgeries      x5       Review of patient's allergies indicates:  No Known Allergies  Current Facility-Administered Medications   Medication Frequency    aspirin EC tablet 81 mg QAM    atorvastatin tablet 40 mg Daily    dextrose 50% injection 12.5 g PRN    dextrose 50% injection 25 g PRN    glucagon (human recombinant) injection 1 mg PRN    glucose chewable tablet 16 g PRN    glucose chewable tablet 24 g PRN    insulin aspart U-100 pen 0-5 Units QID (AC + HS) PRN    labetaloL tablet 200 mg BID    losartan tablet 100 mg Daily    NIFEdipine 24 hr tablet 30 mg Daily    pregabalin capsule 75 mg Daily    sevelamer carbonate tablet 2,400 mg TID WM    sodium chloride 0.9% flush 10 mL PRN     Current Outpatient Medications   Medication    ACCU-CHEK GUIDE GLUCOSE METER Misc    ACCU-CHEK GUIDE L1-L2 CTRL SOL Soln    ACCU-CHEK GUIDE TEST STRIPS Strp    ACCU-CHEK SOFTCLIX LANCETS Misc    acetaminophen (TYLENOL) 500 MG tablet    aspirin (ECOTRIN) 81 MG EC tablet    atorvastatin (LIPITOR) 40 MG tablet    blood  "sugar diagnostic Strp    blood-glucose meter kit    DROPSAFE ALCOHOL PREP PADS PadM    epoetin shiela (PROCRIT) 10,000 unit/mL injection    hydrOXYzine HCL (ATARAX) 10 MG Tab    insulin aspart U-100 (NOVOLOG) 100 unit/mL (3 mL) InPn pen    insulin glargine U-100, Lantus, (LANTUS SOLOSTAR U-100 INSULIN) 100 unit/mL (3 mL) InPn pen    labetaloL (NORMODYNE) 200 MG tablet    lancets Misc    losartan (COZAAR) 100 MG tablet    NIFEdipine (PROCARDIA-XL) 30 MG (OSM) 24 hr tablet    omeprazole (PRILOSEC) 20 MG capsule    pen needle, diabetic 31 gauge x 3/16" Ndle    pregabalin (LYRICA) 75 MG capsule    sevelamer carbonate (RENVELA) 800 mg Tab    sevelamer HCL (RENAGEL) 800 MG Tab     Family History       Problem Relation (Age of Onset)    Cancer Mother, Sister, Sister    Diabetes Sister, Brother    Lung cancer Sister    Macular degeneration Sister    Ovarian cancer Mother, Sister, Sister          Tobacco Use    Smoking status: Never    Smokeless tobacco: Never   Substance and Sexual Activity    Alcohol use: No    Drug use: No    Sexual activity: Yes     Partners: Male     Birth control/protection: Post-menopausal     Review of Systems   Constitutional:  Negative for activity change, appetite change, fatigue and fever.   HENT:  Negative for congestion and hearing loss.    Eyes:  Negative for visual disturbance.   Respiratory:  Positive for shortness of breath. Negative for cough and chest tightness.    Cardiovascular:  Negative for chest pain, palpitations and leg swelling.   Gastrointestinal:  Negative for abdominal distention, abdominal pain, blood in stool, constipation, diarrhea, nausea and vomiting.   Genitourinary:  Positive for decreased urine volume. Negative for dysuria and urgency.   Musculoskeletal:  Negative for gait problem.   Skin:  Negative for wound.   Allergic/Immunologic: Negative.    Neurological:  Negative for dizziness, weakness and headaches.   Psychiatric/Behavioral:  Negative for agitation, behavioral " problems, confusion and decreased concentration.      Objective:     Vital Signs (Most Recent):  Temp: 98.3 °F (36.8 °C) (07/16/25 0700)  Pulse: 75 (07/16/25 1100)  Resp: 19 (07/16/25 1000)  BP: (!) 215/98 (07/16/25 1100)  SpO2: 96 % (07/16/25 1100) Vital Signs (24h Range):  Temp:  [97.8 °F (36.6 °C)-98.3 °F (36.8 °C)] 98.3 °F (36.8 °C)  Pulse:  [75-86] 75  Resp:  [12-34] 19  SpO2:  [92 %-100 %] 96 %  BP: (186-233)/() 215/98     Weight: 88.1 kg (194 lb 3.6 oz) (07/16/25 0250)  Body mass index is 33.34 kg/m².  Body surface area is 1.99 meters squared.    No intake/output data recorded.     Physical Exam  Vitals and nursing note reviewed.   Constitutional:       General: She is not in acute distress.     Appearance: She is well-developed.   HENT:      Head: Normocephalic and atraumatic.      Right Ear: Hearing and external ear normal.      Left Ear: Hearing and external ear normal.      Mouth/Throat:      Mouth: Mucous membranes are moist.   Eyes:      General: No scleral icterus.     Conjunctiva/sclera: Conjunctivae normal.   Cardiovascular:      Rate and Rhythm: Normal rate.   Pulmonary:      Effort: Pulmonary effort is normal. No respiratory distress.   Abdominal:      General: Bowel sounds are normal. There is no distension.      Palpations: Abdomen is soft.      Tenderness: There is no abdominal tenderness.   Musculoskeletal:         General: No swelling. Normal range of motion.      Cervical back: Normal range of motion.      Right lower leg: No edema.      Left lower leg: No edema.   Skin:     General: Skin is warm and dry.   Neurological:      General: No focal deficit present.      Mental Status: She is alert and oriented to person, place, and time. Mental status is at baseline.   Psychiatric:         Mood and Affect: Mood normal.         Behavior: Behavior normal. Behavior is cooperative.          Significant Labs:  CBC:   Recent Labs   Lab 07/16/25  0355 07/16/25  0600   WBC 7.25  --    RBC 3.62*  --     HGB 9.2*  --    HCT 28.5* 26*     --    MCV 79*  --    MCH 25.4*  --    MCHC 32.3  --      CMP:   Recent Labs   Lab 07/16/25  0555      CALCIUM 9.2   ALBUMIN 3.8   PROT 8.3      K 4.8   CO2 27   CL 99   BUN 42*   CREATININE 9.2*   ALKPHOS 150   ALT 9*   AST 29   BILITOT 1.1*     All labs within the past 24 hours have been reviewed.    Assessment/Plan:     Pulmonary  SOB (shortness of breath)  - HD today for volume removal     Renal/  ESRD (end stage renal disease) on dialysis  59 y.o. Black or  Female ESRD-HD M-W-F presents to ED on 7/16/2025 with diagnosis of: ESRD (end stage renal disease) [N18.6]   Nephrology consulted for inpatient ESRD-HD management    Outpatient HD Information:  -Dialysis modality: Hemodialysis  -Outpatient HD unit: [facility]   -Nephrologist: Ras TRIMBLE   -HD TX days: Tuesday/Thursday/Saturday, duration of treatment: 3.5 hrs  -Last HD TX prior to hospital admission: 09/12/24  -Dialysis access: RUE AV fistula   -Residual urine: Minium  -EDW: 85 kg     Assessment:   - Will provide dialysis today (07/16/2025) with UF - 3L as BP tolerates for metabolic clearance and volume management   - Consented for routine dialysis today.  - Labs reviewed and dialysate to be adjusted to current labs.   - Continue to monitor intake and output  - Please avoid gadolinium, fleets, phos-based laxatives, NSAIDs  - Dialysis thrice weekly unless more urgent indications arise. Will evaluate RRT requirements Daily.  - Increased mortality risk secondary to ESRD on dialysis in setting of other co morbidities.   - OP records reviewed; inpatient dialysis orders to be adjusted as needed per OP records.       Anemia of ESRD   Recent Labs   Lab 07/16/25  0355 07/16/25  0600   WBC 7.25  --    HGB 9.2*  --    HCT 28.5* 26*     --      Lab Results   Component Value Date    FESATURATED 19 (L) 10/07/2024    FERRITIN 2,828 (H) 10/07/2024       - Goal in ESRD is Hgb of 10-11. Hgb  9.2, near target.   - EPO can be administered and dosed per his OP unit upon discharge.  - if patient is on iron infusions please D/C when active infection, cautiously use EPO when hx of malignancy, high BP (SBP usually > 170mmhg).    Mineral Bone Disease in ESRD   Lab Results   Component Value Date    .3 (H) 10/07/2024    CALCIUM 9.2 07/16/2025    ALBUMIN 3.8 07/16/2025    PHOS 4.7 (H) 12/04/2024       - F/U PO4, Mg, Calcium. And albumin levels daily.   - Renal diet with protein intake goal 1.5 g/kg/d with 1 L fluid restriction   - If patient has poor oral intake, recommend nephro nutritional shakes (ex Novasource)  - Continue or restart home phos binder. Phos pending.  - PTH to be managed at her outpatient HD unit        Thank you for your consult. I will follow-up with patient. Please contact us if you have any additional questions.    Herlinda Toro, SERAFIN, FNP-C  Nephrology  [facility] - Emergency Dept

## 2025-07-16 NOTE — ED NOTES
I-STAT Chem-8+ Results:   Value Reference Range   Sodium 139 136-145 mmol/L   Potassium  4.6 3.5-5.1 mmol/L   Chloride 98  mmol/L   Ionized Calcium 1.07 1.06-1.42 mmol/L   CO2 (measured) 31 23-29 mmol/L   Glucose 109  mg/dL   BUN 49 6-30 mg/dL   Creatinine 9.9 0.5-1.4 mg/dL   Hematocrit 26 36-54%

## 2025-07-16 NOTE — SUBJECTIVE & OBJECTIVE
Past Medical History:   Diagnosis Date    Acute hyperkalemia 2020    Acute on chronic diastolic heart failure 10/08/2021    Anemia of chronic disorder 2017    Overview:  Added automatically from request for surgery [id number]    Anemia of chronic renal failure, stage 5     Anxiety     Coronary artery disease 7/3/2025    Cyst of left ovary 2019    Cyst of left ovary 2019    Dyslipidemia 2013    Encounter for screening colonoscopy 10/08/2020    End-stage renal disease on hemodialysis 2020    ESRD on hemodialysis 2017    Hematuria 10/30/2019    History of COVID-19 2020    Hospital discharge follow-up 10/06/2023    Hx of sinus bradycardia 2017    Hyperkalemia 2017    Hypertensive urgency 2021    Peripheral neuropathy 2013    Preoperative testing 10/08/2020    Renovascular hypertension 2020    S/P dilation and curettage 2018    Screening for colon cancer 2023    Thickened endometrium 2018    Thyroid nodule     Thyroid nodule 2021    Uncontrolled type 2 diabetes mellitus 2013    Overview:  poc a1c today 11.2-270/ up from 10.8-263, Pt. has very very stressed/ was incarcerated/marital stress/ will current meds/ less stress now/ will monitor.       Past Surgical History:   Procedure Laterality Date    AV FISTULA PLACEMENT      right arm     SECTION      x1    COLONOSCOPY N/A 2018    Procedure: COLONOSCOPY;  Surgeon: Pankaj Hinojosa MD;  [facility]  Service: Endoscopy;  Laterality: N/A;  dialysis pt/labs prior to colon/MS    COLONOSCOPY N/A 2023    Procedure: COLONOSCOPY;  Surgeon: Angel Turner MD;  Location: [facility] (2ND FLR);  Service: Endoscopy;  Laterality: N/A;  Ref By:  Maty Gayle MD  Procedure: Colonoscopy  Diagnosis: endounter for preventive health   Procedure Timing: pt. pref  Provider: Dr. turner   Location: [facility]-Endo   Prep Specifications: Standard prep   Additional  Info: Dialysis patient lab pending    CYSTOSCOPY W/ RETROGRADES Bilateral 8/20/2020    Procedure: CYSTOSCOPY, WITH RETROGRADE PYELOGRAM;  Surgeon: Douglas Abraham MD;  Location: [facility] OR 1ST FLR;  Service: Urology;  Laterality: Bilateral;  45 min    FISTULOGRAM Right 9/9/2020    Procedure: Fistulogram;  Surgeon: Lester Gómez MD;  Location: [facility] CATH LAB;  Service: Nephrology;  Laterality: Right;    FISTULOGRAM Right 12/5/2024    Procedure: Fistulogram RIGHT FOREARM;  Surgeon: Lester Gómez MD;  Location: [facility] CATH LAB;  Service: Nephrology;  Laterality: Right;    HYSTERECTOMY      IMPLANTATION OF COCHLEAR PROSTHESIS Right 6/28/2021    Procedure: INSERTION, PROSTHESIS, COCHLEAR;  Surgeon: Ramiro Zuleta MD;  Location: [facility] OR 2ND FLR;  Service: ENT;  Laterality: Right;  COCHLEAR BABAK    IMPLANTATION OF COCHLEAR PROSTHESIS Right 6/30/2021    Procedure: INSERTION, PROSTHESIS, COCHLEAR;  Surgeon: Ramiro Zuleta MD;  Location: [facility] OR 2ND FLR;  Service: ENT;  Laterality: Right;  COCHLEAR AMERICAS    ROBOT-ASSISTED LAPAROSCOPIC ABDOMINAL HYSTERECTOMY USING DA NATALYA XI N/A 7/5/2019    Procedure: XI ROBOTIC HYSTERECTOMY;  Surgeon: Trice Lei MD;  Location: [facility];  Service: OB/GYN;  Laterality: N/A;    ROBOT-ASSISTED LAPAROSCOPIC NEPHRECTOMY Right 10/8/2020    Procedure: ROBOTIC NEPHRECTOMY;  Surgeon: Douglas Abraham MD;  Location: [facility] OR 2ND FLR;  Service: Urology;  Laterality: Right;  3.5hrs gen with regional     ROBOT-ASSISTED LAPAROSCOPIC SALPINGO-OOPHORECTOMY USING DA NATALYA XI Bilateral 7/5/2019    Procedure: XI ROBOTIC SALPINGO-OOPHORECTOMY;  Surgeon: Trice Lei MD;  Location: [facility];  Service: OB/GYN;  Laterality: Bilateral;    TUBAL LIGATION      Vascular access surgeries      x5       Review of patient's allergies indicates:  No Known Allergies  Current Facility-Administered Medications   Medication Frequency    aspirin EC tablet 81 mg QAM    atorvastatin tablet 40 mg Daily    dextrose 50%  "injection 12.5 g PRN    dextrose 50% injection 25 g PRN    glucagon (human recombinant) injection 1 mg PRN    glucose chewable tablet 16 g PRN    glucose chewable tablet 24 g PRN    insulin aspart U-100 pen 0-5 Units QID (AC + HS) PRN    labetaloL tablet 200 mg BID    losartan tablet 100 mg Daily    NIFEdipine 24 hr tablet 30 mg Daily    pregabalin capsule 75 mg Daily    sevelamer carbonate tablet 2,400 mg TID WM    sodium chloride 0.9% flush 10 mL PRN     Current Outpatient Medications   Medication    ACCU-CHEK GUIDE GLUCOSE METER Misc    ACCU-CHEK GUIDE L1-L2 CTRL SOL Soln    ACCU-CHEK GUIDE TEST STRIPS Strp    ACCU-CHEK SOFTCLIX LANCETS Misc    acetaminophen (TYLENOL) 500 MG tablet    aspirin (ECOTRIN) 81 MG EC tablet    atorvastatin (LIPITOR) 40 MG tablet    blood sugar diagnostic Strp    blood-glucose meter kit    DROPSAFE ALCOHOL PREP PADS PadM    epoetin shiela (PROCRIT) 10,000 unit/mL injection    hydrOXYzine HCL (ATARAX) 10 MG Tab    insulin aspart U-100 (NOVOLOG) 100 unit/mL (3 mL) InPn pen    insulin glargine U-100, Lantus, (LANTUS SOLOSTAR U-100 INSULIN) 100 unit/mL (3 mL) InPn pen    labetaloL (NORMODYNE) 200 MG tablet    lancets Misc    losartan (COZAAR) 100 MG tablet    NIFEdipine (PROCARDIA-XL) 30 MG (OSM) 24 hr tablet    omeprazole (PRILOSEC) 20 MG capsule    pen needle, diabetic 31 gauge x 3/16" Ndle    pregabalin (LYRICA) 75 MG capsule    sevelamer carbonate (RENVELA) 800 mg Tab    sevelamer HCL (RENAGEL) 800 MG Tab     Family History       Problem Relation (Age of Onset)    Cancer Mother, Sister, Sister    Diabetes Sister, Brother    Lung cancer Sister    Macular degeneration Sister    Ovarian cancer Mother, Sister, Sister          Tobacco Use    Smoking status: Never    Smokeless tobacco: Never   Substance and Sexual Activity    Alcohol use: No    Drug use: No    Sexual activity: Yes     Partners: Male     Birth control/protection: Post-menopausal     Review of Systems   Constitutional:  Negative " for activity change, appetite change, fatigue and fever.   HENT:  Negative for congestion and hearing loss.    Eyes:  Negative for visual disturbance.   Respiratory:  Positive for shortness of breath. Negative for cough and chest tightness.    Cardiovascular:  Negative for chest pain, palpitations and leg swelling.   Gastrointestinal:  Negative for abdominal distention, abdominal pain, blood in stool, constipation, diarrhea, nausea and vomiting.   Genitourinary:  Positive for decreased urine volume. Negative for dysuria and urgency.   Musculoskeletal:  Negative for gait problem.   Skin:  Negative for wound.   Allergic/Immunologic: Negative.    Neurological:  Negative for dizziness, weakness and headaches.   Psychiatric/Behavioral:  Negative for agitation, behavioral problems, confusion and decreased concentration.      Objective:     Vital Signs (Most Recent):  Temp: 98.3 °F (36.8 °C) (07/16/25 0700)  Pulse: 75 (07/16/25 1100)  Resp: 19 (07/16/25 1000)  BP: (!) 215/98 (07/16/25 1100)  SpO2: 96 % (07/16/25 1100) Vital Signs (24h Range):  Temp:  [97.8 °F (36.6 °C)-98.3 °F (36.8 °C)] 98.3 °F (36.8 °C)  Pulse:  [75-86] 75  Resp:  [12-34] 19  SpO2:  [92 %-100 %] 96 %  BP: (186-233)/() 215/98     Weight: 88.1 kg (194 lb 3.6 oz) (07/16/25 0250)  Body mass index is 33.34 kg/m².  Body surface area is 1.99 meters squared.    No intake/output data recorded.     Physical Exam  Vitals and nursing note reviewed.   Constitutional:       General: She is not in acute distress.     Appearance: She is well-developed.   HENT:      Head: Normocephalic and atraumatic.      Right Ear: Hearing and external ear normal.      Left Ear: Hearing and external ear normal.      Mouth/Throat:      Mouth: Mucous membranes are moist.   Eyes:      General: No scleral icterus.     Conjunctiva/sclera: Conjunctivae normal.   Cardiovascular:      Rate and Rhythm: Normal rate.   Pulmonary:      Effort: Pulmonary effort is normal. No respiratory  distress.   Abdominal:      General: Bowel sounds are normal. There is no distension.      Palpations: Abdomen is soft.      Tenderness: There is no abdominal tenderness.   Musculoskeletal:         General: No swelling. Normal range of motion.      Cervical back: Normal range of motion.      Right lower leg: No edema.      Left lower leg: No edema.   Skin:     General: Skin is warm and dry.   Neurological:      General: No focal deficit present.      Mental Status: She is alert and oriented to person, place, and time. Mental status is at baseline.   Psychiatric:         Mood and Affect: Mood normal.         Behavior: Behavior normal. Behavior is cooperative.          Significant Labs:  CBC:   Recent Labs   Lab 07/16/25  0355 07/16/25  0600   WBC 7.25  --    RBC 3.62*  --    HGB 9.2*  --    HCT 28.5* 26*     --    MCV 79*  --    MCH 25.4*  --    MCHC 32.3  --      CMP:   Recent Labs   Lab 07/16/25  0555      CALCIUM 9.2   ALBUMIN 3.8   PROT 8.3      K 4.8   CO2 27   CL 99   BUN 42*   CREATININE 9.2*   ALKPHOS 150   ALT 9*   AST 29   BILITOT 1.1*     All labs within the past 24 hours have been reviewed.

## 2025-07-16 NOTE — H&P
ED Observation Unit  History and Physical      The observation order was placed by the main ED provider at 0604 on 7/16/25. I assumed care of this patient from the Main ED at 0700 on 7/16/25 upon the start of my shift.    History of Present Illness:    59-year-old female presents with chief complaint of shortness a breath, anxiety and feeling flushed.  Past medical history detailed in her chart.  Patient has ESRD on dialysis Monday Wednesday Friday.  Patient has hypertension, hyperlipidemia, diabetes, CAD.  Patient is due for dialysis this morning.  Yesterday the patient had an ultrasound and CT scan.  Patient reports that she had contrast.  She has been doing well yesterday evening, overnight developed flushing of the skin and became anxious.  Patient became short of breath prompting visit to the ED.  Upon arrival patient is extremely hypertensive, reports compliance with the medications.  She is short of breath, denies chest pain.     I reviewed the ED Provider Note dated 7/16/25 prior to my evaluation of this patient.  I reviewed all labs and imaging performed in the Main ED, prior to patient being placed in Observation. Patient was placed in the ED Observation Unit for ESRD on hemodialysis.    PMHx   Past Medical History:   Diagnosis Date    Acute hyperkalemia 09/09/2020    Acute on chronic diastolic heart failure 10/08/2021    Anemia of chronic disorder 11/08/2017    Overview:  Added automatically from request for surgery [id number]    Anemia of chronic renal failure, stage 5     Anxiety     Coronary artery disease 7/3/2025    Cyst of left ovary 05/14/2019    Cyst of left ovary 07/05/2019    Dyslipidemia 01/04/2013    Encounter for screening colonoscopy 10/08/2020    End-stage renal disease on hemodialysis 08/20/2020    ESRD on hemodialysis 09/28/2017    Hematuria 10/30/2019    History of COVID-19 04/07/2020    Hospital discharge follow-up 10/06/2023    Hx of sinus bradycardia 09/28/2017    Hyperkalemia 03/29/2017     Hypertensive urgency 2021    Peripheral neuropathy 2013    Preoperative testing 10/08/2020    Renovascular hypertension 2020    S/P dilation and curettage 2018    Screening for colon cancer 2023    Thickened endometrium 2018    Thyroid nodule     Thyroid nodule 2021    Uncontrolled type 2 diabetes mellitus 2013    Overview:  poc a1c today 11.2-270/ up from 10.8-263, Pt. has very very stressed/ was incarcerated/marital stress/ will current meds/ less stress now/ will monitor.      Past Surgical History:   Procedure Laterality Date    AV FISTULA PLACEMENT      right arm     SECTION      x1    COLONOSCOPY N/A 2018    Procedure: COLONOSCOPY;  Surgeon: Pankaj Hinojosa MD;  Location: [facility] (4TH FLR);  Service: Endoscopy;  Laterality: N/A;  dialysis pt/labs prior to colon/MS    COLONOSCOPY N/A 2023    Procedure: COLONOSCOPY;  Surgeon: Angel Turner MD;  Location: [facility] (2ND FLR);  Service: Endoscopy;  Laterality: N/A;  Ref By:  Maty Gayle MD  Procedure: Colonoscopy  Diagnosis: endounter for preventive health   Procedure Timing: pt. pref  Provider: Dr. turner   Location: [facility] 4-Endo   Prep Specifications: Standard prep   Additional Info: Dialysis patient lab pending    CYSTOSCOPY W/ RETROGRADES Bilateral 2020    Procedure: CYSTOSCOPY, WITH RETROGRADE PYELOGRAM;  Surgeon: Douglas Abraham MD;  Location: [facility] OR 1ST FLR;  Service: Urology;  Laterality: Bilateral;  45 min    FISTULOGRAM Right 2020    Procedure: Fistulogram;  Surgeon: Lester Gómez MD;  Location: [facility] CATH LAB;  Service: Nephrology;  Laterality: Right;    FISTULOGRAM Right 2024    Procedure: Fistulogram RIGHT FOREARM;  Surgeon: Lester Gómez MD;  Location: [facility] CATH LAB;  Service: Nephrology;  Laterality: Right;    HYSTERECTOMY      IMPLANTATION OF COCHLEAR PROSTHESIS Right 2021    Procedure: INSERTION, PROSTHESIS, COCHLEAR;  Surgeon: Ramiro Zuleta MD;   Location: [facility] OR 2ND FLR;  Service: ENT;  Laterality: Right;  COCHLEAR BABAK    IMPLANTATION OF COCHLEAR PROSTHESIS Right 6/30/2021    Procedure: INSERTION, PROSTHESIS, COCHLEAR;  Surgeon: Ramiro Zuleta MD;  Location: [facility] OR 2ND FLR;  Service: ENT;  Laterality: Right;  COCHLEAR AMERICAS    ROBOT-ASSISTED LAPAROSCOPIC ABDOMINAL HYSTERECTOMY USING DA NATALYA XI N/A 7/5/2019    Procedure: XI ROBOTIC HYSTERECTOMY;  Surgeon: Trice Lei MD;  Location: [facility] [place] OR;  Service: OB/GYN;  Laterality: N/A;    ROBOT-ASSISTED LAPAROSCOPIC NEPHRECTOMY Right 10/8/2020    Procedure: ROBOTIC NEPHRECTOMY;  Surgeon: Douglas Abraham MD;  Location: [facility] OR 2ND FLR;  Service: Urology;  Laterality: Right;  3.5hrs gen with regional     ROBOT-ASSISTED LAPAROSCOPIC SALPINGO-OOPHORECTOMY USING DA NATALYA XI Bilateral 7/5/2019    Procedure: XI ROBOTIC SALPINGO-OOPHORECTOMY;  Surgeon: Trice Lei MD;  Location: [facility];  Service: OB/GYN;  Laterality: Bilateral;    TUBAL LIGATION      Vascular access surgeries      x5        Family Hx   Family History   Problem Relation Name Age of Onset    Cancer Mother      Ovarian cancer Mother      Cancer Sister      Ovarian cancer Sister      Lung cancer Sister      Macular degeneration Sister      Cancer Sister      Ovarian cancer Sister      Diabetes Sister      Diabetes Brother      Breast cancer Neg Hx      Colon cancer Neg Hx      Cervical cancer Neg Hx      Endometrial cancer Neg Hx      Vaginal cancer Neg Hx      Thyroid disease Neg Hx      Glaucoma Neg Hx      Retinal detachment Neg Hx          Social Hx   Social History     Socioeconomic History    Marital status:     Number of children: 2   Occupational History    Occupation: disability   Tobacco Use    Smoking status: Never    Smokeless tobacco: Never   Substance and Sexual Activity    Alcohol use: No    Drug use: No    Sexual activity: Yes     Partners: Male     Birth control/protection: Post-menopausal     Social Drivers of  Health     Financial Resource Strain: Low Risk  (7/16/2025)    Overall Financial Resource Strain (CARDIA)     Difficulty of Paying Living Expenses: Not very hard   Food Insecurity: No Food Insecurity (7/16/2025)    Hunger Vital Sign     Worried About Running Out of Food in the Last Year: Never true     Ran Out of Food in the Last Year: Never true   Transportation Needs: No Transportation Needs (7/16/2025)    PRAPARE - Transportation     Lack of Transportation (Medical): No     Lack of Transportation (Non-Medical): No   Physical Activity: Inactive (7/16/2025)    Exercise Vital Sign     Days of Exercise per Week: 0 days     Minutes of Exercise per Session: 0 min   Stress: No Stress Concern Present (7/16/2025)    [employer] [employer] - Occupational Stress Questionnaire     Feeling of Stress : Not at all   Housing Stability: Low Risk  (7/16/2025)    Housing Stability Vital Sign     Unable to Pay for Housing in the Last Year: No     Homeless in the Last Year: No        Vital Signs   Vitals:    07/16/25 0503 07/16/25 0600 07/16/25 0700 07/16/25 0800   BP: (!) 198/94  (!) 228/92 (!) 224/129   BP Location:   Right arm    Patient Position:   Lying    Pulse: 80 82 81 77   Resp: 17  16    Temp:   98.3 °F (36.8 °C)    TempSrc:   Oral    SpO2: 95% 96% 95% 96%   Weight:       Height:            Review of Systems  Per HPI    Physical Exam  Constitutional: Vital signs are normal. She appears well-developed and well-nourished.   HENT:   Head: Normocephalic and atraumatic.   Right Ear: Hearing normal.   Left Ear: Hearing normal.   Eyes: Conjunctivae are normal.   Cardiovascular:  Normal rate and regular rhythm.           Pulmonary/Chest:   Clear, good air movement   Abdominal: Abdomen is soft. Bowel sounds are normal.   Soft and benign   Musculoskeletal:         General: Normal range of motion.      Neurological: She is alert and oriented to person, place, and time.   Skin: Skin is warm and intact.   No rashes or  lesions, no redness   Psychiatric: She has a normal mood and affect. Her speech is normal and behavior is normal. Cognition and memory are normal.     Medications:   Scheduled Meds:  Continuous Infusions:  PRN Meds:.      Assessment/Plan:  End stage renal disease on hemodialysis  Hypertension  Shortness of breath  -place in EDOU  -nephrology consulted by ED provider  -plan for acute dialysis followed by discharge home after if no concerns  -home BP meds and re-assess  -continue home medications as prescribed unless otherwise advised  -follow up per nephrology instructions. ED return precautions discussed.  -using O2 per nasal canula here prior to dialysis. Will reassess O2 sat on room air following dialysis.  -dc home vs upgrade to HM following dialysis pending oxygen requirement and repeat v/s      Case was discussed with the ED provider, IRIS Merchant and nephrology.

## 2025-07-16 NOTE — ASSESSMENT & PLAN NOTE
59 y.o. Black or  Female ESRD-HD M-W-F presents to ED on 7/16/2025 with diagnosis of: ESRD (end stage renal disease) [N18.6]   Nephrology consulted for inpatient ESRD-HD management    Outpatient HD Information:  -Dialysis modality: Hemodialysis  -Outpatient HD unit: [facility]   -Nephrologist: Ras TRIMBLE   -HD TX days: Tuesday/Thursday/Saturday, duration of treatment: 3.5 hrs  -Last HD TX prior to hospital admission: 09/12/24  -Dialysis access: RUE AV fistula   -Residual urine: Minium  -EDW: 85 kg     Assessment:   - Will provide dialysis today (07/16/2025) with UF - 3L as BP tolerates for metabolic clearance and volume management   - Consented for routine dialysis today.  - Labs reviewed and dialysate to be adjusted to current labs.   - Continue to monitor intake and output  - Please avoid gadolinium, fleets, phos-based laxatives, NSAIDs  - Dialysis thrice weekly unless more urgent indications arise. Will evaluate RRT requirements Daily.    Anemia of ESRD   Recent Labs   Lab 07/16/25  0355 07/16/25  0600   WBC 7.25  --    HGB 9.2*  --    HCT 28.5* 26*     --      Lab Results   Component Value Date    FESATURATED 19 (L) 10/07/2024    FERRITIN 2,828 (H) 10/07/2024       - Goal in ESRD is Hgb of 10-11. Hgb 9.2, near target.   - EPO can be administered and dosed per his OP unit upon discharge.  - if patient is on iron infusions please D/C when active infection, cautiously use EPO when hx of malignancy, high BP (SBP usually > 170mmhg).    Mineral Bone Disease in ESRD   Lab Results   Component Value Date    .3 (H) 10/07/2024    CALCIUM 9.2 07/16/2025    ALBUMIN 3.8 07/16/2025    PHOS 4.7 (H) 12/04/2024       - F/U PO4, Mg, Calcium. And albumin levels daily.   - Renal diet with protein intake goal 1.5 g/kg/d with 1 L fluid restriction   - If patient has poor oral intake, recommend nephro nutritional shakes (ex Novasource)  - Continue or restart home phos binder. Phos pending.  - PTH to be  Pt asking what she can do regarding her nail problem.   Nails  from nail bed    Please advise    CB:  [phone] managed at her outpatient HD unit

## 2025-07-16 NOTE — ED PROVIDER NOTES
Encounter Date: 7/16/2025       History     Chief Complaint   Patient presents with    Shortness of Breath     Weakness; had xray done with dye to check kidneys today; MWF dialysis; last one was Monday      59-year-old female presents with chief complaint of shortness a breath, anxiety and feeling flushed.  Past medical history detailed in her chart.  Patient has ESRD on dialysis Monday Wednesday Friday.  Patient has hypertension, hyperlipidemia, diabetes, CAD.  Patient is due for dialysis this morning.  Yesterday the patient had an ultrasound and CT scan.  Patient reports that she had contrast.  She has been doing well yesterday evening, overnight developed flushing of the skin and became anxious.  Patient became short of breath prompting visit to the ED.  Upon arrival patient is extremely hypertensive, reports compliance with the medications.  She is short of breath, denies chest pain.       Review of patient's allergies indicates:  No Known Allergies  Past Medical History:   Diagnosis Date    Acute hyperkalemia 09/09/2020    Acute on chronic diastolic heart failure 10/08/2021    Anemia of chronic disorder 11/08/2017    Overview:  Added automatically from request for surgery [id number]    Anemia of chronic renal failure, stage 5     Anxiety     Coronary artery disease 7/3/2025    Cyst of left ovary 05/14/2019    Cyst of left ovary 07/05/2019    Dyslipidemia 01/04/2013    Encounter for screening colonoscopy 10/08/2020    End-stage renal disease on hemodialysis 08/20/2020    ESRD on hemodialysis 09/28/2017    Hematuria 10/30/2019    History of COVID-19 04/07/2020    Hospital discharge follow-up 10/06/2023    Hx of sinus bradycardia 09/28/2017    Hyperkalemia 03/29/2017    Hypertensive urgency 07/22/2021    Peripheral neuropathy 01/04/2013    Preoperative testing 10/08/2020    Renovascular hypertension 08/17/2020    S/P dilation and curettage 05/22/2018    Screening for colon cancer 11/28/2023    Thickened endometrium  2018    Thyroid nodule     Thyroid nodule 2021    Uncontrolled type 2 diabetes mellitus 2013    Overview:  poc a1c today 11.2-270/ up from 10.8-263, Pt. has very very stressed/ was incarcerated/marital stress/ will current meds/ less stress now/ will monitor.     Past Surgical History:   Procedure Laterality Date    AV FISTULA PLACEMENT      right arm     SECTION      x1    COLONOSCOPY N/A 2018    Procedure: COLONOSCOPY;  Surgeon: Pankaj Hinojosa MD;  Location: [facility] ENDO (4TH FLR);  Service: Endoscopy;  Laterality: N/A;  dialysis pt/labs prior to colon/MS    COLONOSCOPY N/A 2023    Procedure: COLONOSCOPY;  Surgeon: Angel Turner MD;  Location: [facility] ENDO (2ND FLR);  Service: Endoscopy;  Laterality: N/A;  Ref By:  Maty Gayle MD  Procedure: Colonoscopy  Diagnosis: endounter for preventive health   Procedure Timing: pt. pref  Provider: Dr. turner   [place] [facility] [place]   Prep Specifications: Standard prep   Additional Info: Dialysis patient lab pending    CYSTOSCOPY W/ RETROGRADES Bilateral 2020    Procedure: CYSTOSCOPY, WITH RETROGRADE PYELOGRAM;  Surgeon: Douglas Abraham MD;  Location: [facility] OR 1ST FLR;  Service: Urology;  Laterality: Bilateral;  45 min    FISTULOGRAM Right 2020    Procedure: Fistulogram;  Surgeon: Lester Gómez MD;  Location: [facility] CATH LAB;  Service: Nephrology;  Laterality: Right;    FISTULOGRAM Right 2024    Procedure: Fistulogram RIGHT FOREARM;  Surgeon: Lester Gómez MD;  Location: [facility] CATH LAB;  Service: Nephrology;  Laterality: Right;    HYSTERECTOMY      IMPLANTATION OF COCHLEAR PROSTHESIS Right 2021    Procedure: INSERTION, PROSTHESIS, COCHLEAR;  Surgeon: Ramiro Zuleta MD;  Location: [facility] OR 2ND FLR;  Service: ENT;  Laterality: Right;  COCHLEAR BABAK    IMPLANTATION OF COCHLEAR PROSTHESIS Right 2021    Procedure: INSERTION, PROSTHESIS, COCHLEAR;  Surgeon: Ramiro Zuleta MD;  Location: [facility] OR 2ND FLR;  Service: ENT;   Laterality: Right;  COCHLEAR AMERICAS    ROBOT-ASSISTED LAPAROSCOPIC ABDOMINAL HYSTERECTOMY USING DA NATALYA XI N/A 7/5/2019    Procedure: XI ROBOTIC HYSTERECTOMY;  Surgeon: Trice Lei MD;  Location: [facility];  Service: OB/GYN;  Laterality: N/A;    ROBOT-ASSISTED LAPAROSCOPIC NEPHRECTOMY Right 10/8/2020    Procedure: ROBOTIC NEPHRECTOMY;  Surgeon: Douglas Abraham MD;  Location: [address];  Service: Urology;  Laterality: Right;  3.5hrs gen with regional     ROBOT-ASSISTED LAPAROSCOPIC SALPINGO-OOPHORECTOMY USING DA NATALYA XI Bilateral 7/5/2019    Procedure: XI ROBOTIC SALPINGO-OOPHORECTOMY;  Surgeon: Trice Lei MD;  Location: [facility];  Service: OB/GYN;  Laterality: Bilateral;    TUBAL LIGATION      Vascular access surgeries      x5     Family History   Problem Relation Name Age of Onset    Cancer Mother      Ovarian cancer Mother      Cancer Sister      Ovarian cancer Sister      Lung cancer Sister      Macular degeneration Sister      Cancer Sister      Ovarian cancer Sister      Diabetes Sister      Diabetes Brother      Breast cancer Neg Hx      Colon cancer Neg Hx      Cervical cancer Neg Hx      Endometrial cancer Neg Hx      Vaginal cancer Neg Hx      Thyroid disease Neg Hx      Glaucoma Neg Hx      Retinal detachment Neg Hx       Social History[1]  Review of Systems   Constitutional:  Negative for fever.   HENT:  Negative for sore throat.    Respiratory:  Positive for cough. Negative for shortness of breath.    Cardiovascular:  Negative for chest pain.   Gastrointestinal:  Negative for nausea.   Genitourinary:  Negative for dysuria.   Musculoskeletal:  Negative for back pain.   Skin:  Negative for rash.   Neurological:  Negative for weakness.   Hematological:  Does not bruise/bleed easily.       Physical Exam     Initial Vitals [07/16/25 0250]   BP Pulse Resp Temp SpO2   (!) 233/101 86 20 97.8 °F (36.6 °C) 100 %      MAP       --         Physical Exam    Constitutional: Vital signs are normal.  She appears well-developed and well-nourished.   HENT:   Head: Normocephalic and atraumatic.   Right Ear: Hearing normal.   Left Ear: Hearing normal.   Eyes: Conjunctivae are normal.   Cardiovascular:  Normal rate and regular rhythm.           Pulmonary/Chest:   Clear, good air movement   Abdominal: Abdomen is soft. Bowel sounds are normal.   Soft and benign   Musculoskeletal:         General: Normal range of motion.     Neurological: She is alert and oriented to person, place, and time.   Skin: Skin is warm and intact.   No rashes or lesions, no redness   Psychiatric: She has a normal mood and affect. Her speech is normal and behavior is normal. Cognition and memory are normal.         ED Course   Procedures  Labs Reviewed   B-TYPE NATRIURETIC PEPTIDE - Abnormal       Result Value     (*)    CBC WITH DIFFERENTIAL - Abnormal    WBC 7.25      RBC 3.62 (*)     HGB 9.2 (*)     HCT 28.5 (*)     MCV 79 (*)     MCH 25.4 (*)     MCHC 32.3      RDW 20.3 (*)     Platelet Count 206      MPV 10.0      Nucleated RBC 0      Neut % 65.6      Lymph % 16.6 (*)     Mono % 8.1      Eos % 8.8 (*)     Basophil % 0.8      Imm Grans % 0.1      Neut # 4.75      Lymph # 1.20      Mono # 0.59      Eos # 0.64 (*)     Baso # 0.06      Imm Grans # 0.01     TROPONIN I HIGH SENSITIVITY - Normal    Troponin High Sensitive 7     CBC W/ AUTO DIFFERENTIAL    Narrative:     The following orders were created for panel order CBC Auto Differential.  Procedure                               Abnormality         Status                     ---------                               -----------         ------                     CBC with Differential[[id number]]       Abnormal            Final result                 Please view results for these tests on the individual orders.   HEPATITIS C ANTIBODY   HEP C VIRUS HOLD SPECIMEN   HIV 1 / 2 ANTIBODY   COMPREHENSIVE METABOLIC PANEL     EKG Readings: (Independently Interpreted)   Sinus, QT prolonged, no  "peaked T waves       Imaging Results              X-Ray Chest AP Portable (Final result)  Result time 07/16/25 04:28:50      Final result by Yohan Spear MD (07/16/25 04:28:50)                   Impression:      Cardiomegaly with probable mild interstitial edema.      Electronically signed by: Yohan Spear MD  Date:    07/16/2025  Time:    04:28               Narrative:    EXAMINATION:  XR CHEST AP PORTABLE    CLINICAL HISTORY:  Provided history is "  Shortness of breath".    TECHNIQUE:  One view of the chest.    COMPARISON:  12/02/2024.    FINDINGS:  Cardiac wires overlie the chest.  Cardiomediastinal silhouette is enlarged and similar to the prior study.  Atherosclerotic calcifications overlie the aortic arch.  Central vascular congestion and bilateral interstitial opacities with bilateral lower lobe ground-glass opacities.  Suggest correlation for interstitial edema.  No large pleural effusion.  No distinct pneumothorax.  Surgical clips overlying the left upper extremity as seen previously.                                       Medications   LORazepam injection 1 mg (1 mg Intravenous Given 7/16/25 0403)   labetaloL injection 20 mg (20 mg Intravenous Given 7/16/25 0457)     Medical Decision Making  59-year-old female presenting with the acute onset of shortness a breath and flushing skin   Differential:  Pulmonary edema, fluid overload, electrolyte derangement    Pt workup -   No acute findings on CBC, CMP hemolyzed, recollected.  I-STAT revealed K of 4.6  Chest x-ray shows cardiomegaly, mild pulmonary edema. Pt hypertensive on arrival and SOB, improved with Ativan and labetalol.  Suspect that patient had underlying panic attack contributing to her symptoms.  She significantly improved after the Ativan and became much more comfortable.  Blood pressure improved after 1 dose of labetalol which she takes at home.  She reported compliance with all of her high blood pressure medications.    Pt due for " dialysis this morning, concerned about being able to make it to her session.  Since her potassium is normal.  We will admit to the ED observation unit, I have placed a consult for Nephrology.  Patient should be able to be discharged after 1 session of dialysis.           Amount and/or Complexity of Data Reviewed  Labs: ordered.  Radiology: ordered and independent interpretation performed.     Details: Cardiomegaly with pulmonary edema  ECG/medicine tests: ordered and independent interpretation performed.    Risk  Prescription drug management.                                          Clinical Impression:  Final diagnoses:  [R06.02] Shortness of breath  [R06.02] SOB (shortness of breath)  [N18.6] ESRD (end stage renal disease) (Primary)                       [1]   Social History  Tobacco Use    Smoking status: Never    Smokeless tobacco: Never   Substance Use Topics    Alcohol use: No    Drug use: No        Jet Merchant PA-C  07/16/25 0603

## 2025-07-16 NOTE — DISCHARGE INSTRUCTIONS
Follow-up with your primary doctor or nephrologist to discuss your elevated blood pressure.    Return to the ER immediately if you develop significant chest pain, difficulty breathing, severe headache, blurry vision, weakness in your arms or legs, difficulty speaking or any other worrisome symptoms.

## 2025-07-16 NOTE — PLAN OF CARE
[facility] - [facility] Dept  Initial Discharge Assessment       Primary Care Provider: Ken[facility] Willams MD    Admission Diagnosis: ESRD (end stage renal disease) [N18.6]    Admission Date: 7/16/2025  Expected Discharge Date:     Pt stated she is independent with her ambulation and ADL's and does not require assistance or equipment.     Pt to d/c home with no needs when ready     Transition of Care Barriers: (P) None    Payor: HUMANA MANAGED MEDICARE / Plan: HUMANA SNP HMO PPO SPECIAL NEEDS / Product Type: Medicare Advantage /     Extended Emergency Contact Information  Primary Emergency Contact: Sudha Hall   [place]  Mobile Phone: [phone]  Relation: Daughter  Secondary Emergency Contact: Curtis Rodriguez  Home Phone: [phone]  Relation: Friend    Discharge Plan A: (P) Home  Discharge Plan B: (P) Home      [facility]  [address]  [facility] [address]  Phone: [phone] Fax: [phone]    [facility] - [facility] [address]  [address] [facility] [address]  Phone: [phone] Fax: [phone]    [facility] #[id number] @ [facility], [place] - [address]  [address]  [id number]  [facility] [address]  Phone: [phone] Fax: [phone]    [id number], [place] - [address]  [address]  [facility] [address]  Phone: [phone] Fax: [phone]    [id number] - [place], [place] - [address] AT [facility] [address]  [address] [place] [address]  Phone: [phone] Fax: [phone]      Initial Assessment (most recent)       Adult Discharge Assessment - 07/16/25 0753          Discharge Assessment    Assessment Type Discharge Planning Assessment (P)      Confirmed/corrected address, phone number and insurance Yes (P)      Confirmed Demographics Correct on Facesheet  (P)      Source of Information patient (P)      Communicated MEGAN with patient/caregiver Yes (P)      People in Home alone (P)      Facility Arrived From: home (P)      Do you expect to return to your current living situation? Yes (P)      Do you have help at home or someone to help you manage your care at home? No (P)      Prior to hospitilization cognitive status: Alert/Oriented;No Deficits (P)      Current cognitive status: No Deficits;Alert/Oriented (P)      Walking or Climbing Stairs Difficulty no (P)      Dressing/Bathing Difficulty no (P)      Home Accessibility wheelchair accessible (P)      Home Layout Able to live on 1st floor (P)      Equipment Currently Used at Home none (P)      Patient currently being followed by outpatient case management? No (P)      Do you currently have service(s) that help you manage your care at home? No (P)      Do you take prescription medications? Yes (P)      Do you have prescription coverage? Yes (P)      Do you have any problems affording any of your prescribed medications? No (P)      Is the patient taking medications as prescribed? yes (P)      Who is going to help you get home at discharge? family/friends (P)      How do you get to doctors appointments? health plan transportation (P)      Are you on dialysis? Yes (P)      Dialysis Name and Scheduled days [facility] [address] M, W, Fri at 0545hrs (P)      Do you take coumadin? No (P)      Discharge Plan A Home (P)      Discharge Plan B Home (P)      DME Needed Upon Discharge  none (P)      Discharge Plan discussed with: Patient (P)      Transition of Care Barriers None (P)         Physical Activity    On average, how many days per week do you engage in moderate to strenuous exercise (like a brisk walk)? 0 days (P)      On average, how many minutes do you engage in exercise at this level? 0 min (P)         Financial Resource Strain    How hard is it for you to pay for the very basics like food, housing, medical care, and  heating? Not very hard (P)         Housing Stability    In the last 12 months, was there a time when you were not able to pay the mortgage or rent on time? No (P)      At any time in the past 12 months, were you homeless or living in a shelter (including now)? No (P)         Transportation Needs    In the past 12 months, has lack of transportation kept you from medical appointments or from getting medications? No (P)      In the past 12 months, has lack of transportation kept you from meetings, work, or from getting things needed for daily living? No (P)         Food Insecurity    Within the past 12 months, you worried that your food would run out before you got the money to buy more. Never true (P)      Within the past 12 months, the food you bought just didn't last and you didn't have money to get more. Never true (P)         Stress    Do you feel stress - tense, restless, nervous, or anxious, or unable to sleep at night because your mind is troubled all the time - these days? Not at all (P)         Social Isolation    How often do you feel lonely or isolated from those around you?  Never (P)         Alcohol Use    Q1: How often do you have a drink containing alcohol? Never (P)      Q2: How many drinks containing alcohol do you have on a typical day when you are drinking? Patient does not drink (P)      Q3: How often do you have six or more drinks on one occasion? Never (P)         Utilities    In the past 12 months has the electric, gas, oil, or water company threatened to shut off services in your home? No (P)         Health Literacy    How often do you need to have someone help you when you read instructions, pamphlets, or other written material from your doctor or pharmacy? Sometimes (P)                    Discharge Plan A and Plan B have been determined by review of patient's clinical status, future medical and therapeutic needs, and coverage/benefits for post-acute care in coordination with multidisciplinary  team members.    Shana Wisdom CD, MSW, LMSW, RSW   Case Management  [facility]  Email: [email or web address]

## 2025-07-16 NOTE — ED NOTES
Resumed care from Shiela PADILLA. Patient resting comfortably, side rails up no complaints at this time. POC continues.

## 2025-07-16 NOTE — ED TRIAGE NOTES
Pt arrived to the ED with c/o an allergic reaction. She states that around 1am she felt like she was having a hot flash. Endorses palpitations but no chest pain. Endorses sob feeling like she cannot catch her breath. Endorses pruritus of the hands and feet. Appears hypertensive and anxious upon arrival. HD every M/W/F. No other complaints at this time.

## 2025-07-16 NOTE — NURSING
Patient arrived in wheelchair . Right forearm fistula accessed with 15 G needles . Dialysis treatment started .  Report report received from Primary Nurse.

## 2025-07-16 NOTE — ED NOTES
Assumed care of the patient. Pt on continuous cardiac monitoring, continuous pulse oximetry, and automatic BP cuff cycling Q15min. Pt in hospital gown, side rails up X2, bed low and locked, and call light is placed within reach.  at bedside at this time. Pt denies any complaints or needs.

## 2025-07-17 ENCOUNTER — PATIENT OUTREACH (OUTPATIENT)
Dept: ADMINISTRATIVE | Facility: CLINIC | Age: 60
End: 2025-07-17
Payer: MEDICARE

## 2025-07-17 NOTE — DISCHARGE SUMMARY
ED Observation Unit  Discharge Summary        History of Present Illness:    59-year-old female presents with chief complaint of shortness a breath, anxiety and feeling flushed.  Past medical history detailed in her chart.  Patient has ESRD on dialysis Monday Wednesday Friday.  Patient has hypertension, hyperlipidemia, diabetes, CAD.  Patient is due for dialysis this morning.  Yesterday the patient had an ultrasound and CT scan.  Patient reports that she had contrast.  She has been doing well yesterday evening, overnight developed flushing of the skin and became anxious.  Patient became short of breath prompting visit to the ED.  Upon arrival patient is extremely hypertensive, reports compliance with the medications.  She is short of breath, denies chest pain.      I reviewed the ED Provider Note dated 7/16/25 prior to my evaluation of this patient.  I reviewed all labs and imaging performed in the Main ED, prior to patient being placed in Observation. Patient was placed in the ED Observation Unit for ESRD on hemodialysis.    Observation Course:    Patient was placed in observation for shortness of breath and to receive dialysis treatment.  She was noted to be significantly hypertensive and slightly hypoxic.  She completed her full dialysis treatment without complication.  She remained hypertensive with systolic BP in the 180s.  However, her symptoms resolved.  Able to wean off of supplemental O2.  He was ultimately felt to be stable for discharge.  Advised close follow-up with her nephrologist or PCP for uncontrolled hypertension.    Consultants:    Nephrology    Final Diagnosis:  ESRD on hemodialysis, uncontrolled hypertension    Discharge Condition: Good    Disposition: Home or Self Care     Time spent on the discharge of the patient including review of hospital course with the patient. reviewing discharge medications and arranging follow-up care 35 minutes.  Patient was seen and examined on the date of discharge  and determined to be suitable for discharge.    Follow Up:  Future Appointments   Date Time Provider Department Center   7/18/2025  3:00 PM Valentín Carney MD [facility] CARDIO [facility]   7/30/2025  8:00 AM Jed Cardenas OD [facility] OPTOMTY [facility]   8/18/2025  2:00 PM Gerhard Little MD [facility] ENDODIA [facility]   8/18/2025  2:15 PM [facility] ENDO US1 ALFREDO [facility] ENDCNUS [facility]   8/18/2025  3:00 PM Brigitte Santos MD [facility] ENSRG [facility]   9/17/2025  2:30 PM [facility], [facility] NEPHRO [facility]   1/9/2026 10:00 AM [facility]   1/13/2026 10:00 AM Douglas Abraham MD [facility] UROLOG Brent Bishop

## 2025-07-17 NOTE — PROGRESS NOTES
ED Observation Unit  Progress Note      HPI   59-year-old female presents with chief complaint of shortness a breath, anxiety and feeling flushed.  Past medical history detailed in her chart.  Patient has ESRD on dialysis Monday Wednesday Friday.  Patient has hypertension, hyperlipidemia, diabetes, CAD.  Patient is due for dialysis this morning.  Yesterday the patient had an ultrasound and CT scan.  Patient reports that she had contrast.  She has been doing well yesterday evening, overnight developed flushing of the skin and became anxious.  Patient became short of breath prompting visit to the ED.  Upon arrival patient is extremely hypertensive, reports compliance with the medications.  She is short of breath, denies chest pain.      I reviewed the ED Provider Note dated 7/16/25 prior to my evaluation of this patient.  I reviewed all labs and imaging performed in the Main ED, prior to patient being placed in Observation. Patient was placed in the ED Observation Unit for ESRD on hemodialysis.    Interval History   Completed full dialysis session without complication.  Her shortness of breath has resolved.  She was weaned off of supplemental O2 and was satting 95% on room air.  BP still elevated, but improved.  184/84.  She is asymptomatic.  Plan for discharge.    PMHx   Past Medical History:   Diagnosis Date    Acute hyperkalemia 09/09/2020    Acute on chronic diastolic heart failure 10/08/2021    Anemia of chronic disorder 11/08/2017    Overview:  Added automatically from request for surgery [id number]    Anemia of chronic renal failure, stage 5     Anxiety     Coronary artery disease 7/3/2025    Cyst of left ovary 05/14/2019    Cyst of left ovary 07/05/2019    Dyslipidemia 01/04/2013    Encounter for screening colonoscopy 10/08/2020    End-stage renal disease on hemodialysis 08/20/2020    ESRD on hemodialysis 09/28/2017    Hematuria 10/30/2019    History of COVID-19 04/07/2020    Hospital discharge follow-up  10/06/2023    Hx of sinus bradycardia 2017    Hyperkalemia 2017    Hypertensive urgency 2021    Peripheral neuropathy 2013    Preoperative testing 10/08/2020    Renovascular hypertension 2020    S/P dilation and curettage 2018    Screening for colon cancer 2023    Thickened endometrium 2018    Thyroid nodule     Thyroid nodule 2021    Uncontrolled type 2 diabetes mellitus 2013    Overview:  poc a1c today 11.2-270/ up from 10.8-263, Pt. has very very stressed/ was incarcerated/marital stress/ will current meds/ less stress now/ will monitor.      Past Surgical History:   Procedure Laterality Date    AV FISTULA PLACEMENT      right arm     SECTION      x1    COLONOSCOPY N/A 2018    Procedure: COLONOSCOPY;  Surgeon: Pankaj Hinojosa MD;  Location: [facility] (4TH FLR);  Service: Endoscopy;  Laterality: N/A;  dialysis pt/labs prior to colon/MS    COLONOSCOPY N/A 2023    Procedure: COLONOSCOPY;  Surgeon: Angel Turner MD;  Location: [facility] (2ND FLR);  Service: Endoscopy;  Laterality: N/A;  Ref By:  Maty Gayle MD  Procedure: Colonoscopy  Diagnosis: endounter for preventive health   Procedure Timing: pt. pref  Provider: Dr. turner   Location: [facility] 4-Endo   Prep Specifications: Standard prep   Additional Info: Dialysis patient lab pending    CYSTOSCOPY W/ RETROGRADES Bilateral 2020    Procedure: CYSTOSCOPY, WITH RETROGRADE PYELOGRAM;  Surgeon: Douglas Abraham MD;  Location: [facility] OR 1ST FLR;  Service: Urology;  Laterality: Bilateral;  45 min    FISTULOGRAM Right 2020    Procedure: Fistulogram;  Surgeon: Lester Gómez MD;  Location: [facility] [place] CATH LAB;  Service: Nephrology;  Laterality: Right;    FISTULOGRAM Right 2024    Procedure: Fistulogram RIGHT FOREARM;  Surgeon: Lester Gómez MD;  Location: [facility] [place] CATH LAB;  Service: Nephrology;  Laterality: Right;    HYSTERECTOMY      IMPLANTATION OF COCHLEAR PROSTHESIS Right 2021     Procedure: INSERTION, PROSTHESIS, COCHLEAR;  Surgeon: Ramiro Zuleta MD;  Location: [facility] OR 2ND FLR;  Service: ENT;  Laterality: Right;  COCHLEAR BABAK    IMPLANTATION OF COCHLEAR PROSTHESIS Right 6/30/2021    Procedure: INSERTION, PROSTHESIS, COCHLEAR;  Surgeon: Ramiro Zuleta MD;  Location: [facility] OR 2ND FLR;  Service: ENT;  Laterality: Right;  COCHLEAR AMERICAS    ROBOT-ASSISTED LAPAROSCOPIC ABDOMINAL HYSTERECTOMY USING DA NATALYA XI N/A 7/5/2019    Procedure: XI ROBOTIC HYSTERECTOMY;  Surgeon: Trice Lei MD;  Location: [facility];  Service: OB/GYN;  Laterality: N/A;    ROBOT-ASSISTED LAPAROSCOPIC NEPHRECTOMY Right 10/8/2020    Procedure: ROBOTIC NEPHRECTOMY;  Surgeon: Douglas Abraham MD;  Location: [facility] OR 2ND FLR;  Service: Urology;  Laterality: Right;  3.5hrs gen with regional     ROBOT-ASSISTED LAPAROSCOPIC SALPINGO-OOPHORECTOMY USING DA NATALYA XI Bilateral 7/5/2019    Procedure: XI ROBOTIC SALPINGO-OOPHORECTOMY;  Surgeon: Trice Lei MD;  Location: [facility];  Service: OB/GYN;  Laterality: Bilateral;    TUBAL LIGATION      Vascular access surgeries      x5        Family Hx   Family History   Problem Relation Name Age of Onset    Cancer Mother      Ovarian cancer Mother      Cancer Sister      Ovarian cancer Sister      Lung cancer Sister      Macular degeneration Sister      Cancer Sister      Ovarian cancer Sister      Diabetes Sister      Diabetes Brother      Breast cancer Neg Hx      Colon cancer Neg Hx      Cervical cancer Neg Hx      Endometrial cancer Neg Hx      Vaginal cancer Neg Hx      Thyroid disease Neg Hx      Glaucoma Neg Hx      Retinal detachment Neg Hx          Social Hx   Social History     Socioeconomic History    Marital status:     Number of children: 2   Occupational History    Occupation: disability   Tobacco Use    Smoking status: Never    Smokeless tobacco: Never   Substance and Sexual Activity    Alcohol use: No    Drug use: No    Sexual activity: Yes     Partners: Male      Birth control/protection: Post-menopausal     Social Drivers of Health     Financial Resource Strain: Low Risk  (7/16/2025)    Overall Financial Resource Strain (CARDIA)     Difficulty of Paying Living Expenses: Not very hard   Food Insecurity: No Food Insecurity (7/16/2025)    Hunger Vital Sign     Worried About Running Out of Food in the Last Year: Never true     Ran Out of Food in the Last Year: Never true   Transportation Needs: No Transportation Needs (7/16/2025)    PRAPARE - Transportation     Lack of Transportation (Medical): No     Lack of Transportation (Non-Medical): No   Physical Activity: Inactive (7/16/2025)    Exercise Vital Sign     Days of Exercise per Week: 0 days     Minutes of Exercise per Session: 0 min   Stress: No Stress Concern Present (7/16/2025)    [place] [employer] - Occupational Stress Questionnaire     Feeling of Stress : Not at all   Housing Stability: Low Risk  (7/16/2025)    Housing Stability Vital Sign     Unable to Pay for Housing in the Last Year: No     Homeless in the Last Year: No        Vital Signs   Vitals:    07/16/25 1745 07/16/25 1800 07/16/25 1859 07/16/25 1909   BP: (!) 193/88  (!) 184/84    BP Location: Left arm  Left arm    Patient Position: Lying      Pulse: 67 71 73    Resp:       Temp:   97.9 °F (36.6 °C)    TempSrc:   Oral    SpO2: 99% 98% 100% 95%   Weight:       Height:            Review of Systems  Review of Systems   Respiratory:  Negative for shortness of breath.    Cardiovascular:  Negative for chest pain.       Brief Physical Exam/Reassessment   Physical Exam  Vitals reviewed.   Constitutional:       General: She is not in acute distress.     Appearance: She is not diaphoretic.   HENT:      Head: Normocephalic and atraumatic.   Cardiovascular:      Rate and Rhythm: Normal rate and regular rhythm.      Heart sounds: Heart sounds not distant. No murmur heard.     No friction rub. No gallop.   Pulmonary:      Effort: Pulmonary effort is  normal. No respiratory distress.      Breath sounds: Normal breath sounds. No decreased breath sounds, wheezing, rhonchi or rales.   Chest:      Chest wall: No tenderness.   Musculoskeletal:      Cervical back: Neck supple.      Right lower leg: Edema present.      Left lower leg: Edema present.   Skin:     General: Skin is warm and dry.   Neurological:      Mental Status: She is alert.   Psychiatric:         Mood and Affect: Mood and affect normal.         Labs/Imaging   Labs Reviewed   COMPREHENSIVE METABOLIC PANEL - Abnormal       Result Value    Sodium 140      Potassium 4.8      Chloride 99      CO2 27      Glucose 101      BUN 42 (*)     Creatinine 9.2 (*)     Calcium 9.2      Protein Total 8.3      Albumin 3.8      Bilirubin Total 1.1 (*)           AST 29      ALT 9 (*)     Anion Gap 14      eGFR 4 (*)    B-TYPE NATRIURETIC PEPTIDE - Abnormal     (*)    CBC WITH DIFFERENTIAL - Abnormal    WBC 7.25      RBC 3.62 (*)     HGB 9.2 (*)     HCT 28.5 (*)     MCV 79 (*)     MCH 25.4 (*)     MCHC 32.3      RDW 20.3 (*)     Platelet Count 206      MPV 10.0      Nucleated RBC 0      Neut % 65.6      Lymph % 16.6 (*)     Mono % 8.1      Eos % 8.8 (*)     Basophil % 0.8      Imm Grans % 0.1      Neut # 4.75      Lymph # 1.20      Mono # 0.59      Eos # 0.64 (*)     Baso # 0.06      Imm Grans # 0.01     ISTAT PROCEDURE - Abnormal    POC Glucose 109      POC BUN 49 (*)     POC Creatinine 9.9 (*)     POC Sodium 139      POC Potassium 4.6      POC Chloride 98      POC TCO2 (MEASURED) 31 (*)     POC Ionized Calcium 1.07      POC Hematocrit 26 (*)     Sample NIKO     TROPONIN I HIGH SENSITIVITY - Normal    Troponin High Sensitive 7     CBC W/ AUTO DIFFERENTIAL    Narrative:     The following orders were created for panel order CBC Auto Differential.  Procedure                               Abnormality         Status                     ---------                               -----------         ------                "      CBC with Differential[[id number]]       Abnormal            Final result                 Please view results for these tests on the individual orders.   POCT GLUCOSE    POCT Glucose 88     POCT GLUCOSE    POCT Glucose 103     POCT GLUCOSE MONITORING CONTINUOUS      Imaging Results              X-Ray Chest AP Portable (Final result)  Result time 07/16/25 04:28:50      Final result by Yohan Spear MD (07/16/25 04:28:50)                   Impression:      Cardiomegaly with probable mild interstitial edema.      Electronically signed by: Yohan Spear MD  Date:    07/16/2025  Time:    04:28               Narrative:    EXAMINATION:  XR CHEST AP PORTABLE    CLINICAL HISTORY:  Provided history is "  Shortness of breath".    TECHNIQUE:  One view of the chest.    COMPARISON:  12/02/2024.    FINDINGS:  Cardiac wires overlie the chest.  Cardiomediastinal silhouette is enlarged and similar to the prior study.  Atherosclerotic calcifications overlie the aortic arch.  Central vascular congestion and bilateral interstitial opacities with bilateral lower lobe ground-glass opacities.  Suggest correlation for interstitial edema.  No large pleural effusion.  No distinct pneumothorax.  Surgical clips overlying the left upper extremity as seen previously.                                       I reviewed all labs, imaging, EKGs.     Plan   End stage renal disease on hemodialysis  Hypertension  Shortness of breath  -place in EDOU  -nephrology consulted by ED provider  -plan for acute dialysis followed by discharge home after if no concerns  -home BP meds and re-assess  -continue home medications as prescribed unless otherwise advised  -follow up per nephrology instructions. ED return precautions discussed.  -using O2 per nasal canula here prior to dialysis. Will reassess O2 sat on room air following dialysis.  -dc home vs upgrade to  following dialysis pending oxygen requirement and repeat v/s     I have discussed this " case with ORLANDO Giron.

## 2025-07-21 ENCOUNTER — TELEPHONE (OUTPATIENT)
Dept: INTERNAL MEDICINE | Facility: CLINIC | Age: 60
End: 2025-07-21
Payer: MEDICARE

## 2025-07-21 NOTE — TELEPHONE ENCOUNTER
Copied from CRM #[id number]. Topic: Appointments - Amb Referral  >> Jul 21, 2025  9:48 AM Lenore wrote:  Type: General Call Back         Name of Caller:Pt    Would the patient rather a call back or a response via [facility]? Call   Best Call Back Number:[phone]   Additional Information: Pt called requesting to speak with provider, please contact pt with further information

## 2025-07-21 NOTE — TELEPHONE ENCOUNTER
Called pt; pt answered    Pt reports receiving a call on Friday regarding something about her gallstones  Pt has appt on 7/24 (Thursday) with Dr Perla with [facility] for her Gallstones

## 2025-07-22 ENCOUNTER — TELEPHONE (OUTPATIENT)
Dept: SURGERY | Facility: CLINIC | Age: 60
End: 2025-07-22
Payer: MEDICARE

## 2025-07-22 NOTE — TELEPHONE ENCOUNTER
Returned call to pt that had concerns about appt. Explained to pt what day her appt was again & why she was coming to appt. Pt stated she would make it to her appt.

## 2025-07-24 ENCOUNTER — OFFICE VISIT (OUTPATIENT)
Dept: SURGERY | Facility: CLINIC | Age: 60
End: 2025-07-24
Payer: MEDICARE

## 2025-07-24 VITALS
OXYGEN SATURATION: 95 % | WEIGHT: 187.19 LBS | HEART RATE: 80 BPM | HEIGHT: 64 IN | SYSTOLIC BLOOD PRESSURE: 175 MMHG | DIASTOLIC BLOOD PRESSURE: 82 MMHG | BODY MASS INDEX: 31.96 KG/M2

## 2025-07-24 DIAGNOSIS — K80.20 GALLSTONES: ICD-10-CM

## 2025-07-24 PROCEDURE — 99999 PR PBB SHADOW E&M-EST. PATIENT-LVL IV: CPT | Mod: PBBFAC,HCNC,, | Performed by: STUDENT IN AN ORGANIZED HEALTH CARE EDUCATION/TRAINING PROGRAM

## 2025-07-28 ENCOUNTER — HOSPITAL ENCOUNTER (OUTPATIENT)
Facility: HOSPITAL | Age: 60
Discharge: HOME | End: 2025-07-29
Attending: EMERGENCY MEDICINE | Admitting: STUDENT IN AN ORGANIZED HEALTH CARE EDUCATION/TRAINING PROGRAM
Payer: MEDICARE

## 2025-07-28 ENCOUNTER — NURSE TRIAGE (OUTPATIENT)
Dept: ADMINISTRATIVE | Facility: CLINIC | Age: 60
End: 2025-07-28
Payer: MEDICARE

## 2025-07-28 DIAGNOSIS — N18.6 ESRD (END STAGE RENAL DISEASE): ICD-10-CM

## 2025-07-28 DIAGNOSIS — J81.0 ACUTE PULMONARY EDEMA: ICD-10-CM

## 2025-07-28 DIAGNOSIS — R09.02 HYPOXEMIA: ICD-10-CM

## 2025-07-28 DIAGNOSIS — R06.02 SHORTNESS OF BREATH: Primary | ICD-10-CM

## 2025-07-28 LAB
ABSOLUTE EOSINOPHIL (OHS): 0.84 K/UL
ABSOLUTE MONOCYTE (OHS): 0.57 K/UL (ref 0.3–1)
ABSOLUTE NEUTROPHIL COUNT (OHS): 4.79 K/UL (ref 1.8–7.7)
ALBUMIN SERPL BCP-MCNC: 3.8 G/DL (ref 3.5–5.2)
ALP SERPL-CCNC: 146 UNIT/L (ref 40–150)
ALT SERPL W/O P-5'-P-CCNC: <8 UNIT/L (ref 0–55)
ANION GAP (OHS): 11 MMOL/L (ref 8–16)
AST SERPL-CCNC: 25 UNIT/L (ref 0–50)
BASOPHILS # BLD AUTO: 0.05 K/UL
BASOPHILS NFR BLD AUTO: 0.6 %
BILIRUB SERPL-MCNC: 1.9 MG/DL (ref 0.1–1)
BIPAP: 0
BUN SERPL-MCNC: 17 MG/DL (ref 8–23)
CALCIUM SERPL-MCNC: 8.9 MG/DL (ref 8.7–10.5)
CHLORIDE SERPL-SCNC: 97 MMOL/L (ref 95–110)
CO2 SERPL-SCNC: 30 MMOL/L (ref 23–29)
CORRECTED TEMPERATURE (PCO2): 44.1 MMHG
CORRECTED TEMPERATURE (PH): 7.52
CORRECTED TEMPERATURE (PO2): 53.6 MMHG
CREAT SERPL-MCNC: 5.4 MG/DL (ref 0.5–1.4)
ERYTHROCYTE [DISTWIDTH] IN BLOOD BY AUTOMATED COUNT: 20.8 % (ref 11.5–14.5)
FIO2: 21 %
GFR SERPLBLD CREATININE-BSD FMLA CKD-EPI: 8 ML/MIN/1.73/M2
GLUCOSE SERPL-MCNC: 102 MG/DL (ref 70–110)
HCT VFR BLD AUTO: 29.4 % (ref 37–48.5)
HCT VFR BLD CALC: 31.4 % (ref 36–54)
HCV AB SERPL QL IA: NORMAL
HGB BLD-MCNC: 9.4 GM/DL (ref 12–16)
HIV 1+2 AB+HIV1 P24 AG SERPL QL IA: NORMAL
IMM GRANULOCYTES # BLD AUTO: 0.02 K/UL (ref 0–0.04)
IMM GRANULOCYTES NFR BLD AUTO: 0.3 % (ref 0–0.5)
LDH SERPL L TO P-CCNC: 1.1 MMOL/L (ref 0.5–2.2)
LYMPHOCYTES # BLD AUTO: 1.59 K/UL (ref 1–4.8)
MCH RBC QN AUTO: 25.5 PG (ref 27–31)
MCHC RBC AUTO-ENTMCNC: 32 G/DL (ref 32–36)
MCV RBC AUTO: 80 FL (ref 82–98)
NT-PROBNP SERPL-MCNC: ABNORMAL PG/ML
NUCLEATED RBC (/100WBC) (OHS): 0 /100 WBC
OHS QRS DURATION: 96 MS
OHS QTC CALCULATION: 532 MS
PCO2 BLDA: 44.1 MMHG (ref 35–45)
PH SMN: 7.52 [PH] (ref 7.35–7.45)
PLATELET # BLD AUTO: 216 K/UL (ref 150–450)
PMV BLD AUTO: 9.4 FL (ref 9.2–12.9)
PO2 BLDA: 53.6 MMHG (ref 40–60)
POC BASE DEFICIT: 12.2 MMOL/L
POC HCO3: 35.9 MMOL/L (ref 24–28)
POC PERFORMED BY: ABNORMAL
POC TEMPERATURE: 37 C
POCT GLUCOSE: 125 MG/DL (ref 70–110)
POTASSIUM SERPL-SCNC: 3.7 MMOL/L (ref 3.5–5.1)
PROT SERPL-MCNC: 7.8 GM/DL (ref 6–8.4)
RBC # BLD AUTO: 3.68 M/UL (ref 4–5.4)
RELATIVE EOSINOPHIL (OHS): 10.7 %
RELATIVE LYMPHOCYTE (OHS): 20.2 % (ref 18–48)
RELATIVE MONOCYTE (OHS): 7.3 % (ref 4–15)
RELATIVE NEUTROPHIL (OHS): 60.9 % (ref 38–73)
SARS-COV-2 RDRP RESP QL NAA+PROBE: NEGATIVE
SODIUM SERPL-SCNC: 138 MMOL/L (ref 136–145)
SPECIMEN SOURCE: ABNORMAL
TROPONIN I SERPL HS-MCNC: 8 NG/L
TROPONIN I SERPL HS-MCNC: 8 NG/L
WBC # BLD AUTO: 7.86 K/UL (ref 3.9–12.7)

## 2025-07-28 PROCEDURE — 83880 ASSAY OF NATRIURETIC PEPTIDE: CPT | Mod: HCNC | Performed by: EMERGENCY MEDICINE

## 2025-07-28 PROCEDURE — 80053 COMPREHEN METABOLIC PANEL: CPT | Mod: HCNC | Performed by: EMERGENCY MEDICINE

## 2025-07-28 PROCEDURE — 93005 ELECTROCARDIOGRAM TRACING: CPT | Mod: HCNC

## 2025-07-28 PROCEDURE — 99285 EMERGENCY DEPT VISIT HI MDM: CPT | Mod: 25,HCNC

## 2025-07-28 PROCEDURE — 85025 COMPLETE CBC W/AUTO DIFF WBC: CPT | Mod: HCNC | Performed by: EMERGENCY MEDICINE

## 2025-07-28 PROCEDURE — 82962 GLUCOSE BLOOD TEST: CPT | Mod: HCNC

## 2025-07-28 PROCEDURE — G0378 HOSPITAL OBSERVATION PER HR: HCPCS | Mod: HCNC

## 2025-07-28 PROCEDURE — 93010 ELECTROCARDIOGRAM REPORT: CPT | Mod: HCNC,,, | Performed by: INTERNAL MEDICINE

## 2025-07-28 PROCEDURE — 96372 THER/PROPH/DIAG INJ SC/IM: CPT | Performed by: STUDENT IN AN ORGANIZED HEALTH CARE EDUCATION/TRAINING PROGRAM

## 2025-07-28 PROCEDURE — 84484 ASSAY OF TROPONIN QUANT: CPT | Mod: HCNC | Performed by: EMERGENCY MEDICINE

## 2025-07-28 PROCEDURE — 87389 HIV-1 AG W/HIV-1&-2 AB AG IA: CPT | Mod: HCNC | Performed by: PHYSICIAN ASSISTANT

## 2025-07-28 PROCEDURE — 82803 BLOOD GASES ANY COMBINATION: CPT | Mod: HCNC

## 2025-07-28 PROCEDURE — 99900035 HC TECH TIME PER 15 MIN (STAT): Mod: HCNC

## 2025-07-28 PROCEDURE — 83605 ASSAY OF LACTIC ACID: CPT | Mod: HCNC

## 2025-07-28 PROCEDURE — 63600175 PHARM REV CODE 636 W HCPCS: Mod: HCNC | Performed by: STUDENT IN AN ORGANIZED HEALTH CARE EDUCATION/TRAINING PROGRAM

## 2025-07-28 PROCEDURE — 25000003 PHARM REV CODE 250: Mod: HCNC | Performed by: STUDENT IN AN ORGANIZED HEALTH CARE EDUCATION/TRAINING PROGRAM

## 2025-07-28 PROCEDURE — U0002 COVID-19 LAB TEST NON-CDC: HCPCS | Mod: HCNC | Performed by: EMERGENCY MEDICINE

## 2025-07-28 PROCEDURE — 86803 HEPATITIS C AB TEST: CPT | Mod: HCNC | Performed by: PHYSICIAN ASSISTANT

## 2025-07-28 RX ORDER — ONDANSETRON 8 MG/1
8 TABLET, ORALLY DISINTEGRATING ORAL EVERY 8 HOURS PRN
Status: DISCONTINUED | OUTPATIENT
Start: 2025-07-28 | End: 2025-07-28

## 2025-07-28 RX ORDER — PANTOPRAZOLE SODIUM 40 MG/1
40 TABLET, DELAYED RELEASE ORAL DAILY
Status: DISCONTINUED | OUTPATIENT
Start: 2025-07-29 | End: 2025-07-29 | Stop reason: HOSPADM

## 2025-07-28 RX ORDER — LOSARTAN POTASSIUM 50 MG/1
100 TABLET ORAL DAILY
Status: DISCONTINUED | OUTPATIENT
Start: 2025-07-29 | End: 2025-07-29 | Stop reason: HOSPADM

## 2025-07-28 RX ORDER — ACETAMINOPHEN 325 MG/1
650 TABLET ORAL EVERY 8 HOURS PRN
Status: DISCONTINUED | OUTPATIENT
Start: 2025-07-28 | End: 2025-07-29 | Stop reason: HOSPADM

## 2025-07-28 RX ORDER — INSULIN ASPART 100 [IU]/ML
0-5 INJECTION, SOLUTION INTRAVENOUS; SUBCUTANEOUS
Status: DISCONTINUED | OUTPATIENT
Start: 2025-07-28 | End: 2025-07-29 | Stop reason: HOSPADM

## 2025-07-28 RX ORDER — POLYETHYLENE GLYCOL 3350 17 G/17G
17 POWDER, FOR SOLUTION ORAL DAILY PRN
Status: DISCONTINUED | OUTPATIENT
Start: 2025-07-28 | End: 2025-07-29 | Stop reason: HOSPADM

## 2025-07-28 RX ORDER — TALC
6 POWDER (GRAM) TOPICAL NIGHTLY PRN
Status: DISCONTINUED | OUTPATIENT
Start: 2025-07-28 | End: 2025-07-29 | Stop reason: HOSPADM

## 2025-07-28 RX ORDER — IBUPROFEN 200 MG
24 TABLET ORAL
Status: DISCONTINUED | OUTPATIENT
Start: 2025-07-28 | End: 2025-07-29 | Stop reason: HOSPADM

## 2025-07-28 RX ORDER — PROCHLORPERAZINE EDISYLATE 5 MG/ML
5 INJECTION INTRAMUSCULAR; INTRAVENOUS EVERY 6 HOURS PRN
Status: DISCONTINUED | OUTPATIENT
Start: 2025-07-28 | End: 2025-07-28

## 2025-07-28 RX ORDER — NALOXONE HCL 0.4 MG/ML
0.02 VIAL (ML) INJECTION
Status: DISCONTINUED | OUTPATIENT
Start: 2025-07-28 | End: 2025-07-29 | Stop reason: HOSPADM

## 2025-07-28 RX ORDER — SEVELAMER CARBONATE 800 MG/1
2400 TABLET, FILM COATED ORAL
Status: DISCONTINUED | OUTPATIENT
Start: 2025-07-29 | End: 2025-07-29 | Stop reason: HOSPADM

## 2025-07-28 RX ORDER — GLUCAGON 1 MG
1 KIT INJECTION
Status: DISCONTINUED | OUTPATIENT
Start: 2025-07-28 | End: 2025-07-29 | Stop reason: HOSPADM

## 2025-07-28 RX ORDER — IBUPROFEN 200 MG
16 TABLET ORAL
Status: DISCONTINUED | OUTPATIENT
Start: 2025-07-28 | End: 2025-07-29 | Stop reason: HOSPADM

## 2025-07-28 RX ORDER — INSULIN GLARGINE 100 [IU]/ML
9 INJECTION, SOLUTION SUBCUTANEOUS NIGHTLY
Status: DISCONTINUED | OUTPATIENT
Start: 2025-07-28 | End: 2025-07-29 | Stop reason: HOSPADM

## 2025-07-28 RX ORDER — SODIUM CHLORIDE 0.9 % (FLUSH) 0.9 %
10 SYRINGE (ML) INJECTION EVERY 12 HOURS PRN
Status: DISCONTINUED | OUTPATIENT
Start: 2025-07-28 | End: 2025-07-29 | Stop reason: HOSPADM

## 2025-07-28 RX ORDER — LABETALOL 100 MG/1
200 TABLET, FILM COATED ORAL 2 TIMES DAILY
Status: DISCONTINUED | OUTPATIENT
Start: 2025-07-28 | End: 2025-07-29 | Stop reason: HOSPADM

## 2025-07-28 RX ORDER — ATORVASTATIN CALCIUM 40 MG/1
40 TABLET, FILM COATED ORAL DAILY
Status: DISCONTINUED | OUTPATIENT
Start: 2025-07-29 | End: 2025-07-29 | Stop reason: HOSPADM

## 2025-07-28 RX ORDER — ASPIRIN 81 MG/1
81 TABLET ORAL EVERY MORNING
Status: DISCONTINUED | OUTPATIENT
Start: 2025-07-29 | End: 2025-07-29 | Stop reason: HOSPADM

## 2025-07-28 RX ORDER — NIFEDIPINE 30 MG/1
30 TABLET, EXTENDED RELEASE ORAL DAILY
Status: DISCONTINUED | OUTPATIENT
Start: 2025-07-29 | End: 2025-07-29 | Stop reason: HOSPADM

## 2025-07-28 RX ORDER — INSULIN ASPART 100 [IU]/ML
3 INJECTION, SOLUTION INTRAVENOUS; SUBCUTANEOUS
Status: DISCONTINUED | OUTPATIENT
Start: 2025-07-29 | End: 2025-07-29 | Stop reason: HOSPADM

## 2025-07-28 RX ORDER — BISACODYL 10 MG/1
10 SUPPOSITORY RECTAL DAILY PRN
Status: DISCONTINUED | OUTPATIENT
Start: 2025-07-28 | End: 2025-07-29 | Stop reason: HOSPADM

## 2025-07-28 RX ADMIN — INSULIN GLARGINE 9 UNITS: 100 INJECTION, SOLUTION SUBCUTANEOUS at 09:07

## 2025-07-28 RX ADMIN — LABETALOL HYDROCHLORIDE 200 MG: 100 TABLET, FILM COATED ORAL at 07:07

## 2025-07-28 NOTE — SUBJECTIVE & OBJECTIVE
Past Medical History:   Diagnosis Date    Acute hyperkalemia 2020    Acute on chronic diastolic heart failure 10/08/2021    Anemia of chronic disorder 2017    Overview:  Added automatically from request for surgery [id number]    Anemia of chronic renal failure, stage 5     Anxiety     Coronary artery disease 7/3/2025    Cyst of left ovary 2019    Cyst of left ovary 2019    Dyslipidemia 2013    Encounter for screening colonoscopy 10/08/2020    End-stage renal disease on hemodialysis 2020    ESRD on hemodialysis 2017    Hematuria 10/30/2019    History of COVID-19 2020    Hospital discharge follow-up 10/06/2023    Hx of sinus bradycardia 2017    Hyperkalemia 2017    Hypertensive urgency 2021    Peripheral neuropathy 2013    Preoperative testing 10/08/2020    Renovascular hypertension 2020    S/P dilation and curettage 2018    Screening for colon cancer 2023    Thickened endometrium 2018    Thyroid nodule     Thyroid nodule 2021    Uncontrolled type 2 diabetes mellitus 2013    Overview:  poc a1c today 11.2-270/ up from 10.8-263, Pt. has very very stressed/ was incarcerated/marital stress/ will current meds/ less stress now/ will monitor.       Past Surgical History:   Procedure Laterality Date    AV FISTULA PLACEMENT      right arm     SECTION      x1    COLONOSCOPY N/A 2018    Procedure: COLONOSCOPY;  Surgeon: Pankaj Hinojosa MD;  [facility]  Service: Endoscopy;  Laterality: N/A;  dialysis pt/labs prior to colon/MS    COLONOSCOPY N/A 2023    Procedure: COLONOSCOPY;  Surgeon: Angel Turner MD;  Location: [facility] (2ND FLR);  Service: Endoscopy;  Laterality: N/A;  Ref By:  Maty Gayle MD  Procedure: Colonoscopy  Diagnosis: endounter for preventive health   Procedure Timing: pt. pref  Provider: Dr. turner   Location: [facility]   Prep Specifications: Standard prep   Additional  Info: Dialysis patient lab pending    CYSTOSCOPY W/ RETROGRADES Bilateral 8/20/2020    Procedure: CYSTOSCOPY, WITH RETROGRADE PYELOGRAM;  Surgeon: Douglas Abraham MD;  Location: [facility] OR 1ST FLR;  Service: Urology;  Laterality: Bilateral;  45 min    FISTULOGRAM Right 9/9/2020    Procedure: Fistulogram;  Surgeon: Lester Gómez MD;  Location: [facility] CATH LAB;  Service: Nephrology;  Laterality: Right;    FISTULOGRAM Right 12/5/2024    Procedure: Fistulogram RIGHT FOREARM;  Surgeon: Lester Gómez MD;  Location: [facility] CATH LAB;  Service: Nephrology;  Laterality: Right;    HYSTERECTOMY      IMPLANTATION OF COCHLEAR PROSTHESIS Right 6/28/2021    Procedure: INSERTION, PROSTHESIS, COCHLEAR;  Surgeon: Ramiro Zuleta MD;  Location: [facility] OR 2ND FLR;  Service: ENT;  Laterality: Right;  COCHLEAR BABAK    IMPLANTATION OF COCHLEAR PROSTHESIS Right 6/30/2021    Procedure: INSERTION, PROSTHESIS, COCHLEAR;  Surgeon: Ramiro Zuleta MD;  Location: [facility] OR 2ND FLR;  Service: ENT;  Laterality: Right;  COCHLEAR AMERICAS    ROBOT-ASSISTED LAPAROSCOPIC ABDOMINAL HYSTERECTOMY USING DA NATALYA XI N/A 7/5/2019    Procedure: XI ROBOTIC HYSTERECTOMY;  Surgeon: Trice Lei MD;  Location: [facility];  Service: OB/GYN;  Laterality: N/A;    ROBOT-ASSISTED LAPAROSCOPIC NEPHRECTOMY Right 10/8/2020    Procedure: ROBOTIC NEPHRECTOMY;  Surgeon: Douglas Abraham MD;  Location: [facility] OR 2ND FLR;  Service: Urology;  Laterality: Right;  3.5hrs gen with regional     ROBOT-ASSISTED LAPAROSCOPIC SALPINGO-OOPHORECTOMY USING DA NATALYA XI Bilateral 7/5/2019    Procedure: XI ROBOTIC SALPINGO-OOPHORECTOMY;  Surgeon: Trice Lei MD;  Location: [facility];  Service: OB/GYN;  Laterality: Bilateral;    TUBAL LIGATION      Vascular access surgeries      x5       Review of patient's allergies indicates:  No Known Allergies    No current facility-administered medications on file prior to encounter.     Current Outpatient Medications on File Prior to Encounter   Medication  Sig    acetaminophen (TYLENOL) 500 MG tablet Take 1 tablet (500 mg total) by mouth every 8 (eight) hours as needed for Pain.    aspirin (ECOTRIN) 81 MG EC tablet Take 81 mg by mouth every morning.    atorvastatin (LIPITOR) 40 MG tablet Take 1 tablet (40 mg total) by mouth once daily.    epoetin shiela (PROCRIT) 10,000 unit/mL injection Inject 0.85 mLs (8,500 Units total) into the skin every Mon, Wed, Fri.    hydrOXYzine HCL (ATARAX) 10 MG Tab Take 1 tablet (10 mg total) by mouth 2 (two) times daily as needed.    insulin aspart U-100 (NOVOLOG) 100 unit/mL (3 mL) InPn pen Inject 0-5 Units into the skin before meals and at bedtime as needed (Hyperglycemia).    insulin glargine U-100, Lantus, (LANTUS SOLOSTAR U-100 INSULIN) 100 unit/mL (3 mL) InPn pen Inject 12 Units into the skin once daily.    labetaloL (NORMODYNE) 200 MG tablet Take 1 tablet (200 mg total) by mouth 2 (two) times daily.    losartan (COZAAR) 100 MG tablet Take 1 tablet (100 mg total) by mouth once daily.    NIFEdipine (PROCARDIA-XL) 30 MG (OSM) 24 hr tablet Take 1 tablet (30 mg total) by mouth once daily.    omeprazole (PRILOSEC) 20 MG capsule Take 20 mg by mouth every morning.    pregabalin (LYRICA) 75 MG capsule Take 1 capsule (75 mg total) by mouth once daily.    sevelamer carbonate (RENVELA) 800 mg Tab Take 3 tablets (2,400 mg total) by mouth 3 (three) times daily with meals.    sevelamer HCL (RENAGEL) 800 MG Tab Take 800 mg by mouth.    [DISCONTINUED] ACCU-CHEK GUIDE GLUCOSE METER Misc     [DISCONTINUED] ACCU-CHEK GUIDE L1-L2 CTRL SOL Soln     [DISCONTINUED] ACCU-CHEK SOFTCLIX LANCETS Misc     [DISCONTINUED] blood sugar diagnostic (ACCU-CHEK GUIDE TEST STRIPS) Strp Inject 100 each into the skin 3 (three) times daily.    [DISCONTINUED] blood sugar diagnostic Strp To check BG 3 times daily, to use with insurance preferred meter    [DISCONTINUED] blood-glucose meter kit To check BG 3 times daily, to use with insurance preferred meter    [DISCONTINUED]  "DROPSAFE ALCOHOL PREP PADS PadM Apply topically.    [DISCONTINUED] lancets Misc To check BG 3 times daily, to use with insurance preferred meter    [DISCONTINUED] pen needle, diabetic 31 gauge x 3/16" Ndle Use to inject insulin into the skin.     Family History       Problem Relation (Age of Onset)    Cancer Mother, Sister, Sister    Diabetes Sister, Brother    Lung cancer Sister    Macular degeneration Sister    Ovarian cancer Mother, Sister, Sister          Tobacco Use    Smoking status: Never    Smokeless tobacco: Never   Substance and Sexual Activity    Alcohol use: No    Drug use: No    Sexual activity: Yes     Partners: Male     Birth control/protection: Post-menopausal     Review of Systems   Constitutional:  Negative for activity change, appetite change, chills, diaphoresis, fatigue and fever.   HENT:  Negative for rhinorrhea and sore throat.    Respiratory:  Positive for cough and shortness of breath. Negative for chest tightness.    Cardiovascular:  Negative for chest pain and palpitations.   Gastrointestinal:  Negative for abdominal pain, constipation, diarrhea and nausea.   Endocrine: Negative for cold intolerance.   Genitourinary:  Negative for decreased urine volume and dysuria.   Musculoskeletal:  Negative for arthralgias and myalgias.   Skin:  Negative for rash and wound.   Neurological:  Negative for dizziness, weakness, numbness and headaches.   Psychiatric/Behavioral:  Negative for agitation, behavioral problems and confusion.      Objective:     Vital Signs (Most Recent):  Temp: 98.6 °F (37 °C) (07/28/25 1337)  Pulse: 83 (07/28/25 1800)  Resp: (!) 28 (07/28/25 1337)  BP: (!) 202/85 (07/28/25 1800)  SpO2: 98 % (07/28/25 1800) Vital Signs (24h Range):  Temp:  [98.6 °F (37 °C)] 98.6 °F (37 °C)  Pulse:  [80-89] 83  Resp:  [28] 28  SpO2:  [91 %-100 %] 98 %  BP: (181-202)/(85-90) 202/85     Weight: 84.9 kg (187 lb 2.7 oz)  Body mass index is 32.13 kg/m².     Physical Exam  Constitutional:       " General: She is not in acute distress.     Appearance: Normal appearance.   HENT:      Head: Normocephalic and atraumatic.      Mouth/Throat:      Mouth: Mucous membranes are moist.   Eyes:      Extraocular Movements: Extraocular movements intact.      Conjunctiva/sclera: Conjunctivae normal.   Cardiovascular:      Rate and Rhythm: Normal rate and regular rhythm.   Pulmonary:      Effort: Pulmonary effort is normal. No respiratory distress.      Breath sounds: Normal breath sounds. No wheezing or rales.   Abdominal:      General: Abdomen is flat. Bowel sounds are normal.      Palpations: Abdomen is soft.      Tenderness: There is no abdominal tenderness. There is no guarding.   Musculoskeletal:         General: No swelling or tenderness.      Right lower leg: No edema.      Left lower leg: No edema.   Skin:     Findings: No rash.   Neurological:      General: No focal deficit present.      Mental Status: She is alert and oriented to person, place, and time. Mental status is at baseline.   Psychiatric:         Mood and Affect: Mood normal.         Behavior: Behavior normal.                Significant Labs: All pertinent labs within the past 24 hours have been reviewed.  CBC:   Recent Labs   Lab 07/28/25  1402 07/28/25  1407   WBC 7.86  --    HGB 9.4*  --    HCT 29.4* 31.4     --      CMP:   Recent Labs   Lab 07/28/25  1535      K 3.7   CL 97   CO2 30*      BUN 17   CREATININE 5.4*   CALCIUM 8.9   PROT 7.8   ALBUMIN 3.8   BILITOT 1.9*   ALKPHOS 146   AST 25   ALT <8   ANIONGAP 11     Cardiac Markers:   Recent Labs   Lab 07/28/25  1402   BNP 20,424*       Significant Imaging: I have reviewed all pertinent imaging results/findings within the past 24 hours.

## 2025-07-28 NOTE — TELEPHONE ENCOUNTER
Call transferred from primary care office. Pt states that she is having SOB at times. She is at dialysis at this time and they have her on oxygen. Wants to let her PCP know that she has been experiencing SOB. Had SOB at 1 am. When walking she gets SOB and has to stop and wait.  Reason for Disposition   [1] MODERATE difficulty breathing (e.g., speaks in phrases, SOB even at rest, pulse 100-120) AND [2] NEW-onset or WORSE than normal    Additional Information   Negative: SEVERE difficulty breathing (e.g., struggling for each breath, speaks in single words)   Negative: [1] Breathing stopped AND [2] hasn't returned   Negative: Choking on something   Negative: Bluish (or gray) lips or face now   Negative: Difficult to awaken or acting confused (e.g., disoriented, slurred speech)   Negative: Passed out (e.g., fainted, lost consciousness, blacked out and was not responding)   Negative: Wheezing started suddenly after medicine, an allergic food or bee sting   Negative: Stridor (harsh sound while breathing in)   Negative: Slow, shallow and weak breathing   Negative: Sounds like a life-threatening emergency to the triager    Protocols used: Breathing Difficulty-A-AH

## 2025-07-28 NOTE — ED TRIAGE NOTES
Patient comes into the emergency department by POV with complaints of SOB. Patient states that she had a full dialysis session today but was still having SOB. PT states that since she was placed on oxygen she feels better.

## 2025-07-28 NOTE — FIRST PROVIDER EVALUATION
Medical screening examination initiated.  I have conducted a focused provider triage encounter, findings are as follows:    Brief history of present illness:  shortness of breath that started before dialysis, had 2L taken off, breathing slightly better but still short    There were no vitals filed for this visit.    Pertinent physical exam:  a bit breathless    Brief workup plan:  cardiac workup    Preliminary workup initiated; this workup will be continued and followed by the physician or advanced practice provider that is assigned to the patient when roomed.

## 2025-07-28 NOTE — ED PROVIDER NOTES
Encounter Date: 7/28/2025       History     Chief Complaint   Patient presents with    Shortness of Breath     Since this AM, worse on exertion. Completed full dialysis session this AM     Jael Hall is a 59-year-old female with past medical history of acute on chronic diastolic heart failure, anemia of chronic disease, ESRD on Monday Wednesday Friday hemodialysis, CAD, with history of multiple abdominal surgeries, who presents for 12 hours of shortness of breath beginning with waking up in the middle of the night feeling short of breath.  She continued to be short of breath until her dialysis appointment in the early a.m., where she was placed on supplemental oxygen with symptomatic improvement.  She was urged to go to the ED for further workup due to consistent symptoms.    The history is provided by the patient.     Review of patient's allergies indicates:  No Known Allergies  Past Medical History:   Diagnosis Date    Acute hyperkalemia 09/09/2020    Acute on chronic diastolic heart failure 10/08/2021    Anemia of chronic disorder 11/08/2017    Overview:  Added automatically from request for surgery [id number]    Anemia of chronic renal failure, stage 5     Anxiety     Coronary artery disease 7/3/2025    Cyst of left ovary 05/14/2019    Cyst of left ovary 07/05/2019    Dyslipidemia 01/04/2013    Encounter for screening colonoscopy 10/08/2020    End-stage renal disease on hemodialysis 08/20/2020    ESRD on hemodialysis 09/28/2017    Hematuria 10/30/2019    History of COVID-19 04/07/2020    Hospital discharge follow-up 10/06/2023    Hx of sinus bradycardia 09/28/2017    Hyperkalemia 03/29/2017    Hypertensive urgency 07/22/2021    Peripheral neuropathy 01/04/2013    Preoperative testing 10/08/2020    Renovascular hypertension 08/17/2020    S/P dilation and curettage 05/22/2018    Screening for colon cancer 11/28/2023    Thickened endometrium 05/22/2018    Thyroid nodule     Thyroid nodule 12/13/2021    Uncontrolled  type 2 diabetes mellitus 2013    Overview:  poc a1c today 11.2-270/ up from 10.8-263, Pt. has very very stressed/ was incarcerated/marital stress/ will current meds/ less stress now/ will monitor.     Past Surgical History:   Procedure Laterality Date    AV FISTULA PLACEMENT      right arm     SECTION      x1    COLONOSCOPY N/A 2018    Procedure: COLONOSCOPY;  Surgeon: Pankaj Hinojosa MD;  Location: [facility] ENDO (4TH FLR);  Service: Endoscopy;  Laterality: N/A;  dialysis pt/labs prior to colon/MS    COLONOSCOPY N/A 2023    Procedure: COLONOSCOPY;  Surgeon: Angel Turner MD;  Location: [facility] ENDO (2ND FLR);  Service: Endoscopy;  Laterality: N/A;  Ref By:  Maty Gayle MD  Procedure: Colonoscopy  Diagnosis: endounter for preventive health   Procedure Timing: pt. pref  Provider: Dr. turner   [place] [facility] [place]   Prep Specifications: Standard prep   Additional Info: Dialysis patient lab pending    CYSTOSCOPY W/ RETROGRADES Bilateral 2020    Procedure: CYSTOSCOPY, WITH RETROGRADE PYELOGRAM;  Surgeon: Douglas Abraham MD;  Location: [facility] OR 1ST FLR;  Service: Urology;  Laterality: Bilateral;  45 min    FISTULOGRAM Right 2020    Procedure: Fistulogram;  Surgeon: Lester Gómez MD;  Location: [facility] CATH LAB;  Service: Nephrology;  Laterality: Right;    FISTULOGRAM Right 2024    Procedure: Fistulogram RIGHT FOREARM;  Surgeon: Lester Gómez MD;  Location: [facility] CATH LAB;  Service: Nephrology;  Laterality: Right;    HYSTERECTOMY      IMPLANTATION OF COCHLEAR PROSTHESIS Right 2021    Procedure: INSERTION, PROSTHESIS, COCHLEAR;  Surgeon: Ramiro Zuleta MD;  Location: [facility] OR 2ND FLR;  Service: ENT;  Laterality: Right;  COCHLEAR BABAK    IMPLANTATION OF COCHLEAR PROSTHESIS Right 2021    Procedure: INSERTION, PROSTHESIS, COCHLEAR;  Surgeon: Ramiro Zuleta MD;  Location: [facility] OR 2ND FLR;  Service: ENT;  Laterality: Right;  COCHLEAR AMERICAS    ROBOT-ASSISTED LAPAROSCOPIC ABDOMINAL  HYSTERECTOMY USING DA NATALYA XI N/A 7/5/2019    Procedure: XI ROBOTIC HYSTERECTOMY;  Surgeon: Trice Lei MD;  Location: [facility] OR;  Service: OB/GYN;  Laterality: N/A;    ROBOT-ASSISTED LAPAROSCOPIC NEPHRECTOMY Right 10/8/2020    Procedure: ROBOTIC NEPHRECTOMY;  Surgeon: Douglas Abraham MD;  [address]  Service: Urology;  Laterality: Right;  3.5hrs gen with regional     ROBOT-ASSISTED LAPAROSCOPIC SALPINGO-OOPHORECTOMY USING DA NATALYA XI Bilateral 7/5/2019    Procedure: XI ROBOTIC SALPINGO-OOPHORECTOMY;  Surgeon: Trice Lei MD;  Location: [facility] OR;  Service: OB/GYN;  Laterality: Bilateral;    TUBAL LIGATION      Vascular access surgeries      x5     Family History   Problem Relation Name Age of Onset    Cancer Mother      Ovarian cancer Mother      Cancer Sister      Ovarian cancer Sister      Lung cancer Sister      Macular degeneration Sister      Cancer Sister      Ovarian cancer Sister      Diabetes Sister      Diabetes Brother      Breast cancer Neg Hx      Colon cancer Neg Hx      Cervical cancer Neg Hx      Endometrial cancer Neg Hx      Vaginal cancer Neg Hx      Thyroid disease Neg Hx      Glaucoma Neg Hx      Retinal detachment Neg Hx       Social History[1]  Review of Systems    Physical Exam     Initial Vitals [07/28/25 1337]   BP Pulse Resp Temp SpO2   (!) 196/88 89 (!) 28 98.6 °F (37 °C) (!) 91 %      MAP       --         Physical Exam    Constitutional: She is not diaphoretic. No distress.   HENT:   Head: Normocephalic and atraumatic. Mouth/Throat: Oropharynx is clear and moist. No oropharyngeal exudate, posterior oropharyngeal edema or posterior oropharyngeal erythema.   Eyes: Pupils are equal, round, and reactive to light.   Cardiovascular:  Normal rate, regular rhythm, normal heart sounds and intact distal pulses.           Pulmonary/Chest: No respiratory distress. She has rales (bilateral lower lung fields).   Abdominal: Abdomen is soft. There is no abdominal tenderness.  There is no guarding.   Musculoskeletal:         General: Edema (BLE, non-pitting) present. No tenderness.     Neurological: She is alert and oriented to person, place, and time.   Skin: Skin is warm and dry.         ED Course   Procedures  Labs Reviewed   COMPREHENSIVE METABOLIC PANEL - Abnormal       Result Value    Sodium 138      Potassium 3.7      Chloride 97      CO2 30 (*)     Glucose 102      BUN 17      Creatinine 5.4 (*)     Calcium 8.9      Protein Total 7.8      Albumin 3.8      Bilirubin Total 1.9 (*)           AST 25      ALT <8      Anion Gap 11      eGFR 8 (*)    NT-PRO NATRIURETIC PEPTIDE - Abnormal    NT-proBNP 20,424 (*)     Narrative:     NOTE:  Access complete set of age - and/or gender-specific reference intervals for this test in the [facility] Collection Manual.   CBC WITH DIFFERENTIAL - Abnormal    WBC 7.86      RBC 3.68 (*)     HGB 9.4 (*)     HCT 29.4 (*)     MCV 80 (*)     MCH 25.5 (*)     MCHC 32.0      RDW 20.8 (*)     Platelet Count 216      MPV 9.4      Nucleated RBC 0      Neut % 60.9      Lymph % 20.2      Mono % 7.3      Eos % 10.7 (*)     Basophil % 0.6      Imm Grans % 0.3      Neut # 4.79      Lymph # 1.59      Mono # 0.57      Eos # 0.84 (*)     Baso # 0.05      Imm Grans # 0.02     TROPONIN I HIGH SENSITIVITY - Normal    Troponin High Sensitive 8     TROPONIN I HIGH SENSITIVITY - Normal    Troponin High Sensitive 8     HEPATITIS C ANTIBODY - Normal    Hep C Ab Interp Non-Reactive     HIV 1 / 2 ANTIBODY - Normal    HIV 1/2 Ag/Ab Non-Reactive     CBC W/ AUTO DIFFERENTIAL    Narrative:     The following orders were created for panel order CBC auto differential.  Procedure                               Abnormality         Status                     ---------                               -----------         ------                     CBC with Differential[[id number]]       Abnormal            Final result                 Please view results for these tests on the  individual orders.   HEP C VIRUS HOLD SPECIMEN   SARS-COV-2 RNA AMPLIFICATION, QUAL        ECG Results              EKG 12-lead (Final result)        Collection Time Result Time QRS Duration OHS QTC Calculation    07/28/25 13:44:45 07/28/25 14:58:58 96 532                     Final result by Interface, Lab In [facility] (07/28/25 14:59:03)                   Narrative:    Test Reason : R06.02,    Vent. Rate :  82 BPM     Atrial Rate :  82 BPM     P-R Int : 138 ms          QRS Dur :  96 ms      QT Int : 456 ms       P-R-T Axes :  82 -51  91 degrees    QTcB Int : 532 ms    Normal sinus rhythm  Left anterior fascicular block  Nonspecific ST and/or T wave abnormalities  Prolonged QT  Abnormal ECG  When compared with ECG of 16-Jul-2025 04:08,  No significant change was found  Confirmed by Avi Little ([id number]) on 7/28/2025 2:58:56 PM    Referred By:            Confirmed By: Avi Little                                  Imaging Results              X-Ray Chest AP Portable (Final result)  Result time 07/28/25 15:01:57      Final result by Morteza Pollard MD (07/28/25 15:01:57)                   Impression:      Chronic CHF with edema in lung bases and small left pleural effusion.      Electronically signed by: Morteza Pollard MD  Date:    07/28/2025  Time:    15:01               Narrative:    EXAMINATION:  XR CHEST AP PORTABLE    CLINICAL HISTORY:  shortness of breath;    TECHNIQUE:  Single frontal view of the chest was performed.    COMPARISON:  07/16/2025    FINDINGS:  Heart size remains moderately enlarged.  Mediastinal structures are midline.  Lungs are expanded with increased pulmonary vascularity, and these findings suggest chronic congestive heart failure.  Bilateral lower lung zones have some ground-glass opacity likely representing edema.  Other considerations include pneumonia and aspiration.  A small effusion blunts the left sulcus.    Skeletal structures show no acute finding, noting thoracic  dextroscoliosis.  Clips are present in the left arm.                                       Medications - No data to display  Medical Decision Making  Amount and/or Complexity of Data Reviewed  External Data Reviewed: labs, radiology, ECG and notes.     Details: Prior lab values images EKG readings and discharge summaries reviewed for contacts.  Labs: ordered. Decision-making details documented in ED Course.  Radiology: ordered and independent interpretation performed. Decision-making details documented in ED Course.  ECG/medicine tests: ordered and independent interpretation performed. Decision-making details documented in ED Course.    Risk  Decision regarding hospitalization.               ED Course as of 07/28/25 1638   Mon Jul 28, 2025   1433 Hemoglobin(!): 9.4  Hemoglobin stable to patient's chronic levels due to anemia of chronic disease. [RA]   1433 EKG 12-lead  EKG independently interpreted by me demonstrating normal sinus rhythm and no ST segment changes with slightly prolonged QT intervals. [RA]   1452 NT-proBNP(!): 20,424  BNP elevated consistent with acute on chronic diastolic heart failure, patient's regular BNP normal ascending around 1000. [RA]   1508 Initial differentials includes but not limited to pulmonary embolism, congestive heart failure exacerbation, pneumonia, viral illness. [RA]   1509 X-Ray Chest AP Portable  Upright chest x-ray independently interpreted by me demonstrating enlarged heart contour consistent with patient's prior x-rays, bilateral ground-glass opacities and lower lung fields demonstrating consolidation versus edema. [RA]   1622 Patient oxygen saturation with ambulatory monitoring decreases to 84% when not on nasal cannula.  With shared decision-making decided to admit patient to observation [RA]      ED Course User Index  [RA] Eliud Godoy MD                               Clinical Impression:  Final diagnoses:  [R06.02] Shortness of breath (Primary)  [R09.02]  Hypoxemia  [J81.0] Acute pulmonary edema          ED Disposition Condition    Observation                       [1]   Social History  Tobacco Use    Smoking status: Never    Smokeless tobacco: Never   Substance Use Topics    Alcohol use: No    Drug use: Eliud Jim MD  Resident  07/28/25 1224

## 2025-07-28 NOTE — PROGRESS NOTES
"General Surgery Clinic Note  HPI:  Ms. Hall is a 59 y.o. female who [place] [facility] [place] [facility] [place] of cholelithiasis.    Patient states she was referred here for cholelithiasis.  She is unsure of the reason of referral and denies ever having symptoms associated with biliary colic.  She denies right upper quadrant discomfort, epigastric discomfort, or postprandial discomfort.  From chart tracking, patient does have remote history of pancreatitis however patient states she does not recall any episodes of pancreatitis in the past.     Physical Exam:  BP (!) 175/82 (BP Location: Left arm, Patient Position: Sitting)   Pulse 80   Ht 5' 4" (1.626 m)   Wt 84.9 kg (187 lb 2.7 oz)   LMP 03/15/2016 (LMP Unknown)   SpO2 95%   BMI 32.13 kg/m²   Gen: no acute distress, alert and oriented  Abd: Soft, nontender, nondistended    Medical Decision Making:  Data reviewed during evaluation of Ms. Hall includes lab results, personal interpretation of imaging results, and other practitioner's notes and charts prior to this encounter including review of the results from >2 unique tests. Patient seen for a stable, chronic illness.     Assessment:   Ms. Hall is a 59 y.o. female who presents [place] [facility] for evaluation of cholelithiasis with remote history of pancreatitis in the past.    Patient has a history of pancreatitis with suspected gallstone etiology.  We discussed that recommendations are for prophylactic cholecystectomy following gallstone pancreatitis to decrease the risk of biliary colic, acute cholecystitis, and recurrent gallstone pancreatitis or cholangitis.  We explained that the risk of recurrent pancreatitis approachs 15% in patients who did not have ERCP with sphincterotomy (around 8% in this group), and therefore we recommended laparoscopic cholecystectomy for management.  The patient however has no symptoms associated with biliary colic, and understands the risk for " gallstone pancreatitis in the future.  She would like to continue with nonoperative management for management of her overall asymptomatic cholelithiasis.    We provided Ms. Hall with contact information to our clinic and she was informed to contact us without hesitation should she have any questions or concerns. All of her questions were answered before leaving clinic.      Plan:  -continue nonoperative management of asymptomatic cholelithiasis in the setting of remote pancreatitis  -RTC DANNY Perla MD, FACS  Acute Care Surgery & Surgical Critical Care  [facility]

## 2025-07-28 NOTE — HPI
Ms. Hall is a 59 yoF with a history of T2DM c/b ESRD on HD MWF and diabetic neuropathy, RCC s/p right nephrectomy 2020, htn, HFpEF (EF 60%), NAFLD who presents from dialysis with dyspnea.  Patient reports acute onset dyspnea 1:00 a.m. on day of presentation.  Reported to dialysis that morning and underwent full dialysis session at 4:00 a.m. with 2.2 L removed.  Patient reports dialysis center nephrologist urged her to report to the ED for further evaluation.  Denies missing prior dialysis sessions.  Able to note amount of daily fluid intake.  Makes minimal urine daily.    In the ED, /87, HR 82, afebrile, on 2 L nasal cannula.  Creatinine 5.4, potassium 3.7, hemoglobin 9.4, WBC 7.8, COVID negative, and proBNP 20,424. Checks x-ray with left pleural effusion.

## 2025-07-29 VITALS
HEIGHT: 64 IN | SYSTOLIC BLOOD PRESSURE: 162 MMHG | BODY MASS INDEX: 31.09 KG/M2 | OXYGEN SATURATION: 99 % | DIASTOLIC BLOOD PRESSURE: 70 MMHG | WEIGHT: 182.13 LBS | HEART RATE: 82 BPM | TEMPERATURE: 98 F | RESPIRATION RATE: 18 BRPM

## 2025-07-29 PROBLEM — N18.6 ESRD (END STAGE RENAL DISEASE): Status: ACTIVE | Noted: 2025-07-29

## 2025-07-29 LAB
ABSOLUTE EOSINOPHIL (OHS): 0.69 K/UL
ABSOLUTE MONOCYTE (OHS): 0.61 K/UL (ref 0.3–1)
ABSOLUTE NEUTROPHIL COUNT (OHS): 3.03 K/UL (ref 1.8–7.7)
ALBUMIN SERPL BCP-MCNC: 3.6 G/DL (ref 3.5–5.2)
ALP SERPL-CCNC: 139 UNIT/L (ref 40–150)
ALT SERPL W/O P-5'-P-CCNC: <8 UNIT/L (ref 0–55)
ANION GAP (OHS): 11 MMOL/L (ref 8–16)
AST SERPL-CCNC: 19 UNIT/L (ref 0–50)
BASOPHILS # BLD AUTO: 0.05 K/UL
BASOPHILS NFR BLD AUTO: 0.8 %
BILIRUB SERPL-MCNC: 1.8 MG/DL (ref 0.1–1)
BUN SERPL-MCNC: 29 MG/DL (ref 8–23)
CALCIUM SERPL-MCNC: 8.9 MG/DL (ref 8.7–10.5)
CHLORIDE SERPL-SCNC: 99 MMOL/L (ref 95–110)
CO2 SERPL-SCNC: 30 MMOL/L (ref 23–29)
CREAT SERPL-MCNC: 7.7 MG/DL (ref 0.5–1.4)
ERYTHROCYTE [DISTWIDTH] IN BLOOD BY AUTOMATED COUNT: 20.5 % (ref 11.5–14.5)
GFR SERPLBLD CREATININE-BSD FMLA CKD-EPI: 6 ML/MIN/1.73/M2
GLUCOSE SERPL-MCNC: 74 MG/DL (ref 70–110)
HBV SURFACE AG SERPL QL IA: NORMAL
HCT VFR BLD AUTO: 24.2 % (ref 37–48.5)
HGB BLD-MCNC: 7.7 GM/DL (ref 12–16)
IMM GRANULOCYTES # BLD AUTO: 0.01 K/UL (ref 0–0.04)
IMM GRANULOCYTES NFR BLD AUTO: 0.2 % (ref 0–0.5)
LYMPHOCYTES # BLD AUTO: 1.54 K/UL (ref 1–4.8)
MAGNESIUM SERPL-MCNC: 2.2 MG/DL (ref 1.6–2.6)
MCH RBC QN AUTO: 25.1 PG (ref 27–31)
MCHC RBC AUTO-ENTMCNC: 31.8 G/DL (ref 32–36)
MCV RBC AUTO: 79 FL (ref 82–98)
NUCLEATED RBC (/100WBC) (OHS): 0 /100 WBC
PHOSPHATE SERPL-MCNC: 4.2 MG/DL (ref 2.7–4.5)
PLATELET # BLD AUTO: 166 K/UL (ref 150–450)
PMV BLD AUTO: 9.4 FL (ref 9.2–12.9)
POCT GLUCOSE: 107 MG/DL (ref 70–110)
POCT GLUCOSE: 68 MG/DL (ref 70–110)
POCT GLUCOSE: 71 MG/DL (ref 70–110)
POCT GLUCOSE: 77 MG/DL (ref 70–110)
POCT GLUCOSE: 84 MG/DL (ref 70–110)
POCT GLUCOSE: 92 MG/DL (ref 70–110)
POTASSIUM SERPL-SCNC: 4.3 MMOL/L (ref 3.5–5.1)
PROT SERPL-MCNC: 7.3 GM/DL (ref 6–8.4)
RBC # BLD AUTO: 3.07 M/UL (ref 4–5.4)
RELATIVE EOSINOPHIL (OHS): 11.6 %
RELATIVE LYMPHOCYTE (OHS): 26 % (ref 18–48)
RELATIVE MONOCYTE (OHS): 10.3 % (ref 4–15)
RELATIVE NEUTROPHIL (OHS): 51.1 % (ref 38–73)
SODIUM SERPL-SCNC: 140 MMOL/L (ref 136–145)
WBC # BLD AUTO: 5.93 K/UL (ref 3.9–12.7)

## 2025-07-29 PROCEDURE — 25000003 PHARM REV CODE 250: Mod: HCNC | Performed by: STUDENT IN AN ORGANIZED HEALTH CARE EDUCATION/TRAINING PROGRAM

## 2025-07-29 PROCEDURE — 83735 ASSAY OF MAGNESIUM: CPT | Mod: HCNC | Performed by: STUDENT IN AN ORGANIZED HEALTH CARE EDUCATION/TRAINING PROGRAM

## 2025-07-29 PROCEDURE — G0378 HOSPITAL OBSERVATION PER HR: HCPCS | Mod: HCNC

## 2025-07-29 PROCEDURE — 25000003 PHARM REV CODE 250: Mod: HCNC

## 2025-07-29 PROCEDURE — 94761 N-INVAS EAR/PLS OXIMETRY MLT: CPT | Mod: HCNC

## 2025-07-29 PROCEDURE — 87340 HEPATITIS B SURFACE AG IA: CPT | Mod: HCNC

## 2025-07-29 PROCEDURE — 36415 COLL VENOUS BLD VENIPUNCTURE: CPT | Mod: HCNC | Performed by: STUDENT IN AN ORGANIZED HEALTH CARE EDUCATION/TRAINING PROGRAM

## 2025-07-29 PROCEDURE — 84460 ALANINE AMINO (ALT) (SGPT): CPT | Mod: HCNC | Performed by: STUDENT IN AN ORGANIZED HEALTH CARE EDUCATION/TRAINING PROGRAM

## 2025-07-29 PROCEDURE — G0257 UNSCHED DIALYSIS ESRD PT HOS: HCPCS | Mod: HCNC

## 2025-07-29 PROCEDURE — 85025 COMPLETE CBC W/AUTO DIFF WBC: CPT | Mod: HCNC | Performed by: STUDENT IN AN ORGANIZED HEALTH CARE EDUCATION/TRAINING PROGRAM

## 2025-07-29 PROCEDURE — 99900035 HC TECH TIME PER 15 MIN (STAT): Mod: HCNC

## 2025-07-29 PROCEDURE — 94799 UNLISTED PULMONARY SVC/PX: CPT | Mod: HCNC

## 2025-07-29 PROCEDURE — 84100 ASSAY OF PHOSPHORUS: CPT | Mod: HCNC | Performed by: STUDENT IN AN ORGANIZED HEALTH CARE EDUCATION/TRAINING PROGRAM

## 2025-07-29 RX ORDER — MUPIROCIN 20 MG/G
OINTMENT TOPICAL 2 TIMES DAILY
Status: CANCELLED | OUTPATIENT
Start: 2025-07-29 | End: 2025-08-03

## 2025-07-29 RX ORDER — SODIUM CHLORIDE 9 MG/ML
INJECTION, SOLUTION INTRAVENOUS ONCE
Status: CANCELLED | OUTPATIENT
Start: 2025-07-29 | End: 2025-07-29

## 2025-07-29 RX ORDER — SODIUM CHLORIDE 9 MG/ML
INJECTION, SOLUTION INTRAVENOUS
Status: DISCONTINUED | OUTPATIENT
Start: 2025-07-29 | End: 2025-07-29 | Stop reason: HOSPADM

## 2025-07-29 RX ADMIN — SEVELAMER CARBONATE 2400 MG: 800 TABLET, FILM COATED ORAL at 11:07

## 2025-07-29 RX ADMIN — NIFEDIPINE 30 MG: 30 TABLET, FILM COATED, EXTENDED RELEASE ORAL at 07:07

## 2025-07-29 RX ADMIN — ATORVASTATIN CALCIUM 40 MG: 40 TABLET, FILM COATED ORAL at 08:07

## 2025-07-29 RX ADMIN — ASPIRIN 81 MG: 81 TABLET, COATED ORAL at 06:07

## 2025-07-29 RX ADMIN — SEVELAMER CARBONATE 2400 MG: 800 TABLET, FILM COATED ORAL at 07:07

## 2025-07-29 RX ADMIN — SEVELAMER CARBONATE 2400 MG: 800 TABLET, FILM COATED ORAL at 06:07

## 2025-07-29 RX ADMIN — LABETALOL HYDROCHLORIDE 200 MG: 100 TABLET, FILM COATED ORAL at 04:07

## 2025-07-29 RX ADMIN — PANTOPRAZOLE SODIUM 40 MG: 40 TABLET, DELAYED RELEASE ORAL at 08:07

## 2025-07-29 RX ADMIN — LOSARTAN POTASSIUM 100 MG: 50 TABLET, FILM COATED ORAL at 07:07

## 2025-07-29 RX ADMIN — SODIUM CHLORIDE: 9 INJECTION, SOLUTION INTRAVENOUS at 02:07

## 2025-07-29 NOTE — ASSESSMENT & PLAN NOTE
Patient with Hypercapnic Respiratory failure which is Acute.  she is not on home oxygen. Supplemental oxygen was provided and noted-      .   Signs/symptoms of respiratory failure include- increased work of breathing. Contributing diagnoses includes - CHF Labs and images were reviewed. Patient Has not had a recent ABG. Will treat underlying causes and adjust management of respiratory failure as follows.      - Patient likely presenting with volume overload between routine Friday and Monday dialysis  - On 2 L nasal cannula in the ED  - Treat CHF as well  - Weaning supplemental oxygen as tolerated

## 2025-07-29 NOTE — ASSESSMENT & PLAN NOTE
"Patient's FSGs are controlled on current medication regimen.    Last A1c reviewed-   Lab Results   Component Value Date    HGBA1C 4.6 07/03/2025     Most recent fingerstick glucose reviewed- No results for input(s): "POCTGLUCOSE" in the last 24 hours.  Current correctional scale  Low  Maintain anti-hyperglycemic dose as follows-   Antihyperglycemics (From admission, onward)      Start     Stop Route Frequency Ordered    07/29/25 0715  insulin aspart U-100 pen 3 Units         -- SubQ 3 times daily with meals 07/28/25 1754    07/28/25 2100  insulin glargine U-100 (Lantus) pen 9 Units         -- SubQ Nightly 07/28/25 1754    07/28/25 1853  insulin aspart U-100 pen 0-5 Units         -- SubQ Before meals & nightly PRN 07/28/25 1753          Hold Oral hypoglycemics while patient is in the hospital.    "

## 2025-07-29 NOTE — PLAN OF CARE
Verified with Wily TRIMBLE no new medicine. VSS. BG monitored. Pt given discharge instruction, medication reviewed with the pt, follow up appts reviewed. All questions and concerns addressed. Pt verbalized understanding. IV, telemetry removed. Discharge paperwork given to patient. Pt in room waiting for transport. 1920: Pt left with wheel chair with her daughter

## 2025-07-29 NOTE — ASSESSMENT & PLAN NOTE
Creatine stable for now. BMP reviewed- noted Estimated Creatinine Clearance: 11.5 mL/min (A) (based on SCr of 5.4 mg/dL (H)). according to latest data. Based on current GFR, CKD stage is end stage.  Monitor UOP and serial BMP and adjust therapy as needed. Renally dose meds. Avoid nephrotoxic medications and procedures.    - Consider nephrology consult if hypoxia not improve after routine outpatient dialysis

## 2025-07-29 NOTE — NURSING TRANSFER
Nursing Transfer Note      7/29/2025   2:00 PM    Reason patient is being transferred: Dialysis    Transfer To: dialysis unit    Transfer via wheelchair    Transfer with cardiac monitoring    Transported by transportation tech    Telemetry: Box Number [id number]  Order for Tele Monitor? Yes

## 2025-07-29 NOTE — H&P
[facility] - Emergency Dept  [facility] Medicine  History & Physical    Patient Name: Jael Hall  MRN: [id number]  Patient Class: OP- Observation  Admission Date: 7/28/2025  Attending Physician: Kingston Julien MD   Primary Care Provider: Kenyon Willams MD         Patient information was obtained from patient and ER records.     Subjective:     Principal Problem:<principal problem not specified>    Chief Complaint:   Chief Complaint   Patient presents with    Shortness of Breath     Since this AM, worse on exertion. Completed full dialysis session this AM        HPI: Ms. Hall is a 59 yoF with a history of T2DM c/b ESRD on HD MWF and diabetic neuropathy, RCC s/p right nephrectomy 2020, htn, HFpEF (EF 60%), NAFLD who presents from dialysis with dyspnea.  Patient reports acute onset dyspnea 1:00 a.m. on day of presentation.  Reported to dialysis that morning and underwent full dialysis session at 4:00 a.m. with 2.2 L removed.  Patient reports dialysis center nephrologist urged her to report to the ED for further evaluation.  Denies missing prior dialysis sessions.  Able to note amount of daily fluid intake.  Makes minimal urine daily.    In the ED, /87, HR 82, afebrile, on 2 L nasal cannula.  Creatinine 5.4, potassium 3.7, hemoglobin 9.4, WBC 7.8, COVID negative, and proBNP 20,424. Checks x-ray with left pleural effusion.        Past Medical History:   Diagnosis Date    Acute hyperkalemia 09/09/2020    Acute on chronic diastolic heart failure 10/08/2021    Anemia of chronic disorder 11/08/2017    Overview:  Added automatically from request for surgery [id number]    Anemia of chronic renal failure, stage 5     Anxiety     Coronary artery disease 7/3/2025    Cyst of left ovary 05/14/2019    Cyst of left ovary 07/05/2019    Dyslipidemia 01/04/2013    Encounter for screening colonoscopy 10/08/2020    End-stage renal disease on hemodialysis 08/20/2020    ESRD on hemodialysis 09/28/2017    Hematuria 10/30/2019     History of COVID-19 2020    Hospital discharge follow-up 10/06/2023    Hx of sinus bradycardia 2017    Hyperkalemia 2017    Hypertensive urgency 2021    Peripheral neuropathy 2013    Preoperative testing 10/08/2020    Renovascular hypertension 2020    S/P dilation and curettage 2018    Screening for colon cancer 2023    Thickened endometrium 2018    Thyroid nodule     Thyroid nodule 2021    Uncontrolled type 2 diabetes mellitus 2013    Overview:  poc a1c today 11.2-270/ up from 10.8-263, Pt. has very very stressed/ was incarcerated/marital stress/ will current meds/ less stress now/ will monitor.       Past Surgical History:   Procedure Laterality Date    AV FISTULA PLACEMENT      right arm     SECTION      x1    COLONOSCOPY N/A 2018    Procedure: COLONOSCOPY;  Surgeon: Pankaj Hinojosa MD;  Location: [facility] (4TH FLR);  Service: Endoscopy;  Laterality: N/A;  dialysis pt/labs prior to colon/MS    COLONOSCOPY N/A 2023    Procedure: COLONOSCOPY;  Surgeon: Angel Turner MD;  Location: [facility] (2ND FLR);  Service: Endoscopy;  Laterality: N/A;  Ref By:  Maty Gayle MD  Procedure: Colonoscopy  Diagnosis: endounter for preventive health   Procedure Timing: pt. pref  Provider: Dr. turner   Location: [facility] 4-Endo   Prep Specifications: Standard prep   Additional Info: Dialysis patient lab pending    CYSTOSCOPY W/ RETROGRADES Bilateral 2020    Procedure: CYSTOSCOPY, WITH RETROGRADE PYELOGRAM;  Surgeon: Douglas Abraham MD;  Location: [facility] OR 1ST FLR;  Service: Urology;  Laterality: Bilateral;  45 min    FISTULOGRAM Right 2020    Procedure: Fistulogram;  Surgeon: Lester Gómez MD;  Location: [facility] CATH LAB;  Service: Nephrology;  Laterality: Right;    FISTULOGRAM Right 2024    Procedure: Fistulogram RIGHT FOREARM;  Surgeon: Lester Gómez MD;  Location: [facility] CATH LAB;  Service: Nephrology;  Laterality: Right;     HYSTERECTOMY      IMPLANTATION OF COCHLEAR PROSTHESIS Right 6/28/2021    Procedure: INSERTION, PROSTHESIS, COCHLEAR;  Surgeon: Ramiro Zuleta MD;  Location: [facility] OR 2ND FLR;  Service: ENT;  Laterality: Right;  COCHLEAR BABAK    IMPLANTATION OF COCHLEAR PROSTHESIS Right 6/30/2021    Procedure: INSERTION, PROSTHESIS, COCHLEAR;  Surgeon: Ramiro Zuleta MD;  Location: [facility] OR 2ND FLR;  Service: ENT;  Laterality: Right;  COCHLEAR AMERICAS    ROBOT-ASSISTED LAPAROSCOPIC ABDOMINAL HYSTERECTOMY USING DA NATALYA XI N/A 7/5/2019    Procedure: XI ROBOTIC HYSTERECTOMY;  Surgeon: Trice Lei MD;  Location: [facility];  Service: OB/GYN;  Laterality: N/A;    ROBOT-ASSISTED LAPAROSCOPIC NEPHRECTOMY Right 10/8/2020    Procedure: ROBOTIC NEPHRECTOMY;  Surgeon: Douglas Abraham MD;  Location: [facility] OR 2ND FLR;  Service: Urology;  Laterality: Right;  3.5hrs gen with regional     ROBOT-ASSISTED LAPAROSCOPIC SALPINGO-OOPHORECTOMY USING DA NATALYA XI Bilateral 7/5/2019    Procedure: XI ROBOTIC SALPINGO-OOPHORECTOMY;  Surgeon: Trice Lei MD;  Location: [facility];  Service: OB/GYN;  Laterality: Bilateral;    TUBAL LIGATION      Vascular access surgeries      x5       Review of patient's allergies indicates:  No Known Allergies    No current facility-administered medications on file prior to encounter.     Current Outpatient Medications on File Prior to Encounter   Medication Sig    acetaminophen (TYLENOL) 500 MG tablet Take 1 tablet (500 mg total) by mouth every 8 (eight) hours as needed for Pain.    aspirin (ECOTRIN) 81 MG EC tablet Take 81 mg by mouth every morning.    atorvastatin (LIPITOR) 40 MG tablet Take 1 tablet (40 mg total) by mouth once daily.    epoetin shiela (PROCRIT) 10,000 unit/mL injection Inject 0.85 mLs (8,500 Units total) into the skin every Mon, Wed, Fri.    hydrOXYzine HCL (ATARAX) 10 MG Tab Take 1 tablet (10 mg total) by mouth 2 (two) times daily as needed.    insulin aspart U-100 (NOVOLOG) 100 unit/mL (3 mL) InPn pen  "Inject 0-5 Units into the skin before meals and at bedtime as needed (Hyperglycemia).    insulin glargine U-100, Lantus, (LANTUS SOLOSTAR U-100 INSULIN) 100 unit/mL (3 mL) InPn pen Inject 12 Units into the skin once daily.    labetaloL (NORMODYNE) 200 MG tablet Take 1 tablet (200 mg total) by mouth 2 (two) times daily.    losartan (COZAAR) 100 MG tablet Take 1 tablet (100 mg total) by mouth once daily.    NIFEdipine (PROCARDIA-XL) 30 MG (OSM) 24 hr tablet Take 1 tablet (30 mg total) by mouth once daily.    omeprazole (PRILOSEC) 20 MG capsule Take 20 mg by mouth every morning.    pregabalin (LYRICA) 75 MG capsule Take 1 capsule (75 mg total) by mouth once daily.    sevelamer carbonate (RENVELA) 800 mg Tab Take 3 tablets (2,400 mg total) by mouth 3 (three) times daily with meals.    sevelamer HCL (RENAGEL) 800 MG Tab Take 800 mg by mouth.    [DISCONTINUED] ACCU-CHEK GUIDE GLUCOSE METER Misc     [DISCONTINUED] ACCU-CHEK GUIDE L1-L2 CTRL SOL Soln     [DISCONTINUED] ACCU-CHEK SOFTCLIX LANCETS Misc     [DISCONTINUED] blood sugar diagnostic (ACCU-CHEK GUIDE TEST STRIPS) Strp Inject 100 each into the skin 3 (three) times daily.    [DISCONTINUED] blood sugar diagnostic Strp To check BG 3 times daily, to use with insurance preferred meter    [DISCONTINUED] blood-glucose meter kit To check BG 3 times daily, to use with insurance preferred meter    [DISCONTINUED] DROPSAFE ALCOHOL PREP PADS PadM Apply topically.    [DISCONTINUED] lancets Misc To check BG 3 times daily, to use with insurance preferred meter    [DISCONTINUED] pen needle, diabetic 31 gauge x 3/16" Ndle Use to inject insulin into the skin.     Family History       Problem Relation (Age of Onset)    Cancer Mother, Sister, Sister    Diabetes Sister, Brother    Lung cancer Sister    Macular degeneration Sister    Ovarian cancer Mother, Sister, Sister          Tobacco Use    Smoking status: Never    Smokeless tobacco: Never   Substance and Sexual Activity    Alcohol " use: No    Drug use: No    Sexual activity: Yes     Partners: Male     Birth control/protection: Post-menopausal     Review of Systems   Constitutional:  Negative for activity change, appetite change, chills, diaphoresis, fatigue and fever.   HENT:  Negative for rhinorrhea and sore throat.    Respiratory:  Positive for cough and shortness of breath. Negative for chest tightness.    Cardiovascular:  Negative for chest pain and palpitations.   Gastrointestinal:  Negative for abdominal pain, constipation, diarrhea and nausea.   Endocrine: Negative for cold intolerance.   Genitourinary:  Negative for decreased urine volume and dysuria.   Musculoskeletal:  Negative for arthralgias and myalgias.   Skin:  Negative for rash and wound.   Neurological:  Negative for dizziness, weakness, numbness and headaches.   Psychiatric/Behavioral:  Negative for agitation, behavioral problems and confusion.      Objective:     Vital Signs (Most Recent):  Temp: 98.6 °F (37 °C) (07/28/25 1337)  Pulse: 83 (07/28/25 1800)  Resp: (!) 28 (07/28/25 1337)  BP: (!) 202/85 (07/28/25 1800)  SpO2: 98 % (07/28/25 1800) Vital Signs (24h Range):  Temp:  [98.6 °F (37 °C)] 98.6 °F (37 °C)  Pulse:  [80-89] 83  Resp:  [28] 28  SpO2:  [91 %-100 %] 98 %  BP: (181-202)/(85-90) 202/85     Weight: 84.9 kg (187 lb 2.7 oz)  Body mass index is 32.13 kg/m².     Physical Exam  Constitutional:       General: She is not in acute distress.     Appearance: Normal appearance.   HENT:      Head: Normocephalic and atraumatic.      Mouth/Throat:      Mouth: Mucous membranes are moist.   Eyes:      Extraocular Movements: Extraocular movements intact.      Conjunctiva/sclera: Conjunctivae normal.   Cardiovascular:      Rate and Rhythm: Normal rate and regular rhythm.   Pulmonary:      Effort: Pulmonary effort is normal. No respiratory distress.      Breath sounds: Normal breath sounds. No wheezing or rales.   Abdominal:      General: Abdomen is flat. Bowel sounds are normal.       Palpations: Abdomen is soft.      Tenderness: There is no abdominal tenderness. There is no guarding.   Musculoskeletal:         General: No swelling or tenderness.      Right lower leg: No edema.      Left lower leg: No edema.   Skin:     Findings: No rash.   Neurological:      General: No focal deficit present.      Mental Status: She is alert and oriented to person, place, and time. Mental status is at baseline.   Psychiatric:         Mood and Affect: Mood normal.         Behavior: Behavior normal.                Significant Labs: All pertinent labs within the past 24 hours have been reviewed.  CBC:   Recent Labs   Lab 07/28/25  1402 07/28/25  1407   WBC 7.86  --    HGB 9.4*  --    HCT 29.4* 31.4     --      CMP:   Recent Labs   Lab 07/28/25  1535      K 3.7   CL 97   CO2 30*      BUN 17   CREATININE 5.4*   CALCIUM 8.9   PROT 7.8   ALBUMIN 3.8   BILITOT 1.9*   ALKPHOS 146   AST 25   ALT <8   ANIONGAP 11     Cardiac Markers:   Recent Labs   Lab 07/28/25  1402   BNP 20,424*       Significant Imaging: I have reviewed all pertinent imaging results/findings within the past 24 hours.  Assessment/Plan:     Assessment & Plan  Acute hypoxemic respiratory failure  Patient with Hypercapnic Respiratory failure which is Acute.  she is not on home oxygen. Supplemental oxygen was provided and noted-      .   Signs/symptoms of respiratory failure include- increased work of breathing. Contributing diagnoses includes - CHF Labs and images were reviewed. Patient Has not had a recent ABG. Will treat underlying causes and adjust management of respiratory failure as follows.      - Patient likely presenting with volume overload between routine Friday and Monday dialysis  - On 2 L nasal cannula in the ED  - Treat CHF as well  - Weaning supplemental oxygen as tolerated    Chronic diastolic heart failure  - Admission BNP 20,424  - Makes minimal urine   - Volume removal through HD    Mixed hyperlipidemia  - Home  "statin    ESRD (end stage renal disease) on dialysis  Chronic kidney disease-mineral and bone disorder  Creatine stable for now. BMP reviewed- noted Estimated Creatinine Clearance: 11.5 mL/min (A) (based on SCr of 5.4 mg/dL (H)). according to latest data. Based on current GFR, CKD stage is end stage.  Monitor UOP and serial BMP and adjust therapy as needed. Renally dose meds. Avoid nephrotoxic medications and procedures.    - Consider nephrology consult if hypoxia not improve after routine outpatient dialysis    Essential hypertension  Patient's blood pressure range in the last 24 hours was: BP  Min: 181/87  Max: 202/85.The patient's inpatient anti-hypertensive regimen is listed below:  Current Antihypertensives  labetaloL tablet 200 mg, 2 times daily, Oral  losartan tablet 100 mg, Daily, Oral  NIFEdipine 24 hr tablet 30 mg, Daily, Oral    Plan  - BP is controlled, no changes needed to their regimen    QT prolongation  - Noted on admission EKG   - Avoid QT prolonging medications    Type 2 diabetes mellitus  Patient's FSGs are controlled on current medication regimen.    Last A1c reviewed-   Lab Results   Component Value Date    HGBA1C 4.6 07/03/2025     Most recent fingerstick glucose reviewed- No results for input(s): "POCTGLUCOSE" in the last 24 hours.  Current correctional scale  Low  Maintain anti-hyperglycemic dose as follows-   Antihyperglycemics (From admission, onward)      Start     Stop Route Frequency Ordered    07/29/25 0715  insulin aspart U-100 pen 3 Units         -- SubQ 3 times daily with meals 07/28/25 1754    07/28/25 2100  insulin glargine U-100 (Lantus) pen 9 Units         -- SubQ Nightly 07/28/25 1754    07/28/25 1853  insulin aspart U-100 pen 0-5 Units         -- SubQ Before meals & nightly PRN 07/28/25 1753          Hold Oral hypoglycemics while patient is in the hospital.    VTE Risk Mitigation (From admission, onward)           Ordered     IP VTE HIGH RISK PATIENT  Once         07/28/25 1703 "     Place sequential compression device  Until discontinued         07/28/25 1703                                   On 07/28/2025, patient should be placed in hospital observation services under my care.             Kingston Julien MD  Department of Hospital Medicine  [facility] - Emergency Dept

## 2025-07-29 NOTE — NURSING
Notified  BG 92,  ok to give 3 units insulin, but pt refused, made Gabby TRIMBLE aware. Notified  manual /70, and  is ok to discharge pt.

## 2025-07-29 NOTE — ASSESSMENT & PLAN NOTE
Patient's blood pressure range in the last 24 hours was: BP  Min: 181/87  Max: 202/85.The patient's inpatient anti-hypertensive regimen is listed below:  Current Antihypertensives  labetaloL tablet 200 mg, 2 times daily, Oral  losartan tablet 100 mg, Daily, Oral  NIFEdipine 24 hr tablet 30 mg, Daily, Oral    Plan  - BP is controlled, no changes needed to their regimen

## 2025-07-29 NOTE — PROGRESS NOTES
Patient arrived to the unit room 3070, oriented to room and nurse. Team notified of patients arrival. Telemetry box applied and vital signs documented in flow sheet. Identification band placed on patient. Call bell with in reach; bed is locked in the lowest position. Pt arrived with belongings. Pt on 3 L O2 >99%. Notified team of pt BP, will continue to monitor. Pt .Pt resting comfortably with no acute distress. Pt denies chest pain/SOB.  Plan of care initiated.       07/28/25 2107   Vital Signs   Temp 98.4 °F (36.9 °C)   Temp Source Temporal   Pulse 78   Heart Rate Source Monitor   Resp 18   SpO2 99 %   Pulse Oximetry Type Intermittent   Probe Placed On (Pulse Ox) finger   Flow (L/min) (Oxygen Therapy) 3   Device (Oxygen Therapy) nasal cannula   BP (!) 191/85   MAP (mmHg) 122   BP Location Left arm   BP Method Automatic   Patient Position Lying

## 2025-07-29 NOTE — PROGRESS NOTES
HD tx complete  2250 ml removed over 3 hours. Unable to achieve Rx UF goal of 3L d/t pt c/o cramping in right leg.  Blood returned, both needles removed  Gauze and tape applied  Pressure held to each site for 10 minutes, homeostasis achieved  Report given to primary nurse Philip  Transferred from unit via w/c by transport back to room 4485

## 2025-07-29 NOTE — PROGRESS NOTES
Patient arrived in a w/c to dialysis unit.   Report received from Philip Hernandez per dialysis Flowsheet.     Hemodialysis initiated using the following:     Dialysis Access: RFA AVF     Needle size: 15 gauge  Insertion with no complications. Tolerated well, flows good     Will Maintain telemetry and blood pressure monitoring throughout treatment.  Refer to dialysis flowsheet and MAR for details.

## 2025-07-29 NOTE — CONSULTS
[facility] - [facility]   Nephrology  Consult Note          Patient Name: Jael Hall   MRN: [id number]   Current Provider: Kingston Julien MD  Primary Care Provider: Kenyon Willams MD   Admission Date: 7/28/2025   Hospital Day: 0  Bed: 3069/3069 A  Principal Problem: <principal problem not specified>            NEPHROLOGY NOTE    Reason for Consultation:   ESRD    History of Present Illness:      Jael Hall is a 59 y.o. female with PMHx of chronic diastolic heart failure, CAD, anemia of chronic disease, history of multiple abdominal surgeries  admitted to the hospital on 7/27/2025 with SOB immediately after she had her dialysis session. X ray showed pleural effusion. Patient herself thinks she has some fluid on.     Nephrology consulted for dialysis management.    Today does not feel acutely short of breath but she thinks she has fluid on     Past Medical History:   Diagnosis Date    Acute hyperkalemia 09/09/2020    Acute on chronic diastolic heart failure 10/08/2021    Anemia of chronic disorder 11/08/2017    Overview:  Added automatically from request for surgery [id number]    Anemia of chronic renal failure, stage 5     Anxiety     Coronary artery disease 7/3/2025    Cyst of left ovary 05/14/2019    Cyst of left ovary 07/05/2019    Dyslipidemia 01/04/2013    Encounter for screening colonoscopy 10/08/2020    End-stage renal disease on hemodialysis 08/20/2020    ESRD on hemodialysis 09/28/2017    Hematuria 10/30/2019    History of COVID-19 04/07/2020    Hospital discharge follow-up 10/06/2023    Hx of sinus bradycardia 09/28/2017    Hyperkalemia 03/29/2017    Hypertensive urgency 07/22/2021    Peripheral neuropathy 01/04/2013    Preoperative testing 10/08/2020    Renovascular hypertension 08/17/2020    S/P dilation and curettage 05/22/2018    Screening for colon cancer 11/28/2023    Thickened endometrium 05/22/2018    Thyroid nodule     Thyroid nodule 12/13/2021    Uncontrolled type 2 diabetes mellitus 04/05/2013     Overview:  poc a1c today 11.2-270/ up from 10.8-263, Pt. has very very stressed/ was incarcerated/marital stress/ will current meds/ less stress now/ will monitor.     Past Surgical History:   Procedure Laterality Date    AV FISTULA PLACEMENT      right arm     SECTION      x1    COLONOSCOPY N/A 2018    Procedure: COLONOSCOPY;  Surgeon: Pankaj Hinojosa MD;  Location: [facility] ENDO (4TH FLR);  Service: Endoscopy;  Laterality: N/A;  dialysis pt/labs prior to colon/MS    COLONOSCOPY N/A 2023    Procedure: COLONOSCOPY;  Surgeon: Angel Turner MD;  Location: [facility] (2ND FLR);  Service: Endoscopy;  Laterality: N/A;  Ref By:  Maty Gayle MD  Procedure: Colonoscopy  Diagnosis: endounter for preventive health   Procedure Timing: pt. pref  Provider: Dr. turner   [place] [facility]   Prep Specifications: Standard prep   Additional Info: Dialysis patient lab pending    CYSTOSCOPY W/ RETROGRADES Bilateral 2020    Procedure: CYSTOSCOPY, WITH RETROGRADE PYELOGRAM;  Surgeon: Douglas Abraham MD;  Location: [facility] OR 1ST FLR;  Service: Urology;  Laterality: Bilateral;  45 min    FISTULOGRAM Right 2020    Procedure: Fistulogram;  Surgeon: Lester Gómez MD;  Location: [facility] CATH LAB;  Service: Nephrology;  Laterality: Right;    FISTULOGRAM Right 2024    Procedure: Fistulogram RIGHT FOREARM;  Surgeon: Lester Gómez MD;  Location: [facility] CATH LAB;  Service: Nephrology;  Laterality: Right;    HYSTERECTOMY      IMPLANTATION OF COCHLEAR PROSTHESIS Right 2021    Procedure: INSERTION, PROSTHESIS, COCHLEAR;  Surgeon: Ramiro Zuleta MD;  Location: [facility] OR 2ND FLR;  Service: ENT;  Laterality: Right;  COCHLEAR BABAK    IMPLANTATION OF COCHLEAR PROSTHESIS Right 2021    Procedure: INSERTION, PROSTHESIS, COCHLEAR;  Surgeon: Ramiro Zuleta MD;  Location: [facility] OR 2ND FLR;  Service: ENT;  Laterality: Right;  COCHLEAR AMERICAS    ROBOT-ASSISTED LAPAROSCOPIC ABDOMINAL HYSTERECTOMY USING DA NATALYA XI N/A  "7/5/2019    Procedure: XI ROBOTIC HYSTERECTOMY;  Surgeon: Trice Lei MD;  Location: [facility];  Service: OB/GYN;  Laterality: N/A;    ROBOT-ASSISTED LAPAROSCOPIC NEPHRECTOMY Right 10/8/2020    Procedure: ROBOTIC NEPHRECTOMY;  Surgeon: Douglas Abraham MD;  [address]  Service: Urology;  Laterality: Right;  3.5hrs gen with regional     ROBOT-ASSISTED LAPAROSCOPIC SALPINGO-OOPHORECTOMY USING DA NATALYA XI Bilateral 7/5/2019    Procedure: XI ROBOTIC SALPINGO-OOPHORECTOMY;  Surgeon: Trice Lei MD;  Location: [facility];  Service: OB/GYN;  Laterality: Bilateral;    TUBAL LIGATION      Vascular access surgeries      x5     Medications:   aspirin  81 mg Oral QAM    atorvastatin  40 mg Oral Daily    insulin aspart U-100  3 Units Subcutaneous TIDWM    insulin glargine U-100  9 Units Subcutaneous QHS    labetaloL  200 mg Oral BID    losartan  100 mg Oral Daily    NIFEdipine  30 mg Oral Daily    pantoprazole  40 mg Oral Daily    sevelamer carbonate  2,400 mg Oral TID WM      Review of patient's allergies indicates:  No Known Allergies  Family History   Problem Relation Name Age of Onset    Cancer Mother      Ovarian cancer Mother      Cancer Sister      Ovarian cancer Sister      Lung cancer Sister      Macular degeneration Sister      Cancer Sister      Ovarian cancer Sister      Diabetes Sister      Diabetes Brother      Breast cancer Neg Hx      Colon cancer Neg Hx      Cervical cancer Neg Hx      Endometrial cancer Neg Hx      Vaginal cancer Neg Hx      Thyroid disease Neg Hx      Glaucoma Neg Hx      Retinal detachment Neg Hx          Physical Examination:        Vital Signs   BP (!) 180/74 (BP Location: Left arm, Patient Position: Sitting)   Pulse 73   Temp 98.7 °F (37.1 °C) (Oral)   Resp 18   Ht 5' 4" (1.626 m)   Wt 82.6 kg (182 lb 1.6 oz)   LMP 03/15/2016 (LMP Unknown)   SpO2 98%   BMI 31.26 kg/m²    I/O last 3 completed shifts:  In: 240 [P.O.:240]  Out: -     BP Readings from Last 3 " Encounters:   07/29/25 (!) 180/74   07/24/25 (!) 175/82   07/16/25 (!) 184/84        Gen: NAD  HEENT: NC.   No lower extremity edema   Neck: atrumatic  Heart: audible heart sounds, no edema  Lungs: CTAB, no w/r/r  Access: RUE radiocephalic AVF , Big aneurysms at the fistula site     LABS:    Lab Results   Component Value Date    HGB 7.7 (L) 07/29/2025    HGB 9.4 (L) 07/28/2025    HGB 9.2 (L) 07/16/2025    HGB 10.4 (L) 02/17/2025    HGB 8.8 (L) 12/04/2024    HGB 9.3 (L) 12/03/2024    FERRITIN 2,828 (H) 10/07/2024        Lab Results   Component Value Date    PHOSPHORUS 5 (H) 04/22/2025    PHOSPHORUS 3.7 04/21/2025    CALCIUM 8.9 07/29/2025    CALCIUM 8.9 07/28/2025    CALCIUM 9.2 04/22/2025    CALCIUM 10.2 04/21/2025    CALCIUM 9.2 12/04/2024    CALCIUM 9.1 12/03/2024    .3 (H) 10/07/2024    .7 (H) 01/23/2023        Assessment/Plan:    Jael Hall 59 y.o. female, with Phx of ESRD on IHD, Presented with SOB likely secondary to volume overload       Diagnosis: ESRD on dialysis     ESRD schedule: MWF   Last HD before admission: 7/28/2025  Access: RUE radiocephalic AVF    Unit/Location: [facility]   Nephrologist:Lester Gómez MD  Residual renal function: Minimal       Recommendation:  HD today: Aim for 3 litres of removal   -1L fluid restrictions  - pre/post HD weights on dialysis days.   - continue strict I/O's  - Okay to discharge from nephrology stand point after HD session   - Aneurysms at fistula site : Patient should follow up with vascular surgery asap     Anemia of renal disease - Hb 7.7  - Monitor Hemoglobin  - Continue EPO   - Transfusion if Hg < 7     Secondary hyperparathyroidism - Phos 4.2 (Phos goal 3.5-5.5)  Continue home  sevelamer     Blood pressure management in a patient with ESRD -    Resume home antihypertensives; we will continue to ultrafiltrate with goal dry weight     Access - RUE radiocephalic AVF     Nutrition   -Renal diet (low phos, low K).   -Goal Albumin is 4mg/dL.     -If patient has poor oral intake, recommend nephro nutritional nahedkes    Vale Sultana MD.  Clinical Nephrology Fellow, PGY-4  [facility] [address]

## 2025-07-29 NOTE — PLAN OF CARE
[facility] - Cardiology Stepdown  Discharge Assessment    Primary Care Provider: [facility] Willams MD     Discharge Assessment (most recent)       BRIEF DISCHARGE ASSESSMENT - 07/29/25 1318          Discharge Planning    Assessment Type Discharge Planning Brief Assessment     Resource/Environmental Concerns none     Support Systems Family members     Assistance Needed None     Equipment Currently Used at Home none     Current Living Arrangements apartment     Patient/Family Anticipates Transition to home     Patient/Family Anticipated Services at Transition none     DME Needed Upon Discharge  none     Discharge Plan A Home     Discharge Plan B Home with family                   SW met with pt at bedside to discuss discharge planning.  Pt lives alone in a an apartment and is independent with ambulation and ADLs.  Pt gets HD [facility] Mon/Wed/Fri 5:45am.  Pt will have transportation and assistance from family at discharge.  Discharge Plan A and Plan B have been determined by review of patient's clinical status, future medical and therapeutic needs, and coverage/benefits for post-acute care in coordination with multidisciplinary team members.  Will continue to follow.      Airam Cruz LMSW  [facility]  [id number]

## 2025-07-29 NOTE — NURSING TRANSFER
Nursing Transfer Note      7/29/2025   6:10 PM  Reason patient is being transferred: Dialysis done    Transfer To: room 3069    Transfer via wheelchair    Transfer with cardiac monitoring    Transported by transportation team    Telemetry: Box Number [id number]  Order for Tele Monitor? Yes      Patient belongings transferred with patient: No    Chart send with patient: Yes    Notified: daughter    Patient reassessed at: 1810 07/29/2025    Upon arrival to floor: cardiac monitor applied, patient oriented to room, call bell in reach, and bed in lowest position

## 2025-07-29 NOTE — NURSING
Notified Wily.MD manual /76, BG 71, asymptomatic, ok to give losartan and nifedipine now and hold scheduled insulin. 0900 recheck manual /70, asymptomatic, notified , no new order, [facility]. 1015: notified Wily.MD manual /74, asymptomatic. 1154: Notified  manual /80, BG 77, asymptomatic, ok to hold insulin.

## 2025-07-29 NOTE — PROGRESS NOTES
07/28/25 2107 07/28/25 2354   Vital Signs   BP (!) 191/85 (!) 186/84   MAP (mmHg) 122 120     2111 - Notified Hise, DO of pt BP/map. No new orders. Continue to monitor. Pt asymptomatic, resting comfortably. Pt denies chest pain/SOB.    2359 - Notified Hise, DO of pt BP. No new orders. Continue to monitor. Pt asymptomatic, resting comfortably. Pt denies chest pain/SOB.    0400 - Pt /99 with map 142. Pt reported feeling light headed. Notified Hise, DO. They said she could have her labetalol 200 mg early.     0411 - RN administered labetalol 200 mg. Will re check BP and continue plan care.Updated charge nurse.    0500 - /76 with map 109. Pt asymptomatic.

## 2025-07-30 ENCOUNTER — OFFICE VISIT (OUTPATIENT)
Dept: OPTOMETRY | Facility: CLINIC | Age: 60
End: 2025-07-30
Payer: MEDICARE

## 2025-07-30 DIAGNOSIS — H52.4 PRESBYOPIA OF BOTH EYES: ICD-10-CM

## 2025-07-30 DIAGNOSIS — H35.373 EPIRETINAL MEMBRANE (ERM) OF BOTH EYES: ICD-10-CM

## 2025-07-30 DIAGNOSIS — E11.9 DIABETES MELLITUS TYPE 2 WITHOUT RETINOPATHY: ICD-10-CM

## 2025-07-30 DIAGNOSIS — Z01.00 EYE EXAM, ROUTINE: Primary | ICD-10-CM

## 2025-07-30 DIAGNOSIS — H43.813 PVD (POSTERIOR VITREOUS DETACHMENT), BILATERAL: ICD-10-CM

## 2025-07-30 DIAGNOSIS — H25.13 CATARACT, NUCLEAR SCLEROTIC, BOTH EYES: ICD-10-CM

## 2025-07-30 PROCEDURE — 2023F DILAT RTA XM W/O RTNOPTHY: CPT | Mod: CPTII,HCNC,S$GLB, | Performed by: OPTOMETRIST

## 2025-07-30 PROCEDURE — 92014 COMPRE OPH EXAM EST PT 1/>: CPT | Mod: HCNC,S$GLB,, | Performed by: OPTOMETRIST

## 2025-07-30 PROCEDURE — 1159F MED LIST DOCD IN RCRD: CPT | Mod: CPTII,HCNC,S$GLB, | Performed by: OPTOMETRIST

## 2025-07-30 PROCEDURE — 4010F ACE/ARB THERAPY RXD/TAKEN: CPT | Mod: CPTII,HCNC,S$GLB, | Performed by: OPTOMETRIST

## 2025-07-30 PROCEDURE — 99999 PR PBB SHADOW E&M-EST. PATIENT-LVL II: CPT | Mod: PBBFAC,HCNC,, | Performed by: OPTOMETRIST

## 2025-07-30 PROCEDURE — 3066F NEPHROPATHY DOC TX: CPT | Mod: CPTII,HCNC,S$GLB, | Performed by: OPTOMETRIST

## 2025-07-30 PROCEDURE — 3044F HG A1C LEVEL LT 7.0%: CPT | Mod: CPTII,HCNC,S$GLB, | Performed by: OPTOMETRIST

## 2025-07-30 PROCEDURE — 1160F RVW MEDS BY RX/DR IN RCRD: CPT | Mod: CPTII,HCNC,S$GLB, | Performed by: OPTOMETRIST

## 2025-07-30 PROCEDURE — 92015 DETERMINE REFRACTIVE STATE: CPT | Mod: HCNC,S$GLB,, | Performed by: OPTOMETRIST

## 2025-07-30 NOTE — ASSESSMENT & PLAN NOTE
Creatine stable for now. BMP reviewed- noted Estimated Creatinine Clearance: 8 mL/min (A) (based on SCr of 7.7 mg/dL (H)). according to latest data. Based on current GFR, CKD stage is end stage.  Monitor UOP and serial BMP and adjust therapy as needed. Renally dose meds. Avoid nephrotoxic medications and procedures.    - Consider nephrology consult if hypoxia not improve after routine outpatient dialysis

## 2025-07-30 NOTE — PLAN OF CARE
[facility] - Cardiology Stepdown  Discharge Final Note    Primary Care Provider: [facility] Willams MD    Expected Discharge Date: 7/29/2025    Final Discharge Note (most recent)       Final Note - 07/30/25 0832          Final Note    Assessment Type Final Discharge Note     Anticipated Discharge Disposition Home or Self Care                 Airam Cruz LMSW  [facility]  [id number]      Future Appointments   Date Time Provider Department Center   8/18/2025  2:00 PM Gerhard Little MD [facility] ENDODIA [facility]   8/18/2025  2:15 PM [facility] ENDO US1 ALFREDO [facility] ENDCNUS [facility]   8/18/2025  3:00 PM Brigitte Santos MD [facility] ENSRG [facility]   8/28/2025  2:20 PM Rose Valera MD [facility] CARDIO [facility]   9/17/2025  2:30 PM [facility] NEPHRO [facility]   9/25/2025 10:00 AM Diane Alcala MD [facility] PULMSVC [facility]   1/9/2026 10:00 AM [facility] [id number] [facility]   1/13/2026 10:00 AM Douglas Abraham MD [facility] UROLOG Brent Bishop

## 2025-07-30 NOTE — ASSESSMENT & PLAN NOTE
Patient's FSGs are controlled on current medication regimen.    Last A1c reviewed-   Lab Results   Component Value Date    HGBA1C 4.6 07/03/2025     Most recent fingerstick glucose reviewed-   Recent Labs   Lab 07/29/25  0747 07/29/25  1129 07/29/25  1728 07/29/25  1804   POCTGLUCOSE 71 77 107 92     Current correctional scale  Low  Maintain anti-hyperglycemic dose as follows-   Antihyperglycemics (From admission, onward)      Start     Stop Route Frequency Ordered    07/29/25 0715  insulin aspart U-100 pen 3 Units         -- SubQ 3 times daily with meals 07/28/25 1754    07/28/25 2100  insulin glargine U-100 (Lantus) pen 9 Units         -- SubQ Nightly 07/28/25 1754    07/28/25 1853  insulin aspart U-100 pen 0-5 Units         -- SubQ Before meals & nightly PRN 07/28/25 1753          Hold Oral hypoglycemics while patient is in the hospital.

## 2025-07-30 NOTE — HOSPITAL COURSE
Patient with continued hypoxia while inpatient suggestive of continued volume overloaded despite episode of dialysis outpatient prior presentation.  Nephrology consulted patient underwent additional session of dialysis for volume removal.  Patient weaned to room air.  Patient discharged to continue outpatient return dialysis.      Patient deemed appropriate for discharge. I personally saw, examined, and evaluated patient prior to departure. Plan discussed with patient, who was agreeable and amenable; medications were discussed and reviewed, outpatient follow-up scheduled, ER precautions were given, all questions were answered to the patient's satisfaction, and Jael Hall was subsequently discharged.

## 2025-07-30 NOTE — PROGRESS NOTES
HPI    VERONICA: 08/2024 Dr. Cardenas  Last DFE: 08/2024  Chief complaint (CC): 58 yo F presents for an annual diabetic eye exam. Pt   c/o blurry vision at near. Pt denies any other ocular or visual complaints   today.    Glasses? -  Contacts? -  H/o eye surgery, injections or laser: -  H/o eye injury: -  Known eye conditions?    Diabetes mellitus type 2 without retinopathy   Incomplete PVD (posterior vitreous detachment), bilateral; Vitreoretinal   traction surrounding nerve, both eyes   Epiretinal membrane (ERM) of both eyes   Nuclear sclerosis of both eyes  Family h/o eye conditions? -  Eye gtts? -      (-) Flashes (-) Mucous   (+)  Floaters pt reports noticed a few weeks ago   (+)  Tearing pt reports started about 1 month ago    (-) Itching (-) Burning   (-) Headaches (-) Eye Pain/discomfort (-) Irritation   (-)  Redness (-) Double vision (-) Blurry vision    CL Exam: No    DM Dx'ed   BS: 111 This morning   Diabetic? Yes  A1c? Lab Results       Component                Value               Date                       HGBA1C                   4.6                 07/03/2025              Last edited by Jed Cardenas, OD on 7/30/2025  9:45 AM.            Assessment /Plan     For exam results, see Encounter Report.      Eye exam, routine   - [facility]    Diabetes mellitus type 2 without retinopathy   - Pt educated on the importance of maintaining adequate glycemic and blood pressure control via diet, compliance with medications, exercise and timely follow up with PCP.  No diabetic retinopathy, No CSME.     Incomplete PVD (posterior vitreous detachment), bilateral              - OCT macula 8/22/24: ERM OU; vitreoretinal traction surrounding ONH OU               - BCVA OD 20/30 OS 20/40              - No evidence of holes, tears or detachment of retina. Negative Shafers sign in vitreous. Patient educated on signs and symptoms of retinal detachment and advised to return to clinic immediately if symptoms arise.              -  Pt referred to retina for further evaluation and potential treatment     Cataract, nuclear sclerotic, both eyes   - Not visually significant. Monitor.    Astigmatism of both eyes with presbyopia   - New Spectacle Rx given, discussed different options for glasses.

## 2025-07-30 NOTE — ASSESSMENT & PLAN NOTE
Creatine stable for now. BMP reviewed- noted Estimated Creatinine Clearance: 8 mL/min (A) (based on SCr of 7.7 mg/dL (H)). according to latest data. Based on current GFR, CKD stage is end stage.  Monitor UOP and serial BMP and adjust therapy as needed. Renally dose meds. Avoid nephrotoxic medications and procedures.

## 2025-07-30 NOTE — ASSESSMENT & PLAN NOTE
Patient's blood pressure range in the last 24 hours was: BP  Min: 149/79  Max: 224/99.The patient's inpatient anti-hypertensive regimen is listed below:  Current Antihypertensives  labetaloL tablet 200 mg, 2 times daily, Oral  losartan tablet 100 mg, Daily, Oral  NIFEdipine 24 hr tablet 30 mg, Daily, Oral    Plan  - BP is controlled, no changes needed to their regimen

## 2025-07-30 NOTE — DISCHARGE SUMMARY
[facility] - Cardiology [facility] Medicine  Discharge Summary      Patient Name: Jael Hall  MRN: [id number]  CHUCK: [id number]  Patient Class: OP- Observation  Admission Date: 7/28/2025  Hospital Length of Stay: 0 days  Discharge Date and Time: 07/29/2025 8:25 PM  Attending Physician: Kingston Julien MD   Discharging Provider: Kingston Julien MD  Primary Care Provider: Kenyon Willams MD  [facility] Medicine Team: [facility] MED  Kingston Julien MD  Primary Care Team: [facility]    HPI:   Ms. Hall is a 59 yoF with a history of T2DM c/b ESRD on HD MWF and diabetic neuropathy, RCC s/p right nephrectomy 2020, htn, HFpEF (EF 60%), NAFLD who presents from dialysis with dyspnea.  Patient reports acute onset dyspnea 1:00 a.m. on day of presentation.  Reported to dialysis that morning and underwent full dialysis session at 4:00 a.m. with 2.2 L removed.  Patient reports dialysis center nephrologist urged her to report to the ED for further evaluation.  Denies missing prior dialysis sessions.  Able to note amount of daily fluid intake.  Makes minimal urine daily.    In the ED, /87, HR 82, afebrile, on 2 L nasal cannula.  Creatinine 5.4, potassium 3.7, hemoglobin 9.4, WBC 7.8, COVID negative, and proBNP 20,424. Checks x-ray with left pleural effusion.        * No surgery found *      Hospital Course:   Patient with continued hypoxia while inpatient suggestive of continued volume overloaded despite episode of dialysis outpatient prior presentation.  Nephrology consulted patient underwent additional session of dialysis for volume removal.  Patient weaned to room air.  Patient discharged to continue outpatient return dialysis.      Patient deemed appropriate for discharge. I personally saw, examined, and evaluated patient prior to departure. Plan discussed with patient, who was agreeable and amenable; medications were discussed and reviewed, outpatient follow-up scheduled, ER precautions were given, all  questions were answered to the patient's satisfaction, and Jael Hall was subsequently discharged.       Goals of Care Treatment Preferences:  Code Status: Full Code         Consults:   Consults (From admission, onward)          Status Ordering Provider     Inpatient consult to Nephrology  Once        Provider:  (Not yet assigned)    Completed MOISES SOSA            Assessment & Plan  Acute hypoxemic respiratory failure  Patient with Hypercapnic Respiratory failure which is Acute.  she is not on home oxygen. Supplemental oxygen was provided and noted-      .   Signs/symptoms of respiratory failure include- increased work of breathing. Contributing diagnoses includes - CHF Labs and images were reviewed. Patient Has not had a recent ABG. Will treat underlying causes and adjust management of respiratory failure as follows.      - Patient likely presenting with volume overload between routine Friday and Monday dialysis  - On 2 L nasal cannula in the ED  - Treat CHF as well  - Weaning supplemental oxygen as tolerated    Chronic diastolic heart failure  - Admission BNP 20,424  - Makes minimal urine   - Volume removal through HD    Mixed hyperlipidemia  - Home statin    ESRD (end stage renal disease) on dialysis  Chronic kidney disease-mineral and bone disorder  Creatine stable for now. BMP reviewed- noted Estimated Creatinine Clearance: 8 mL/min (A) (based on SCr of 7.7 mg/dL (H)). according to latest data. Based on current GFR, CKD stage is end stage.  Monitor UOP and serial BMP and adjust therapy as needed. Renally dose meds. Avoid nephrotoxic medications and procedures.    - Consider nephrology consult if hypoxia not improve after routine outpatient dialysis    Essential hypertension  Patient's blood pressure range in the last 24 hours was: BP  Min: 149/79  Max: 224/99.The patient's inpatient anti-hypertensive regimen is listed below:  Current Antihypertensives  labetaloL tablet 200 mg, 2 times daily,  Oral  losartan tablet 100 mg, Daily, Oral  NIFEdipine 24 hr tablet 30 mg, Daily, Oral    Plan  - BP is controlled, no changes needed to their regimen    QT prolongation  - Noted on admission EKG   - Avoid QT prolonging medications    Type 2 diabetes mellitus  Patient's FSGs are controlled on current medication regimen.    Last A1c reviewed-   Lab Results   Component Value Date    HGBA1C 4.6 07/03/2025     Most recent fingerstick glucose reviewed-   Recent Labs   Lab 07/29/25  0747 07/29/25  1129 07/29/25  1728 07/29/25  1804   POCTGLUCOSE 71 77 107 92     Current correctional scale  Low  Maintain anti-hyperglycemic dose as follows-   Antihyperglycemics (From admission, onward)      Start     Stop Route Frequency Ordered    07/29/25 0715  insulin aspart U-100 pen 3 Units         -- SubQ 3 times daily with meals 07/28/25 1754    07/28/25 2100  insulin glargine U-100 (Lantus) pen 9 Units         -- SubQ Nightly 07/28/25 1754    07/28/25 1853  insulin aspart U-100 pen 0-5 Units         -- SubQ Before meals & nightly PRN 07/28/25 1753          Hold Oral hypoglycemics while patient is in the hospital.    ESRD (end stage renal disease)  Creatine stable for now. BMP reviewed- noted Estimated Creatinine Clearance: 8 mL/min (A) (based on SCr of 7.7 mg/dL (H)). according to latest data. Based on current GFR, CKD stage is end stage.  Monitor UOP and serial BMP and adjust therapy as needed. Renally dose meds. Avoid nephrotoxic medications and procedures.  Final Active Diagnoses:    Diagnosis Date Noted POA    PRINCIPAL PROBLEM:  Acute hypoxemic respiratory failure [J96.01] 12/03/2024 Yes    ESRD (end stage renal disease) [N18.6] 07/29/2025 Yes    Type 2 diabetes mellitus [E11.9] 07/27/2021 Yes    QT prolongation [R94.31] 07/22/2021 Yes    Chronic diastolic heart failure [I50.32] 02/25/2021 Yes    Chronic kidney disease-mineral and bone disorder [N18.9, E83.9, M89.9] 10/08/2020 Yes    Essential hypertension [I10]  Yes     Chronic     Mixed hyperlipidemia [E78.2] 09/28/2017 Yes    ESRD (end stage renal disease) on dialysis [N18.6, Z99.2] 09/28/2017 Not Applicable     Chronic      Problems Resolved During this Admission:       Discharged Condition: good    Disposition: Home or Self Care    Follow Up:    Patient Instructions:      Diet renal     Notify your health care provider if you experience any of the following:  increased confusion or weakness     Notify your health care provider if you experience any of the following:  persistent dizziness, light-headedness, or visual disturbances     Notify your health care provider if you experience any of the following:  worsening rash     Notify your health care provider if you experience any of the following:  severe persistent headache     Notify your health care provider if you experience any of the following:  difficulty breathing or increased cough     Notify your health care provider if you experience any of the following:  redness, tenderness, or signs of infection (pain, swelling, redness, odor or green/yellow discharge around incision site)     Notify your health care provider if you experience any of the following:  severe uncontrolled pain     Notify your health care provider if you experience any of the following:  persistent nausea and vomiting or diarrhea     Notify your health care provider if you experience any of the following:  temperature >100.4     Activity as tolerated       Significant Diagnostic Studies: Labs: All labs within the past 24 hours have been reviewed    Pending Diagnostic Studies:       Procedure Component Value Units Date/Time    HCV Virus Hold Specimen [[id number]] Collected: 07/28/25 1402    Order Status: Sent Lab Status: In process Updated: 07/28/25 1408    Specimen: Blood            Medications:  Reconciled Home Medications:      Medication List        CONTINUE taking these medications      acetaminophen 500 MG tablet  Commonly known as: TYLENOL  Take 1 tablet  (500 mg total) by mouth every 8 (eight) hours as needed for Pain.     aspirin 81 MG EC tablet  Commonly known as: ECOTRIN  Take 81 mg by mouth every morning.     atorvastatin 40 MG tablet  Commonly known as: LIPITOR  Take 1 tablet (40 mg total) by mouth once daily.     epoetin shiela 10,000 unit/mL injection  Commonly known as: PROCRIT  Inject 0.85 mLs (8,500 Units total) into the skin every Mon, Wed, Fri.     hydrOXYzine HCL 10 MG Tab  Commonly known as: ATARAX  Take 1 tablet (10 mg total) by mouth 2 (two) times daily as needed.     insulin aspart U-100 100 unit/mL (3 mL) Inpn pen  Commonly known as: NovoLOG  Inject 0-5 Units into the skin before meals and at bedtime as needed (Hyperglycemia).     labetaloL 200 MG tablet  Commonly known as: NORMODYNE  Take 1 tablet (200 mg total) by mouth 2 (two) times daily.     LANTUS SOLOSTAR U-100 INSULIN 100 unit/mL (3 mL) Inpn pen  Generic drug: insulin glargine U-100 (Lantus)  Inject 12 Units into the skin once daily.     losartan 100 MG tablet  Commonly known as: COZAAR  Take 1 tablet (100 mg total) by mouth once daily.     NIFEdipine 30 MG (OSM) 24 hr tablet  Commonly known as: PROCARDIA-XL  Take 1 tablet (30 mg total) by mouth once daily.     omeprazole 20 MG capsule  Commonly known as: PRILOSEC  Take 20 mg by mouth every morning.     pregabalin 75 MG capsule  Commonly known as: LYRICA  Take 1 capsule (75 mg total) by mouth once daily.     sevelamer carbonate 800 mg Tab  Commonly known as: RENVELA  Take 3 tablets (2,400 mg total) by mouth 3 (three) times daily with meals.     sevelamer  MG Tab  Commonly known as: RENAGEL  Take 800 mg by mouth.              Indwelling Lines/Drains at time of discharge:   Lines/Drains/Airways       Drain  Duration                  Hemodialysis AV Fistula Right forearm -- days                        Time spent on the discharge of patient: 35 minutes         Kingston Julien MD  Department of Hospital Medicine  [facility] - Cardiology  Stepdown

## 2025-07-31 LAB — HOLD SPECIMEN: NORMAL

## 2025-08-01 ENCOUNTER — PATIENT OUTREACH (OUTPATIENT)
Dept: ADMINISTRATIVE | Facility: CLINIC | Age: 60
End: 2025-08-01
Payer: MEDICARE

## 2025-08-01 NOTE — TELEPHONE ENCOUNTER
Called pt; pt did not answer    Left message asking pt to call back  Pt should schedule HOSFU appt - does NOT need to be with PCP

## 2025-08-01 NOTE — PROGRESS NOTES
C3 nurse attempted to contact Jael Hall  for a TCC post hospital discharge follow up call. No answer. LVM requesting a callback at [phone].    The patient does not have a scheduled HOSFU appointment. Message sent to PCP's staff to assist with HOSFU appointment scheduling.        no

## 2025-08-08 ENCOUNTER — OFFICE VISIT (OUTPATIENT)
Dept: OPHTHALMOLOGY | Facility: CLINIC | Age: 60
End: 2025-08-08
Payer: MEDICARE

## 2025-08-08 DIAGNOSIS — E11.9 DIABETES MELLITUS TYPE 2 WITHOUT RETINOPATHY: ICD-10-CM

## 2025-08-08 DIAGNOSIS — H35.373 EPIRETINAL MEMBRANE (ERM) OF BOTH EYES: ICD-10-CM

## 2025-08-08 DIAGNOSIS — H43.813 PVD (POSTERIOR VITREOUS DETACHMENT), BILATERAL: Primary | ICD-10-CM

## 2025-08-08 DIAGNOSIS — H25.13 AGE-RELATED NUCLEAR CATARACT OF BOTH EYES: ICD-10-CM

## 2025-08-08 PROCEDURE — 99999 PR PBB SHADOW E&M-EST. PATIENT-LVL III: CPT | Mod: PBBFAC,HCNC,, | Performed by: OPHTHALMOLOGY

## 2025-08-08 NOTE — PROGRESS NOTES
"HPI    Pt presents for retinal evaluation     Pt. States NV is very hard for her to see. Has to blink constantly to try   and get her eyes to refocus. Eyes also are very watery and slight   irritation in eyes OU. Occasionally see's "floating stars" thinks these   may be flashes of light.   Denies Floaters/pain  Eye meds:None    Hemoglobin A1c       Date                     Value               Ref Range             Status                07/03/2025               4.6                 4.0 - 5.6 %           Final               Last edited by Bob Gaston MA on 8/8/2025 10:18 AM.         A/P    ICD-10-CM ICD-9-CM   1. PVD (posterior vitreous detachment), bilateral  H43.813 379.21   2. Epiretinal membrane (ERM) of both eyes  H35.373 362.56   3. Age-related nuclear cataract of both eyes  H25.13 366.16   4. Diabetes mellitus type 2 without retinopathy  E11.9 250.00       1. PVD (posterior vitreous detachment), bilateral  2. Epiretinal membrane (ERM) of both eyes  Referral from Dr Cardenas for retina check - Recent notes reviewed     Mild ERM, pt without metamorphopsia  Occ floaters no RT/RD    Plan: Observation for now, do not recommend PPV.MP at this time   [place] precautions discussed    Visit today is associated with current or anticipated ongoing medical care related to this patients single serious condition/complex condition (epiretinal membrane)     3. Age-related nuclear cataract of both eyes  Borderline VS NS  Plan: Observation Update Mrx prn     4. Diabetes mellitus type 2 without retinopathy  PCP Kenyon Willams MD - Recent notes reviewed  07/03/2025  4.6  A1C   No DR or DME OU  Plan: Observation  Recommend good blood pressure control, tight blood glucose control, and good cholesterol control     RTC 6 mo DFE/OCTm OU         I saw and examined the patient and reviewed in detail the findings documented. The final examination findings, image interpretations which have been independently interpreted, and plan as " documented in the record represent my personal judgment and conclusions.    Antione Ramsey MD  Vitreoretinal Surgery   [facility]

## 2025-08-11 ENCOUNTER — TELEPHONE (OUTPATIENT)
Dept: PULMONOLOGY | Facility: CLINIC | Age: 60
End: 2025-08-11
Payer: MEDICARE

## 2025-08-11 ENCOUNTER — OFFICE VISIT (OUTPATIENT)
Dept: INTERNAL MEDICINE | Facility: CLINIC | Age: 60
End: 2025-08-11
Payer: MEDICARE

## 2025-08-11 VITALS
HEART RATE: 73 BPM | WEIGHT: 180.31 LBS | OXYGEN SATURATION: 94 % | SYSTOLIC BLOOD PRESSURE: 140 MMHG | HEIGHT: 64 IN | BODY MASS INDEX: 30.78 KG/M2 | DIASTOLIC BLOOD PRESSURE: 58 MMHG

## 2025-08-11 DIAGNOSIS — N18.6 ESRD (END STAGE RENAL DISEASE) ON DIALYSIS: ICD-10-CM

## 2025-08-11 DIAGNOSIS — Z79.4 TYPE 2 DIABETES MELLITUS WITH OTHER SPECIFIED COMPLICATION, WITH LONG-TERM CURRENT USE OF INSULIN: ICD-10-CM

## 2025-08-11 DIAGNOSIS — E11.69 TYPE 2 DIABETES MELLITUS WITH OTHER SPECIFIED COMPLICATION, WITH LONG-TERM CURRENT USE OF INSULIN: ICD-10-CM

## 2025-08-11 DIAGNOSIS — I50.32 CHRONIC DIASTOLIC HEART FAILURE: ICD-10-CM

## 2025-08-11 DIAGNOSIS — Z99.2 ESRD (END STAGE RENAL DISEASE) ON DIALYSIS: ICD-10-CM

## 2025-08-11 DIAGNOSIS — I15.1 HYPERTENSION SECONDARY TO OTHER RENAL DISORDERS: ICD-10-CM

## 2025-08-11 DIAGNOSIS — Z09 HOSPITAL DISCHARGE FOLLOW-UP: Primary | ICD-10-CM

## 2025-08-11 PROBLEM — E11.9 DIABETES MELLITUS TYPE 2 WITHOUT RETINOPATHY: Status: RESOLVED | Noted: 2025-08-08 | Resolved: 2025-08-11

## 2025-08-11 PROCEDURE — 99999 PR PBB SHADOW E&M-EST. PATIENT-LVL III: CPT | Mod: PBBFAC,HCNC,GC,

## 2025-08-13 ENCOUNTER — TELEPHONE (OUTPATIENT)
Dept: PULMONOLOGY | Facility: CLINIC | Age: 60
End: 2025-08-13
Payer: MEDICARE

## 2025-08-18 ENCOUNTER — HOSPITAL ENCOUNTER (OUTPATIENT)
Dept: ENDOCRINOLOGY | Facility: CLINIC | Age: 60
Discharge: HOME | End: 2025-08-18
Attending: SURGERY
Payer: MEDICARE

## 2025-08-18 ENCOUNTER — OFFICE VISIT (OUTPATIENT)
Dept: ENDOCRINOLOGY | Facility: CLINIC | Age: 60
End: 2025-08-18
Payer: MEDICARE

## 2025-08-18 VITALS
BODY MASS INDEX: 31.39 KG/M2 | DIASTOLIC BLOOD PRESSURE: 78 MMHG | SYSTOLIC BLOOD PRESSURE: 176 MMHG | WEIGHT: 182.88 LBS | HEART RATE: 81 BPM

## 2025-08-18 DIAGNOSIS — E04.1 THYROID NODULE: Primary | ICD-10-CM

## 2025-08-18 DIAGNOSIS — R79.89 LOW TSH LEVEL: ICD-10-CM

## 2025-08-18 PROCEDURE — 99999 PR PBB SHADOW E&M-EST. PATIENT-LVL III: CPT | Mod: PBBFAC,HCNC,, | Performed by: INTERNAL MEDICINE

## 2025-08-18 PROCEDURE — 88173 CYTOPATH EVAL FNA REPORT: CPT | Mod: TC,HCNC

## 2025-08-18 PROCEDURE — 3044F HG A1C LEVEL LT 7.0%: CPT | Mod: CPTII,HCNC,S$GLB, | Performed by: INTERNAL MEDICINE

## 2025-08-18 PROCEDURE — 4010F ACE/ARB THERAPY RXD/TAKEN: CPT | Mod: CPTII,HCNC,S$GLB, | Performed by: INTERNAL MEDICINE

## 2025-08-18 PROCEDURE — 99204 OFFICE O/P NEW MOD 45 MIN: CPT | Mod: HCNC,S$GLB,, | Performed by: INTERNAL MEDICINE

## 2025-08-18 PROCEDURE — 3008F BODY MASS INDEX DOCD: CPT | Mod: CPTII,HCNC,S$GLB, | Performed by: INTERNAL MEDICINE

## 2025-08-18 PROCEDURE — 1159F MED LIST DOCD IN RCRD: CPT | Mod: CPTII,HCNC,S$GLB, | Performed by: INTERNAL MEDICINE

## 2025-08-18 PROCEDURE — 3066F NEPHROPATHY DOC TX: CPT | Mod: CPTII,HCNC,S$GLB, | Performed by: INTERNAL MEDICINE

## 2025-08-18 PROCEDURE — G2211 COMPLEX E/M VISIT ADD ON: HCPCS | Mod: HCNC,S$GLB,, | Performed by: INTERNAL MEDICINE

## 2025-08-18 PROCEDURE — 3077F SYST BP >= 140 MM HG: CPT | Mod: CPTII,HCNC,S$GLB, | Performed by: INTERNAL MEDICINE

## 2025-08-18 PROCEDURE — 3078F DIAST BP <80 MM HG: CPT | Mod: CPTII,HCNC,S$GLB, | Performed by: INTERNAL MEDICINE

## 2025-08-18 PROCEDURE — 10005 FNA BX W/US GDN 1ST LES: CPT | Mod: HCNC,S$GLB,, | Performed by: INTERNAL MEDICINE

## 2025-08-19 ENCOUNTER — TELEPHONE (OUTPATIENT)
Dept: ENDOCRINOLOGY | Facility: CLINIC | Age: 60
End: 2025-08-19
Payer: MEDICARE

## 2025-08-19 DIAGNOSIS — E04.1 THYROID NODULE: Primary | ICD-10-CM

## 2025-08-19 LAB
ESTROGEN SERPL-MCNC: NORMAL PG/ML
INSULIN SERPL-ACNC: NORMAL U[IU]/ML
LAB AP CLINICAL INFORMATION: NORMAL
LAB AP GROSS DESCRIPTION: NORMAL
LAB AP NON-GYN INTERPRETATION SPECIMEN 1: NORMAL
LAB AP PERFORMING LOCATION(S): NORMAL

## 2025-08-21 ENCOUNTER — OFFICE VISIT (OUTPATIENT)
Dept: PULMONOLOGY | Facility: CLINIC | Age: 60
End: 2025-08-21
Payer: MEDICARE

## 2025-08-21 VITALS
DIASTOLIC BLOOD PRESSURE: 64 MMHG | BODY MASS INDEX: 31.27 KG/M2 | WEIGHT: 183.19 LBS | HEIGHT: 64 IN | HEART RATE: 76 BPM | SYSTOLIC BLOOD PRESSURE: 144 MMHG | OXYGEN SATURATION: 96 %

## 2025-08-21 DIAGNOSIS — I27.20 PULMONARY HTN: ICD-10-CM

## 2025-08-21 DIAGNOSIS — I50.32 CHRONIC HEART FAILURE WITH PRESERVED EJECTION FRACTION: Primary | ICD-10-CM

## 2025-08-21 DIAGNOSIS — R59.0 MEDIASTINAL ADENOPATHY: Chronic | ICD-10-CM

## 2025-08-21 DIAGNOSIS — N18.6 ESRD (END STAGE RENAL DISEASE) ON DIALYSIS: Chronic | ICD-10-CM

## 2025-08-21 DIAGNOSIS — Z99.2 ESRD (END STAGE RENAL DISEASE) ON DIALYSIS: Chronic | ICD-10-CM

## 2025-08-21 DIAGNOSIS — J96.01 ACUTE HYPOXEMIC RESPIRATORY FAILURE: ICD-10-CM

## 2025-08-21 PROCEDURE — 99999 PR PBB SHADOW E&M-EST. PATIENT-LVL IV: CPT | Mod: PBBFAC,HCNC,, | Performed by: INTERNAL MEDICINE

## 2025-08-21 RX ORDER — AMLODIPINE BESYLATE 10 MG/1
10 TABLET ORAL NIGHTLY
COMMUNITY

## 2025-08-25 ENCOUNTER — TELEPHONE (OUTPATIENT)
Dept: CARDIOLOGY | Facility: CLINIC | Age: 60
End: 2025-08-25
Payer: MEDICARE

## (undated) DEVICE — ELECTRODE REM PLYHSV RETURN 9

## (undated) DEVICE — DRAPE SURGICAL STERI IRRG PCH

## (undated) DEVICE — DEVICE ANC SW STAT FOLEY 6-24

## (undated) DEVICE — SOL CLEARIFY VISUALIZATION LAP

## (undated) DEVICE — BAG INZII TISS RETRV 12/15MM

## (undated) DEVICE — BURR ROUND DMND CRS ELT 4.0MM

## (undated) DEVICE — SET MICPUNC ACC STIFF CANNULA

## (undated) DEVICE — CLOSURE SKIN STERI STRIP 1/2X4

## (undated) DEVICE — BUR ELITE TAPR DMND EXT 2.0MM

## (undated) DEVICE — DRESSING GLASSCOCK PED MASTOID

## (undated) DEVICE — SPONGE GAUZE 16PLY 4X4

## (undated) DEVICE — SEE MEDLINE ITEM 157110

## (undated) DEVICE — KIT EAR EAOFE

## (undated) DEVICE — SUCTION SURGICAL FRAZR

## (undated) DEVICE — KIT DRESS GLASSCK EAR ADLT ST

## (undated) DEVICE — Device

## (undated) DEVICE — SEE MEDLINE ITEM 152622

## (undated) DEVICE — SET MICROPUNCTUREECHO STIFF

## (undated) DEVICE — PORT ACCESS 8MM W/120MM LOW

## (undated) DEVICE — SEAL UNIVERSAL 5MM-8MM XI

## (undated) DEVICE — BURR PRECISION 5MM ROUND

## (undated) DEVICE — SOL ELECTROLUBE ANTI-STIC

## (undated) DEVICE — BANDAGE ADHESIVE

## (undated) DEVICE — DRAPE COLUMN DAVINCI XI

## (undated) DEVICE — SET BASIN 48X48IN 6000ML RING

## (undated) DEVICE — SYR SLIP TIP 20CC

## (undated) DEVICE — SET TRI-LUMEN FILTERED TUBE

## (undated) DEVICE — SUT BONE WAX 2.5 GRMS 12/BX

## (undated) DEVICE — BLADE CLIPPER SENICLIP SURGICA

## (undated) DEVICE — TROCAR ENDOPATH XCEL 12X100MM

## (undated) DEVICE — SOL WATER STRL IRR 1000ML

## (undated) DEVICE — SUT PROLENE RB-1 4-0

## (undated) DEVICE — IRRIGATOR ENDOSCOPY DISP.

## (undated) DEVICE — FLOWSWITCH HP 1-W W/O LL

## (undated) DEVICE — PORT AIRSEAL 12/120MM LPI

## (undated) DEVICE — ADHESIVE DERMABOND ADVANCED

## (undated) DEVICE — GLOVE BIOGEL SKINSENSE PI 7.0

## (undated) DEVICE — GLOVE BIOGEL 7.5

## (undated) DEVICE — SEE MEDLINE ITEM 146313

## (undated) DEVICE — POSITIONER HEAD ADULT

## (undated) DEVICE — SEE MEDLINE ITEM 157128

## (undated) DEVICE — COVER TIP CURVED SCISSORS XI

## (undated) DEVICE — FORCEP CURVED DISP

## (undated) DEVICE — KIT ROBOTIC 4 ARM DA VINCI SI

## (undated) DEVICE — STAPLER INT LINEAR ARTC 3.5-45

## (undated) DEVICE — CORD BIPOLAR 12 FOOT

## (undated) DEVICE — GOWN SMARTGOWN LVL4 X-LONG XL

## (undated) DEVICE — GLOVE BIOGEL SKINSENSE PI 7.5

## (undated) DEVICE — CLIP HEMO-LOK MLX LARGE LF

## (undated) DEVICE — DRAPE ARM DAVINCI XI

## (undated) DEVICE — SEE MEDLINE ITEM 156923

## (undated) DEVICE — TUBE FRAZIER 5MM 2FT SOFT TIP

## (undated) DEVICE — SOL IRR WATER STRL 3000 ML

## (undated) DEVICE — ELECTRODE NDL

## (undated) DEVICE — COVER LIGHT HANDLE 80/CA

## (undated) DEVICE — SUT V-LOC 180 ABD 2/0 GS-21

## (undated) DEVICE — SYR 10CC LUER LOCK

## (undated) DEVICE — TRAY FOLEY 16FR INFECTION CONT

## (undated) DEVICE — CLIPPER BLADE MOD 4406 (CAREF)

## (undated) DEVICE — STERILE WATER 1000ML BOTTLE

## (undated) DEVICE — INSERT CUSHIONPRONE VIEW LARGE

## (undated) DEVICE — KIT ANTIFOG

## (undated) DEVICE — TUBING CONTRAST INJCTN F-M 10

## (undated) DEVICE — TRAY MINOR GEN SURG

## (undated) DEVICE — SET INFLOW TUBE HYSTER

## (undated) DEVICE — GLOVE BIOGEL SKINSENSE PI 8.0

## (undated) DEVICE — SUT PROLENE 0 CT1 30IN BLUE

## (undated) DEVICE — BLADE SURG CARBON STEEL SZ11

## (undated) DEVICE — JELLY SURGILUBE 5GR

## (undated) DEVICE — GUIDE WIRE MOTION .035 X 150CM

## (undated) DEVICE — DRESSING TELFA STRL 4X3 LF

## (undated) DEVICE — SOL NS 1000CC

## (undated) DEVICE — SUT 3/0 18IN COATED VICRYLP

## (undated) DEVICE — GOWN SURGICAL X-LARGE

## (undated) DEVICE — SOL IRR NACL .9% 3000ML

## (undated) DEVICE — DRAPE ABDOMINAL TIBURON 14X11

## (undated) DEVICE — DRAPE SCOPE PILLOW WARMER

## (undated) DEVICE — KIT WING PAD POSITIONING

## (undated) DEVICE — BUR ELITE RND DIAMOND 3.0MM

## (undated) DEVICE — BURR PRECISION ROUND 4.0MM

## (undated) DEVICE — SUT MCRYL PLUS 4-0 PS2 27IN

## (undated) DEVICE — NDL INSUF ULTRA VERESS 120MM

## (undated) DEVICE — GAUZE FLUFF XXLG 36X36 2 PLY

## (undated) DEVICE — COVERS PROBE NR-48 STERILE

## (undated) DEVICE — CART STAPLE RELD 45MM WHT

## (undated) DEVICE — GLOVE BIOGEL SKINSENSE PI 6.5

## (undated) DEVICE — SEE MEDLINE ITEM 157103

## (undated) DEVICE — CATH 5FR OPEN END URETERAL

## (undated) DEVICE — SOL 9P NACL IRR PIC IL

## (undated) DEVICE — BURR ROUND DIAMOND TAPR 1.5MM

## (undated) DEVICE — TRAY CYSTO BASIN

## (undated) DEVICE — TAPE SILK 3IN

## (undated) DEVICE — PACK CYSTO

## (undated) DEVICE — COVER LIGHT HANDLE

## (undated) DEVICE — KIT SUCTION IRRIGATION

## (undated) DEVICE — SEE MEDLINE ITEM 156918

## (undated) DEVICE — SEE MEDLINE ITEM 146417

## (undated) DEVICE — CONTAINER SPECIMEN STRL 4OZ

## (undated) DEVICE — MANIPULATOR VCARE PLUS 34MM

## (undated) DEVICE — BURR ROUND DIAMOND TAPR 1.0MM